# Patient Record
Sex: FEMALE | Race: WHITE | NOT HISPANIC OR LATINO | Employment: OTHER | URBAN - METROPOLITAN AREA
[De-identification: names, ages, dates, MRNs, and addresses within clinical notes are randomized per-mention and may not be internally consistent; named-entity substitution may affect disease eponyms.]

---

## 2017-01-13 ENCOUNTER — OFFICE VISIT (OUTPATIENT)
Dept: PLASTIC SURGERY | Facility: CLINIC | Age: 57
End: 2017-01-13
Payer: MEDICARE

## 2017-01-13 DIAGNOSIS — Z09 SURGERY FOLLOW-UP EXAMINATION: Primary | ICD-10-CM

## 2017-01-13 PROCEDURE — 99024 POSTOP FOLLOW-UP VISIT: CPT | Mod: S$GLB,,, | Performed by: PHYSICIAN ASSISTANT

## 2017-01-13 PROCEDURE — 99999 PR PBB SHADOW E&M-EST. PATIENT-LVL III: CPT | Mod: PBBFAC,,, | Performed by: PHYSICIAN ASSISTANT

## 2017-01-13 NOTE — PROGRESS NOTES
Negrita Castro presents [facility] on 1[facility]13/2017 for a follow up visit status post R breast expansion     Current Outpatient Prescriptions on File Prior to Visit   Medication Sig Dispense Refill    carisoprodol (SOMA) 350 MG tablet Take 350 mg by mouth 3 (three) times daily as needed.   0    cyclobenzaprine (FLEXERIL) 10 MG tablet       diazepam (VALIUM) 10 MG tablet Take 10 mg by mouth every 6 (six) hours as needed.       dronabinol (MARINOL) 2.5 MG capsule Take 2.5 mg by mouth 2 (two) times daily before meals.      enalapril (VASOTEC) 2.5 MG tablet Take 2.5 mg by mouth once daily. 1/2 TAB  1    glimepiride (AMARYL) 1 MG tablet Take 1 mg by mouth once daily.  1    hydrocodone-acetaminophen 10-325mg (NORCO)  mg Tab Take 1 tablet by mouth 4 (four) times daily as needed.  0    JANUVIA 100 mg Tab Take 100 mg by mouth once daily.   1    metformin (GLUCOPHAGE) 850 MG tablet Take 850 mg by mouth 2 (two) times daily.   1    ondansetron (ZOFRAN) 8 MG tablet   1    ondansetron (ZOFRAN-ODT) 8 MG TbDL Take 1 tablet (8 mg total) by mouth every 8 (eight) hours as needed. 10 tablet 1    oxycodone-acetaminophen (PERCOCET) 5-325 mg per tablet Take 1 tablet by mouth every 4 (four) hours as needed for Pain. 41 tablet 0    oxycodone-acetaminophen (PERCOCET) 7.5-325 mg per tablet Take 1 tablet by mouth every 4 (four) hours as needed for Pain. 40 tablet 0    oxycodone-acetaminophen (PERCOCET) 7.5-325 mg per tablet Take 1 tablet by mouth every 4 (four) hours as needed for Pain. 30 tablet 0    pravastatin (PRAVACHOL) 40 MG tablet Take 40 mg by mouth every evening.   1    promethazine (PHENERGAN) 25 MG tablet   1    sertraline (ZOLOFT) 100 MG tablet       silver sulfADIAZINE 1% (SILVADENE) 1 % cream APPLY TO AFFECTED AREA FOUR TIMES DAILY  1    triamcinolone acetonide 0.1% (KENALOG) 0.1 % ointment apply TO SKIN THREE TIMES DAILY  1    venlafaxine (EFFEXOR) 37.5 MG Tab TAKE 1 TABLET BY MOUTH TWICE  DAILY FOR hot flashes  3    zolpidem (AMBIEN) 10 mg Tab Take 10 mg by mouth nightly as needed.  0     No current facility-administered medications on file prior to visit.      Patient Active Problem List   Diagnosis    Breast cancer in situ    Breast cancer    Depression    Anxiety disorder due to known physiological condition    Dysthymic disorder    Malignant neoplasm of right female breast     Past Surgical History   Procedure Laterality Date    Shoulder surgery and wrist       BILAT  BONE SPUR    Wrist surgery       RIGHT    Breast biopsy       right    Breast surgery       11-3-15 LUMPECTOMY, 12-2-15 REEXCISION    Mastectomy 2016       Doing well today. Tolerated her last expansion well    50ccNS injected to R breast TE. Tolerated well. This will be her last expansion.     Scheduled for second stage recon the first week of March.     All questions were answered. The patient was advised to call the clinic with any questions or concerns prior to their next visit.

## 2017-01-25 ENCOUNTER — TELEPHONE (OUTPATIENT)
Dept: PLASTIC SURGERY | Facility: CLINIC | Age: 57
End: 2017-01-25

## 2017-01-25 DIAGNOSIS — Z85.3 PERSONAL HISTORY OF MALIGNANT NEOPLASM OF BREAST: Primary | ICD-10-CM

## 2017-01-26 NOTE — PRE ADMISSION SCREENING
Anesthesia Assessment: Preoperative EQUATION    Planned Procedure: Procedure(s) (LRB):  HEMANTH FREE FLAP (Bilateral)  Requested Anesthesia Type:General  Surgeon: Xiang Hernandez MD  Service: Plastics  Known or anticipated Date of Surgery:2/14/2017    Surgeon notes: reviewed and Personal history of malignant neoplasm of breast    Electronic QUestionnaire Assessment completed via nurse interview with patient.        Negrita Castro [[id number]] - 56 y.o. Female        Providers Outside of [facility]      No data to display       Surgical Risk Level      Surgical Risk Level:  3           caRDScore (Clinical Anesthesia Rapid Decision Score)        Moderate  Total Score: 16      16 Sum of Clinical Scores       caRDScores (Grouped)      caRDScore - Ane:  4                caRDScore - CVD:  1                caRDScore - Pul:  2                caRDScore - Met:  5                caRDScore - Phy:  4           caRDScore Items           Pre-admit from 2/14/2017 in [facility]     Anesthesia      Had IM or IV steroid injections -2 or more in last 6 mo as outpt or in-patient  Yes [during chemo-last dose 9/2016]     Uses non-conventional drugs  Yes [medical marijuana]     Has degenerative arthritis causing severely limited neck movement  No [bulging disk in neck-mild pain sometimes]     Has chest pain or heaviness at times  Yes [from breast expander]     Currently taking tranquilizers or sleep medications on a regular basis  Yes     Has panic attacks  Yes [none recently]     CVD      Activity similar to best ability for maximal activity or exercise  Has a physical disability that limits assessment of max activity, METS 3     Pulmonary      Total smoking adds up to 20 - 40 years  Yes     Metabolic      Is on Rx for depression or bipolar disease  Yes     Type 2 Diabetes  Yes     Is on oral Rx and/or non-insulin injectable for diabetes  Yes     Has hyperlipoproteinemia  Yes     Physiologic      Problems with memory   Yes [mild]     Had major surgery for Ca with resection of vital organ tissue (Brain, Lung, Liver, Kidney-please specify)  Yes [s/p breast cancer surgery]     Has Hx of seizures requiring chronic medication  Yes [last seizure 20yrs. ago from fall-head injury-no meds now]       Flags      Red Flag Score:  0                Yellow Flag Score:  7           Red Flags      No data to display       Yellow Flags           Pre-admit from 2/14/2017 in [facility]     Has had surgery within the last 3 months  Yes     Has had steroids in the last year  Yes     Takes herbal medications or vitamin supplements  Yes [will stop x7 days prior to sx]     Is or has been a Smoker  Yes     Has degenerative arthritis causing severely limited neck movement  No [bulging disk in neck-mild pain sometimes]     Has Hx of seizures requiring chronic medication  Yes [last seizure 20yrs. ago from fall-head injury-no meds now]     Has pain  Yes       PONV Risk Score (assumes periop narcotic use = +1, Max=4)      PONV Risk Score:  3           PONV Risk Factors  Total Score: 2      1 Female     1 Non-Smoker at present       Sleep Apnea  Total Score: 0        NERY STOP-Bang Risk Factors (Max=8)  Total Score: 3      1 Has loud snoring     1 Observed to have interrupted breathing during sleep     1 Age >50       NERY Risk Level - 1 (Low), 2 (Moderate), 3 (High)      NERY Risk Level:  2           RCRI (Revised Cardiac Risk Indices of ACC/AHA guidelines, Max=6)  Total Score: 0        CAD Risk Factors  Total Score: 3      1 Total smoking adds up to 20 - 40 years     1 Has Diabetes     1 Has hyperlipoproteinemia       CHADS Score if applicable (history of atrial fib/flutter, Max=6)  Total Score: 1      1 Has Diabetes       Maximal Exercise Capacity           Pre-admit from 2/14/2017 in [facility]     Maximal Exercise Capacity  Has a physical disability that limits assessment of max activity, METS 3       Summary of  Dependence  Total Score: 1      1 Is totally independent of others for activities of daily living       Phone Fraility Score (Max = 17)  Total Score: 3      1 Has had 1 hospitalization in last year     1 Uses 5 or more meds on reg basis     1 Recently, often feels sad or depressed       Pain Factors           Pre-admit from 2/14/2017 in [facility]     Has pain  Yes     Location and description of pain  -- [lower back-L4-L5-degenerative disk]     Typical Pain Scores  7 to 10     Depends on strong Rx (opioids, adjunctive meds)  Yes     Uses one of the following medications:  -- [[place]]     On opioid medication for greater than 90 days  Yes       Risk Triggers (Evidence-Based Risk Triggers)        Pulmonary Risk Triggers  Total Score: 1      1 Total smoking adds up to 20 - 40 years       Renal Risk Triggers  Total Score: 1      1 Type 2 Diabetes       Delirium Risk Triggers  Total Score: 1      1 Problems with memory       Urologic Risk Triggers  Total Score: 0        Logistics        Pre-op Clinic Logistics  Total Score: 8      1 Has had 1 hospitalization in last year     1 Has had surgery within the last 3 months     2 Takes herbal medications or vitamin supplements     2 Problems with memory     1 Has had anesthesia, either as adult or as a child     1 Has chronic pain / depends on strong Rx (opioids, adjunctive meds)       DOSC Logistics  Total Score: 6      2 Problems with memory     3 Has a functional chemotherapy port     1 Has peripheral neuropathy       Discharge Logistics  Total Score: 8      1 Functional capacity limit: METS 3     2 Problems with memory     2 Uses non-conventional drugs     1 Has peripheral neuropathy     2 Recently, often feels sad or depressed       Discharge Planning           Pre-admit from 2/14/2017 in [facility]     Discharge Planning      Will assist patient 24/7, if needed  -- [[phone]]       Anes & Int Med <For office use  only>      Total Score: 18        Surgical Risk Level Assessment                 Triage considerations:     The patient has no apparent active cardiac condition (No unstable coronary Syndrome such as severe unstable angina or recent [<1 month] myocardial infarction, decompensated CHF, severe valvular   disease or significant arrhythmia)    Previous anesthesia records:11/15/16-Removal Port-A-Cath left side-MAC- Airway/Jaw/Neck:  Airway Findings: Mouth Opening: Normal Tongue: Normal General Airway Assessment: Adult, Good Mallampati: I TM Distance: 4-6 cm    -No PONV-no apparent anesthetic complications and tolerated procedure well  Anesthesia Hx:  Denies Family Hx of Anesthesia complications. Denies Personal Hx of Anesthesia complications.     3/21/72-Tevnxmmsu-Pegf-A-Cath-left chest-GeneralAirway/Jaw/Neck:  Airway Findings: Mouth Opening: Normal Tongue: Normal General Airway Assessment: Adult, Good Mallampati: I TM Distance: 4-6 cm-No PONV   -no apparent anesthetic complications, tolerated procedure well and no evidence of recall    Anesthesia Hx:  Denies Family Hx of Anesthesia complications. Denies Personal Hx of Anesthesia complications.       Last PCP note: within 3 months , within [facility] , focused visit   Subspecialty notes: Hematology/Oncology    Other important co-morbidities:   Diagnosis    Breast cancer in situ    Breast cancer    Depression    Anxiety disorder due to known physiological condition    Dysthymic disorder    Malignant neoplasm of right female breast             Past Surgical History   Procedure Laterality Date    Shoulder surgery and wrist           BILAT BONE SPUR    Wrist surgery           RIGHT    Breast biopsy           right    Breast surgery           11-3-15 LUMPECTOMY, 12-2-15 REEXCISION    Mastectomy 2016               Tests already available:  Results have been reviewed.CXR-1/10/14/  EKG-11/2/15/            Instructions given. (See in Nurse's note)    Optimization:   Anesthesia Preop Clinic Assessment  Indicated    Medical Opinion Indicated           Plan:    Testing:  Hematology Profile   Pre-anesthesia  visit       Visit focus: concerns in complex and/or prolonged anesthesia, acute or chronic anxiety concerns     Consultation:IM Perioperative Hospitalist     Patient  has previously scheduled Medical Appointment:Appointment on 2/2/17, prior to surgery date.    Navigation: Tests Scheduled. Lab-Hem Profile on 2/2/17, @ 12:30p             Consults scheduled.POC & Perioperative IM Consult on 2/2/17 @ 1p & 2p             Results will be tracked by Preop Clinic.               Gayle Muniz RN 1/27/17

## 2017-01-27 ENCOUNTER — ANESTHESIA EVENT (OUTPATIENT)
Dept: SURGERY | Facility: HOSPITAL | Age: 57
DRG: 585 | End: 2017-01-27
Payer: MEDICARE

## 2017-01-27 NOTE — ANESTHESIA PREPROCEDURE EVALUATION
Anesthesia Assessment: Preoperative EQUATION    Planned Procedure: Procedure(s) (LRB):  HEMANTH FREE FLAP (Bilateral)  Requested Anesthesia Type:General  Surgeon: Xiang Hernandez MD  Service: Plastics  Known or anticipated Date of Surgery:2/14/2017    Surgeon notes: reviewed and Personal history of malignant neoplasm of breast    Electronic QUestionnaire Assessment completed via nurse interview with patient.        Negrita Castro [[id number]] - 56 y.o. Female        Providers Outside of [facility]      No data to display       Surgical Risk Level      Surgical Risk Level:  3           caRDScore (Clinical Anesthesia Rapid Decision Score)        Moderate  Total Score: 16      16 Sum of Clinical Scores       caRDScores (Grouped)      caRDScore - Ane:  4                caRDScore - CVD:  1                caRDScore - Pul:  2                caRDScore - Met:  5                caRDScore - Phy:  4           caRDScore Items           Pre-admit from 2/14/2017 in [facility]     Anesthesia      Had IM or IV steroid injections -2 or more in last 6 mo as outpt or in-patient  Yes [during chemo-last dose 9/2016]     Uses non-conventional drugs  Yes [medical marijuana]     Has degenerative arthritis causing severely limited neck movement  No [bulging disk in neck-mild pain sometimes]     Has chest pain or heaviness at times  Yes [from breast expander]     Currently taking tranquilizers or sleep medications on a regular basis  Yes     Has panic attacks  Yes [none recently]     CVD      Activity similar to best ability for maximal activity or exercise  Has a physical disability that limits assessment of max activity, METS 3     Pulmonary      Total smoking adds up to 20 - 40 years  Yes     Metabolic      Is on Rx for depression or bipolar disease  Yes     Type 2 Diabetes  Yes     Is on oral Rx and/or non-insulin injectable for diabetes  Yes     Has hyperlipoproteinemia  Yes     Physiologic      Problems with memory   Yes [mild]     Had major surgery for Ca with resection of vital organ tissue (Brain, Lung, Liver, Kidney-please specify)  Yes [s/p breast cancer surgery]     Has Hx of seizures requiring chronic medication  Yes [last seizure 20yrs. ago from fall-head injury-no meds now]       Flags      Red Flag Score:  0                Yellow Flag Score:  7           Red Flags      No data to display       Yellow Flags           Pre-admit from 2/14/2017 in [facility]     Has had surgery within the last 3 months  Yes     Has had steroids in the last year  Yes     Takes herbal medications or vitamin supplements  Yes [will stop x7 days prior to sx]     Is or has been a Smoker  Yes     Has degenerative arthritis causing severely limited neck movement  No [bulging disk in neck-mild pain sometimes]     Has Hx of seizures requiring chronic medication  Yes [last seizure 20yrs. ago from fall-head injury-no meds now]     Has pain  Yes       PONV Risk Score (assumes periop narcotic use = +1, Max=4)      PONV Risk Score:  3           PONV Risk Factors  Total Score: 2      1 Female     1 Non-Smoker at present       Sleep Apnea  Total Score: 0        NERY STOP-Bang Risk Factors (Max=8)  Total Score: 3      1 Has loud snoring     1 Observed to have interrupted breathing during sleep     1 Age >50       NERY Risk Level - 1 (Low), 2 (Moderate), 3 (High)      NERY Risk Level:  2           RCRI (Revised Cardiac Risk Indices of ACC/AHA guidelines, Max=6)  Total Score: 0        CAD Risk Factors  Total Score: 3      1 Total smoking adds up to 20 - 40 years     1 Has Diabetes     1 Has hyperlipoproteinemia       CHADS Score if applicable (history of atrial fib/flutter, Max=6)  Total Score: 1      1 Has Diabetes       Maximal Exercise Capacity           Pre-admit from 2/14/2017 in [facility]     Maximal Exercise Capacity  Has a physical disability that limits assessment of max activity, METS 3       Summary of  Dependence  Total Score: 1      1 Is totally independent of others for activities of daily living       Phone Fraility Score (Max = 17)  Total Score: 3      1 Has had 1 hospitalization in last year     1 Uses 5 or more meds on reg basis     1 Recently, often feels sad or depressed       Pain Factors           Pre-admit from 2/14/2017 in [facility]     Has pain  Yes     Location and description of pain  -- [lower back-L4-L5-degenerative disk]     Typical Pain Scores  7 to 10     Depends on strong Rx (opioids, adjunctive meds)  Yes     Uses one of the following medications:  -- [[place]]     On opioid medication for greater than 90 days  Yes       Risk Triggers (Evidence-Based Risk Triggers)        Pulmonary Risk Triggers  Total Score: 1      1 Total smoking adds up to 20 - 40 years       Renal Risk Triggers  Total Score: 1      1 Type 2 Diabetes       Delirium Risk Triggers  Total Score: 1      1 Problems with memory       Urologic Risk Triggers  Total Score: 0        Logistics        Pre-op Clinic Logistics  Total Score: 8      1 Has had 1 hospitalization in last year     1 Has had surgery within the last 3 months     2 Takes herbal medications or vitamin supplements     2 Problems with memory     1 Has had anesthesia, either as adult or as a child     1 Has chronic pain / depends on strong Rx (opioids, adjunctive meds)       DOSC Logistics  Total Score: 6      2 Problems with memory     3 Has a functional chemotherapy port     1 Has peripheral neuropathy       Discharge Logistics  Total Score: 8      1 Functional capacity limit: METS 3     2 Problems with memory     2 Uses non-conventional drugs     1 Has peripheral neuropathy     2 Recently, often feels sad or depressed       Discharge Planning           Pre-admit from 2/14/2017 in [facility]     Discharge Planning      Will assist patient 24/7, if needed  -- [[phone]]       Anes & Int Med <For office use  only>      Total Score: 18        Surgical Risk Level Assessment                 Triage considerations:     The patient has no apparent active cardiac condition (No unstable coronary Syndrome such as severe unstable angina or recent [<1 month] myocardial infarction, decompensated CHF, severe valvular   disease or significant arrhythmia)    Previous anesthesia records:11/15/16-Removal Port-A-Cath left side-MAC- Airway/Jaw/Neck:  Airway Findings: Mouth Opening: Normal Tongue: Normal General Airway Assessment: Adult, Good Mallampati: I TM Distance: 4-6 cm    -No PONV-no apparent anesthetic complications and tolerated procedure well  Anesthesia Hx:  Denies Family Hx of Anesthesia complications. Denies Personal Hx of Anesthesia complications.     3/21/19-Idcqiacnk-Yycm-A-Cath-left chest-GeneralAirway/Jaw/Neck:  Airway Findings: Mouth Opening: Normal Tongue: Normal General Airway Assessment: Adult, Good Mallampati: I TM Distance: 4-6 cm-No PONV   -no apparent anesthetic complications, tolerated procedure well and no evidence of recall    Anesthesia Hx:  Denies Family Hx of Anesthesia complications. Denies Personal Hx of Anesthesia complications.       Last PCP note: within 3 months , within [facility] , focused visit   Subspecialty notes: Hematology/Oncology    Other important co-morbidities:   Diagnosis    Breast cancer in situ    Breast cancer    Depression    Anxiety disorder due to known physiological condition    Dysthymic disorder    Malignant neoplasm of right female breast             Past Surgical History   Procedure Laterality Date    Shoulder surgery and wrist           BILAT BONE SPUR    Wrist surgery           RIGHT    Breast biopsy           right    Breast surgery           11-3-15 LUMPECTOMY, 12-2-15 REEXCISION    Mastectomy 2016               Tests already available:  Results have been reviewed.CXR-1/10/14/  EKG-11/2/15/            Instructions given. (See in Nurse's note)    Optimization:   Anesthesia Preop Clinic Assessment  Indicated    Medical Opinion Indicated           Plan:    Testing:  Hematology Profile   Pre-anesthesia  visit       Visit focus: concerns in complex and/or prolonged anesthesia, acute or chronic anxiety concerns     Consultation:IM Perioperative Hospitalist     Patient  has previously scheduled Medical Appointment:Appointment on 2/2/17, prior to surgery date.    Navigation: Tests Scheduled. Lab-Hem Profile on 2/2/17, @ 12:30p             Consults scheduled.POC & Perioperative IM Consult on 2/2/17 @ 1p & 2p             Results will be tracked by Preop Clinic.               Gayle Muniz RN 1/27/17 01/27/2017  Negrita Castro is a 56 y.o., female with depression, anxiety, DM II (poorly controlled since on steroids, A1C 9, on renal protection with ACEi), obesity, and breast cancer s/p mastectomy/chemo, neuropathy from chemo here for bilateral adan flap.    Preop  -- will give SSI to cover.    TTE 3/2016:    CONCLUSIONS     1 - Concentric remodeling.     2 - Hyperdynamic left ventricular systolic function (EF 65-70%).     3 - Normal left ventricular diastolic function.     4 - Normal right ventricular systolic function .     5 - The estimated PA systolic pressure is 16 mmHg.     6 - Difficult apical window, poor endocardial visualization.     [facility] Anesthesia Evaluation      I have reviewed the Medications.   Steroids Taken In Past Year:     Review of Systems  Anesthesia Hx:  History of prior surgery of interest to airway management or planning: Previous anesthesia: MAC  11/2016: Port-removal with MAC.  Denies Family Hx of Anesthesia complications.   Denies Personal Hx of Anesthesia complications.   Hematology/Oncology:         -- Cancer in past history: s/p chemotherapy and s/p surgery  Oncology Comments: Right breast cancer 10/2015: last chemo 9/2016; radiation-  8/2016    Uses medical marijuana for nausea and vomiting     Cardiovascular:  Functional Capacity good / => 4 METS, pt. was able to walk from garage to POC : denies CP/SOB    Pulmonary:   Denies Asthma.  Denies Sleep Apnea.    Renal/:  Renal/ Normal     Hepatic/GI:   Denies GERD.    Neurological:   Peripheral Neuropathy  Seizure Disorder Last seizure 20 years ago   Endocrine:  Diabetes, Type 2 Diabetes , controlled by oral hypoglycemics. , most recent HgA1c value was 9.1 on 2/2/17.  Metabolic Disorders, Hyperlipoproteinemia  Psych:  Anxiety Disorder.  Depression.          The patient was seen by Perioperative Internal Medicine physician Dr. Mc on 2/2/17, please see recommendations.    Luz Frazier RN      Physical Exam  General:  Obesity    Airway/Jaw/Neck:  Airway Findings: Mouth Opening: Normal Tongue: Normal  General Airway Assessment: Average, Adult  Mallampati: II  Jaw/Neck Findings:     Neck ROM: Normal ROM      Dental:  Dental Findings: In tact   Chest/Lungs:  Chest/Lungs Findings: Clear to auscultation, Normal Respiratory Rate     Heart/Vascular:  Heart Findings: Rate: Normal  Rhythm: Regular Rhythm        Mental Status:  Mental Status Findings:  Cooperative, Alert and Oriented         Anesthesia Plan  Type of Anesthesia, risks & benefits discussed:  Anesthesia Type:  general  Patient's Preference:   Intra-op Monitoring Plan: standard ASA monitors  Intra-op Monitoring Plan Comments:   Post Op Pain Control Plan:   Post Op Pain Control Plan Comments:   Induction:   IV  Beta Blocker:  Patient is not currently on a Beta-Blocker (No further documentation required).       Informed Consent: Patient understands risks and agrees with Anesthesia plan.  Questions answered. Anesthesia consent signed with patient.  ASA Score: 2     Day of Surgery Review of History & Physical:    H&P update referred to the surgeon.         Ready For Surgery From Anesthesia Perspective.

## 2017-02-02 ENCOUNTER — HOSPITAL ENCOUNTER (OUTPATIENT)
Dept: RADIOLOGY | Facility: OTHER | Age: 57
Discharge: HOME | End: 2017-02-02
Attending: PLASTIC SURGERY
Payer: MEDICARE

## 2017-02-02 ENCOUNTER — INITIAL CONSULT (OUTPATIENT)
Dept: INTERNAL MEDICINE | Facility: CLINIC | Age: 57
End: 2017-02-02
Payer: MEDICARE

## 2017-02-02 ENCOUNTER — HOSPITAL ENCOUNTER (OUTPATIENT)
Dept: PREADMISSION TESTING | Facility: HOSPITAL | Age: 57
Discharge: HOME | End: 2017-02-02
Attending: ANESTHESIOLOGY
Payer: MEDICARE

## 2017-02-02 VITALS
WEIGHT: 201.94 LBS | HEIGHT: 71 IN | BODY MASS INDEX: 28.27 KG/M2 | HEART RATE: 71 BPM | SYSTOLIC BLOOD PRESSURE: 142 MMHG | DIASTOLIC BLOOD PRESSURE: 87 MMHG | OXYGEN SATURATION: 98 %

## 2017-02-02 DIAGNOSIS — Z85.3 HISTORY OF BREAST CANCER: ICD-10-CM

## 2017-02-02 DIAGNOSIS — C50.919 BREAST CA: ICD-10-CM

## 2017-02-02 DIAGNOSIS — R74.8 ELEVATED ALKALINE PHOSPHATASE LEVEL: ICD-10-CM

## 2017-02-02 DIAGNOSIS — L81.8 TATTOO: ICD-10-CM

## 2017-02-02 DIAGNOSIS — Z01.818 PREOP EXAMINATION: Primary | ICD-10-CM

## 2017-02-02 DIAGNOSIS — E66.3 OVER WEIGHT: ICD-10-CM

## 2017-02-02 DIAGNOSIS — G62.9 NEUROPATHY: ICD-10-CM

## 2017-02-02 DIAGNOSIS — R07.89 ATYPICAL CHEST PAIN: ICD-10-CM

## 2017-02-02 DIAGNOSIS — E11.9 TYPE 2 DIABETES MELLITUS WITHOUT COMPLICATION, WITHOUT LONG-TERM CURRENT USE OF INSULIN: ICD-10-CM

## 2017-02-02 PROCEDURE — 99214 OFFICE O/P EST MOD 30 MIN: CPT | Mod: S$GLB,,, | Performed by: HOSPITALIST

## 2017-02-02 PROCEDURE — 73706 CT ANGIO LWR EXTR W/O&W/DYE: CPT | Mod: TC

## 2017-02-02 PROCEDURE — 2022F DILAT RTA XM EVC RTNOPTHY: CPT | Mod: S$GLB,,, | Performed by: HOSPITALIST

## 2017-02-02 PROCEDURE — 99999 PR PBB SHADOW E&M-EST. PATIENT-LVL II: CPT | Mod: PBBFAC,,, | Performed by: HOSPITALIST

## 2017-02-02 PROCEDURE — 74174 CTA ABD&PLVS W/CONTRAST: CPT | Mod: TC

## 2017-02-02 PROCEDURE — 3046F HEMOGLOBIN A1C LEVEL >9.0%: CPT | Mod: S$GLB,,, | Performed by: HOSPITALIST

## 2017-02-02 PROCEDURE — 4010F ACE/ARB THERAPY RXD/TAKEN: CPT | Mod: S$GLB,,, | Performed by: HOSPITALIST

## 2017-02-02 PROCEDURE — 25500020 PHARM REV CODE 255: Performed by: PLASTIC SURGERY

## 2017-02-02 PROCEDURE — 74174 CTA ABD&PLVS W/CONTRAST: CPT | Mod: 26,,, | Performed by: RADIOLOGY

## 2017-02-02 RX ADMIN — IOHEXOL 100 ML: 350 INJECTION, SOLUTION INTRAVENOUS at 04:02

## 2017-02-02 NOTE — IP AVS SNAPSHOT
[facility]  [address]  [facility] [address]  Phone: [phone]           Patient Discharge Instructions    Our goal is to set you up for success. This packet includes information on your condition, medications, and your home care. It will help you to care for yourself so you don't get sicker.     Please ask your nurse if you have any questions.        There are many details to remember when preparing for your surgery. Here is what you will need to do, please ask your nurse if there are more specific instructions and if you have any questions:    1. 24 hours before procedure Do not smoke or drink alcoholic beverages 24 hours prior to your procedure    2. Eating before procedure Do not eat or drink anything 8 hours before your procedure - this includes gum, mints, and candy.     3. Day of procedure Please remove all jewelry for the procedure. If you wear contact lenses, dentures, hearing aids or glasses, bring a container to put them in during your surgery and give to a family member for safekeeping.  If your doctor has scheduled you for an overnight stay, bring a small overnight bag with any personal items that you need.    4. After procedure Make arrangements in advance for transportation home by a responsible adult. It is not safe to drive a vehicle during the 24 hours following surgery.     PLEASE NOTE: You may be contacted the day before your surgery to confirm your surgery date and arrival time. The Surgery schedule has many variables which may affect the time of your surgery case. Family members should be available if your surgery time changes.                ** Verify the list of medication(s) below is accurate and up to date. Carry this with you in case of emergency. If your medications have changed, please notify your healthcare provider.             Medication List      Notice     Cannot display patient medications because the patient has not yet been checked in.                Please bring to all follow up appointments:    1. A copy of your discharge instructions.  2. All medicines you are currently taking in their original bottles.  3. Identification and insurance card.    Please arrive 15 minutes ahead of scheduled appointment time.    Please call 24 hours in advance if you must reschedule your appointment and/or time.        Your Scheduled Appointments     Feb 02, 2017  2:00 PM CST   Pre-Admit Testing Visit with Luz Frazier RN   [facility]    [address] [address] [phone]            Feb 02, 2017  3:00 PM CST   Ct Cta with [facility] CT OP LIMIT 450 LBS   [facility]    [address]  [place] [address] [phone]            Feb 02, 2017  3:30 PM CST   Ct Cta with [facility] CT OP LIMIT 450 LBS   [facility]    [address]  [place] [address] [phone]              Your Future Surgeries/Procedures     Feb 14, 2017   Surgery with Xiang Hernandez MD   [facility]    [facility] [address] [phone]                  Discharge Instructions       Your surgery has been scheduled for:__________________________________________    You should report to:  ____[facility], located on the [place] side of the first floor of the           [facility] ([phone])  ____[facility], located on the [facility] side of the            Second floor of the [facility] ([phone])  ____3rd Floor [facility] located on the [facility] side of the [facility] [phone]  Please Note   - Tell your doctor if you take Aspirin, products containing Aspirin, herbal medications  or blood thinners, such as Coumadin, Ticlid, or Plavix.  (Consult your provider regarding  holding or stopping before surgery).  - Arrange for someone to drive you home following surgery.  You will not be allowed to leave the surgical facility alone or drive yourself home following sedation and anesthesia.  Before Surgery  - Stop taking all herbal medications 14days prior to surgery  - No Motrin/Advil (Ibuprofen) 7 days before surgery  - No Aleve (Naproxen) 7 days before surgery  - Stop Taking Asprin, products containing Asprin _____days before surgery  - Stop taking blood thinners_______days before surgery  - Refrain from drinking alcoholic beverages for 24hours before and after surgery  - Stop or limit smoking _________days before surgery  Night before Surgery  - DO NOT EAT OR DRINK ANYTHING AFTER MIDNIGHT, INCLUDING GUM, HARD CANDY, MINTS, OR CHEWING TOBACCO.  - Take a shower or bath (shower is recommended).  Bathe with Hibiclens soap or an antibacterial soap from the neck down.  If not supplied by your surgeon, hibiclens soap will need to be purchased over the counter in pharmacy.  Rinse soap off thoroughly.  - Shampoo your hair with your regular shampoo  The Day of Surgery  - Take another bath or shower with hibiclens or any antibacterial soap, to reduce the chance of infection.  - Take heart and blood pressure medications with a small sip of water, as advised by the perioperative team.  - Do not take fluid pills  - You may brush your teeth and rinse your mouth, but do not swall any additional water.   - Do not apply perfumes, powder, body lotions or deodorant on the day of surgery.  - Nail polish should be removed.  - Do not wear makeup or moisturizer  - Wear comfortable clothes, such as a button front shirt and loose fitting pants.  - Leave all jewelry, including body piercings, and valuables at home.    - Bring any devices you will neeed after surgery such as crutches or canes.  - If you have sleep apnea, please bring your CPAP machine  In the event that your physical condition changes including  the onset of a cold or respiratory illness, or if you have to delay or cancel your surgery, please notify your surgeon.  Anesthesia: General Anesthesia  Youre due to have surgery. During surgery, youll be given medication called anesthesia. (It is also called anesthetic.) This will keep you comfortable and pain-free. Your surgeon will use general anesthesia. This sheet tells you more about it.    What Is General Anesthesia?  General anesthesia puts you into a state like deep sleep. It goes into the bloodstream (IV anesthetics), into the lungs (gas anesthetics), or both. You feel nothing during the procedure. You will not remember it. During the procedure, the anesthesia provider monitors you. He or she checks your heart rate and rhythm, blood pressure, and blood oxygen.  · IV Anesthetics  IV anesthetics are given through an IV line in your arm. Theyre often given first. This is so you are asleep before a gas anesthetic is started. Some kinds of IV anesthetics relieve pain. Others relax you. Your doctor will decide which kind is best in your case.  · Gas Anesthetics  Gas anesthetics are breathed into the lungs. They are often used to keep you asleep. They can be given through a facemask, an endotracheal tube, or a laryngeal mask airway.  ¨ If you have a facemask, your anesthesia provider will most likely place it over your nose and mouth while youre still awake. Youll breathe oxygen through the mask as your IV anesthetic is started. Gas anesthetic may be added through the mask.  ¨ If you have an endotracheal tube or a laryngeal mask airway, it will be inserted into your throat after youre asleep.  Anesthesia Tools and Medications  You will likely have:  · IV anesthetics sent through an IV line into your bloodstream.  · Gas anesthetics breathed into your lungs, where they pass into your bloodstream.  · A pulse oximeter on the end of your finger. This measures your blood oxygen level.  · Electrocardiography leads  (electrodes) on your chest. These record your heart rate and rhythm.  · A blood pressure cuff. This reads your blood pressure.  Risks and Possible Complications  General anesthesia has some risks. These include:  · Breathing problems  · Nausea and vomiting  · Sore throat or hoarseness  · Allergic reaction to the anesthetic  · Ongoing numbness (rare)  · Irregular heartbeat (rare)  · Cardiac arrest (rare)   Anesthesia Safety  · Follow all instructions you are given for how long not to eat or drink before your procedure.  · Be sure your doctor knows what medications you take. This includes over-the-counter medications, herbs, and supplements.  · Have an adult family member or friend drive you home after the procedure.  · For the first 24 hours after your surgery:  ¨ Do not drive or use heavy equipment.  ¨ Do not make important decisions or sign documents.  ¨ Avoid alcohol.  ¨ Have someone stay with you, if possible. They can watch for problems and help keep you safe.  © 7394-9967 [employer] [facility], [address] [place] [address]. All rights reserved. This information is not intended as a substitute for professional medical care. Always follow your healthcare professional's instructions.    Discharge References/Attachments     PAIN AT HOME, MANAGING POST-OP: NON-MEDICATION RELIEF (ENGLISH)    PAIN MANAGEMENT AFTER SURGERY (ENGLISH)        Admission Information     Date & Time Department CSN    2/2/2017  2:00 PM [facility] [id number]      Care Providers     Not on file      Your Vitals Were     Last Period                   01/16/2016           Recent Lab Values     No lab values to display.      Allergies as of 2/2/2017        Reactions    Adhesive Tape-silicones Hives    BANDAIDS    Polymycin Hives      Advance Directives     An advance directive is a document which, in the event you are no longer able to make decisions for yourself, tells your healthcare team what kind of treatment you do or  do not want to receive, or who you would like to make those decisions for you.  If you do not currently have an advance directive, [facility] encourages you to create one.  For more information call:  [phone]), [phone] ([phone]),  or log on to [email or web address].        Smoking Cessation     If you would like to quit smoking:   You may be eligible for free services if you are a [place] resident and started smoking cigarettes before September 1, 1988.  Call the Smoking Cessation Trust (SCT) toll free at (194) 768-8338 or (427) 594-6103.   Call 0-982-QUIT-NOW if you do not meet the above criteria.            Language Assistance Services     ATTENTION: Language assistance services are available, free of charge. Please call [phone].      ATENCIÓN: Si habla español, tiene a smart disposición servicios gratuitos de asistencia lingüística. Llame al [phone].     [facility] Ý: N?u b?n nói Ti?ng Vi?t, có các d?ch v? h? tr? ngôn ng? mi?n phí dành cho b?n. G?i s? [phone].        [facility] Sign-Up     Activating your [facility] account is as easy as 1-2-3!     1) Visit [email or web address], select Sign Up Now, enter this activation code and your date of birth, then select Next.  [email or web address]  Expires: 3/19/2017  1:21 PM      2) Create a username and password to use when you visit [facility] in the future and select a security question in case you lose your password and select Next.    3) Enter your e-mail address and click Sign Up!    Additional Information  If you have questions, please e-mail [email or web address] or call [phone] to talk to our [facility] staff. Remember, [facility] is NOT to be used for urgent needs. For medical emergencies, dial 911.          [facility] complies with applicable Federal civil rights laws and does not discriminate on the basis of race, color, national origin, age, disability, or sex.

## 2017-02-02 NOTE — LETTER
February 2, 2017      Mary Jo Hilton MD  [address] [address]           [facility] - Pre Op Consult  [address]  1st Floor [address]rium  [facility] [address]  Phone: [phone]          Patient: Negrita Castro   MR Number: [id number]   YOB: 1960   Date of Visit: 2/2/2017       Dear Dr. Mary Jo Hilton:    Thank you for referring Negrita Castro to me for evaluation. Attached you will find relevant portions of my assessment and plan of care.    If you have questions, please do not hesitate to call me. I look forward to following Negrita Castro along with you.    Sincerely,    Ludmila Mc MD    Enclosure  CC:  MD Xiang Dave MD    If you would like to receive this communication electronically, please contact [email or web address] or [phone] to request more information on [facility] access.    For providers and/or their staff who would like to refer a patient to [facility], please contact us through our one-stop-shop provider referral line, [facility], at [phone].    If you feel you have received this communication in error or would no longer like to receive these types of communications, please e-mail [email or web address]

## 2017-02-02 NOTE — PROGRESS NOTES
Chief complaint-Preoperative evaluation , Perioperative Medical management, complication reduction plan     Date of Evaluation- 02/02/2017    PCP-  Yoni Renteria MD    Future cases for Negrita Castro [[id number]]     Case ID Status Date Time Tomer Procedure Provider Location    [id number] [facility] 2/14/2017 12:00  HEMANTH FREE FLAP Xiang Hernandez MD [[id number]] [facility] OR 2ND FLR      Rt     History of present illness- I had the pleasure of meeting this pleasant 56 y.o. lady in the pre op clinic prior to her elective Breast surgery. The patient is new to me . Negrita was accompanied by friend Natanael.    I have obtained the history by speaking to the patient and by reviewing the electronic health records.    Events leading up to surgery / History of presenting illness -    Rt Breast cancer diagnosed october 2015  Mastectomy   Left breast reconstruction   No pain from this .No aggravating factors. No relieving factors     Relevant health conditions of significance for the perioperative period/ History of presenting illness -    Finished Chemo Sept 2016, Finished radiation Aug 2016   No recurrence    Type 2  Diabetes Mellitus  On treatment with oral agent Metformin  Was on Metformin , Januvia before chemo and had received steroid with chemo and due to hyperglycemia Metformin dose increased ( Jan 2017)  and sulfonylurea added ( during chemo )   Hemoglobin A1c- was 7 before was 8 Jan 2017  Capillary glucose check-none  Suggested CBG checks   Not drinking soft drinks  Due to Epidural steroid 2/8  Chemotherapy related neuropathy    Not known to have HTN,( taking Enalapril ) for renal protection , given Diabetes  No heart disease    Over weight   Suggested weight loss    Phantom pain Rt nipple area since removal  No radiation, on exposure to cold  Rt sided breast pain from expansion and since reduction of fluid pain resolved  Not on exertion, lasting for a few seconds  No associated sweating , nausea, vomiting     Metformin  related Diarrhea     Active cardiac conditions- none    Revised cardiac risk index predictors- None     Functional capacity -Examples of physical activity -   house work and  can take 1 flight of stairs----- She can undertake all the above activities without  chest pain,chest tightness, Shortness of breath ,dizziness,lightheadedness making her exercise tolerance more,than 4 Mets.     Review of symptoms    Constitutional -as above ,No fever  Eyes- No new vision changes  ENT- STOP BANG - age over 50   Cardiac-As above   Respiratory- No cough, expectoration and  no hemoptysis  GI- Bowel movements as above  No overt GI/ blood losses   LMP- before cancer   -No dysuria and  no urinary hesitancy   MS- No unusual muscle,Joint pain  Neurologic-No unilateral weakness   Psychiatric- no suicidal, homicidal ideation  and  Depression controlled  Endocrine-  Prednisone use for over 3 weeks -no  Hematological/Lymphatic-No spontaneous bruising, bleeding    Past Medical history- reviewed in EPIC-    Pertinent negatives-   DVT-  Pulmonary embolism-  Heart disease -  Vascular stenting -    Past Surgical history - reviewed in EPIC-    Most recent surgery jan 2016 breast  No Anaesthetic,Bleeding ,Cardiac problems with previous surgeries-  No history of delirium -  No history of post operative  nausea, vomiting -    Family history- reviewed in EPIC    Heart disease- none-    Thrombosis- None-  Anaesthetic complications- None-  Diabetes Mellitus - yes- maternal grand mother, siblings    Social history- reviewed in EPIC    Lives with friend  Help available post op     Medications and Allergies - reviewed in EPIC    Exam    Constitutional- General appearance-Conscious,Coherent  Eyes- No conjunctival icterus,pupils  round  and reactive to light   ENT-Oral cavity- moist  , Hearing grossly normal   Neck- No thyromegaly ,Trachea -central, No jugular venous distension, No Carotid Bruit   Cardiovascular -Heart Sounds- Normal  and  no murmur    , No gallop rhythm   Respiratory - Normal Respiratory Effort, Normal breath sounds and  no wheeze    Peripheral pitting pedal edema-- none , no calf pain   Gastrointestinal -Soft abdomen, No palpable masses, Non Tender,Liver,Spleen not palpable. No-- free fluid and shifting dullness  Musculoskeletal- No finger Clubbing. Strength grossly normal   Lymphatic-No Palpable cervical, axillary,Inguinal lymphadenopathy   Psychiatric - normal effect,Orientation  Rt Dorsalis pedis pulses-palpable    Lt Dorsalis pedis pulses- palpable   Rt Posterior tibial pulses -palpable   Left posterior tibial pulses -palpable   Miscellaneous -  no asterixis,  no dupuytren's contracture,  Surgical scarRt breast  ,  no suggestion of bacterial feet infection and  no renal bruit    Investigations    Review of Medicine tests    EKG- I had independently reviewed the EKG from--11/2/2015  It was reported to be showing     Sinus bradycardia  Otherwise normal ECG  No previous ECGs available    March 2016      1 - Concentric remodeling.     2 - Hyperdynamic left ventricular systolic function (EF 65-70%).     3 - Normal left ventricular diastolic function.     4 - Normal right ventricular systolic function .     5 - The estimated PA systolic pressure is 16 mmHg.     6 - Difficult apical window, poor endocardial visualization.     Stress test 2004   Negative for ischemia    Review of clinical lab tests-Date---3/21/2016  Creatinine-0.8  Date--today Hemoglobin--13.1 Platelet count--277    Review of old records- Was done and information gathered regards to events leading to surgery and health conditions of significance in the perioperative period      Assessment and plan    New problems to me      Preoperative cardiac risk assessment    The patient  does not have any active cardiac conditions . Revised cardiac risk index predictors-0 ---.Functional capacity  Is more than 4 Mets. She will be undergoing a breast procedure that carries a intermediate risk      The estimated risk of the rate of adverse cardiac outcomes   -0.4%     No further cardiac work up is indicated prior to proceeding with the surgery     Perioperative Medical management    Valium use   As needed up to 2 times a day   Habituation , fall risk discussed    Enalapril   I suggest holding Enalapril  on the morning of the surgery and can continue that  post operatively under blood pressure, electrolyte and renal function monitoring as long as they are acceptable.I suggest addressing pain control as uncontrolled pain can increased blood pressure     HLD-I  suggest continuation of statin during the entire perioperative period.    Type 2  Diabetes Mellitus  -I suggest monitoring the glucose in the perioperative period ( Before meals and bed time,if the patient is on oral feeds or every 6 hourly ,if the patient is NPO )  Blood glucose targets in hospitalized patients are ( Critical care patients 140-180 mg/dl, Med/Surg patients ( non critical care) 100-140 mg/dl, patients on continuous enteral or parenteral nutrition 140-180 mg/dl )  .Oral Hypoglycemic agents are generally avoided during the hospital stay . If glucose is consistently elevated ,I suggest using basal ,prandial Insulin regimen to control the glucose , as elevated glucose can be associated with adverse surgical out comes. Please consider involving Hospital Medicine or Endocrinology ,if any help is needed with Glucose control. Patient will be instructed based on the pre op clinic guidelines  about adjustment of diabetic treatment  considering the NPO status for Surgery   I had educated that uncontrolled DM can cause post op complications,risk of infection, wound healing problem,increased length of stay in hospital and its associated complications.I suggest exercise as much as possible and follow diabetic diet    Over weight   Suggested weight loss    Elevated alkaline phosphatase   No suggestion of symptomatic gall stone disease    Tattoo- no  history of hepatitis   No suggestion of hepatic decompensation    Atypical chest pain     Preventive perioperative care    Thromboembolic prophylaxis:  Her risk factors for thrombosis include surgical procedure and age.I suggest  thromboembolic prophylaxis ( mechanical/pharmacological, weighing the risk benefits of pharmacological agent use considering cherie procedural bleeding )  during the perioperative period.I suggested being active in the post operative period. I suggest  physical therapy, if felt appropriate  in the post operative period    Postoperative pulmonary complication prophylaxis-Risk factors for post operative pulmonary complications include proximity of the surgical site to the lungs- I suggest incentive Spirometry use,early ambulation and end tidal carbon dioxide  monitoring in the perioperative period.I suggested being active in the post operative period  . I suggest pain control so as to avoid diaphragmatic splinting due to pain leading to shallow breaths, atelectasis,pneumonia.I suggest physical therapy, if felt appropriate  in the post operative period    Renal complication prophylaxis-Risk factors for renal complications include Diabetes Mellitus . I suggest keeping her well hydrated .I suggested drinking 2 litre's of water a day     Surgical site Infection Prophylaxis-I  suggest appropriate antibiotic for Prophylaxis against Surgical site infections    Delirium prophylaxis-Risk factors - benzodiazepine use - I suggest avoidance / minimizing the use of  Benzodiazepines ( unless the patient has been taking it on a regular basis ),Anticholinergic medication,Antihistamines ( like  Benadryl).I suggest minimizing the use of opioid medication and use of IV tylenol,if it is appropriate. I suggest using the lowest possible dose of opioids for the shortest duration possible in the perioperative period. I suggest to Keep shades/blinds open during the day, lights off and shades closed at night to  "encourage normal sleep/wake cycle.I encourage the presence of the family member with the patient at all times, if at all possible as mental status changes can be picked up early by the family members and they help with reorientation. I encouraged the presence of family to help with orientation in the perioperative period. Benadryl avoidance suggested     This visit was focussed on Preoperative evaluation , Perioperative Medical management, complication reduction plans and I suggest that the patient follows up with the primary care ,relevant sub specialists for on going health care      I appreciate the opportunity to be involved in this  pleasant  lady's care.Please feel free to contact me if there were any questions about this consultation     Patient is -optimized- for the above     Dr SHAE Mc MD [facility] ( ),FACP   Perioperative Medicine  [facility]   Pager [phone]  ------------------    3/2- 1507     BS reading done in office was 249.   ---------------    2/2- 17 53     Visit Vitals    BP (!) 142/87  Comment: left only    Pulse 71    Ht 5' 11" (1.803 m)    Wt 91.6 kg (201 lb 15.1 oz)    LMP 01/16/2016    SpO2 98%    BMI 28.17 kg/m2     ? Limb alert - RUE    Called to follow up   Had not taken DM medication today   Feels fine  Hydration  CBG at bed time and at  1-2 am  ------------------    2/3 14 51    Pt called to report BS reading:       Yesterday when arrived home it was 174 and then at 1 it was 122.   This AM (w/o taking anything) it was 160     Called to follow up   Spoke to friend  Avoidance if salted food suggested   Suggested to follow diabetic diet      American Diabetes association targets - Capillary glucose Pre meal ( pre prandial) ,1-2 hr after beginning of the meal ( post prandial ) less than 180 were discussed    Call 2/6 with CBG readings  ---------------------    2/7   A1c is 9.1   Called to follow up on glucose   Left a message to call with CBG " readings  ------------------------    2/10- 9 47     Chem 2/2 Creat 0.9   Un controlled diabetes can cause problem with wound healing , given the plastic surgery   Spoke to friend, informed about the above   CBG- fasting 113-120- 160, 140   Bed time - 11 -12 PM- 160,150- - 5 Pm supper  Had steroid injection neck area- 2/8-    2/13- 12 42     Called to follow up  CBG pre breakfast , before supper 120-114-115 - 130-140

## 2017-02-02 NOTE — DISCHARGE INSTRUCTIONS
Your surgery has been scheduled for:__________________________________________    You should report to:  ____[facility], located on the [place] side of the first floor of the           [facility] ([phone])  ____[facility], located on the [facility] side of the            Second floor of the [facility] [phone])  ____3rd Floor [facility] located on the [facility] [address] side of the [facility] [phone]  Please Note   - Tell your doctor if you take Aspirin, products containing Aspirin, herbal medications  or blood thinners, such as Coumadin, Ticlid, or Plavix.  (Consult your provider regarding holding or stopping before surgery).  - Arrange for someone to drive you home following surgery.  You will not be allowed to leave the surgical facility alone or drive yourself home following sedation and anesthesia.  Before Surgery  - Stop taking all herbal medications 14days prior to surgery  - No Motrin/Advil (Ibuprofen) 7 days before surgery  - No Aleve (Naproxen) 7 days before surgery  - Stop Taking Asprin, products containing Asprin _____days before surgery  - Stop taking blood thinners_______days before surgery  - Refrain from drinking alcoholic beverages for 24hours before and after surgery  - Stop or limit smoking _________days before surgery  Night before Surgery  - DO NOT EAT OR DRINK ANYTHING AFTER MIDNIGHT, INCLUDING GUM, HARD CANDY, MINTS, OR CHEWING TOBACCO.  - Take a shower or bath (shower is recommended).  Bathe with Hibiclens soap or an antibacterial soap from the neck down.  If not supplied by your surgeon, hibiclens soap will need to be purchased over the counter in pharmacy.  Rinse soap off thoroughly.  - Shampoo your hair with your regular shampoo  The Day of Surgery  - Take another bath or shower with hibiclens or any antibacterial soap, to reduce the chance of infection.  - Take heart and blood  pressure medications with a small sip of water, as advised by the perioperative team.  - Do not take fluid pills  - You may brush your teeth and rinse your mouth, but do not swall any additional water.   - Do not apply perfumes, powder, body lotions or deodorant on the day of surgery.  - Nail polish should be removed.  - Do not wear makeup or moisturizer  - Wear comfortable clothes, such as a button front shirt and loose fitting pants.  - Leave all jewelry, including body piercings, and valuables at home.    - Bring any devices you will neeed after surgery such as crutches or canes.  - If you have sleep apnea, please bring your CPAP machine  In the event that your physical condition changes including the onset of a cold or respiratory illness, or if you have to delay or cancel your surgery, please notify your surgeon.  Anesthesia: General Anesthesia  Youre due to have surgery. During surgery, youll be given medication called anesthesia. (It is also called anesthetic.) This will keep you comfortable and pain-free. Your surgeon will use general anesthesia. This sheet tells you more about it.    What Is General Anesthesia?  General anesthesia puts you into a state like deep sleep. It goes into the bloodstream (IV anesthetics), into the lungs (gas anesthetics), or both. You feel nothing during the procedure. You will not remember it. During the procedure, the anesthesia provider monitors you. He or she checks your heart rate and rhythm, blood pressure, and blood oxygen.  · IV Anesthetics  IV anesthetics are given through an IV line in your arm. Theyre often given first. This is so you are asleep before a gas anesthetic is started. Some kinds of IV anesthetics relieve pain. Others relax you. Your doctor will decide which kind is best in your case.  · Gas Anesthetics  Gas anesthetics are breathed into the lungs. They are often used to keep you asleep. They can be given through a facemask, an endotracheal tube, or a  laryngeal mask airway.  ¨ If you have a facemask, your anesthesia provider will most likely place it over your nose and mouth while youre still awake. Youll breathe oxygen through the mask as your IV anesthetic is started. Gas anesthetic may be added through the mask.  ¨ If you have an endotracheal tube or a laryngeal mask airway, it will be inserted into your throat after youre asleep.  Anesthesia Tools and Medications  You will likely have:  · IV anesthetics sent through an IV line into your bloodstream.  · Gas anesthetics breathed into your lungs, where they pass into your bloodstream.  · A pulse oximeter on the end of your finger. This measures your blood oxygen level.  · Electrocardiography leads (electrodes) on your chest. These record your heart rate and rhythm.  · A blood pressure cuff. This reads your blood pressure.  Risks and Possible Complications  General anesthesia has some risks. These include:  · Breathing problems  · Nausea and vomiting  · Sore throat or hoarseness  · Allergic reaction to the anesthetic  · Ongoing numbness (rare)  · Irregular heartbeat (rare)  · Cardiac arrest (rare)   Anesthesia Safety  · Follow all instructions you are given for how long not to eat or drink before your procedure.  · Be sure your doctor knows what medications you take. This includes over-the-counter medications, herbs, and supplements.  · Have an adult family member or friend drive you home after the procedure.  · For the first 24 hours after your surgery:  ¨ Do not drive or use heavy equipment.  ¨ Do not make important decisions or sign documents.  ¨ Avoid alcohol.  ¨ Have someone stay with you, if possible. They can watch for problems and help keep you safe.  © 6183-3859 [employer], [address] [place] [address], [place] [address]. All rights reserved. This information is not intended as a substitute for professional medical care. Always follow your healthcare professional's instructions.

## 2017-02-13 ENCOUNTER — TELEPHONE (OUTPATIENT)
Dept: PLASTIC SURGERY | Facility: CLINIC | Age: 57
End: 2017-02-13

## 2017-02-14 ENCOUNTER — HOSPITAL ENCOUNTER (INPATIENT)
Facility: HOSPITAL | Age: 57
LOS: 4 days | Discharge: HOME | DRG: 585 | End: 2017-02-18
Attending: SURGERY | Admitting: SURGERY
Payer: MEDICARE

## 2017-02-14 ENCOUNTER — ANESTHESIA (OUTPATIENT)
Dept: SURGERY | Facility: HOSPITAL | Age: 57
DRG: 585 | End: 2017-02-14
Payer: MEDICARE

## 2017-02-14 DIAGNOSIS — C50.919 BREAST CANCER: ICD-10-CM

## 2017-02-14 DIAGNOSIS — E11.9 TYPE 2 DIABETES MELLITUS WITHOUT COMPLICATION, WITHOUT LONG-TERM CURRENT USE OF INSULIN: Primary | ICD-10-CM

## 2017-02-14 LAB
POCT GLUCOSE: 229 MG/DL (ref 70–110)
POCT GLUCOSE: 252 MG/DL (ref 70–110)

## 2017-02-14 PROCEDURE — 63600175 PHARM REV CODE 636 W HCPCS: Performed by: ANESTHESIOLOGY

## 2017-02-14 PROCEDURE — 63600175 PHARM REV CODE 636 W HCPCS

## 2017-02-14 PROCEDURE — 37000008 HC ANESTHESIA 1ST 15 MINUTES: Performed by: SURGERY

## 2017-02-14 PROCEDURE — C1769 GUIDE WIRE: HCPCS | Performed by: SURGERY

## 2017-02-14 PROCEDURE — 63600175 PHARM REV CODE 636 W HCPCS: Performed by: SURGERY

## 2017-02-14 PROCEDURE — D9220A PRA ANESTHESIA: Mod: CRNA,,, | Performed by: NURSE ANESTHETIST, CERTIFIED REGISTERED

## 2017-02-14 PROCEDURE — 0PB10ZZ EXCISION OF 1 TO 2 RIBS, OPEN APPROACH: ICD-10-PCS | Performed by: SURGERY

## 2017-02-14 PROCEDURE — 20600001 HC STEP DOWN PRIVATE ROOM

## 2017-02-14 PROCEDURE — 27201423 OPTIME MED/SURG SUP & DEVICES STERILE SUPPLY: Performed by: SURGERY

## 2017-02-14 PROCEDURE — 25000003 PHARM REV CODE 250

## 2017-02-14 PROCEDURE — C1729 CATH, DRAINAGE: HCPCS | Performed by: SURGERY

## 2017-02-14 PROCEDURE — 71000033 HC RECOVERY, INTIAL HOUR: Performed by: SURGERY

## 2017-02-14 PROCEDURE — 0HPT0JZ REMOVAL OF SYNTHETIC SUBSTITUTE FROM RIGHT BREAST, OPEN APPROACH: ICD-10-PCS | Performed by: SURGERY

## 2017-02-14 PROCEDURE — 63600175 PHARM REV CODE 636 W HCPCS: Performed by: NURSE ANESTHETIST, CERTIFIED REGISTERED

## 2017-02-14 PROCEDURE — 36000708 HC OR TIME LEV III 1ST 15 MIN: Performed by: SURGERY

## 2017-02-14 PROCEDURE — 36000709 HC OR TIME LEV III EA ADD 15 MIN: Performed by: SURGERY

## 2017-02-14 PROCEDURE — 25000003 PHARM REV CODE 250: Performed by: NURSE ANESTHETIST, CERTIFIED REGISTERED

## 2017-02-14 PROCEDURE — 0HRT077 REPLACEMENT OF RIGHT BREAST USING DEEP INFERIOR EPIGASTRIC ARTERY PERFORATOR FLAP, OPEN APPROACH: ICD-10-PCS | Performed by: SURGERY

## 2017-02-14 PROCEDURE — 25000003 PHARM REV CODE 250: Performed by: ORTHOPAEDIC SURGERY

## 2017-02-14 PROCEDURE — 88305 TISSUE EXAM BY PATHOLOGIST: CPT | Performed by: PATHOLOGY

## 2017-02-14 PROCEDURE — 25000003 PHARM REV CODE 250: Performed by: SURGERY

## 2017-02-14 PROCEDURE — 88305 TISSUE EXAM BY PATHOLOGIST: CPT | Mod: 26,,, | Performed by: PATHOLOGY

## 2017-02-14 PROCEDURE — 0HSU0ZZ REPOSITION LEFT BREAST, OPEN APPROACH: ICD-10-PCS | Performed by: SURGERY

## 2017-02-14 PROCEDURE — 19316 MASTOPEXY: CPT | Mod: 51,,, | Performed by: SURGERY

## 2017-02-14 PROCEDURE — 11971 RMVL TIS XPNDR WO INSJ IMPLT: CPT | Mod: 51,,, | Performed by: SURGERY

## 2017-02-14 PROCEDURE — 37000009 HC ANESTHESIA EA ADD 15 MINS: Performed by: SURGERY

## 2017-02-14 PROCEDURE — 27000221 HC OXYGEN, UP TO 24 HOURS

## 2017-02-14 PROCEDURE — 19364 BRST RCNSTJ FREE FLAP: CPT | Mod: 62,59,, | Performed by: SURGERY

## 2017-02-14 PROCEDURE — 82962 GLUCOSE BLOOD TEST: CPT | Performed by: SURGERY

## 2017-02-14 PROCEDURE — D9220A PRA ANESTHESIA: Mod: ANES,,, | Performed by: ANESTHESIOLOGY

## 2017-02-14 PROCEDURE — 27800903 OPTIME MED/SURG SUP & DEVICES OTHER IMPLANTS: Performed by: SURGERY

## 2017-02-14 PROCEDURE — 88300 SURGICAL PATH GROSS: CPT | Mod: 26,,, | Performed by: PATHOLOGY

## 2017-02-14 PROCEDURE — 71000039 HC RECOVERY, EACH ADD'L HOUR: Performed by: SURGERY

## 2017-02-14 PROCEDURE — 88304 TISSUE EXAM BY PATHOLOGIST: CPT | Mod: 26,,, | Performed by: PATHOLOGY

## 2017-02-14 DEVICE — COUPLER MICROVAS ANSTMS 2.5MM: Type: IMPLANTABLE DEVICE | Site: BREAST | Status: FUNCTIONAL

## 2017-02-14 DEVICE — COUPLER 2.0MM: Type: IMPLANTABLE DEVICE | Site: BREAST | Status: FUNCTIONAL

## 2017-02-14 RX ORDER — SODIUM CHLORIDE 9 MG/ML
INJECTION, SOLUTION INTRAVENOUS CONTINUOUS
Status: DISCONTINUED | OUTPATIENT
Start: 2017-02-14 | End: 2017-02-14

## 2017-02-14 RX ORDER — DIPHENHYDRAMINE HYDROCHLORIDE 50 MG/ML
12.5 INJECTION INTRAMUSCULAR; INTRAVENOUS ONCE
Status: COMPLETED | OUTPATIENT
Start: 2017-02-14 | End: 2017-02-14

## 2017-02-14 RX ORDER — HYDROMORPHONE HYDROCHLORIDE 1 MG/ML
1 INJECTION, SOLUTION INTRAMUSCULAR; INTRAVENOUS; SUBCUTANEOUS EVERY 4 HOURS PRN
Status: DISCONTINUED | OUTPATIENT
Start: 2017-02-14 | End: 2017-02-18 | Stop reason: HOSPADM

## 2017-02-14 RX ORDER — ONDANSETRON 2 MG/ML
4 INJECTION INTRAMUSCULAR; INTRAVENOUS EVERY 12 HOURS PRN
Status: DISCONTINUED | OUTPATIENT
Start: 2017-02-14 | End: 2017-02-14 | Stop reason: HOSPADM

## 2017-02-14 RX ORDER — HEPARIN SODIUM 5000 [USP'U]/ML
INJECTION, SOLUTION INTRAVENOUS; SUBCUTANEOUS
Status: DISCONTINUED | OUTPATIENT
Start: 2017-02-14 | End: 2017-02-14 | Stop reason: HOSPADM

## 2017-02-14 RX ORDER — LIDOCAINE HYDROCHLORIDE 10 MG/ML
1 INJECTION, SOLUTION EPIDURAL; INFILTRATION; INTRACAUDAL; PERINEURAL ONCE
Status: COMPLETED | OUTPATIENT
Start: 2017-02-14 | End: 2017-02-14

## 2017-02-14 RX ORDER — HYDROMORPHONE HYDROCHLORIDE 1 MG/ML
0.2 INJECTION, SOLUTION INTRAMUSCULAR; INTRAVENOUS; SUBCUTANEOUS EVERY 5 MIN PRN
Status: DISCONTINUED | OUTPATIENT
Start: 2017-02-14 | End: 2017-02-14 | Stop reason: HOSPADM

## 2017-02-14 RX ORDER — CEFAZOLIN SODIUM 2 G/50ML
2 SOLUTION INTRAVENOUS
Status: COMPLETED | OUTPATIENT
Start: 2017-02-15 | End: 2017-02-15

## 2017-02-14 RX ORDER — SODIUM CHLORIDE 0.9 % (FLUSH) 0.9 %
3 SYRINGE (ML) INJECTION EVERY 8 HOURS
Status: DISCONTINUED | OUTPATIENT
Start: 2017-02-14 | End: 2017-02-14 | Stop reason: HOSPADM

## 2017-02-14 RX ORDER — ONDANSETRON 2 MG/ML
INJECTION INTRAMUSCULAR; INTRAVENOUS
Status: DISCONTINUED | OUTPATIENT
Start: 2017-02-14 | End: 2017-02-14

## 2017-02-14 RX ORDER — HEPARIN SODIUM 1000 [USP'U]/ML
INJECTION, SOLUTION INTRAVENOUS; SUBCUTANEOUS
Status: DISCONTINUED | OUTPATIENT
Start: 2017-02-14 | End: 2017-02-14 | Stop reason: HOSPADM

## 2017-02-14 RX ORDER — ACETAMINOPHEN 10 MG/ML
INJECTION, SOLUTION INTRAVENOUS
Status: DISCONTINUED | OUTPATIENT
Start: 2017-02-14 | End: 2017-02-14

## 2017-02-14 RX ORDER — DOCUSATE SODIUM 100 MG/1
100 CAPSULE, LIQUID FILLED ORAL EVERY 12 HOURS
Status: DISCONTINUED | OUTPATIENT
Start: 2017-02-14 | End: 2017-02-18 | Stop reason: HOSPADM

## 2017-02-14 RX ORDER — HYDROCODONE BITARTRATE AND ACETAMINOPHEN 5; 325 MG/1; MG/1
1 TABLET ORAL EVERY 4 HOURS PRN
Status: DISCONTINUED | OUTPATIENT
Start: 2017-02-14 | End: 2017-02-16

## 2017-02-14 RX ORDER — SODIUM CHLORIDE 0.9 % (FLUSH) 0.9 %
3 SYRINGE (ML) INJECTION
Status: DISCONTINUED | OUTPATIENT
Start: 2017-02-14 | End: 2017-02-18 | Stop reason: HOSPADM

## 2017-02-14 RX ORDER — ONDANSETRON 2 MG/ML
4 INJECTION INTRAMUSCULAR; INTRAVENOUS EVERY 8 HOURS PRN
Status: DISCONTINUED | OUTPATIENT
Start: 2017-02-14 | End: 2017-02-18 | Stop reason: HOSPADM

## 2017-02-14 RX ORDER — NEOSTIGMINE METHYLSULFATE 1 MG/ML
INJECTION, SOLUTION INTRAVENOUS
Status: DISCONTINUED | OUTPATIENT
Start: 2017-02-14 | End: 2017-02-14

## 2017-02-14 RX ORDER — LIDOCAINE HCL/PF 100 MG/5ML
SYRINGE (ML) INTRAVENOUS
Status: DISCONTINUED | OUTPATIENT
Start: 2017-02-14 | End: 2017-02-14

## 2017-02-14 RX ORDER — MIDAZOLAM HYDROCHLORIDE 1 MG/ML
INJECTION, SOLUTION INTRAMUSCULAR; INTRAVENOUS
Status: DISCONTINUED | OUTPATIENT
Start: 2017-02-14 | End: 2017-02-14

## 2017-02-14 RX ORDER — ONDANSETRON 2 MG/ML
4 INJECTION INTRAMUSCULAR; INTRAVENOUS ONCE
Status: COMPLETED | OUTPATIENT
Start: 2017-02-14 | End: 2017-02-14

## 2017-02-14 RX ORDER — INSULIN ASPART 100 [IU]/ML
0-5 INJECTION, SOLUTION INTRAVENOUS; SUBCUTANEOUS
Status: DISCONTINUED | OUTPATIENT
Start: 2017-02-14 | End: 2017-02-18 | Stop reason: HOSPADM

## 2017-02-14 RX ORDER — DIPHENHYDRAMINE HYDROCHLORIDE 50 MG/ML
INJECTION INTRAMUSCULAR; INTRAVENOUS
Status: COMPLETED
Start: 2017-02-14 | End: 2017-02-14

## 2017-02-14 RX ORDER — PROPOFOL 10 MG/ML
VIAL (ML) INTRAVENOUS
Status: DISCONTINUED | OUTPATIENT
Start: 2017-02-14 | End: 2017-02-14

## 2017-02-14 RX ORDER — HEPARIN SODIUM 5000 [USP'U]/ML
5000 INJECTION, SOLUTION INTRAVENOUS; SUBCUTANEOUS EVERY 8 HOURS
Status: DISCONTINUED | OUTPATIENT
Start: 2017-02-14 | End: 2017-02-18 | Stop reason: HOSPADM

## 2017-02-14 RX ORDER — KETAMINE HYDROCHLORIDE 100 MG/ML
INJECTION, SOLUTION INTRAMUSCULAR; INTRAVENOUS
Status: DISCONTINUED | OUTPATIENT
Start: 2017-02-14 | End: 2017-02-14

## 2017-02-14 RX ORDER — DIPHENHYDRAMINE HCL 25 MG
25 CAPSULE ORAL EVERY 4 HOURS PRN
Status: DISCONTINUED | OUTPATIENT
Start: 2017-02-14 | End: 2017-02-18 | Stop reason: HOSPADM

## 2017-02-14 RX ORDER — SODIUM CHLORIDE AND POTASSIUM CHLORIDE 150; 900 MG/100ML; MG/100ML
INJECTION, SOLUTION INTRAVENOUS CONTINUOUS
Status: DISCONTINUED | OUTPATIENT
Start: 2017-02-14 | End: 2017-02-15

## 2017-02-14 RX ORDER — GLYCOPYRROLATE 0.2 MG/ML
INJECTION INTRAMUSCULAR; INTRAVENOUS
Status: DISCONTINUED | OUTPATIENT
Start: 2017-02-14 | End: 2017-02-14

## 2017-02-14 RX ORDER — HYDROMORPHONE HYDROCHLORIDE 1 MG/ML
INJECTION, SOLUTION INTRAMUSCULAR; INTRAVENOUS; SUBCUTANEOUS
Status: COMPLETED
Start: 2017-02-14 | End: 2017-02-14

## 2017-02-14 RX ORDER — LIDOCAINE HYDROCHLORIDE AND EPINEPHRINE 10; 10 MG/ML; UG/ML
INJECTION, SOLUTION INFILTRATION; PERINEURAL
Status: DISCONTINUED | OUTPATIENT
Start: 2017-02-14 | End: 2017-02-14 | Stop reason: HOSPADM

## 2017-02-14 RX ORDER — IBUPROFEN 200 MG
16 TABLET ORAL
Status: DISCONTINUED | OUTPATIENT
Start: 2017-02-14 | End: 2017-02-18 | Stop reason: HOSPADM

## 2017-02-14 RX ORDER — LIDOCAINE HYDROCHLORIDE 40 MG/ML
INJECTION, SOLUTION RETROBULBAR
Status: DISCONTINUED | OUTPATIENT
Start: 2017-02-14 | End: 2017-02-14 | Stop reason: HOSPADM

## 2017-02-14 RX ORDER — DEXAMETHASONE SODIUM PHOSPHATE 4 MG/ML
INJECTION, SOLUTION INTRA-ARTICULAR; INTRALESIONAL; INTRAMUSCULAR; INTRAVENOUS; SOFT TISSUE
Status: DISCONTINUED | OUTPATIENT
Start: 2017-02-14 | End: 2017-02-14

## 2017-02-14 RX ORDER — GLUCAGON 1 MG
1 KIT INJECTION
Status: DISCONTINUED | OUTPATIENT
Start: 2017-02-14 | End: 2017-02-18 | Stop reason: HOSPADM

## 2017-02-14 RX ORDER — PHENYLEPHRINE HYDROCHLORIDE 10 MG/ML
INJECTION INTRAVENOUS
Status: DISCONTINUED | OUTPATIENT
Start: 2017-02-14 | End: 2017-02-14

## 2017-02-14 RX ORDER — CYCLOBENZAPRINE HCL 10 MG
10 TABLET ORAL 3 TIMES DAILY PRN
Status: DISCONTINUED | OUTPATIENT
Start: 2017-02-14 | End: 2017-02-18 | Stop reason: HOSPADM

## 2017-02-14 RX ORDER — ROCURONIUM BROMIDE 10 MG/ML
INJECTION, SOLUTION INTRAVENOUS
Status: DISCONTINUED | OUTPATIENT
Start: 2017-02-14 | End: 2017-02-14

## 2017-02-14 RX ORDER — FENTANYL CITRATE 50 UG/ML
INJECTION, SOLUTION INTRAMUSCULAR; INTRAVENOUS
Status: DISCONTINUED | OUTPATIENT
Start: 2017-02-14 | End: 2017-02-14

## 2017-02-14 RX ORDER — IBUPROFEN 200 MG
24 TABLET ORAL
Status: DISCONTINUED | OUTPATIENT
Start: 2017-02-14 | End: 2017-02-18 | Stop reason: HOSPADM

## 2017-02-14 RX ORDER — SODIUM CHLORIDE AND POTASSIUM CHLORIDE 150; 900 MG/100ML; MG/100ML
INJECTION, SOLUTION INTRAVENOUS
Status: COMPLETED
Start: 2017-02-14 | End: 2017-02-14

## 2017-02-14 RX ADMIN — GLYCOPYRROLATE 0.6 MG: 0.2 INJECTION, SOLUTION INTRAMUSCULAR; INTRAVENOUS at 04:02

## 2017-02-14 RX ADMIN — PROPOFOL 200 MG: 10 INJECTION, EMULSION INTRAVENOUS at 12:02

## 2017-02-14 RX ADMIN — INSULIN HUMAN 2 UNITS: 100 INJECTION, SOLUTION PARENTERAL at 03:02

## 2017-02-14 RX ADMIN — PHENYLEPHRINE HYDROCHLORIDE 200 MCG: 10 INJECTION INTRAVENOUS at 03:02

## 2017-02-14 RX ADMIN — ONDANSETRON 4 MG: 2 INJECTION INTRAMUSCULAR; INTRAVENOUS at 04:02

## 2017-02-14 RX ADMIN — ROCURONIUM BROMIDE 20 MG: 10 INJECTION, SOLUTION INTRAVENOUS at 02:02

## 2017-02-14 RX ADMIN — ROCURONIUM BROMIDE 10 MG: 10 INJECTION, SOLUTION INTRAVENOUS at 03:02

## 2017-02-14 RX ADMIN — NEOSTIGMINE METHYLSULFATE 5 MG: 1 INJECTION INTRAVENOUS at 04:02

## 2017-02-14 RX ADMIN — PHENYLEPHRINE HYDROCHLORIDE 50 MCG: 10 INJECTION INTRAVENOUS at 03:02

## 2017-02-14 RX ADMIN — FENTANYL CITRATE 50 MCG: 50 INJECTION, SOLUTION INTRAMUSCULAR; INTRAVENOUS at 01:02

## 2017-02-14 RX ADMIN — SODIUM CHLORIDE: 0.9 INJECTION, SOLUTION INTRAVENOUS at 11:02

## 2017-02-14 RX ADMIN — PROMETHAZINE HYDROCHLORIDE 6.25 MG: 25 INJECTION INTRAMUSCULAR; INTRAVENOUS at 09:02

## 2017-02-14 RX ADMIN — DEXAMETHASONE SODIUM PHOSPHATE 4 MG: 4 INJECTION, SOLUTION INTRAMUSCULAR; INTRAVENOUS at 12:02

## 2017-02-14 RX ADMIN — DOCUSATE SODIUM 100 MG: 100 CAPSULE, LIQUID FILLED ORAL at 09:02

## 2017-02-14 RX ADMIN — HYDROMORPHONE HYDROCHLORIDE 0.2 MG: 1 INJECTION, SOLUTION INTRAMUSCULAR; INTRAVENOUS; SUBCUTANEOUS at 05:02

## 2017-02-14 RX ADMIN — DIPHENHYDRAMINE HYDROCHLORIDE 12.5 MG: 50 INJECTION INTRAMUSCULAR; INTRAVENOUS at 06:02

## 2017-02-14 RX ADMIN — SODIUM CHLORIDE AND POTASSIUM CHLORIDE: .9; .15 SOLUTION INTRAVENOUS at 06:02

## 2017-02-14 RX ADMIN — INSULIN ASPART 2 UNITS: 100 INJECTION, SOLUTION INTRAVENOUS; SUBCUTANEOUS at 11:02

## 2017-02-14 RX ADMIN — KETAMINE HYDROCHLORIDE 25 MG: 100 INJECTION, SOLUTION, CONCENTRATE INTRAMUSCULAR; INTRAVENOUS at 12:02

## 2017-02-14 RX ADMIN — LIDOCAINE HYDROCHLORIDE 0.2 MG: 10 INJECTION, SOLUTION EPIDURAL; INFILTRATION; INTRACAUDAL; PERINEURAL at 12:02

## 2017-02-14 RX ADMIN — LIDOCAINE HYDROCHLORIDE 100 MG: 20 INJECTION, SOLUTION INTRAVENOUS at 12:02

## 2017-02-14 RX ADMIN — SODIUM CHLORIDE, SODIUM GLUCONATE, SODIUM ACETATE, POTASSIUM CHLORIDE, MAGNESIUM CHLORIDE, SODIUM PHOSPHATE, DIBASIC, AND POTASSIUM PHOSPHATE: .53; .5; .37; .037; .03; .012; .00082 INJECTION, SOLUTION INTRAVENOUS at 02:02

## 2017-02-14 RX ADMIN — Medication 2 G: at 12:02

## 2017-02-14 RX ADMIN — ROCURONIUM BROMIDE 10 MG: 10 INJECTION, SOLUTION INTRAVENOUS at 02:02

## 2017-02-14 RX ADMIN — HYDROMORPHONE HYDROCHLORIDE 0.4 MG: 1 INJECTION, SOLUTION INTRAMUSCULAR; INTRAVENOUS; SUBCUTANEOUS at 07:02

## 2017-02-14 RX ADMIN — ACETAMINOPHEN 1000 MG: 10 INJECTION, SOLUTION INTRAVENOUS at 04:02

## 2017-02-14 RX ADMIN — DIPHENHYDRAMINE HYDROCHLORIDE 25 MG: 25 CAPSULE ORAL at 07:02

## 2017-02-14 RX ADMIN — HYDROMORPHONE HYDROCHLORIDE 0.2 MG: 1 INJECTION, SOLUTION INTRAMUSCULAR; INTRAVENOUS; SUBCUTANEOUS at 06:02

## 2017-02-14 RX ADMIN — ONDANSETRON 4 MG: 2 INJECTION INTRAMUSCULAR; INTRAVENOUS at 06:02

## 2017-02-14 RX ADMIN — ROCURONIUM BROMIDE 50 MG: 10 INJECTION, SOLUTION INTRAVENOUS at 12:02

## 2017-02-14 RX ADMIN — DIPHENHYDRAMINE HYDROCHLORIDE 12.5 MG: 50 INJECTION, SOLUTION INTRAMUSCULAR; INTRAVENOUS at 06:02

## 2017-02-14 RX ADMIN — FENTANYL CITRATE 100 MCG: 50 INJECTION, SOLUTION INTRAMUSCULAR; INTRAVENOUS at 01:02

## 2017-02-14 RX ADMIN — FENTANYL CITRATE 50 MCG: 50 INJECTION, SOLUTION INTRAMUSCULAR; INTRAVENOUS at 03:02

## 2017-02-14 RX ADMIN — HYDROCODONE BITARTRATE AND ACETAMINOPHEN 1 TABLET: 5; 325 TABLET ORAL at 07:02

## 2017-02-14 RX ADMIN — SODIUM CHLORIDE, SODIUM GLUCONATE, SODIUM ACETATE, POTASSIUM CHLORIDE, MAGNESIUM CHLORIDE, SODIUM PHOSPHATE, DIBASIC, AND POTASSIUM PHOSPHATE: .53; .5; .37; .037; .03; .012; .00082 INJECTION, SOLUTION INTRAVENOUS at 12:02

## 2017-02-14 RX ADMIN — INSULIN ASPART 3 UNITS: 100 INJECTION, SOLUTION INTRAVENOUS; SUBCUTANEOUS at 06:02

## 2017-02-14 RX ADMIN — FENTANYL CITRATE 100 MCG: 50 INJECTION, SOLUTION INTRAMUSCULAR; INTRAVENOUS at 12:02

## 2017-02-14 RX ADMIN — PHENYLEPHRINE HYDROCHLORIDE 200 MCG: 10 INJECTION INTRAVENOUS at 04:02

## 2017-02-14 RX ADMIN — Medication 2 G: at 04:02

## 2017-02-14 RX ADMIN — MIDAZOLAM HYDROCHLORIDE 2 MG: 1 INJECTION, SOLUTION INTRAMUSCULAR; INTRAVENOUS at 12:02

## 2017-02-14 RX ADMIN — ROCURONIUM BROMIDE 20 MG: 10 INJECTION, SOLUTION INTRAVENOUS at 01:02

## 2017-02-14 RX ADMIN — KETAMINE HYDROCHLORIDE 10 MG: 100 INJECTION, SOLUTION, CONCENTRATE INTRAMUSCULAR; INTRAVENOUS at 03:02

## 2017-02-14 RX ADMIN — HEPARIN SODIUM 5000 UNITS: 5000 INJECTION, SOLUTION INTRAVENOUS; SUBCUTANEOUS at 09:02

## 2017-02-14 NOTE — PROGRESS NOTES
1156-2 units of novolog co-checked with DANIEL Figueredo CRNA. Mar did not require a second witness.

## 2017-02-14 NOTE — TRANSFER OF CARE
"Anesthesia Transfer of Care Note    Patient: Negrita Castro    Procedure(s) Performed: Procedure(s) (LRB):  HEMANTH FREE FLAP (Bilateral)    Patient location: PACU    Anesthesia Type: general    Transport from OR: Transported from OR on 6-10 L/min O2 by face mask with adequate spontaneous ventilation    Post pain: adequate analgesia    Post assessment: no apparent anesthetic complications    Post vital signs: stable    Level of consciousness: awake    Nausea/Vomiting: no nausea/vomiting    Complications: none          Last vitals:   Visit Vitals    BP (!) 163/74    Pulse 77    Temp 36.8 °C (98.2 °F) (Oral)    Resp 18    Ht 5' 11" (1.803 m)    Wt 91.2 kg (201 lb)    LMP 01/16/2016    SpO2 99%    BMI 28.03 kg/m2     "

## 2017-02-14 NOTE — H&P
02/14/2017  CC: breast reconstruction    Interval History of Present Illness: Negrita Castro is a 56 y.o. year old female patient here for reconstruction of right breast with HEMANTH flap. Denies fevers, chills, nausea, vomiting. No significant change to medical history since last clinic visit.    Past Medical History:  Active Ambulatory Problems     Diagnosis Date Noted    Depression 01/22/2016    Anxiety disorder due to known physiological condition 01/22/2016    Dysthymic disorder 01/22/2016    History of breast cancer 02/02/2017    Neuropathy 02/02/2017    Over weight 02/02/2017    Type 2 diabetes mellitus without complication, without long-term current use of insulin 02/02/2017    Atypical chest pain 02/02/2017    Elevated alkaline phosphatase level 02/02/2017    Tattoo 02/02/2017     Resolved Ambulatory Problems     Diagnosis Date Noted    Breast cancer in situ 11/03/2015    Breast cancer 12/02/2015    Malignant neoplasm of right female breast 11/15/2016     Past Medical History   Diagnosis Date    Cancer     Diabetes mellitus     Panic attacks     S/P epidural steroid injection     Seizures     Wears glasses      Past Surgical History   Procedure Laterality Date    Shoulder surgery and wrist       BILAT  BONE SPUR    Wrist surgery       RIGHT    Breast biopsy       right    Breast surgery       11-3-15 LUMPECTOMY, 12-2-15 REEXCISION    Mastectomy 2016       Medications:  Entered into EMR  Review of patient's allergies indicates:   Allergen Reactions    Adhesive tape-silicones Hives     BANDAIDS    Polymycin Hives     ROS:   Negative except as stated in HPI.    Social History     Social History    Marital status: Single     Spouse name: N/A    Number of children: N/A    Years of education: N/A     Occupational History    Not on file.     Social History Main Topics    Smoking status: Former Smoker     Packs/day: 0.25     Years: 30.00     Quit date: 9/28/2015    Smokeless tobacco:  Not on file    Alcohol use 0.0 oz/week     0 Standard drinks or equivalent per week      Comment: RARELY    Drug use: Yes     Special: Marijuana      Comment: medical marijuana    Sexual activity: Not on file     Other Topics Concern    Not on file     Social History Narrative     Family History   Problem Relation Age of Onset    Anesthesia problems Neg Hx      Physical Examination:  Vital Signs: There were no vitals filed for this visit.  General: Awake, alert, oriented x3. NAD  Psych: Mood and affect normal  HEENT: NC/AT  Cardio: Regular rate  Respiratory: Clear, equal, and unlabored respirations  Skin/Neuro/MSK:  Incisions well-healed     Impression:  S/p right breast reconstruction with tissue expander and left breast mastopexy      Discussion/Plan:  - NPO  - To OR today    Marilu Mc M.D. PGY-1  Orthopedic Surgery

## 2017-02-14 NOTE — IP AVS SNAPSHOT
[facility]  [address]  [facility] [address]  Phone: [phone]           Patient Discharge Instructions     Our goal is to set you up for success. This packet includes information on your condition, medications, and your home care. It will help you to care for yourself so you don't get sicker and need to go back to the hospital.     Please ask your nurse if you have any questions.        There are many details to remember when preparing to leave the hospital. Here is what you will need to do:    1. Take your medicine. If you are prescribed medications, review your Medication List in the following pages. You may have new medications to  at the pharmacy and others that you'll need to stop taking. Review the instructions for how and when to take your medications. Talk with your doctor or nurses if you are unsure of what to do.     2. Go to your follow-up appointments. Specific follow-up information is listed in the following pages. Your may be contacted by a transition nurse or clinical provider about future appointments. Be sure we have all of the phone numbers to reach you, if needed. Please contact your provider's office if you are unable to make an appointment.     3. Watch for warning signs. Your doctor or nurse will give you detailed warning signs to watch for and when to call for assistance. These instructions may also include educational information about your condition. If you experience any of warning signs to your health, call your doctor.               [facility]  Unless otherwise directed by your provider, please contact [facility], our nurse care line that is available for 24/7 assistance.     [phone] (toll-free)    Registered nurses in the [facility] provide clinical advisement, health education, appointment booking, and other advisory services.                    ** Verify the list of medication(s) below is accurate and up  to date. Carry this with you in case of emergency. If your medications have changed, please notify your healthcare provider.             Medication List      START taking these medications        Additional Info                      clindamycin 150 MG capsule   Commonly known as:  CLEOCIN   Quantity:  30 capsule   Refills:  0   Dose:  300 mg    Instructions:  Take 2 capsules (300 mg total) by mouth 3 (three) times daily.     Begin Date    AM    Noon    PM    Bedtime       cyclobenzaprine 5 MG tablet   Commonly known as:  FLEXERIL   Quantity:  61 tablet   Refills:  0   Dose:  5 mg    Last time this was given:  10 mg on 2/15/2017  8:50 PM   Instructions:  Take 1 tablet (5 mg total) by mouth 3 (three) times daily as needed for Muscle spasms.     Begin Date    AM    Noon    PM    Bedtime       oxycodone-acetaminophen 5-325 mg per tablet   Commonly known as:  PERCOCET   Quantity:  91 tablet   Refills:  0   Dose:  1 tablet    Instructions:  Take 1 tablet by mouth every 4 (four) hours as needed for Pain.     Begin Date    AM    Noon    PM    Bedtime         ASK your doctor about these medications        Additional Info                      carisoprodol 350 MG tablet   Commonly known as:  SOMA   Refills:  0   Dose:  350 mg   Indications:  Muscle Spasm    Instructions:  Take 350 mg by mouth 3 (three) times daily as needed.     Begin Date    AM    Noon    PM    Bedtime       diazePAM 10 MG Tab   Commonly known as:  VALIUM   Refills:  0   Dose:  10 mg   Indications:  Panic Disorder    Instructions:  Take 10 mg by mouth every 6 (six) hours as needed.     Begin Date    AM    Noon    PM    Bedtime       enalapril 2.5 MG tablet   Commonly known as:  VASOTEC   Refills:  1   Dose:  1.25 mg   Indications:  Hypertension    Instructions:  Take 1.25 mg by mouth 2 (two) times daily.     Begin Date    AM    Noon    PM    Bedtime       glimepiride 1 MG tablet   Commonly known as:  AMARYL   Refills:  1   Dose:  1 mg    Instructions:  Take 1  mg by mouth once daily.     Begin Date    AM    Noon    PM    Bedtime       HAIR, SKIN, NAILS WITH BIOTIN ORAL   Refills:  0   Dose:  1 tablet    Instructions:  Take 1 tablet by mouth every morning.     Begin Date    AM    Noon    PM    Bedtime       hydrocodone-acetaminophen 10-325mg  mg Tab   Commonly known as:  NORCO   Refills:  0   Dose:  1 tablet    Instructions:  Take 1 tablet by mouth 4 (four) times daily as needed.     Begin Date    AM    Noon    PM    Bedtime       JANUVIA 100 MG Tab   Refills:  1   Dose:  100 mg   Indications:  type 2 diabetes mellitus   Generic drug:  SITagliptan    Instructions:  Take 100 mg by mouth once daily.     Begin Date    AM    Noon    PM    Bedtime       MARINOL 2.5 MG capsule   Refills:  0   Dose:  2.5 mg   Generic drug:  dronabinol    Instructions:  Take 2.5 mg by mouth 2 (two) times daily before meals.     Begin Date    AM    Noon    PM    Bedtime       metformin 850 MG tablet   Commonly known as:  GLUCOPHAGE   Refills:  1   Dose:  1000 mg   Indications:  type 2 diabetes mellitus    Instructions:  Take 1,000 mg by mouth 2 (two) times daily.     Begin Date    AM    Noon    PM    Bedtime       multivitamin capsule   Refills:  0   Dose:  1 capsule    Instructions:  Take 1 capsule by mouth every morning.     Begin Date    AM    Noon    PM    Bedtime       pravastatin 40 MG tablet   Commonly known as:  PRAVACHOL   Refills:  1   Dose:  40 mg   Indications:  hypercholesterolemia    Instructions:  Take 40 mg by mouth every morning.     Begin Date    AM    Noon    PM    Bedtime       zolpidem 10 mg Tab   Commonly known as:  AMBIEN   Refills:  0   Dose:  10 mg    Instructions:  Take 10 mg by mouth nightly as needed.     Begin Date    AM    Noon    PM    Bedtime            Where to Get Your Medications      You can get these medications from any pharmacy     Bring a paper prescription for each of these medications     clindamycin 150 MG capsule    cyclobenzaprine 5 MG tablet     "oxycodone-acetaminophen 5-325 mg per tablet                  Please bring to all follow up appointments:    1. A copy of your discharge instructions.  2. All medicines you are currently taking in their original bottles.  3. Identification and insurance card.    Please arrive 15 minutes ahead of scheduled appointment time.    Please call 24 hours in advance if you must reschedule your appointment and/or time.        Follow-up Information     Follow up with Xiang Hernandez MD. Schedule an appointment as soon as possible for a visit in 1 week.    Specialty:  Plastic Surgery    Why:  For wound re-check    Contact information:    [address]  [facility] [address]  [phone]            Primary Diagnosis     Your primary diagnosis was:  Breast Cancer      Admission Information     Date & Time Provider Department CSN    2/14/2017 10:47 AM Xiang Hernandez MD [facility] [id number]      Care Providers     Provider Role Specialty Primary office phone    Xiang Hernandez MD Attending Provider Plastic Surgery [phone]    Xiang Hernandez MD Surgeon  Plastic Surgery [phone]      Your Vitals Were     BP Pulse Temp Resp Height Weight    118/67 (BP Location: Left arm, Patient Position: Lying, BP Method: Automatic) 84 97.9 °F (36.6 °C) (Oral) 16 5' 11" (1.803 m) 91.2 kg (201 lb)    Last Period SpO2 BMI          01/16/2016 92% 28.03 kg/m2        Recent Lab Values        2/2/2017                          12:49 PM           A1C 9.1 (H)           Comment for A1C at 12:49 PM on 2/2/2017:  According to ADA guidelines, hemoglobin A1C <7.0% represents  optimal control in non-pregnant diabetic patients.  Different  metrics may apply to specific populations.   Standards of Medical Care in Diabetes - 2016.  For the purpose of screening for the presence of diabetes:  <5.7%     Consistent with the absence of diabetes  5.7-6.4%  Consistent with increasing risk for diabetes "   (prediabetes)  >or=6.5%  Consistent with diabetes  Currently no consensus exists for use of hemoglobin A1C  for diagnosis of diabetes for children.        Allergies as of 2/18/2017        Reactions    Adhesive Tape-silicones Hives    BANDAIDS    Polymycin Hives      Advance Directives     An advance directive is a document which, in the event you are no longer able to make decisions for yourself, tells your healthcare team what kind of treatment you do or do not want to receive, or who you would like to make those decisions for you.  If you do not currently have an advance directive, [facility] encourages you to create one.  For more information call:  [phone]), [phone] ([phone]),  or log on to [email or web address].        Smoking Cessation     If you would like to quit smoking:   You may be eligible for free services if you are a [place] resident and started smoking cigarettes before September 1, 1988.  Call the Smoking Cessation Trust ([facility]) toll free at ([phone] or [phone] [phone].   Call 2-310-QUIT-NOW if you do not meet the above criteria.            Language Assistance Services     ATTENTION: Language assistance services are available, free of charge. Please call [phone].      ATENCIÓN: Si habla español, tiene a smart disposición servicios gratuitos de asistencia lingüística. Llame al [phone].     CHÚ Ý: N?u b?n nói Ti?ng Vi?t, có các d?ch v? h? tr? ngôn ng? mi?n phí dành cho b?n. G?i s? [phone].        Diabetes Discharge Instructions                                   [facility] Sign-Up     Activating your [facility] account is as easy as 1-2-3!     1) Visit [email or web address], select Sign Up Now, enter this activation code and your date of birth, then select Next.  [email or web address]  Expires: 3/19/2017  1:21 PM      2) Create a username and password to use when you visit [facility] in the future and select a security question in case you lose your password  and select Next.    3) Enter your e-mail address and click Sign Up!    Additional Information  If you have questions, please e-mail [email or web address] or call [phone] to talk to our [facility] staff. Remember, [facility] is NOT to be used for urgent needs. For medical emergencies, dial 911.          [facility] complies with applicable Federal civil rights laws and does not discriminate on the basis of race, color, national origin, age, disability, or sex.

## 2017-02-15 LAB
POCT GLUCOSE: 170 MG/DL (ref 70–110)
POCT GLUCOSE: 203 MG/DL (ref 70–110)
POCT GLUCOSE: 207 MG/DL (ref 70–110)

## 2017-02-15 PROCEDURE — 20600001 HC STEP DOWN PRIVATE ROOM

## 2017-02-15 PROCEDURE — 63600175 PHARM REV CODE 636 W HCPCS: Performed by: SURGERY

## 2017-02-15 PROCEDURE — 25000003 PHARM REV CODE 250: Performed by: SURGERY

## 2017-02-15 RX ADMIN — HEPARIN SODIUM 5000 UNITS: 5000 INJECTION, SOLUTION INTRAVENOUS; SUBCUTANEOUS at 02:02

## 2017-02-15 RX ADMIN — HYDROCODONE BITARTRATE AND ACETAMINOPHEN 1 TABLET: 5; 325 TABLET ORAL at 06:02

## 2017-02-15 RX ADMIN — HYDROMORPHONE HYDROCHLORIDE 1 MG: 1 INJECTION, SOLUTION INTRAMUSCULAR; INTRAVENOUS; SUBCUTANEOUS at 02:02

## 2017-02-15 RX ADMIN — DOCUSATE SODIUM 100 MG: 100 CAPSULE, LIQUID FILLED ORAL at 08:02

## 2017-02-15 RX ADMIN — SODIUM CHLORIDE AND POTASSIUM CHLORIDE: .9; .15 SOLUTION INTRAVENOUS at 08:02

## 2017-02-15 RX ADMIN — INSULIN ASPART 1 UNITS: 100 INJECTION, SOLUTION INTRAVENOUS; SUBCUTANEOUS at 10:02

## 2017-02-15 RX ADMIN — CYCLOBENZAPRINE HYDROCHLORIDE 10 MG: 10 TABLET, FILM COATED ORAL at 08:02

## 2017-02-15 RX ADMIN — HEPARIN SODIUM 5000 UNITS: 5000 INJECTION, SOLUTION INTRAVENOUS; SUBCUTANEOUS at 09:02

## 2017-02-15 RX ADMIN — CEFAZOLIN SODIUM 2 G: 2 SOLUTION INTRAVENOUS at 03:02

## 2017-02-15 RX ADMIN — CEFAZOLIN SODIUM 2 G: 2 SOLUTION INTRAVENOUS at 08:02

## 2017-02-15 RX ADMIN — HEPARIN SODIUM 5000 UNITS: 5000 INJECTION, SOLUTION INTRAVENOUS; SUBCUTANEOUS at 06:02

## 2017-02-15 RX ADMIN — HYDROMORPHONE HYDROCHLORIDE 1 MG: 1 INJECTION, SOLUTION INTRAMUSCULAR; INTRAVENOUS; SUBCUTANEOUS at 08:02

## 2017-02-15 RX ADMIN — HYDROCODONE BITARTRATE AND ACETAMINOPHEN 1 TABLET: 5; 325 TABLET ORAL at 03:02

## 2017-02-15 RX ADMIN — INSULIN ASPART 3 UNITS: 100 INJECTION, SOLUTION INTRAVENOUS; SUBCUTANEOUS at 06:02

## 2017-02-15 RX ADMIN — INSULIN ASPART 2 UNITS: 100 INJECTION, SOLUTION INTRAVENOUS; SUBCUTANEOUS at 08:02

## 2017-02-15 RX ADMIN — HYDROMORPHONE HYDROCHLORIDE 1 MG: 1 INJECTION, SOLUTION INTRAMUSCULAR; INTRAVENOUS; SUBCUTANEOUS at 07:02

## 2017-02-15 NOTE — PLAN OF CARE
Problem: Patient Care Overview  Goal: Plan of Care Review  Outcome: Ongoing (interventions implemented as appropriate)  No significant events overnight. Patient has had pulse flap checks with a strong doppler pulse. Pain and nausea have been controled. Minimal drainage for three GENARO drains. Urine output adequate. Plan of care discussed with significant other, sisters and patient.

## 2017-02-15 NOTE — ANESTHESIA RELEASE NOTE
"Anesthesia Release from PACU Note    Patient: Negrita Castro    Procedure(s) Performed: Procedure(s) (LRB):  HEMANTH FREE FLAP (Bilateral)    Anesthesia type: general    Post pain: Adequate analgesia    Post assessment: no apparent anesthetic complications    Last Vitals:   Visit Vitals    BP (!) 148/65    Pulse 73    Temp 36.1 °C (96.9 °F) (Axillary)    Resp 10    Ht 5' 11" (1.803 m)    Wt 91.2 kg (201 lb)    LMP 01/16/2016    SpO2 100%    BMI 28.03 kg/m2       Post vital signs: stable    Level of consciousness: awake, alert  and oriented    Nausea/Vomiting: no nausea/no vomiting    Complications: none    Airway Patency: patent    Respiratory: unassisted    Cardiovascular: stable and blood pressure at baseline    Hydration: euvolemic     "

## 2017-02-15 NOTE — ANESTHESIA POSTPROCEDURE EVALUATION
"Anesthesia Post Evaluation    Patient: Negrita Castro    Procedure(s) Performed: Procedure(s) (LRB):  HEMANTH FREE FLAP (Bilateral)    Final Anesthesia Type: general  Patient location during evaluation: PACU  Patient participation: Yes- Able to Participate  Level of consciousness: awake and alert  Post-procedure vital signs: reviewed and stable  Pain management: adequate  Airway patency: patent  PONV status at discharge: No PONV  Anesthetic complications: no      Cardiovascular status: blood pressure returned to baseline  Respiratory status: unassisted  Hydration status: euvolemic  Follow-up not needed.        Visit Vitals    BP (!) 148/65    Pulse 73    Temp 36.1 °C (96.9 °F) (Axillary)    Resp 10    Ht 5' 11" (1.803 m)    Wt 91.2 kg (201 lb)    LMP 01/16/2016    SpO2 100%    BMI 28.03 kg/m2       Pain/Larissa Score: Pain Assessment Performed: Yes (2/14/2017  5:40 PM)  Presence of Pain: non-verbal indicators absent (2/14/2017  5:40 PM)  Pain Rating Prior to Med Admin: 7 (2/14/2017  6:07 PM)  Larissa Score: 8 (2/14/2017  5:40 PM)      "

## 2017-02-15 NOTE — PLAN OF CARE
Problem: Patient Care Overview  Goal: Plan of Care Review  Outcome: Ongoing (interventions implemented as appropriate)  No acute events throughout the day, VS and assessment per flow sheet, patient progressing towards goal as tolerated. Plan of care reviewed with Negrita Castro and family, all concerns addressed. Will continue to monitor.

## 2017-02-15 NOTE — OP NOTE
DATE OF PROCEDURE:  02/14/2017    PREOPERATIVE DIAGNOSES:  1.  History of breast cancer on the right side.  2.  Failure of implant based reconstruction.    POSTOPERATIVE DIAGNOSES:  1.  History of breast cancer on the right side.  2.  Failure of implant based reconstruction.    PROCEDURES:  1.  Removal of intact mammary implant, right.  2.  Capsulectomy, right.  3.  Mastopexy, left.  4.  Right breast reconstruction with right HEMANTH flap.  5.  Right breast reconstruction with left HEMANTH flap.  6.  Partial rib resection x1, right chest.    SURGEONS:  Juve Christopher and Xiang Hernandez M.D., ALEX    ANESTHESIA:  General through oral intubation.    ESTIMATED BLOOD LOSS:  100 mL    COMPLICATIONS:  None.    DISPOSITION:  The patient tolerated the procedure well, was extubated in the   Operating Room and transported to PACU in stable condition.    DESCRIPTION OF OPERATIVE PROCEDURE:  The patient was taken to the Operating Room   and placed supine on the table.  Following induction of general anesthesia and   successful oral endotracheal intubation, the patient was prepped and draped in   sterile fashion.  Attention was then directed to the abdomen, which had been   marked in the preoperative area.  The right hemiabdominal flap was initially   elevated, incised at its periphery, lateral and medial dissection was performed.    The dominant  was found and lateral row circumferentially dissected.    The fascia was opened and the muscle split along its fibers.  Retrograde   dissection was taken to the deep inferior epigastric to a point where adequate   pedicle length and vessel caliber was obtained.  Attention was then directed to   the contralateral ochoa-abdomen.  Again the flap was inset at its periphery,   lateral to medial dissection was performed.  Dominant  was found on   the medial row, there were actually two.  The fascia was opened and the muscle   was split along its fibers.  Retrograde  dissection was taken to the deep   inferior epigastric to a point where adequate pedicle length and vessel caliber   was obtained.  Attention was then directed to the chest where the previous   incision was entered.  The pocket was divided.  Hemostasis was achieved.  The   pectoralis muscle was elevated with the skin flap.  The implant was then removed   as well as capsulectomy performed.  Hemostasis was achieved and the pectoralis   muscle was tacked to the chest wall.  The third intercostal space was   identified.  The pectoralis muscle was split along its fibers.  The   perichondrium of the third rib was then scored.  Subperichondrial dissection was   performed with a M[place]ouin elevator.  Partial rib resection of the third rib was   then performed with a large rongeur.  The intercostal musculature was then   excised giving access to the internal mammary artery and vein, which were   prepared for microvascular transfer.  The flap was then harvested, taken to the   chest and secured temporarily.  Under the microscope, the cephalic continuation   of the DIEA and DIEV of the right flap was then anastomosed to the DIEA and DIEV   of the left flap.  This was performed using two 2.0 venous  and a 9-0   nylon suture placed in interrupted fashion.  The DIEA and DIEV of the left flap   was then anastomosed to the JUNO and IMV using 2.5 venous  and a 9-0 nylon   suture placed in interrupted fashion.  Upon release of vascular anastomotic   clamps, good inflow and outflow of both flaps.  Flaps were then deepithelialized   for a short-segment and inset it using a combination of 2-0 Vicryl and 2-0   Quill over drains.  Following this, the fascia was reconstructed at the abdomen   using 0 PDS sutures.  The abdominal skin flap was slightly undermined and closed   in layers using 0 PDS, 2-0 PDS, 2-0 Quill over drains.  Thoracoabdominal block   using Exparel was performed bilaterally as well.  The patient tolerated the    procedure well.  There was good perfusion of both flaps at the end of the   procedure and was transferred to PACU in stable condition.      HS/HN  dd: 02/15/2017 07:11:53 (CST)  td: 02/15/2017 08:21:56 (CST)  Doc ID   #[id number]  Job ID #[id number]    CC:

## 2017-02-15 NOTE — PLAN OF CARE
PCP: Yoni Renteria MD  ADDRESS: [address] / [place] [address]    Payor: [employer] MANAGED MEDICARE / Plan: [employer] / Product Type: Medicare Advantage /     PHARMACY:    [facility] [place], - [place] - [address]  [address] [place] [address]  Phone: [phone] Fax: [phone]         02/15/17 1532   Discharge Assessment   Assessment Type Discharge Planning Assessment   Confirmed/corrected address and phone number on facesheet? Yes   Assessment information obtained from? Medical Record   Expected Length of Stay (days) 4   Communicated expected length of stay with patient/caregiver yes   If Healthcare Directive is received, is it scanned into Epic? yes   Prior to hospitilization cognitive status: Alert/Oriented   Prior to hospitalization functional status: Independent   Current cognitive status: Alert/Oriented   Current Functional Status: Independent   Arrived From home or self-care   Lives With friend(s)   Able to Return to Prior Arrangements yes   Is patient able to care for self after discharge? Yes   How many people do you have in your home that can help with your care after discharge? 1   Who are your caregiver(s) and their phone number(s)? Isha Fowler, [phone]   Patient's perception of discharge disposition home or selfcare   Readmission Within The Last 30 Days no previous admission in last 30 days   Patient currently being followed by outpatient case management? No   Patient currently receives home health services? No   Does the patient currently use HME? No   Patient currently receives private duty nursing? N/A   Patient currently receives any other outside agency services? No   Equipment Currently Used at Home none   Do you have any problems affording any of your prescribed medications? TBD   Is the patient taking medications as prescribed? yes   Do you have any financial concerns preventing you from receiving the healthcare you  need? No   Does the patient have transportation to healthcare appointments? Yes   Transportation Available car;family or friend will provide   On Dialysis? No   Does the patient receive services at the Coumadin Clinic? No   Are there any open cases? No   Discharge Plan A Home with family   Discharge Plan B Home Health   Patient/Family In Agreement With Plan yes

## 2017-02-15 NOTE — OP NOTE
DATE OF PROCEDURE:  02/14/2017    PREOPERATIVE DIAGNOSIS:  History of breast cancer.    POSTOPERATIVE DIAGNOSIS:  History of breast cancer.    PROCEDURES PERFORMED:  1.  Left mastopexy.  2.  Right HEMANTH flap breast reconstruction using two HEMANTH flaps.  3.  Partial rib resection x1.  4.  Capsulectomy, right breast.    CO-SURGEONS:  Xiang Hernandez M.D., CUATE and Juve Christopher M.D.    BLOOD LOSS:  Approximately 150 mL.    DESCRIPTION OF PROCEDURE  The patient was evaluated in the preoperative holding   area.  In the supine position, the Doppler probe was used to identify the   perforators.  Both medial rows of perforators were marked.  Markings were also   made for the flap itself.  The patient would need to have stacked flaps   performed in order to get enough volume to recreate the right breast.  On the   left side, markings were made for the mastopexy.  The patient was then taken to   the Operative Room, placed in the supine position after adequate general   endotracheal anesthesia, was prepped and draped in a normal sterile fashion.    The new nipple areolar site complex was marked measuring 42 mm.  The area around   the nipple-areolar complex and inferior to it was overall deepithelialized.    Small flaps were then elevated.  The incisions were closed using a dermal   stapler for the dermis followed by a running 4-0 Monocryl subcuticular suture   for the inframammary incision, vertical limb and nipple-areolar complexes.  On   the HEMANTH flap side, it should be noted that I will be dictating the chest   portion while Dr. Juve Christopher will be dictating the flap harvest as well as   the microanastomosis portion.  The previous mastectomy incision was then   reexcised.  The patient had a tissue expander in place.  The dissection then   proceeded down to the new inframammary fold.  Superiorly, the dissection   proceeded on top of the pectoralis major muscle.  The entire breast mastectomy   pocket was then  recreated.  The implant was then removed.  The capsulectomy was   performed.  The pectoralis major muscle was then sutured down to the chest wall   using interrupted 2-0 Vicryl sutures.  Next, overlying the third rib, the   pectoralis muscle was divided at its sternal attachments.  It was then retracted   laterally.  The third rib was identified.  Subperiosteal dissection then   proceeded.  It should be noted that due to the radiation, there was thick rinds   of tissue that needed to be excised.  This made the dissection much more   difficult.  A partial rib resection was then performed.  The fascia was then   opened using a bipolar.  The internal mammary artery and internal mammary vein   were then dissected the full length of the interspace.  Dr. Juve Christopher   will then dictate the harvest and the microanastomosis.  After completion of the   microanastomosis, the flap was inset.  The superior flap, which was heavily   radiated and poor quality skin, was excised.  Inferiorly, the inferior flap was   preserved.  The deepithelialization of the HEMANTH flap then was performed in the   inferior aspect.  Very good bright red bleeding was noted.  The flap was then   inset.  A drain was placed.  The flap was then closed using dermal staplers   followed by running 4-0 Monocryl subcuticular suture.  The abdomen was then   closed.  The fascia was closed using #0 PDS mattress sutures.  This was a double   layer.  Next, the flap was then brought down and the Bib fascia was closed   using interrupted 2-0 Vicryl, the skin using 3-0 Monocryl dermal staples as well   as a running 4-0 Monocryl subcuticular suture.  The umbilicus was brought onto   the anterior abdominal wall and closed using 4-0 Monocryl followed by 5-0 nylon.    Excellent Doppler signals were achieved in 2 separate areas on the flap after   completion of the procedure.  There were no complications.  It should be noted   that there were 2 drains in the abdomen  as well.  There were no complications.      CRB/HN  dd: 02/14/2017 17:19:57 (CST)  td: 02/14/2017 21:37:31 (CST)  Doc ID   #[id number]  Job ID #[id number]    CC:

## 2017-02-15 NOTE — NURSING TRANSFER
Nursing Transfer Note      2/14/2017     Transfer To: 611 from pacu    Transfer via bed    Transfer with cardiac monitoring    Transported by pacu rn x2    Medicines sent: insulin pen     Chart send with patient: Yes    Notified: family    Patient reassessed at: 2030 2/14/2017

## 2017-02-15 NOTE — PROGRESS NOTES
Progress Note  Plastic Surgery    Admit Date: 2/14/2017  Post-operative Day: 1 Day Post-Op  Hospital Day: 2    SUBJECTIVE:     Patient doing well this AM. No acute events overnight. Pain well controlled. Doppler checks completed overnight.    Follow-up For: Right stacked HEMANTH flap for breast reconstuction, left breast scar revision    Scheduled Meds:   ceFAZolin 2 g/50mL Dextrose IVPB  2 g Intravenous Q8H    docusate sodium  100 mg Oral Q12H    heparin (porcine)  5,000 Units Subcutaneous Q8H     Continuous Infusions:   0/9% NACL & POTASSIUM CHLORIDE 20 MEQ/L 100 mL/hr at 02/14/17 1804     PRN Meds:cyclobenzaprine, dextrose 50%, dextrose 50%, diphenhydrAMINE, glucagon (human recombinant), glucose, glucose, hydrocodone-acetaminophen 5-325mg, HYDROmorphone, insulin aspart, ondansetron, promethazine (PHENERGAN) IVPB, sodium chloride 0.9%    Review of patient's allergies indicates:   Allergen Reactions    Adhesive tape-silicones Hives     BANDAIDS    Polymycin Hives       OBJECTIVE:     Vital Signs (Most Recent)  Temp: 98.5 °F (36.9 °C) (02/15/17 0400)  Pulse: 71 (02/15/17 0400)  Resp: 12 (02/15/17 0400)  BP: 138/66 (02/15/17 0400)  SpO2: 98 % (02/15/17 0400)    Vital Signs Range (Last 24H):  Temp:  [96.9 °F (36.1 °C)-98.5 °F (36.9 °C)]   Pulse:  [61-78]   Resp:  [10-21]   BP: (121-163)/(56-74)   SpO2:  [91 %-100 %]     I & O (Last 24H):  Intake/Output Summary (Last 24 hours) at 02/15/17 0748  Last data filed at 02/15/17 0600   Gross per 24 hour   Intake             2000 ml   Output             1180 ml   Net              820 ml     Physical Exam:  Right breast flap with good cap refill, triphasic doppler signal x2, warm, incision clean and dry, drain in place with ss output  Left breast incisions c/d/i  Abd incision c/d/i, drains x2 in place with ss output      ASSESSMENT/PLAN:     57 y/o female s/p Right breast HEMANTH stacked flap on 2/14/17.  - Patient tolerated procedure well  - Start clear liquid diet today  -  Q1h doppler checks until noon, then q2h doppler/flap checks   - Rosas removed last night, patient voiding  - Stop IVF at noon once tolerating clear liquids  - PRN pain mgmt  - Abx x2 days post op  - Heparin SQ q8h  - Drain teaching, patient will be discharged with drains

## 2017-02-16 LAB
POCT GLUCOSE: 226 MG/DL (ref 70–110)
POCT GLUCOSE: 239 MG/DL (ref 70–110)
POCT GLUCOSE: 246 MG/DL (ref 70–110)
POCT GLUCOSE: 263 MG/DL (ref 70–110)

## 2017-02-16 PROCEDURE — 63600175 PHARM REV CODE 636 W HCPCS: Performed by: SURGERY

## 2017-02-16 PROCEDURE — 20600001 HC STEP DOWN PRIVATE ROOM

## 2017-02-16 PROCEDURE — 25000003 PHARM REV CODE 250: Performed by: SURGERY

## 2017-02-16 PROCEDURE — 25000003 PHARM REV CODE 250: Performed by: STUDENT IN AN ORGANIZED HEALTH CARE EDUCATION/TRAINING PROGRAM

## 2017-02-16 PROCEDURE — 25000003 PHARM REV CODE 250: Performed by: ORTHOPAEDIC SURGERY

## 2017-02-16 RX ORDER — OXYCODONE AND ACETAMINOPHEN 5; 325 MG/1; MG/1
1 TABLET ORAL EVERY 4 HOURS PRN
Status: DISCONTINUED | OUTPATIENT
Start: 2017-02-16 | End: 2017-02-18 | Stop reason: HOSPADM

## 2017-02-16 RX ORDER — OXYCODONE AND ACETAMINOPHEN 10; 325 MG/1; MG/1
1 TABLET ORAL EVERY 4 HOURS PRN
Status: DISCONTINUED | OUTPATIENT
Start: 2017-02-16 | End: 2017-02-18 | Stop reason: HOSPADM

## 2017-02-16 RX ORDER — ACETAMINOPHEN 325 MG/1
650 TABLET ORAL EVERY 6 HOURS PRN
Status: DISCONTINUED | OUTPATIENT
Start: 2017-02-16 | End: 2017-02-18 | Stop reason: HOSPADM

## 2017-02-16 RX ADMIN — HEPARIN SODIUM 5000 UNITS: 5000 INJECTION, SOLUTION INTRAVENOUS; SUBCUTANEOUS at 09:02

## 2017-02-16 RX ADMIN — ACETAMINOPHEN 650 MG: 325 TABLET, FILM COATED ORAL at 04:02

## 2017-02-16 RX ADMIN — HYDROMORPHONE HYDROCHLORIDE 1 MG: 1 INJECTION, SOLUTION INTRAMUSCULAR; INTRAVENOUS; SUBCUTANEOUS at 10:02

## 2017-02-16 RX ADMIN — HEPARIN SODIUM 5000 UNITS: 5000 INJECTION, SOLUTION INTRAVENOUS; SUBCUTANEOUS at 05:02

## 2017-02-16 RX ADMIN — DOCUSATE SODIUM 100 MG: 100 CAPSULE, LIQUID FILLED ORAL at 08:02

## 2017-02-16 RX ADMIN — HEPARIN SODIUM 5000 UNITS: 5000 INJECTION, SOLUTION INTRAVENOUS; SUBCUTANEOUS at 02:02

## 2017-02-16 RX ADMIN — HYDROMORPHONE HYDROCHLORIDE 1 MG: 1 INJECTION, SOLUTION INTRAMUSCULAR; INTRAVENOUS; SUBCUTANEOUS at 07:02

## 2017-02-16 RX ADMIN — OXYCODONE AND ACETAMINOPHEN 1 TABLET: 10; 325 TABLET ORAL at 09:02

## 2017-02-16 RX ADMIN — HYDROMORPHONE HYDROCHLORIDE 1 MG: 1 INJECTION, SOLUTION INTRAMUSCULAR; INTRAVENOUS; SUBCUTANEOUS at 11:02

## 2017-02-16 RX ADMIN — INSULIN ASPART 2 UNITS: 100 INJECTION, SOLUTION INTRAVENOUS; SUBCUTANEOUS at 04:02

## 2017-02-16 RX ADMIN — HYDROMORPHONE HYDROCHLORIDE 1 MG: 1 INJECTION, SOLUTION INTRAMUSCULAR; INTRAVENOUS; SUBCUTANEOUS at 05:02

## 2017-02-16 RX ADMIN — DOCUSATE SODIUM 100 MG: 100 CAPSULE, LIQUID FILLED ORAL at 09:02

## 2017-02-16 RX ADMIN — INSULIN ASPART 2 UNITS: 100 INJECTION, SOLUTION INTRAVENOUS; SUBCUTANEOUS at 12:02

## 2017-02-16 RX ADMIN — HYDROMORPHONE HYDROCHLORIDE 1 MG: 1 INJECTION, SOLUTION INTRAMUSCULAR; INTRAVENOUS; SUBCUTANEOUS at 03:02

## 2017-02-16 RX ADMIN — INSULIN ASPART 1 UNITS: 100 INJECTION, SOLUTION INTRAVENOUS; SUBCUTANEOUS at 10:02

## 2017-02-16 NOTE — PLAN OF CARE
Problem: Patient Care Overview  Goal: Plan of Care Review  Outcome: Ongoing (interventions implemented as appropriate)  POC reviewed with pt. Who verbalized understanding. AAOx 4. Remains free of falls and injuries. VSS. Tolerating CL diet, denies nausea. Complains of pain treated with prn meds. Assist with activity. BG monitored at night time with coverage needed. L nipple sututes. R  breast Flap Q 2. Transverse-DB. GENARO x 3. Telemetry-SR. No acute events. No distress noted. BRADLEY.

## 2017-02-17 LAB
POCT GLUCOSE: 210 MG/DL (ref 70–110)
POCT GLUCOSE: 217 MG/DL (ref 70–110)
POCT GLUCOSE: 252 MG/DL (ref 70–110)
POCT GLUCOSE: 285 MG/DL (ref 70–110)
POCT GLUCOSE: 299 MG/DL (ref 70–110)

## 2017-02-17 PROCEDURE — 25000003 PHARM REV CODE 250: Performed by: STUDENT IN AN ORGANIZED HEALTH CARE EDUCATION/TRAINING PROGRAM

## 2017-02-17 PROCEDURE — 25000003 PHARM REV CODE 250: Performed by: ORTHOPAEDIC SURGERY

## 2017-02-17 PROCEDURE — 20600001 HC STEP DOWN PRIVATE ROOM

## 2017-02-17 PROCEDURE — 25000003 PHARM REV CODE 250: Performed by: SURGERY

## 2017-02-17 PROCEDURE — 63600175 PHARM REV CODE 636 W HCPCS: Performed by: SURGERY

## 2017-02-17 RX ORDER — CYCLOBENZAPRINE HCL 5 MG
5 TABLET ORAL 3 TIMES DAILY PRN
Qty: 61 TABLET | Refills: 0 | Status: SHIPPED | OUTPATIENT
Start: 2017-02-17 | End: 2017-03-03

## 2017-02-17 RX ORDER — OXYCODONE AND ACETAMINOPHEN 5; 325 MG/1; MG/1
1 TABLET ORAL EVERY 4 HOURS PRN
Qty: 91 TABLET | Refills: 0 | Status: SHIPPED | OUTPATIENT
Start: 2017-02-17 | End: 2017-05-18 | Stop reason: CLARIF

## 2017-02-17 RX ORDER — CLINDAMYCIN HYDROCHLORIDE 150 MG/1
300 CAPSULE ORAL 3 TIMES DAILY
Qty: 30 CAPSULE | Refills: 0 | Status: SHIPPED | OUTPATIENT
Start: 2017-02-17 | End: 2017-02-27

## 2017-02-17 RX ADMIN — HYDROMORPHONE HYDROCHLORIDE 1 MG: 1 INJECTION, SOLUTION INTRAMUSCULAR; INTRAVENOUS; SUBCUTANEOUS at 09:02

## 2017-02-17 RX ADMIN — ACETAMINOPHEN 650 MG: 325 TABLET, FILM COATED ORAL at 11:02

## 2017-02-17 RX ADMIN — HEPARIN SODIUM 5000 UNITS: 5000 INJECTION, SOLUTION INTRAVENOUS; SUBCUTANEOUS at 02:02

## 2017-02-17 RX ADMIN — DOCUSATE SODIUM 100 MG: 100 CAPSULE, LIQUID FILLED ORAL at 09:02

## 2017-02-17 RX ADMIN — DOCUSATE SODIUM 100 MG: 100 CAPSULE, LIQUID FILLED ORAL at 08:02

## 2017-02-17 RX ADMIN — INSULIN ASPART 1 UNITS: 100 INJECTION, SOLUTION INTRAVENOUS; SUBCUTANEOUS at 09:02

## 2017-02-17 RX ADMIN — HEPARIN SODIUM 5000 UNITS: 5000 INJECTION, SOLUTION INTRAVENOUS; SUBCUTANEOUS at 09:02

## 2017-02-17 RX ADMIN — INSULIN ASPART 3 UNITS: 100 INJECTION, SOLUTION INTRAVENOUS; SUBCUTANEOUS at 10:02

## 2017-02-17 RX ADMIN — INSULIN ASPART 2 UNITS: 100 INJECTION, SOLUTION INTRAVENOUS; SUBCUTANEOUS at 08:02

## 2017-02-17 RX ADMIN — HYDROMORPHONE HYDROCHLORIDE 1 MG: 1 INJECTION, SOLUTION INTRAMUSCULAR; INTRAVENOUS; SUBCUTANEOUS at 12:02

## 2017-02-17 RX ADMIN — OXYCODONE AND ACETAMINOPHEN 1 TABLET: 10; 325 TABLET ORAL at 05:02

## 2017-02-17 RX ADMIN — HYDROMORPHONE HYDROCHLORIDE 1 MG: 1 INJECTION, SOLUTION INTRAMUSCULAR; INTRAVENOUS; SUBCUTANEOUS at 07:02

## 2017-02-17 RX ADMIN — HEPARIN SODIUM 5000 UNITS: 5000 INJECTION, SOLUTION INTRAVENOUS; SUBCUTANEOUS at 05:02

## 2017-02-17 RX ADMIN — OXYCODONE AND ACETAMINOPHEN 1 TABLET: 10; 325 TABLET ORAL at 08:02

## 2017-02-17 RX ADMIN — INSULIN ASPART 3 UNITS: 100 INJECTION, SOLUTION INTRAVENOUS; SUBCUTANEOUS at 06:02

## 2017-02-17 RX ADMIN — OXYCODONE AND ACETAMINOPHEN 1 TABLET: 10; 325 TABLET ORAL at 10:02

## 2017-02-17 RX ADMIN — ONDANSETRON 4 MG: 2 INJECTION INTRAMUSCULAR; INTRAVENOUS at 05:02

## 2017-02-17 NOTE — PLAN OF CARE
02/17/17 1414   Right Care Assessment   Can the patient answer the patient profile reliably? Yes, cognitively intact   How often would a person be available to care for the patient? Whenever needed   Describe the patient's ability to walk at the present time. Minor restrictions or changes

## 2017-02-17 NOTE — PROGRESS NOTES
Progress Note  Plastic Surgery    Admit Date: 2/14/2017  Post-operative Day: 3 Days Post-Op  Hospital Day: 4    SUBJECTIVE:     Patient doing well this AM. No acute events overnight. Pain well controlled. OOB to chair yesterday, ambulating in room. Feels comfortable with discharge tomorrow. Encouraged IS use.    Follow-up For: Right stacked HEMANTH flap for breast reconstuction, left breast scar revision    Scheduled Meds:   docusate sodium  100 mg Oral Q12H    heparin (porcine)  5,000 Units Subcutaneous Q8H     Continuous Infusions:     PRN Meds:acetaminophen, cyclobenzaprine, dextrose 50%, dextrose 50%, diphenhydrAMINE, glucagon (human recombinant), glucose, glucose, HYDROmorphone, insulin aspart, ondansetron, oxycodone-acetaminophen, oxycodone-acetaminophen, promethazine (PHENERGAN) IVPB, sodium chloride 0.9%    Review of patient's allergies indicates:   Allergen Reactions    Adhesive tape-silicones Hives     BANDAIDS    Polymycin Hives       OBJECTIVE:     Vital Signs (Most Recent)  Temp: 98.4 °F (36.9 °C) (02/17/17 1200)  Pulse: 93 (02/17/17 1200)  Resp: 20 (02/17/17 1200)  BP: 124/67 (02/17/17 1200)  SpO2: 96 % (02/17/17 1200)    Vital Signs Range (Last 24H):  Temp:  [97.1 °F (36.2 °C)-100.2 °F (37.9 °C)]   Pulse:  [80-99]   Resp:  [13-28]   BP: (117-132)/(62-69)   SpO2:  [92 %-97 %]     I & O (Last 24H):    Intake/Output Summary (Last 24 hours) at 02/17/17 1303  Last data filed at 02/17/17 0500   Gross per 24 hour   Intake                0 ml   Output              640 ml   Net             -640 ml     Physical Exam:  Right breast flap with good cap refill, good biphasic doppler signal x2, warm, incision clean and intact with minimal ss drainage from medial incision, drain in place with ss output  Left breast incisions c/d/i  Abd incision c/d/i, drains x2 in place with ss output      ASSESSMENT/PLAN:     57 y/o female s/p Right breast HEMANTH stacked flap on 2/14/17.  - Patient tolerated procedure well  -  Encourage IS use  - ADA diet ordered  - doppler/flap checks as ordered  - PRN pain mgmt  - Heparin SQ q8h  - Drain teaching, patient will be discharged with drains    Dispo: home tomorrow

## 2017-02-17 NOTE — PLAN OF CARE
Problem: Patient Care Overview  Goal: Plan of Care Review  Outcome: Ongoing (interventions implemented as appropriate)  Careplan reviewed and understood. RR even and unlabored. Left AC IV intact and patent. Left GENARO drain to abdomen intact and patent. Right GENARO drain to abdomen intact and patent. Right GENARO drain to breast intact and patent. Flap checks strong. Ambulated to bedside commode throughout day. Up in chair throughout day. Pt tolerating diet. No complaints of N/V. Pt remain free of falls. No acute distress noted. Will continue to monitor.

## 2017-02-18 VITALS
DIASTOLIC BLOOD PRESSURE: 80 MMHG | BODY MASS INDEX: 28.14 KG/M2 | RESPIRATION RATE: 18 BRPM | WEIGHT: 201 LBS | HEART RATE: 75 BPM | SYSTOLIC BLOOD PRESSURE: 143 MMHG | TEMPERATURE: 98 F | HEIGHT: 71 IN | OXYGEN SATURATION: 94 %

## 2017-02-18 PROBLEM — C50.919 BREAST CANCER: Status: RESOLVED | Noted: 2017-02-14 | Resolved: 2017-02-18

## 2017-02-18 LAB — POCT GLUCOSE: 284 MG/DL (ref 70–110)

## 2017-02-18 PROCEDURE — 25000003 PHARM REV CODE 250: Performed by: STUDENT IN AN ORGANIZED HEALTH CARE EDUCATION/TRAINING PROGRAM

## 2017-02-18 PROCEDURE — 25000003 PHARM REV CODE 250: Performed by: SURGERY

## 2017-02-18 RX ORDER — BACITRACIN ZINC 500 UNIT/G
OINTMENT (GRAM) TOPICAL 2 TIMES DAILY
Status: DISCONTINUED | OUTPATIENT
Start: 2017-02-18 | End: 2017-02-18 | Stop reason: HOSPADM

## 2017-02-18 RX ADMIN — BACITRACIN ZINC: 500 OINTMENT TOPICAL at 09:02

## 2017-02-18 RX ADMIN — OXYCODONE AND ACETAMINOPHEN 1 TABLET: 10; 325 TABLET ORAL at 06:02

## 2017-02-18 RX ADMIN — HEPARIN SODIUM 5000 UNITS: 5000 INJECTION, SOLUTION INTRAVENOUS; SUBCUTANEOUS at 06:02

## 2017-02-18 RX ADMIN — INSULIN ASPART 3 UNITS: 100 INJECTION, SOLUTION INTRAVENOUS; SUBCUTANEOUS at 08:02

## 2017-02-18 RX ADMIN — OXYCODONE AND ACETAMINOPHEN 1 TABLET: 10; 325 TABLET ORAL at 01:02

## 2017-02-18 RX ADMIN — DOCUSATE SODIUM 100 MG: 100 CAPSULE, LIQUID FILLED ORAL at 08:02

## 2017-02-18 RX ADMIN — OXYCODONE AND ACETAMINOPHEN 1 TABLET: 10; 325 TABLET ORAL at 10:02

## 2017-02-18 NOTE — PROGRESS NOTES
Progress Note  Plastic Surgery    Admit Date: 2/14/2017  Post-operative Day: 4 Days Post-Op  Hospital Day: 5    SUBJECTIVE:     Patient doing well this AM. No acute events overnight.    Follow-up For: Right stacked HEMANTH flap for breast reconstuction, left breast scar revision    Scheduled Meds:   docusate sodium  100 mg Oral Q12H    heparin (porcine)  5,000 Units Subcutaneous Q8H     Continuous Infusions:     PRN Meds:acetaminophen, cyclobenzaprine, dextrose 50%, dextrose 50%, diphenhydrAMINE, glucagon (human recombinant), glucose, glucose, HYDROmorphone, insulin aspart, ondansetron, oxycodone-acetaminophen, oxycodone-acetaminophen, promethazine (PHENERGAN) IVPB, sodium chloride 0.9%    Review of patient's allergies indicates:   Allergen Reactions    Adhesive tape-silicones Hives     BANDAIDS    Polymycin Hives       OBJECTIVE:     Vital Signs (Most Recent)  Temp: 97.9 °F (36.6 °C) (02/18/17 0416)  Pulse: 84 (02/18/17 0416)  Resp: 16 (02/18/17 0416)  BP: 118/67 (02/18/17 0416)  SpO2: (!) 92 % (02/18/17 0416)    Vital Signs Range (Last 24H):  Temp:  [97.9 °F (36.6 °C)-98.9 °F (37.2 °C)]   Pulse:  [80-93]   Resp:  [16-20]   BP: (115-140)/(65-69)   SpO2:  [92 %-97 %]     I & O (Last 24H):    Intake/Output Summary (Last 24 hours) at 02/18/17 0820  Last data filed at 02/18/17 0400   Gross per 24 hour   Intake              251 ml   Output              797 ml   Net             -546 ml     Physical Exam:  Right breast flap with good cap refill, warm, incision clean and intact with minimal ss drainage from medial incision, drain in place with ss output  Left breast incisions c/d/i  Abd incision c/d/i, drains x2 in place with ss output, right lateral aspect of incision had small blister-placed mepilex dressing over it      ASSESSMENT/PLAN:     55 y/o female s/p Right breast HEMANTH stacked flap on 2/14/17.  - bacitracin to right lateral aspect of incision  - Drain teaching, patient will be discharged with drains    Dispo:  home today

## 2017-02-18 NOTE — PLAN OF CARE
Problem: Patient Care Overview  Goal: Plan of Care Review  Outcome: Ongoing (interventions implemented as appropriate)  Remained safe throughout shift. AAOx4. Abdominal binder on throughout shift. Flap checks Q4H to right breast. GENARO drains x3 noted with serosanguineous drainage. Tolerating diet; denies nausea. Accuchecks AC/HS. Pain managed with PRN PO meds. Ambulating independently in room. Remained free from falls or injuries with bed locked, bed in lowest position, and call bell within reach. Spouse at bedside.

## 2017-02-19 NOTE — PROGRESS NOTES
Pt. Given discharge instructions including prescription for medications. Questions answered to pt. Satisfaction. Pt. Verbalized understanding. Pt. VSS. PIV removed. Catheter tip intact. Pt. Provided wheelchair escort. Pt. Discharge home.

## 2017-02-20 ENCOUNTER — TELEPHONE (OUTPATIENT)
Dept: PLASTIC SURGERY | Facility: CLINIC | Age: 57
End: 2017-02-20

## 2017-02-20 NOTE — PLAN OF CARE
02/20/17 0917   Final Note   Assessment Type Final Discharge Note   Discharge Disposition Home   Discharge planning education complete? Yes   Discharge plans and expectations educations in teach back method with documentation complete? Yes   Offered [facility] -- Bedside Delivery? n/a   Discharge/Hospital Encounter Summary to (non-[facility]) PCP n/a   Referral to Outpatient Case Management complete? n/a   Referral to / orders for Home Health Complete? n/a   30 day supply of medicines given at discharge, if documented non-compliance / non-adherence? n/a   Any social issues identified prior to discharge? Yes   Did you assess the readiness or willingness of the family or caregiver to support self management of care? Yes

## 2017-02-20 NOTE — TELEPHONE ENCOUNTER
Returned a call to Andreia [employer] re: clarification of surgery and appt dates. No answer. Left a voice message.

## 2017-02-24 ENCOUNTER — TELEPHONE (OUTPATIENT)
Dept: PLASTIC SURGERY | Facility: CLINIC | Age: 57
End: 2017-02-24

## 2017-02-24 ENCOUNTER — OFFICE VISIT (OUTPATIENT)
Dept: PLASTIC SURGERY | Facility: CLINIC | Age: 57
End: 2017-02-24
Payer: MEDICARE

## 2017-02-24 DIAGNOSIS — Z09 SURGERY FOLLOW-UP EXAMINATION: Primary | ICD-10-CM

## 2017-02-24 PROCEDURE — 99024 POSTOP FOLLOW-UP VISIT: CPT | Mod: S$GLB,,, | Performed by: SURGERY

## 2017-02-24 RX ORDER — FLUCONAZOLE 150 MG/1
150 TABLET ORAL ONCE
Qty: 1 TABLET | Refills: 0 | Status: SHIPPED | OUTPATIENT
Start: 2017-02-24 | End: 2017-02-24

## 2017-02-24 RX ORDER — CLINDAMYCIN HYDROCHLORIDE 300 MG/1
300 CAPSULE ORAL 3 TIMES DAILY
Qty: 30 CAPSULE | Refills: 0 | Status: SHIPPED | OUTPATIENT
Start: 2017-02-24 | End: 2017-03-06

## 2017-02-24 NOTE — PROGRESS NOTES
Negrita Castro presents to Plastic Surgery Clinic after having a HEMANTH flap.    Her right breast drain was removed as was one abdominal drain.  In the radiated   skin on the superior pole of the breast, she is a little bit red, and also she   cannot feel any fluctuance or anything in there.  We will keep her on her   clindamycin.  We will see her again next week.      REI/ROLY  dd: 02/24/2017 09:38:15 (CST)  td: 02/24/2017 19:16:33 (CST)  Doc ID   #[id number]  Job ID #[id number]    CC:

## 2017-03-03 ENCOUNTER — OFFICE VISIT (OUTPATIENT)
Dept: PLASTIC SURGERY | Facility: CLINIC | Age: 57
End: 2017-03-03
Payer: MEDICARE

## 2017-03-03 DIAGNOSIS — Z09 SURGERY FOLLOW-UP EXAMINATION: Primary | ICD-10-CM

## 2017-03-03 PROCEDURE — 99024 POSTOP FOLLOW-UP VISIT: CPT | Mod: S$GLB,,, | Performed by: SURGERY

## 2017-03-14 NOTE — DISCHARGE SUMMARY
[facility]  Discharge Summary  Plastic Surgery      Admit Date: 2/14/2017    Discharge Date and Time: 2/18/2017 12:47 PM    Attending Physician: No att. providers found     Discharge Provider: Xiang Hernandez    Reason for Admission: Planned R breast reconstruction with HEMANTH flap and L breast mastopexy    Procedures Performed: Procedure(s) (LRB):  HEMANTH FREE FLAP (Bilateral)    Hospital Course (synopsis of major diagnoses, care, treatment, and services provided during the course of the hospital stay): Planned post procedure admission following R breast recon with HEMANTH flap and L mastopexy. Admitted to ICU following surgery for q1 hour flaps checks. Flap remained viable with good signal, then transferred to floor for further observation. Following PO pain control and return to normal diet, patient discharged home in stable condition. Procedure went as planned with no intra or post op complications.      Consults: None    Significant Diagnostic Studies: Labs: BMP: No results for input(s): GLU, NA, K, CL, CO2, BUN, CREATININE, CALCIUM, MG in the last 48 hours.    Final Diagnoses:    Principal Problem: Breast cancer   Secondary Diagnoses:   Active Hospital Problems    Diagnosis  POA   No active problems to display.      Resolved Hospital Problems    Diagnosis Date Resolved POA    *Breast cancer [C50.919] 02/18/2017 Yes       Discharged Condition: good    Disposition: Home or Self Care    Follow Up/Patient Instructions:     Medications:  Reconciled Home Medications:   Discharge Medication List as of 2/18/2017  9:19 AM      START taking these medications    Details   clindamycin (CLEOCIN) 150 MG capsule Take 2 capsules (300 mg total) by mouth 3 (three) times daily., Starting 2/17/2017, Until Mon 2/27/17, Print      cyclobenzaprine (FLEXERIL) 5 MG tablet Take 1 tablet (5 mg total) by mouth 3 (three) times daily as needed for Muscle spasms., Starting 2/17/2017, Until Fri 3/3/17, Print       oxycodone-acetaminophen (PERCOCET) 5-325 mg per tablet Take 1 tablet by mouth every 4 (four) hours as needed for Pain., Starting 2/17/2017, Until Discontinued, Print         CONTINUE these medications which have NOT CHANGED    Details   ASCORBIC ACID/VITAMIN E/BIOTIN (HAIR, SKIN, NAILS WITH BIOTIN ORAL) Take 1 tablet by mouth every morning. , Until Discontinued, Historical Med      carisoprodol (SOMA) 350 MG tablet Take 350 mg by mouth 3 (three) times daily as needed. , Starting 9/30/2015, Until Discontinued, Historical Med      diazepam (VALIUM) 10 MG tablet Take 10 mg by mouth every 6 (six) hours as needed. , Until Discontinued, Historical Med      dronabinol (MARINOL) 2.5 MG capsule Take 2.5 mg by mouth 2 (two) times daily before meals., Until Discontinued, Historical Med      enalapril (VASOTEC) 2.5 MG tablet Take 1.25 mg by mouth 2 (two) times daily. , Starting 8/28/2015, Until Discontinued, Historical Med      glimepiride (AMARYL) 1 MG tablet Take 1 mg by mouth once daily., Starting 3/30/2016, Until Discontinued, Historical Med      hydrocodone-acetaminophen 10-325mg (NORCO)  mg Tab Take 1 tablet by mouth 4 (four) times daily as needed., Starting 3/14/2016, Until Discontinued, Historical Med      JANUVIA 100 mg Tab Take 100 mg by mouth once daily. , Starting 8/27/2015, Until Discontinued, Historical Med      metformin (GLUCOPHAGE) 850 MG tablet Take 1,000 mg by mouth 2 (two) times daily. , Starting 7/31/2015, Until Discontinued, Historical Med      multivitamin capsule Take 1 capsule by mouth every morning., Until Discontinued, Historical Med      pravastatin (PRAVACHOL) 40 MG tablet Take 40 mg by mouth every morning. , Starting 8/28/2015, Until Discontinued, Historical Med      zolpidem (AMBIEN) 10 mg Tab Take 10 mg by mouth nightly as needed., Starting 8/15/2016, Until Discontinued, Historical Med           No discharge procedures on file.  Follow-up Information     Follow up with Xiang WOODALL  MD Mary. Schedule an appointment as soon as possible for a visit in 1 week.    Specialty:  Plastic Surgery    Why:  For wound re-check    Contact information:    Jaxon Figueroa  [facility] [id number] [phone]

## 2017-03-17 ENCOUNTER — OFFICE VISIT (OUTPATIENT)
Dept: PLASTIC SURGERY | Facility: CLINIC | Age: 57
End: 2017-03-17
Payer: MEDICARE

## 2017-03-17 DIAGNOSIS — Z09 SURGERY FOLLOW-UP EXAMINATION: Primary | ICD-10-CM

## 2017-03-17 PROCEDURE — 99024 POSTOP FOLLOW-UP VISIT: CPT | Mod: S$GLB,,, | Performed by: PHYSICIAN ASSISTANT

## 2017-03-17 PROCEDURE — 99999 PR PBB SHADOW E&M-EST. PATIENT-LVL II: CPT | Mod: PBBFAC,,, | Performed by: PHYSICIAN ASSISTANT

## 2017-03-17 NOTE — PROGRESS NOTES
Negrita Castro presents to Plastic Surgery Clinic on 3/17/2017 for a follow up visit status post R breast HEMANTH recon and L mastopexy on 02/14/2017    Current Outpatient Prescriptions on File Prior to Visit   Medication Sig Dispense Refill    ASCORBIC ACID/VITAMIN E/BIOTIN (HAIR, SKIN, NAILS WITH BIOTIN ORAL) Take 1 tablet by mouth every morning.       carisoprodol (SOMA) 350 MG tablet Take 350 mg by mouth 3 (three) times daily as needed.   0    diazepam (VALIUM) 10 MG tablet Take 10 mg by mouth every 6 (six) hours as needed.       dronabinol (MARINOL) 2.5 MG capsule Take 2.5 mg by mouth 2 (two) times daily before meals.      enalapril (VASOTEC) 2.5 MG tablet Take 1.25 mg by mouth 2 (two) times daily.   1    glimepiride (AMARYL) 1 MG tablet Take 1 mg by mouth once daily.  1    hydrocodone-acetaminophen 10-325mg (NORCO)  mg Tab Take 1 tablet by mouth 4 (four) times daily as needed.  0    JANUVIA 100 mg Tab Take 100 mg by mouth once daily.   1    metformin (GLUCOPHAGE) 850 MG tablet Take 1,000 mg by mouth 2 (two) times daily.   1    multivitamin capsule Take 1 capsule by mouth every morning.      oxycodone-acetaminophen (PERCOCET) 5-325 mg per tablet Take 1 tablet by mouth every 4 (four) hours as needed for Pain. 91 tablet 0    pravastatin (PRAVACHOL) 40 MG tablet Take 40 mg by mouth every morning.   1    zolpidem (AMBIEN) 10 mg Tab Take 10 mg by mouth nightly as needed.  0     No current facility-administered medications on file prior to visit.      Patient Active Problem List   Diagnosis    Depression    Anxiety disorder due to known physiological condition    Dysthymic disorder    History of breast cancer    Neuropathy    Over weight    Type 2 diabetes mellitus without complication, without long-term current use of insulin    Atypical chest pain    Elevated alkaline phosphatase level    Tattoo     Past Surgical History:   Procedure Laterality Date    BREAST BIOPSY      right     BREAST SURGERY      11-3-15 LUMPECTOMY, 12-2-15 REEXCISION    mastectomy 2016      shoulder surgery and wrist      BILAT  BONE SPUR    WRIST SURGERY      RIGHT     Doing well today. Flap viable. Incisions of B breast clean/dryt/intact. No erythema/warmth/signs of infection. All remaining sutures removed.     No complaints or concerns. All questions were answered. Will return to clinic in 3 weeks. The patient was advised to call the clinic with any questions or concerns prior to their next visit.

## 2017-04-07 ENCOUNTER — OFFICE VISIT (OUTPATIENT)
Dept: PLASTIC SURGERY | Facility: CLINIC | Age: 57
End: 2017-04-07
Payer: MEDICARE

## 2017-04-07 VITALS — BODY MASS INDEX: 27.97 KG/M2 | HEIGHT: 71 IN | WEIGHT: 199.75 LBS

## 2017-04-07 DIAGNOSIS — Z09 SURGERY FOLLOW-UP EXAMINATION: Primary | ICD-10-CM

## 2017-04-07 PROCEDURE — 99999 PR PBB SHADOW E&M-EST. PATIENT-LVL III: CPT | Mod: PBBFAC,,, | Performed by: PHYSICIAN ASSISTANT

## 2017-04-07 PROCEDURE — 99024 POSTOP FOLLOW-UP VISIT: CPT | Mod: S$GLB,,, | Performed by: PHYSICIAN ASSISTANT

## 2017-04-07 NOTE — PROGRESS NOTES
Negrita Castro presents to Plastic Surgery Clinic on 4/7/2017 for a follow up visit status post R breast HEMANTH flap reconstruction    Current Outpatient Prescriptions on File Prior to Visit   Medication Sig Dispense Refill    ASCORBIC ACID/VITAMIN E/BIOTIN (HAIR, SKIN, NAILS WITH BIOTIN ORAL) Take 1 tablet by mouth every morning.       carisoprodol (SOMA) 350 MG tablet Take 350 mg by mouth 3 (three) times daily as needed.   0    diazepam (VALIUM) 10 MG tablet Take 10 mg by mouth every 6 (six) hours as needed.       dronabinol (MARINOL) 2.5 MG capsule Take 2.5 mg by mouth 2 (two) times daily before meals.      enalapril (VASOTEC) 2.5 MG tablet Take 1.25 mg by mouth 2 (two) times daily.   1    glimepiride (AMARYL) 1 MG tablet Take 1 mg by mouth once daily.  1    hydrocodone-acetaminophen 10-325mg (NORCO)  mg Tab Take 1 tablet by mouth 4 (four) times daily as needed.  0    JANUVIA 100 mg Tab Take 100 mg by mouth once daily.   1    metformin (GLUCOPHAGE) 850 MG tablet Take 1,000 mg by mouth 2 (two) times daily.   1    multivitamin capsule Take 1 capsule by mouth every morning.      oxycodone-acetaminophen (PERCOCET) 5-325 mg per tablet Take 1 tablet by mouth every 4 (four) hours as needed for Pain. 91 tablet 0    pravastatin (PRAVACHOL) 40 MG tablet Take 40 mg by mouth every morning.   1    zolpidem (AMBIEN) 10 mg Tab Take 10 mg by mouth nightly as needed.  0     No current facility-administered medications on file prior to visit.      Patient Active Problem List   Diagnosis    Depression    Anxiety disorder due to known physiological condition    Dysthymic disorder    History of breast cancer    Neuropathy    Over weight    Type 2 diabetes mellitus without complication, without long-term current use of insulin    Atypical chest pain    Elevated alkaline phosphatase level    Tattoo     Past Surgical History:   Procedure Laterality Date    BREAST BIOPSY      right    BREAST SURGERY       11-3-15 LUMPECTOMY, 12-2-15 REEXCISION    mastectomy 2016      shoulder surgery and wrist      BILAT  BONE SPUR    WRIST SURGERY      RIGHT     Doing well today. Flap viable. Umbilicus viable. Incisions well healed.    She looks great. She will need release of scar contracture of R superior breast flap, Free fat transfer to breast and soft tissue revision of abdomen.     We will submit for insurance authorization and schedule accordingly.     All questions were answered. The patient was advised to call the clinic with any questions or concerns prior to their next visit.

## 2017-04-13 ENCOUNTER — TELEPHONE (OUTPATIENT)
Dept: PLASTIC SURGERY | Facility: CLINIC | Age: 57
End: 2017-04-13

## 2017-04-17 ENCOUNTER — TELEPHONE (OUTPATIENT)
Dept: PLASTIC SURGERY | Facility: CLINIC | Age: 57
End: 2017-04-17

## 2017-04-17 DIAGNOSIS — Z85.3 PERSONAL HISTORY OF BREAST CANCER: Primary | ICD-10-CM

## 2017-05-12 ENCOUNTER — TELEPHONE (OUTPATIENT)
Dept: HEMATOLOGY/ONCOLOGY | Facility: CLINIC | Age: 57
End: 2017-05-12

## 2017-05-13 NOTE — TELEPHONE ENCOUNTER
Amy or clarisa, Tell pt Soma not covered on [employer] formulary.  Does she want to pay pina for Soma or try another covered med in the [employer] plan?

## 2017-05-18 RX ORDER — METFORMIN HYDROCHLORIDE 500 MG/1
500 TABLET, FILM COATED, EXTENDED RELEASE ORAL
COMMUNITY
End: 2018-02-07 | Stop reason: CLARIF

## 2017-05-18 RX ORDER — FUROSEMIDE 20 MG/1
20 TABLET ORAL DAILY
COMMUNITY
End: 2018-05-22 | Stop reason: SDUPTHER

## 2017-05-18 NOTE — PRE-PROCEDURE INSTRUCTIONS
Anesthesia review complete, medication instructions given NPO past midnight, Bathe with hibiclens or Dial soap night before surgery & am of surgery. nothing on skin - lotion, deodorant, powder  No metal or jewelry & no valuables pt verbalized understanding and all questions answered

## 2017-05-18 NOTE — PRE ADMISSION SCREENING
Anesthesia Assessment: Preoperative EQUATION    Planned Procedure: Procedure(s) (LRB):  RECONSTRUCTION/REVISION-BREAST  1) second stage Breast Reconstruction 2) Soft Tissue Revision - Abdomen (N/A)  Requested Anesthesia Type:General  Surgeon: Xiang Hernandez MD  Service: Plastics  Known or anticipated Date of Surgery:6/1/2017    Surgeon notes: reviewed    Planned Procedure: Procedure(s) (LRB):  HEMANTH FREE FLAP (Bilateral)  Requested Anesthesia Type:General  Surgeon: Xiang Hernandez MD  Service: Plastics  Known or anticipated Date of Surgery:2/14/2017     Surgeon notes: reviewed and Personal history of malignant neoplasm of breast     Electronic QUestionnaire Assessment completed via nurse interview with patient.                    Negrita Castro [[id number]] - 56 y.o. Female         Providers Outside of [facility]       No data to display        Surgical Risk Level       Surgical Risk Level:   3              caRDScore (Clinical Anesthesia Rapid Decision Score)         Moderate  Total Score: 16       16 Sum of Clinical Scores        caRDScores (Grouped)       caRDScore - Ane:   4                       caRDScore - CVD:   1                       caRDScore - Pul:   2                       caRDScore - Met:   5                       caRDScore - Phy:   4              caRDScore Items             Pre-admit from 2/14/2017 in [facility]      Anesthesia        Had IM or IV steroid injections -2 or more in last 6 mo as outpt or in-patient   Yes [during chemo-last dose 9/2016]      Uses non-conventional drugs   Yes [medical marijuana]      Has degenerative arthritis causing severely limited neck movement   No [bulging disk in neck-mild pain sometimes]      Has chest pain or heaviness at times   Yes [from breast expander]      Currently taking tranquilizers or sleep medications on a regular basis   Yes      Has panic attacks   Yes [none recently]      CVD        Activity similar to best ability for  maximal activity or exercise   Has a physical disability that limits assessment of max activity, METS 3      Pulmonary        Total smoking adds up to 20 - 40 years   Yes      Metabolic        Is on Rx for depression or bipolar disease   Yes      Type 2 Diabetes   Yes      Is on oral Rx and/or non-insulin injectable for diabetes   Yes      Has hyperlipoproteinemia   Yes      Physiologic        Problems with memory   Yes [mild]      Had major surgery for Ca with resection of vital organ tissue (Brain, Lung, Liver, Kidney-please specify)   Yes [s/p breast cancer surgery]      Has Hx of seizures requiring chronic medication   Yes [last seizure 20yrs. ago from fall-head injury-no meds now]        Flags       Red Flag Score:   0                       Yellow Flag Score:   7              Red Flags       No data to display        Yellow Flags             Pre-admit from 2/14/2017 in [facility]      Has had surgery within the last 3 months   Yes      Has had steroids in the last year   Yes      Takes herbal medications or vitamin supplements   Yes [will stop x7 days prior to sx]      Is or has been a Smoker   Yes      Has degenerative arthritis causing severely limited neck movement   No [bulging disk in neck-mild pain sometimes]      Has Hx of seizures requiring chronic medication   Yes [last seizure 20yrs. ago from fall-head injury-no meds now]      Has pain   Yes        PONV Risk Score (assumes periop narcotic use = +1, Max=4)       PONV Risk Score:   3              PONV Risk Factors  Total Score: 2       1 Female      1 Non-Smoker at present        Sleep Apnea  Total Score: 0         NERY STOP-Bang Risk Factors (Max=8)  Total Score: 3       1 Has loud snoring      1 Observed to have interrupted breathing during sleep      1 Age >50        NERY Risk Level - 1 (Low), 2 (Moderate), 3 (High)       NERY Risk Level:   2              RCRI (Revised Cardiac Risk Indices of ACC/AHA guidelines, Max=6)  Total Score:  0         CAD Risk Factors  Total Score: 3       1 Total smoking adds up to 20 - 40 years      1 Has Diabetes      1 Has hyperlipoproteinemia        CHADS Score if applicable (history of atrial fib/flutter, Max=6)  Total Score: 1       1 Has Diabetes        Maximal Exercise Capacity             Pre-admit from 2/14/2017 in [facility]      Maximal Exercise Capacity   Has a physical disability that limits assessment of max activity, METS 3        Summary of Dependence  Total Score: 1       1 Is totally independent of others for activities of daily living        Phone Fraility Score (Max = 17)  Total Score: 3       1 Has had 1 hospitalization in last year      1 Uses 5 or more meds on reg basis      1 Recently, often feels sad or depressed        Pain Factors             Pre-admit from 2/14/2017 in [facility]      Has pain   Yes      Location and description of pain   -- [lower back-L4-L5-degenerative disk]      Typical Pain Scores   7 to 10      Depends on strong Rx (opioids, adjunctive meds)   Yes      Uses one of the following medications:   -- [Potter]      On opioid medication for greater than 90 days   Yes        Risk Triggers (Evidence-Based Risk Triggers)         Pulmonary Risk Triggers  Total Score: 1       1 Total smoking adds up to 20 - 40 years        Renal Risk Triggers  Total Score: 1       1 Type 2 Diabetes        Delirium Risk Triggers  Total Score: 1       1 Problems with memory        Urologic Risk Triggers  Total Score: 0         Logistics         Pre-op Clinic Logistics  Total Score: 8       1 Has had 1 hospitalization in last year      1 Has had surgery within the last 3 months      2 Takes herbal medications or vitamin supplements      2 Problems with memory      1 Has had anesthesia, either as adult or as a child      1 Has chronic pain / depends on strong Rx (opioids, adjunctive meds)        DOSC Logistics  Total Score: 6       2 Problems with memory      3  Has a functional chemotherapy port      1 Has peripheral neuropathy        Discharge Logistics  Total Score: 8       1 Functional capacity limit: METS 3      2 Problems with memory      2 Uses non-conventional drugs      1 Has peripheral neuropathy      2 Recently, often feels sad or depressed        Discharge Planning             Pre-admit from 2/14/2017 in [facility]      Discharge Planning        Will assist patient 24/7, if needed   -- [[phone]]        Anes & Int Med <For office use only>       Total Score: 18          Surgical Risk Level Assessment                       Triage considerations:      The patient has no apparent active cardiac condition (No unstable coronary Syndrome such as severe unstable angina or recent [<1 month] myocardial infarction, decompensated CHF, severe valvular disease or significant arrhythmia)     Previous anesthesia records:11/15/16-Removal Port-A-Cath left side-MAC- Airway/Jaw/Neck:  Airway Findings: Mouth Opening: Normal Tongue: Normal General Airway Assessment: Adult, Good Mallampati: I TM Distance: 4-6 cm -No PONV-no apparent anesthetic complications and tolerated procedure well  Anesthesia Hx:  Denies Family Hx of Anesthesia complications. Denies Personal Hx of Anesthesia complications.      3/21/88-Yhphiyzkb-Nkrj-A-Cath-left chest-GeneralAirway/Jaw/Neck:  Airway Findings: Mouth Opening: Normal Tongue: Normal General Airway Assessment: Adult, Good Mallampati: I TM Distance: 4-6 cm-No PONV -no apparent anesthetic complications, tolerated procedure well and no evidence of recall     Anesthesia Hx:  Denies Family Hx of Anesthesia complications. Denies Personal Hx of Anesthesia complications.         Last PCP note: within 3 months , within [facility] , focused visit   Subspecialty notes: Hematology/Oncology     Other important co-morbidities:       Diagnosis    Breast cancer in situ    Breast cancer    Depression    Anxiety disorder due to  known physiological condition    Dysthymic disorder    Malignant neoplasm of right female breast                   Past Surgical History   Procedure Laterality Date    Shoulder surgery and wrist               BILAT BONE SPUR    Wrist surgery               RIGHT    Breast biopsy               right    Breast surgery               11-3-15 LUMPECTOMY, 12-2-15 REEXCISION    Mastectomy 2016                    Tests already available:  Results have been reviewed.CXR-1/10/14/ EKG-11/2/15/      Instructions given. (See in Nurse's note)     Optimization:  Anesthesia Preop Clinic Assessment Indicated  Medical Opinion Indicated      Plan:   Testing: Hematology Profile  Pre-anesthesia visit   Visit focus: concerns in complex and/or prolonged anesthesia, acute or chronic anxiety concerns  Consultation:IM Perioperative Hospitalist  Patient has previously scheduled Medical Appointment:Appointment on 2/2/17, prior to surgery date.     Navigation: Tests Scheduled. Lab-Hem Profile on 2/2/17, @ 12:30p  Consults scheduled.POC & Perioperative IM Consult on 2/2/17 @ 1p & 2p  Results will be tracked by Preop Clinic.      Gayle Muniz RN 1/27/17 01/27/2017  Negrita Castro is a 56 y.o., female with depression, anxiety, DM II (poorly controlled since on steroids, A1C 9, on renal protection with ACEi), obesity, and breast cancer s/p mastectomy/chemo, neuropathy from chemo here for bilateral adan flap.     Preop  -- will give SSI to cover.     TTE 3/2016:    CONCLUSIONS     1 - Concentric remodeling.     2 - Hyperdynamic left ventricular systolic function (EF 65-70%).     3 - Normal left ventricular diastolic function.     4 - Normal right ventricular systolic function .     5 - The estimated PA systolic pressure is 16 mmHg.     6 - Difficult apical window, poor endocardial visualization.      [facility] Anesthesia Evaluation    I have reviewed the Medications.   Steroids Taken In Past Year:      Review of Systems  Anesthesia  Hx:  History of prior surgery of interest to airway management or planning: Previous anesthesia: MAC 2016: Port-removal with MAC. Denies Family Hx of Anesthesia complications. Denies Personal Hx of Anesthesia complications.   Hematology/Oncology:          -- Cancer in past history: s/p chemotherapy and s/p surgery Oncology Comments: Right breast cancer 10/2015: last chemo 2016; radiation- 2016     Uses medical marijuana for nausea and vomiting   Cardiovascular:  Functional Capacity good / => 4 METS, pt. was able to walk from garage to POC : denies CP/SOB   Pulmonary:  Denies Asthma. Denies Sleep Apnea.   Renal/:  Renal/ Normal   Hepatic/GI:  Denies GERD.   Neurological:  Peripheral Neuropathy Seizure Disorder Last seizure 20 years ago   Endocrine:  Diabetes, Type 2 Diabetes , controlled by oral hypoglycemics. , most recent HgA1c value was 9.1 on 17. Metabolic Disorders, Hyperlipoproteinemia  Psych:  Anxiety Disorder. Depression.         The patient was seen by Perioperative Internal Medicine physician Dr. Mc on 17, please see recommendations.             Triage considerations:     The patient has no apparent active cardiac condition (No unstable coronary Syndrome such as severe unstable angina or recent [<1 month] myocardial infarction, decompensated CHF, severe valvular   disease or significant arrhythmia)    Previous anesthesia records:GETA    [facility]   Subspecialty notes: Hematology/Oncology    Other important co-morbidities:  Depression, anxiety, dm 2     Tests already available:  {testin}            Instructions given. (See in Nurse's note)    Optimization:  Anesthesia Preop Clinic Assessment not  Indicated  Plan:    Testing:  Hematology Profile and A1C    Navigation: Tests Scheduled.                        Results will be tracked by Preop Clinic.                         Plans per surgeon and Follow-up per Surgeon

## 2017-05-24 ENCOUNTER — TELEPHONE (OUTPATIENT)
Dept: HEMATOLOGY/ONCOLOGY | Facility: CLINIC | Age: 57
End: 2017-05-24

## 2017-05-24 DIAGNOSIS — D05.90 CARCINOMA IN SITU OF BREAST, UNSPECIFIED LATERALITY, UNSPECIFIED TYPE: ICD-10-CM

## 2017-05-24 DIAGNOSIS — D05.90 CARCINOMA IN SITU OF BREAST, UNSPECIFIED LATERALITY, UNSPECIFIED TYPE: Primary | ICD-10-CM

## 2017-05-24 RX ORDER — DRONABINOL 2.5 MG/1
2.5 CAPSULE ORAL
Qty: 30 CAPSULE | Refills: 1 | Status: SHIPPED | OUTPATIENT
Start: 2017-05-24 | End: 2022-01-17

## 2017-05-24 RX ORDER — DRONABINOL 2.5 MG/1
2.5 CAPSULE ORAL
Qty: 30 CAPSULE | Refills: 1 | Status: SHIPPED | OUTPATIENT
Start: 2017-05-24 | End: 2017-05-24 | Stop reason: SDUPTHER

## 2017-05-24 NOTE — TELEPHONE ENCOUNTER
----- Message from Anyi Noel MA sent at 5/24/2017 12:07 PM CDT -----      ----- Message -----  From: Malena Lopez  Sent: 5/24/2017  11:36 AM  To: Nicanor Lopez Staff    rx refill 05/24/17

## 2017-05-25 ENCOUNTER — TELEPHONE (OUTPATIENT)
Dept: HEMATOLOGY/ONCOLOGY | Facility: CLINIC | Age: 57
End: 2017-05-25

## 2017-05-25 NOTE — TELEPHONE ENCOUNTER
Pt called in requesting nurse to call her about Rx marinol 2.5mg. She picked up rx today but wants to speak to nurse about the quanity that was given to her.

## 2017-05-25 NOTE — TELEPHONE ENCOUNTER
Pt called in and checked on status of RX let pt know RX is ready to be picked up. Pt said she is sending shayy to  rx .

## 2017-05-31 ENCOUNTER — ANESTHESIA EVENT (OUTPATIENT)
Dept: SURGERY | Facility: HOSPITAL | Age: 57
End: 2017-05-31
Payer: MEDICARE

## 2017-05-31 ENCOUNTER — TELEPHONE (OUTPATIENT)
Dept: PLASTIC SURGERY | Facility: CLINIC | Age: 57
End: 2017-05-31

## 2017-06-01 ENCOUNTER — ANESTHESIA (OUTPATIENT)
Dept: SURGERY | Facility: HOSPITAL | Age: 57
End: 2017-06-01
Payer: MEDICARE

## 2017-06-01 ENCOUNTER — HOSPITAL ENCOUNTER (OUTPATIENT)
Facility: HOSPITAL | Age: 57
Discharge: HOME | End: 2017-06-01
Attending: SURGERY | Admitting: SURGERY
Payer: MEDICARE

## 2017-06-01 DIAGNOSIS — C50.911 BREAST CANCER, RIGHT: Primary | ICD-10-CM

## 2017-06-01 DIAGNOSIS — Z85.3 HISTORY OF BREAST CANCER: ICD-10-CM

## 2017-06-01 LAB
POCT GLUCOSE: 209 MG/DL (ref 70–110)
POCT GLUCOSE: 212 MG/DL (ref 70–110)

## 2017-06-01 PROCEDURE — 14301 TIS TRNFR ANY 30.1-60 SQ CM: CPT | Mod: 59,LT,, | Performed by: SURGERY

## 2017-06-01 PROCEDURE — D9220A PRA ANESTHESIA: Mod: CRNA,,, | Performed by: NURSE ANESTHETIST, CERTIFIED REGISTERED

## 2017-06-01 PROCEDURE — 19364 BRST RCNSTJ FREE FLAP: CPT | Mod: RT,,, | Performed by: SURGERY

## 2017-06-01 PROCEDURE — 71000039 HC RECOVERY, EACH ADD'L HOUR: Performed by: SURGERY

## 2017-06-01 PROCEDURE — C1729 CATH, DRAINAGE: HCPCS | Performed by: SURGERY

## 2017-06-01 PROCEDURE — 63600175 PHARM REV CODE 636 W HCPCS: Performed by: ANESTHESIOLOGY

## 2017-06-01 PROCEDURE — 37000009 HC ANESTHESIA EA ADD 15 MINS: Performed by: SURGERY

## 2017-06-01 PROCEDURE — 37000008 HC ANESTHESIA 1ST 15 MINUTES: Performed by: SURGERY

## 2017-06-01 PROCEDURE — 25000003 PHARM REV CODE 250: Performed by: NURSE ANESTHETIST, CERTIFIED REGISTERED

## 2017-06-01 PROCEDURE — 88304 TISSUE EXAM BY PATHOLOGIST: CPT | Mod: 26,,, | Performed by: PATHOLOGY

## 2017-06-01 PROCEDURE — 82962 GLUCOSE BLOOD TEST: CPT | Performed by: SURGERY

## 2017-06-01 PROCEDURE — 71000015 HC POSTOP RECOV 1ST HR: Performed by: SURGERY

## 2017-06-01 PROCEDURE — 63600175 PHARM REV CODE 636 W HCPCS: Performed by: NURSE ANESTHETIST, CERTIFIED REGISTERED

## 2017-06-01 PROCEDURE — D9220A PRA ANESTHESIA: Mod: ANES,,, | Performed by: ANESTHESIOLOGY

## 2017-06-01 PROCEDURE — 88304 TISSUE EXAM BY PATHOLOGIST: CPT | Performed by: PATHOLOGY

## 2017-06-01 PROCEDURE — 25000003 PHARM REV CODE 250: Performed by: STUDENT IN AN ORGANIZED HEALTH CARE EDUCATION/TRAINING PROGRAM

## 2017-06-01 PROCEDURE — 71000033 HC RECOVERY, INTIAL HOUR: Performed by: SURGERY

## 2017-06-01 PROCEDURE — 63600175 PHARM REV CODE 636 W HCPCS: Performed by: STUDENT IN AN ORGANIZED HEALTH CARE EDUCATION/TRAINING PROGRAM

## 2017-06-01 PROCEDURE — 36000707: Performed by: SURGERY

## 2017-06-01 PROCEDURE — 25000003 PHARM REV CODE 250: Performed by: SURGERY

## 2017-06-01 PROCEDURE — 36000706: Performed by: SURGERY

## 2017-06-01 RX ORDER — ACETAMINOPHEN 325 MG/1
650 TABLET ORAL EVERY 8 HOURS PRN
Status: DISCONTINUED | OUTPATIENT
Start: 2017-06-01 | End: 2017-06-01 | Stop reason: HOSPADM

## 2017-06-01 RX ORDER — MIDAZOLAM HYDROCHLORIDE 1 MG/ML
1 INJECTION INTRAMUSCULAR; INTRAVENOUS EVERY 5 MIN PRN
Status: DISCONTINUED | OUTPATIENT
Start: 2017-06-01 | End: 2017-06-01 | Stop reason: HOSPADM

## 2017-06-01 RX ORDER — RAMELTEON 8 MG/1
8 TABLET ORAL NIGHTLY PRN
Status: DISCONTINUED | OUTPATIENT
Start: 2017-06-01 | End: 2017-06-01 | Stop reason: HOSPADM

## 2017-06-01 RX ORDER — FENTANYL CITRATE 50 UG/ML
INJECTION, SOLUTION INTRAMUSCULAR; INTRAVENOUS
Status: DISCONTINUED | OUTPATIENT
Start: 2017-06-01 | End: 2017-06-01

## 2017-06-01 RX ORDER — PROPOFOL 10 MG/ML
VIAL (ML) INTRAVENOUS
Status: DISCONTINUED | OUTPATIENT
Start: 2017-06-01 | End: 2017-06-01

## 2017-06-01 RX ORDER — MIDAZOLAM HYDROCHLORIDE 1 MG/ML
INJECTION, SOLUTION INTRAMUSCULAR; INTRAVENOUS
Status: DISCONTINUED | OUTPATIENT
Start: 2017-06-01 | End: 2017-06-01

## 2017-06-01 RX ORDER — FENTANYL CITRATE 50 UG/ML
25 INJECTION, SOLUTION INTRAMUSCULAR; INTRAVENOUS EVERY 5 MIN PRN
Status: DISCONTINUED | OUTPATIENT
Start: 2017-06-01 | End: 2017-06-01 | Stop reason: HOSPADM

## 2017-06-01 RX ORDER — HYDROMORPHONE HYDROCHLORIDE 2 MG/ML
INJECTION, SOLUTION INTRAMUSCULAR; INTRAVENOUS; SUBCUTANEOUS
Status: DISCONTINUED | OUTPATIENT
Start: 2017-06-01 | End: 2017-06-01

## 2017-06-01 RX ORDER — HYDROMORPHONE HYDROCHLORIDE 1 MG/ML
0.2 INJECTION, SOLUTION INTRAMUSCULAR; INTRAVENOUS; SUBCUTANEOUS EVERY 5 MIN PRN
Status: DISCONTINUED | OUTPATIENT
Start: 2017-06-01 | End: 2017-06-01 | Stop reason: HOSPADM

## 2017-06-01 RX ORDER — ONDANSETRON 2 MG/ML
4 INJECTION INTRAMUSCULAR; INTRAVENOUS EVERY 12 HOURS PRN
Status: DISCONTINUED | OUTPATIENT
Start: 2017-06-01 | End: 2017-06-01 | Stop reason: HOSPADM

## 2017-06-01 RX ORDER — DIPHENHYDRAMINE HYDROCHLORIDE 50 MG/ML
25 INJECTION INTRAMUSCULAR; INTRAVENOUS EVERY 4 HOURS PRN
Status: DISCONTINUED | OUTPATIENT
Start: 2017-06-01 | End: 2017-06-01 | Stop reason: HOSPADM

## 2017-06-01 RX ORDER — SODIUM CHLORIDE 0.9 % (FLUSH) 0.9 %
3 SYRINGE (ML) INJECTION EVERY 8 HOURS
Status: DISCONTINUED | OUTPATIENT
Start: 2017-06-01 | End: 2017-06-01 | Stop reason: HOSPADM

## 2017-06-01 RX ORDER — ROCURONIUM BROMIDE 10 MG/ML
INJECTION, SOLUTION INTRAVENOUS
Status: DISCONTINUED | OUTPATIENT
Start: 2017-06-01 | End: 2017-06-01

## 2017-06-01 RX ORDER — HYDROCODONE BITARTRATE AND ACETAMINOPHEN 5; 325 MG/1; MG/1
1 TABLET ORAL EVERY 6 HOURS PRN
Qty: 40 TABLET | Refills: 0 | Status: SHIPPED | OUTPATIENT
Start: 2017-06-01 | End: 2017-06-01

## 2017-06-01 RX ORDER — NEOSTIGMINE METHYLSULFATE 1 MG/ML
INJECTION, SOLUTION INTRAVENOUS
Status: DISCONTINUED | OUTPATIENT
Start: 2017-06-01 | End: 2017-06-01

## 2017-06-01 RX ORDER — ACETAMINOPHEN 325 MG/1
650 TABLET ORAL EVERY 4 HOURS PRN
Status: DISCONTINUED | OUTPATIENT
Start: 2017-06-01 | End: 2017-06-01 | Stop reason: HOSPADM

## 2017-06-01 RX ORDER — LIDOCAINE HCL/PF 100 MG/5ML
SYRINGE (ML) INTRAVENOUS
Status: DISCONTINUED | OUTPATIENT
Start: 2017-06-01 | End: 2017-06-01

## 2017-06-01 RX ORDER — GLYCOPYRROLATE 0.2 MG/ML
INJECTION INTRAMUSCULAR; INTRAVENOUS
Status: DISCONTINUED | OUTPATIENT
Start: 2017-06-01 | End: 2017-06-01

## 2017-06-01 RX ORDER — OXYCODONE AND ACETAMINOPHEN 5; 325 MG/1; MG/1
1 TABLET ORAL EVERY 4 HOURS PRN
Qty: 41 TABLET | Refills: 0 | Status: SHIPPED | OUTPATIENT
Start: 2017-06-01 | End: 2018-02-07 | Stop reason: CLARIF

## 2017-06-01 RX ORDER — OXYCODONE HYDROCHLORIDE 5 MG/1
5 TABLET ORAL EVERY 4 HOURS PRN
Status: DISCONTINUED | OUTPATIENT
Start: 2017-06-01 | End: 2017-06-01 | Stop reason: HOSPADM

## 2017-06-01 RX ORDER — ONDANSETRON 2 MG/ML
INJECTION INTRAMUSCULAR; INTRAVENOUS
Status: DISCONTINUED | OUTPATIENT
Start: 2017-06-01 | End: 2017-06-01

## 2017-06-01 RX ORDER — OXYCODONE HYDROCHLORIDE 5 MG/1
TABLET ORAL
Status: DISCONTINUED
Start: 2017-06-01 | End: 2017-06-01 | Stop reason: HOSPADM

## 2017-06-01 RX ORDER — SODIUM CHLORIDE 9 MG/ML
INJECTION, SOLUTION INTRAVENOUS CONTINUOUS
Status: DISCONTINUED | OUTPATIENT
Start: 2017-06-01 | End: 2017-06-01 | Stop reason: HOSPADM

## 2017-06-01 RX ORDER — ACETAMINOPHEN 10 MG/ML
INJECTION, SOLUTION INTRAVENOUS
Status: DISCONTINUED | OUTPATIENT
Start: 2017-06-01 | End: 2017-06-01

## 2017-06-01 RX ORDER — CEPHALEXIN 500 MG/1
500 CAPSULE ORAL EVERY 8 HOURS
Qty: 42 CAPSULE | Refills: 0 | Status: SHIPPED | OUTPATIENT
Start: 2017-06-01 | End: 2017-06-15

## 2017-06-01 RX ADMIN — MIDAZOLAM HYDROCHLORIDE 2 MG: 1 INJECTION, SOLUTION INTRAMUSCULAR; INTRAVENOUS at 10:06

## 2017-06-01 RX ADMIN — PROPOFOL 200 MG: 10 INJECTION, EMULSION INTRAVENOUS at 10:06

## 2017-06-01 RX ADMIN — FENTANYL CITRATE 50 MCG: 50 INJECTION, SOLUTION INTRAMUSCULAR; INTRAVENOUS at 11:06

## 2017-06-01 RX ADMIN — DIPHENHYDRAMINE HYDROCHLORIDE 25 MG: 50 INJECTION, SOLUTION INTRAMUSCULAR; INTRAVENOUS at 12:06

## 2017-06-01 RX ADMIN — HYDROMORPHONE HYDROCHLORIDE 0.2 MG: 1 INJECTION, SOLUTION INTRAMUSCULAR; INTRAVENOUS; SUBCUTANEOUS at 12:06

## 2017-06-01 RX ADMIN — ONDANSETRON 4 MG: 2 INJECTION INTRAMUSCULAR; INTRAVENOUS at 11:06

## 2017-06-01 RX ADMIN — NEOSTIGMINE METHYLSULFATE 4 MG: 1 INJECTION INTRAVENOUS at 11:06

## 2017-06-01 RX ADMIN — OXYCODONE HYDROCHLORIDE 5 MG: 5 TABLET ORAL at 12:06

## 2017-06-01 RX ADMIN — DEXTROSE 2 G: 50 INJECTION, SOLUTION INTRAVENOUS at 11:06

## 2017-06-01 RX ADMIN — FENTANYL CITRATE 50 MCG: 50 INJECTION, SOLUTION INTRAMUSCULAR; INTRAVENOUS at 10:06

## 2017-06-01 RX ADMIN — SODIUM CHLORIDE, SODIUM GLUCONATE, SODIUM ACETATE, POTASSIUM CHLORIDE, MAGNESIUM CHLORIDE, SODIUM PHOSPHATE, DIBASIC, AND POTASSIUM PHOSPHATE: .53; .5; .37; .037; .03; .012; .00082 INJECTION, SOLUTION INTRAVENOUS at 10:06

## 2017-06-01 RX ADMIN — GLYCOPYRROLATE 0.6 MG: 0.2 INJECTION, SOLUTION INTRAMUSCULAR; INTRAVENOUS at 11:06

## 2017-06-01 RX ADMIN — HYDROMORPHONE HYDROCHLORIDE 0.2 MG: 1 INJECTION, SOLUTION INTRAMUSCULAR; INTRAVENOUS; SUBCUTANEOUS at 01:06

## 2017-06-01 RX ADMIN — SODIUM CHLORIDE: 0.9 INJECTION, SOLUTION INTRAVENOUS at 07:06

## 2017-06-01 RX ADMIN — SODIUM CHLORIDE, PRESERVATIVE FREE 3 ML: 5 INJECTION INTRAVENOUS at 01:06

## 2017-06-01 RX ADMIN — LIDOCAINE HYDROCHLORIDE 100 MG: 20 INJECTION, SOLUTION INTRAVENOUS at 10:06

## 2017-06-01 RX ADMIN — HYDROMORPHONE HYDROCHLORIDE 0.8 MG: 2 INJECTION, SOLUTION INTRAMUSCULAR; INTRAVENOUS; SUBCUTANEOUS at 12:06

## 2017-06-01 RX ADMIN — FENTANYL CITRATE 150 MCG: 50 INJECTION, SOLUTION INTRAMUSCULAR; INTRAVENOUS at 10:06

## 2017-06-01 RX ADMIN — ONDANSETRON 4 MG: 2 INJECTION INTRAMUSCULAR; INTRAVENOUS at 12:06

## 2017-06-01 RX ADMIN — ACETAMINOPHEN 1000 MG: 10 INJECTION, SOLUTION INTRAVENOUS at 11:06

## 2017-06-01 RX ADMIN — ROCURONIUM BROMIDE 50 MG: 10 INJECTION, SOLUTION INTRAVENOUS at 10:06

## 2017-06-01 NOTE — BRIEF OP NOTE
[facility]  Brief Operative Note     SUMMARY     Surgery Date: 6/1/2017     Surgeon(s) and Role:     * Karen Grimes MD - Resident - Assisting     * Xiang Hernandez MD - Primary     * Zuleyka Caicedo MD - Fellow        Pre-op Diagnosis:  Personal history of breast cancer [Z85.3]    Post-op Diagnosis:  Post-Op Diagnosis Codes:     * Personal history of breast cancer [Z85.3]    Procedure(s) (LRB):  RECONSTRUCTION/REVISION-BREAST  1) second stage Breast Reconstruction 2) Soft Tissue Revision - Abdomen (N/A)  FLAP-FREE- RE-ELEVATION OF INSET. (Right)  EXCISION  HYPERTROPHY OF SCARS WITH ADV FLAP CLOSURE. (Left)    Anesthesia: General    Description of the findings of the procedure: right contracture release and revision of HEMANTH reconstruction and left breast soft tissue revision    Findings/Key Components: improved breast symmetry    Estimated Blood Loss:  30 ml       Specimens:   Specimen (12h ago through future)    Start     Ordered    06/01/17 1150  Specimen to Pathology - Surgery  Once     Comments:  1.right breast tissue-perm2. Left breast tissue-perm      06/01/17 1150          Discharge Note    SUMMARY     Admit Date: 6/1/2017    Discharge Date and Time:  06/01/2017 12:40 PM    Hospital Course (synopsis of major diagnoses, care, treatment, and services provided during the course of the hospital stay): Pt admitted for outpatient procedure, tolerated procedure well, no complications.  Pt discharged home in stable condition.    Final Diagnosis: Post-Op Diagnosis Codes:     * Personal history of breast cancer [Z85.3]    Disposition: Home or Self Care    Follow Up/Patient Instructions: instructions below    Medications:  Reconciled Home Medications:   Current Discharge Medication List      START taking these medications    Details   cephALEXin (KEFLEX) 500 MG capsule Take 1 capsule (500 mg total) by mouth every 8 (eight) hours.  Qty: 42 capsule, Refills: 0    Associated Diagnoses:  Breast cancer, right      hydrocodone-acetaminophen 5-325mg (NORCO) 5-325 mg per tablet Take 1 tablet by mouth every 6 (six) hours as needed for Pain.  Qty: 40 tablet, Refills: 0    Associated Diagnoses: Breast cancer, right         CONTINUE these medications which have NOT CHANGED    Details   enalapril (VASOTEC) 2.5 MG tablet Take 2.5 mg by mouth once daily. For diabetes  Refills: 1      furosemide (LASIX) 20 MG tablet Take 20 mg by mouth daily as needed.      glimepiride (AMARYL) 1 MG tablet Take 4 mg by mouth once daily.   Refills: 1      hydrocodone-acetaminophen 10-325mg (NORCO)  mg Tab Take 1 tablet by mouth 4 (four) times daily as needed.  Refills: 0      JANUVIA 100 mg Tab Take 100 mg by mouth once daily.   Refills: 1      metformin (GLUMETZA) 500 MG (MOD) 24 hr tablet Take 500 mg by mouth daily with breakfast. Taking 2000 mg in am      pravastatin (PRAVACHOL) 40 MG tablet Take 40 mg by mouth every morning.   Refills: 1      zolpidem (AMBIEN) 10 mg Tab Take 10 mg by mouth nightly as needed.  Refills: 0      ASCORBIC ACID/VITAMIN E/BIOTIN (HAIR, SKIN, NAILS WITH BIOTIN ORAL) Take 1 tablet by mouth every morning.       carisoprodol (SOMA) 350 MG tablet Take 350 mg by mouth 3 (three) times daily as needed.   Refills: 0      diazepam (VALIUM) 10 MG tablet Take 10 mg by mouth 4 (four) times daily.       dronabinol (MARINOL) 2.5 MG capsule Take 1 capsule (2.5 mg total) by mouth 2 (two) times daily before meals.  Qty: 30 capsule, Refills: 1    Associated Diagnoses: Carcinoma in situ of breast, unspecified laterality, unspecified type      multivitamin capsule Take 1 capsule by mouth every morning.             Discharge Procedure Orders  Diet general     Lifting restrictions   Order Comments: No lifting more than ten pounds over the next six weeks     Call MD for:  temperature >100.4     Call MD for:  persistent nausea and vomiting or diarrhea     Call MD for:  severe uncontrolled pain     Call MD for:   redness, tenderness, or signs of infection (pain, swelling, redness, odor or green/yellow discharge around incision site)     Call MD for:  difficulty breathing or increased cough     Call MD for:  severe persistent headache     Call MD for:  worsening rash     Call MD for:  persistent dizziness, light-headedness, or visual disturbances     Call MD for:  increased confusion or weakness     No dressing needed   Order Comments: Wear your bra 24 hours per day except when showering. You can shower in 48 hours. Leave the dermabond tape on - it will come off on its own in 3 weeks or we will take it off in clinic. The yellow gauze on your left nipple can come off in a day or so.       Follow-up Information     Xiang Hernandez MD. Schedule an appointment as soon as possible for a visit in 1 week.    Specialty:  Plastic Surgery  Why:  post op  Contact information:  [address]  [facility] [id number]  [phone]

## 2017-06-01 NOTE — ANESTHESIA POSTPROCEDURE EVALUATION
Anesthesia Post Evaluation    Patient: Negrita Castro    Procedure(s) Performed: Procedure(s) (LRB):  RECONSTRUCTION/REVISION-BREAST  1) second stage Breast Reconstruction 2) Soft Tissue Revision - Abdomen (N/A)  FLAP-FREE- RE-ELEVATION OF INSET. (Right)  EXCISION  HYPERTROPHY OF SCARS WITH ADV FLAP CLOSURE. (Left)    Final Anesthesia Type: general  Patient location during evaluation: PACU  Patient participation: Yes- Able to Participate  Level of consciousness: awake and alert  Post-procedure vital signs: reviewed and stable  Pain management: adequate  Airway patency: patent  PONV status at discharge: No PONV  Anesthetic complications: no      Cardiovascular status: blood pressure returned to baseline and hemodynamically stable  Respiratory status: unassisted, spontaneous ventilation and room air  Hydration status: euvolemic  Follow-up not needed.        Visit Vitals  BP (!) 156/78   Pulse 62   Temp 37 °C (98.6 °F) (Oral)   Resp 14   LMP 01/16/2016   SpO2 97%   Breastfeeding? No       Pain/Larissa Score: Pain Assessment Performed: Yes (6/1/2017  1:30 PM)  Presence of Pain: complains of pain/discomfort (6/1/2017  1:30 PM)  Pain Rating Prior to Med Admin: 6 (6/1/2017  1:25 PM)  Larissa Score: 10 (6/1/2017  1:30 PM)

## 2017-06-01 NOTE — PROGRESS NOTES
Pt AA&Ox3, sleeping between care, left breast dressing , right breast drain and surgical bra intact, pt states pain 7/10 , w/o grimacing, guarding or other pain indicators, pt stable at present.

## 2017-06-01 NOTE — ANESTHESIA PREPROCEDURE EVALUATION
The patient has no apparent active cardiac condition (No unstable coronary Syndrome such as severe unstable angina or recent [<1 month] myocardial infarction, decompensated CHF, severe valvular   disease or significant arrhythmia)    Previous anesthesia records:11/15/16-Removal Port-A-Cath left side-MAC- Airway/Jaw/Neck:  Airway Findings: Mouth Opening: Normal Tongue: Normal General Airway Assessment: Adult, Good Mallampati: I TM Distance: 4-6 cm    -No PONV-no apparent anesthetic complications and tolerated procedure well  Anesthesia Hx:  Denies Family Hx of Anesthesia complications. Denies Personal Hx of Anesthesia complications.     3/21/07-Ejuprkimd-Mgpd-A-Cath-left chest-GeneralAirway/Jaw/Neck:  Airway Findings: Mouth Opening: Normal Tongue: Normal General Airway Assessment: Adult, Good Mallampati: I TM Distance: 4-6 cm-No PONV   -no apparent anesthetic complications, tolerated procedure well and no evidence of recall    Anesthesia Hx:  Denies Family Hx of Anesthesia complications. Denies Personal Hx of Anesthesia complications.       Last PCP note: within 3 months , within [facility] , focused visit   Subspecialty notes: Hematology/Oncology    Other important co-morbidities:   Diagnosis    Breast cancer in situ    Breast cancer    Depression    Anxiety disorder due to known physiological condition    Dysthymic disorder    Malignant neoplasm of right female breast             Past Surgical History   Procedure Laterality Date    Shoulder surgery and wrist           BILAT BONE SPUR    Wrist surgery           RIGHT    Breast biopsy           right    Breast surgery           11-3-15 LUMPECTOMY, 12-2-15 REEXCISION    Mastectomy 2016 06/01/2017  Negrita Castro is a 56 y.o., female with depression, anxiety, DM II (poorly controlled since on steroids, A1C 9, on renal protection with  ACEi), obesity, and breast cancer s/p mastectomy/chemo, neuropathy from chemo here for bilateral adan flap.      TTE 3/2016:    CONCLUSIONS     1 - Concentric remodeling.     2 - Hyperdynamic left ventricular systolic function (EF 65-70%).     3 - Normal left ventricular diastolic function.     4 - Normal right ventricular systolic function .     5 - The estimated PA systolic pressure is 16 mmHg.     6 - Difficult apical window, poor endocardial visualization.     Pre-op Assessment      I have reviewed the Medications.   Steroids Taken In Past Year:     Review of Systems  Anesthesia Hx:  History of prior surgery of interest to airway management or planning: Previous anesthesia: MAC  11/2016: Port-removal with MAC.  Denies Family Hx of Anesthesia complications.   Denies Personal Hx of Anesthesia complications.   Hematology/Oncology:         -- Cancer in past history: s/p chemotherapy and s/p surgery  Oncology Comments: Right breast cancer 10/2015: last chemo 9/2016; radiation- 8/2016    Uses medical marijuana for nausea and vomiting     Cardiovascular:  Functional Capacity good / => 4 METS, pt. was able to walk from garage to POC : denies CP/SOB    Pulmonary:   Denies Asthma.  Denies Sleep Apnea.    Renal/:  Renal/ Normal     Hepatic/GI:   Denies GERD.    Neurological:   Peripheral Neuropathy  Seizure Disorder Last seizure 20 years ago   Endocrine:   Diabetes, poorly controlled  Diabetes, Type 2 Diabetes , controlled by oral hypoglycemics. , most recent HgA1c value was 9.1 on 2/2/17.  Metabolic Disorders, Hyperlipoproteinemia  Psych:  Anxiety Disorder.  Depression.              Physical Exam  General:  Obesity    Airway/Jaw/Neck:  Airway Findings: Mouth Opening: Normal Tongue: Normal  General Airway Assessment: Average, Adult  Mallampati: II  Jaw/Neck Findings:     Neck ROM: Normal ROM      Dental:  Dental Findings: In tact   Chest/Lungs:  Chest/Lungs Findings: Clear to auscultation, Normal Respiratory Rate    "  Heart/Vascular:  Heart Findings: Rate: Normal  Rhythm: Regular Rhythm        Mental Status:  Mental Status Findings:  Cooperative, Alert and Oriented         Anesthesia Plan  Type of Anesthesia, risks & benefits discussed:  Anesthesia Type:  general  Patient's Preference:   Intra-op Monitoring Plan: standard ASA monitors  Intra-op Monitoring Plan Comments:   Post Op Pain Control Plan:   Post Op Pain Control Plan Comments:   Induction:   IV  Beta Blocker:  Patient is not currently on a Beta-Blocker (No further documentation required).       Informed Consent: Patient understands risks and agrees with Anesthesia plan.  Questions answered. Anesthesia consent signed with patient.  ASA Score: 3     Day of Surgery Review of History & Physical:    H&P update referred to the surgeon.     Anesthesia Plan Notes: Sore throat "for a few days" after last GETA with 7.5 ETT. Will downsize for today procedure. No difficulty with previous intubation.        Ready For Surgery From Anesthesia Perspective.       "

## 2017-06-01 NOTE — ANESTHESIA RELEASE NOTE
Anesthesia Release from PACU Note    Patient: Negrita Castro    Procedure(s) Performed: Procedure(s) (LRB):  RECONSTRUCTION/REVISION-BREAST  1) second stage Breast Reconstruction 2) Soft Tissue Revision - Abdomen (N/A)  FLAP-FREE- RE-ELEVATION OF INSET. (Right)  EXCISION  HYPERTROPHY OF SCARS WITH ADV FLAP CLOSURE. (Left)    Anesthesia type: general    Post pain: Adequate analgesia    Post assessment: no apparent anesthetic complications, tolerated procedure well and no evidence of recall    Last Vitals:   Visit Vitals  BP (!) 156/78   Pulse 62   Temp 37 °C (98.6 °F) (Oral)   Resp 14   LMP 01/16/2016   SpO2 97%   Breastfeeding? No       Post vital signs: stable    Level of consciousness: awake, alert  and oriented    Nausea/Vomiting: no nausea/no vomiting    Complications: none    Airway Patency: patent    Respiratory: unassisted, spontaneous ventilation, room air    Cardiovascular: stable    Hydration: euvolemic

## 2017-06-01 NOTE — TRANSFER OF CARE
Anesthesia Transfer of Care Note    Patient: Negrita Castro    Procedure(s) Performed: Procedure(s) (LRB):  RECONSTRUCTION/REVISION-BREAST  1) second stage Breast Reconstruction 2) Soft Tissue Revision - Abdomen (N/A)  FLAP-FREE- RE-ELEVATION OF INSET. (Right)  EXCISION  HYPERTROPHY OF SCARS WITH ADV FLAP CLOSURE. (Left)    Patient location: PACU    Anesthesia Type: general    Transport from OR: Transported from OR on 6-10 L/min O2 by face mask with adequate spontaneous ventilation    Post pain: adequate analgesia    Post assessment: no apparent anesthetic complications    Post vital signs: stable    Level of consciousness: awake and lethargic    Complications: none    Transfer of care protocol was followed      Last vitals:   Visit Vitals  BP (!) 144/82 (BP Location: Left arm, Patient Position: Lying, BP Method: Automatic)   Pulse 74   Temp 36.9 °C (98.4 °F) (Oral)   Resp 18   LMP 01/16/2016   SpO2 98%   Breastfeeding? No

## 2017-06-01 NOTE — H&P
Plastic Surgery History and Physical    HPI:  Negrita Castro 56 y.o. female who is s/p HEMANTH reconstruction of the right breast and left breast mastopexy on 2/14/17. She did great post operatively but needs release of scar contracture of right superior breast flap, possible free fat transfer to breast and soft tissue revision of left breast.    PMH:  Past Medical History:   Diagnosis Date    Cancer     RIGHT BREAST 10-15    Depression     Diabetes mellitus     Neuropathy     Panic attacks     S/P epidural steroid injection     Seizures     none since early 30's    Wears glasses     TO DRIVE       PSH:  Past Surgical History:   Procedure Laterality Date    BREAST BIOPSY      right    BREAST SURGERY      11-3-15 LUMPECTOMY, 12-2-15 REEXCISION    mastectomy 2016      shoulder surgery and wrist      BILAT  BONE SPUR    WRIST SURGERY      RIGHT       SH:  Tobacco:   History   Smoking Status    Former Smoker    Packs/day: 0.25    Years: 30.00    Quit date: 9/28/2015   Smokeless Tobacco    Not on file     ETOH:   History   Alcohol Use    0.0 oz/week     Comment: RARELY     Illicit Drugs:   History   Drug Use    Types: Marijuana     Comment: medical marijuana       Medications  No current facility-administered medications for this encounter.     There were no vitals filed for this visit.    Physical Exam  General: NAD, Resting comfortably in bed  Chest: CTA Bl  Heart: RRR  Abd: Soft, NT, ND  Breasts with some asymmetry    Diagnostics:  Lab Results   Component Value Date    WBC 6.49 02/02/2017    HGB 13.1 02/02/2017    HCT 38.9 02/02/2017    MCV 91 02/02/2017     02/02/2017     Lab Results   Component Value Date    CREATININE 0.9 02/02/2017    BUN 13 02/02/2017     (L) 03/21/2016    K 4.0 03/21/2016     03/21/2016    CO2 21 (L) 03/21/2016     Lab Results   Component Value Date    ALT 37 03/21/2016    AST 20 03/21/2016    ALKPHOS 148 (H) 03/21/2016    BILITOT 0.3 03/21/2016     Lab  Results   Component Value Date    ALBUMIN 3.3 (L) 03/21/2016     Assessment/Plan  Negrita Castro 56 y.o. female with R HEMANTH recon and L mastopexy here for revision of soft tissue right breast with possible free fat transfer to breast and soft tissue revision of left breast    1. Proceed to OR today  2. Informed consent to be obtained on day of surgery    Karen Escobarvenger  General Surgery PGY1  Pager: [phone]

## 2017-06-01 NOTE — PLAN OF CARE
Discharge instructions reviewed w/ pt and spouse, verbalized understanding. Pt in NADN. States pain tolerable for d/c.Tolerated liquids w/ no issues. Paper RX given. Pt would rather percocet instead of norco, paged MD. Awaiting acll back To be d/c'd home w/ family. GENARO drain instructions given., verbalized understanding.

## 2017-06-02 VITALS
TEMPERATURE: 98 F | HEART RATE: 58 BPM | OXYGEN SATURATION: 97 % | RESPIRATION RATE: 16 BRPM | SYSTOLIC BLOOD PRESSURE: 148 MMHG | DIASTOLIC BLOOD PRESSURE: 76 MMHG

## 2017-06-02 NOTE — OP NOTE
DATE OF PROCEDURE:  06/01/2017    PREOPERATIVE DIAGNOSIS:  History of breast cancer.    POSTOPERATIVE DIAGNOSIS:  History of breast cancer.    PROCEDURES PERFORMED:  1.  Re-elevation and inset of right HEMANTH flap.  2.  Excision of hypertrophic scars of the left breast with advancement flap   closure.  Advancement flap measures 25 x 2 cm.    SURGEON:  Xiang Hernandez M.D., [facility]    ANESTHESIA:  General.    DESCRIPTION OF PROCEDURE:  The patient was placed in the supine position and   after adequate general endotracheal anesthesia, was prepped and draped in the   normal sterile fashion.  The patient had a large dent in the upper outer   quadrant of the right breast where the HEMANTH flap had been inset.  Thus, the   previous incisions were reexcised in one-half of the flap laterally.  This   enabled us to dissect down the entire HEMANTH flap down to the chest wall.  The   HEMANTH flap was then mobilized completely.  It was then advanced superiorly into   the defect.  The native skin was draped over the skin paddle for the HEMANTH flap,   which was then marked for deepithelialization.  It was deepithelialized.  It was   then sutured up high into the defect using Vicryl sutures.  The skin was then   closed using interrupted 3-0 Monocryl followed by running 4-0 Monocryl   subcuticular suture.  On the opposite side, the patient had severe hypertrophic   scarring.  Around her mastopexy incisions, these were completely reexcised   including the nipple vertically down and inferiorly.  Small flaps were then   raised to avoid tension.  This was dissected approximately 4 to 5 cm throughout   superiorly.  The flaps were then advanced inferiorly and closed in layers using   interrupted 3-0 Monocryl followed by running 4-0 Monocryl subcuticular suture.    Nipple areolar complex was closed using interrupted 4-0 Monocryl followed by   running 4-0 Monocryl subcuticular suture.  There were no complications.      REI/DARIA  dd: 06/01/2017 20:45:12  (CDT)  td: 06/02/2017 00:49:34 (ARVIN)  Doc ID   #[id number]  Job ID #[id number]    CC:

## 2017-06-05 DIAGNOSIS — C50.311 MALIGNANT NEOPLASM OF LOWER-INNER QUADRANT OF RIGHT FEMALE BREAST: Primary | ICD-10-CM

## 2017-06-05 RX ORDER — HYDROCODONE BITARTRATE AND ACETAMINOPHEN 10; 325 MG/1; MG/1
1 TABLET ORAL EVERY 6 HOURS PRN
COMMUNITY
End: 2017-06-05 | Stop reason: SDUPTHER

## 2017-06-05 NOTE — TELEPHONE ENCOUNTER
----- Message from Malena Lopez sent at 6/5/2017 11:43 AM CDT -----  Contact: pt   Pt called in requesting RX somas 350mg 90 tablets and norco 10mg 120 tablets      [facility]     Please call when ready

## 2017-06-06 RX ORDER — CARISOPRODOL 350 MG/1
350 TABLET ORAL 3 TIMES DAILY PRN
Qty: 90 TABLET | Refills: 0 | Status: SHIPPED | OUTPATIENT
Start: 2017-06-06 | End: 2021-01-26 | Stop reason: SDUPTHER

## 2017-06-06 RX ORDER — HYDROCODONE BITARTRATE AND ACETAMINOPHEN 10; 325 MG/1; MG/1
1 TABLET ORAL EVERY 6 HOURS PRN
Qty: 120 TABLET | Refills: 0 | Status: ON HOLD | OUTPATIENT
Start: 2017-06-06 | End: 2018-02-26 | Stop reason: HOSPADM

## 2017-06-09 ENCOUNTER — OFFICE VISIT (OUTPATIENT)
Dept: PLASTIC SURGERY | Facility: CLINIC | Age: 57
End: 2017-06-09
Payer: MEDICARE

## 2017-06-09 VITALS — BODY MASS INDEX: 28.51 KG/M2 | HEIGHT: 71 IN | WEIGHT: 203.63 LBS

## 2017-06-09 DIAGNOSIS — Z09 SURGERY FOLLOW-UP EXAMINATION: Primary | ICD-10-CM

## 2017-06-09 PROCEDURE — 99999 PR PBB SHADOW E&M-EST. PATIENT-LVL III: CPT | Mod: PBBFAC,,, | Performed by: PHYSICIAN ASSISTANT

## 2017-06-09 PROCEDURE — 99024 POSTOP FOLLOW-UP VISIT: CPT | Mod: S$GLB,,, | Performed by: PHYSICIAN ASSISTANT

## 2017-06-09 NOTE — PROGRESS NOTES
Negrita Castro presents to [facility] Surgery Clinic on 6/9/2017 s/p second stage breast reconstruction    Review of patient's allergies indicates:   Allergen Reactions    Adhesive tape-silicones Hives     BANDAIDS    Polymycin Hives    Latex, natural rubber Itching    Polyurethane-39      Current Outpatient Prescriptions on File Prior to Visit   Medication Sig Dispense Refill    ASCORBIC ACID/VITAMIN E/BIOTIN (HAIR, SKIN, NAILS WITH BIOTIN ORAL) Take 1 tablet by mouth every morning.       carisoprodol (SOMA) 350 MG tablet Take 1 tablet (350 mg total) by mouth 3 (three) times daily as needed. 90 tablet 0    cephALEXin (KEFLEX) 500 MG capsule Take 1 capsule (500 mg total) by mouth every 8 (eight) hours. 42 capsule 0    diazepam (VALIUM) 10 MG tablet Take 10 mg by mouth 4 (four) times daily.       dronabinol (MARINOL) 2.5 MG capsule Take 1 capsule (2.5 mg total) by mouth 2 (two) times daily before meals. 30 capsule 1    enalapril (VASOTEC) 2.5 MG tablet Take 2.5 mg by mouth once daily. For diabetes  1    furosemide (LASIX) 20 MG tablet Take 20 mg by mouth daily as needed.      glimepiride (AMARYL) 1 MG tablet Take 4 mg by mouth once daily.   1    hydrocodone-acetaminophen 10-325mg (NORCO)  mg Tab Take 1 tablet by mouth every 6 (six) hours as needed for Pain. 120 tablet 0    JANUVIA 100 mg Tab Take 100 mg by mouth once daily.   1    metformin (GLUMETZA) 500 MG (MOD) 24 hr tablet Take 500 mg by mouth daily with breakfast. Taking 2000 mg in am      multivitamin capsule Take 1 capsule by mouth every morning.      oxycodone-acetaminophen (PERCOCET) 5-325 mg per tablet Take 1 tablet by mouth every 4 (four) hours as needed for Pain. 41 tablet 0    pravastatin (PRAVACHOL) 40 MG tablet Take 40 mg by mouth every morning.   1    zolpidem (AMBIEN) 10 mg Tab Take 10 mg by mouth nightly as needed.  0     No current facility-administered medications on file prior to visit.      Patient Active Problem List    Diagnosis    Depression    Anxiety disorder due to known physiological condition    Dysthymic disorder    History of breast cancer    Neuropathy    Over weight    Type 2 diabetes mellitus without complication, without long-term current use of insulin    Atypical chest pain    Elevated alkaline phosphatase level    Tattoo    Breast cancer, right     Past Surgical History:   Procedure Laterality Date    BREAST BIOPSY      right    BREAST SURGERY      11-3-15 LUMPECTOMY, 12-2-15 REEXCISION    mastectomy 2016      shoulder surgery and wrist      BILAT  BONE SPUR    WRIST SURGERY      RIGHT       DATE OF PROCEDURE:  06/01/2017     PREOPERATIVE DIAGNOSIS:  History of breast cancer.     POSTOPERATIVE DIAGNOSIS:  History of breast cancer.     PROCEDURES PERFORMED:  1.  Re-elevation and inset of right HEMANTH flap.  2.  Excision of hypertrophic scars of the left breast with advancement flap   closure.  Advancement flap measures 25 x 2 cm.     SURGEON:  Xiang Hernandez M.D., [facility]     ANESTHESIA:  General.    PHYSICAL EXAMINATION    WD WN NAD  Lungs - Normal resp effort  R breast - flap viable, incisional tape intact  L breast - Nipple viable, incisional tape intact  Abdomen - hypertrophic scar (tranverse incision and periumbilical, dog ear of B flank  Skin - warm/dry, no rash      ASSESSMENT/PLAN  - doing well, no issues  - Drain removed from R lateral breast as she reports <15cc/24 hours  - reports painful scar of abdomen and periumbilical  - Will RTC next week, appt scheduled (to see Dr. Xiang Hernandez  Next week)    All questions were answered. The patient was advised to call the clinic with any questions or concerns prior to their next visit.

## 2017-06-14 DIAGNOSIS — C50.311 MALIGNANT NEOPLASM OF LOWER-INNER QUADRANT OF RIGHT FEMALE BREAST: Primary | ICD-10-CM

## 2017-06-14 DIAGNOSIS — D05.90 CARCINOMA IN SITU OF BREAST, UNSPECIFIED LATERALITY, UNSPECIFIED TYPE: ICD-10-CM

## 2017-06-15 RX ORDER — DRONABINOL 2.5 MG/1
2.5 CAPSULE ORAL
Qty: 60 CAPSULE | Refills: 1 | Status: CANCELLED | OUTPATIENT
Start: 2017-06-15

## 2017-06-16 ENCOUNTER — OFFICE VISIT (OUTPATIENT)
Dept: PLASTIC SURGERY | Facility: CLINIC | Age: 57
End: 2017-06-16
Payer: MEDICARE

## 2017-06-16 VITALS — BODY MASS INDEX: 28.7 KG/M2 | HEIGHT: 71 IN | WEIGHT: 205 LBS

## 2017-06-16 DIAGNOSIS — Z09 SURGERY FOLLOW-UP EXAMINATION: Primary | ICD-10-CM

## 2017-06-16 PROCEDURE — 99999 PR PBB SHADOW E&M-EST. PATIENT-LVL II: CPT | Mod: PBBFAC,,, | Performed by: SURGERY

## 2017-06-16 PROCEDURE — 99024 POSTOP FOLLOW-UP VISIT: CPT | Mod: S$GLB,,, | Performed by: SURGERY

## 2017-06-16 NOTE — PROGRESS NOTES
Ms. Castro presents to the Plastic Surgery Clinic.  She has had a unilateral   HEMANTH flap.  She has done great.  Everything looks very good.  She does have a   small dog ear in the left hip.  She most recently had free fat injections and   complete repositioning of the right HEMANTH.  Nonetheless, we can do the dog ear in   the Minor Surgery Room at a later date.      GREGORIO  dd: 06/16/2017 14:09:50 (CDT)  td: 06/17/2017 08:23:25 (CDT)  Doc ID   #[id number]  Job ID #[id number]    CC:

## 2017-06-23 ENCOUNTER — OFFICE VISIT (OUTPATIENT)
Dept: PLASTIC SURGERY | Facility: CLINIC | Age: 57
End: 2017-06-23
Payer: MEDICARE

## 2017-06-23 VITALS — BODY MASS INDEX: 28.41 KG/M2 | WEIGHT: 203.69 LBS

## 2017-06-23 DIAGNOSIS — D05.90 CARCINOMA IN SITU OF BREAST, UNSPECIFIED LATERALITY, UNSPECIFIED TYPE: ICD-10-CM

## 2017-06-23 DIAGNOSIS — Z09 SURGERY FOLLOW-UP EXAMINATION: Primary | ICD-10-CM

## 2017-06-23 PROCEDURE — 99024 POSTOP FOLLOW-UP VISIT: CPT | Mod: S$GLB,,, | Performed by: SURGERY

## 2017-06-23 PROCEDURE — 99999 PR PBB SHADOW E&M-EST. PATIENT-LVL I: CPT | Mod: PBBFAC,,, | Performed by: SURGERY

## 2017-06-23 NOTE — PROGRESS NOTES
Negrita Gloria regular after having a free flap since she had a stage II   revision.  She is doing well.  She complaints of some lumps, bumps and knots   within both the breast and also the abdomen.  Right now, I think she is fresh   postop.  I do not see any problems.  She has no signs of any infection.    Everything looks really good.  She will need a revision on the left hip.      CRB/HN  dd: 06/23/2017 08:27:22 (CDT)  td: 06/23/2017 13:03:27 (CDT)  Doc ID   #[id number]  Job ID #[id number]    CC:

## 2017-06-26 RX ORDER — DRONABINOL 2.5 MG/1
2.5 CAPSULE ORAL
Qty: 30 CAPSULE | Refills: 1 | Status: CANCELLED | OUTPATIENT
Start: 2017-06-26

## 2017-06-27 RX ORDER — METFORMIN HYDROCHLORIDE 500 MG/1
2000 TABLET, EXTENDED RELEASE ORAL NIGHTLY
COMMUNITY
Start: 2017-06-17 | End: 2021-01-14 | Stop reason: SDUPTHER

## 2017-06-27 RX ORDER — GLIMEPIRIDE 2 MG/1
2 TABLET ORAL DAILY
Refills: 1 | COMMUNITY
Start: 2017-04-14 | End: 2017-08-28 | Stop reason: CLARIF

## 2017-06-27 RX ORDER — GLIMEPIRIDE 4 MG/1
4 TABLET ORAL
COMMUNITY
Start: 2017-06-17 | End: 2019-07-30

## 2017-07-05 DIAGNOSIS — C50.311 MALIGNANT NEOPLASM OF LOWER-INNER QUADRANT OF RIGHT FEMALE BREAST: ICD-10-CM

## 2017-07-05 RX ORDER — HYDROCODONE BITARTRATE AND ACETAMINOPHEN 10; 325 MG/1; MG/1
1 TABLET ORAL EVERY 6 HOURS PRN
Qty: 120 TABLET | Refills: 0 | Status: CANCELLED | OUTPATIENT
Start: 2017-07-05

## 2017-07-05 RX ORDER — CARISOPRODOL 350 MG/1
350 TABLET ORAL 3 TIMES DAILY PRN
Qty: 90 TABLET | Refills: 0 | Status: CANCELLED | OUTPATIENT
Start: 2017-07-05

## 2017-07-05 NOTE — TELEPHONE ENCOUNTER
----- Message from Malena Lopez sent at 7/5/2017  1:42 PM CDT -----  Pt called in asking for refill on norco 10/325 120 tablets                                                   Soma 350mg  90 tablets      [facility] on      Pt call back number [phone]     Pt needs medication soon she will be out by end of today.

## 2017-07-24 DIAGNOSIS — Z85.3 PERSONAL HISTORY OF MALIGNANT NEOPLASM OF BREAST: Primary | ICD-10-CM

## 2017-08-28 ENCOUNTER — ANESTHESIA EVENT (OUTPATIENT)
Dept: SURGERY | Facility: HOSPITAL | Age: 57
End: 2017-08-28
Payer: MEDICARE

## 2017-08-28 NOTE — ANESTHESIA PREPROCEDURE EVALUATION
Pre Admission Screening  Mariia Pereira RN      []Hide copied text  Anesthesia Assessment: Preoperative EQUATION     Planned Procedure: Procedure(s) (LRB):  EXCISION-SKIN Dog Ear Left Hip (Left)  Requested Anesthesia Type:General  Surgeon: Xiang Hernandez MD  Service: Plastics  Known or anticipated Date of Surgery:8/31/2017     Surgeon notes: reviewed     Electronic QUestionnaire Assessment completed via nurse interview with patient.         No AQ        Triage considerations:      The patient has no apparent active cardiac condition (No unstable coronary Syndrome such as severe unstable angina or recent [<1 month] myocardial infarction, decompensated CHF, severe valvular   disease or significant arrhythmia)     Previous anesthesia records:GETA, MAC and No problems   Breast reconstruction:   06/01/17; Placement Time: 1041; Method of Intubation: Direct laryngoscopy; Inserted by: Anesthesia MD; Airway Device: Endotracheal Tube; Mask Ventilation: Easy - oral; Intubated: Postinduction; Blade: De Los Santos #2; Airway Device Size: 7.0; Style: Cuffed; Cuff Inflation: Minimal occlusive pressure; Inflation Amount: 5; Placement Verified By: Auscultation, Capnometry; Grade: Grade II; Complicating Factors: Large/Floppy epiglottis; Intubation Findings: Positive EtCO2, Bilateral breath sounds, Atraumatic/Condition of teeth unchanged;  Depth of Insertion: 21; Securment: Lips; Complications: None; Breath Sounds: Equal Bilateral; Insertion Attempts: 2 (first attempt CANDIS Dong); Removal Date: 06/01/17;  Removal Time: 1210  Airway/Jaw/Neck:  Airway Findings: Mouth Opening: Normal Tongue: Normal  General Airway Assessment: Average, Adult  Mallampati: II  Jaw/Neck Findings:     Neck ROM: Normal ROM      Dental:  Dental Findings: In tact      Last PCP note: 6-12 months ago , within [facility]  2/2/17 Dr Mc  Subspecialty notes: Endocrinology     Other important co-morbidities: HX seizures (30 yrs ago), HX breast  cancer, HLP, DM2     Tests already available:  Available tests,  6-12 months ago . 2/2/17 Heme prof and A1c. 3/2016 echo.                                                Instructions given. (See in Nurse's note)     Optimization:  Anesthesia Preop Clinic Assessment  Indicated-not required for this procedure                          Plan:              Testing:  none                                               Patient  has previously scheduled Medical Appointment:none     Navigation:                                      Straight Line to surgery.                                    No tests, anesthesia preop clinic visit, or consult required.                                                                                    Electronically signed by Mariia Pereira RN at 8/28/2017  2:41 PM        Pre-admit on 8/31/2017            Detailed Report                                                                                                                      08/28/2017  Negrita Castro is a 57 y.o., female.    Anesthesia Evaluation    I have reviewed the Patient Summary Reports.    I have reviewed the Nursing Notes.   I have reviewed the Medications.     Review of Systems  Anesthesia Hx:  No problems with previous Anesthesia  History of prior surgery of interest to airway management or planning: Previous anesthesia: General 6/1/17 breast reconstruction with general anesthesia.  Procedure performed at an [facility] Facility. Denies Family Hx of Anesthesia complications.   Denies Personal Hx of Anesthesia complications.   Hematology/Oncology:  Hematology Normal       -- Cancer in past history: s/p surgery, s/p chemotherapy and s/p radiation therapy   Oncology Comments: Breast cancer treated with chemo 9/2016, radiation 8/2016 and surgery     EENT/Dental:EENT/Dental Normal   Cardiovascular:   Denies Hypertension.   Denies Angina. hyperlipidemia States she is on low dose Pril to protect her kidneys-denies HTN    Pulmonary:  Pulmonary Normal  Denies Shortness of breath.  Denies Recent URI.    Renal/:  Renal/ Normal     Hepatic/GI:  Hepatic/GI Normal    Neurological:   Peripheral Neuropathy  Seizure Disorder , Most recent seizure occurred >1 year ago Last seizure 30 yrs ago   Endocrine:  Diabetes, Type 2 Diabetes , controlled by oral hypoglycemics. , most recent HgA1c value was 9.1 on 2/2/17.        Physical Exam  General:  Well nourished    Airway/Jaw/Neck:  Airway Findings: Mouth Opening: Normal Tongue: Normal  General Airway Assessment: Adult  Mallampati: II  TM Distance: Normal, at least 6 cm  Jaw/Neck Findings:     Neck ROM: Normal ROM     Eyes/Ears/Nose:  Eyes/Ears/Nose Findings:    Dental:  Dental Findings:    Chest/Lungs:  Chest/Lungs Findings:    Heart/Vascular:  Heart Findings:    Abdomen:  Abdomen Findings:       Mental Status:  Mental Status Findings:  Cooperative, Alert and Oriented         Anesthesia Plan  Type of Anesthesia, risks & benefits discussed:  Anesthesia Type:  general  Patient's Preference:   Intra-op Monitoring Plan: standard ASA monitors  Intra-op Monitoring Plan Comments:   Post Op Pain Control Plan: per primary service following discharge from PACU  Post Op Pain Control Plan Comments:   Induction:   IV  Beta Blocker:  Patient is not currently on a Beta-Blocker (No further documentation required).       Informed Consent: Patient understands risks and agrees with Anesthesia plan.  Questions answered. Anesthesia consent signed with patient.  ASA Score: 2     Day of Surgery Review of History & Physical:    H&P update referred to the surgeon.         Ready For Surgery From Anesthesia Perspective.

## 2017-08-30 ENCOUNTER — TELEPHONE (OUTPATIENT)
Dept: PLASTIC SURGERY | Facility: CLINIC | Age: 57
End: 2017-08-30

## 2017-08-31 ENCOUNTER — ANESTHESIA (OUTPATIENT)
Dept: SURGERY | Facility: HOSPITAL | Age: 57
End: 2017-08-31
Payer: MEDICARE

## 2017-08-31 ENCOUNTER — SURGERY (OUTPATIENT)
Age: 57
End: 2017-08-31

## 2017-08-31 PROBLEM — Z85.3 HISTORY OF MALIGNANT NEOPLASM OF BREAST: Status: ACTIVE | Noted: 2017-08-31

## 2017-08-31 PROCEDURE — 25000003 PHARM REV CODE 250: Performed by: NURSE ANESTHETIST, CERTIFIED REGISTERED

## 2017-08-31 PROCEDURE — D9220A PRA ANESTHESIA: Mod: CRNA,,, | Performed by: NURSE ANESTHETIST, CERTIFIED REGISTERED

## 2017-08-31 PROCEDURE — D9220A PRA ANESTHESIA: Mod: ANES,,, | Performed by: ANESTHESIOLOGY

## 2017-08-31 PROCEDURE — 63600175 PHARM REV CODE 636 W HCPCS: Performed by: STUDENT IN AN ORGANIZED HEALTH CARE EDUCATION/TRAINING PROGRAM

## 2017-08-31 PROCEDURE — 63600175 PHARM REV CODE 636 W HCPCS: Performed by: NURSE ANESTHETIST, CERTIFIED REGISTERED

## 2017-08-31 RX ORDER — LIDOCAINE HCL/PF 100 MG/5ML
SYRINGE (ML) INTRAVENOUS
Status: DISCONTINUED | OUTPATIENT
Start: 2017-08-31 | End: 2017-08-31

## 2017-08-31 RX ORDER — DEXAMETHASONE SODIUM PHOSPHATE 4 MG/ML
INJECTION, SOLUTION INTRA-ARTICULAR; INTRALESIONAL; INTRAMUSCULAR; INTRAVENOUS; SOFT TISSUE
Status: DISCONTINUED | OUTPATIENT
Start: 2017-08-31 | End: 2017-08-31

## 2017-08-31 RX ORDER — FENTANYL CITRATE 50 UG/ML
INJECTION, SOLUTION INTRAMUSCULAR; INTRAVENOUS
Status: DISCONTINUED | OUTPATIENT
Start: 2017-08-31 | End: 2017-08-31

## 2017-08-31 RX ORDER — ONDANSETRON 2 MG/ML
INJECTION INTRAMUSCULAR; INTRAVENOUS
Status: DISCONTINUED | OUTPATIENT
Start: 2017-08-31 | End: 2017-08-31

## 2017-08-31 RX ORDER — PROPOFOL 10 MG/ML
VIAL (ML) INTRAVENOUS
Status: DISCONTINUED | OUTPATIENT
Start: 2017-08-31 | End: 2017-08-31

## 2017-08-31 RX ORDER — MIDAZOLAM HYDROCHLORIDE 1 MG/ML
INJECTION, SOLUTION INTRAMUSCULAR; INTRAVENOUS
Status: DISCONTINUED | OUTPATIENT
Start: 2017-08-31 | End: 2017-08-31

## 2017-08-31 RX ORDER — SODIUM CHLORIDE 9 MG/ML
INJECTION, SOLUTION INTRAVENOUS CONTINUOUS PRN
Status: DISCONTINUED | OUTPATIENT
Start: 2017-08-31 | End: 2017-08-31

## 2017-08-31 RX ADMIN — MIDAZOLAM HYDROCHLORIDE 2 MG: 1 INJECTION, SOLUTION INTRAMUSCULAR; INTRAVENOUS at 03:08

## 2017-08-31 RX ADMIN — TRIAMCINOLONE ACETONIDE 160 MG: 40 INJECTION, SUSPENSION INTRA-ARTICULAR; INTRAMUSCULAR at 04:08

## 2017-08-31 RX ADMIN — SODIUM CHLORIDE: 0.9 INJECTION, SOLUTION INTRAVENOUS at 03:08

## 2017-08-31 RX ADMIN — FENTANYL CITRATE 50 MCG: 50 INJECTION, SOLUTION INTRAMUSCULAR; INTRAVENOUS at 04:08

## 2017-08-31 RX ADMIN — Medication 2 G: at 03:08

## 2017-08-31 RX ADMIN — LIDOCAINE HYDROCHLORIDE 25 MG: 20 INJECTION, SOLUTION INTRAVENOUS at 04:08

## 2017-08-31 RX ADMIN — FENTANYL CITRATE 50 MCG: 50 INJECTION, SOLUTION INTRAMUSCULAR; INTRAVENOUS at 03:08

## 2017-08-31 RX ADMIN — LIDOCAINE HYDROCHLORIDE AND EPINEPHRINE 10 ML: 10; 10 INJECTION, SOLUTION INFILTRATION; PERINEURAL at 04:08

## 2017-08-31 RX ADMIN — ONDANSETRON 4 MG: 2 INJECTION INTRAMUSCULAR; INTRAVENOUS at 04:08

## 2017-08-31 RX ADMIN — TRIAMCINOLONE ACETONIDE 200 MG: 40 INJECTION, SUSPENSION INTRA-ARTICULAR; INTRAMUSCULAR at 04:08

## 2017-08-31 RX ADMIN — DEXAMETHASONE SODIUM PHOSPHATE 8 MG: 4 INJECTION, SOLUTION INTRAMUSCULAR; INTRAVENOUS at 04:08

## 2017-08-31 RX ADMIN — PROPOFOL 170 MG: 10 INJECTION, EMULSION INTRAVENOUS at 03:08

## 2017-08-31 RX ADMIN — LIDOCAINE HYDROCHLORIDE 75 MG: 20 INJECTION, SOLUTION INTRAVENOUS at 03:08

## 2017-08-31 RX ADMIN — PROPOFOL 30 MG: 10 INJECTION, EMULSION INTRAVENOUS at 04:08

## 2017-08-31 NOTE — TRANSFER OF CARE
"Anesthesia Transfer of Care Note    Patient: Negrita Castro    Procedure(s) Performed: Procedure(s) (LRB):  EXCISION-SKIN Dog Ear Left Hip (Bilateral)    Patient location: PACU    Anesthesia Type: general    Transport from OR: Transported from OR on 100% O2 by closed face mask with adequate spontaneous ventilation    Post pain: adequate analgesia    Post assessment: no apparent anesthetic complications    Post vital signs: stable    Level of consciousness: awake, alert and oriented    Nausea/Vomiting: no nausea/vomiting    Complications: none    Transfer of care protocol was followed      Last vitals:   Visit Vitals  BP (!) 178/78 (BP Location: Left arm, Patient Position: Lying)   Pulse 60   Temp 36.7 °C (98 °F) (Oral)   Resp 15   Ht 5' 11" (1.803 m)   Wt 93 kg (205 lb)   LMP 01/16/2016   SpO2 95%   Breastfeeding? No   BMI 28.59 kg/m²     "

## 2017-09-05 ENCOUNTER — OFFICE VISIT (OUTPATIENT)
Dept: RADIATION ONCOLOGY | Facility: CLINIC | Age: 57
End: 2017-09-05
Payer: MEDICARE

## 2017-09-05 ENCOUNTER — OFFICE VISIT (OUTPATIENT)
Dept: HEMATOLOGY/ONCOLOGY | Facility: CLINIC | Age: 57
End: 2017-09-05
Payer: MEDICARE

## 2017-09-05 VITALS
HEART RATE: 94 BPM | WEIGHT: 203 LBS | TEMPERATURE: 99 F | RESPIRATION RATE: 20 BRPM | DIASTOLIC BLOOD PRESSURE: 82 MMHG | BODY MASS INDEX: 28.31 KG/M2 | SYSTOLIC BLOOD PRESSURE: 140 MMHG

## 2017-09-05 VITALS — WEIGHT: 203 LBS | BODY MASS INDEX: 28.31 KG/M2

## 2017-09-05 DIAGNOSIS — C50.311 MALIGNANT NEOPLASM OF LOWER-INNER QUADRANT OF RIGHT BREAST OF FEMALE, ESTROGEN RECEPTOR NEGATIVE: Primary | ICD-10-CM

## 2017-09-05 DIAGNOSIS — F06.4 ANXIETY DISORDER DUE TO KNOWN PHYSIOLOGICAL CONDITION: ICD-10-CM

## 2017-09-05 DIAGNOSIS — E11.9 TYPE 2 DIABETES MELLITUS WITHOUT COMPLICATION, WITHOUT LONG-TERM CURRENT USE OF INSULIN: ICD-10-CM

## 2017-09-05 DIAGNOSIS — G62.9 NEUROPATHY: ICD-10-CM

## 2017-09-05 DIAGNOSIS — Z17.1 MALIGNANT NEOPLASM OF LOWER-INNER QUADRANT OF RIGHT BREAST OF FEMALE, ESTROGEN RECEPTOR NEGATIVE: Primary | ICD-10-CM

## 2017-09-05 PROCEDURE — 99214 OFFICE O/P EST MOD 30 MIN: CPT | Mod: ,,, | Performed by: INTERNAL MEDICINE

## 2017-09-05 PROCEDURE — 3046F HEMOGLOBIN A1C LEVEL >9.0%: CPT | Mod: ,,, | Performed by: INTERNAL MEDICINE

## 2017-09-05 PROCEDURE — 99215 OFFICE O/P EST HI 40 MIN: CPT | Mod: ,,, | Performed by: RADIOLOGY

## 2017-09-05 PROCEDURE — 4010F ACE/ARB THERAPY RXD/TAKEN: CPT | Mod: ,,, | Performed by: INTERNAL MEDICINE

## 2017-09-05 PROCEDURE — 3008F BODY MASS INDEX DOCD: CPT | Mod: ,,, | Performed by: INTERNAL MEDICINE

## 2017-09-05 NOTE — LETTER
September 6, 2017      Xiang Hernandez MD  [address]  [facility] [address]           [facility]  [address]  [address]  [place] [address]  Phone: [phone]  Fax: [phone]          Patient: Negrita Castro   MR Number: [id number]   YOB: 1960   Date of Visit: 9/5/2017       Dear Dr. Xiang Hernandez:    Thank you for referring Negrita Castro to me for evaluation. Attached you will find relevant portions of my assessment and plan of care.    If you have questions, please do not hesitate to call me. I look forward to following Negrita Castro along with you.    Sincerely,    TALISHA Vick MD    Enclosure  CC:  No Recipients    If you would like to receive this communication electronically, please contact [email or web address] or [phone] to request more information on Home Delivery Service (HDS) Link access.    For providers and/or their staff who would like to refer a patient to [facility], please contact us through our one-stop-shop provider referral line, [facility], at [phone].    If you feel you have received this communication in error or would no longer like to receive these types of communications, please e-mail [email or web address]

## 2017-09-05 NOTE — PROGRESS NOTES
Negrita Castro  [id number]  1960 9/5/2017  No referring provider defined for this encounter.    DIAGNOSIS: IIA, [id number] IDC R breast, triple -   REASON FOR VISIT: Routine scheduled follow-up.    HISTORY OF PRESENT ILLNESS:   Is a 57-year-old female who had a screening mammogram detected abnormality in the right breast with diagnostic mammogram and ultrasound confirming a 9 mm asymmetric mass in the 4:00 position of the right breast, CT-guided biopsy returning invasive ductal carcinoma that was triple negative. She underwent a lumpectomy with sentinel lymph node assessment which returned a high-grade 7 mm invasive ductal carcinoma with high-grade ductal carcinoma in situ, with 3 separate foci of microinvasive disease, positive margin, 0 sentinel lymph nodes contained disease. She went to reexcision of the superior and anterior margin again with high-grade duct carcinoma in situ at the margins and 2 small foci of microinvasive disease. She subsequently underwent a right simple mastectomy that revealed residual invasive ductal carcinoma with associated high-grade ductal carcinoma in situ with close but negative margins. She began the reconstructive process with Dr. Hernandez by placement of tissue expanders. She then received Adriamycin and Cytoxan ×4 cycles.    Patient completed adjuvant radiotherapy to the right chest wall, 5040 cGy with concurrent Taxol ending the treatment on 08/04/2016.    INTERVAL HISTORY:   Patient continued her reconstructive process with bilateral HEMANTH flaps under the care of Dr. Hernandez. Notably the patient has had extensive keloid formation at the left reconstructed nipple x 2, umbilicus, transverse abdominal incision. She has not yet completed her reconstruction of the right breast. She met with her medical oncologist Dr. Vick and suggested to him that there may be a role for radiotherapy per her surgeon and he recommended she come to our clinic to discuss. At today's visit the  patient is complaining of bruising and occasional oozing in the lateral portions of her abdominal incision as well as pain, itching, discomfort at all sites of keloids. Notably the patient did not develop a keloid with prior shoulder surgery, hand surgery or port placement.    Review of Systems   Constitutional: Negative for appetite change, chills, fever and unexpected weight change.   HENT:   Negative for lump/mass, mouth sores, sore throat, trouble swallowing and voice change.    Eyes: Negative for eye problems and icterus.   Respiratory: Negative for cough, hemoptysis and shortness of breath.    Cardiovascular: Negative for chest pain and leg swelling.   Gastrointestinal: Negative for abdominal pain, constipation, diarrhea, nausea and vomiting.   Genitourinary: Negative for dysuria, frequency, hematuria, nocturia and vaginal bleeding.    Musculoskeletal: Negative for back pain, gait problem, neck pain and neck stiffness.   Skin: Positive for itching, rash and wound.   Neurological: Negative for extremity weakness, gait problem, headaches, numbness and seizures.   Hematological: Negative for adenopathy.     Past Medical History:   Diagnosis Date    Breast cancer     Cancer     RIGHT BREAST 10-15    Depression     Diabetes mellitus     Neuropathy     Panic attacks     S/P epidural steroid injection     Seizures     none since early 30's    Wears glasses     TO DRIVE     Past Surgical History:   Procedure Laterality Date    BREAST BIOPSY      right    breast reconstruction with tummy veeck      BREAST SURGERY      11-3-15 LUMPECTOMY, 12-2-15 REEXCISION    mastectomy 2016      shoulder surgery and wrist      BILAT  BONE SPUR    WRIST SURGERY      RIGHT     Social History     Social History    Marital status: Single     Spouse name: N/A    Number of children: N/A    Years of education: N/A     Social History Main Topics    Smoking status: Former Smoker     Packs/day: 0.25     Years: 30.00     Quit  date: 9/28/2015    Smokeless tobacco: Not on file    Alcohol use 0.0 oz/week      Comment: RARELY    Drug use:      Types: Marijuana      Comment: medical marijuana    Sexual activity: Not on file     Other Topics Concern    Not on file     Social History Narrative    No narrative on file     Family History   Problem Relation Age of Onset    Anesthesia problems Neg Hx      Medication List with Changes/Refills   Current Medications    ASCORBIC ACID/VITAMIN E/BIOTIN (HAIR, SKIN, NAILS WITH BIOTIN ORAL)    Take 1 tablet by mouth every morning.     CARISOPRODOL (SOMA) 350 MG TABLET    Take 1 tablet (350 mg total) by mouth 3 (three) times daily as needed.    CEPHALEXIN (KEFLEX) 500 MG CAPSULE    Take 1 capsule (500 mg total) by mouth every 12 (twelve) hours.    DIAZEPAM (VALIUM) 10 MG TABLET    Take 10 mg by mouth 4 (four) times daily.     DRONABINOL (MARINOL) 2.5 MG CAPSULE    Take 1 capsule (2.5 mg total) by mouth 2 (two) times daily before meals.    ENALAPRIL (VASOTEC) 2.5 MG TABLET    Take 2.5 mg by mouth once daily. For diabetes    FUROSEMIDE (LASIX) 20 MG TABLET    Take 20 mg by mouth daily as needed.    GLIMEPIRIDE (AMARYL) 4 MG TABLET        HYDROCODONE-ACETAMINOPHEN 10-325MG (NORCO)  MG TAB    Take 1 tablet by mouth every 6 (six) hours as needed for Pain.    JANUVIA 100 MG TAB    Take 100 mg by mouth once daily.     METFORMIN (GLUCOPHAGE-XR) 500 MG 24 HR TABLET        METFORMIN (GLUMETZA) 500 MG (MOD) 24 HR TABLET    Take 500 mg by mouth daily with breakfast. Taking 2000 mg in am    MULTIVITAMIN CAPSULE    Take 1 capsule by mouth every morning.    OXYCODONE-ACETAMINOPHEN (PERCOCET) 5-325 MG PER TABLET    Take 1 tablet by mouth every 4 (four) hours as needed for Pain.    OXYCODONE-ACETAMINOPHEN (PERCOCET) 5-325 MG PER TABLET    Take 1 tablet by mouth every 4 (four) hours as needed for Pain.    PRAVASTATIN (PRAVACHOL) 40 MG TABLET    Take 40 mg by mouth every morning.     ZOLPIDEM (AMBIEN) 10 MG TAB     Take 10 mg by mouth nightly as needed.     Review of patient's allergies indicates:   Allergen Reactions    Adhesive tape-silicones Hives     BANDAIDS    Polymycin Hives    Latex, natural rubber Itching    Polyurethane-39        QUALITY OF LIFE: 80%- Normal Activity with Effort: Some Symptoms of Disease    Vitals:    09/05/17 1521   Weight: 92.1 kg (203 lb)       PHYSICAL EXAM:   GENERAL: alert; in no apparent distress.   HEAD: normocephalic, atraumatic.  EYES: pupils are equal, round, reactive to light and accommodation. Sclera anicteric. Conjunctiva not injected.   NOSE/THROAT: no nasal erythema or rhinorrhea. Oropharynx pink, without erythema, ulcerations or thrush.   NECK: no cervical motion rigidity; supple with no masses.  CHEST:  Patient is speaking comfortably on room air with normal work of breathing without using accessory muscles of respiration. PORT site smooth  CARDIOVASCULAR: regular rate and rhythm; no murmurs, rubs or gallops.  ABDOMEN: transverse abdominal insicion with mild-moderate keloid formation and bloody bandage drainage at lateral margins B hips; circumferential keloid at umbilicus  MUSCULOSKELETAL: no tenderness to palpation along the spine or scapulae. Normal range of motion.  NEUROLOGIC: cranial nerves II-XII intact bilaterally. Strength 5/5 in bilateral upper and lower extremities. No sensory deficits appreciated. Normal gait.  EXTREMITIES: no clubbing, cyanosis, edema.  BREAST: R reconstructed breast with circumferential incision c/d/i, no nipple and very mild keloid formation, tender at inner quadrant; L nipple with mild-moderate keloid formation, TTP; no sx/sx infection    ANCILLARY DATA: none    ASSESSMENT: 57F with stage IIA, [id number] IDC R breast, triple - s/p R mastectomy, AC x 4, 50.4GY + taxane ending 08/16 now with keloid formation at B HEMANTH reconstruction.  PLAN:  I explained to Ms. Castro the role of radiotherapy in preventing keloid formation. It does appear as though  she's developed significant keloids at her abdominal incision which is large and traverses the entire width of her anterior abdomen, as well as at her umbilicus, and left-sided nipple reconstruction. There is a mild tender keloid of the right-sided HEMANTH as well. Her right-sided reconstruction is not yet completed and she is still healing from her recent surgeries just days ago. Regarding the role of keloid prevention, the treatment with radiotherapy is ineffective if not preceded immediately by surgery for removal of the scar tissue. Any treatment with radiotherapy would have to be closely coordinated with her surgeon to follow ideally within 24-48 hours, treatment likely in 3 fractions for a dose of 6-8Gy per fraction. The abdominal incision, umbilicus, left nipple are all in previously nonirradiated fields and could safely tolerate these doses. However the right-sided chest wall likely has received some dose of radiotherapy pending the process of reconstruction by her plastic surgeon and so this must be considered although I do suspect she could safely receive this treatment. Also recommend consideration of steroid injections at the time of her keloid removal prior to proceeding with radiotherapy as this is shown a benefit for preventing keloids as well. Given the large volume of irradiation that will be necessary the patient may require a staged procedure. And given the proximity of the radiotherapy to her surgery she may best be served by treatment in an inpatient setting which could best be provided at [facility] main campus. I will carbon copy this note to Dr. Hernandez for further discussion.    Regarding her diagnosis of breast cancer she will continue to follow with us on a every 6 month basis as well as with her medical oncologist Dr. Vick. By my assessment today she is without evidence of disease.  All questions answered and contact information provided. Patient understands free to call us anytime with any  questions or concerns regarding radiation therapy.    TIME SPENT WITH PATIENT: I have personally seen and evaluated this patient. Approximately 30 minutes were spent with the patient discussing follow-up careplan.     PHYSICIAN: Ramírez Alarcon Jr, MD

## 2017-09-05 NOTE — ANESTHESIA POSTPROCEDURE EVALUATION
"Anesthesia Post Evaluation    Patient: Negrita Castro    Procedure(s) Performed: Procedure(s) (LRB):  EXCISION-SKIN Dog Ear Left Hip (Bilateral)    Final Anesthesia Type: general  Patient location during evaluation: PACU  Patient participation: Yes- Able to Participate  Level of consciousness: awake and alert and oriented  Post-procedure vital signs: reviewed and stable  Pain management: adequate  Airway patency: patent  PONV status at discharge: No PONV  Anesthetic complications: no      Cardiovascular status: hemodynamically stable  Respiratory status: unassisted and spontaneous ventilation  Hydration status: euvolemic  Follow-up not needed.        Visit Vitals  BP (!) 140/75   Pulse 63   Temp 36.7 °C (98.1 °F) (Temporal)   Resp 16   Ht 5' 11" (1.803 m)   Wt 93 kg (205 lb)   LMP 01/16/2016   SpO2 99%   Breastfeeding? No   BMI 28.59 kg/m²       Pain/Larissa Score: No Data Recorded      "

## 2017-09-07 NOTE — PROGRESS NOTES
"     [facility] History & Physical    Subjective:      Patient ID:   Negrita Castro  57 y.o.   Martínez Renteria R. Leblanc      Chief Complaint:   Breast Cancer  follow up    HPI:  57 y.o. female with diagnosis of Stage 1 R breast cancer 10/26/15.  "Triple negative".  Adjuvant AC x's 4, tx's 12 and Rad Rx through 16.    Port removed.  Had bilateral reconstruction surgery 17.    DM, cholesterol, epilepsy hx, DJD, LBP.    LR shoulder arthroscopy, R wrist surgery, M0.    Smoke  ppd x's 35 years, quit 2015.  ETOH no.  Disability-depression, epilepsy  Allergy none    Mom ovarian cancer  Dad lung cancer  Sisters DM, lung dx.      ROS:   GEN: normal without any fever, night sweats or weight loss  HEENT: normal with no HA's, sore throat, stiff neck, changes in vision  CV: normal with no CP, SOB, PND, POOL or orthopnea  PULM: normal with no SOB, cough, hemoptysis, sputum or pleuritic pain  GI: normal with no abdominal pain, nausea, vomiting, constipation, diarrhea, melanotic stools, BRBPR, or hematemesis  : normal with no hematuria, dysuria  BREAST: See HPI.  SKIN: normal with no rash, erythema, bruising, or swelling     Past Medical History:   Diagnosis Date    Breast cancer     Cancer     RIGHT BREAST 10-15    Depression     Diabetes mellitus     Neuropathy     Panic attacks     S/P epidural steroid injection     Seizures     none since early     Wears glasses     TO DRIVE     Past Surgical History:   Procedure Laterality Date    BREAST BIOPSY      right    breast reconstruction with tummy tuck      BREAST SURGERY      11-3-15 LUMPECTOMY, 12-2-15 REEXCISION    mastectomy 2016      shoulder surgery and wrist      BILAT  BONE SPUR    WRIST SURGERY      RIGHT       Review of patient's allergies indicates:   Allergen Reactions    Adhesive tape-silicones Hives     BANDAIDS    Polymycin Hives    Latex, natural rubber Itching    Polyurethane-39      Social History     Social History    " Marital status: Single     Spouse name: N/A    Number of children: N/A    Years of education: N/A     Occupational History    Not on file.     Social History Main Topics    Smoking status: Former Smoker     Packs/day: 0.25     Years: 30.00     Quit date: 9/28/2015    Smokeless tobacco: Not on file    Alcohol use 0.0 oz/week      Comment: RARELY    Drug use:      Types: Marijuana      Comment: medical marijuana    Sexual activity: Not on file     Other Topics Concern    Not on file     Social History Narrative    No narrative on file         Current Outpatient Prescriptions:     ASCORBIC ACID/VITAMIN E/BIOTIN (HAIR, SKIN, NAILS WITH BIOTIN ORAL), Take 1 tablet by mouth every morning. , Disp: , Rfl:     carisoprodol (SOMA) 350 MG tablet, Take 1 tablet (350 mg total) by mouth 3 (three) times daily as needed., Disp: 90 tablet, Rfl: 0    cephALEXin (KEFLEX) 500 MG capsule, Take 1 capsule (500 mg total) by mouth every 12 (twelve) hours., Disp: 14 capsule, Rfl: 0    diazepam (VALIUM) 10 MG tablet, Take 10 mg by mouth 4 (four) times daily. , Disp: , Rfl:     dronabinol (MARINOL) 2.5 MG capsule, Take 1 capsule (2.5 mg total) by mouth 2 (two) times daily before meals., Disp: 30 capsule, Rfl: 1    enalapril (VASOTEC) 2.5 MG tablet, Take 2.5 mg by mouth once daily. For diabetes, Disp: , Rfl: 1    furosemide (LASIX) 20 MG tablet, Take 20 mg by mouth daily as needed., Disp: , Rfl:     glimepiride (AMARYL) 4 MG tablet, , Disp: , Rfl:     hydrocodone-acetaminophen 10-325mg (NORCO)  mg Tab, Take 1 tablet by mouth every 6 (six) hours as needed for Pain., Disp: 120 tablet, Rfl: 0    JANUVIA 100 mg Tab, Take 100 mg by mouth once daily. , Disp: , Rfl: 1    metformin (GLUMETZA) 500 MG (MOD) 24 hr tablet, Take 500 mg by mouth daily with breakfast. Taking 2000 mg in am, Disp: , Rfl:     multivitamin capsule, Take 1 capsule by mouth every morning., Disp: , Rfl:     oxycodone-acetaminophen (PERCOCET) 5-325 mg per  "tablet, Take 1 tablet by mouth every 4 (four) hours as needed for Pain., Disp: 41 tablet, Rfl: 0    pravastatin (PRAVACHOL) 40 MG tablet, Take 40 mg by mouth every morning. , Disp: , Rfl: 1    zolpidem (AMBIEN) 10 mg Tab, Take 10 mg by mouth nightly as needed., Disp: , Rfl: 0    metformin (GLUCOPHAGE-XR) 500 MG 24 hr tablet, , Disp: , Rfl:     oxycodone-acetaminophen (PERCOCET) 5-325 mg per tablet, Take 1 tablet by mouth every 4 (four) hours as needed for Pain., Disp: 41 tablet, Rfl: 0          Objective:   Vitals:  Blood pressure (!) 140/82, pulse 94, temperature 98.9 °F (37.2 °C), resp. rate 20, weight 92.1 kg (203 lb), last menstrual period 01/16/2016.    Physical Examination:   GEN: no apparent distress, comfortable  HEAD: atraumatic and normocephalic  EYES: no pallor, no icterus  ENT: no pharyngeal erythema, external ears WNL; no nasal discharge; no thrush  NECK: no masses, thyroid normal, trachea midline, no LAD/LN's, supple  CV: RRR with no murmur; normal pulse  CHEST: Normal respiratory effort; CTAB; normal breath sounds; no wheeze or crackles  ABDOM:  nondistended; soft; normal bowel sounds; no rebound/guarding, abdomenal wall healing, liver spleen not palpated  MUSC/Skeletal: ROM normal; no crepitus; joints normal  EXTREM: no clubbing, cyanosis, inflammation or swelling  SKIN: She is developing keloid changes at L breast incision and navel surgical sites.  : no devi  NEURO: grossly intact; motor/sensory WNL; no tremors  PSYCH: normal mood, affect and behavior  LYMPH: normal cervical, supraclavicular, axillary and groin LN's  BREASTS: L and "R breast" healing, extensive post op change, at chest .  No palpable mass.      Labs:   Lab Results   Component Value Date    WBC 6.49 02/02/2017    HGB 13.1 02/02/2017    HCT 38.9 02/02/2017    MCV 91 02/02/2017     02/02/2017    CMP      Assessment:   (1) 57 y.o. female with diagnosis of Stage 1 triple negative R breast cancer, 10/2016.       S/P R " mastectomy, SNBx, AC x's 4, T x's12 weeks, Rad Rx and recent reconstruction.(8/31/17)    (2)Keloids developing.  Refer to Dr. Peña/Agnes today for Rad Rx evaluation for keloid management.    (3)Refill marinol, norco.  RTC 3 months.    Plan:

## 2017-09-08 ENCOUNTER — OFFICE VISIT (OUTPATIENT)
Dept: PLASTIC SURGERY | Facility: CLINIC | Age: 57
End: 2017-09-08
Payer: MEDICARE

## 2017-09-08 VITALS
DIASTOLIC BLOOD PRESSURE: 82 MMHG | HEART RATE: 73 BPM | SYSTOLIC BLOOD PRESSURE: 144 MMHG | BODY MASS INDEX: 28.84 KG/M2 | WEIGHT: 206.81 LBS

## 2017-09-08 DIAGNOSIS — Z09 SURGERY FOLLOW-UP EXAMINATION: Primary | ICD-10-CM

## 2017-09-08 PROCEDURE — 99024 POSTOP FOLLOW-UP VISIT: CPT | Mod: S$GLB,,, | Performed by: PHYSICIAN ASSISTANT

## 2017-09-08 RX ORDER — FLUCONAZOLE 150 MG/1
150 TABLET ORAL DAILY
Qty: 1 TABLET | Refills: 0 | Status: SHIPPED | OUTPATIENT
Start: 2017-09-08 | End: 2017-09-09

## 2017-09-10 ENCOUNTER — HOSPITAL ENCOUNTER (EMERGENCY)
Facility: HOSPITAL | Age: 57
Discharge: HOME | End: 2017-09-10
Attending: EMERGENCY MEDICINE
Payer: MEDICARE

## 2017-09-10 ENCOUNTER — NURSE TRIAGE (OUTPATIENT)
Dept: ADMINISTRATIVE | Facility: CLINIC | Age: 57
End: 2017-09-10

## 2017-09-10 VITALS
WEIGHT: 205 LBS | OXYGEN SATURATION: 99 % | DIASTOLIC BLOOD PRESSURE: 68 MMHG | SYSTOLIC BLOOD PRESSURE: 145 MMHG | BODY MASS INDEX: 28.7 KG/M2 | HEART RATE: 75 BPM | HEIGHT: 71 IN | TEMPERATURE: 97 F | RESPIRATION RATE: 18 BRPM

## 2017-09-10 DIAGNOSIS — L03.311 CELLULITIS OF ABDOMINAL WALL: Primary | ICD-10-CM

## 2017-09-10 PROCEDURE — 99283 EMERGENCY DEPT VISIT LOW MDM: CPT

## 2017-09-10 PROCEDURE — 25000003 PHARM REV CODE 250: Performed by: EMERGENCY MEDICINE

## 2017-09-10 RX ORDER — CEPHALEXIN 500 MG/1
500 CAPSULE ORAL 4 TIMES DAILY
Qty: 28 CAPSULE | Refills: 0 | Status: SHIPPED | OUTPATIENT
Start: 2017-09-10 | End: 2017-09-17

## 2017-09-10 RX ORDER — CEPHALEXIN 250 MG/1
500 CAPSULE ORAL
Status: COMPLETED | OUTPATIENT
Start: 2017-09-10 | End: 2017-09-10

## 2017-09-10 RX ADMIN — CEPHALEXIN 500 MG: 250 CAPSULE ORAL at 07:09

## 2017-09-10 NOTE — TELEPHONE ENCOUNTER
Reason for Disposition   [1] SEVERE pain (e.g., excruciating, unable to do any normal activities) AND [2] fever    Protocols used: ST HIP PAIN-A-AH    Patient states her left hip and surgical site is red and draining and painful. Patient advised to go to the nearest ED for evaluation. She verbalized understanding.

## 2017-09-11 ENCOUNTER — TELEPHONE (OUTPATIENT)
Dept: PLASTIC SURGERY | Facility: CLINIC | Age: 57
End: 2017-09-11

## 2017-09-11 NOTE — ED PROVIDER NOTES
Encounter Date: 9/10/2017    SCRIBE #1 NOTE: I, Arnaldo Gauthier, am scribing for, and in the presence of, Dr. Leija.       History     Chief Complaint   Patient presents with    Post-op Problem     wound check        09/10/2017 7:04 PM     Chief Complaint: Wound check      Negrita Castro is a 57 y.o. female with a history of DM, Breast CA, Neuropathy, Seizures and Depression who presents to the ED with drainage, erythema, and pain to the incision site. Pt reports the wound was drained 2 days ago and drainage persist. She reports pain when sitting and movement. She took Keflex BID every 12 hours for 10 days but ran out yesterday. Pt is s/p HEMANTH free flap reconstruction. Allergens include Adhesive tape, Polymycin, Latex and polyurethane-39.      The history is provided by the patient.     Review of patient's allergies indicates:   Allergen Reactions    Adhesive tape-silicones Hives     BANDAIDS    Polymycin Hives    Latex, natural rubber Itching    Polyurethane-39      Past Medical History:   Diagnosis Date    Breast cancer     Cancer     RIGHT BREAST 10-15    Depression     Diabetes mellitus     Neuropathy     Panic attacks     S/P epidural steroid injection     Seizures     none since early 30's    Wears glasses     TO DRIVE     Past Surgical History:   Procedure Laterality Date    BREAST BIOPSY      right    breast reconstruction with tummy tuck      BREAST SURGERY      11-3-15 LUMPECTOMY, 12-2-15 REEXCISION    mastectomy 2016      shoulder surgery and wrist      BILAT  BONE SPUR    WRIST SURGERY      RIGHT     Family History   Problem Relation Age of Onset    Anesthesia problems Neg Hx      Social History   Substance Use Topics    Smoking status: Former Smoker     Packs/day: 0.25     Years: 30.00     Quit date: 9/28/2015    Smokeless tobacco: Not on file    Alcohol use 0.0 oz/week      Comment: RARELY     Review of Systems   Constitutional: Negative for activity change, appetite change,  "chills, fatigue and fever.   Eyes: Negative for visual disturbance.   Respiratory: Negative for apnea and shortness of breath.    Cardiovascular: Negative for chest pain and palpitations.   Gastrointestinal: Positive for abdominal pain (Secondary to incision). Negative for abdominal distention.   Genitourinary: Negative for difficulty urinating.   Musculoskeletal: Negative for neck pain.   Skin: Positive for color change (Erythema around incision site.) and wound. Negative for pallor and rash.        + for "wound drainage"   Neurological: Negative for headaches.   Hematological: Does not bruise/bleed easily.   Psychiatric/Behavioral: Negative for agitation.       Physical Exam     Initial Vitals [09/10/17 1829]   BP Pulse Resp Temp SpO2   (!) 145/68 75 18 97.4 °F (36.3 °C) 99 %      MAP       93.67         Physical Exam    Nursing note and vitals reviewed.  Constitutional: She appears well-developed and well-nourished.   HENT:   Head: Normocephalic and atraumatic.   Eyes: Conjunctivae and EOM are normal. Pupils are equal, round, and reactive to light.   Neck: Normal range of motion. Neck supple.   Cardiovascular: Normal rate and regular rhythm. Exam reveals no gallop and no friction rub.    No murmur heard.  Pulmonary/Chest: Effort normal and breath sounds normal. No respiratory distress. She has no wheezes. She has no rhonchi. She has no rales.   Abdominal: Soft. She exhibits no distension. There is no tenderness.   Musculoskeletal: Normal range of motion.   6 mm area of dehiscence. Drainage and enduration of superior margin of left hip.     Neurological: She is alert and oriented to person, place, and time.   Skin: Skin is warm and dry. No erythema.   Psychiatric: She has a normal mood and affect.         ED Course   Procedures  Labs Reviewed - No data to display          Medical Decision Making:   ED Management:  Negrita Castro is a 57 y.o. female who presents with  wound dehiscence with induration of the " margin concerning for postoperative cellulitis.  She is restarted on Keflex 500 mg 4 times daily.  She is encouraged to follow-up with Dr. Hernandez in 2 days.            Scribe Attestation:   Scribe #1: I performed the above scribed service and the documentation accurately describes the services I performed. I attest to the accuracy of the note.    Attending Attestation:           Physician Attestation for Scribe:  Physician Attestation Statement for Scribe #1: I, Dr. Leija, reviewed documentation, as scribed by Arnaldo Gauthier in my presence, and it is both accurate and complete.                 ED Course      Clinical Impression:     1. Cellulitis of abdominal wall                               Lewis Leija III, MD  09/10/17 2018

## 2017-09-11 NOTE — ED NOTES
Pt concerned because surgical areas on hips are red and draining small amounts of  Blood. Denies fever alert calm family at bedside. Aware to notify nurse of needs or concerns

## 2017-09-15 ENCOUNTER — OFFICE VISIT (OUTPATIENT)
Dept: PLASTIC SURGERY | Facility: CLINIC | Age: 57
End: 2017-09-15
Payer: MEDICARE

## 2017-09-15 DIAGNOSIS — Z09 SURGERY FOLLOW-UP EXAMINATION: Primary | ICD-10-CM

## 2017-09-15 PROCEDURE — 99024 POSTOP FOLLOW-UP VISIT: CPT | Mod: S$GLB,,, | Performed by: SURGERY

## 2017-09-15 NOTE — PROGRESS NOTES
SUBJECTIVE:  Ms. Castro had bilateral HEMANTH flaps.  She came back for a second   stage to have excess abdominal soft tissue removed.  This was several weeks ago.    She developed a small hematoma in the left flank.  This was evacuated by the   physician's assistant.  She comes in today, everything is healing well.  We will   see her again next week.      GREGORIO  dd: 09/15/2017 10:23:20 (CDT)  td: 09/16/2017 03:03:23 (CDT)  Doc ID   #[id number]  Job ID #[id number]    CC:

## 2017-09-22 ENCOUNTER — OFFICE VISIT (OUTPATIENT)
Dept: PLASTIC SURGERY | Facility: CLINIC | Age: 57
End: 2017-09-22
Payer: MEDICARE

## 2017-09-22 VITALS
BODY MASS INDEX: 29.16 KG/M2 | DIASTOLIC BLOOD PRESSURE: 86 MMHG | TEMPERATURE: 99 F | HEIGHT: 71 IN | WEIGHT: 208.31 LBS | HEART RATE: 74 BPM | SYSTOLIC BLOOD PRESSURE: 164 MMHG

## 2017-09-22 DIAGNOSIS — Z09 SURGERY FOLLOW-UP EXAMINATION: Primary | ICD-10-CM

## 2017-09-22 PROCEDURE — 99024 POSTOP FOLLOW-UP VISIT: CPT | Mod: S$GLB,,, | Performed by: SURGERY

## 2017-10-20 ENCOUNTER — OFFICE VISIT (OUTPATIENT)
Dept: PLASTIC SURGERY | Facility: CLINIC | Age: 57
End: 2017-10-20
Payer: MEDICARE

## 2017-10-20 VITALS
HEART RATE: 77 BPM | TEMPERATURE: 98 F | DIASTOLIC BLOOD PRESSURE: 91 MMHG | SYSTOLIC BLOOD PRESSURE: 142 MMHG | WEIGHT: 205.5 LBS | HEIGHT: 71 IN | BODY MASS INDEX: 28.77 KG/M2

## 2017-10-20 DIAGNOSIS — Z09 SURGERY FOLLOW-UP EXAMINATION: Primary | ICD-10-CM

## 2017-10-20 PROCEDURE — 99024 POSTOP FOLLOW-UP VISIT: CPT | Mod: S$GLB,,, | Performed by: SURGERY

## 2017-10-20 NOTE — PROGRESS NOTES
Ms. Castro is status post bilateral breast reconstruction.  She has done very,   very well.  She has developed a hollowing in the superomedial pole of the HEMANTH   flap on the right side.  She will need some extensive fat grafting to this area.    We will go ahead and harvest the fat from the medial thighs.  We will use   tumescent fluid.  The procedure will take about 90 minutes.      GREGORIO  dd: 10/20/2017 11:07:53 (CDT)  td: 10/20/2017 19:36:54 (CDT)  Doc ID   #[id number]  Job ID #[id number]    CC:

## 2017-12-04 ENCOUNTER — TELEPHONE (OUTPATIENT)
Dept: HEMATOLOGY/ONCOLOGY | Facility: CLINIC | Age: 57
End: 2017-12-04

## 2017-12-04 ENCOUNTER — OFFICE VISIT (OUTPATIENT)
Dept: HEMATOLOGY/ONCOLOGY | Facility: CLINIC | Age: 57
End: 2017-12-04
Payer: MEDICARE

## 2017-12-04 VITALS
DIASTOLIC BLOOD PRESSURE: 97 MMHG | WEIGHT: 208 LBS | RESPIRATION RATE: 18 BRPM | BODY MASS INDEX: 29.12 KG/M2 | SYSTOLIC BLOOD PRESSURE: 145 MMHG | TEMPERATURE: 98 F | HEIGHT: 71 IN | HEART RATE: 88 BPM

## 2017-12-04 DIAGNOSIS — C50.911 CARCINOMA OF RIGHT BREAST, ESTROGEN AND PROGESTERONE RECEPTOR NEGATIVE: Primary | ICD-10-CM

## 2017-12-04 DIAGNOSIS — Z17.1 CARCINOMA OF RIGHT BREAST, ESTROGEN AND PROGESTERONE RECEPTOR NEGATIVE: Primary | ICD-10-CM

## 2017-12-04 PROCEDURE — 99214 OFFICE O/P EST MOD 30 MIN: CPT | Mod: ,,, | Performed by: INTERNAL MEDICINE

## 2017-12-04 NOTE — LETTER
December 4, 2017      Yoni Renteria MD  [address]  [place] [address]           [facility]ology Oncology  [address]  [address]  [place] [address]  Phone: [phone]  Fax: [phone]          Patient: Negrita Castro   MR Number: [id number]   YOB: 1960   Date of Visit: 12/4/2017       Dear Dr. Yoni Renteria:    Thank you for referring Negrita Castro to me for evaluation. Attached you will find relevant portions of my assessment and plan of care.    If you have questions, please do not hesitate to call me. I look forward to following Negrita Castro along with you.    Sincerely,    TALISHA Vick MD    Enclosure  CC:  No Recipients    If you would like to receive this communication electronically, please contact [email or web address] or [phone] to request more information on [employer] Link access.    For providers and/or their staff who would like to refer a patient to [facility], please contact us through our one-stop-shop provider referral line, [facility], at [phone].    If you feel you have received this communication in error or would no longer like to receive these types of communications, please e-mail [email or web address]

## 2017-12-05 NOTE — PROGRESS NOTES
"     [facility] History & Physical    Subjective:      Patient ID:   Negrita Castro  57 y.o.   Martínez Renteria R. Leblanc      Chief Complaint:   Breast cancer follow up    HPI:  57 y.o. female with diagnosis of Stage 1 R breast cancer 10/26/15.  "Triple negative".  Adjuvant AC x's 4, tx's 12 and Rad Rx through 16.    Port removed.  Had bilateral reconstruction surgery 17.  Further reconstruction, tummy tuck 17.      DM, cholesterol, epilepsy hx, DJD, LBP.    LR shoulder arthroscopy, R wrist surgery, M0.    Smoke  ppd x's 35 years, quit 2015.  ETOH no.  Disability-depression, epilepsy  Allergy none    Mom ovarian cancer  Dad lung cancer  Sisters DM, lung dx.      ROS:   GEN: normal without any fever, night sweats or weight loss  HEENT: normal with no HA's, sore throat, stiff neck, changes in vision  CV: normal with no CP, SOB, PND, POOL or orthopnea  PULM: normal with no SOB, cough, hemoptysis, sputum or pleuritic pain  GI: normal with no abdominal pain, nausea, vomiting, constipation, diarrhea, melanotic stools, BRBPR, or hematemesis  : normal with no hematuria, dysuria  BREAST: See HPI.  SKIN: normal with no rash, erythema, bruising, or swelling     Past Medical History:   Diagnosis Date    Breast cancer     Cancer     RIGHT BREAST 10-15    Depression     Diabetes mellitus     Neuropathy     Panic attacks     S/P epidural steroid injection     Seizures     none since early     Wears glasses     TO DRIVE     Past Surgical History:   Procedure Laterality Date    BREAST BIOPSY      right    breast reconstruction with tummy tuck      BREAST SURGERY      11-3-15 LUMPECTOMY, 12-2-15 REEXCISION    mastectomy 2016      shoulder surgery and wrist      BILAT  BONE SPUR    WRIST SURGERY      RIGHT       Review of patient's allergies indicates:   Allergen Reactions    Adhesive tape-silicones Hives     BANDAIDS    Polymycin Hives    Latex, natural rubber Itching    Polyurethane-39  "     Social History     Social History    Marital status: Single     Spouse name: N/A    Number of children: N/A    Years of education: N/A     Occupational History    Not on file.     Social History Main Topics    Smoking status: Former Smoker     Packs/day: 0.25     Years: 30.00     Quit date: 9/28/2015    Smokeless tobacco: Never Used    Alcohol use 0.0 oz/week      Comment: RARELY    Drug use:      Types: Marijuana      Comment: medical marijuana    Sexual activity: Not on file     Other Topics Concern    Not on file     Social History Narrative    No narrative on file         Current Outpatient Prescriptions:     ASCORBIC ACID/VITAMIN E/BIOTIN (HAIR, SKIN, NAILS WITH BIOTIN ORAL), Take 1 tablet by mouth every morning. , Disp: , Rfl:     carisoprodol (SOMA) 350 MG tablet, Take 1 tablet (350 mg total) by mouth 3 (three) times daily as needed., Disp: 90 tablet, Rfl: 0    diazepam (VALIUM) 10 MG tablet, Take 10 mg by mouth 4 (four) times daily. , Disp: , Rfl:     dronabinol (MARINOL) 2.5 MG capsule, Take 1 capsule (2.5 mg total) by mouth 2 (two) times daily before meals., Disp: 30 capsule, Rfl: 1    enalapril (VASOTEC) 2.5 MG tablet, Take 2.5 mg by mouth once daily. For diabetes, Disp: , Rfl: 1    furosemide (LASIX) 20 MG tablet, Take 20 mg by mouth daily as needed., Disp: , Rfl:     glimepiride (AMARYL) 4 MG tablet, , Disp: , Rfl:     hydrocodone-acetaminophen 10-325mg (NORCO)  mg Tab, Take 1 tablet by mouth every 6 (six) hours as needed for Pain., Disp: 120 tablet, Rfl: 0    JANUVIA 100 mg Tab, Take 100 mg by mouth once daily. , Disp: , Rfl: 1    metformin (GLUCOPHAGE-XR) 500 MG 24 hr tablet, , Disp: , Rfl:     metformin (GLUMETZA) 500 MG (MOD) 24 hr tablet, Take 500 mg by mouth daily with breakfast. Taking 2000 mg in am, Disp: , Rfl:     multivitamin capsule, Take 1 capsule by mouth every morning., Disp: , Rfl:     oxycodone-acetaminophen (PERCOCET) 5-325 mg per tablet, Take 1 tablet  "by mouth every 4 (four) hours as needed for Pain., Disp: 41 tablet, Rfl: 0    oxycodone-acetaminophen (PERCOCET) 5-325 mg per tablet, Take 1 tablet by mouth every 4 (four) hours as needed for Pain., Disp: 41 tablet, Rfl: 0    pravastatin (PRAVACHOL) 40 MG tablet, Take 40 mg by mouth every morning. , Disp: , Rfl: 1    zolpidem (AMBIEN) 10 mg Tab, Take 10 mg by mouth nightly as needed., Disp: , Rfl: 0          Objective:   Vitals:  Blood pressure (!) 145/97, pulse 88, temperature 98.1 °F (36.7 °C), resp. rate 18, height 5' 11" (1.803 m), weight 94.3 kg (208 lb), last menstrual period 01/16/2016.    Physical Examination:   GEN: no apparent distress, comfortable  HEAD: atraumatic and normocephalic  EYES: no pallor, no icterus  ENT: no pharyngeal erythema, external ears WNL; no nasal discharge; no thrush  NECK: no masses, thyroid normal, trachea midline, no LAD/LN's, supple  CV: RRR with no murmur; normal pulse  CHEST: Normal respiratory effort; CTAB; normal breath sounds; no wheeze or crackles  ABDOM:  nondistended; soft; normal bowel sounds; no rebound/guarding, abdomenal wall healing, liver spleen not palpated  MUSC/Skeletal: ROM normal; no crepitus; joints normal  EXTREM: no clubbing, cyanosis, inflammation or swelling  SKIN: She is developing keloid changes at L breast incision and navel surgical sites.  : no devi  NEURO: grossly intact; motor/sensory WNL; no tremors  PSYCH: normal mood, affect and behavior  LYMPH: normal cervical, supraclavicular, axillary and groin LN's  BREASTS: L and "R breast" healing, extensive post op change, at chest .  No palpable mass.      Labs:   Lab Results   Component Value Date    WBC 6.49 02/02/2017    HGB 13.1 02/02/2017    HCT 38.9 02/02/2017    MCV 91 02/02/2017     02/02/2017    CMP      Assessment:   (1) 57 y.o. female with diagnosis of Stage 1 triple negative R breast cancer, 10/2016.       S/P R mastectomy, SNBx, AC x's 4, T x's12 weeks, Rad Rx and recent " reconstruction.(8/31/17)    (2)Keloids developing, Dr. Hernandez injecting scar areas.    (3)Refill marinol, norco.  Check CAT and Bone Scan in 2/2018  RTC 3 months.

## 2018-01-08 RX ORDER — FLUCONAZOLE 150 MG/1
150 TABLET ORAL DAILY
Qty: 1 TABLET | Refills: 0 | Status: SHIPPED | OUTPATIENT
Start: 2018-01-08 | End: 2018-01-09

## 2018-01-18 ENCOUNTER — TELEPHONE (OUTPATIENT)
Dept: PLASTIC SURGERY | Facility: CLINIC | Age: 58
End: 2018-01-18

## 2018-01-18 NOTE — TELEPHONE ENCOUNTER
Returned call to patient regarding scheduling surgery, pt states that she was told to follow up in Jan for a surgery date, will notify provider regarding surgical date, advised patient that we would give her a return call regarding scheduling, pt states the only date she is unavailable is 2/7/18 as she has a scan scheduled, all questions answered at this time

## 2018-01-18 NOTE — TELEPHONE ENCOUNTER
----- Message from Edgar Lombardo sent at 1/15/2018  3:55 PM CST -----  Pt want to schedule her surgery. Please call pt at [phone]

## 2018-01-29 ENCOUNTER — TELEPHONE (OUTPATIENT)
Dept: PLASTIC SURGERY | Facility: CLINIC | Age: 58
End: 2018-01-29

## 2018-01-31 ENCOUNTER — TELEPHONE (OUTPATIENT)
Dept: PLASTIC SURGERY | Facility: CLINIC | Age: 58
End: 2018-01-31

## 2018-01-31 NOTE — TELEPHONE ENCOUNTER
Returned call to patient, surgery date of 2/26/18 confirmed, pre-op and post op appt confirmed, pt to have CT chest/abd/pelvis and bone scan prior to sx with medical oncology, pt stated she would call if anything changes at this time

## 2018-02-02 DIAGNOSIS — Z85.3 PERSONAL HISTORY OF BREAST CANCER: Primary | ICD-10-CM

## 2018-02-06 ENCOUNTER — TELEPHONE (OUTPATIENT)
Dept: HEMATOLOGY/ONCOLOGY | Facility: CLINIC | Age: 58
End: 2018-02-06

## 2018-02-06 DIAGNOSIS — Z17.1 MALIGNANT NEOPLASM OF LOWER-INNER QUADRANT OF RIGHT BREAST OF FEMALE, ESTROGEN RECEPTOR NEGATIVE: Primary | ICD-10-CM

## 2018-02-06 DIAGNOSIS — C50.311 MALIGNANT NEOPLASM OF LOWER-INNER QUADRANT OF RIGHT BREAST OF FEMALE, ESTROGEN RECEPTOR NEGATIVE: Primary | ICD-10-CM

## 2018-02-06 NOTE — TELEPHONE ENCOUNTER
----- Message from Malena Lopez sent at 2/6/2018  3:03 PM CST -----  Pt called in said she is taking an whole body bone scan and ct of the abdomen with contrast done tomorrow morning and would like to know if she could take her medphormine and her insulin tonight even tho her test is tomorrow morning.     Call back # [phone]

## 2018-02-06 NOTE — TELEPHONE ENCOUNTER
Called pt back instructed her that with Metformin and Ct with contrast we instructed to hold because of kidneys, but she needed to consult with her PMD Dr. Renteria on holding this and her insulin. KATHY.

## 2018-02-06 NOTE — TELEPHONE ENCOUNTER
----- Message from Marilu Mendoza sent at 2/6/2018  9:07 AM CST -----  [facility] with [facility] called in stating that the patient is on the schedule for a CT w/ contrast tomorrow. She said that she needs a STAT creatinine order. She said it can either be put in epic or scanned in the media. Please contact More with any questions at [phone]. Thanks

## 2018-02-07 ENCOUNTER — HOSPITAL ENCOUNTER (OUTPATIENT)
Dept: RADIOLOGY | Facility: HOSPITAL | Age: 58
Discharge: HOME | End: 2018-02-07
Attending: INTERNAL MEDICINE
Payer: MEDICARE

## 2018-02-07 ENCOUNTER — ANESTHESIA EVENT (OUTPATIENT)
Dept: SURGERY | Facility: HOSPITAL | Age: 58
End: 2018-02-07
Payer: MEDICARE

## 2018-02-07 DIAGNOSIS — Z17.1 CARCINOMA OF RIGHT BREAST, ESTROGEN AND PROGESTERONE RECEPTOR NEGATIVE: ICD-10-CM

## 2018-02-07 DIAGNOSIS — C50.911 CARCINOMA OF RIGHT BREAST, ESTROGEN AND PROGESTERONE RECEPTOR NEGATIVE: ICD-10-CM

## 2018-02-07 PROCEDURE — A9503 TC99M MEDRONATE: HCPCS

## 2018-02-07 PROCEDURE — 74177 CT ABD & PELVIS W/CONTRAST: CPT | Mod: 26,,, | Performed by: RADIOLOGY

## 2018-02-07 PROCEDURE — 71260 CT THORAX DX C+: CPT | Mod: 26,,, | Performed by: RADIOLOGY

## 2018-02-07 PROCEDURE — 25500020 PHARM REV CODE 255

## 2018-02-07 PROCEDURE — 78306 BONE IMAGING WHOLE BODY: CPT | Mod: 26,,, | Performed by: RADIOLOGY

## 2018-02-07 PROCEDURE — 74177 CT ABD & PELVIS W/CONTRAST: CPT | Mod: TC

## 2018-02-07 RX ORDER — SODIUM CHLORIDE 9 MG/ML
INJECTION, SOLUTION INTRAVENOUS
Status: DISPENSED
Start: 2018-02-07 | End: 2018-02-07

## 2018-02-07 RX ADMIN — IOHEXOL 100 ML: 350 INJECTION, SOLUTION INTRAVENOUS at 08:02

## 2018-02-07 RX ADMIN — IOHEXOL 30 ML: 350 INJECTION, SOLUTION INTRAVENOUS at 08:02

## 2018-02-07 NOTE — ANESTHESIA PREPROCEDURE EVALUATION
Anesthesia Assessment: Preoperative EQUATION    Planned Procedure: Procedure(s) (LRB):  TRANSFER-FAT (Right)  Requested Anesthesia Type:General  Surgeon: Xiang Hernandez MD  Service: Plastics  Known or anticipated Date of Surgery:2/26/2018    Surgeon notes: reviewed and Personal history of breast cancer    Previous anesthesia records:8/31/17-Excision-Skin Dog Ear Left Hip-Bilateral-General    Last PCP note: > 1 year ago , within [facility] , focused visit , --Preop Exam  Subspecialty notes: Hematology/Oncology, Rad Onc     Tests already available:  Results have been reviewed.Labs-2/7/18-CEA/CMP/CBC/ CXR 1 view-3/18/16  EKG-11/2/15/ Lab-2/2/17-A1c      Plan:Phone      Testing:  EKG (SIGN  & HELD) per Dr. Hilton     Patient  has previously scheduled Medical Appointment:Appointment on 2/15/18, prior to surgery date.    Navigation: Tests Scheduled. EKG (SIGN  & HELD) per Dr. Hilton                            Reviewed plan with .                       Gayle Muniz, RN  2/7/18 02/07/2018  Negrita Castro is a 57 y.o., female.  Pre-operative evaluation for Procedure(s) (LRB):  TRANSFER-FAT (Right)    Negrita Castro is a 57 y.o. female     Patient Active Problem List   Diagnosis    Depression    Anxiety disorder due to known physiological condition    Dysthymic disorder    Neuropathy    Type 2 diabetes mellitus without complication, without long-term current use of insulin    Atypical chest pain    Breast cancer, right       Review of patient's allergies indicates:   Allergen Reactions    Adhesive tape-silicones Hives     BANDAIDS    Polymycin Hives    Latex, natural rubber Hives and Itching    Polyurethane-39 Hives       No current facility-administered medications on file prior to encounter.      Current Outpatient Prescriptions on File Prior to Encounter   Medication  Sig Dispense Refill    ASCORBIC ACID/VITAMIN E/BIOTIN (HAIR, SKIN, NAILS WITH BIOTIN ORAL) Take 1 tablet by mouth every morning.       carisoprodol (SOMA) 350 MG tablet Take 1 tablet (350 mg total) by mouth 3 (three) times daily as needed. 90 tablet 0    diazepam (VALIUM) 10 MG tablet Take 10 mg by mouth 4 (four) times daily.       dronabinol (MARINOL) 2.5 MG capsule Take 1 capsule (2.5 mg total) by mouth 2 (two) times daily before meals. 30 capsule 1    enalapril (VASOTEC) 2.5 MG tablet Take 2.5 mg by mouth once daily. For diabetes  1    furosemide (LASIX) 20 MG tablet Take 20 mg by mouth once daily.       glimepiride (AMARYL) 4 MG tablet       hydrocodone-acetaminophen 10-325mg (NORCO)  mg Tab Take 1 tablet by mouth every 6 (six) hours as needed for Pain. 120 tablet 0    metformin (GLUCOPHAGE-XR) 500 MG 24 hr tablet       multivitamin capsule Take 1 capsule by mouth every morning.      pravastatin (PRAVACHOL) 40 MG tablet Take 40 mg by mouth every morning.   1    zolpidem (AMBIEN) 10 mg Tab Take 10 mg by mouth nightly as needed.  0       Past Surgical History:   Procedure Laterality Date    BREAST BIOPSY      right    breast reconstruction with tummy tuck      BREAST SURGERY      11-3-15 LUMPECTOMY, 12-2-15 REEXCISION    mastectomy 2016      shoulder surgery and wrist      BILAT  BONE SPUR    WRIST SURGERY      RIGHT       Social History     Social History    Marital status: Single     Spouse name: N/A    Number of children: N/A    Years of education: N/A     Occupational History    Not on file.     Social History Main Topics    Smoking status: Former Smoker     Packs/day: 0.25     Years: 30.00     Quit date: 9/28/2015    Smokeless tobacco: Never Used    Alcohol use 0.0 oz/week      Comment: RARELY    Drug use: Unknown      Comment: medical marijuana    Sexual activity: Not on file     Other Topics Concern    Not on file     Social History Narrative    No narrative on file  "        Vital Signs Range (Last 24H):  Temp:  [36.6 °C (97.9 °F)]   Pulse:  [64]   Resp:  [18]   BP: (164)/(79)   SpO2:  [100 %]       CBC: No results for input(s): WBC, RBC, HGB, HCT, PLT, MCV, MCH, MCHC in the last 72 hours.    CMP: No results for input(s): NA, K, CL, CO2, BUN, CREATININE, GLU, MG, PHOS, CALCIUM, ALBUMIN, PROT, ALKPHOS, ALT, AST, BILITOT in the last 72 hours.    INR  No results for input(s): PT, INR, PROTIME, APTT in the last 72 hours.        Diagnostic Studies:  Last 3 sets of Vitals    Vitals - 1 value per visit 10/20/2017 12/4/2017 2/26/2018   SYSTOLIC 142 145 164   DIASTOLIC 91 97 79   PULSE 77 88 64   TEMPERATURE 97.6 98.1 97.9   RESPIRATIONS - 18 18   SPO2 - - 100   Weight (lb) 205.47 208 210   Weight (kg) 93.2 94.348 95.255   HEIGHT 5' 11" 5' 11" 5' 11"   BODY MASS INDEX 28.66 29.01 29.29   VISIT REPORT - - -   Pain Score  0 8 -   Some recent data might be hidden           EKG:      CONCLUSIONS     1 - Concentric remodeling.     2 - Hyperdynamic left ventricular systolic function (EF 65-70%).     3 - Normal left ventricular diastolic function.     4 - Normal right ventricular systolic function .     5 - The estimated PA systolic pressure is 16 mmHg.     6 - Difficult apical window, poor endocardial visualization.         This document has been electronically    SIGNED BY: Gideon Marr MD On: 03/04/2016 16:402D Echo:        Anesthesia Evaluation         Review of Systems  Anesthesia Hx:  No problems with previous Anesthesia Denies Family Hx of Anesthesia complications.   Denies Personal Hx of Anesthesia complications.   Social:  Social Alcohol Use, Former Smoker    Hematology/Oncology:         -- Cancer in past history: Breast right chemotherapy, radiation and surgery    EENT/Dental:   Implants/wears glasses   Cardiovascular:   Walking daily   Neurological:   Seizures Peripheral Neuropathy-Ruben. Legs/arms/   Hx of seizures -last seizure 1997-anxiety related per patient-Gran mal seizures during " sleep  Peripheral Neuropathy    Endocrine:   Diabetes, type 2, using insulin    Psych:   Psychiatric History anxiety depression             Anesthesia Plan  Type of Anesthesia, risks & benefits discussed:  Anesthesia Type:  general  Patient's Preference:   Intra-op Monitoring Plan: standard ASA monitors  Intra-op Monitoring Plan Comments:   Post Op Pain Control Plan: IV/PO Opioids PRN and per primary service following discharge from PACU  Post Op Pain Control Plan Comments:   Induction:   IV  Beta Blocker:  Patient is not currently on a Beta-Blocker (No further documentation required).       Informed Consent: Patient understands risks and agrees with Anesthesia plan.  Questions answered. Anesthesia consent signed with patient.  ASA Score: 3     Day of Surgery Review of History & Physical:    H&P update referred to the surgeon.         Ready For Surgery From Anesthesia Perspective.

## 2018-02-07 NOTE — PRE ADMISSION SCREENING
Anesthesia Assessment: Preoperative EQUATION    Planned Procedure: Procedure(s) (LRB):  TRANSFER-FAT (Right)  Requested Anesthesia Type:General  Surgeon: Xiang Hernandez MD  Service: Plastics  Known or anticipated Date of Surgery:2/26/2018    Surgeon notes: reviewed and Personal history of breast cancer    Previous anesthesia records:8/31/17-Excision-Skin Dog Ear Left Hip-Bilateral-General    Last PCP note: > 1 year ago , within [facility] , focused visit , --Preop Exam  Subspecialty notes: Hematology/Oncology, Rad Onc     Tests already available:  Results have been reviewed.Labs-2/7/18-CEA/CMP/CBC/ CXR 1 view-3/18/16  EKG-11/2/15/ Lab-2/2/17-A1c      Plan:Phone      Testing:  will check with Dr. Hilton     Patient  has previously scheduled Medical Appointment:Appointment on 2/15/18, prior to surgery date.    Navigation: Tests Scheduled. ?             Consults scheduled.?             Results will be tracked by Preop Clinic.                 Straight Line to surgery.  ?             No tests, anesthesia preop clinic visit, or consult required. ?            Gayle Muniz, RN  2/7/18

## 2018-02-08 ENCOUNTER — TELEPHONE (OUTPATIENT)
Dept: HEMATOLOGY/ONCOLOGY | Facility: CLINIC | Age: 58
End: 2018-02-08

## 2018-02-08 DIAGNOSIS — Z01.818 PREOP TESTING: Primary | ICD-10-CM

## 2018-02-08 NOTE — TELEPHONE ENCOUNTER
Tell her, bone scan did not show cancer in the bones.    Tell her, no cancer seen on the cat scan, she has a fatty liver.

## 2018-02-09 NOTE — TELEPHONE ENCOUNTER
Patient notified of Bone and CT results and voiced understanding. Reminded of MD appointment in March.

## 2018-02-12 ENCOUNTER — TELEPHONE (OUTPATIENT)
Dept: PLASTIC SURGERY | Facility: CLINIC | Age: 58
End: 2018-02-12

## 2018-02-12 NOTE — TELEPHONE ENCOUNTER
I HAVE CALLED HER MANY OF TIMES TODAY AND LEFT 3 MESSAGES ON CELL PHONE AND HOUSE NUMBER TO TRIED AND RESCHEDULE HER FOR THE [place] OFFICE. DR. BOUCHER IS NO LONGER SEEING PT IN [place].

## 2018-02-15 ENCOUNTER — CLINICAL SUPPORT (OUTPATIENT)
Dept: PLASTIC SURGERY | Facility: CLINIC | Age: 58
End: 2018-02-15
Payer: MEDICARE

## 2018-02-15 NOTE — PROGRESS NOTES
Patient arrived for pre op appointment. Pre op instructions, dates and times reviewed with patient. Patient verbalized understanding. Pre op paperwork given to patient.

## 2018-02-23 ENCOUNTER — TELEPHONE (OUTPATIENT)
Dept: PLASTIC SURGERY | Facility: CLINIC | Age: 58
End: 2018-02-23

## 2018-02-23 NOTE — TELEPHONE ENCOUNTER
Ms. Castro is aware of surgery date, arrival time, start time, and location. I discussed night before and morning of surgery instructions according to [facility] Surgery Guide.

## 2018-02-26 ENCOUNTER — ANESTHESIA (OUTPATIENT)
Dept: SURGERY | Facility: HOSPITAL | Age: 58
End: 2018-02-26
Payer: MEDICARE

## 2018-02-26 ENCOUNTER — SURGERY (OUTPATIENT)
Age: 58
End: 2018-02-26

## 2018-02-26 ENCOUNTER — HOSPITAL ENCOUNTER (OUTPATIENT)
Facility: HOSPITAL | Age: 58
Discharge: HOME | End: 2018-02-26
Attending: SURGERY | Admitting: SURGERY
Payer: MEDICARE

## 2018-02-26 VITALS
WEIGHT: 210 LBS | HEIGHT: 71 IN | HEART RATE: 59 BPM | BODY MASS INDEX: 29.4 KG/M2 | TEMPERATURE: 98 F | DIASTOLIC BLOOD PRESSURE: 90 MMHG | OXYGEN SATURATION: 97 % | RESPIRATION RATE: 18 BRPM | SYSTOLIC BLOOD PRESSURE: 146 MMHG

## 2018-02-26 DIAGNOSIS — C50.919 BREAST CANCER: ICD-10-CM

## 2018-02-26 DIAGNOSIS — C50.311 MALIGNANT NEOPLASM OF LOWER-INNER QUADRANT OF RIGHT BREAST OF FEMALE, ESTROGEN RECEPTOR NEGATIVE: Primary | ICD-10-CM

## 2018-02-26 DIAGNOSIS — Z17.1 MALIGNANT NEOPLASM OF LOWER-INNER QUADRANT OF RIGHT BREAST OF FEMALE, ESTROGEN RECEPTOR NEGATIVE: Primary | ICD-10-CM

## 2018-02-26 LAB
POCT GLUCOSE: 242 MG/DL (ref 70–110)
POCT GLUCOSE: 280 MG/DL (ref 70–110)
POCT GLUCOSE: 291 MG/DL (ref 70–110)

## 2018-02-26 PROCEDURE — 25000003 PHARM REV CODE 250: Performed by: SURGERY

## 2018-02-26 PROCEDURE — 27000221 HC OXYGEN, UP TO 24 HOURS

## 2018-02-26 PROCEDURE — 82962 GLUCOSE BLOOD TEST: CPT | Mod: 91 | Performed by: SURGERY

## 2018-02-26 PROCEDURE — 94761 N-INVAS EAR/PLS OXIMETRY MLT: CPT

## 2018-02-26 PROCEDURE — D9220A PRA ANESTHESIA: Mod: ANES,,, | Performed by: ANESTHESIOLOGY

## 2018-02-26 PROCEDURE — 63600175 PHARM REV CODE 636 W HCPCS: Performed by: SURGERY

## 2018-02-26 PROCEDURE — S0077 INJECTION, CLINDAMYCIN PHOSP: HCPCS | Performed by: SURGERY

## 2018-02-26 PROCEDURE — 20926 PR REMV TISSUE FOR GRAFT OTHR: CPT | Mod: RT,,, | Performed by: SURGERY

## 2018-02-26 PROCEDURE — 36000704 HC OR TIME LEV I 1ST 15 MIN: Performed by: SURGERY

## 2018-02-26 PROCEDURE — 63600175 PHARM REV CODE 636 W HCPCS: Performed by: ANESTHESIOLOGY

## 2018-02-26 PROCEDURE — 25000003 PHARM REV CODE 250: Performed by: NURSE ANESTHETIST, CERTIFIED REGISTERED

## 2018-02-26 PROCEDURE — D9220A PRA ANESTHESIA: Mod: CRNA,,, | Performed by: NURSE ANESTHETIST, CERTIFIED REGISTERED

## 2018-02-26 PROCEDURE — S0028 INJECTION, FAMOTIDINE, 20 MG: HCPCS | Performed by: NURSE ANESTHETIST, CERTIFIED REGISTERED

## 2018-02-26 PROCEDURE — 36000705 HC OR TIME LEV I EA ADD 15 MIN: Performed by: SURGERY

## 2018-02-26 PROCEDURE — 71000015 HC POSTOP RECOV 1ST HR: Performed by: SURGERY

## 2018-02-26 PROCEDURE — 63600175 PHARM REV CODE 636 W HCPCS

## 2018-02-26 PROCEDURE — 71000033 HC RECOVERY, INTIAL HOUR: Performed by: SURGERY

## 2018-02-26 PROCEDURE — 63600175 PHARM REV CODE 636 W HCPCS: Performed by: NURSE ANESTHETIST, CERTIFIED REGISTERED

## 2018-02-26 PROCEDURE — 27200651 HC AIRWAY, LMA: Performed by: NURSE ANESTHETIST, CERTIFIED REGISTERED

## 2018-02-26 PROCEDURE — 37000009 HC ANESTHESIA EA ADD 15 MINS: Performed by: SURGERY

## 2018-02-26 PROCEDURE — 71000039 HC RECOVERY, EACH ADD'L HOUR: Performed by: SURGERY

## 2018-02-26 PROCEDURE — 37000008 HC ANESTHESIA 1ST 15 MINUTES: Performed by: SURGERY

## 2018-02-26 PROCEDURE — 82962 GLUCOSE BLOOD TEST: CPT | Performed by: SURGERY

## 2018-02-26 RX ORDER — FAMOTIDINE 10 MG/ML
INJECTION INTRAVENOUS
Status: DISCONTINUED | OUTPATIENT
Start: 2018-02-26 | End: 2018-02-26

## 2018-02-26 RX ORDER — HEPARIN SODIUM 5000 [USP'U]/ML
5000 INJECTION, SOLUTION INTRAVENOUS; SUBCUTANEOUS ONCE
Status: DISCONTINUED | OUTPATIENT
Start: 2018-02-26 | End: 2018-02-26 | Stop reason: HOSPADM

## 2018-02-26 RX ORDER — MEPERIDINE HYDROCHLORIDE 50 MG/ML
25 INJECTION INTRAMUSCULAR; INTRAVENOUS; SUBCUTANEOUS EVERY 10 MIN PRN
Status: DISCONTINUED | OUTPATIENT
Start: 2018-02-26 | End: 2018-02-26 | Stop reason: HOSPADM

## 2018-02-26 RX ORDER — LIDOCAINE HYDROCHLORIDE 10 MG/ML
INJECTION, SOLUTION EPIDURAL; INFILTRATION; INTRACAUDAL; PERINEURAL
Status: DISCONTINUED | OUTPATIENT
Start: 2018-02-26 | End: 2018-02-26 | Stop reason: HOSPADM

## 2018-02-26 RX ORDER — OXYCODONE HYDROCHLORIDE 5 MG/1
5 TABLET ORAL ONCE AS NEEDED
Status: COMPLETED | OUTPATIENT
Start: 2018-02-26 | End: 2018-02-26

## 2018-02-26 RX ORDER — LIDOCAINE HYDROCHLORIDE 10 MG/ML
1 INJECTION, SOLUTION EPIDURAL; INFILTRATION; INTRACAUDAL; PERINEURAL ONCE
Status: COMPLETED | OUTPATIENT
Start: 2018-02-26 | End: 2018-02-26

## 2018-02-26 RX ORDER — FENTANYL CITRATE 50 UG/ML
INJECTION, SOLUTION INTRAMUSCULAR; INTRAVENOUS
Status: DISCONTINUED | OUTPATIENT
Start: 2018-02-26 | End: 2018-02-26

## 2018-02-26 RX ORDER — MIDAZOLAM HYDROCHLORIDE 1 MG/ML
INJECTION, SOLUTION INTRAMUSCULAR; INTRAVENOUS
Status: DISCONTINUED | OUTPATIENT
Start: 2018-02-26 | End: 2018-02-26

## 2018-02-26 RX ORDER — HYDROCODONE BITARTRATE AND ACETAMINOPHEN 5; 325 MG/1; MG/1
1 TABLET ORAL EVERY 4 HOURS PRN
Status: DISCONTINUED | OUTPATIENT
Start: 2018-02-26 | End: 2018-02-26 | Stop reason: HOSPADM

## 2018-02-26 RX ORDER — LIDOCAINE HCL/PF 100 MG/5ML
SYRINGE (ML) INTRAVENOUS
Status: DISCONTINUED | OUTPATIENT
Start: 2018-02-26 | End: 2018-02-26

## 2018-02-26 RX ORDER — CLINDAMYCIN HYDROCHLORIDE 300 MG/1
300 CAPSULE ORAL EVERY 6 HOURS
Qty: 30 CAPSULE | Refills: 0 | Status: SHIPPED | OUTPATIENT
Start: 2018-02-26 | End: 2018-07-09

## 2018-02-26 RX ORDER — PHENYLEPHRINE HYDROCHLORIDE 10 MG/ML
INJECTION INTRAVENOUS
Status: DISCONTINUED | OUTPATIENT
Start: 2018-02-26 | End: 2018-02-26

## 2018-02-26 RX ORDER — SODIUM CHLORIDE 9 MG/ML
INJECTION, SOLUTION INTRAVENOUS CONTINUOUS
Status: DISCONTINUED | OUTPATIENT
Start: 2018-02-26 | End: 2018-02-26 | Stop reason: HOSPADM

## 2018-02-26 RX ORDER — INSULIN ASPART 100 [IU]/ML
4 INJECTION, SOLUTION INTRAVENOUS; SUBCUTANEOUS ONCE AS NEEDED
Status: COMPLETED | OUTPATIENT
Start: 2018-02-26 | End: 2018-02-26

## 2018-02-26 RX ORDER — INDOMETHACIN 25 MG/1
CAPSULE ORAL
Status: DISCONTINUED | OUTPATIENT
Start: 2018-02-26 | End: 2018-02-26 | Stop reason: HOSPADM

## 2018-02-26 RX ORDER — LIDOCAINE HYDROCHLORIDE 10 MG/ML
1 INJECTION, SOLUTION EPIDURAL; INFILTRATION; INTRACAUDAL; PERINEURAL ONCE
Status: DISCONTINUED | OUTPATIENT
Start: 2018-02-26 | End: 2018-02-26 | Stop reason: HOSPADM

## 2018-02-26 RX ORDER — MEPERIDINE HYDROCHLORIDE 50 MG/ML
25 INJECTION INTRAMUSCULAR; INTRAVENOUS; SUBCUTANEOUS EVERY 10 MIN PRN
Status: DISCONTINUED | OUTPATIENT
Start: 2018-02-26 | End: 2018-02-26

## 2018-02-26 RX ORDER — SODIUM CHLORIDE 0.9 % (FLUSH) 0.9 %
3 SYRINGE (ML) INJECTION
Status: DISCONTINUED | OUTPATIENT
Start: 2018-02-26 | End: 2018-02-26 | Stop reason: HOSPADM

## 2018-02-26 RX ORDER — OXYCODONE AND ACETAMINOPHEN 10; 325 MG/1; MG/1
1 TABLET ORAL EVERY 4 HOURS PRN
Qty: 28 TABLET | Refills: 0 | Status: SHIPPED | OUTPATIENT
Start: 2018-02-26 | End: 2018-07-09

## 2018-02-26 RX ORDER — DIPHENHYDRAMINE HYDROCHLORIDE 50 MG/ML
INJECTION INTRAMUSCULAR; INTRAVENOUS
Status: COMPLETED
Start: 2018-02-26 | End: 2018-02-26

## 2018-02-26 RX ORDER — PROPOFOL 10 MG/ML
VIAL (ML) INTRAVENOUS
Status: DISCONTINUED | OUTPATIENT
Start: 2018-02-26 | End: 2018-02-26

## 2018-02-26 RX ORDER — TRIAMCINOLONE ACETONIDE 40 MG/ML
INJECTION, SUSPENSION INTRA-ARTICULAR; INTRAMUSCULAR
Status: DISCONTINUED | OUTPATIENT
Start: 2018-02-26 | End: 2018-02-26 | Stop reason: HOSPADM

## 2018-02-26 RX ORDER — INSULIN ASPART 100 [IU]/ML
INJECTION, SOLUTION INTRAVENOUS; SUBCUTANEOUS
Status: COMPLETED
Start: 2018-02-26 | End: 2018-02-26

## 2018-02-26 RX ORDER — ONDANSETRON 2 MG/ML
4 INJECTION INTRAMUSCULAR; INTRAVENOUS DAILY PRN
Status: DISCONTINUED | OUTPATIENT
Start: 2018-02-26 | End: 2018-02-26 | Stop reason: HOSPADM

## 2018-02-26 RX ORDER — DIPHENHYDRAMINE HYDROCHLORIDE 50 MG/ML
25 INJECTION INTRAMUSCULAR; INTRAVENOUS ONCE
Status: COMPLETED | OUTPATIENT
Start: 2018-02-26 | End: 2018-02-26

## 2018-02-26 RX ORDER — ACETAMINOPHEN 10 MG/ML
INJECTION, SOLUTION INTRAVENOUS
Status: COMPLETED
Start: 2018-02-26 | End: 2018-02-26

## 2018-02-26 RX ORDER — FENTANYL CITRATE 50 UG/ML
25 INJECTION, SOLUTION INTRAMUSCULAR; INTRAVENOUS EVERY 5 MIN PRN
Status: COMPLETED | OUTPATIENT
Start: 2018-02-26 | End: 2018-02-26

## 2018-02-26 RX ORDER — OXYCODONE HYDROCHLORIDE 5 MG/1
TABLET ORAL
Status: DISCONTINUED
Start: 2018-02-26 | End: 2018-02-26 | Stop reason: HOSPADM

## 2018-02-26 RX ORDER — EPINEPHRINE 1 MG/ML
INJECTION INTRAMUSCULAR; INTRAVENOUS; SUBCUTANEOUS
Status: DISCONTINUED | OUTPATIENT
Start: 2018-02-26 | End: 2018-02-26 | Stop reason: HOSPADM

## 2018-02-26 RX ORDER — CLINDAMYCIN PHOSPHATE 900 MG/50ML
900 INJECTION, SOLUTION INTRAVENOUS
Status: COMPLETED | OUTPATIENT
Start: 2018-02-26 | End: 2018-02-26

## 2018-02-26 RX ORDER — ONDANSETRON 2 MG/ML
INJECTION INTRAMUSCULAR; INTRAVENOUS
Status: DISCONTINUED | OUTPATIENT
Start: 2018-02-26 | End: 2018-02-26

## 2018-02-26 RX ORDER — ACETAMINOPHEN 10 MG/ML
1000 INJECTION, SOLUTION INTRAVENOUS EVERY 8 HOURS
Status: COMPLETED | OUTPATIENT
Start: 2018-02-26 | End: 2018-02-26

## 2018-02-26 RX ADMIN — DIPHENHYDRAMINE HYDROCHLORIDE 25 MG: 50 INJECTION, SOLUTION INTRAMUSCULAR; INTRAVENOUS at 01:02

## 2018-02-26 RX ADMIN — FENTANYL CITRATE 25 MCG: 50 INJECTION, SOLUTION INTRAMUSCULAR; INTRAVENOUS at 01:02

## 2018-02-26 RX ADMIN — FENTANYL CITRATE 25 MCG: 50 INJECTION, SOLUTION INTRAMUSCULAR; INTRAVENOUS at 12:02

## 2018-02-26 RX ADMIN — LIDOCAINE HYDROCHLORIDE 37.5 ML: 10 INJECTION, SOLUTION EPIDURAL; INFILTRATION; INTRACAUDAL; PERINEURAL at 11:02

## 2018-02-26 RX ADMIN — OXYCODONE HYDROCHLORIDE 5 MG: 5 TABLET ORAL at 03:02

## 2018-02-26 RX ADMIN — INSULIN ASPART 4 UNITS: 100 INJECTION, SOLUTION INTRAVENOUS; SUBCUTANEOUS at 02:02

## 2018-02-26 RX ADMIN — FENTANYL CITRATE 25 MCG: 50 INJECTION, SOLUTION INTRAMUSCULAR; INTRAVENOUS at 11:02

## 2018-02-26 RX ADMIN — MIDAZOLAM HYDROCHLORIDE 2 MG: 1 INJECTION, SOLUTION INTRAMUSCULAR; INTRAVENOUS at 11:02

## 2018-02-26 RX ADMIN — SODIUM CHLORIDE: 0.9 INJECTION, SOLUTION INTRAVENOUS at 12:02

## 2018-02-26 RX ADMIN — ACETAMINOPHEN 1000 MG: 10 INJECTION, SOLUTION INTRAVENOUS at 02:02

## 2018-02-26 RX ADMIN — DIPHENHYDRAMINE HYDROCHLORIDE 25 MG: 50 INJECTION INTRAMUSCULAR; INTRAVENOUS at 01:02

## 2018-02-26 RX ADMIN — LIDOCAINE HYDROCHLORIDE 1 MG: 10 INJECTION, SOLUTION EPIDURAL; INFILTRATION; INTRACAUDAL; PERINEURAL at 09:02

## 2018-02-26 RX ADMIN — PROPOFOL 150 MG: 10 INJECTION, EMULSION INTRAVENOUS at 11:02

## 2018-02-26 RX ADMIN — EPINEPHRINE 1 MG: 1 INJECTION INTRAMUSCULAR; INTRAVENOUS; SUBCUTANEOUS at 11:02

## 2018-02-26 RX ADMIN — ONDANSETRON 4 MG: 2 INJECTION INTRAMUSCULAR; INTRAVENOUS at 11:02

## 2018-02-26 RX ADMIN — SODIUM BICARBONATE 10 MEQ: 84 INJECTION, SOLUTION INTRAVENOUS at 11:02

## 2018-02-26 RX ADMIN — LIDOCAINE HYDROCHLORIDE 100 MG: 20 INJECTION, SOLUTION INTRAVENOUS at 11:02

## 2018-02-26 RX ADMIN — FENTANYL CITRATE 50 MCG: 50 INJECTION, SOLUTION INTRAMUSCULAR; INTRAVENOUS at 11:02

## 2018-02-26 RX ADMIN — CLINDAMYCIN PHOSPHATE 900 MG: 18 INJECTION, SOLUTION INTRAVENOUS at 11:02

## 2018-02-26 RX ADMIN — FAMOTIDINE 20 MG: 10 INJECTION, SOLUTION INTRAVENOUS at 11:02

## 2018-02-26 RX ADMIN — PHENYLEPHRINE HYDROCHLORIDE 100 MCG: 10 INJECTION INTRAVENOUS at 11:02

## 2018-02-26 RX ADMIN — ONDANSETRON 4 MG: 2 INJECTION INTRAMUSCULAR; INTRAVENOUS at 01:02

## 2018-02-26 RX ADMIN — SODIUM CHLORIDE: 0.9 INJECTION, SOLUTION INTRAVENOUS at 09:02

## 2018-02-26 RX ADMIN — HYDROCODONE BITARTRATE AND ACETAMINOPHEN 1 TABLET: 5; 325 TABLET ORAL at 12:02

## 2018-02-26 RX ADMIN — FENTANYL CITRATE 50 MCG: 50 INJECTION, SOLUTION INTRAMUSCULAR; INTRAVENOUS at 12:02

## 2018-02-26 RX ADMIN — TRIAMCINOLONE ACETONIDE 40 MG: 40 INJECTION, SUSPENSION INTRA-ARTICULAR; INTRAMUSCULAR at 11:02

## 2018-02-26 NOTE — PLAN OF CARE
"Vss. sats 96% on room air.  Pt states "pain is tolerable to chest area."   Pt has incision agnieszka, dermabond and one suture noted to right breast area. No drainage.  nilda upper legs, surgical girdle in place.  Pt assisted to bathroom with pacu rn.  When girdle moved down legs, abd pads off.  Pt did not want to replace.  Girdle remains in place.  ble scds on.  Pt's boyfriend updated by pacu rn.   "

## 2018-02-26 NOTE — H&P
Negirta Castro presents for outpatient surgery for second stage breast reconstruction. We will perform free fat transfer to R breast.     Review of patient's allergies indicates:   Allergen Reactions    Adhesive tape-silicones Hives     BANDAIDS    Polymycin Hives    Latex, natural rubber Hives and Itching    Polyurethane-39 Hives     No current facility-administered medications on file prior to encounter.      Current Outpatient Prescriptions on File Prior to Encounter   Medication Sig Dispense Refill    ASCORBIC ACID/VITAMIN E/BIOTIN (HAIR, SKIN, NAILS WITH BIOTIN ORAL) Take 1 tablet by mouth every morning.       carisoprodol (SOMA) 350 MG tablet Take 1 tablet (350 mg total) by mouth 3 (three) times daily as needed. 90 tablet 0    diazepam (VALIUM) 10 MG tablet Take 10 mg by mouth 4 (four) times daily.       dronabinol (MARINOL) 2.5 MG capsule Take 1 capsule (2.5 mg total) by mouth 2 (two) times daily before meals. 30 capsule 1    enalapril (VASOTEC) 2.5 MG tablet Take 2.5 mg by mouth once daily. For diabetes  1    furosemide (LASIX) 20 MG tablet Take 20 mg by mouth once daily.       glimepiride (AMARYL) 4 MG tablet       hydrocodone-acetaminophen 10-325mg (NORCO)  mg Tab Take 1 tablet by mouth every 6 (six) hours as needed for Pain. 120 tablet 0    metformin (GLUCOPHAGE-XR) 500 MG 24 hr tablet       multivitamin capsule Take 1 capsule by mouth every morning.      pravastatin (PRAVACHOL) 40 MG tablet Take 40 mg by mouth every morning.   1    zolpidem (AMBIEN) 10 mg Tab Take 10 mg by mouth nightly as needed.  0     Patient Active Problem List   Diagnosis    Depression    Anxiety disorder due to known physiological condition    Dysthymic disorder    Neuropathy    Type 2 diabetes mellitus without complication, without long-term current use of insulin    Atypical chest pain    Breast cancer, right     Past Surgical History:   Procedure Laterality Date    BREAST BIOPSY      right     breast reconstruction with tummy [facility]      BREAST SURGERY      11-3-15 LUMPECTOMY, 12-2-15 REEXCISION    mastectomy 2016      shoulder surgery and wrist      BILAT  BONE SPUR    WRIST SURGERY      RIGHT     PHYSICAL EXAMINATION    Vitals:    02/26/18 0857   BP: (!) 164/79   Pulse: 64   Resp: 18   Temp: 97.9 °F (36.6 °C)     WD WN NAD  VSS   Normal resp effort  R Breast - incisions well healed, implant intact  L Breast - incisions well healed, nipple viable    ASSESSMENT/PLAN  57 y.o. F here of second stage breast reconstruction  - Informed consent obtained for free fat transfer to R breast  - Patient marked for surgery  - Proceed to OR

## 2018-02-26 NOTE — PROGRESS NOTES
"Dr rojas notified that pt with complaints of itching.  New orders received per telephone order read back.  And md also aware that bg 242.  Per pt, "last time they gave me something for my blood sugar because I don't take my metformin until night time."  md to place orders in computer.  Pt's boyfriend updated by dalia ponce rn.   "

## 2018-02-26 NOTE — TRANSFER OF CARE
"Anesthesia Transfer of Care Note    Patient: Negrita Castro    Procedure(s) Performed: Procedure(s) (LRB):  TRANSFER-FAT (Right)    Patient location: PACU    Anesthesia Type: general    Transport from OR: Transported from OR on 6-10 L/min O2 by face mask with adequate spontaneous ventilation    Post pain: adequate analgesia    Post assessment: no apparent anesthetic complications and tolerated procedure well    Post vital signs: stable    Level of consciousness: awake    Nausea/Vomiting: no nausea/vomiting    Complications: none    Transfer of care protocol was followed      Last vitals:   Visit Vitals  BP (!) 164/79 (BP Location: Left arm, Patient Position: Lying)   Pulse 64   Temp 36.6 °C (97.9 °F) (Oral)   Resp 18   Ht 5' 11" (1.803 m)   Wt 95.3 kg (210 lb)   LMP 01/16/2016   SpO2 100%   Breastfeeding? No   BMI 29.29 kg/m²     "

## 2018-02-26 NOTE — PROGRESS NOTES
Pt's boyfriend updated by pacu rn.  Notified pt to stay in pacu a little longer.  Verbalizes understanding. Notified will continue to update.

## 2018-02-26 NOTE — DISCHARGE INSTRUCTIONS
Discharge Instructions for Laparoscopic Cholecystectomy  You have had a procedure known as a laparoscopic cholecystectomy. A laparoscopic cholecystectomy is a procedure to remove your gallbladder. People who have this procedure usually recover more quickly and have less pain than with open gallbladder surgery (called open cholecystectomy). Many surgeons recommend a low-fat diet, avoiding fried food in particular, for the first month after surgery.   You can live a full and healthy life without your gallbladder. This includes eating the foods and doing the things you enjoyed before your gallbladder problems started.  Home care  Recommendations for home care include the following:   · Ask someone to drive you to your appointments for the next 3 days. Dont drive until you are no longer taking pain medicine and are able to step on the brake pedal without hesitation.   · Wash the skin around your incision daily with mild soap and water. It's OK to shower the day after your surgery.  · Eat your regular diet. It is wise to stay away from rich, greasy, or spicy food for a few days.  · Remember, it takes at least 1 week for you to get most of your strength and energy back.  · Make an office visit to talk to your healthcare provider if the following symptoms dont go away within a week after your surgery:  ¨ Fatigue  ¨ Pain around the incision  ¨ Diarrhea or constipation  ¨ Loss of appetite

## 2018-02-27 NOTE — ANESTHESIA POSTPROCEDURE EVALUATION
"Anesthesia Post Evaluation    Patient: Negrita Castro    Procedure(s) Performed: Procedure(s) (LRB):  TRANSFER-FAT (Right)    Final Anesthesia Type: general  Patient location during evaluation: PACU  Patient participation: Yes- Able to Participate  Level of consciousness: awake and alert  Post-procedure vital signs: reviewed and stable  Pain management: adequate  Airway patency: patent  PONV status at discharge: No PONV  Anesthetic complications: no      Cardiovascular status: blood pressure returned to baseline  Respiratory status: unassisted, spontaneous ventilation and room air  Hydration status: euvolemic  Follow-up not needed.        Visit Vitals  BP (!) 146/90   Pulse (!) 59   Temp 36.7 °C (98.1 °F) (Oral)   Resp 18   Ht 5' 11" (1.803 m)   Wt 95.3 kg (210 lb)   LMP 01/16/2016   SpO2 97%   Breastfeeding? No   BMI 29.29 kg/m²       Pain/Larissa Score: Pain Assessment Performed: Yes (2/26/2018  3:34 PM)  Presence of Pain: complains of pain/discomfort (2/26/2018  2:10 PM)  Pain Rating Prior to Med Admin: 5 (2/26/2018  3:26 PM)  Pain Rating Post Med Admin: 5 (2/26/2018  2:27 PM)  Larissa Score: 10 (2/26/2018  3:34 PM)      "

## 2018-02-27 NOTE — OP NOTE
DATE OF PROCEDURE:  02/26/2018    PREOPERATIVE DIAGNOSIS:  History of breast cancer.    POSTOPERATIVE DIAGNOSIS:  History of breast cancer.    PROCEDURE PERFORMED:  Free fat transfer to the right superior and medial breast.    SURGEON:  Xiang Hernandez M.D., [facility]    ANESTHESIA:  General.    BLOOD LOSS:  Minimal.    DESCRIPTION OF PROCEDURE:  The patient was evaluated in the preoperative holding   area and the area to be fat grafted was then marked.  The patient had a large   depression in the medial and superior aspect of the left breast.  Next, she was   taken to the Operating Room, placed in the supine position after adequate   general endotracheal anesthesia, was prepped and draped in normal sterile   fashion.  The tumescent fluid was instilled into both thighs.  The liposuction   then proceeded.  The fat was washed in the Revolve system.  Loaded into   syringes.  Approximately 150 mL of fat were instilled into the defect.  No   further fat could be instilled.  Incisions were closed using 6-0 nylon sutures.    A compression garment was placed on the lower extremities.  There were no   complications with this procedure.      REI/DARIA  dd: 02/27/2018 10:44:23 (CST)  td: 02/27/2018 12:50:01 (CST)  Doc ID   #[id number]  Job ID #[id number]    CC:

## 2018-02-27 NOTE — DISCHARGE SUMMARY
[facility]  Brief Operative Note     SUMMARY     Surgery Date: 2/26/2018     Surgeon(s) and Role:     * Xiang Hernandez MD - Primary     * Ken Valdivia MD - Fellow    Assisting Surgeon: None    Pre-op Diagnosis:  Personal history of breast cancer [Z85.3]    Post-op Diagnosis:  Post-Op Diagnosis Codes:     * Personal history of breast cancer [Z85.3]    Procedure(s) (LRB):  TRANSFER-FAT (Right)    Anesthesia: General    Description of the findings of the procedure: fat grafting to R breast    Findings/Key Components: superior pole lack of volume    Estimated Blood Loss: * No values recorded between 2/26/2018 11:43 AM and 2/26/2018 12:35 PM *         Specimens:   Specimen (12h ago through future)    None          Discharge Note    SUMMARY     Admit Date: 2/26/2018    Discharge Date and Time:  02/27/2018 8:23 AM    Hospital Course (synopsis of major diagnoses, care, treatment, and services provided during the course of the hospital stay): pt admitted for surgery and tolerated well.  She was discharged to home.      Final Diagnosis: Post-Op Diagnosis Codes:     * Personal history of breast cancer [Z85.3]    Disposition: Home or Self Care    Follow Up/Patient Instructions:     Medications:  Reconciled Home Medications:   Discharge Medication List as of 2/26/2018  3:30 PM      START taking these medications    Details   clindamycin (CLEOCIN) 300 MG capsule Take 1 capsule (300 mg total) by mouth every 6 (six) hours., Starting Mon 2/26/2018, Print      oxyCODONE-acetaminophen (PERCOCET)  mg per tablet Take 1 tablet by mouth every 4 (four) hours as needed for Pain., Starting Mon 2/26/2018, Print         CONTINUE these medications which have NOT CHANGED    Details   INSULIN GLARGINE,HUM.REC.ANLOG (BASAGLAR KWIKPEN SUBQ) Inject 10 Units into the skin., Historical Med      ASCORBIC ACID/VITAMIN E/BIOTIN (HAIR, SKIN, NAILS WITH BIOTIN ORAL) Take 1 tablet by mouth every morning. , Until  Discontinued, Historical Med      carisoprodol (SOMA) 350 MG tablet Take 1 tablet (350 mg total) by mouth 3 (three) times daily as needed., Starting Tue 6/6/2017, Print      diazepam (VALIUM) 10 MG tablet Take 10 mg by mouth 4 (four) times daily. , Historical Med      dronabinol (MARINOL) 2.5 MG capsule Take 1 capsule (2.5 mg total) by mouth 2 (two) times daily before meals., Starting Wed 5/24/2017, Print      enalapril (VASOTEC) 2.5 MG tablet Take 2.5 mg by mouth once daily. For diabetes, Starting Fri 8/28/2015, Historical Med      furosemide (LASIX) 20 MG tablet Take 20 mg by mouth once daily. , Historical Med      glimepiride (AMARYL) 4 MG tablet Starting Sat 6/17/2017, Historical Med      metformin (GLUCOPHAGE-XR) 500 MG 24 hr tablet Starting Sat 6/17/2017, Historical Med      multivitamin capsule Take 1 capsule by mouth every morning., Until Discontinued, Historical Med      pravastatin (PRAVACHOL) 40 MG tablet Take 40 mg by mouth every morning. , Starting 8/28/2015, Until Discontinued, Historical Med      zolpidem (AMBIEN) 10 mg Tab Take 10 mg by mouth nightly as needed., Starting 8/15/2016, Until Discontinued, Historical Med         STOP taking these medications       hydrocodone-acetaminophen 10-325mg (NORCO)  mg Tab Comments:   Reason for Stopping:               Discharge Procedure Orders  Diet general     Diet general     Activity as tolerated     Call MD for:  temperature >100.4     Call MD for:  persistent nausea and vomiting     Call MD for:  severe uncontrolled pain     Call MD for:  difficulty breathing, headache or visual disturbances     Call MD for:  redness, tenderness, or signs of infection (pain, swelling, redness, odor or green/yellow discharge around incision site)     Call MD for:  hives     Call MD for:  persistent dizziness or light-headedness     Call MD for:  extreme fatigue       Follow-up Information     Xiang Hernandez MD In 1 week.    Specialty:  Plastic Surgery  Contact  information:  [address]  [facility] [address]  [phone]

## 2018-03-05 ENCOUNTER — OFFICE VISIT (OUTPATIENT)
Dept: HEMATOLOGY/ONCOLOGY | Facility: CLINIC | Age: 58
End: 2018-03-05
Payer: MEDICARE

## 2018-03-05 VITALS
WEIGHT: 207 LBS | HEIGHT: 71 IN | SYSTOLIC BLOOD PRESSURE: 139 MMHG | TEMPERATURE: 98 F | BODY MASS INDEX: 28.98 KG/M2 | RESPIRATION RATE: 18 BRPM | DIASTOLIC BLOOD PRESSURE: 83 MMHG | HEART RATE: 108 BPM

## 2018-03-05 DIAGNOSIS — C50.011 MALIGNANT NEOPLASM OF NIPPLE OF RIGHT BREAST IN FEMALE, ESTROGEN RECEPTOR POSITIVE: Primary | ICD-10-CM

## 2018-03-05 DIAGNOSIS — Z17.0 MALIGNANT NEOPLASM OF NIPPLE OF RIGHT BREAST IN FEMALE, ESTROGEN RECEPTOR POSITIVE: Primary | ICD-10-CM

## 2018-03-05 PROCEDURE — 99214 OFFICE O/P EST MOD 30 MIN: CPT | Mod: ,,, | Performed by: INTERNAL MEDICINE

## 2018-03-05 NOTE — LETTER
March 5, 2018      Yoni Renteria MD  [address]  [place] [address]           [facility]  [address]  [address]  [place] [address]  Phone: [phone]  Fax: [phone]          Patient: Negrita Castro   MR Number: [id number]   YOB: 1960   Date of Visit: 3/5/2018       Dear Dr. Yoni Renteria:    Thank you for referring Negrita Castro to me for evaluation. Attached you will find relevant portions of my assessment and plan of care.    If you have questions, please do not hesitate to call me. I look forward to following Negrita Castro along with you.    Sincerely,    TALISHA Vick MD    Enclosure  CC:  No Recipients    If you would like to receive this communication electronically, please contact [email or web address] or [phone] to request more information on [employer] access.    For providers and/or their staff who would like to refer a patient to [facility], please contact us through our one-stop-shop provider referral line, [facility], at [phone].    If you feel you have received this communication in error or would no longer like to receive these types of communications, please e-mail [email or web address]

## 2018-03-09 ENCOUNTER — OFFICE VISIT (OUTPATIENT)
Dept: PLASTIC SURGERY | Facility: CLINIC | Age: 58
End: 2018-03-09
Payer: MEDICARE

## 2018-03-09 VITALS
BODY MASS INDEX: 28.98 KG/M2 | DIASTOLIC BLOOD PRESSURE: 90 MMHG | HEART RATE: 105 BPM | TEMPERATURE: 98 F | WEIGHT: 207 LBS | HEIGHT: 71 IN | SYSTOLIC BLOOD PRESSURE: 175 MMHG

## 2018-03-09 DIAGNOSIS — Z09 SURGERY FOLLOW-UP EXAMINATION: Primary | ICD-10-CM

## 2018-03-09 PROCEDURE — 99999 PR PBB SHADOW E&M-EST. PATIENT-LVL III: CPT | Mod: PBBFAC,,, | Performed by: SURGERY

## 2018-03-09 PROCEDURE — 99024 POSTOP FOLLOW-UP VISIT: CPT | Mod: S$GLB,,, | Performed by: SURGERY

## 2018-03-09 RX ORDER — CIPROFLOXACIN 500 MG/1
500 TABLET ORAL EVERY 12 HOURS
Qty: 20 TABLET | Refills: 0 | Status: SHIPPED | OUTPATIENT
Start: 2018-03-09 | End: 2018-03-16 | Stop reason: SDUPTHER

## 2018-03-09 RX ORDER — SULFAMETHOXAZOLE AND TRIMETHOPRIM 800; 160 MG/1; MG/1
1 TABLET ORAL 2 TIMES DAILY
Qty: 20 TABLET | Refills: 0 | Status: SHIPPED | OUTPATIENT
Start: 2018-03-09 | End: 2018-03-16 | Stop reason: SDUPTHER

## 2018-03-09 NOTE — PROGRESS NOTES
Ms. Castro presents to [facility] after having bilateral HEMANTH flap.    She most recently had free fat injected into the right breast.  She presents   today.  She has got two areas of erythema.  This is where we put the majority of   the fat.  We will go ahead and treat her with Bactrim as well as the Cipro.    She may indeed develop an abscess in this area.  She is going to call the office   early Monday morning if she does not get better in 48 hours.      REI/DARIA  dd: 03/09/2018 11:43:38 (CST)  td: 03/10/2018 01:25:59 (CST)  Doc ID   #[id number]  Job ID #[id number]    CC:

## 2018-03-12 ENCOUNTER — TELEPHONE (OUTPATIENT)
Dept: PLASTIC SURGERY | Facility: CLINIC | Age: 58
End: 2018-03-12

## 2018-03-12 ENCOUNTER — OFFICE VISIT (OUTPATIENT)
Dept: PLASTIC SURGERY | Facility: CLINIC | Age: 58
End: 2018-03-12
Payer: MEDICARE

## 2018-03-12 VITALS
WEIGHT: 202 LBS | BODY MASS INDEX: 28.28 KG/M2 | HEIGHT: 71 IN | DIASTOLIC BLOOD PRESSURE: 78 MMHG | SYSTOLIC BLOOD PRESSURE: 150 MMHG | HEART RATE: 88 BPM | TEMPERATURE: 98 F

## 2018-03-12 DIAGNOSIS — L03.90 CELLULITIS, UNSPECIFIED CELLULITIS SITE: Primary | ICD-10-CM

## 2018-03-12 PROCEDURE — 99024 POSTOP FOLLOW-UP VISIT: CPT | Mod: S$GLB,,, | Performed by: SURGERY

## 2018-03-12 PROCEDURE — 99999 PR PBB SHADOW E&M-EST. PATIENT-LVL IV: CPT | Mod: PBBFAC,,, | Performed by: SURGERY

## 2018-03-12 PROCEDURE — 87070 CULTURE OTHR SPECIMN AEROBIC: CPT

## 2018-03-12 RX ORDER — FLUCONAZOLE 150 MG/1
150 TABLET ORAL DAILY
Refills: 0 | COMMUNITY
Start: 2018-03-09 | End: 2018-07-09

## 2018-03-12 NOTE — TELEPHONE ENCOUNTER
----- Message from Tonie Latif sent at 3/12/2018  8:10 AM CDT -----  Contact: Pt.Self   Pt. States she was told to call nurse MsGloriaCarmen back if her infection still not going away from last Friday 03/09/18 Pt. Is in a lot of pain and would like to know what she should do please call Pt. Back @ [phone] Thank You :)

## 2018-03-13 ENCOUNTER — OFFICE VISIT (OUTPATIENT)
Dept: PLASTIC SURGERY | Facility: CLINIC | Age: 58
End: 2018-03-13
Payer: MEDICARE

## 2018-03-13 VITALS
TEMPERATURE: 98 F | WEIGHT: 201.94 LBS | SYSTOLIC BLOOD PRESSURE: 163 MMHG | DIASTOLIC BLOOD PRESSURE: 84 MMHG | BODY MASS INDEX: 28.27 KG/M2 | HEART RATE: 92 BPM | HEIGHT: 71 IN

## 2018-03-13 DIAGNOSIS — Z09 SURGERY FOLLOW-UP EXAMINATION: Primary | ICD-10-CM

## 2018-03-13 PROCEDURE — 99999 PR PBB SHADOW E&M-EST. PATIENT-LVL IV: CPT | Mod: PBBFAC,,, | Performed by: PHYSICIAN ASSISTANT

## 2018-03-13 PROCEDURE — 99024 POSTOP FOLLOW-UP VISIT: CPT | Mod: S$GLB,,, | Performed by: PHYSICIAN ASSISTANT

## 2018-03-13 NOTE — PROGRESS NOTES
Negrita Castro presents to [facility] on 3/13/2018 for a follow up visit, 1 day status post I and D of R chest abscess status post free fat transfer for breast reconstruction    Review of patient's allergies indicates:   Allergen Reactions    Adhesive tape-silicones Hives     BANDAIDS    Polymycin Hives    Latex, natural rubber Hives and Itching    Polyurethane-39 Hives     Current Outpatient Prescriptions on File Prior to Visit   Medication Sig Dispense Refill    ASCORBIC ACID/VITAMIN E/BIOTIN (HAIR, SKIN, NAILS WITH BIOTIN ORAL) Take 1 tablet by mouth every morning.       carisoprodol (SOMA) 350 MG tablet Take 1 tablet (350 mg total) by mouth 3 (three) times daily as needed. 90 tablet 0    ciprofloxacin HCl (CIPRO) 500 MG tablet Take 1 tablet (500 mg total) by mouth every 12 (twelve) hours. 20 tablet 0    clindamycin (CLEOCIN) 300 MG capsule Take 1 capsule (300 mg total) by mouth every 6 (six) hours. 30 capsule 0    diazepam (VALIUM) 10 MG tablet Take 10 mg by mouth 4 (four) times daily.       dronabinol (MARINOL) 2.5 MG capsule Take 1 capsule (2.5 mg total) by mouth 2 (two) times daily before meals. 30 capsule 1    enalapril (VASOTEC) 2.5 MG tablet Take 2.5 mg by mouth once daily. For diabetes  1    fluconazole (DIFLUCAN) 150 MG Tab Take 150 mg by mouth once daily.  0    furosemide (LASIX) 20 MG tablet Take 20 mg by mouth once daily.       glimepiride (AMARYL) 4 MG tablet       INSULIN GLARGINE,HUM.REC.ANLOG (BASAGLAR KWIKPEN SUBQ) Inject 10 Units into the skin.      metformin (GLUCOPHAGE-XR) 500 MG 24 hr tablet       multivitamin capsule Take 1 capsule by mouth every morning.      oxyCODONE-acetaminophen (PERCOCET)  mg per tablet Take 1 tablet by mouth every 4 (four) hours as needed for Pain. 28 tablet 0    pravastatin (PRAVACHOL) 40 MG tablet Take 40 mg by mouth every morning.   1    sulfamethoxazole-trimethoprim 800-160mg (BACTRIM DS) 800-160 mg Tab Take 1 tablet by  mouth 2 (two) times daily. 20 tablet 0    zolpidem (AMBIEN) 10 mg Tab Take 10 mg by mouth nightly as needed.  0     No current facility-administered medications on file prior to visit.      Patient Active Problem List   Diagnosis    Depression    Anxiety disorder due to known physiological condition    Dysthymic disorder    Neuropathy    Type 2 diabetes mellitus without complication, without long-term current use of insulin    Atypical chest pain    Breast cancer, right    Breast cancer     Past Surgical History:   Procedure Laterality Date    BREAST BIOPSY      right    breast reconstruction with tummy tuck      BREAST SURGERY      11-3-15 LUMPECTOMY, 12-2-15 REEXCISION    mastectomy 2016      shoulder surgery and wrist      BILAT  BONE SPUR    WRIST SURGERY      RIGHT     PHYICAL EXAMINATION  Vitals:    03/13/18 1026   BP: (!) 163/84   Pulse: 92   Temp: 97.8 °F (36.6 °C)     WD WN NAD  VSS  Normal resp effort  R chest - erythema improved, packing in place    ASSESSMENT/PLAN  57 y.o. F s/p I and D of R chest  - Doing well, pain improved  - Packing removed and replaced  - Continue PO Bactrim and Cipro  - Advised to remove packing and cover with clean dressing daily  - RTC Friday, appt scheduled    All questions were answered. The patient was advised to call the clinic with any questions or concerns prior to their next visit.

## 2018-03-13 NOTE — PROGRESS NOTES
SUBJECTIVE:  Ms. Castro presents to the Plastic Surgery Clinic for evaluation   of her right breast.  She had recently undergone fat grafting to the right   breast.  She has a reddened area in the superior pole.  There was some   fluctuance there.  A syringe was used to aspirate it and there was purulence   there.  Thus, after signing the informed consent, the patient was prepped and   draped in the normal sterile fashion.  Using a #11 blade, the ____ was incised.    There was purulence that was drained.  It was irrigated.  It was lightly   packed.  She is on antibiotics.  We will see her tomorrow.      REI/DARIA  dd: 03/13/2018 11:37:39 (CDT)  td: 03/13/2018 18:58:45 (CDT)  Doc ID   #[id number]  Job ID #[id number]    CC:

## 2018-03-14 LAB — BACTERIA SPEC AEROBE CULT: NORMAL

## 2018-03-16 ENCOUNTER — OFFICE VISIT (OUTPATIENT)
Dept: PLASTIC SURGERY | Facility: CLINIC | Age: 58
End: 2018-03-16
Payer: MEDICARE

## 2018-03-16 VITALS
HEIGHT: 71 IN | BODY MASS INDEX: 28.14 KG/M2 | HEART RATE: 81 BPM | TEMPERATURE: 98 F | DIASTOLIC BLOOD PRESSURE: 79 MMHG | WEIGHT: 201 LBS | SYSTOLIC BLOOD PRESSURE: 126 MMHG

## 2018-03-16 DIAGNOSIS — Z09 SURGERY FOLLOW-UP EXAMINATION: Primary | ICD-10-CM

## 2018-03-16 PROCEDURE — 99999 PR PBB SHADOW E&M-EST. PATIENT-LVL III: CPT | Mod: PBBFAC,,, | Performed by: SURGERY

## 2018-03-16 PROCEDURE — 99024 POSTOP FOLLOW-UP VISIT: CPT | Mod: S$GLB,,, | Performed by: SURGERY

## 2018-03-16 RX ORDER — SULFAMETHOXAZOLE AND TRIMETHOPRIM 800; 160 MG/1; MG/1
1 TABLET ORAL 2 TIMES DAILY
Qty: 20 TABLET | Refills: 0 | Status: SHIPPED | OUTPATIENT
Start: 2018-03-16 | End: 2018-03-26

## 2018-03-16 RX ORDER — CIPROFLOXACIN 500 MG/1
500 TABLET ORAL EVERY 12 HOURS
Qty: 20 TABLET | Refills: 0 | Status: SHIPPED | OUTPATIENT
Start: 2018-03-16 | End: 2018-03-26

## 2018-03-16 RX ORDER — CIPROFLOXACIN 500 MG/1
500 TABLET ORAL EVERY 12 HOURS
Qty: 20 TABLET | Refills: 0 | Status: CANCELLED | OUTPATIENT
Start: 2018-03-16 | End: 2018-03-26

## 2018-03-16 RX ORDER — SULFAMETHOXAZOLE AND TRIMETHOPRIM 800; 160 MG/1; MG/1
1 TABLET ORAL 2 TIMES DAILY
Qty: 20 TABLET | Refills: 0 | Status: SHIPPED | OUTPATIENT
Start: 2018-03-16 | End: 2018-07-09

## 2018-03-20 DIAGNOSIS — G47.01 INSOMNIA DUE TO MEDICAL CONDITION: Primary | ICD-10-CM

## 2018-03-22 ENCOUNTER — TELEPHONE (OUTPATIENT)
Dept: HEMATOLOGY/ONCOLOGY | Facility: CLINIC | Age: 58
End: 2018-03-22

## 2018-03-22 RX ORDER — ZOLPIDEM TARTRATE 10 MG/1
TABLET ORAL
Qty: 30 TABLET | Refills: 2 | OUTPATIENT
Start: 2018-03-22

## 2018-03-22 NOTE — TELEPHONE ENCOUNTER
Called Rx for Ambien 1omg #30 #1 po Qhs prn sleep #2 refills. Called to [facility] pharmacy.

## 2018-04-06 ENCOUNTER — OFFICE VISIT (OUTPATIENT)
Dept: PLASTIC SURGERY | Facility: CLINIC | Age: 58
End: 2018-04-06
Payer: MEDICARE

## 2018-04-06 VITALS
WEIGHT: 207 LBS | SYSTOLIC BLOOD PRESSURE: 155 MMHG | BODY MASS INDEX: 28.98 KG/M2 | TEMPERATURE: 99 F | HEART RATE: 71 BPM | HEIGHT: 71 IN | DIASTOLIC BLOOD PRESSURE: 80 MMHG

## 2018-04-06 DIAGNOSIS — Z09 SURGERY FOLLOW-UP EXAMINATION: Primary | ICD-10-CM

## 2018-04-06 PROCEDURE — 99024 POSTOP FOLLOW-UP VISIT: CPT | Mod: S$GLB,,, | Performed by: PHYSICIAN ASSISTANT

## 2018-04-06 PROCEDURE — 99999 PR PBB SHADOW E&M-EST. PATIENT-LVL III: CPT | Mod: PBBFAC,,, | Performed by: PHYSICIAN ASSISTANT

## 2018-04-06 NOTE — PROGRESS NOTES
Negrita Castro presents to [facility] on 4/6/2018 for a follow up visit status post breast reconstruction. She is doing well today with no issues since her last visit     Review of patient's allergies indicates:   Allergen Reactions    Adhesive tape-silicones Hives     BANDAIDS    Polymycin Hives    Latex, natural rubber Hives and Itching    Polyurethane-39 Hives     Current Outpatient Prescriptions on File Prior to Visit   Medication Sig Dispense Refill    ASCORBIC ACID/VITAMIN E/BIOTIN (HAIR, SKIN, NAILS WITH BIOTIN ORAL) Take 1 tablet by mouth every morning.       carisoprodol (SOMA) 350 MG tablet Take 1 tablet (350 mg total) by mouth 3 (three) times daily as needed. 90 tablet 0    clindamycin (CLEOCIN) 300 MG capsule Take 1 capsule (300 mg total) by mouth every 6 (six) hours. 30 capsule 0    diazepam (VALIUM) 10 MG tablet Take 10 mg by mouth 4 (four) times daily.       dronabinol (MARINOL) 2.5 MG capsule Take 1 capsule (2.5 mg total) by mouth 2 (two) times daily before meals. 30 capsule 1    enalapril (VASOTEC) 2.5 MG tablet Take 2.5 mg by mouth once daily. For diabetes  1    fluconazole (DIFLUCAN) 150 MG Tab Take 150 mg by mouth once daily.  0    furosemide (LASIX) 20 MG tablet Take 20 mg by mouth once daily.       glimepiride (AMARYL) 4 MG tablet       INSULIN GLARGINE,HUM.REC.ANLOG (BASAGLAR KWIKPEN SUBQ) Inject 10 Units into the skin.      metformin (GLUCOPHAGE-XR) 500 MG 24 hr tablet       multivitamin capsule Take 1 capsule by mouth every morning.      oxyCODONE-acetaminophen (PERCOCET)  mg per tablet Take 1 tablet by mouth every 4 (four) hours as needed for Pain. 28 tablet 0    pravastatin (PRAVACHOL) 40 MG tablet Take 40 mg by mouth every morning.   1    sulfamethoxazole-trimethoprim 800-160mg (BACTRIM DS) 800-160 mg Tab Take 1 tablet by mouth 2 (two) times daily. 20 tablet 0    zolpidem (AMBIEN) 10 mg Tab Take 10 mg by mouth nightly as needed.  0     No current  facility-administered medications on file prior to visit.      Patient Active Problem List   Diagnosis    Depression    Anxiety disorder due to known physiological condition    Dysthymic disorder    Neuropathy    Type 2 diabetes mellitus without complication, without long-term current use of insulin    Atypical chest pain    Breast cancer, right    Breast cancer     Past Surgical History:   Procedure Laterality Date    BREAST BIOPSY      right    breast reconstruction with tummy tuck      BREAST SURGERY      11-3-15 LUMPECTOMY, 12-2-15 REEXCISION    mastectomy 2016      shoulder surgery and wrist      BILAT  BONE SPUR    WRIST SURGERY      RIGHT     PHYSICAL EXAMINATION  WD WN NAD  VSS  Normal resp effort  R Breast - flap viable, incision CDI, erythema resolved (superior chest)  L Breast - incision CDI, no erythema, nipple viable      ASSESSMENT/PLAN  57 y.o. F s/p Breast reconstruction  - Doing well, no issues  - Will RTC in 6 weeks to discuss R nipple recon    All questions were answered. The patient was advised to call the clinic with any questions or concerns prior to their next visit.

## 2018-04-11 ENCOUNTER — TELEPHONE (OUTPATIENT)
Dept: HEMATOLOGY/ONCOLOGY | Facility: CLINIC | Age: 58
End: 2018-04-11

## 2018-04-11 NOTE — TELEPHONE ENCOUNTER
----- Message from Malena Lopez sent at 4/11/2018 11:51 AM CDT -----  Pt called in asked to check the status of the prior auth for the marinol pt said she ran out today.  Call back [phone]

## 2018-04-16 ENCOUNTER — TELEPHONE (OUTPATIENT)
Dept: HEMATOLOGY/ONCOLOGY | Facility: CLINIC | Age: 58
End: 2018-04-16

## 2018-05-06 ENCOUNTER — HOSPITAL ENCOUNTER (EMERGENCY)
Facility: HOSPITAL | Age: 58
Discharge: HOME | End: 2018-05-06
Attending: EMERGENCY MEDICINE
Payer: MEDICARE

## 2018-05-06 DIAGNOSIS — H10.33 ACUTE CONJUNCTIVITIS OF BOTH EYES, UNSPECIFIED ACUTE CONJUNCTIVITIS TYPE: Primary | ICD-10-CM

## 2018-05-06 PROCEDURE — 99283 EMERGENCY DEPT VISIT LOW MDM: CPT

## 2018-05-06 PROCEDURE — 25000003 PHARM REV CODE 250: Performed by: EMERGENCY MEDICINE

## 2018-05-06 RX ORDER — NAPROXEN 500 MG/1
500 TABLET ORAL 2 TIMES DAILY WITH MEALS
Qty: 30 TABLET | Refills: 0 | Status: SHIPPED | OUTPATIENT
Start: 2018-05-06 | End: 2018-05-21

## 2018-05-06 RX ORDER — NAPROXEN 250 MG/1
500 TABLET ORAL
Status: COMPLETED | OUTPATIENT
Start: 2018-05-06 | End: 2018-05-06

## 2018-05-06 RX ORDER — SULFACETAMIDE SODIUM 100 MG/ML
2 SOLUTION/ DROPS OPHTHALMIC ONCE
Status: COMPLETED | OUTPATIENT
Start: 2018-05-06 | End: 2018-05-06

## 2018-05-06 RX ADMIN — NAPROXEN 500 MG: 250 TABLET ORAL at 06:05

## 2018-05-06 RX ADMIN — SULFACETAMIDE SODIUM 2 DROP: 100 SOLUTION/ DROPS OPHTHALMIC at 06:05

## 2018-05-07 NOTE — ED PROVIDER NOTES
Encounter Date: 5/6/2018       History     Chief Complaint   Patient presents with    Conjunctivitis     Began yesterday      This patient is a 57-year-old female presenting to the emergency department with complaints of right eye redness, drainage, and photosensitivity that has been present for the past 24 hr.  She reports she has been around her young grandchildren who have had similar symptoms associated with conjunctivitis within 1-2 days prior to onset of symptoms.  She denies associated past history of glaucoma, fever, swelling around the eye.  She denies taking anything for symptoms prior to arrival.  She denies she is a contact lens wearer.          Review of patient's allergies indicates:   Allergen Reactions    Adhesive tape-silicones Hives     BANDAIDS    Polymycin Hives    Latex, natural rubber Hives and Itching    Polyurethane-39 Hives     Past Medical History:   Diagnosis Date    Breast cancer     Cancer     RIGHT BREAST 10-15    Depression     Diabetes mellitus     Neuropathy     Panic attacks     S/P epidural steroid injection     Seizures     none since early 30's    Wears glasses     TO DRIVE     Past Surgical History:   Procedure Laterality Date    BREAST BIOPSY      right    breast reconstruction with tummy tuck      BREAST SURGERY      11-3-15 LUMPECTOMY, 12-2-15 REEXCISION    mastectomy 2016      shoulder surgery and wrist      BILAT  BONE SPUR    WRIST SURGERY      RIGHT     Family History   Problem Relation Age of Onset    Anesthesia problems Neg Hx      Social History   Substance Use Topics    Smoking status: Former Smoker     Packs/day: 0.25     Years: 30.00     Quit date: 9/28/2015    Smokeless tobacco: Never Used    Alcohol use 0.0 oz/week      Comment: RARELY     Review of Systems   Constitutional: Negative for fever.   HENT: Negative for congestion.    Eyes: Positive for discharge.   Respiratory: Negative for shortness of breath.    Skin: Negative for rash.    Hematological: Negative for adenopathy.       Physical Exam     Initial Vitals   BP Pulse Resp Temp SpO2   -- -- -- -- --      MAP       --         Physical Exam    Nursing note and vitals reviewed.  Constitutional: She appears well-nourished. No distress.   HENT:   Head: Normocephalic and atraumatic.   Eyes: EOM are normal. Right eye exhibits discharge.   Conjunctival injection with limbic sparing, scant clear drainage, cornea is not hazy, baseline visual acuity   Neck: Normal range of motion. Neck supple.   Pulmonary/Chest: No respiratory distress.   Musculoskeletal: Normal range of motion.   Skin: Skin is warm and dry. Capillary refill takes less than 2 seconds. No rash noted.   Psychiatric: She has a normal mood and affect. Thought content normal.         ED Course   Procedures  Labs Reviewed - No data to display          Medical Decision Making:   ED Management:  Patient's history and physical examination consistent with progressing conjunctivitis.  I doubt occult underlying ocular pathology, including uveitis, iritis, glaucoma.  She will be asked to take oral anti-inflammatory medications and will be provided antibiotic drops and she is asked to follow up with her primary care doctor or an ophthalmologist or optometrist as soon as possible regarding expected improvement.  She is asked to return to the ER for any new, concerning, or worsening symptoms.  The patient and  were agreeable with this plan for follow-up and she was discharged in stable condition.                      Clinical Impression:   The encounter diagnosis was Acute conjunctivitis of both eyes, unspecified acute conjunctivitis type.    Disposition:   Disposition: Discharged  Condition: Stable                        Cade Moctezuma MD  05/07/18 0658

## 2018-05-08 ENCOUNTER — TELEPHONE (OUTPATIENT)
Dept: HEMATOLOGY/ONCOLOGY | Facility: CLINIC | Age: 58
End: 2018-05-08

## 2018-05-08 NOTE — TELEPHONE ENCOUNTER
"----- Message from Nelia Sutherland sent at 5/8/2018  9:17 AM CDT -----  Contact: kat came in office  Patient is asking for another appeal for her Marinol. She stated "It helps with everything going on" she paid out of pocket for it this month. Please advise.   "

## 2018-05-11 ENCOUNTER — TELEPHONE (OUTPATIENT)
Dept: HEMATOLOGY/ONCOLOGY | Facility: CLINIC | Age: 58
End: 2018-05-11

## 2018-05-11 NOTE — TELEPHONE ENCOUNTER
Prescription for fluconozole 150mg x 1 dose [facility] per patient's [facility]. Patient voiced understanding.

## 2018-05-17 RX ORDER — FLUCONAZOLE 150 MG/1
150 TABLET ORAL DAILY
OUTPATIENT
Start: 2018-05-17 | End: 2018-05-18

## 2018-05-18 ENCOUNTER — OFFICE VISIT (OUTPATIENT)
Dept: PLASTIC SURGERY | Facility: CLINIC | Age: 58
End: 2018-05-18
Payer: MEDICARE

## 2018-05-18 VITALS — WEIGHT: 207 LBS | BODY MASS INDEX: 28.98 KG/M2 | HEIGHT: 71 IN

## 2018-05-18 DIAGNOSIS — Z09 SURGERY FOLLOW-UP EXAMINATION: Primary | ICD-10-CM

## 2018-05-18 PROCEDURE — 99999 PR PBB SHADOW E&M-EST. PATIENT-LVL II: CPT | Mod: PBBFAC,,, | Performed by: SURGERY

## 2018-05-18 PROCEDURE — 99024 POSTOP FOLLOW-UP VISIT: CPT | Mod: S$GLB,,, | Performed by: SURGERY

## 2018-05-18 NOTE — PROGRESS NOTES
HISTORY OF PRESENT ILLNESS:  Ms. Castro presents to Plastic Surgery Clinic in   followup of her breast reconstruction.  She had a HEMANTH flap on the right side   and had some loss of tissue.  She had some fat grafting and developed an abscess   there, so she does have a depression in the upper medial quadrant of the right   breast.  I do not think we should do anything with it at that time.  The breast   actually looks really good.  The inframammary folds are symmetrical.  The volume   is symmetrical.  She just needs a little volume in that upper pole medially.  I   do not think we should do anything with that at this time.  She has just   overcome a bout of shingles.  She states it was in her eye.  She does need a   nipple reconstruction.  I think we should probably wait until about November to   do this.      REI/DARIA  dd: 05/18/2018 10:02:23 (CDT)  td: 05/19/2018 06:21:55 (CDT)  Doc ID   #[id number]  Job ID #[id number]    CC:

## 2018-05-22 DIAGNOSIS — R60.0 MILD PERIPHERAL EDEMA: Primary | ICD-10-CM

## 2018-05-23 DIAGNOSIS — Z85.3 PERSONAL HISTORY OF BREAST CANCER: Primary | ICD-10-CM

## 2018-05-23 RX ORDER — FUROSEMIDE 20 MG/1
TABLET ORAL
Qty: 30 TABLET | Refills: 4 | Status: SHIPPED | OUTPATIENT
Start: 2018-05-23 | End: 2018-10-22 | Stop reason: SDUPTHER

## 2018-06-04 ENCOUNTER — TELEPHONE (OUTPATIENT)
Dept: HEMATOLOGY/ONCOLOGY | Facility: CLINIC | Age: 58
End: 2018-06-04

## 2018-06-04 NOTE — TELEPHONE ENCOUNTER
----- Message from Mary Jo Haas sent at 6/4/2018  1:38 PM CDT -----  Patient is calling to request RX refills on [address].

## 2018-07-05 DIAGNOSIS — C50.311 MALIGNANT NEOPLASM OF LOWER-INNER QUADRANT OF RIGHT BREAST OF FEMALE, ESTROGEN RECEPTOR NEGATIVE: ICD-10-CM

## 2018-07-05 DIAGNOSIS — Z17.0 MALIGNANT NEOPLASM OF NIPPLE OF RIGHT BREAST IN FEMALE, ESTROGEN RECEPTOR POSITIVE: Primary | ICD-10-CM

## 2018-07-05 DIAGNOSIS — Z17.1 MALIGNANT NEOPLASM OF LOWER-INNER QUADRANT OF RIGHT BREAST OF FEMALE, ESTROGEN RECEPTOR NEGATIVE: ICD-10-CM

## 2018-07-05 DIAGNOSIS — C50.011 MALIGNANT NEOPLASM OF NIPPLE OF RIGHT BREAST IN FEMALE, ESTROGEN RECEPTOR POSITIVE: Primary | ICD-10-CM

## 2018-07-05 DIAGNOSIS — D05.90 CARCINOMA IN SITU OF BREAST, UNSPECIFIED LATERALITY, UNSPECIFIED TYPE: ICD-10-CM

## 2018-07-05 DIAGNOSIS — F06.4 ANXIETY DISORDER DUE TO KNOWN PHYSIOLOGICAL CONDITION: ICD-10-CM

## 2018-07-05 RX ORDER — OXYCODONE AND ACETAMINOPHEN 10; 325 MG/1; MG/1
1 TABLET ORAL EVERY 4 HOURS PRN
Qty: 28 TABLET | Refills: 0 | Status: CANCELLED | OUTPATIENT
Start: 2018-07-05

## 2018-07-05 RX ORDER — CARISOPRODOL 350 MG/1
350 TABLET ORAL 3 TIMES DAILY PRN
Qty: 90 TABLET | Refills: 0 | Status: CANCELLED | OUTPATIENT
Start: 2018-07-05

## 2018-07-09 ENCOUNTER — OFFICE VISIT (OUTPATIENT)
Dept: HEMATOLOGY/ONCOLOGY | Facility: CLINIC | Age: 58
End: 2018-07-09
Payer: MEDICARE

## 2018-07-09 ENCOUNTER — TELEPHONE (OUTPATIENT)
Dept: HEMATOLOGY/ONCOLOGY | Facility: CLINIC | Age: 58
End: 2018-07-09

## 2018-07-09 VITALS
WEIGHT: 207 LBS | RESPIRATION RATE: 20 BRPM | DIASTOLIC BLOOD PRESSURE: 84 MMHG | TEMPERATURE: 99 F | HEART RATE: 76 BPM | BODY MASS INDEX: 28.87 KG/M2 | SYSTOLIC BLOOD PRESSURE: 127 MMHG

## 2018-07-09 DIAGNOSIS — Z17.0 MALIGNANT NEOPLASM OF NIPPLE OF RIGHT BREAST IN FEMALE, ESTROGEN RECEPTOR POSITIVE: Primary | ICD-10-CM

## 2018-07-09 DIAGNOSIS — C50.011 MALIGNANT NEOPLASM OF NIPPLE OF RIGHT BREAST IN FEMALE, ESTROGEN RECEPTOR POSITIVE: Primary | ICD-10-CM

## 2018-07-09 DIAGNOSIS — G62.9 NEUROPATHY: ICD-10-CM

## 2018-07-09 DIAGNOSIS — E11.9 TYPE 2 DIABETES MELLITUS WITHOUT COMPLICATION, WITHOUT LONG-TERM CURRENT USE OF INSULIN: ICD-10-CM

## 2018-07-09 PROCEDURE — 99213 OFFICE O/P EST LOW 20 MIN: CPT | Mod: ,,, | Performed by: INTERNAL MEDICINE

## 2018-07-09 PROCEDURE — 3008F BODY MASS INDEX DOCD: CPT | Mod: ,,, | Performed by: INTERNAL MEDICINE

## 2018-07-09 NOTE — TELEPHONE ENCOUNTER
----- Message from Anyi Noel MA sent at 7/9/2018  2:25 PM CDT -----  Pt called saying that the Marinol is requiring a PA. Dr Vick was trying to get any approval using the [employer]. Please call [phone].

## 2018-07-09 NOTE — TELEPHONE ENCOUNTER
Renata jenkins/ [employer] call inquiring as to why we are submitting another PA on the pt Marinol.     The pt Dx  aren't covering the rx..

## 2018-07-10 NOTE — TELEPHONE ENCOUNTER
----- Message from Armida Gross sent at 7/10/2018  9:45 AM CDT -----  Contact: Monika, [facility] [employer]  Monika with [facility] called.  Needs information related to the RX Marinol.  Current diagnosis is not sufficient to approve the Rx.    # [phone]    Thanks,  Armida

## 2018-07-10 NOTE — PROGRESS NOTES
"     [facility] History & Physical    Subjective:      Patient ID:   Negrita Castro  58 y.o.   Martínez Renteria R. Leblanc      Chief Complaint:   Breast cancer follow up    HPI:  58 y.o. female with diagnosis of Stage 1 R breast cancer 10/26/15.  "Triple negative".  Adjuvant AC x's 4, tx's 12 and Rad Rx through 16.    Port removed.  Had bilateral reconstruction surgery 17.  Further reconstruction, tummy tuck 17.  Additional fat injection at R chest done for symmetry.      DM, cholesterol, epilepsy hx, DJD, LBP.  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    LR shoulder arthroscopy, R wrist surgery, M0.    Smoke  ppd x's 35 years, quit 2015.  ETOH no.  Disability-depression, epilepsy  Allergy none    Mom ovarian cancer  Dad lung cancer  Sisters DM, lung dx.      ROS:   GEN: normal without any fever, night sweats or weight loss  HEENT: normal with no HA's, sore throat, stiff neck, changes in vision  CV: normal with no CP, SOB, PND, POOL or orthopnea  PULM: normal with no SOB, cough, hemoptysis, sputum or pleuritic pain  GI: normal with no abdominal pain, nausea, vomiting, constipation, diarrhea, melanotic stools, BRBPR, or hematemesis  : normal with no hematuria, dysuria  BREAST: See HPI.  SKIN: normal with no rash, erythema, bruising, or swelling     Past Medical History:   Diagnosis Date    Breast cancer     Cancer     RIGHT BREAST 10-15    Depression     Diabetes mellitus     Neuropathy     Panic attacks     S/P epidural steroid injection     Seizures     none since early     Wears glasses     TO DRIVE     Past Surgical History:   Procedure Laterality Date    BREAST BIOPSY      right    breast reconstruction with tummy maria l      BREAST SURGERY      11-3-15 LUMPECTOMY, 12-2-15 REEXCISION    mastectomy 2016      shoulder surgery and wrist      BILAT  BONE SPUR    WRIST SURGERY      RIGHT       Review of patient's allergies indicates:   Allergen Reactions    Adhesive tape-silicones " Hives     BANDAIDS    Polymycin Hives    Latex, natural rubber Itching    Polyurethane-39      Social History     Social History    Marital status: Single     Spouse name: N/A    Number of children: N/A    Years of education: N/A     Occupational History    Not on file.     Social History Main Topics    Smoking status: Former Smoker     Packs/day: 0.25     Years: 30.00     Quit date: 9/28/2015    Smokeless tobacco: Never Used    Alcohol use 0.0 oz/week      Comment: RARELY    Drug use: Unknown      Comment: medical marijuana    Sexual activity: Not on file     Other Topics Concern    Not on file     Social History Narrative    No narrative on file         Current Outpatient Prescriptions:     ASCORBIC ACID/VITAMIN E/BIOTIN (HAIR, SKIN, NAILS WITH BIOTIN ORAL), Take 1 tablet by mouth every morning. , Disp: , Rfl:     carisoprodol (SOMA) 350 MG tablet, Take 1 tablet (350 mg total) by mouth 3 (three) times daily as needed., Disp: 90 tablet, Rfl: 0    diazepam (VALIUM) 10 MG tablet, Take 10 mg by mouth 4 (four) times daily. , Disp: , Rfl:     dronabinol (MARINOL) 2.5 MG capsule, Take 1 capsule (2.5 mg total) by mouth 2 (two) times daily before meals., Disp: 30 capsule, Rfl: 1    enalapril (VASOTEC) 2.5 MG tablet, Take 2.5 mg by mouth once daily. For diabetes, Disp: , Rfl: 1    furosemide (LASIX) 20 MG tablet, TAKE 1 TABLET BY MOUTH EVERY DAY, Disp: 30 tablet, Rfl: 4    glimepiride (AMARYL) 4 MG tablet, , Disp: , Rfl:     INSULIN GLARGINE,HUM.REC.ANLOG (BASAGLAR KWIKPEN SUBQ), Inject 10 Units into the skin., Disp: , Rfl:     metformin (GLUCOPHAGE-XR) 500 MG 24 hr tablet, , Disp: , Rfl:     multivitamin capsule, Take 1 capsule by mouth every morning., Disp: , Rfl:     pravastatin (PRAVACHOL) 40 MG tablet, Take 40 mg by mouth every morning. , Disp: , Rfl: 1    zolpidem (AMBIEN) 10 mg Tab, Take 10 mg by mouth nightly as needed., Disp: , Rfl: 0          Objective:   Vitals:  Blood pressure 127/84,  "pulse 76, temperature 98.9 °F (37.2 °C), temperature source Oral, resp. rate 20, weight 93.9 kg (207 lb), last menstrual period 01/16/2016.    Physical Examination:   GEN: no apparent distress, comfortable  HEAD: atraumatic and normocephalic  EYES: no pallor, no icterus  ENT: no pharyngeal erythema, external ears WNL; no nasal discharge; no thrush  NECK: no masses, thyroid normal, trachea midline, no LAD/LN's, supple  CV: RRR with no murmur; normal pulse  CHEST: Normal respiratory effort; CTAB; normal breath sounds; no wheeze or crackles  ABDOM:  nondistended; soft; normal bowel sounds; no rebound/guarding, abdomenal wall healing, liver spleen not palpated  MUSC/Skeletal: ROM normal; no crepitus; joints normal  EXTREM: no clubbing, cyanosis, inflammation or swelling  SKIN: She is developing keloid changes at L breast incision and navel surgical sites.  : no devi  NEURO: grossly intact; motor/sensory WNL; no tremors  PSYCH: normal mood, affect and behavior  LYMPH: normal cervical, supraclavicular, axillary and groin LN's  BREASTS: L and "R breast" healing, extensive post op change, at chest .  No palpable mass.      Labs:   Lab Results   Component Value Date    WBC 4.90 02/07/2018    HGB 13.8 02/07/2018    HCT 40.9 02/07/2018    MCV 89 02/07/2018     02/07/2018    CMP    Bone Scan negative for metastatic dx. 2/7/18.  CAT of chest negative for metastatic dx. 2/7/18.  Assessment:   (1) 58 y.o. female with diagnosis of Stage 1 triple negative R breast cancer, 10/2016.       S/P R mastectomy, SNBx, AC x's 4, T x's12 weeks, Rad Rx and recent reconstruction.    (2)Keloids developing, Dr. Hernandez injecting scar areas.    (3)Refill marinol, norco.    RTC 4 months.               "

## 2018-07-10 NOTE — TELEPHONE ENCOUNTER
Marinol not covered with her diagnosis since chemotherapy is complete. Patient will have to pay cash for marinol per Insurance company since it doesn't meet criteria. LM for patient to call back to give her the information

## 2018-07-23 ENCOUNTER — DOCUMENTATION ONLY (OUTPATIENT)
Dept: HEMATOLOGY/ONCOLOGY | Facility: CLINIC | Age: 58
End: 2018-07-23

## 2018-07-31 ENCOUNTER — TELEPHONE (OUTPATIENT)
Dept: HEMATOLOGY/ONCOLOGY | Facility: CLINIC | Age: 58
End: 2018-07-31

## 2018-07-31 NOTE — TELEPHONE ENCOUNTER
LM for pt RE: scripts are ready for pickup  Scott RN     ----- Message from Armida Gross sent at 7/30/2018  3:25 PM CDT -----  Pt called and requested a refill on the following medication(s):    Soma 350mg  #120  [place] 10/325 #120    Pharmacy: n/a    CB# [phone]    Thanks,  Armida

## 2018-07-31 NOTE — TELEPHONE ENCOUNTER
Message left for pt that scripts are ready to be picked up in office.  Scott RN      ----- Message from Armida Gross sent at 7/30/2018  3:25 PM CDT -----  Pt called and requested a refill on the following medication(s):    Soma 350mg  #120  [place] 10/325 #120    Pharmacy: n/a    CB# [phone]    Thanks,  Armida

## 2018-08-08 LAB — CEA SERPL-MCNC: 1 NG/ML

## 2018-08-14 ENCOUNTER — TELEPHONE (OUTPATIENT)
Dept: HEMATOLOGY/ONCOLOGY | Facility: CLINIC | Age: 58
End: 2018-08-14

## 2018-09-04 ENCOUNTER — TELEPHONE (OUTPATIENT)
Dept: HEMATOLOGY/ONCOLOGY | Facility: CLINIC | Age: 58
End: 2018-09-04

## 2018-09-04 NOTE — TELEPHONE ENCOUNTER
----- Message from Marilu Mendoza sent at 8/31/2018  2:17 PM CDT -----  Patient called in requesting RX refill on Soma #90 and [place] 10/325 #120. Please contact patient when ready to be picked up at [phone]. Thanks

## 2018-09-25 ENCOUNTER — TELEPHONE (OUTPATIENT)
Dept: HEMATOLOGY/ONCOLOGY | Facility: CLINIC | Age: 58
End: 2018-09-25

## 2018-09-25 NOTE — TELEPHONE ENCOUNTER
----- Message from Armida Gross sent at 9/24/2018  1:35 PM CDT -----  Pt called and requested a refill on the following medication(s):    Hugo 10/325mg #120  Soma's   350mg  #90  Ambien ?  Faxed last  10mg  #30  Pharmacy:[facility]# [phone]    Thanks,  Armida

## 2018-10-19 ENCOUNTER — ANESTHESIA EVENT (OUTPATIENT)
Dept: SURGERY | Facility: HOSPITAL | Age: 58
End: 2018-10-19
Payer: MEDICARE

## 2018-10-19 ENCOUNTER — OFFICE VISIT (OUTPATIENT)
Dept: PLASTIC SURGERY | Facility: CLINIC | Age: 58
End: 2018-10-19
Payer: MEDICARE

## 2018-10-19 VITALS
DIASTOLIC BLOOD PRESSURE: 74 MMHG | HEIGHT: 71 IN | TEMPERATURE: 98 F | HEART RATE: 77 BPM | WEIGHT: 210 LBS | BODY MASS INDEX: 29.4 KG/M2 | RESPIRATION RATE: 12 BRPM | SYSTOLIC BLOOD PRESSURE: 143 MMHG

## 2018-10-19 DIAGNOSIS — Z09 SURGERY FOLLOW-UP EXAMINATION: Primary | ICD-10-CM

## 2018-10-19 DIAGNOSIS — R19.07 GENERALIZED ABDOMINAL MASS: ICD-10-CM

## 2018-10-19 PROCEDURE — 99213 OFFICE O/P EST LOW 20 MIN: CPT | Mod: PBBFAC,PO | Performed by: SURGERY

## 2018-10-19 PROCEDURE — 99212 OFFICE O/P EST SF 10 MIN: CPT | Mod: S$PBB,,, | Performed by: SURGERY

## 2018-10-19 PROCEDURE — 3008F BODY MASS INDEX DOCD: CPT | Mod: CPTII,,, | Performed by: SURGERY

## 2018-10-19 PROCEDURE — 99999 PR PBB SHADOW E&M-EST. PATIENT-LVL III: CPT | Mod: PBBFAC,,, | Performed by: SURGERY

## 2018-10-19 NOTE — PROGRESS NOTES
SP breast recon.  Doing well. Pt for rt nipple recon in November.  Has superior pole hollowing secondary to abscess post fat grafting.  Also has a bulge rt sup abdomen.  Do not feel a hernia but does have a weakness. Will order CT.

## 2018-10-22 DIAGNOSIS — R60.0 MILD PERIPHERAL EDEMA: ICD-10-CM

## 2018-10-23 ENCOUNTER — TELEPHONE (OUTPATIENT)
Dept: PLASTIC SURGERY | Facility: CLINIC | Age: 58
End: 2018-10-23

## 2018-10-23 RX ORDER — FUROSEMIDE 20 MG/1
TABLET ORAL
Qty: 30 TABLET | Refills: 4 | Status: SHIPPED | OUTPATIENT
Start: 2018-10-23 | End: 2020-11-29

## 2018-10-23 NOTE — TELEPHONE ENCOUNTER
Called to inform pt that her abd CT will be tomorrow 915 at [facility] next to ER reg. KEYSHAWN, LPN

## 2018-10-24 ENCOUNTER — HOSPITAL ENCOUNTER (OUTPATIENT)
Dept: RADIOLOGY | Facility: HOSPITAL | Age: 58
Discharge: HOME | End: 2018-10-24
Attending: SURGERY
Payer: MEDICARE

## 2018-10-24 DIAGNOSIS — R60.0 MILD PERIPHERAL EDEMA: ICD-10-CM

## 2018-10-24 DIAGNOSIS — R19.07 GENERALIZED ABDOMINAL MASS: ICD-10-CM

## 2018-10-24 PROCEDURE — 74150 CT ABDOMEN W/O CONTRAST: CPT | Mod: TC

## 2018-10-24 PROCEDURE — 74150 CT ABDOMEN W/O CONTRAST: CPT | Mod: 26,,, | Performed by: RADIOLOGY

## 2018-10-25 ENCOUNTER — TELEPHONE (OUTPATIENT)
Dept: PLASTIC SURGERY | Facility: CLINIC | Age: 58
End: 2018-10-25

## 2018-10-25 ENCOUNTER — TELEPHONE (OUTPATIENT)
Dept: HEMATOLOGY/ONCOLOGY | Facility: CLINIC | Age: 58
End: 2018-10-25

## 2018-10-25 RX ORDER — HYDROCODONE BITARTRATE AND ACETAMINOPHEN 10; 325 MG/1; MG/1
1 TABLET ORAL EVERY 4 HOURS PRN
Status: ON HOLD | COMMUNITY
End: 2018-11-05 | Stop reason: HOSPADM

## 2018-10-25 NOTE — PRE ADMISSION SCREENING
Anesthesia Assessment: Preoperative EQUATION    Planned Procedure: Procedure(s) (LRB):  RECONSTRUCTION-NIPPLE RIGHT (Right)  Requested Anesthesia Type:General  Surgeon: Xiang Hernandez MD  Service: Plastics  Known or anticipated Date of Surgery:11/5/2018    Optimization:  Anesthesia Preop Clinic Assessment  Indicated  Plan:    SEEING PCP ON 11/1 TO DISCUSS HER BLOOD SUGARS.    Navigation:               Straight Line to surgery.               No tests, anesthesia preop clinic visit, or consult required.

## 2018-10-25 NOTE — TELEPHONE ENCOUNTER
CALLED PT TO CONFIRM THAT DR HERNANDEZ WILL REVIEW PROCEDURE PLANS AND EXPECTATIONS IMMEDIATELY BEFORE SURGERY. TRACY MAHAJAN    ----- Message from Alejandrina Cadet RN sent at 10/25/2018  8:54 AM CDT -----  Just spoke to pt concerning surgical procedure. She stated that Dr Hernandez was going to address addition procedures with the reconstruction of nipple,. She stated that he was going to address scaring on abdomal scar, Chest caving in,scaring under left breast, & filling in the right breast. Wanted to give you an update of what pt is expecting. Please advise.

## 2018-10-25 NOTE — TELEPHONE ENCOUNTER
----- Message from Armida Gross sent at 10/25/2018  3:12 PM CDT -----  Pt called and requested a refill on the following medication(s):    norco 10/325mg  #120  Soma 350 #90  ambien 10mg  #with a couple refills#    Nov 5th surgery---does he want to see her before or after her surgery? Please clarify.    Ph[facility]acy: [facility]    CB# [phone]    Thanks,  Armida

## 2018-10-25 NOTE — ANESTHESIA PREPROCEDURE EVALUATION
10/25/2018  Pre-operative evaluation for Procedure(s) (LRB):  RECONSTRUCTION-NIPPLE RIGHT (Right)    Negrita Castro is a 58 y.o. female     Patient Active Problem List   Diagnosis    Depression    Anxiety disorder due to known physiological condition    Dysthymic disorder    Neuropathy    Type 2 diabetes mellitus without complication, without long-term current use of insulin    Atypical chest pain    Breast cancer, right    Breast cancer    Nipple anomaly       Review of patient's allergies indicates:   Allergen Reactions    Adhesive tape-silicones Hives     BANDAIDS    Polymycin Hives    Adhesive     Latex, natural rubber Hives and Itching    Neomycin-bacitracnzn-polymyxnb     Polyurethane-39 Hives       No current facility-administered medications on file prior to encounter.      Current Outpatient Medications on File Prior to Encounter   Medication Sig Dispense Refill    carisoprodol (SOMA) 350 MG tablet Take 1 tablet (350 mg total) by mouth 3 (three) times daily as needed. 90 tablet 0    diazepam (VALIUM) 10 MG tablet Take 10 mg by mouth 4 (four) times daily.       dronabinol (MARINOL) 2.5 MG capsule Take 1 capsule (2.5 mg total) by mouth 2 (two) times daily before meals. 30 capsule 1    enalapril (VASOTEC) 2.5 MG tablet Take 2.5 mg by mouth once daily. For diabetes  1    glimepiride (AMARYL) 4 MG tablet Take 4 mg by mouth daily with breakfast.       HYDROcodone-acetaminophen (NORCO)  mg per tablet Take 1 tablet by mouth every 4 (four) hours as needed for Pain.      metformin (GLUCOPHAGE-XR) 500 MG 24 hr tablet Take 2,000 mg by mouth every evening.       pravastatin (PRAVACHOL) 40 MG tablet Take 40 mg by mouth every morning.   1    zolpidem (AMBIEN) 10 mg Tab Take 10 mg by mouth nightly as needed.  0    ASCORBIC ACID/VITAMIN E/BIOTIN (HAIR, SKIN, NAILS WITH BIOTIN ORAL)  Take 1 tablet by mouth every morning.       INSULIN GLARGINE,HUM.REC.ANLOG (BASAGLAR KWIKPEN SUBQ) Inject 25 Units into the skin every evening.       multivitamin capsule Take 1 capsule by mouth every morning.         Past Surgical History:   Procedure Laterality Date    BIOPSY-SENTINEL NODE Right 11/3/2015    Performed [facility] at [facility] OR    BREAST BIOPSY      right    breast reconstruction with tummy tuck      BREAST SURGERY      11-3-15 LUMPECTOMY, 12-2-15 REEXCISION    HEMANTH FREE FLAP Bilateral 2/14/2017    [facility] 2ND FLR    EXCISION  HYPERTROPHY OF SCARS WITH ADV FLAP CLOSURE. Left 6/1/2017    Per[facility] at [facility] 2ND FLR    EXCISION-SKIN Dog Ear Left Hip Bilateral 8/31/2017    [facility] at [facility] 2ND FLR    FLAP-FREE- RE-ELEVATION OF INSET. Right 6/1/2017    Per[facility] at [facility] 2ND FLR    INSERTION-BREAST TISSUE EXPANDER Bilateral 1/29/2016    [facility] at [facility] OR    [id number]-IRNH-L-CATH Left 3/21/2016    [facility]    INSERTION-TISSUE EXPANDER Right 1/29/2016    [facility] at [facility] OR    LUMPECTOMY-BREAST Right 12/2/2015    [facility] by [facility]c [facility] at [facility] OR    LUMPECTOMY-BREAST Right 11/3/2015    [facility] at [facility] OR    mastectomy 2016      MASTECTOMY/RIGHT Right 1/29/2016    Performed [facility] at [facility] OR    MASTOPEXY Left 1/29/2016    Performed by [facility] at [facility] OR    PLACEMENT ALLODERM Right 1/29/2016    Performed by [facility] at [facility] OR    RECONSTRUCTION-BREAST Right 1/29/2016    Performed [facility] at [facility] OR    RECONSTRUCTION-BREAST Bilateral 1/29/2016    Performed by [facility] at [facility] OR    RECONSTRUCTION/REVISION-BREAST  1)  second stage Breast Reconstruction 2) Soft Tissue Revision - Abdomen N/A 6/1/2017    Performed [facility] at [facility] OR 2ND FLR    REMOVAL-PORT-A-CATH Left 11/15/2016    [facility]    shoulder surgery and wrist      BILAT  BONE SPUR    TRANSFER-FAT Right 2/26/2018    Performed [facility] at [facility] OR 2ND FLR    WRIST SURGERY      RIGHT       Social History     Socioeconomic History    Marital status: Single     Spouse name: Not on file    Number of children: Not on file    Years of education: Not on file    Highest education level: Not on file   Social Needs    Financial resource strain: Not on file    Food insecurity - worry: Not on file    Food insecurity - inability: Not on file    Transportation needs - medical: Not on file    Transportation needs - non-medical: Not on file   Occupational History    Not on file   Tobacco Use    Smoking status: Former Smoker     Packs/day: 0.25     Years: 30.00     Pack years: 7.50     Last attempt to quit: 9/28/2015     Years since quitting: 3.1    Smokeless tobacco: Never Used   Substance and Sexual Activity    Alcohol use: Yes     Alcohol/week: 0.0 oz     Comment: RARELY    Drug use: Not on file     Comment: medical marijuana    Sexual activity: Not on file   Other Topics Concern    Not on file   Social History Narrative    Not on file         2D Echo:  Results for orders placed or performed during the hospital encounter of 03/04/16   2D echo with color flow doppler   Result Value Ref Range    Calculated EF 68 55 - 65    Diastolic Dysfunction No     Est. PA Systolic Pressure 15.96     Tricuspid Valve Regurgitation TRIVIAL          Anesthesia Evaluation    I have reviewed the Patient Summary Reports.    I have reviewed the Nursing Notes.   I have reviewed the Medications.     Review of Systems  Anesthesia Hx:  No problems with previous Anesthesia    Social:  Former Smoker, Social Alcohol  Use    Hematology/Oncology:  Hematology Normal       -- Cancer in past history:  right chemotherapy, radiation and surgery    EENT/Dental:EENT/Dental Normal   Cardiovascular:  Cardiovascular Normal Exercise tolerance: poor   Functional Capacity 2 METS    Pulmonary:  Pulmonary Normal    Renal/:  Renal/ Normal     Hepatic/GI:  Hepatic/GI Normal    Musculoskeletal:  Musculoskeletal Normal    Neurological:   Seizures    Endocrine:   Diabetes, poorly controlled, type 2  Diabetes, Type 2 Diabetes for 10 years , Complications include Diabetic Nephropathy , controlled by diet, oral hypoglycemics, insulin. Typical AM glucose range: 165    Psych:   anxiety depression          Physical Exam  General:  Obesity    Airway/Jaw/Neck:  Airway Findings: Mouth Opening: Normal Tongue: Normal  General Airway Assessment: Adult  Mallampati: I  TM Distance: Normal, at least 6 cm       Chest/Lungs:  Chest/Lungs Findings: Clear to auscultation, Normal Respiratory Rate     Heart/Vascular:  Heart Findings: Rate: Normal  Rhythm: Regular Rhythm             Anesthesia Plan  Type of Anesthesia, risks & benefits discussed:  Anesthesia Type:  general  Patient's Preference:   Intra-op Monitoring Plan: standard ASA monitors  Intra-op Monitoring Plan Comments:   Post Op Pain Control Plan: multimodal analgesia and IV/PO Opioids PRN  Post Op Pain Control Plan Comments:   Induction:   IV  Beta Blocker:         Informed Consent: Patient understands risks and agrees with Anesthesia plan.  Questions answered. Anesthesia consent signed with patient.  ASA Score: 3     Day of Surgery Review of History & Physical:            Ready For Surgery From Anesthesia Perspective.

## 2018-10-25 NOTE — PRE-PROCEDURE INSTRUCTIONS
Anesthesia review complete, medication instructions given NPO past midnight, Bathe with hibiclens or dial soap the night before & am of surgery. Put nothing on skin -  lotion, deodorant, powder  No metal or jewelry & no valuables pt verbalized understanding and all questions answered. STRESSED IMPORTANCE OF PT TAKING HER DIABETIC MEDICINES AS INSTRUCTED.    Alejandrina Cadet RN sent to [facility] Staff            Just spoke to pt concerning surgical procedure. She stated that Dr Hernandez was going to address addition procedures with the reconstruction of nipple,. She stated that he was going to address scaring on abdomal scar, Chest caving in,scaring under left breast, & filling in the right breast. Wanted to give you an update of what pt is expecting. Please advise.

## 2018-10-26 RX ORDER — FUROSEMIDE 20 MG/1
TABLET ORAL
Qty: 30 TABLET | Refills: 4 | Status: SHIPPED | OUTPATIENT
Start: 2018-10-26 | End: 2018-12-13 | Stop reason: SDUPTHER

## 2018-11-01 ENCOUNTER — TELEPHONE (OUTPATIENT)
Dept: PLASTIC SURGERY | Facility: CLINIC | Age: 58
End: 2018-11-01

## 2018-11-01 NOTE — TELEPHONE ENCOUNTER
Attempted to speak with pt regarding preop instructions, LVM on id'd VM with call back info. KEYSHAWN, TRACY

## 2018-11-02 ENCOUNTER — TELEPHONE (OUTPATIENT)
Dept: PLASTIC SURGERY | Facility: CLINIC | Age: 58
End: 2018-11-02

## 2018-11-02 NOTE — TELEPHONE ENCOUNTER
Called to complete preop instructions, give arrival time, location, call back info and answer surgical questions with verbal understanding. KEYSHAWN, TRACY

## 2018-11-05 ENCOUNTER — HOSPITAL ENCOUNTER (OUTPATIENT)
Facility: HOSPITAL | Age: 58
Discharge: HOME | End: 2018-11-05
Attending: SURGERY | Admitting: SURGERY
Payer: MEDICARE

## 2018-11-05 ENCOUNTER — TELEPHONE (OUTPATIENT)
Dept: PLASTIC SURGERY | Facility: HOSPITAL | Age: 58
End: 2018-11-05

## 2018-11-05 ENCOUNTER — ANESTHESIA (OUTPATIENT)
Dept: SURGERY | Facility: HOSPITAL | Age: 58
End: 2018-11-05
Payer: MEDICARE

## 2018-11-05 ENCOUNTER — TELEPHONE (OUTPATIENT)
Dept: PLASTIC SURGERY | Facility: CLINIC | Age: 58
End: 2018-11-05

## 2018-11-05 VITALS
BODY MASS INDEX: 29.68 KG/M2 | WEIGHT: 212 LBS | TEMPERATURE: 98 F | RESPIRATION RATE: 18 BRPM | DIASTOLIC BLOOD PRESSURE: 86 MMHG | SYSTOLIC BLOOD PRESSURE: 159 MMHG | HEART RATE: 70 BPM | OXYGEN SATURATION: 93 % | HEIGHT: 71 IN

## 2018-11-05 DIAGNOSIS — Q83.9 NIPPLE ANOMALY: Primary | ICD-10-CM

## 2018-11-05 LAB
POCT GLUCOSE: 249 MG/DL (ref 70–110)
POCT GLUCOSE: 250 MG/DL (ref 70–110)

## 2018-11-05 PROCEDURE — D9220A PRA ANESTHESIA: Mod: CRNA,,, | Performed by: NURSE ANESTHETIST, CERTIFIED REGISTERED

## 2018-11-05 PROCEDURE — 25000003 PHARM REV CODE 250: Performed by: ANESTHESIOLOGY

## 2018-11-05 PROCEDURE — 25000003 PHARM REV CODE 250: Performed by: NURSE ANESTHETIST, CERTIFIED REGISTERED

## 2018-11-05 PROCEDURE — S0028 INJECTION, FAMOTIDINE, 20 MG: HCPCS | Performed by: NURSE ANESTHETIST, CERTIFIED REGISTERED

## 2018-11-05 PROCEDURE — 82962 GLUCOSE BLOOD TEST: CPT | Performed by: SURGERY

## 2018-11-05 PROCEDURE — 19350 NIPPLE/AREOLA RECONSTRUCTION: CPT | Mod: 51,RT,, | Performed by: SURGERY

## 2018-11-05 PROCEDURE — 94761 N-INVAS EAR/PLS OXIMETRY MLT: CPT

## 2018-11-05 PROCEDURE — 37000008 HC ANESTHESIA 1ST 15 MINUTES: Performed by: SURGERY

## 2018-11-05 PROCEDURE — 25000003 PHARM REV CODE 250: Performed by: STUDENT IN AN ORGANIZED HEALTH CARE EDUCATION/TRAINING PROGRAM

## 2018-11-05 PROCEDURE — 71000039 HC RECOVERY, EACH ADD'L HOUR: Performed by: SURGERY

## 2018-11-05 PROCEDURE — 71000015 HC POSTOP RECOV 1ST HR: Performed by: SURGERY

## 2018-11-05 PROCEDURE — 63600175 PHARM REV CODE 636 W HCPCS: Performed by: ANESTHESIOLOGY

## 2018-11-05 PROCEDURE — 63600175 PHARM REV CODE 636 W HCPCS: Performed by: STUDENT IN AN ORGANIZED HEALTH CARE EDUCATION/TRAINING PROGRAM

## 2018-11-05 PROCEDURE — 14301 TIS TRNFR ANY 30.1-60 SQ CM: CPT | Mod: ,,, | Performed by: SURGERY

## 2018-11-05 PROCEDURE — 37000009 HC ANESTHESIA EA ADD 15 MINS: Performed by: SURGERY

## 2018-11-05 PROCEDURE — D9220A PRA ANESTHESIA: Mod: ANES,,, | Performed by: ANESTHESIOLOGY

## 2018-11-05 PROCEDURE — 36000706: Performed by: SURGERY

## 2018-11-05 PROCEDURE — 27200651 HC AIRWAY, LMA: Performed by: NURSE ANESTHETIST, CERTIFIED REGISTERED

## 2018-11-05 PROCEDURE — 71000033 HC RECOVERY, INTIAL HOUR: Performed by: SURGERY

## 2018-11-05 PROCEDURE — 63600175 PHARM REV CODE 636 W HCPCS: Performed by: NURSE ANESTHETIST, CERTIFIED REGISTERED

## 2018-11-05 PROCEDURE — 14302 TIS TRNFR ADDL 30 SQ CM: CPT | Mod: ,,, | Performed by: SURGERY

## 2018-11-05 PROCEDURE — 36000707: Performed by: SURGERY

## 2018-11-05 RX ORDER — LIDOCAINE HYDROCHLORIDE 10 MG/ML
1 INJECTION, SOLUTION EPIDURAL; INFILTRATION; INTRACAUDAL; PERINEURAL ONCE
Status: COMPLETED | OUTPATIENT
Start: 2018-11-05 | End: 2018-11-05

## 2018-11-05 RX ORDER — DIPHENHYDRAMINE HYDROCHLORIDE 50 MG/ML
INJECTION INTRAMUSCULAR; INTRAVENOUS
Status: DISCONTINUED
Start: 2018-11-05 | End: 2018-11-05 | Stop reason: HOSPADM

## 2018-11-05 RX ORDER — CEPHALEXIN 500 MG/1
500 CAPSULE ORAL EVERY 6 HOURS
Status: DISCONTINUED | OUTPATIENT
Start: 2018-11-05 | End: 2018-11-05 | Stop reason: HOSPADM

## 2018-11-05 RX ORDER — IBUPROFEN 200 MG
24 TABLET ORAL
Status: DISCONTINUED | OUTPATIENT
Start: 2018-11-05 | End: 2018-11-05 | Stop reason: HOSPADM

## 2018-11-05 RX ORDER — OXYCODONE HYDROCHLORIDE 5 MG/1
TABLET ORAL
Status: DISCONTINUED
Start: 2018-11-05 | End: 2018-11-05 | Stop reason: HOSPADM

## 2018-11-05 RX ORDER — ACETAMINOPHEN 10 MG/ML
INJECTION, SOLUTION INTRAVENOUS
Status: DISCONTINUED | OUTPATIENT
Start: 2018-11-05 | End: 2018-11-05

## 2018-11-05 RX ORDER — MUPIROCIN 20 MG/G
OINTMENT TOPICAL
Status: DISCONTINUED | OUTPATIENT
Start: 2018-11-05 | End: 2018-11-05 | Stop reason: HOSPADM

## 2018-11-05 RX ORDER — INSULIN ASPART 100 [IU]/ML
1-10 INJECTION, SOLUTION INTRAVENOUS; SUBCUTANEOUS
Status: DISCONTINUED | OUTPATIENT
Start: 2018-11-05 | End: 2018-11-05 | Stop reason: HOSPADM

## 2018-11-05 RX ORDER — GLUCAGON 1 MG
1 KIT INJECTION
Status: DISCONTINUED | OUTPATIENT
Start: 2018-11-05 | End: 2018-11-05 | Stop reason: HOSPADM

## 2018-11-05 RX ORDER — PHENYLEPHRINE HYDROCHLORIDE 10 MG/ML
INJECTION INTRAVENOUS
Status: DISCONTINUED | OUTPATIENT
Start: 2018-11-05 | End: 2018-11-05

## 2018-11-05 RX ORDER — HYDROCODONE BITARTRATE AND ACETAMINOPHEN 5; 325 MG/1; MG/1
1 TABLET ORAL EVERY 4 HOURS PRN
Qty: 31 TABLET | Refills: 0 | Status: SHIPPED | OUTPATIENT
Start: 2018-11-05 | End: 2018-11-05 | Stop reason: SDUPTHER

## 2018-11-05 RX ORDER — LIDOCAINE HCL/PF 100 MG/5ML
SYRINGE (ML) INTRAVENOUS
Status: DISCONTINUED | OUTPATIENT
Start: 2018-11-05 | End: 2018-11-05

## 2018-11-05 RX ORDER — ONDANSETRON HYDROCHLORIDE 2 MG/ML
INJECTION, SOLUTION INTRAMUSCULAR; INTRAVENOUS
Status: DISCONTINUED | OUTPATIENT
Start: 2018-11-05 | End: 2018-11-05

## 2018-11-05 RX ORDER — HYDROMORPHONE HYDROCHLORIDE 1 MG/ML
1 INJECTION, SOLUTION INTRAMUSCULAR; INTRAVENOUS; SUBCUTANEOUS ONCE AS NEEDED
Status: DISCONTINUED | OUTPATIENT
Start: 2018-11-05 | End: 2018-11-05 | Stop reason: HOSPADM

## 2018-11-05 RX ORDER — MIDAZOLAM HYDROCHLORIDE 1 MG/ML
INJECTION INTRAMUSCULAR; INTRAVENOUS
Status: DISCONTINUED | OUTPATIENT
Start: 2018-11-05 | End: 2018-11-05

## 2018-11-05 RX ORDER — CEFAZOLIN SODIUM 1 G/3ML
2 INJECTION, POWDER, FOR SOLUTION INTRAMUSCULAR; INTRAVENOUS
Status: COMPLETED | OUTPATIENT
Start: 2018-11-05 | End: 2018-11-05

## 2018-11-05 RX ORDER — DIPHENHYDRAMINE HYDROCHLORIDE 50 MG/ML
INJECTION INTRAMUSCULAR; INTRAVENOUS
Status: DISCONTINUED | OUTPATIENT
Start: 2018-11-05 | End: 2018-11-05

## 2018-11-05 RX ORDER — HYDROMORPHONE HYDROCHLORIDE 1 MG/ML
0.2 INJECTION, SOLUTION INTRAMUSCULAR; INTRAVENOUS; SUBCUTANEOUS EVERY 5 MIN PRN
Status: COMPLETED | OUTPATIENT
Start: 2018-11-05 | End: 2018-11-05

## 2018-11-05 RX ORDER — GLYCOPYRROLATE 0.2 MG/ML
INJECTION INTRAMUSCULAR; INTRAVENOUS
Status: DISCONTINUED | OUTPATIENT
Start: 2018-11-05 | End: 2018-11-05

## 2018-11-05 RX ORDER — OXYCODONE HYDROCHLORIDE 5 MG/1
5 CAPSULE ORAL EVERY 4 HOURS PRN
Qty: 31 CAPSULE | Refills: 0 | Status: SHIPPED | OUTPATIENT
Start: 2018-11-05 | End: 2018-12-13

## 2018-11-05 RX ORDER — INSULIN ASPART 100 [IU]/ML
INJECTION, SOLUTION INTRAVENOUS; SUBCUTANEOUS
Status: DISCONTINUED
Start: 2018-11-05 | End: 2018-11-05 | Stop reason: HOSPADM

## 2018-11-05 RX ORDER — FAMOTIDINE 10 MG/ML
INJECTION INTRAVENOUS
Status: DISCONTINUED | OUTPATIENT
Start: 2018-11-05 | End: 2018-11-05

## 2018-11-05 RX ORDER — HEPARIN SODIUM 5000 [USP'U]/ML
5000 INJECTION, SOLUTION INTRAVENOUS; SUBCUTANEOUS EVERY 8 HOURS
Status: DISCONTINUED | OUTPATIENT
Start: 2018-11-05 | End: 2018-11-05 | Stop reason: HOSPADM

## 2018-11-05 RX ORDER — DIPHENHYDRAMINE HYDROCHLORIDE 50 MG/ML
12.5 INJECTION INTRAMUSCULAR; INTRAVENOUS ONCE
Status: DISCONTINUED | OUTPATIENT
Start: 2018-11-05 | End: 2018-11-05 | Stop reason: HOSPADM

## 2018-11-05 RX ORDER — IBUPROFEN 200 MG
16 TABLET ORAL
Status: DISCONTINUED | OUTPATIENT
Start: 2018-11-05 | End: 2018-11-05 | Stop reason: HOSPADM

## 2018-11-05 RX ORDER — OXYCODONE HYDROCHLORIDE 5 MG/1
5 TABLET ORAL
Status: DISCONTINUED | OUTPATIENT
Start: 2018-11-05 | End: 2018-11-05 | Stop reason: HOSPADM

## 2018-11-05 RX ORDER — SODIUM CHLORIDE 9 MG/ML
INJECTION, SOLUTION INTRAVENOUS CONTINUOUS
Status: DISCONTINUED | OUTPATIENT
Start: 2018-11-05 | End: 2018-11-05 | Stop reason: HOSPADM

## 2018-11-05 RX ORDER — SODIUM CHLORIDE 0.9 % (FLUSH) 0.9 %
5 SYRINGE (ML) INJECTION
Status: DISCONTINUED | OUTPATIENT
Start: 2018-11-05 | End: 2018-11-05 | Stop reason: HOSPADM

## 2018-11-05 RX ORDER — HYDROCODONE BITARTRATE AND ACETAMINOPHEN 5; 325 MG/1; MG/1
1 TABLET ORAL EVERY 4 HOURS PRN
Status: DISCONTINUED | OUTPATIENT
Start: 2018-11-05 | End: 2018-11-05 | Stop reason: HOSPADM

## 2018-11-05 RX ORDER — SODIUM CHLORIDE 0.9 % (FLUSH) 0.9 %
3 SYRINGE (ML) INJECTION
Status: DISCONTINUED | OUTPATIENT
Start: 2018-11-05 | End: 2018-11-05 | Stop reason: HOSPADM

## 2018-11-05 RX ORDER — FENTANYL CITRATE 50 UG/ML
INJECTION, SOLUTION INTRAMUSCULAR; INTRAVENOUS
Status: DISCONTINUED | OUTPATIENT
Start: 2018-11-05 | End: 2018-11-05

## 2018-11-05 RX ORDER — PROPOFOL 10 MG/ML
VIAL (ML) INTRAVENOUS
Status: DISCONTINUED | OUTPATIENT
Start: 2018-11-05 | End: 2018-11-05

## 2018-11-05 RX ORDER — DEXAMETHASONE SODIUM PHOSPHATE 4 MG/ML
INJECTION, SOLUTION INTRA-ARTICULAR; INTRALESIONAL; INTRAMUSCULAR; INTRAVENOUS; SOFT TISSUE
Status: DISCONTINUED | OUTPATIENT
Start: 2018-11-05 | End: 2018-11-05

## 2018-11-05 RX ORDER — FENTANYL CITRATE 50 UG/ML
25 INJECTION, SOLUTION INTRAMUSCULAR; INTRAVENOUS EVERY 5 MIN PRN
Status: DISCONTINUED | OUTPATIENT
Start: 2018-11-05 | End: 2018-11-05 | Stop reason: HOSPADM

## 2018-11-05 RX ORDER — HYDROCODONE BITARTRATE AND ACETAMINOPHEN 5; 325 MG/1; MG/1
1 TABLET ORAL EVERY 4 HOURS PRN
Qty: 31 TABLET | Refills: 0 | Status: SHIPPED | OUTPATIENT
Start: 2018-11-05 | End: 2018-11-05 | Stop reason: HOSPADM

## 2018-11-05 RX ADMIN — PHENYLEPHRINE HYDROCHLORIDE 50 MCG: 10 INJECTION INTRAVENOUS at 09:11

## 2018-11-05 RX ADMIN — SODIUM CHLORIDE, SODIUM GLUCONATE, SODIUM ACETATE, POTASSIUM CHLORIDE, MAGNESIUM CHLORIDE, SODIUM PHOSPHATE, DIBASIC, AND POTASSIUM PHOSPHATE: .53; .5; .37; .037; .03; .012; .00082 INJECTION, SOLUTION INTRAVENOUS at 08:11

## 2018-11-05 RX ADMIN — Medication 0.2 MG: at 10:11

## 2018-11-05 RX ADMIN — MIDAZOLAM HYDROCHLORIDE 2 MG: 1 INJECTION, SOLUTION INTRAMUSCULAR; INTRAVENOUS at 07:11

## 2018-11-05 RX ADMIN — Medication 0.2 MG: at 09:11

## 2018-11-05 RX ADMIN — LIDOCAINE HYDROCHLORIDE 2 MG: 10 INJECTION, SOLUTION EPIDURAL; INFILTRATION; INTRACAUDAL; PERINEURAL at 06:11

## 2018-11-05 RX ADMIN — PROPOFOL 150 MG: 10 INJECTION, EMULSION INTRAVENOUS at 07:11

## 2018-11-05 RX ADMIN — PROPOFOL 30 MG: 10 INJECTION, EMULSION INTRAVENOUS at 08:11

## 2018-11-05 RX ADMIN — ACETAMINOPHEN 1000 MG: 10 INJECTION, SOLUTION INTRAVENOUS at 08:11

## 2018-11-05 RX ADMIN — FENTANYL CITRATE 25 MCG: 50 INJECTION, SOLUTION INTRAMUSCULAR; INTRAVENOUS at 08:11

## 2018-11-05 RX ADMIN — CEFAZOLIN 2 G: 330 INJECTION, POWDER, FOR SOLUTION INTRAMUSCULAR; INTRAVENOUS at 08:11

## 2018-11-05 RX ADMIN — INSULIN ASPART 4 UNITS: 100 INJECTION, SOLUTION INTRAVENOUS; SUBCUTANEOUS at 09:11

## 2018-11-05 RX ADMIN — FAMOTIDINE 20 MG: 10 INJECTION, SOLUTION INTRAVENOUS at 07:11

## 2018-11-05 RX ADMIN — PHENYLEPHRINE HYDROCHLORIDE 100 MCG: 10 INJECTION INTRAVENOUS at 09:11

## 2018-11-05 RX ADMIN — ONDANSETRON 4 MG: 2 INJECTION, SOLUTION INTRAMUSCULAR; INTRAVENOUS at 07:11

## 2018-11-05 RX ADMIN — HEPARIN SODIUM 5000 UNITS: 5000 INJECTION, SOLUTION INTRAVENOUS; SUBCUTANEOUS at 06:11

## 2018-11-05 RX ADMIN — DIPHENHYDRAMINE HYDROCHLORIDE 12.5 MG: 50 INJECTION, SOLUTION INTRAMUSCULAR; INTRAVENOUS at 08:11

## 2018-11-05 RX ADMIN — GLYCOPYRROLATE 0.2 MG: 0.2 INJECTION, SOLUTION INTRAMUSCULAR; INTRAVENOUS at 07:11

## 2018-11-05 RX ADMIN — OXYCODONE HYDROCHLORIDE 5 MG: 5 TABLET ORAL at 09:11

## 2018-11-05 RX ADMIN — DEXAMETHASONE SODIUM PHOSPHATE 4 MG: 4 INJECTION, SOLUTION INTRAMUSCULAR; INTRAVENOUS at 07:11

## 2018-11-05 RX ADMIN — LIDOCAINE HYDROCHLORIDE 100 MG: 20 INJECTION, SOLUTION INTRAVENOUS at 07:11

## 2018-11-05 RX ADMIN — SODIUM CHLORIDE: 0.9 INJECTION, SOLUTION INTRAVENOUS at 06:11

## 2018-11-05 RX ADMIN — MUPIROCIN: 20 OINTMENT TOPICAL at 06:11

## 2018-11-05 NOTE — SUBJECTIVE & OBJECTIVE
No current facility-administered medications on file prior to encounter.      Current Outpatient Medications on File Prior to Encounter   Medication Sig    carisoprodol (SOMA) 350 MG tablet Take 1 tablet (350 mg total) by mouth 3 (three) times daily as needed.    diazepam (VALIUM) 10 MG tablet Take 10 mg by mouth 4 (four) times daily.     dronabinol (MARINOL) 2.5 MG capsule Take 1 capsule (2.5 mg total) by mouth 2 (two) times daily before meals.    enalapril (VASOTEC) 2.5 MG tablet Take 2.5 mg by mouth once daily. For diabetes    glimepiride (AMARYL) 4 MG tablet Take 4 mg by mouth daily with breakfast.     HYDROcodone-acetaminophen (NORCO)  mg per tablet Take 1 tablet by mouth every 4 (four) hours as needed for Pain.    metformin (GLUCOPHAGE-XR) 500 MG 24 hr tablet Take 2,000 mg by mouth every evening.     pravastatin (PRAVACHOL) 40 MG tablet Take 40 mg by mouth every morning.     zolpidem (AMBIEN) 10 mg Tab Take 10 mg by mouth nightly as needed.    ASCORBIC ACID/VITAMIN E/BIOTIN (HAIR, SKIN, NAILS WITH BIOTIN ORAL) Take 1 tablet by mouth every morning.     INSULIN GLARGINE,HUM.REC.ANLOG (BASAGLAR KWIKPEN SUBQ) Inject 25 Units into the skin every evening.     multivitamin capsule Take 1 capsule by mouth every morning.       Review of patient's allergies indicates:   Allergen Reactions    Adhesive tape-silicones Hives     BANDAIDS    Polymycin Hives    Adhesive     Latex, natural rubber Hives and Itching    Neomycin-bacitracnzn-polymyxnb     Polyurethane-39 Hives       Past Medical History:   Diagnosis Date    Breast cancer     Cancer     RIGHT BREAST 10-15    Depression     Diabetes mellitus     Neuropathy     Panic attacks     S/P epidural steroid injection     Seizures     none since early 30's    Wears glasses     TO DRIVE     Past Surgical History:   Procedure Laterality Date    BIOPSY-SENTINEL NODE Right 11/3/2015    [facility]    BREAST  BIOPSY      right    breast reconstruction with tummy [facility]      BREAST SURGERY      11-3-15 LUMPECTOMY, 12-2-15 REEXCISION    HEMANTH FREE FLAP Bilateral 2/14/2017    [facility] 2ND FLR    EXCISION  HYPERTROPHY OF SCARS WITH ADV FLAP CLOSURE. Left 6/1/2017    Performed [facility] at [facility] OR 2ND FLR    EXCISION-SKIN Dog Ear Left Hip Bilateral 8/31/2017    [facility] at [facility] OR 2ND FLR    FLAP-FREE- RE-ELEVATION OF INSET. Right 6/1/2017    Performed by [facility] at [facility] OR 2ND FLR    INSERTION-BREAST TISSUE EXPANDER Bilateral 1/29/2016    [facility]    [id number]-D-CATH Left 3/21/2016    [facility]    INSERTION-TISSUE EXPANDER Right 1/29/2016    [facility] at [facility]    LUMPECTOMY-BREAST Right 12/2/2015    [facility] at [facility]    LUMPECTOMY-BREAST Right 11/3/2015    [facility] at [facility]    mastectomy 2016      MASTECTOMY/RIGHT Right 1/29/2016    [facility] at [facility]    MASTOPEXY Left 1/29/2016    [facility] at [facility]    PLACEMENT ALLODERM Right 1/29/2016    [facility] at [facility]    RECONSTRUCTION-BREAST Right 1/29/2016    [facility] at [facility]    RECONSTRUCTION-BREAST Bilateral 1/29/2016    [facility] at [facility]    RECONSTRUCTION/REVISION-BREAST  1) second stage Breast Reconstruction 2) Soft Tissue Revision - Abdomen N/A 6/1/2017    Performed by [facility] at [facility] OR 2ND FLR    REMOVAL-PORT-A-CATH Left 11/15/2016    [facility] at [facility]    shoulder surgery and wrist      BILAT  BONE SPUR    TRANSFER-FAT Right 2/26/2018    Performed by [facility] at [facility] OR  2ND FLR    WRIST SURGERY      RIGHT     Family History     None        Tobacco Use    Smoking status: Former Smoker     Packs/day: 0.25     Years: 30.00     Pack years: 7.50     Last attempt to quit: 9/28/2015     Years since quitting: 3.1    Smokeless tobacco: Never Used   Substance and Sexual Activity    Alcohol use: Yes     Alcohol/week: 0.0 oz     Comment: RARELY    Drug use: Not on file     Comment: medical marijuana    Sexual activity: Not on file     Review of Systems   All other systems reviewed and are negative.    Objective:     Vital Signs (Most Recent):  Temp: 97.6 °F (36.4 °C) (11/05/18 0559)  Pulse: (!) 59 (11/05/18 0559)  Resp: 18 (11/05/18 0559)  BP: (!) 174/78 (11/05/18 0559)  SpO2: 98 % (11/05/18 0559) Vital Signs (24h Range):  Temp:  [97.6 °F (36.4 °C)] 97.6 °F (36.4 °C)  Pulse:  [59] 59  Resp:  [18] 18  SpO2:  [98 %] 98 %  BP: (174)/(78) 174/78     Weight: 96.2 kg (212 lb)  Body mass index is 29.57 kg/m².    Physical Exam   Constitutional: She appears well-developed and well-nourished. No distress.   HENT:   Head: Normocephalic and atraumatic.   Eyes: EOM are normal.   Neck: Normal range of motion.   Cardiovascular: Normal rate.   Pulmonary/Chest: Effort normal.   Abdominal: Soft.   Neurological: She is alert.   Skin: Skin is warm and dry.   Psychiatric: She has a normal mood and affect. Her behavior is normal.   Nursing note and vitals reviewed.

## 2018-11-05 NOTE — TELEPHONE ENCOUNTER
MD contacted regarding inability of pharmacy to fill capsule versus tablet of oxycodone. Spoke to pharmacist who stated he would call nearby pharmacies and get the prescription filled.     Brandon Padilla, PGY1  General Surgery  [phone]

## 2018-11-05 NOTE — BRIEF OP NOTE
[facility]  Brief Operative Note     SUMMARY     Surgery Date: 11/5/2018     Surgeon(s) and Role:     * Xiang Hernandez MD - Primary     * Donovan Jones MD - Resident - Assisting      Pre-op Diagnosis:  Personal history of breast cancer [Z85.3]    Post-op Diagnosis:  Post-Op Diagnosis Codes:     * Personal history of breast cancer [Z85.3]    Procedure(s) (LRB):  RECONSTRUCTION-NIPPLE RIGHT (Right)  REVISION, SCAR, ABDOMINAL DONOR SITE (N/A)    Anesthesia: General    Description of the findings of the procedure: right nipple reconstruction and abdominal scar revision    Findings/Key Components: as above    Estimated Blood Loss: 25 mL         Specimens:   Specimen (12h ago, onward)    None          Discharge Note    SUMMARY     Admit Date: 11/5/2018    Discharge Date and Time:  11/05/2018 9:53 AM    Hospital Course (synopsis of major diagnoses, care, treatment, and services provided during the course of the hospital stay): patient underwent operation stated above and tolerated it well. She was advanced on a surgically progressive diet with adequate PO pain control. She was then discharged with close follow up. No complications.      Final Diagnosis: Post-Op Diagnosis Codes:     * Personal history of breast cancer [Z85.3]    Disposition: Home or Self Care    Follow Up/Patient Instructions: As in summary    Medications:  Reconciled Home Medications:      Medication List      START taking these medications    HYDROcodone-acetaminophen 5-325 mg per tablet  Commonly known as:  NORCO  Take 1 tablet by mouth every 4 (four) hours as needed for Pain.  Replaces:  HYDROcodone-acetaminophen  mg per tablet        CONTINUE taking these medications    VASHTI RIVAS SUBQ  Inject 25 Units into the skin every evening.     carisoprodol 350 MG tablet  Commonly known as:  SOMA  Take 1 tablet (350 mg total) by mouth 3 (three) times daily as needed.     diazePAM 10 MG Tab  Commonly known as:   VALIUM  Take 10 mg by mouth 4 (four) times daily.     dronabinol 2.5 MG capsule  Commonly known as:  MARINOL  Take 1 capsule (2.5 mg total) by mouth 2 (two) times daily before meals.     enalapril 2.5 MG tablet  Commonly known as:  VASOTEC  Take 2.5 mg by mouth once daily. For diabetes     * furosemide 20 MG tablet  Commonly known as:  LASIX  TAKE ONE TABLET BY MOUTH EVERY DAY     * furosemide 20 MG tablet  Commonly known as:  LASIX  TAKE ONE TABLET BY MOUTH EVERY DAY     glimepiride 4 MG tablet  Commonly known as:  AMARYL  Take 4 mg by mouth daily with breakfast.     HAIR, SKIN, NAILS WITH BIOTIN ORAL  Take 1 tablet by mouth every morning.     metFORMIN 500 MG 24 hr tablet  Commonly known as:  GLUCOPHAGE-XR  Take 2,000 mg by mouth every evening.     multivitamin capsule  Take 1 capsule by mouth every morning.     pravastatin 40 MG tablet  Commonly known as:  PRAVACHOL  Take 40 mg by mouth every morning.     zolpidem 10 mg Tab  Commonly known as:  AMBIEN  Take 10 mg by mouth nightly as needed.         * This list has 2 medication(s) that are the same as other medications prescribed for you. Read the directions carefully, and ask your doctor or other care provider to review them with you.            STOP taking these medications    HYDROcodone-acetaminophen  mg per tablet  Commonly known as:  NORCO  Replaced by:  HYDROcodone-acetaminophen 5-325 mg per tablet          Discharge Procedure Orders   Diet general     Follow-up Information     Xiang Hernandez MD On 11/14/2018.    Specialty:  Plastic Surgery  Why:  For wound re-check  Contact information:  [facility] [facility] [id number] [phone]

## 2018-11-05 NOTE — DISCHARGE INSTRUCTIONS
You can shower after 48 hours. When showering, it is okay to let warm water with soap rinse over your wounds. Do not scrub your wounds or completely submerge your wounds in any body of water. Do not sit in a bath tub, spa, or pool. Do not go swimming. For your nipple wound, purchase over the counter antibiotic ointment and apply to the area three times a day. Any antibiotic ointment should be okay, as long as you aren't allergic to it. For your abdominal wound, it's okay to let it air dry after 48 hours. If oozing, apply gauze and skin tape over it.     Scar Revision Surgery  A scar is a lambert left after a wound has healed. Scar revision surgery can help improve the look of a scar or make it less visible. If the scar is tight and restricts movement of the skin, revision can improve this. No scar can be completely erased. But scar revision surgery can help a scar look and feel better.  Types of surgery  Scar revision surgery can be done in a number of ways. The method used depends on the type of scar you have and where it is on your body. You and your doctor will discuss these details before the surgery. Common methods of scar revision surgery include:  · Skin graft. Scar tissue is removed. It is then replaced with a piece of healthy skin (graft) taken from another part of the body.   · Z-plasty. A Z-shaped incision is made through the scar tissue and some healthy skin. (If the scar is very large, more than one incision may be made.) The Z-shape creates pointed skin flaps that can then be arranged for a more pleasing look and contour.  Preparing for surgery  Prepare for the surgery as you have been told. In addition:  · Tell your doctor about all medicines you take. This includes herbs and other supplements. It also includes any blood thinners, such as warfarin, clopidogrel, or daily aspirin. You may need to stop taking some or all of them before surgery.  · Follow any directions you are given for not eating or  drinking before surgery.  The day of surgery  The surgery takes 2 to 4 hours. You may go home the same day. Or you may stay 1 or more nights.  Before the surgery begins:  · An IV line is put into a vein in your arm or hand. This line delivers fluids and medicines.  · You will be given medicine (anesthesia) to keep you pain free during the surgery. You may receive sedation, which makes you relaxed and sleepy. Local anesthesia also used to numb the surgical area. In certain cases, general anesthesia is used instead. This puts you into a state like deep sleep during the surgery. With general anesthesia, a tube may be inserted into your throat to help you breathe. The anesthesiologist will discuss your options with you.  During the surgery:  · Your doctor will repair the scar using the method discussed with you prior to the surgery.  · You will be taken to a recovery room to wake up from the anesthesia. You may be sleepy and nauseated. If a breathing tube was used, your throat may be sore at first. You will be given medicine to manage pain. If the revision is to a large area, you may stay one or more nights in the hospital. Once you are ready to go home, have an adult family member or friend drive you.  Recovering at home  Once at home, follow the instructions you have been given. Your doctor will tell you when you can return to your normal routine. To help your healing:  · Take any prescribed medicines exactly as directed.  · If advised by your doctor, use a cold pack wrapped in a thin towel to relieve discomfort and control swelling. Its important not to leave the cold pack on for too long, or your skin could be damaged. Put the pack over your bandages for no more than 10 minutes at a time. Then, leave it off for at least 20 minutes. Repeat this as often as needed during waking hours until swelling starts to improve. Dont fall asleep with the cold pack on. If youre not sure how to safely use the cold pack, ask your  doctor.  · Follow all instructions from your doctor for taking care of the incision. Leave the bandage in place until you are told to remove or change it. Once you can change your bandage, do so every 24 hours or as directed. Also replace the bandage whenever it gets wet or dirty. Wash your hands before changing the bandage.  · Keep the surgical site clean and dry. You can get it wet after 48 hours. Rinse the surgical site gently and pat it dry.  · Keep the surgical site out of the sun as it heals. This will help ensure a better outcome. At first, cover the healing skin with a bandage or clothing. When your doctor says its okay, you can use sunscreen on the site.  When to call your doctor  Call your doctor if you have any of the following:  · Chest pain or trouble breathing (call 911 or other emergency service)  · A fever of 100.4°F (38.0°C) or higher (or as directed by your doctor)  · Symptoms of infection at an incision site, such as increased redness or swelling, warmth, worsening pain, or foul-smelling drainage  · Bleeding or drainage from the incision  · Pain that is not relieved by medication   Follow-up  You will have follow-up visits so your doctor can see how well youre healing. During these visits, your doctor can monitor the results of your surgery. Let your doctor know if you have any questions or concerns.  Risks and complications  Risks and possible complications include:  · Bleeding  · Infection  · Blood clots, which can travel to the lungs, heart, or brain  · Damage to nerves, muscles, or blood vessels  · Changes in skin sensitivity  · Skin discoloration  · Not liking look of revised scar  · Recurrence of a keloid scar  · Risks of anesthesia (the anesthesiologist will discuss these with you)   Date Last Reviewed: 7/15/2015  © 7089-5204 [employer]. [address] [place] [address], [place] [address]. All rights reserved. This information is not intended as a substitute for professional medical  care. Always follow your healthcare professional's instructions.

## 2018-11-05 NOTE — TELEPHONE ENCOUNTER
Spoke with pharmacist staff who state that the hydrocodone 5 mg cannot be accepted as capsules, has to be written as tabs. Can be escribed. Prescribing MD Dr Brandon Padilla informed. KEYSHAWN, LPN

## 2018-11-05 NOTE — HPI
Ms. Castro presents [facility] in   followup of her breast reconstruction.  She had a HEMANTH flap on the right side   and had some loss of tissue.  She had some fat grafting and developed an abscess   there, so she does have a depression in the upper medial quadrant of the right   breast.  I do not think we should do anything with it at that time.  The breast   actually looks really good.  The inframammary folds are symmetrical.  The volume   is symmetrical.  She just needs a little volume in that upper pole medially.  I   do not think we should do anything with that at this time.  She has just   overcome a bout of shingles.  She states it was in her eye.  She does need a   nipple reconstruction.  I think we should probably wait until about November to   do this.

## 2018-11-05 NOTE — ANESTHESIA POSTPROCEDURE EVALUATION
"Anesthesia Post Evaluation    Patient: Negrita Castro    Procedure(s) Performed: Procedure(s) (LRB):  RECONSTRUCTION-NIPPLE RIGHT (Right)  REVISION, SCAR, ABDOMINAL DONOR SITE (N/A)    Final Anesthesia Type: general  Patient location during evaluation: PACU  Patient participation: Yes- Able to Participate  Level of consciousness: awake and alert  Post-procedure vital signs: reviewed and stable  Pain management: adequate  Airway patency: patent  PONV status at discharge: No PONV  Anesthetic complications: no      Cardiovascular status: blood pressure returned to baseline  Respiratory status: unassisted  Hydration status: euvolemic  Follow-up not needed.        Visit Vitals  BP (!) 181/84   Pulse 75   Temp 36.6 °C (97.9 °F) (Temporal)   Resp (!) 22   Ht 5' 11" (1.803 m)   Wt 96.2 kg (212 lb)   LMP 01/16/2016   SpO2 100%   Breastfeeding? No   BMI 29.57 kg/m²       Pain/Larissa Score: Pain Assessment Performed: Yes (11/5/2018 10:00 AM)  Presence of Pain: complains of pain/discomfort (11/5/2018  9:55 AM)  Pain Rating Prior to Med Admin: 7 (11/5/2018 10:40 AM)  Larissa Score: 9 (11/5/2018  9:45 AM)        "

## 2018-11-05 NOTE — PLAN OF CARE
Pre op assessment complete. Pt needs: full H&P, surgical and anesthesia consents and site lambert.

## 2018-11-05 NOTE — H&P
[facility]  Plastic Surgery  History & Physical    Patient Name: Negrita Castro  MRN: [id number]  Admission Date: 11/5/2018  Attending Physician: Xiang Hernandez, *  Primary Care Provider: Yoni Renteria MD    Patient information was obtained from patient.     Subjective:     Chief Complaint/Reason for Admission: nipple reconstruction     History of Present Illness: Ms. Castro presents to [facility] in   followup of her breast reconstruction.  She had a HEMANTH flap on the right side   and had some loss of tissue.  She had some fat grafting and developed an abscess   there, so she does have a depression in the upper medial quadrant of the right   breast.  I do not think we should do anything with it at that time.  The breast   actually looks really good.  The inframammary folds are symmetrical.  The volume   is symmetrical.  She just needs a little volume in that upper pole medially.  I   do not think we should do anything with that at this time.  She has just   overcome a bout of shingles.  She states it was in her eye.  She does need a   nipple reconstruction.  I think we should probably wait until about November to   do this.    No current facility-administered medications on file prior to encounter.      Current Outpatient Medications on File Prior to Encounter   Medication Sig    carisoprodol (SOMA) 350 MG tablet Take 1 tablet (350 mg total) by mouth 3 (three) times daily as needed.    diazepam (VALIUM) 10 MG tablet Take 10 mg by mouth 4 (four) times daily.     dronabinol (MARINOL) 2.5 MG capsule Take 1 capsule (2.5 mg total) by mouth 2 (two) times daily before meals.    enalapril (VASOTEC) 2.5 MG tablet Take 2.5 mg by mouth once daily. For diabetes    glimepiride (AMARYL) 4 MG tablet Take 4 mg by mouth daily with breakfast.     HYDROcodone-acetaminophen (NORCO)  mg per tablet Take 1 tablet by mouth every 4 (four) hours as needed for Pain.    metformin  (GLUCOPHAGE-XR) 500 MG 24 hr tablet Take 2,000 mg by mouth every evening.     pravastatin (PRAVACHOL) 40 MG tablet Take 40 mg by mouth every morning.     zolpidem (AMBIEN) 10 mg Tab Take 10 mg by mouth nightly as needed.    ASCORBIC ACID/VITAMIN E/BIOTIN (HAIR, SKIN, NAILS WITH BIOTIN ORAL) Take 1 tablet by mouth every morning.     INSULIN GLARGINE,HUM.REC.ANLOG (BASAGLAR KWIKPEN SUBQ) Inject 25 Units into the skin every evening.     multivitamin capsule Take 1 capsule by mouth every morning.       Review of patient's allergies indicates:   Allergen Reactions    Adhesive tape-silicones Hives     BANDAIDS    Polymycin Hives    Adhesive     Latex, natural rubber Hives and Itching    Neomycin-bacitracnzn-polymyxnb     Polyurethane-39 Hives       Past Medical History:   Diagnosis Date    Breast cancer     Cancer     RIGHT BREAST 10-15    Depression     Diabetes mellitus     Neuropathy     Panic attacks     S/P epidural steroid injection     Seizures     none since early 30's    Wears glasses     TO DRIVE     Past Surgical History:   Procedure Laterality Date    BIOPSY-SENTINEL NODE Right 11/3/2015    [facility]formed [facility] at [facility] OR    BREAST BIOPSY      right    breast reconstruction with tummy [facility]      BREAST SURGERY      11-3-15 LUMPECTOMY, 12-2-15 REEXCISION    HEMANTH FREE FLAP Bilateral 2/14/2017    [facility] 2ND [facility]    EXCISION  HYPERTROPHY OF SCARS WITH ADV FLAP CLOSURE. Left 6/1/2017    [facility] OR 2ND [facility]LR    EXCISION-SKIN Dog Ear Left Hip Bilateral 8/31/2017    [facility] 2ND [facility]LR    FLAP-FREE- RE-ELEVATION OF INSET. Right 6/1/2017    [facility] 2ND [facility]LR    INSERTION-BREAST TISSUE EXPANDER Bilateral 1/29/2016    [facility] at [facility] OR    KQERDBSIJ-YHVN-X-CATH Left 3/21/2016    [facility]    INSERTION-TISSUE EXPANDER Right 1/29/2016    [facility] OR    LUMPECTOMY-BREAST Right 12/2/2015    [facility] at [facility] OR    LUMPECTOMY-BREAST Right 11/3/2015    [facility] at [facility] OR    mastectomy 2016      MASTECTOMY/RIGHT Right 1/29/2016    Performed [facility] at [facility] OR    MASTOPEXY Left 1/29/2016    Performed [facility] at [facility] OR    PLACEMENT ALLODERM Right 1/29/2016    Performed by [facility] at [facility] OR    RECONSTRUCTION-BREAST Right 1/29/2016    Performed [facility] at [facility] OR    RECONSTRUCTION-BREAST Bilateral 1/29/2016    Performed by [facility] at [facility] OR    RECONSTRUCTION/REVISION-BREAST  1) second stage Breast Reconstruction 2) Soft Tissue Revision - Abdomen N/A 6/1/2017    Performed [facility] at [facility] OR 2ND FLR    REMOVAL-PORT-A-CATH Left 11/15/2016    Performed [facility] at [facility] OR    shoulder surgery and wrist      BILAT  BONE SPUR    TRANSFER-FAT Right 2/26/2018    Performed [facility] at [facility] OR 2ND FLR    WRIST SURGERY      RIGHT     Family History     None        Tobacco Use    Smoking status: Former Smoker     Packs/day: 0.25     Years: 30.00     Pack years: 7.50     Last attempt to quit: 9/28/2015     Years since quitting: 3.1    Smokeless tobacco: Never Used   Substance and Sexual Activity    Alcohol use: Yes     Alcohol/week: 0.0 oz     Comment: RARELY    Drug use: Not on file     Comment: medical marijuana    Sexual activity: Not on file     Review of Systems   All other systems reviewed and are negative.    Objective:     Vital Signs (Most Recent):  Temp: 97.6 °F (36.4 °C) (11/05/18 0559)  Pulse: (!) 59 (11/05/18 0559)  Resp: 18 (11/05/18 0559)  BP: (!) 174/78 (11/05/18 0559)  SpO2: 98 % (11/05/18 0559) Vital Signs (24h  Range):  Temp:  [97.6 °F (36.4 °C)] 97.6 °F (36.4 °C)  Pulse:  [59] 59  Resp:  [18] 18  SpO2:  [98 %] 98 %  BP: (174)/(78) 174/78     Weight: 96.2 kg (212 lb)  Body mass index is 29.57 kg/m².    Physical Exam   Constitutional: She appears well-developed and well-nourished. No distress.   HENT:   Head: Normocephalic and atraumatic.   Eyes: EOM are normal.   Neck: Normal range of motion.   Cardiovascular: Normal rate.   Pulmonary/Chest: Effort normal.   Abdominal: Soft.   Neurological: She is alert.   Skin: Skin is warm and dry.   Psychiatric: She has a normal mood and affect. Her behavior is normal.   Nursing note and vitals reviewed.      Assessment/Plan:     Nipple anomaly    58-year-old female s/p HEMANTH flap now in need of nipple reconstruction.   -Plan on right nipple reconstruction today       VTE Risk Mitigation (From admission, onward)        Ordered     Place sequential compression device  Until discontinued      11/05/18 0538     IP VTE HIGH RISK PATIENT  Once      11/05/18 0538     heparin (porcine) injection 5,000 Units  Every 8 hours      11/05/18 0538          Brandon Padilla MD  Plastic Surgery  [facility]

## 2018-11-05 NOTE — ASSESSMENT & PLAN NOTE
58-year-old female s/p HEMANTH flap now in need of nipple reconstruction.   -Plan on right nipple reconstruction today

## 2018-11-05 NOTE — TRANSFER OF CARE
"Anesthesia Transfer of Care Note    Patient: Negrita Castro    Procedure(s) Performed: Procedure(s) (LRB):  RECONSTRUCTION-NIPPLE RIGHT (Right)  REVISION, SCAR, ABDOMINAL DONOR SITE (N/A)    Patient location: PACU    Anesthesia Type: general    Transport from OR: Transported from OR on room air with adequate spontaneous ventilation    Post pain: adequate analgesia    Post assessment: tolerated procedure well and no apparent anesthetic complications    Post vital signs: stable    Level of consciousness: awake    Nausea/Vomiting: no nausea/vomiting    Complications: none    Transfer of care protocol was followed      Last vitals: 11/05/18 0947  Visit Vitals  /93   Pulse 81   Temp 97.6   Resp 14   Ht 5' 11" (1.803 m)   Wt 96.2 kg (212 lb)   LMP 01/16/2016   SpO2 98%   Breastfeeding? No   BMI 29.57 kg/m²     "

## 2018-11-06 NOTE — OP NOTE
DATE OF PROCEDURE:  11/05/2018    PREOPERATIVE DIAGNOSIS:  History of breast cancer.    POSTOPERATIVE DIAGNOSIS:  History of breast cancer.    PROCEDURES PERFORMED:  1.  Right nipple reconstruction.  2.  Excision of released scar contracture of the lower abdomen with advancement   flap closure.  Advancement flap measures approximately 20 cm x 5 cm.    SURGEON:  Xiang Hernandez M.D., [facility]    ANESTHESIA:  General.    DESCRIPTION OF PROCEDURE:  The patient was evaluated in the preoperative holding   area.  The patient was instructed to place a circular Band-Aid where she   believes her nipple was best suited to match the opposite side.  This was done,   it was checked, and it looked in great position.  The patient also had a tight   painful contracture of the lower abdomen.  I had a very wide scar and long scar   in the midline.  The scar contracture would be released and advancement flap   would be used to close this.  The patient was taken to the Operative Room and   placed in the supine position.  After adequate general anesthesia, was prepped   and draped in the normal sterile fashion.  A modified skate flap was then marked   on the patient.  The incisions were made.  The flaps were wrapped around each   other and then closed using interrupted 5-0 nylon.  Donor site was closed using   interrupted 4-0 Monocryl followed by interrupted 5-0 nylon.  Attention was   turned to the abdomen.  Approximately 20 cm incision was then made around the   scar contracture and it was not freed up from the fascia beneath.  Next, flaps   were elevated superiorly.  They were advanced inferiorly.  Incision was closed   in three layers using running 2-0 Vicryl, interrupted 3-0 Monocryl and running   4-0 Monocryl subcuticular suture.  There were no complications.      CRB/HN  dd: 11/06/2018 16:04:47 (CST)  td: 11/06/2018 16:16:00 (CST)  Doc ID   #[id number]  Job ID #[id number]    CC:

## 2018-11-14 ENCOUNTER — OFFICE VISIT (OUTPATIENT)
Dept: HEMATOLOGY/ONCOLOGY | Facility: CLINIC | Age: 58
End: 2018-11-14
Payer: MEDICARE

## 2018-11-14 VITALS
TEMPERATURE: 98 F | WEIGHT: 209 LBS | SYSTOLIC BLOOD PRESSURE: 141 MMHG | RESPIRATION RATE: 20 BRPM | DIASTOLIC BLOOD PRESSURE: 94 MMHG | BODY MASS INDEX: 29.15 KG/M2 | HEART RATE: 75 BPM

## 2018-11-14 DIAGNOSIS — C50.011 MALIGNANT NEOPLASM OF NIPPLE OF RIGHT BREAST IN FEMALE, ESTROGEN RECEPTOR POSITIVE: Primary | ICD-10-CM

## 2018-11-14 DIAGNOSIS — Z17.0 MALIGNANT NEOPLASM OF NIPPLE OF RIGHT BREAST IN FEMALE, ESTROGEN RECEPTOR POSITIVE: Primary | ICD-10-CM

## 2018-11-14 PROCEDURE — 3008F BODY MASS INDEX DOCD: CPT | Mod: ,,, | Performed by: INTERNAL MEDICINE

## 2018-11-14 PROCEDURE — 99214 OFFICE O/P EST MOD 30 MIN: CPT | Mod: ,,, | Performed by: INTERNAL MEDICINE

## 2018-11-14 NOTE — PATIENT INSTRUCTIONS
Types of Surgery for Breast Cancer  Several types of surgery are used to treat breast cancer. The goal of each is to remove the cancer. You and your surgeon will decide which approach is best for you.  In these drawings, the tissue within the dotted line is the part removed for each type of surgery.            Lumpectomy  The goal of lumpectomy is to remove the cancer while conserving the breast. In fact, lumpectomy may also be called breast-conserving surgery. To do this surgery, the surgeon removes the breast tissue that contains the cancer cells. A margin of normal tissue around the cancer is also taken out. This surgery may not need a hospital stay. In most cases, it is followed by radiation therapy.  Simple mastectomy  During a simple mastectomy, the surgeon removes all of the breast tissue, plus the nipple. Underarm lymph nodes and the chest muscles are not removed. This surgery most often needs a hospital stay. Based on the results of surgery and follow-up tests, more treatment may also be needed.  Modified radical mastectomy  During modified radical mastectomy, the surgeon removes all the breast tissue and the nipple. Some of the lymph nodes in the armpit near the breast are removed as well. This surgery most often needs a hospital stay. Based on the results of follow-up tests, more treatment may be needed.  Checking the lymph nodes  If cancer cells break away from a tumor, they can travel in the lymph fluid to nearby lymph nodes. During surgery, your first lymph node or nodes may be removed and checked for cancer. This is called a sentinel node biopsy. In some cases, all of the nodes are removed. If the nodes contain cancer, more treatment is often needed because the cancer may have spread to other parts of the body.    Risks and possible complications  Risks of breast and lymph node surgery include:  · Pain (which improves as you heal and recover)  · Numbness (due to nerve damage)  · Bleeding or  infection  · Fluid collection (seroma)  · Long-term swelling of the arm (lymphedema)  · Stiffness of the shoulder  Your healthcare team can tell you what to expect based on the type of surgery you have.    Date Last Reviewed: 10/31/2015  © 5736-2819 [employer]. [address] [place] [address]. All rights reserved. This information is not intended as a substitute for professional medical care. Always follow your healthcare professional's instructions.

## 2018-11-14 NOTE — LETTER
November 14, 2018      Yoni Renteria MD  [address]  [place] [address]           [facility] Hematology Oncology  [address]  [address]  [place] [address]  Phone: [phone]  Fax: [phone]          Patient: Negrita Castro   MR Number: [id number]   YOB: 1960   Date of Visit: 11/14/2018       Dear Dr. Yoni Renteria:    Thank you for referring Negrita Castro to me for evaluation. Attached you will find relevant portions of my assessment and plan of care.    If you have questions, please do not hesitate to call me. I look forward to following Negrita Castro along with you.    Sincerely,    TALISHA Vick MD    Enclosure  CC:  No Recipients    If you would like to receive this communication electronically, please contact [email or web address] or [phone] to request more information on [employer] access.    For providers and/or their staff who would like to refer a patient to [facility], please contact us through our one-stop-shop provider referral line, [facility], at [phone].    If you feel you have received this communication in error or would no longer like to receive these types of communications, please e-mail [email or web address]

## 2018-11-15 NOTE — PROGRESS NOTES
"     [facility] History & Physical    Subjective:      Patient ID:   Negrita Castro  58 y.o.   Martínez Renteria R. Leblanc      Chief Complaint:   Breast cancer follow up    HPI:  58 y.o. female with diagnosis of Stage 1 R breast cancer 10/26/15.  "Triple negative".  Adjuvant AC x's 4, tx's 12 and Rad Rx through 16.    Port removed.  Had bilateral reconstruction surgery 17.  Further reconstruction, tummy tuck 17.  Additional fat injection at R chest done for symmetry.    She had placement of R nipple.    DM, cholesterol, epilepsy hx, DJD, LBP.  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    LR shoulder arthroscopy, R wrist surgery, M0.    Smoke  ppd x's 35 years, quit 2015.  ETOH no.  Disability-depression, epilepsy  Allergy none    Mom ovarian cancer  Dad lung cancer  Sisters DM, lung dx.      ROS:   GEN: normal without any fever, night sweats or weight loss  HEENT: normal with no HA's, sore throat, stiff neck, changes in vision  CV: normal with no CP, SOB, PND, POOL or orthopnea  PULM: normal with no SOB, cough, hemoptysis, sputum or pleuritic pain  GI: normal with no abdominal pain, nausea, vomiting, constipation, diarrhea, melanotic stools, BRBPR, or hematemesis  : normal with no hematuria, dysuria  BREAST: See HPI.  SKIN: normal with no rash, erythema, bruising, or swelling     Past Medical History:   Diagnosis Date    Breast cancer     Cancer     RIGHT BREAST 10-15    Depression     Diabetes mellitus     Neuropathy     Panic attacks     S/P epidural steroid injection     Seizures     none since early     Wears glasses     TO DRIVE     Past Surgical History:   Procedure Laterality Date    BIOPSY-SENTINEL NODE Right 11/3/2015    [facility]    BREAST BIOPSY      right    breast reconstruction with tummy tuck      BREAST SURGERY      11-3-15 LUMPECTOMY, 12-2-15 REEXCISION    HEMANTH FREE FLAP Bilateral 2017    Performed by Xiang Hernandez MD " at [facility]    EXCISION  HYPERTROPHY OF SCARS WITH ADV FLAP CLOSURE. Left 6/1/2017    Performed by [facility] at [facility]ND [facility]LR    EXCISION-SKIN Dog Ear Left Hip Bilateral 8/31/2017    [facility] at [facility]    FLAP-FREE- RE-ELEVATION OF INSET. Right 6/1/2017    Performed by [facility] at [facility] 2ND [facility]    INSERTION-BREAST TISSUE EXPANDER Bilateral 1/29/2016    [facility] at [facility] OR    VCFAJPNHZ-PWRE-Z-CATH Left 3/21/2016    [facility]    INSERTION-TISSUE EXPANDER Right 1/29/2016    [facility] at [facility] OR    LUMPECTOMY-BREAST Right 12/2/2015    [facility] at [facility] OR    LUMPECTOMY-BREAST Right 11/3/2015    [facility] at [facility] OR    mastectomy 2016      MASTECTOMY/RIGHT Right 1/29/2016    [facility]formed [facility] at [facility] OR    MASTOPEXY Left 1/29/2016    [facility] at [facility] OR    PLACEMENT ALLODERM Right 1/29/2016    [facility] at [facility] OR    RECONSTRUCTION OF NIPPLE Right 11/5/2018    Procedure: RECONSTRUCTION-NIPPLE RIGHT;  Surgeon: Xiang Hernandez MD;  [facility];  Service: Plastics;  Laterality: Right;    RECONSTRUCTION-BREAST Right 1/29/2016    [facility] at [facility] OR    RECONSTRUCTION-BREAST Bilateral 1/29/2016    [facility]formed by [facility] at [facility] OR    RECONSTRUCTION-NIPPLE RIGHT Right 11/5/2018    [facility]formed [facility] at [facility] 2ND [facility]LR    RECONSTRUCTION/REVISION-BREAST  1) second stage Breast Reconstruction 2) Soft Tissue Revision - Abdomen N/A 6/1/2017    Performed by [facility] at [facility] OR 2ND [facility]LR    REMOVAL-PORT-A-CATH Left 11/15/2016    [facility] at [facility] OR    REVISION, SCAR, ABDOMINAL DONOR SITE N/A  11/5/2018    [facility] 2ND [facility]    shoulder surgery and wrist      BILAT  BONE SPUR    TRANSFER-FAT Right 2/26/2018    [facility] 2ND [facility]LR    WRIST SURGERY      RIGHT       Review of patient's allergies indicates:   Allergen Reactions    Adhesive tape-silicones Hives     BANDAIDS    Polymycin Hives    Latex, natural rubber Itching    Polyurethane-39      Social History     Socioeconomic History    Marital status: Single     Spouse name: Not on file    Number of children: Not on file    Years of education: Not on file    Highest education level: Not on file   Social Needs    Financial resource strain: Not on file    Food insecurity - worry: Not on file    Food insecurity - inability: Not on file    Transportation needs - medical: Not on file    Transportation needs - non-medical: Not on file   Occupational History    Not on file   Tobacco Use    Smoking status: Former Smoker     Packs/day: 0.25     Years: 30.00     Pack years: 7.50     Last attempt to quit: 9/28/2015     Years since quitting: 3.1    Smokeless tobacco: Never Used   Substance and Sexual Activity    Alcohol use: Yes     Alcohol/week: 0.0 oz     Comment: RARELY    Drug use: Not on file     Comment: medical marijuana    Sexual activity: Not on file   Other Topics Concern    Not on file   Social History Narrative    Not on file         Current Outpatient Medications:     ASCORBIC ACID/VITAMIN E/BIOTIN (HAIR, SKIN, NAILS WITH BIOTIN ORAL), Take 1 tablet by mouth every morning. , Disp: , Rfl:     carisoprodol (SOMA) 350 MG tablet, Take 1 tablet (350 mg total) by mouth 3 (three) times daily as needed., Disp: 90 tablet, Rfl: 0    diazepam (VALIUM) 10 MG tablet, Take 10 mg by mouth 4 (four) times daily. , Disp: , Rfl:     dronabinol (MARINOL) 2.5 MG capsule, Take 1 capsule (2.5 mg total) by mouth 2 (two) times daily before meals., Disp: 30 capsule, Rfl:  1    enalapril (VASOTEC) 2.5 MG tablet, Take 2.5 mg by mouth once daily. For diabetes, Disp: , Rfl: 1    furosemide (LASIX) 20 MG tablet, TAKE ONE TABLET BY MOUTH EVERY DAY, Disp: 30 tablet, Rfl: 4    furosemide (LASIX) 20 MG tablet, TAKE ONE TABLET BY MOUTH EVERY DAY, Disp: 30 tablet, Rfl: 4    glimepiride (AMARYL) 4 MG tablet, Take 4 mg by mouth daily with breakfast. , Disp: , Rfl:     INSULIN GLARGINE,HUM.REC.ANLOG (BASAGLAR KWIKPEN SUBQ), Inject 25 Units into the skin every evening. , Disp: , Rfl:     metformin (GLUCOPHAGE-XR) 500 MG 24 hr tablet, Take 2,000 mg by mouth every evening. , Disp: , Rfl:     multivitamin capsule, Take 1 capsule by mouth every morning., Disp: , Rfl:     oxyCODONE (OXY-IR) 5 mg Cap, Take 1 capsule (5 mg total) by mouth every 4 (four) hours as needed for Pain., Disp: 31 capsule, Rfl: 0    pravastatin (PRAVACHOL) 40 MG tablet, Take 40 mg by mouth every morning. , Disp: , Rfl: 1    zolpidem (AMBIEN) 10 mg Tab, Take 10 mg by mouth nightly as needed., Disp: , Rfl: 0          Objective:   Vitals:  Blood pressure (!) 141/94, pulse 75, temperature 97.9 °F (36.6 °C), resp. rate 20, weight 94.8 kg (209 lb), last menstrual period 01/16/2016.    Physical Examination:   GEN: no apparent distress, comfortable  HEAD: atraumatic and normocephalic  EYES: no pallor, no icterus  ENT: no pharyngeal erythema, external ears WNL; no nasal discharge; no thrush  NECK: no masses, thyroid normal, trachea midline, no LAD/LN's, supple  CV: RRR with no murmur; normal pulse  CHEST: Normal respiratory effort; CTAB; normal breath sounds; no wheeze or crackles  ABDOM:  nondistended; soft;  no rebound/guarding, abdomenal wall healing, liver spleen not palpated  MUSC/Skeletal: ROM normal; no crepitus; joints normal  EXTREM: no clubbing, cyanosis, inflammation or swelling  SKIN: She is developing keloid changes at L breast incision and navel surgical sites.  : no CVAT  NEURO: grossly intact; motor/sensory WNL;  "no tremors  PSYCH: normal mood, affect and behavior  LYMPH: normal cervical, supraclavicular, axillary and groin LN's  BREASTS: L and "R breast" healing, extensive post op change, at chest .  No palpable mass.      Labs:   Lab Results   Component Value Date    WBC 5.40 10/24/2018    HGB 13.2 10/24/2018    HCT 39.3 10/24/2018    MCV 89 10/24/2018     10/24/2018    CMP    Bone Scan negative for metastatic dx. 2/7/18.  CAT of chest negative for metastatic dx. 2/7/18.  Assessment:   (1) 58 y.o. female with diagnosis of Stage 1 triple negative R breast cancer, 10/2016.       S/P R mastectomy, SNBx, AC x's 4, T x's12 weeks, Rad Rx and recent reconstruction.    (2)Keloids developing, Dr. Hernandez injecting scar areas.    (3)Refill marinol, norco.    RTC 4 months.               "

## 2018-11-16 ENCOUNTER — OFFICE VISIT (OUTPATIENT)
Dept: PLASTIC SURGERY | Facility: CLINIC | Age: 58
End: 2018-11-16
Payer: MEDICARE

## 2018-11-16 VITALS
HEIGHT: 71 IN | WEIGHT: 208 LBS | TEMPERATURE: 98 F | SYSTOLIC BLOOD PRESSURE: 149 MMHG | BODY MASS INDEX: 29.12 KG/M2 | DIASTOLIC BLOOD PRESSURE: 87 MMHG | HEART RATE: 65 BPM

## 2018-11-16 DIAGNOSIS — Z09 SURGERY FOLLOW-UP EXAMINATION: Primary | ICD-10-CM

## 2018-11-16 PROCEDURE — 99024 POSTOP FOLLOW-UP VISIT: CPT | Mod: S$GLB,,, | Performed by: SURGERY

## 2018-11-16 PROCEDURE — 99999 PR PBB SHADOW E&M-EST. PATIENT-LVL III: CPT | Mod: PBBFAC,,, | Performed by: SURGERY

## 2018-11-16 NOTE — PROGRESS NOTES
Sp Right nipple reconstruction. Looks nice. One tiny area on top flap non-viable.  Abdominal scar looks good.  Remove doner nipple sutures today. The remainder next week

## 2018-12-07 ENCOUNTER — OFFICE VISIT (OUTPATIENT)
Dept: PLASTIC SURGERY | Facility: CLINIC | Age: 58
End: 2018-12-07
Payer: MEDICARE

## 2018-12-07 VITALS
HEIGHT: 71 IN | HEART RATE: 64 BPM | DIASTOLIC BLOOD PRESSURE: 85 MMHG | WEIGHT: 209 LBS | SYSTOLIC BLOOD PRESSURE: 148 MMHG | BODY MASS INDEX: 29.26 KG/M2

## 2018-12-07 DIAGNOSIS — Z09 SURGERY FOLLOW-UP EXAMINATION: Primary | ICD-10-CM

## 2018-12-07 PROCEDURE — 99999 PR PBB SHADOW E&M-EST. PATIENT-LVL III: CPT | Mod: PBBFAC,,, | Performed by: PHYSICIAN ASSISTANT

## 2018-12-07 PROCEDURE — 99024 POSTOP FOLLOW-UP VISIT: CPT | Mod: S$GLB,,, | Performed by: PHYSICIAN ASSISTANT

## 2018-12-07 NOTE — PROGRESS NOTES
Negrita Castro presents to [facility] on 12/7/2018 for a follow up visit status post second stage breast reconstruction with R nipple recon and scar release of lower abdomen on 11/05/2018. She is doing well today with no issues since her last visit. She denies fever, chills, N/V, pain or other systemic signs of infection.     Review of patient's allergies indicates:   Allergen Reactions    Adhesive tape-silicones Hives     BANDAIDS    Polymycin Hives    Adhesive     Latex, natural rubber Hives and Itching    Neomycin-bacitracnzn-polymyxnb     Polyurethane-39 Hives     Current Outpatient Medications on File Prior to Visit   Medication Sig Dispense Refill    ASCORBIC ACID/VITAMIN E/BIOTIN (HAIR, SKIN, NAILS WITH BIOTIN ORAL) Take 1 tablet by mouth every morning.       carisoprodol (SOMA) 350 MG tablet Take 1 tablet (350 mg total) by mouth 3 (three) times daily as needed. 90 tablet 0    diazepam (VALIUM) 10 MG tablet Take 10 mg by mouth 4 (four) times daily.       dronabinol (MARINOL) 2.5 MG capsule Take 1 capsule (2.5 mg total) by mouth 2 (two) times daily before meals. 30 capsule 1    enalapril (VASOTEC) 2.5 MG tablet Take 2.5 mg by mouth once daily. For diabetes  1    furosemide (LASIX) 20 MG tablet TAKE ONE TABLET BY MOUTH EVERY DAY 30 tablet 4    furosemide (LASIX) 20 MG tablet TAKE ONE TABLET BY MOUTH EVERY DAY 30 tablet 4    glimepiride (AMARYL) 4 MG tablet Take 4 mg by mouth daily with breakfast.       INSULIN GLARGINE,HUM.REC.ANLOG (BASAGLAR KWIKPEN SUBQ) Inject 25 Units into the skin every evening.       metformin (GLUCOPHAGE-XR) 500 MG 24 hr tablet Take 2,000 mg by mouth every evening.       multivitamin capsule Take 1 capsule by mouth every morning.      oxyCODONE (OXY-IR) 5 mg Cap Take 1 capsule (5 mg total) by mouth every 4 (four) hours as needed for Pain. 31 capsule 0    pravastatin (PRAVACHOL) 40 MG tablet Take 40 mg by mouth every morning.   1    zolpidem  (AMBIEN) 10 mg Tab Take 10 mg by mouth nightly as needed.  0     No current facility-administered medications on file prior to visit.      Patient Active Problem List   Diagnosis    Depression    Anxiety disorder due to known physiological condition    Dysthymic disorder    Neuropathy    Type 2 diabetes mellitus without complication, without long-term current use of insulin    Atypical chest pain    Breast cancer, right    Breast cancer    Nipple anomaly     Past Surgical History:   Procedure Laterality Date    BIOPSY-SENTINEL NODE Right 11/3/2015    [facility]    BREAST BIOPSY      right    breast reconstruction [facility] tummy [facility]      BREAST SURGERY      11-3-15 LUMPECTOMY, 12-2-15 REEXCISION    HEMANTH FREE FLAP Bilateral 2/14/2017    [facility] 2ND FLR    EXCISION  HYPERTROPHY OF SCARS WITH ADV FLAP CLOSURE. Left 6/1/2017    Performed [facility] at [facility] 2ND [facility]LR    EXCISION-SKIN Dog Ear Left Hip Bilateral 8/31/2017    [facility] at [facility] 2[facility]LR    FLAP-FREE- RE-ELEVATION OF INSET. Right 6/1/2017    [facility]formed [facility] at [facility] 2[facility]LR    INSERTION-BREAST TISSUE EXPANDER Bilateral 1/29/2016    [facility]    [id number]-L-CATH Left 3/21/2016    [facility]    INSERTION-TISSUE EXPANDER Right 1/29/2016    [facility] at [facility]    LUMPECTOMY-BREAST Right 12/2/2015    [facility] at [facility]    LUMPECTOMY-BREAST Right 11/3/2015    [facility] at [facility] OR    mastectomy 2016      MASTECTOMY/RIGHT Right 1/29/2016    [facility] at [facility]    MASTOPEXY Left 1/29/2016    [facility] at [facility]    PLACEMENT ALLODERM Right 1/29/2016    [facility]    RECONSTRUCTION OF NIPPLE Right 11/5/2018    Procedure: RECONSTRUCTION-NIPPLE RIGHT;  Surgeon: Xiang Hernandez MD;  Location: [facility] OR [address] [place] [address];  Service: Plastics;  Laterality: Right;    RECONSTRUCTION-BREAST Right 1/29/2016    [facility] at [facility] OR    RECONSTRUCTION-BREAST Bilateral 1/29/2016    [facility] at [facility] OR    RECONSTRUCTION-NIPPLE RIGHT Right 11/5/2018    [facility] 2ND [facility]    RECONSTRUCTION/REVISION-BREAST  1) second stage Breast Reconstruction 2) Soft Tissue Revision - Abdomen N/A 6/1/2017    [facility] 2ND [facility]LR    REMOVAL-PORT-A-CATH Left 11/15/2016    [facility]    REVISION, SCAR, ABDOMINAL DONOR SITE N/A 11/5/2018    [facility] 2ND [facility]LR    shoulder surgery and wrist      BILAT  BONE SPUR    TRANSFER-FAT Right 2/26/2018    [facility] 2ND [facility]LR    WRIST SURGERY      RIGHT     Social History     Socioeconomic History    Marital status: Single     Spouse name: Not on file    Number of children: Not on file    Years of education: Not on file    Highest education level: Not on file   Social Needs    Financial resource strain: Not on file    Food insecurity - worry: Not on file    Food insecurity - inability: Not on file    Transportation needs - medical: Not on file    Transportation needs - non-medical: Not on file   Occupational History    Not on file   Tobacco Use    Smoking status: Former Smoker     Packs/day: 0.25     Years: 30.00     Pack years: 7.50     Last attempt to quit: 9/28/2015     Years since quitting: 3.1    Smokeless tobacco: Never Used   Substance and Sexual Activity    Alcohol use: Yes     Alcohol/week: 0.0 oz     Comment: RARELY    Drug use: Not on file     Comment: medical marijuana     Sexual activity: Not on file   Other Topics Concern    Not on file   Social History Narrative    Not on file       DATE OF PROCEDURE:  11/05/2018     PREOPERATIVE DIAGNOSIS:  History of breast cancer.     POSTOPERATIVE DIAGNOSIS:  History of breast cancer.     PROCEDURES PERFORMED:  1.  Right nipple reconstruction.  2.  Excision of released scar contracture of the lower abdomen with advancement   flap closure.  Advancement flap measures approximately 20 cm x 5 cm.     SURGEON:  Xiang Hernandez M.D., FACS     ANESTHESIA:  General.    ROS - negative    PHYSICAL EXAMINATION  Vitals:    12/07/18 0912   BP: (!) 148/85   Pulse: 64     WD WN NAD  VSS  Normal resp effort  R breast - flaps viable, incision CDI, sutures intact  L breast - incisions healed, circumareolar keloid  Abdomen - incision healed with no erythema/drainage    ASSESSMENT/PLAN  58 y.o. F s/p second stage breast reconstruction  - Doing well, no issues.   - Nipple flaps viable, all remaining sutures removed  - Abdomen well healed, pleased with outcome  - RTC x 6 weeks    All questions were answered. The patient was advised to call the clinic with any questions or concerns prior to their next visit.

## 2018-12-13 ENCOUNTER — OFFICE VISIT (OUTPATIENT)
Dept: ORTHOPEDICS | Facility: CLINIC | Age: 58
End: 2018-12-13
Payer: MEDICARE

## 2018-12-13 VITALS
WEIGHT: 208 LBS | HEART RATE: 82 BPM | HEIGHT: 71 IN | DIASTOLIC BLOOD PRESSURE: 82 MMHG | SYSTOLIC BLOOD PRESSURE: 152 MMHG | BODY MASS INDEX: 29.12 KG/M2

## 2018-12-13 DIAGNOSIS — M22.42 CHONDROMALACIA OF LEFT PATELLA: Primary | ICD-10-CM

## 2018-12-13 PROCEDURE — 99213 OFFICE O/P EST LOW 20 MIN: CPT | Mod: 25,,, | Performed by: ORTHOPAEDIC SURGERY

## 2018-12-13 PROCEDURE — 3008F BODY MASS INDEX DOCD: CPT | Mod: ,,, | Performed by: ORTHOPAEDIC SURGERY

## 2018-12-13 PROCEDURE — 20610 DRAIN/INJ JOINT/BURSA W/O US: CPT | Mod: LT,,, | Performed by: ORTHOPAEDIC SURGERY

## 2018-12-13 PROCEDURE — 73562 X-RAY EXAM OF KNEE 3: CPT | Mod: LT,,, | Performed by: ORTHOPAEDIC SURGERY

## 2018-12-13 RX ORDER — METHYLPREDNISOLONE ACETATE 40 MG/ML
40 INJECTION, SUSPENSION INTRA-ARTICULAR; INTRALESIONAL; INTRAMUSCULAR; SOFT TISSUE
Status: DISCONTINUED | OUTPATIENT
Start: 2018-12-13 | End: 2018-12-13 | Stop reason: HOSPADM

## 2018-12-13 RX ORDER — HYDROCODONE BITARTRATE AND ACETAMINOPHEN 10; 325 MG/1; MG/1
1 TABLET ORAL EVERY 6 HOURS PRN
Refills: 0 | COMMUNITY
Start: 2018-11-28 | End: 2021-01-26 | Stop reason: SDUPTHER

## 2018-12-13 RX ADMIN — METHYLPREDNISOLONE ACETATE 40 MG: 40 INJECTION, SUSPENSION INTRA-ARTICULAR; INTRALESIONAL; INTRAMUSCULAR; SOFT TISSUE at 12:12

## 2018-12-13 NOTE — PROCEDURES
Large Joint Aspiration/Injection: L knee  Date/Time: 12/13/2018 12:37 PM  Performed by: Chepe Stewart MD  Authorized by: Chepe Stewart MD     Consent Done?:  Yes (Verbal)  Indications:  Pain  Procedure site marked: Yes    Timeout: Prior to procedure the correct patient, procedure, and site was verified      Location:  Knee  Site:  L knee  Prep: Patient was prepped and draped in usual sterile fashion    Needle size:  25 G  Medications:  40 mg methylPREDNISolone acetate 40 mg/mL; 40 mg methylPREDNISolone acetate 40 mg/mL  Patient tolerance:  Patient tolerated the procedure well with no immediate complications

## 2018-12-13 NOTE — PROGRESS NOTES
[facility]    Subjective:     Chief Complaint:   Chief Complaint   Patient presents with    Left Knee - Pain     Left knee pain x 3-4 weeks. States that it bothers her the most when she bends on it. States that today it started to hurt when she walks. States that the pain is not constant and that she is not able to kneel,bend on it.        Past Medical History:   Diagnosis Date    Breast cancer     Cancer     RIGHT BREAST 10-15    Depression     Diabetes mellitus     Neuropathy     Panic attacks     S/P epidural steroid injection     Seizures     none since early 30's    Wears glasses     TO DRIVE       Past Surgical History:   Procedure Laterality Date    BIOPSY-SENTINEL NODE Right 11/3/2015    Performed by [facility] at [facility] OR    BREAST BIOPSY      right    breast reconstruction with tummy tuck      BREAST SURGERY      11-3-15 LUMPECTOMY, 12-2-15 REEXCISION    HEMANTH FREE FLAP Bilateral 2/14/2017    Performed [facility] 2ND [facility]LR    EXCISION  HYPERTROPHY OF SCARS WITH ADV FLAP CLOSURE. Left 6/1/2017    Performed by [facility] at [facility]ND [facility]    EXCISION-SKIN Dog Ear Left Hip Bilateral 8/31/2017    [facility]formed by [facility] at [facility]    FLAP-FREE- RE-ELEVATION OF INSET. Right 6/1/2017    Performed by [facility] at [facility] 2ND [facility]    INSERTION-BREAST TISSUE EXPANDER Bilateral 1/29/2016    [facility]formed [facility] at [facility] OR    YIWSHWQAG-MNPG-Y-CATH Left 3/21/2016    [facility]    INSERTION-TISSUE EXPANDER Right 1/29/2016    [facility]formed by [facility] at [facility] OR    LUMPECTOMY-BREAST Right 12/2/2015    [facility]formed [facility] at [facility] OR    LUMPECTOMY-BREAST Right 11/3/2015    [facility]formed [facility] at [facility] OR    mastectomy 2016      MASTECTOMY/RIGHT Right 1/29/2016    Performed by [facility]  at [facility] OR    MASTOPEXY Left 1/29/2016    [facility]formed by [facility] at [facility] OR    PLACEMENT ALLODERM Right 1/29/2016    Performed by [facility] at [facility] OR    RECONSTRUCTION OF NIPPLE Right 11/5/2018    Procedure: RECONSTRUCTION-NIPPLE RIGHT;  Surgeon: Xiang Hernandez MD;  Location: [facility] OR [address] [place] [address];  Service: Plastics;  Laterality: Right;    RECONSTRUCTION-BREAST Right 1/29/2016    Performed by [facility] at [facility] OR    RECONSTRUCTION-BREAST Bilateral 1/29/2016    Performed by [facility] at [facility] OR    RECONSTRUCTION-NIPPLE RIGHT Right 11/5/2018    Performed by [facility] at [facility]    RECONSTRUCTION/REVISION-BREAST  1) second stage Breast Reconstruction 2) Soft Tissue Revision - Abdomen N/A 6/1/2017    Performed by [facility]iang Hernandez MD at [facility] OR 2ND [facility]    REMOVAL-PORT-A-CATH Left 11/15/2016    Performed by [facility]ick [facility] at [facility] OR    REVISION, SCAR, ABDOMINAL DONOR SITE N/A 11/5/2018    Performed by [facility] MD at [facility] OR 2ND [facility]LR    shoulder surgery and wrist      BILAT  BONE SPUR    TRANSFER-FAT Right 2/26/2018    Performed by [facility] at [facility] OR 2ND [facility]    WRIST SURGERY      RIGHT       Current Outpatient Medications   Medication Sig    carisoprodol (SOMA) 350 MG tablet Take 1 tablet (350 mg total) by mouth 3 (three) times daily as needed.    diazepam (VALIUM) 10 MG tablet Take 10 mg by mouth 4 (four) times daily.     dronabinol (MARINOL) 2.5 MG capsule Take 1 capsule (2.5 mg total) by mouth 2 (two) times daily before meals.    enalapril (VASOTEC) 2.5 MG tablet Take 2.5 mg by mouth once daily. For diabetes    furosemide (LASIX) 20 MG tablet TAKE ONE TABLET BY MOUTH EVERY DAY    glimepiride (AMARYL) 4 MG tablet Take 4 mg by mouth daily with breakfast.     HYDROcodone-acetaminophen (NORCO)  mg per tablet Take 1 tablet by mouth every 6 (six) hours as needed.     INSULIN GLARGINE,HUM.REC.ANLOG (BASAGLAR KWIKPEN SUBQ) Inject 25 Units into the skin every evening.     metformin (GLUCOPHAGE-XR) 500 MG 24 hr tablet Take 2,000 mg by mouth every evening.     pravastatin (PRAVACHOL) 40 MG tablet Take 40 mg by mouth every morning.     zolpidem (AMBIEN) 10 mg Tab Take 10 mg by mouth nightly as needed.    ASCORBIC ACID/VITAMIN E/BIOTIN (HAIR, SKIN, NAILS WITH BIOTIN ORAL) Take 1 tablet by mouth every morning.     multivitamin capsule Take 1 capsule by mouth every morning.     No current facility-administered medications for this visit.        Review of patient's allergies indicates:   Allergen Reactions    Adhesive tape-silicones Hives     BANDAIDS    Polymycin Hives    Adhesive     Latex, natural rubber Hives and Itching    Neomycin-bacitracnzn-polymyxnb     Polyurethane-39 Hives       Family History   Problem Relation Age of Onset    Anesthesia problems Neg Hx        Social History     Socioeconomic History    Marital status: Single     Spouse name: Not on file    Number of children: Not on file    Years of education: Not on file    Highest education level: Not on file   Social Needs    Financial resource strain: Not on file    Food insecurity - worry: Not on file    Food insecurity - inability: Not on file    Transportation needs - medical: Not on file    Transportation needs - non-medical: Not on file   Occupational History    Not on file   Tobacco Use    Smoking status: Former Smoker     Packs/day: 0.25     Years: 30.00     Pack years: 7.50     Last attempt to quit: 9/28/2015     Years since quitting: 3.2    Smokeless tobacco: Never Used   Substance and Sexual Activity    Alcohol use: Yes     Alcohol/week: 0.0 oz     Comment: RARELY    Drug use: Not on file     Comment: medical marijuana    Sexual activity: Not on file   Other Topics Concern    Not on file   Social History Narrative    Not on file       History of present illness: Patient comes in  today for the left knee. She's having a lot of anterior knee pain. This been going on about a month.      Review of Systems:    Constitution: Negative for chills, fever, and sweats.  Negative for unexplained weight loss.    HENT:  Negative for headaches and blurry vision.    Cardiovascular:Negative for chest pain or irregular heart beat. Negative for hypertension.    Respiratory:  Negative for cough and shortness of breath.    Gastrointestinal: Negative for abdominal pain, heartburn, melena, nausea, and vomitting.    Genitourinary:  Negative bladder incontinence and dysuria.    Musculoskeletal:  See HPI for details.     Neurological: Negative for numbness.    Psychiatric/Behavioral: Negative for depression.  The patient is not nervous/anxious.      Endocrine: Negative for polyuria    Hematologic/Lymphatic: Negative for bleeding problem.  Does not bruise/bleed easily.    Skin: Negative for poor would healing and rash    Objective:      Physical Examination:    Vital Signs:    Vitals:    12/13/18 1131   BP: (!) 152/82   Pulse: 82       Body mass index is 29.01 kg/m².    This a well-developed, well nourished patient in no acute distress.  They are alert and oriented and cooperative to examination.        Patient has full range of motion the left knee. Positive anterior crepitus. Full range of motion of the right knee. Negative straight leg raises. EHLs are intact deep tendon reflexes are intact  Pertinent New Results:    XRAY Report / Interpretation:   AP lateral and sunrise views of the left knee demonstrate normal bony anatomy.    Assessment/Plan:      Chondromalacia patella left knee. I injected the left knee with Depo-Medrol and lidocaine. I provided her with quad strengthening exercises. She will follow-up in a month if she symptomatic      This note was created using Dragon voice recognition software that occasionally misinterpreted phrases or words.

## 2019-01-11 ENCOUNTER — TELEPHONE (OUTPATIENT)
Dept: HEMATOLOGY/ONCOLOGY | Facility: CLINIC | Age: 59
End: 2019-01-11

## 2019-01-11 NOTE — TELEPHONE ENCOUNTER
Pt returned the office call and we let her know that the letter was completed and faxed to the # given.

## 2019-01-11 NOTE — TELEPHONE ENCOUNTER
----- Message from Anyi Noel MA sent at 1/9/2019  3:08 PM CST -----  Contact: Negrita Cruz is requesting Jury duty excuse letter to be faxed to [phone]. Call if you have any questions.     Please call pt when it is done.

## 2019-01-18 ENCOUNTER — TELEPHONE (OUTPATIENT)
Dept: HEMATOLOGY/ONCOLOGY | Facility: CLINIC | Age: 59
End: 2019-01-18

## 2019-01-18 ENCOUNTER — OFFICE VISIT (OUTPATIENT)
Dept: PLASTIC SURGERY | Facility: CLINIC | Age: 59
End: 2019-01-18
Payer: MEDICARE

## 2019-01-18 VITALS
HEIGHT: 71 IN | HEART RATE: 89 BPM | WEIGHT: 210 LBS | DIASTOLIC BLOOD PRESSURE: 81 MMHG | SYSTOLIC BLOOD PRESSURE: 154 MMHG | BODY MASS INDEX: 29.4 KG/M2

## 2019-01-18 DIAGNOSIS — Z09 SURGERY FOLLOW-UP EXAMINATION: Primary | ICD-10-CM

## 2019-01-18 PROCEDURE — 99999 PR PBB SHADOW E&M-EST. PATIENT-LVL III: ICD-10-PCS | Mod: PBBFAC,,, | Performed by: SURGERY

## 2019-01-18 PROCEDURE — 99024 PR POST-OP FOLLOW-UP VISIT: ICD-10-PCS | Mod: S$GLB,,, | Performed by: SURGERY

## 2019-01-18 PROCEDURE — 99024 POSTOP FOLLOW-UP VISIT: CPT | Mod: S$GLB,,, | Performed by: SURGERY

## 2019-01-18 PROCEDURE — 99999 PR PBB SHADOW E&M-EST. PATIENT-LVL III: CPT | Mod: PBBFAC,,, | Performed by: SURGERY

## 2019-01-18 NOTE — TELEPHONE ENCOUNTER
----- Message from Armida Gross sent at 1/16/2019  4:29 PM CST -----  Pt called and requested a refill on the following medication(s): #due next week    Norco 10/325mg # 120  (2 scripts on each, one post dated)  Soma  350mg   #90       (2 scripts on each, one post dated)    She's going to call the pharmacy and have them fax over the refill request for Marinol and then Dr. Vick said he will fill out the Prior Auth request which he told her he kept a copy of from the last time.    Pharmacy:    CB# [phone]    Thanks,  Armida

## 2019-01-18 NOTE — PROGRESS NOTES
Dictation #1  MRN:[id number]  CSN:[id number]  Doing well.  Nipple recon looks greatStill has hollow sup aspect of the right adan flap.  Had an abscess there post fat grafting.  Will need further fat grafting but will wait at least another 6 months.

## 2019-03-18 ENCOUNTER — OFFICE VISIT (OUTPATIENT)
Dept: HEMATOLOGY/ONCOLOGY | Facility: CLINIC | Age: 59
End: 2019-03-18
Payer: MEDICARE

## 2019-03-18 VITALS
BODY MASS INDEX: 29.99 KG/M2 | TEMPERATURE: 98 F | WEIGHT: 215 LBS | HEART RATE: 67 BPM | SYSTOLIC BLOOD PRESSURE: 156 MMHG | RESPIRATION RATE: 20 BRPM | DIASTOLIC BLOOD PRESSURE: 73 MMHG

## 2019-03-18 DIAGNOSIS — R07.89 ATYPICAL CHEST PAIN: ICD-10-CM

## 2019-03-18 DIAGNOSIS — Z17.0 MALIGNANT NEOPLASM OF NIPPLE OF RIGHT BREAST IN FEMALE, ESTROGEN RECEPTOR POSITIVE: Primary | ICD-10-CM

## 2019-03-18 DIAGNOSIS — C50.011 MALIGNANT NEOPLASM OF NIPPLE OF RIGHT BREAST IN FEMALE, ESTROGEN RECEPTOR POSITIVE: Primary | ICD-10-CM

## 2019-03-18 PROCEDURE — 3008F PR BODY MASS INDEX (BMI) DOCUMENTED: ICD-10-PCS | Mod: ,,, | Performed by: INTERNAL MEDICINE

## 2019-03-18 PROCEDURE — 3008F BODY MASS INDEX DOCD: CPT | Mod: ,,, | Performed by: INTERNAL MEDICINE

## 2019-03-18 PROCEDURE — 99214 OFFICE O/P EST MOD 30 MIN: CPT | Mod: ,,, | Performed by: INTERNAL MEDICINE

## 2019-03-18 PROCEDURE — 99214 PR OFFICE/OUTPT VISIT, EST, LEVL IV, 30-39 MIN: ICD-10-PCS | Mod: ,,, | Performed by: INTERNAL MEDICINE

## 2019-03-18 NOTE — LETTER
March 18, 2019      Yoni Renteria MD  [address]  [place] [address]           [facility]  [address]  [address]  [place] [address]  Phone: [phone]  Fax: [phone]          Patient: Negrita Castro   MR Number: [id number]   YOB: 1960   Date of Visit: 3/18/2019       Dear Dr. Yoni Renteria:    Thank you for referring Negrita Castro to me for evaluation. Attached you will find relevant portions of my assessment and plan of care.    If you have questions, please do not hesitate to call me. I look forward to following Negrita Castro along with you.    Sincerely,    TALISHA Vick MD    Enclosure  CC:  No Recipients    If you would like to receive this communication electronically, please contact [email or web address] or [phone] to request more information on [employer] access.    For providers and/or their staff who would like to refer a patient to [facility], please contact us through our one-stop-shop provider referral line, [facility], at [phone].    If you feel you have received this communication in error or would no longer like to receive these types of communications, please e-mail [email or web address]

## 2019-03-19 NOTE — PROGRESS NOTES
"     [facility] History & Physical    Subjective:      Patient ID:   Negrita Castro  58 y.o.   Martínez Renteria R. Leblanc      Chief Complaint:   Breast cancer follow up    HPI:  58 y.o. female with diagnosis of Stage 1 R breast cancer 10/26/15.  "Triple negative".  Adjuvant AC x's 4, tx's 12 and Rad Rx through 16.    Port removed.  Had bilateral reconstruction surgery 17.  Further reconstruction, tummy tuck 17.  Additional fat injection at R chest done for symmetry.    She had placement of R nipple.    DM, cholesterol, epilepsy hx, DJD, LBP.  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    LR shoulder arthroscopy, R wrist surgery, M0.    Smoke  ppd x's 35 years, quit 2015.  ETOH no.  Disability-depression, epilepsy  Allergy none    Mom ovarian cancer  Dad lung cancer  Sisters DM, lung dx.      ROS:   GEN: normal without any fever, night sweats or weight loss  HEENT: normal with no HA's, sore throat, stiff neck, changes in vision  CV: normal with no CP, SOB, PND, POOL or orthopnea  PULM: normal with no SOB, cough, hemoptysis, sputum or pleuritic pain  GI: normal with no abdominal pain, nausea, vomiting, constipation, diarrhea, melanotic stools, BRBPR, or hematemesis  : normal with no hematuria, dysuria  BREAST: See HPI.  SKIN: normal with no rash, erythema, bruising, or swelling     Past Medical History:   Diagnosis Date    Breast cancer     Cancer     RIGHT BREAST 10-15    Depression     Diabetes mellitus     Neuropathy     Panic attacks     S/P epidural steroid injection     Seizures     none since early     Wears glasses     TO DRIVE     Past Surgical History:   Procedure Laterality Date    BIOPSY-SENTINEL NODE Right 11/3/2015    [facility]    BREAST BIOPSY      right    breast reconstruction with tummy tuck      BREAST SURGERY      11-3-15 LUMPECTOMY, 12-2-15 REEXCISION    HEMANTH FREE FLAP Bilateral 2017    Performed by Xiang Hernandez MD " at [facility] 2ND [facility]    EXCISION  HYPERTROPHY OF SCARS WITH ADV FLAP CLOSURE. Left 6/1/2017    Performed by [facility] at [facility] OR 2ND [facility]LR    EXCISION-SKIN Dog Ear Left Hip Bilateral 8/31/2017    Performed by [facility] at [facility] OR 2ND [facility]LR    FLAP-FREE- RE-ELEVATION OF INSET. Right 6/1/2017    Performed by [facility] at [facility] OR 2ND [facility]    INSERTION-BREAST TISSUE EXPANDER Bilateral 1/29/2016    [facility]    CELAYOHTD-NEEH-I-CATH Left 3/21/2016    [facility] OR    INSERTION-TISSUE EXPANDER Right 1/29/2016    [facility] at [facility] OR    LUMPECTOMY-BREAST Right 12/2/2015    [facility] at [facility] OR    LUMPECTOMY-BREAST Right 11/3/2015    [facility] at [facility] OR    mastectomy 2016      MASTECTOMY/RIGHT Right 1/29/2016    [facility] at [facility] OR    MASTOPEXY Left 1/29/2016    [facility] at [facility] OR    PLACEMENT ALLODERM Right 1/29/2016    [facility] at [facility] OR    RECONSTRUCTION-BREAST Right 1/29/2016    [facility] at [facility] OR    RECONSTRUCTION-BREAST Bilateral 1/29/2016    [facility] at [facility] OR    RECONSTRUCTION-NIPPLE RIGHT Right 11/5/2018    Performed by [facility] at [facility] OR 2ND FLR    RECONSTRUCTION/REVISION-BREAST  1) second stage Breast Reconstruction 2) Soft Tissue Revision - Abdomen N/A 6/1/2017    Performed by [facility] at [facility] OR 2ND [facility]LR    REMOVAL-PORT-A-CATH Left 11/15/2016    [facility] at [facility] OR    REVISION, SCAR, ABDOMINAL DONOR SITE N/A 11/5/2018    Performed by [facility] at [facility] OR 2ND [facility]LR    shoulder surgery and wrist      BILAT  BONE SPUR    TRANSFER-FAT Right 2/26/2018    [facility]    WRIST SURGERY      RIGHT       Review of patient's allergies indicates:   Allergen Reactions    Adhesive tape-silicones Hives     BANDAIDS    Polymycin Hives    Latex, natural rubber Itching    Polyurethane-39      Social History     Socioeconomic History    Marital status: Single     Spouse name: Not on file    Number of children: Not on file    Years of education: Not on file    Highest education level: Not on file   Social Needs    Financial resource strain: Not on file    Food insecurity - worry: Not on file    Food insecurity - inability: Not on file    Transportation needs - medical: Not on file    Transportation needs - non-medical: Not on file   Occupational History    Not on file   Tobacco Use    Smoking status: Former Smoker     Packs/day: 0.25     Years: 30.00     Pack years: 7.50     Last attempt to quit: 9/28/2015     Years since quitting: 3.4    Smokeless tobacco: Never Used   Substance and Sexual Activity    Alcohol use: Yes     Alcohol/week: 0.0 oz     Comment: RARELY    Drug use: Not on file     Comment: medical marijuana    Sexual activity: Not on file   Other Topics Concern    Not on file   Social History Narrative    Not on file         Current Outpatient Medications:     ASCORBIC ACID/VITAMIN E/BIOTIN (HAIR, SKIN, NAILS WITH BIOTIN ORAL), Take 1 tablet by mouth every morning. , Disp: , Rfl:     carisoprodol (SOMA) 350 MG tablet, Take 1 tablet (350 mg total) by mouth 3 (three) times daily as needed., Disp: 90 tablet, Rfl: 0    diazepam (VALIUM) 10 MG tablet, Take 10 mg by mouth 4 (four) times daily. , Disp: , Rfl:     dronabinol (MARINOL) 2.5 MG capsule, Take 1 capsule (2.5 mg total) by mouth 2 (two) times daily before meals., Disp: 30 capsule, Rfl: 1    enalapril (VASOTEC) 2.5 MG tablet, Take 2.5 mg by mouth once daily. For diabetes, Disp: , Rfl: 1    furosemide (LASIX) 20 MG tablet, TAKE ONE TABLET BY MOUTH EVERY DAY, Disp: 30  "tablet, Rfl: 4    glimepiride (AMARYL) 4 MG tablet, Take 4 mg by mouth daily with breakfast. , Disp: , Rfl:     HYDROcodone-acetaminophen (NORCO)  mg per tablet, Take 1 tablet by mouth every 6 (six) hours as needed., Disp: , Rfl: 0    INSULIN GLARGINE,HUM.REC.ANLOG (BASAGLAR KWIKPEN SUBQ), Inject 25 Units into the skin every evening. , Disp: , Rfl:     metformin (GLUCOPHAGE-XR) 500 MG 24 hr tablet, Take 2,000 mg by mouth every evening. , Disp: , Rfl:     multivitamin capsule, Take 1 capsule by mouth every morning., Disp: , Rfl:     pravastatin (PRAVACHOL) 40 MG tablet, Take 40 mg by mouth every morning. , Disp: , Rfl: 1    zolpidem (AMBIEN) 10 mg Tab, Take 10 mg by mouth nightly as needed., Disp: , Rfl: 0          Objective:   Vitals:  Blood pressure (!) 156/73, pulse 67, temperature 97.8 °F (36.6 °C), resp. rate 20, weight 97.5 kg (215 lb), last menstrual period 01/16/2016.    Physical Examination:   GEN: no apparent distress, comfortable  HEAD: atraumatic and normocephalic  EYES: no pallor, no icterus  ENT: no pharyngeal erythema, external ears WNL; no nasal discharge; no thrush  NECK: no masses, thyroid normal, trachea midline, no LAD/LN's, supple  CV: RRR with no murmur; normal pulse  CHEST: Normal respiratory effort; CTAB; normal breath sounds; no wheeze or crackles  ABDOM:  nondistended; soft;  no rebound/guarding, abdomenal wall healing, liver spleen not palpated  MUSC/Skeletal: ROM normal; no crepitus; joints normal  EXTREM: no clubbing, cyanosis, inflammation or swelling  SKIN: She is developing keloid changes at L breast incision and navel surgical sites.  : no CVAT  NEURO: grossly intact; motor/sensory WNL; no tremors  PSYCH: normal mood, affect and behavior  LYMPH: normal cervical, supraclavicular, axillary and groin LN's  BREASTS: L and "R breast" healing, extensive post op change, at chest .  No palpable mass.      Labs:   Lab Results   Component Value Date    WBC 5.40 10/24/2018    HGB " 13.2 10/24/2018    HCT 39.3 10/24/2018    MCV 89 10/24/2018     10/24/2018    CMP    Bone Scan negative for metastatic dx. 2/7/18.  CAT of chest negative for metastatic dx. 2/7/18.  Assessment:   (1) 58 y.o. female with diagnosis of Stage 1 triple negative R breast cancer, 10/2016.       S/P R mastectomy, SNBx, AC x's 4, T x's12 weeks, Rad Rx and recent reconstruction.    (2)Keloids developing, Dr. Hernandez injecting scar areas.  He plans further keloid surgery in 6/2019 in coordination with  Dr. Bebeto Alarcon of [facility].    (3)Refill marinol, norco., soma.    RTC 3 months.

## 2019-04-18 ENCOUNTER — HOSPITAL ENCOUNTER (OUTPATIENT)
Dept: RADIOLOGY | Facility: HOSPITAL | Age: 59
Discharge: HOME | End: 2019-04-18
Attending: INTERNAL MEDICINE
Payer: MEDICARE

## 2019-04-18 ENCOUNTER — ANCILLARY ORDERS (OUTPATIENT)
Dept: HEMATOLOGY/ONCOLOGY | Facility: CLINIC | Age: 59
End: 2019-04-18

## 2019-04-18 DIAGNOSIS — C50.011 MALIGNANT NEOPLASM OF NIPPLE OF RIGHT BREAST IN FEMALE, ESTROGEN RECEPTOR POSITIVE: ICD-10-CM

## 2019-04-18 DIAGNOSIS — Z17.0 MALIGNANT NEOPLASM OF NIPPLE OF RIGHT BREAST IN FEMALE, ESTROGEN RECEPTOR POSITIVE: ICD-10-CM

## 2019-04-18 DIAGNOSIS — R07.89 ATYPICAL CHEST PAIN: ICD-10-CM

## 2019-04-18 PROCEDURE — 71260 CT CHEST ABDOMEN PELVIS WITH CONTRAST (XPD): ICD-10-PCS | Mod: 26,,, | Performed by: RADIOLOGY

## 2019-04-18 PROCEDURE — 74177 CT ABD & PELVIS W/CONTRAST: CPT | Mod: TC

## 2019-04-18 PROCEDURE — 74177 CT ABD & PELVIS W/CONTRAST: CPT | Mod: 26,,, | Performed by: RADIOLOGY

## 2019-04-18 PROCEDURE — 71260 CT THORAX DX C+: CPT | Mod: 26,,, | Performed by: RADIOLOGY

## 2019-04-18 PROCEDURE — 78306 BONE IMAGING WHOLE BODY: CPT | Mod: 26,,, | Performed by: RADIOLOGY

## 2019-04-18 PROCEDURE — 78306 NM BONE SCAN WHOLE BODY: ICD-10-PCS | Mod: 26,,, | Performed by: RADIOLOGY

## 2019-04-18 PROCEDURE — 74177 CT CHEST ABDOMEN PELVIS WITH CONTRAST (XPD): ICD-10-PCS | Mod: 26,,, | Performed by: RADIOLOGY

## 2019-04-18 PROCEDURE — A9503 TC99M MEDRONATE: HCPCS

## 2019-04-18 PROCEDURE — 25500020 PHARM REV CODE 255: Performed by: INTERNAL MEDICINE

## 2019-04-18 RX ADMIN — IOHEXOL 100 ML: 350 INJECTION, SOLUTION INTRAVENOUS at 10:04

## 2019-04-25 ENCOUNTER — TELEPHONE (OUTPATIENT)
Dept: HEMATOLOGY/ONCOLOGY | Facility: CLINIC | Age: 59
End: 2019-04-25

## 2019-04-25 ENCOUNTER — DOCUMENTATION ONLY (OUTPATIENT)
Dept: HEMATOLOGY/ONCOLOGY | Facility: CLINIC | Age: 59
End: 2019-04-25

## 2019-04-25 NOTE — TELEPHONE ENCOUNTER
Let pt know no cancer was seen in the bone scan and CT.  Does show some congestion.  Levaquin 500 mg #10. 1 po daily x 10 days.  No refills. Generic is fine.

## 2019-04-25 NOTE — PROGRESS NOTES
CAT scans of chest, abdomen, pelvis no cancer seen.  Bone Scan no cancer seem.  CAT of chest shows ground glass opacity, etiology?    Give a trial of legaquin 500 mg #10, Sig 1 po daily x's 10 days, cover for possible pneumonia.    Pt informed.

## 2019-04-25 NOTE — TELEPHONE ENCOUNTER
----- Message from Nell Carrillo sent at 4/24/2019  2:08 PM CDT -----  The patient wants someone to call her back with the results of her CT scan. Please call her back at [phone].

## 2019-06-28 ENCOUNTER — OFFICE VISIT (OUTPATIENT)
Dept: PLASTIC SURGERY | Facility: CLINIC | Age: 59
End: 2019-06-28
Payer: MEDICARE

## 2019-06-28 VITALS
SYSTOLIC BLOOD PRESSURE: 155 MMHG | DIASTOLIC BLOOD PRESSURE: 83 MMHG | BODY MASS INDEX: 29.43 KG/M2 | HEART RATE: 72 BPM | WEIGHT: 211 LBS

## 2019-06-28 DIAGNOSIS — Z09 SURGERY FOLLOW-UP EXAMINATION: Primary | ICD-10-CM

## 2019-06-28 PROCEDURE — 99999 PR PBB SHADOW E&M-EST. PATIENT-LVL III: ICD-10-PCS | Mod: PBBFAC,,, | Performed by: SURGERY

## 2019-06-28 PROCEDURE — 99024 POSTOP FOLLOW-UP VISIT: CPT | Mod: S$GLB,,, | Performed by: SURGERY

## 2019-06-28 PROCEDURE — 99999 PR PBB SHADOW E&M-EST. PATIENT-LVL III: CPT | Mod: PBBFAC,,, | Performed by: SURGERY

## 2019-06-28 PROCEDURE — 99024 PR POST-OP FOLLOW-UP VISIT: ICD-10-PCS | Mod: S$GLB,,, | Performed by: SURGERY

## 2019-06-28 NOTE — PROGRESS NOTES
SP breast reconstruction with multiple stages.  Had free fat injected HEMANTH flap superior pole with postop purulent infection.  This was approx 1 year ago. Has superior hollowing. Discussed in great detail with the patient, I think we should leave that alone. I think the   Risks of surgery, especially in her case far out way the benefits.  I will however revise her lt periareolar scar.

## 2019-07-15 ENCOUNTER — OFFICE VISIT (OUTPATIENT)
Dept: HEMATOLOGY/ONCOLOGY | Facility: CLINIC | Age: 59
End: 2019-07-15
Payer: MEDICARE

## 2019-07-15 VITALS
BODY MASS INDEX: 29.01 KG/M2 | SYSTOLIC BLOOD PRESSURE: 139 MMHG | HEART RATE: 72 BPM | RESPIRATION RATE: 20 BRPM | WEIGHT: 208 LBS | TEMPERATURE: 98 F | DIASTOLIC BLOOD PRESSURE: 89 MMHG

## 2019-07-15 DIAGNOSIS — N30.00 ACUTE CYSTITIS WITHOUT HEMATURIA: ICD-10-CM

## 2019-07-15 DIAGNOSIS — R93.89 ABNORMAL CAT SCAN: Primary | ICD-10-CM

## 2019-07-15 PROCEDURE — 99214 PR OFFICE/OUTPT VISIT, EST, LEVL IV, 30-39 MIN: ICD-10-PCS | Mod: ,,, | Performed by: INTERNAL MEDICINE

## 2019-07-15 PROCEDURE — 3008F BODY MASS INDEX DOCD: CPT | Mod: ,,, | Performed by: INTERNAL MEDICINE

## 2019-07-15 PROCEDURE — 3008F PR BODY MASS INDEX (BMI) DOCUMENTED: ICD-10-PCS | Mod: ,,, | Performed by: INTERNAL MEDICINE

## 2019-07-15 PROCEDURE — 99214 OFFICE O/P EST MOD 30 MIN: CPT | Mod: ,,, | Performed by: INTERNAL MEDICINE

## 2019-07-16 ENCOUNTER — TELEPHONE (OUTPATIENT)
Dept: PLASTIC SURGERY | Facility: CLINIC | Age: 59
End: 2019-07-16

## 2019-07-18 LAB
APPEARANCE UR: ABNORMAL
BACTERIA #/AREA URNS HPF: ABNORMAL /HPF
BACTERIA UR CULT: ABNORMAL
BILIRUB UR QL STRIP: NEGATIVE
COLOR UR: YELLOW
GLUCOSE UR QL STRIP: ABNORMAL
HGB UR QL STRIP: NEGATIVE
HYALINE CASTS #/AREA URNS LPF: ABNORMAL /LPF
KETONES UR QL STRIP: ABNORMAL
LEUKOCYTE ESTERASE UR QL STRIP: ABNORMAL
NITRITE UR QL STRIP: NEGATIVE
PH UR STRIP: ABNORMAL [PH] (ref 5–8)
PROT UR QL STRIP: NEGATIVE
RBC #/AREA URNS HPF: ABNORMAL /HPF
SP GR UR STRIP: 1.03 (ref 1–1.03)
SQUAMOUS #/AREA URNS HPF: ABNORMAL /HPF
URATE CRY #/AREA URNS HPF: ABNORMAL /HPF
WBC #/AREA URNS HPF: ABNORMAL /HPF

## 2019-07-20 NOTE — PROGRESS NOTES
"  [facility] History & Physical    Subjective:      Patient ID:   Negrita Castro  59 y.o.   Martínez Renteria R. Leblanc, Babycos      Chief Complaint:   Breast cancer follow up    HPI:  59 y.o. female with diagnosis of Stage 1 R breast cancer 10/26/15.  "Triple negative".  Adjuvant AC x's 4, tx's 12 and Rad Rx through 16.    Port removed.  Had bilateral reconstruction surgery 17.  Further reconstruction, tummy tuck 17.  Additional fat injection at R chest done for symmetry.  She had placement of R nipple.  Dr. Hernandez.      C/O dysuria, frequency.  Check U/A, C/S.  Cipro 500 mg BID.    DM, cholesterol, epilepsy hx, DJD, LBP.  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    LR shoulder arthroscopy, R wrist surgery, M0.    Smoke  ppd x's 35 years, quit 2015.  ETOH no.  Disability-depression, epilepsy  Allergy none    Mom ovarian cancer  Dad lung cancer  Sisters DM, lung dx.      ROS:   GEN: normal without any fever, night sweats or weight loss  HEENT: normal with no HA's, sore throat, stiff neck, changes in vision  CV: normal with no CP, SOB, PND, POOL or orthopnea  PULM: normal with no SOB, cough, hemoptysis, sputum or pleuritic pain  GI: normal with no abdominal pain, nausea, vomiting, constipation, diarrhea, melanotic stools, BRBPR, or hematemesis  : normal with no hematuria, dysuria  BREAST: See HPI.  SKIN: normal with no rash, erythema, bruising, or swelling     Past Medical History:   Diagnosis Date    Breast cancer     Cancer     RIGHT BREAST 10-15    Depression     Diabetes mellitus     Neuropathy     Panic attacks     S/P epidural steroid injection     Seizures     none since early     Wears glasses     TO DRIVE     Past Surgical History:   Procedure Laterality Date    BIOPSY-SENTINEL NODE Right 11/3/2015    [facility]    BREAST BIOPSY      right    breast reconstruction with tummy veeck      BREAST SURGERY      11-3-15 LUMPECTOMY, 12-2-15 " REEXCISION    HEMANTH FREE FLAP Bilateral 2/14/2017    Performed by Xiang Hernandez [facility] at [facility] OR 2ND FLR    EXCISION  HYPERTROPHY OF SCARS WITH ADV FLAP CLOSURE. Left 6/1/2017    Performed by [facility]iang Hernandez MD at [facility] OR 2ND [facility]LR    EXCISION-SKIN Dog Ear Left Hip Bilateral 8/31/2017    Performed by [facility]iang Hernandez [facility] at [facility] OR 2ND [facility]LR    FLAP-FREE- RE-ELEVATION OF INSET. Right 6/1/2017    Performed by Xiang Hernandez MD at [facility] OR 2ND [facility]LR    INSERTION-BREAST TISSUE EXPANDER Bilateral 1/29/2016    [facility]formed [facility] at [facility] OR    [id number]-CATH Left 3/21/2016    [facility]    INSERTION-TISSUE EXPANDER Right 1/29/2016    [facility]formed by [facility] at [facility] OR    LUMPECTOMY-BREAST Right 12/2/2015    Performed [facility] at [facility] OR    LUMPECTOMY-BREAST Right 11/3/2015    Performed by [facility] at [facility] OR    mastectomy 2016      MASTECTOMY/RIGHT Right 1/29/2016    Performed by [facility] at [facility] OR    MASTOPEXY Left 1/29/2016    Performed by [facility] at [facility] OR    PLACEMENT ALLODERM Right 1/29/2016    Performed by [facility] at [facility] OR    RECONSTRUCTION-BREAST Right 1/29/2016    Performed by [facility] at [facility] OR    RECONSTRUCTION-BREAST Bilateral 1/29/2016    Performed by [facility] at [facility] OR    RECONSTRUCTION-NIPPLE RIGHT Right 11/5/2018    Performed by Xiang Hernandez [facility] at [facility] OR 2ND [facility]LR    RECONSTRUCTION/REVISION-BREAST  1) second stage Breast Reconstruction 2) Soft Tissue Revision - Abdomen N/A 6/1/2017    Performed by Xiang Hernandez MD at [facility] OR 2ND FLR    REMOVAL-PORT-A-CATH Left 11/15/2016    Performed by [facility] at [facility] OR    REVISION, SCAR, ABDOMINAL DONOR SITE N/A 11/5/2018    Performed by Xiang Hernandez MD at [facility] OR 2ND FLR    shoulder surgery and  wrist      BILAT  BONE SPUR    TRANSFER-FAT Right 2/26/2018    [facility] 2ND [facility]LR    WRIST SURGERY      RIGHT       Review of patient's allergies indicates:   Allergen Reactions    Adhesive tape-silicones Hives     BANDAIDS    Polymycin Hives    Latex, natural rubber Itching    Polyurethane-39            Current Outpatient Medications:     ASCORBIC ACID/VITAMIN E/BIOTIN (HAIR, SKIN, NAILS WITH BIOTIN ORAL), Take 1 tablet by mouth every morning. , Disp: , Rfl:     carisoprodol (SOMA) 350 MG tablet, Take 1 tablet (350 mg total) by mouth 3 (three) times daily as needed., Disp: 90 tablet, Rfl: 0    diazepam (VALIUM) 10 MG tablet, Take 10 mg by mouth 4 (four) times daily. , Disp: , Rfl:     dronabinol (MARINOL) 2.5 MG capsule, Take 1 capsule (2.5 mg total) by mouth 2 (two) times daily before meals., Disp: 30 capsule, Rfl: 1    enalapril (VASOTEC) 2.5 MG tablet, Take 2.5 mg by mouth once daily. For diabetes, Disp: , Rfl: 1    furosemide (LASIX) 20 MG tablet, TAKE ONE TABLET BY MOUTH EVERY DAY, Disp: 30 tablet, Rfl: 4    glimepiride (AMARYL) 4 MG tablet, Take 4 mg by mouth daily with breakfast. , Disp: , Rfl:     HYDROcodone-acetaminophen (NORCO)  mg per tablet, Take 1 tablet by mouth every 6 (six) hours as needed., Disp: , Rfl: 0    INSULIN GLARGINE,HUM.REC.ANLOG (BASAGLAR KWIKPEN SUBQ), Inject 25 Units into the skin every evening. , Disp: , Rfl:     metformin (GLUCOPHAGE-XR) 500 MG 24 hr tablet, Take 2,000 mg by mouth every evening. , Disp: , Rfl:     multivitamin capsule, Take 1 capsule by mouth every morning., Disp: , Rfl:     pravastatin (PRAVACHOL) 40 MG tablet, Take 40 mg by mouth every morning. , Disp: , Rfl: 1    zolpidem (AMBIEN) 10 mg Tab, Take 10 mg by mouth nightly as needed., Disp: , Rfl: 0          Objective:   Vitals:  Blood pressure 139/89, pulse 72, temperature 97.9 °F (36.6 °C), temperature source Oral, resp. rate 20, weight 94.3 kg (208  "lb), last menstrual period 01/16/2016.    Physical Examination:   GEN: no apparent distress, comfortable  HEAD: atraumatic and normocephalic  EYES: no pallor, no icterus  ENT: no pharyngeal erythema, external ears WNL; no nasal discharge; no thrush  NECK: no masses, thyroid normal, trachea midline, no LAD/LN's, supple  CV: RRR with no murmur; normal pulse  CHEST: Normal respiratory effort; CTAB; normal breath sounds; no wheeze or crackles  ABDOM:  nondistended; soft;  no rebound/guarding, abdomenal wall healing, liver spleen not palpated  MUSC/Skeletal: ROM normal; no crepitus; joints normal  EXTREM: no clubbing, cyanosis, inflammation or swelling  SKIN: She is developing keloid changes at L breast incision and navel surgical sites.  : no CVAT  NEURO: grossly intact; motor/sensory WNL; no tremors  PSYCH: normal mood, affect and behavior  LYMPH: normal cervical, supraclavicular, axillary and groin LN's  BREASTS: L and "R breast" healing, extensive post op change, at chest .  No palpable mass.      Labs:   Lab Results   Component Value Date    WBC 6.00 04/18/2019    HGB 13.3 04/18/2019    HCT 40.4 04/18/2019    MCV 88 04/18/2019     04/18/2019    CMP    Bone Scan negative for metastatic dx. 2/7/18.  CAT of chest negative for metastatic dx. 2/7/18.  Check CAT now.  U/A, C/S.  Assessment:   (1) 59 y.o. female with diagnosis of Stage 1 triple negative R breast cancer, 10/2016.       S/P R mastectomy, SNBx, AC x's 4, T x's12 weeks, Rad Rx and recent reconstruction.    (2)Keloids developing, Dr. Hernandez injecting scar areas.  He  further keloid surgery in 6/2019 in coordination with  Dr. Bebeto Alarcon of Rad Rx.    (3)Refill marinol, norco., soma.    RTC 2 weeks, can cancel.  RTC 6 months.               "

## 2019-07-22 ENCOUNTER — TELEPHONE (OUTPATIENT)
Dept: HEMATOLOGY/ONCOLOGY | Facility: CLINIC | Age: 59
End: 2019-07-22

## 2019-07-22 DIAGNOSIS — C50.011 MALIGNANT NEOPLASM OF NIPPLE OF RIGHT BREAST IN FEMALE, ESTROGEN RECEPTOR POSITIVE: Primary | ICD-10-CM

## 2019-07-22 DIAGNOSIS — Z17.0 MALIGNANT NEOPLASM OF NIPPLE OF RIGHT BREAST IN FEMALE, ESTROGEN RECEPTOR POSITIVE: Primary | ICD-10-CM

## 2019-07-23 ENCOUNTER — TELEPHONE (OUTPATIENT)
Dept: HEMATOLOGY/ONCOLOGY | Facility: CLINIC | Age: 59
End: 2019-07-23

## 2019-07-23 ENCOUNTER — HOSPITAL ENCOUNTER (OUTPATIENT)
Dept: RADIOLOGY | Facility: HOSPITAL | Age: 59
Discharge: HOME | End: 2019-07-23
Attending: INTERNAL MEDICINE
Payer: MEDICARE

## 2019-07-23 DIAGNOSIS — R93.89 ABNORMAL CAT SCAN: ICD-10-CM

## 2019-07-23 PROCEDURE — 25500020 PHARM REV CODE 255: Performed by: INTERNAL MEDICINE

## 2019-07-23 PROCEDURE — 71260 CT THORAX DX C+: CPT | Mod: 26,,, | Performed by: RADIOLOGY

## 2019-07-23 PROCEDURE — 71260 CT CHEST WITH CONTRAST: ICD-10-PCS | Mod: 26,,, | Performed by: RADIOLOGY

## 2019-07-23 PROCEDURE — 71260 CT THORAX DX C+: CPT | Mod: TC

## 2019-07-23 RX ADMIN — IOHEXOL 75 ML: 350 INJECTION, SOLUTION INTRAVENOUS at 10:07

## 2019-07-24 NOTE — TELEPHONE ENCOUNTER
Tell her the urine did show UTI.  Is she taking her antibiotic?  Is she better?    Tell her CAT of chest is clear, pneumonia is cleared, no cancer seen.

## 2019-07-30 ENCOUNTER — OFFICE VISIT (OUTPATIENT)
Dept: HEMATOLOGY/ONCOLOGY | Facility: CLINIC | Age: 59
End: 2019-07-30
Payer: MEDICARE

## 2019-07-30 VITALS
BODY MASS INDEX: 29.01 KG/M2 | HEART RATE: 63 BPM | DIASTOLIC BLOOD PRESSURE: 79 MMHG | WEIGHT: 208 LBS | SYSTOLIC BLOOD PRESSURE: 135 MMHG | RESPIRATION RATE: 20 BRPM | TEMPERATURE: 98 F

## 2019-07-30 DIAGNOSIS — N30.00 ACUTE CYSTITIS WITHOUT HEMATURIA: ICD-10-CM

## 2019-07-30 DIAGNOSIS — Z17.0 MALIGNANT NEOPLASM OF NIPPLE OF RIGHT BREAST IN FEMALE, ESTROGEN RECEPTOR POSITIVE: Primary | ICD-10-CM

## 2019-07-30 DIAGNOSIS — R93.89 ABNORMAL CAT SCAN: ICD-10-CM

## 2019-07-30 DIAGNOSIS — C50.011 MALIGNANT NEOPLASM OF NIPPLE OF RIGHT BREAST IN FEMALE, ESTROGEN RECEPTOR POSITIVE: Primary | ICD-10-CM

## 2019-07-30 DIAGNOSIS — E11.9 TYPE 2 DIABETES MELLITUS WITHOUT COMPLICATION, WITHOUT LONG-TERM CURRENT USE OF INSULIN: ICD-10-CM

## 2019-07-30 PROCEDURE — 3008F PR BODY MASS INDEX (BMI) DOCUMENTED: ICD-10-PCS | Mod: S$GLB,,, | Performed by: INTERNAL MEDICINE

## 2019-07-30 PROCEDURE — 3008F BODY MASS INDEX DOCD: CPT | Mod: S$GLB,,, | Performed by: INTERNAL MEDICINE

## 2019-07-30 PROCEDURE — 3046F PR MOST RECENT HEMOGLOBIN A1C LEVEL > 9.0%: ICD-10-PCS | Mod: S$GLB,,, | Performed by: INTERNAL MEDICINE

## 2019-07-30 PROCEDURE — 99213 OFFICE O/P EST LOW 20 MIN: CPT | Mod: S$GLB,,, | Performed by: INTERNAL MEDICINE

## 2019-07-30 PROCEDURE — 3046F HEMOGLOBIN A1C LEVEL >9.0%: CPT | Mod: S$GLB,,, | Performed by: INTERNAL MEDICINE

## 2019-07-30 PROCEDURE — 99213 PR OFFICE/OUTPT VISIT, EST, LEVL III, 20-29 MIN: ICD-10-PCS | Mod: S$GLB,,, | Performed by: INTERNAL MEDICINE

## 2019-07-31 NOTE — PROGRESS NOTES
"  [facility] History & Physical    Subjective:      Patient ID:   Negrita Castro  59 y.o.   Martínez Renteria R. Leblanc, Babycos      Chief Complaint:   Breast cancer follow up    HPI:  59 y.o. female with diagnosis of Stage 1 R breast cancer 10/26/15.  "Triple negative".  Adjuvant AC x's 4, tx's 12 and Rad Rx through 16.    Port removed.  Had bilateral reconstruction surgery 17.  Further reconstruction, tummy tuck 17.  Additional fat injection at R chest done for symmetry.  She had placement of R nipple.  Dr. Hernandez.      She had UTI sx.  Treated with   Cipro 500 mg BID.  Better.    DM, cholesterol, epilepsy hx, DJD, LBP.  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    LR shoulder arthroscopy, R wrist surgery, M0.    Smoke  ppd x's 35 years, quit 2015.  ETOH no.  Disability-depression, epilepsy  Allergy none    Mom ovarian cancer  Dad lung cancer  Sisters DM, lung dx.      ROS:   GEN: normal without any fever, night sweats or weight loss  HEENT: normal with no HA's, sore throat, stiff neck, changes in vision  CV: normal with no CP, SOB, PND, POOL or orthopnea  PULM: normal with no SOB, cough, hemoptysis, sputum or pleuritic pain  GI: normal with no abdominal pain, nausea, vomiting, constipation, diarrhea, melanotic stools, BRBPR, or hematemesis  : normal with no hematuria, dysuria  BREAST: See HPI.  SKIN: normal with no rash, erythema, bruising, or swelling     Past Medical History:   Diagnosis Date    Breast cancer     Cancer     RIGHT BREAST 10-15    Depression     Diabetes mellitus     Neuropathy     Panic attacks     S/P epidural steroid injection     Seizures     none since early     Wears glasses     TO DRIVE     Past Surgical History:   Procedure Laterality Date    BIOPSY-SENTINEL NODE Right 11/3/2015    [facility]    BREAST BIOPSY      right    breast reconstruction with tummy tuck      BREAST SURGERY      11-3-15 LUMPECTOMY, 12-2-15 REEXCISION "    HEMANTH FREE FLAP Bilateral 2/14/2017    Performed by [facility] 2ND [facility]LR    EXCISION  HYPERTROPHY OF SCARS WITH ADV FLAP CLOSURE. Left 6/1/2017    Performed by [facility] at [facility] 2ND [facility]LR    EXCISION-SKIN Dog Ear Left Hip Bilateral 8/31/2017    Performed by [facility] at [facility] 2ND [facility]LR    FLAP-FREE- RE-ELEVATION OF INSET. Right 6/1/2017    Performed by [facility] at [facility] 2ND [facility]LR    INSERTION-BREAST TISSUE EXPANDER Bilateral 1/29/2016    [facility] OR    [id number]-CATH Left 3/21/2016    [facility]    INSERTION-TISSUE EXPANDER Right 1/29/2016    Performed by [facility] at [facility] OR    LUMPECTOMY-BREAST Right 12/2/2015    Performed by [facility] at [facility] OR    LUMPECTOMY-BREAST Right 11/3/2015    Performed by [facility] at [facility] OR    mastectomy 2016      MASTECTOMY/RIGHT Right 1/29/2016    Performed by [facility] at [facility] OR    MASTOPEXY Left 1/29/2016    Performed by [facility] at [facility] OR    PLACEMENT ALLODERM Right 1/29/2016    Performed by [facility] at [facility] OR    RECONSTRUCTION-BREAST Right 1/29/2016    Performed by [facility] at [facility] OR    RECONSTRUCTION-BREAST Bilateral 1/29/2016    Performed by [facility] at [facility] OR    RECONSTRUCTION-NIPPLE RIGHT Right 11/5/2018    Performed by [facility] at [facility] 2ND FLR    RECONSTRUCTION/REVISION-BREAST  1) second stage Breast Reconstruction 2) Soft Tissue Revision - Abdomen N/A 6/1/2017    Performed by [facility] at [facility] OR 2ND FLR    REMOVAL-PORT-A-CATH Left 11/15/2016    Performed by [facility] at [facility] OR    REVISION, SCAR, ABDOMINAL DONOR SITE N/A 11/5/2018    [facility]    shoulder surgery and wrist       BILAT  BONE SPUR    TRANSFER-FAT Right 2/26/2018    [facility] 2ND FLR    WRIST SURGERY      RIGHT       Review of patient's allergies indicates:   Allergen Reactions    Adhesive tape-silicones Hives     BANDAIDS    Polymycin Hives    Latex, natural rubber Itching    Polyurethane-39            Current Outpatient Medications:     ASCORBIC ACID/VITAMIN E/BIOTIN (HAIR, SKIN, NAILS WITH BIOTIN ORAL), Take 1 tablet by mouth every morning. , Disp: , Rfl:     carisoprodol (SOMA) 350 MG tablet, Take 1 tablet (350 mg total) by mouth 3 (three) times daily as needed., Disp: 90 tablet, Rfl: 0    diazepam (VALIUM) 10 MG tablet, Take 10 mg by mouth 4 (four) times daily. , Disp: , Rfl:     dronabinol (MARINOL) 2.5 MG capsule, Take 1 capsule (2.5 mg total) by mouth 2 (two) times daily before meals., Disp: 30 capsule, Rfl: 1    dulaglutide (TRULICITY) 0.75 mg/0.5 mL PnIj, Inject 0.75 mg into the skin every 7 days., Disp: , Rfl:     enalapril (VASOTEC) 2.5 MG tablet, Take 2.5 mg by mouth once daily. For diabetes, Disp: , Rfl: 1    furosemide (LASIX) 20 MG tablet, TAKE ONE TABLET BY MOUTH EVERY DAY, Disp: 30 tablet, Rfl: 4    HYDROcodone-acetaminophen (NORCO)  mg per tablet, Take 1 tablet by mouth every 6 (six) hours as needed., Disp: , Rfl: 0    INSULIN GLARGINE,HUM.REC.ANLOG (BASAGLAR KWIKPEN SUBQ), Inject 25 Units into the skin every evening. , Disp: , Rfl:     metformin (GLUCOPHAGE-XR) 500 MG 24 hr tablet, Take 2,000 mg by mouth every evening. , Disp: , Rfl:     multivitamin capsule, Take 1 capsule by mouth every morning., Disp: , Rfl:     pravastatin (PRAVACHOL) 40 MG tablet, Take 40 mg by mouth every morning. , Disp: , Rfl: 1    zolpidem (AMBIEN) 10 mg Tab, Take 10 mg by mouth nightly as needed., Disp: , Rfl: 0          Objective:   Vitals:  Blood pressure 135/79, pulse 63, temperature 97.8 °F (36.6 °C), resp. rate 20, weight 94.3 kg (208 lb), last menstrual period  "01/16/2016.    Physical Examination:   GEN: no apparent distress, comfortable  HEAD: atraumatic and normocephalic  EYES: no pallor, no icterus  ENT: no pharyngeal erythema, external ears WNL; no nasal discharge; no thrush  NECK: no masses, thyroid normal, trachea midline, no LAD/LN's, supple  CV: RRR with no murmur; normal pulse  CHEST: Normal respiratory effort; CTAB; normal breath sounds; no wheeze or crackles  ABDOM:  nondistended; soft;  no rebound/guarding, abdomenal wall healing, liver spleen not palpated  MUSC/Skeletal: ROM normal; no crepitus; joints normal  EXTREM: no clubbing, cyanosis, inflammation or swelling  SKIN: She is developing keloid changes at L breast incision and navel surgical sites.  : no CVAT  NEURO: grossly intact; motor/sensory WNL; no tremors  PSYCH: normal mood, affect and behavior  LYMPH: normal cervical, supraclavicular, axillary and groin LN's  BREASTS: L and "R breast" healing, extensive post op change, at chest .  No palpable mass.      Labs:   Lab Results   Component Value Date    WBC 6.00 04/18/2019    HGB 13.3 04/18/2019    HCT 40.4 04/18/2019    MCV 88 04/18/2019     04/18/2019    CMP    Bone Scan negative for metastatic dx. 2/7/18.  CAT of chest negative for metastatic dx. 2/7/18.    CAT of chest now.    Clearing of patchy infiltrates, no pulmonary nodule seen.  Assessment:   (1) 59 y.o. female with diagnosis of Stage 1 triple negative R breast cancer, 10/2016.       S/P R mastectomy, SNBx, AC x's 4, T x's12 weeks, Rad Rx and recent reconstruction.    (2)Keloids developing, Dr. Hernandez injecting scar areas.  He  further keloid surgery in 6/2019 in coordination with  Dr. Bebeto Alarcon of [facility].    (3)Refill marinol, norco., soma. Now x's 3 months.    RTC 3 months.               "

## 2019-08-06 DIAGNOSIS — R11.2 CHEMOTHERAPY INDUCED NAUSEA AND VOMITING: Primary | ICD-10-CM

## 2019-08-06 DIAGNOSIS — T45.1X5A CHEMOTHERAPY INDUCED NAUSEA AND VOMITING: Primary | ICD-10-CM

## 2019-08-26 RX ORDER — ONDANSETRON 8 MG/1
TABLET, ORALLY DISINTEGRATING ORAL
OUTPATIENT
Start: 2019-08-26

## 2019-10-28 ENCOUNTER — OFFICE VISIT (OUTPATIENT)
Dept: HEMATOLOGY/ONCOLOGY | Facility: CLINIC | Age: 59
End: 2019-10-28
Payer: MEDICARE

## 2019-10-28 VITALS
TEMPERATURE: 98 F | HEART RATE: 75 BPM | SYSTOLIC BLOOD PRESSURE: 154 MMHG | DIASTOLIC BLOOD PRESSURE: 83 MMHG | WEIGHT: 204 LBS | BODY MASS INDEX: 28.45 KG/M2

## 2019-10-28 DIAGNOSIS — Z17.0 MALIGNANT NEOPLASM OF NIPPLE OF RIGHT BREAST IN FEMALE, ESTROGEN RECEPTOR POSITIVE: Primary | ICD-10-CM

## 2019-10-28 DIAGNOSIS — C50.011 MALIGNANT NEOPLASM OF NIPPLE OF RIGHT BREAST IN FEMALE, ESTROGEN RECEPTOR POSITIVE: Primary | ICD-10-CM

## 2019-10-28 PROCEDURE — 99214 PR OFFICE/OUTPT VISIT, EST, LEVL IV, 30-39 MIN: ICD-10-PCS | Mod: S$GLB,,, | Performed by: INTERNAL MEDICINE

## 2019-10-28 PROCEDURE — 3008F BODY MASS INDEX DOCD: CPT | Mod: S$GLB,,, | Performed by: INTERNAL MEDICINE

## 2019-10-28 PROCEDURE — 3008F PR BODY MASS INDEX (BMI) DOCUMENTED: ICD-10-PCS | Mod: S$GLB,,, | Performed by: INTERNAL MEDICINE

## 2019-10-28 PROCEDURE — 99214 OFFICE O/P EST MOD 30 MIN: CPT | Mod: S$GLB,,, | Performed by: INTERNAL MEDICINE

## 2019-10-29 NOTE — PROGRESS NOTES
"  [facility] History & Physical    Subjective:      Patient ID:   Negrita Castro  59 y.o.   Martínez Renteria R. Leblanc, Babycos      Chief Complaint:   Breast cancer follow up    HPI:  59 y.o. female with diagnosis of Stage 1 R breast cancer 10/26/15.  "Triple negative".  Adjuvant AC x's 4, tx's 12 and Rad Rx through 16.    Port removed.  Had bilateral reconstruction surgery 17.  Further reconstruction, tummy tuck 17.  Additional fat injection at R chest done for symmetry.  She had placement of R nipple.  Dr. Hernandez.      She is due for additional reconstructive surgery at breast and abdomenal areas, date not yet set?    Dr. Washington saw her for C spine eval, no surgery planned for now.  He referred her to Dr. Rivers for injection Rx.    DM, cholesterol, epilepsy hx, DJD, LBP.  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    LR shoulder arthroscopy, R wrist surgery, M0.    Smoke  ppd x's 35 years, quit 2015.  ETOH no.  Disability-depression, epilepsy  Allergy none    Mom ovarian cancer  Dad lung cancer  Sisters DM, lung dx.      ROS:   GEN: normal without any fever, night sweats or weight loss  HEENT: normal with no HA's, sore throat, stiff neck, changes in vision  CV: normal with no CP, SOB, PND, POOL or orthopnea  PULM: normal with no SOB, cough, hemoptysis, sputum or pleuritic pain  GI: normal with no abdominal pain, nausea, vomiting, constipation, diarrhea, melanotic stools, BRBPR, or hematemesis  : normal with no hematuria, dysuria  BREAST: See HPI.  SKIN: normal with no rash, erythema, bruising, or swelling     Past Medical History:   Diagnosis Date    Breast cancer     Cancer     RIGHT BREAST 10-15    Depression     Diabetes mellitus     Neuropathy     Panic attacks     S/P epidural steroid injection     Seizures     none since early     Wears glasses     TO DRIVE     Past Surgical History:   Procedure Laterality Date    BREAST BIOPSY      right    breast reconstruction with tummy tuck   "    BREAST SURGERY      11-3-15 LUMPECTOMY, 12-2-15 REEXCISION    mastectomy 2016      RECONSTRUCTION OF NIPPLE Right 11/5/2018    Procedure: RECONSTRUCTION-NIPPLE RIGHT;  Surgeon: Xiang Hernandez MD;  Location: [facility] OR [address] [place] [address];  Service: Plastics;  Laterality: Right;    shoulder surgery and wrist      BILAT  BONE SPUR    WRIST SURGERY      RIGHT       Review of patient's allergies indicates:   Allergen Reactions    Adhesive tape-silicones Hives     BANDAIDS    Polymycin Hives    Latex, natural rubber Itching    Polyurethane-39            Current Outpatient Medications:     ASCORBIC ACID/VITAMIN E/BIOTIN (HAIR, SKIN, NAILS WITH BIOTIN ORAL), Take 1 tablet by mouth every morning. , Disp: , Rfl:     carisoprodol (SOMA) 350 MG tablet, Take 1 tablet (350 mg total) by mouth 3 (three) times daily as needed., Disp: 90 tablet, Rfl: 0    diazepam (VALIUM) 10 MG tablet, Take 10 mg by mouth 4 (four) times daily. , Disp: , Rfl:     dronabinol (MARINOL) 2.5 MG capsule, Take 1 capsule (2.5 mg total) by mouth 2 (two) times daily before meals., Disp: 30 capsule, Rfl: 1    dulaglutide (TRULICITY) 0.75 mg/0.5 mL PnIj, Inject 0.75 mg into the skin every 7 days., Disp: , Rfl:     enalapril (VASOTEC) 2.5 MG tablet, Take 2.5 mg by mouth once daily. For diabetes, Disp: , Rfl: 1    furosemide (LASIX) 20 MG tablet, TAKE ONE TABLET BY MOUTH EVERY DAY, Disp: 30 tablet, Rfl: 4    HYDROcodone-acetaminophen (NORCO)  mg per tablet, Take 1 tablet by mouth every 6 (six) hours as needed., Disp: , Rfl: 0    INSULIN GLARGINE,HUM.REC.ANLOG (BASAGLAR KWIKPEN SUBQ), Inject 25 Units into the skin every evening. , Disp: , Rfl:     metformin (GLUCOPHAGE-XR) 500 MG 24 hr tablet, Take 2,000 mg by mouth every evening. , Disp: , Rfl:     multivitamin capsule, Take 1 capsule by mouth every morning., Disp: , Rfl:     pravastatin (PRAVACHOL) 40 MG tablet, Take 40 mg by mouth every morning. , Disp: , Rfl: 1    zolpidem (AMBIEN) 10  "mg Tab, Take 10 mg by mouth nightly as needed., Disp: , Rfl: 0          Objective:   Vitals:  Blood pressure (!) 154/83, pulse 75, temperature 98.4 °F (36.9 °C), weight 92.5 kg (204 lb), last menstrual period 01/16/2016.    Physical Examination:   GEN: no apparent distress, comfortable  HEAD: atraumatic and normocephalic  EYES: no pallor, no icterus  ENT: no pharyngeal erythema, external ears WNL; no nasal discharge; no thrush  NECK: no masses, thyroid normal, trachea midline, no LAD/LN's, supple  CV: RRR with no murmur; normal pulse  CHEST: Normal respiratory effort; CTAB; normal breath sounds; no wheeze or crackles  ABDOM:  nondistended; soft;  no rebound/guarding, abdomenal wall healing, liver spleen not palpated  MUSC/Skeletal: ROM normal; no crepitus; joints normal  EXTREM: no clubbing, cyanosis, inflammation or swelling  SKIN: She is developing keloid changes at L breast incision and navel surgical sites.  : no CVAT  NEURO: grossly intact; motor/sensory WNL; no tremors  PSYCH: normal mood, affect and behavior  LYMPH: normal cervical, supraclavicular, axillary and groin LN's  BREASTS: L and "R breast" healing, extensive post op change, at chest .  No palpable mass.      Labs:   Lab Results   Component Value Date    WBC 6.00 04/18/2019    HGB 13.3 04/18/2019    HCT 40.4 04/18/2019    MCV 88 04/18/2019     04/18/2019    CMP    Bone Scan negative for metastatic dx. 2/7/18.  CAT of chest negative for metastatic dx. 2/7/18.    CAT of chest now.    Clearing of patchy infiltrates, no pulmonary nodule seen.  Assessment:   (1) 59 y.o. female with diagnosis of Stage 1 triple negative R breast cancer, 10/2016.       S/P R mastectomy, SNBx, AC x's 4, T x's12 weeks, Rad Rx and recent reconstruction.    (2)Keloids developing, Dr. Hernandez injecting scar areas.  He  further keloid surgery in 6/2019 in coordination with  Dr. Bebeto Alarcon of [facility].    (3)Refill marinol, norco., soma. Now x's 3 months.    (4)C spine DDD, " as per Dr. Washington..    RTC 3 months.

## 2019-11-08 DIAGNOSIS — D05.90 CARCINOMA IN SITU OF BREAST, UNSPECIFIED LATERALITY, UNSPECIFIED TYPE: ICD-10-CM

## 2019-11-08 DIAGNOSIS — R60.0 MILD PERIPHERAL EDEMA: ICD-10-CM

## 2019-11-12 RX ORDER — DRONABINOL 2.5 MG/1
CAPSULE ORAL
Qty: 60 CAPSULE | OUTPATIENT
Start: 2019-11-12

## 2019-11-12 RX ORDER — FUROSEMIDE 20 MG/1
TABLET ORAL
Qty: 30 TABLET | Refills: 4 | OUTPATIENT
Start: 2019-11-12

## 2019-11-19 DIAGNOSIS — R43.0 ANOSMIA: Primary | ICD-10-CM

## 2019-11-25 ENCOUNTER — TELEPHONE (OUTPATIENT)
Dept: HEMATOLOGY/ONCOLOGY | Facility: CLINIC | Age: 59
End: 2019-11-25

## 2019-11-25 ENCOUNTER — HOSPITAL ENCOUNTER (OUTPATIENT)
Dept: RADIOLOGY | Facility: HOSPITAL | Age: 59
Discharge: HOME | End: 2019-11-25
Attending: OTOLARYNGOLOGY
Payer: MEDICARE

## 2019-11-25 DIAGNOSIS — R43.0 ANOSMIA: ICD-10-CM

## 2019-11-25 PROCEDURE — 70486 CT MAXILLOFACIAL W/O DYE: CPT | Mod: 26,,, | Performed by: RADIOLOGY

## 2019-11-25 PROCEDURE — 70486 CT MAXILLOFACIAL W/O DYE: CPT | Mod: TC

## 2019-11-25 PROCEDURE — 70486 CT SINUSES WITHOUT CONTRAST: ICD-10-PCS | Mod: 26,,, | Performed by: RADIOLOGY

## 2019-11-25 NOTE — TELEPHONE ENCOUNTER
----- Message from Nell Carrillo sent at 11/25/2019  1:57 PM CST -----  The patient called again about her Ambien. She said she would like it called in to [facility] on [address]. Please call her back at [phone] or [phone].

## 2020-01-07 NOTE — PROGRESS NOTES
CC: This 59 y.o. female presents for management of diabetes and chronic conditions pending review including HTN, HLP, h/o breast cancer     HPI: She was diagnosed with T2DM in ~ 2012  .Has never been hospitalized r/t DM.  Family hx of DM: MGM, brothers , sisters x3    Reports bg has been uncontrolled since 2015 when she was diagnosed w breast cancer  H/o breast cancer, Had breast reconstruction iintially 2017- having touch ups since  Right Mastectomy, and left breast lift      hypoglycemia at home-none    monitoring BG at home:  Fasting 169 this am, 138-300s    Diet: Eats 1 Meals a day, snacks  Skips breakfast and lunch, eats dinner and snacks overnight  Night owl, h/o seizures, scared to sleep, day and night schedule off  Exercise: none  CURRENT DM MEDS: Trulicity 1.5 mg weekly (Monday) , metformin xr 2000 mg qhs, basaglar 35 u daily  Vial/pen:  Uses pen  Glucometer type:  True Metric    Standards of Care:  Eye exam: Eye care 20/20 8/2019    disabled r/t seizures  Has never had DM edu class    ROS:   Gen: Appetite good, + weight loss ~ 20lbs since October , denies fatigue and weakness.  Skin: Skin is intact and heals well, no rashes, no hair changes  Eyes: Denies visual disturbances  Resp: no SOB or POOL, no cough  Cardiac: No palpitations, chest pain, no edema   GI: No nausea or vomiting, diarrhea, constipation, or abdominal pain.  /GYN: 2-7+ nocturia (depends on if be elevated at night or not), no burning or pain.   MS/Neuro: + numbness/ tingling in BLE; Gait steady, speech clear  Psych: Denies drug/ETOH abuse, no hx of depression.  Other systems: negative.    PE:  GENERAL: Well developed, well nourished.  PSYCH: AAOx3, appropriate mood and affect, pleasant expression, conversant, appears relaxed, well groomed.   EYES: Conjunctiva, corneas clear  NECK: Supple, trachea midline   CHEST: Resp even and unlabored, CTA bilateral.  CARDIAC: RRR, S1, S2 heard, no murmurs  ABDOMEN: Soft, non-tender, non-distended    VASCULAR: DP pulses +2/4 bilaterally, no edema.  NEURO: Gait steady  SKIN: Skin warm and dry no acanthosis nigracans. .    MCR 30 11/2019 [facility]- see in Media tab  A1c 12%    Hemoglobin A1C   Date Value Ref Range Status   10/24/2018 9.5 (H) 4.0 - 5.6 % Final     Comment:     ADA Screening Guidelines:  5.7-6.4%  Consistent with prediabetes  >or=6.5%  Consistent with diabetes  High levels of fetal hemoglobin interfere with the HbA1C  assay. Heterozygous hemoglobin variants (HbS, HgC, etc)do  not significantly interfere with this assay.   However, presence of multiple variants may affect accuracy.     02/02/2017 9.1 (H) 4.5 - 6.2 % Final     Comment:     According to ADA guidelines, hemoglobin A1C <7.0% represents  optimal control in non-pregnant diabetic patients.  Different  metrics may apply to specific populations.   Standards of Medical Care in Diabetes - 2016.  For the purpose of screening for the presence of diabetes:  <5.7%     Consistent with the absence of diabetes  5.7-6.4%  Consistent with increasing risk for diabetes   (prediabetes)  >or=6.5%  Consistent with diabetes  Currently no consensus exists for use of hemoglobin A1C  for diagnosis of diabetes for children.          ASSESSMENT and PLAN:      1. T2DM with hyperglycemia-  Continue basaglar 35 u qhs, Trulicity 1.5 mg weekly-   receives free samples from Dr Renteria   Start Jardiance 10 mg once a day  x1 month, then increase to 25 mg once a day indefinitely.   Discussed MOA, possible side effects including yeast infection, UTI, dehydration and ketoacidosis, importance of maintaining hydration and avoiding low carb diets.   No family or personal hx of medullary thyroid cancer, no h/o pancreatitis   Discussed DM, progression of disease, long term complications, tx options.   Discussed A1c and BG goals.  Check bg bid, log in 2 weeks or sooner for issues  Only other options viable would bg CHAMBERS- needs to be on a more regular meal schedule or meal dose insulin  or TZD  Marinol may contribute to hyperglycemia    2. HTN - controlled, continue meds as previously prescribed and monitor.     3. HLP -  on statin therapy, LFTs WNL    4. Overweight- continue weight loss, try and eat discrete meals, DM edu  Body mass index is 28.67 kg/m².    5. Neuropathy- suspect r/t cancer txs       Follow-up: in 3 months with lab prior

## 2020-01-08 ENCOUNTER — OFFICE VISIT (OUTPATIENT)
Dept: ENDOCRINOLOGY | Facility: CLINIC | Age: 60
End: 2020-01-08
Payer: MEDICARE

## 2020-01-08 VITALS
WEIGHT: 205.56 LBS | DIASTOLIC BLOOD PRESSURE: 82 MMHG | SYSTOLIC BLOOD PRESSURE: 120 MMHG | TEMPERATURE: 98 F | BODY MASS INDEX: 28.78 KG/M2 | HEIGHT: 71 IN | HEART RATE: 76 BPM

## 2020-01-08 DIAGNOSIS — E11.65 TYPE 2 DIABETES MELLITUS WITH HYPERGLYCEMIA, WITH LONG-TERM CURRENT USE OF INSULIN: Primary | ICD-10-CM

## 2020-01-08 DIAGNOSIS — G62.9 NEUROPATHY: ICD-10-CM

## 2020-01-08 DIAGNOSIS — Z79.4 TYPE 2 DIABETES MELLITUS WITH HYPERGLYCEMIA, WITH LONG-TERM CURRENT USE OF INSULIN: Primary | ICD-10-CM

## 2020-01-08 DIAGNOSIS — E78.2 MIXED HYPERLIPIDEMIA: ICD-10-CM

## 2020-01-08 DIAGNOSIS — E66.3 OVERWEIGHT: ICD-10-CM

## 2020-01-08 PROBLEM — E11.9 TYPE 2 DIABETES MELLITUS WITHOUT COMPLICATION, WITHOUT LONG-TERM CURRENT USE OF INSULIN: Status: RESOLVED | Noted: 2017-02-02 | Resolved: 2020-01-08

## 2020-01-08 PROCEDURE — 99999 PR PBB SHADOW E&M-EST. PATIENT-LVL V: ICD-10-PCS | Mod: PBBFAC,,, | Performed by: NURSE PRACTITIONER

## 2020-01-08 PROCEDURE — 99999 PR PBB SHADOW E&M-EST. PATIENT-LVL V: CPT | Mod: PBBFAC,,, | Performed by: NURSE PRACTITIONER

## 2020-01-08 PROCEDURE — 99204 PR OFFICE/OUTPT VISIT, NEW, LEVL IV, 45-59 MIN: ICD-10-PCS | Mod: S$GLB,,, | Performed by: NURSE PRACTITIONER

## 2020-01-08 PROCEDURE — 99204 OFFICE O/P NEW MOD 45 MIN: CPT | Mod: S$GLB,,, | Performed by: NURSE PRACTITIONER

## 2020-01-08 NOTE — LETTER
January 8, 2020      Yoni Renteria MD  [address]  [place] [address]           [place] [facility]  [address]  [place] [address]  Phone: [phone]          Patient: Negrita Castro   MR Number: [id number]   YOB: 1960   Date of Visit: 1/8/2020       Dear Dr. Yoni Renteria:    Thank you for referring Negrita Castro to me for evaluation. Attached you will find relevant portions of my assessment and plan of care.    If you have questions, please do not hesitate to call me. I look forward to following Negrita Castro along with you.    Sincerely,    Ludy Sawyer, TAYA, NP    Enclosure  CC:  No Recipients    If you would like to receive this communication electronically, please contact [email or web address] or [phone] to request more information on [employer] access.    For providers and/or their staff who would like to refer a patient to [facility], please contact us through our one-stop-shop provider referral line, [facility], at [phone].    If you feel you have received this communication in error or would no longer like to receive these types of communications, please e-mail [email or web address]

## 2020-01-27 ENCOUNTER — TELEPHONE (OUTPATIENT)
Dept: ENDOCRINOLOGY | Facility: CLINIC | Age: 60
End: 2020-01-27

## 2020-01-27 ENCOUNTER — OFFICE VISIT (OUTPATIENT)
Dept: HEMATOLOGY/ONCOLOGY | Facility: CLINIC | Age: 60
End: 2020-01-27
Payer: MEDICARE

## 2020-01-27 VITALS
HEART RATE: 67 BPM | SYSTOLIC BLOOD PRESSURE: 131 MMHG | TEMPERATURE: 98 F | DIASTOLIC BLOOD PRESSURE: 80 MMHG | BODY MASS INDEX: 28.4 KG/M2 | WEIGHT: 203.63 LBS | RESPIRATION RATE: 19 BRPM

## 2020-01-27 DIAGNOSIS — G62.9 NEUROPATHY: ICD-10-CM

## 2020-01-27 DIAGNOSIS — F06.4 ANXIETY DISORDER DUE TO KNOWN PHYSIOLOGICAL CONDITION: ICD-10-CM

## 2020-01-27 DIAGNOSIS — Z17.0 MALIGNANT NEOPLASM OF NIPPLE OF RIGHT BREAST IN FEMALE, ESTROGEN RECEPTOR POSITIVE: Primary | ICD-10-CM

## 2020-01-27 DIAGNOSIS — E11.9 TYPE 2 DIABETES MELLITUS WITHOUT COMPLICATION, WITHOUT LONG-TERM CURRENT USE OF INSULIN: ICD-10-CM

## 2020-01-27 DIAGNOSIS — F34.1 DYSTHYMIC DISORDER: ICD-10-CM

## 2020-01-27 DIAGNOSIS — C50.011 MALIGNANT NEOPLASM OF NIPPLE OF RIGHT BREAST IN FEMALE, ESTROGEN RECEPTOR POSITIVE: Primary | ICD-10-CM

## 2020-01-27 PROCEDURE — 99214 OFFICE O/P EST MOD 30 MIN: CPT | Mod: S$GLB,,, | Performed by: INTERNAL MEDICINE

## 2020-01-27 PROCEDURE — 3008F PR BODY MASS INDEX (BMI) DOCUMENTED: ICD-10-PCS | Mod: S$GLB,,, | Performed by: INTERNAL MEDICINE

## 2020-01-27 PROCEDURE — 3008F BODY MASS INDEX DOCD: CPT | Mod: S$GLB,,, | Performed by: INTERNAL MEDICINE

## 2020-01-27 PROCEDURE — 99214 PR OFFICE/OUTPT VISIT, EST, LEVL IV, 30-39 MIN: ICD-10-PCS | Mod: S$GLB,,, | Performed by: INTERNAL MEDICINE

## 2020-01-27 NOTE — TELEPHONE ENCOUNTER
Eats around 1 pm and around 7 pm. When do you want her to take the insulin. Does not go to bed until 6 am.

## 2020-01-28 NOTE — PROGRESS NOTES
"  [facility] History & Physical    Subjective:      Patient ID:   Negrita Castro  59 y.o.   Martínez Renteria R. Leblanc, Babycos      Chief Complaint:   Breast cancer follow up    HPI:  59 y.o. female with diagnosis of Stage 1 R breast cancer 10/26/15.  "Triple negative".  Adjuvant AC x's 4, tx's 12 and Rad Rx through 16.    Port removed.  Had bilateral reconstruction surgery 17.  Further reconstruction, tummy tuck 17.  Additional fat injection at R chest done for symmetry.  She had placement of R nipple.  Dr. Hernandez.      She is due for additional reconstructive surgery at breast and abdomenal areas, date not yet set?  Also, Dr. Alarcon to consider Rad Rx to operative site to decrease keloid potential.    Dr. Washington saw her for C spine eval, no surgery planned for now.  He referred her to Dr. Rivers for injection Rx.    DM, cholesterol, epilepsy hx, DJD, LBP.  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    LR shoulder arthroscopy, R wrist surgery, M0.    Smoke  ppd x's 35 years, quit 2015.  ETOH no.  Disability-depression, epilepsy  Allergy none    Mom ovarian cancer  Dad lung cancer  Sisters DM, lung dx.      ROS:   GEN: normal without any fever, night sweats or weight loss  HEENT: normal with no HA's, sore throat, stiff neck, changes in vision  CV: normal with no CP, SOB, PND, POOL or orthopnea  PULM: normal with no SOB, cough, hemoptysis, sputum or pleuritic pain  GI: normal with no abdominal pain, nausea, vomiting, constipation, diarrhea, melanotic stools, BRBPR, or hematemesis  : normal with no hematuria, dysuria  BREAST: See HPI.  SKIN: normal with no rash, erythema, bruising, or swelling     Past Medical History:   Diagnosis Date    Breast cancer     Cancer     RIGHT BREAST 10-15    Depression     Diabetes mellitus     Neuropathy     Panic attacks     S/P epidural steroid injection     Seizures     none since early     Wears glasses     TO DRIVE     Past Surgical History:   Procedure " Laterality Date    BREAST BIOPSY      right    breast reconstruction with tummy [facility]      BREAST SURGERY      11-3-15 LUMPECTOMY, 12-2-15 REEXCISION    mastectomy 2016      RECONSTRUCTION OF NIPPLE Right 11/5/2018    Procedure: RECONSTRUCTION-NIPPLE RIGHT;  Surgeon: Xiang Hernandez MD;  Location: [facility] OR [address] [place] [address];  Service: Plastics;  Laterality: Right;    shoulder surgery and wrist      BILAT  BONE SPUR    WRIST SURGERY      RIGHT       Review of patient's allergies indicates:   Allergen Reactions    Adhesive tape-silicones Hives     BANDAIDS    Polymycin Hives    Latex, natural rubber Itching    Polyurethane-39            Current Outpatient Medications:     ASCORBIC ACID/VITAMIN E/BIOTIN (HAIR, SKIN, NAILS WITH BIOTIN ORAL), Take 1 tablet by mouth every morning. , Disp: , Rfl:     carisoprodol (SOMA) 350 MG tablet, Take 1 tablet (350 mg total) by mouth 3 (three) times daily as needed., Disp: 90 tablet, Rfl: 0    diazepam (VALIUM) 10 MG tablet, Take 10 mg by mouth 4 (four) times daily. , Disp: , Rfl:     dronabinol (MARINOL) 2.5 MG capsule, Take 1 capsule (2.5 mg total) by mouth 2 (two) times daily before meals., Disp: 30 capsule, Rfl: 1    dulaglutide (TRULICITY) 0.75 mg/0.5 mL PnIj, Inject 0.75 mg into the skin every 7 days., Disp: , Rfl:     empagliflozin (JARDIANCE) 10 mg Tab, Take 10 mg by mouth once daily., Disp: 30 tablet, Rfl: 0    enalapril (VASOTEC) 2.5 MG tablet, Take 2.5 mg by mouth once daily. For diabetes, Disp: , Rfl: 1    furosemide (LASIX) 20 MG tablet, TAKE ONE TABLET BY MOUTH EVERY DAY, Disp: 30 tablet, Rfl: 4    HYDROcodone-acetaminophen (NORCO)  mg per tablet, Take 1 tablet by mouth every 6 (six) hours as needed., Disp: , Rfl: 0    INSULIN GLARGINE,HUM.REC.ANLOG (BASAGLAR KWIKPEN SUBQ), Inject 25 Units into the skin every evening. , Disp: , Rfl:     metformin (GLUCOPHAGE-XR) 500 MG 24 hr tablet, Take 2,000 mg by mouth every evening. , Disp: , Rfl:      "multivitamin capsule, Take 1 capsule by mouth every morning., Disp: , Rfl:     pravastatin (PRAVACHOL) 40 MG tablet, Take 40 mg by mouth every morning. , Disp: , Rfl: 1    zolpidem (AMBIEN) 10 mg Tab, Take 10 mg by mouth nightly as needed., Disp: , Rfl: 0          Objective:   Vitals:  Blood pressure 131/80, pulse 67, temperature 98.1 °F (36.7 °C), temperature source Oral, resp. rate 19, weight 92.4 kg (203 lb 9.6 oz), last menstrual period 01/16/2016.    Physical Examination:   GEN: no apparent distress, comfortable  HEAD: atraumatic and normocephalic  EYES: no pallor, no icterus  ENT: no pharyngeal erythema, external ears WNL; no nasal discharge; no thrush  NECK: no masses, thyroid normal, trachea midline, no LAD/LN's, supple  CV: RRR with no murmur; normal pulse  CHEST: Normal respiratory effort; CTAB; normal breath sounds; no wheeze or crackles  ABDOM:  nondistended; soft;  no rebound/guarding, abdomenal wall healing, liver spleen not palpated  MUSC/Skeletal: ROM normal; no crepitus; joints normal  EXTREM: no clubbing, cyanosis, inflammation or swelling  SKIN: She is developing keloid changes at L breast incision and navel surgical sites.  : no CVAT  NEURO: grossly intact; motor/sensory WNL; no tremors  PSYCH: normal mood, affect and behavior  LYMPH: normal cervical, supraclavicular, axillary and groin LN's  BREASTS: L and "R breast" healing, extensive post op change, at chest .  No palpable mass.      Labs:   Lab Results   Component Value Date    WBC 6.00 04/18/2019    HGB 13.3 04/18/2019    HCT 40.4 04/18/2019    MCV 88 04/18/2019     04/18/2019    CMP    Bone Scan negative for metastatic dx. 2/7/18.  CAT of chest negative for metastatic dx. 2/7/18.    CAT of chest clearing of patchy infiltrates, no pulmonary nodule seen.  Assessment:   (1) 59 y.o. female with diagnosis of Stage 1 triple negative R breast cancer, 10/2016.       S/P R mastectomy, SNBx, AC x's 4, T x's12 weeks, Rad Rx and recent " reconstruction.    (2)Keloids developing, Dr. Hernandez injecting scar areas.  For further keloid surgery  in coordination with  Dr. Bebeto Alarcon of Rad Rx.    (3)Refill marinol, norco., soma. Now x's 3 months.    (4)C spine DDD, as per Dr. Washington..    RTC 3 months.

## 2020-02-05 ENCOUNTER — TELEPHONE (OUTPATIENT)
Dept: ENDOCRINOLOGY | Facility: CLINIC | Age: 60
End: 2020-02-05

## 2020-02-05 RX ORDER — EMPAGLIFLOZIN 10 MG/1
TABLET, FILM COATED ORAL
Qty: 30 TABLET | Refills: 0 | OUTPATIENT
Start: 2020-02-05

## 2020-02-21 ENCOUNTER — OFFICE VISIT (OUTPATIENT)
Dept: PLASTIC SURGERY | Facility: CLINIC | Age: 60
End: 2020-02-21
Payer: MEDICARE

## 2020-02-21 VITALS
SYSTOLIC BLOOD PRESSURE: 158 MMHG | HEART RATE: 83 BPM | WEIGHT: 203 LBS | HEIGHT: 71 IN | DIASTOLIC BLOOD PRESSURE: 88 MMHG | BODY MASS INDEX: 28.42 KG/M2

## 2020-02-21 DIAGNOSIS — Z85.3 PERSONAL HISTORY OF BREAST CANCER: ICD-10-CM

## 2020-02-21 DIAGNOSIS — L91.0 KELOID: Primary | ICD-10-CM

## 2020-02-21 PROCEDURE — 99999 PR PBB SHADOW E&M-EST. PATIENT-LVL III: ICD-10-PCS | Mod: PBBFAC,,, | Performed by: SURGERY

## 2020-02-21 PROCEDURE — 3008F BODY MASS INDEX DOCD: CPT | Mod: CPTII,S$GLB,, | Performed by: SURGERY

## 2020-02-21 PROCEDURE — 3008F PR BODY MASS INDEX (BMI) DOCUMENTED: ICD-10-PCS | Mod: CPTII,S$GLB,, | Performed by: SURGERY

## 2020-02-21 PROCEDURE — 99213 PR OFFICE/OUTPT VISIT, EST, LEVL III, 20-29 MIN: ICD-10-PCS | Mod: S$GLB,,, | Performed by: SURGERY

## 2020-02-21 PROCEDURE — 99999 PR PBB SHADOW E&M-EST. PATIENT-LVL III: CPT | Mod: PBBFAC,,, | Performed by: SURGERY

## 2020-02-21 PROCEDURE — 99213 OFFICE O/P EST LOW 20 MIN: CPT | Mod: S$GLB,,, | Performed by: SURGERY

## 2020-02-21 NOTE — PROGRESS NOTES
Patient presents to Plastic surgery Clinic after having bilateral breast reconstruction using a DIP flap.  We attempted to do free fat grafting in the superior pole of the right flap this resulted in a purulent infection.  This was at least a year and half ago.  The patient still has the same defect above the flap.  I am reluctant to inject free fat.  We may indeed discuss doing a Tdap in this area if it will reach.  She also has very painful keloid amount the left areola as well as the left hip area.  We will excise these keloids as well and plan on doing some postoperative steroid injections.

## 2020-02-28 ENCOUNTER — TELEPHONE (OUTPATIENT)
Dept: PLASTIC SURGERY | Facility: CLINIC | Age: 60
End: 2020-02-28

## 2020-03-05 ENCOUNTER — TELEPHONE (OUTPATIENT)
Dept: HEMATOLOGY/ONCOLOGY | Facility: CLINIC | Age: 60
End: 2020-03-05

## 2020-03-05 DIAGNOSIS — L91.0 KELOID: Primary | ICD-10-CM

## 2020-03-05 DIAGNOSIS — Z85.3 PERSONAL HISTORY OF BREAST CANCER: ICD-10-CM

## 2020-03-05 NOTE — TELEPHONE ENCOUNTER
----- Message from Valerie Leblanc, Patient Care Assistant sent at 3/5/2020 10:43 AM CST -----  Lala, patient  called in stating she was checking to see if you received a fax from [employer] for the reduction of her medications. She can be reached at [phone] or [phone]

## 2020-03-11 PROBLEM — T45.1X5A CHEMOTHERAPY-INDUCED NAUSEA: Status: ACTIVE | Noted: 2020-03-11

## 2020-03-11 PROBLEM — R11.0 CHEMOTHERAPY-INDUCED NAUSEA: Status: ACTIVE | Noted: 2020-03-11

## 2020-03-12 ENCOUNTER — TELEPHONE (OUTPATIENT)
Dept: HEMATOLOGY/ONCOLOGY | Facility: CLINIC | Age: 60
End: 2020-03-12

## 2020-03-12 NOTE — TELEPHONE ENCOUNTER
----- Message from Valerie Leblanc, Patient Care Assistant sent at 3/10/2020  4:19 PM CDT -----  Patient called in stating her insurance company still have not received the paperwork for her reduction of her medication. She can be reached at [phone]

## 2020-03-13 ENCOUNTER — TELEPHONE (OUTPATIENT)
Dept: PLASTIC SURGERY | Facility: CLINIC | Age: 60
End: 2020-03-13

## 2020-03-16 ENCOUNTER — TELEPHONE (OUTPATIENT)
Dept: PLASTIC SURGERY | Facility: CLINIC | Age: 60
End: 2020-03-16

## 2020-03-16 NOTE — TELEPHONE ENCOUNTER
Pt notified elective surgery postponed  due to current COVID-19 Policy. Pt verbalized understanding.

## 2020-04-02 ENCOUNTER — TELEPHONE (OUTPATIENT)
Dept: PLASTIC SURGERY | Facility: CLINIC | Age: 60
End: 2020-04-02

## 2020-04-23 ENCOUNTER — TELEPHONE (OUTPATIENT)
Dept: HEMATOLOGY/ONCOLOGY | Facility: CLINIC | Age: 60
End: 2020-04-23

## 2020-04-23 NOTE — TELEPHONE ENCOUNTER
Called the pt to change her 04/23 appointment to a virtual visit. She has downloaded the chidi on her phone and I walked her through the epre-check. I changed her appointment to a video visit.

## 2020-04-27 ENCOUNTER — DOCUMENTATION ONLY (OUTPATIENT)
Dept: RADIATION ONCOLOGY | Facility: CLINIC | Age: 60
End: 2020-04-27

## 2020-04-27 ENCOUNTER — OFFICE VISIT (OUTPATIENT)
Dept: HEMATOLOGY/ONCOLOGY | Facility: CLINIC | Age: 60
End: 2020-04-27
Payer: MEDICARE

## 2020-04-27 ENCOUNTER — TELEPHONE (OUTPATIENT)
Dept: HEMATOLOGY/ONCOLOGY | Facility: CLINIC | Age: 60
End: 2020-04-27

## 2020-04-27 DIAGNOSIS — Z79.4 TYPE 2 DIABETES MELLITUS WITH HYPERGLYCEMIA, WITH LONG-TERM CURRENT USE OF INSULIN: ICD-10-CM

## 2020-04-27 DIAGNOSIS — Z17.0 MALIGNANT NEOPLASM OF NIPPLE OF RIGHT BREAST IN FEMALE, ESTROGEN RECEPTOR POSITIVE: Primary | ICD-10-CM

## 2020-04-27 DIAGNOSIS — C50.011 MALIGNANT NEOPLASM OF NIPPLE OF RIGHT BREAST IN FEMALE, ESTROGEN RECEPTOR POSITIVE: Primary | ICD-10-CM

## 2020-04-27 DIAGNOSIS — E11.65 TYPE 2 DIABETES MELLITUS WITH HYPERGLYCEMIA, WITH LONG-TERM CURRENT USE OF INSULIN: ICD-10-CM

## 2020-04-27 PROCEDURE — 99214 PR OFFICE/OUTPT VISIT, EST, LEVL IV, 30-39 MIN: ICD-10-PCS | Mod: 95,,, | Performed by: INTERNAL MEDICINE

## 2020-04-27 PROCEDURE — 99214 OFFICE O/P EST MOD 30 MIN: CPT | Mod: 95,,, | Performed by: INTERNAL MEDICINE

## 2020-04-27 NOTE — TELEPHONE ENCOUNTER
Rustam,  You have seen Mrs. Castro before.  She sees  for plastic surgery at the breast area.  She has keloids.    He has scheduled her for keloid surgery on May 19th.  The surgery is being done at [facility].  I believe.    He has set her up to see a radiation physician at [facility], she wants to have her radiation done per yourself here in [place].    Would you in your office carried the ball in getting the radiation clarified and transferred over here to [place] for her?

## 2020-04-27 NOTE — PROGRESS NOTES
"[facility] telemedicine audio video encounter progress note  2020  Patient is in isolation at home because of the corona virus epidemic.  This is a synchronous audio video encounter in lieu of in-person encounter because of corona virus emergency.  Patient acknowledges and consents to audio video encounter today as part of her outpatient care.  Chief complaint is breast cancer follow-up.    Subjective:      Patient ID:   Negrita Castro  59 y.o.   Martínez Renteria R. Leblanc, Babycos      Chief Complaint:   Breast cancer follow up    HPI:  59 y.o. female with diagnosis of Stage 1 R breast cancer 10/26/15.  "Triple negative".  Adjuvant AC x's 4, tx's 12 and Rad Rx through 16.    Port removed.  Had bilateral reconstruction surgery 17.  Further reconstruction, tummy tuck 17.  Additional fat injection at R chest done for symmetry.  She had placement of R nipple.  Dr. Hernandez.      She is due for additional reconstructive surgery at breast and abdomenal areas.  The surgery is being done as part of her reconstruction efforts and for keloid management.  The date of the surgery is May 19th.  Have sent a message to Dr. Alarcon to arrange radiation therapy follow-up with her around the time of the surgery to reduce risk of keloid recurrence.  Also, Dr. Alarcon to consider Rad Rx to operative site to decrease keloid potential.    Dr. Washington saw her for C spine eval, no surgery planned for now.  He referred her to Dr. Rivers for injection Rx.    DM, cholesterol, epilepsy hx, DJD, LBP.  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    LR shoulder arthroscopy, R wrist surgery, M0.    Smoke  ppd x's 35 years, quit 2015.  ETOH no.  Disability-depression, epilepsy  Allergy none    Mom ovarian cancer  Dad lung cancer  Sisters DM, lung dx.      ROS:   GEN: normal without any fever, night sweats or weight loss  HEENT: normal with no HA's, sore throat, stiff neck, changes in vision  CV: normal with no CP, SOB, " PND, POOL or orthopnea  PULM: normal with no SOB, cough, hemoptysis, sputum or pleuritic pain  GI: normal with no abdominal pain, nausea, vomiting, constipation, diarrhea, melanotic stools, BRBPR, or hematemesis  : normal with no hematuria, dysuria  BREAST: See HPI.  SKIN: normal with no rash, erythema, bruising, or swelling     Past Medical History:   Diagnosis Date    Breast cancer     Cancer     RIGHT BREAST 10-15    Depression     Diabetes mellitus     Neuropathy     Panic attacks     S/P epidural steroid injection     Seizures     none since early 30's    Wears glasses     TO DRIVE     Past Surgical History:   Procedure Laterality Date    BREAST BIOPSY      right    breast reconstruction with tummy [facility]      BREAST SURGERY      11-3-15 LUMPECTOMY, 12-2-15 REEXCISION    mastectomy 2016      RECONSTRUCTION OF NIPPLE Right 11/5/2018    Procedure: RECONSTRUCTION-NIPPLE RIGHT;  Surgeon: Xiang Hernandez MD;  Location: [facility] OR [address] [place] [address];  Service: Plastics;  Laterality: Right;    shoulder surgery and wrist      BILAT  BONE SPUR    WRIST SURGERY      RIGHT       Review of patient's allergies indicates:   Allergen Reactions    Adhesive tape-silicones Hives     BANDAIDS    Polymycin Hives    Latex, natural rubber Itching    Polyurethane-39            Current Outpatient Medications:     ASCORBIC ACID/VITAMIN E/BIOTIN (HAIR, SKIN, NAILS WITH BIOTIN ORAL), Take 1 tablet by mouth every morning. , Disp: , Rfl:     carisoprodol (SOMA) 350 MG tablet, Take 1 tablet (350 mg total) by mouth 3 (three) times daily as needed., Disp: 90 tablet, Rfl: 0    diazepam (VALIUM) 10 MG tablet, Take 10 mg by mouth 4 (four) times daily. , Disp: , Rfl:     dronabinol (MARINOL) 2.5 MG capsule, Take 1 capsule (2.5 mg total) by mouth 2 (two) times daily before meals., Disp: 30 capsule, Rfl: 1    dulaglutide (TRULICITY) 0.75 mg/0.5 mL PnIj, Inject 0.75 mg into the skin every 7 days., Disp: , Rfl:     enalapril  "(VASOTEC) 2.5 MG tablet, Take 2.5 mg by mouth once daily. For diabetes, Disp: , Rfl: 1    furosemide (LASIX) 20 MG tablet, TAKE ONE TABLET BY MOUTH EVERY DAY, Disp: 30 tablet, Rfl: 4    HYDROcodone-acetaminophen (NORCO)  mg per tablet, Take 1 tablet by mouth every 6 (six) hours as needed., Disp: , Rfl: 0    insulin glargine,hum.rec.anlog (LANTUS U-100 INSULIN SUBQ), Inject 35 Units into the skin every evening., Disp: , Rfl:     metformin (GLUCOPHAGE-XR) 500 MG 24 hr tablet, Take 2,000 mg by mouth every evening. , Disp: , Rfl:     multivitamin capsule, Take 1 capsule by mouth every morning., Disp: , Rfl:     pravastatin (PRAVACHOL) 40 MG tablet, Take 40 mg by mouth every morning. , Disp: , Rfl: 1    zolpidem (AMBIEN) 10 mg Tab, Take 10 mg by mouth nightly as needed., Disp: , Rfl: 0          Objective:   Vitals:  Last menstrual period 01/16/2016.    Physical Examination:   GEN: no apparent distress, comfortable  HEAD: atraumatic and normocephalic  EYES: no pallor, no icterus  ENT: no pharyngeal erythema  NECK: noLAD/LN's, supple  CV:  No edema  CHEST: Normal respiratory effort   ABDOM:  nondistended; soft  MUSC/Skeletal: ROM normal  EXTREM: no swelling  SKIN: She is developing keloid changes at L breast incision and navel surgical sites.  NEURO: grossly intact  PSYCH: normal mood, affect and behavior  LYMPH: normal  LN's  BREASTS: L and "R breast" healing, extensive post op change, at chest .  No palpable mass.      Labs:   Lab Results   Component Value Date    WBC 6.00 04/18/2019    HGB 13.3 04/18/2019    HCT 40.4 04/18/2019    MCV 88 04/18/2019     04/18/2019    CMP    Bone Scan negative for metastatic dx. 2/7/18.  CAT of chest negative for metastatic dx. 2/7/18.    CAT of chest clearing of patchy infiltrates, no pulmonary nodule seen.  Assessment:   (1) 59 y.o. female with diagnosis of Stage 1 triple negative R breast cancer, 10/2016.       S/P R mastectomy, SNBx, AC x's 4, T x's12 weeks, Rad Rx " and recent reconstruction.    (2)Keloids developing, Dr. Hernandez injecting scar areas.  For further keloid surgery  in coordination with  Dr. Bebeto Alarcon of Rad Rx.  On May 19, 2020.    (3)Refill marinol, norco., soma. Now x's 3 months.    (4)C spine DDD, as per Dr. Washington..    (5) CT scan of chest abdomen pelvis and bone scan and lab work ordered for around July 2020 for re-evaluation of breast cancer history.  Return to clinic at that time.

## 2020-04-27 NOTE — TELEPHONE ENCOUNTER
Lala,     She is having surgery at which [facility] May 19th.  To she is set up to see the radiation doctor on May 18th at [facility].  She wants to see Dr. Alarcon here instead.  I have placed a note to Dr. Alarcon the set this up.    Will you call radiation therapy at [facility] in cancel her appointment there for May 18th.    Would you refill her medications to include Marinol, Norco and Soma monthly x3 months, I think this will be a total of 9 prescriptions.  She will have someone  her prescriptions on Tuesday the 28th.  Date the prescriptions to be filled starting around May 1st    I have scheduled her for a CT scan of the chest abdomen and pelvis and a bone scan at [facility] imaging for July 15th approximately 2020.  Please tell her to hold her Glucophage on the day of the CAT scan and for 2 days thereafter    I have placed orders for her lab work at [facility] for July 2020.  Ask her to get her lab work done at [facility] in July.    She returns to clinic to see me towards the end of July,  Marilu, would you  make that appointment?

## 2020-04-28 ENCOUNTER — TELEPHONE (OUTPATIENT)
Dept: HEMATOLOGY/ONCOLOGY | Facility: CLINIC | Age: 60
End: 2020-04-28

## 2020-04-28 NOTE — TELEPHONE ENCOUNTER
Spoke to pt letting her know prescriptions are ready for .  Also, informed pt to hold glucophage the day of CT scan and 2 days after.  Also instructed pt to have lab work done in July before RTC at the end of July. Pt verbalized understanding.

## 2020-04-29 ENCOUNTER — DOCUMENTATION ONLY (OUTPATIENT)
Dept: RADIATION ONCOLOGY | Facility: CLINIC | Age: 60
End: 2020-04-29

## 2020-04-29 NOTE — PROGRESS NOTES
04/28/2020 Patient seen by nurse in radiation therapy.  Set up appointment with Dr. Alarcon to discuss upcoming radiation therapy for keloids.  Patient clarified that she was having surgery at [facility] with Dr. Hernandez and not at [facility].

## 2020-05-04 ENCOUNTER — LAB VISIT (OUTPATIENT)
Dept: LAB | Facility: HOSPITAL | Age: 60
End: 2020-05-04
Attending: INTERNAL MEDICINE
Payer: MEDICARE

## 2020-05-04 ENCOUNTER — CLINICAL SUPPORT (OUTPATIENT)
Dept: CARDIOLOGY | Facility: CLINIC | Age: 60
End: 2020-05-04
Payer: MEDICARE

## 2020-05-04 DIAGNOSIS — Z79.4 TYPE 2 DIABETES MELLITUS WITH HYPEROSMOLARITY WITHOUT COMA, WITH LONG-TERM CURRENT USE OF INSULIN: ICD-10-CM

## 2020-05-04 DIAGNOSIS — E11.00 TYPE 2 DIABETES MELLITUS WITH HYPEROSMOLARITY WITHOUT COMA, WITH LONG-TERM CURRENT USE OF INSULIN: ICD-10-CM

## 2020-05-04 DIAGNOSIS — Z01.818 PREOP TESTING: ICD-10-CM

## 2020-05-04 LAB
ANION GAP SERPL CALC-SCNC: 8 MMOL/L (ref 8–16)
BUN SERPL-MCNC: 19 MG/DL (ref 6–20)
CALCIUM SERPL-MCNC: 9 MG/DL (ref 8.7–10.5)
CHLORIDE SERPL-SCNC: 102 MMOL/L (ref 95–110)
CO2 SERPL-SCNC: 26 MMOL/L (ref 23–29)
CREAT SERPL-MCNC: 0.7 MG/DL (ref 0.5–1.4)
ERYTHROCYTE [DISTWIDTH] IN BLOOD BY AUTOMATED COUNT: 12.4 % (ref 11.5–14.5)
EST. GFR  (AFRICAN AMERICAN): >60 ML/MIN/1.73 M^2
EST. GFR  (NON AFRICAN AMERICAN): >60 ML/MIN/1.73 M^2
ESTIMATED AVG GLUCOSE: 220 MG/DL (ref 68–131)
GLUCOSE SERPL-MCNC: 235 MG/DL (ref 70–110)
HBA1C MFR BLD HPLC: 9.3 % (ref 4.5–6.2)
HCT VFR BLD AUTO: 40 % (ref 37–48.5)
HGB BLD-MCNC: 13.1 G/DL (ref 12–16)
MCH RBC QN AUTO: 30 PG (ref 27–31)
MCHC RBC AUTO-ENTMCNC: 32.8 G/DL (ref 32–36)
MCV RBC AUTO: 92 FL (ref 82–98)
PLATELET # BLD AUTO: 268 K/UL (ref 150–350)
PMV BLD AUTO: 10 FL (ref 9.2–12.9)
POTASSIUM SERPL-SCNC: 4.3 MMOL/L (ref 3.5–5.1)
RBC # BLD AUTO: 4.36 M/UL (ref 4–5.4)
SODIUM SERPL-SCNC: 136 MMOL/L (ref 136–145)
WBC # BLD AUTO: 5.93 K/UL (ref 3.9–12.7)

## 2020-05-04 PROCEDURE — 85027 COMPLETE CBC AUTOMATED: CPT

## 2020-05-04 PROCEDURE — 83036 HEMOGLOBIN GLYCOSYLATED A1C: CPT

## 2020-05-04 PROCEDURE — 80048 BASIC METABOLIC PNL TOTAL CA: CPT

## 2020-05-04 PROCEDURE — 93000 EKG 12-LEAD: ICD-10-PCS | Mod: S$GLB,,, | Performed by: INTERNAL MEDICINE

## 2020-05-04 PROCEDURE — 93000 ELECTROCARDIOGRAM COMPLETE: CPT | Mod: S$GLB,,, | Performed by: INTERNAL MEDICINE

## 2020-05-04 PROCEDURE — 36415 COLL VENOUS BLD VENIPUNCTURE: CPT

## 2020-05-05 ENCOUNTER — CLINICAL SUPPORT (OUTPATIENT)
Dept: RADIATION ONCOLOGY | Facility: CLINIC | Age: 60
End: 2020-05-05
Payer: MEDICARE

## 2020-05-05 DIAGNOSIS — C50.011 MALIGNANT NEOPLASM OF NIPPLE OF RIGHT BREAST IN FEMALE, ESTROGEN RECEPTOR POSITIVE: Primary | ICD-10-CM

## 2020-05-05 DIAGNOSIS — L91.0 KELOID OF SKIN: ICD-10-CM

## 2020-05-05 DIAGNOSIS — Z17.0 MALIGNANT NEOPLASM OF NIPPLE OF RIGHT BREAST IN FEMALE, ESTROGEN RECEPTOR POSITIVE: Primary | ICD-10-CM

## 2020-05-05 PROCEDURE — 99441 PR PHYSICIAN TELEPHONE EVALUATION 5-10 MIN: CPT | Mod: 95,,, | Performed by: RADIOLOGY

## 2020-05-05 PROCEDURE — 99441 PR PHYSICIAN TELEPHONE EVALUATION 5-10 MIN: ICD-10-PCS | Mod: 95,,, | Performed by: RADIOLOGY

## 2020-05-05 NOTE — PROGRESS NOTES
Negrita Castro  [id number]  1960 5/5/2020  No referring provider defined for this encounter.    DIAGNOSIS: Cancer Staging  Breast cancer, right  Staging form: Onc Breast AJCC V7  - Pathologic: Stage IIA (T2, N0, cM0) - Signed by Ramírez Alarcon Jr., MD on 9/5/2017    REASON FOR VISIT: Routine scheduled follow-up.    The patient location is:  [place], [place]; home  Visit type: Virtual visit with audio ONLY  The reason for the audio only service rather than synchronous audio and video virtual visit was related to technical difficulties or patient preference/necessity.    HISTORY OF PRESENT ILLNESS:   59-year-old female who had a screening mammogram detected abnormality in the right breast with diagnostic mammogram and ultrasound confirming a 9 mm asymmetric mass in the 4:00 position of the right breast, CT-guided biopsy returning invasive ductal carcinoma that was triple negative. She underwent a lumpectomy with sentinel lymph node assessment which returned a high-grade 7 mm invasive ductal carcinoma with high-grade ductal carcinoma in situ, with 3 separate foci of microinvasive disease, positive margin, 0 sentinel lymph nodes contained disease. She went to reexcision of the superior and anterior margin again with high-grade duct carcinoma in situ at the margins and 2 small foci of microinvasive disease. She subsequently underwent a right simple mastectomy that revealed residual invasive ductal carcinoma with associated high-grade ductal carcinoma in situ with close but negative margins. She began the reconstructive process with Dr. Hernandez by placement of tissue expanders. She then received Adriamycin and Cytoxan ×4 cycles.    Patient completed adjuvant radiotherapy to the right chest wall, 5040 cGy with concurrent Taxol ending the treatment on 08/04/2016.  Patient continued her reconstructive process with bilateral HEMANTH flaps under the care of Dr. Hernandez I met with her and September of 2017 to discuss adjuvant  radiotherapy after keloid resection.      INTERVAL HISTORY:   She has undergone multiple resections and reports persistent/recurrent keloid at the left hip causing pain and discomfort as well as at the left reconstructed nipple with eventual intention for tattooing.  She is now planned for left hip and left nipple keloid resection with Dr. Hernandez on 5/19 at [facility] and has met with Dr. Vick who recommended adjuvant radiotherapy to minimize risk of recurrence.     Review of Systems   Constitutional: Negative for appetite change, chills, fever and unexpected weight change.   HENT:   Negative for lump/mass, mouth sores, sore throat, trouble swallowing and voice change.    Eyes: Negative for eye problems and icterus.   Respiratory: Negative for cough, hemoptysis and shortness of breath.    Cardiovascular: Negative for chest pain and leg swelling.   Gastrointestinal: Negative for abdominal pain, constipation, diarrhea, nausea and vomiting.   Genitourinary: Negative for dysuria, frequency, hematuria, nocturia and vaginal bleeding.    Musculoskeletal: Negative for back pain, gait problem, neck pain and neck stiffness.   Skin: Positive for itching.   Neurological: Negative for extremity weakness, gait problem, headaches, numbness and seizures.   Hematological: Negative for adenopathy.     Past Medical History:   Diagnosis Date    Breast cancer     Cancer     RIGHT BREAST 10-15    Depression     Diabetes mellitus     Neuropathy     Panic attacks     S/P epidural steroid injection     Seizures     none since early 30's    Wears glasses     TO DRIVE     Past Surgical History:   Procedure Laterality Date    BREAST BIOPSY      right    breast reconstruction with tummy ck      BREAST SURGERY      11-3-15 LUMPECTOMY, 12-2-15 REEXCISION    mastectomy 2016      RECONSTRUCTION OF NIPPLE Right 11/5/2018    Procedure: RECONSTRUCTION-NIPPLE RIGHT;  Surgeon: Xiang Hernandez MD;  Location: [facility] OR [address] [place] [address];   Service: Plastics;  Laterality: Right;    shoulder surgery and wrist      BILAT  BONE SPUR    WRIST SURGERY      RIGHT     Social History     Socioeconomic History    Marital status: Single     Spouse name: Not on file    Number of children: Not on file    Years of education: Not on file    Highest education level: Not on file   Occupational History    Not on file   Social Needs    Financial resource strain: Not on file    Food insecurity:     Worry: Not on file     Inability: Not on file    Transportation needs:     Medical: Not on file     Non-medical: Not on file   Tobacco Use    Smoking status: Former Smoker     Packs/day: 0.25     Years: 30.00     Pack years: 7.50     Last attempt to quit: 2015     Years since quittin.6    Smokeless tobacco: Never Used   Substance and Sexual Activity    Alcohol use: Yes     Alcohol/week: 0.0 standard drinks     Comment: RARELY    Drug use: Not on file     Comment: medical marijuana    Sexual activity: Not on file   Lifestyle    Physical activity:     Days per week: Not on file     Minutes per session: Not on file    Stress: Not on file   Relationships    Social connections:     Talks on phone: Not on file     Gets together: Not on file     Attends [employer] service: Not on file     Active member of club or organization: Not on file     Attends meetings of clubs or organizations: Not on file     Relationship status: Not on file   Other Topics Concern    Not on file   Social History Narrative    Not on file     Family History   Problem Relation Age of Onset    Anesthesia problems Neg Hx      Medication List with Changes/Refills   Current Medications    ASCORBIC ACID/VITAMIN E/BIOTIN (HAIR, SKIN, NAILS WITH BIOTIN ORAL)    Take 1 tablet by mouth every morning.     CARISOPRODOL (SOMA) 350 MG TABLET    Take 1 tablet (350 mg total) by mouth 3 (three) times daily as needed.    DIAZEPAM (VALIUM) 10 MG TABLET    Take 10 mg by mouth 4 (four) times daily.      DRONABINOL (MARINOL) 2.5 MG CAPSULE    Take 1 capsule (2.5 mg total) by mouth 2 (two) times daily before meals.    DULAGLUTIDE (TRULICITY) 0.75 MG/0.5 ML PNIJ    Inject 0.75 mg into the skin every 7 days.    ENALAPRIL (VASOTEC) 2.5 MG TABLET    Take 2.5 mg by mouth once daily. For diabetes    FUROSEMIDE (LASIX) 20 MG TABLET    TAKE ONE TABLET BY MOUTH EVERY DAY    HYDROCODONE-ACETAMINOPHEN (NORCO)  MG PER TABLET    Take 1 tablet by mouth every 6 (six) hours as needed.    INSULIN GLARGINE,HUM.REC.ANLOG (LANTUS U-100 INSULIN SUBQ)    Inject 35 Units into the skin every evening.    METFORMIN (GLUCOPHAGE-XR) 500 MG 24 HR TABLET    Take 2,000 mg by mouth every evening.     MULTIVITAMIN CAPSULE    Take 1 capsule by mouth every morning.    PRAVASTATIN (PRAVACHOL) 40 MG TABLET    Take 40 mg by mouth every morning.     ZOLPIDEM (AMBIEN) 10 MG TAB    Take 10 mg by mouth nightly as needed.     Review of patient's allergies indicates:   Allergen Reactions    Adhesive tape-silicones Hives     BANDAIDS    Polymycin Hives    Adhesive     Latex, natural rubber Hives and Itching    Neomycin-bacitracnzn-polymyxnb     Polyurethane-39 Hives       QUALITY OF LIFE: Unknown    There were no vitals filed for this visit.  There is no height or weight on file to calculate BMI.    PHYSICAL EXAM:   (telemed visit)    ANCILLARY DATA:     ASSESSMENT: 59 y.o. female with recurrent keloid of the left hip causing pain and discomfort and of the left reconstructed nipple, planned for resection on May 19.  PLAN:   Negrita Castro will previously met with me to discuss the role of adjuvant radiotherapy for prevention of keloid.  I explained that radiotherapy has to be initiated within 24 hr of surgical resection for maximum efficacy to eradicate the cells responsible for keloid formation.  She is planned for resection with Dr. Hernandez of multiply recurrent left hip keloid which is causing significant pain and discomfort as well as of left  reconstructed nipple with eventual plan for tattooing.  She has undergone extensive prior reconstructions and reports previously visualized keloids at the abdominal incision have not recurred.  Given the pain and discomfort at the left hip with prior recurrences, she would benefit from adjuvant radiotherapy and I will carbon copy Dr. Hernandez for his opinion regarding the left nipple site.  My recommendation is 18 Gy in 3 fractions using superficial electrons with custom bolusing and I have asked the patient to contact us with her surgical appointment so that we may coordinate care and initiate treatment within 24 hr window.  She expressed understanding and would like to proceed.    I carefully explained the process of simulation and treatment delivery with weekly physician visits.     We discussed the risks and benefits of the above treatment and have gone over in detail the acute and late toxicities of radiation therapy to the left hip and left nipple. The patient expressed  understanding.    Consent deferred.    All questions answered and contact information provided. Patient understands free to call us anytime with any questions or concerns regarding radiation therapy.    I have personally seen and evaluated this patient. Greater than 50% of this time was spent discussing coordination of care and/or counseling.    Total time spent with patient: 10min    Each patient to whom he or she provides medical services by telemedicine is:  (1) informed of the relationship between the physician and patient and the respective role of any other health care provider with respect to management of the patient; and (2) notified that he or she may decline to receive medical services by telemedicine and may withdraw from such care at any time. Patient verbally consented to receive this service via voice-only telephone call.    This service was not originating from a related E/M service provided within the previous 7 days nor will   to an E/M service or procedure within the next 24 hours or my soonest available appointment.  Prevailing standard of care was able to be met in this audio-only visit.      COVID-19 precautions discussed.    PHYSICIAN: Ramírez Alarcon Jr, MD

## 2020-05-12 ENCOUNTER — TELEPHONE (OUTPATIENT)
Dept: PLASTIC SURGERY | Facility: CLINIC | Age: 60
End: 2020-05-12

## 2020-05-12 NOTE — TELEPHONE ENCOUNTER
Spoke to MsGloria Castro about postponing surgery scheduled 5/19/2020 until A1C is improved. Pt verbalized understanding.

## 2020-05-19 RX ORDER — SYRINGE,SAFETY WITH NEEDLE,1ML 25GX1"
1 SYRINGE (EA) MISCELLANEOUS DAILY
Qty: 50 EACH | Refills: 0 | COMMUNITY
Start: 2020-05-19 | End: 2020-12-11 | Stop reason: CLARIF

## 2020-05-19 RX ORDER — INSULIN GLARGINE 100 [IU]/ML
35 INJECTION, SOLUTION SUBCUTANEOUS NIGHTLY
Qty: 10.5 ML | Refills: 11 | Status: SHIPPED | OUTPATIENT
Start: 2020-05-19 | End: 2021-04-13

## 2020-05-19 NOTE — TELEPHONE ENCOUNTER
Patient advised she is in donut hole and would like to have vial for lantus sent in cheaper in cost and if there is a substitute for trulicity

## 2020-05-19 NOTE — TELEPHONE ENCOUNTER
----- Message from Jacquie Limon MA sent at 5/19/2020  9:58 AM CDT -----  Contact: self/ [phone]  PT stated she needed s a refill on her medication, Per [facility] pt stated she has a co-pay for over $500/dollars and was told to contact Dr. Sawyer office for help or a program that will help cover her cost. PT stated she is in a gap with [facility]...Please call back to discuss. Pt is out of Medication....    Medication: . dulaglutide (TRULICITY) 0.75 mg/0.5 mL PnIj  / insulin glargine,hum.rec.anlog (LANTUS U-100 INSULIN SUBQ)

## 2020-05-31 ENCOUNTER — HOSPITAL ENCOUNTER (EMERGENCY)
Facility: HOSPITAL | Age: 60
Discharge: HOME | End: 2020-05-31
Attending: EMERGENCY MEDICINE
Payer: MEDICARE

## 2020-05-31 VITALS
RESPIRATION RATE: 16 BRPM | DIASTOLIC BLOOD PRESSURE: 92 MMHG | HEART RATE: 82 BPM | OXYGEN SATURATION: 98 % | TEMPERATURE: 98 F | SYSTOLIC BLOOD PRESSURE: 164 MMHG

## 2020-05-31 DIAGNOSIS — S05.02XA ABRASION OF LEFT CORNEA, INITIAL ENCOUNTER: Primary | ICD-10-CM

## 2020-05-31 PROCEDURE — 63600175 PHARM REV CODE 636 W HCPCS: Performed by: EMERGENCY MEDICINE

## 2020-05-31 PROCEDURE — 99284 EMERGENCY DEPT VISIT MOD MDM: CPT | Mod: 25

## 2020-05-31 PROCEDURE — 90715 TDAP VACCINE 7 YRS/> IM: CPT | Performed by: EMERGENCY MEDICINE

## 2020-05-31 PROCEDURE — 90471 IMMUNIZATION ADMIN: CPT | Performed by: EMERGENCY MEDICINE

## 2020-05-31 RX ORDER — ERYTHROMYCIN 5 MG/G
OINTMENT OPHTHALMIC
Qty: 1 TUBE | Refills: 0 | Status: SHIPPED | OUTPATIENT
Start: 2020-05-31 | End: 2020-08-21

## 2020-05-31 RX ADMIN — CLOSTRIDIUM TETANI TOXOID ANTIGEN (FORMALDEHYDE INACTIVATED), CORYNEBACTERIUM DIPHTHERIAE TOXOID ANTIGEN (FORMALDEHYDE INACTIVATED), BORDETELLA PERTUSSIS TOXOID ANTIGEN (GLUTARALDEHYDE INACTIVATED), BORDETELLA PERTUSSIS FILAMENTOUS HEMAGGLUTININ ANTIGEN (FORMALDEHYDE INACTIVATED), BORDETELLA PERTUSSIS PERTACTIN ANTIGEN, AND BORDETELLA PERTUSSIS FIMBRIAE 2/3 ANTIGEN 0.5 ML: 5; 2; 2.5; 5; 3; 5 INJECTION, SUSPENSION INTRAMUSCULAR at 04:05

## 2020-05-31 NOTE — ED PROVIDER NOTES
"Encounter Date: 5/31/2020    SCRIBE #1 NOTE: I, Danny Nugent, am scribing for, and in the presence of, Dr. Jacobsen.       History     Chief Complaint   Patient presents with    Eye Problem     redness and swelling to left eye; blurred vision after using prednisolone gtts filled in April of 2019     Time seen by provider: 2:18 PM on 05/31/2020      Negrita Castro is a 59 y.o. female with a PMHx of DM, panic attacks, depression and breast cancer who presents to the ED for left eye redness that started 2 days ago. She states that this eye redness started 2 days ago and has associated eye drainage, visual disturbances, and eye pain. She reports that the pain has ceased since using her eye prednisolone gtts prescribed to her by a local eye clinic [facility]" 1 year ago. She states that the drainage is clear and thin in viscosity. Patient reports that she is having cloudy vision in the left eye that has slightly worsened since onset 2 days ago. She does admit to having previous occular issues. Patient states that she wears glasses to correct nearsightedness. She states that she took a Claritin for the eye redness, which offered a little relief. Patient denies fever, headache, numbness, weakness, right eye pain, or any other complaint at this time. The patient has a PSHx of shoulder surgery, mastectomy, and breast reconstructive surgery.    The history is provided by the patient.     Review of patient's allergies indicates:   Allergen Reactions    Adhesive tape-silicones Hives     BANDAIDS    Polymycin Hives    Adhesive     Latex, natural rubber Hives and Itching    Neomycin-bacitracnzn-polymyxnb     Polyurethane-39 Hives     Past Medical History:   Diagnosis Date    Breast cancer     Cancer     RIGHT BREAST 10-15    Depression     Diabetes mellitus     Neuropathy     Panic attacks     S/P epidural steroid injection     Seizures     none since early 30's    Wears glasses     TO DRIVE     Past " Surgical History:   Procedure Laterality Date    BREAST BIOPSY      right    breast reconstruction with tummy ck      BREAST SURGERY      11-3-15 LUMPECTOMY, 12-2-15 REEXCISION    mastectomy 2016      RECONSTRUCTION OF NIPPLE Right 2018    Procedure: RECONSTRUCTION-NIPPLE RIGHT;  Surgeon: Xiang Hernandez MD;  Location: [facility] OR [address] [place] [address];  Service: Plastics;  Laterality: Right;    shoulder surgery and wrist      BILAT  BONE SPUR    WRIST SURGERY      RIGHT     Family History   Problem Relation Age of Onset    Anesthesia problems Neg Hx      Social History     Tobacco Use    Smoking status: Former Smoker     Packs/day: 0.25     Years: 30.00     Pack years: 7.50     Last attempt to quit: 2015     Years since quittin.6    Smokeless tobacco: Never Used   Substance Use Topics    Alcohol use: Yes     Alcohol/week: 0.0 standard drinks     Comment: RARELY    Drug use: Not on file     Comment: medical marijuana     Review of Systems   Constitutional: Negative for activity change, diaphoresis and fever.   HENT: Negative for ear pain, rhinorrhea, sore throat and trouble swallowing.    Eyes: Positive for pain, discharge, redness and visual disturbance.   Respiratory: Negative for cough, shortness of breath and stridor.    Cardiovascular: Negative for chest pain.   Gastrointestinal: Negative for abdominal pain, blood in stool, diarrhea, nausea and vomiting.   Genitourinary: Negative for dysuria, hematuria, vaginal bleeding and vaginal discharge.   Musculoskeletal: Negative for gait problem.   Skin: Negative for rash and wound.   Neurological: Negative for seizures, weakness, numbness and headaches.   Psychiatric/Behavioral: Negative for hallucinations and suicidal ideas.       Physical Exam     Initial Vitals [20 1235]   BP Pulse Resp Temp SpO2   (!) 164/92 82 16 98.2 °F (36.8 °C) 98 %      MAP       --         Physical Exam    Nursing note and vitals reviewed.  Constitutional: She appears  well-developed. She is not diaphoretic. No distress.   HENT:   Head: Normocephalic and atraumatic.   Nose: Nose normal.   Eyes: EOM are normal.   20/40 in the right eye and unable to obtain visual acuity in right eye. Intraocular pressure 20 mm Hg in right and 16 mm Hg left eye. Fluoroscope exam spots corneal abrasion overlying the pupil in the right eye. Lids everted and swept with no foreign bodies    Neck: Normal range of motion. Neck supple.   Cardiovascular: Normal rate, regular rhythm, normal heart sounds and intact distal pulses. Exam reveals no gallop and no friction rub.    No murmur heard.  Pulmonary/Chest: Breath sounds normal. No stridor. No respiratory distress. She has no wheezes. She has no rhonchi. She has no rales.   Abdominal: Soft. Bowel sounds are normal. There is no tenderness.   Musculoskeletal: Normal range of motion.   Neurological: She is alert and oriented to person, place, and time. No cranial nerve deficit.   Skin: Skin is warm and dry. Capillary refill takes less than 2 seconds. No rash noted.   Psychiatric: She has a normal mood and affect.         ED Course   Procedures  Labs Reviewed - No data to display       Imaging Results    None          Medical Decision Making:   History:   Old Medical Records: I decided to obtain old medical records.  ED Management:  + Scleral injection  Corneal abrasion located centrally.  Negative Holli sign.  No significant photophobia.   Normal IOP bilaterally.   Decreased visual acuity due to location of abrasion.        Given history and exam I have low suspicion for corneal ulcer, globe rupture, uveitis, HSV keratitis, Endopthalmitis, Retinal Detachment, Angle Closure Glaucoma, Foreign Body. Unfortunately our slit lamp is broken and therefore unable to do complete evaluation in the ED therefore recommend ophthalmology visit first thing giana morning.     Rx:  Erythromycin applied to the conjunctiva q6hrs for 7 days    Disposition: Discharge home with  primary care and ophthalmology follow up in 24 hours for wound recheck. SRP.       4:05 PM Patient states that her TDAP status is unknown.            Scribe Attestation:   Scribe #1: I performed the above scribed service and the documentation accurately describes the services I performed. I attest to the accuracy of the note.      Attending Attestation:     Physician Attestation for Scribe:    I, Dr. Nathan Jacobsen, personally performed the services described in this documentation.   All medical record entries made by the scribe were at my direction and in my presence.   I have reviewed the chart and agree that the record is accurate and complete.   Nathan Jacobsen MD  9:01 AM 06/01/2020     DISCLAIMER: This note was prepared with [employer] Naturally Speaking voice recognition transcription software. Garbled syntax, mangled pronouns, and other bizarre constructions may be attributed to that software system.                        Clinical Impression:       ICD-10-CM ICD-9-CM   1. Abrasion of left cornea, initial encounter S05.02XA 918.1         Disposition:   Disposition: Discharged  Condition: Stable     ED Disposition Condition    Discharge Stable        ED Prescriptions     Medication Sig Dispense Start Date End Date Auth. Provider    erythromycin (ROMYCIN) ophthalmic ointment Place a 1/2 inch ribbon of ointment into the lower eyelid. 1 Tube 5/31/2020  Nathan Jacobsen MD        Follow-up Information     Follow up With Specialties Details Why Contact Info    [facility], Contact Lens Optometry, Ophthalmology Go in 1 day  [address] [facility] [address]  [phone]      Yoni Renteria MD Internal Medicine Go in 1 day  [address] [facility] [address]  [facility] [address]  [phone]      [facility] Emergency Medicine Go to  As needed, If symptoms worsen [address]  [facility] [address] [phone]                                     Nathan Jacobsen MD  06/01/20  0901

## 2020-05-31 NOTE — ED NOTES
AAOx4, skin warm/dry, respirations even/unlabored.  NAD, but complains of poor vision in left eye.  Left orbit erythematous.

## 2020-07-01 ENCOUNTER — HOSPITAL ENCOUNTER (OUTPATIENT)
Dept: RADIOLOGY | Facility: HOSPITAL | Age: 60
Discharge: HOME | End: 2020-07-01
Attending: INTERNAL MEDICINE
Payer: MEDICARE

## 2020-07-01 DIAGNOSIS — Z17.0 MALIGNANT NEOPLASM OF NIPPLE OF RIGHT BREAST IN FEMALE, ESTROGEN RECEPTOR POSITIVE: ICD-10-CM

## 2020-07-01 DIAGNOSIS — C50.011 MALIGNANT NEOPLASM OF NIPPLE OF RIGHT BREAST IN FEMALE, ESTROGEN RECEPTOR POSITIVE: ICD-10-CM

## 2020-07-01 LAB
CREAT SERPL-MCNC: 0.6 MG/DL (ref 0.5–1.4)
SAMPLE: NORMAL

## 2020-07-01 PROCEDURE — 25500020 PHARM REV CODE 255: Mod: PO | Performed by: INTERNAL MEDICINE

## 2020-07-01 PROCEDURE — 74177 CT ABD & PELVIS W/CONTRAST: CPT | Mod: TC,PO

## 2020-07-01 RX ADMIN — IOHEXOL 100 ML: 350 INJECTION, SOLUTION INTRAVENOUS at 02:07

## 2020-07-05 ENCOUNTER — TELEPHONE (OUTPATIENT)
Dept: HEMATOLOGY/ONCOLOGY | Facility: CLINIC | Age: 60
End: 2020-07-05

## 2020-07-05 DIAGNOSIS — R93.89 ABNORMAL CAT SCAN: Primary | ICD-10-CM

## 2020-07-05 DIAGNOSIS — N63.20 MASS OF LEFT BREAST: ICD-10-CM

## 2020-07-05 DIAGNOSIS — C50.011 MALIGNANT NEOPLASM OF NIPPLE OF RIGHT BREAST IN FEMALE, ESTROGEN RECEPTOR POSITIVE: ICD-10-CM

## 2020-07-05 DIAGNOSIS — Z17.0 MALIGNANT NEOPLASM OF NIPPLE OF RIGHT BREAST IN FEMALE, ESTROGEN RECEPTOR POSITIVE: ICD-10-CM

## 2020-07-09 ENCOUNTER — HOSPITAL ENCOUNTER (OUTPATIENT)
Dept: RADIOLOGY | Facility: HOSPITAL | Age: 60
Discharge: HOME | End: 2020-07-09
Attending: INTERNAL MEDICINE
Payer: MEDICARE

## 2020-07-09 DIAGNOSIS — Z17.0 MALIGNANT NEOPLASM OF NIPPLE OF RIGHT BREAST IN FEMALE, ESTROGEN RECEPTOR POSITIVE: ICD-10-CM

## 2020-07-09 DIAGNOSIS — C50.011 MALIGNANT NEOPLASM OF NIPPLE OF RIGHT BREAST IN FEMALE, ESTROGEN RECEPTOR POSITIVE: ICD-10-CM

## 2020-07-09 PROCEDURE — A9503 TC99M MEDRONATE: HCPCS

## 2020-07-22 ENCOUNTER — HOSPITAL ENCOUNTER (OUTPATIENT)
Dept: RADIOLOGY | Facility: HOSPITAL | Age: 60
Discharge: HOME | End: 2020-07-22
Attending: INTERNAL MEDICINE
Payer: MEDICARE

## 2020-07-22 DIAGNOSIS — N63.20 MASS OF LEFT BREAST: ICD-10-CM

## 2020-07-22 DIAGNOSIS — C50.011 MALIGNANT NEOPLASM OF NIPPLE OF RIGHT BREAST IN FEMALE, ESTROGEN RECEPTOR POSITIVE: ICD-10-CM

## 2020-07-22 DIAGNOSIS — R93.89 ABNORMAL CAT SCAN: ICD-10-CM

## 2020-07-22 DIAGNOSIS — Z17.0 MALIGNANT NEOPLASM OF NIPPLE OF RIGHT BREAST IN FEMALE, ESTROGEN RECEPTOR POSITIVE: ICD-10-CM

## 2020-07-22 PROCEDURE — 76642 ULTRASOUND BREAST LIMITED: CPT | Mod: TC,PO,LT

## 2020-07-22 PROCEDURE — 77065 DX MAMMO INCL CAD UNI: CPT | Mod: TC,PO,LT

## 2020-07-22 PROCEDURE — 77061 BREAST TOMOSYNTHESIS UNI: CPT | Mod: TC,PO,LT

## 2020-07-23 ENCOUNTER — TELEPHONE (OUTPATIENT)
Dept: PLASTIC SURGERY | Facility: CLINIC | Age: 60
End: 2020-07-23

## 2020-07-23 NOTE — TELEPHONE ENCOUNTER
Pt called to update us that she has cancer in the other breast now and unable to plan surgery until she gets the biopsy. States that her side where Mary is planning on doing surgery is still hurting and she still wants it when she gets things figured out. Pt encouraged to stay strong and to keep us updated if we can do anything for her.

## 2020-07-24 ENCOUNTER — TELEPHONE (OUTPATIENT)
Dept: HEMATOLOGY/ONCOLOGY | Facility: CLINIC | Age: 60
End: 2020-07-24

## 2020-07-24 NOTE — TELEPHONE ENCOUNTER
Called the patient and instructed her that Dr. Peraza is out of the office today and I will have Lala call her back on Monday.

## 2020-07-24 NOTE — TELEPHONE ENCOUNTER
----- Message from Nell Carrillo sent at 7/24/2020 11:12 AM CDT -----  The patient had her mammogram on Wednesday and she was told she needs a biopsy because there is suspicion of breast cancer on the left side. She called to find out when Dr Peraza will be ordering the biopsy for her. She is very anxious about this. Please call her back.

## 2020-07-27 DIAGNOSIS — R92.8 ABNORMAL MAMMOGRAM OF LEFT BREAST: Primary | ICD-10-CM

## 2020-07-28 ENCOUNTER — TELEPHONE (OUTPATIENT)
Dept: HEMATOLOGY/ONCOLOGY | Facility: CLINIC | Age: 60
End: 2020-07-28

## 2020-07-28 NOTE — TELEPHONE ENCOUNTER
----- Message from Nell Carrillo sent at 7/27/2020 11:27 AM CDT -----  The patient said she would like Dr Cory Peraza to do her biopsy if possible. She said to leave a message when you call her back because she is going to the eye doctor for 2pm.

## 2020-07-28 NOTE — TELEPHONE ENCOUNTER
Referral placed for Cory Vick. Pt to call his office to make an appt.  That is who she would like to see for biopsy and possible surgery.

## 2020-08-03 ENCOUNTER — OFFICE VISIT (OUTPATIENT)
Dept: SURGERY | Facility: CLINIC | Age: 60
End: 2020-08-03
Payer: MEDICARE

## 2020-08-03 ENCOUNTER — TELEPHONE (OUTPATIENT)
Dept: HEMATOLOGY/ONCOLOGY | Facility: CLINIC | Age: 60
End: 2020-08-03

## 2020-08-03 VITALS
DIASTOLIC BLOOD PRESSURE: 93 MMHG | BODY MASS INDEX: 29.18 KG/M2 | HEART RATE: 84 BPM | WEIGHT: 209.19 LBS | SYSTOLIC BLOOD PRESSURE: 210 MMHG

## 2020-08-03 DIAGNOSIS — R92.8 ABNORMAL MAMMOGRAM OF LEFT BREAST: ICD-10-CM

## 2020-08-03 PROCEDURE — 3008F PR BODY MASS INDEX (BMI) DOCUMENTED: ICD-10-PCS | Mod: CPTII,S$GLB,, | Performed by: SURGERY

## 2020-08-03 PROCEDURE — 3008F BODY MASS INDEX DOCD: CPT | Mod: CPTII,S$GLB,, | Performed by: SURGERY

## 2020-08-03 PROCEDURE — 99999 PR PBB SHADOW E&M-EST. PATIENT-LVL III: CPT | Mod: PBBFAC,,, | Performed by: SURGERY

## 2020-08-03 PROCEDURE — 99204 PR OFFICE/OUTPT VISIT, NEW, LEVL IV, 45-59 MIN: ICD-10-PCS | Mod: S$GLB,,, | Performed by: SURGERY

## 2020-08-03 PROCEDURE — 99999 PR PBB SHADOW E&M-EST. PATIENT-LVL III: ICD-10-PCS | Mod: PBBFAC,,, | Performed by: SURGERY

## 2020-08-03 PROCEDURE — 99204 OFFICE O/P NEW MOD 45 MIN: CPT | Mod: S$GLB,,, | Performed by: SURGERY

## 2020-08-03 NOTE — LETTER
August 3, 2020      TALISHA Vick MD  [address]  [address]  [facility] [address]           [facility]  [address] [place] [address]  Phone: [phone]          Patient: Negrita Castro   MR Number: [id number]   YOB: 1960   Date of Visit: 8/3/2020       Dear Dr. TALISHA Vick:    Thank you for referring Negrita Castro to me for evaluation. Attached you will find relevant portions of my assessment and plan of care.    If you have questions, please do not hesitate to call me. I look forward to following Negrita Castro along with you.    Sincerely,    Cory Vick MD    Enclosure  CC:  No Recipients    If you would like to receive this communication electronically, please contact [email or web address] or [phone] to request more information on [employer] access.    For providers and/or their staff who would like to refer a patient to [facility], please contact us through our one-stop-shop provider referral line, [facility] , at [phone].    If you feel you have received this communication in error or would no longer like to receive these types of communications, please e-mail [email or web address]

## 2020-08-03 NOTE — PROGRESS NOTES
Subjective:       Patient ID: Negrita Castro is a 60 y.o. female.    Chief Complaint: No chief complaint on file.      HPI 60-year-old female with a history of right breast triple negative cancer.  She underwent mastectomy with reconstruction.  She has a new suspicious lesion at 4:00 a.m. in the left breast for which biopsy was recommended.  This was found on a screening test.  The patient is asymptomatic.  At the time of her reconstruction she had a left done left breast as well.  She denies nipple discharge.  Family history is negative for breast cancer.    Past Medical History:   Diagnosis Date    Breast cancer     Cancer     RIGHT BREAST 10-15    Depression     Diabetes mellitus     Neuropathy     Panic attacks     S/P epidural steroid injection     Seizures     none since early 30's    Wears glasses     TO DRIVE     Past Surgical History:   Procedure Laterality Date    BREAST BIOPSY      right    breast reconstruction with tummy tuck      BREAST SURGERY      11-3-15 LUMPECTOMY, 12-2-15 REEXCISION    mastectomy 2016      RECONSTRUCTION OF NIPPLE Right 11/5/2018    Procedure: RECONSTRUCTION-NIPPLE RIGHT;  Surgeon: Xiang Hernandez MD;  Location: [facility] OR [address] [place] [address];  Service: Plastics;  Laterality: Right;    shoulder surgery and wrist      BILAT  BONE SPUR    WRIST SURGERY      RIGHT         Current Outpatient Medications:     ASCORBIC ACID/VITAMIN E/BIOTIN (HAIR, SKIN, NAILS WITH BIOTIN ORAL), Take 1 tablet by mouth every morning. , Disp: , Rfl:     carisoprodol (SOMA) 350 MG tablet, Take 1 tablet (350 mg total) by mouth 3 (three) times daily as needed., Disp: 90 tablet, Rfl: 0    diazepam (VALIUM) 10 MG tablet, Take 10 mg by mouth 4 (four) times daily. , Disp: , Rfl:     dronabinol (MARINOL) 2.5 MG capsule, Take 1 capsule (2.5 mg total) by mouth 2 (two) times daily before meals., Disp: 30 capsule, Rfl: 1    dulaglutide (TRULICITY) 0.75 mg/0.5 mL PnIj, Inject 0.75 mg into the skin every  7 days., Disp: , Rfl:     enalapril (VASOTEC) 2.5 MG tablet, Take 2.5 mg by mouth once daily. For diabetes, Disp: , Rfl: 1    erythromycin (ROMYCIN) ophthalmic ointment, Place a 1/2 inch ribbon of ointment into the lower eyelid., Disp: 1 Tube, Rfl: 0    furosemide (LASIX) 20 MG tablet, TAKE ONE TABLET BY MOUTH EVERY DAY, Disp: 30 tablet, Rfl: 4    HYDROcodone-acetaminophen (NORCO)  mg per tablet, Take 1 tablet by mouth every 6 (six) hours as needed., Disp: , Rfl: 0    insulin glargine (LANTUS U-100 INSULIN) 100 unit/mL injection, Inject 35 Units into the skin every evening., Disp: 10.5 mL, Rfl: 11    insulin glargine,hum.rec.anlog (LANTUS U-100 INSULIN SUBQ), Inject 35 Units into the skin every evening., Disp: , Rfl:     insulin syringe-needle U-100 1 mL 31 gauge x 5/16 Syrg, 1 Device by Misc.(Non-Drug; Combo Route) route once daily., Disp: 50 each, Rfl: 0    metformin (GLUCOPHAGE-XR) 500 MG 24 hr tablet, Take 2,000 mg by mouth every evening. , Disp: , Rfl:     multivitamin capsule, Take 1 capsule by mouth every morning., Disp: , Rfl:     pravastatin (PRAVACHOL) 40 MG tablet, Take 40 mg by mouth every morning. , Disp: , Rfl: 1    zolpidem (AMBIEN) 10 mg Tab, Take 10 mg by mouth nightly as needed., Disp: , Rfl: 0    Review of patient's allergies indicates:   Allergen Reactions    Adhesive tape-silicones Hives     BANDAIDS    Polymycin Hives    Adhesive     Latex, natural rubber Hives and Itching    Neomycin-bacitracnzn-polymyxnb     Polyurethane-39 Hives       Family History   Problem Relation Age of Onset    Anesthesia problems Neg Hx      Social History     Socioeconomic History    Marital status: Single     Spouse name: Not on file    Number of children: Not on file    Years of education: Not on file    Highest education level: Not on file   Occupational History    Not on file   Social Needs    Financial resource strain: Not on file    Food insecurity     Worry: Not on file      Inability: Not on file    Transportation needs     Medical: Not on file     Non-medical: Not on file   Tobacco Use    Smoking status: Former Smoker     Packs/day: 0.25     Years: 30.00     Pack years: 7.50     Quit date: 2015     Years since quittin.8    Smokeless tobacco: Never Used   Substance and Sexual Activity    Alcohol use: Yes     Alcohol/week: 0.0 standard drinks     Comment: RARELY    Drug use: Not on file     Comment: medical marijuana    Sexual activity: Not on file   Lifestyle    Physical activity     Days per week: Not on file     Minutes per session: Not on file    Stress: Not on file   Relationships    Social connections     Talks on phone: Not on file     Gets together: Not on file     Attends Anglican service: Not on file     Active member of club or organization: Not on file     Attends meetings of clubs or organizations: Not on file     Relationship status: Not on file   Other Topics Concern    Not on file   Social History Narrative    Not on file       Review of Systems   Constitutional: Negative for activity change, chills, fever and unexpected weight change.   HENT: Negative for congestion, sore throat, trouble swallowing and voice change.    Eyes: Negative for redness and visual disturbance.   Respiratory: Negative for cough, shortness of breath and wheezing.    Cardiovascular: Negative for chest pain and palpitations.   Gastrointestinal: Negative for abdominal pain, blood in stool, nausea and vomiting.   Endocrine: Negative.    Genitourinary: Negative for dysuria, frequency and hematuria.   Musculoskeletal: Negative for arthralgias, back pain and neck pain.   Skin: Negative for rash and wound.   Allergic/Immunologic: Negative.    Neurological: Negative for dizziness, weakness and headaches.   Hematological: Negative for adenopathy.   Psychiatric/Behavioral: Negative for agitation and dysphoric mood. The patient is not nervous/anxious.      Objective:     Physical  Exam  Constitutional:       General: She is not in acute distress.     Appearance: She is well-developed.   HENT:      Head: Normocephalic and atraumatic.      Mouth/Throat:      Pharynx: No oropharyngeal exudate.   Eyes:      General: No scleral icterus.     Conjunctiva/sclera: Conjunctivae normal.      Pupils: Pupils are equal, round, and reactive to light.   Neck:      Musculoskeletal: Normal range of motion.      Thyroid: No thyromegaly.   Cardiovascular:      Rate and Rhythm: Normal rate and regular rhythm.      Heart sounds: No murmur.      Comments: Left breast:  Postsurgical changes from previous left as expected.  There is no palpable axillary adenopathy.  There is some thickness at 4:00 a.m. in the breast consistent with a mammogram and ultrasound findings.  There is no nipple discharge.  There is no evidence of infection.  Pulmonary:      Effort: Pulmonary effort is normal.      Breath sounds: Normal breath sounds. No wheezing or rales.   Abdominal:      General: Bowel sounds are normal. There is no distension.      Palpations: Abdomen is soft. There is no mass.      Tenderness: There is no abdominal tenderness.      Hernia: No hernia is present.   Musculoskeletal: Normal range of motion.   Lymphadenopathy:      Cervical: No cervical adenopathy.   Skin:     General: Skin is warm and dry.      Findings: No erythema or rash.   Neurological:      Mental Status: She is alert and oriented to person, place, and time.      Cranial Nerves: No cranial nerve deficit.   Psychiatric:         Behavior: Behavior normal.       Assessment:     Encounter Diagnosis   Name Primary?    Abnormal mammogram of left breast        Plan:      1.  Plan ultrasound-guided biopsy of left breast lesion.  2. Risks and benefits of the planned procedure were discussed at length with the patient.  Risks and benefits of not proceeding with the procedure were discussed as well. All questions were answered. The patient expressed clear  understanding and would like to proceed with the procedure as discussed.

## 2020-08-04 ENCOUNTER — DOCUMENTATION ONLY (OUTPATIENT)
Dept: RADIOLOGY | Facility: HOSPITAL | Age: 60
End: 2020-08-04

## 2020-08-05 ENCOUNTER — HOSPITAL ENCOUNTER (OUTPATIENT)
Dept: RADIOLOGY | Facility: HOSPITAL | Age: 60
Discharge: HOME | End: 2020-08-05
Attending: SURGERY
Payer: MEDICARE

## 2020-08-05 DIAGNOSIS — R92.8 ABNORMAL MAMMOGRAM OF LEFT BREAST: ICD-10-CM

## 2020-08-05 PROCEDURE — 88360 TUMOR IMMUNOHISTOCHEM/MANUAL: CPT | Mod: 26,,, | Performed by: PATHOLOGY

## 2020-08-05 PROCEDURE — 25000003 PHARM REV CODE 250: Performed by: SURGERY

## 2020-08-05 PROCEDURE — 19083 BX BREAST 1ST LESION US IMAG: CPT | Mod: LT,,, | Performed by: SURGERY

## 2020-08-05 PROCEDURE — 88360 TUMOR IMMUNOHISTOCHEM/MANUAL: CPT | Mod: 59 | Performed by: PATHOLOGY

## 2020-08-05 PROCEDURE — 88360 PR  TUMOR IMMUNOHISTOCHEM/MANUAL: ICD-10-PCS | Mod: 26,,, | Performed by: PATHOLOGY

## 2020-08-05 PROCEDURE — 19083 US BREAST BIOPSY WITH IMAGING 1ST SITE LEFT: ICD-10-PCS | Mod: LT,,, | Performed by: SURGERY

## 2020-08-05 PROCEDURE — 88342 IMHCHEM/IMCYTCHM 1ST ANTB: CPT | Performed by: PATHOLOGY

## 2020-08-05 PROCEDURE — 27201044 US BREAST BIOPSY WITH IMAGING 1ST SITE LEFT

## 2020-08-05 PROCEDURE — 88305 TISSUE EXAM BY PATHOLOGIST: ICD-10-PCS | Mod: 26,,, | Performed by: PATHOLOGY

## 2020-08-05 PROCEDURE — 88305 TISSUE EXAM BY PATHOLOGIST: CPT | Mod: 26,,, | Performed by: PATHOLOGY

## 2020-08-05 PROCEDURE — 88342 IMHCHEM/IMCYTCHM 1ST ANTB: CPT | Mod: 26,59,, | Performed by: PATHOLOGY

## 2020-08-05 PROCEDURE — 88342 CHG IMMUNOCYTOCHEMISTRY: ICD-10-PCS | Mod: 26,59,, | Performed by: PATHOLOGY

## 2020-08-05 PROCEDURE — 19083 BX BREAST 1ST LESION US IMAG: CPT | Mod: TC,LT

## 2020-08-05 PROCEDURE — 88305 TISSUE EXAM BY PATHOLOGIST: CPT | Performed by: PATHOLOGY

## 2020-08-05 RX ADMIN — LIDOCAINE HYDROCHLORIDE 30 ML: 10; .005 INJECTION, SOLUTION EPIDURAL; INFILTRATION; INTRACAUDAL; PERINEURAL at 02:08

## 2020-08-10 LAB
FINAL PATHOLOGIC DIAGNOSIS: NORMAL
GROSS: NORMAL
Lab: NORMAL
MICROSCOPIC EXAM: NORMAL

## 2020-08-11 ENCOUNTER — TELEPHONE (OUTPATIENT)
Dept: RADIOLOGY | Facility: HOSPITAL | Age: 60
End: 2020-08-11

## 2020-08-11 NOTE — TELEPHONE ENCOUNTER
..Patient notified of breast biopsy results     Final Pathologic Diagnosis Left breast, biopsy:   Invasive ductal carcinoma, Grade 3 (T=3, N=2, M=3)   Tumor measures 12 mm (1.2 cm) in greatest linear dimension within the core   biopsy   Shyla Ayala DO has reviewed this case and agrees with the above   diagnosis.   BREAST BIOMARKER RESULTS   Estrogen receptor (ER):  Positive (100%, strong)   Progesterone receptor (ME):  Positive (70%, moderate to strong)   HER2 IHC:  Negative ( Score 0)      Appointment scheduled with Dr Vick on 8/17/2020.

## 2020-08-11 NOTE — TELEPHONE ENCOUNTER
----- Message from Emy Lynch sent at 8/11/2020 11:30 AM CDT -----  Type: Needs Medical Advice  Who Called:  Patient  Best Call Back Number:   Additional Information: Calling to get the results of her biopsy that was done last week.

## 2020-08-12 ENCOUNTER — TELEPHONE (OUTPATIENT)
Dept: HEMATOLOGY/ONCOLOGY | Facility: CLINIC | Age: 60
End: 2020-08-12

## 2020-08-12 NOTE — TELEPHONE ENCOUNTER
----- Message from Nell Carrillo sent at 8/11/2020 11:46 AM CDT -----  The patient called about the results of her biopsy. Please call her back at [phone].

## 2020-08-17 ENCOUNTER — OFFICE VISIT (OUTPATIENT)
Dept: SURGERY | Facility: CLINIC | Age: 60
End: 2020-08-17
Payer: MEDICARE

## 2020-08-17 VITALS
HEART RATE: 79 BPM | WEIGHT: 200.63 LBS | BODY MASS INDEX: 27.98 KG/M2 | DIASTOLIC BLOOD PRESSURE: 83 MMHG | SYSTOLIC BLOOD PRESSURE: 171 MMHG

## 2020-08-17 DIAGNOSIS — C50.912 MALIGNANT NEOPLASM OF LEFT FEMALE BREAST, UNSPECIFIED ESTROGEN RECEPTOR STATUS, UNSPECIFIED SITE OF BREAST: Primary | ICD-10-CM

## 2020-08-17 DIAGNOSIS — Z01.818 PREOP TESTING: ICD-10-CM

## 2020-08-17 DIAGNOSIS — C50.912 BREAST CANCER, LEFT BREAST: ICD-10-CM

## 2020-08-17 PROCEDURE — 3008F BODY MASS INDEX DOCD: CPT | Mod: CPTII,S$GLB,, | Performed by: SURGERY

## 2020-08-17 PROCEDURE — 99999 PR PBB SHADOW E&M-EST. PATIENT-LVL IV: ICD-10-PCS | Mod: PBBFAC,,, | Performed by: SURGERY

## 2020-08-17 PROCEDURE — 99214 OFFICE O/P EST MOD 30 MIN: CPT | Mod: S$GLB,,, | Performed by: SURGERY

## 2020-08-17 PROCEDURE — 99999 PR PBB SHADOW E&M-EST. PATIENT-LVL IV: CPT | Mod: PBBFAC,,, | Performed by: SURGERY

## 2020-08-17 PROCEDURE — 3008F PR BODY MASS INDEX (BMI) DOCUMENTED: ICD-10-PCS | Mod: CPTII,S$GLB,, | Performed by: SURGERY

## 2020-08-17 PROCEDURE — 99214 PR OFFICE/OUTPT VISIT, EST, LEVL IV, 30-39 MIN: ICD-10-PCS | Mod: S$GLB,,, | Performed by: SURGERY

## 2020-08-17 RX ORDER — SODIUM CHLORIDE 9 MG/ML
INJECTION, SOLUTION INTRAVENOUS CONTINUOUS
Status: CANCELLED | OUTPATIENT
Start: 2020-08-24

## 2020-08-18 ENCOUNTER — OFFICE VISIT (OUTPATIENT)
Dept: HEMATOLOGY/ONCOLOGY | Facility: CLINIC | Age: 60
End: 2020-08-18
Payer: MEDICARE

## 2020-08-18 ENCOUNTER — SOCIAL WORK (OUTPATIENT)
Dept: HEMATOLOGY/ONCOLOGY | Facility: CLINIC | Age: 60
End: 2020-08-18

## 2020-08-18 VITALS
SYSTOLIC BLOOD PRESSURE: 121 MMHG | BODY MASS INDEX: 28.02 KG/M2 | TEMPERATURE: 97 F | DIASTOLIC BLOOD PRESSURE: 80 MMHG | HEART RATE: 88 BPM | RESPIRATION RATE: 18 BRPM | WEIGHT: 200.88 LBS

## 2020-08-18 DIAGNOSIS — Z85.3 HISTORY OF BILATERAL BREAST CANCER: Primary | ICD-10-CM

## 2020-08-18 PROCEDURE — 99214 PR OFFICE/OUTPT VISIT, EST, LEVL IV, 30-39 MIN: ICD-10-PCS | Mod: S$GLB,,, | Performed by: INTERNAL MEDICINE

## 2020-08-18 PROCEDURE — 99214 OFFICE O/P EST MOD 30 MIN: CPT | Mod: S$GLB,,, | Performed by: INTERNAL MEDICINE

## 2020-08-18 PROCEDURE — 3008F BODY MASS INDEX DOCD: CPT | Mod: S$GLB,,, | Performed by: INTERNAL MEDICINE

## 2020-08-18 PROCEDURE — 3008F PR BODY MASS INDEX (BMI) DOCUMENTED: ICD-10-PCS | Mod: S$GLB,,, | Performed by: INTERNAL MEDICINE

## 2020-08-18 NOTE — PROGRESS NOTES
Amy Dolan, Financial Navigator, and this  did not assist patient with co-pay assistance for past Marinol prescription.  Patient provided a GoodRx coupon for assistance with Marinol.  Co-pay of $46.27 at [facility].  Amy will discuss with Lala Gary RN, to send a request to [facility] to inquired if they can locate a cheaper refill.

## 2020-08-19 NOTE — PROGRESS NOTES
"[facility] In Office Follow Up Encounter Progress Note    20    Subjective:      Patient ID:   Negrita Castro  60 y.o.   Martínez Renteria R. Leblanc, [place]      Chief Complaint:   New L breast cancer eval.    HPI:  60 y.o. female with diagnosis of Stage 1 R breast cancer 10/26/15.  "Triple negative".  Adjuvant AC x's 4, tx's 12 and Rad Rx through 16.    Port removed.  Had bilateral reconstruction surgery 17.  Further reconstruction, tummy tuck 17.  Additional fat injection at R chest done for symmetry.  She had placement of R nipple.  Dr. Hernandez.      She is due for additional reconstructive surgery at breast and abdomenal areas.  The surgery is being done as part of her reconstruction efforts and for keloid management.  The date of the surgery is May 19th.  Have sent a message to Dr. Alarcon to arrange radiation therapy follow-up with her around the time of the surgery to reduce risk of keloid recurrence.  Also, Dr. Alarcon to consider Rad Rx to operative site to decrease keloid potential.  On hold now because of Covid 19 pandemic.    Recent Mamm/U/S showed small mass at 4:00 at L breast.  Needle Bx invasive ductal Ca, ERP+, PRP+, H2N neg  To have L partial mastectomy, Sentinal node Bx 20.    Discussed Oncotype Dx testing  Discussed BRCA testing, given her bilateral  breast Dx.  Refill meds for her.    Dr. Washington saw her for C spine eval, no surgery planned for now.  He referred her to Dr. Rivers for injection Rx.    DM, cholesterol, epilepsy hx, DJD, LBP.  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    LR shoulder arthroscopy, R wrist surgery, M0.    Smoke  ppd x's 35 years, quit 2015.  ETOH no.  Disability-depression, epilepsy  Allergy none    Mom ovarian cancer  Dad lung cancer  Sisters DM, lung dx.      ROS:   GEN: normal without any fever, night sweats or weight loss  HEENT: normal with no HA's, sore throat, stiff neck, changes in vision  CV: normal with no CP, SOB, PND, POOL or " orthopnea  PULM: normal with no SOB, cough, hemoptysis, sputum or pleuritic pain  GI: normal with no abdominal pain, nausea, vomiting, constipation, diarrhea, melanotic stools, BRBPR, or hematemesis  : normal with no hematuria, dysuria  BREAST: See HPI.  SKIN: normal with no rash, erythema, bruising, or swelling     Past Medical History:   Diagnosis Date    Breast cancer     Cancer     RIGHT BREAST 10-15    Depression     Diabetes mellitus     Neuropathy     Panic attacks     S/P epidural steroid injection     Seizures     none since early 30's    Wears glasses     TO DRIVE     Past Surgical History:   Procedure Laterality Date    BREAST BIOPSY      right    breast reconstruction with tummy [facility]      BREAST SURGERY      11-3-15 LUMPECTOMY, 12-2-15 REEXCISION    mastectomy 2016      RECONSTRUCTION OF NIPPLE Right 11/5/2018    Procedure: RECONSTRUCTION-NIPPLE RIGHT;  Surgeon: Xiang Hernandez MD;  Location: [facility] OR [address] [place] [address];  Service: Plastics;  Laterality: Right;    shoulder surgery and wrist      BILAT  BONE SPUR    WRIST SURGERY      RIGHT       Review of patient's allergies indicates:   Allergen Reactions    Adhesive tape-silicones Hives     BANDAIDS    Polymycin Hives    Latex, natural rubber Itching    Polyurethane-39            Current Outpatient Medications:     ASCORBIC ACID/VITAMIN E/BIOTIN (HAIR, SKIN, NAILS WITH BIOTIN ORAL), Take 1 tablet by mouth every morning. , Disp: , Rfl:     carisoprodol (SOMA) 350 MG tablet, Take 1 tablet (350 mg total) by mouth 3 (three) times daily as needed., Disp: 90 tablet, Rfl: 0    diazepam (VALIUM) 10 MG tablet, Take 10 mg by mouth 4 (four) times daily. , Disp: , Rfl:     dronabinol (MARINOL) 2.5 MG capsule, Take 1 capsule (2.5 mg total) by mouth 2 (two) times daily before meals., Disp: 30 capsule, Rfl: 1    dulaglutide (TRULICITY) 0.75 mg/0.5 mL PnIj, Inject 0.75 mg into the skin every 7 days., Disp: , Rfl:     enalapril (VASOTEC) 2.5 MG  "tablet, Take 2.5 mg by mouth once daily. For diabetes, Disp: , Rfl: 1    erythromycin (ROMYCIN) ophthalmic ointment, Place a 1/2 inch ribbon of ointment into the lower eyelid., Disp: 1 Tube, Rfl: 0    furosemide (LASIX) 20 MG tablet, TAKE ONE TABLET BY MOUTH EVERY DAY, Disp: 30 tablet, Rfl: 4    HYDROcodone-acetaminophen (NORCO)  mg per tablet, Take 1 tablet by mouth every 6 (six) hours as needed., Disp: , Rfl: 0    insulin glargine (LANTUS U-100 INSULIN) 100 unit/mL injection, Inject 35 Units into the skin every evening., Disp: 10.5 mL, Rfl: 11    insulin glargine,hum.rec.anlog (LANTUS U-100 INSULIN SUBQ), Inject 35 Units into the skin every evening., Disp: , Rfl:     insulin syringe-needle U-100 1 mL 31 gauge x 5/16 Syrg, 1 Device by Misc.(Non-Drug; Combo Route) route once daily., Disp: 50 each, Rfl: 0    metformin (GLUCOPHAGE-XR) 500 MG 24 hr tablet, Take 2,000 mg by mouth every evening. , Disp: , Rfl:     multivitamin capsule, Take 1 capsule by mouth every morning., Disp: , Rfl:     pravastatin (PRAVACHOL) 40 MG tablet, Take 40 mg by mouth every morning. , Disp: , Rfl: 1    zolpidem (AMBIEN) 10 mg Tab, Take 10 mg by mouth nightly as needed., Disp: , Rfl: 0          Objective:   Vitals:  Blood pressure 121/80, pulse 88, temperature 96.7 °F (35.9 °C), resp. rate 18, weight 91.1 kg (200 lb 14.4 oz), last menstrual period 01/16/2016.    Physical Examination:   GEN: no apparent distress, comfortable  HEAD: atraumatic and normocephalic  EYES: no pallor, no icterus  ENT: no pharyngeal erythema  NECK: noLAD/LN's, supple  CV:  No edema  CHEST: Normal respiratory effort   ABDOM:  nondistended; soft  MUSC/Skeletal: ROM normal  EXTREM: no swelling  SKIN: She is developing keloid changes at L breast incision and navel surgical sites.  NEURO: grossly intact  PSYCH: normal mood, affect and behavior  LYMPH: normal  LN's  BREASTS: L and "R breast" healing, extensive post op change, at chest .  No palpable mass at " L or R breast.      Labs:   Lab Results   Component Value Date    WBC 5.93 05/04/2020    HGB 13.1 05/04/2020    HCT 40.0 05/04/2020    MCV 92 05/04/2020     05/04/2020    CMP    Bone Scan negative for metastatic dx. 2/7/18.  CAT of chest negative for metastatic dx. 2/7/18.    CAT of chest clearing of patchy infiltrates, no pulmonary nodule seen.    Mamm/U/S L breast mass 4:00.  Assessment:   (1) 60 y.o. female with diagnosis of Stage 1 triple negative R breast cancer, 10/2016.       S/P R mastectomy, SNBx, AC x's 4, T x's12 weeks, Rad Rx and recent reconstruction.    (2)Keloids developing, Dr. Hernandez injecting scar areas.  For further keloid surgery  in coordination with  Dr. Bebeto Alarcon of Rad Rx.    Deferred because of covid 19 pandemic for now.    (3)Refill marinol, norco., soma. Now x's 3 months.    (4)C spine DDD, as per Dr. Washington..    (5) New L invasive ductal Ca, ERP+, PRP+, H2N neg.  Clinical stage 1 dx.  For lumpectomy and SNBx 8/24/20  Oncotype Dx testing  RTC 4-5 weeks.

## 2020-08-21 ENCOUNTER — TELEPHONE (OUTPATIENT)
Dept: SURGERY | Facility: CLINIC | Age: 60
End: 2020-08-21

## 2020-08-21 ENCOUNTER — LAB VISIT (OUTPATIENT)
Dept: LAB | Facility: HOSPITAL | Age: 60
End: 2020-08-21
Attending: SURGERY
Payer: MEDICARE

## 2020-08-21 ENCOUNTER — LAB VISIT (OUTPATIENT)
Dept: PRIMARY CARE CLINIC | Facility: CLINIC | Age: 60
End: 2020-08-21
Payer: MEDICARE

## 2020-08-21 DIAGNOSIS — Z01.818 PREOP TESTING: ICD-10-CM

## 2020-08-21 LAB
ALBUMIN SERPL BCP-MCNC: 3.8 G/DL (ref 3.5–5.2)
ALP SERPL-CCNC: 176 U/L (ref 55–135)
ALT SERPL W/O P-5'-P-CCNC: 45 U/L (ref 10–44)
ANION GAP SERPL CALC-SCNC: 10 MMOL/L (ref 8–16)
AST SERPL-CCNC: 19 U/L (ref 10–40)
BASOPHILS # BLD AUTO: 0.03 K/UL (ref 0–0.2)
BASOPHILS NFR BLD: 0.5 % (ref 0–1.9)
BILIRUB SERPL-MCNC: 0.3 MG/DL (ref 0.1–1)
BUN SERPL-MCNC: 15 MG/DL (ref 6–20)
CALCIUM SERPL-MCNC: 9.6 MG/DL (ref 8.7–10.5)
CHLORIDE SERPL-SCNC: 103 MMOL/L (ref 95–110)
CO2 SERPL-SCNC: 26 MMOL/L (ref 23–29)
CREAT SERPL-MCNC: 0.9 MG/DL (ref 0.5–1.4)
DIFFERENTIAL METHOD: ABNORMAL
EOSINOPHIL # BLD AUTO: 0.1 K/UL (ref 0–0.5)
EOSINOPHIL NFR BLD: 1.2 % (ref 0–8)
ERYTHROCYTE [DISTWIDTH] IN BLOOD BY AUTOMATED COUNT: 12.5 % (ref 11.5–14.5)
EST. GFR  (AFRICAN AMERICAN): >60 ML/MIN/1.73 M^2
EST. GFR  (NON AFRICAN AMERICAN): >60 ML/MIN/1.73 M^2
GLUCOSE SERPL-MCNC: 364 MG/DL (ref 70–110)
HCT VFR BLD AUTO: 43.6 % (ref 37–48.5)
HGB BLD-MCNC: 13.6 G/DL (ref 12–16)
IMM GRANULOCYTES # BLD AUTO: 0.03 K/UL (ref 0–0.04)
IMM GRANULOCYTES NFR BLD AUTO: 0.5 % (ref 0–0.5)
LYMPHOCYTES # BLD AUTO: 1.8 K/UL (ref 1–4.8)
LYMPHOCYTES NFR BLD: 31.3 % (ref 18–48)
MCH RBC QN AUTO: 29 PG (ref 27–31)
MCHC RBC AUTO-ENTMCNC: 31.2 G/DL (ref 32–36)
MCV RBC AUTO: 93 FL (ref 82–98)
MONOCYTES # BLD AUTO: 0.4 K/UL (ref 0.3–1)
MONOCYTES NFR BLD: 6.3 % (ref 4–15)
NEUTROPHILS # BLD AUTO: 3.5 K/UL (ref 1.8–7.7)
NEUTROPHILS NFR BLD: 60.2 % (ref 38–73)
NRBC BLD-RTO: 0 /100 WBC
PLATELET # BLD AUTO: 289 K/UL (ref 150–350)
PMV BLD AUTO: 10.4 FL (ref 9.2–12.9)
POTASSIUM SERPL-SCNC: 4.3 MMOL/L (ref 3.5–5.1)
PROT SERPL-MCNC: 7.3 G/DL (ref 6–8.4)
RBC # BLD AUTO: 4.69 M/UL (ref 4–5.4)
SODIUM SERPL-SCNC: 139 MMOL/L (ref 136–145)
WBC # BLD AUTO: 5.76 K/UL (ref 3.9–12.7)

## 2020-08-21 PROCEDURE — 36415 COLL VENOUS BLD VENIPUNCTURE: CPT

## 2020-08-21 PROCEDURE — U0003 INFECTIOUS AGENT DETECTION BY NUCLEIC ACID (DNA OR RNA); SEVERE ACUTE RESPIRATORY SYNDROME CORONAVIRUS 2 (SARS-COV-2) (CORONAVIRUS DISEASE [COVID-19]), AMPLIFIED PROBE TECHNIQUE, MAKING USE OF HIGH THROUGHPUT TECHNOLOGIES AS DESCRIBED BY CMS-2020-01-R: HCPCS

## 2020-08-21 PROCEDURE — 85025 COMPLETE CBC W/AUTO DIFF WBC: CPT

## 2020-08-21 PROCEDURE — 80053 COMPREHEN METABOLIC PANEL: CPT

## 2020-08-21 NOTE — H&P (VIEW-ONLY)
Subjective:       Patient ID: Negrita Castro is a 60 y.o. female.    Chief Complaint: No chief complaint on file.      HPI 60-year-old female with newly diagnosed invasive ductal  Carcinoma of the left breast.  Patient had right mastectomy with reconstruction previously.  This is in the ERPR positive tumor which is different than the last pathology.  This was diagnosed on ultrasound-guided biopsy.  The patient was asymptomatic.  The lesion was found on a screening test.  She has done well since the biopsy.    Past Medical History:   Diagnosis Date    Breast cancer     Cancer     RIGHT BREAST 10-15    Depression     Diabetes mellitus     Neuropathy     Panic attacks     S/P epidural steroid injection     Seizures     none since early 30's    Wears glasses     TO DRIVE     Past Surgical History:   Procedure Laterality Date    BREAST BIOPSY      right    breast reconstruction with tummy [facility]      BREAST SURGERY      11-3-15 LUMPECTOMY, 12-2-15 REEXCISION    mastectomy 2016      RECONSTRUCTION OF NIPPLE Right 11/5/2018    Procedure: RECONSTRUCTION-NIPPLE RIGHT;  Surgeon: Xiang Hernandez MD;  Location: [facility] OR [address] [place] [address];  Service: Plastics;  Laterality: Right;    shoulder surgery and wrist      BILAT  BONE SPUR    WRIST SURGERY      RIGHT         Current Outpatient Medications:     ASCORBIC ACID/VITAMIN E/BIOTIN (HAIR, SKIN, NAILS WITH BIOTIN ORAL), Take 1 tablet by mouth every morning. , Disp: , Rfl:     carisoprodol (SOMA) 350 MG tablet, Take 1 tablet (350 mg total) by mouth 3 (three) times daily as needed., Disp: 90 tablet, Rfl: 0    diazepam (VALIUM) 10 MG tablet, Take 10 mg by mouth 4 (four) times daily. , Disp: , Rfl:     dronabinol (MARINOL) 2.5 MG capsule, Take 1 capsule (2.5 mg total) by mouth 2 (two) times daily before meals., Disp: 30 capsule, Rfl: 1    dulaglutide (TRULICITY) 0.75 mg/0.5 mL PnIj, Inject 0.75 mg into the skin every 7 days., Disp: , Rfl:     enalapril (VASOTEC) 2.5  MG tablet, Take 2.5 mg by mouth once daily. For diabetes, Disp: , Rfl: 1    erythromycin (ROMYCIN) ophthalmic ointment, Place a 1/2 inch ribbon of ointment into the lower eyelid., Disp: 1 Tube, Rfl: 0    furosemide (LASIX) 20 MG tablet, TAKE ONE TABLET BY MOUTH EVERY DAY, Disp: 30 tablet, Rfl: 4    HYDROcodone-acetaminophen (NORCO)  mg per tablet, Take 1 tablet by mouth every 6 (six) hours as needed., Disp: , Rfl: 0    insulin glargine (LANTUS U-100 INSULIN) 100 unit/mL injection, Inject 35 Units into the skin every evening., Disp: 10.5 mL, Rfl: 11    insulin glargine,hum.rec.anlog (LANTUS U-100 INSULIN SUBQ), Inject 35 Units into the skin every evening., Disp: , Rfl:     insulin syringe-needle U-100 1 mL 31 gauge x 5/16 Syrg, 1 Device by Misc.(Non-Drug; Combo Route) route once daily., Disp: 50 each, Rfl: 0    metformin (GLUCOPHAGE-XR) 500 MG 24 hr tablet, Take 2,000 mg by mouth every evening. , Disp: , Rfl:     multivitamin capsule, Take 1 capsule by mouth every morning., Disp: , Rfl:     pravastatin (PRAVACHOL) 40 MG tablet, Take 40 mg by mouth every morning. , Disp: , Rfl: 1    zolpidem (AMBIEN) 10 mg Tab, Take 10 mg by mouth nightly as needed., Disp: , Rfl: 0    Review of patient's allergies indicates:   Allergen Reactions    Adhesive tape-silicones Hives     BANDAIDS    Polymycin Hives    Adhesive     Neomycin-bacitracnzn-polymyxnb     Polyurethane-39 Hives       Family History   Problem Relation Age of Onset    Anesthesia problems Neg Hx      Social History     Socioeconomic History    Marital status: Single     Spouse name: Not on file    Number of children: Not on file    Years of education: Not on file    Highest education level: Not on file   Occupational History    Not on file   Social Needs    Financial resource strain: Not on file    Food insecurity     Worry: Not on file     Inability: Not on file    Transportation needs     Medical: Not on file     Non-medical: Not on  file   Tobacco Use    Smoking status: Former Smoker     Packs/day: 0.25     Years: 30.00     Pack years: 7.50     Quit date: 2015     Years since quittin.9    Smokeless tobacco: Never Used   Substance and Sexual Activity    Alcohol use: Yes     Alcohol/week: 0.0 standard drinks     Comment: RARELY    Drug use: Not on file     Comment: medical marijuana    Sexual activity: Not on file   Lifestyle    Physical activity     Days per week: Not on file     Minutes per session: Not on file    Stress: Not on file   Relationships    Social connections     Talks on phone: Not on file     Gets together: Not on file     Attends Rastafarian service: Not on file     Active member of club or organization: Not on file     Attends meetings of clubs or organizations: Not on file     Relationship status: Not on file   Other Topics Concern    Not on file   Social History Narrative    Not on file       Review of Systems   Constitutional: Negative for activity change, chills, fever and unexpected weight change.   HENT: Negative for congestion, sore throat, trouble swallowing and voice change.    Eyes: Negative for redness and visual disturbance.   Respiratory: Negative for cough, shortness of breath and wheezing.    Cardiovascular: Negative for chest pain and palpitations.   Gastrointestinal: Negative for abdominal pain, blood in stool, nausea and vomiting.   Endocrine: Negative.    Genitourinary: Negative for dysuria, frequency and hematuria.   Musculoskeletal: Negative for arthralgias, back pain and neck pain.   Skin: Negative for rash and wound.   Allergic/Immunologic: Negative.    Neurological: Negative for dizziness, weakness and headaches.   Hematological: Negative for adenopathy.   Psychiatric/Behavioral: Negative for agitation and dysphoric mood. The patient is not nervous/anxious.      Objective:     Physical Exam  Constitutional:       General: She is not in acute distress.     Appearance: She is  well-developed.   HENT:      Head: Normocephalic and atraumatic.      Mouth/Throat:      Pharynx: No oropharyngeal exudate.   Eyes:      General: No scleral icterus.     Conjunctiva/sclera: Conjunctivae normal.      Pupils: Pupils are equal, round, and reactive to light.   Neck:      Musculoskeletal: Normal range of motion.      Thyroid: No thyromegaly.   Cardiovascular:      Rate and Rhythm: Normal rate and regular rhythm.      Heart sounds: No murmur.      Comments: Left Breast Exam:  There are no palpable dominant masses.  There are no overlying skin changes.  There is no contour change of the breast.  There is no nipple discharge.  There is no significant breast tenderness.  The biopsy site is healing well.  There is no evidence of infection.  There is no palpable axillary or supraclavicular adenopathy.    There contralateral breast findings are consistent with previous mastectomy with reconstruction.  Pulmonary:      Effort: Pulmonary effort is normal.      Breath sounds: Normal breath sounds. No wheezing or rales.   Abdominal:      General: Bowel sounds are normal. There is no distension.      Palpations: Abdomen is soft. There is no mass.      Tenderness: There is no abdominal tenderness.      Hernia: No hernia is present.   Musculoskeletal: Normal range of motion.   Lymphadenopathy:      Cervical: No cervical adenopathy.   Skin:     General: Skin is warm and dry.      Findings: No erythema or rash.   Neurological:      Mental Status: She is alert and oriented to person, place, and time.      Cranial Nerves: No cranial nerve deficit.   Psychiatric:         Behavior: Behavior normal.       Assessment:     Encounter Diagnoses   Name Primary?    Preop testing     Malignant neoplasm of left female breast, unspecified estrogen receptor status, unspecified site of breast Yes    Breast cancer, left breast        Plan:      1.  Plan left partial mastectomy with wire localization and sentinel node biopsy.  This  will be followed by radiation.  The patient previously received radiation to the right breast.  2. Risks and benefits of the planned procedure were discussed at length with the patient.  Risks and benefits of not proceeding with the procedure were discussed as well. All questions were answered. The patient expressed clear understanding and would like to proceed with the procedure as discussed.

## 2020-08-21 NOTE — TELEPHONE ENCOUNTER
----- Message from Ilana Marroquin sent at 8/21/2020  8:47 AM CDT -----  Regarding: insurance approval for procedure  Contact: Negrita krishnamurthy  Type: Needs Medical Advice  Who Called:  Negrita Fowler Call Back Number: [phone]  Additional Information: Pls call pt regarding approval for her procedure. She doesn't want to go take the tests if approval has not been received.

## 2020-08-21 NOTE — PROGRESS NOTES
Subjective:       Patient ID: Negrita Castro is a 60 y.o. female.    Chief Complaint: No chief complaint on file.      HPI 60-year-old female with newly diagnosed invasive ductal  Carcinoma of the left breast.  Patient had right mastectomy with reconstruction previously.  This is in the ERPR positive tumor which is different than the last pathology.  This was diagnosed on ultrasound-guided biopsy.  The patient was asymptomatic.  The lesion was found on a screening test.  She has done well since the biopsy.    Past Medical History:   Diagnosis Date    Breast cancer     Cancer     RIGHT BREAST 10-15    Depression     Diabetes mellitus     Neuropathy     Panic attacks     S/P epidural steroid injection     Seizures     none since early 30's    Wears glasses     TO DRIVE     Past Surgical History:   Procedure Laterality Date    BREAST BIOPSY      right    breast [facility]my [facility]      BREAST SURGERY      11-3-15 LUMPECTOMY, 12-2-15 REEXCISION    mastectomy 2016      RECONSTRUCTION OF NIPPLE Right 11/5/2018    Procedure: RECONSTRUCTION-NIPPLE RIGHT;  Surgeon: Xiang Hernandez MD;  Location: [facility] OR [address] [place] [address];  Service: Plastics;  Laterality: Right;    shoulder surgery and wrist      BILAT  BONE SPUR    WRIST SURGERY      RIGHT         Current Outpatient Medications:     ASCORBIC ACID/VITAMIN E/BIOTIN (HAIR, SKIN, NAILS WITH BIOTIN ORAL), Take 1 tablet by mouth every morning. , Disp: , Rfl:     carisoprodol (SOMA) 350 MG tablet, Take 1 tablet (350 mg total) by mouth 3 (three) times daily as needed., Disp: 90 tablet, Rfl: 0    diazepam (VALIUM) 10 MG tablet, Take 10 mg by mouth 4 (four) times daily. , Disp: , Rfl:     dronabinol (MARINOL) 2.5 MG capsule, Take 1 capsule (2.5 mg total) by mouth 2 (two) times daily before meals., Disp: 30 capsule, Rfl: 1    dulaglutide (TRULICITY) 0.75 mg/0.5 mL PnIj, Inject 0.75 mg into the skin every 7 days., Disp: , Rfl:     enalapril (VASOTEC) 2.5  MG tablet, Take 2.5 mg by mouth once daily. For diabetes, Disp: , Rfl: 1    erythromycin (ROMYCIN) ophthalmic ointment, Place a 1/2 inch ribbon of ointment into the lower eyelid., Disp: 1 Tube, Rfl: 0    furosemide (LASIX) 20 MG tablet, TAKE ONE TABLET BY MOUTH EVERY DAY, Disp: 30 tablet, Rfl: 4    HYDROcodone-acetaminophen (NORCO)  mg per tablet, Take 1 tablet by mouth every 6 (six) hours as needed., Disp: , Rfl: 0    insulin glargine (LANTUS U-100 INSULIN) 100 unit/mL injection, Inject 35 Units into the skin every evening., Disp: 10.5 mL, Rfl: 11    insulin glargine,hum.rec.anlog (LANTUS U-100 INSULIN SUBQ), Inject 35 Units into the skin every evening., Disp: , Rfl:     insulin syringe-needle U-100 1 mL 31 gauge x 5/16 Syrg, 1 Device by Misc.(Non-Drug; Combo Route) route once daily., Disp: 50 each, Rfl: 0    metformin (GLUCOPHAGE-XR) 500 MG 24 hr tablet, Take 2,000 mg by mouth every evening. , Disp: , Rfl:     multivitamin capsule, Take 1 capsule by mouth every morning., Disp: , Rfl:     pravastatin (PRAVACHOL) 40 MG tablet, Take 40 mg by mouth every morning. , Disp: , Rfl: 1    zolpidem (AMBIEN) 10 mg Tab, Take 10 mg by mouth nightly as needed., Disp: , Rfl: 0    Review of patient's allergies indicates:   Allergen Reactions    Adhesive tape-silicones Hives     BANDAIDS    Polymycin Hives    Adhesive     Neomycin-bacitracnzn-polymyxnb     Polyurethane-39 Hives       Family History   Problem Relation Age of Onset    Anesthesia problems Neg Hx      Social History     Socioeconomic History    Marital status: Single     Spouse name: Not on file    Number of children: Not on file    Years of education: Not on file    Highest education level: Not on file   Occupational History    Not on file   Social Needs    Financial resource strain: Not on file    Food insecurity     Worry: Not on file     Inability: Not on file    Transportation needs     Medical: Not on file     Non-medical: Not on  file   Tobacco Use    Smoking status: Former Smoker     Packs/day: 0.25     Years: 30.00     Pack years: 7.50     Quit date: 2015     Years since quittin.9    Smokeless tobacco: Never Used   Substance and Sexual Activity    Alcohol use: Yes     Alcohol/week: 0.0 standard drinks     Comment: RARELY    Drug use: Not on file     Comment: medical marijuana    Sexual activity: Not on file   Lifestyle    Physical activity     Days per week: Not on file     Minutes per session: Not on file    Stress: Not on file   Relationships    Social connections     Talks on phone: Not on file     Gets together: Not on file     Attends Latter day service: Not on file     Active member of club or organization: Not on file     Attends meetings of clubs or organizations: Not on file     Relationship status: Not on file   Other Topics Concern    Not on file   Social History Narrative    Not on file       Review of Systems   Constitutional: Negative for activity change, chills, fever and unexpected weight change.   HENT: Negative for congestion, sore throat, trouble swallowing and voice change.    Eyes: Negative for redness and visual disturbance.   Respiratory: Negative for cough, shortness of breath and wheezing.    Cardiovascular: Negative for chest pain and palpitations.   Gastrointestinal: Negative for abdominal pain, blood in stool, nausea and vomiting.   Endocrine: Negative.    Genitourinary: Negative for dysuria, frequency and hematuria.   Musculoskeletal: Negative for arthralgias, back pain and neck pain.   Skin: Negative for rash and wound.   Allergic/Immunologic: Negative.    Neurological: Negative for dizziness, weakness and headaches.   Hematological: Negative for adenopathy.   Psychiatric/Behavioral: Negative for agitation and dysphoric mood. The patient is not nervous/anxious.      Objective:     Physical Exam  Constitutional:       General: She is not in acute distress.     Appearance: She is  well-developed.   HENT:      Head: Normocephalic and atraumatic.      Mouth/Throat:      Pharynx: No oropharyngeal exudate.   Eyes:      General: No scleral icterus.     Conjunctiva/sclera: Conjunctivae normal.      Pupils: Pupils are equal, round, and reactive to light.   Neck:      Musculoskeletal: Normal range of motion.      Thyroid: No thyromegaly.   Cardiovascular:      Rate and Rhythm: Normal rate and regular rhythm.      Heart sounds: No murmur.      Comments: Left Breast Exam:  There are no palpable dominant masses.  There are no overlying skin changes.  There is no contour change of the breast.  There is no nipple discharge.  There is no significant breast tenderness.  The biopsy site is healing well.  There is no evidence of infection.  There is no palpable axillary or supraclavicular adenopathy.    There contralateral breast findings are consistent with previous mastectomy with reconstruction.  Pulmonary:      Effort: Pulmonary effort is normal.      Breath sounds: Normal breath sounds. No wheezing or rales.   Abdominal:      General: Bowel sounds are normal. There is no distension.      Palpations: Abdomen is soft. There is no mass.      Tenderness: There is no abdominal tenderness.      Hernia: No hernia is present.   Musculoskeletal: Normal range of motion.   Lymphadenopathy:      Cervical: No cervical adenopathy.   Skin:     General: Skin is warm and dry.      Findings: No erythema or rash.   Neurological:      Mental Status: She is alert and oriented to person, place, and time.      Cranial Nerves: No cranial nerve deficit.   Psychiatric:         Behavior: Behavior normal.       Assessment:     Encounter Diagnoses   Name Primary?    Preop testing     Malignant neoplasm of left female breast, unspecified estrogen receptor status, unspecified site of breast Yes    Breast cancer, left breast        Plan:      1.  Plan left partial mastectomy with wire localization and sentinel node biopsy.  This  will be followed by radiation.  The patient previously received radiation to the right breast.  2. Risks and benefits of the planned procedure were discussed at length with the patient.  Risks and benefits of not proceeding with the procedure were discussed as well. All questions were answered. The patient expressed clear understanding and would like to proceed with the procedure as discussed.

## 2020-08-21 NOTE — H&P (VIEW-ONLY)
Subjective:       Patient ID: Negrita Castro is a 60 y.o. female.    Chief Complaint: No chief complaint on file.      HPI 60-year-old female with newly diagnosed invasive ductal  Carcinoma of the left breast.  Patient had right mastectomy with reconstruction previously.  This is in the ERPR positive tumor which is different than the last pathology.  This was diagnosed on ultrasound-guided biopsy.  The patient was asymptomatic.  The lesion was found on a screening test.  She has done well since the biopsy.    Past Medical History:   Diagnosis Date    Breast cancer     Cancer     RIGHT BREAST 10-15    Depression     Diabetes mellitus     Neuropathy     Panic attacks     S/P epidural steroid injection     Seizures     none since early 30's    Wears glasses     TO DRIVE     Past Surgical History:   Procedure Laterality Date    BREAST BIOPSY      right    breast reconstruction with tummy [facility]      BREAST SURGERY      11-3-15 LUMPECTOMY, 12-2-15 REEXCISION    mastectomy 2016      RECONSTRUCTION OF NIPPLE Right 11/5/2018    Procedure: RECONSTRUCTION-NIPPLE RIGHT;  Surgeon: Xiang Hernandez MD;  Location: [facility] OR [address] [place] [address];  Service: Plastics;  Laterality: Right;    shoulder surgery and wrist      BILAT  BONE SPUR    WRIST SURGERY      RIGHT         Current Outpatient Medications:     ASCORBIC ACID/VITAMIN E/BIOTIN (HAIR, SKIN, NAILS WITH BIOTIN ORAL), Take 1 tablet by mouth every morning. , Disp: , Rfl:     carisoprodol (SOMA) 350 MG tablet, Take 1 tablet (350 mg total) by mouth 3 (three) times daily as needed., Disp: 90 tablet, Rfl: 0    diazepam (VALIUM) 10 MG tablet, Take 10 mg by mouth 4 (four) times daily. , Disp: , Rfl:     dronabinol (MARINOL) 2.5 MG capsule, Take 1 capsule (2.5 mg total) by mouth 2 (two) times daily before meals., Disp: 30 capsule, Rfl: 1    dulaglutide (TRULICITY) 0.75 mg/0.5 mL PnIj, Inject 0.75 mg into the skin every 7 days., Disp: , Rfl:     enalapril (VASOTEC) 2.5  MG tablet, Take 2.5 mg by mouth once daily. For diabetes, Disp: , Rfl: 1    erythromycin (ROMYCIN) ophthalmic ointment, Place a 1/2 inch ribbon of ointment into the lower eyelid., Disp: 1 Tube, Rfl: 0    furosemide (LASIX) 20 MG tablet, TAKE ONE TABLET BY MOUTH EVERY DAY, Disp: 30 tablet, Rfl: 4    HYDROcodone-acetaminophen (NORCO)  mg per tablet, Take 1 tablet by mouth every 6 (six) hours as needed., Disp: , Rfl: 0    insulin glargine (LANTUS U-100 INSULIN) 100 unit/mL injection, Inject 35 Units into the skin every evening., Disp: 10.5 mL, Rfl: 11    insulin glargine,hum.rec.anlog (LANTUS U-100 INSULIN SUBQ), Inject 35 Units into the skin every evening., Disp: , Rfl:     insulin syringe-needle U-100 1 mL 31 gauge x 5/16 Syrg, 1 Device by Misc.(Non-Drug; Combo Route) route once daily., Disp: 50 each, Rfl: 0    metformin (GLUCOPHAGE-XR) 500 MG 24 hr tablet, Take 2,000 mg by mouth every evening. , Disp: , Rfl:     multivitamin capsule, Take 1 capsule by mouth every morning., Disp: , Rfl:     pravastatin (PRAVACHOL) 40 MG tablet, Take 40 mg by mouth every morning. , Disp: , Rfl: 1    zolpidem (AMBIEN) 10 mg Tab, Take 10 mg by mouth nightly as needed., Disp: , Rfl: 0    Review of patient's allergies indicates:   Allergen Reactions    Adhesive tape-silicones Hives     BANDAIDS    Polymycin Hives    Adhesive     Neomycin-bacitracnzn-polymyxnb     Polyurethane-39 Hives       Family History   Problem Relation Age of Onset    Anesthesia problems Neg Hx      Social History     Socioeconomic History    Marital status: Single     Spouse name: Not on file    Number of children: Not on file    Years of education: Not on file    Highest education level: Not on file   Occupational History    Not on file   Social Needs    Financial resource strain: Not on file    Food insecurity     Worry: Not on file     Inability: Not on file    Transportation needs     Medical: Not on file     Non-medical: Not on  file   Tobacco Use    Smoking status: Former Smoker     Packs/day: 0.25     Years: 30.00     Pack years: 7.50     Quit date: 2015     Years since quittin.9    Smokeless tobacco: Never Used   Substance and Sexual Activity    Alcohol use: Yes     Alcohol/week: 0.0 standard drinks     Comment: RARELY    Drug use: Not on file     Comment: medical marijuana    Sexual activity: Not on file   Lifestyle    Physical activity     Days per week: Not on file     Minutes per session: Not on file    Stress: Not on file   Relationships    Social connections     Talks on phone: Not on file     Gets together: Not on file     Attends [employer] service: Not on file     Active member of club or organization: Not on file     Attends meetings of clubs or organizations: Not on file     Relationship status: Not on file   Other Topics Concern    Not on file   Social History Narrative    Not on file       Review of Systems   Constitutional: Negative for activity change, chills, fever and unexpected weight change.   HENT: Negative for congestion, sore throat, trouble swallowing and voice change.    Eyes: Negative for redness and visual disturbance.   Respiratory: Negative for cough, shortness of breath and wheezing.    Cardiovascular: Negative for chest pain and palpitations.   Gastrointestinal: Negative for abdominal pain, blood in stool, nausea and vomiting.   Endocrine: Negative.    Genitourinary: Negative for dysuria, frequency and hematuria.   Musculoskeletal: Negative for arthralgias, back pain and neck pain.   Skin: Negative for rash and wound.   Allergic/Immunologic: Negative.    Neurological: Negative for dizziness, weakness and headaches.   Hematological: Negative for adenopathy.   Psychiatric/Behavioral: Negative for agitation and dysphoric mood. The patient is not nervous/anxious.      Objective:     Physical Exam  Constitutional:       General: She is not in acute distress.     Appearance: She is  well-developed.   HENT:      Head: Normocephalic and atraumatic.      Mouth/Throat:      Pharynx: No oropharyngeal exudate.   Eyes:      General: No scleral icterus.     Conjunctiva/sclera: Conjunctivae normal.      Pupils: Pupils are equal, round, and reactive to light.   Neck:      Musculoskeletal: Normal range of motion.      Thyroid: No thyromegaly.   Cardiovascular:      Rate and Rhythm: Normal rate and regular rhythm.      Heart sounds: No murmur.      Comments: Left Breast Exam:  There are no palpable dominant masses.  There are no overlying skin changes.  There is no contour change of the breast.  There is no nipple discharge.  There is no significant breast tenderness.  The biopsy site is healing well.  There is no evidence of infection.  There is no palpable axillary or supraclavicular adenopathy.    There contralateral breast findings are consistent with previous mastectomy with reconstruction.  Pulmonary:      Effort: Pulmonary effort is normal.      Breath sounds: Normal breath sounds. No wheezing or rales.   Abdominal:      General: Bowel sounds are normal. There is no distension.      Palpations: Abdomen is soft. There is no mass.      Tenderness: There is no abdominal tenderness.      Hernia: No hernia is present.   Musculoskeletal: Normal range of motion.   Lymphadenopathy:      Cervical: No cervical adenopathy.   Skin:     General: Skin is warm and dry.      Findings: No erythema or rash.   Neurological:      Mental Status: She is alert and oriented to person, place, and time.      Cranial Nerves: No cranial nerve deficit.   Psychiatric:         Behavior: Behavior normal.       Assessment:     Encounter Diagnoses   Name Primary?    Preop testing     Malignant neoplasm of left female breast, unspecified estrogen receptor status, unspecified site of breast Yes    Breast cancer, left breast        Plan:      1.  Plan left partial mastectomy with wire localization and sentinel node biopsy.  This  will be followed by radiation.  The patient previously received radiation to the right breast.  2. Risks and benefits of the planned procedure were discussed at length with the patient.  Risks and benefits of not proceeding with the procedure were discussed as well. All questions were answered. The patient expressed clear understanding and would like to proceed with the procedure as discussed.

## 2020-08-22 LAB — SARS-COV-2 RNA RESP QL NAA+PROBE: NOT DETECTED

## 2020-08-24 ENCOUNTER — HOSPITAL ENCOUNTER (OUTPATIENT)
Facility: HOSPITAL | Age: 60
Discharge: HOME | End: 2020-08-24
Attending: SURGERY | Admitting: SURGERY
Payer: MEDICARE

## 2020-08-24 ENCOUNTER — ANESTHESIA (OUTPATIENT)
Dept: SURGERY | Facility: HOSPITAL | Age: 60
End: 2020-08-24
Payer: MEDICARE

## 2020-08-24 ENCOUNTER — HOSPITAL ENCOUNTER (OUTPATIENT)
Dept: RADIOLOGY | Facility: HOSPITAL | Age: 60
Discharge: HOME | End: 2020-08-24
Attending: SURGERY | Admitting: SURGERY
Payer: MEDICARE

## 2020-08-24 ENCOUNTER — HOSPITAL ENCOUNTER (OUTPATIENT)
Dept: RADIOLOGY | Facility: HOSPITAL | Age: 60
Discharge: HOME | End: 2020-08-24
Attending: SURGERY
Payer: MEDICARE

## 2020-08-24 ENCOUNTER — ANESTHESIA EVENT (OUTPATIENT)
Dept: SURGERY | Facility: HOSPITAL | Age: 60
End: 2020-08-24
Payer: MEDICARE

## 2020-08-24 VITALS
HEIGHT: 71 IN | TEMPERATURE: 98 F | DIASTOLIC BLOOD PRESSURE: 83 MMHG | WEIGHT: 200 LBS | BODY MASS INDEX: 28 KG/M2 | SYSTOLIC BLOOD PRESSURE: 153 MMHG | RESPIRATION RATE: 14 BRPM | HEART RATE: 61 BPM | OXYGEN SATURATION: 97 %

## 2020-08-24 DIAGNOSIS — C50.912 MALIGNANT NEOPLASM OF LEFT FEMALE BREAST, UNSPECIFIED ESTROGEN RECEPTOR STATUS, UNSPECIFIED SITE OF BREAST: ICD-10-CM

## 2020-08-24 DIAGNOSIS — C50.912 INVASIVE DUCTAL CARCINOMA OF BREAST, FEMALE, LEFT: ICD-10-CM

## 2020-08-24 DIAGNOSIS — C50.912 BREAST CANCER, LEFT BREAST: Primary | ICD-10-CM

## 2020-08-24 LAB — GLUCOSE SERPL-MCNC: 225 MG/DL (ref 70–110)

## 2020-08-24 PROCEDURE — 88307 TISSUE EXAM BY PATHOLOGIST: CPT | Mod: 26,,, | Performed by: PATHOLOGY

## 2020-08-24 PROCEDURE — 19301 PR MASTECTOMY, PARTIAL: ICD-10-PCS | Mod: LT,,, | Performed by: SURGERY

## 2020-08-24 PROCEDURE — D9220A PRA ANESTHESIA: Mod: CRNA,,, | Performed by: NURSE ANESTHETIST, CERTIFIED REGISTERED

## 2020-08-24 PROCEDURE — 88342 IMHCHEM/IMCYTCHM 1ST ANTB: CPT | Mod: 26,,, | Performed by: PATHOLOGY

## 2020-08-24 PROCEDURE — 19285 PERQ DEV BREAST 1ST US IMAG: CPT | Mod: LT,,, | Performed by: RADIOLOGY

## 2020-08-24 PROCEDURE — 76098 X-RAY EXAM SURGICAL SPECIMEN: CPT | Mod: TC,PO

## 2020-08-24 PROCEDURE — 88342 IMHCHEM/IMCYTCHM 1ST ANTB: CPT | Performed by: PATHOLOGY

## 2020-08-24 PROCEDURE — 38792 RA TRACER ID OF SENTINL NODE: CPT | Mod: TC,PO

## 2020-08-24 PROCEDURE — 88341 PR IHC OR ICC EACH ADD'L SINGLE ANTIBODY  STAINPR: ICD-10-PCS | Mod: 26,,, | Performed by: PATHOLOGY

## 2020-08-24 PROCEDURE — 19301 PARTIAL MASTECTOMY: CPT | Mod: LT,,, | Performed by: SURGERY

## 2020-08-24 PROCEDURE — 88342 CHG IMMUNOCYTOCHEMISTRY: ICD-10-PCS | Mod: 26,,, | Performed by: PATHOLOGY

## 2020-08-24 PROCEDURE — 38525 PR BIOPSY/REM LYMPH NODES, AXILLARY: ICD-10-PCS | Mod: 51,LT,, | Performed by: SURGERY

## 2020-08-24 PROCEDURE — 63600175 PHARM REV CODE 636 W HCPCS: Mod: PO | Performed by: SURGERY

## 2020-08-24 PROCEDURE — D9220A PRA ANESTHESIA: ICD-10-PCS | Mod: CRNA,,, | Performed by: NURSE ANESTHETIST, CERTIFIED REGISTERED

## 2020-08-24 PROCEDURE — 88341 IMHCHEM/IMCYTCHM EA ADD ANTB: CPT | Performed by: PATHOLOGY

## 2020-08-24 PROCEDURE — 76098 X-RAY EXAM SURGICAL SPECIMEN: CPT | Mod: 26,,, | Performed by: RADIOLOGY

## 2020-08-24 PROCEDURE — 88341 IMHCHEM/IMCYTCHM EA ADD ANTB: CPT | Mod: 26,,, | Performed by: PATHOLOGY

## 2020-08-24 PROCEDURE — 25000003 PHARM REV CODE 250: Mod: PO | Performed by: SURGERY

## 2020-08-24 PROCEDURE — 63600175 PHARM REV CODE 636 W HCPCS: Mod: PO | Performed by: ANESTHESIOLOGY

## 2020-08-24 PROCEDURE — 25000003 PHARM REV CODE 250: Mod: PO | Performed by: ANESTHESIOLOGY

## 2020-08-24 PROCEDURE — 63600175 PHARM REV CODE 636 W HCPCS: Mod: PO | Performed by: NURSE ANESTHETIST, CERTIFIED REGISTERED

## 2020-08-24 PROCEDURE — 19285 PERQ DEV BREAST 1ST US IMAG: CPT | Mod: TC,PO

## 2020-08-24 PROCEDURE — D9220A PRA ANESTHESIA: Mod: ANES,,, | Performed by: ANESTHESIOLOGY

## 2020-08-24 PROCEDURE — 88307 PR  SURG PATH,LEVEL V: ICD-10-PCS | Mod: 26,,, | Performed by: PATHOLOGY

## 2020-08-24 PROCEDURE — D9220A PRA ANESTHESIA: ICD-10-PCS | Mod: ANES,,, | Performed by: ANESTHESIOLOGY

## 2020-08-24 PROCEDURE — 71000033 HC RECOVERY, INTIAL HOUR: Mod: PO | Performed by: SURGERY

## 2020-08-24 PROCEDURE — 37000009 HC ANESTHESIA EA ADD 15 MINS: Mod: PO | Performed by: SURGERY

## 2020-08-24 PROCEDURE — 88307 TISSUE EXAM BY PATHOLOGIST: CPT | Performed by: PATHOLOGY

## 2020-08-24 PROCEDURE — 82962 GLUCOSE BLOOD TEST: CPT | Mod: PO | Performed by: SURGERY

## 2020-08-24 PROCEDURE — 76098 MAMMO BREAST SPECIMEN: ICD-10-PCS | Mod: 26,,, | Performed by: RADIOLOGY

## 2020-08-24 PROCEDURE — 36000707: Mod: PO | Performed by: SURGERY

## 2020-08-24 PROCEDURE — 27200651 HC AIRWAY, LMA: Mod: PO | Performed by: ANESTHESIOLOGY

## 2020-08-24 PROCEDURE — 25000003 PHARM REV CODE 250: Mod: PO | Performed by: NURSE ANESTHETIST, CERTIFIED REGISTERED

## 2020-08-24 PROCEDURE — 36000706: Mod: PO | Performed by: SURGERY

## 2020-08-24 PROCEDURE — 38792 NM SENTINEL LYMPH NODE INJECTION ONLY_RAD PERFORMED: ICD-10-PCS | Mod: LT,,, | Performed by: RADIOLOGY

## 2020-08-24 PROCEDURE — 71000039 HC RECOVERY, EACH ADD'L HOUR: Mod: PO | Performed by: SURGERY

## 2020-08-24 PROCEDURE — 38525 BIOPSY/REMOVAL LYMPH NODES: CPT | Mod: 51,LT,, | Performed by: SURGERY

## 2020-08-24 PROCEDURE — 38900 PR INTRAOPERATIVE SENTINEL LYMPH NODE ID W DYE INJECTION: ICD-10-PCS | Mod: LT,,, | Performed by: SURGERY

## 2020-08-24 PROCEDURE — 38792 RA TRACER ID OF SENTINL NODE: CPT | Mod: LT,,, | Performed by: RADIOLOGY

## 2020-08-24 PROCEDURE — 38900 IO MAP OF SENT LYMPH NODE: CPT | Mod: LT,,, | Performed by: SURGERY

## 2020-08-24 PROCEDURE — 19285 US NEEDLE LOC WITH ULTRASOUND 1ST SITE: ICD-10-PCS | Mod: LT,,, | Performed by: RADIOLOGY

## 2020-08-24 PROCEDURE — 37000008 HC ANESTHESIA 1ST 15 MINUTES: Mod: PO | Performed by: SURGERY

## 2020-08-24 RX ORDER — FENTANYL CITRATE 50 UG/ML
INJECTION, SOLUTION INTRAMUSCULAR; INTRAVENOUS
Status: DISCONTINUED | OUTPATIENT
Start: 2020-08-24 | End: 2020-08-24

## 2020-08-24 RX ORDER — HYDRALAZINE HYDROCHLORIDE 20 MG/ML
10 INJECTION INTRAMUSCULAR; INTRAVENOUS ONCE AS NEEDED
Status: DISCONTINUED | OUTPATIENT
Start: 2020-08-24 | End: 2020-08-24 | Stop reason: HOSPADM

## 2020-08-24 RX ORDER — MIDAZOLAM HYDROCHLORIDE 1 MG/ML
INJECTION, SOLUTION INTRAMUSCULAR; INTRAVENOUS
Status: DISCONTINUED | OUTPATIENT
Start: 2020-08-24 | End: 2020-08-24

## 2020-08-24 RX ORDER — ONDANSETRON 2 MG/ML
INJECTION INTRAMUSCULAR; INTRAVENOUS
Status: DISCONTINUED | OUTPATIENT
Start: 2020-08-24 | End: 2020-08-24

## 2020-08-24 RX ORDER — BUPIVACAINE HYDROCHLORIDE AND EPINEPHRINE 2.5; 5 MG/ML; UG/ML
INJECTION, SOLUTION EPIDURAL; INFILTRATION; INTRACAUDAL; PERINEURAL
Status: DISCONTINUED | OUTPATIENT
Start: 2020-08-24 | End: 2020-08-24 | Stop reason: HOSPADM

## 2020-08-24 RX ORDER — LIDOCAINE HYDROCHLORIDE 10 MG/ML
1 INJECTION, SOLUTION EPIDURAL; INFILTRATION; INTRACAUDAL; PERINEURAL ONCE
Status: COMPLETED | OUTPATIENT
Start: 2020-08-24 | End: 2020-08-24

## 2020-08-24 RX ORDER — KETAMINE HYDROCHLORIDE 100 MG/ML
INJECTION, SOLUTION INTRAMUSCULAR; INTRAVENOUS
Status: DISCONTINUED | OUTPATIENT
Start: 2020-08-24 | End: 2020-08-24

## 2020-08-24 RX ORDER — LIDOCAINE HYDROCHLORIDE 20 MG/ML
INJECTION INTRAVENOUS
Status: DISCONTINUED | OUTPATIENT
Start: 2020-08-24 | End: 2020-08-24

## 2020-08-24 RX ORDER — SODIUM CHLORIDE 9 MG/ML
INJECTION, SOLUTION INTRAVENOUS CONTINUOUS
Status: DISCONTINUED | OUTPATIENT
Start: 2020-08-24 | End: 2020-08-24 | Stop reason: HOSPADM

## 2020-08-24 RX ORDER — PROPOFOL 10 MG/ML
VIAL (ML) INTRAVENOUS
Status: DISCONTINUED | OUTPATIENT
Start: 2020-08-24 | End: 2020-08-24

## 2020-08-24 RX ORDER — METHYLENE BLUE 10 MG/ML
INJECTION INTRAVENOUS
Status: DISCONTINUED | OUTPATIENT
Start: 2020-08-24 | End: 2020-08-24 | Stop reason: HOSPADM

## 2020-08-24 RX ORDER — CEFAZOLIN SODIUM 2 G/50ML
2 SOLUTION INTRAVENOUS
Status: COMPLETED | OUTPATIENT
Start: 2020-08-24 | End: 2020-08-24

## 2020-08-24 RX ORDER — ACETAMINOPHEN 10 MG/ML
INJECTION, SOLUTION INTRAVENOUS
Status: DISCONTINUED | OUTPATIENT
Start: 2020-08-24 | End: 2020-08-24

## 2020-08-24 RX ORDER — HYDROCODONE BITARTRATE AND ACETAMINOPHEN 5; 325 MG/1; MG/1
1 TABLET ORAL EVERY 6 HOURS PRN
Qty: 10 TABLET | Refills: 0 | Status: SHIPPED | OUTPATIENT
Start: 2020-08-24 | End: 2020-12-11 | Stop reason: CLARIF

## 2020-08-24 RX ORDER — LIDOCAINE HYDROCHLORIDE 10 MG/ML
5 INJECTION INFILTRATION; PERINEURAL ONCE
Status: COMPLETED | OUTPATIENT
Start: 2020-08-24 | End: 2020-08-24

## 2020-08-24 RX ORDER — DEXAMETHASONE SODIUM PHOSPHATE 4 MG/ML
8 INJECTION, SOLUTION INTRA-ARTICULAR; INTRALESIONAL; INTRAMUSCULAR; INTRAVENOUS; SOFT TISSUE
Status: DISCONTINUED | OUTPATIENT
Start: 2020-08-24 | End: 2020-08-24

## 2020-08-24 RX ORDER — ONDANSETRON 2 MG/ML
4 INJECTION INTRAMUSCULAR; INTRAVENOUS ONCE
Status: COMPLETED | OUTPATIENT
Start: 2020-08-24 | End: 2020-08-24

## 2020-08-24 RX ORDER — FENTANYL CITRATE 50 UG/ML
25 INJECTION, SOLUTION INTRAMUSCULAR; INTRAVENOUS EVERY 5 MIN PRN
Status: COMPLETED | OUTPATIENT
Start: 2020-08-24 | End: 2020-08-24

## 2020-08-24 RX ORDER — SODIUM CHLORIDE, SODIUM LACTATE, POTASSIUM CHLORIDE, CALCIUM CHLORIDE 600; 310; 30; 20 MG/100ML; MG/100ML; MG/100ML; MG/100ML
INJECTION, SOLUTION INTRAVENOUS CONTINUOUS
Status: DISCONTINUED | OUTPATIENT
Start: 2020-08-24 | End: 2020-08-24 | Stop reason: HOSPADM

## 2020-08-24 RX ORDER — OXYCODONE HYDROCHLORIDE 5 MG/1
5 TABLET ORAL ONCE AS NEEDED
Status: COMPLETED | OUTPATIENT
Start: 2020-08-24 | End: 2020-08-24

## 2020-08-24 RX ADMIN — FENTANYL CITRATE 25 MCG: 50 INJECTION INTRAMUSCULAR; INTRAVENOUS at 01:08

## 2020-08-24 RX ADMIN — ONDANSETRON HYDROCHLORIDE 4 MG: 2 SOLUTION INTRAMUSCULAR; INTRAVENOUS at 01:08

## 2020-08-24 RX ADMIN — LIDOCAINE HYDROCHLORIDE ANHYDROUS 5 ML: 10 INJECTION, SOLUTION INFILTRATION at 11:08

## 2020-08-24 RX ADMIN — PROPOFOL 150 MG: 10 INJECTION, EMULSION INTRAVENOUS at 12:08

## 2020-08-24 RX ADMIN — MIDAZOLAM 2 MG: 1 INJECTION INTRAMUSCULAR; INTRAVENOUS at 12:08

## 2020-08-24 RX ADMIN — FENTANYL CITRATE 25 MCG: 50 INJECTION, SOLUTION INTRAMUSCULAR; INTRAVENOUS at 12:08

## 2020-08-24 RX ADMIN — SODIUM CHLORIDE, SODIUM LACTATE, POTASSIUM CHLORIDE, AND CALCIUM CHLORIDE: .6; .31; .03; .02 INJECTION, SOLUTION INTRAVENOUS at 10:08

## 2020-08-24 RX ADMIN — CEFAZOLIN SODIUM 2 G: 2 SOLUTION INTRAVENOUS at 12:08

## 2020-08-24 RX ADMIN — LIDOCAINE HYDROCHLORIDE 10 MG: 10 INJECTION, SOLUTION EPIDURAL; INFILTRATION; INTRACAUDAL; PERINEURAL at 10:08

## 2020-08-24 RX ADMIN — FENTANYL CITRATE 50 MCG: 50 INJECTION, SOLUTION INTRAMUSCULAR; INTRAVENOUS at 12:08

## 2020-08-24 RX ADMIN — OXYCODONE HYDROCHLORIDE 5 MG: 5 TABLET ORAL at 01:08

## 2020-08-24 RX ADMIN — KETAMINE HYDROCHLORIDE 25 MG: 100 INJECTION, SOLUTION, CONCENTRATE INTRAMUSCULAR; INTRAVENOUS at 12:08

## 2020-08-24 RX ADMIN — ONDANSETRON 4 MG: 2 INJECTION, SOLUTION INTRAMUSCULAR; INTRAVENOUS at 12:08

## 2020-08-24 RX ADMIN — LIDOCAINE HYDROCHLORIDE 75 MG: 20 INJECTION, SOLUTION INTRAVENOUS at 12:08

## 2020-08-24 RX ADMIN — ACETAMINOPHEN 1000 MG: 10 INJECTION, SOLUTION INTRAVENOUS at 12:08

## 2020-08-24 NOTE — TRANSFER OF CARE
"Anesthesia Transfer of Care Note    Patient: Negrita Castro    Procedure(s) Performed: Procedure(s) (LRB):  LUMPECTOMY, BREAST (Left)  INSERTION, LOCALIZATION WIRE (Left)  BIOPSY, LYMPH NODE, SENTINEL (Left)  LYMPHADENECTOMY, AXILLARY (Left)    Patient location: PACU    Anesthesia Type: general    Transport from OR: Transported from OR on room air with adequate spontaneous ventilation    Post pain: adequate analgesia    Post assessment: no apparent anesthetic complications    Post vital signs: stable    Level of consciousness: awake and sedated    Nausea/Vomiting: no nausea/vomiting    Complications: none    Transfer of care protocol was followed      Last vitals:   Visit Vitals  BP (!) 169/80 (BP Location: Left arm, Patient Position: Sitting)   Pulse 69   Temp 36.6 °C (97.9 °F) (Skin)   Resp 16   Ht 5' 11" (1.803 m)   Wt 90.7 kg (200 lb)   LMP 01/16/2016   SpO2 95%   Breastfeeding No   BMI 27.89 kg/m²     "

## 2020-08-24 NOTE — INTERVAL H&P NOTE
The patient has been examined and the H&P has been reviewed:    I concur with the findings and no changes have occurred since H&P was written.    Surgery risks, benefits and alternative options discussed and understood by patient/family.          Active Hospital Problems    Diagnosis  POA    Breast cancer, left breast [C50.912]  Yes      Resolved Hospital Problems   No resolved problems to display.

## 2020-08-24 NOTE — DISCHARGE INSTRUCTIONS
WOUND CARE    After Surgery    DOS:   May shower in 24 hours   May remove outer dressing in 48 hours. Leave steri-strips in place. If steri-strips get wet, pat dry. If they fall off, do not worry.   Minimal activity for 48 hours.   Advance diet as tolerated.   Resume home medications as prescribed    DONT:   Do not soak in the tub   No driving for 24 hours or while taking narcotic pain medication   DO NOT TAKE ADDITIONAL TYLENOL/ACETAMINOPHEN WHILE TAKING NARCOTIC PAIN MEDICATION THAT CONTAINS TYLENOL/ACETAMINOPHEN.    CALL PHYSICIAN FOR:   Redness, swelling or bleeding.   Fever greater then 101.   Drainage from the incision site that appears infected.   Persistent pain not relieved by pain medication.    RETURN APPOINTMENT: Call to schedule appointment in 10-14 days    FOR EMERGENCIES: Contact your doctor at [phone]    BREAST BIOPSY    DO'S        Wear bra day and night for one week.        May shower in 48 hrs.        May remove dressing in 48 hours. Leave steri strips or skin glu in place. Pat dry if                  If gets wet.        Minimal activity for next 2 days.        Advance diet as tolerated.        Apply ice to area for 20 min at a time for comfort.        Resume all home medications.    DON'T        DO NOT SOAK WOUND IN TUB.        No driving for 24 hours or while taking narcotic pain meds.        DO NOT TAKE ADDITIONAL TYLENOL/ACETAMINOPHEN WHILE TAKING                  NARCOTICS.    CALL PHYSICIAN:         Bleeding or signs of infection (redness, swelling, warm to touch or pus)         Fever greater than 101.         Persistent pain not relieved with pain meds.  Return appointment as instructed.    EMERGENCIES: Contact doctor at [phone]

## 2020-08-24 NOTE — OP NOTE
Patient: Negrita Castro     Date of Procedure: 8/24/2020    Procedure: 1. Left partial mastectomy with wire localization.  2. [place] node injection.  3. Excision of deep axillary sentinel node.    Surgeon: Cory Peraza MD    Assistant: None    Pre-op Diagnosis: Malignant neoplasm of left female breast, unspecified estrogen receptor status, unspecified site of breast [C50.912]     Post-op Diagnosis: Malignant neoplasm of left female breast, unspecified estrogen receptor status, unspecified site of breast [C50.912]    Findings: Breast Cancer.  Axillary sentinel node.    Specimen: Partial mastectomy.  Axillary sentinel node.    EBL: 15 cc    Complications: None      Procedure in detail:    After appropriate consent was obtained, consent forms signed and questions answered, the breast was marked and the patient was taken to the operating room.  She was positioned and general anesthesia was induced.  3 cc of Methylene Blue was injected into the subareolar tissue of the left breast and the breast was massaged in the usual fashion. The x-ray images were reviewed.  The chest was then prepped and draped in the usual sterile fashion.  Prior to the procedure the patient had had wire localization performed by radiology.  Radiolabeled colloid was injected by radiology at that time for identification of the axillary sentinel node.    The neoprobe was used to identify the site of signal in the axilla.  A skin incision was made overlying this site and dissection was performed into the axillary tissue.  One to three sentinel nodes were identified and removed.  These were sent to pathology for permanent section.  Adequate hemostasis was achieved.  Quarter percent Marcaine with epinephrine was injected for local anesthesia.  The deep tissue was reapproximated with 3-0 Vicryl suture and the skin was closed with a 4-0 Monocryl subcuticular stitch.   The x-ray images were again reviewed identifying the wire and the lesion.  Skin  incision was made and dissection was performed into the breast tissue with electrocautery.  A partial mastectomy specimen was then removed which encased the wire.  The specimen was then oriented for the pathologist with a long stitch on the lateral margin, a short stitch on the superior margin, and a double stitch on the deep margin.  The cavity was examined and adequate hemostasis was achieved.  Quarter percent Marcaine with epinephrine was injected for local anesthesia.  The deep tissue was reapproximated with 3-0 Vicryl suture and the skin was then closed with a 4-0 Monocryl subcuticular stitch.   Specimen radiograph confirmed the lesion to be within the partial mastectomy specimen.  Steri-Strips and dressings were then applied.  The patient was then awakened, extubated and transferred to the recovery room in stable condition.  She tolerated the procedure without complication.

## 2020-08-24 NOTE — ANESTHESIA PREPROCEDURE EVALUATION
08/24/2020  Negrita Castro is a 60 y.o., female.    Anesthesia Evaluation          Review of Systems  Anesthesia Hx:  No problems with previous Anesthesia   Social:  Non-Smoker    Hematology/Oncology:        Current/Recent Cancer. Breast right and bilateral axillary node dissection no lymphedema chemotherapy and surgery   Cardiovascular:   Exercise tolerance: good    Pulmonary:  Pulmonary Normal    Renal/:  Renal/ Normal     Hepatic/GI:  Hepatic/GI Normal    Musculoskeletal:  Musculoskeletal Normal    Neurological:   Seizures, well controlled Last seizure 20 years ago   Endocrine:   Diabetes, type 2, using insulin    Psych:   Psychiatric History          Physical Exam  General:  Obesity    Airway/Jaw/Neck:  Airway Findings: Mouth Opening: Normal Tongue: Normal  General Airway Assessment: Adult  Mallampati: II  TM Distance: Normal, at least 6 cm       Chest/Lungs:  Chest/Lungs Clear    Heart/Vascular:  Heart Findings: Normal            Anesthesia Plan  Type of Anesthesia, risks & benefits discussed:  Anesthesia Type:  general  Patient's Preference:   Intra-op Monitoring Plan: standard ASA monitors  Intra-op Monitoring Plan Comments:   Post Op Pain Control Plan: multimodal analgesia and IV/PO Opioids PRN  Post Op Pain Control Plan Comments:   Induction:   IV  Beta Blocker:  Patient is not currently on a Beta-Blocker (No further documentation required).       Informed Consent: Patient understands risks and agrees with Anesthesia plan.  Questions answered. Anesthesia consent signed with patient.  ASA Score: 3     Day of Surgery Review of History & Physical:    H&P update referred to the surgeon.     Anesthesia Plan Notes: I have personally evaluated the patient and discussed risk/benefits/alternatives of general anesthesia.        Ready For Surgery From Anesthesia Perspective.

## 2020-08-24 NOTE — DISCHARGE SUMMARY
Discharge Summary  General Surgery      Admit Date: 8/24/2020    Discharge Date :8/24/2020    Attending Physician: Cory Vick     Discharge Physician: Cory Vick    Discharged Condition: good    Discharge Diagnosis: Malignant neoplasm of left female breast, unspecified estrogen receptor status, unspecified site of breast [C50.912]    Treatments/Procedures: Procedure(s) (LRB):  LUMPECTOMY, BREAST (Left)  INSERTION, LOCALIZATION WIRE (Left)  BIOPSY, LYMPH NODE, SENTINEL (Left)  LYMPHADENECTOMY, AXILLARY (Left)    Hospital Course: Uneventful; Discharged home from Recovery    Significant Diagnostic Studies: none    Disposition: Home or Self Care    Diet: Regular    Follow up: Office 10-14 days    Activity: No heavy lifting till seen in office.    Patient Instructions:   Current Discharge Medication List      START taking these medications    Details   HYDROcodone-acetaminophen (NORCO) 5-325 mg per tablet Take 1 tablet by mouth every 6 (six) hours as needed for Pain.  Qty: 10 tablet, Refills: 0    Comments: Quantity prescribed more than 7 day supply? No         CONTINUE these medications which have NOT CHANGED    Details   carisoprodol (SOMA) 350 MG tablet Take 1 tablet (350 mg total) by mouth 3 (three) times daily as needed.  Qty: 90 tablet, Refills: 0    Associated Diagnoses: Malignant neoplasm of lower-inner quadrant of right female breast      diazepam (VALIUM) 10 MG tablet Take 10 mg by mouth 4 (four) times daily.       dronabinol (MARINOL) 2.5 MG capsule Take 1 capsule (2.5 mg total) by mouth 2 (two) times daily before meals.  Qty: 30 capsule, Refills: 1    Associated Diagnoses: Carcinoma in situ of breast, unspecified laterality, unspecified type      dulaglutide (TRULICITY) 0.75 mg/0.5 mL PnIj Inject 0.75 mg into the skin every 7 days.      enalapril (VASOTEC) 2.5 MG tablet Take 5 mg by mouth once daily. For diabetes  Refills: 1      furosemide (LASIX) 20 MG tablet TAKE ONE TABLET BY MOUTH EVERY  DAY  Qty: 30 tablet, Refills: 4    Associated Diagnoses: Mild peripheral edema      HYDROcodone-acetaminophen (NORCO)  mg per tablet Take 1 tablet by mouth every 6 (six) hours as needed.  Refills: 0      !! insulin glargine (LANTUS U-100 INSULIN) 100 unit/mL injection Inject 35 Units into the skin every evening.  Qty: 10.5 mL, Refills: 11      metformin (GLUCOPHAGE-XR) 500 MG 24 hr tablet Take 2,000 mg by mouth every evening.       pravastatin (PRAVACHOL) 40 MG tablet Take 40 mg by mouth every morning.   Refills: 1      ASCORBIC ACID/VITAMIN E/BIOTIN (HAIR, SKIN, NAILS WITH BIOTIN ORAL) Take 1 tablet by mouth every morning.       !! insulin glargine,hum.rec.anlog (LANTUS U-100 INSULIN SUBQ) Inject 35 Units into the skin every evening.      insulin syringe-needle U-100 1 mL 31 gauge x 5/16 Syrg 1 Device by Misc.(Non-Drug; Combo Route) route once daily.  Qty: 50 each, Refills: 0      multivitamin capsule Take 1 capsule by mouth every morning.      zolpidem (AMBIEN) 10 mg Tab TAKE ONE TABLET BY MOUTH EVERY NIGHT AT BEDTIME AS NEEDED FOR SLEEP  Qty: 30 tablet, Refills: 2    Associated Diagnoses: Adjustment insomnia       !! - Potential duplicate medications found. Please discuss with provider.          Discharge Procedure Orders   Diet general

## 2020-08-25 NOTE — ANESTHESIA POSTPROCEDURE EVALUATION
Anesthesia Post Evaluation    Patient: Negrita Castro    Procedure(s) Performed: Procedure(s) (LRB):  LUMPECTOMY, BREAST (Left)  INSERTION, LOCALIZATION WIRE (Left)  BIOPSY, LYMPH NODE, SENTINEL (Left)  LYMPHADENECTOMY, AXILLARY (Left)    Final Anesthesia Type: general    Patient location during evaluation: PACU  Patient participation: Yes- Able to Participate  Level of consciousness: awake and alert and oriented  Post-procedure vital signs: reviewed and stable  Pain management: adequate  Airway patency: patent  NERY mitigation strategies: Multimodal analgesia and Extubation while patient is awake  PONV status at discharge: No PONV  Anesthetic complications: no      Cardiovascular status: blood pressure returned to baseline  Respiratory status: unassisted, spontaneous ventilation and room air  Hydration status: euvolemic  Follow-up not needed.          Vitals Value Taken Time   /83 08/24/20 1412   Temp 36.8 °C (98.2 °F) 08/24/20 1320   Pulse 61 08/24/20 1412   Resp 14 08/24/20 1400   SpO2 97 % 08/24/20 1412         Event Time   Out of Recovery 14:41:08         Pain/Larissa Score: Pain Rating Prior to Med Admin: 8 (8/24/2020  1:50 PM)  Pain Rating Post Med Admin: 2 (8/24/2020  2:00 PM)  Larissa Score: 10 (8/24/2020  2:00 PM)

## 2020-09-02 ENCOUNTER — TELEPHONE (OUTPATIENT)
Dept: SURGERY | Facility: CLINIC | Age: 60
End: 2020-09-02

## 2020-09-02 NOTE — TELEPHONE ENCOUNTER
----- Message from Ilana Marroquin sent at 9/2/2020 11:46 AM CDT -----  Regarding: pt needs a 2 week post op appt/  Contact: Negrita pt  Type:  Sooner Apoointment Request    Caller is requesting a sooner appointment.  Caller declined first available appointment listed below.  Caller will not accept being placed on the waitlist and is requesting a message be sent to doctor.    Name of Caller:  Negrita  When is the first available appointment?  Needs an appt for 2 weeks post op/surgery was 08/24/20    Best Call Back Number:  [phone]  Additional Information:  Pls call pt to schedule. Nobody called her to schedule

## 2020-09-04 ENCOUNTER — TELEPHONE (OUTPATIENT)
Dept: SURGERY | Facility: CLINIC | Age: 60
End: 2020-09-04

## 2020-09-04 NOTE — TELEPHONE ENCOUNTER
----- Message from Gladys Howell sent at 9/4/2020 12:53 PM CDT -----  Contact: pt at [phone]      Who Called:  Pt  Best Call Back Number:  [phone]  Additional Information:  Patient is also wanting to speak with a nurse in regards to some after effects from her surgery.Please call back and advise.

## 2020-09-06 ENCOUNTER — HOSPITAL ENCOUNTER (EMERGENCY)
Facility: HOSPITAL | Age: 60
Discharge: HOME | End: 2020-09-06
Attending: EMERGENCY MEDICINE
Payer: MEDICARE

## 2020-09-06 VITALS
BODY MASS INDEX: 28 KG/M2 | DIASTOLIC BLOOD PRESSURE: 76 MMHG | SYSTOLIC BLOOD PRESSURE: 159 MMHG | RESPIRATION RATE: 18 BRPM | OXYGEN SATURATION: 98 % | HEART RATE: 81 BPM | HEIGHT: 71 IN | WEIGHT: 200 LBS | TEMPERATURE: 98 F

## 2020-09-06 DIAGNOSIS — T14.8XXA DRAINAGE FROM WOUND: Primary | ICD-10-CM

## 2020-09-06 LAB
ALBUMIN SERPL BCP-MCNC: 3.9 G/DL (ref 3.5–5.2)
ALP SERPL-CCNC: 136 U/L (ref 55–135)
ALT SERPL W/O P-5'-P-CCNC: 30 U/L (ref 10–44)
ANION GAP SERPL CALC-SCNC: 12 MMOL/L (ref 8–16)
AST SERPL-CCNC: 28 U/L (ref 10–40)
BASOPHILS # BLD AUTO: 0.03 K/UL (ref 0–0.2)
BASOPHILS NFR BLD: 0.3 % (ref 0–1.9)
BILIRUB SERPL-MCNC: 1.1 MG/DL (ref 0.1–1)
BUN SERPL-MCNC: 14 MG/DL (ref 6–20)
CALCIUM SERPL-MCNC: 9.3 MG/DL (ref 8.7–10.5)
CHLORIDE SERPL-SCNC: 101 MMOL/L (ref 95–110)
CO2 SERPL-SCNC: 24 MMOL/L (ref 23–29)
CREAT SERPL-MCNC: 0.9 MG/DL (ref 0.5–1.4)
DIFFERENTIAL METHOD: ABNORMAL
EOSINOPHIL # BLD AUTO: 0.1 K/UL (ref 0–0.5)
EOSINOPHIL NFR BLD: 0.8 % (ref 0–8)
ERYTHROCYTE [DISTWIDTH] IN BLOOD BY AUTOMATED COUNT: 12.7 % (ref 11.5–14.5)
EST. GFR  (AFRICAN AMERICAN): >60 ML/MIN/1.73 M^2
EST. GFR  (NON AFRICAN AMERICAN): >60 ML/MIN/1.73 M^2
GLUCOSE SERPL-MCNC: 363 MG/DL (ref 70–110)
HCT VFR BLD AUTO: 42.9 % (ref 37–48.5)
HGB BLD-MCNC: 14 G/DL (ref 12–16)
IMM GRANULOCYTES # BLD AUTO: 0.03 K/UL (ref 0–0.04)
IMM GRANULOCYTES NFR BLD AUTO: 0.3 % (ref 0–0.5)
INR PPP: 1.4
LACTATE SERPL-SCNC: 1.6 MMOL/L (ref 0.5–1.9)
LYMPHOCYTES # BLD AUTO: 1.8 K/UL (ref 1–4.8)
LYMPHOCYTES NFR BLD: 15.5 % (ref 18–48)
MCH RBC QN AUTO: 29.4 PG (ref 27–31)
MCHC RBC AUTO-ENTMCNC: 32.6 G/DL (ref 32–36)
MCV RBC AUTO: 90 FL (ref 82–98)
MONOCYTES # BLD AUTO: 0.7 K/UL (ref 0.3–1)
MONOCYTES NFR BLD: 6 % (ref 4–15)
NEUTROPHILS # BLD AUTO: 9 K/UL (ref 1.8–7.7)
NEUTROPHILS NFR BLD: 77.1 % (ref 38–73)
NRBC BLD-RTO: 0 /100 WBC
PLATELET # BLD AUTO: 256 K/UL (ref 150–350)
PMV BLD AUTO: 9.6 FL (ref 9.2–12.9)
POTASSIUM SERPL-SCNC: 3.5 MMOL/L (ref 3.5–5.1)
PROT SERPL-MCNC: 7.9 G/DL (ref 6–8.4)
PROTHROMBIN TIME: 16.5 SEC (ref 10.6–14.8)
RBC # BLD AUTO: 4.76 M/UL (ref 4–5.4)
SARS-COV-2 RDRP RESP QL NAA+PROBE: NEGATIVE
SODIUM SERPL-SCNC: 137 MMOL/L (ref 136–145)
WBC # BLD AUTO: 11.62 K/UL (ref 3.9–12.7)

## 2020-09-06 PROCEDURE — 80053 COMPREHEN METABOLIC PANEL: CPT

## 2020-09-06 PROCEDURE — 36415 COLL VENOUS BLD VENIPUNCTURE: CPT

## 2020-09-06 PROCEDURE — 85610 PROTHROMBIN TIME: CPT

## 2020-09-06 PROCEDURE — U0002 COVID-19 LAB TEST NON-CDC: HCPCS

## 2020-09-06 PROCEDURE — 83605 ASSAY OF LACTIC ACID: CPT

## 2020-09-06 PROCEDURE — 25000003 PHARM REV CODE 250: Performed by: EMERGENCY MEDICINE

## 2020-09-06 PROCEDURE — 85025 COMPLETE CBC W/AUTO DIFF WBC: CPT

## 2020-09-06 PROCEDURE — 96365 THER/PROPH/DIAG IV INF INIT: CPT

## 2020-09-06 PROCEDURE — 87040 BLOOD CULTURE FOR BACTERIA: CPT

## 2020-09-06 PROCEDURE — 99284 EMERGENCY DEPT VISIT MOD MDM: CPT | Mod: 25

## 2020-09-06 RX ORDER — CLINDAMYCIN PHOSPHATE 900 MG/50ML
900 INJECTION, SOLUTION INTRAVENOUS
Status: COMPLETED | OUTPATIENT
Start: 2020-09-06 | End: 2020-09-06

## 2020-09-06 RX ORDER — CLINDAMYCIN HYDROCHLORIDE 150 MG/1
300 CAPSULE ORAL 4 TIMES DAILY
Qty: 56 CAPSULE | Refills: 0 | Status: SHIPPED | OUTPATIENT
Start: 2020-09-06 | End: 2020-09-13

## 2020-09-06 RX ADMIN — CLINDAMYCIN IN 5 PERCENT DEXTROSE 900 MG: 18 INJECTION, SOLUTION INTRAVENOUS at 07:09

## 2020-09-06 NOTE — ED PROVIDER NOTES
Encounter Date: 9/6/2020       History     Chief Complaint   Patient presents with    POST SURGICAL INFECTION     L AXILLA    Abscess     L, S/P LYMPH NODE REMOVAL ON AUG 24     60-year-old female presents status post lumpectomy on August 24th, patient had lumpectomy of the left breast and lymph node dissection patient reports redness to the left axilla with serous drainage which started yesterday, patient reports increased pain to this area and she reports mild fever yesterday.        Review of patient's allergies indicates:   Allergen Reactions    Adhesive tape-silicones Hives     BANDAIDS    Polymycin Hives    Adhesive     Latex, natural rubber Hives and Itching    Neomycin-bacitracnzn-polymyxnb     Polyurethane-39 Hives     Past Medical History:   Diagnosis Date    Breast cancer     left breast    Cancer     RIGHT BREAST 10-15    Depression     Diabetes mellitus     Neuropathy     Panic attacks     S/P epidural steroid injection     Seizures     none since early 30's    Wears glasses     TO DRIVE     Past Surgical History:   Procedure Laterality Date    AXILLARY NODE DISSECTION Left 8/24/2020    Procedure: LYMPHADENECTOMY, AXILLARY;  Surgeon: Cory Vick MD;  Location: [facility] OR;  Service: General;  Laterality: Left;  left breast mastectomy with possible axillary lymph node dissection    BREAST BIOPSY      right    breast reconstruction with tummy [facility]      BREAST SURGERY      11-3-15 LUMPECTOMY, 12-2-15 REEXCISION    INSERTION OF LOCALIZATION WIRE Left 8/24/2020    Procedure: INSERTION, LOCALIZATION WIRE;  Surgeon: Cory Vick MD;  Location: [facility] OR;  Service: General;  Laterality: Left;  left breast lumpectomy with wire needle loc    mastectomy 2016      RECONSTRUCTION OF NIPPLE Right 11/5/2018    Procedure: RECONSTRUCTION-NIPPLE RIGHT;  Surgeon: Xiang Hernandez MD;  Location: [address];  Service: Plastics;  Laterality: Right;    SENTINEL LYMPH NODE BIOPSY  Left 2020    Procedure: BIOPSY, LYMPH NODE, SENTINEL;  Surgeon: Cory Vick MD;  Location: [facility] OR;  Service: General;  Laterality: Left;  left breast lumpectomy with left sentinel lymoh node       shoulder surgery and wrist      BILAT  BONE SPUR    WRIST SURGERY      RIGHT     Family History   Problem Relation Age of Onset    Anesthesia problems Neg Hx      Social History     Tobacco Use    Smoking status: Former Smoker     Packs/day: 0.25     Years: 30.00     Pack years: 7.50     Quit date: 2015     Years since quittin.9    Smokeless tobacco: Never Used   Substance Use Topics    Alcohol use: Yes     Alcohol/week: 0.0 standard drinks     Comment: RARELY    Drug use: Yes     Types: Marijuana     Comment: medical marijuana     Review of Systems   Constitutional: Positive for fever.   HENT: Negative for congestion, rhinorrhea, sore throat and trouble swallowing.    Eyes: Negative for visual disturbance.   Respiratory: Negative for cough, chest tightness, shortness of breath and wheezing.    Cardiovascular: Negative for chest pain, palpitations and leg swelling.   Gastrointestinal: Negative for abdominal distention, abdominal pain, constipation, diarrhea, nausea and vomiting.   Genitourinary: Negative for difficulty urinating, dysuria, flank pain and frequency.   Musculoskeletal: Negative for arthralgias, back pain, joint swelling and neck pain.   Skin: Positive for wound. Negative for color change and rash.   Neurological: Negative for dizziness, syncope, speech difficulty, weakness, numbness and headaches.   All other systems reviewed and are negative.      Physical Exam     Initial Vitals [20 1750]   BP Pulse Resp Temp SpO2   (!) 148/80 89 16 98.6 °F (37 °C) 98 %      MAP       --         Physical Exam    Nursing note and vitals reviewed.  Constitutional: She appears well-developed and well-nourished. She is not diaphoretic. No distress.   HENT:   Head: Normocephalic and  atraumatic.   Right Ear: External ear normal.   Left Ear: External ear normal.   Nose: Nose normal.   Mouth/Throat: Oropharynx is clear and moist. No oropharyngeal exudate.   Eyes: Conjunctivae and EOM are normal. Pupils are equal, round, and reactive to light. Right eye exhibits no discharge. Left eye exhibits no discharge. No scleral icterus.   Neck: Normal range of motion. Neck supple. No thyromegaly present. No tracheal deviation present. No JVD present.   Cardiovascular: Normal rate, regular rhythm, normal heart sounds and intact distal pulses. Exam reveals no gallop and no friction rub.    No murmur heard.  Pulmonary/Chest: Breath sounds normal. No stridor. No respiratory distress. She has no wheezes. She has no rhonchi. She has no rales. She exhibits no tenderness.   Abdominal: Soft. Bowel sounds are normal. She exhibits no distension and no mass. There is no abdominal tenderness. There is no rebound and no guarding.   Musculoskeletal: Normal range of motion. No tenderness or edema.   Lymphadenopathy:     She has no cervical adenopathy.   Neurological: She is alert and oriented to person, place, and time. She has normal strength. She displays normal reflexes. No cranial nerve deficit or sensory deficit.   Skin: Skin is warm and dry. No rash and no abscess noted. There is erythema. No pallor.   Patient has a 2-1/2 cm area of erythema to the left axilla surgical wound has mild serous drainage when wound is palpated, there is no warmth to this region, patient has full range of motion of the shoulder no other acute abnormalities noted         ED Course   Procedures  Labs Reviewed   CBC W/ AUTO DIFFERENTIAL - Abnormal; Notable for the following components:       Result Value    Gran # (ANC) 9.0 (*)     Gran% 77.1 (*)     Lymph% 15.5 (*)     All other components within normal limits   COMPREHENSIVE METABOLIC PANEL - Abnormal; Notable for the following components:    Glucose 363 (*)     Total Bilirubin 1.1 (*)      Alkaline Phosphatase 136 (*)     All other components within normal limits   PROTIME-INR - Abnormal; Notable for the following components:    PT 16.5 (*)     All other components within normal limits   CULTURE, BLOOD   CULTURE, BLOOD   LACTIC ACID, PLASMA   SARS-COV-2 RNA AMPLIFICATION, QUAL          Imaging Results          X-Ray Chest AP Portable (Final result)  Result time 09/06/20 18:50:21    Final result by Alejandrina Zacarias MD (09/06/20 18:50:21)                 Narrative:    PROCEDURE:   XR CHEST AP PORTABLE  dated  9/6/2020 6:50 PM    CLINICAL HISTORY:   Female 60 years of age.   pain    TECHNIQUE: AP view of the chest obtained portably at 6:50 PM.    PREVIOUS STUDIES:  None Available    FINDINGS:    Surgical clips project over the right mid to lower hemithorax. Cardiac  and mediastinal contours are normal. Lungs are clear. There is no  pleural effusion or pneumothorax. Bones are unremarkable.      IMPRESSION:    No acute findings. Clear lungs. Normal size heart.  Surgical clips over the right mid to lower chest.    Electronically Signed by Alejandrina Zacarias on 9/6/2020 7:47 PM                               Medical Decision Making:   History:   Old Medical Records: I decided to obtain old medical records.  Initial Assessment:   Emergent evaluation of a 60-year-old female presenting with drainage from surgical wound, there is some serous component noted wound culture obtained patient will be started on IV antibiotics and consulted to General surgery and will be admitted to Internal Medicine for further evaluation and management              Attending Attestation:             Attending ED Notes:   Patient's labs show no significant acute abnormalities patient's vitals show no significant acute abnormalities, patient has negative lactic acid normal white blood cell count patient has no significant drainage noted on physical exam.  I discussed the case with Dr. Dixon who is covering for Dr Vick, she recommends  starting patient on a course of antibiotics and having patient follow-up with her surgeon on Tuesday.  Patient reports that she has an appointment on Tuesday and she is happy to comply she reports that she would have no difficulty returning should she experience any worsening and she is advised to do so patient given a dose of IV clindamycin here and will be discharged home with clindamycin, patient is advised to follow up on Tuesday as scheduled otherwise to return immediately to the emergency department.  Patient reports she understands these instructions and will have no difficulty complying                        Clinical Impression:       ICD-10-CM ICD-9-CM   1. Drainage from wound  T14.8XXA 879.8             ED Disposition Condition    Discharge Stable        ED Prescriptions     Medication Sig Dispense Start Date End Date Auth. Provider    clindamycin (CLEOCIN) 150 MG capsule Take 2 capsules (300 mg total) by mouth 4 (four) times daily. for 7 days 56 capsule 9/6/2020 9/13/2020 Main Pinzon MD        Follow-up Information     Follow up With Specialties Details Why Contact Info Additional Information    Cory Vick MD General Surgery, Surgery Schedule an appointment as soon as possible for a visit in 2 days  [address]  [address]  [place] [address]  [phone]       [facility] Emergency Medicine  If symptoms worsen [address] [address]  [phone] 1st floor                                     Main Pinzon MD  09/06/20 2066

## 2020-09-06 NOTE — FIRST PROVIDER EVALUATION
"Medical screening exam completed.  I have conducted a focused provider triage encounter, findings are as follows:    Brief history of present illness:  llump nodes taken out on 8/24  Has drainage and swelling to left axilla On Thurday    Vitals:    09/06/20 1750   BP: (!) 148/80   BP Location: Left arm   Patient Position: Sitting   Pulse: 89   Resp: 16   Temp: 98.6 °F (37 °C)   TempSrc: Oral   SpO2: 98%   Weight: 90.7 kg (200 lb)   Height: 5' 11" (1.803 m)       Pertinent physical exam: abscess left axilla  Red and tender    Brief workup plan:  Labs  I&D    Preliminary workup initiated; this workup will be continued and followed by the physician or advanced practice provider that is assigned to the patient when roomed.  "

## 2020-09-07 NOTE — ED NOTES
Calmoseptine ointment applied to left axillary abscess site before covered with ABD pad and reinforced with 4x4.

## 2020-09-08 ENCOUNTER — OFFICE VISIT (OUTPATIENT)
Dept: SURGERY | Facility: CLINIC | Age: 60
End: 2020-09-08
Payer: MEDICARE

## 2020-09-08 ENCOUNTER — ANESTHESIA EVENT (OUTPATIENT)
Dept: SURGERY | Facility: HOSPITAL | Age: 60
End: 2020-09-08
Payer: MEDICARE

## 2020-09-08 DIAGNOSIS — C50.911 BREAST CANCER, RIGHT BREAST: ICD-10-CM

## 2020-09-08 DIAGNOSIS — C50.911 MALIGNANT NEOPLASM OF RIGHT FEMALE BREAST, UNSPECIFIED ESTROGEN RECEPTOR STATUS, UNSPECIFIED SITE OF BREAST: Primary | ICD-10-CM

## 2020-09-08 PROCEDURE — 99999 PR PBB SHADOW E&M-EST. PATIENT-LVL III: CPT | Mod: PBBFAC,,, | Performed by: SURGERY

## 2020-09-08 PROCEDURE — 99999 PR PBB SHADOW E&M-EST. PATIENT-LVL III: ICD-10-PCS | Mod: PBBFAC,,, | Performed by: SURGERY

## 2020-09-08 PROCEDURE — 99024 PR POST-OP FOLLOW-UP VISIT: ICD-10-PCS | Mod: S$GLB,,, | Performed by: SURGERY

## 2020-09-08 PROCEDURE — 99024 POSTOP FOLLOW-UP VISIT: CPT | Mod: S$GLB,,, | Performed by: SURGERY

## 2020-09-08 RX ORDER — SODIUM CHLORIDE 9 MG/ML
INJECTION, SOLUTION INTRAVENOUS CONTINUOUS
Status: CANCELLED | OUTPATIENT
Start: 2020-09-09

## 2020-09-09 ENCOUNTER — HOSPITAL ENCOUNTER (OUTPATIENT)
Facility: HOSPITAL | Age: 60
Discharge: HOME | End: 2020-09-09
Attending: SURGERY | Admitting: SURGERY
Payer: MEDICARE

## 2020-09-09 ENCOUNTER — ANESTHESIA (OUTPATIENT)
Dept: SURGERY | Facility: HOSPITAL | Age: 60
End: 2020-09-09
Payer: MEDICARE

## 2020-09-09 DIAGNOSIS — C50.911 BREAST CANCER, RIGHT BREAST: Primary | ICD-10-CM

## 2020-09-09 DIAGNOSIS — C50.911 MALIGNANT NEOPLASM OF RIGHT FEMALE BREAST, UNSPECIFIED ESTROGEN RECEPTOR STATUS, UNSPECIFIED SITE OF BREAST: ICD-10-CM

## 2020-09-09 DIAGNOSIS — Z01.818 PREOP TESTING: ICD-10-CM

## 2020-09-09 LAB
POCT GLUCOSE: 257 MG/DL (ref 70–110)
POCT GLUCOSE: 260 MG/DL (ref 70–110)
POCT GLUCOSE: 361 MG/DL (ref 70–110)

## 2020-09-09 PROCEDURE — 99900103 DSU ONLY-NO CHARGE-INITIAL HR (STAT): Performed by: SURGERY

## 2020-09-09 PROCEDURE — 25000003 PHARM REV CODE 250: Performed by: NURSE ANESTHETIST, CERTIFIED REGISTERED

## 2020-09-09 PROCEDURE — 10140 I&D HMTMA SEROMA/FLUID COLLJ: CPT | Mod: 51,78,, | Performed by: SURGERY

## 2020-09-09 PROCEDURE — 63600175 PHARM REV CODE 636 W HCPCS: Performed by: NURSE ANESTHETIST, CERTIFIED REGISTERED

## 2020-09-09 PROCEDURE — 63600175 PHARM REV CODE 636 W HCPCS: Performed by: SURGERY

## 2020-09-09 PROCEDURE — 25000003 PHARM REV CODE 250: Performed by: ANESTHESIOLOGY

## 2020-09-09 PROCEDURE — 37000009 HC ANESTHESIA EA ADD 15 MINS: Performed by: SURGERY

## 2020-09-09 PROCEDURE — 88342 CHG IMMUNOCYTOCHEMISTRY: ICD-10-PCS | Mod: 26,,, | Performed by: PATHOLOGY

## 2020-09-09 PROCEDURE — 27200651 HC AIRWAY, LMA: Performed by: ANESTHESIOLOGY

## 2020-09-09 PROCEDURE — 87075 CULTR BACTERIA EXCEPT BLOOD: CPT

## 2020-09-09 PROCEDURE — 71000039 HC RECOVERY, EACH ADD'L HOUR: Performed by: SURGERY

## 2020-09-09 PROCEDURE — 63600175 PHARM REV CODE 636 W HCPCS: Performed by: ANESTHESIOLOGY

## 2020-09-09 PROCEDURE — 88341 PR IHC OR ICC EACH ADD'L SINGLE ANTIBODY  STAINPR: ICD-10-PCS | Mod: 26,,, | Performed by: PATHOLOGY

## 2020-09-09 PROCEDURE — 19301 PR MASTECTOMY, PARTIAL: ICD-10-PCS | Mod: 58,LT,, | Performed by: SURGERY

## 2020-09-09 PROCEDURE — 88341 IMHCHEM/IMCYTCHM EA ADD ANTB: CPT | Mod: 26,,, | Performed by: PATHOLOGY

## 2020-09-09 PROCEDURE — 88307 TISSUE EXAM BY PATHOLOGIST: CPT | Mod: 26,,, | Performed by: PATHOLOGY

## 2020-09-09 PROCEDURE — 88307 TISSUE EXAM BY PATHOLOGIST: CPT | Performed by: PATHOLOGY

## 2020-09-09 PROCEDURE — 25000003 PHARM REV CODE 250: Performed by: SURGERY

## 2020-09-09 PROCEDURE — D9220A PRA ANESTHESIA: Mod: ANES,,, | Performed by: ANESTHESIOLOGY

## 2020-09-09 PROCEDURE — 88342 IMHCHEM/IMCYTCHM 1ST ANTB: CPT | Performed by: PATHOLOGY

## 2020-09-09 PROCEDURE — 87186 SC STD MICRODIL/AGAR DIL: CPT

## 2020-09-09 PROCEDURE — 88341 IMHCHEM/IMCYTCHM EA ADD ANTB: CPT | Performed by: PATHOLOGY

## 2020-09-09 PROCEDURE — 71000015 HC POSTOP RECOV 1ST HR: Performed by: SURGERY

## 2020-09-09 PROCEDURE — 19301 PARTIAL MASTECTOMY: CPT | Mod: 58,LT,, | Performed by: SURGERY

## 2020-09-09 PROCEDURE — D9220A PRA ANESTHESIA: ICD-10-PCS | Mod: ANES,,, | Performed by: ANESTHESIOLOGY

## 2020-09-09 PROCEDURE — 87077 CULTURE AEROBIC IDENTIFY: CPT

## 2020-09-09 PROCEDURE — 88307 PR  SURG PATH,LEVEL V: ICD-10-PCS | Mod: 26,,, | Performed by: PATHOLOGY

## 2020-09-09 PROCEDURE — 37000008 HC ANESTHESIA 1ST 15 MINUTES: Performed by: SURGERY

## 2020-09-09 PROCEDURE — 36000706: Performed by: SURGERY

## 2020-09-09 PROCEDURE — C1729 CATH, DRAINAGE: HCPCS | Performed by: SURGERY

## 2020-09-09 PROCEDURE — 87070 CULTURE OTHR SPECIMN AEROBIC: CPT

## 2020-09-09 PROCEDURE — 99900104 DSU ONLY-NO CHARGE-EA ADD'L HR (STAT): Performed by: SURGERY

## 2020-09-09 PROCEDURE — 71000033 HC RECOVERY, INTIAL HOUR: Performed by: SURGERY

## 2020-09-09 PROCEDURE — 88342 IMHCHEM/IMCYTCHM 1ST ANTB: CPT | Mod: 26,,, | Performed by: PATHOLOGY

## 2020-09-09 PROCEDURE — 10140 PR DRAINAGE OF HEMATOMA/FLUID: ICD-10-PCS | Mod: 51,78,, | Performed by: SURGERY

## 2020-09-09 PROCEDURE — D9220A PRA ANESTHESIA: ICD-10-PCS | Mod: CRNA,,, | Performed by: NURSE ANESTHETIST, CERTIFIED REGISTERED

## 2020-09-09 PROCEDURE — D9220A PRA ANESTHESIA: Mod: CRNA,,, | Performed by: NURSE ANESTHETIST, CERTIFIED REGISTERED

## 2020-09-09 PROCEDURE — 36000707: Performed by: SURGERY

## 2020-09-09 RX ORDER — SODIUM CHLORIDE 9 MG/ML
INJECTION, SOLUTION INTRAVENOUS CONTINUOUS
Status: CANCELLED | OUTPATIENT
Start: 2020-09-09

## 2020-09-09 RX ORDER — LIDOCAINE HYDROCHLORIDE 10 MG/ML
1 INJECTION, SOLUTION EPIDURAL; INFILTRATION; INTRACAUDAL; PERINEURAL ONCE
Status: DISCONTINUED | OUTPATIENT
Start: 2020-09-09 | End: 2020-09-09 | Stop reason: HOSPADM

## 2020-09-09 RX ORDER — CEFAZOLIN SODIUM 2 G/50ML
2 SOLUTION INTRAVENOUS
Status: COMPLETED | OUTPATIENT
Start: 2020-09-09 | End: 2020-09-09

## 2020-09-09 RX ORDER — DIPHENHYDRAMINE HYDROCHLORIDE 50 MG/ML
25 INJECTION INTRAMUSCULAR; INTRAVENOUS EVERY 6 HOURS PRN
Status: DISCONTINUED | OUTPATIENT
Start: 2020-09-09 | End: 2020-09-09 | Stop reason: HOSPADM

## 2020-09-09 RX ORDER — FENTANYL CITRATE 50 UG/ML
INJECTION, SOLUTION INTRAMUSCULAR; INTRAVENOUS
Status: DISCONTINUED | OUTPATIENT
Start: 2020-09-09 | End: 2020-09-09

## 2020-09-09 RX ORDER — OXYCODONE HYDROCHLORIDE 5 MG/1
5 TABLET ORAL
Status: COMPLETED | OUTPATIENT
Start: 2020-09-09 | End: 2020-09-09

## 2020-09-09 RX ORDER — LIDOCAINE HCL/PF 100 MG/5ML
SYRINGE (ML) INTRAVENOUS
Status: DISCONTINUED | OUTPATIENT
Start: 2020-09-09 | End: 2020-09-09

## 2020-09-09 RX ORDER — HYDROCODONE BITARTRATE AND ACETAMINOPHEN 5; 325 MG/1; MG/1
1 TABLET ORAL EVERY 6 HOURS PRN
Qty: 15 TABLET | Refills: 0 | Status: SHIPPED | OUTPATIENT
Start: 2020-09-09 | End: 2020-12-11 | Stop reason: CLARIF

## 2020-09-09 RX ORDER — BUPIVACAINE HYDROCHLORIDE AND EPINEPHRINE 2.5; 5 MG/ML; UG/ML
INJECTION, SOLUTION EPIDURAL; INFILTRATION; INTRACAUDAL; PERINEURAL
Status: DISCONTINUED | OUTPATIENT
Start: 2020-09-09 | End: 2020-09-09 | Stop reason: HOSPADM

## 2020-09-09 RX ORDER — OXYCODONE AND ACETAMINOPHEN 10; 325 MG/1; MG/1
1 TABLET ORAL EVERY 4 HOURS PRN
Qty: 20 TABLET | Refills: 0 | Status: SHIPPED | OUTPATIENT
Start: 2020-09-09 | End: 2020-12-11 | Stop reason: CLARIF

## 2020-09-09 RX ORDER — ONDANSETRON 2 MG/ML
INJECTION INTRAMUSCULAR; INTRAVENOUS
Status: DISCONTINUED | OUTPATIENT
Start: 2020-09-09 | End: 2020-09-09

## 2020-09-09 RX ORDER — FENTANYL CITRATE 50 UG/ML
25 INJECTION, SOLUTION INTRAMUSCULAR; INTRAVENOUS EVERY 5 MIN PRN
Status: DISCONTINUED | OUTPATIENT
Start: 2020-09-09 | End: 2020-09-09 | Stop reason: HOSPADM

## 2020-09-09 RX ORDER — HYDRALAZINE HYDROCHLORIDE 20 MG/ML
10 INJECTION INTRAMUSCULAR; INTRAVENOUS ONCE
Status: COMPLETED | OUTPATIENT
Start: 2020-09-09 | End: 2020-09-09

## 2020-09-09 RX ORDER — HYDROMORPHONE HYDROCHLORIDE 2 MG/ML
0.2 INJECTION, SOLUTION INTRAMUSCULAR; INTRAVENOUS; SUBCUTANEOUS EVERY 5 MIN PRN
Status: DISCONTINUED | OUTPATIENT
Start: 2020-09-09 | End: 2020-09-09 | Stop reason: HOSPADM

## 2020-09-09 RX ORDER — MIDAZOLAM HYDROCHLORIDE 1 MG/ML
INJECTION, SOLUTION INTRAMUSCULAR; INTRAVENOUS
Status: DISCONTINUED | OUTPATIENT
Start: 2020-09-09 | End: 2020-09-09

## 2020-09-09 RX ORDER — SODIUM CHLORIDE 0.9 % (FLUSH) 0.9 %
3 SYRINGE (ML) INJECTION
Status: DISCONTINUED | OUTPATIENT
Start: 2020-09-09 | End: 2020-09-09 | Stop reason: HOSPADM

## 2020-09-09 RX ORDER — FENTANYL CITRATE 50 UG/ML
25 INJECTION, SOLUTION INTRAMUSCULAR; INTRAVENOUS EVERY 5 MIN PRN
Status: COMPLETED | OUTPATIENT
Start: 2020-09-09 | End: 2020-09-09

## 2020-09-09 RX ORDER — ONDANSETRON 2 MG/ML
4 INJECTION INTRAMUSCULAR; INTRAVENOUS ONCE AS NEEDED
Status: DISCONTINUED | OUTPATIENT
Start: 2020-09-09 | End: 2020-09-09 | Stop reason: HOSPADM

## 2020-09-09 RX ORDER — PROPOFOL 10 MG/ML
VIAL (ML) INTRAVENOUS
Status: DISCONTINUED | OUTPATIENT
Start: 2020-09-09 | End: 2020-09-09

## 2020-09-09 RX ORDER — PHENYLEPHRINE HYDROCHLORIDE 10 MG/ML
INJECTION INTRAVENOUS
Status: DISCONTINUED | OUTPATIENT
Start: 2020-09-09 | End: 2020-09-09

## 2020-09-09 RX ORDER — ACETAMINOPHEN 10 MG/ML
INJECTION, SOLUTION INTRAVENOUS
Status: DISCONTINUED | OUTPATIENT
Start: 2020-09-09 | End: 2020-09-09

## 2020-09-09 RX ORDER — SODIUM CHLORIDE, SODIUM LACTATE, POTASSIUM CHLORIDE, CALCIUM CHLORIDE 600; 310; 30; 20 MG/100ML; MG/100ML; MG/100ML; MG/100ML
75 INJECTION, SOLUTION INTRAVENOUS CONTINUOUS
Status: DISCONTINUED | OUTPATIENT
Start: 2020-09-09 | End: 2020-09-09 | Stop reason: HOSPADM

## 2020-09-09 RX ADMIN — HYDRALAZINE HYDROCHLORIDE 10 MG: 20 INJECTION INTRAMUSCULAR; INTRAVENOUS at 11:09

## 2020-09-09 RX ADMIN — MIDAZOLAM 2 MG: 1 INJECTION INTRAMUSCULAR; INTRAVENOUS at 09:09

## 2020-09-09 RX ADMIN — FENTANYL CITRATE 50 MCG: 50 INJECTION, SOLUTION INTRAMUSCULAR; INTRAVENOUS at 09:09

## 2020-09-09 RX ADMIN — ONDANSETRON 4 MG: 2 INJECTION, SOLUTION INTRAMUSCULAR; INTRAVENOUS at 09:09

## 2020-09-09 RX ADMIN — FENTANYL CITRATE 25 MCG: 50 INJECTION INTRAMUSCULAR; INTRAVENOUS at 11:09

## 2020-09-09 RX ADMIN — FENTANYL CITRATE 25 MCG: 50 INJECTION, SOLUTION INTRAMUSCULAR; INTRAVENOUS at 10:09

## 2020-09-09 RX ADMIN — LIDOCAINE HYDROCHLORIDE 100 MG: 20 INJECTION, SOLUTION INTRAVENOUS at 09:09

## 2020-09-09 RX ADMIN — SODIUM CHLORIDE, SODIUM GLUCONATE, SODIUM ACETATE, POTASSIUM CHLORIDE, MAGNESIUM CHLORIDE, SODIUM PHOSPHATE, DIBASIC, AND POTASSIUM PHOSPHATE: .53; .5; .37; .037; .03; .012; .00082 INJECTION, SOLUTION INTRAVENOUS at 10:09

## 2020-09-09 RX ADMIN — ACETAMINOPHEN 1000 MG: 10 INJECTION, SOLUTION INTRAVENOUS at 10:09

## 2020-09-09 RX ADMIN — PHENYLEPHRINE HYDROCHLORIDE 100 MCG: 10 INJECTION INTRAVENOUS at 10:09

## 2020-09-09 RX ADMIN — OXYCODONE HYDROCHLORIDE 5 MG: 5 TABLET ORAL at 11:09

## 2020-09-09 RX ADMIN — CEFAZOLIN SODIUM 2 G: 2 SOLUTION INTRAVENOUS at 09:09

## 2020-09-09 RX ADMIN — SODIUM CHLORIDE, SODIUM GLUCONATE, SODIUM ACETATE, POTASSIUM CHLORIDE, MAGNESIUM CHLORIDE, SODIUM PHOSPHATE, DIBASIC, AND POTASSIUM PHOSPHATE: .53; .5; .37; .037; .03; .012; .00082 INJECTION, SOLUTION INTRAVENOUS at 08:09

## 2020-09-09 RX ADMIN — DIPHENHYDRAMINE HYDROCHLORIDE 25 MG: 50 INJECTION INTRAMUSCULAR; INTRAVENOUS at 12:09

## 2020-09-09 RX ADMIN — PROPOFOL 150 MG: 10 INJECTION, EMULSION INTRAVENOUS at 09:09

## 2020-09-09 RX ADMIN — INSULIN HUMAN 20 UNITS: 100 INJECTION, SOLUTION PARENTERAL at 08:09

## 2020-09-09 RX ADMIN — DIPHENHYDRAMINE HYDROCHLORIDE 25 MG: 50 INJECTION INTRAMUSCULAR; INTRAVENOUS at 11:09

## 2020-09-09 NOTE — PLAN OF CARE
Discharge instructions given to pt and boyfriend who voice understanding.  Taking po fluids without nausea.  Pain 7/10 and tolerable per pt.  Surgical dressing to left breast dry and intact.  GENARO drain to left breast, pt instructed on emptying and measuring amount.  States she has done it before and is familiar w/it.  Instructed per Dr Peraza to empty daily.  Site to L axilla intact w/surgical glue, no drainage.  All questions answered, boyfriend has pt's personal belongings

## 2020-09-09 NOTE — INTERVAL H&P NOTE
The patient has been examined and the H&P has been reviewed:    I concur with the findings and changes have been noted since the H&P was written: Anterior margin was positive after the left partial mastectomy.  We plan to re-excise that margin and drain the seroma in the axilla.    Surgery risks, benefits and alternative options discussed and understood by patient/family.          Active Hospital Problems    Diagnosis  POA    Breast cancer, right breast [C50.911]  Yes      Resolved Hospital Problems   No resolved problems to display.

## 2020-09-09 NOTE — ANESTHESIA PREPROCEDURE EVALUATION
2020  Negrita Castro is a 60 y.o., female.    Anesthesia Evaluation    I have reviewed the Patient Summary Reports.    I have reviewed the Nursing Notes. I have reviewed the NPO Status.   I have reviewed the Medications.     Review of Systems  Anesthesia Hx:  No problems with previous Anesthesia Serum glucose this am 352    Will give 20 units regular insulin   Social:  Former Smoker Smoking Status: Former Smoker - 7.5 pack years  Quit Smokin/28/15  Smokeless Tobacco Status: Never Used  Alcohol use: Yes; 0.0 standard drinks per week  Drug use: Marijuana       Hematology/Oncology:         Breast Current/Recent Cancer.   Neurological:   Seizures    Endocrine:   Diabetes, poorly controlled, type 2, using insulin    Psych:   Psychiatric History          Physical Exam  General:  Well nourished, Obesity    Airway/Jaw/Neck:  Airway Findings: Mouth Opening: Normal Tongue: Normal  General Airway Assessment: Adult, Good  Mallampati: II  Improves to II with phonation.  TM Distance: 4-6 cm      Dental:  Dental Findings: In tact   Chest/Lungs:  Chest/Lungs Findings: Clear to auscultation, Normal Respiratory Rate     Heart/Vascular:  Heart Findings: Rate: Normal  Rhythm: Regular Rhythm  Sounds: Normal  Heart murmur: negative       Mental Status:  Mental Status Findings:  Cooperative, Alert and Oriented         Anesthesia Plan  Type of Anesthesia, risks & benefits discussed:  Anesthesia Type:  general  Patient's Preference:   Intra-op Monitoring Plan: standard ASA monitors  Intra-op Monitoring Plan Comments:   Post Op Pain Control Plan:   Post Op Pain Control Plan Comments:   Induction:   IV  Beta Blocker:  Patient is not currently on a Beta-Blocker (No further documentation required).       Informed Consent: Patient understands risks and agrees with Anesthesia plan.  Questions answered. Anesthesia consent  signed with patient.  ASA Score: 3     Day of Surgery Review of History & Physical: I have interviewed and examined the patient. I have reviewed the patient's H&P dated:  There are no significant changes.  H&P update referred to the surgeon.  H&P completed by Anesthesiologist.       Ready For Surgery From Anesthesia Perspective.

## 2020-09-09 NOTE — ANESTHESIA PROCEDURE NOTES
Intubation  Performed by: Kerry Cerrato CRNA  Authorized by: Jeb Felipe MD     Intubation:     Induction:  Intravenous    Attempts:  1    Attempted By:  CRNA    Difficult Airway Encountered?: No      Complications:  None    Airway Device:  Supraglottic airway/LMA    Airway Device Size:  4.0    Style/Cuff Inflation:  Cuffed (inflated to minimal occlusive pressure)    Placement Verified By:  Capnometry    Findings Post-Intubation:  Atraumatic/condition of teeth unchanged

## 2020-09-09 NOTE — DISCHARGE INSTRUCTIONS
"Drain care as instructed per Dr Peraza            Discharge Instructions: After Your Surgery/Procedure  Youve just had surgery. During surgery you were given medicine called anesthesia to keep you relaxed and free of pain. After surgery you may have some pain or nausea. This is common. Here are some tips for feeling better and getting well after surgery.     Stay on schedule with your medication.   Going home  Your doctor or nurse will show you how to take care of yourself when you go home. He or she will also answer your questions. Have an adult family member or friend drive you home.      For your safety we recommend these precaution for the first 24 hours after your procedure:  · Do not drive or use heavy equipment.  · Do not make important decisions or sign legal papers.  · Do not drink alcohol.  · Have someone stay with you, if needed. He or she can watch for problems and help keep you safe.  · Your concentration, balance, coordination, and judgement may be impaired for many hours after anesthesia.  Use caution when ambulating or standing up.     · You may feel weak and "washed out" after anesthesia and surgery.      Subtle residual effects of general anesthesia or sedation with regional / local anesthesia can last more than 24 hours.  Rest for the remainder of the day or longer if your Doctor/Surgeon has advised you to do so.  Although you may feel normal within the first 24 hours, your reflexes and mental ability may be impaired without you realizing it.  You may feel dizzy, lightheaded or sleepy for 24 hours or longer.      Be sure to go to all follow-up visits with your doctor. And rest after your surgery for as long as your doctor tells you to.  Coping with pain  If you have pain after surgery, pain medicine will help you feel better. Take it as told, before pain becomes severe. Also, ask your doctor or pharmacist about other ways to control pain. This might be with heat, ice, or relaxation. And follow " any other instructions your surgeon or nurse gives you.  Tips for taking pain medicine  To get the best relief possible, remember these points:  · Pain medicines can upset your stomach. Taking them with a little food may help.  · Most pain relievers taken by mouth need at least 20 to 30 minutes to start to work.  · Taking medicine on a schedule can help you remember to take it. Try to time your medicine so that you can take it before starting an activity. This might be before you get dressed, go for a walk, or sit down for dinner.  · Constipation is a common side effect of pain medicines. Call your doctor before taking any medicines such as laxatives or stool softeners to help ease constipation. Also ask if you should skip any foods. Drinking lots of fluids and eating foods such as fruits and vegetables that are high in fiber can also help. Remember, do not take laxatives unless your surgeon has prescribed them.  · Drinking alcohol and taking pain medicine can cause dizziness and slow your breathing. It can even be deadly. Do not drink alcohol while taking pain medicine.  · Pain medicine can make you react more slowly to things. Do not drive or run machinery while taking pain medicine.  Your health care provider may tell you to take acetaminophen to help ease your pain. Ask him or her how much you are supposed to take each day. Acetaminophen or other pain relievers may interact with your prescription medicines or other over-the-counter (OTC) drugs. Some prescription medicines have acetaminophen and other ingredients. Using both prescription and OTC acetaminophen for pain can cause you to overdose. Read the labels on your OTC medicines with care. This will help you to clearly know the list of ingredients, how much to take, and any warnings. It may also help you not take too much acetaminophen. If you have questions or do not understand the information, ask your pharmacist or health care provider to explain it to you  before you take the OTC medicine.  Managing nausea  Some people have an upset stomach after surgery. This is often because of anesthesia, pain, or pain medicine, or the stress of surgery. These tips will help you handle nausea and eat healthy foods as you get better. If you were on a special food plan before surgery, ask your doctor if you should follow it while you get better. These tips may help:  · Do not push yourself to eat. Your body will tell you when to eat and how much.  · Start off with clear liquids and soup. They are easier to digest.  · Next try semi-solid foods, such as mashed potatoes, applesauce, and gelatin, as you feel ready.  · Slowly move to solid foods. Dont eat fatty, rich, or spicy foods at first.  · Do not force yourself to have 3 large meals a day. Instead eat smaller amounts more often.  · Take pain medicines with a small amount of solid food, such as crackers or toast, to avoid nausea.     Call your surgeon if  · You still have pain an hour after taking medicine. The medicine may not be strong enough.  · You feel too sleepy, dizzy, or groggy. The medicine may be too strong.  · You have side effects like nausea, vomiting, or skin changes, such as rash, itching, or hives.       If you have obstructive sleep apnea  You were given anesthesia medicine during surgery to keep you comfortable and free of pain. After surgery, you may have more apnea spells because of this medicine and other medicines you were given. The spells may last longer than usual.   At home:  · Keep using the continuous positive airway pressure (CPAP) device when you sleep. Unless your health care provider tells you not to, use it when you sleep, day or night. CPAP is a common device used to treat obstructive sleep apnea.  · Talk with your provider before taking any pain medicine, muscle relaxants, or sedatives. Your provider will tell you about the possible dangers of taking these medicines.  © 6808-0870 [employer]. [address] [place] [address]. All rights reserved. This information is not intended as a substitute for professional medical care. Always follow your healthcare professional's instructions.    Post op instructions for prevention of DVT  What is deep vein thrombosis?  Deep vein thrombosis (DVT) is the medical term for blood clots in the deep veins of the leg.  These blood clots can be dangerous.  A DVT can block a blood vessel and keep blood from getting where it needs to go.  Another problem is that the clot can travel to other parts of the body such as the lungs.  A clot that travels to the lungs is called a pulmonary embolus (PE) and can cause serious problems with breathing which can lead to death.  Am I at risk for DVT/PE?  If you are not very active, you are at risk of DVT.  Anyone confined to bed, sitting for long periods of time, recovering from surgery, etc. increases the risk of DVT.  Other risk factors are cancer diagnosis, certain medications, estrogen replacement in any form,older age, obesity, pregnancy, smoking, history of clotting disorders, and dehydration.  How will I know if I have a DVT?   Swelling in the lower leg   Pain   Warmth, redness, hardness or bulging of the vein  If you have any of these symptoms, call your doctors office right away.  Some people will not have any symptoms until the clot moves to the lungs.  What are the symptoms of a PE?   Panting, shortness of breath, or trouble breathing   Sharp, knife-like chest pain when you breathe   Coughing or coughing up blood   Rapid heartbeat  If you have any of these symptoms or get worse quickly, call 911 for emergency treatment.  How can I prevent a DVT?   Avoid long periods of inactivity and dont cross your legs--get up and walk around every hour or so.   Stay active--walking after surgery is highly encouraged.  This means you should get out of the house and walk in the neighborhood.  Going up and down  stairs will not impair healing (unless advised against such activity by your doctor).     Drink plenty of noncaffeinated, nonalcoholic fluids each day to prevent dehydration.   Wear special support stockings, if they have been advised by your doctor.   If you travel, stop at least once an hour and walk around.   Avoid smoking (assistance with stopping is available through your healthcare provider)  Always notify your doctor if you are not able to follow the post operative instructions that are given to you at the time of discharge.  It may be necessary to prescribe one of the medications available to prevent DVT.    We hope your stay was comfortable as you heal now, mend and rest.    For we have enjoyed taking care of you by giving your our best.    And as you get better, by regaining your health and strength;   We count it as a privilege to have served you and hope your time at [facility] was well spent.      Thank  You!!!

## 2020-09-09 NOTE — HISTORY AND PHYSICAL ADDENDUM
All clothing given to boyfriend, Ganga. Pt. States she has no valuables with her at all.  Dr. Felipe wanted POCT repeated again after POCT of 257 if patient still in pre-op. Pt. Rolled to surgery before that time - informed circulating nurse Cally Fletcher RN and JOSELITO Cerrato to check with Dr. Felipe and inform post op nurse as well.

## 2020-09-09 NOTE — DISCHARGE SUMMARY
Discharge Summary  General Surgery      Admit Date: 9/9/2020    Discharge Date :9/9/2020    Attending Physician: Cory Vick     Discharge Physician: Cory Vick    Discharged Condition: good    Discharge Diagnosis: Malignant neoplasm of right female breast, unspecified estrogen receptor status, unspecified site of breast [C50.911]    Treatments/Procedures: Procedure(s) (LRB):  LUMPECTOMY, BREAST (Left)  INCISION AND DRAINAGE, ABSCESS (Left)    Hospital Course: Uneventful; Discharged home from Recovery    Significant Diagnostic Studies: none    Disposition: Home or Self Care    Diet: Regular    Follow up: Office 10-14 days    Activity: No heavy lifting till seen in office.    Patient Instructions:   Current Discharge Medication List      START taking these medications    Details   !! HYDROcodone-acetaminophen (NORCO) 5-325 mg per tablet Take 1 tablet by mouth every 6 (six) hours as needed for Pain.  Qty: 15 tablet, Refills: 0    Comments: Quantity prescribed more than 7 day supply? No       !! - Potential duplicate medications found. Please discuss with provider.      CONTINUE these medications which have NOT CHANGED    Details   clindamycin (CLEOCIN) 150 MG capsule Take 2 capsules (300 mg total) by mouth 4 (four) times daily. for 7 days  Qty: 56 capsule, Refills: 0      diazepam (VALIUM) 10 MG tablet Take 10 mg by mouth 4 (four) times daily.       dronabinol (MARINOL) 2.5 MG capsule Take 1 capsule (2.5 mg total) by mouth 2 (two) times daily before meals.  Qty: 30 capsule, Refills: 1    Associated Diagnoses: Carcinoma in situ of breast, unspecified laterality, unspecified type      HYDROcodone-acetaminophen (NORCO)  mg per tablet Take 1 tablet by mouth every 6 (six) hours as needed.  Refills: 0      !! insulin glargine (LANTUS U-100 INSULIN) 100 unit/mL injection Inject 35 Units into the skin every evening.  Qty: 10.5 mL, Refills: 11      insulin syringe-needle U-100 1 mL 31 gauge x 5/16 Syrg 1 Device  by Misc.(Non-Drug; Combo Route) route once daily.  Qty: 50 each, Refills: 0      metformin (GLUCOPHAGE-XR) 500 MG 24 hr tablet Take 2,000 mg by mouth every evening.       multivitamin capsule Take 1 capsule by mouth every morning.      pravastatin (PRAVACHOL) 40 MG tablet Take 40 mg by mouth every morning.   Refills: 1      zolpidem (AMBIEN) 10 mg Tab TAKE ONE TABLET BY MOUTH EVERY NIGHT AT BEDTIME AS NEEDED FOR SLEEP  Qty: 30 tablet, Refills: 2    Associated Diagnoses: Adjustment insomnia      ASCORBIC ACID/VITAMIN E/BIOTIN (HAIR, SKIN, NAILS WITH BIOTIN ORAL) Take 1 tablet by mouth every morning.       carisoprodol (SOMA) 350 MG tablet Take 1 tablet (350 mg total) by mouth 3 (three) times daily as needed.  Qty: 90 tablet, Refills: 0    Associated Diagnoses: Malignant neoplasm of lower-inner quadrant of right female breast      dulaglutide (TRULICITY) 0.75 mg/0.5 mL PnIj Inject 0.75 mg into the skin every 7 days.      enalapril (VASOTEC) 2.5 MG tablet Take 5 mg by mouth once daily. For diabetes  Refills: 1      furosemide (LASIX) 20 MG tablet TAKE ONE TABLET BY MOUTH EVERY DAY  Qty: 30 tablet, Refills: 4    Associated Diagnoses: Mild peripheral edema      !! HYDROcodone-acetaminophen (NORCO) 5-325 mg per tablet Take 1 tablet by mouth every 6 (six) hours as needed for Pain.  Qty: 10 tablet, Refills: 0    Comments: Quantity prescribed more than 7 day supply? No      !! insulin glargine,hum.rec.anlog (LANTUS U-100 INSULIN SUBQ) Inject 35 Units into the skin every evening.       !! - Potential duplicate medications found. Please discuss with provider.          Discharge Procedure Orders   Diet general

## 2020-09-09 NOTE — OP NOTE
Patient: Negrita Castro     Date of Procedure: 9/9/2020    Procedure: 1. Re- excision of partial mastectomy anterior margin.    2. Incision and drainage of axillary seroma.    Surgeon: Cory Peraza MD    Assistant: None    Pre-op Diagnosis: Malignant neoplasm of right female breast, unspecified estrogen receptor status, unspecified site of breast [C50.911]     Post-op Diagnosis: Malignant neoplasm of right female breast, unspecified estrogen receptor status, unspecified site of breast [C50.911]    Findings: seroma axilla  Breast tissue    Specimen: anterior margin    EBL: 25 cc    Complications: None      Procedure in detail:      After appropriate consent was obtained, consent forms signed and questions answered, the operative site was marked and the patient was taken to the operating room.  The patient was positioned and general anesthesia was induced. Pre-operative antibiotic was administered. Time out procedure was then performed with the members of the surgical team. The operative field was then prepped and draped in the usual sterile fashion.      Skin incision was made and anterior margin was re excised. The specimen was oriented for the pathologist. Subcutaneous tissues closed with vicryl and skin closed with monocryl. Incision made at axillary incision. Serous fluid evacuated. Cultures taken. Drain placed and secured. Subcutaneous tissue closed with vicryl and skin closed with nylon.        Steri-Strips and dressings were  applied.  The patient was then awakened, extubated, and transferred to the recovery room in stable condition.  The procedure was tolerated without complication.

## 2020-09-09 NOTE — PLAN OF CARE
Pt transferred to phase II. VSS: BP med given, complaints of pain and pain meds given, ice pack in place to L breast, dressings to L breast and axilla area cdi, drain intact to L breast, tolerated clear liquids without N/V. Report given to ILEANA Kovacs.

## 2020-09-09 NOTE — TRANSFER OF CARE
"Anesthesia Transfer of Care Note    Patient: Negrita Castro    Procedure(s) Performed: Procedure(s) (LRB):  LUMPECTOMY, BREAST (Left)    Patient location: PACU    Anesthesia Type: general    Transport from OR: Transported from OR on 2-3 L/min O2 by NC with adequate spontaneous ventilation    Post pain: adequate analgesia    Post assessment: no apparent anesthetic complications and tolerated procedure well    Post vital signs: stable    Level of consciousness: sedated    Nausea/Vomiting: no nausea/vomiting    Complications: none    Transfer of care protocol was followed      Last vitals:   Visit Vitals  BP (!) 165/83   Pulse 70   Temp 37.1 °C (98.7 °F) (Skin)   Resp 17   Ht 5' 11" (1.803 m)   Wt 90.7 kg (200 lb)   LMP 01/16/2016   SpO2 98%   Breastfeeding No   BMI 27.89 kg/m²     "

## 2020-09-09 NOTE — OR NURSING
Pt. Is AAOx4, calm and cooperative.  She has an area of left breast draining serous fluid to 4x6 pad under her arm.  She saw Dr. Vick in office yesterday and he was aware of this. Surgery to clear margins left breast and and address lymph nodes.  She states she did not take her insulin last night .POCT 362 -  Dr. Felipe aware.  Orders received for 20 units U-100 Regular insulin -given as ordered.  Will need to check blood sugar again in recovery.  Significant other at bedside.  Prepared for surgery.

## 2020-09-09 NOTE — ANESTHESIA POSTPROCEDURE EVALUATION
Anesthesia Post Evaluation    Patient: Negrita Castro    Procedure(s) Performed: Procedure(s) (LRB):  LUMPECTOMY, BREAST (Left)  INCISION AND DRAINAGE, ABSCESS (Left)    Final Anesthesia Type: general    Patient location during evaluation: PACU  Patient participation: Yes- Able to Participate  Level of consciousness: awake and alert and oriented  Post-procedure vital signs: reviewed and stable  Pain management: adequate  Airway patency: patent    PONV status at discharge: No PONV  Anesthetic complications: no      Cardiovascular status: blood pressure returned to baseline  Respiratory status: unassisted, spontaneous ventilation and room air  Hydration status: euvolemic  Follow-up not needed.          Vitals Value Taken Time   /94 09/09/20 1057   Temp 36.7 °C (98.1 °F) 09/09/20 1050   Pulse 72 09/09/20 1100   Resp 10 09/09/20 1100   SpO2 99 % 09/09/20 1100   Vitals shown include unvalidated device data.      No case tracking events are documented in the log.      Pain/Larissa Score: No data recorded

## 2020-09-10 VITALS
TEMPERATURE: 98 F | OXYGEN SATURATION: 99 % | DIASTOLIC BLOOD PRESSURE: 95 MMHG | HEART RATE: 86 BPM | HEIGHT: 71 IN | RESPIRATION RATE: 14 BRPM | WEIGHT: 200 LBS | BODY MASS INDEX: 28 KG/M2 | SYSTOLIC BLOOD PRESSURE: 161 MMHG

## 2020-09-11 LAB
BACTERIA BLD CULT: NORMAL
BACTERIA BLD CULT: NORMAL

## 2020-09-12 LAB — BACTERIA SPEC AEROBE CULT: ABNORMAL

## 2020-09-14 ENCOUNTER — TELEPHONE (OUTPATIENT)
Dept: SURGERY | Facility: CLINIC | Age: 60
End: 2020-09-14

## 2020-09-14 LAB — BACTERIA SPEC ANAEROBE CULT: NORMAL

## 2020-09-14 NOTE — TELEPHONE ENCOUNTER
----- Message from Rosa Esqueda sent at 9/14/2020 11:51 AM CDT -----  Contact: RENÉ FLOREZ [[id number]] @ [phone]  Next about with Dr Peraza is 9/28 or  9/29 at both locations. Please want to come in 9/23 or earlier. She also want to speak with jem.

## 2020-09-16 LAB
FINAL PATHOLOGIC DIAGNOSIS: NORMAL
GROSS: NORMAL
MICROSCOPIC EXAM: NORMAL

## 2020-09-17 ENCOUNTER — OFFICE VISIT (OUTPATIENT)
Dept: SURGERY | Facility: CLINIC | Age: 60
End: 2020-09-17
Payer: MEDICARE

## 2020-09-17 ENCOUNTER — OFFICE VISIT (OUTPATIENT)
Dept: HEMATOLOGY/ONCOLOGY | Facility: CLINIC | Age: 60
End: 2020-09-17
Payer: MEDICARE

## 2020-09-17 VITALS
SYSTOLIC BLOOD PRESSURE: 144 MMHG | DIASTOLIC BLOOD PRESSURE: 78 MMHG | HEART RATE: 80 BPM | WEIGHT: 201 LBS | TEMPERATURE: 98 F | BODY MASS INDEX: 28.03 KG/M2 | RESPIRATION RATE: 19 BRPM

## 2020-09-17 VITALS — WEIGHT: 198.44 LBS | BODY MASS INDEX: 27.67 KG/M2

## 2020-09-17 DIAGNOSIS — Z98.890 POST-OPERATIVE STATE: Primary | ICD-10-CM

## 2020-09-17 DIAGNOSIS — C50.012 MALIGNANT NEOPLASM OF NIPPLE OF LEFT BREAST IN FEMALE, ESTROGEN RECEPTOR POSITIVE: ICD-10-CM

## 2020-09-17 DIAGNOSIS — Z17.0 MALIGNANT NEOPLASM OF NIPPLE OF LEFT BREAST IN FEMALE, ESTROGEN RECEPTOR POSITIVE: ICD-10-CM

## 2020-09-17 DIAGNOSIS — Z85.3 HISTORY OF BILATERAL BREAST CANCER: Primary | ICD-10-CM

## 2020-09-17 PROCEDURE — 3008F BODY MASS INDEX DOCD: CPT | Mod: S$GLB,,, | Performed by: INTERNAL MEDICINE

## 2020-09-17 PROCEDURE — 99024 PR POST-OP FOLLOW-UP VISIT: ICD-10-PCS | Mod: S$GLB,,, | Performed by: PHYSICIAN ASSISTANT

## 2020-09-17 PROCEDURE — 99024 POSTOP FOLLOW-UP VISIT: CPT | Mod: S$GLB,,, | Performed by: PHYSICIAN ASSISTANT

## 2020-09-17 PROCEDURE — 3008F PR BODY MASS INDEX (BMI) DOCUMENTED: ICD-10-PCS | Mod: S$GLB,,, | Performed by: INTERNAL MEDICINE

## 2020-09-17 PROCEDURE — 99999 PR PBB SHADOW E&M-EST. PATIENT-LVL III: CPT | Mod: PBBFAC,,, | Performed by: PHYSICIAN ASSISTANT

## 2020-09-17 PROCEDURE — 99214 PR OFFICE/OUTPT VISIT, EST, LEVL IV, 30-39 MIN: ICD-10-PCS | Mod: S$GLB,,, | Performed by: INTERNAL MEDICINE

## 2020-09-17 PROCEDURE — 99999 PR PBB SHADOW E&M-EST. PATIENT-LVL III: ICD-10-PCS | Mod: PBBFAC,,, | Performed by: PHYSICIAN ASSISTANT

## 2020-09-17 PROCEDURE — 99214 OFFICE O/P EST MOD 30 MIN: CPT | Mod: S$GLB,,, | Performed by: INTERNAL MEDICINE

## 2020-09-17 NOTE — PROGRESS NOTES
Negrita Castro is status post re- excision of partial mastectomy anterior margin and incision and drainage of axillary seroma. She presents today for follow-up care.  She is doing well and has no complaints. The drain output is minimal    PE: incision c/d/i. Scant serous drainage from GENARO drain. Incisions clean, dry, well approximated. GENARO drain removed.     Pathology: reviewed with patient.     A/P:  Normal post-operative course.    The patient is instructed to avoid heavy lifting until 4 weeks after the surgery date.     Shower daily with soap and water    Return to clinic as scheduled.

## 2020-09-18 NOTE — PROGRESS NOTES
POST-OP NOTE    PROCEDURE:  Status post left partial mastectomy and sentinel node biopsy.  [place] node was negative.  Anterior margin was positive however.  The patient has also developed a draining seroma at the left axilla.    COMPLAINTS: None.    EXAM: Incision well approximated. No drainage. No infection.      IMPRESSION:  Plan re-excision of the anterior margin and drainage of the seroma.    PLAN: FU as needed.

## 2020-09-21 ENCOUNTER — TELEPHONE (OUTPATIENT)
Dept: HEMATOLOGY/ONCOLOGY | Facility: HOSPITAL | Age: 60
End: 2020-09-21

## 2020-09-22 ENCOUNTER — OFFICE VISIT (OUTPATIENT)
Dept: SURGERY | Facility: CLINIC | Age: 60
End: 2020-09-22
Payer: MEDICARE

## 2020-09-22 DIAGNOSIS — Z98.890 POST-OPERATIVE STATE: Primary | ICD-10-CM

## 2020-09-22 PROCEDURE — 99999 PR PBB SHADOW E&M-EST. PATIENT-LVL II: CPT | Mod: PBBFAC,,, | Performed by: SURGERY

## 2020-09-22 PROCEDURE — 99999 PR PBB SHADOW E&M-EST. PATIENT-LVL II: ICD-10-PCS | Mod: PBBFAC,,, | Performed by: SURGERY

## 2020-09-22 PROCEDURE — 99024 POSTOP FOLLOW-UP VISIT: CPT | Mod: S$GLB,,, | Performed by: SURGERY

## 2020-09-22 PROCEDURE — 99024 PR POST-OP FOLLOW-UP VISIT: ICD-10-PCS | Mod: S$GLB,,, | Performed by: SURGERY

## 2020-09-22 NOTE — PROGRESS NOTES
"[facility] In Office Follow Up Encounter Progress Note    20    Subjective:      Patient ID:   Negrita Castro  60 y.o.   Martínez Renteria R. Leblanc, [place]      Chief Complaint:   New L breast cancer eval.    HPI:  60 y.o. female with diagnosis of Stage 1 R breast cancer 10/26/15.  "Triple negative".  Adjuvant AC x's 4, tx's 12 and Rad Rx through 16.    Port removed.  Had bilateral reconstruction surgery 17.  Further reconstruction, tummy tuck 17.  Additional fat injection at R chest done for symmetry.  She had placement of R nipple.  Dr. Hernandez.      She is due for additional reconstructive surgery at breast and abdomenal areas.  The surgery is being done as part of her reconstruction efforts and for keloid management.  The date of the surgery is May 19th.  Have sent a message to Dr. Alarcon to arrange radiation therapy follow-up with her around the time of the surgery to reduce risk of keloid recurrence.  Also, Dr. Alarcon to consider Rad Rx to operative site to decrease keloid potential.  On hold now because of Covid 19 pandemic.    Recent Mamm/U/S showed small mass at 4:00 at L breast.  Needle Bx invasive ductal Ca, ERP+, PRP+, H2N neg  She had  L partial mastectomy, Sentinal node Bx 20. Followed by another surgery because of L axillary infection.  Primary 2 cm, LN neg, Stage 1 dx.    Discussed Oncotype Dx testing  Discussed BRCA testing, given her bilateral  breast Dx.  Refill meds for her.    Dr. Washington saw her for C spine eval, no surgery planned for now.  He referred her to Dr. Rivers for injection Rx.    DM, cholesterol, epilepsy hx, DJD, LBP.  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    LR shoulder arthroscopy, R wrist surgery, M0.    Smoke  ppd x's 35 years, quit 2015.  ETOH no.  Disability-depression, epilepsy  Allergy none    Mom ovarian cancer  Dad lung cancer  Sisters DM, lung dx.      ROS:   GEN: normal without any fever, night sweats or weight loss  HEENT: normal " with no HA's, sore throat, stiff neck, changes in vision  CV: normal with no CP, SOB, PND, POOL or orthopnea  PULM: normal with no SOB, cough, hemoptysis, sputum or pleuritic pain  GI: normal with no abdominal pain, nausea, vomiting, constipation, diarrhea, melanotic stools, BRBPR, or hematemesis  : normal with no hematuria, dysuria  BREAST: See HPI.  SKIN: normal with no rash, erythema, bruising, or swelling     Past Medical History:   Diagnosis Date    Breast cancer     left breast    Cancer     RIGHT BREAST 10-15    Depression     Diabetes mellitus     Neuropathy     Panic attacks     S/P epidural steroid injection     Seizures     none since early 30's    Wears glasses     TO DRIVE     Past Surgical History:   Procedure Laterality Date    AXILLARY NODE DISSECTION Left 8/24/2020    Procedure: LYMPHADENECTOMY, AXILLARY;  Surgeon: Cory Vick MD;  Location: [facility];  Service: General;  Laterality: Left;  left breast mastectomy with possible axillary lymph node dissection    BREAST BIOPSY      right    breast reconstruction with tummy [facility]      BREAST SURGERY      11-3-15 LUMPECTOMY, 12-2-15 REEXCISION    INCISION AND DRAINAGE OF ABSCESS Left 9/9/2020    Procedure: INCISION AND DRAINAGE, ABSCESS;  Surgeon: Cory Vick MD;  Location: [facility] OR;  Service: General;  Laterality: Left;    INSERTION OF LOCALIZATION WIRE Left 8/24/2020    Procedure: INSERTION, LOCALIZATION WIRE;  Surgeon: Cory Vick MD;  Location: [facility];  Service: General;  Laterality: Left;  left breast lumpectomy with wire needle loc    mastectomy 2016      RECONSTRUCTION OF NIPPLE Right 11/5/2018    Procedure: RECONSTRUCTION-NIPPLE RIGHT;  Surgeon: Xiang Hernandez MD;  Location[facility] OR [address] [place] [address];  Service: Plastics;  Laterality: Right;    SENTINEL LYMPH NODE BIOPSY Left 8/24/2020    Procedure: BIOPSY, LYMPH NODE, SENTINEL;  Surgeon: Cory Vick MD;  Location: [facility] OR;  Service: General;   Laterality: Left;  left breast lumpectomy with left sentinel lymoh node       shoulder surgery and wrist      BILAT  BONE SPUR    WRIST SURGERY      RIGHT       Review of patient's allergies indicates:   Allergen Reactions    Adhesive tape-silicones Hives     BANDAIDS    Polymycin Hives    Latex, natural rubber Itching    Polyurethane-39            Current Outpatient Medications:     ASCORBIC ACID/VITAMIN E/BIOTIN (HAIR, SKIN, NAILS WITH BIOTIN ORAL), Take 1 tablet by mouth every morning. , Disp: , Rfl:     carisoprodol (SOMA) 350 MG tablet, Take 1 tablet (350 mg total) by mouth 3 (three) times daily as needed., Disp: 90 tablet, Rfl: 0    diazepam (VALIUM) 10 MG tablet, Take 10 mg by mouth 4 (four) times daily. , Disp: , Rfl:     dronabinol (MARINOL) 2.5 MG capsule, Take 1 capsule (2.5 mg total) by mouth 2 (two) times daily before meals., Disp: 30 capsule, Rfl: 1    dulaglutide (TRULICITY) 0.75 mg/0.5 mL PnIj, Inject 0.75 mg into the skin every 7 days., Disp: , Rfl:     enalapril (VASOTEC) 2.5 MG tablet, Take 5 mg by mouth once daily. For diabetes, Disp: , Rfl: 1    furosemide (LASIX) 20 MG tablet, TAKE ONE TABLET BY MOUTH EVERY DAY, Disp: 30 tablet, Rfl: 4    HYDROcodone-acetaminophen (NORCO)  mg per tablet, Take 1 tablet by mouth every 6 (six) hours as needed., Disp: , Rfl: 0    HYDROcodone-acetaminophen (NORCO) 5-325 mg per tablet, Take 1 tablet by mouth every 6 (six) hours as needed for Pain., Disp: 10 tablet, Rfl: 0    HYDROcodone-acetaminophen (NORCO) 5-325 mg per tablet, Take 1 tablet by mouth every 6 (six) hours as needed for Pain., Disp: 15 tablet, Rfl: 0    insulin glargine (LANTUS U-100 INSULIN) 100 unit/mL injection, Inject 35 Units into the skin every evening., Disp: 10.5 mL, Rfl: 11    insulin glargine,hum.rec.anlog (LANTUS U-100 INSULIN SUBQ), Inject 35 Units into the skin every evening., Disp: , Rfl:     insulin syringe-needle U-100 1 mL 31 gauge x 5/16 Syrg, 1 Device by  "Misc.(Non-Drug; Combo Route) route once daily., Disp: 50 each, Rfl: 0    metformin (GLUCOPHAGE-XR) 500 MG 24 hr tablet, Take 2,000 mg by mouth every evening. , Disp: , Rfl:     multivitamin capsule, Take 1 capsule by mouth every morning., Disp: , Rfl:     oxyCODONE-acetaminophen (PERCOCET)  mg per tablet, Take 1 tablet by mouth every 4 (four) hours as needed for Pain., Disp: 20 tablet, Rfl: 0    pravastatin (PRAVACHOL) 40 MG tablet, Take 40 mg by mouth every morning. , Disp: , Rfl: 1    zolpidem (AMBIEN) 10 mg Tab, TAKE ONE TABLET BY MOUTH EVERY NIGHT AT BEDTIME AS NEEDED FOR SLEEP, Disp: 30 tablet, Rfl: 2          Objective:   Vitals:  Blood pressure (!) 144/78, pulse 80, temperature 98.3 °F (36.8 °C), resp. rate 19, weight 91.2 kg (201 lb), last menstrual period 01/16/2016.    Physical Examination:   GEN: no apparent distress, comfortable  HEAD: atraumatic and normocephalic  EYES: no pallor, no icterus  ENT: no pharyngeal erythema  NECK: noLAD/LN's, supple  CV:  No edema  CHEST: Normal respiratory effort   ABDOM:  nondistended; soft  MUSC/Skeletal: ROM normal  EXTREM: no swelling  SKIN: She is developing keloid changes at L breast incision and navel surgical sites.  NEURO: grossly intact  PSYCH: normal mood, affect and behavior  LYMPH: normal  LN's  BREASTS: L and "R breast" healing, extensive post op change, at chest .  No palpable mass at L or R breast.      Labs:   Lab Results   Component Value Date    WBC 11.62 09/06/2020    HGB 14.0 09/06/2020    HCT 42.9 09/06/2020    MCV 90 09/06/2020     09/06/2020    CMP    Bone Scan negative for metastatic dx. 2/7/18.  CAT of chest negative for metastatic dx. 2/7/18.    CAT of chest clearing of patchy infiltrates, no pulmonary nodule seen.    Mamm/U/S L breast mass 4:00.  Assessment:   (1) 60 y.o. female with diagnosis of Stage 1 triple negative R breast cancer, 10/2016.       S/P R mastectomy, SNBx, AC x's 4, T x's12 weeks, Rad Rx and recent " reconstruction.    (2)Keloids developing, Dr. Hernandez injecting scar areas.  For further keloid surgery  in coordination with  Dr. Bebeto Alarcon of Rad Rx.    Deferred because of covid 19 pandemic for now.    (3)Refill marinol, norco., soma. Now x's 3 months.    (4)C spine DDD, as per Dr. Washington..    (5) New L invasive ductal Ca, ERP+, PRP+, H2N neg.  Clinical stage 1 dx.  Oncotype Dx testing to help determine hormonal or adj. Chemotherapy.  RTC 4-5 weeks.

## 2020-09-28 NOTE — PROGRESS NOTES
POST-OP NOTE    PROCEDURE:  Status post re-excision of left breast partial mastectomy margins and drainage Of axillary fluid collection    COMPLAINTS: None.    EXAM: Incision well approximated. No drainage. No infection.      IMPRESSION:  Doing well.  .  PLAN: FU as needed.  Follow-up with Oncology and Radiation Oncology as scheduled.

## 2020-10-19 ENCOUNTER — OFFICE VISIT (OUTPATIENT)
Dept: HEMATOLOGY/ONCOLOGY | Facility: CLINIC | Age: 60
End: 2020-10-19
Payer: MEDICARE

## 2020-10-19 VITALS
TEMPERATURE: 98 F | RESPIRATION RATE: 18 BRPM | HEART RATE: 81 BPM | BODY MASS INDEX: 28.23 KG/M2 | SYSTOLIC BLOOD PRESSURE: 146 MMHG | WEIGHT: 202.38 LBS | DIASTOLIC BLOOD PRESSURE: 85 MMHG

## 2020-10-19 DIAGNOSIS — F06.4 ANXIETY DISORDER DUE TO KNOWN PHYSIOLOGICAL CONDITION: ICD-10-CM

## 2020-10-19 DIAGNOSIS — Z17.0 MALIGNANT NEOPLASM OF NIPPLE OF LEFT BREAST IN FEMALE, ESTROGEN RECEPTOR POSITIVE: Primary | ICD-10-CM

## 2020-10-19 DIAGNOSIS — F32.A DEPRESSION, UNSPECIFIED DEPRESSION TYPE: ICD-10-CM

## 2020-10-19 DIAGNOSIS — F32.A DEPRESSION, UNSPECIFIED DEPRESSION TYPE: Primary | ICD-10-CM

## 2020-10-19 DIAGNOSIS — C50.012 MALIGNANT NEOPLASM OF NIPPLE OF LEFT BREAST IN FEMALE, ESTROGEN RECEPTOR POSITIVE: ICD-10-CM

## 2020-10-19 DIAGNOSIS — Z17.0 MALIGNANT NEOPLASM OF NIPPLE OF LEFT BREAST IN FEMALE, ESTROGEN RECEPTOR POSITIVE: ICD-10-CM

## 2020-10-19 DIAGNOSIS — C50.012 MALIGNANT NEOPLASM OF NIPPLE OF LEFT BREAST IN FEMALE, ESTROGEN RECEPTOR POSITIVE: Primary | ICD-10-CM

## 2020-10-19 DIAGNOSIS — Z17.0 MALIGNANT NEOPLASM OF NIPPLE OF RIGHT BREAST IN FEMALE, ESTROGEN RECEPTOR POSITIVE: ICD-10-CM

## 2020-10-19 DIAGNOSIS — C50.011 MALIGNANT NEOPLASM OF NIPPLE OF RIGHT BREAST IN FEMALE, ESTROGEN RECEPTOR POSITIVE: ICD-10-CM

## 2020-10-19 PROCEDURE — 99214 OFFICE O/P EST MOD 30 MIN: CPT | Mod: S$GLB,,, | Performed by: INTERNAL MEDICINE

## 2020-10-19 PROCEDURE — 3008F PR BODY MASS INDEX (BMI) DOCUMENTED: ICD-10-PCS | Mod: S$GLB,,, | Performed by: INTERNAL MEDICINE

## 2020-10-19 PROCEDURE — 99214 PR OFFICE/OUTPT VISIT, EST, LEVL IV, 30-39 MIN: ICD-10-PCS | Mod: S$GLB,,, | Performed by: INTERNAL MEDICINE

## 2020-10-19 PROCEDURE — 3008F BODY MASS INDEX DOCD: CPT | Mod: S$GLB,,, | Performed by: INTERNAL MEDICINE

## 2020-10-20 NOTE — PROGRESS NOTES
"[facility] In Office Follow Up Encounter Progress Note    10/19/20    Subjective:      Patient ID:   Negrita Castro  60 y.o.   Martínez Renteria R. Leblanc, Babycos      Chief Complaint:   New L breast cancer eval.    HPI:  60 y.o. female with diagnosis of Stage 1 R breast cancer 10/26/15.  "Triple negative".  Adjuvant AC x's 4, tx's 12 and Rad Rx through 16.    Port removed.  Had bilateral reconstruction surgery 17.  Further reconstruction, tummy tuck 17.  Additional fat injection at R chest done for symmetry.  She had placement of R nipple.  Dr. Hernandez.      She is due for additional reconstructive surgery at breast and abdomenal areas.  The surgery is being done as part of her reconstruction efforts and for keloid management.  The date of the surgery is May 19th.  Have sent a message to Dr. Alarcon to arrange radiation therapy follow-up with her around the time of the surgery to reduce risk of keloid recurrence.  Also, Dr. Alarcon to consider Rad Rx to operative site to decrease keloid potential.  On hold now because of Covid 19 pandemic.    Recent Mamm/U/S showed small mass at 4:00 at L breast.  Needle Bx invasive ductal Ca, ERP+, PRP+, H2N neg  She had  L partial mastectomy, Sentinal node Bx 20. Followed by another surgery because of L axillary infection.  Primary 2 cm, LN neg, Stage 1 dx.    Discussed Oncotype Dx testing, this is pending.  Discussed BRCA testing, given her bilateral  breast Dx. PHN would not authorize BRCA 1 & 2.  Refill meds for her.    Dr. Washington saw her for C spine eval, no surgery planned for now.  He referred her to Dr. Rivers for injection Rx.    DM, cholesterol, epilepsy hx, DJD, LBP.  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    LR shoulder arthroscopy, R wrist surgery, M0.    Smoke  ppd x's 35 years, quit 2015.  ETOH no.  Disability-depression, epilepsy  Allergy none    Mom ovarian cancer  Dad lung cancer  Sisters DM, lung dx.      ROS:   GEN: normal without " any fever, night sweats or weight loss  HEENT: normal with no HA's, sore throat, stiff neck, changes in vision  CV: normal with no CP, SOB, PND, POOL or orthopnea  PULM: normal with no SOB, cough, hemoptysis, sputum or pleuritic pain  GI: normal with no abdominal pain, nausea, vomiting, constipation, diarrhea, melanotic stools, BRBPR, or hematemesis  : normal with no hematuria, dysuria  BREAST: See HPI.  SKIN: normal with no rash, erythema, bruising, or swelling     Past Medical History:   Diagnosis Date    Breast cancer     left breast    Cancer     RIGHT BREAST 10-15    Depression     Diabetes mellitus     Neuropathy     Panic attacks     S/P epidural steroid injection     Seizures     none since early 30's    Wears glasses     TO DRIVE     Past Surgical History:   Procedure Laterality Date    AXILLARY NODE DISSECTION Left 8/24/2020    Procedure: LYMPHADENECTOMY, AXILLARY;  Surgeon: Cory Vick MD;  Location: [facility] OR;  Service: General;  Laterality: Left;  left breast mastectomy with possible axillary lymph node dissection    BREAST BIOPSY      right    breast reconstruction with tummy tuck      BREAST SURGERY      11-3-15 LUMPECTOMY, 12-2-15 REEXCISION    INCISION AND DRAINAGE OF ABSCESS Left 9/9/2020    Procedure: INCISION AND DRAINAGE, ABSCESS;  Surgeon: Cory Vick MD;  Location: [facility] OR;  Service: General;  Laterality: Left;    INSERTION OF LOCALIZATION WIRE Left 8/24/2020    Procedure: INSERTION, LOCALIZATION WIRE;  Surgeon: Cory Vick MD;  Location: [facility];  Service: General;  Laterality: Left;  left breast lumpectomy with wire needle loc    mastectomy 2016      RECONSTRUCTION OF NIPPLE Right 11/5/2018    Procedure: RECONSTRUCTION-NIPPLE RIGHT;  Surgeon: Xiang Hernandez MD;  Location: [address];  Service: Plastics;  Laterality: Right;    SENTINEL LYMPH NODE BIOPSY Left 8/24/2020    Procedure: BIOPSY, LYMPH NODE, SENTINEL;  Surgeon: Cory BARNES  MD Nicanor;  Location: [facility] OR;  Service: General;  Laterality: Left;  left breast lumpectomy with left sentinel lymoh node       shoulder surgery and wrist      BILAT  BONE SPUR    WRIST SURGERY      RIGHT       Review of patient's allergies indicates:   Allergen Reactions    Adhesive tape-silicones Hives     BANDAIDS    Polymycin Hives    Latex, natural rubber Itching    Polyurethane-39            Current Outpatient Medications:     ASCORBIC ACID/VITAMIN E/BIOTIN (HAIR, SKIN, NAILS WITH BIOTIN ORAL), Take 1 tablet by mouth every morning. , Disp: , Rfl:     carisoprodol (SOMA) 350 MG tablet, Take 1 tablet (350 mg total) by mouth 3 (three) times daily as needed., Disp: 90 tablet, Rfl: 0    diazepam (VALIUM) 10 MG tablet, Take 10 mg by mouth 4 (four) times daily. , Disp: , Rfl:     dronabinol (MARINOL) 2.5 MG capsule, Take 1 capsule (2.5 mg total) by mouth 2 (two) times daily before meals., Disp: 30 capsule, Rfl: 1    dulaglutide (TRULICITY) 0.75 mg/0.5 mL PnIj, Inject 0.75 mg into the skin every 7 days., Disp: , Rfl:     enalapril (VASOTEC) 2.5 MG tablet, Take 5 mg by mouth once daily. For diabetes, Disp: , Rfl: 1    furosemide (LASIX) 20 MG tablet, TAKE ONE TABLET BY MOUTH EVERY DAY, Disp: 30 tablet, Rfl: 4    HYDROcodone-acetaminophen (NORCO)  mg per tablet, Take 1 tablet by mouth every 6 (six) hours as needed., Disp: , Rfl: 0    HYDROcodone-acetaminophen (NORCO) 5-325 mg per tablet, Take 1 tablet by mouth every 6 (six) hours as needed for Pain., Disp: 10 tablet, Rfl: 0    HYDROcodone-acetaminophen (NORCO) 5-325 mg per tablet, Take 1 tablet by mouth every 6 (six) hours as needed for Pain., Disp: 15 tablet, Rfl: 0    insulin glargine (LANTUS U-100 INSULIN) 100 unit/mL injection, Inject 35 Units into the skin every evening., Disp: 10.5 mL, Rfl: 11    insulin glargine,hum.rec.anlog (LANTUS U-100 INSULIN SUBQ), Inject 35 Units into the skin every evening., Disp: , Rfl:     insulin  "syringe-needle U-100 1 mL 31 gauge x 5/16 Syrg, 1 Device by Misc.(Non-Drug; Combo Route) route once daily., Disp: 50 each, Rfl: 0    metformin (GLUCOPHAGE-XR) 500 MG 24 hr tablet, Take 2,000 mg by mouth every evening. , Disp: , Rfl:     multivitamin capsule, Take 1 capsule by mouth every morning., Disp: , Rfl:     oxyCODONE-acetaminophen (PERCOCET)  mg per tablet, Take 1 tablet by mouth every 4 (four) hours as needed for Pain., Disp: 20 tablet, Rfl: 0    pravastatin (PRAVACHOL) 40 MG tablet, Take 40 mg by mouth every morning. , Disp: , Rfl: 1    zolpidem (AMBIEN) 10 mg Tab, TAKE ONE TABLET BY MOUTH EVERY NIGHT AT BEDTIME AS NEEDED FOR SLEEP, Disp: 30 tablet, Rfl: 2          Objective:   Vitals:  Blood pressure (!) 146/85, pulse 81, temperature 97.7 °F (36.5 °C), resp. rate 18, weight 91.8 kg (202 lb 6.4 oz), last menstrual period 01/16/2016.    Physical Examination:   GEN: no apparent distress, comfortable  HEAD: atraumatic and normocephalic  EYES: no pallor, no icterus  ENT: no pharyngeal erythema  NECK: noLAD/LN's, supple  CV:  No edema  CHEST: Normal respiratory effort   ABDOM:  nondistended; soft  MUSC/Skeletal: ROM normal  EXTREM: no swelling  SKIN: She is developing keloid changes at L breast incision and navel surgical sites.  NEURO: grossly intact  PSYCH: normal mood, affect and behavior  LYMPH: normal  LN's  BREASTS: L and "R breast" healing, extensive post op change, at chest .  No palpable mass at L or R breast.      Labs:   Lab Results   Component Value Date    WBC 11.62 09/06/2020    HGB 14.0 09/06/2020    HCT 42.9 09/06/2020    MCV 90 09/06/2020     09/06/2020    CMP    Bone Scan negative for metastatic dx. 2/7/18.  CAT of chest negative for metastatic dx. 2/7/18.    CAT of chest clearing of patchy infiltrates, no pulmonary nodule seen.    Mamm/U/S L breast mass 4:00.  Assessment:   (1) 60 y.o. female with diagnosis of Stage 1 triple negative R breast cancer, 10/2016.       S/P R " mastectomy, SNBx, AC x's 4, T x's12 weeks, Rad Rx and recent reconstruction.    (2)Keloids developing, Dr. Hernandez injecting scar areas.  For further keloid surgery  in coordination with  Dr. Bebeto Alarcon of Rad Rx.    Deferred because of covid 19 pandemic for now.    (3)Refill marinol, norco., soma. Now x's 3 months.    (4)C spine DDD, as per Dr. Washington..    (5) New L invasive ductal Ca, ERP+, PRP+, H2N neg.  Clinical stage 1 dx.  Oncotype Dx testing to help determine hormonal or adj. Chemotherapy.  RTC 4-5 weeks.

## 2020-10-21 ENCOUNTER — TELEPHONE (OUTPATIENT)
Dept: HEMATOLOGY/ONCOLOGY | Facility: CLINIC | Age: 60
End: 2020-10-21

## 2020-10-21 NOTE — PROGRESS NOTES
I received a referral from Dr. Peraza requesting me to provide patient with a psychiatrist.  I spoke with patient, she stated that she is looking for a psychiatrist that will continue prescribing valium.  She has previously seen a psychiatrist at [facility]; she stated that he will continue to prescribe the med but she does want to go that far.  She has since seen Dr. Raghu Decker, but he will not prescribe the medication she is seeking.  Patient provided the number to [facility] [phone] to inquire if that psychiatrist is able to meet fulfil her request.

## 2020-10-21 NOTE — PROGRESS NOTES
Chief Complaint   Patient presents with   [address]     Patient presents in office today for f/u and labs. Would like to get a flu shot. No concerns. Pt / caregiver given opportunity to review vaccine information sheet prior to vaccine administration. Opportunity given for questions and concerns. No questions or concerns at this time. 1. Have you been to the ER, urgent care clinic since your last visit? Hospitalized since your last visit? No    2. Have you seen or consulted any other health care providers outside of the [address] [place] [address] since your last visit? Include any pap smears or colon screening.  No    Learning Assessment 6/28/2017   PRIMARY LEARNER Patient   PRIMARY LANGUAGE ENGLISH   LEARNER PREFERENCE PRIMARY READING   ANSWERED BY patient   RELATIONSHIP SELF [facility]grita Castro [facility] a follow up visit status post excision of dog ears of abdomen s/p HEMANTH free flap reconstruction to R site. Doing well today with no issues    Review of patient's allergies indicates:   Allergen Reactions    Adhesive tape-silicones Hives     BANDAIDS    Polymycin Hives    Latex, natural rubber Itching    Polyurethane-39      Current Outpatient Prescriptions on File Prior to Visit   Medication Sig Dispense Refill    ASCORBIC ACID/VITAMIN E/BIOTIN (HAIR, SKIN, NAILS WITH BIOTIN ORAL) Take 1 tablet by mouth every morning.       carisoprodol (SOMA) 350 MG tablet Take 1 tablet (350 mg total) by mouth 3 (three) times daily as needed. 90 tablet 0    [] cephALEXin (KEFLEX) 500 MG capsule Take 1 capsule (500 mg total) by mouth every 12 (twelve) hours. 14 capsule 0    diazepam (VALIUM) 10 MG tablet Take 10 mg by mouth 4 (four) times daily.       dronabinol (MARINOL) 2.5 MG capsule Take 1 capsule (2.5 mg total) by mouth 2 (two) times daily before meals. 30 capsule 1    enalapril (VASOTEC) 2.5 MG tablet Take 2.5 mg by mouth once daily. For diabetes  1    furosemide (LASIX) 20 MG tablet Take 20 mg by mouth daily as needed.      glimepiride (AMARYL) 4 MG tablet       hydrocodone-acetaminophen 10-325mg (NORCO)  mg Tab Take 1 tablet by mouth every 6 (six) hours as needed for Pain. 120 tablet 0    JANUVIA 100 mg Tab Take 100 mg by mouth once daily.   1    metformin (GLUCOPHAGE-XR) 500 MG 24 hr tablet       metformin (GLUMETZA) 500 MG (MOD) 24 hr tablet Take 500 mg by mouth daily with breakfast. Taking 2000 mg in am      multivitamin capsule Take 1 capsule by mouth every morning.      oxycodone-acetaminophen (PERCOCET) 5-325 mg per tablet Take 1 tablet by mouth every 4 (four) hours as needed for Pain. 41 tablet 0    oxycodone-acetaminophen (PERCOCET) 5-325 mg per tablet Take 1 tablet by mouth every 4 (four) hours as needed for Pain. 41  tablet 0    pravastatin (PRAVACHOL) 40 MG tablet Take 40 mg by mouth every morning.   1    zolpidem (AMBIEN) 10 mg Tab Take 10 mg by mouth nightly as needed.  0     No current facility-administered medications on file prior to visit.      Patient Active Problem List   Diagnosis    Depression    Anxiety disorder due to known physiological condition    Dysthymic disorder    Neuropathy    Type 2 diabetes mellitus without complication, without long-term current use of insulin    Atypical chest pain    Breast cancer, right     Past Surgical History:   Procedure Laterality Date    BREAST BIOPSY      right    breast reconstruction with tummy tuck      BREAST SURGERY      11-3-15 LUMPECTOMY, 12-2-15 REEXCISION    mastectomy 2016      shoulder surgery and wrist      BILAT  BONE SPUR    WRIST SURGERY      RIGHT     PHYSICAL EXAMINATION    Vitals:    09/08/17 0915   BP: (!) 144/82   Pulse: 73         WD WN NAD  VSS  Lungs - normal effort  R Abdomen - incisional tape intact, no erythema/drainage/warmth  L abdomen - incisional tape intact, dark blood on dressing, small hematoma present (drained)  Skin - warm/dry, no rash    ASSESSMENT/PLAN    57 y.o. F s/p scar revision of abdomen  - doing well, no issues  - incisional tape left in place on R flank  - Incisional tape removed from L flank, incision opened using sterile scissors, hematoma drained  - she understands she can expect dark bloody draining for 3-4 days  - will RTC x 1 week    All questions were answered. The patient was advised to call the clinic with any questions or concerns prior to their next visit.

## 2020-10-29 LAB
FINAL PATHOLOGIC DIAGNOSIS: NORMAL
GROSS: NORMAL
MICROSCOPIC EXAM: NORMAL
SUPPLEMENTAL DIAGNOSIS: NORMAL

## 2020-11-16 ENCOUNTER — OFFICE VISIT (OUTPATIENT)
Dept: HEMATOLOGY/ONCOLOGY | Facility: CLINIC | Age: 60
End: 2020-11-16
Payer: MEDICARE

## 2020-11-16 VITALS
WEIGHT: 204 LBS | TEMPERATURE: 97 F | DIASTOLIC BLOOD PRESSURE: 94 MMHG | HEART RATE: 76 BPM | SYSTOLIC BLOOD PRESSURE: 161 MMHG | RESPIRATION RATE: 18 BRPM | BODY MASS INDEX: 28.45 KG/M2

## 2020-11-16 DIAGNOSIS — Z17.0 MALIGNANT NEOPLASM OF NIPPLE OF LEFT BREAST IN FEMALE, ESTROGEN RECEPTOR POSITIVE: Primary | ICD-10-CM

## 2020-11-16 DIAGNOSIS — C50.012 MALIGNANT NEOPLASM OF NIPPLE OF LEFT BREAST IN FEMALE, ESTROGEN RECEPTOR POSITIVE: Primary | ICD-10-CM

## 2020-11-16 PROCEDURE — 1125F AMNT PAIN NOTED PAIN PRSNT: CPT | Mod: S$GLB,,, | Performed by: INTERNAL MEDICINE

## 2020-11-16 PROCEDURE — 3008F BODY MASS INDEX DOCD: CPT | Mod: S$GLB,,, | Performed by: INTERNAL MEDICINE

## 2020-11-16 PROCEDURE — 3008F PR BODY MASS INDEX (BMI) DOCUMENTED: ICD-10-PCS | Mod: S$GLB,,, | Performed by: INTERNAL MEDICINE

## 2020-11-16 PROCEDURE — 1125F PR PAIN SEVERITY QUANTIFIED, PAIN PRESENT: ICD-10-PCS | Mod: S$GLB,,, | Performed by: INTERNAL MEDICINE

## 2020-11-16 PROCEDURE — 99214 PR OFFICE/OUTPT VISIT, EST, LEVL IV, 30-39 MIN: ICD-10-PCS | Mod: S$GLB,,, | Performed by: INTERNAL MEDICINE

## 2020-11-16 PROCEDURE — 99214 OFFICE O/P EST MOD 30 MIN: CPT | Mod: S$GLB,,, | Performed by: INTERNAL MEDICINE

## 2020-11-16 NOTE — PROGRESS NOTES
"[facility] In Office Follow Up Encounter Progress Note    2020    Subjective:      Patient ID:   Negrita Castro  60 y.o.   Martínez Renteria R. Leblanc, Babycos      Chief Complaint:   New L breast cancer eval.    HPI:  60 y.o. female with diagnosis of Stage 1 R breast cancer 10/26/15.  "Triple negative".  Adjuvant AC x's 4, tx's 12 and Rad Rx through 16.    Port removed.  Had bilateral reconstruction surgery 17.  Further reconstruction, tumsofy lisa 17.  Additional fat injection at R chest done for symmetry.  She had placement of R nipple.  Dr. Hernandez.      She is due for additional reconstructive surgery at breast and abdomenal areas.  The surgery is being done as part of her reconstruction efforts and for keloid management.  The date of the surgery is May 19th.  Have sent a message to Dr. Alarcon to arrange radiation therapy follow-up with her around the time of the surgery to reduce risk of keloid recurrence.  Also, Dr. Alarcon to consider Rad Rx to operative site to decrease keloid potential.  On hold now because of Covid 19 pandemic.    Recent Mamm/U/S showed small mass at 4:00 at L breast.  Needle Bx invasive ductal Ca, ERP+, PRP+, H2N neg  She had  L partial mastectomy, Sentinal node Bx 20. Followed by another surgery because of L axillary infection.  Primary 2 cm, LN neg, Stage 1 dx.    Discussed Oncotype Dx testing, this is pending.  Discussed BRCA testing, given her bilateral  breast Dx. PHN would not authorize BRCA 1 & 2.  Refill meds for her.    Dr. Washington saw her for C spine eval, no surgery planned for now.  He referred her to Dr. Rivers for injection Rx.    DM, cholesterol, epilepsy hx, DJD, LBP.  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    LR shoulder arthroscopy, R wrist surgery, M0.    Smoke / ppd x's 35 years, quit 2015.  ETOH no.  Disability-depression, epilepsy  Allergy none    Mom ovarian cancer  Dad lung cancer  Sisters DM, lung dx.      ROS:   GEN: normal " without any fever, night sweats or weight loss  HEENT: normal with no HA's, sore throat, stiff neck, changes in vision  CV: normal with no CP, SOB, PND, POOL or orthopnea  PULM: normal with no SOB, cough, hemoptysis, sputum or pleuritic pain  GI: normal with no abdominal pain, nausea, vomiting, constipation, diarrhea, melanotic stools, BRBPR, or hematemesis  : normal with no hematuria, dysuria  BREAST: See HPI.  SKIN: normal with no rash, erythema, bruising, or swelling     Past Medical History:   Diagnosis Date    Breast cancer     left breast    Cancer     RIGHT BREAST 10-15    Depression     Diabetes mellitus     Neuropathy     Panic attacks     S/P epidural steroid injection     Seizures     none since early 30's    Wears glasses     TO DRIVE     Past Surgical History:   Procedure Laterality Date    AXILLARY NODE DISSECTION Left 8/24/2020    Procedure: LYMPHADENECTOMY, AXILLARY;  Surgeon: Cory Vick MD;  Location: [facility] OR;  Service: General;  Laterality: Left;  left breast mastectomy with possible axillary lymph node dissection    BREAST BIOPSY      right    breast reconstruction with tummy [facility]      BREAST SURGERY      11-3-15 LUMPECTOMY, 12-2-15 REEXCISION    INCISION AND DRAINAGE OF ABSCESS Left 9/9/2020    Procedure: INCISION AND DRAINAGE, ABSCESS;  Surgeon: Cory Vick MD;  Location: [facility] OR;  Service: General;  Laterality: Left;    INSERTION OF LOCALIZATION WIRE Left 8/24/2020    Procedure: INSERTION, LOCALIZATION WIRE;  Surgeon: Cory Vick MD;  Location: [facility];  Service: General;  Laterality: Left;  left breast lumpectomy with wire needle loc    mastectomy 2016      RECONSTRUCTION OF NIPPLE Right 11/5/2018    Procedure: RECONSTRUCTION-NIPPLE RIGHT;  Surgeon: Xiang Hernandez MD;  Location: [address];  Service: Plastics;  Laterality: Right;    SENTINEL LYMPH NODE BIOPSY Left 8/24/2020    Procedure: BIOPSY, LYMPH NODE, SENTINEL;  Surgeon: Cory  MOLLY Vick MD;  Location: [facility] OR;  Service: General;  Laterality: Left;  left breast lumpectomy with left sentinel lymoh node       shoulder surgery and wrist      BILAT  BONE SPUR    WRIST SURGERY      RIGHT       Review of patient's allergies indicates:   Allergen Reactions    Adhesive tape-silicones Hives     BANDAIDS    Polymycin Hives    Latex, natural rubber Itching    Polyurethane-39            Current Outpatient Medications:     ASCORBIC ACID/VITAMIN E/BIOTIN (HAIR, SKIN, NAILS WITH BIOTIN ORAL), Take 1 tablet by mouth every morning. , Disp: , Rfl:     carisoprodol (SOMA) 350 MG tablet, Take 1 tablet (350 mg total) by mouth 3 (three) times daily as needed., Disp: 90 tablet, Rfl: 0    diazepam (VALIUM) 10 MG tablet, Take 10 mg by mouth 4 (four) times daily. , Disp: , Rfl:     dronabinol (MARINOL) 2.5 MG capsule, Take 1 capsule (2.5 mg total) by mouth 2 (two) times daily before meals., Disp: 30 capsule, Rfl: 1    dulaglutide (TRULICITY) 0.75 mg/0.5 mL PnIj, Inject 0.75 mg into the skin every 7 days., Disp: , Rfl:     enalapril (VASOTEC) 2.5 MG tablet, Take 5 mg by mouth once daily. For diabetes, Disp: , Rfl: 1    furosemide (LASIX) 20 MG tablet, TAKE ONE TABLET BY MOUTH EVERY DAY, Disp: 30 tablet, Rfl: 4    HYDROcodone-acetaminophen (NORCO)  mg per tablet, Take 1 tablet by mouth every 6 (six) hours as needed., Disp: , Rfl: 0    HYDROcodone-acetaminophen (NORCO) 5-325 mg per tablet, Take 1 tablet by mouth every 6 (six) hours as needed for Pain., Disp: 10 tablet, Rfl: 0    HYDROcodone-acetaminophen (NORCO) 5-325 mg per tablet, Take 1 tablet by mouth every 6 (six) hours as needed for Pain., Disp: 15 tablet, Rfl: 0    insulin glargine (LANTUS U-100 INSULIN) 100 unit/mL injection, Inject 35 Units into the skin every evening., Disp: 10.5 mL, Rfl: 11    insulin glargine,hum.rec.anlog (LANTUS U-100 INSULIN SUBQ), Inject 35 Units into the skin every evening., Disp: , Rfl:     insulin  "syringe-needle U-100 1 mL 31 gauge x 5/16 Syrg, 1 Device by Misc.(Non-Drug; Combo Route) route once daily., Disp: 50 each, Rfl: 0    metformin (GLUCOPHAGE-XR) 500 MG 24 hr tablet, Take 2,000 mg by mouth every evening. , Disp: , Rfl:     multivitamin capsule, Take 1 capsule by mouth every morning., Disp: , Rfl:     oxyCODONE-acetaminophen (PERCOCET)  mg per tablet, Take 1 tablet by mouth every 4 (four) hours as needed for Pain., Disp: 20 tablet, Rfl: 0    pravastatin (PRAVACHOL) 40 MG tablet, Take 40 mg by mouth every morning. , Disp: , Rfl: 1    zolpidem (AMBIEN) 10 mg Tab, TAKE ONE TABLET BY MOUTH EVERY NIGHT AT BEDTIME AS NEEDED FOR SLEEP, Disp: 30 tablet, Rfl: 2    dexAMETHasone (DECADRON) 4 MG Tab, Take 1 tablet b.i.d. with meals on Monday, Tuesday, Wednesday. Repeat q. 3 weeks before chemotherapy, Disp: 36 tablet, Rfl: 0          Objective:   Vitals:  Blood pressure (!) 161/94, pulse 76, temperature 97.3 °F (36.3 °C), resp. rate 18, weight 92.5 kg (204 lb), last menstrual period 01/16/2016.    Physical Examination:   GEN: no apparent distress, comfortable  HEAD: atraumatic and normocephalic  EYES: no pallor, no icterus  ENT: no pharyngeal erythema  NECK: noLAD/LN's, supple  CV:  No edema  CHEST: Normal respiratory effort   ABDOM:  nondistended; soft  MUSC/Skeletal: ROM normal  EXTREM: no swelling  SKIN: She is developing keloid changes at L breast incision and navel surgical sites.  NEURO: grossly intact  PSYCH: normal mood, affect and behavior  LYMPH: normal  LN's  BREASTS: L and "R breast" healing, extensive post op change, at chest .  No palpable mass at L or R breast.    Oncotype DX testing supports that her recurrence risk is increased with hormonal adjuvant therapy alone.  I have recommended that we give her adjuvant combination chemotherapy in addition to hormonal therapy to try and reduce her systemic recurrence risk long-term.  Cytoxan Taxotere Q 3 weeks x6 cycles would be reasonable here.  " This would be followed by adjuvant hormonal therapy at a later date..  Also she will require adjuvant radiation therapy for local control after combination chemotherapy has been completed.    Have discussed with her the rationale and general schema of Cytoxan Taxotere.  Myelosuppression is significant with this regimen and she will require Neulasta administration or Udenyca support.      Labs:   Lab Results   Component Value Date    WBC 11.62 09/06/2020    HGB 14.0 09/06/2020    HCT 42.9 09/06/2020    MCV 90 09/06/2020     09/06/2020    CMP    Bone Scan negative for metastatic dx. 2/7/18.  CAT of chest negative for metastatic dx. 2/7/18.    CAT of chest clearing of patchy infiltrates, no pulmonary nodule seen.    Mamm/U/S L breast mass 4:00.  Assessment:   (1) 60 y.o. female with diagnosis of Stage 1 triple negative R breast cancer, 10/2016.       S/P R mastectomy, SNBx, AC x's 4, T x's12 weeks, Rad Rx and recent reconstruction.    (2)Keloids developing, Dr. Hernandez injecting scar areas.  For further keloid surgery  in coordination with  Dr. Bebeto Alarcon of Rad Rx.    Deferred because of covid 19 pandemic for now.    (3)Refill marinol, norco., soma. Now x's 3 months.    (4)C spine DDD, as per Dr. Washington..    (5) New L invasive ductal Ca, ERP+, PRP+, H2N neg.  Clinical stage 1 dx.    Oncotype DX supports 25% recurrence risk with adjuvant hormonal therapy alone.  Will plan to give her Cytoxan Taxotere adjuvant chemotherapy q. 3 weeks x6 cycles to reduce her systemic recurrence risk.  Plan to start this on December 1st.

## 2020-11-24 ENCOUNTER — TELEPHONE (OUTPATIENT)
Dept: HEMATOLOGY/ONCOLOGY | Facility: CLINIC | Age: 60
End: 2020-11-24

## 2020-11-24 NOTE — TELEPHONE ENCOUNTER
----- Message from Nell Carrillo sent at 11/23/2020  4:15 PM CST -----  The patient called for a refill of her ambien to be called in to [facility]. She said they sent a request but did not get a response. Please call her back when done at [phone].

## 2020-11-25 DIAGNOSIS — T45.1X5A CHEMOTHERAPY-INDUCED NAUSEA: Primary | ICD-10-CM

## 2020-11-25 DIAGNOSIS — D70.1 CHEMOTHERAPY-INDUCED NEUTROPENIA: Primary | ICD-10-CM

## 2020-11-25 DIAGNOSIS — T45.1X5A CHEMOTHERAPY-INDUCED NEUTROPENIA: Primary | ICD-10-CM

## 2020-11-25 DIAGNOSIS — R11.0 CHEMOTHERAPY-INDUCED NAUSEA: Primary | ICD-10-CM

## 2020-11-25 RX ORDER — HEPARIN 100 UNIT/ML
500 SYRINGE INTRAVENOUS
Status: CANCELLED | OUTPATIENT
Start: 2020-12-03

## 2020-11-25 RX ORDER — DEXAMETHASONE 4 MG/1
TABLET ORAL
Qty: 36 TABLET | Refills: 0 | Status: SHIPPED | OUTPATIENT
Start: 2020-11-25 | End: 2021-03-02

## 2020-11-25 RX ORDER — SODIUM CHLORIDE 0.9 % (FLUSH) 0.9 %
10 SYRINGE (ML) INJECTION
Status: CANCELLED | OUTPATIENT
Start: 2020-12-03

## 2020-12-01 ENCOUNTER — LAB VISIT (OUTPATIENT)
Dept: PRIMARY CARE CLINIC | Facility: CLINIC | Age: 60
End: 2020-12-01
Payer: MEDICARE

## 2020-12-01 DIAGNOSIS — Z01.818 PREOP TESTING: ICD-10-CM

## 2020-12-01 PROCEDURE — U0003 INFECTIOUS AGENT DETECTION BY NUCLEIC ACID (DNA OR RNA); SEVERE ACUTE RESPIRATORY SYNDROME CORONAVIRUS 2 (SARS-COV-2) (CORONAVIRUS DISEASE [COVID-19]), AMPLIFIED PROBE TECHNIQUE, MAKING USE OF HIGH THROUGHPUT TECHNOLOGIES AS DESCRIBED BY CMS-2020-01-R: HCPCS

## 2020-12-02 ENCOUNTER — LAB VISIT (OUTPATIENT)
Dept: LAB | Facility: HOSPITAL | Age: 60
End: 2020-12-02
Attending: NURSE PRACTITIONER
Payer: MEDICARE

## 2020-12-02 ENCOUNTER — OFFICE VISIT (OUTPATIENT)
Dept: HEMATOLOGY/ONCOLOGY | Facility: CLINIC | Age: 60
End: 2020-12-02
Payer: MEDICARE

## 2020-12-02 ENCOUNTER — TELEPHONE (OUTPATIENT)
Dept: HEMATOLOGY/ONCOLOGY | Facility: CLINIC | Age: 60
End: 2020-12-02

## 2020-12-02 VITALS
DIASTOLIC BLOOD PRESSURE: 93 MMHG | WEIGHT: 203 LBS | SYSTOLIC BLOOD PRESSURE: 160 MMHG | BODY MASS INDEX: 28.31 KG/M2 | RESPIRATION RATE: 18 BRPM | TEMPERATURE: 98 F | HEART RATE: 86 BPM

## 2020-12-02 DIAGNOSIS — U07.1 COVID-19 VIRUS DETECTED: ICD-10-CM

## 2020-12-02 DIAGNOSIS — C50.011 MALIGNANT NEOPLASM OF NIPPLE OF RIGHT BREAST IN FEMALE, ESTROGEN RECEPTOR POSITIVE: ICD-10-CM

## 2020-12-02 DIAGNOSIS — C50.011 MALIGNANT NEOPLASM OF NIPPLE OF RIGHT BREAST IN FEMALE, ESTROGEN RECEPTOR POSITIVE: Primary | ICD-10-CM

## 2020-12-02 DIAGNOSIS — Z76.89 ESTABLISHING CARE WITH NEW DOCTOR, ENCOUNTER FOR: ICD-10-CM

## 2020-12-02 DIAGNOSIS — Z17.0 MALIGNANT NEOPLASM OF NIPPLE OF RIGHT BREAST IN FEMALE, ESTROGEN RECEPTOR POSITIVE: Primary | ICD-10-CM

## 2020-12-02 DIAGNOSIS — Z17.0 MALIGNANT NEOPLASM OF NIPPLE OF RIGHT BREAST IN FEMALE, ESTROGEN RECEPTOR POSITIVE: ICD-10-CM

## 2020-12-02 LAB
ALBUMIN SERPL BCP-MCNC: 4 G/DL (ref 3.5–5.2)
ALP SERPL-CCNC: 142 U/L (ref 55–135)
ALT SERPL W/O P-5'-P-CCNC: 56 U/L (ref 10–44)
ANION GAP SERPL CALC-SCNC: 11 MMOL/L (ref 8–16)
AST SERPL-CCNC: 51 U/L (ref 10–40)
BASOPHILS # BLD AUTO: 0.02 K/UL (ref 0–0.2)
BASOPHILS NFR BLD: 0.5 % (ref 0–1.9)
BILIRUB SERPL-MCNC: 0.6 MG/DL (ref 0.1–1)
BUN SERPL-MCNC: 9 MG/DL (ref 6–20)
CALCIUM SERPL-MCNC: 8.8 MG/DL (ref 8.7–10.5)
CHLORIDE SERPL-SCNC: 99 MMOL/L (ref 95–110)
CO2 SERPL-SCNC: 28 MMOL/L (ref 23–29)
CREAT SERPL-MCNC: 1 MG/DL (ref 0.5–1.4)
DIFFERENTIAL METHOD: ABNORMAL
EOSINOPHIL # BLD AUTO: 0 K/UL (ref 0–0.5)
EOSINOPHIL NFR BLD: 0.3 % (ref 0–8)
ERYTHROCYTE [DISTWIDTH] IN BLOOD BY AUTOMATED COUNT: 13 % (ref 11.5–14.5)
EST. GFR  (AFRICAN AMERICAN): >60 ML/MIN/1.73 M^2
EST. GFR  (NON AFRICAN AMERICAN): >60 ML/MIN/1.73 M^2
GLUCOSE SERPL-MCNC: 239 MG/DL (ref 70–110)
HCT VFR BLD AUTO: 42.1 % (ref 37–48.5)
HGB BLD-MCNC: 13.7 G/DL (ref 12–16)
IMM GRANULOCYTES # BLD AUTO: 0.02 K/UL (ref 0–0.04)
IMM GRANULOCYTES NFR BLD AUTO: 0.5 % (ref 0–0.5)
LYMPHOCYTES # BLD AUTO: 1.2 K/UL (ref 1–4.8)
LYMPHOCYTES NFR BLD: 32 % (ref 18–48)
MCH RBC QN AUTO: 29.7 PG (ref 27–31)
MCHC RBC AUTO-ENTMCNC: 32.5 G/DL (ref 32–36)
MCV RBC AUTO: 91 FL (ref 82–98)
MONOCYTES # BLD AUTO: 0.5 K/UL (ref 0.3–1)
MONOCYTES NFR BLD: 13 % (ref 4–15)
NEUTROPHILS # BLD AUTO: 2 K/UL (ref 1.8–7.7)
NEUTROPHILS NFR BLD: 53.7 % (ref 38–73)
NRBC BLD-RTO: 0 /100 WBC
PLATELET # BLD AUTO: 229 K/UL (ref 150–350)
PMV BLD AUTO: 10.3 FL (ref 9.2–12.9)
POTASSIUM SERPL-SCNC: 3.7 MMOL/L (ref 3.5–5.1)
PROT SERPL-MCNC: 7.5 G/DL (ref 6–8.4)
RBC # BLD AUTO: 4.62 M/UL (ref 4–5.4)
SARS-COV-2 RNA RESP QL NAA+PROBE: DETECTED
SODIUM SERPL-SCNC: 138 MMOL/L (ref 136–145)
WBC # BLD AUTO: 3.69 K/UL (ref 3.9–12.7)

## 2020-12-02 PROCEDURE — 85025 COMPLETE CBC W/AUTO DIFF WBC: CPT

## 2020-12-02 PROCEDURE — 36415 COLL VENOUS BLD VENIPUNCTURE: CPT

## 2020-12-02 PROCEDURE — 3008F BODY MASS INDEX DOCD: CPT | Mod: S$GLB,,, | Performed by: NURSE PRACTITIONER

## 2020-12-02 PROCEDURE — 1125F AMNT PAIN NOTED PAIN PRSNT: CPT | Mod: S$GLB,,, | Performed by: NURSE PRACTITIONER

## 2020-12-02 PROCEDURE — 3008F PR BODY MASS INDEX (BMI) DOCUMENTED: ICD-10-PCS | Mod: S$GLB,,, | Performed by: NURSE PRACTITIONER

## 2020-12-02 PROCEDURE — 1125F PR PAIN SEVERITY QUANTIFIED, PAIN PRESENT: ICD-10-PCS | Mod: S$GLB,,, | Performed by: NURSE PRACTITIONER

## 2020-12-02 PROCEDURE — 99215 PR OFFICE/OUTPT VISIT, EST, LEVL V, 40-54 MIN: ICD-10-PCS | Mod: S$GLB,,, | Performed by: NURSE PRACTITIONER

## 2020-12-02 PROCEDURE — 80053 COMPREHEN METABOLIC PANEL: CPT

## 2020-12-02 PROCEDURE — 99215 OFFICE O/P EST HI 40 MIN: CPT | Mod: S$GLB,,, | Performed by: NURSE PRACTITIONER

## 2020-12-02 RX ORDER — ONDANSETRON 8 MG/1
8 TABLET, ORALLY DISINTEGRATING ORAL EVERY 8 HOURS PRN
Qty: 30 TABLET | Refills: 5 | Status: SHIPPED | OUTPATIENT
Start: 2020-12-02 | End: 2021-01-14

## 2020-12-02 RX ORDER — FLUCONAZOLE 150 MG/1
150 TABLET ORAL ONCE
Qty: 1 TABLET | Refills: 0 | Status: SHIPPED | OUTPATIENT
Start: 2020-12-02 | End: 2020-12-02

## 2020-12-02 RX ORDER — PROMETHAZINE HYDROCHLORIDE 25 MG/1
25 TABLET ORAL
Qty: 30 TABLET | Refills: 5 | Status: SHIPPED | OUTPATIENT
Start: 2020-12-02 | End: 2022-06-01 | Stop reason: SDUPTHER

## 2020-12-02 NOTE — PROGRESS NOTES
FOLLOW-UP APPOINTMENT    PATIENT:   Negrita Castro  :    1960  MR#:    [id number]  DATE OF VISIT:  2020      Chief Complaint: Breast Cancer/Chemo School    HPI:   Ms. Negrita Castro 61 yo female with Breast Cancer presents today for chemotherapy education.  She will be starting treatment with Taxotere, Cytoxan and Udenyca for the above diagnosis. Patient concerned about the risk of hair loss discussed with her there is a chance of permanent hair loss with Taxotere. It is rare, but still a possibility. She verbalized understanding. She knows to have labs drawn weekly at [facility]. She will have baseline labs today at [facility]. She was previously being seen by Dr. Islas for her diabetes as her PCP and he is retiring. Will refer patient to Dr. Shaver for new PCP.    Depression Patient Health Questionnaire 2020 2020 10/19/2020 10/19/2020 2020 2020 2020   Over the last two weeks how often have you been bothered by little interest or pleasure in doing things 1 1 1 0 1 3 3   Over the last two weeks how often have you been bothered by feeling down, depressed or hopeless 1 1 1 0 1 3 3   PHQ-2 Total Score 2 2 2 0 2 6 6   Over the last two weeks how often have you been bothered by trouble falling or staying asleep, or sleeping too much - - - - - 3 1   Over the last two weeks how often have you been bothered by feeling tired or having little energy - - - - - 3 0   Over the last two weeks how often have you been bothered by a poor appetite or overeating - - - - - 0 0   Over the last two weeks how often have you been bothered by feeling bad about yourself - or that you are a failure or have let yourself or your family down - - - - - 0 0   Over the last two weeks how often have you been bothered by trouble concentrating on things, such as reading the newspaper or watching television - - - - - 3 0   Over the last two weeks how often have you been bothered by moving or speaking so slowly that other  people could have noticed. Or the opposite - being so fidgety or restless that you have been moving around a lot more than usual. - - - - - 0 0   Over the last two weeks how often have you been bothered by thoughts that you would be better off dead, or of hurting yourself - - - - - 0 0   If you checked off any problems, how difficult have these problems made it for you to do your work, take care of things at home or get along with other people? - - - - - Not difficult at all Not difficult at all   Total Score - - - - - 15 7         Review of Systems - Oncology    Oncology History   Breast cancer, left breast   8/24/2020 Initial Diagnosis    Breast cancer, left breast     12/3/2020 -  Chemotherapy    Treatment Summary   Plan Name: OP BREAST TC - DOCETAXEL CYCLOPHOSPHAMIDE Q3W  Treatment Goal: Curative  Status: Active  Start Date: 12/3/2020 (Planned)  End Date: 3/19/2021 (Planned)  Provider: TALISHA Vick MD  Chemotherapy: cyclophosphamide (CYTOXAN) 600 mg/m2 = 1,290 mg in sodium chloride 0.9% 250 mL chemo infusion, 600 mg/m2 = 1,290 mg, Intravenous, Clinic/HOD 1 time, 0 of 6 cycles  DOCEtaxeL (TAXOTERE) 75 mg/m2 = 160 mg in sodium chloride 0.9% 250 mL chemo infusion, 75 mg/m2 = 160 mg, Intravenous, Clinic/HOD 1 time, 0 of 6 cycles         Patient Active Problem List   Diagnosis    Depression    Anxiety disorder due to known physiological condition    Dysthymic disorder    Neuropathy    Atypical chest pain    Breast cancer, right    Breast cancer    Nipple anomaly    Type 2 diabetes mellitus with hyperglycemia, with long-term current use of insulin    Mixed hyperlipidemia    Chemotherapy-induced nausea    Keloid of skin    Breast cancer, left breast    Breast cancer, right breast    Chemotherapy-induced neutropenia       Past Medical History:   Diagnosis Date    Breast cancer     left breast    Cancer     RIGHT BREAST 10-15    Depression     Diabetes mellitus     Neuropathy     Panic attacks      S/P epidural steroid injection     Seizures     none since early 30's    Wears glasses     TO DRIVE       Past Surgical History:   Procedure Laterality Date    AXILLARY NODE DISSECTION Left 8/24/2020    Procedure: LYMPHADENECTOMY, AXILLARY;  Surgeon: Cory Vick MD;  Location: [facility];  Service: General;  Laterality: Left;  left breast mastectomy with possible axillary lymph node dissection    BREAST BIOPSY      right    breast reconstruction with tummy tuck      BREAST SURGERY      11-3-15 LUMPECTOMY, 12-2-15 REEXCISION    INCISION AND DRAINAGE OF ABSCESS Left 9/9/2020    Procedure: INCISION AND DRAINAGE, ABSCESS;  Surgeon: Cory Vick MD;  Location: [facility] OR;  Service: General;  Laterality: Left;    INSERTION OF LOCALIZATION WIRE Left 8/24/2020    Procedure: INSERTION, LOCALIZATION WIRE;  Surgeon: Cory Vick MD;  Location: [facility];  Service: General;  Laterality: Left;  left breast lumpectomy with wire needle loc    mastectomy 2016      RECONSTRUCTION OF NIPPLE Right 11/5/2018    Procedure: RECONSTRUCTION-NIPPLE RIGHT;  Surgeon: Xiang Hernandez MD;  Location: [address];  Service: Plastics;  Laterality: Right;    SENTINEL LYMPH NODE BIOPSY Left 8/24/2020    Procedure: BIOPSY, LYMPH NODE, SENTINEL;  Surgeon: Cory Vick MD;  Location: [facility];  Service: General;  Laterality: Left;  left breast lumpectomy with left sentinel lymoh node       shoulder surgery and wrist      BILAT  BONE SPUR    WRIST SURGERY      RIGHT       Social History     Socioeconomic History    Marital status:      Spouse name: Not on file    Number of children: Not on file    Years of education: Not on file    Highest education level: Not on file   Occupational History    Not on file   Social Needs    Financial resource strain: Not on file    Food insecurity     Worry: Not on file     Inability: Not on file    Transportation needs     Medical: Not on file      Non-medical: Not on file   Tobacco Use    Smoking status: Former Smoker     Packs/day: 0.25     Years: 30.00     Pack years: 7.50     Quit date: 2015     Years since quittin.1    Smokeless tobacco: Never Used   Substance and Sexual Activity    Alcohol use: Yes     Alcohol/week: 0.0 standard drinks     Comment: RARELY    Drug use: Yes     Types: Marijuana     Comment: medical marijuana    Sexual activity: Not on file   Lifestyle    Physical activity     Days per week: Not on file     Minutes per session: Not on file    Stress: Not on file   Relationships    Social connections     Talks on phone: Not on file     Gets together: Not on file     Attends Scientologist service: Not on file     Active member of club or organization: Not on file     Attends meetings of clubs or organizations: Not on file     Relationship status: Not on file   Other Topics Concern    Not on file   Social History Narrative    Not on file       Family History   Problem Relation Age of Onset    Anesthesia problems Neg Hx          Current Outpatient Medications:     ASCORBIC ACID/VITAMIN E/BIOTIN (HAIR, SKIN, NAILS WITH BIOTIN ORAL), Take 1 tablet by mouth every morning. , Disp: , Rfl:     carisoprodol (SOMA) 350 MG tablet, Take 1 tablet (350 mg total) by mouth 3 (three) times daily as needed., Disp: 90 tablet, Rfl: 0    dexAMETHasone (DECADRON) 4 MG Tab, Take 1 tablet b.i.d. with meals on Monday, Tuesday, Wednesday. Repeat q. 3 weeks before chemotherapy, Disp: 36 tablet, Rfl: 0    diazepam (VALIUM) 10 MG tablet, Take 10 mg by mouth 4 (four) times daily. , Disp: , Rfl:     dronabinol (MARINOL) 2.5 MG capsule, Take 1 capsule (2.5 mg total) by mouth 2 (two) times daily before meals., Disp: 30 capsule, Rfl: 1    dulaglutide (TRULICITY) 0.75 mg/0.5 mL PnIj, Inject 0.75 mg into the skin every 7 days., Disp: , Rfl:     enalapril (VASOTEC) 2.5 MG tablet, Take 5 mg by mouth once daily. For diabetes, Disp: , Rfl: 1    furosemide  (LASIX) 20 MG tablet, TAKE ONE TABLET BY MOUTH EVERY DAY, Disp: 30 tablet, Rfl: 6    HYDROcodone-acetaminophen (NORCO)  mg per tablet, Take 1 tablet by mouth every 6 (six) hours as needed., Disp: , Rfl: 0    HYDROcodone-acetaminophen (NORCO) 5-325 mg per tablet, Take 1 tablet by mouth every 6 (six) hours as needed for Pain., Disp: 10 tablet, Rfl: 0    HYDROcodone-acetaminophen (NORCO) 5-325 mg per tablet, Take 1 tablet by mouth every 6 (six) hours as needed for Pain., Disp: 15 tablet, Rfl: 0    insulin glargine (LANTUS U-100 INSULIN) 100 unit/mL injection, Inject 35 Units into the skin every evening., Disp: 10.5 mL, Rfl: 11    insulin glargine,hum.rec.anlog (LANTUS U-100 INSULIN SUBQ), Inject 35 Units into the skin every evening., Disp: , Rfl:     insulin syringe-needle U-100 1 mL 31 gauge x 5/16 Syrg, 1 Device by Misc.(Non-Drug; Combo Route) route once daily., Disp: 50 each, Rfl: 0    metformin (GLUCOPHAGE-XR) 500 MG 24 hr tablet, Take 2,000 mg by mouth every evening. , Disp: , Rfl:     multivitamin capsule, Take 1 capsule by mouth every morning., Disp: , Rfl:     oxyCODONE-acetaminophen (PERCOCET)  mg per tablet, Take 1 tablet by mouth every 4 (four) hours as needed for Pain., Disp: 20 tablet, Rfl: 0    pravastatin (PRAVACHOL) 40 MG tablet, Take 40 mg by mouth every morning. , Disp: , Rfl: 1    zolpidem (AMBIEN) 10 mg Tab, TAKE ONE TABLET BY MOUTH EVERY NIGHT AT BEDTIME AS NEEDED FOR SLEEP, Disp: 30 tablet, Rfl: 2    fluconazole (DIFLUCAN) 150 MG Tab, Take 1 tablet (150 mg total) by mouth once. for 1 dose, Disp: 1 tablet, Rfl: 0    ondansetron (ZOFRAN-ODT) 8 MG TbDL, Take 1 tablet (8 mg total) by mouth every 8 (eight) hours as needed., Disp: 30 tablet, Rfl: 5    promethazine (PHENERGAN) 25 MG tablet, Take 1 tablet (25 mg total) by mouth every 4 to 6 hours as needed., Disp: 30 tablet, Rfl: 5    Review of patient's allergies indicates:   Allergen Reactions    Adhesive tape-silicones Hives      BANDAIDS    Polymycin Hives    Adhesive     Latex, natural rubber Hives and Itching    Neomycin-bacitracnzn-polymyxnb     Polyurethane-39 Hives       Physcial Examination  VITAL SIGNS:    Body surface area is 2.15 meters squared.   Pain Assessment  Vitals:    12/02/20 1252   BP: (!) 160/93   Pulse: 86   Resp: 18   Temp: 98.4 °F (36.9 °C)   Weight: 92.1 kg (203 lb)   PainSc:   7   PainLoc: Back     Quality:aching and throbbing  Onset: Greater than 4 years ago  Duration: more than 1 year  What relieves the pain? pain medication  What cause or increases the pain? Laying, Bending, Extension, Flexing and Lifting  Plan: Patient is to continue with current pain medications.     Wt Readings from Last 5 Encounters:   12/02/20 92.1 kg (203 lb)   11/16/20 92.5 kg (204 lb)   10/19/20 91.8 kg (202 lb 6.4 oz)   09/17/20 91.2 kg (201 lb)   09/17/20 90 kg (198 lb 6.6 oz)       GENERAL:  Negrita Castro is healthy-appearing 60 y.o. female, in no distress.   EYES:   Pupils equal, round, reactive.  Conjunctivae, sclera and lids normal.  HEENT: Head normocephalic and atraumatic, without alopecia.  Oropharynx is unremarkable.  No icterus, jaundice, stomatitis, mucositis, or ulceration is noted.  Ears are clear and unremarkable.  Nose, nares, and septum are unremarkable.    NECK:   No masses.  Thyroid and trachea are normal.    BREASTS:  Deferred.  RESPIRATORY: Clear to auscultation bilaterally.  Symmetrically effortless expansion.  No wheezing and no stridor.    CV: Heart reveals regular rate and rhythm without murmur, rub, or gallops.  ABDOMEN: Soft, non-tender.  No masses, no hernias, and no rebound or rigidity are noted.  /RECTAL:  Deferred.  LYMPHATICS: No preauricular, submandibular, cervical, supraclavicular, axillary, lymphadenopathy.  MUSCULOSKELETAL:Fair musculature, no atrophy.  No arthritic changes.  No edema or cyanosis. Back is without gross abnormal curvature.   NEUROLOGICAL: Cranial nerves II-XII grossly  intact.  Motor and sensory exam intact.  SKIN:   No lesions, bruises, petechiae or rashes.  Good turgor.    PSYCHIATRIC: Patient is alert and oriented to time, place and person.  Mood and affect are appropriate.         Laboratory and Radiology   Lab Results   Component Value Date    WBC 11.62 09/06/2020    RBC 4.76 09/06/2020    HGB 14.0 09/06/2020    HCT 42.9 09/06/2020    MCV 90 09/06/2020    MCH 29.4 09/06/2020    MCHC 32.6 09/06/2020    RDW 12.7 09/06/2020     09/06/2020    MPV 9.6 09/06/2020    GRAN 9.0 (H) 09/06/2020    GRAN 77.1 (H) 09/06/2020    LYMPH 1.8 09/06/2020    LYMPH 15.5 (L) 09/06/2020    MONO 0.7 09/06/2020    MONO 6.0 09/06/2020    EOS 0.1 09/06/2020    BASO 0.03 09/06/2020    EOSINOPHIL 0.8 09/06/2020    BASOPHIL 0.3 09/06/2020     BMP  Lab Results   Component Value Date     09/06/2020    K 3.5 09/06/2020     09/06/2020    CO2 24 09/06/2020    BUN 14 09/06/2020    CREATININE 0.9 09/06/2020    CALCIUM 9.3 09/06/2020    ANIONGAP 12 09/06/2020    ESTGFRAFRICA >60.0 09/06/2020    EGFRNONAA >60.0 09/06/2020     Lab Results   Component Value Date    ALT 30 09/06/2020    AST 28 09/06/2020    ALKPHOS 136 (H) 09/06/2020    BILITOT 1.1 (H) 09/06/2020     No results found for this or any previous visit (from the past 2160 hour(s)).  No results found for this or any previous visit (from the past 2160 hour(s)).  No results found for this or any previous visit (from the past 2160 hour(s)).    Pathology  Pathology Results  (Last 10 years)               09/09/20 1032  Specimen to Pathology, Surgery General Surgery Final result    Narrative:  Pre-op Diagnosis: Malignant neoplasm of right female breast,   unspecified estrogen receptor status, unspecified site of   breast [C50.911]   Procedure(s):   LUMPECTOMY, BREAST   Number of specimens: 1   Name of specimens: LEFT BREAST ANTERIOR MARGIN  ( STITCH AT   NEW ANTERIOR MARGIN)   Specimen total (fresh, frozen, permanent):->1       08/24/20 1255   Specimen to Pathology, Surgery General Surgery Edited Result - FINAL    Narrative:  Pre-op Diagnosis: Malignant neoplasm of left female breast,   unspecified estrogen receptor status, unspecified site of   breast [C50.912]   Procedure(s):   LUMPECTOMY, BREAST   INSERTION, LOCALIZATION WIRE   BIOPSY, LYMPH NODE, SENTINEL   LYMPHADENECTOMY, AXILLARY   Number of specimens: 2   Name of specimens:   1- left axillary sentinel node   2- left breast partial mastectomy with wire (short stitch   superior, long stitch lateral, double stitch deep)   Specimen total (fresh, frozen, permanent):->2       08/05/20 1436  Specimen to Pathology, Radiology Breast, needle biopsy Final result    Narrative:  Left breast biopsy   4:00   6cm fn   Dr arthur   Fig 8 clip       10/22/15 0900  Tissue Specimen to Pathology Edited Result - FINAL          TITLE: PLAN OF CARE FOR THE CHEMOTHERAPY PATIENT / TEACHING PROTOCOL    PURPOSE: To involve the patient / significant other in the plan of care and to provide teaching to the significant other & patient receiving chemotherapy.    LEVEL: Independent.    CONTENT: The Plan of Care for the chemotherapy patient is individualized and appropriate to the patients needs, strengths, limitations, & goals.  Education includes information regarding chemotherapy side effects, the treatment itself, and self-care  Activities.    GOAL / OUTCOME STANDARDS    PHYSIOLOGIC: The client will remain free or experience minimal side effects or toxicities throughout the chemotherapy treatment period.     PSYCHOLOGIC: The client/significant others will demonstrate positive coping mechanisms in relation to chemotherapy and its side effects.      COGINITIVE: The client/significant others will verbalize understanding of self-care measure to avoid/minimize side effects of the chemotherapy regime.    EVALUATION / COMMENT KEY:    V = Audiovisual/Video  S = Successfully meets outcome  N = Needs further instruction  NA = Not  applicable to the patient  P = Previous knowledge  U = Unable to comprehend  * = See progress notes          PLAN OF CARE  INFORMATION TO BE DELIVERED / NURSING INTERVENTIONS DATE EVALUATION   1. Assessment of client/caregiver,          knowledge of cancer diagnosis,          and chemotherapy as a treatment.  1a. Evaluate patient/caregiver learning ability     b. Plan teaching sessions with patient/caregiver according to needs and present anxiety level/ability to learn.     c. Provide Chemotherapy Education Packet,         Mouth Care Protocol,          Specific Patient Education Sheets. 12/02/2020  S   2. Individual chemotherapy treatment          plan.  2a. Review of Chemotherapy Education handout from [employer]              12/02/2020     S   3. Knowledge Deficit & Self-Management of general side effects common to all chemotherapy:  a. Nausea/Vomiting   b.   Diarrhea  c. Mouth Care  d. Dental care  e. Constipation  f. Hair Loss  g. Potential for infection  h. Fatigue   3a. Reinforce that the majority of side effects from chemotherapy are reversible and are  controlled both in the hospital and at home        (blood counts recover, hair grows back).   b.  Refer to the following for reinforcement of         information post-treatment:  1. Mouth Care Protocol.  2. Bowel Protocol for constipation or diarrhea.  3.  Drug Specific Chemotherapy Information Sheets for each medication patient receiving.    12/02/2020     S     PLAN OF CARE  INFORMATION TO BE DELIVERED / NURSING INTERVENTIONS DATE EVALUATION   h. Potential for bleeding         i. Potential anemia/fatigue         j. Potential sunburn         k. Birth control measures  l. Safety measures post treatment 4.  Chemotherapy Home Care Instruction  and Safety Information Sheet.  A. patient/caregivers to thoroughly cook shellfish (shrimp, crab, etc) to decrease the chance of infection.    B.  Use sunscreen and protective clothing while in the sun.   12/02/2020       4. Knowledge deficit & Self Management of Drug Specific  Side Effects.    a. BLADDER EFFECTS         (Hemorrhagic Cystitis)                     Preventable with adequate hydration; occurs 2-3 days or more post treatment.     1.  Instruct patient to:   a.   Void at least every 2 hours; increase intake.   b.   DO NOT hold urine; go when urge is felt.   c.    Empty bladder at bedtime and on          awakening.   d.   Observe for color changes (red to tea            colored), amount and frequency changes.   e.   Notify oncologist of any abnormalities           in urine or voiding or if you cannot               drink adequate fluids.    12/02/2020     S    b.   CHANGES IN URINE        COLOR:         1.   Instruct patient:   a.   Most evident in first 2-3 voidings after            administration.  b. Lasts less than 24 hours.  c. If urine is discolored 2 or more days post- treatment, notify oncologist.      12/02/2020 S   c.    KIDNEY EFFECTS           (Nephrotoxicity)   1.  Instruct patient to:  a.   Drink 8-16 glasses of fluid/day the day   pre-treatment and 3-4 days post-treatment to maintain hydration; the best way to minimize kidney problems.  b.   Notify oncologist immediately if unable to drink fluids or if changes are noted in urinary elimination.      12/02/2020     S   a. PULMONARY TOXICITY    1. Instruct patient to report symptoms such as shortness of breath, chest pain, shallow breathing, or chest wall discomfort to physician.  2. Reinforce preventative measures used by the health care team.  a. Baseline and periodic PFT and chest x-ray.   12/02/2020   S     PLAN OF CARE INFORMATION TO BE DELIVERED / NURSING INTERVENTIONS DATE EVALUATION   b. NERVE & MUSCLE EFFECTS (neurotoxocity; neuropathy, possible visual/hearing changes)        3. Instruct patient to:    a. Report numbness or tingling of the hands/feet, loss of fine motor movement (buttoning shirt, tying shoelaces), or gait changes to your  oncologist.  b. If numbness/tingling are present:   protect feet with shoes at all times.   Use gloves for washing dishes/gardening & potholders in kitchen.       12/02/2020   S   c. CARDIOTOXICITY  Decreased effectiveness of             cardiac function. Effective are                  cumulative and irreversible.                                    CARDIAC ARRYTHMIAS              4   Instruct:  a. Heart function may be tested before treatment and perdiocally during treatment.  b. Notify oncologist of irregular pulse, palpitations, shortness of breath, or swelling in lower extremities/feet.          Can cause arrhythmias on infusion that resolve once infusion discontinued. Instruct nurse if any irregularity felt.    12/02/2020   S   d. EXTRAVASTION  Occurs when vesicants leak outside of vein and cause damage to the skin and underlying tissues.   1. Reinforce preventive measures used to avoid complications.  a. Fresh IV site or central line monitored continuously with vesicant IVP.  b. Continuous infusion via central line site and blood return monitored periodically around the clock.  2. Instruct to:  a. Notify nurse of any discomfort, burning, stinging, etc. at IV site during chemotherapy administration.  b. Notify oncologist of any redness, pain, or swelling at IV site after discharge from hospital.   12/02/2020   S   e. HYPERSENSITIVITY can happen with any medication.   1. Instruct patient:  a. Nurse is with them during the initial part of treatment and will be close by to monitor.  b. Pre-medication ordered by the oncologist must be taken on time. If doses are missed, treatment will need to be re-scheduled.  c. Skin redness, itching, or hives appearing after discharge should be reported to oncologist. 12/02/2020   S       PLAN OF CARE INFORMATION TO BE DELIVERED / NURSING INTERVENTIONS DATE EVALUATION   f. FLU-LIKE SYNDROME      1. Instruct patient symptoms are hard to prevent and may include fever, shaking  chills, muscle and body aches.  a. Taking prescribed medications from physician if needed.  b. Adequate fluids are important.    2. Reinforce the need to call if temperature is         elevated to 100.4 or more  12/02/2020   S   g. HAND-FOOT SYNDROME  causes painful, symmetric swelling and redness of palms and soles                  5. Instruct patient to report any numbness or tingling in the hands or feet.  6. Explain prevention techniques, such as     a. Use heavy moisturizers to lessen skin dryness and itching, but to avoid if skin is cracked or broken  b. Bathe in tepid water, use non-perfumed soap, and wash gently. Baths with oatmeal or diluted baking soda may be soothing.  c. Avoid tight fitting shoes and repetitive actions, such as rubbing hands or applying pressure to hands/feet.  7. Review measures to take should syndrome occur:  a. Cold compresses and elevation for          edema  b. Pain medications and other measures as ordered by oncologist.   4.   Syndrome resolves few weeks after therapy. 12/02/2020   S   5. DISCHARGE PLANNING /        EDUCATION 1.    Explain importance of compliance with follow- up  tests (CBC, CMP).  2.    Verify patient/caregiver know:  a.    Oncologists office phone number.  b.    Dates of follow-up appointments.  c.    Prescriptions given for nausea  3.   Review side effects to monitor and notify          oncologist about.  4.   Reinforce the need for patient and caregivers to:  a.    Review information given.  b.    Call oncologists office with questions          or symptoms  5.   Provide Cancer [facility] [employer] make referrals if needed for financial or .   12/02/2020   S     PROGRESS NOTES: I met with the patient today for chemotherapy education. she will be starting treatment with Taxotere and Cytoxan. We discussed the mechanism of action, potential side effects of this treatment as well as ways she can manage them at home. Some of these side effects  include but or not limited to fever, nausea, vomiting, decreased appetite, fatigue, weakness, cytopenias, myalgia/arthralgia, constipation, diarrhea, bleeding, headache, shortness of breath, nail changes, taste change, hair thinning/loss, mood disturbances, or edema. We also discussed dietary modifications she should make although this will be discussed in more detail with the dietician. she was provided with anti-emetic medication, a copy of all of the information we discussed today as well as our contact information. she will be provided a schedule on his first day of treatment. We will obtain labs on a weekly basis and the patient will follow-up with the physician for toxicity monitoring throughout treatment. All questions were answered and an informed consent was obtained. she was reminded to certainly contact us sooner if needed.  Attached to the patients folder and discussed with the patient the 24 hour/ 7 days a week after hours telephone number for the physician.  Patient notified to call anytime 24/7 because their is a physician on call for any problems that may arise.  Patient also notified to report to [facility] if they can not get in touch with a physician after hours.  Discussed the five wishes booklet with the patient and their family.           Assessment/Plan     ICD-10-CM ICD-9-CM   1. Malignant neoplasm of nipple of right breast in female, estrogen receptor positive  C50.011 174.0    Z17.0 V86.0   2. Establishing care with new doctor, encounter for  Z76.89 V65.8          Medications Ordered:  Zofran 8mg 1 tab PO Q8h prn nausea  Phenergan 25mg PO Q4-6h prn nausea  Diflucan 150 mg po once- if she develops a Yeast infection, which was common with her previous chemotherapy.  Claritin 10mg PO QD day of chemotherapy and for 5 days after Neulasta to help prevent bone pain        Standing Labs Ordered:  CBC weekly  CMP weekly    Follow up in about 12 days (around 12/14/2020) for with   Stu.      Total Face to Face Time: 60 minutes face to face with the patient and their family discussing the chemotherapy side-effects and when to call our office.     Electronically signed by: Tabatha Denise, MSN, APRN, AGNP-C, OCN

## 2020-12-02 NOTE — TELEPHONE ENCOUNTER
Called in prescription of decadron 4 mg #36 1 po BID with meals day before chemo, day of chemo, day after chemo. 0 refills.

## 2020-12-07 ENCOUNTER — TELEPHONE (OUTPATIENT)
Dept: HEMATOLOGY/ONCOLOGY | Facility: CLINIC | Age: 60
End: 2020-12-07

## 2020-12-07 NOTE — TELEPHONE ENCOUNTER
Pt to not take anything unless she is having loose stools 3-4 times a day.  If so, she may start imodium.  Pt encouraged to hydrate.

## 2020-12-07 NOTE — TELEPHONE ENCOUNTER
----- Message from Valerie Leblanc, Patient Care Assistant sent at 12/7/2020  2:29 PM CST -----  Patient  called in stating she has been having Diarrhea 1 x  a day since 12/02/2020, she would like to know if she could take something for it . She can be reached at [phone]

## 2020-12-11 ENCOUNTER — HOSPITAL ENCOUNTER (INPATIENT)
Facility: HOSPITAL | Age: 60
LOS: 12 days | Discharge: HOME HEALTH | DRG: 177 | End: 2020-12-23
Attending: EMERGENCY MEDICINE | Admitting: HOSPITALIST
Payer: MEDICARE

## 2020-12-11 DIAGNOSIS — U07.1 COVID-19 VIRUS INFECTION: Primary | ICD-10-CM

## 2020-12-11 DIAGNOSIS — Z20.822 SUSPECTED COVID-19 VIRUS INFECTION: ICD-10-CM

## 2020-12-11 DIAGNOSIS — J18.9 PNEUMONIA OF BOTH LOWER LOBES DUE TO INFECTIOUS ORGANISM: ICD-10-CM

## 2020-12-11 DIAGNOSIS — R09.02 HYPOXIA: ICD-10-CM

## 2020-12-11 DIAGNOSIS — J96.01 ACUTE HYPOXEMIC RESPIRATORY FAILURE: ICD-10-CM

## 2020-12-11 LAB
ALBUMIN SERPL BCP-MCNC: 3 G/DL (ref 3.5–5.2)
ALLENS TEST: ABNORMAL
ALP SERPL-CCNC: 205 U/L (ref 55–135)
ALT SERPL W/O P-5'-P-CCNC: 42 U/L (ref 10–44)
ANION GAP SERPL CALC-SCNC: 17 MMOL/L (ref 8–16)
ANION GAP SERPL CALC-SCNC: 22 MMOL/L (ref 8–16)
AST SERPL-CCNC: 38 U/L (ref 10–40)
BACTERIA #/AREA URNS HPF: ABNORMAL /HPF
BASOPHILS # BLD AUTO: 0.02 K/UL (ref 0–0.2)
BASOPHILS NFR BLD: 0.3 % (ref 0–1.9)
BILIRUB SERPL-MCNC: 0.4 MG/DL (ref 0.1–1)
BILIRUB UR QL STRIP: ABNORMAL
BNP SERPL-MCNC: 83 PG/ML (ref 0–99)
BUN SERPL-MCNC: 13 MG/DL (ref 6–20)
BUN SERPL-MCNC: 15 MG/DL (ref 6–20)
CALCIUM SERPL-MCNC: 8.8 MG/DL (ref 8.7–10.5)
CALCIUM SERPL-MCNC: 9.2 MG/DL (ref 8.7–10.5)
CHLORIDE SERPL-SCNC: 100 MMOL/L (ref 95–110)
CHLORIDE SERPL-SCNC: 102 MMOL/L (ref 95–110)
CK SERPL-CCNC: 65 U/L (ref 20–180)
CLARITY UR: CLEAR
CO2 SERPL-SCNC: 16 MMOL/L (ref 23–29)
CO2 SERPL-SCNC: 19 MMOL/L (ref 23–29)
COLOR UR: YELLOW
CREAT SERPL-MCNC: 0.9 MG/DL (ref 0.5–1.4)
CREAT SERPL-MCNC: 1.1 MG/DL (ref 0.5–1.4)
CRP SERPL-MCNC: 288.4 MG/L (ref 0–8.2)
D DIMER PPP IA.FEU-MCNC: 2.25 MG/L FEU
DIFFERENTIAL METHOD: ABNORMAL
EOSINOPHIL # BLD AUTO: 0 K/UL (ref 0–0.5)
EOSINOPHIL NFR BLD: 0 % (ref 0–8)
ERYTHROCYTE [DISTWIDTH] IN BLOOD BY AUTOMATED COUNT: 13.2 % (ref 11.5–14.5)
ERYTHROCYTE [SEDIMENTATION RATE] IN BLOOD BY WESTERGREN METHOD: 112 MM/HR (ref 0–20)
EST. GFR  (AFRICAN AMERICAN): >60 ML/MIN/1.73 M^2
EST. GFR  (AFRICAN AMERICAN): >60 ML/MIN/1.73 M^2
EST. GFR  (NON AFRICAN AMERICAN): 55 ML/MIN/1.73 M^2
EST. GFR  (NON AFRICAN AMERICAN): >60 ML/MIN/1.73 M^2
FERRITIN SERPL-MCNC: 540 NG/ML (ref 20–300)
GLUCOSE SERPL-MCNC: 422 MG/DL (ref 70–110)
GLUCOSE SERPL-MCNC: 422 MG/DL (ref 70–110)
GLUCOSE SERPL-MCNC: 466 MG/DL (ref 70–110)
GLUCOSE UR QL STRIP: ABNORMAL
HCO3 UR-SCNC: 15.2 MMOL/L (ref 24–28)
HCT VFR BLD AUTO: 43.3 % (ref 37–48.5)
HGB BLD-MCNC: 13.8 G/DL (ref 12–16)
HGB UR QL STRIP: ABNORMAL
HYALINE CASTS #/AREA URNS LPF: 0 /LPF
IMM GRANULOCYTES # BLD AUTO: 0.05 K/UL (ref 0–0.04)
IMM GRANULOCYTES NFR BLD AUTO: 0.7 % (ref 0–0.5)
KETONES UR QL STRIP: ABNORMAL
LACTATE SERPL-SCNC: 2.2 MMOL/L (ref 0.5–2.2)
LDH SERPL L TO P-CCNC: 556 U/L (ref 110–260)
LEUKOCYTE ESTERASE UR QL STRIP: NEGATIVE
LYMPHOCYTES # BLD AUTO: 0.6 K/UL (ref 1–4.8)
LYMPHOCYTES NFR BLD: 9.2 % (ref 18–48)
MCH RBC QN AUTO: 28.9 PG (ref 27–31)
MCHC RBC AUTO-ENTMCNC: 31.9 G/DL (ref 32–36)
MCV RBC AUTO: 91 FL (ref 82–98)
MICROSCOPIC COMMENT: ABNORMAL
MONOCYTES # BLD AUTO: 0.2 K/UL (ref 0.3–1)
MONOCYTES NFR BLD: 2.9 % (ref 4–15)
NEUTROPHILS # BLD AUTO: 6 K/UL (ref 1.8–7.7)
NEUTROPHILS NFR BLD: 86.9 % (ref 38–73)
NITRITE UR QL STRIP: NEGATIVE
NRBC BLD-RTO: 0 /100 WBC
PCO2 BLDA: 23.3 MMHG (ref 35–45)
PH SMN: 7.42 [PH] (ref 7.35–7.45)
PH UR STRIP: 6 [PH] (ref 5–8)
PLATELET # BLD AUTO: 286 K/UL (ref 150–350)
PMV BLD AUTO: 9.8 FL (ref 9.2–12.9)
PO2 BLDA: 59 MMHG (ref 50–70)
POC BE: -9 MMOL/L
POC SATURATED O2: 91 % (ref 95–100)
POC TCO2: 16 MMOL/L (ref 23–27)
POCT GLUCOSE: 415 MG/DL (ref 70–110)
POCT GLUCOSE: 447 MG/DL (ref 70–110)
POTASSIUM SERPL-SCNC: 4.4 MMOL/L (ref 3.5–5.1)
POTASSIUM SERPL-SCNC: 4.4 MMOL/L (ref 3.5–5.1)
PROCALCITONIN SERPL IA-MCNC: 0.72 NG/ML
PROT SERPL-MCNC: 8.1 G/DL (ref 6–8.4)
PROT UR QL STRIP: ABNORMAL
RBC # BLD AUTO: 4.77 M/UL (ref 4–5.4)
RBC #/AREA URNS HPF: 6 /HPF (ref 0–4)
SAMPLE: ABNORMAL
SITE: ABNORMAL
SODIUM SERPL-SCNC: 138 MMOL/L (ref 136–145)
SODIUM SERPL-SCNC: 138 MMOL/L (ref 136–145)
SP GR UR STRIP: >=1.03 (ref 1–1.03)
SQUAMOUS #/AREA URNS HPF: 2 /HPF
TROPONIN I SERPL DL<=0.01 NG/ML-MCNC: <0.006 NG/ML (ref 0–0.03)
URN SPEC COLLECT METH UR: ABNORMAL
UROBILINOGEN UR STRIP-ACNC: NEGATIVE EU/DL
WBC # BLD AUTO: 6.93 K/UL (ref 3.9–12.7)
WBC #/AREA URNS HPF: 3 /HPF (ref 0–5)
YEAST URNS QL MICRO: ABNORMAL

## 2020-12-11 PROCEDURE — 83615 LACTATE (LD) (LDH) ENZYME: CPT

## 2020-12-11 PROCEDURE — 80048 BASIC METABOLIC PNL TOTAL CA: CPT

## 2020-12-11 PROCEDURE — 83880 ASSAY OF NATRIURETIC PEPTIDE: CPT

## 2020-12-11 PROCEDURE — C9399 UNCLASSIFIED DRUGS OR BIOLOG: HCPCS | Performed by: HOSPITALIST

## 2020-12-11 PROCEDURE — 85651 RBC SED RATE NONAUTOMATED: CPT

## 2020-12-11 PROCEDURE — 93010 EKG 12-LEAD: ICD-10-PCS | Mod: ,,, | Performed by: INTERNAL MEDICINE

## 2020-12-11 PROCEDURE — 27000221 HC OXYGEN, UP TO 24 HOURS

## 2020-12-11 PROCEDURE — 85379 FIBRIN DEGRADATION QUANT: CPT

## 2020-12-11 PROCEDURE — 81000 URINALYSIS NONAUTO W/SCOPE: CPT

## 2020-12-11 PROCEDURE — 20000000 HC ICU ROOM

## 2020-12-11 PROCEDURE — 86140 C-REACTIVE PROTEIN: CPT

## 2020-12-11 PROCEDURE — 85025 COMPLETE CBC W/AUTO DIFF WBC: CPT

## 2020-12-11 PROCEDURE — 63600175 PHARM REV CODE 636 W HCPCS: Performed by: EMERGENCY MEDICINE

## 2020-12-11 PROCEDURE — 83605 ASSAY OF LACTIC ACID: CPT

## 2020-12-11 PROCEDURE — 82803 BLOOD GASES ANY COMBINATION: CPT

## 2020-12-11 PROCEDURE — 25000003 PHARM REV CODE 250: Performed by: HOSPITALIST

## 2020-12-11 PROCEDURE — 63600175 PHARM REV CODE 636 W HCPCS: Performed by: HOSPITALIST

## 2020-12-11 PROCEDURE — 80053 COMPREHEN METABOLIC PANEL: CPT

## 2020-12-11 PROCEDURE — 93010 ELECTROCARDIOGRAM REPORT: CPT | Mod: ,,, | Performed by: INTERNAL MEDICINE

## 2020-12-11 PROCEDURE — 82728 ASSAY OF FERRITIN: CPT

## 2020-12-11 PROCEDURE — 82947 ASSAY GLUCOSE BLOOD QUANT: CPT

## 2020-12-11 PROCEDURE — 87040 BLOOD CULTURE FOR BACTERIA: CPT

## 2020-12-11 PROCEDURE — 84484 ASSAY OF TROPONIN QUANT: CPT

## 2020-12-11 PROCEDURE — 36416 COLLJ CAPILLARY BLOOD SPEC: CPT

## 2020-12-11 PROCEDURE — 99291 CRITICAL CARE FIRST HOUR: CPT | Mod: 25

## 2020-12-11 PROCEDURE — 82550 ASSAY OF CK (CPK): CPT

## 2020-12-11 PROCEDURE — 84145 PROCALCITONIN (PCT): CPT

## 2020-12-11 PROCEDURE — 63600175 PHARM REV CODE 636 W HCPCS: Performed by: NURSE PRACTITIONER

## 2020-12-11 PROCEDURE — 36415 COLL VENOUS BLD VENIPUNCTURE: CPT

## 2020-12-11 PROCEDURE — 93005 ELECTROCARDIOGRAM TRACING: CPT

## 2020-12-11 RX ORDER — SODIUM CHLORIDE 0.9 % (FLUSH) 0.9 %
10 SYRINGE (ML) INJECTION
Status: DISCONTINUED | OUTPATIENT
Start: 2020-12-11 | End: 2020-12-23 | Stop reason: HOSPADM

## 2020-12-11 RX ORDER — CALCIUM GLUCONATE 20 MG/ML
2 INJECTION, SOLUTION INTRAVENOUS
Status: DISCONTINUED | OUTPATIENT
Start: 2020-12-11 | End: 2020-12-23 | Stop reason: HOSPADM

## 2020-12-11 RX ORDER — DRONABINOL 2.5 MG/1
2.5 CAPSULE ORAL
Status: DISCONTINUED | OUTPATIENT
Start: 2020-12-12 | End: 2020-12-17

## 2020-12-11 RX ORDER — ENALAPRIL MALEATE 5 MG/1
5 TABLET ORAL DAILY
Status: DISCONTINUED | OUTPATIENT
Start: 2020-12-12 | End: 2020-12-23 | Stop reason: HOSPADM

## 2020-12-11 RX ORDER — POTASSIUM CHLORIDE 7.45 MG/ML
60 INJECTION INTRAVENOUS
Status: DISCONTINUED | OUTPATIENT
Start: 2020-12-11 | End: 2020-12-23 | Stop reason: HOSPADM

## 2020-12-11 RX ORDER — ACETAMINOPHEN 325 MG/1
650 TABLET ORAL EVERY 4 HOURS PRN
Status: DISCONTINUED | OUTPATIENT
Start: 2020-12-11 | End: 2020-12-23 | Stop reason: HOSPADM

## 2020-12-11 RX ORDER — ZOLPIDEM TARTRATE 5 MG/1
5 TABLET ORAL NIGHTLY PRN
Status: DISCONTINUED | OUTPATIENT
Start: 2020-12-11 | End: 2020-12-23 | Stop reason: HOSPADM

## 2020-12-11 RX ORDER — MUPIROCIN 20 MG/G
OINTMENT TOPICAL 2 TIMES DAILY
Status: COMPLETED | OUTPATIENT
Start: 2020-12-12 | End: 2020-12-16

## 2020-12-11 RX ORDER — CALCIUM GLUCONATE 20 MG/ML
3 INJECTION, SOLUTION INTRAVENOUS
Status: DISCONTINUED | OUTPATIENT
Start: 2020-12-11 | End: 2020-12-23 | Stop reason: HOSPADM

## 2020-12-11 RX ORDER — MAGNESIUM SULFATE HEPTAHYDRATE 40 MG/ML
2 INJECTION, SOLUTION INTRAVENOUS
Status: DISCONTINUED | OUTPATIENT
Start: 2020-12-11 | End: 2020-12-23 | Stop reason: HOSPADM

## 2020-12-11 RX ORDER — POTASSIUM CHLORIDE 7.45 MG/ML
40 INJECTION INTRAVENOUS
Status: DISCONTINUED | OUTPATIENT
Start: 2020-12-11 | End: 2020-12-23 | Stop reason: HOSPADM

## 2020-12-11 RX ORDER — DEXAMETHASONE SODIUM PHOSPHATE 4 MG/ML
6 INJECTION, SOLUTION INTRA-ARTICULAR; INTRALESIONAL; INTRAMUSCULAR; INTRAVENOUS; SOFT TISSUE DAILY
Status: DISCONTINUED | OUTPATIENT
Start: 2020-12-12 | End: 2020-12-18

## 2020-12-11 RX ORDER — POTASSIUM CHLORIDE 7.45 MG/ML
80 INJECTION INTRAVENOUS
Status: DISCONTINUED | OUTPATIENT
Start: 2020-12-11 | End: 2020-12-23 | Stop reason: HOSPADM

## 2020-12-11 RX ORDER — ENOXAPARIN SODIUM 100 MG/ML
40 INJECTION SUBCUTANEOUS EVERY 24 HOURS
Status: DISCONTINUED | OUTPATIENT
Start: 2020-12-11 | End: 2020-12-14

## 2020-12-11 RX ORDER — MAGNESIUM SULFATE HEPTAHYDRATE 40 MG/ML
4 INJECTION, SOLUTION INTRAVENOUS
Status: DISCONTINUED | OUTPATIENT
Start: 2020-12-11 | End: 2020-12-23 | Stop reason: HOSPADM

## 2020-12-11 RX ORDER — FUROSEMIDE 20 MG/1
20 TABLET ORAL DAILY
Status: DISCONTINUED | OUTPATIENT
Start: 2020-12-12 | End: 2020-12-18

## 2020-12-11 RX ORDER — ONDANSETRON 2 MG/ML
8 INJECTION INTRAMUSCULAR; INTRAVENOUS EVERY 8 HOURS PRN
Status: DISCONTINUED | OUTPATIENT
Start: 2020-12-11 | End: 2020-12-23 | Stop reason: HOSPADM

## 2020-12-11 RX ORDER — CARISOPRODOL 350 MG/1
350 TABLET ORAL 3 TIMES DAILY PRN
Status: DISCONTINUED | OUTPATIENT
Start: 2020-12-11 | End: 2020-12-18

## 2020-12-11 RX ORDER — DIAZEPAM 5 MG/1
10 TABLET ORAL 4 TIMES DAILY PRN
Status: DISCONTINUED | OUTPATIENT
Start: 2020-12-11 | End: 2020-12-23 | Stop reason: HOSPADM

## 2020-12-11 RX ORDER — PRAVASTATIN SODIUM 40 MG/1
40 TABLET ORAL EVERY MORNING
Status: DISCONTINUED | OUTPATIENT
Start: 2020-12-12 | End: 2020-12-15

## 2020-12-11 RX ORDER — CALCIUM GLUCONATE 20 MG/ML
1 INJECTION, SOLUTION INTRAVENOUS
Status: DISCONTINUED | OUTPATIENT
Start: 2020-12-11 | End: 2020-12-23 | Stop reason: HOSPADM

## 2020-12-11 RX ORDER — ONDANSETRON 2 MG/ML
4 INJECTION INTRAMUSCULAR; INTRAVENOUS
Status: COMPLETED | OUTPATIENT
Start: 2020-12-11 | End: 2020-12-11

## 2020-12-11 RX ADMIN — AZITHROMYCIN MONOHYDRATE 500 MG: 500 INJECTION, POWDER, LYOPHILIZED, FOR SOLUTION INTRAVENOUS at 11:12

## 2020-12-11 RX ADMIN — INSULIN HUMAN 10 UNITS: 100 INJECTION, SOLUTION PARENTERAL at 11:12

## 2020-12-11 RX ADMIN — ONDANSETRON 4 MG: 2 INJECTION INTRAMUSCULAR; INTRAVENOUS at 05:12

## 2020-12-11 RX ADMIN — REMDESIVIR 200 MG: 100 INJECTION, POWDER, LYOPHILIZED, FOR SOLUTION INTRAVENOUS at 09:12

## 2020-12-11 RX ADMIN — ENOXAPARIN SODIUM 40 MG: 40 INJECTION SUBCUTANEOUS at 09:12

## 2020-12-11 RX ADMIN — INSULIN DETEMIR 35 UNITS: 100 INJECTION, SOLUTION SUBCUTANEOUS at 09:12

## 2020-12-11 RX ADMIN — CEFTRIAXONE 1 G: 1 INJECTION, SOLUTION INTRAVENOUS at 09:12

## 2020-12-11 NOTE — ED PROVIDER NOTES
Chief complaint:  Shortness of Breath (+ covid, worsening last few days, pt states she started having SOB last few days. )      HPI:  Negrita Castro is a 60 y.o. female IDDM, left breast CA on chemotherapy s/p mastectomy, known COVID-19-positive 12/1/20 presenting with SOB.  Patient initially endorsed anosmia followed by a nonproductive cough 3 days prior with gradual onset of dyspnea.  She has had multiple loose stools per day, nonbloody.  Decreased appetite without emesis.  No chest pain.     ROS: As per HPI and below:  No headache, neck pain, chest pain, syncope, hemoptysis, abdominal pain, emesis, rashes, swelling, dysuria, measured fever.  The patient/family denies blurry vision, dysphagia, joint swelling, easy bruising.    Review of patient's allergies indicates:   Allergen Reactions    Adhesive tape-silicones Hives     BANDAIDS    Polymycin Hives    Adhesive     Latex, natural rubber Hives and Itching    Neomycin-bacitracnzn-polymyxnb     Polyurethane-39 Hives       Current Discharge Medication List      CONTINUE these medications which have NOT CHANGED    Details   carisoprodol (SOMA) 350 MG tablet Take 1 tablet (350 mg total) by mouth 3 (three) times daily as needed.  Qty: 90 tablet, Refills: 0    Associated Diagnoses: Malignant neoplasm of lower-inner quadrant of right female breast      diazepam (VALIUM) 10 MG tablet Take 10 mg by mouth 4 (four) times daily as needed (anxiety).       dronabinol (MARINOL) 2.5 MG capsule Take 1 capsule (2.5 mg total) by mouth 2 (two) times daily before meals.  Qty: 30 capsule, Refills: 1    Associated Diagnoses: Carcinoma in situ of breast, unspecified laterality, unspecified type      dulaglutide (TRULICITY) 0.75 mg/0.5 mL PnIj Inject 0.75 mg into the skin once a week. Wed      enalapril (VASOTEC) 5 MG tablet Take 5 mg by mouth once daily.   Refills: 1      furosemide (LASIX) 20 MG tablet TAKE ONE TABLET BY MOUTH EVERY DAY  Qty: 30 tablet, Refills: 6    Comments:  This prescription was filled on 11/4/2020. Any refills authorized will be placed on file.  Associated Diagnoses: Mild peripheral edema      HYDROcodone-acetaminophen (NORCO)  mg per tablet Take 1 tablet by mouth every 6 (six) hours as needed for Pain.   Refills: 0      insulin glargine (LANTUS U-100 INSULIN) 100 unit/mL injection Inject 35 Units into the skin every evening.  Qty: 10.5 mL, Refills: 11      metformin (GLUCOPHAGE-XR) 500 MG 24 hr tablet Take 2,000 mg by mouth every evening.       ondansetron (ZOFRAN-ODT) 8 MG TbDL Take 1 tablet (8 mg total) by mouth every 8 (eight) hours as needed.  Qty: 30 tablet, Refills: 5    Associated Diagnoses: Malignant neoplasm of nipple of right breast in female, estrogen receptor positive      pravastatin (PRAVACHOL) 40 MG tablet Take 40 mg by mouth every morning.   Refills: 1      promethazine (PHENERGAN) 25 MG tablet Take 1 tablet (25 mg total) by mouth every 4 to 6 hours as needed.  Qty: 30 tablet, Refills: 5    Associated Diagnoses: Malignant neoplasm of nipple of right breast in female, estrogen receptor positive      zolpidem (AMBIEN) 10 mg Tab TAKE ONE TABLET BY MOUTH EVERY NIGHT AT BEDTIME AS NEEDED FOR SLEEP  Qty: 30 tablet, Refills: 2    Associated Diagnoses: Adjustment insomnia      dexAMETHasone (DECADRON) 4 MG Tab Take 1 tablet b.i.d. with meals on Monday, Tuesday, Wednesday.  Repeat q. 3 weeks before chemotherapy  Qty: 36 tablet, Refills: 0    Associated Diagnoses: Chemotherapy-induced nausea             PMH:  As per HPI and below:  Past Medical History:   Diagnosis Date    Breast cancer     left breast    Cancer     RIGHT BREAST 10-15    Depression     Diabetes mellitus     Neuropathy     Panic attacks     S/P epidural steroid injection     Seizures     none since early 30's    Wears glasses     TO DRIVE     Past Surgical History:   Procedure Laterality Date    AXILLARY NODE DISSECTION Left 8/24/2020    Procedure: LYMPHADENECTOMY, AXILLARY;   Surgeon: Cory Vick MD;  Location: [facility] OR;  Service: General;  Laterality: Left;  left breast mastectomy with possible axillary lymph node dissection    BREAST BIOPSY      right    breast reconstruction with tummy tuck      BREAST SURGERY      11-3-15 LUMPECTOMY, 12-2-15 REEXCISION    INCISION AND DRAINAGE OF ABSCESS Left 2020    Procedure: INCISION AND DRAINAGE, ABSCESS;  Surgeon: Cory Vick MD;  Location: [facility] OR;  Service: General;  Laterality: Left;    INSERTION OF LOCALIZATION WIRE Left 2020    Procedure: INSERTION, LOCALIZATION WIRE;  Surgeon: Cory Vick MD;  Location: [facility] OR;  Service: General;  Laterality: Left;  left breast lumpectomy with wire needle loc    mastectomy 2016      RECONSTRUCTION OF NIPPLE Right 2018    Procedure: RECONSTRUCTION-NIPPLE RIGHT;  Surgeon: Xiang Hernandez MD;  Location: [address];  Service: Plastics;  Laterality: Right;    SENTINEL LYMPH NODE BIOPSY Left 2020    Procedure: BIOPSY, LYMPH NODE, SENTINEL;  Surgeon: Cory Vick MD;  Location: [facility];  Service: General;  Laterality: Left;  left breast lumpectomy with left sentinel lymoh node       shoulder surgery and wrist      BILAT  BONE SPUR    WRIST SURGERY      RIGHT       Social History     Socioeconomic History    Marital status:      Spouse name: Not on file    Number of children: Not on file    Years of education: Not on file    Highest education level: Not on file   Occupational History    Not on file   Social Needs    Financial resource strain: Not on file    Food insecurity     Worry: Not on file     Inability: Not on file    Transportation needs     Medical: Not on file     Non-medical: Not on file   Tobacco Use    Smoking status: Former Smoker     Packs/day: 0.25     Years: 30.00     Pack years: 7.50     Quit date: 2015     Years since quittin.2    Smokeless tobacco: Never Used   Substance and Sexual Activity     Alcohol use: Yes     Alcohol/week: 0.0 standard drinks     Comment: RARELY    Drug use: Yes     Types: Marijuana     Comment: medical marijuana    Sexual activity: Not on file   Lifestyle    Physical activity     Days per week: Not on file     Minutes per session: Not on file    Stress: Not on file   Relationships    Social connections     Talks on phone: Not on file     Gets together: Not on file     Attends Faith service: Not on file     Active member of club or organization: Not on file     Attends meetings of clubs or organizations: Not on file     Relationship status: Not on file   Other Topics Concern    Not on file   Social History Narrative    Not on file       Family History   Problem Relation Age of Onset    Anesthesia problems Neg Hx        Physical Exam:    Vitals:    12/11/20 2021   BP:    Pulse:    Resp:    Temp: 99 °F (37.2 °C)     GENERAL:  No apparent distress.  Alert.    HEENT:  Moist mucous membranes.  Normocephalic and atraumatic.    NECK:  No swelling.  Midline trachea. No JVD.    CARDIOVASCULAR:  Regular rate and rhythm.  2+ radial pulses.  No murmurs auscultated.    PULMONARY:  Scant dry rales at both lung bases.  No wheezing or rhonchi.  Mild tachypnea without accessory muscle use or retractions.  Speaks in complete sentences.    ABDOMEN:  Non-tender and non-distended.    EXTREMITIES:  Warm and well perfused.  Brisk capillary refill.  No peripheral edema.  NEUROLOGICAL:  Normal mental status.  Appropriate and conversant.    SKIN:  No rashes or ecchymoses.    BACK:  Atraumatic.  No CVA tenderness to palpation.      Labs Reviewed   CBC W/ AUTO DIFFERENTIAL - Abnormal; Notable for the following components:       Result Value    MCHC 31.9 (*)     Immature Granulocytes 0.7 (*)     Immature Grans (Abs) 0.05 (*)     Lymph # 0.6 (*)     Mono # 0.2 (*)     Gran % 86.9 (*)     Lymph % 9.2 (*)     Mono % 2.9 (*)     All other components within normal limits   COMPREHENSIVE METABOLIC PANEL -  Abnormal; Notable for the following components:    CO2 16 (*)     Glucose 466 (*)     Albumin 3.0 (*)     Alkaline Phosphatase 205 (*)     Anion Gap 22 (*)     eGFR if non  55 (*)     All other components within normal limits    Narrative:        critical glucose result(s) called and verbal readback obtained   from Malena Dejesus at 1339 12/11/2020  by [facility] 12/11/2020 13:40   C-REACTIVE PROTEIN - Abnormal; Notable for the following components:    .4 (*)     All other components within normal limits   FERRITIN - Abnormal; Notable for the following components:    Ferritin 540 (*)     All other components within normal limits   LACTATE DEHYDROGENASE - Abnormal; Notable for the following components:     (*)     All other components within normal limits   URINALYSIS, REFLEX TO URINE CULTURE - Abnormal; Notable for the following components:    Specific Gravity, UA >=1.030 (*)     Protein, UA 2+ (*)     Glucose, UA 4+ (*)     Ketones, UA 3+ (*)     Bilirubin (UA) 1+ (*)     Occult Blood UA 2+ (*)     All other components within normal limits    Narrative:     Specimen Source->Urine   URINALYSIS MICROSCOPIC - Abnormal; Notable for the following components:    RBC, UA 6 (*)     Bacteria Few (*)     Yeast, UA Occasional (*)     All other components within normal limits    Narrative:     Specimen Source->Urine   ISTAT PROCEDURE - Abnormal; Notable for the following components:    POC PCO2 23.3 (*)     POC HCO3 15.2 (*)     POC SATURATED O2 91 (*)     POC TCO2 16 (*)     All other components within normal limits   CK   LACTIC ACID, PLASMA   TROPONIN I       Current Discharge Medication List      CONTINUE these medications which have NOT CHANGED    Details   carisoprodol (SOMA) 350 MG tablet Take 1 tablet (350 mg total) by mouth 3 (three) times daily as needed.  Qty: 90 tablet, Refills: 0    Associated Diagnoses: Malignant neoplasm of lower-inner quadrant of right female breast      diazepam (VALIUM)  10 MG tablet Take 10 mg by mouth 4 (four) times daily as needed (anxiety).       dronabinol (MARINOL) 2.5 MG capsule Take 1 capsule (2.5 mg total) by mouth 2 (two) times daily before meals.  Qty: 30 capsule, Refills: 1    Associated Diagnoses: Carcinoma in situ of breast, unspecified laterality, unspecified type      dulaglutide (TRULICITY) 0.75 mg/0.5 mL PnIj Inject 0.75 mg into the skin once a week. Wed      enalapril (VASOTEC) 5 MG tablet Take 5 mg by mouth once daily.   Refills: 1      furosemide (LASIX) 20 MG tablet TAKE ONE TABLET BY MOUTH EVERY DAY  Qty: 30 tablet, Refills: 6    Comments: This prescription was filled on 11/4/2020. Any refills authorized will be placed on file.  Associated Diagnoses: Mild peripheral edema      HYDROcodone-acetaminophen (NORCO)  mg per tablet Take 1 tablet by mouth every 6 (six) hours as needed for Pain.   Refills: 0      insulin glargine (LANTUS U-100 INSULIN) 100 unit/mL injection Inject 35 Units into the skin every evening.  Qty: 10.5 mL, Refills: 11      metformin (GLUCOPHAGE-XR) 500 MG 24 hr tablet Take 2,000 mg by mouth every evening.       ondansetron (ZOFRAN-ODT) 8 MG TbDL Take 1 tablet (8 mg total) by mouth every 8 (eight) hours as needed.  Qty: 30 tablet, Refills: 5    Associated Diagnoses: Malignant neoplasm of nipple of right breast in female, estrogen receptor positive      pravastatin (PRAVACHOL) 40 MG tablet Take 40 mg by mouth every morning.   Refills: 1      promethazine (PHENERGAN) 25 MG tablet Take 1 tablet (25 mg total) by mouth every 4 to 6 hours as needed.  Qty: 30 tablet, Refills: 5    Associated Diagnoses: Malignant neoplasm of nipple of right breast in female, estrogen receptor positive      zolpidem (AMBIEN) 10 mg Tab TAKE ONE TABLET BY MOUTH EVERY NIGHT AT BEDTIME AS NEEDED FOR SLEEP  Qty: 30 tablet, Refills: 2    Associated Diagnoses: Adjustment insomnia      dexAMETHasone (DECADRON) 4 MG Tab Take 1 tablet b.i.d. with meals on Monday, Tuesday,  Wednesday.  Repeat q. 3 weeks before chemotherapy  Qty: 36 tablet, Refills: 0    Associated Diagnoses: Chemotherapy-induced nausea             Orders Placed This Encounter   Procedures    Critical Care    Blood culture (site 1)    Blood culture (site 2)    X-Ray Chest AP Portable    CBC auto differential    Comprehensive metabolic panel    C-Reactive Protein    Ferritin    Lactate Dehydrogenase    CK    Lactic Acid, Plasma    Troponin I    Urinalysis, Reflex to Urine Culture Urine, Clean Catch    Brain natriuretic peptide    Comprehensive Metabolic Panel (CMP)    Magnesium    Phosphorus    C-Reactive Protein    Sedimentation rate    Procalcitonin    D-Dimer, Quantitative    Urinalysis Microscopic    Glucose, Random    Diet diabetic [facility]; 2000 Calorie    Vital signs    Charge nurse to notify infection control    Place appropriate labels/decals on patient door    Limit non essential personnel and visitor contact for patients with concern for suspected COVID-19    Please combine tasks to q4hrs or greater to minimize entry/exit from room    All visitors who enter room will adhere to PPE appropriate for patient condition and comply to facility rules for identification and conduct    Please verify that the patient has styrofoam tray and plastic utensils    Please ensure that the diet tray is brought to the nursing station and not directly to the patient's room    Intake and output Contact physician if urine output is less than 0.5 mL/kg/hr    Notify Physician - Potential Need of Opioid Reversal    Place sequential compression device    Full code    Inpatient consult to Pulmonology    Airborne and Contact and Droplet Isolation Status    Oxygen Continuous    POCT Capillary Blood Gas-Resp Once    Pulse Oximetry Continuous    Please use the respiratory therapy protocols to escalate and de-escalate therapy: Airway Clearance Protocol, Airway Clearance Protocol,  Atelectasis/Hyperinflation Protocol, Bronchodilator Protocol    Oxygen Continuous    EKG 12-lead    Saline lock IV    Admit to Inpatient       Imaging Results           X-Ray Chest AP Portable (Final result)  Result time 12/11/20 13:31:56    Final result by Floyd Nguyen Jr., MD (12/11/20 13:31:56)                 Impression:      Right lower lobe and left mid and lower lung field new infiltrates consistent with pneumonia.    This report was flagged in Epic as abnormal.      Electronically signed by: Floyd Nguyen MD  Date:    12/11/2020  Time:    13:31             Narrative:    EXAMINATION:  XR CHEST AP PORTABLE    CLINICAL HISTORY:  Suspected Covid-19 Virus Infection;    TECHNIQUE:  Single frontal view of the chest was performed.    COMPARISON:  Prior chest of September 6, 2020.    FINDINGS:  The cardiac size and contours within normal limits.  There is a new right lower lobe infiltrate and a left mid and lower lung field infiltrate since the prior study consistent with pneumonia.  The right upper lung field and left lung apex are spared.  No pneumothorax or pleural effusion is noted.                            (radiology reading, visualized by me)    ED Course as of Dec 11 2233   Fri Dec 11, 2020   1256 EKG:  Sinus tachycardia, rate of 102, normal intervals and axis.  There are no acute ST or T wave changes suggestive of acute ischemia or infarction.      [MR]   1305 CXR:  B/l infiltrates with lower lobe predominance. (my read)    [MR]      ED Course User Index  [MR] Dimitris Tucker MD       MDM:    60 y.o. female with hypoxia in the setting of known COVID-19 with worsening of respiratory function.  Patient initially started on nasal cannula with insufficient oxygenation.  Transition to high-flow nasal cannula at 15 L is sufficient with oxygen saturation in the low 90s.  Likely COVID admit workup undertaken with inflammatory markers.  Low suspicion for bacterial pneumonia or sepsis.  I doubt PE  or CHF.  Patient did well on high-flow nasal cannula with no recurrent hypoxia or worsening work of breathing.  I have discussed with hospital medicine who will assume care for further monitoring.  Hyperglycemia without acidosis noted on capillary venous blood gas.  I doubt DKA.    Diagnoses:    1. COVID-19 infection  2. Hypoxia  3. Bilateral pneumonia    Critical Care    Date/Time: 12/11/2020 10:32 PM  Performed by: Dimitris Tucker MD  Authorized by: Katt Richter MD   Direct patient critical care time: 15 minutes  Additional history critical care time: 4 minutes  Ordering / reviewing critical care time: 6 minutes  Documentation critical care time: 5 minutes  Consulting other physicians critical care time: 3 minutes  Total critical care time (exclusive of procedural time) : 33 minutes  Critical care time was exclusive of separately billable procedures and treating other patients.             Dimitris Tucker MD  12/11/20 [id number]

## 2020-12-11 NOTE — ED NOTES
AAOx4, skin warm/dry, respirations deep/even/unlabored.  Appears in mild distress with moderate shortness of breath.  Found on cardiac/BP/O2 sat monitors and O2 at 100% per non-rebreather, sats 93%.  Changed to O2 per high-flow nasal cannula per respiratory.  Sats continue at 93%.

## 2020-12-11 NOTE — ASSESSMENT & PLAN NOTE
- COVID-19 testing Collection Date: 12/1/2020 Collection Time:  11:28 AM   - Infection Control notified     - Isolation:   - Airborne, Contact and Droplet Precautions  - Cohort patients into COVID units  - N95 mask, wear eye protection  - 20 second hand hygiene              - Limit visitors per hospital policy              - Consolidating lab draws, nursing care, provider visits, and interventions    - Diagnostics: (leukopenia, hyponatremia, hyperferritinemia, elevated troponin, elevated d-dimer, age, and comorbidities are significant predictors of poor clinical outcome)  CBC, CMP, Ferritin, CRP and Portable CXR    - Management:  Supplemental O2 to maintain SpO2 >92%  Continuous/intermittent Pulse Ox  Albuterol treatment PRN   Dexamethasone  Remdesivir  Consult with pulmonology    Advance Care Planning     Code Status  Full code

## 2020-12-11 NOTE — SUBJECTIVE & OBJECTIVE
Past Medical History:   Diagnosis Date    Breast cancer     left breast    Cancer     RIGHT BREAST 10-15    Depression     Diabetes mellitus     Neuropathy     Panic attacks     S/P epidural steroid injection     Seizures     none since early 30's    Wears glasses     TO DRIVE       Past Surgical History:   Procedure Laterality Date    AXILLARY NODE DISSECTION Left 8/24/2020    Procedure: LYMPHADENECTOMY, AXILLARY;  Surgeon: Cory Vick MD;  Location: [facility];  Service: General;  Laterality: Left;  left breast mastectomy with possible axillary lymph node dissection    BREAST BIOPSY      right    breast reconstruction with tummy [facility]      BREAST SURGERY      11-3-15 LUMPECTOMY, 12-2-15 REEXCISION    INCISION AND DRAINAGE OF ABSCESS Left 9/9/2020    Procedure: INCISION AND DRAINAGE, ABSCESS;  Surgeon: Cory Vick MD;  Location: [facility] OR;  Service: General;  Laterality: Left;    INSERTION OF LOCALIZATION WIRE Left 8/24/2020    Procedure: INSERTION, LOCALIZATION WIRE;  Surgeon: Cory Vick MD;  Location: [facility];  Service: General;  Laterality: Left;  left breast lumpectomy with wire needle loc    mastectomy 2016      RECONSTRUCTION OF NIPPLE Right 11/5/2018    Procedure: RECONSTRUCTION-NIPPLE RIGHT;  Surgeon: Xiang eHrnandez MD;  Location: [address];  Service: Plastics;  Laterality: Right;    SENTINEL LYMPH NODE BIOPSY Left 8/24/2020    Procedure: BIOPSY, LYMPH NODE, SENTINEL;  Surgeon: Cory Vick MD;  Location: [facility];  Service: General;  Laterality: Left;  left breast lumpectomy with left sentinel lymoh node       shoulder surgery and wrist      BILAT  BONE SPUR    WRIST SURGERY      RIGHT       Review of patient's allergies indicates:   Allergen Reactions    Adhesive tape-silicones Hives     BANDAIDS    Polymycin Hives    Adhesive     Latex, natural rubber Hives and Itching    Neomycin-bacitracnzn-polymyxnb     Polyurethane-39 Hives       No  current facility-administered medications on file prior to encounter.      Current Outpatient Medications on File Prior to Encounter   Medication Sig    carisoprodol (SOMA) 350 MG tablet Take 1 tablet (350 mg total) by mouth 3 (three) times daily as needed. (Patient taking differently: Take 350 mg by mouth 3 (three) times daily as needed for Muscle spasms. )    diazepam (VALIUM) 10 MG tablet Take 10 mg by mouth 4 (four) times daily as needed (anxiety).     dronabinol (MARINOL) 2.5 MG capsule Take 1 capsule (2.5 mg total) by mouth 2 (two) times daily before meals.    dulaglutide (TRULICITY) 0.75 mg/0.5 mL PnIj Inject 0.75 mg into the skin once a week. Wed    enalapril (VASOTEC) 5 MG tablet Take 5 mg by mouth once daily.     furosemide (LASIX) 20 MG tablet TAKE ONE TABLET BY MOUTH EVERY DAY (Patient taking differently: Take 20 mg by mouth once daily. )    HYDROcodone-acetaminophen (NORCO)  mg per tablet Take 1 tablet by mouth every 6 (six) hours as needed for Pain.     insulin glargine (LANTUS U-100 INSULIN) 100 unit/mL injection Inject 35 Units into the skin every evening.    metformin (GLUCOPHAGE-XR) 500 MG 24 hr tablet Take 2,000 mg by mouth every evening.     ondansetron (ZOFRAN-ODT) 8 MG TbDL Take 1 tablet (8 mg total) by mouth every 8 (eight) hours as needed. (Patient taking differently: Take 8 mg by mouth every 8 (eight) hours as needed (nausea). )    pravastatin (PRAVACHOL) 40 MG tablet Take 40 mg by mouth every morning.     promethazine (PHENERGAN) 25 MG tablet Take 1 tablet (25 mg total) by mouth every 4 to 6 hours as needed. (Patient taking differently: Take 25 mg by mouth every 4 to 6 hours as needed for Nausea. )    zolpidem (AMBIEN) 10 mg Tab TAKE ONE TABLET BY MOUTH EVERY NIGHT AT BEDTIME AS NEEDED FOR SLEEP (Patient taking differently: Take 10 mg by mouth nightly as needed (insomnia). )    dexAMETHasone (DECADRON) 4 MG Tab Take 1 tablet b.i.d. with meals on Monday, Tuesday,  Wednesday.  Repeat q. 3 weeks before chemotherapy (Patient taking differently: Take 4 mg by mouth 3 (three) times a week. Take 1 tablet b.i.d. with meals on Monday, Tuesday, Wednesday.  Repeat q. 3 weeks before chemotherapy)    [DISCONTINUED] ASCORBIC ACID/VITAMIN E/BIOTIN (HAIR, SKIN, NAILS WITH BIOTIN ORAL) Take 1 tablet by mouth every morning.     [DISCONTINUED] HYDROcodone-acetaminophen (NORCO) 5-325 mg per tablet Take 1 tablet by mouth every 6 (six) hours as needed for Pain.    [DISCONTINUED] HYDROcodone-acetaminophen (NORCO) 5-325 mg per tablet Take 1 tablet by mouth every 6 (six) hours as needed for Pain.    [DISCONTINUED] insulin glargine,hum.rec.anlog (LANTUS U-100 INSULIN SUBQ) Inject 35 Units into the skin every evening.    [DISCONTINUED] insulin syringe-needle U-100 1 mL 31 gauge x 5/16 Syrg 1 Device by Misc.(Non-Drug; Combo Route) route once daily.    [DISCONTINUED] multivitamin capsule Take 1 capsule by mouth every morning.    [DISCONTINUED] oxyCODONE-acetaminophen (PERCOCET)  mg per tablet Take 1 tablet by mouth every 4 (four) hours as needed for Pain.     Family History     None        Tobacco Use    Smoking status: Former Smoker     Packs/day: 0.25     Years: 30.00     Pack years: 7.50     Quit date: 2015     Years since quittin.2    Smokeless tobacco: Never Used   Substance and Sexual Activity    Alcohol use: Yes     Alcohol/week: 0.0 standard drinks     Comment: RARELY    Drug use: Yes     Types: Marijuana     Comment: medical marijuana    Sexual activity: Not on file     Review of Systems   Constitutional: Positive for activity change, appetite change and fatigue. Negative for chills and fever.   HENT: Negative for congestion and rhinorrhea.    Respiratory: Positive for cough and shortness of breath. Negative for wheezing.    Cardiovascular: Negative for chest pain, palpitations and leg swelling.   Gastrointestinal: Positive for diarrhea. Negative for abdominal  distention, abdominal pain, nausea and vomiting.   Endocrine: Negative for cold intolerance and heat intolerance.   Genitourinary: Negative for dysuria and urgency.   Musculoskeletal: Negative for arthralgias and neck stiffness.   Skin: Negative for rash and wound.   Neurological: Negative for dizziness and weakness.     Objective:     Vital Signs (Most Recent):  Temp: 98 °F (36.7 °C) (12/11/20 1200)  Pulse: 100 (12/11/20 1701)  Resp: (!) 24 (12/11/20 1200)  BP: (!) 174/92 (12/11/20 1701)  SpO2: (!) 91 % (12/11/20 1701) Vital Signs (24h Range):  Temp:  [98 °F (36.7 °C)] 98 °F (36.7 °C)  Pulse:  [] 100  Resp:  [24] 24  SpO2:  [81 %-93 %] 91 %  BP: (159-218)/() 174/92     Weight: 90 kg (198 lb 6.6 oz)  Body mass index is 27.67 kg/m².    Physical Exam  Constitutional:       General: She is not in acute distress.     Appearance: She is well-developed.   HENT:      Head: Normocephalic and atraumatic.   Eyes:      Pupils: Pupils are equal, round, and reactive to light.   Cardiovascular:      Rate and Rhythm: Normal rate and regular rhythm.      Heart sounds: No murmur.   Pulmonary:      Effort: No respiratory distress.      Breath sounds: Rales present. No wheezing.      Comments: On high-flow nasal cannula.  Mild tachypnea  Abdominal:      General: Bowel sounds are normal. There is no distension.      Palpations: Abdomen is soft.      Tenderness: There is no abdominal tenderness.   Musculoskeletal: Normal range of motion.   Skin:     General: Skin is warm and dry.      Findings: No rash.   Neurological:      Mental Status: She is alert and oriented to person, place, and time.      Cranial Nerves: No cranial nerve deficit.   Psychiatric:         Behavior: Behavior normal.           CRANIAL NERVES     CN III, IV, VI   Pupils are equal, round, and reactive to light.       Significant Labs: All pertinent labs within the past 24 hours have been reviewed.    Significant Imaging: I have reviewed and interpreted all  pertinent imaging results/findings within the past 24 hours.

## 2020-12-11 NOTE — HPI
Miss Castro is a 60 y.o. female IDDM, prior right breast cancer and recent diagnosis of left breast CA about to start chemotherapy, known COVID-19-positive 12/1/20 presenting with SOB.  Patient reports loss of sense of smell at our initial system and denied any respiratory complaints up until about 3 days ago.  Since then she reports worsening shortness of breath.  Also reports some loose stools and fatigue.  She is requiring 15 L nasal cannula in the ED after being 81% on room air.  She is going to be admitted to the ICU on high-flow nasal cannula.  Full code status confirmed on admission

## 2020-12-11 NOTE — ED NOTES
Patient is talking on phone. Patient asked to relax and focus on her breathing as her oxygen is 89% while talking.

## 2020-12-11 NOTE — ASSESSMENT & PLAN NOTE
Patient's FSGs are not controlled on current hypoglycemics.  Patient reports noncompliance with insulin at home the past few days  Last A1c reviewed-   Lab Results   Component Value Date    HGBA1C 9.3 (H) 05/04/2020     Most recent fingerstick glucose reviewed- No results for input(s): POCTGLUCOSE in the last 24 hours.  Current correctional scale  Medium  Maintain anti-hyperglycemic dose as follows- will initiate home regimen with medium sliding scale insulin and monitor.  Antihyperglycemics (From admission, onward)    None        Hold Oral hypoglycemics while patient is in the hospital.

## 2020-12-12 ENCOUNTER — ANESTHESIA (OUTPATIENT)
Dept: INTENSIVE CARE | Facility: HOSPITAL | Age: 60
DRG: 177 | End: 2020-12-12
Payer: MEDICARE

## 2020-12-12 ENCOUNTER — ANESTHESIA EVENT (OUTPATIENT)
Dept: INTENSIVE CARE | Facility: HOSPITAL | Age: 60
DRG: 177 | End: 2020-12-12
Payer: MEDICARE

## 2020-12-12 PROBLEM — J96.01 ACUTE HYPOXEMIC RESPIRATORY FAILURE: Status: ACTIVE | Noted: 2020-12-12

## 2020-12-12 PROBLEM — J80 ARDS (ADULT RESPIRATORY DISTRESS SYNDROME): Status: ACTIVE | Noted: 2020-12-12

## 2020-12-12 LAB
ALBUMIN SERPL BCP-MCNC: 2.6 G/DL (ref 3.5–5.2)
ALP SERPL-CCNC: 181 U/L (ref 55–135)
ALT SERPL W/O P-5'-P-CCNC: 33 U/L (ref 10–44)
ANION GAP SERPL CALC-SCNC: 13 MMOL/L (ref 8–16)
AST SERPL-CCNC: 36 U/L (ref 10–40)
BILIRUB SERPL-MCNC: 0.3 MG/DL (ref 0.1–1)
BUN SERPL-MCNC: 12 MG/DL (ref 6–20)
CALCIUM SERPL-MCNC: 8.7 MG/DL (ref 8.7–10.5)
CHLORIDE SERPL-SCNC: 103 MMOL/L (ref 95–110)
CO2 SERPL-SCNC: 23 MMOL/L (ref 23–29)
CREAT SERPL-MCNC: 0.8 MG/DL (ref 0.5–1.4)
EST. GFR  (AFRICAN AMERICAN): >60 ML/MIN/1.73 M^2
EST. GFR  (NON AFRICAN AMERICAN): >60 ML/MIN/1.73 M^2
ESTIMATED AVG GLUCOSE: 275 MG/DL (ref 68–131)
GLUCOSE SERPL-MCNC: 234 MG/DL (ref 70–110)
HBA1C MFR BLD HPLC: 11.2 % (ref 4–5.6)
MAGNESIUM SERPL-MCNC: 2.2 MG/DL (ref 1.6–2.6)
PHOSPHATE SERPL-MCNC: 2.8 MG/DL (ref 2.7–4.5)
POCT GLUCOSE: 225 MG/DL (ref 70–110)
POCT GLUCOSE: 303 MG/DL (ref 70–110)
POCT GLUCOSE: 305 MG/DL (ref 70–110)
POCT GLUCOSE: 330 MG/DL (ref 70–110)
POCT GLUCOSE: 361 MG/DL (ref 70–110)
POTASSIUM SERPL-SCNC: 3.9 MMOL/L (ref 3.5–5.1)
PROT SERPL-MCNC: 7.4 G/DL (ref 6–8.4)
SODIUM SERPL-SCNC: 139 MMOL/L (ref 136–145)

## 2020-12-12 PROCEDURE — 83735 ASSAY OF MAGNESIUM: CPT

## 2020-12-12 PROCEDURE — 94761 N-INVAS EAR/PLS OXIMETRY MLT: CPT

## 2020-12-12 PROCEDURE — 83036 HEMOGLOBIN GLYCOSYLATED A1C: CPT

## 2020-12-12 PROCEDURE — 36556 INSERT NON-TUNNEL CV CATH: CPT | Mod: ,,, | Performed by: ANESTHESIOLOGY

## 2020-12-12 PROCEDURE — 25000003 PHARM REV CODE 250: Performed by: INTERNAL MEDICINE

## 2020-12-12 PROCEDURE — 94760 N-INVAS EAR/PLS OXIMETRY 1: CPT

## 2020-12-12 PROCEDURE — 99291 PR CRITICAL CARE, E/M 30-74 MINUTES: ICD-10-PCS | Mod: ,,, | Performed by: INTERNAL MEDICINE

## 2020-12-12 PROCEDURE — 20000000 HC ICU ROOM

## 2020-12-12 PROCEDURE — 25000003 PHARM REV CODE 250: Performed by: HOSPITALIST

## 2020-12-12 PROCEDURE — 84100 ASSAY OF PHOSPHORUS: CPT

## 2020-12-12 PROCEDURE — 99900035 HC TECH TIME PER 15 MIN (STAT)

## 2020-12-12 PROCEDURE — 27000221 HC OXYGEN, UP TO 24 HOURS

## 2020-12-12 PROCEDURE — 99291 CRITICAL CARE FIRST HOUR: CPT | Mod: ,,, | Performed by: INTERNAL MEDICINE

## 2020-12-12 PROCEDURE — 36415 COLL VENOUS BLD VENIPUNCTURE: CPT

## 2020-12-12 PROCEDURE — 27000190 HC CPAP FULL FACE MASK W/VALVE

## 2020-12-12 PROCEDURE — 76937 US GUIDE VASCULAR ACCESS: CPT | Performed by: ANESTHESIOLOGY

## 2020-12-12 PROCEDURE — 80053 COMPREHEN METABOLIC PANEL: CPT

## 2020-12-12 PROCEDURE — 76937 CENTRAL LINE: ICD-10-PCS | Mod: 26,,, | Performed by: ANESTHESIOLOGY

## 2020-12-12 PROCEDURE — 36573 INSJ PICC RS&I 5 YR+: CPT

## 2020-12-12 PROCEDURE — 63600175 PHARM REV CODE 636 W HCPCS: Performed by: HOSPITALIST

## 2020-12-12 PROCEDURE — C1751 CATH, INF, PER/CENT/MIDLINE: HCPCS

## 2020-12-12 PROCEDURE — 36556 CENTRAL LINE: ICD-10-PCS | Mod: ,,, | Performed by: ANESTHESIOLOGY

## 2020-12-12 PROCEDURE — 94660 CPAP INITIATION&MGMT: CPT

## 2020-12-12 RX ORDER — INSULIN ASPART 100 [IU]/ML
0-5 INJECTION, SOLUTION INTRAVENOUS; SUBCUTANEOUS EVERY 6 HOURS PRN
Status: DISCONTINUED | OUTPATIENT
Start: 2020-12-12 | End: 2020-12-14

## 2020-12-12 RX ORDER — GLUCAGON 1 MG
1 KIT INJECTION
Status: DISCONTINUED | OUTPATIENT
Start: 2020-12-12 | End: 2020-12-12

## 2020-12-12 RX ORDER — GLUCAGON 1 MG
1 KIT INJECTION
Status: DISCONTINUED | OUTPATIENT
Start: 2020-12-12 | End: 2020-12-23 | Stop reason: HOSPADM

## 2020-12-12 RX ADMIN — INSULIN DETEMIR 35 UNITS: 100 INJECTION, SOLUTION SUBCUTANEOUS at 09:12

## 2020-12-12 RX ADMIN — INSULIN ASPART 5 UNITS: 100 INJECTION, SOLUTION INTRAVENOUS; SUBCUTANEOUS at 02:12

## 2020-12-12 RX ADMIN — REMDESIVIR 100 MG: 100 INJECTION, POWDER, LYOPHILIZED, FOR SOLUTION INTRAVENOUS at 05:12

## 2020-12-12 RX ADMIN — AZITHROMYCIN MONOHYDRATE 500 MG: 500 INJECTION, POWDER, LYOPHILIZED, FOR SOLUTION INTRAVENOUS at 10:12

## 2020-12-12 RX ADMIN — INSULIN ASPART 2 UNITS: 100 INJECTION, SOLUTION INTRAVENOUS; SUBCUTANEOUS at 09:12

## 2020-12-12 RX ADMIN — ENALAPRIL MALEATE 5 MG: 5 TABLET ORAL at 10:12

## 2020-12-12 RX ADMIN — LEUCINE, PHENYLALANINE, LYSINE, METHIONINE, ISOLEUCINE, VALINE, HISTIDINE, THREONINE, TRYPTOPHAN, ALANINE, GLYCINE, ARGININE, PROLINE, SERINE, TYROSINE, SODIUM ACETATE, DIBASIC POTASSIUM PHOSPHATE, MAGNESIUM CHLORIDE, SODIUM CHLORIDE, CALCIUM CHLORIDE, DEXTROSE
311; 238; 247; 170; 255; 247; 204; 179; 77; 880; 438; 489; 289; 213; 17; 297; 261; 51; 77; 33; 5 INJECTION INTRAVENOUS at 08:12

## 2020-12-12 RX ADMIN — PRAVASTATIN SODIUM 40 MG: 40 TABLET ORAL at 06:12

## 2020-12-12 RX ADMIN — DRONABINOL 2.5 MG: 2.5 CAPSULE ORAL at 06:12

## 2020-12-12 RX ADMIN — FUROSEMIDE 20 MG: 20 TABLET ORAL at 10:12

## 2020-12-12 RX ADMIN — CEFTRIAXONE 1 G: 1 INJECTION, SOLUTION INTRAVENOUS at 08:12

## 2020-12-12 RX ADMIN — ENOXAPARIN SODIUM 40 MG: 40 INJECTION SUBCUTANEOUS at 05:12

## 2020-12-12 RX ADMIN — MUPIROCIN: 20 OINTMENT TOPICAL at 10:12

## 2020-12-12 RX ADMIN — DRONABINOL 2.5 MG: 2.5 CAPSULE ORAL at 05:12

## 2020-12-12 RX ADMIN — MUPIROCIN: 20 OINTMENT TOPICAL at 09:12

## 2020-12-12 RX ADMIN — DEXAMETHASONE SODIUM PHOSPHATE 6 MG: 4 INJECTION, SOLUTION INTRA-ARTICULAR; INTRALESIONAL; INTRAMUSCULAR; INTRAVENOUS; SOFT TISSUE at 10:12

## 2020-12-12 RX ADMIN — INSULIN ASPART 4 UNITS: 100 INJECTION, SOLUTION INTRAVENOUS; SUBCUTANEOUS at 05:12

## 2020-12-12 NOTE — ED NOTES
Upon transfer to ICU, patient is AAOx4, patient placed on non rebreather for transport. SANDIP SALCIDO

## 2020-12-12 NOTE — NURSING
Note patient with elevated blood pressure of 177/83. Also note pt. Blood glucose 447 per finger stick. Glucose lab drawn per protocol and note a blood glucose of 422. Notified MOLLY Caicedo NP at this time. Instructed to continue to monitor patient blood glucose. Insulin regular 10 U IV X1 ordered at this time. Will continue to monitor

## 2020-12-12 NOTE — EICU
eICU Note : New Admit :notified by the [facility]:    Brief HPI: 61 y/o F , with IDDM , Covid 19 positive,  12/01/20 ,  and prior breast cancer with recent diagnosis of left breast CA about to start chemotherapy. Pt is a known case CA breast., pt presenting with  Shortness of breath, diarrhea,  Anorexia,   She was on 15L nasal cannula  On 81% on room air .    Vital Signs :  Vitals:    12/11/20 1851 12/11/20 1918 12/11/20 1921 12/11/20 1924   BP:    (!) 154/74   Pulse: 99  104    SpO2: (!) 92% (!) 90%           Camera Assessment : Pt lying in bed in no distress      Data:  WBC 6.93, hemoglobin 13.8, hematocrit 43.3, platelets 286  Sodium 138, potassium 4.4, chloride 100, CO2 16, anion gap 22, BUN 15, creatinine 1.1, EGFR 55, glucose 466  ALT 42, AST 38, , CPK 65,   CXR: bilateral diffuse infiltrates   Venous Lactate 2.2  Impression and recommendations:  1.COVID 19 Pneumonia with bilateral infiltrates :  Rocephin and Azithromycin, Dexamethasone , transition to high flow nasal cannula , N95 mask,   2. Diabetes Mellitus  2: SSI  3.Hypertension : Enalapril  5 mg daily.  4. Hyperlipidemia : Pravastatin  40 mg , mixed   5. H/o CA breast:  Follow up with oncology  6. Anxiety : Diazepam PRN   7.PUD, DVT prophylaxis: SCD and PPI        Filomena Christian M.D  eICU Physician

## 2020-12-12 NOTE — PLAN OF CARE
"Alert and orient X4. Complains of pain but does not want pain medications at this time stating "I don't want to take it until I eat a real meal so I won't get sick." Blood glucose monitored this shift. Managed per NP order refer to MAR. Up to bedside commode with standby assist. SOB noted on transfer. Oxygen saturation maintained 90% or greater on nonrebreather. Pt. Complains of mask being on face. Educated on need to wear mask at this time to maintain adequate oxygenation. SR/ST. Reports Dr. Peraza to be patient oncologist. Safety intact.   "

## 2020-12-12 NOTE — PLAN OF CARE
Cm tried to complete the assessment, cm left message on answering machine. Pt is in ICU-COVID+.   Cm called the friend Carlos, they live in the same house, he ws able to give me minimal information and he has COVID as well.  The address and contact numbers are correct on face sheet.  Cm will follow-up with pt on discharge needs.  Disposition:  Pt will discharge to home. Cm will follow-up on transportation for discharge. Cm utilized the MR as well to complete the assessment.  Pt will need O2 evaluation on discharge.       12/12/20 0507   Discharge Assessment   Assessment Type Discharge Planning Assessment   Confirmed/corrected address and phone number on facesheet? Yes   Assessment information obtained from? Other;Medical Record  (friend that shares house)   Expected Length of Stay (days) 3   Communicated expected length of stay with patient/caregiver no   Prior to hospitilization cognitive status: Alert/Oriented   Prior to hospitalization functional status: Independent   Current cognitive status: Alert/Oriented   Current Functional Status: Independent   Lives With friend(s)   Able to Return to Prior Arrangements yes   Is patient able to care for self after discharge? Yes   Who are your caregiver(s) and their phone number(s)? Carlos- [phone]/balwinder delgadillo - [phone]   Patient's perception of discharge disposition home or selfcare   Readmission Within the Last 30 Days no previous admission in last 30 days   Patient currently being followed by outpatient case management? Yes   If yes, name of outpatient case management following: insurance company assigned oupatient case management   Patient currently receives any other outside agency services? No   Do you have any problems affording any of your prescribed medications? No   Is the patient taking medications as prescribed? yes   Discharge Plan A Home with family   Discharge Plan B Home with family   DME Needed Upon Discharge  none   Patient/Family in  Agreement with Plan yes

## 2020-12-12 NOTE — ED NOTES
Patient states that her dinner was not any good and she is still hungry. Patient given a turkey sandwich and apple sauce.

## 2020-12-12 NOTE — PLAN OF CARE
Care plan reviewed. Safety maintained. Patient is now on Bipap, continuous. NPO x meds and sips of water. Pure wick in place. Afebrile. PICC attempted with no success. Central line to be placed. Consents were obtained for each and are on patient chart. Patient became very tearful. Asked if she was going to make it. She stated she is scared. Emotional support provided. Prayed with patient. Patient spoke about her family, her friends who have recently , her dog. She reports that she is telling all of her friends goodbye. Patient stated she does not want to be alone. Dr. Knox placed central line. Waiting for CXR confirmation. Patient seen by Dr. Marin and Dr. Justin. POA papers are in patient chart. Patient also keeps stating she does not want any pain medication 'what so ever at all'.

## 2020-12-12 NOTE — NURSING
Messaged MOLLY Espinoza NP per secure chat.     patient is here for increased SOB. note lab at 2228 with glucose of 422, Gap 17, CO2 19. 10u of short acting insulin given and 35 units of patient long acting insulin given. follow up blood glucose noted to be 303. would you like to further treat at this time?     New orders received: Insulin Regular 10 U IV X1 ordered at this time

## 2020-12-12 NOTE — NURSING
PICC consulted.  Could only use L arm due to R mastectomy.    Attempted 2 different veins and could not get PICC to thread past axillary region.  PICC unsuccessful    TPN ordered, tunnelled PICC or IJ central line only options at this time   ILEANA Cortés, aware

## 2020-12-12 NOTE — CONSULTS
12/12/2020      Admit Date: 12/11/2020  Negrita Castro  New Patient Consult    Chief Complaint   Patient presents with    Shortness of Breath     + covid, worsening last few days, pt states she started having SOB last few days.        History of Present Illness:  Pt diabetic post Mary Grace, was completing breast rx - preparing for reconstruction of right breast post cancer when found to have left breast ca now post op.  Pt non smoker/drinker.  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  No functional limits nor sz last ys.  Not really ill prior to covid.  Has sibs as next of kin but relates boyfriend, Ganga Brown, acts Medical POA (vague on if this if formally done) - her recall is not optimal.      From Dr Richter hpi 12/11/2020-HPI: Miss Castro is a 60 y.o. female IDDM, prior right breast cancer and recent diagnosis of left breast CA about to start chemotherapy, known COVID-19-positive 12/1/20 presenting with SOB.  Patient reports loss of sense of smell at our initial system and denied any respiratory complaints up until about 3 days ago.  Since then she reports worsening shortness of breath.  Also reports some loose stools and fatigue.  She is requiring 15 L nasal cannula in the ED after being 81% on room air.  She is going to be admitted to the ICU on high-flow nasal cannula.  Full code status confirmed on admission  PFSH:  Past Medical History:   Diagnosis Date    Breast cancer     left breast    Cancer     RIGHT BREAST 10-15    Depression     Diabetes mellitus     Neuropathy     Panic attacks     S/P epidural steroid injection     Seizures     none since early 30's    Wears glasses     TO DRIVE     Past Surgical History:   Procedure Laterality Date    AXILLARY NODE DISSECTION Left 8/24/2020    Procedure: LYMPHADENECTOMY, AXILLARY;  Surgeon: Cory Vick MD;  Location: [facility] OR;  Service: General;  Laterality: Left;  left breast mastectomy with possible axillary lymph node  dissection    BREAST BIOPSY      right    breast reconstruction with tummy [facility]      BREAST SURGERY      11-3-15 LUMPECTOMY, 12-2-15 REEXCISION    INCISION AND DRAINAGE OF ABSCESS Left 2020    Procedure: INCISION AND DRAINAGE, ABSCESS;  Surgeon: Cory Vick MD;  Location: [facility] OR;  Service: General;  Laterality: Left;    INSERTION OF LOCALIZATION WIRE Left 2020    Procedure: INSERTION, LOCALIZATION WIRE;  Surgeon: Cory Vick MD;  Location: [facility] OR;  Service: General;  Laterality: Left;  left breast lumpectomy with wire needle loc    mastectomy 2016      RECONSTRUCTION OF NIPPLE Right 2018    Procedure: RECONSTRUCTION-NIPPLE RIGHT;  Surgeon: Xiang Hernandez MD;  Location: [facility] OR [address] [place] [address];  Service: Plastics;  Laterality: Right;    SENTINEL LYMPH NODE BIOPSY Left 2020    Procedure: BIOPSY, LYMPH NODE, SENTINEL;  Surgeon: Cory Vick MD;  Location: [facility] OR;  Service: General;  Laterality: Left;  left breast lumpectomy with left sentinel lymoh node       shoulder surgery and wrist      BILAT  BONE SPUR    WRIST SURGERY      RIGHT     Social History     Tobacco Use    Smoking status: Former Smoker     Packs/day: 0.25     Years: 30.00     Pack years: 7.50     Quit date: 2015     Years since quittin.2    Smokeless tobacco: Never Used   Substance Use Topics    Alcohol use: Yes     Alcohol/week: 0.0 standard drinks     Comment: RARELY    Drug use: Yes     Types: Marijuana     Comment: medical marijuana     Family History   Problem Relation Age of Onset    Anesthesia problems Neg Hx      Review of patient's allergies indicates:   Allergen Reactions    Adhesive tape-silicones Hives     BANDAIDS    Polymycin Hives    Adhesive     Latex, natural rubber Hives and Itching    Neomycin-bacitracnzn-polymyxnb     Polyurethane-39 Hives       Performance Status:Performance Status:The patient's activity level is functions out of house.    Review of Systems:  " a review of eleven systems covering constitutional, Psych, Eye, HEENT, Respiratory, Cardiac, GI, , Musculoskeletal, Endocrine, Dermatologicwas negative except the above mentioned abnormalities and for any pertinent findings as listed below: Do resp mechanics -supine and erect vital capacity, nip, mep, mvv         Exam:Comprehensive exam done. BP (!) 153/74   Pulse 103   Temp 98.5 °F (36.9 °C) (Axillary)   Resp (!) 33   Ht 5' 11" (1.803 m)   Wt 85.8 kg (189 lb 2.5 oz)   LMP 01/16/2016   SpO2 (!) 93%   Breastfeeding No   BMI 26.38 kg/m²   Exam included Vitals as listed, and patient's appearance and affect and alertness and mood, oral exam for yeast and hygiene and pharynx lesions and Mallapatti (M) score, neck with inspection for jvd and masses and thyroid abnormalities and lymph nodes (supraclavicular and infraclavicular nodes also examined and noted if abn), chest exam included symmetry and effort and fremitus and percussion and auscultation, cardiac exam included rhythm and gallops and murmur and rubs and jvd and edema, abdominal exam for mass and hepatosplenomegaly and tenderness and hernias and bowel sounds, Musculoskeletal exam with muscle tone and posture and mobility/gait and  strenght, and skin for rashes and cyanosis and pallor and turgor, extremity for clubbing.  Findings were normal except as listed below:  M3, alert, min rales, chest is symmetric, no distress, normal percussion, normal fremitus and distatn cor, no edema, rest good.    Radiographs reviewed: view by direct vision    Results for orders placed during the hospital encounter of 03/21/16   X-Ray Chest 1 View    Narrative A portable AP view of the chest was obtained and comparison is made to 01/10/2014.    A vascular access catheter has been placed via a left subclavian approach with the tip in the superior vena cava.  No pneumothorax is present.  Heart size and pulmonary vessels are normal.  The lungs are clear.  No pleural " effusion is present.    Impression  Vascular access catheter in satisfactory position with no evidence of complication.  ______________________________________     Electronically signed by: Natanael Hinojosa  Date:     03/21/16  Time:    09:10    ]    Labs     Recent Labs   Lab 12/11/20  1247   WBC 6.93   HGB 13.8   HCT 43.3        Recent Labs   Lab 12/11/20  1247 12/11/20 2035 12/12/20  0552     --    < > 139   K 4.4  --    < > 3.9     --    < > 103   CO2 16*  --    < > 23   BUN 15  --    < > 12   CREATININE 1.1  --    < > 0.8   *  --    < > 234*   CALCIUM 9.2  --    < > 8.7   MG  --   --   --  2.2   PHOS  --   --   --  2.8   AST 38  --   --  36   ALT 42  --   --  33   ALKPHOS 205*  --   --  181*   BILITOT 0.4  --   --  0.3   PROT 8.1  --   --  7.4   ALBUMIN 3.0*  --   --  2.6*   .4*  --   --   --    SEDRATE  --  112*  --   --    PROCAL  --  0.72*  --   --    LACTATE 2.2  --   --   --    TROPONINI <0.006  --   --   --    CPK 65  --   --   --    BNP  --  83  --   --     < > = values in this interval not displayed.     Recent Labs   Lab 12/11/20  1410   PH 7.423   PCO2 23.3*   PO2 59   HCO3 15.2*     Microbiology Results (last 7 days)     Procedure Component Value Units Date/Time    Blood culture (site 1) [[id number]] Collected: 12/11/20 2035    Order Status: Sent Specimen: Blood from Antecubital, Right Arm Updated: 12/11/20 2035    Blood culture (site 2) [[id number]] Collected: 12/11/20 2035    Order Status: Sent Specimen: Blood from Antecubital, Right Hand Updated: 12/11/20 2035          Impression:  Active Hospital Problems    Diagnosis  POA    *COVID-19 virus infection [U07.1]  Yes    Acute hypoxemic respiratory failure [J96.01]  Yes    ARDS (adult respiratory distress syndrome) [J80]  Yes    Type 2 diabetes mellitus with hyperglycemia, with long-term current use of insulin [E11.65, Z79.4]  Not Applicable    Mixed hyperlipidemia [E78.2]  Yes    Breast cancer [C50.919]  Yes     Depression [F32.9]  Yes      Resolved Hospital Problems   No resolved problems to display.               Plan: 12/12  No fever on  Rocephin/azithro, decadron  15 lpm nrbm sat upper 90's  Wbc 6.3 on 11th,  ddimer 2.25- low dose lovenox,  Ferritin 540, procal 0.72, crp 288 on 11th,   bnp good at 83,   cxr clear 9/6/2020-- 12/11 lef>> right infiltrates c/w covid. More basilair  covid + 12/1      Discussed niv/bipap, and possible needing intubation with pt and her designated poa (she identified Ganga Brown, friend as poa (not clear if medical poa executed but pt tells me he would make medical decisions if she cannot)) -both agree to intubation expecting prolonged course for covid to clear.    covid pos on 12/1 makes response to plasma minimal- would not recommedn.     The following were evaluated and adjusted as needed: mechanical ventilator settings and weaning status, intravenous fluids and nutritional status, sedation and neurologic status, antibiotics, hemodynamics, support tubes and access lines and invasive monitoring, acid base balance and oxygenation needs, input and output and renal status and CODE STATUS/OUTLOOK DISCUSSED WITH AVAILABLE NEXT OF  KIN       Critical Care  - THE PATIENT HAS A HIGH PROBABILITY OF IMMINENT OR LIFE THREATENING DETERIORATION.  Over 50%time of encounter was in direct care at bedside.  Time was 30 to 74 minutes required for patient care.  Time needed for all of the above totaled 35 minutes.

## 2020-12-13 LAB
ALBUMIN SERPL BCP-MCNC: 2.4 G/DL (ref 3.5–5.2)
ALP SERPL-CCNC: 243 U/L (ref 55–135)
ALT SERPL W/O P-5'-P-CCNC: 46 U/L (ref 10–44)
ANION GAP SERPL CALC-SCNC: 14 MMOL/L (ref 8–16)
AST SERPL-CCNC: 45 U/L (ref 10–40)
BILIRUB SERPL-MCNC: 0.3 MG/DL (ref 0.1–1)
BUN SERPL-MCNC: 19 MG/DL (ref 6–20)
CALCIUM SERPL-MCNC: 8.7 MG/DL (ref 8.7–10.5)
CHLORIDE SERPL-SCNC: 100 MMOL/L (ref 95–110)
CO2 SERPL-SCNC: 23 MMOL/L (ref 23–29)
CREAT SERPL-MCNC: 0.7 MG/DL (ref 0.5–1.4)
CRP SERPL-MCNC: 187.5 MG/L (ref 0–8.2)
EST. GFR  (AFRICAN AMERICAN): >60 ML/MIN/1.73 M^2
EST. GFR  (NON AFRICAN AMERICAN): >60 ML/MIN/1.73 M^2
GLUCOSE SERPL-MCNC: 378 MG/DL (ref 70–110)
GLUCOSE SERPL-MCNC: 527 MG/DL (ref 70–110)
MAGNESIUM SERPL-MCNC: 2.3 MG/DL (ref 1.6–2.6)
PHOSPHATE SERPL-MCNC: 4 MG/DL (ref 2.7–4.5)
POCT GLUCOSE: 361 MG/DL (ref 70–110)
POCT GLUCOSE: 374 MG/DL (ref 70–110)
POCT GLUCOSE: 451 MG/DL (ref 70–110)
POCT GLUCOSE: 462 MG/DL (ref 70–110)
POCT GLUCOSE: >500 MG/DL (ref 70–110)
POTASSIUM SERPL-SCNC: 4 MMOL/L (ref 3.5–5.1)
PROT SERPL-MCNC: 7 G/DL (ref 6–8.4)
SODIUM SERPL-SCNC: 137 MMOL/L (ref 136–145)

## 2020-12-13 PROCEDURE — 83735 ASSAY OF MAGNESIUM: CPT

## 2020-12-13 PROCEDURE — 99900035 HC TECH TIME PER 15 MIN (STAT)

## 2020-12-13 PROCEDURE — 36415 COLL VENOUS BLD VENIPUNCTURE: CPT

## 2020-12-13 PROCEDURE — 25000003 PHARM REV CODE 250: Performed by: HOSPITALIST

## 2020-12-13 PROCEDURE — 86140 C-REACTIVE PROTEIN: CPT

## 2020-12-13 PROCEDURE — 63600175 PHARM REV CODE 636 W HCPCS: Performed by: HOSPITALIST

## 2020-12-13 PROCEDURE — 80053 COMPREHEN METABOLIC PANEL: CPT

## 2020-12-13 PROCEDURE — 94761 N-INVAS EAR/PLS OXIMETRY MLT: CPT

## 2020-12-13 PROCEDURE — 99233 PR SUBSEQUENT HOSPITAL CARE,LEVL III: ICD-10-PCS | Mod: ,,, | Performed by: INTERNAL MEDICINE

## 2020-12-13 PROCEDURE — 99233 SBSQ HOSP IP/OBS HIGH 50: CPT | Mod: ,,, | Performed by: INTERNAL MEDICINE

## 2020-12-13 PROCEDURE — 27000221 HC OXYGEN, UP TO 24 HOURS

## 2020-12-13 PROCEDURE — 94660 CPAP INITIATION&MGMT: CPT

## 2020-12-13 PROCEDURE — 84100 ASSAY OF PHOSPHORUS: CPT

## 2020-12-13 PROCEDURE — 82947 ASSAY GLUCOSE BLOOD QUANT: CPT

## 2020-12-13 PROCEDURE — 20000000 HC ICU ROOM

## 2020-12-13 RX ORDER — HYDROCODONE BITARTRATE AND ACETAMINOPHEN 5; 325 MG/1; MG/1
1 TABLET ORAL EVERY 6 HOURS PRN
Status: DISCONTINUED | OUTPATIENT
Start: 2020-12-13 | End: 2020-12-23 | Stop reason: HOSPADM

## 2020-12-13 RX ORDER — DIPHENHYDRAMINE HCL 25 MG
50 CAPSULE ORAL EVERY 6 HOURS PRN
Status: DISCONTINUED | OUTPATIENT
Start: 2020-12-13 | End: 2020-12-23 | Stop reason: HOSPADM

## 2020-12-13 RX ORDER — HYDROMORPHONE HYDROCHLORIDE 1 MG/ML
0.5 INJECTION, SOLUTION INTRAMUSCULAR; INTRAVENOUS; SUBCUTANEOUS
Status: DISCONTINUED | OUTPATIENT
Start: 2020-12-13 | End: 2020-12-14

## 2020-12-13 RX ADMIN — LEUCINE, PHENYLALANINE, LYSINE, METHIONINE, ISOLEUCINE, VALINE, HISTIDINE, THREONINE, TRYPTOPHAN, ALANINE, GLYCINE, ARGININE, PROLINE, SERINE, TYROSINE, SODIUM ACETATE, DIBASIC POTASSIUM PHOSPHATE, MAGNESIUM CHLORIDE, SODIUM CHLORIDE, CALCIUM CHLORIDE, DEXTROSE
311; 238; 247; 170; 255; 247; 204; 179; 77; 880; 438; 489; 289; 213; 17; 297; 261; 51; 77; 33; 5 INJECTION INTRAVENOUS at 09:12

## 2020-12-13 RX ADMIN — DEXAMETHASONE SODIUM PHOSPHATE 6 MG: 4 INJECTION, SOLUTION INTRA-ARTICULAR; INTRALESIONAL; INTRAMUSCULAR; INTRAVENOUS; SOFT TISSUE at 08:12

## 2020-12-13 RX ADMIN — DIPHENHYDRAMINE HYDROCHLORIDE 50 MG: 25 CAPSULE ORAL at 09:12

## 2020-12-13 RX ADMIN — INSULIN ASPART 5 UNITS: 100 INJECTION, SOLUTION INTRAVENOUS; SUBCUTANEOUS at 04:12

## 2020-12-13 RX ADMIN — MUPIROCIN: 20 OINTMENT TOPICAL at 09:12

## 2020-12-13 RX ADMIN — DRONABINOL 2.5 MG: 2.5 CAPSULE ORAL at 04:12

## 2020-12-13 RX ADMIN — MUPIROCIN: 20 OINTMENT TOPICAL at 08:12

## 2020-12-13 RX ADMIN — AZITHROMYCIN MONOHYDRATE 500 MG: 500 INJECTION, POWDER, LYOPHILIZED, FOR SOLUTION INTRAVENOUS at 10:12

## 2020-12-13 RX ADMIN — REMDESIVIR 100 MG: 100 INJECTION, POWDER, LYOPHILIZED, FOR SOLUTION INTRAVENOUS at 04:12

## 2020-12-13 RX ADMIN — PRAVASTATIN SODIUM 40 MG: 40 TABLET ORAL at 06:12

## 2020-12-13 RX ADMIN — CEFTRIAXONE 1 G: 1 INJECTION, SOLUTION INTRAVENOUS at 08:12

## 2020-12-13 RX ADMIN — INSULIN ASPART 5 UNITS: 100 INJECTION, SOLUTION INTRAVENOUS; SUBCUTANEOUS at 11:12

## 2020-12-13 RX ADMIN — INSULIN ASPART 5 UNITS: 100 INJECTION, SOLUTION INTRAVENOUS; SUBCUTANEOUS at 06:12

## 2020-12-13 RX ADMIN — DRONABINOL 2.5 MG: 2.5 CAPSULE ORAL at 06:12

## 2020-12-13 RX ADMIN — INSULIN ASPART 5 UNITS: 100 INJECTION, SOLUTION INTRAVENOUS; SUBCUTANEOUS at 09:12

## 2020-12-13 RX ADMIN — ENALAPRIL MALEATE 5 MG: 5 TABLET ORAL at 08:12

## 2020-12-13 RX ADMIN — FUROSEMIDE 20 MG: 20 TABLET ORAL at 08:12

## 2020-12-13 RX ADMIN — ENOXAPARIN SODIUM 40 MG: 40 INJECTION SUBCUTANEOUS at 04:12

## 2020-12-13 RX ADMIN — DIPHENHYDRAMINE HYDROCHLORIDE 50 MG: 25 CAPSULE ORAL at 02:12

## 2020-12-13 NOTE — PLAN OF CARE
12/12/20 1958   Patient Assessment/Suction   Level of Consciousness (AVPU) alert   Respiratory Effort Unlabored   Expansion/Accessory Muscles/Retractions no use of accessory muscles   All Lung Fields Breath Sounds diminished   Rhythm/Pattern, Respiratory tachypneic   Cough Frequency no cough   PRE-TX-O2   O2 Device (Oxygen Therapy) BiPAP   $ Is the patient on Low Flow Oxygen? Yes   Oxygen Concentration (%) 50   SpO2 96 %   Pulse Oximetry Type Continuous   $ Pulse Oximetry - Multiple Charge Pulse Oximetry - Multiple   Pulse 79   Resp (!) 29   Wound Care   $ Wound Care Tech Time 15 min   Area of Concern Left;Right;Cheek;Bridge of nose   Skin Color/Characteristics without discoloration   Skin Temperature warm   Ready to Wean/Extubation Screen   FIO2<=50 (chart decimal) 0.5   Preset CPAP/BiPAP Settings   Mode Of Delivery BiPAP S/T   $ CPAP/BiPAP Daily Charge BiPAP/CPAP Daily   $ Initial CPAP/BiPAP Setup? No   $ Is patient using? Yes   Size of Mask Small/Medium   Sized Appropriately? Yes   Equipment Type V60   Airway Device Type medium full face mask   Humidifier not applicable   Ipap 14   EPAP (cm H2O) 7   Pressure Support (cm H2O) 7   Patient CPAP/BiPAP Settings   RR Total (Breaths/Min) 27   Tidal Volume (mL) 353   VE Minute Ventilation (L/min) 9.6 L/min   Peak Inspiratory Pressure (cm H2O) 14   TiTOT (%) 32   Total Leak (L/Min) 37   Patient Trigger - ST Mode Only (%) 100   CPAP/BiPAP Backup Settings   Backup Rate 10 breaths per minute (bpm)

## 2020-12-13 NOTE — ASSESSMENT & PLAN NOTE
Patient's FSGs are not controlled on current hypoglycemics. Last A1c reviewed-   Lab Results   Component Value Date    HGBA1C 11.2 (H) 12/12/2020     Most recent fingerstick glucose reviewed-   Recent Labs   Lab 12/12/20  0557 12/12/20  1109 12/12/20  1558 12/12/20  2105   POCTGLUCOSE 225* 361* 330* 305*     Current correctional scale  Low  Maintain anti-hyperglycemic dose as follows- will initiate home regimen with medium sliding scale insulin and monitor.  Antihyperglycemics (From admission, onward)    Start     Stop Route Frequency Ordered    12/12/20 1356  insulin aspart U-100 pen 0-5 Units      -- SubQ Every 6 hours PRN 12/12/20 1256    12/11/20 2100  insulin detemir U-100 pen 35 Units      -- SubQ Nightly 12/11/20 2002        Hold Oral hypoglycemics while patient is in the hospital.

## 2020-12-13 NOTE — NURSING
Accu check 461 rechecked 451 secure chat sent to Dr pierce to see if he want a stat blood glucose and to increase insulin. Awaiting response

## 2020-12-13 NOTE — PLAN OF CARE
Able to change to high flow nasal cannula at 15 oxygen saturations remaining above 92%. Patient is anxious and appears frightened. Sat up in chair for 3 hours this morning. Watches TV and talks to family on phone. Lung sounds remain diminished. SCD in place. Clinimix E in progress

## 2020-12-13 NOTE — SUBJECTIVE & OBJECTIVE
Interval History:  She's afraid of succumbing to the virus.  Nurse and I gave her emotional and spiritual support.  She had central line inserted by anesthesiologist.    Review of Systems   Constitutional: Positive for fatigue. Negative for fever.   Respiratory: Positive for cough and shortness of breath.    Cardiovascular: Negative for chest pain.   Gastrointestinal: Negative for abdominal pain.     Objective:     Vital Signs (Most Recent):  Temp: 97.9 °F (36.6 °C) (12/12/20 1912)  Pulse: 79 (12/12/20 1958)  Resp: (!) 29 (12/12/20 1958)  BP: (!) 158/70 (12/12/20 1800)  SpO2: 96 % (12/12/20 1958) Vital Signs (24h Range):  Temp:  [97.8 °F (36.6 °C)-98.9 °F (37.2 °C)] 97.9 °F (36.6 °C)  Pulse:  [] 79  Resp:  [17-40] 29  SpO2:  [83 %-98 %] 96 %  BP: (138-195)/() 158/70     Weight: 85.8 kg (189 lb 2.5 oz)  Body mass index is 26.38 kg/m².    Intake/Output Summary (Last 24 hours) at 12/12/2020 2217  Last data filed at 12/12/2020 1918  Gross per 24 hour   Intake 740 ml   Output 1400 ml   Net -660 ml      Physical Exam  Vitals signs reviewed.   Constitutional:       General: She is not in acute distress.     Appearance: She is ill-appearing.      Interventions: Face mask in place.   Eyes:      General:         Right eye: No discharge.         Left eye: No discharge.   Neck:      Vascular: No JVD.   Cardiovascular:      Rate and Rhythm: Normal rate and regular rhythm.   Pulmonary:      Effort: Tachypnea present.      Breath sounds: Rales present.      Comments: NRBM  Abdominal:      General: Bowel sounds are normal. There is no distension.      Palpations: Abdomen is soft.      Tenderness: There is no abdominal tenderness.   Skin:     General: Skin is warm.      Findings: No rash.   Neurological:      Mental Status: She is alert.         Significant Labs: All pertinent labs within the past 24 hours have been reviewed.    Significant Imaging: I have reviewed all pertinent imaging results/findings within the past  24 hours.

## 2020-12-13 NOTE — NURSING
Talked to Dr pierce about blood glucose level. He does not want stat blood glucose level. Wants to keeps coverage as is and he will increase levemir.

## 2020-12-13 NOTE — PROGRESS NOTES
Progress Note  PULMONARY    Admit Date: 12/11/2020 12/13/2020    From Dr Jutsin's consult 12/12:History of Present Illness:  Pt diabetic post Mary Grace, was completing breast rx - preparing for reconstruction of right breast post cancer when found to have left breast ca now post op.  Pt non smoker/drinker.  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  No functional limits nor sz last ys.  Not really ill prior to covid.  Has sibs as next of kin but relates boyfriend, Ganga Brown, acts Medical POA (vague on if this if formally done) - her recall is not optimal.       From Dr Richter hpi 12/11/2020-HPI: Miss Castro is a 60 y.o. female IDDM, prior right breast cancer and recent diagnosis of left breast CA about to start chemotherapy, known COVID-19-positive 12/1/20 presenting with SOB.  Patient reports loss of sense of smell at our initial system and denied any respiratory complaints up until about 3 days ago.  Since then she reports worsening shortness of breath.  Also reports some loose stools and fatigue.  She is requiring 15 L nasal cannula in the ED after being 81% on room air.  She is going to be admitted to the ICU on high-flow nasal cannula.  Full code status confirmed on admission  Plan: 12/12  No fever on  Rocephin/azithro, decadron  15 lpm nrbm sat upper 90's  Wbc 6.3 on 11th,  ddimer 2.25- low dose lovenox,  Ferritin 540, procal 0.72, crp 288 on 11th,   bnp good at 83,   cxr clear 9/6/2020-- 12/11 lef>> right infiltrates c/w covid. More basilair  covid + 12/1   Discussed niv/bipap, and possible needing intubation with pt and her designated poa (she identified Ganga Brown, friend as poa (not clear if medical poa executed but pt tells me he would make medical decisions if she cannot)) -both agree to intubation expecting prolonged course for covid to clear.   covid pos on 12/1 makes response to plasma minimal- would not recommedn.       SUBJECTIVE:     12/13 on niv, excessive coughing with  desat mobilizing or coughing, no new c/o.      PFSH and Allergies reviewed.    OBJECTIVE:     Vitals (Most recent):  Vitals:    12/13/20 1000   BP: (!) 170/77   Pulse: 88   Resp: 13   Temp:        Vitals (24 hour range):  Temp:  [97.3 °F (36.3 °C)-98.4 °F (36.9 °C)]   Pulse:  [65-96]   Resp:  [13-36]   BP: (122-197)/()   SpO2:  [88 %-97 %]       Intake/Output Summary (Last 24 hours) at 12/13/2020 1053  Last data filed at 12/13/2020 0700  Gross per 24 hour   Intake 1272.5 ml   Output 800 ml   Net 472.5 ml          Physical Exam:  The patient's neuro status (alertness,orientation,cognitive function,motor skills,), pharyngeal exam (oral lesions, hygiene, abn dentition,), Neck (jvd,mass,thyroid,nodes in neck and above/below clavicle),RESPIRATORY(symmetry,effort,fremitus,percussion,auscultation),  Cor(rhythm,heart tones including gallops,perfusion,edema)ABD(distention,hepatic&splenomegaly,tenderness,masses), Skin(rash,cyanosis),Psyc(affect,judgement,).  Exam negative except for these pertinent findings:    Alert, was up in chair 3 hrs, coughs at times, chest is symmetric, no distress, normal percussion, normal fremitus and post rales, no edema.    Radiographs reviewed: view by direct vision    Results for orders placed during the hospital encounter of 03/21/16   X-Ray Chest 1 View    Narrative A portable AP view of the chest was obtained and comparison is made to 01/10/2014.    A vascular access catheter has been placed via a left subclavian approach with the tip in the superior vena cava.  No pneumothorax is present.  Heart size and pulmonary vessels are normal.  The lungs are clear.  No pleural effusion is present.    Impression  Vascular access catheter in satisfactory position with no evidence of complication.  ______________________________________     Electronically signed by: Natanael Hinojosa  Date:     03/21/16  Time:    09:10    ]    Labs     No results for input(s): WBC, HGB, HCT, PLT, BAND, METAMYELOCYT,  MYELOPCT, HGBA1C in the last 24 hours.  Recent Labs   Lab 12/13/20  0344      K 4.0      CO2 23   BUN 19   CREATININE 0.7   *   CALCIUM 8.7   MG 2.3   PHOS 4.0   AST 45*   ALT 46*   ALKPHOS 243*   BILITOT 0.3   PROT 7.0   ALBUMIN 2.4*   .5*   No results for input(s): PH, PCO2, PO2, HCO3 in the last 24 hours.  Microbiology Results (last 7 days)     Procedure Component Value Units Date/Time    Blood culture (site 1) [[id number]] Collected: 12/11/20 2035    Order Status: Completed Specimen: Blood from Antecubital, Right Arm Updated: 12/12/20 1715     Blood Culture, Routine No Growth to date    Narrative:      Site # 1, aerobic and anaerobic    Blood culture (site 2) [[id number]] Collected: 12/11/20 2035    Order Status: Completed Specimen: Blood from Antecubital, Right Hand Updated: 12/12/20 1715     Blood Culture, Routine No Growth to date    Narrative:      Site # 2, aerobic only          Impression:  Active Hospital Problems    Diagnosis  POA    *COVID-19 virus infection [U07.1]  Yes    Acute hypoxemic respiratory failure [J96.01]  Yes    ARDS (adult respiratory distress syndrome) [J80]  Yes    Type 2 diabetes mellitus with hyperglycemia, with long-term current use of insulin [E11.65, Z79.4]  Not Applicable    Mixed hyperlipidemia [E78.2]  Yes    Breast cancer [C50.919]  Yes    Depression [F32.9]  Yes      Resolved Hospital Problems   No resolved problems to display.               Plan:   12/13- will add norco for coughing excessively , and dilaudid for cough/air hunger.      afeb  - crp 187  Rocephin/azithro, decadron 6/d  Glu 374, crp 188, ddimer 2.25, ferritin 540  cxr late 12/12 with impressive diffuse ggo  15 lpm or niv at 50% needed-- sat upper 80's.    Marginal, asks for intake on ppn. Need to keep light as very marginal    Increase ppn to 100.                          .

## 2020-12-13 NOTE — ASSESSMENT & PLAN NOTE
- COVID-19 testing Collection Date: 12/1/2020 Collection Time:  11:28 AM   - Infection Control notified     - Isolation:   - Airborne, Contact and Droplet Precautions  - Cohort patients into COVID units  - N95 masks must be fit tested, wear eye protection  - 20 second hand hygiene              - Limit visitors per hospital policy              - Consolidating lab draws, nursing care, provider visits, and interventions      - Management:  Supplemental O2 to maintain SpO2 >92%  Telemetry  Continuous/intermittent Pulse Ox   Dexamethasone.  Ceftriaxone.  Azithromycin.  remdesivir.    Ferritin   Date Value Ref Range Status   12/11/2020 540 (H) 20.0 - 300.0 ng/mL Final     CPK   Date Value Ref Range Status   12/11/2020 65 20 - 180 U/L Final     Procalcitonin   Date Value Ref Range Status   12/11/2020 0.72 (H) <0.25 ng/mL Final     Comment:     A concentration < 0.25 ng/mL represents a low risk bacterial   infection.  Procalcitonin may not be accurate among patients with localized   infection, recent trauma or major surgery, immunosuppressed state,   invasive fungal infection, renal dysfunction. Decisions regarding   initiation or continuation of antibiotic therapy should not be based   solely on procalcitonin levels.         Advance Care Planning     Advance Directives and Living Will: Received 7/30/2019    Power of : Received 1/29/2016

## 2020-12-13 NOTE — CARE UPDATE
12/13/20 0841   Patient Assessment/Suction   Level of Consciousness (AVPU) alert   Respiratory Effort Moderate;Mild   Expansion/Accessory Muscles/Retractions no use of accessory muscles   Rhythm/Pattern, Respiratory tachypneic   PRE-TX-O2   O2 Device (Oxygen Therapy) High Flow nasal Cannula   $ Is the patient on Low Flow Oxygen? Yes   Flow (L/min) 15   SpO2 (!) 88 %   Pulse Oximetry Type Continuous   $ Pulse Oximetry - Multiple Charge Pulse Oximetry - Multiple   Pulse 81   Resp (!) 36   Wound Care   $ Wound Care Tech Time 15 min   Area of Concern Bridge of nose   Skin Color/Characteristics without discoloration

## 2020-12-13 NOTE — PROGRESS NOTES
[facility] Hospital Medicine  Progress Note    Patient Name: Negrita Castro  MRN: [id number]  Patient Class: IP- Inpatient   Admission Date: 12/11/2020  Length of Stay: 1 days  Attending Physician: Jose Alfredo Marin MD  Primary Care Provider: Yoni Renteria MD        Subjective:     Principal Problem:COVID-19 virus infection        HPI:  Miss Castro is a 60 y.o. female IDDM, prior right breast cancer and recent diagnosis of left breast CA about to start chemotherapy, known COVID-19-positive 12/1/20 presenting with SOB.  Patient reports loss of sense of smell at our initial system and denied any respiratory complaints up until about 3 days ago.  Since then she reports worsening shortness of breath.  Also reports some loose stools and fatigue.  She is requiring 15 L nasal cannula in the ED after being 81% on room air.  She is going to be admitted to the ICU on high-flow nasal cannula.  Full code status confirmed on admission    Overview/Hospital Course:  No notes on file    Interval History:  She's afraid of succumbing to the virus.  Nurse and I gave her emotional and spiritual support.  She had central line inserted by anesthesiologist.    Review of Systems   Constitutional: Positive for fatigue. Negative for fever.   Respiratory: Positive for cough and shortness of breath.    Cardiovascular: Negative for chest pain.   Gastrointestinal: Negative for abdominal pain.     Objective:     Vital Signs (Most Recent):  Temp: 97.9 °F (36.6 °C) (12/12/20 1912)  Pulse: 79 (12/12/20 1958)  Resp: (!) 29 (12/12/20 1958)  BP: (!) 158/70 (12/12/20 1800)  SpO2: 96 % (12/12/20 1958) Vital Signs (24h Range):  Temp:  [97.8 °F (36.6 °C)-98.9 °F (37.2 °C)] 97.9 °F (36.6 °C)  Pulse:  [] 79  Resp:  [17-40] 29  SpO2:  [83 %-98 %] 96 %  BP: (138-195)/() 158/70     Weight: 85.8 kg (189 lb 2.5 oz)  Body mass index is 26.38 kg/m².    Intake/Output Summary (Last 24 hours) at 12/12/2020 8270  Last data filed at  12/12/2020 1918  Gross per 24 hour   Intake 740 ml   Output 1400 ml   Net -660 ml      Physical Exam  Vitals signs reviewed.   Constitutional:       General: She is not in acute distress.     Appearance: She is ill-appearing.      Interventions: Face mask in place.   Eyes:      General:         Right eye: No discharge.         Left eye: No discharge.   Neck:      Vascular: No JVD.   Cardiovascular:      Rate and Rhythm: Normal rate and regular rhythm.   Pulmonary:      Effort: Tachypnea present.      Breath sounds: Rales present.      Comments: NRBM  Abdominal:      General: Bowel sounds are normal. There is no distension.      Palpations: Abdomen is soft.      Tenderness: There is no abdominal tenderness.   Skin:     General: Skin is warm.      Findings: No rash.   Neurological:      Mental Status: She is alert.         Significant Labs: All pertinent labs within the past 24 hours have been reviewed.    Significant Imaging: I have reviewed all pertinent imaging results/findings within the past 24 hours.      Assessment/Plan:      * COVID-19 virus infection  - COVID-19 testing Collection Date: 12/1/2020 Collection Time:  11:28 AM   - Infection Control notified     - Isolation:   - Airborne, Contact and Droplet Precautions  - Cohort patients into COVID units  - N95 masks must be fit tested, wear eye protection  - 20 second hand hygiene              - Limit visitors per hospital policy              - Consolidating lab draws, nursing care, provider visits, and interventions      - Management:  Supplemental O2 to maintain SpO2 >92%  Telemetry  Continuous/intermittent Pulse Ox   Dexamethasone.  Ceftriaxone.  Azithromycin.  remdesivir.    Ferritin   Date Value Ref Range Status   12/11/2020 540 (H) 20.0 - 300.0 ng/mL Final     CPK   Date Value Ref Range Status   12/11/2020 65 20 - 180 U/L Final     Procalcitonin   Date Value Ref Range Status   12/11/2020 0.72 (H) <0.25 ng/mL Final     Comment:     A concentration < 0.25  ng/mL represents a low risk bacterial   infection.  Procalcitonin may not be accurate among patients with localized   infection, recent trauma or major surgery, immunosuppressed state,   invasive fungal infection, renal dysfunction. Decisions regarding   initiation or continuation of antibiotic therapy should not be based   solely on procalcitonin levels.         Advance Care Planning     Advance Directives and Living Will: Received 7/30/2019    Power of : Received 1/29/2016         ARDS (adult respiratory distress syndrome)  Monitor chest radiographs.  Conservative fluid strategy.      Acute hypoxemic respiratory failure  Severe.  Support on NRBM.  Pulmonologist assisting with management.  Will probably need NIPPV.    Mixed hyperlipidemia  Chronic/controlled.  Continue home medications      Type 2 diabetes mellitus with hyperglycemia, with long-term current use of insulin  Patient's FSGs are not controlled on current hypoglycemics. Last A1c reviewed-   Lab Results   Component Value Date    HGBA1C 11.2 (H) 12/12/2020     Most recent fingerstick glucose reviewed-   Recent Labs   Lab 12/12/20  0557 12/12/20  1109 12/12/20  1558 12/12/20  2105   POCTGLUCOSE 225* 361* 330* 305*     Current correctional scale  Low  Maintain anti-hyperglycemic dose as follows- will initiate home regimen with medium sliding scale insulin and monitor.  Antihyperglycemics (From admission, onward)    Start     Stop Route Frequency Ordered    12/12/20 1356  insulin aspart U-100 pen 0-5 Units      -- SubQ Every 6 hours PRN 12/12/20 1256    12/11/20 2100  insulin detemir U-100 pen 35 Units      -- SubQ Nightly 12/11/20 2002        Hold Oral hypoglycemics while patient is in the hospital.        Breast cancer  Seen by Dr. Peraza as outpatient.  Plans to start chemo soon      Depression  Chronic.  Stable at this time.      VTE Risk Mitigation (From admission, onward)         Ordered     enoxaparin injection 40 mg  Every 24 hours       12/11/20 2002     IP VTE HIGH RISK PATIENT  Once      12/11/20 2002     Place sequential compression device  Until discontinued      12/11/20 2002                Discharge Planning   GUNNER:      Code Status: Full Code   Is the patient medically ready for discharge?:     Reason for patient still in hospital (select all that apply): Patient unstable, Patient trending condition and Treatment  Discharge Plan A: Home with family            Critical care time spent on the evaluation and treatment of severe organ dysfunction, review of pertinent labs and imaging studies, discussions with consulting providers and discussions with patient/family: 37 minutes.      Jose Alfredo Marin MD  Department of Hospital Medicine   [facility]

## 2020-12-14 PROBLEM — J12.82 PNEUMONIA DUE TO COVID-19 VIRUS: Status: ACTIVE | Noted: 2020-12-11

## 2020-12-14 LAB
ALBUMIN SERPL BCP-MCNC: 2.3 G/DL (ref 3.5–5.2)
ALP SERPL-CCNC: 215 U/L (ref 55–135)
ALT SERPL W/O P-5'-P-CCNC: 33 U/L (ref 10–44)
ANION GAP SERPL CALC-SCNC: 10 MMOL/L (ref 8–16)
AST SERPL-CCNC: 25 U/L (ref 10–40)
BILIRUB SERPL-MCNC: 0.3 MG/DL (ref 0.1–1)
BUN SERPL-MCNC: 19 MG/DL (ref 6–20)
CALCIUM SERPL-MCNC: 8.4 MG/DL (ref 8.7–10.5)
CHLORIDE SERPL-SCNC: 98 MMOL/L (ref 95–110)
CO2 SERPL-SCNC: 27 MMOL/L (ref 23–29)
CREAT SERPL-MCNC: 0.7 MG/DL (ref 0.5–1.4)
CRP SERPL-MCNC: 91.3 MG/L (ref 0–8.2)
D DIMER PPP IA.FEU-MCNC: 23.69 MG/L FEU
ERYTHROCYTE [DISTWIDTH] IN BLOOD BY AUTOMATED COUNT: 12.3 % (ref 11.5–14.5)
EST. GFR  (AFRICAN AMERICAN): >60 ML/MIN/1.73 M^2
EST. GFR  (NON AFRICAN AMERICAN): >60 ML/MIN/1.73 M^2
FERRITIN SERPL-MCNC: 476 NG/ML (ref 20–300)
GLUCOSE SERPL-MCNC: 391 MG/DL (ref 70–110)
HCT VFR BLD AUTO: 37.5 % (ref 37–48.5)
HGB BLD-MCNC: 12.7 G/DL (ref 12–16)
MAGNESIUM SERPL-MCNC: 2 MG/DL (ref 1.6–2.6)
MCH RBC QN AUTO: 29.7 PG (ref 27–31)
MCHC RBC AUTO-ENTMCNC: 33.9 G/DL (ref 32–36)
MCV RBC AUTO: 88 FL (ref 82–98)
PHOSPHATE SERPL-MCNC: 4 MG/DL (ref 2.7–4.5)
PLATELET # BLD AUTO: 308 K/UL (ref 150–350)
PMV BLD AUTO: 9.7 FL (ref 9.2–12.9)
POCT GLUCOSE: 322 MG/DL (ref 70–110)
POCT GLUCOSE: 382 MG/DL (ref 70–110)
POCT GLUCOSE: 393 MG/DL (ref 70–110)
POCT GLUCOSE: 398 MG/DL (ref 70–110)
POCT GLUCOSE: 403 MG/DL (ref 70–110)
POCT GLUCOSE: 418 MG/DL (ref 70–110)
POCT GLUCOSE: 439 MG/DL (ref 70–110)
POCT GLUCOSE: 444 MG/DL (ref 70–110)
POCT GLUCOSE: 456 MG/DL (ref 70–110)
POTASSIUM SERPL-SCNC: 3.7 MMOL/L (ref 3.5–5.1)
PROCALCITONIN SERPL IA-MCNC: 0.13 NG/ML
PROT SERPL-MCNC: 6.5 G/DL (ref 6–8.4)
RBC # BLD AUTO: 4.27 M/UL (ref 4–5.4)
SODIUM SERPL-SCNC: 135 MMOL/L (ref 136–145)
WBC # BLD AUTO: 7.69 K/UL (ref 3.9–12.7)

## 2020-12-14 PROCEDURE — 85027 COMPLETE CBC AUTOMATED: CPT

## 2020-12-14 PROCEDURE — 20000000 HC ICU ROOM

## 2020-12-14 PROCEDURE — 97802 MEDICAL NUTRITION INDIV IN: CPT

## 2020-12-14 PROCEDURE — 25000003 PHARM REV CODE 250: Performed by: HOSPITALIST

## 2020-12-14 PROCEDURE — 83735 ASSAY OF MAGNESIUM: CPT

## 2020-12-14 PROCEDURE — 82728 ASSAY OF FERRITIN: CPT

## 2020-12-14 PROCEDURE — 36415 COLL VENOUS BLD VENIPUNCTURE: CPT

## 2020-12-14 PROCEDURE — 63600175 PHARM REV CODE 636 W HCPCS: Performed by: HOSPITALIST

## 2020-12-14 PROCEDURE — 99900035 HC TECH TIME PER 15 MIN (STAT)

## 2020-12-14 PROCEDURE — 94799 UNLISTED PULMONARY SVC/PX: CPT

## 2020-12-14 PROCEDURE — 99291 CRITICAL CARE FIRST HOUR: CPT | Mod: ,,, | Performed by: INTERNAL MEDICINE

## 2020-12-14 PROCEDURE — 25000003 PHARM REV CODE 250: Performed by: INTERNAL MEDICINE

## 2020-12-14 PROCEDURE — 84100 ASSAY OF PHOSPHORUS: CPT

## 2020-12-14 PROCEDURE — 80053 COMPREHEN METABOLIC PANEL: CPT

## 2020-12-14 PROCEDURE — 99291 PR CRITICAL CARE, E/M 30-74 MINUTES: ICD-10-PCS | Mod: ,,, | Performed by: INTERNAL MEDICINE

## 2020-12-14 PROCEDURE — 94761 N-INVAS EAR/PLS OXIMETRY MLT: CPT

## 2020-12-14 PROCEDURE — 63600175 PHARM REV CODE 636 W HCPCS: Performed by: INTERNAL MEDICINE

## 2020-12-14 PROCEDURE — 27000221 HC OXYGEN, UP TO 24 HOURS

## 2020-12-14 PROCEDURE — 84145 PROCALCITONIN (PCT): CPT

## 2020-12-14 PROCEDURE — 85379 FIBRIN DEGRADATION QUANT: CPT

## 2020-12-14 PROCEDURE — 63600175 PHARM REV CODE 636 W HCPCS: Performed by: NURSE PRACTITIONER

## 2020-12-14 PROCEDURE — 86140 C-REACTIVE PROTEIN: CPT

## 2020-12-14 RX ORDER — ENOXAPARIN SODIUM 100 MG/ML
40 INJECTION SUBCUTANEOUS EVERY 12 HOURS
Status: DISCONTINUED | OUTPATIENT
Start: 2020-12-14 | End: 2020-12-23 | Stop reason: HOSPADM

## 2020-12-14 RX ORDER — INSULIN ASPART 100 [IU]/ML
1-10 INJECTION, SOLUTION INTRAVENOUS; SUBCUTANEOUS
Status: DISCONTINUED | OUTPATIENT
Start: 2020-12-14 | End: 2020-12-23 | Stop reason: HOSPADM

## 2020-12-14 RX ORDER — INSULIN ASPART 100 [IU]/ML
15 INJECTION, SOLUTION INTRAVENOUS; SUBCUTANEOUS
Status: DISCONTINUED | OUTPATIENT
Start: 2020-12-14 | End: 2020-12-18

## 2020-12-14 RX ADMIN — REMDESIVIR 100 MG: 100 INJECTION, POWDER, LYOPHILIZED, FOR SOLUTION INTRAVENOUS at 04:12

## 2020-12-14 RX ADMIN — DRONABINOL 2.5 MG: 2.5 CAPSULE ORAL at 04:12

## 2020-12-14 RX ADMIN — HYDROCODONE BITARTRATE AND ACETAMINOPHEN 1 TABLET: 5; 325 TABLET ORAL at 11:12

## 2020-12-14 RX ADMIN — HYDROCODONE BITARTRATE AND ACETAMINOPHEN 1 TABLET: 5; 325 TABLET ORAL at 08:12

## 2020-12-14 RX ADMIN — INSULIN ASPART 10 UNITS: 100 INJECTION, SOLUTION INTRAVENOUS; SUBCUTANEOUS at 04:12

## 2020-12-14 RX ADMIN — INSULIN ASPART 10 UNITS: 100 INJECTION, SOLUTION INTRAVENOUS; SUBCUTANEOUS at 11:12

## 2020-12-14 RX ADMIN — INSULIN ASPART 15 UNITS: 100 INJECTION, SOLUTION INTRAVENOUS; SUBCUTANEOUS at 04:12

## 2020-12-14 RX ADMIN — INSULIN ASPART 3 UNITS: 100 INJECTION, SOLUTION INTRAVENOUS; SUBCUTANEOUS at 01:12

## 2020-12-14 RX ADMIN — ENALAPRIL MALEATE 5 MG: 5 TABLET ORAL at 08:12

## 2020-12-14 RX ADMIN — MUPIROCIN: 20 OINTMENT TOPICAL at 08:12

## 2020-12-14 RX ADMIN — INSULIN HUMAN 10 UNITS: 100 INJECTION, SOLUTION PARENTERAL at 12:12

## 2020-12-14 RX ADMIN — FUROSEMIDE 20 MG: 20 TABLET ORAL at 08:12

## 2020-12-14 RX ADMIN — CEFTRIAXONE 1 G: 1 INJECTION, SOLUTION INTRAVENOUS at 08:12

## 2020-12-14 RX ADMIN — DRONABINOL 2.5 MG: 2.5 CAPSULE ORAL at 06:12

## 2020-12-14 RX ADMIN — ENOXAPARIN SODIUM 40 MG: 40 INJECTION SUBCUTANEOUS at 11:12

## 2020-12-14 RX ADMIN — DEXAMETHASONE SODIUM PHOSPHATE 6 MG: 4 INJECTION, SOLUTION INTRA-ARTICULAR; INTRALESIONAL; INTRAMUSCULAR; INTRAVENOUS; SOFT TISSUE at 08:12

## 2020-12-14 RX ADMIN — INSULIN ASPART 4 UNITS: 100 INJECTION, SOLUTION INTRAVENOUS; SUBCUTANEOUS at 08:12

## 2020-12-14 RX ADMIN — INSULIN ASPART 10 UNITS: 100 INJECTION, SOLUTION INTRAVENOUS; SUBCUTANEOUS at 06:12

## 2020-12-14 RX ADMIN — ENOXAPARIN SODIUM 40 MG: 40 INJECTION SUBCUTANEOUS at 08:12

## 2020-12-14 RX ADMIN — PRAVASTATIN SODIUM 40 MG: 40 TABLET ORAL at 06:12

## 2020-12-14 NOTE — NURSING
MOLLY Espinoza NP notified of patient's accucheck and STAT glucose. Awaiting new orders.      Results for RENÉ FLOREZ (MRN [id number]) as of 12/13/2020 23:31   Ref. Range 12/13/2020 21:48 12/13/2020 22:21   Glucose Latest Ref Range: 70 - 110 mg/dL  527 (HH)   POCT Glucose Latest Ref Range: 70 - 110 mg/dL >500 (H)

## 2020-12-14 NOTE — ASSESSMENT & PLAN NOTE
Patient's FSGs are not controlled on current hypoglycemics.   Will increase detemir to 50 units every night.    Most recent fingerstick glucose reviewed-   Recent Labs   Lab 12/13/20  0652 12/13/20  1138 12/13/20  1623 12/13/20  1633   POCTGLUCOSE 374* 361* 462* 451*     Current correctional scale  Low  Maintain anti-hyperglycemic dose as follows- will initiate home regimen with medium sliding scale insulin and monitor.  Antihyperglycemics (From admission, onward)    Start     Stop Route Frequency Ordered    12/13/20 2100  insulin detemir U-100 pen 50 Units      -- SubQ Nightly 12/13/20 1748    12/12/20 1356  insulin aspart U-100 pen 0-5 Units      -- SubQ Every 6 hours PRN 12/12/20 1256        Hold Oral hypoglycemics while patient is in the hospital.

## 2020-12-14 NOTE — CARE UPDATE
12/13/20 2000   Patient Assessment/Suction   Level of Consciousness (AVPU) alert   Respiratory Effort Unlabored   Expansion/Accessory Muscles/Retractions no use of accessory muscles   Rhythm/Pattern, Respiratory no shortness of breath reported   Cough Frequency infrequent   Cough Type nonproductive   PRE-TX-O2   O2 Device (Oxygen Therapy) High Flow nasal Cannula   $ Is the patient on Low Flow Oxygen? Yes   Flow (L/min) 15   SpO2 (!) 91 %   Pulse Oximetry Type Continuous   $ Pulse Oximetry - Multiple Charge Pulse Oximetry - Multiple   Oximetry Probe Site No Change Needed   Pulse 82   Resp (!) 28   BP (!) 146/85   Respiratory Interventions   Cough And Deep Breathing done with encouragement   Airway/Ventilation Support humidification applied   Preset CPAP/BiPAP Settings   Mode Of Delivery Standby

## 2020-12-14 NOTE — CHAPLAIN
Placed a CV+ patient and staff prayer sign on the door of the pt; said silently prayer; Lord, in your mercy.

## 2020-12-14 NOTE — PLAN OF CARE
Patient AAOx4.  VSS.  Has remained afebrile this shift.  NSR on telemetry. Respiratory status maintained on 15L high flow. Purewick in place, patient tolerating well. Patient with increased blood glucose throughout night. Sliding scale changed from low-dose, to a moderate dose sliding scale (see new orders). POC reviewed with patient.  Open discussion was facilitated.  Patient verbalized understanding.  Bed in lowest position, side rails up x2, call light within reach, and safety measures maintained throughout shift.  Patient denies any needs at this time.

## 2020-12-14 NOTE — PROGRESS NOTES
"[facility]  Adult Nutrition  Progress Note    SUMMARY    Intervention: full liquid diet and parenteral nutrition therapy   Current intake:  D 5 AA 4.25 @ 100 ml/hr no lipids ( provides 816 kcal ( 42% EEN), 102  Protein ( 99% EPN))    Recommendation:  1) Advance PO diet to goal of DM 2000 kcal ( 4 carb servings/meal) as soon as medically able   2) Add Boost glucose control BID today and d/c PPN   3) weigh pt weekly  4) DM diet education when on floor    Goals: 1) PO intakes > 50% of meals and supplements at f/u  Nutrition Goal Status: new  Communication of RD Recs: reviewed with physician(POC, sticky note, second sign)    Reason for Assessment    Reason For Assessment: new TPN  Diagnosis: (COVID-19)  Relevant Medical History: DM2, breast CA no chemo yet, depression  Interdisciplinary Rounds: did not attend    General Information Comments: 61 y/o female admitted with COVID-19 ( diagnosed 12/1 and c/o loss of smell x 3 days PTA). Was on bipap with PPn ordered yesterday, now advanced to hiflow nasal cannula x 1 day, on full liquid diet. Tolerating. Per discussion with MD supplements added and PPN d/c'd as pt with elevated glucose. NFPE not done r/t restrictions to prevent the spread of COVID-19, to be done at a later date. no significant wt loss per chart review.    Nutrition Discharge Planning: to be determined- DM 1800 kcal diet + boost glucose control as needed    Nutrition Risk Screen    Nutrition Risk Screen: no indicators present    Nutrition/Diet History    Typical Food/Fluid Intake: unable to obtain  Spiritual, Cultural Beliefs, Muslim Practices, Values that Affect Care: no  Food Allergies: NKFA  Factors Affecting Nutritional Intake: decreased appetite, altered taste(full liquid diet)    Anthropometrics    Temp: 99.1 °F (37.3 °C)  Height Method: Measured(office 1/8/20)  Height: 5' 11"  Height (inches): 71 in  Weight Method: Bed Scale  Weight: 85.8 kg (189 lb 2.5 oz)  Weight (lb): 189.16 " lb  Ideal Body Weight (IBW), Female: 155 lb  % Ideal Body Weight, Female (lb): 122.04 %  BMI (Calculated): 26.4  BMI Grade: 25 - 29.9 - overweight  Usual Body Weight (UBW), k.1 kg(20)  % Usual Body Weight: 94.38  % Weight Change From Usual Weight: -5.82 %       Lab/Procedures/Meds    Pertinent Labs Reviewed: reviewed  BMP  Lab Results   Component Value Date     (L) 2020    K 3.7 2020    CL 98 2020    CO2 27 2020    BUN 19 2020    CREATININE 0.7 2020    CALCIUM 8.4 (L) 2020    ANIONGAP 10 2020    ESTGFRAFRICA >60 2020    EGFRNONAA >60 2020     POCT Glucose   Date Value Ref Range Status   2020 398 (H) 70 - 110 mg/dL Final   2020 456 (HH) 70 - 110 mg/dL Final   2020 382 (H) 70 - 110 mg/dL Final   2020 403 (H) 70 - 110 mg/dL Final   2020 393 (H) 70 - 110 mg/dL Final   2020 >500 (H) 70 - 110 mg/dL Final   2020 451 (HH) 70 - 110 mg/dL Final   2020 462 (HH) 70 - 110 mg/dL Final   2020 361 (H) 70 - 110 mg/dL Final   2020 374 (H) 70 - 110 mg/dL Final   2020 305 (H) 70 - 110 mg/dL Final   2020 330 (H) 70 - 110 mg/dL Final   2020 361 (H) 70 - 110 mg/dL Final   2020 225 (H) 70 - 110 mg/dL Final   2020 303 (H) 70 - 110 mg/dL Final   2020 415 (H) 70 - 110 mg/dL Final   2020 447 (H) 70 - 110 mg/dL Final     Lab Results   Component Value Date    HGBA1C 11.2 (H) 2020     Lab Results   Component Value Date    CRP 91.3 (H) 2020       Pertinent Medications Reviewed: reviewed  Pertinent Medications Comments: insulin, Mg sulfate, zofran, KCl, NaPhos, Dronabinol, lasix, statin    Estimated/Assessed Needs    Weight Used For Calorie Calculations: 85.5 kg (188 lb 7.9 oz)  Energy Calorie Requirements (kcal): MSJ ( x 1.25 critical care) = 1925 kcal  Energy Need Method: Chandler Ching  Protein Requirements: 1.2 g protein/kg ( 103 g protein, critical  care)  Weight Used For Protein Calculations: 85.8 kg (189 lb 2.5 oz)  Fluid Requirements (mL): 1900 ml or per MD  Estimated Fluid Requirement Method: RDA Method  CHO Requirement: 216-240 g      Nutrition Prescription Ordered    Current Diet Order: Full liquid diet + PPn above    Evaluation of Received Nutrient/Fluid Intake    Energy Calories Required: not meeting needs  Protein Required: meeting needs  Fluid Required: meeting needs  Total Fluid Intake (mL/kg): PPN @ 100 ml/hr + PO  Tolerance: other (see comments)(high glucose)  % Intake of Estimated Energy Needs: 42%  % Meal Intake: 05 + PPN    Nutrition Risk    Level of Risk/Frequency of Follow-up: moderate 2 x weekly    Assessment and Plan    Inadequate energy intake  R/t decreased appetite, bipap, and inadequate nutrition support infusion  As evidenced by PO intakes < 50% EEN x 4 days   Intervention: above  new    Monitor and Evaluation    Food and Nutrient Intake: energy intake, food and beverage intake  Food and Nutrient Adminstration: diet order, enteral and parenteral nutrition administration  Anthropometric Measurements: weight  Biochemical Data, Medical Tests and Procedures: electrolyte and renal panel, gastrointestinal profile, glucose/endocrine profile, inflammatory profile  Nutrition-Focused Physical Findings: overall appearance, extremities, muscles and bones     Malnutrition Assessment     Skin (Micronutrient): ([place] = 18, + PICC)  Teeth (Micronutrient): (WDL)   Micronutrient Evaluation: suspected deficiency  Micronutrient Evaluation Comments: Na   Energy Intake (Malnutrition): (PO intakes < 50% EEN x 4 days)           Edema (Fluid Accumulation): 0-->no edema present             Nutrition Follow-Up    RD Follow-up?: Yes

## 2020-12-14 NOTE — PROGRESS NOTES
[facility] Hospital Medicine  Progress Note    Patient Name: Negrita Castro  MRN: [id number]  Patient Class: IP- Inpatient   Admission Date: 12/11/2020  Length of Stay: 2 days  Attending Physician: Jose Alfredo Marin MD  Primary Care Provider: oYni Renteria MD        Subjective:     Principal Problem:COVID-19 virus infection        HPI:  Miss Castro is a 60 y.o. female IDDM, prior right breast cancer and recent diagnosis of left breast CA about to start chemotherapy, known COVID-19-positive 12/1/20 presenting with SOB.  Patient reports loss of sense of smell at our initial system and denied any respiratory complaints up until about 3 days ago.  Since then she reports worsening shortness of breath.  Also reports some loose stools and fatigue.  She is requiring 15 L nasal cannula in the ED after being 81% on room air.  She is going to be admitted to the ICU on high-flow nasal cannula.  Full code status confirmed on admission    Overview/Hospital Course:  Admitted to undergo treatment of severe COVID-19 pneumonia.  Was treated with NIPPV.  She improved.  NIPPV removed.  She continued to get supplemental oxygen via high flow NC.  She received dexamethasone, remdesivir, azithromycin, and ceftriaxone.    Interval History:  Spent today on high flow NC.  On parenteral nutrition.        Review of Systems   Constitutional: Positive for fatigue. Negative for fever.   Respiratory: Positive for cough and shortness of breath.    Cardiovascular: Negative for chest pain.   Gastrointestinal: Negative for abdominal pain.     Objective:     Vital Signs (Most Recent):  Temp: 98.8 °F (37.1 °C) (12/13/20 1930)  Pulse: 82 (12/13/20 2000)  Resp: (!) 28 (12/13/20 2000)  BP: (!) 146/85 (12/13/20 2000)  SpO2: (!) 91 % (12/13/20 2000) Vital Signs (24h Range):  Temp:  [97.3 °F (36.3 °C)-98.8 °F (37.1 °C)] 98.8 °F (37.1 °C)  Pulse:  [60-95] 82  Resp:  [13-36] 28  SpO2:  [88 %-98 %] 91 %  BP: (126-197)/(60-88) 146/85      Weight: 85.8 kg (189 lb 2.5 oz)  Body mass index is 26.38 kg/m².    Intake/Output Summary (Last 24 hours) at 12/13/2020 2118  Last data filed at 12/13/2020 1800  Gross per 24 hour   Intake 3047.92 ml   Output 3300 ml   Net -252.08 ml      Physical Exam  Vitals signs reviewed.   Constitutional:       General: She is not in acute distress.     Appearance: She is ill-appearing.      Interventions: Nasal cannula in place.   Eyes:      General:         Right eye: No discharge.         Left eye: No discharge.   Neck:      Vascular: No JVD.   Cardiovascular:      Rate and Rhythm: Normal rate and regular rhythm.   Pulmonary:      Effort: Tachypnea present.      Breath sounds: Rales present.   Abdominal:      General: Bowel sounds are normal. There is no distension.      Palpations: Abdomen is soft.      Tenderness: There is no abdominal tenderness.   Skin:     General: Skin is warm.      Findings: No rash.   Neurological:      Mental Status: She is alert.         Significant Labs: All pertinent labs within the past 24 hours have been reviewed.    Significant Imaging: I have reviewed all pertinent imaging results/findings within the past 24 hours.      Assessment/Plan:      * COVID-19 virus infection  - COVID-19 testing Collection Date: 12/1/2020 Collection Time:  11:28 AM   - Infection Control notified     - Isolation:   - Airborne, Contact and Droplet Precautions  - Cohort patients into COVID units  - N95 masks must be fit tested, wear eye protection  - 20 second hand hygiene              - Limit visitors per hospital policy              - Consolidating lab draws, nursing care, provider visits, and interventions      - Management:  Supplemental O2 to maintain SpO2 >92%  Telemetry  Continuous/intermittent Pulse Ox   Dexamethasone.  Ceftriaxone.  Azithromycin.  remdesivir.    Ferritin   Date Value Ref Range Status   12/11/2020 540 (H) 20.0 - 300.0 ng/mL Final     CPK   Date Value Ref Range Status   12/11/2020 65 20 - 180  U/L Final     Procalcitonin   Date Value Ref Range Status   12/11/2020 0.72 (H) <0.25 ng/mL Final     Comment:     A concentration < 0.25 ng/mL represents a low risk bacterial   infection.  Procalcitonin may not be accurate among patients with localized   infection, recent trauma or major surgery, immunosuppressed state,   invasive fungal infection, renal dysfunction. Decisions regarding   initiation or continuation of antibiotic therapy should not be based   solely on procalcitonin levels.         Advance Care Planning     Advance Directives and Living Will: Received 7/30/2019    Power of : Received 1/29/2016         ARDS (adult respiratory distress syndrome)  Monitor chest radiographs.  Conservative fluid strategy.  Not requiring mechanical ventilation.      Acute hypoxemic respiratory failure  Severe.  Support with high flow NC.  Pulmonologist assisting with management.  Will probably need NIPPV at times.    Mixed hyperlipidemia  Chronic/controlled.  Continue home medications      Type 2 diabetes mellitus with hyperglycemia, with long-term current use of insulin  Patient's FSGs are not controlled on current hypoglycemics.   Will increase detemir to 50 units every night.    Most recent fingerstick glucose reviewed-   Recent Labs   Lab 12/13/20  0652 12/13/20  1138 12/13/20  1623 12/13/20  1633   POCTGLUCOSE 374* 361* 462* 451*     Current correctional scale  Low  Maintain anti-hyperglycemic dose as follows- will initiate home regimen with medium sliding scale insulin and monitor.  Antihyperglycemics (From admission, onward)    Start     Stop Route Frequency Ordered    12/13/20 2100  insulin detemir U-100 pen 50 Units      -- SubQ Nightly 12/13/20 1748    12/12/20 1356  insulin aspart U-100 pen 0-5 Units      -- SubQ Every 6 hours PRN 12/12/20 1256        Hold Oral hypoglycemics while patient is in the hospital.        Breast cancer  Seen by Dr. Peraza as outpatient.  Plans to start chemo  soon      Depression  Chronic.  Stable at this time.      VTE Risk Mitigation (From admission, onward)         Ordered     enoxaparin injection 40 mg  Every 24 hours      12/11/20 2002     IP VTE HIGH RISK PATIENT  Once      12/11/20 2002     Place sequential compression device  Until discontinued      12/11/20 2002                Discharge Planning   GUNNER:      Code Status: Full Code   Is the patient medically ready for discharge?:     Reason for patient still in hospital (select all that apply): Patient trending condition and Treatment  Discharge Plan A: Home with family            Jose Alfredo Marin MD  Department of Hospital Medicine   [facility]

## 2020-12-14 NOTE — ASSESSMENT & PLAN NOTE
Severe.  Support with high flow NC.  Pulmonologist assisting with management.  Will probably need NIPPV at times.

## 2020-12-14 NOTE — PLAN OF CARE
"Afebrile.   Pt placed in prone position at 1235; can be place supine at 2035.  Weaned down to 12L high flow; Oxygenation improved.   Diet advanced to diabetic; TPN to be d/c.  Pt tolerating diet well.   Pt anxious, but refusing to take benzodiazepines because she "wants to be alert." POC discussed with pt.  Safety maintained entire shift.  "

## 2020-12-14 NOTE — SUBJECTIVE & OBJECTIVE
Interval History:  Spent today on high flow NC.  On parenteral nutrition.        Review of Systems   Constitutional: Positive for fatigue. Negative for fever.   Respiratory: Positive for cough and shortness of breath.    Cardiovascular: Negative for chest pain.   Gastrointestinal: Negative for abdominal pain.     Objective:     Vital Signs (Most Recent):  Temp: 98.8 °F (37.1 °C) (12/13/20 1930)  Pulse: 82 (12/13/20 2000)  Resp: (!) 28 (12/13/20 2000)  BP: (!) 146/85 (12/13/20 2000)  SpO2: (!) 91 % (12/13/20 2000) Vital Signs (24h Range):  Temp:  [97.3 °F (36.3 °C)-98.8 °F (37.1 °C)] 98.8 °F (37.1 °C)  Pulse:  [60-95] 82  Resp:  [13-36] 28  SpO2:  [88 %-98 %] 91 %  BP: (126-197)/(60-88) 146/85     Weight: 85.8 kg (189 lb 2.5 oz)  Body mass index is 26.38 kg/m².    Intake/Output Summary (Last 24 hours) at 12/13/2020 2118  Last data filed at 12/13/2020 1800  Gross per 24 hour   Intake 3047.92 ml   Output 3300 ml   Net -252.08 ml      Physical Exam  Vitals signs reviewed.   Constitutional:       General: She is not in acute distress.     Appearance: She is ill-appearing.      Interventions: Nasal cannula in place.   Eyes:      General:         Right eye: No discharge.         Left eye: No discharge.   Neck:      Vascular: No JVD.   Cardiovascular:      Rate and Rhythm: Normal rate and regular rhythm.   Pulmonary:      Effort: Tachypnea present.      Breath sounds: Rales present.   Abdominal:      General: Bowel sounds are normal. There is no distension.      Palpations: Abdomen is soft.      Tenderness: There is no abdominal tenderness.   Skin:     General: Skin is warm.      Findings: No rash.   Neurological:      Mental Status: She is alert.         Significant Labs: All pertinent labs within the past 24 hours have been reviewed.    Significant Imaging: I have reviewed all pertinent imaging results/findings within the past 24 hours.

## 2020-12-14 NOTE — HOSPITAL COURSE
Admitted to undergo treatment of severe COVID-19 pneumonia.  Was treated with NIPPV.  She improved.  NIPPV removed.  She continued to get supplemental oxygen via high flow NC.  She received dexamethasone, remdesivir, azithromycin, and ceftriaxone.  Patient made some improvements and was down to 6 L nasal cannula then had acute worsening on 12/19 and required to be put back on BiPAP.  Was started on Lasix with some improvement.  She was able to slowly be weaned off the BiPAP to high-flow nasal cannula.  Her insulin was adjusted during her stay.  Pulmonology consultant assisted with management.  Patient completed remdesivir course as well as steroid course.  She was evaluated for home oxygen and qualified.  Home oxygen was arranged and she was cleared for discharge by pulmonologist.  She will follow up with her PCP, pulmonology, as well as her oncologist for chemo planning.  She was agreeable to discharge plan    Discharge exam  Awake, alert, no apparent distress  Regular rate and rhythm no murmur  Lungs clear to auscultation no wheeze  Abdomen soft nontender nondistended

## 2020-12-14 NOTE — NURSING
Sliding scale used to cover patient's 393 blood sugar at 0146, see MAR. Patient's blood sugar checked again, resulting 403. MOLLY Espinoza NP notified. NP instructed RN to monitor morning lab results and reassess at 0600. Patient updated on plan of care.

## 2020-12-14 NOTE — PLAN OF CARE
Intervention: full liquid diet and parenteral nutrition therapy   Current intake:  D 5 AA 4.25 @ 100 ml/hr no lipids ( provides 816 kcal ( 42% EEN), 102  Protein ( 99% EPN))    Recommendation:  1) Advance PO diet to goal of DM 2000 kcal ( 4 carb servings/meal) as soon as medically able   2) Add Boost glucose control BID today and d/c PPN   3) weigh pt weekly  4) DM diet education when on floor    Goals: 1) PO intakes > 50% of meals and supplements at f/u  Nutrition Goal Status: new  Communication of RD Recs: reviewed with physician(POC, sticky note, second sign)

## 2020-12-14 NOTE — NURSING
After giving the 10 units of Insulin R len Dey, NP ordered. Patient's glucose is now 393. NP notified.       Results for RENÉ FLOREZ (MRN [id number]) as of 12/14/2020 01:19   Ref. Range 12/14/2020 01:15   POCT Glucose Latest Ref Range: 70 - 110 mg/dL 393 (H)

## 2020-12-14 NOTE — PROGRESS NOTES
Progress Note  PULMONARY    Admit Date: 12/11/2020 12/14/2020    From Dr Justin's consult 12/12:History of Present Illness:  Pt diabetic post [facility], was completing breast rx - preparing for reconstruction of right breast post cancer when found to have left breast ca now post op.  Pt non smoker/drinker.  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  No functional limits nor sz last ys.  Not really ill prior to covid.  Has sibs as next of kin but relates boyfriend, Ganga Brown, acts Medical POA (vague on if this if formally done) - her recall is not optimal.       From Dr Richter hpi 12/11/2020-HPI: Miss Castro is a 60 y.o. female IDDM, prior right breast cancer and recent diagnosis of left breast CA about to start chemotherapy, known COVID-19-positive 12/1/20 presenting with SOB.  Patient reports loss of sense of smell at our initial system and denied any respiratory complaints up until about 3 days ago.  Since then she reports worsening shortness of breath.  Also reports some loose stools and fatigue.  She is requiring 15 L nasal cannula in the ED after being 81% on room air.  She is going to be admitted to the ICU on high-flow nasal cannula.  Full code status confirmed on admission  Plan: 12/12  No fever on  Rocephin/azithro, decadron  15 lpm nrbm sat upper 90's  Wbc 6.3 on 11th,  ddimer 2.25- low dose lovenox,  Ferritin 540, procal 0.72, crp 288 on 11th,   bnp good at 83,   cxr clear 9/6/2020-- 12/11 lef>> right infiltrates c/w covid. More basilair  covid + 12/1   Discussed niv/bipap, and possible needing intubation with pt and her designated poa (she identified Ganga Brown, friend as poa (not clear if medical poa executed but pt tells me he would make medical decisions if she cannot)) -both agree to intubation expecting prolonged course for covid to clear.   covid pos on 12/1 makes response to plasma minimal- would not recommedn.       SUBJECTIVE:     12/13 on niv, excessive coughing with  desat mobilizing or coughing, no new c/o.  12/14- on 15L HFNC, with hypertension. Afebrile. Glucose 300s-500s. Per nurse seems anxious but pt denies. Starting to eat soft diet and had BM this am.     PFSH and Allergies reviewed.    OBJECTIVE:     Vitals (Most recent):  Vitals:    12/14/20 0845   BP:    Pulse: 93   Resp: (!) 28   Temp:        Vitals (24 hour range):  Temp:  [98.1 °F (36.7 °C)-98.8 °F (37.1 °C)]   Pulse:  [60-95]   Resp:  [13-33]   BP: (132-184)/(61-94)   SpO2:  [89 %-99 %]       Intake/Output Summary (Last 24 hours) at 12/14/2020 0959  Last data filed at 12/14/2020 0800  Gross per 24 hour   Intake 2855.42 ml   Output 4500 ml   Net -1644.58 ml          Physical Exam:  The patient's neuro status (alertness,orientation,cognitive function,motor skills,), pharyngeal exam (oral lesions, hygiene, abn dentition,), Neck (jvd,mass,thyroid,nodes in neck and above/below clavicle),RESPIRATORY(symmetry,effort,fremitus,percussion,auscultation),  Cor(rhythm,heart tones including gallops,perfusion,edema)ABD(distention,hepatic&splenomegaly,tenderness,masses), Skin(rash,cyanosis),Psyc(affect,judgement,).  Exam negative except for these pertinent findings:    Awake, alert, fatigued, breathless s/p adjusting in bed  Diminished breath sounds bilaterally  abd soft nontender  No edema    Radiographs reviewed: view by direct vision    CXR 12/12- bilateral infiltrates, increasing    Labs     No results for input(s): WBC, HGB, HCT, PLT, BAND, METAMYELOCYT, MYELOPCT, HGBA1C in the last 24 hours.  Recent Labs   Lab 12/14/20  0430   *   K 3.7   CL 98   CO2 27   BUN 19   CREATININE 0.7   *   CALCIUM 8.4*   MG 2.0   PHOS 4.0   AST 25   ALT 33   ALKPHOS 215*   BILITOT 0.3   PROT 6.5   ALBUMIN 2.3*   CRP 91.3*   No results for input(s): PH, PCO2, PO2, HCO3 in the last 24 hours.  Microbiology Results (last 7 days)     Procedure Component Value Units Date/Time    Blood culture (site 1) [[id number]] Collected: 12/11/20 2035     Order Status: Completed Specimen: Blood from Antecubital, Right Arm Updated: 12/13/20 1212     Blood Culture, Routine No Growth to date      No Growth to date    Narrative:      Site # 1, aerobic and anaerobic    Blood culture (site 2) [[id number]] Collected: 12/11/20 2035    Order Status: Completed Specimen: Blood from Antecubital, Right Hand Updated: 12/13/20 1212     Blood Culture, Routine No Growth to date      No Growth to date    Narrative:      Site # 2, aerobic only          Impression:  Active Hospital Problems    Diagnosis  POA    *COVID-19 virus infection [U07.1]  Yes    Acute hypoxemic respiratory failure [J96.01]  Yes    ARDS (adult respiratory distress syndrome) [J80]  Yes    Type 2 diabetes mellitus with hyperglycemia, with long-term current use of insulin [E11.65, Z79.4]  Not Applicable    Mixed hyperlipidemia [E78.2]  Yes    Breast cancer [C50.919]  Yes    Depression [F32.9]  Yes      Resolved Hospital Problems   No resolved problems to display.               Plan:   Acute hypoxemic respiratory failure   COVID 19 pna  ARDS  DM2, uncontrolled  Elevated D-dimer, increasing  Breast ca, s/p treatment on right (2015), recurrent on left and pending chemo    - continue HFNC to keep sats 92-95%  - BIPAP use if needed  - prone as tolerated  - CXR as needed for clinical change   - check procalcitonin, de-escalate rocephin if negative  - incentive spirometry   - continue RDV  - continue decadron 6mg daily x 10 day course  - blood sugar, BP control per hospitalist  - monitor CRP, d-dimer, ferritin   - increase to half dose lovenox for elevated d-dimer  - high index of suspicion for VTE- workup/escalate tx accordingly if decompensates  - continues on PPN, advance oral diet as tolerated    The following were evaluated and adjusted as needed: acid base balance and oxygenation needs       Critical Care  - THE PATIENT HAS A HIGH PROBABILITY OF IMMINENT OR LIFE THREATENING DETERIORATION.  Over 50%time of  encounter was in direct care at bedside.  Time was 30 to 74 minutes required for patient care.  Time needed for all of the above totaled 30 minutes.    Lala Leija MD  Pulmonary & Critical Care Medicine                      .

## 2020-12-15 LAB
ALBUMIN SERPL BCP-MCNC: 2.3 G/DL (ref 3.5–5.2)
ALP SERPL-CCNC: 201 U/L (ref 55–135)
ALT SERPL W/O P-5'-P-CCNC: 23 U/L (ref 10–44)
ANION GAP SERPL CALC-SCNC: 13 MMOL/L (ref 8–16)
AST SERPL-CCNC: 24 U/L (ref 10–40)
BILIRUB SERPL-MCNC: 0.3 MG/DL (ref 0.1–1)
BUN SERPL-MCNC: 13 MG/DL (ref 6–20)
CALCIUM SERPL-MCNC: 8.3 MG/DL (ref 8.7–10.5)
CHLORIDE SERPL-SCNC: 99 MMOL/L (ref 95–110)
CO2 SERPL-SCNC: 25 MMOL/L (ref 23–29)
CREAT SERPL-MCNC: 0.7 MG/DL (ref 0.5–1.4)
CRP SERPL-MCNC: 85.2 MG/L (ref 0–8.2)
D DIMER PPP IA.FEU-MCNC: 24.69 MG/L FEU
ERYTHROCYTE [DISTWIDTH] IN BLOOD BY AUTOMATED COUNT: 12.3 % (ref 11.5–14.5)
EST. GFR  (AFRICAN AMERICAN): >60 ML/MIN/1.73 M^2
EST. GFR  (NON AFRICAN AMERICAN): >60 ML/MIN/1.73 M^2
FERRITIN SERPL-MCNC: 414 NG/ML (ref 20–300)
GLUCOSE SERPL-MCNC: 234 MG/DL (ref 70–110)
HCT VFR BLD AUTO: 36.2 % (ref 37–48.5)
HGB BLD-MCNC: 12.1 G/DL (ref 12–16)
MAGNESIUM SERPL-MCNC: 1.8 MG/DL (ref 1.6–2.6)
MAGNESIUM SERPL-MCNC: 2.1 MG/DL (ref 1.6–2.6)
MCH RBC QN AUTO: 29.4 PG (ref 27–31)
MCHC RBC AUTO-ENTMCNC: 33.4 G/DL (ref 32–36)
MCV RBC AUTO: 88 FL (ref 82–98)
PHOSPHATE SERPL-MCNC: 4.1 MG/DL (ref 2.7–4.5)
PLATELET # BLD AUTO: 318 K/UL (ref 150–350)
PMV BLD AUTO: 9.7 FL (ref 9.2–12.9)
POCT GLUCOSE: 175 MG/DL (ref 70–110)
POCT GLUCOSE: 287 MG/DL (ref 70–110)
POCT GLUCOSE: 321 MG/DL (ref 70–110)
POTASSIUM SERPL-SCNC: 3.3 MMOL/L (ref 3.5–5.1)
POTASSIUM SERPL-SCNC: 3.8 MMOL/L (ref 3.5–5.1)
PROT SERPL-MCNC: 6.5 G/DL (ref 6–8.4)
RBC # BLD AUTO: 4.12 M/UL (ref 4–5.4)
SODIUM SERPL-SCNC: 137 MMOL/L (ref 136–145)
WBC # BLD AUTO: 6.26 K/UL (ref 3.9–12.7)

## 2020-12-15 PROCEDURE — 63600175 PHARM REV CODE 636 W HCPCS: Performed by: HOSPITALIST

## 2020-12-15 PROCEDURE — 63600175 PHARM REV CODE 636 W HCPCS: Performed by: INTERNAL MEDICINE

## 2020-12-15 PROCEDURE — 86140 C-REACTIVE PROTEIN: CPT

## 2020-12-15 PROCEDURE — 99291 CRITICAL CARE FIRST HOUR: CPT | Mod: ,,, | Performed by: INTERNAL MEDICINE

## 2020-12-15 PROCEDURE — 80053 COMPREHEN METABOLIC PANEL: CPT

## 2020-12-15 PROCEDURE — 83735 ASSAY OF MAGNESIUM: CPT

## 2020-12-15 PROCEDURE — 25000003 PHARM REV CODE 250: Performed by: INTERNAL MEDICINE

## 2020-12-15 PROCEDURE — 94761 N-INVAS EAR/PLS OXIMETRY MLT: CPT

## 2020-12-15 PROCEDURE — 82728 ASSAY OF FERRITIN: CPT

## 2020-12-15 PROCEDURE — 36415 COLL VENOUS BLD VENIPUNCTURE: CPT

## 2020-12-15 PROCEDURE — 94799 UNLISTED PULMONARY SVC/PX: CPT

## 2020-12-15 PROCEDURE — 84100 ASSAY OF PHOSPHORUS: CPT

## 2020-12-15 PROCEDURE — 25000003 PHARM REV CODE 250: Performed by: HOSPITALIST

## 2020-12-15 PROCEDURE — 27000221 HC OXYGEN, UP TO 24 HOURS

## 2020-12-15 PROCEDURE — 85027 COMPLETE CBC AUTOMATED: CPT

## 2020-12-15 PROCEDURE — 83735 ASSAY OF MAGNESIUM: CPT | Mod: 91

## 2020-12-15 PROCEDURE — 84132 ASSAY OF SERUM POTASSIUM: CPT

## 2020-12-15 PROCEDURE — 99291 PR CRITICAL CARE, E/M 30-74 MINUTES: ICD-10-PCS | Mod: ,,, | Performed by: INTERNAL MEDICINE

## 2020-12-15 PROCEDURE — 85379 FIBRIN DEGRADATION QUANT: CPT

## 2020-12-15 PROCEDURE — 20000000 HC ICU ROOM

## 2020-12-15 RX ORDER — PRAVASTATIN SODIUM 40 MG/1
40 TABLET ORAL EVERY MORNING
Status: DISCONTINUED | OUTPATIENT
Start: 2020-12-15 | End: 2020-12-23 | Stop reason: HOSPADM

## 2020-12-15 RX ADMIN — FUROSEMIDE 20 MG: 20 TABLET ORAL at 09:12

## 2020-12-15 RX ADMIN — INSULIN ASPART 8 UNITS: 100 INJECTION, SOLUTION INTRAVENOUS; SUBCUTANEOUS at 04:12

## 2020-12-15 RX ADMIN — INSULIN ASPART 3 UNITS: 100 INJECTION, SOLUTION INTRAVENOUS; SUBCUTANEOUS at 08:12

## 2020-12-15 RX ADMIN — ENOXAPARIN SODIUM 40 MG: 40 INJECTION SUBCUTANEOUS at 09:12

## 2020-12-15 RX ADMIN — MAGNESIUM SULFATE 2 G: 2 INJECTION INTRAVENOUS at 05:12

## 2020-12-15 RX ADMIN — ENOXAPARIN SODIUM 40 MG: 40 INJECTION SUBCUTANEOUS at 08:12

## 2020-12-15 RX ADMIN — INSULIN ASPART 15 UNITS: 100 INJECTION, SOLUTION INTRAVENOUS; SUBCUTANEOUS at 09:12

## 2020-12-15 RX ADMIN — MUPIROCIN: 20 OINTMENT TOPICAL at 08:12

## 2020-12-15 RX ADMIN — MUPIROCIN: 20 OINTMENT TOPICAL at 09:12

## 2020-12-15 RX ADMIN — INSULIN ASPART 2 UNITS: 100 INJECTION, SOLUTION INTRAVENOUS; SUBCUTANEOUS at 11:12

## 2020-12-15 RX ADMIN — INSULIN HUMAN 10 UNITS: 100 INJECTION, SOLUTION PARENTERAL at 12:12

## 2020-12-15 RX ADMIN — INSULIN ASPART 15 UNITS: 100 INJECTION, SOLUTION INTRAVENOUS; SUBCUTANEOUS at 04:12

## 2020-12-15 RX ADMIN — POTASSIUM CHLORIDE 40 MEQ: 7.46 INJECTION, SOLUTION INTRAVENOUS at 10:12

## 2020-12-15 RX ADMIN — PRAVASTATIN SODIUM 40 MG: 40 TABLET ORAL at 09:12

## 2020-12-15 RX ADMIN — REMDESIVIR 100 MG: 100 INJECTION, POWDER, LYOPHILIZED, FOR SOLUTION INTRAVENOUS at 05:12

## 2020-12-15 RX ADMIN — DRONABINOL 2.5 MG: 2.5 CAPSULE ORAL at 06:12

## 2020-12-15 RX ADMIN — DEXAMETHASONE SODIUM PHOSPHATE 6 MG: 4 INJECTION, SOLUTION INTRA-ARTICULAR; INTRALESIONAL; INTRAMUSCULAR; INTRAVENOUS; SOFT TISSUE at 09:12

## 2020-12-15 RX ADMIN — INSULIN ASPART 15 UNITS: 100 INJECTION, SOLUTION INTRAVENOUS; SUBCUTANEOUS at 11:12

## 2020-12-15 RX ADMIN — POTASSIUM CHLORIDE 60 MEQ: 7.46 INJECTION, SOLUTION INTRAVENOUS at 05:12

## 2020-12-15 RX ADMIN — HYDROCODONE BITARTRATE AND ACETAMINOPHEN 1 TABLET: 5; 325 TABLET ORAL at 08:12

## 2020-12-15 RX ADMIN — DRONABINOL 2.5 MG: 2.5 CAPSULE ORAL at 05:12

## 2020-12-15 RX ADMIN — ENALAPRIL MALEATE 5 MG: 5 TABLET ORAL at 09:12

## 2020-12-15 NOTE — SUBJECTIVE & OBJECTIVE
Interval History:  Patient seen and examined.  Had some hyperglycemia overnight.  Appears anxious on exam.    Review of Systems   Constitutional: Positive for activity change and fatigue.   Respiratory: Positive for cough and shortness of breath.    Cardiovascular: Negative for chest pain and leg swelling.   Gastrointestinal: Negative for abdominal pain and nausea.   Neurological: Negative for weakness.   Psychiatric/Behavioral: Negative for confusion. The patient is nervous/anxious.    All other systems reviewed and are negative.    Objective:     Vital Signs (Most Recent):  Temp: 98.7 °F (37.1 °C) (12/14/20 2000)  Pulse: 98 (12/14/20 2000)  Resp: 16 (12/14/20 2055)  BP: (!) 140/75 (12/14/20 2000)  SpO2: (!) 89 % (12/14/20 2000) Vital Signs (24h Range):  Temp:  [98.6 °F (37 °C)-99.1 °F (37.3 °C)] 98.7 °F (37.1 °C)  Pulse:  [] 98  Resp:  [16-34] 16  SpO2:  [87 %-99 %] 89 %  BP: (132-185)/(63-94) 140/75     Weight: 85.8 kg (189 lb 2.5 oz)  Body mass index is 26.38 kg/m².    Intake/Output Summary (Last 24 hours) at 12/14/2020 2100  Last data filed at 12/14/2020 1800  Gross per 24 hour   Intake 2210 ml   Output 4450 ml   Net -2240 ml      Physical Exam  Vitals signs and nursing note reviewed.   Constitutional:       General: She is not in acute distress.  Eyes:      Pupils: Pupils are equal, round, and reactive to light.   Cardiovascular:      Rate and Rhythm: Normal rate and regular rhythm.      Heart sounds: No murmur.   Pulmonary:      Comments: Increased respiratory effort noted.  Tachypnea noted.  Patient on high-flow nasal cannula.  Abdominal:      General: There is no distension.      Palpations: Abdomen is soft.      Tenderness: There is no abdominal tenderness.   Skin:     Coloration: Skin is not pale.      Findings: No rash.   Neurological:      Mental Status: She is alert and oriented to person, place, and time.         Significant Labs: All pertinent labs within the past 24 hours have been  reviewed.    Significant Imaging: I have reviewed all pertinent imaging results/findings within the past 24 hours.

## 2020-12-15 NOTE — ASSESSMENT & PLAN NOTE
Patient with Hypoxic Respiratory failure which is Acute.  she is not on home oxygen. Supplemental ventilation was provided and noted-  .  High-flow nasal cannula at 12 L  Differential diagnosis includes - Pneumonia Labs and images were reviewed. Patient Has not has a recent ABG. Will treat underlying causes and adjust management of respiratory failure as follows- continue treatment for COVID-19 and supplemental oxygen for hypoxemia.

## 2020-12-15 NOTE — PROGRESS NOTES
[facility] Hospital Medicine  Progress Note    Patient Name: Negrita Castro  MRN: [id number]  Patient Class: IP- Inpatient   Admission Date: 12/11/2020  Length of Stay: 3 days  Attending Physician: Patricio Barroso MD  Primary Care Provider: Yoni Renteria MD        Subjective:     Principal Problem:Acute hypoxemic respiratory failure        HPI:  Miss Castro is a 60 y.o. female IDDM, prior right breast cancer and recent diagnosis of left breast CA about to start chemotherapy, known COVID-19-positive 12/1/20 presenting with SOB.  Patient reports loss of sense of smell at our initial system and denied any respiratory complaints up until about 3 days ago.  Since then she reports worsening shortness of breath.  Also reports some loose stools and fatigue.  She is requiring 15 L nasal cannula in the ED after being 81% on room air.  She is going to be admitted to the ICU on high-flow nasal cannula.  Full code status confirmed on admission    Overview/Hospital Course:  Admitted to undergo treatment of severe COVID-19 pneumonia.  Was treated with NIPPV.  She improved.  NIPPV removed.  She continued to get supplemental oxygen via high flow NC.  She received dexamethasone, remdesivir, azithromycin, and ceftriaxone.    Interval History:  Patient seen and examined.  Had some hyperglycemia overnight.  Appears anxious on exam.    Review of Systems   Constitutional: Positive for activity change and fatigue.   Respiratory: Positive for cough and shortness of breath.    Cardiovascular: Negative for chest pain and leg swelling.   Gastrointestinal: Negative for abdominal pain and nausea.   Neurological: Negative for weakness.   Psychiatric/Behavioral: Negative for confusion. The patient is nervous/anxious.    All other systems reviewed and are negative.    Objective:     Vital Signs (Most Recent):  Temp: 98.7 °F (37.1 °C) (12/14/20 2000)  Pulse: 98 (12/14/20 2000)  Resp: 16 (12/14/20 2055)  BP: (!) 140/75  (12/14/20 2000)  SpO2: (!) 89 % (12/14/20 2000) Vital Signs (24h Range):  Temp:  [98.6 °F (37 °C)-99.1 °F (37.3 °C)] 98.7 °F (37.1 °C)  Pulse:  [] 98  Resp:  [16-34] 16  SpO2:  [87 %-99 %] 89 %  BP: (132-185)/(63-94) 140/75     Weight: 85.8 kg (189 lb 2.5 oz)  Body mass index is 26.38 kg/m².    Intake/Output Summary (Last 24 hours) at 12/14/2020 2100  Last data filed at 12/14/2020 1800  Gross per 24 hour   Intake 2210 ml   Output 4450 ml   Net -2240 ml      Physical Exam  Vitals signs and nursing note reviewed.   Constitutional:       General: She is not in acute distress.  Eyes:      Pupils: Pupils are equal, round, and reactive to light.   Cardiovascular:      Rate and Rhythm: Normal rate and regular rhythm.      Heart sounds: No murmur.   Pulmonary:      Comments: Increased respiratory effort noted.  Tachypnea noted.  Patient on high-flow nasal cannula.  Abdominal:      General: There is no distension.      Palpations: Abdomen is soft.      Tenderness: There is no abdominal tenderness.   Skin:     Coloration: Skin is not pale.      Findings: No rash.   Neurological:      Mental Status: She is alert and oriented to person, place, and time.         Significant Labs: All pertinent labs within the past 24 hours have been reviewed.    Significant Imaging: I have reviewed all pertinent imaging results/findings within the past 24 hours.      Assessment/Plan:      * Acute hypoxemic respiratory failure  Patient with Hypoxic Respiratory failure which is Acute.  she is not on home oxygen. Supplemental ventilation was provided and noted-  .  High-flow nasal cannula at 12 L  Differential diagnosis includes - Pneumonia Labs and images were reviewed. Patient Has not has a recent ABG. Will treat underlying causes and adjust management of respiratory failure as follows- continue treatment for COVID-19 and supplemental oxygen for hypoxemia.        Pneumonia due to COVID-19 virus  - COVID-19 testing Collection Date: 12/1/2020  Collection Time:  11:28 AM   - Infection Control notified     - Isolation:   - Airborne, Contact and Droplet Precautions  - Cohort patients into COVID units              - Limit visitors per hospital policy              - Consolidating lab draws, nursing care, provider visits, and interventions      - Management:  Supplemental O2 to maintain SpO2 >92%  Telemetry  Continuous/intermittent Pulse Ox  Albuterol treatment PRN  Careful use of steroids in the absence of other indications - unless septic shock due to increased viral replication Empiric antibiotics per likely source & patient allergies if patient hypoxic with airspace disease for CAP: azithromycin & ceftriaxone  Continue Remdesivir.    Advance Care Planning     Healthcare Power of : Received 12/14/2020    Advance Directives and Living Will: Received 7/30/2019    Power of : Received 1/29/2016         ARDS (adult respiratory distress syndrome)  Monitor chest radiographs.  Conservative fluid strategy.  Not requiring mechanical ventilation.      Mixed hyperlipidemia   Patient is chronically on statin.will continue for now. Monitor clinically. Last LDL was   Lab Results   Component Value Date    LDLCALC 114.2 10/24/2018            Type 2 diabetes mellitus with hyperglycemia, with long-term current use of insulin  Patient's FSGs are not controlled on current hypoglycemics.   Will increase detemir to 50 units every night.    Most recent fingerstick glucose reviewed-   Recent Labs   Lab 12/14/20  1122 12/14/20  1642 12/14/20  1644 12/14/20  1957   POCTGLUCOSE 398* 444* 439* 322*     Current correctional scale  Low  Maintain anti-hyperglycemic dose as follows- will initiate home regimen with medium sliding scale insulin and monitor.  Antihyperglycemics (From admission, onward)    Start     Stop Route Frequency Ordered    12/14/20 1645  insulin aspart U-100 pen 15 Units      -- SubQ 3 times daily with meals 12/14/20 1145    12/14/20 0716  insulin aspart  U-100 pen 1-10 Units      -- SubQ Before meals & nightly PRN 12/14/20 0617    12/13/20 2100  insulin detemir U-100 pen 50 Units      -- SubQ Nightly 12/13/20 1748        Hold Oral hypoglycemics while patient is in the hospital.        Breast cancer  Seen by Dr. Peraza as outpatient.  Plans to start chemo soon      Depression  Chronic.  Stable at this time.      VTE Risk Mitigation (From admission, onward)         Ordered     enoxaparin injection 40 mg  Every 12 hours      12/14/20 1005     IP VTE HIGH RISK PATIENT  Once      12/11/20 2002     Place sequential compression device  Until discontinued      12/11/20 2002                Discharge Planning   GUNNER:      Code Status: Full Code   Is the patient medically ready for discharge?:     Reason for patient still in hospital (select all that apply): Treatment  Discharge Plan A: Home with family            Critical care time spent on the evaluation and treatment of severe organ dysfunction, review of pertinent labs and imaging studies, discussions with consulting providers and discussions with patient/family: 38 minutes.      Patricio Barroso MD  Department of Hospital Medicine   [facility]

## 2020-12-15 NOTE — PLAN OF CARE
Problem: Adult Inpatient Plan of Care  Goal: Plan of Care Review  Outcome: Ongoing, Progressing  Goal: Patient-Specific Goal (Individualization)  Outcome: Ongoing, Progressing  Goal: Absence of Hospital-Acquired Illness or Injury  Outcome: Ongoing, Progressing  Goal: Optimal Comfort and Wellbeing  Outcome: Ongoing, Progressing  Goal: Readiness for Transition of Care  Outcome: Ongoing, Progressing  Goal: Rounds/Family Conference  Outcome: Ongoing, Progressing     Problem: Fall Injury Risk  Goal: Absence of Fall and Fall-Related Injury  Outcome: Ongoing, Progressing     Problem: Diabetes Comorbidity  Goal: Blood Glucose Level Within Desired Range  Outcome: Ongoing, Progressing     Problem: Skin Injury Risk Increased  Goal: Skin Health and Integrity  Outcome: Ongoing, Progressing     Problem: ARDS (Acute Respiratory Distress Syndrome)  Goal: Effective Oxygenation  Outcome: Ongoing, Progressing     Problem: Infection  Goal: Infection Symptom Resolution  Outcome: Ongoing, Progressing     Problem: Oral Intake Inadequate  Goal: Improved Oral Intake  Outcome: Ongoing, Progressing   Patient will demonstrate the desired outcomes by discharge/transition of care.

## 2020-12-15 NOTE — PROGRESS NOTES
Progress Note  PULMONARY    Admit Date: 12/11/2020   12/15/2020    From Dr Justin's consult 12/12:History of Present Illness:  Pt diabetic post [facility], was completing breast rx - preparing for reconstruction of right breast post cancer when found to have left breast ca now post op.  Pt non smoker/drinker.  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  No functional limits nor sz last ys.  Not really ill prior to covid.  Has sibs as next of kin but relates boyfriend, Ganga Brown, acts Medical POA (vague on if this if formally done) - her recall is not optimal.       From Dr Richter hpi 12/11/2020-HPI: Miss Castro is a 60 y.o. female IDDM, prior right breast cancer and recent diagnosis of left breast CA about to start chemotherapy, known COVID-19-positive 12/1/20 presenting with SOB.  Patient reports loss of sense of smell at our initial system and denied any respiratory complaints up until about 3 days ago.  Since then she reports worsening shortness of breath.  Also reports some loose stools and fatigue.  She is requiring 15 L nasal cannula in the ED after being 81% on room air.  She is going to be admitted to the ICU on high-flow nasal cannula.  Full code status confirmed on admission  Plan: 12/12  No fever on  Rocephin/azithro, decadron  15 lpm nrbm sat upper 90's  Wbc 6.3 on 11th,  ddimer 2.25- low dose lovenox,  Ferritin 540, procal 0.72, crp 288 on 11th,   bnp good at 83,   cxr clear 9/6/2020-- 12/11 lef>> right infiltrates c/w covid. More basilair  covid + 12/1   Discussed niv/bipap, and possible needing intubation with pt and her designated poa (she identified Ganga Brown, friend as poa (not clear if medical poa executed but pt tells me he would make medical decisions if she cannot)) -both agree to intubation expecting prolonged course for covid to clear.   covid pos on 12/1 makes response to plasma minimal- would not recommedn.       SUBJECTIVE:     12/13 on niv, excessive coughing with  desat mobilizing or coughing, no new c/o.  12/14- on 15L HFNC, with hypertension. Afebrile. Glucose 300s-500s. Per nurse seems anxious but pt denies. Starting to eat soft diet and had BM this am.   12/15- remains on HFNC with 15L NRB on top to maintain sats in 90s. Pt denies SOB, cough, pain. She has been able to eat some soft foods. Reports she was able to prone for several hours then turned back over due to neuropathy-leg pain.    PFSH and Allergies reviewed.    OBJECTIVE:     Vitals (Most recent):  Vitals:    12/15/20 1145   BP:    Pulse:    Resp:    Temp: 98.2 °F (36.8 °C)       Vitals (24 hour range):  Temp:  [97.7 °F (36.5 °C)-99.1 °F (37.3 °C)]   Pulse:  []   Resp:  [16-32]   BP: (124-169)/(58-81)   SpO2:  [89 %-99 %]       Intake/Output Summary (Last 24 hours) at 12/15/2020 1235  Last data filed at 12/15/2020 0600  Gross per 24 hour   Intake 1263.75 ml   Output 2650 ml   Net -1386.25 ml          Physical Exam:  The patient's neuro status (alertness,orientation,cognitive function,motor skills,), pharyngeal exam (oral lesions, hygiene, abn dentition,), Neck (jvd,mass,thyroid,nodes in neck and above/below clavicle),RESPIRATORY(symmetry,effort,fremitus,percussion,auscultation),  Cor(rhythm,heart tones including gallops,perfusion,edema)ABD(distention,hepatic&splenomegaly,tenderness,masses), Skin(rash,cyanosis),Psyc(affect,judgement,).  Exam negative except for these pertinent findings:    Sleeping, arouses, on NRB mask w/ HFNC  Diminished breath sounds bilaterally  abd soft nontender  No edema    Radiographs reviewed: view by direct vision    CXR 12/12- bilateral infiltrates, increasing    Labs     Recent Labs   Lab 12/15/20  0320   WBC 6.26   HGB 12.1   HCT 36.2*        Recent Labs   Lab 12/15/20  0320      K 3.3*   CL 99   CO2 25   BUN 13   CREATININE 0.7   *   CALCIUM 8.3*   MG 1.8   PHOS 4.1   AST 24   ALT 23   ALKPHOS 201*   BILITOT 0.3   PROT 6.5   ALBUMIN 2.3*   CRP 85.2*   No  results for input(s): PH, PCO2, PO2, HCO3 in the last 24 hours.  Microbiology Results (last 7 days)     Procedure Component Value Units Date/Time    Blood culture (site 1) [[id number]] Collected: 12/11/20 2035    Order Status: Completed Specimen: Blood from Antecubital, Right Arm Updated: 12/15/20 1212     Blood Culture, Routine No Growth to date      No Growth to date      No Growth to date      No Growth to date    Narrative:      Site # 1, aerobic and anaerobic    Blood culture (site 2) [[id number]] Collected: 12/11/20 2035    Order Status: Completed Specimen: Blood from Antecubital, Right Hand Updated: 12/15/20 1212     Blood Culture, Routine No Growth to date      No Growth to date      No Growth to date      No Growth to date    Narrative:      Site # 2, aerobic only          Impression:  Active Hospital Problems    Diagnosis  POA    *Acute hypoxemic respiratory failure [J96.01]  Yes    ARDS (adult respiratory distress syndrome) [J80]  Yes    Pneumonia due to COVID-19 virus [U07.1, J12.89]  Yes    Type 2 diabetes mellitus with hyperglycemia, with long-term current use of insulin [E11.65, Z79.4]  Not Applicable    Mixed hyperlipidemia [E78.2]  Yes    Breast cancer [C50.919]  Yes    Depression [F32.9]  Yes      Resolved Hospital Problems   No resolved problems to display.               Plan:   Acute hypoxemic respiratory failure   COVID 19 pna  ARDS  DM2, uncontrolled  Elevated D-dimer, increasing  Breast ca, s/p treatment on right (2015), recurrent on left and pending chemo    - continue HFNC/NRB mask to keep sats 92-95%  - BIPAP use if needed  - prone as tolerated  - CXR repeat in am  - dc rocephin  - incentive spirometry   - continue RDV  - continue decadron 6mg daily x 10 day course  - continue daily PO lasix  - blood sugar, BP control per hospitalist  - monitor CRP, d-dimer, ferritin   - continue half dose lovenox for elevated d-dimer  - high index of suspicion for VTE- workup/escalate tx accordingly  if decompensates  - advance oral diet as tolerated    The following were evaluated and adjusted as needed: antibiotics, acid base balance and oxygenation needs and input and output and renal status       Critical Care  - THE PATIENT HAS A HIGH PROBABILITY OF IMMINENT OR LIFE THREATENING DETERIORATION.  Over 50%time of encounter was in direct care at bedside.  Time was 30 to 74 minutes required for patient care.  Time needed for all of the above totaled 30 minutes.    Lala Leija MD  Pulmonary & Critical Care Medicine                      .

## 2020-12-15 NOTE — NURSING
Pt placed back in supine position d/t to pt insisting on getting out of prone position.  Pt should be placed back in prone at 2200

## 2020-12-15 NOTE — ASSESSMENT & PLAN NOTE
Patient is chronically on statin.will continue for now. Monitor clinically. Last LDL was   Lab Results   Component Value Date    LDLCALC 114.2 10/24/2018

## 2020-12-15 NOTE — CARE UPDATE
12/14/20 7140   Patient Assessment/Suction   Level of Consciousness (AVPU) alert   Expansion/Accessory Muscles/Retractions no use of accessory muscles;no retractions;expansion symmetric   All Lung Fields Breath Sounds Anterior:;Lateral:;diminished   Rhythm/Pattern, Respiratory no shortness of breath reported   Cough Frequency infrequent   Cough Type nonproductive   PRE-TX-O2   O2 Device (Oxygen Therapy) High Flow nasal Cannula   $ Is the patient on Low Flow Oxygen? Yes   Flow (L/min) 12   SpO2 96 %   Pulse Oximetry Type Continuous   $ Pulse Oximetry - Multiple Charge Pulse Oximetry - Multiple   Oximetry Probe Site No Change Needed   Incentive Spirometer   $ Incentive Spirometer Charges done with encouragement;other (see comments)  (while prone / had difficulties not withstanding the position)   Administration (IS) instruction provided, initial   Number of Repetitions (IS) 10   Level Incentive Spirometer (mL) 500   Patient Tolerance (IS) fair   Wound Care   Skin Color/Characteristics without discoloration   Skin Temperature warm

## 2020-12-15 NOTE — ASSESSMENT & PLAN NOTE
- COVID-19 testing Collection Date: 12/1/2020 Collection Time:  11:28 AM   - Infection Control notified     - Isolation:   - Airborne, Contact and Droplet Precautions  - Cohort patients into COVID units              - Limit visitors per hospital policy              - Consolidating lab draws, nursing care, provider visits, and interventions      - Management:  Supplemental O2 to maintain SpO2 >92%  Telemetry  Continuous/intermittent Pulse Ox  Albuterol treatment PRN  Careful use of steroids in the absence of other indications - unless septic shock due to increased viral replication Empiric antibiotics per likely source & patient allergies if patient hypoxic with airspace disease for CAP: azithromycin & ceftriaxone  Continue Remdesivir.    Advance Care Planning     Healthcare Power of : Received 12/14/2020    Advance Directives and Living Will: Received 7/30/2019    Power of : Received 1/29/2016

## 2020-12-15 NOTE — ASSESSMENT & PLAN NOTE
Patient's FSGs are not controlled on current hypoglycemics.   Will increase detemir to 50 units every night.    Most recent fingerstick glucose reviewed-   Recent Labs   Lab 12/14/20  1122 12/14/20  1642 12/14/20  1644 12/14/20  1957   POCTGLUCOSE 398* 444* 439* 322*     Current correctional scale  Low  Maintain anti-hyperglycemic dose as follows- will initiate home regimen with medium sliding scale insulin and monitor.  Antihyperglycemics (From admission, onward)    Start     Stop Route Frequency Ordered    12/14/20 1645  insulin aspart U-100 pen 15 Units      -- SubQ 3 times daily with meals 12/14/20 1145    12/14/20 0716  insulin aspart U-100 pen 1-10 Units      -- SubQ Before meals & nightly PRN 12/14/20 0617    12/13/20 2100  insulin detemir U-100 pen 50 Units      -- SubQ Nightly 12/13/20 1748        Hold Oral hypoglycemics while patient is in the hospital.

## 2020-12-16 PROBLEM — J80 ARDS (ADULT RESPIRATORY DISTRESS SYNDROME): Status: RESOLVED | Noted: 2020-12-12 | Resolved: 2020-12-16

## 2020-12-16 LAB
ALBUMIN SERPL BCP-MCNC: 2.3 G/DL (ref 3.5–5.2)
ALP SERPL-CCNC: 185 U/L (ref 55–135)
ALT SERPL W/O P-5'-P-CCNC: 19 U/L (ref 10–44)
ANION GAP SERPL CALC-SCNC: 10 MMOL/L (ref 8–16)
AST SERPL-CCNC: 25 U/L (ref 10–40)
BILIRUB SERPL-MCNC: 0.4 MG/DL (ref 0.1–1)
BUN SERPL-MCNC: 10 MG/DL (ref 6–20)
CALCIUM SERPL-MCNC: 8.5 MG/DL (ref 8.7–10.5)
CHLORIDE SERPL-SCNC: 101 MMOL/L (ref 95–110)
CO2 SERPL-SCNC: 26 MMOL/L (ref 23–29)
CREAT SERPL-MCNC: 0.6 MG/DL (ref 0.5–1.4)
ERYTHROCYTE [DISTWIDTH] IN BLOOD BY AUTOMATED COUNT: 12.4 % (ref 11.5–14.5)
EST. GFR  (AFRICAN AMERICAN): >60 ML/MIN/1.73 M^2
EST. GFR  (NON AFRICAN AMERICAN): >60 ML/MIN/1.73 M^2
FERRITIN SERPL-MCNC: 425 NG/ML (ref 20–300)
GLUCOSE SERPL-MCNC: 173 MG/DL (ref 70–110)
HCT VFR BLD AUTO: 37.6 % (ref 37–48.5)
HGB BLD-MCNC: 12.4 G/DL (ref 12–16)
MAGNESIUM SERPL-MCNC: 2.1 MG/DL (ref 1.6–2.6)
MCH RBC QN AUTO: 29.3 PG (ref 27–31)
MCHC RBC AUTO-ENTMCNC: 33 G/DL (ref 32–36)
MCV RBC AUTO: 89 FL (ref 82–98)
PHOSPHATE SERPL-MCNC: 3.6 MG/DL (ref 2.7–4.5)
PLATELET # BLD AUTO: 340 K/UL (ref 150–350)
PMV BLD AUTO: 9.8 FL (ref 9.2–12.9)
POCT GLUCOSE: 237 MG/DL (ref 70–110)
POCT GLUCOSE: 274 MG/DL (ref 70–110)
POCT GLUCOSE: 367 MG/DL (ref 70–110)
POTASSIUM SERPL-SCNC: 4.1 MMOL/L (ref 3.5–5.1)
PROT SERPL-MCNC: 6.7 G/DL (ref 6–8.4)
RBC # BLD AUTO: 4.23 M/UL (ref 4–5.4)
SODIUM SERPL-SCNC: 137 MMOL/L (ref 136–145)
WBC # BLD AUTO: 6.03 K/UL (ref 3.9–12.7)

## 2020-12-16 PROCEDURE — 85027 COMPLETE CBC AUTOMATED: CPT

## 2020-12-16 PROCEDURE — 63600175 PHARM REV CODE 636 W HCPCS: Performed by: HOSPITALIST

## 2020-12-16 PROCEDURE — 80053 COMPREHEN METABOLIC PANEL: CPT

## 2020-12-16 PROCEDURE — 36415 COLL VENOUS BLD VENIPUNCTURE: CPT

## 2020-12-16 PROCEDURE — 25000003 PHARM REV CODE 250: Performed by: INTERNAL MEDICINE

## 2020-12-16 PROCEDURE — 99291 PR CRITICAL CARE, E/M 30-74 MINUTES: ICD-10-PCS | Mod: ,,, | Performed by: INTERNAL MEDICINE

## 2020-12-16 PROCEDURE — 97161 PT EVAL LOW COMPLEX 20 MIN: CPT

## 2020-12-16 PROCEDURE — 83735 ASSAY OF MAGNESIUM: CPT

## 2020-12-16 PROCEDURE — 99291 CRITICAL CARE FIRST HOUR: CPT | Mod: ,,, | Performed by: INTERNAL MEDICINE

## 2020-12-16 PROCEDURE — 94761 N-INVAS EAR/PLS OXIMETRY MLT: CPT

## 2020-12-16 PROCEDURE — 25000003 PHARM REV CODE 250: Performed by: HOSPITALIST

## 2020-12-16 PROCEDURE — 82728 ASSAY OF FERRITIN: CPT

## 2020-12-16 PROCEDURE — 94799 UNLISTED PULMONARY SVC/PX: CPT

## 2020-12-16 PROCEDURE — 84100 ASSAY OF PHOSPHORUS: CPT

## 2020-12-16 PROCEDURE — 63600175 PHARM REV CODE 636 W HCPCS: Performed by: INTERNAL MEDICINE

## 2020-12-16 PROCEDURE — 20000000 HC ICU ROOM

## 2020-12-16 PROCEDURE — 27000221 HC OXYGEN, UP TO 24 HOURS

## 2020-12-16 RX ADMIN — DRONABINOL 2.5 MG: 2.5 CAPSULE ORAL at 07:12

## 2020-12-16 RX ADMIN — INSULIN ASPART 15 UNITS: 100 INJECTION, SOLUTION INTRAVENOUS; SUBCUTANEOUS at 04:12

## 2020-12-16 RX ADMIN — FUROSEMIDE 20 MG: 20 TABLET ORAL at 09:12

## 2020-12-16 RX ADMIN — HYDROCODONE BITARTRATE AND ACETAMINOPHEN 1 TABLET: 5; 325 TABLET ORAL at 09:12

## 2020-12-16 RX ADMIN — INSULIN ASPART 15 UNITS: 100 INJECTION, SOLUTION INTRAVENOUS; SUBCUTANEOUS at 07:12

## 2020-12-16 RX ADMIN — INSULIN ASPART 15 UNITS: 100 INJECTION, SOLUTION INTRAVENOUS; SUBCUTANEOUS at 12:12

## 2020-12-16 RX ADMIN — INSULIN ASPART 5 UNITS: 100 INJECTION, SOLUTION INTRAVENOUS; SUBCUTANEOUS at 08:12

## 2020-12-16 RX ADMIN — DIAZEPAM 10 MG: 5 TABLET ORAL at 09:12

## 2020-12-16 RX ADMIN — DEXAMETHASONE SODIUM PHOSPHATE 6 MG: 4 INJECTION, SOLUTION INTRA-ARTICULAR; INTRALESIONAL; INTRAMUSCULAR; INTRAVENOUS; SOFT TISSUE at 09:12

## 2020-12-16 RX ADMIN — ENOXAPARIN SODIUM 40 MG: 40 INJECTION SUBCUTANEOUS at 09:12

## 2020-12-16 RX ADMIN — ENALAPRIL MALEATE 5 MG: 5 TABLET ORAL at 09:12

## 2020-12-16 RX ADMIN — DRONABINOL 2.5 MG: 2.5 CAPSULE ORAL at 04:12

## 2020-12-16 RX ADMIN — HYDROCODONE BITARTRATE AND ACETAMINOPHEN 1 TABLET: 5; 325 TABLET ORAL at 03:12

## 2020-12-16 RX ADMIN — PRAVASTATIN SODIUM 40 MG: 40 TABLET ORAL at 07:12

## 2020-12-16 RX ADMIN — ENOXAPARIN SODIUM 40 MG: 40 INJECTION SUBCUTANEOUS at 08:12

## 2020-12-16 RX ADMIN — MUPIROCIN: 20 OINTMENT TOPICAL at 09:12

## 2020-12-16 RX ADMIN — DIAZEPAM 10 MG: 5 TABLET ORAL at 04:12

## 2020-12-16 RX ADMIN — MUPIROCIN: 20 OINTMENT TOPICAL at 08:12

## 2020-12-16 NOTE — PLAN OF CARE
Pt remains in ICU. Afebrile. AAOx4. Sitting to standing at side of bed with physical therapy, tolerated well. RT and myself re-educated pt on importance of IS and proning, pt verbalized understanding and stated she has been trying to prone but gets leg pain d/t neuropathy when prone. Remains on 12-15L HFNC, unable to titrate much without sats dropping. NSR on monitor. Rosas in place with adequate U/O. Up to bedside commode, 1 BM this shift. Pt remains free of any falls, injuries, or new skin breakdown during this shift with precautions in place for all.

## 2020-12-16 NOTE — PLAN OF CARE
Problem: Physical Therapy Goal  Goal: Physical Therapy Goal  Description: Goals to be met by: 2021    Patient will increase functional independence with mobility by performin. Supine to sit with [place]  2. Sit to supine with [place]  3. Sit to stand transfer with [place]  4. Bed to chair transfer with [place] using no AD  5. Gait  x 50 feet with Supervision using no AD.     Outcome: Ongoing, Progressing

## 2020-12-16 NOTE — PROGRESS NOTES
[facility] Hospital Medicine  Progress Note    Patient Name: Negrita Castro  MRN: [id number]  Patient Class: IP- Inpatient   Admission Date: 12/11/2020  Length of Stay: 4 days  Attending Physician: Patricio Barroso MD  Primary Care Provider: Yoni Renteria MD        Subjective:     Principal Problem:Acute hypoxemic respiratory failure        HPI:  Miss Castro is a 60 y.o. female IDDM, prior right breast cancer and recent diagnosis of left breast CA about to start chemotherapy, known COVID-19-positive 12/1/20 presenting with SOB.  Patient reports loss of sense of smell at our initial system and denied any respiratory complaints up until about 3 days ago.  Since then she reports worsening shortness of breath.  Also reports some loose stools and fatigue.  She is requiring 15 L nasal cannula in the ED after being 81% on room air.  She is going to be admitted to the ICU on high-flow nasal cannula.  Full code status confirmed on admission    Overview/Hospital Course:  Admitted to undergo treatment of severe COVID-19 pneumonia.  Was treated with NIPPV.  She improved.  NIPPV removed.  She continued to get supplemental oxygen via high flow NC.  She received dexamethasone, remdesivir, azithromycin, and ceftriaxone.    Interval History:  Patient seen and examined.  Hyperglycemia slowly improving no acute events overnight.  Plan of care discussed with the patient at the bedside in detail.    Review of Systems   Constitutional: Positive for activity change and fatigue.   Respiratory: Positive for cough and shortness of breath.    Cardiovascular: Negative for chest pain and leg swelling.   Gastrointestinal: Negative for abdominal pain and nausea.   Neurological: Negative for weakness.   Psychiatric/Behavioral: Negative for confusion. The patient is nervous/anxious.    All other systems reviewed and are negative.    Objective:     Vital Signs (Most Recent):  Temp: 98.1 °F (36.7 °C) (12/15/20 2000)  Pulse: 63  (12/15/20 2100)  Resp: 20 (12/15/20 2100)  BP: 127/75 (12/15/20 2100)  SpO2: 97 % (12/15/20 2100) Vital Signs (24h Range):  Temp:  [97.7 °F (36.5 °C)-98.7 °F (37.1 °C)] 98.1 °F (36.7 °C)  Pulse:  [] 63  Resp:  [18-39] 20  SpO2:  [88 %-99 %] 97 %  BP: (121-161)/(58-98) 127/75     Weight: 85.8 kg (189 lb 2.5 oz)  Body mass index is 26.38 kg/m².    Intake/Output Summary (Last 24 hours) at 12/15/2020 2212  Last data filed at 12/15/2020 1800  Gross per 24 hour   Intake 1630 ml   Output 2600 ml   Net -970 ml      Physical Exam  Vitals signs and nursing note reviewed.   Constitutional:       General: She is not in acute distress.  Eyes:      Pupils: Pupils are equal, round, and reactive to light.   Cardiovascular:      Rate and Rhythm: Normal rate and regular rhythm.      Heart sounds: No murmur.   Pulmonary:      Comments: Increased respiratory effort noted.  Tachypnea noted.  Patient on high-flow nasal cannula.  Abdominal:      General: There is no distension.      Palpations: Abdomen is soft.      Tenderness: There is no abdominal tenderness.   Skin:     Coloration: Skin is not pale.      Findings: No rash.   Neurological:      Mental Status: She is alert and oriented to person, place, and time.         Significant Labs: All pertinent labs within the past 24 hours have been reviewed.    Significant Imaging: I have reviewed all pertinent imaging results/findings within the past 24 hours.      Assessment/Plan:      * Acute hypoxemic respiratory failure  Patient with Hypoxic Respiratory failure which is Acute.  she is not on home oxygen. Supplemental ventilation was provided and noted-  .  High-flow nasal cannula at 12 L  Differential diagnosis includes - Pneumonia Labs and images were reviewed. Patient Has not has a recent ABG. Will treat underlying causes and adjust management of respiratory failure as follows- continue treatment for COVID-19 and supplemental oxygen for hypoxemia.        Pneumonia due to COVID-19  virus  - COVID-19 testing Collection Date: 12/1/2020 Collection Time:  11:28 AM   - Infection Control notified     - Isolation:   - Airborne, Contact and Droplet Precautions  - Cohort patients into COVID units              - Limit visitors per hospital policy              - Consolidating lab draws, nursing care, provider visits, and interventions      - Management:  Supplemental O2 to maintain SpO2 >92%  Telemetry  Continuous/intermittent Pulse Ox  Albuterol treatment PRN  Careful use of steroids in the absence of other indications - unless septic shock due to increased viral replication Empiric antibiotics per likely source & patient allergies if patient hypoxic with airspace disease for CAP: azithromycin & ceftriaxone  Continue Remdesivir.    Advance Care Planning     Healthcare Power of : Received 12/14/2020    Advance Directives and Living Will: Received 7/30/2019    Power of : Received 1/29/2016         ARDS (adult respiratory distress syndrome)  Monitor chest radiographs.  Conservative fluid strategy.  Not requiring mechanical ventilation.      Mixed hyperlipidemia   Patient is chronically on statin.will continue for now. Monitor clinically. Last LDL was   Lab Results   Component Value Date    LDLCALC 114.2 10/24/2018            Type 2 diabetes mellitus with hyperglycemia, with long-term current use of insulin  Patient's FSGs are not controlled on current hypoglycemics.   Will increase detemir to 50 units every night.    Most recent fingerstick glucose reviewed-   Recent Labs   Lab 12/14/20  2359 12/15/20  1102 12/15/20  1624 12/15/20  1951   POCTGLUCOSE 418* 175* 321* 287*     Current correctional scale  Low  Maintain anti-hyperglycemic dose as follows- will initiate home regimen with medium sliding scale insulin and monitor.  Antihyperglycemics (From admission, onward)    Start     Stop Route Frequency Ordered    12/15/20 2100  insulin detemir U-100 pen 60 Units      -- SubQ Nightly 12/14/20  2105    12/14/20 1645  insulin aspart U-100 pen 15 Units      -- SubQ 3 times daily with meals 12/14/20 1145    12/14/20 0716  insulin aspart U-100 pen 1-10 Units      -- SubQ Before meals & nightly PRN 12/14/20 0617        Hold Oral hypoglycemics while patient is in the hospital.        Breast cancer  Seen by Dr. Peraza as outpatient.  Plans to start chemo soon      Depression  Chronic.  Stable at this time.      VTE Risk Mitigation (From admission, onward)         Ordered     enoxaparin injection 40 mg  Every 12 hours      12/14/20 1005     IP VTE HIGH RISK PATIENT  Once      12/11/20 2002     Place sequential compression device  Until discontinued      12/11/20 2002                Discharge Planning   GUNNER:      Code Status: Full Code   Is the patient medically ready for discharge?:     Reason for patient still in hospital (select all that apply): Treatment  Discharge Plan A: Home with family            Critical care time spent on the evaluation and treatment of severe organ dysfunction, review of pertinent labs and imaging studies, discussions with consulting providers and discussions with patient/family:  35 minutes.      Patricio Barroso MD  Department of Hospital Medicine   [facility]

## 2020-12-16 NOTE — CARE UPDATE
12/15/20 2000   Patient Assessment/Suction   Level of Consciousness (AVPU) alert   Respiratory Effort Unlabored   Expansion/Accessory Muscles/Retractions no use of accessory muscles;no retractions   All Lung Fields Breath Sounds diminished   RLL Breath Sounds crackles, fine   Rhythm/Pattern, Respiratory no shortness of breath reported   Cough Frequency infrequent   Cough Type nonproductive   PRE-TX-O2   O2 Device (Oxygen Therapy) High Flow nasal Cannula   $ Is the patient on Low Flow Oxygen? Yes   Flow (L/min) 12   Pulse Oximetry Type Continuous   $ Pulse Oximetry - Multiple Charge Pulse Oximetry - Multiple   Oximetry Probe Site No Change Needed   Incentive Spirometer   $ Incentive Spirometer Charges done with encouragement   Administration (IS) mouthpiece   Number of Repetitions (IS) 10   Level Incentive Spirometer (mL) 500   Patient Tolerance (IS) fair

## 2020-12-16 NOTE — PLAN OF CARE
Pt has been very co operative today. She talked on phone at times. Pt took off bipap for breakfast. High flow NC on at 12 L. Oxygen levels dipped to mid 80's when eating. NR at 15L given to pt while she was eating to help keep her oxygen sats up. Pt sat up in chair for about 1.5 hours before supper. Ate supper while up in chair. States she wants to use BSC after a while. Instructed to call staff to keep from falling. Rosas to gravity. Right EJ site tender. Attempted to alleviate pressure by using PICC stabilizer.

## 2020-12-16 NOTE — PT/OT/SLP EVAL
Physical Therapy Evaluation    Patient Name:  Negrita Castro   MRN:  [id number]    Recommendations:     Discharge Recommendations:  home, home health PT   Discharge Equipment Recommendations: none   Barriers to discharge: None    Assessment:     Negrita Castro is a 60 y.o. female admitted with a medical diagnosis of Acute hypoxemic respiratory failure.  She presents with the following impairments/functional limitations:  weakness, impaired endurance, impaired functional mobilty, gait instability, impaired balance, decreased lower extremity function, impaired cardiopulmonary response to activity .  Patient agreeable to PT evaluation but did have significant reduced O2 sats with treatment.  Patient presented supine in bed and was able to sitting with SBA and then stood with SBA.  Patient only able to stand briefly due to O2 sats at 88% in sitting and then 74% in standing.  Patient returned to supine and O2 sats went to 90% after a few minutes.    Rehab Prognosis: Good; patient would benefit from acute skilled PT services to address these deficits and reach maximum level of function.    Recent Surgery: * No surgery found *      Plan:     During this hospitalization, patient to be seen 6 x/week to address the identified rehab impairments via gait training, therapeutic activities, therapeutic exercises and progress toward the following goals:    · Plan of Care Expires:  01/13/21    Subjective     Chief Complaint: SOB  Patient/Family Comments/goals: go home  Pain/Comfort:  ·      Patients cultural, spiritual, Nondenominational conflicts given the current situation:      Living Environment:  Currently lives with family in [address].  Prior to admission, patients level of function was independent.  Equipment used at home: none.  DME owned (not currently used): none.  Upon discharge, patient will have assistance from family.    Objective:     Communicated with nurse prior to session.  Patient found supine with bed alarm,  oxygen, peripheral IV, SCD, telemetry  upon PT entry to room.    General Precautions: Standard, fall, droplet, contact, airborne   Orthopedic Precautions:N/A   Braces:       Exams:  · RLE ROM: WFL  · RLE Strength: WFL  · LLE ROM: WFL  · LLE Strength: WFL    Functional Mobility:  · Bed Mobility:     · Supine to Sit: stand by assistance  · Sit to Supine: stand by assistance  · Transfers:     · Sit to Stand:  contact guard assistance with no AD and rolling walker    Therapeutic Activities and Exercises:   none    AM-PAC 6 CLICK MOBILITY  Total Score:18     Patient left supine with call button in reach, bed alarm on and nurse notified.    GOALS:   Multidisciplinary Problems     Physical Therapy Goals        Problem: Physical Therapy Goal    Goal Priority Disciplines Outcome Goal Variances Interventions   Physical Therapy Goal     PT, PT/OT Ongoing, Progressing     Description: Goals to be met by: 2021    Patient will increase functional independence with mobility by performin. Supine to sit with Levy  2. Sit to supine with Levy  3. Sit to stand transfer with Levy  4. Bed to chair transfer with Levy using no AD  5. Gait  x 50 feet with Supervision using no AD.                      History:     Past Medical History:   Diagnosis Date    Breast cancer     left breast    Cancer     RIGHT BREAST 10-15    Depression     Diabetes mellitus     Neuropathy     Panic attacks     S/P epidural steroid injection     Seizures     none since early     Wears glasses     TO DRIVE       Past Surgical History:   Procedure Laterality Date    AXILLARY NODE DISSECTION Left 2020    Procedure: LYMPHADENECTOMY, AXILLARY;  Surgeon: Cory Vick MD;  Location: [facility] OR;  Service: General;  Laterality: Left;  left breast mastectomy with possible axillary lymph node dissection    BREAST BIOPSY      right    breast reconstruction with tummy [facility]      BREAST SURGERY      11-3-15  LUMPECTOMY, 12-2-15 REEXCISION    INCISION AND DRAINAGE OF ABSCESS Left 9/9/2020    Procedure: INCISION AND DRAINAGE, ABSCESS;  Surgeon: Cory Vick MD;  Location: [facility] OR;  Service: General;  Laterality: Left;    INSERTION OF LOCALIZATION WIRE Left 8/24/2020    Procedure: INSERTION, LOCALIZATION WIRE;  Surgeon: Cory Vick MD;  Location: [facility] OR;  Service: General;  Laterality: Left;  left breast lumpectomy with wire needle loc    mastectomy 2016      RECONSTRUCTION OF NIPPLE Right 11/5/2018    Procedure: RECONSTRUCTION-NIPPLE RIGHT;  Surgeon: Xiang Hernandez MD;  Location: [address];  Service: Plastics;  Laterality: Right;    SENTINEL LYMPH NODE BIOPSY Left 8/24/2020    Procedure: BIOPSY, LYMPH NODE, SENTINEL;  Surgeon: Cory Vick MD;  Location: [facility];  Service: General;  Laterality: Left;  left breast lumpectomy with left sentinel lymoh node       shoulder surgery and wrist      BILAT  BONE SPUR    WRIST SURGERY      RIGHT       Time Tracking:     PT Received On: 12/16/20  PT Start Time: 0859     PT Stop Time: 0919  PT Total Time (min): 20 min     Billable Minutes: Evaluation 20      Chris Megilligan, PT  12/16/2020

## 2020-12-16 NOTE — SUBJECTIVE & OBJECTIVE
Interval History:  Patient seen and examined.  Hyperglycemia slowly improving no acute events overnight.  Plan of care discussed with the patient at the bedside in detail.    Review of Systems   Constitutional: Positive for activity change and fatigue.   Respiratory: Positive for cough and shortness of breath.    Cardiovascular: Negative for chest pain and leg swelling.   Gastrointestinal: Negative for abdominal pain and nausea.   Neurological: Negative for weakness.   Psychiatric/Behavioral: Negative for confusion. The patient is nervous/anxious.    All other systems reviewed and are negative.    Objective:     Vital Signs (Most Recent):  Temp: 98.1 °F (36.7 °C) (12/15/20 2000)  Pulse: 63 (12/15/20 2100)  Resp: 20 (12/15/20 2100)  BP: 127/75 (12/15/20 2100)  SpO2: 97 % (12/15/20 2100) Vital Signs (24h Range):  Temp:  [97.7 °F (36.5 °C)-98.7 °F (37.1 °C)] 98.1 °F (36.7 °C)  Pulse:  [] 63  Resp:  [18-39] 20  SpO2:  [88 %-99 %] 97 %  BP: (121-161)/(58-98) 127/75     Weight: 85.8 kg (189 lb 2.5 oz)  Body mass index is 26.38 kg/m².    Intake/Output Summary (Last 24 hours) at 12/15/2020 2212  Last data filed at 12/15/2020 1800  Gross per 24 hour   Intake 1630 ml   Output 2600 ml   Net -970 ml      Physical Exam  Vitals signs and nursing note reviewed.   Constitutional:       General: She is not in acute distress.  Eyes:      Pupils: Pupils are equal, round, and reactive to light.   Cardiovascular:      Rate and Rhythm: Normal rate and regular rhythm.      Heart sounds: No murmur.   Pulmonary:      Comments: Increased respiratory effort noted.  Tachypnea noted.  Patient on high-flow nasal cannula.  Abdominal:      General: There is no distension.      Palpations: Abdomen is soft.      Tenderness: There is no abdominal tenderness.   Skin:     Coloration: Skin is not pale.      Findings: No rash.   Neurological:      Mental Status: She is alert and oriented to person, place, and time.         Significant Labs: All  pertinent labs within the past 24 hours have been reviewed.    Significant Imaging: I have reviewed all pertinent imaging results/findings within the past 24 hours.

## 2020-12-16 NOTE — ASSESSMENT & PLAN NOTE
Patient's FSGs are not controlled on current hypoglycemics.   Will increase detemir to 50 units every night.    Most recent fingerstick glucose reviewed-   Recent Labs   Lab 12/14/20  2359 12/15/20  1102 12/15/20  1624 12/15/20  1951   POCTGLUCOSE 418* 175* 321* 287*     Current correctional scale  Low  Maintain anti-hyperglycemic dose as follows- will initiate home regimen with medium sliding scale insulin and monitor.  Antihyperglycemics (From admission, onward)    Start     Stop Route Frequency Ordered    12/15/20 2100  insulin detemir U-100 pen 60 Units      -- SubQ Nightly 12/14/20 2105    12/14/20 1645  insulin aspart U-100 pen 15 Units      -- SubQ 3 times daily with meals 12/14/20 1145    12/14/20 0716  insulin aspart U-100 pen 1-10 Units      -- SubQ Before meals & nightly PRN 12/14/20 0617        Hold Oral hypoglycemics while patient is in the hospital.

## 2020-12-16 NOTE — PLAN OF CARE
Problem: Adult Inpatient Plan of Care  Goal: Plan of Care Review  Outcome: Ongoing, Progressing  Goal: Patient-Specific Goal (Individualization)  Outcome: Ongoing, Progressing  Goal: Absence of Hospital-Acquired Illness or Injury  Outcome: Ongoing, Progressing  Goal: Optimal Comfort and Wellbeing  Outcome: Ongoing, Progressing  Goal: Readiness for Transition of Care  Outcome: Ongoing, Progressing  Goal: Rounds/Family Conference  Outcome: Ongoing, Progressing     Problem: Fall Injury Risk  Goal: Absence of Fall and Fall-Related Injury  Outcome: Ongoing, Progressing     Problem: Diabetes Comorbidity  Goal: Blood Glucose Level Within Desired Range  Outcome: Ongoing, Progressing     Problem: Skin Injury Risk Increased  Goal: Skin Health and Integrity  Outcome: Ongoing, Progressing     Problem: ARDS (Acute Respiratory Distress Syndrome)  Goal: Effective Oxygenation  Outcome: Ongoing, Progressing     Problem: Infection  Goal: Infection Symptom Resolution  Outcome: Ongoing, Progressing     Problem: Oral Intake Inadequate  Goal: Improved Oral Intake  Outcome: Ongoing, Progressing   No acute events overnight. Patient remains on 15L HFNC. Patient able to tolerate prone positioning for 6 hours during the night.

## 2020-12-16 NOTE — PROGRESS NOTES
Progress Note  PULMONARY    Admit Date: 12/11/2020 12/16/2020    From Dr Justin's consult 12/12:History of Present Illness:  Pt diabetic post [facility], was completing breast rx - preparing for reconstruction of right breast post cancer when found to have left breast ca now post op.  Pt non smoker/drinker.  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  No functional limits nor sz last ys.  Not really ill prior to covid.  Has sibs as next of kin but relates boyfriend, Ganga Brown, acts Medical POA (vague on if this if formally done) - her recall is not optimal.       From Dr Richter hpi 12/11/2020-HPI: Miss Castro is a 60 y.o. female IDDM, prior right breast cancer and recent diagnosis of left breast CA about to start chemotherapy, known COVID-19-positive 12/1/20 presenting with SOB.  Patient reports loss of sense of smell at our initial system and denied any respiratory complaints up until about 3 days ago.  Since then she reports worsening shortness of breath.  Also reports some loose stools and fatigue.  She is requiring 15 L nasal cannula in the ED after being 81% on room air.  She is going to be admitted to the ICU on high-flow nasal cannula.  Full code status confirmed on admission  Plan: 12/12  No fever on  Rocephin/azithro, decadron  15 lpm nrbm sat upper 90's  Wbc 6.3 on 11th,  ddimer 2.25- low dose lovenox,  Ferritin 540, procal 0.72, crp 288 on 11th,   bnp good at 83,   cxr clear 9/6/2020-- 12/11 lef>> right infiltrates c/w covid. More basilair  covid + 12/1   Discussed niv/bipap, and possible needing intubation with pt and her designated poa (she identified Ganga Brown, friend as poa (not clear if medical poa executed but pt tells me he would make medical decisions if she cannot)) -both agree to intubation expecting prolonged course for covid to clear.   covid pos on 12/1 makes response to plasma minimal- would not recommedn.       SUBJECTIVE:     12/13 on niv, excessive coughing with  desat mobilizing or coughing, no new c/o.  12/14- on 15L HFNC, with hypertension. Afebrile. Glucose 300s-500s. Per nurse seems anxious but pt denies. Starting to eat soft diet and had BM this am.   12/15- remains on HFNC with 15L NRB on top to maintain sats in 90s. Pt denies SOB, cough, pain. She has been able to eat some soft foods. Reports she was able to prone for several hours then turned back over due to neuropathy-leg pain.  12/16- on 12L HFNC today-->15L. Worked with PT, feels stronger. Good appetite. Able to prone 6h then stopped due to leg pain    PFSH and Allergies reviewed.    OBJECTIVE:     Vitals (Most recent):  Vitals:    12/16/20 0900   BP: (!) 127/59   Pulse: 80   Resp: (!) 22   Temp:        Vitals (24 hour range):  Temp:  [97.8 °F (36.6 °C)-98.3 °F (36.8 °C)]   Pulse:  []   Resp:  [17-34]   BP: (118-159)/(57-98)   SpO2:  [86 %-99 %]       Intake/Output Summary (Last 24 hours) at 12/16/2020 0931  Last data filed at 12/16/2020 0800  Gross per 24 hour   Intake 2530 ml   Output 3430 ml   Net -900 ml          Physical Exam:  The patient's neuro status (alertness,orientation,cognitive function,motor skills,), pharyngeal exam (oral lesions, hygiene, abn dentition,), Neck (jvd,mass,thyroid,nodes in neck and above/below clavicle),RESPIRATORY(symmetry,effort,fremitus,percussion,auscultation),  Cor(rhythm,heart tones including gallops,perfusion,edema)ABD(distention,hepatic&splenomegaly,tenderness,masses), Skin(rash,cyanosis),Psyc(affect,judgement,).  Exam negative except for these pertinent findings:    Awake, alert   HFNC in place  Diminished breath sounds bilaterally  abd soft nontender  No edema    Radiographs reviewed: view by direct vision    CXR 12/16- L>R airspace disease, clearing compared to previous  CXR 12/12- bilateral infiltrates, increasing    Labs     Recent Labs   Lab 12/16/20 0454   WBC 6.03   HGB 12.4   HCT 37.6        Recent Labs   Lab 12/16/20 0454      K 4.1       CO2 26   BUN 10   CREATININE 0.6   *   CALCIUM 8.5*   MG 2.1   PHOS 3.6   AST 25   ALT 19   ALKPHOS 185*   BILITOT 0.4   PROT 6.7   ALBUMIN 2.3*   No results for input(s): PH, PCO2, PO2, HCO3 in the last 24 hours.  Microbiology Results (last 7 days)     Procedure Component Value Units Date/Time    Blood culture (site 1) [[id number]] Collected: 12/11/20 2035    Order Status: Completed Specimen: Blood from Antecubital, Right Arm Updated: 12/15/20 1212     Blood Culture, Routine No Growth to date      No Growth to date      No Growth to date      No Growth to date    Narrative:      Site # 1, aerobic and anaerobic    Blood culture (site 2) [[id number]] Collected: 12/11/20 2035    Order Status: Completed Specimen: Blood from Antecubital, Right Hand Updated: 12/15/20 1212     Blood Culture, Routine No Growth to date      No Growth to date      No Growth to date      No Growth to date    Narrative:      Site # 2, aerobic only          Impression:  Active Hospital Problems    Diagnosis  POA    *Acute hypoxemic respiratory failure [J96.01]  Yes    ARDS (adult respiratory distress syndrome) [J80]  Yes    Pneumonia due to COVID-19 virus [U07.1, J12.89]  Yes    Type 2 diabetes mellitus with hyperglycemia, with long-term current use of insulin [E11.65, Z79.4]  Not Applicable    Mixed hyperlipidemia [E78.2]  Yes    Breast cancer [C50.919]  Yes    Depression [F32.9]  Yes      Resolved Hospital Problems   No resolved problems to display.               Plan:   Acute hypoxemic respiratory failure   COVID 19 pna  ARDS  DM2, uncontrolled  Elevated D-dimer, increasing  Breast ca, s/p treatment on right (2015), recurrent on left and pending chemo    - continue HFNC titrate to keep sats 92-95%  - BIPAP use if needed  - prone as tolerated  - emphasized incentive spirometry   - continue RDV  - continue decadron 6mg daily x 10 day course  - continue daily PO lasix  - blood sugar, BP control per hospitalist  - monitor CRP,  d-dimer, ferritin   - continue half dose lovenox for elevated d-dimer  - seems to be moving in the right direction today; could transfer out of ICU if a bed is needed    The following were evaluated and adjusted as needed: antibiotics, acid base balance and oxygenation needs and input and output and renal status       Critical Care  - THE PATIENT HAS A HIGH PROBABILITY OF IMMINENT OR LIFE THREATENING DETERIORATION.  Over 50%time of encounter was in direct care at bedside.  Time was 30 to 74 minutes required for patient care.  Time needed for all of the above totaled 30 minutes.    Lala Leija MD  Pulmonary & Critical Care Medicine                      .

## 2020-12-17 LAB
BACTERIA BLD CULT: NORMAL
BACTERIA BLD CULT: NORMAL
D DIMER PPP IA.FEU-MCNC: 12.89 MG/L FEU
ERYTHROCYTE [DISTWIDTH] IN BLOOD BY AUTOMATED COUNT: 12.6 % (ref 11.5–14.5)
FERRITIN SERPL-MCNC: 382 NG/ML (ref 20–300)
HCT VFR BLD AUTO: 35.8 % (ref 37–48.5)
HGB BLD-MCNC: 11.8 G/DL (ref 12–16)
MCH RBC QN AUTO: 29.5 PG (ref 27–31)
MCHC RBC AUTO-ENTMCNC: 33 G/DL (ref 32–36)
MCV RBC AUTO: 90 FL (ref 82–98)
PLATELET # BLD AUTO: 355 K/UL (ref 150–350)
PMV BLD AUTO: 9.8 FL (ref 9.2–12.9)
POCT GLUCOSE: 182 MG/DL (ref 70–110)
POCT GLUCOSE: 236 MG/DL (ref 70–110)
POCT GLUCOSE: 244 MG/DL (ref 70–110)
POCT GLUCOSE: 383 MG/DL (ref 70–110)
POCT GLUCOSE: 392 MG/DL (ref 70–110)
POCT GLUCOSE: 404 MG/DL (ref 70–110)
POCT GLUCOSE: 410 MG/DL (ref 70–110)
RBC # BLD AUTO: 4 M/UL (ref 4–5.4)
WBC # BLD AUTO: 6.37 K/UL (ref 3.9–12.7)

## 2020-12-17 PROCEDURE — 99900035 HC TECH TIME PER 15 MIN (STAT)

## 2020-12-17 PROCEDURE — 94660 CPAP INITIATION&MGMT: CPT

## 2020-12-17 PROCEDURE — 63600175 PHARM REV CODE 636 W HCPCS: Performed by: INTERNAL MEDICINE

## 2020-12-17 PROCEDURE — 25000003 PHARM REV CODE 250: Performed by: HOSPITALIST

## 2020-12-17 PROCEDURE — 97803 MED NUTRITION INDIV SUBSEQ: CPT

## 2020-12-17 PROCEDURE — 25000003 PHARM REV CODE 250: Performed by: INTERNAL MEDICINE

## 2020-12-17 PROCEDURE — 99291 PR CRITICAL CARE, E/M 30-74 MINUTES: ICD-10-PCS | Mod: ,,, | Performed by: INTERNAL MEDICINE

## 2020-12-17 PROCEDURE — 85379 FIBRIN DEGRADATION QUANT: CPT

## 2020-12-17 PROCEDURE — 63600175 PHARM REV CODE 636 W HCPCS: Performed by: HOSPITALIST

## 2020-12-17 PROCEDURE — 99291 CRITICAL CARE FIRST HOUR: CPT | Mod: ,,, | Performed by: INTERNAL MEDICINE

## 2020-12-17 PROCEDURE — 97530 THERAPEUTIC ACTIVITIES: CPT

## 2020-12-17 PROCEDURE — 94761 N-INVAS EAR/PLS OXIMETRY MLT: CPT

## 2020-12-17 PROCEDURE — 94799 UNLISTED PULMONARY SVC/PX: CPT

## 2020-12-17 PROCEDURE — 82728 ASSAY OF FERRITIN: CPT

## 2020-12-17 PROCEDURE — 27000221 HC OXYGEN, UP TO 24 HOURS

## 2020-12-17 PROCEDURE — 36415 COLL VENOUS BLD VENIPUNCTURE: CPT

## 2020-12-17 PROCEDURE — 97110 THERAPEUTIC EXERCISES: CPT

## 2020-12-17 PROCEDURE — 85027 COMPLETE CBC AUTOMATED: CPT

## 2020-12-17 PROCEDURE — 20000000 HC ICU ROOM

## 2020-12-17 RX ORDER — DRONABINOL 2.5 MG/1
2.5 CAPSULE ORAL 2 TIMES DAILY
Status: DISCONTINUED | OUTPATIENT
Start: 2020-12-17 | End: 2020-12-23 | Stop reason: HOSPADM

## 2020-12-17 RX ADMIN — DEXAMETHASONE SODIUM PHOSPHATE 6 MG: 4 INJECTION, SOLUTION INTRA-ARTICULAR; INTRALESIONAL; INTRAMUSCULAR; INTRAVENOUS; SOFT TISSUE at 08:12

## 2020-12-17 RX ADMIN — INSULIN ASPART 15 UNITS: 100 INJECTION, SOLUTION INTRAVENOUS; SUBCUTANEOUS at 08:12

## 2020-12-17 RX ADMIN — INSULIN ASPART 15 UNITS: 100 INJECTION, SOLUTION INTRAVENOUS; SUBCUTANEOUS at 12:12

## 2020-12-17 RX ADMIN — PRAVASTATIN SODIUM 40 MG: 40 TABLET ORAL at 06:12

## 2020-12-17 RX ADMIN — DRONABINOL 2.5 MG: 2.5 CAPSULE ORAL at 09:12

## 2020-12-17 RX ADMIN — ENOXAPARIN SODIUM 40 MG: 40 INJECTION SUBCUTANEOUS at 09:12

## 2020-12-17 RX ADMIN — INSULIN ASPART 4 UNITS: 100 INJECTION, SOLUTION INTRAVENOUS; SUBCUTANEOUS at 12:12

## 2020-12-17 RX ADMIN — INSULIN ASPART 4 UNITS: 100 INJECTION, SOLUTION INTRAVENOUS; SUBCUTANEOUS at 04:12

## 2020-12-17 RX ADMIN — HYDROCODONE BITARTRATE AND ACETAMINOPHEN 1 TABLET: 5; 325 TABLET ORAL at 07:12

## 2020-12-17 RX ADMIN — DRONABINOL 2.5 MG: 2.5 CAPSULE ORAL at 08:12

## 2020-12-17 RX ADMIN — DIAZEPAM 10 MG: 5 TABLET ORAL at 09:12

## 2020-12-17 RX ADMIN — INSULIN ASPART 10 UNITS: 100 INJECTION, SOLUTION INTRAVENOUS; SUBCUTANEOUS at 05:12

## 2020-12-17 RX ADMIN — ENOXAPARIN SODIUM 40 MG: 40 INJECTION SUBCUTANEOUS at 08:12

## 2020-12-17 RX ADMIN — INSULIN ASPART 15 UNITS: 100 INJECTION, SOLUTION INTRAVENOUS; SUBCUTANEOUS at 05:12

## 2020-12-17 RX ADMIN — FUROSEMIDE 20 MG: 20 TABLET ORAL at 08:12

## 2020-12-17 RX ADMIN — ENALAPRIL MALEATE 5 MG: 5 TABLET ORAL at 08:12

## 2020-12-17 NOTE — PROGRESS NOTES
[facility] Hospital Medicine  Progress Note    Patient Name: Negrita Castro  MRN: [id number]  Patient Class: IP- Inpatient   Admission Date: 12/11/2020  Length of Stay: 6 days  Attending Physician: Patricio Barroso MD  Primary Care Provider: Yoni Renteria MD        Subjective:     Principal Problem:Acute hypoxemic respiratory failure        HPI:  Miss Castro is a 60 y.o. female IDDM, prior right breast cancer and recent diagnosis of left breast CA about to start chemotherapy, known COVID-19-positive 12/1/20 presenting with SOB.  Patient reports loss of sense of smell at our initial system and denied any respiratory complaints up until about 3 days ago.  Since then she reports worsening shortness of breath.  Also reports some loose stools and fatigue.  She is requiring 15 L nasal cannula in the ED after being 81% on room air.  She is going to be admitted to the ICU on high-flow nasal cannula.  Full code status confirmed on admission    Overview/Hospital Course:  Admitted to undergo treatment of severe COVID-19 pneumonia.  Was treated with NIPPV.  She improved.  NIPPV removed.  She continued to get supplemental oxygen via high flow NC.  She received dexamethasone, remdesivir, azithromycin, and ceftriaxone.    Interval History:  Patient seen and examined.  Hyperglycemia slowly improving, no acute events overnight.  Plan of care discussed with the patient at the bedside in detail.    Review of Systems   Constitutional: Positive for activity change and fatigue.   Respiratory: Positive for cough and shortness of breath.    Cardiovascular: Negative for chest pain and leg swelling.   Gastrointestinal: Negative for abdominal pain and nausea.   Neurological: Negative for weakness.   Psychiatric/Behavioral: Negative for confusion. The patient is nervous/anxious.    All other systems reviewed and are negative.    Objective:     Vital Signs (Most Recent):  Temp: 98.6 °F (37 °C) (12/17/20 1150)  Pulse: 92  (12/17/20 1400)  Resp: (!) 28 (12/17/20 1400)  BP: 95/63 (12/17/20 1400)  SpO2: (!) 93 % (12/17/20 1400) Vital Signs (24h Range):  Temp:  [97.8 °F (36.6 °C)-98.9 °F (37.2 °C)] 98.6 °F (37 °C)  Pulse:  [62-97] 92  Resp:  [17-28] 28  SpO2:  [87 %-98 %] 93 %  BP: ()/(57-76) 95/63     Weight: 86.6 kg (190 lb 14.7 oz)  Body mass index is 26.63 kg/m².    Intake/Output Summary (Last 24 hours) at 12/17/2020 1458  Last data filed at 12/17/2020 0500  Gross per 24 hour   Intake 220 ml   Output 2350 ml   Net -2130 ml      Physical Exam  Vitals signs and nursing note reviewed.   Constitutional:       General: She is not in acute distress.  Eyes:      Pupils: Pupils are equal, round, and reactive to light.   Cardiovascular:      Rate and Rhythm: Normal rate and regular rhythm.      Heart sounds: No murmur.   Pulmonary:      Comments: Increased respiratory effort noted.  Tachypnea noted.  Patient on high-flow nasal cannula.  Abdominal:      General: There is no distension.      Palpations: Abdomen is soft.      Tenderness: There is no abdominal tenderness.   Skin:     Coloration: Skin is not pale.      Findings: No rash.   Neurological:      Mental Status: She is alert and oriented to person, place, and time.         Significant Labs: All pertinent labs within the past 24 hours have been reviewed.    Significant Imaging: I have reviewed all pertinent imaging results/findings within the past 24 hours.      Assessment/Plan:      * Acute hypoxemic respiratory failure  Patient with Hypoxic Respiratory failure which is Acute.  she is not on home oxygen. Supplemental ventilation was provided and noted- Oxygen Concentration (%):  [50] 50.  High-flow nasal cannula at 12 L  Differential diagnosis includes - Pneumonia Labs and images were reviewed. Patient Has not has a recent ABG. Will treat underlying causes and adjust management of respiratory failure as follows- continue treatment for COVID-19 and supplemental oxygen for  hypoxemia.        Pneumonia due to COVID-19 virus  - COVID-19 testing Collection Date: 12/1/2020 Collection Time:  11:28 AM   - Infection Control notified     - Isolation:   - Airborne, Contact and Droplet Precautions  - Cohort patients into COVID units              - Limit visitors per hospital policy              - Consolidating lab draws, nursing care, provider visits, and interventions      - Management:  Supplemental O2 to maintain SpO2 >92%  Telemetry  Continuous/intermittent Pulse Ox  Albuterol treatment PRN  Careful use of steroids in the absence of other indications - unless septic shock due to increased viral replication Empiric antibiotics per likely source & patient allergies if patient hypoxic with airspace disease for CAP: azithromycin & ceftriaxone  Continue Remdesivir.    Advance Care Planning     Healthcare Power of : Received 12/14/2020    Advance Directives and Living Will: Received 7/30/2019    Power of : Received 1/29/2016         Mixed hyperlipidemia   Patient is chronically on statin.will continue for now. Monitor clinically. Last LDL was   Lab Results   Component Value Date    LDLCALC 114.2 10/24/2018        S    Type 2 diabetes mellitus with hyperglycemia, with long-term current use of insulin  Fingerstick glucose appears to be controlled on current regimen.    Most recent fingerstick glucose reviewed-   Recent Labs   Lab 12/16/20 2008 12/17/20  0453 12/17/20  0726 12/17/20  1137   POCTGLUCOSE 383* 236* 182* 244*     Current correctional scale  Medium  Increase anti-hyperglycemic dose as follows- will initiate home regimen with medium sliding scale insulin and monitor.  Antihyperglycemics (From admission, onward)    Start     Stop Route Frequency Ordered    12/15/20 2100  insulin detemir U-100 pen 60 Units      -- SubQ Nightly 12/14/20 2105    12/14/20 1645  insulin aspart U-100 pen 15 Units      -- SubQ 3 times daily with meals 12/14/20 1145    12/14/20 0716  insulin aspart  U-100 pen 1-10 Units      -- SubQ Before meals & nightly PRN 12/14/20 0617        Hold Oral hypoglycemics while patient is in the hospital.        Breast cancer  Seen by Dr. Peraza as outpatient.  Plans to start chemo soon      Depression  Chronic.  Stable at this time.      VTE Risk Mitigation (From admission, onward)         Ordered     enoxaparin injection 40 mg  Every 12 hours      12/14/20 1005     IP VTE HIGH RISK PATIENT  Once      12/11/20 2002     Place sequential compression device  Until discontinued      12/11/20 2002                Discharge Planning   GUNNER:      Code Status: Full Code   Is the patient medically ready for discharge?:     Reason for patient still in hospital (select all that apply): Treatment  Discharge Plan A: Home with family            Critical care time spent on the evaluation and treatment of severe organ dysfunction, review of pertinent labs and imaging studies, discussions with consulting providers and discussions with patient/family: 33 minutes.      Patricio Barroso MD  Department of Hospital Medicine   [facility]

## 2020-12-17 NOTE — PLAN OF CARE
12/17/20 0806   Patient Assessment/Suction   Respiratory Effort Unlabored   Expansion/Accessory Muscles/Retractions no use of accessory muscles;no retractions   All Lung Fields Breath Sounds Anterior:;Lateral:;diminished   Rhythm/Pattern, Respiratory unlabored   Cough Frequency infrequent   PRE-TX-O2   O2 Device (Oxygen Therapy) High Flow nasal Cannula   $ Is the patient on Low Flow Oxygen? Yes   Flow (L/min) 15   SpO2 95 %   Pulse Oximetry Type Continuous   $ Pulse Oximetry - Multiple Charge Pulse Oximetry - Multiple   Pulse 84   Resp (!) 27   /71   Wound Care   $ Wound Care Tech Time 15 min   Area of Concern Bridge of nose   Skin Color/Characteristics without discoloration   Skin Temperature warm   Preset CPAP/BiPAP Settings   Mode Of Delivery Standby

## 2020-12-17 NOTE — PROGRESS NOTES
Progress Note  PULMONARY    Admit Date: 12/11/2020 12/17/2020    From Dr Justin's consult 12/12:History of Present Illness:  Pt diabetic post [facility], was completing breast rx - preparing for reconstruction of right breast post cancer when found to have left breast ca now post op.  Pt non smoker/drinker.  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  No functional limits nor sz last ys.  Not really ill prior to covid.  Has sibs as next of kin but relates boyfriend, Ganga Brown, acts Medical POA (vague on if this if formally done) - her recall is not optimal.       From Dr Richter hpi 12/11/2020-HPI: Miss Castro is a 60 y.o. female IDDM, prior right breast cancer and recent diagnosis of left breast CA about to start chemotherapy, known COVID-19-positive 12/1/20 presenting with SOB.  Patient reports loss of sense of smell at our initial system and denied any respiratory complaints up until about 3 days ago.  Since then she reports worsening shortness of breath.  Also reports some loose stools and fatigue.  She is requiring 15 L nasal cannula in the ED after being 81% on room air.  She is going to be admitted to the ICU on high-flow nasal cannula.  Full code status confirmed on admission  Plan: 12/12  No fever on  Rocephin/azithro, decadron  15 lpm nrbm sat upper 90's  Wbc 6.3 on 11th,  ddimer 2.25- low dose lovenox,  Ferritin 540, procal 0.72, crp 288 on 11th,   bnp good at 83,   cxr clear 9/6/2020-- 12/11 lef>> right infiltrates c/w covid. More basilair  covid + 12/1   Discussed niv/bipap, and possible needing intubation with pt and her designated poa (she identified Ganga Brown, friend as poa (not clear if medical poa executed but pt tells me he would make medical decisions if she cannot)) -both agree to intubation expecting prolonged course for covid to clear.   covid pos on 12/1 makes response to plasma minimal- would not recommedn.       SUBJECTIVE:     12/13 on niv, excessive coughing with  desat mobilizing or coughing, no new c/o.  12/14- on 15L HFNC, with hypertension. Afebrile. Glucose 300s-500s. Per nurse seems anxious but pt denies. Starting to eat soft diet and had BM this am.   12/15- remains on HFNC with 15L NRB on top to maintain sats in 90s. Pt denies SOB, cough, pain. She has been able to eat some soft foods. Reports she was able to prone for several hours then turned back over due to neuropathy-leg pain.  12/16- on 12L HFNC today-->15L. Worked with PT, feels stronger. Good appetite. Able to prone 6h then stopped due to leg pain  12/17- continues on HFNC 15L, no new complaints    PFSH and Allergies reviewed.    OBJECTIVE:     Vitals (Most recent):  Vitals:    12/17/20 0806   BP: 125/71   Pulse: 84   Resp: (!) 27   Temp:        Vitals (24 hour range):  Temp:  [97.8 °F (36.6 °C)-98.2 °F (36.8 °C)]   Pulse:  [62-95]   Resp:  [17-29]   BP: (113-150)/(58-85)   SpO2:  [84 %-98 %]       Intake/Output Summary (Last 24 hours) at 12/17/2020 0952  Last data filed at 12/17/2020 0500  Gross per 24 hour   Intake 220 ml   Output 3701 ml   Net -3481 ml          Physical Exam:  The patient's neuro status (alertness,orientation,cognitive function,motor skills,), pharyngeal exam (oral lesions, hygiene, abn dentition,), Neck (jvd,mass,thyroid,nodes in neck and above/below clavicle),RESPIRATORY(symmetry,effort,fremitus,percussion,auscultation),  Cor(rhythm,heart tones including gallops,perfusion,edema)ABD(distention,hepatic&splenomegaly,tenderness,masses), Skin(rash,cyanosis),Psyc(affect,judgement,).  Exam negative except for these pertinent findings:    Awake, alert   HFNC in place  Diminished breath sounds bilaterally  abd soft nontender  No edema    Radiographs reviewed: view by direct vision    CXR 12/16- L>R airspace disease, clearing compared to previous  CXR 12/12- bilateral infiltrates, increasing    Labs     Recent Labs   Lab 12/17/20  0356   WBC 6.37   HGB 11.8*   HCT 35.8*   *     No results  for input(s): NA, K, CL, CO2, BUN, CREATININE, GLU, CALCIUM, CAION, MG, PHOS, AST, ALT, ALKPHOS, BILITOT, BILIDIR, PROT, ALBUMIN, PREALBUMIN, AMYLASE, LIPASE, CRP, HSCRP, SEDRATE, PROCAL, INR, PTT, LABHEPA, LACTATE, TROPONINI, CPK, CPKMB, MB, BNP in the last 24 hours.No results for input(s): PH, PCO2, PO2, HCO3 in the last 24 hours.  Microbiology Results (last 7 days)     Procedure Component Value Units Date/Time    Blood culture (site 2) [[id number]] Collected: 12/11/20 2035    Order Status: Completed Specimen: Blood from Antecubital, Right Hand Updated: 12/16/20 1212     Blood Culture, Routine No Growth to date      No Growth to date      No Growth to date      No Growth to date      No Growth to date    Narrative:      Site # 2, aerobic only    Blood culture (site 1) [[id number]] Collected: 12/11/20 2035    Order Status: Completed Specimen: Blood from Antecubital, Right Arm Updated: 12/16/20 1212     Blood Culture, Routine No Growth to date      No Growth to date      No Growth to date      No Growth to date      No Growth to date    Narrative:      Site # 1, aerobic and anaerobic          Impression:  Active Hospital Problems    Diagnosis  POA    *Acute hypoxemic respiratory failure [J96.01]  Yes    Pneumonia due to COVID-19 virus [U07.1, J12.89]  Yes    Type 2 diabetes mellitus with hyperglycemia, with long-term current use of insulin [E11.65, Z79.4]  Not Applicable    Mixed hyperlipidemia [E78.2]  Yes    Breast cancer [C50.919]  Yes    Depression [F32.9]  Yes      Resolved Hospital Problems    Diagnosis Date Resolved POA    ARDS (adult respiratory distress syndrome) [J80] 12/16/2020 Yes               Plan:   Acute hypoxemic respiratory failure   COVID 19 pna  ARDS  DM2, uncontrolled  Elevated D-dimer, increasing  Breast ca, s/p treatment on right (2015), recurrent on left and pending chemo    - continue HFNC titrate to keep sats 92-95%  - BIPAP use if needed  - prone as tolerated  - emphasized incentive  spirometry   - continue RDV  - continue decadron 6mg daily x 10 day course  - continue daily PO lasix  - blood sugar, BP control per hospitalist  - monitor CRP, d-dimer, ferritin   - continue half dose lovenox for elevated d-dimer  - could transfer out of ICU if a bed is needed    The following were evaluated and adjusted as needed: acid base balance and oxygenation needs and input and output and renal status       Critical Care  - THE PATIENT HAS A HIGH PROBABILITY OF IMMINENT OR LIFE THREATENING DETERIORATION.  Over 50%time of encounter was in direct care at bedside.  Time was 30 to 74 minutes required for patient care.  Time needed for all of the above totaled 30 minutes.    Lala Leija MD  Pulmonary & Critical Care Medicine                      .

## 2020-12-17 NOTE — ASSESSMENT & PLAN NOTE
Fingerstick glucose appears to be controlled on current regimen.    Most recent fingerstick glucose reviewed-   Recent Labs   Lab 12/16/20 2008 12/17/20  0453 12/17/20  0726 12/17/20  1137   POCTGLUCOSE 383* 236* 182* 244*     Current correctional scale  Medium  Increase anti-hyperglycemic dose as follows- will initiate home regimen with medium sliding scale insulin and monitor.  Antihyperglycemics (From admission, onward)    Start     Stop Route Frequency Ordered    12/15/20 2100  insulin detemir U-100 pen 60 Units      -- SubQ Nightly 12/14/20 2105    12/14/20 1645  insulin aspart U-100 pen 15 Units      -- SubQ 3 times daily with meals 12/14/20 1145    12/14/20 0716  insulin aspart U-100 pen 1-10 Units      -- SubQ Before meals & nightly PRN 12/14/20 0617        Hold Oral hypoglycemics while patient is in the hospital.

## 2020-12-17 NOTE — CARE UPDATE
12/17/20 0345   PRE-TX-O2   O2 Device (Oxygen Therapy) BiPAP   $ Is the patient on Low Flow Oxygen? Yes   Oxygen Concentration (%) 50   SpO2 98 %   Pulse Oximetry Type Continuous   $ Pulse Oximetry - Multiple Charge Pulse Oximetry - Multiple   Oximetry Probe Site No Change Needed   Pulse 64   Resp 18   Wound Care   $ Wound Care Tech Time 15 min   Area of Concern Bridge of nose   Skin Color/Characteristics without discoloration   Skin Temperature warm   Preset CPAP/BiPAP Settings   Mode Of Delivery BiPAP S/T   $ CPAP/BiPAP Daily Charge BiPAP/CPAP Daily   $ Initial CPAP/BiPAP Setup? No   $ Is patient using? Yes   Size of Mask Small/Medium   Sized Appropriately? Yes   Equipment Type V60   Airway Device Type medium full face mask   Humidifier not applicable   Ipap 14   EPAP (cm H2O) 7   Pressure Support (cm H2O) 7   ITime (sec) 1   Rise Time (sec) 0.2   Patient CPAP/BiPAP Settings   RR Total (Breaths/Min) 24   Tidal Volume (mL) 610   VE Minute Ventilation (L/min) 12 L/min   Peak Inspiratory Pressure (cm H2O) 15   TiTOT (%) 32   Total Leak (L/Min) 7   Patient Trigger - ST Mode Only (%) 100   CPAP/BiPAP Backup Settings   Backup Rate 10 breaths per minute (bpm)   CPAP/BiPAP Alarms   High Pressure (cm H2O) 25   Low Pressure (cm H2O) 10   Low Pressure Delay (Sec) 20   Minute Ventilation (L/Min) 3   High RR (breaths/min) 40   Low RR (breaths/min) 10

## 2020-12-17 NOTE — PLAN OF CARE
Intervention: carbohydrate modified diet and nutrition supplement therapy    Recommendation:  1) Continue DM 2000 kcal ( 4 carb servings/meal)  Diet    2) Continue Boost glucose control BID  3) weigh pt weekly  4) DM diet education when on floor, off isolation     Goals: 1) PO intakes > 50% of meals and supplements at f/u  Nutrition Goal Status: meeting-continues  Communication of RD Recs: (POC, sticky note)

## 2020-12-17 NOTE — PROGRESS NOTES
"[facility]  Adult Nutrition  Progress Note    SUMMARY   Intervention: carbohydrate modified diet and nutrition supplement therapy    Recommendation:  1) Continue DM 2000 kcal ( 4 carb servings/meal)  Diet    2) Continue Boost glucose control BID  3) weigh pt weekly  4) DM diet education when on floor, off isolation     Goals: 1) PO intakes > 50% of meals and supplements at f/u  Nutrition Goal Status: meeting-continues  Communication of RD Recs: (POC, sticky note)     Reason for Assessment     Reason For Assessment: follow up  Diagnosis: (COVID-19)  Relevant Medical History: DM2, breast CA no chemo yet, depression  Interdisciplinary Rounds: attended     General Information Comments: 59 y/o female admitted with COVID-19 ( diagnosed 12/1 and c/o loss of smell x 3 days PTA). Was on bipap with PPn ordered yesterday, now advanced to hiflow nasal cannula x 1 day, on full liquid diet. Tolerating. Per discussion with MD supplements added and PPN d/c'd as pt with elevated glucose. NFPE not done r/t restrictions to prevent the spread of COVID-19, to be done at a later date. no significant wt loss per chart review.  12/17/20 Pt off PPN, now on hi flow nasal cannula. Glucose improving. Up in chair, ate 75% of meals yesterday, 25% of breakfast because it was cold. Asked for boost this morning. Did drink 2 yesterday.      Nutrition Discharge Planning: to be determined- DM 1800 kcal diet + boost glucose control as needed     Nutrition Risk Screen     Nutrition Risk Screen: no indicators present     Nutrition/Diet History     Typical Food/Fluid Intake: unable to obtain  Spiritual, Cultural Beliefs, Sikhism Practices, Values that Affect Care: no  Food Allergies: NKFA  Factors Affecting Nutritional Intake: decreased appetite     Anthropometrics  Height Method: Measured(office 1/8/20)  Height: 5' 11"  Height (inches): 71 in  Weight Method: Bed Scale  Weight: 86.6 kg 12/16, 85.8 kg (189 lb 2.5 oz)  Weight (lb): 190 " lb   Ideal Body Weight (IBW), Female: 155 lb  BMI (Calculated): 26 kg/m2  BMI Grade: 25 - 29.9 - overweight  Usual Body Weight (UBW), k.1 kg(20)     Lab/Procedures/Meds     Pertinent Labs Reviewed: reviewed  BMP  Lab Results   Component Value Date     2020    K 4.1 2020     2020    CO2 26 2020    BUN 10 2020    CREATININE 0.6 2020    CALCIUM 8.5 (L) 2020    ANIONGAP 10 2020    ESTGFRAFRICA >60 2020    EGFRNONAA >60 2020     Recent Labs   Lab 20  1137   POCTGLUCOSE 244*     Lab Results   Component Value Date    CRP 85.2 (H) 12/15/2020          Pertinent Medications Reviewed: reviewed  Lasix, Naphos, insulin, statin, Mg sulfate, zofran, KCl     Estimated/Assessed Needs   admission  Weight Used For Calorie Calculations: 85.5 kg (188 lb 7.9 oz)  Energy Calorie Requirements (kcal): MSJ ( x 1.25 critical care) = 1925 kcal  Energy Need Method: Flom-St or  Protein Requirements: 1.2 g protein/kg ( 103 g protein, critical care)  Weight Used For Protein Calculations: 85.8 kg (189 lb 2.5 oz)  Fluid Requirements (mL): 1900 ml or per MD  Estimated Fluid Requirement Method: RDA Method  CHO Requirement: 216-240 g        Nutrition Prescription Ordered     Current Diet Order: DM 2000 kcal diet + boost glucose control BID     Evaluation of Received Nutrient/Fluid Intake     Energy Calories Required: meeting needs  Protein Required: meeting needs  Fluid Required: meeting needs  Tolerance: tolerating  % Intake of Estimated Energy Needs: 75%  % Meal Intake: 75% most     Nutrition Risk     Level of Risk/Frequency of Follow-up: moderate 2 x weekly     Assessment and Plan     Inadequate energy intake  R/t decreased appetite, bipap, and inadequate nutrition support infusion  As evidenced by PO intakes < 50% EEN x 4 days   Intervention: above  improving     Monitor and Evaluation     Food and Nutrient Intake: energy intake, food and beverage  intake  Food and Nutrient Adminstration: diet order  Anthropometric Measurements: weight  Biochemical Data, Medical Tests and Procedures: electrolyte and renal panel, gastrointestinal profile, glucose/endocrine profile, inflammatory profile  Nutrition-Focused Physical Findings: overall appearance, extremities, muscles and bones      Malnutrition Assessment  Skin (Micronutrient): ([place] = 17)  Teeth (Micronutrient): (WDL)   Micronutrient Evaluation: suspected deficiency  Micronutrient Evaluation Comments: Na   Energy Intake (Malnutrition): (PO intakes < 50% EEN x 4 days)           Edema (Fluid Accumulation): 0-->no edema present           Nutrition Follow-Up     RD Follow-up?: Yes

## 2020-12-17 NOTE — ASSESSMENT & PLAN NOTE
Fingerstick glucose appears to be controlled on current regimen.    Most recent fingerstick glucose reviewed-   Recent Labs   Lab 12/16/20  0724 12/16/20  1202 12/16/20  1656   POCTGLUCOSE 237* 274* 367*     Current correctional scale  Low  Maintain anti-hyperglycemic dose as follows- will initiate home regimen with medium sliding scale insulin and monitor.  Antihyperglycemics (From admission, onward)    Start     Stop Route Frequency Ordered    12/15/20 2100  insulin detemir U-100 pen 60 Units      -- SubQ Nightly 12/14/20 2105    12/14/20 1645  insulin aspart U-100 pen 15 Units      -- SubQ 3 times daily with meals 12/14/20 1145    12/14/20 0716  insulin aspart U-100 pen 1-10 Units      -- SubQ Before meals & nightly PRN 12/14/20 0617        Hold Oral hypoglycemics while patient is in the hospital.

## 2020-12-17 NOTE — SUBJECTIVE & OBJECTIVE
Interval History:  Patient seen and examined.  Hyperglycemia slowly improving no acute events overnight.  Plan of care discussed with the patient at the bedside in detail.    Review of Systems   Constitutional: Positive for activity change and fatigue.   Respiratory: Positive for cough and shortness of breath.    Cardiovascular: Negative for chest pain and leg swelling.   Gastrointestinal: Negative for abdominal pain and nausea.   Neurological: Negative for weakness.   Psychiatric/Behavioral: Negative for confusion. The patient is nervous/anxious.    All other systems reviewed and are negative.    Objective:     Vital Signs (Most Recent):  Temp: 97.8 °F (36.6 °C) (12/16/20 2000)  Pulse: 74 (12/16/20 2000)  Resp: 18 (12/16/20 2129)  BP: 119/64 (12/16/20 2000)  SpO2: (!) 91 % (12/16/20 2000) Vital Signs (24h Range):  Temp:  [97.8 °F (36.6 °C)-98.3 °F (36.8 °C)] 97.8 °F (36.6 °C)  Pulse:  [57-95] 74  Resp:  [17-29] 18  SpO2:  [84 %-99 %] 91 %  BP: (113-156)/(57-86) 119/64     Weight: 86.6 kg (190 lb 14.7 oz)  Body mass index is 26.63 kg/m².    Intake/Output Summary (Last 24 hours) at 12/16/2020 2231  Last data filed at 12/16/2020 1743  Gross per 24 hour   Intake 1360 ml   Output 4531 ml   Net -3171 ml      Physical Exam  Vitals signs and nursing note reviewed.   Constitutional:       General: She is not in acute distress.  Eyes:      Pupils: Pupils are equal, round, and reactive to light.   Cardiovascular:      Rate and Rhythm: Normal rate and regular rhythm.      Heart sounds: No murmur.   Pulmonary:      Comments: Increased respiratory effort noted.  Tachypnea noted.  Patient on high-flow nasal cannula.  Abdominal:      General: There is no distension.      Palpations: Abdomen is soft.      Tenderness: There is no abdominal tenderness.   Skin:     Coloration: Skin is not pale.      Findings: No rash.   Neurological:      Mental Status: She is alert and oriented to person, place, and time.         Significant Labs:  All pertinent labs within the past 24 hours have been reviewed.    Significant Imaging: I have reviewed all pertinent imaging results/findings within the past 24 hours.

## 2020-12-17 NOTE — ASSESSMENT & PLAN NOTE
Patient with Hypoxic Respiratory failure which is Acute.  she is not on home oxygen. Supplemental ventilation was provided and noted- Oxygen Concentration (%):  [50] 50.  High-flow nasal cannula at 12 L  Differential diagnosis includes - Pneumonia Labs and images were reviewed. Patient Has not has a recent ABG. Will treat underlying causes and adjust management of respiratory failure as follows- continue treatment for COVID-19 and supplemental oxygen for hypoxemia.

## 2020-12-17 NOTE — SUBJECTIVE & OBJECTIVE
Interval History:  Patient seen and examined.  Hyperglycemia slowly improving, no acute events overnight.  Plan of care discussed with the patient at the bedside in detail.    Review of Systems   Constitutional: Positive for activity change and fatigue.   Respiratory: Positive for cough and shortness of breath.    Cardiovascular: Negative for chest pain and leg swelling.   Gastrointestinal: Negative for abdominal pain and nausea.   Neurological: Negative for weakness.   Psychiatric/Behavioral: Negative for confusion. The patient is nervous/anxious.    All other systems reviewed and are negative.    Objective:     Vital Signs (Most Recent):  Temp: 98.6 °F (37 °C) (12/17/20 1150)  Pulse: 92 (12/17/20 1400)  Resp: (!) 28 (12/17/20 1400)  BP: 95/63 (12/17/20 1400)  SpO2: (!) 93 % (12/17/20 1400) Vital Signs (24h Range):  Temp:  [97.8 °F (36.6 °C)-98.9 °F (37.2 °C)] 98.6 °F (37 °C)  Pulse:  [62-97] 92  Resp:  [17-28] 28  SpO2:  [87 %-98 %] 93 %  BP: ()/(57-76) 95/63     Weight: 86.6 kg (190 lb 14.7 oz)  Body mass index is 26.63 kg/m².    Intake/Output Summary (Last 24 hours) at 12/17/2020 1458  Last data filed at 12/17/2020 0500  Gross per 24 hour   Intake 220 ml   Output 2350 ml   Net -2130 ml      Physical Exam  Vitals signs and nursing note reviewed.   Constitutional:       General: She is not in acute distress.  Eyes:      Pupils: Pupils are equal, round, and reactive to light.   Cardiovascular:      Rate and Rhythm: Normal rate and regular rhythm.      Heart sounds: No murmur.   Pulmonary:      Comments: Increased respiratory effort noted.  Tachypnea noted.  Patient on high-flow nasal cannula.  Abdominal:      General: There is no distension.      Palpations: Abdomen is soft.      Tenderness: There is no abdominal tenderness.   Skin:     Coloration: Skin is not pale.      Findings: No rash.   Neurological:      Mental Status: She is alert and oriented to person, place, and time.         Significant Labs: All  pertinent labs within the past 24 hours have been reviewed.    Significant Imaging: I have reviewed all pertinent imaging results/findings within the past 24 hours.

## 2020-12-17 NOTE — PROGRESS NOTES
[facility] Hospital Medicine  Progress Note    Patient Name: Negrita Castro  MRN: [id number]  Patient Class: IP- Inpatient   Admission Date: 12/11/2020  Length of Stay: 5 days  Attending Physician: Patricio Barroso MD  Primary Care Provider: Yoni Renteria MD        Subjective:     Principal Problem:Acute hypoxemic respiratory failure        HPI:  Miss Castro is a 60 y.o. female IDDM, prior right breast cancer and recent diagnosis of left breast CA about to start chemotherapy, known COVID-19-positive 12/1/20 presenting with SOB.  Patient reports loss of sense of smell at our initial system and denied any respiratory complaints up until about 3 days ago.  Since then she reports worsening shortness of breath.  Also reports some loose stools and fatigue.  She is requiring 15 L nasal cannula in the ED after being 81% on room air.  She is going to be admitted to the ICU on high-flow nasal cannula.  Full code status confirmed on admission    Overview/Hospital Course:  Admitted to undergo treatment of severe COVID-19 pneumonia.  Was treated with NIPPV.  She improved.  NIPPV removed.  She continued to get supplemental oxygen via high flow NC.  She received dexamethasone, remdesivir, azithromycin, and ceftriaxone.    Interval History:  Patient seen and examined.  Hyperglycemia slowly improving no acute events overnight.  Plan of care discussed with the patient at the bedside in detail.    Review of Systems   Constitutional: Positive for activity change and fatigue.   Respiratory: Positive for cough and shortness of breath.    Cardiovascular: Negative for chest pain and leg swelling.   Gastrointestinal: Negative for abdominal pain and nausea.   Neurological: Negative for weakness.   Psychiatric/Behavioral: Negative for confusion. The patient is nervous/anxious.    All other systems reviewed and are negative.    Objective:     Vital Signs (Most Recent):  Temp: 97.8 °F (36.6 °C) (12/16/20 2000)  Pulse: 74  (12/16/20 2000)  Resp: 18 (12/16/20 2129)  BP: 119/64 (12/16/20 2000)  SpO2: (!) 91 % (12/16/20 2000) Vital Signs (24h Range):  Temp:  [97.8 °F (36.6 °C)-98.3 °F (36.8 °C)] 97.8 °F (36.6 °C)  Pulse:  [57-95] 74  Resp:  [17-29] 18  SpO2:  [84 %-99 %] 91 %  BP: (113-156)/(57-86) 119/64     Weight: 86.6 kg (190 lb 14.7 oz)  Body mass index is 26.63 kg/m².    Intake/Output Summary (Last 24 hours) at 12/16/2020 2231  Last data filed at 12/16/2020 1743  Gross per 24 hour   Intake 1360 ml   Output 4531 ml   Net -3171 ml      Physical Exam  Vitals signs and nursing note reviewed.   Constitutional:       General: She is not in acute distress.  Eyes:      Pupils: Pupils are equal, round, and reactive to light.   Cardiovascular:      Rate and Rhythm: Normal rate and regular rhythm.      Heart sounds: No murmur.   Pulmonary:      Comments: Increased respiratory effort noted.  Tachypnea noted.  Patient on high-flow nasal cannula.  Abdominal:      General: There is no distension.      Palpations: Abdomen is soft.      Tenderness: There is no abdominal tenderness.   Skin:     Coloration: Skin is not pale.      Findings: No rash.   Neurological:      Mental Status: She is alert and oriented to person, place, and time.         Significant Labs: All pertinent labs within the past 24 hours have been reviewed.    Significant Imaging: I have reviewed all pertinent imaging results/findings within the past 24 hours.      Assessment/Plan:      * Acute hypoxemic respiratory failure  Patient with Hypoxic Respiratory failure which is Acute.  she is not on home oxygen. Supplemental ventilation was provided and noted-  .  High-flow nasal cannula at 12 L  Differential diagnosis includes - Pneumonia Labs and images were reviewed. Patient Has not has a recent ABG. Will treat underlying causes and adjust management of respiratory failure as follows- continue treatment for COVID-19 and supplemental oxygen for hypoxemia.        Pneumonia due to  COVID-19 virus  - COVID-19 testing Collection Date: 12/1/2020 Collection Time:  11:28 AM   - Infection Control notified     - Isolation:   - Airborne, Contact and Droplet Precautions  - Cohort patients into COVID units              - Limit visitors per hospital policy              - Consolidating lab draws, nursing care, provider visits, and interventions      - Management:  Supplemental O2 to maintain SpO2 >92%  Telemetry  Continuous/intermittent Pulse Ox  Albuterol treatment PRN  Careful use of steroids in the absence of other indications - unless septic shock due to increased viral replication Empiric antibiotics per likely source & patient allergies if patient hypoxic with airspace disease for CAP: azithromycin & ceftriaxone  Continue Remdesivir.    Advance Care Planning     Healthcare Power of : Received 12/14/2020    Advance Directives and Living Will: Received 7/30/2019    Power of : Received 1/29/2016         Mixed hyperlipidemia   Patient is chronically on statin.will continue for now. Monitor clinically. Last LDL was   Lab Results   Component Value Date    LDLCALC 114.2 10/24/2018        S    Type 2 diabetes mellitus with hyperglycemia, with long-term current use of insulin  Fingerstick glucose appears to be controlled on current regimen.    Most recent fingerstick glucose reviewed-   Recent Labs   Lab 12/16/20  0724 12/16/20  1202 12/16/20  1656   POCTGLUCOSE 237* 274* 367*     Current correctional scale  Low  Maintain anti-hyperglycemic dose as follows- will initiate home regimen with medium sliding scale insulin and monitor.  Antihyperglycemics (From admission, onward)    Start     Stop Route Frequency Ordered    12/15/20 2100  insulin detemir U-100 pen 60 Units      -- SubQ Nightly 12/14/20 2105    12/14/20 1645  insulin aspart U-100 pen 15 Units      -- SubQ 3 times daily with meals 12/14/20 1145    12/14/20 0716  insulin aspart U-100 pen 1-10 Units      -- SubQ Before meals & nightly  PRN 12/14/20 0617        Hold Oral hypoglycemics while patient is in the hospital.        Breast cancer  Seen by Dr. Peraza as outpatient.  Plans to start chemo soon      Depression  Chronic.  Stable at this time.      VTE Risk Mitigation (From admission, onward)         Ordered     enoxaparin injection 40 mg  Every 12 hours      12/14/20 1005     IP VTE HIGH RISK PATIENT  Once      12/11/20 2002     Place sequential compression device  Until discontinued      12/11/20 2002                Discharge Planning   GUNNER:      Code Status: Full Code   Is the patient medically ready for discharge?:     Reason for patient still in hospital (select all that apply): Treatment  Discharge Plan A: Home with family            Critical care time spent on the evaluation and treatment of severe organ dysfunction, review of pertinent labs and imaging studies, discussions with consulting providers and discussions with patient/family:  32 minutes.      Patricio Barroso MD  Department of Hospital Medicine   [facility]

## 2020-12-17 NOTE — PT/OT/SLP PROGRESS
Physical Therapy Treatment    Patient Name:  Negrita Castro   MRN:  [id number]    Recommendations:     Discharge Recommendations:  home, home health PT   Discharge Equipment Recommendations: none   Barriers to discharge: None    Assessment:     Negrita Castro is a 60 y.o. female admitted with a medical diagnosis of Acute hypoxemic respiratory failure.  She presents with the following impairments/functional limitations:  weakness, impaired endurance, gait instability, impaired balance, impaired functional mobilty, decreased safety awareness, decreased ROM, edema .  Patient agreeable to PT treatment this morning.  Patient presented supine in bed and was able to transfer to sitting with SBA and then to chair also with SBA.  Patient currently SBA with mobility but has not ambulated due to being in small ICU isolation room.  Therapy to reduce to QD3 at least until moving out of the ICU.  Patient was instructed in home program and given written/verbal instruction so will do these on own daily.    Rehab Prognosis: Good; patient would benefit from acute skilled PT services to address these deficits and reach maximum level of function.    Recent Surgery: * No surgery found *      Plan:     During this hospitalization, patient to be seen 3 x/week to address the identified rehab impairments via gait training, therapeutic activities, therapeutic exercises and progress toward the following goals:    · Plan of Care Expires:  01/13/21    Subjective     Chief Complaint: SOB  Patient/Family Comments/goals: go home  Pain/Comfort:  ·        Objective:     Communicated with nurse prior to session.  Patient found supine with bed alarm, peripheral IV, oxygen, telemetry upon PT entry to room.     General Precautions: Standard, fall, droplet, contact, airborne   Orthopedic Precautions:N/A   Braces:       Functional Mobility:  · Bed Mobility:     · Supine to Sit: stand by assistance  · Transfers:     · Sit to Stand:  stand by assistance  with no AD      AM-PAC 6 CLICK MOBILITY          Therapeutic Activities and Exercises:   exercise to include ankle pumps, quad sets, SLR, hip abd and heel slides.  All done bilateral LE x 10 reps with a 3 second hold on isometrics.  Transfer training supine to sit with SBA, EOB to chair with SBA with no AD.    Patient left up in chair with call button in reach and nurse notified..    GOALS:   Multidisciplinary Problems     Physical Therapy Goals        Problem: Physical Therapy Goal    Goal Priority Disciplines Outcome Goal Variances Interventions   Physical Therapy Goal     PT, PT/OT Ongoing, Progressing     Description: Goals to be met by: 2021    Patient will increase functional independence with mobility by performin. Supine to sit with Harrah  2. Sit to supine with Harrah  3. Sit to stand transfer with Harrah  4. Bed to chair transfer with Harrah using no AD  5. Gait  x 50 feet with Supervision using no AD.                      Time Tracking:     PT Received On: 20  PT Start Time: 1016     PT Stop Time: 1039  PT Total Time (min): 23 min     Billable Minutes: Therapeutic Activity 8 and Therapeutic Exercise 15    Treatment Type: Treatment  PT/PTA: PT     PTA Visit Number: 0     Chris Megilligan, PT  2020

## 2020-12-17 NOTE — ASSESSMENT & PLAN NOTE
Patient is chronically on statin.will continue for now. Monitor clinically. Last LDL was   Lab Results   Component Value Date    LDLCALC 114.2 10/24/2018        S

## 2020-12-17 NOTE — PLAN OF CARE
POC discussed with patient, verbalized understanding. Patient with uneventful night, slept well throughout the night on Bipap. Received Valium for sleep and Hydrocodone for complaints of generalized discomfort. R IJ Quad Lumen noted. VS stable. Adequate urine noted in devi catheter. Call light at bedside.

## 2020-12-17 NOTE — PLAN OF CARE
SW spoke to patient's significant other Ganga RodriguezBrown [phone] regarding discharge plan.  SW gave name and number for any questions regarding discharge planning needs.  CM following.       12/17/20 1626   Discharge Reassessment   Assessment Type Discharge Planning Reassessment   Discharge Plan A Home   Discharge Plan B Home with family   DME Needed Upon Discharge  oxygen  (may home O2)

## 2020-12-18 LAB
ALLENS TEST: ABNORMAL
DELSYS: ABNORMAL
EP: 10
ERYTHROCYTE [SEDIMENTATION RATE] IN BLOOD BY WESTERGREN METHOD: 31 MM/H
FIO2: 60
HCO3 UR-SCNC: 26.5 MMOL/L (ref 24–28)
IP: 15
MIN VOL: 19.2
MODE: ABNORMAL
PCO2 BLDA: 40.9 MMHG (ref 35–45)
PH SMN: 7.42 [PH] (ref 7.35–7.45)
PO2 BLDA: 80 MMHG (ref 80–100)
POC BE: 2 MMOL/L
POC SATURATED O2: 96 % (ref 95–100)
POC TCO2: 28 MMOL/L (ref 23–27)
POCT GLUCOSE: 116 MG/DL (ref 70–110)
POCT GLUCOSE: 123 MG/DL (ref 70–110)
POCT GLUCOSE: 238 MG/DL (ref 70–110)
POCT GLUCOSE: 270 MG/DL (ref 70–110)
POCT GLUCOSE: 293 MG/DL (ref 70–110)
POCT GLUCOSE: 392 MG/DL (ref 70–110)
POCT GLUCOSE: 495 MG/DL (ref 70–110)
SAMPLE: ABNORMAL
SITE: ABNORMAL
SP02: 95
SPONT RATE: 31

## 2020-12-18 PROCEDURE — 25000003 PHARM REV CODE 250: Performed by: HOSPITALIST

## 2020-12-18 PROCEDURE — 99233 PR SUBSEQUENT HOSPITAL CARE,LEVL III: ICD-10-PCS | Mod: ,,, | Performed by: INTERNAL MEDICINE

## 2020-12-18 PROCEDURE — 99233 SBSQ HOSP IP/OBS HIGH 50: CPT | Mod: ,,, | Performed by: INTERNAL MEDICINE

## 2020-12-18 PROCEDURE — 94799 UNLISTED PULMONARY SVC/PX: CPT

## 2020-12-18 PROCEDURE — 27000221 HC OXYGEN, UP TO 24 HOURS

## 2020-12-18 PROCEDURE — 63600175 PHARM REV CODE 636 W HCPCS: Performed by: HOSPITALIST

## 2020-12-18 PROCEDURE — 99900035 HC TECH TIME PER 15 MIN (STAT)

## 2020-12-18 PROCEDURE — 94761 N-INVAS EAR/PLS OXIMETRY MLT: CPT

## 2020-12-18 PROCEDURE — 82803 BLOOD GASES ANY COMBINATION: CPT

## 2020-12-18 PROCEDURE — 36600 WITHDRAWAL OF ARTERIAL BLOOD: CPT

## 2020-12-18 PROCEDURE — 20000000 HC ICU ROOM

## 2020-12-18 PROCEDURE — 25000242 PHARM REV CODE 250 ALT 637 W/ HCPCS: Performed by: HOSPITALIST

## 2020-12-18 PROCEDURE — 94660 CPAP INITIATION&MGMT: CPT

## 2020-12-18 PROCEDURE — 63600175 PHARM REV CODE 636 W HCPCS: Performed by: INTERNAL MEDICINE

## 2020-12-18 PROCEDURE — 94640 AIRWAY INHALATION TREATMENT: CPT

## 2020-12-18 RX ORDER — INSULIN ASPART 100 [IU]/ML
25 INJECTION, SOLUTION INTRAVENOUS; SUBCUTANEOUS
Status: DISCONTINUED | OUTPATIENT
Start: 2020-12-18 | End: 2020-12-19

## 2020-12-18 RX ORDER — IPRATROPIUM BROMIDE AND ALBUTEROL SULFATE 2.5; .5 MG/3ML; MG/3ML
3 SOLUTION RESPIRATORY (INHALATION) EVERY 6 HOURS
Status: DISCONTINUED | OUTPATIENT
Start: 2020-12-18 | End: 2020-12-23 | Stop reason: HOSPADM

## 2020-12-18 RX ORDER — FUROSEMIDE 10 MG/ML
40 INJECTION INTRAMUSCULAR; INTRAVENOUS 2 TIMES DAILY WITH MEALS
Status: DISCONTINUED | OUTPATIENT
Start: 2020-12-18 | End: 2020-12-22

## 2020-12-18 RX ADMIN — DEXAMETHASONE SODIUM PHOSPHATE 6 MG: 4 INJECTION, SOLUTION INTRA-ARTICULAR; INTRALESIONAL; INTRAMUSCULAR; INTRAVENOUS; SOFT TISSUE at 08:12

## 2020-12-18 RX ADMIN — FUROSEMIDE 40 MG: 10 INJECTION, SOLUTION INTRAVENOUS at 03:12

## 2020-12-18 RX ADMIN — INSULIN ASPART 4 UNITS: 100 INJECTION, SOLUTION INTRAVENOUS; SUBCUTANEOUS at 11:12

## 2020-12-18 RX ADMIN — ENOXAPARIN SODIUM 40 MG: 40 INJECTION SUBCUTANEOUS at 08:12

## 2020-12-18 RX ADMIN — PRAVASTATIN SODIUM 40 MG: 40 TABLET ORAL at 06:12

## 2020-12-18 RX ADMIN — INSULIN ASPART 25 UNITS: 100 INJECTION, SOLUTION INTRAVENOUS; SUBCUTANEOUS at 05:12

## 2020-12-18 RX ADMIN — DIAZEPAM 10 MG: 5 TABLET ORAL at 08:12

## 2020-12-18 RX ADMIN — IPRATROPIUM BROMIDE AND ALBUTEROL SULFATE 3 ML: .5; 2.5 SOLUTION RESPIRATORY (INHALATION) at 07:12

## 2020-12-18 RX ADMIN — HYDROCODONE BITARTRATE AND ACETAMINOPHEN 1 TABLET: 5; 325 TABLET ORAL at 05:12

## 2020-12-18 RX ADMIN — INSULIN ASPART 10 UNITS: 100 INJECTION, SOLUTION INTRAVENOUS; SUBCUTANEOUS at 05:12

## 2020-12-18 RX ADMIN — INSULIN ASPART 15 UNITS: 100 INJECTION, SOLUTION INTRAVENOUS; SUBCUTANEOUS at 11:12

## 2020-12-18 RX ADMIN — FUROSEMIDE 20 MG: 20 TABLET ORAL at 08:12

## 2020-12-18 RX ADMIN — DRONABINOL 2.5 MG: 2.5 CAPSULE ORAL at 09:12

## 2020-12-18 RX ADMIN — INSULIN ASPART 3 UNITS: 100 INJECTION, SOLUTION INTRAVENOUS; SUBCUTANEOUS at 12:12

## 2020-12-18 RX ADMIN — INSULIN ASPART 15 UNITS: 100 INJECTION, SOLUTION INTRAVENOUS; SUBCUTANEOUS at 09:12

## 2020-12-18 RX ADMIN — DRONABINOL 2.5 MG: 2.5 CAPSULE ORAL at 08:12

## 2020-12-18 RX ADMIN — ENALAPRIL MALEATE 5 MG: 5 TABLET ORAL at 08:12

## 2020-12-18 NOTE — SIGNIFICANT EVENT
Patient with new event of SOB, placed on BIPAP. CXR shows worsening bilateral fluffy opacities that could represent pulm edema. Will increase lasix to IV and continue on BIPAP tonight with repeat CXR and ABG in AM. Ok to go off BIPAP for dinner if sats hold.

## 2020-12-18 NOTE — ASSESSMENT & PLAN NOTE
Patient with Hypoxic Respiratory failure which is Acute.  she is not on home oxygen. Supplemental ventilation was provided and noted- Oxygen Concentration (%):  [60] 60.  High-flow nasal cannula at 12 L  Differential diagnosis includes - Pneumonia Labs and images were reviewed. Patient Has not has a recent ABG. Will treat underlying causes and adjust management of respiratory failure as follows- continue treatment for COVID-19 and supplemental oxygen for hypoxemia.

## 2020-12-18 NOTE — PROGRESS NOTES
Progress Note  PULMONARY    Admit Date: 12/11/2020 12/18/2020    From Dr Justin's consult 12/12:History of Present Illness:  Pt diabetic post [facility], was completing breast rx - preparing for reconstruction of right breast post cancer when found to have left breast ca now post op.  Pt non smoker/drinker.  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  No functional limits nor sz last ys.  Not really ill prior to covid.  Has sibs as next of kin but relates boyfriend, Ganga Brown, acts Medical POA (vague on if this if formally done) - her recall is not optimal.       From Dr Richter hpi 12/11/2020-HPI: Miss Castro is a 60 y.o. female IDDM, prior right breast cancer and recent diagnosis of left breast CA about to start chemotherapy, known COVID-19-positive 12/1/20 presenting with SOB.  Patient reports loss of sense of smell at our initial system and denied any respiratory complaints up until about 3 days ago.  Since then she reports worsening shortness of breath.  Also reports some loose stools and fatigue.  She is requiring 15 L nasal cannula in the ED after being 81% on room air.  She is going to be admitted to the ICU on high-flow nasal cannula.  Full code status confirmed on admission  Plan: 12/12  No fever on  Rocephin/azithro, decadron  15 lpm nrbm sat upper 90's  Wbc 6.3 on 11th,  ddimer 2.25- low dose lovenox,  Ferritin 540, procal 0.72, crp 288 on 11th,   bnp good at 83,   cxr clear 9/6/2020-- 12/11 lef>> right infiltrates c/w covid. More basilair  covid + 12/1   Discussed niv/bipap, and possible needing intubation with pt and her designated poa (she identified Ganga Brown, friend as poa (not clear if medical poa executed but pt tells me he would make medical decisions if she cannot)) -both agree to intubation expecting prolonged course for covid to clear.   covid pos on 12/1 makes response to plasma minimal- would not recommedn.       SUBJECTIVE:     12/13 on niv, excessive coughing with  desat mobilizing or coughing, no new c/o.  12/14- on 15L HFNC, with hypertension. Afebrile. Glucose 300s-500s. Per nurse seems anxious but pt denies. Starting to eat soft diet and had BM this am.   12/15- remains on HFNC with 15L NRB on top to maintain sats in 90s. Pt denies SOB, cough, pain. She has been able to eat some soft foods. Reports she was able to prone for several hours then turned back over due to neuropathy-leg pain.  12/16- on 12L HFNC today-->15L. Worked with PT, feels stronger. Good appetite. Able to prone 6h then stopped due to leg pain  12/17- continues on HFNC 15L, no new complaints  12/18- now on HFNC 7L, improving    PFSH and Allergies reviewed.    OBJECTIVE:     Vitals (Most recent):  Vitals:    12/18/20 0827   BP:    Pulse: 77   Resp: (!) 24   Temp:        Vitals (24 hour range):  Temp:  [97.8 °F (36.6 °C)-98.6 °F (37 °C)]   Pulse:  [57-97]   Resp:  [17-31]   BP: ()/(50-81)   SpO2:  [87 %-100 %]       Intake/Output Summary (Last 24 hours) at 12/18/2020 0941  Last data filed at 12/17/2020 1930  Gross per 24 hour   Intake --   Output 1200 ml   Net -1200 ml          Physical Exam:  The patient's neuro status (alertness,orientation,cognitive function,motor skills,), pharyngeal exam (oral lesions, hygiene, abn dentition,), Neck (jvd,mass,thyroid,nodes in neck and above/below clavicle),RESPIRATORY(symmetry,effort,fremitus,percussion,auscultation),  Cor(rhythm,heart tones including gallops,perfusion,edema)ABD(distention,hepatic&splenomegaly,tenderness,masses), Skin(rash,cyanosis),Psyc(affect,judgement,).  Exam negative except for these pertinent findings:    Awake, alert   HFNC in place  Diminished breath sounds bilaterally  abd soft nontender  No edema    Radiographs reviewed: view by direct vision    CXR 12/16- L>R airspace disease, clearing compared to previous  CXR 12/12- bilateral infiltrates, increasing    Labs     No results for input(s): WBC, HGB, HCT, PLT, BAND, METAMYELOCYT, MYELOPCT,  HGBA1C in the last 24 hours.  No results for input(s): NA, K, CL, CO2, BUN, CREATININE, GLU, CALCIUM, CAION, MG, PHOS, AST, ALT, ALKPHOS, BILITOT, BILIDIR, PROT, ALBUMIN, PREALBUMIN, AMYLASE, LIPASE, CRP, HSCRP, SEDRATE, PROCAL, INR, PTT, LABHEPA, LACTATE, TROPONINI, CPK, CPKMB, MB, BNP in the last 24 hours.No results for input(s): PH, PCO2, PO2, HCO3 in the last 24 hours.  Microbiology Results (last 7 days)     Procedure Component Value Units Date/Time    Blood culture (site 1) [[id number]] Collected: 12/11/20 2035    Order Status: Completed Specimen: Blood from Antecubital, Right Arm Updated: 12/17/20 1212     Blood Culture, Routine No growth after 5 days.    Narrative:      Site # 1, aerobic and anaerobic    Blood culture (site 2) [[id number]] Collected: 12/11/20 2035    Order Status: Completed Specimen: Blood from Antecubital, Right Hand Updated: 12/17/20 1212     Blood Culture, Routine No growth after 5 days.    Narrative:      Site # 2, aerobic only          Impression:  Active Hospital Problems    Diagnosis  POA    *Acute hypoxemic respiratory failure [J96.01]  Yes    Pneumonia due to COVID-19 virus [U07.1, J12.89]  Yes    Type 2 diabetes mellitus with hyperglycemia, with long-term current use of insulin [E11.65, Z79.4]  Not Applicable    Mixed hyperlipidemia [E78.2]  Yes    Breast cancer [C50.919]  Yes    Depression [F32.9]  Yes      Resolved Hospital Problems    Diagnosis Date Resolved POA    ARDS (adult respiratory distress syndrome) [J80] 12/16/2020 Yes               Plan:   Acute hypoxemic respiratory failure   COVID 19 pna  ARDS  DM2, uncontrolled  Elevated D-dimer, increasing  Breast ca, s/p treatment on right (2015), recurrent on left and pending chemo    - continue HFNC titrate to keep sats 92-95%  - prone as tolerated  - emphasized incentive spirometry   - continue RDV  - continue decadron 6mg daily x 10 day course  - continue daily PO lasix  - blood sugar, BP control per hospitalist  - monitor  CRP, d-dimer, ferritin   - continue half dose lovenox for elevated d-dimer  - dc central line  - transfer out of ICU today      Lala Leija MD  Pulmonary & Critical Care Medicine                      .

## 2020-12-18 NOTE — CARE UPDATE
Dr Barroso told patient she needs to be on the bipap for the rest of the day with the exception for meals.      12/18/20 1605   PRE-TX-O2   SpO2 95 %   Pulse (!) 115   Resp (!) 27   BP (!) 153/95   Preset CPAP/BiPAP Settings   Mode Of Delivery BiPAP   $ CPAP/BiPAP Daily Charge BiPAP/CPAP Daily   $ Initial CPAP/BiPAP Setup? No   $ Is patient using? Yes   Size of Mask Small/Medium   Sized Appropriately? Yes   Equipment Type V60   Airway Device Type medium full face mask   Humidifier not applicable   Ipap 15   EPAP (cm H2O) 10   Pressure Support (cm H2O) 5   Set Rate (Breaths/Min) 14   ITime (sec) 1   Rise Time (sec) 0.2   Patient CPAP/BiPAP Settings   Timed Inspiration (Sec) 1   IPAP Rise Time (sec) 2   RR Total (Breaths/Min) 26   Tidal Volume (mL) 767   VE Minute Ventilation (L/min) 19.7 L/min   Peak Inspiratory Pressure (cm H2O) 16   TiTOT (%) 35   Total Leak (L/Min) 5   Patient Trigger - ST Mode Only (%) 99   CPAP/BiPAP Alarms   High Pressure (cm H2O) 22   Low Pressure (cm H2O) 5   Low Pressure Delay (Sec) 20   Minute Ventilation (L/Min) 3   High RR (breaths/min) 40   Low RR (breaths/min) 10   Apnea (Sec) 20

## 2020-12-18 NOTE — ASSESSMENT & PLAN NOTE
Fingerstick glucose appears to be controlled on current regimen.    Most recent fingerstick glucose reviewed-   Recent Labs   Lab 12/18/20  0631 12/18/20  0846 12/18/20  1148 12/18/20  1350   POCTGLUCOSE 116* 123* 238* 392*     Current correctional scale  Medium  Increase anti-hyperglycemic dose as follows- will initiate home regimen with medium sliding scale insulin and monitor.  Antihyperglycemics (From admission, onward)    Start     Stop Route Frequency Ordered    12/18/20 2100  insulin detemir U-100 pen 50 Units      -- SubQ Nightly 12/18/20 1156    12/18/20 1645  insulin aspart U-100 pen 25 Units      -- SubQ 3 times daily with meals 12/18/20 1156    12/14/20 0716  insulin aspart U-100 pen 1-10 Units      -- SubQ Before meals & nightly PRN 12/14/20 0617        Hold Oral hypoglycemics while patient is in the hospital.

## 2020-12-18 NOTE — PLAN OF CARE
Pt remains in ICU. Aaox4. Afebrile. Up to chair most of shift. Sats improved when in Chair (92-96%). Pt weaned to 7 L HFNC, tolerated well. No BM. Rosas with good U/O. CBG monitored with scheduled and SSI given per orders. % of all meals consumed. Pt back in bed at this time. No distress noted. Remains free of any falls, injuries, or new skin breakdown during this shift with precautions in place for all.

## 2020-12-18 NOTE — PROGRESS NOTES
[facility] Hospital Medicine  Progress Note    Patient Name: Negrita Castro  MRN: [id number]  Patient Class: IP- Inpatient   Admission Date: 12/11/2020  Length of Stay: 7 days  Attending Physician: Patricio Barroso MD  Primary Care Provider: Yoni Renteria MD        Subjective:     Principal Problem:Acute hypoxemic respiratory failure        HPI:  Miss Castro is a 60 y.o. female IDDM, prior right breast cancer and recent diagnosis of left breast CA about to start chemotherapy, known COVID-19-positive 12/1/20 presenting with SOB.  Patient reports loss of sense of smell at our initial system and denied any respiratory complaints up until about 3 days ago.  Since then she reports worsening shortness of breath.  Also reports some loose stools and fatigue.  She is requiring 15 L nasal cannula in the ED after being 81% on room air.  She is going to be admitted to the ICU on high-flow nasal cannula.  Full code status confirmed on admission    Overview/Hospital Course:  Admitted to undergo treatment of severe COVID-19 pneumonia.  Was treated with NIPPV.  She improved.  NIPPV removed.  She continued to get supplemental oxygen via high flow NC.  She received dexamethasone, remdesivir, azithromycin, and ceftriaxone.    Interval History:  Patient seen and examined.  Hyperglycemia improving.  Oxygen requirement has decreased to approximately 6 L. plan of care discussed with the patient bedside in detail.    Review of Systems   Constitutional: Positive for activity change and fatigue.   Respiratory: Positive for cough and shortness of breath.    Cardiovascular: Negative for chest pain and leg swelling.   Gastrointestinal: Negative for abdominal pain and nausea.   Neurological: Negative for weakness.   Psychiatric/Behavioral: Negative for confusion. The patient is nervous/anxious.    All other systems reviewed and are negative.    Objective:     Vital Signs (Most Recent):  Temp: 98.3 °F (36.8 °C) (12/18/20  1130)  Pulse: (!) 113 (12/18/20 1400)  Resp: (!) 26 (12/18/20 1400)  BP: (!) 156/91 (12/18/20 1400)  SpO2: 96 % (12/18/20 1400) Vital Signs (24h Range):  Temp:  [97.8 °F (36.6 °C)-98.7 °F (37.1 °C)] 98.3 °F (36.8 °C)  Pulse:  [] 113  Resp:  [17-31] 26  SpO2:  [88 %-100 %] 96 %  BP: (100-161)/(50-93) 156/91     Weight: 86.6 kg (190 lb 14.7 oz)  Body mass index is 26.63 kg/m².    Intake/Output Summary (Last 24 hours) at 12/18/2020 1459  Last data filed at 12/17/2020 1930  Gross per 24 hour   Intake --   Output 1200 ml   Net -1200 ml      Physical Exam  Vitals signs and nursing note reviewed.   Constitutional:       General: She is not in acute distress.  Eyes:      Pupils: Pupils are equal, round, and reactive to light.   Cardiovascular:      Rate and Rhythm: Normal rate and regular rhythm.      Heart sounds: No murmur.   Pulmonary:      Comments: Increased respiratory effort noted.  Tachypnea noted.  Patient on high-flow nasal cannula.  Abdominal:      General: There is no distension.      Palpations: Abdomen is soft.      Tenderness: There is no abdominal tenderness.   Skin:     Coloration: Skin is not pale.      Findings: No rash.   Neurological:      Mental Status: She is alert and oriented to person, place, and time.         Significant Labs: All pertinent labs within the past 24 hours have been reviewed.    Significant Imaging: I have reviewed all pertinent imaging results/findings within the past 24 hours.      Assessment/Plan:      * Acute hypoxemic respiratory failure  Patient with Hypoxic Respiratory failure which is Acute.  she is not on home oxygen. Supplemental ventilation was provided and noted- Oxygen Concentration (%):  [60] 60.  High-flow nasal cannula at 12 L  Differential diagnosis includes - Pneumonia Labs and images were reviewed. Patient Has not has a recent ABG. Will treat underlying causes and adjust management of respiratory failure as follows- continue treatment for COVID-19 and  supplemental oxygen for hypoxemia.        Pneumonia due to COVID-19 virus  - COVID-19 testing Collection Date: 12/1/2020 Collection Time:  11:28 AM   - Infection Control notified     - Isolation:   - Airborne, Contact and Droplet Precautions  - Cohort patients into COVID units              - Limit visitors per hospital policy              - Consolidating lab draws, nursing care, provider visits, and interventions      - Management:  Supplemental O2 to maintain SpO2 >92%  Telemetry  Continuous/intermittent Pulse Ox  Albuterol treatment PRN  Careful use of steroids in the absence of other indications - unless septic shock due to increased viral replication Empiric antibiotics per likely source & patient allergies if patient hypoxic with airspace disease for CAP: azithromycin & ceftriaxone  Continue Remdesivir.    Advance Care Planning     Healthcare Power of : Received 12/14/2020    Advance Directives and Living Will: Received 7/30/2019    Power of : Received 1/29/2016         Mixed hyperlipidemia   Patient is chronically on statin.will continue for now. Monitor clinically. Last LDL was   Lab Results   Component Value Date    LDLCALC 114.2 10/24/2018          Type 2 diabetes mellitus with hyperglycemia, with long-term current use of insulin  Fingerstick glucose appears to be controlled on current regimen.    Most recent fingerstick glucose reviewed-   Recent Labs   Lab 12/18/20  0631 12/18/20  0846 12/18/20  1148 12/18/20  1350   POCTGLUCOSE 116* 123* 238* 392*     Current correctional scale  Medium  Increase anti-hyperglycemic dose as follows- will initiate home regimen with medium sliding scale insulin and monitor.  Antihyperglycemics (From admission, onward)    Start     Stop Route Frequency Ordered    12/18/20 2100  insulin detemir U-100 pen 50 Units      -- SubQ Nightly 12/18/20 1156    12/18/20 1645  insulin aspart U-100 pen 25 Units      -- SubQ 3 times daily with meals 12/18/20 1156    12/14/20  0716  insulin aspart U-100 pen 1-10 Units      -- SubQ Before meals & nightly PRN 12/14/20 0617        Hold Oral hypoglycemics while patient is in the hospital.        Breast cancer  Seen by Dr. Peraza as outpatient.  Plans to start chemo soon      Depression  Chronic.  Stable at this time.      VTE Risk Mitigation (From admission, onward)         Ordered     enoxaparin injection 40 mg  Every 12 hours      12/14/20 1005     IP VTE HIGH RISK PATIENT  Once      12/11/20 2002     Place sequential compression device  Until discontinued      12/11/20 2002                Discharge Planning   GUNNER:      Code Status: Full Code   Is the patient medically ready for discharge?:     Reason for patient still in hospital (select all that apply): Treatment  Discharge Plan A: Home            Critical care time spent on the evaluation and treatment of severe organ dysfunction, review of pertinent labs and imaging studies, discussions with consulting providers and discussions with patient/family: 33 minutes.      Patricio Barroso MD  Department of Hospital Medicine   [facility]

## 2020-12-18 NOTE — CARE UPDATE
12/18/20 0827   PRE-TX-O2   O2 Device (Oxygen Therapy) High Flow nasal Cannula   $ Is the patient on Low Flow Oxygen? Yes   Flow (L/min) 7   SpO2 (!) 91 %   Pulse Oximetry Type Continuous   $ Pulse Oximetry - Multiple Charge Pulse Oximetry - Multiple   Pulse 77   Resp (!) 24   Incentive Spirometer   $ Incentive Spirometer Charges done with encouragement   Administration (IS) instruction provided, follow-up   Number of Repetitions (IS) 10   Level Incentive Spirometer (mL) 750   Patient Tolerance (IS) good

## 2020-12-18 NOTE — SUBJECTIVE & OBJECTIVE
Interval History:  Patient seen and examined.  Hyperglycemia improving.  Oxygen requirement has decreased to approximately 6 L. plan of care discussed with the patient bedside in detail.    Review of Systems   Constitutional: Positive for activity change and fatigue.   Respiratory: Positive for cough and shortness of breath.    Cardiovascular: Negative for chest pain and leg swelling.   Gastrointestinal: Negative for abdominal pain and nausea.   Neurological: Negative for weakness.   Psychiatric/Behavioral: Negative for confusion. The patient is nervous/anxious.    All other systems reviewed and are negative.    Objective:     Vital Signs (Most Recent):  Temp: 98.3 °F (36.8 °C) (12/18/20 1130)  Pulse: (!) 113 (12/18/20 1400)  Resp: (!) 26 (12/18/20 1400)  BP: (!) 156/91 (12/18/20 1400)  SpO2: 96 % (12/18/20 1400) Vital Signs (24h Range):  Temp:  [97.8 °F (36.6 °C)-98.7 °F (37.1 °C)] 98.3 °F (36.8 °C)  Pulse:  [] 113  Resp:  [17-31] 26  SpO2:  [88 %-100 %] 96 %  BP: (100-161)/(50-93) 156/91     Weight: 86.6 kg (190 lb 14.7 oz)  Body mass index is 26.63 kg/m².    Intake/Output Summary (Last 24 hours) at 12/18/2020 1459  Last data filed at 12/17/2020 1930  Gross per 24 hour   Intake --   Output 1200 ml   Net -1200 ml      Physical Exam  Vitals signs and nursing note reviewed.   Constitutional:       General: She is not in acute distress.  Eyes:      Pupils: Pupils are equal, round, and reactive to light.   Cardiovascular:      Rate and Rhythm: Normal rate and regular rhythm.      Heart sounds: No murmur.   Pulmonary:      Comments: Increased respiratory effort noted.  Tachypnea noted.  Patient on high-flow nasal cannula.  Abdominal:      General: There is no distension.      Palpations: Abdomen is soft.      Tenderness: There is no abdominal tenderness.   Skin:     Coloration: Skin is not pale.      Findings: No rash.   Neurological:      Mental Status: She is alert and oriented to person, place, and time.          Significant Labs: All pertinent labs within the past 24 hours have been reviewed.    Significant Imaging: I have reviewed all pertinent imaging results/findings within the past 24 hours.

## 2020-12-18 NOTE — PLAN OF CARE
Patient AAOx4.  VSS.  Has remained afebrile this shift.  Blood sugar monitored closely, PRN insulin utilized per sliding scale. Rosas cath intact, draining clear-yellow urine. Right Quad-lumen IJ intact. POC reviewed with patient.  Open discussion was facilitated.  Patient verbalized understanding.  Bed in lowest position, side rails up x2, call light within reach, and safety measures maintained throughout shift.  Patient denies any needs at this time.  Plan of care in progress.

## 2020-12-19 LAB
ALBUMIN SERPL BCP-MCNC: 2.6 G/DL (ref 3.5–5.2)
ALLENS TEST: ABNORMAL
ALP SERPL-CCNC: 304 U/L (ref 55–135)
ALT SERPL W/O P-5'-P-CCNC: 49 U/L (ref 10–44)
ANION GAP SERPL CALC-SCNC: 14 MMOL/L (ref 8–16)
AST SERPL-CCNC: 40 U/L (ref 10–40)
BASOPHILS # BLD AUTO: 0.03 K/UL (ref 0–0.2)
BASOPHILS NFR BLD: 0.3 % (ref 0–1.9)
BILIRUB SERPL-MCNC: 0.5 MG/DL (ref 0.1–1)
BUN SERPL-MCNC: 22 MG/DL (ref 6–20)
CALCIUM SERPL-MCNC: 9 MG/DL (ref 8.7–10.5)
CHLORIDE SERPL-SCNC: 98 MMOL/L (ref 95–110)
CO2 SERPL-SCNC: 27 MMOL/L (ref 23–29)
CREAT SERPL-MCNC: 0.8 MG/DL (ref 0.5–1.4)
CRP SERPL-MCNC: 76.4 MG/L (ref 0–8.2)
DELSYS: ABNORMAL
DIFFERENTIAL METHOD: ABNORMAL
EOSINOPHIL # BLD AUTO: 0.1 K/UL (ref 0–0.5)
EOSINOPHIL NFR BLD: 1.5 % (ref 0–8)
EP: 10
ERYTHROCYTE [DISTWIDTH] IN BLOOD BY AUTOMATED COUNT: 13.1 % (ref 11.5–14.5)
ERYTHROCYTE [SEDIMENTATION RATE] IN BLOOD BY WESTERGREN METHOD: 14 MM/H
EST. GFR  (AFRICAN AMERICAN): >60 ML/MIN/1.73 M^2
EST. GFR  (NON AFRICAN AMERICAN): >60 ML/MIN/1.73 M^2
FIO2: 60
GLUCOSE SERPL-MCNC: 217 MG/DL (ref 70–110)
HCO3 UR-SCNC: 29.6 MMOL/L (ref 24–28)
HCT VFR BLD AUTO: 43.7 % (ref 37–48.5)
HGB BLD-MCNC: 14 G/DL (ref 12–16)
IMM GRANULOCYTES # BLD AUTO: 0.22 K/UL (ref 0–0.04)
IMM GRANULOCYTES NFR BLD AUTO: 2.3 % (ref 0–0.5)
IP: 15
LYMPHOCYTES # BLD AUTO: 1.5 K/UL (ref 1–4.8)
LYMPHOCYTES NFR BLD: 16.1 % (ref 18–48)
MAGNESIUM SERPL-MCNC: 1.9 MG/DL (ref 1.6–2.6)
MCH RBC QN AUTO: 29.2 PG (ref 27–31)
MCHC RBC AUTO-ENTMCNC: 32 G/DL (ref 32–36)
MCV RBC AUTO: 91 FL (ref 82–98)
MIN VOL: 12
MODE: ABNORMAL
MONOCYTES # BLD AUTO: 0.4 K/UL (ref 0.3–1)
MONOCYTES NFR BLD: 4.4 % (ref 4–15)
NEUTROPHILS # BLD AUTO: 7.1 K/UL (ref 1.8–7.7)
NEUTROPHILS NFR BLD: 75.4 % (ref 38–73)
NRBC BLD-RTO: 0 /100 WBC
PCO2 BLDA: 42.2 MMHG (ref 35–45)
PH SMN: 7.46 [PH] (ref 7.35–7.45)
PLATELET # BLD AUTO: 413 K/UL (ref 150–350)
PMV BLD AUTO: 10.1 FL (ref 9.2–12.9)
PO2 BLDA: 69 MMHG (ref 80–100)
POC BE: 6 MMOL/L
POC SATURATED O2: 94 % (ref 95–100)
POC TCO2: 31 MMOL/L (ref 23–27)
POCT GLUCOSE: 177 MG/DL (ref 70–110)
POCT GLUCOSE: 223 MG/DL (ref 70–110)
POCT GLUCOSE: 231 MG/DL (ref 70–110)
POCT GLUCOSE: 272 MG/DL (ref 70–110)
POCT GLUCOSE: 287 MG/DL (ref 70–110)
POTASSIUM SERPL-SCNC: 3.9 MMOL/L (ref 3.5–5.1)
PROT SERPL-MCNC: 7.4 G/DL (ref 6–8.4)
RBC # BLD AUTO: 4.8 M/UL (ref 4–5.4)
SAMPLE: ABNORMAL
SITE: ABNORMAL
SODIUM SERPL-SCNC: 139 MMOL/L (ref 136–145)
SP02: 95
SPONT RATE: 9
WBC # BLD AUTO: 9.48 K/UL (ref 3.9–12.7)

## 2020-12-19 PROCEDURE — 94799 UNLISTED PULMONARY SVC/PX: CPT

## 2020-12-19 PROCEDURE — 25000003 PHARM REV CODE 250: Performed by: HOSPITALIST

## 2020-12-19 PROCEDURE — 63600175 PHARM REV CODE 636 W HCPCS: Performed by: HOSPITALIST

## 2020-12-19 PROCEDURE — 83735 ASSAY OF MAGNESIUM: CPT

## 2020-12-19 PROCEDURE — C9399 UNCLASSIFIED DRUGS OR BIOLOG: HCPCS | Performed by: HOSPITALIST

## 2020-12-19 PROCEDURE — 99900035 HC TECH TIME PER 15 MIN (STAT)

## 2020-12-19 PROCEDURE — 82803 BLOOD GASES ANY COMBINATION: CPT

## 2020-12-19 PROCEDURE — 99233 SBSQ HOSP IP/OBS HIGH 50: CPT | Mod: ,,, | Performed by: INTERNAL MEDICINE

## 2020-12-19 PROCEDURE — 99233 PR SUBSEQUENT HOSPITAL CARE,LEVL III: ICD-10-PCS | Mod: ,,, | Performed by: INTERNAL MEDICINE

## 2020-12-19 PROCEDURE — 86140 C-REACTIVE PROTEIN: CPT

## 2020-12-19 PROCEDURE — 94761 N-INVAS EAR/PLS OXIMETRY MLT: CPT

## 2020-12-19 PROCEDURE — 27000221 HC OXYGEN, UP TO 24 HOURS

## 2020-12-19 PROCEDURE — 36600 WITHDRAWAL OF ARTERIAL BLOOD: CPT

## 2020-12-19 PROCEDURE — 94640 AIRWAY INHALATION TREATMENT: CPT

## 2020-12-19 PROCEDURE — 80053 COMPREHEN METABOLIC PANEL: CPT

## 2020-12-19 PROCEDURE — 25000242 PHARM REV CODE 250 ALT 637 W/ HCPCS: Performed by: HOSPITALIST

## 2020-12-19 PROCEDURE — 20000000 HC ICU ROOM

## 2020-12-19 PROCEDURE — 94660 CPAP INITIATION&MGMT: CPT

## 2020-12-19 PROCEDURE — 36415 COLL VENOUS BLD VENIPUNCTURE: CPT

## 2020-12-19 PROCEDURE — 85025 COMPLETE CBC W/AUTO DIFF WBC: CPT

## 2020-12-19 RX ORDER — INSULIN ASPART 100 [IU]/ML
28 INJECTION, SOLUTION INTRAVENOUS; SUBCUTANEOUS
Status: DISCONTINUED | OUTPATIENT
Start: 2020-12-19 | End: 2020-12-21

## 2020-12-19 RX ADMIN — HYDROCODONE BITARTRATE AND ACETAMINOPHEN 1 TABLET: 5; 325 TABLET ORAL at 12:12

## 2020-12-19 RX ADMIN — IPRATROPIUM BROMIDE AND ALBUTEROL SULFATE 3 ML: .5; 2.5 SOLUTION RESPIRATORY (INHALATION) at 12:12

## 2020-12-19 RX ADMIN — IPRATROPIUM BROMIDE AND ALBUTEROL SULFATE 3 ML: .5; 2.5 SOLUTION RESPIRATORY (INHALATION) at 11:12

## 2020-12-19 RX ADMIN — INSULIN DETEMIR 54 UNITS: 100 INJECTION, SOLUTION SUBCUTANEOUS at 10:12

## 2020-12-19 RX ADMIN — INSULIN ASPART 25 UNITS: 100 INJECTION, SOLUTION INTRAVENOUS; SUBCUTANEOUS at 07:12

## 2020-12-19 RX ADMIN — DRONABINOL 2.5 MG: 2.5 CAPSULE ORAL at 08:12

## 2020-12-19 RX ADMIN — DRONABINOL 2.5 MG: 2.5 CAPSULE ORAL at 09:12

## 2020-12-19 RX ADMIN — DEXAMETHASONE 6 MG: 4 TABLET ORAL at 08:12

## 2020-12-19 RX ADMIN — INSULIN ASPART 25 UNITS: 100 INJECTION, SOLUTION INTRAVENOUS; SUBCUTANEOUS at 11:12

## 2020-12-19 RX ADMIN — INSULIN ASPART 28 UNITS: 100 INJECTION, SOLUTION INTRAVENOUS; SUBCUTANEOUS at 04:12

## 2020-12-19 RX ADMIN — FUROSEMIDE 40 MG: 10 INJECTION, SOLUTION INTRAVENOUS at 07:12

## 2020-12-19 RX ADMIN — IPRATROPIUM BROMIDE AND ALBUTEROL SULFATE 3 ML: .5; 2.5 SOLUTION RESPIRATORY (INHALATION) at 08:12

## 2020-12-19 RX ADMIN — HYDROCODONE BITARTRATE AND ACETAMINOPHEN 1 TABLET: 5; 325 TABLET ORAL at 10:12

## 2020-12-19 RX ADMIN — PRAVASTATIN SODIUM 40 MG: 40 TABLET ORAL at 06:12

## 2020-12-19 RX ADMIN — INSULIN ASPART 4 UNITS: 100 INJECTION, SOLUTION INTRAVENOUS; SUBCUTANEOUS at 06:12

## 2020-12-19 RX ADMIN — INSULIN ASPART 6 UNITS: 100 INJECTION, SOLUTION INTRAVENOUS; SUBCUTANEOUS at 04:12

## 2020-12-19 RX ADMIN — ENOXAPARIN SODIUM 40 MG: 40 INJECTION SUBCUTANEOUS at 08:12

## 2020-12-19 RX ADMIN — ENOXAPARIN SODIUM 40 MG: 40 INJECTION SUBCUTANEOUS at 09:12

## 2020-12-19 RX ADMIN — INSULIN ASPART 6 UNITS: 100 INJECTION, SOLUTION INTRAVENOUS; SUBCUTANEOUS at 11:12

## 2020-12-19 RX ADMIN — ENALAPRIL MALEATE 5 MG: 5 TABLET ORAL at 08:12

## 2020-12-19 RX ADMIN — FUROSEMIDE 40 MG: 10 INJECTION, SOLUTION INTRAVENOUS at 06:12

## 2020-12-19 RX ADMIN — IPRATROPIUM BROMIDE AND ALBUTEROL SULFATE 3 ML: .5; 2.5 SOLUTION RESPIRATORY (INHALATION) at 07:12

## 2020-12-19 RX ADMIN — DIAZEPAM 10 MG: 5 TABLET ORAL at 04:12

## 2020-12-19 NOTE — SUBJECTIVE & OBJECTIVE
Interval History:  Patient seen and examined.  Reports she is much less short of breath than yesterday.  She is currently on 15 L high-flow nasal cannula.  Continue to monitor in ICU per pulmonology.  I Updated her significant other Ganga    Review of Systems   Constitutional: Positive for activity change and fatigue.   Respiratory: Positive for cough and shortness of breath.    Cardiovascular: Negative for chest pain and leg swelling.   Gastrointestinal: Negative for abdominal pain and nausea.   Neurological: Negative for weakness.   Psychiatric/Behavioral: Negative for confusion. The patient is nervous/anxious.    All other systems reviewed and are negative.    Objective:     Vital Signs (Most Recent):  Temp: 98 °F (36.7 °C) (12/19/20 1200)  Pulse: 90 (12/19/20 1243)  Resp: (!) 23 (12/19/20 1243)  BP: 101/63 (12/19/20 1230)  SpO2: 97 % (12/19/20 1243) Vital Signs (24h Range):  Temp:  [97.7 °F (36.5 °C)-98.4 °F (36.9 °C)] 98 °F (36.7 °C)  Pulse:  [] 90  Resp:  [18-37] 23  SpO2:  [88 %-99 %] 97 %  BP: ()/() 101/63     Weight: 84.7 kg (186 lb 11.7 oz)  Body mass index is 26.04 kg/m².    Intake/Output Summary (Last 24 hours) at 12/19/2020 1331  Last data filed at 12/19/2020 0600  Gross per 24 hour   Intake 240 ml   Output 2640 ml   Net -2400 ml      Physical Exam  Vitals signs and nursing note reviewed.   Constitutional:       General: She is not in acute distress.  Eyes:      Pupils: Pupils are equal, round, and reactive to light.   Cardiovascular:      Rate and Rhythm: Normal rate and regular rhythm.      Heart sounds: No murmur.   Pulmonary:      Comments: .  Patient on high-flow nasal cannula.  Normal work of breathing noted  Abdominal:      General: There is no distension.      Palpations: Abdomen is soft.      Tenderness: There is no abdominal tenderness.   Skin:     Coloration: Skin is not pale.      Findings: No rash.   Neurological:      Mental Status: She is alert and oriented to person,  place, and time.         Significant Labs: All pertinent labs within the past 24 hours have been reviewed.    Significant Imaging: I have reviewed and interpreted all pertinent imaging results/findings within the past 24 hours.

## 2020-12-19 NOTE — PLAN OF CARE
"Pt remains in ICU. Afebrile. AAOx4. 6 L HFNC, tolerating well with sats above goal.  #22g PIV started in Lt AC with anticipation of transferring pt to floor. CBG monitored per orders with scheduled and SSI given, CBG continues to increase.     1230: Pt up to commode, large BM.   1310: Right IJ removed per MD to order to prepare pt for transfer to the floor.  1350: pt 02 sats begin to decrease rapidly. Pt stated " I can't breath". HFNC increased to 15 L and NRB placed over HFNC, minimal improvement in oxygenation noted. Respiratory called and place pt on Bipap 50%, sats improving. MD called to bedside, stat ABG and CXR obtained and MD reviewed. New order placed for IV lasix, q6h resp txs, increase aspart and lovenox doses.     1745: pt placed on 15L HFNC to see if she can tolerate being off of bipap to eat dinner, tolerating well. Will continue to monitor.    1815: pt tolerating 15L HFNC well. Dinner set up on bedside table. Pt only ate about 25%, stated she did not want any more. Pt upset she did not get dessert. Explained to pt that her blood sugars are running to high for her to get dessert at this time and that it is important that we get CBg under control. Pt verbalized understanding.    Pt upright in bed resting comfortably. PO pain med given per pt request. 15L HFNC continued with sats WNL. ST on monitor 115-120's, MD aware. Rosas in place with U/O >2L. Pt remains free of any falls, injuries, or new skin breakdown this shift with precautions in place for all.   "

## 2020-12-19 NOTE — PLAN OF CARE
Plan of care reviewed. Patient off bipap this am and placed on 15 liters high flow, no complaints voiced.VSS. Ate well. Valuim given x1. Urine output good. Safety maintained

## 2020-12-19 NOTE — CARE UPDATE
Receiving aerosol tx Q6.      12/19/20 0843   Patient Assessment/Suction   Level of Consciousness (AVPU) alert   Respiratory Effort Unlabored   Expansion/Accessory Muscles/Retractions expansion symmetric   All Lung Fields Breath Sounds diminished   PRE-TX-O2   O2 Device (Oxygen Therapy) High Flow nasal Cannula   $ Is the patient on Low Flow Oxygen? Yes   Flow (L/min) 15   SpO2 (!) 91 %   Pulse 93   Resp (!) 21   Aerosol Therapy   $ Aerosol Therapy Charges Aerosol Treatment   Respiratory Treatment Status (SVN) given   Treatment Route (SVN) mouthpiece;oxygen   Patient Position (SVN) Yang's   Signs of Intolerance (SVN) none   Breath Sounds Post-Respiratory Treatment   Throughout All Fields Post-Treatment All Fields   Throughout All Fields Post-Treatment no change   Post-treatment Heart Rate (beats/min) 100   Post-treatment Resp Rate (breaths/min) 21   Incentive Spirometer   $ Incentive Spirometer Charges done with encouragement   Administration (IS) instruction provided, follow-up   Number of Repetitions (IS) 10   Level Incentive Spirometer (mL) 500   Patient Tolerance (IS) fair   Wound Care   $ Wound Care Tech Time 15 min   Area of Concern Left;Right;Cheek;Lower lip;Corner lip   Skin Color/Characteristics without discoloration   Skin Temperature warm   Preset CPAP/BiPAP Settings   Mode Of Delivery Standby

## 2020-12-19 NOTE — ASSESSMENT & PLAN NOTE
Patient with Hypoxic Respiratory failure which is Acute.  she is not on home oxygen. Supplemental ventilation was provided and noted- Oxygen Concentration (%):  [60] 60.  High-flow nasal cannula at 15 L  Differential diagnosis includes - Pneumonia Labs and images were reviewed. Patient Has not has a recent ABG. Will treat underlying causes and adjust management of respiratory failure as follows- continue treatment for COVID-19 and supplemental oxygen for hypoxemia.

## 2020-12-19 NOTE — PROGRESS NOTES
[facility] Hospital Medicine  Progress Note    Patient Name: Negrita Castro  MRN: [id number]  Patient Class: IP- Inpatient   Admission Date: 12/11/2020  Length of Stay: 8 days  Attending Physician: Katt Richter MD  Primary Care Provider: Yoni Renteria MD        Subjective:     Principal Problem:Acute hypoxemic respiratory failure        HPI:  Miss Castro is a 60 y.o. female IDDM, prior right breast cancer and recent diagnosis of left breast CA about to start chemotherapy, known COVID-19-positive 12/1/20 presenting with SOB.  Patient reports loss of sense of smell at our initial system and denied any respiratory complaints up until about 3 days ago.  Since then she reports worsening shortness of breath.  Also reports some loose stools and fatigue.  She is requiring 15 L nasal cannula in the ED after being 81% on room air.  She is going to be admitted to the ICU on high-flow nasal cannula.  Full code status confirmed on admission    Overview/Hospital Course:  Admitted to undergo treatment of severe COVID-19 pneumonia.  Was treated with NIPPV.  She improved.  NIPPV removed.  She continued to get supplemental oxygen via high flow NC.  She received dexamethasone, remdesivir, azithromycin, and ceftriaxone.    Interval History:  Patient seen and examined.  Reports she is much less short of breath than yesterday.  She is currently on 15 L high-flow nasal cannula.  Continue to monitor in ICU per pulmonology.  I Updated her significant other Ganga    Review of Systems   Constitutional: Positive for activity change and fatigue.   Respiratory: Positive for cough and shortness of breath.    Cardiovascular: Negative for chest pain and leg swelling.   Gastrointestinal: Negative for abdominal pain and nausea.   Neurological: Negative for weakness.   Psychiatric/Behavioral: Negative for confusion. The patient is nervous/anxious.    All other systems reviewed and are negative.    Objective:     Vital Signs  (Most Recent):  Temp: 98 °F (36.7 °C) (12/19/20 1200)  Pulse: 90 (12/19/20 1243)  Resp: (!) 23 (12/19/20 1243)  BP: 101/63 (12/19/20 1230)  SpO2: 97 % (12/19/20 1243) Vital Signs (24h Range):  Temp:  [97.7 °F (36.5 °C)-98.4 °F (36.9 °C)] 98 °F (36.7 °C)  Pulse:  [] 90  Resp:  [18-37] 23  SpO2:  [88 %-99 %] 97 %  BP: ()/() 101/63     Weight: 84.7 kg (186 lb 11.7 oz)  Body mass index is 26.04 kg/m².    Intake/Output Summary (Last 24 hours) at 12/19/2020 1331  Last data filed at 12/19/2020 0600  Gross per 24 hour   Intake 240 ml   Output 2640 ml   Net -2400 ml      Physical Exam  Vitals signs and nursing note reviewed.   Constitutional:       General: She is not in acute distress.  Eyes:      Pupils: Pupils are equal, round, and reactive to light.   Cardiovascular:      Rate and Rhythm: Normal rate and regular rhythm.      Heart sounds: No murmur.   Pulmonary:      Comments: .  Patient on high-flow nasal cannula.  Normal work of breathing noted  Abdominal:      General: There is no distension.      Palpations: Abdomen is soft.      Tenderness: There is no abdominal tenderness.   Skin:     Coloration: Skin is not pale.      Findings: No rash.   Neurological:      Mental Status: She is alert and oriented to person, place, and time.         Significant Labs: All pertinent labs within the past 24 hours have been reviewed.    Significant Imaging: I have reviewed and interpreted all pertinent imaging results/findings within the past 24 hours.      Assessment/Plan:      * Acute hypoxemic respiratory failure  Patient with Hypoxic Respiratory failure which is Acute.  she is not on home oxygen. Supplemental ventilation was provided and noted- Oxygen Concentration (%):  [60] 60.  High-flow nasal cannula at 15 L  Differential diagnosis includes - Pneumonia Labs and images were reviewed. Patient Has not has a recent ABG. Will treat underlying causes and adjust management of respiratory failure as follows- continue  treatment for COVID-19 and supplemental oxygen for hypoxemia.        Pneumonia due to COVID-19 virus  - COVID-19 testing Collection Date: 12/1/2020 Collection Time:  11:28 AM   - Infection Control notified     - Isolation:   - Airborne, Contact and Droplet Precautions  - Cohort patients into COVID units              - Limit visitors per hospital policy              - Consolidating lab draws, nursing care, provider visits, and interventions      - Management:  Supplemental O2 to maintain SpO2 >92%  Telemetry  Continuous/intermittent Pulse Ox  Albuterol treatment PRN  Careful use of steroids in the absence of other indications - unless septic shock due to increased viral replication Empiric antibiotics per likely source & patient allergies if patient hypoxic with airspace disease for CAP: azithromycin & ceftriaxone  Continue Remdesivir.    Advance Care Planning     Healthcare Power of : Received 12/14/2020    Advance Directives and Living Will: Received 7/30/2019    Power of : Received 1/29/2016         Mixed hyperlipidemia   Patient is chronically on statin.will continue for now. Monitor clinically. Last LDL was   Lab Results   Component Value Date    LDLCALC 114.2 10/24/2018          Type 2 diabetes mellitus with hyperglycemia, with long-term current use of insulin  Fingerstick glucose uncontrolled on current regimen.    Most recent fingerstick glucose reviewed-   Recent Labs   Lab 12/18/20  2057 12/19/20  0609 12/19/20  0800 12/19/20  1106   POCTGLUCOSE 293* 231* 223* 287*     Current correctional scale  Medium  Increase anti-hyperglycemic dose as follows-  Antihyperglycemics (From admission, onward)    Start     Stop Route Frequency Ordered    12/19/20 2100  insulin detemir U-100 pen 54 Units      -- SubQ Nightly 12/19/20 1334    12/19/20 1645  insulin aspart U-100 pen 28 Units      -- SubQ 3 times daily with meals 12/19/20 1334    12/14/20 0716  insulin aspart U-100 pen 1-10 Units      -- SubQ  Before meals & nightly PRN 12/14/20 0617        Hold Oral hypoglycemics while patient is in the hospital.        Breast cancer  Seen by Dr. Peraza as outpatient.  Plans to start chemo soon      Depression  Chronic.  Stable at this time.      VTE Risk Mitigation (From admission, onward)         Ordered     enoxaparin injection 40 mg  Every 12 hours      12/14/20 1005     IP VTE HIGH RISK PATIENT  Once      12/11/20 2002     Place sequential compression device  Until discontinued      12/11/20 2002                Discharge Planning   GUNNER:      Code Status: Full Code   Is the patient medically ready for discharge?:     Reason for patient still in hospital (select all that apply): Patient trending condition  Discharge Plan A: Home            Critical care time spent on the evaluation and treatment of severe organ dysfunction, review of pertinent labs and imaging studies, discussions with consulting providers and discussions with patient/family: 45 minutes.      Katt Richter MD  Department of Hospital Medicine   [facility]

## 2020-12-19 NOTE — PROGRESS NOTES
Progress Note  PULMONARY    Admit Date: 12/11/2020 12/19/2020    From Dr Justin's consult 12/12:History of Present Illness:  Pt diabetic post [facility], was completing breast rx - preparing for reconstruction of right breast post cancer when found to have left breast ca now post op.  Pt non smoker/drinker.  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  No functional limits nor sz last ys.  Not really ill prior to covid.  Has sibs as next of kin but relates boyfriend, Ganga Brown, acts Medical POA (vague on if this if formally done) - her recall is not optimal.       From Dr Richter hpi 12/11/2020-HPI: Miss Castro is a 60 y.o. female IDDM, prior right breast cancer and recent diagnosis of left breast CA about to start chemotherapy, known COVID-19-positive 12/1/20 presenting with SOB.  Patient reports loss of sense of smell at our initial system and denied any respiratory complaints up until about 3 days ago.  Since then she reports worsening shortness of breath.  Also reports some loose stools and fatigue.  She is requiring 15 L nasal cannula in the ED after being 81% on room air.  She is going to be admitted to the ICU on high-flow nasal cannula.  Full code status confirmed on admission  Plan: 12/12  No fever on  Rocephin/azithro, decadron  15 lpm nrbm sat upper 90's  Wbc 6.3 on 11th,  ddimer 2.25- low dose lovenox,  Ferritin 540, procal 0.72, crp 288 on 11th,   bnp good at 83,   cxr clear 9/6/2020-- 12/11 lef>> right infiltrates c/w covid. More basilair  covid + 12/1   Discussed niv/bipap, and possible needing intubation with pt and her designated poa (she identified Ganga Brown, friend as poa (not clear if medical poa executed but pt tells me he would make medical decisions if she cannot)) -both agree to intubation expecting prolonged course for covid to clear.   covid pos on 12/1 makes response to plasma minimal- would not recommedn.       SUBJECTIVE:     12/13 on niv, excessive coughing with  desat mobilizing or coughing, no new c/o.  12/14- on 15L HFNC, with hypertension. Afebrile. Glucose 300s-500s. Per nurse seems anxious but pt denies. Starting to eat soft diet and had BM this am.   12/15- remains on HFNC with 15L NRB on top to maintain sats in 90s. Pt denies SOB, cough, pain. She has been able to eat some soft foods. Reports she was able to prone for several hours then turned back over due to neuropathy-leg pain.  12/16- on 12L HFNC today-->15L. Worked with PT, feels stronger. Good appetite. Able to prone 6h then stopped due to leg pain  12/17- continues on HFNC 15L, no new complaints  12/18- now on HFNC 7L, improving  12/19 needed niv last pm  No new c/o- relates breathing worsened abruptly yesterday.          PFSH and Allergies reviewed.    OBJECTIVE:     Vitals (Most recent):  Vitals:    12/19/20 0600   BP: 109/71   Pulse: 90   Resp: 19   Temp:        Vitals (24 hour range):  Temp:  [97.7 °F (36.5 °C)-98.7 °F (37.1 °C)]   Pulse:  []   Resp:  [17-37]   BP: ()/()   SpO2:  [88 %-97 %]       Intake/Output Summary (Last 24 hours) at 12/19/2020 0652  Last data filed at 12/19/2020 0600  Gross per 24 hour   Intake 240 ml   Output 3740 ml   Net -3500 ml          Physical Exam:  The patient's neuro status (alertness,orientation,cognitive function,motor skills,), pharyngeal exam (oral lesions, hygiene, abn dentition,), Neck (jvd,mass,thyroid,nodes in neck and above/below clavicle),RESPIRATORY(symmetry,effort,fremitus,percussion,auscultation),  Cor(rhythm,heart tones including gallops,perfusion,edema)ABD(distention,hepatic&splenomegaly,tenderness,masses), Skin(rash,cyanosis),Psyc(affect,judgement,).  Exam negative except for these pertinent findings:    Awake, alert   chest is symmetric, no distress, normal percussion, normal fremitus and min rales breath sounds   Diminished breath sounds bilaterally  abd soft nontender  No edema    Radiographs reviewed: view by direct vision    CXR 12/16-  L>R airspace disease, clearing compared to previous  CXR 12/12- bilateral infiltrates, increasing    Labs     Recent Labs   Lab 12/19/20  0441   WBC 9.48   HGB 14.0   HCT 43.7   *     Recent Labs   Lab 12/19/20  0441      K 3.9   CL 98   CO2 27   BUN 22*   CREATININE 0.8   *   CALCIUM 9.0   MG 1.9   AST 40   ALT 49*   ALKPHOS 304*   BILITOT 0.5   PROT 7.4   ALBUMIN 2.6*   CRP 76.4*     Recent Labs   Lab 12/19/20  0417   PH 7.455*   PCO2 42.2   PO2 69*   HCO3 29.6*     Microbiology Results (last 7 days)     Procedure Component Value Units Date/Time    Blood culture (site 1) [[id number]] Collected: 12/11/20 2035    Order Status: Completed Specimen: Blood from Antecubital, Right Arm Updated: 12/17/20 1212     Blood Culture, Routine No growth after 5 days.    Narrative:      Site # 1, aerobic and anaerobic    Blood culture (site 2) [[id number]] Collected: 12/11/20 2035    Order Status: Completed Specimen: Blood from Antecubital, Right Hand Updated: 12/17/20 1212     Blood Culture, Routine No growth after 5 days.    Narrative:      Site # 2, aerobic only          Impression:  Active Hospital Problems    Diagnosis  POA    *Acute hypoxemic respiratory failure [J96.01]  Yes    Pneumonia due to COVID-19 virus [U07.1, J12.89]  Yes    Type 2 diabetes mellitus with hyperglycemia, with long-term current use of insulin [E11.65, Z79.4]  Not Applicable    Mixed hyperlipidemia [E78.2]  Yes    Breast cancer [C50.919]  Yes    Depression [F32.9]  Yes      Resolved Hospital Problems    Diagnosis Date Resolved POA    ARDS (adult respiratory distress syndrome) [J80] 12/16/2020 Yes               Plan:   Acute hypoxemic respiratory failure   COVID 19 pna  ARDS  DM2, uncontrolled  Elevated D-dimer, increasing  Breast ca, s/p treatment on right (2015), recurrent on left and pending chemo    - continue HFNC titrate to keep sats 92-95%  - prone as tolerated  - emphasized incentive spirometry   - continue RDV  - continue  decadron 6mg daily x 10 day course  - continue daily PO lasix  - blood sugar, BP control per hospitalist  - monitor CRP, d-dimer, ferritin   - continue half dose lovenox for elevated d-dimer  - dc central line  - transfer out of ICU today   Above from Dr Leija and below from Dr Justin:  12/19   Afeb, decadron 6, 60% ox needs  ddimer 13 on 17th,   cxr 18th and 19th bilat impressive dz    F/u cxr am.  Discussed with Ganga Brown - needs longer in icu.  Seems stable?          .

## 2020-12-19 NOTE — PLAN OF CARE
Pt placed on bipap over night and tolerated. Heart rate down to 90's when sleeping. Bath and linen change done.safety measures in place. Bath and linen change

## 2020-12-20 LAB
ALBUMIN SERPL BCP-MCNC: 2.4 G/DL (ref 3.5–5.2)
ALP SERPL-CCNC: 238 U/L (ref 55–135)
ALT SERPL W/O P-5'-P-CCNC: 37 U/L (ref 10–44)
ANION GAP SERPL CALC-SCNC: 12 MMOL/L (ref 8–16)
AST SERPL-CCNC: 23 U/L (ref 10–40)
BASOPHILS # BLD AUTO: 0.02 K/UL (ref 0–0.2)
BASOPHILS NFR BLD: 0.2 % (ref 0–1.9)
BILIRUB SERPL-MCNC: 0.4 MG/DL (ref 0.1–1)
BUN SERPL-MCNC: 23 MG/DL (ref 6–20)
CALCIUM SERPL-MCNC: 8.9 MG/DL (ref 8.7–10.5)
CHLORIDE SERPL-SCNC: 96 MMOL/L (ref 95–110)
CO2 SERPL-SCNC: 29 MMOL/L (ref 23–29)
CREAT SERPL-MCNC: 0.8 MG/DL (ref 0.5–1.4)
CRP SERPL-MCNC: 74.1 MG/L (ref 0–8.2)
DIFFERENTIAL METHOD: ABNORMAL
EOSINOPHIL # BLD AUTO: 0.1 K/UL (ref 0–0.5)
EOSINOPHIL NFR BLD: 0.7 % (ref 0–8)
ERYTHROCYTE [DISTWIDTH] IN BLOOD BY AUTOMATED COUNT: 12.9 % (ref 11.5–14.5)
EST. GFR  (AFRICAN AMERICAN): >60 ML/MIN/1.73 M^2
EST. GFR  (NON AFRICAN AMERICAN): >60 ML/MIN/1.73 M^2
GLUCOSE SERPL-MCNC: 244 MG/DL (ref 70–110)
HCT VFR BLD AUTO: 38.1 % (ref 37–48.5)
HGB BLD-MCNC: 11.9 G/DL (ref 12–16)
IMM GRANULOCYTES # BLD AUTO: 0.13 K/UL (ref 0–0.04)
IMM GRANULOCYTES NFR BLD AUTO: 1.5 % (ref 0–0.5)
LYMPHOCYTES # BLD AUTO: 1.3 K/UL (ref 1–4.8)
LYMPHOCYTES NFR BLD: 14.3 % (ref 18–48)
MAGNESIUM SERPL-MCNC: 2.1 MG/DL (ref 1.6–2.6)
MCH RBC QN AUTO: 28.5 PG (ref 27–31)
MCHC RBC AUTO-ENTMCNC: 31.2 G/DL (ref 32–36)
MCV RBC AUTO: 91 FL (ref 82–98)
MONOCYTES # BLD AUTO: 0.5 K/UL (ref 0.3–1)
MONOCYTES NFR BLD: 5.5 % (ref 4–15)
NEUTROPHILS # BLD AUTO: 6.9 K/UL (ref 1.8–7.7)
NEUTROPHILS NFR BLD: 77.8 % (ref 38–73)
NRBC BLD-RTO: 0 /100 WBC
PLATELET # BLD AUTO: 348 K/UL (ref 150–350)
PMV BLD AUTO: 9.7 FL (ref 9.2–12.9)
POCT GLUCOSE: 206 MG/DL (ref 70–110)
POCT GLUCOSE: 268 MG/DL (ref 70–110)
POCT GLUCOSE: 271 MG/DL (ref 70–110)
POCT GLUCOSE: 358 MG/DL (ref 70–110)
POCT GLUCOSE: 371 MG/DL (ref 70–110)
POCT GLUCOSE: >500 MG/DL (ref 70–110)
POCT GLUCOSE: >500 MG/DL (ref 70–110)
POTASSIUM SERPL-SCNC: 3.9 MMOL/L (ref 3.5–5.1)
PROT SERPL-MCNC: 6.8 G/DL (ref 6–8.4)
RBC # BLD AUTO: 4.18 M/UL (ref 4–5.4)
SODIUM SERPL-SCNC: 137 MMOL/L (ref 136–145)
WBC # BLD AUTO: 8.86 K/UL (ref 3.9–12.7)

## 2020-12-20 PROCEDURE — 83735 ASSAY OF MAGNESIUM: CPT

## 2020-12-20 PROCEDURE — 94660 CPAP INITIATION&MGMT: CPT

## 2020-12-20 PROCEDURE — 99232 PR SUBSEQUENT HOSPITAL CARE,LEVL II: ICD-10-PCS | Mod: ,,, | Performed by: INTERNAL MEDICINE

## 2020-12-20 PROCEDURE — 25000003 PHARM REV CODE 250: Performed by: HOSPITALIST

## 2020-12-20 PROCEDURE — 20000000 HC ICU ROOM

## 2020-12-20 PROCEDURE — 63600175 PHARM REV CODE 636 W HCPCS: Performed by: HOSPITALIST

## 2020-12-20 PROCEDURE — 94640 AIRWAY INHALATION TREATMENT: CPT

## 2020-12-20 PROCEDURE — 99232 SBSQ HOSP IP/OBS MODERATE 35: CPT | Mod: ,,, | Performed by: INTERNAL MEDICINE

## 2020-12-20 PROCEDURE — 85025 COMPLETE CBC W/AUTO DIFF WBC: CPT

## 2020-12-20 PROCEDURE — 25000242 PHARM REV CODE 250 ALT 637 W/ HCPCS: Performed by: HOSPITALIST

## 2020-12-20 PROCEDURE — 94761 N-INVAS EAR/PLS OXIMETRY MLT: CPT

## 2020-12-20 PROCEDURE — 94799 UNLISTED PULMONARY SVC/PX: CPT

## 2020-12-20 PROCEDURE — 80053 COMPREHEN METABOLIC PANEL: CPT

## 2020-12-20 PROCEDURE — 27000221 HC OXYGEN, UP TO 24 HOURS

## 2020-12-20 PROCEDURE — 99900035 HC TECH TIME PER 15 MIN (STAT)

## 2020-12-20 PROCEDURE — 63600175 PHARM REV CODE 636 W HCPCS: Performed by: NURSE PRACTITIONER

## 2020-12-20 PROCEDURE — 36415 COLL VENOUS BLD VENIPUNCTURE: CPT

## 2020-12-20 PROCEDURE — 86140 C-REACTIVE PROTEIN: CPT

## 2020-12-20 RX ORDER — INSULIN ASPART 100 [IU]/ML
15 INJECTION, SOLUTION INTRAVENOUS; SUBCUTANEOUS ONCE
Status: COMPLETED | OUTPATIENT
Start: 2020-12-20 | End: 2020-12-20

## 2020-12-20 RX ADMIN — DEXAMETHASONE 6 MG: 4 TABLET ORAL at 09:12

## 2020-12-20 RX ADMIN — DIAZEPAM 10 MG: 5 TABLET ORAL at 08:12

## 2020-12-20 RX ADMIN — INSULIN ASPART 4 UNITS: 100 INJECTION, SOLUTION INTRAVENOUS; SUBCUTANEOUS at 06:12

## 2020-12-20 RX ADMIN — IPRATROPIUM BROMIDE AND ALBUTEROL SULFATE 3 ML: .5; 2.5 SOLUTION RESPIRATORY (INHALATION) at 07:12

## 2020-12-20 RX ADMIN — FUROSEMIDE 40 MG: 10 INJECTION, SOLUTION INTRAVENOUS at 07:12

## 2020-12-20 RX ADMIN — INSULIN ASPART 10 UNITS: 100 INJECTION, SOLUTION INTRAVENOUS; SUBCUTANEOUS at 05:12

## 2020-12-20 RX ADMIN — INSULIN ASPART 28 UNITS: 100 INJECTION, SOLUTION INTRAVENOUS; SUBCUTANEOUS at 05:12

## 2020-12-20 RX ADMIN — ENOXAPARIN SODIUM 40 MG: 40 INJECTION SUBCUTANEOUS at 09:12

## 2020-12-20 RX ADMIN — INSULIN ASPART 15 UNITS: 100 INJECTION, SOLUTION INTRAVENOUS; SUBCUTANEOUS at 08:12

## 2020-12-20 RX ADMIN — INSULIN ASPART 6 UNITS: 100 INJECTION, SOLUTION INTRAVENOUS; SUBCUTANEOUS at 11:12

## 2020-12-20 RX ADMIN — DRONABINOL 2.5 MG: 2.5 CAPSULE ORAL at 09:12

## 2020-12-20 RX ADMIN — ENOXAPARIN SODIUM 40 MG: 40 INJECTION SUBCUTANEOUS at 08:12

## 2020-12-20 RX ADMIN — IPRATROPIUM BROMIDE AND ALBUTEROL SULFATE 3 ML: .5; 2.5 SOLUTION RESPIRATORY (INHALATION) at 01:12

## 2020-12-20 RX ADMIN — FUROSEMIDE 40 MG: 10 INJECTION, SOLUTION INTRAVENOUS at 05:12

## 2020-12-20 RX ADMIN — INSULIN ASPART 28 UNITS: 100 INJECTION, SOLUTION INTRAVENOUS; SUBCUTANEOUS at 11:12

## 2020-12-20 RX ADMIN — INSULIN ASPART 28 UNITS: 100 INJECTION, SOLUTION INTRAVENOUS; SUBCUTANEOUS at 07:12

## 2020-12-20 RX ADMIN — ENALAPRIL MALEATE 5 MG: 5 TABLET ORAL at 09:12

## 2020-12-20 RX ADMIN — PRAVASTATIN SODIUM 40 MG: 40 TABLET ORAL at 06:12

## 2020-12-20 NOTE — PLAN OF CARE
Alert oriented . Vss. Afebrile. On bipap overnight and tolerated. Good urine output. Bath and linen change done medicated for complaints of back pain with prn analgesics. Safety measures in place.

## 2020-12-20 NOTE — CARE UPDATE
12/20/20 0758   Patient Assessment/Suction   Level of Consciousness (AVPU) alert   All Lung Fields Breath Sounds diminished   PRE-TX-O2   O2 Device (Oxygen Therapy) High Flow nasal Cannula   $ Is the patient on Low Flow Oxygen? Yes   Flow (L/min) 10   SpO2 96 %   Pulse Oximetry Type Continuous   $ Pulse Oximetry - Multiple Charge Pulse Oximetry - Multiple   Pulse 97   Resp 18   Aerosol Therapy   $ Aerosol Therapy Charges Aerosol Treatment   Respiratory Treatment Status (SVN) given   Treatment Route (SVN) mouthpiece   Patient Position (SVN) Yang's   Post Treatment Assessment (SVN) breath sounds unchanged   Signs of Intolerance (SVN) none   Breath Sounds Post-Respiratory Treatment   Throughout All Fields Post-Treatment All Fields   Throughout All Fields Post-Treatment no change   Post-treatment Heart Rate (beats/min) 100   Post-treatment Resp Rate (breaths/min) 18   Wound Care   $ Wound Care Tech Time 15 min   Skin Color/Characteristics without discoloration   Skin Temperature warm   Preset CPAP/BiPAP Settings   Mode Of Delivery Standby   Bipap, HFNC, Duoneb Q6, IS, tolerated MP tx well, Bipap on SB, HFNC at 10L, vitals as charted.

## 2020-12-20 NOTE — PROGRESS NOTES
Progress Note  PULMONARY    Admit Date: 12/11/2020 12/20/2020    From Dr Justin's consult 12/12:History of Present Illness:  Pt diabetic post [facility] Grace, was completing breast rx - preparing for reconstruction of right breast post cancer when found to have left breast ca now post op.  Pt non smoker/drinker.  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  No functional limits nor sz last ys.  Not really ill prior to covid.  Has sibs as next of kin but relates boyfriend, Ganga Brown, acts Medical POA (vague on if this if formally done) - her recall is not optimal.       From Dr Richter hpi 12/11/2020-HPI: Miss Castro is a 60 y.o. female IDDM, prior right breast cancer and recent diagnosis of left breast CA about to start chemotherapy, known COVID-19-positive 12/1/20 presenting with SOB.  Patient reports loss of sense of smell at our initial system and denied any respiratory complaints up until about 3 days ago.  Since then she reports worsening shortness of breath.  Also reports some loose stools and fatigue.  She is requiring 15 L nasal cannula in the ED after being 81% on room air.  She is going to be admitted to the ICU on high-flow nasal cannula.  Full code status confirmed on admission  Plan: 12/12  No fever on  Rocephin/azithro, decadron  15 lpm nrbm sat upper 90's  Wbc 6.3 on 11th,  ddimer 2.25- low dose lovenox,  Ferritin 540, procal 0.72, crp 288 on 11th,   bnp good at 83,   cxr clear 9/6/2020-- 12/11 lef>> right infiltrates c/w covid. More basilair  covid + 12/1   Discussed niv/bipap, and possible needing intubation with pt and her designated poa (she identified Ganga Brown, friend as poa (not clear if medical poa executed but pt tells me he would make medical decisions if she cannot)) -both agree to intubation expecting prolonged course for covid to clear.   covid pos on 12/1 makes response to plasma minimal- would not recommedn.       SUBJECTIVE:     12/13 on niv, excessive coughing with  desat mobilizing or coughing, no new c/o.  12/14- on 15L HFNC, with hypertension. Afebrile. Glucose 300s-500s. Per nurse seems anxious but pt denies. Starting to eat soft diet and had BM this am.   12/15- remains on HFNC with 15L NRB on top to maintain sats in 90s. Pt denies SOB, cough, pain. She has been able to eat some soft foods. Reports she was able to prone for several hours then turned back over due to neuropathy-leg pain.  12/16- on 12L HFNC today-->15L. Worked with PT, feels stronger. Good appetite. Able to prone 6h then stopped due to leg pain  12/17- continues on HFNC 15L, no new complaints  12/18- now on HFNC 7L, improving  12/19 needed niv last pm  No new c/o- relates breathing worsened abruptly yesterday.    12/20 no new c/o    PFSH and Allergies reviewed.    OBJECTIVE:     Vitals (Most recent):  Vitals:    12/20/20 0600   BP: 118/68   Pulse: 78   Resp: 19   Temp:        Vitals (24 hour range):  Temp:  [97.8 °F (36.6 °C)-99.1 °F (37.3 °C)]   Pulse:  []   Resp:  [17-37]   BP: (101-145)/(59-85)   SpO2:  [88 %-99 %]       Intake/Output Summary (Last 24 hours) at 12/20/2020 0657  Last data filed at 12/20/2020 0600  Gross per 24 hour   Intake 600 ml   Output 2000 ml   Net -1400 ml          Physical Exam:  The patient's neuro status (alertness,orientation,cognitive function,motor skills,), pharyngeal exam (oral lesions, hygiene, abn dentition,), Neck (jvd,mass,thyroid,nodes in neck and above/below clavicle),RESPIRATORY(symmetry,effort,fremitus,percussion,auscultation),  Cor(rhythm,heart tones including gallops,perfusion,edema)ABD(distention,hepatic&splenomegaly,tenderness,masses), Skin(rash,cyanosis),Psyc(affect,judgement,).  Exam negative except for these pertinent findings:    Awake, alert   chest is symmetric, no distress, normal percussion, normal fremitus and min rales breath sounds   Diminished breath sounds bilaterally  abd soft nontender  No edema    Radiographs reviewed: view by direct vision     CXR 12/16- L>R airspace disease, clearing compared to previous  CXR 12/12- bilateral infiltrates, increasing    Labs     Recent Labs   Lab 12/20/20  0418   WBC 8.86   HGB 11.9*   HCT 38.1        Recent Labs   Lab 12/20/20  0418      K 3.9   CL 96   CO2 29   BUN 23*   CREATININE 0.8   *   CALCIUM 8.9   MG 2.1   AST 23   ALT 37   ALKPHOS 238*   BILITOT 0.4   PROT 6.8   ALBUMIN 2.4*   CRP 74.1*     No results for input(s): PH, PCO2, PO2, HCO3 in the last 24 hours.  Microbiology Results (last 7 days)     Procedure Component Value Units Date/Time    Blood culture (site 1) [[id number]] Collected: 12/11/20 2035    Order Status: Completed Specimen: Blood from Antecubital, Right Arm Updated: 12/17/20 1212     Blood Culture, Routine No growth after 5 days.    Narrative:      Site # 1, aerobic and anaerobic    Blood culture (site 2) [[id number]] Collected: 12/11/20 2035    Order Status: Completed Specimen: Blood from Antecubital, Right Hand Updated: 12/17/20 1212     Blood Culture, Routine No growth after 5 days.    Narrative:      Site # 2, aerobic only          Impression:  Active Hospital Problems    Diagnosis  POA    *Acute hypoxemic respiratory failure [J96.01]  Yes    Pneumonia due to COVID-19 virus [U07.1, J12.89]  Yes    Type 2 diabetes mellitus with hyperglycemia, with long-term current use of insulin [E11.65, Z79.4]  Not Applicable    Mixed hyperlipidemia [E78.2]  Yes    Breast cancer [C50.919]  Yes    Depression [F32.9]  Yes      Resolved Hospital Problems    Diagnosis Date Resolved POA    ARDS (adult respiratory distress syndrome) [J80] 12/16/2020 Yes               Plan:   Acute hypoxemic respiratory failure   COVID 19 pna  ARDS  DM2, uncontrolled  Elevated D-dimer, increasing  Breast ca, s/p treatment on right (2015), recurrent on left and pending chemo    - continue HFNC titrate to keep sats 92-95%  - prone as tolerated  - emphasized incentive spirometry   - continue RDV  - continue  decadron 6mg daily x 10 day course  - continue daily PO lasix  - blood sugar, BP control per hospitalist  - monitor CRP, d-dimer, ferritin   - continue half dose lovenox for elevated d-dimer  - dc central line  - transfer out of ICU today   Above from Dr Leija and below from Dr Justin:  12/19   Afeb, decadron 6, 60% ox needs  ddimer 13 on 17th,   cxr 18th and 19th bilat impressive dz    F/u cxr am.  Discussed with Ganga Brown - needs longer in icu.  Seems stable?    12/20   Tm 99.1,   bipap 50%, cxr with clearing appreciated.  Could go to floor.    .

## 2020-12-20 NOTE — ASSESSMENT & PLAN NOTE
Fingerstick glucose uncontrolled on current regimen.    Most recent fingerstick glucose reviewed-   Recent Labs   Lab 12/19/20  1614 12/19/20  2139 12/20/20  0144 12/20/20  0617   POCTGLUCOSE 272* 177* 268* 206*     Current correctional scale  Medium  Maintain anti-hyperglycemic dose as follows-  Antihyperglycemics (From admission, onward)    Start     Stop Route Frequency Ordered    12/19/20 2100  insulin detemir U-100 pen 54 Units      -- SubQ Nightly 12/19/20 1334    12/19/20 1645  insulin aspart U-100 pen 28 Units      -- SubQ 3 times daily with meals 12/19/20 1334    12/14/20 0716  insulin aspart U-100 pen 1-10 Units      -- SubQ Before meals & nightly PRN 12/14/20 0617        Hold Oral hypoglycemics while patient is in the hospital.

## 2020-12-20 NOTE — PROGRESS NOTES
[facility] Hospital Medicine  Progress Note    Patient Name: Negrita Castro  MRN: [id number]  Patient Class: IP- Inpatient   Admission Date: 12/11/2020  Length of Stay: 9 days  Attending Physician: Katt Richter MD  Primary Care Provider: Yoni Renteria MD        Subjective:     Principal Problem:Acute hypoxemic respiratory failure        HPI:  Miss Castro is a 60 y.o. female IDDM, prior right breast cancer and recent diagnosis of left breast CA about to start chemotherapy, known COVID-19-positive 12/1/20 presenting with SOB.  Patient reports loss of sense of smell at our initial system and denied any respiratory complaints up until about 3 days ago.  Since then she reports worsening shortness of breath.  Also reports some loose stools and fatigue.  She is requiring 15 L nasal cannula in the ED after being 81% on room air.  She is going to be admitted to the ICU on high-flow nasal cannula.  Full code status confirmed on admission    Overview/Hospital Course:  Admitted to undergo treatment of severe COVID-19 pneumonia.  Was treated with NIPPV.  She improved.  NIPPV removed.  She continued to get supplemental oxygen via high flow NC.  She received dexamethasone, remdesivir, azithromycin, and ceftriaxone.    Interval History:  Patient seen and examined.  On 10 L nasal cannula.  States he is feeling a bit better today and reports breathing somewhat improved.  Chest x-ray with some improvement.  Updated her significant other Ganga via telephone    Review of Systems   Constitutional: Positive for activity change and fatigue.   Respiratory: Positive for cough and shortness of breath.    Cardiovascular: Negative for chest pain and leg swelling.   Gastrointestinal: Negative for abdominal pain and nausea.   Neurological: Negative for weakness.   Psychiatric/Behavioral: Negative for confusion. The patient is nervous/anxious.    All other systems reviewed and are negative.    Objective:     Vital Signs  (Most Recent):  Temp: 97.4 °F (36.3 °C) (12/20/20 0732)  Pulse: 101 (12/20/20 0930)  Resp: 20 (12/20/20 0930)  BP: (!) 145/73 (12/20/20 0939)  SpO2: 95 % (12/20/20 0930) Vital Signs (24h Range):  Temp:  [97.4 °F (36.3 °C)-99.1 °F (37.3 °C)] 97.4 °F (36.3 °C)  Pulse:  [] 101  Resp:  [17-37] 20  SpO2:  [88 %-99 %] 95 %  BP: (101-145)/(59-85) 145/73     Weight: 84.7 kg (186 lb 11.7 oz)  Body mass index is 26.04 kg/m².    Intake/Output Summary (Last 24 hours) at 12/20/2020 1056  Last data filed at 12/20/2020 0935  Gross per 24 hour   Intake 840 ml   Output 2410 ml   Net -1570 ml      Physical Exam  Vitals signs and nursing note reviewed.   Constitutional:       General: She is not in acute distress.  Eyes:      Pupils: Pupils are equal, round, and reactive to light.   Cardiovascular:      Rate and Rhythm: Normal rate and regular rhythm.      Heart sounds: No murmur.   Pulmonary:      Comments: .  Patient on high-flow nasal cannula.  Normal work of breathing noted  Abdominal:      General: There is no distension.      Palpations: Abdomen is soft.      Tenderness: There is no abdominal tenderness.   Skin:     Coloration: Skin is not pale.      Findings: No rash.   Neurological:      Mental Status: She is alert and oriented to person, place, and time.         Significant Labs: All pertinent labs within the past 24 hours have been reviewed.    Significant Imaging: I have reviewed and interpreted all pertinent imaging results/findings within the past 24 hours.      Assessment/Plan:      * Acute hypoxemic respiratory failure  Patient with Hypoxic Respiratory failure which is Acute.  she is not on home oxygen. Supplemental ventilation was provided and noted- Oxygen Concentration (%):  [50-60] 50.  High-flow nasal cannula at 15 L  Differential diagnosis includes - Pneumonia Labs and images were reviewed. Patient Has not has a recent ABG. Will treat underlying causes and adjust management of respiratory failure as follows-  continue treatment for COVID-19 and supplemental oxygen for hypoxemia.        Pneumonia due to COVID-19 virus  - COVID-19 testing Collection Date: 12/1/2020 Collection Time:  11:28 AM   - Infection Control notified     - Isolation:   - Airborne, Contact and Droplet Precautions  - Cohort patients into COVID units              - Limit visitors per hospital policy              - Consolidating lab draws, nursing care, provider visits, and interventions      - Management:  Supplemental O2 to maintain SpO2 >92%  Telemetry  Continuous/intermittent Pulse Ox  Albuterol treatment PRN  Careful use of steroids in the absence of other indications - unless septic shock due to increased viral replication Empiric antibiotics per likely source & patient allergies if patient hypoxic with airspace disease for CAP: azithromycin & ceftriaxone  Continue Remdesivir.    Advance Care Planning     Healthcare Power of : Received 12/14/2020    Advance Directives and Living Will: Received 7/30/2019    Power of : Received 1/29/2016         Mixed hyperlipidemia   Patient is chronically on statin.will continue for now. Monitor clinically. Last LDL was   Lab Results   Component Value Date    LDLCALC 114.2 10/24/2018          Type 2 diabetes mellitus with hyperglycemia, with long-term current use of insulin  Fingerstick glucose uncontrolled on current regimen.    Most recent fingerstick glucose reviewed-   Recent Labs   Lab 12/19/20  1614 12/19/20  2139 12/20/20  0144 12/20/20  0617   POCTGLUCOSE 272* 177* 268* 206*     Current correctional scale  Medium  Maintain anti-hyperglycemic dose as follows-  Antihyperglycemics (From admission, onward)    Start     Stop Route Frequency Ordered    12/19/20 2100  insulin detemir U-100 pen 54 Units      -- SubQ Nightly 12/19/20 1334    12/19/20 1645  insulin aspart U-100 pen 28 Units      -- SubQ 3 times daily with meals 12/19/20 1334    12/14/20 0716  insulin aspart U-100 pen 1-10 Units      --  SubQ Before meals & nightly PRN 12/14/20 0617        Hold Oral hypoglycemics while patient is in the hospital.        Breast cancer  Seen by Dr. Peraza as outpatient.  Plans to start chemo soon      Depression  Chronic.  Stable at this time.      VTE Risk Mitigation (From admission, onward)         Ordered     enoxaparin injection 40 mg  Every 12 hours      12/14/20 1005     IP VTE HIGH RISK PATIENT  Once      12/11/20 2002     Place sequential compression device  Until discontinued      12/11/20 2002                Discharge Planning   GUNNER:      Code Status: Full Code   Is the patient medically ready for discharge?:     Reason for patient still in hospital (select all that apply): Patient trending condition and Treatment  Discharge Plan A: Home            Critical care time spent on the evaluation and treatment of severe organ dysfunction, review of pertinent labs and imaging studies, discussions with consulting providers and discussions with patient/family: 40 minutes.      Katt Richter MD  Department of Hospital Medicine   [facility]

## 2020-12-20 NOTE — SUBJECTIVE & OBJECTIVE
Interval History:  Patient seen and examined.  On 10 L nasal cannula.  States he is feeling a bit better today and reports breathing somewhat improved.  Chest x-ray with some improvement.  Updated her significant other Ganga via telephone    Review of Systems   Constitutional: Positive for activity change and fatigue.   Respiratory: Positive for cough and shortness of breath.    Cardiovascular: Negative for chest pain and leg swelling.   Gastrointestinal: Negative for abdominal pain and nausea.   Neurological: Negative for weakness.   Psychiatric/Behavioral: Negative for confusion. The patient is nervous/anxious.    All other systems reviewed and are negative.    Objective:     Vital Signs (Most Recent):  Temp: 97.4 °F (36.3 °C) (12/20/20 0732)  Pulse: 101 (12/20/20 0930)  Resp: 20 (12/20/20 0930)  BP: (!) 145/73 (12/20/20 0939)  SpO2: 95 % (12/20/20 0930) Vital Signs (24h Range):  Temp:  [97.4 °F (36.3 °C)-99.1 °F (37.3 °C)] 97.4 °F (36.3 °C)  Pulse:  [] 101  Resp:  [17-37] 20  SpO2:  [88 %-99 %] 95 %  BP: (101-145)/(59-85) 145/73     Weight: 84.7 kg (186 lb 11.7 oz)  Body mass index is 26.04 kg/m².    Intake/Output Summary (Last 24 hours) at 12/20/2020 1056  Last data filed at 12/20/2020 0935  Gross per 24 hour   Intake 840 ml   Output 2410 ml   Net -1570 ml      Physical Exam  Vitals signs and nursing note reviewed.   Constitutional:       General: She is not in acute distress.  Eyes:      Pupils: Pupils are equal, round, and reactive to light.   Cardiovascular:      Rate and Rhythm: Normal rate and regular rhythm.      Heart sounds: No murmur.   Pulmonary:      Comments: .  Patient on high-flow nasal cannula.  Normal work of breathing noted  Abdominal:      General: There is no distension.      Palpations: Abdomen is soft.      Tenderness: There is no abdominal tenderness.   Skin:     Coloration: Skin is not pale.      Findings: No rash.   Neurological:      Mental Status: She is alert and oriented to  person, place, and time.         Significant Labs: All pertinent labs within the past 24 hours have been reviewed.    Significant Imaging: I have reviewed and interpreted all pertinent imaging results/findings within the past 24 hours.

## 2020-12-20 NOTE — ASSESSMENT & PLAN NOTE
Patient with Hypoxic Respiratory failure which is Acute.  she is not on home oxygen. Supplemental ventilation was provided and noted- Oxygen Concentration (%):  [50-60] 50.  High-flow nasal cannula at 15 L  Differential diagnosis includes - Pneumonia Labs and images were reviewed. Patient Has not has a recent ABG. Will treat underlying causes and adjust management of respiratory failure as follows- continue treatment for COVID-19 and supplemental oxygen for hypoxemia.

## 2020-12-20 NOTE — PROGRESS NOTES
pts blood sugar 177 discussed with BECKA Courtney per secure chat regarding pm dose of insulin 54 units. Ok to give and give with snack .  Will recheck during night.

## 2020-12-21 LAB
ALBUMIN SERPL BCP-MCNC: 2.4 G/DL (ref 3.5–5.2)
ALP SERPL-CCNC: 320 U/L (ref 55–135)
ALT SERPL W/O P-5'-P-CCNC: 78 U/L (ref 10–44)
ANION GAP SERPL CALC-SCNC: 14 MMOL/L (ref 8–16)
AST SERPL-CCNC: 46 U/L (ref 10–40)
BASOPHILS # BLD AUTO: 0.02 K/UL (ref 0–0.2)
BASOPHILS NFR BLD: 0.2 % (ref 0–1.9)
BILIRUB SERPL-MCNC: 0.4 MG/DL (ref 0.1–1)
BUN SERPL-MCNC: 20 MG/DL (ref 6–20)
CALCIUM SERPL-MCNC: 9.2 MG/DL (ref 8.7–10.5)
CHLORIDE SERPL-SCNC: 96 MMOL/L (ref 95–110)
CO2 SERPL-SCNC: 27 MMOL/L (ref 23–29)
CREAT SERPL-MCNC: 0.8 MG/DL (ref 0.5–1.4)
CRP SERPL-MCNC: 78 MG/L (ref 0–8.2)
DIFFERENTIAL METHOD: ABNORMAL
EOSINOPHIL # BLD AUTO: 0 K/UL (ref 0–0.5)
EOSINOPHIL NFR BLD: 0.2 % (ref 0–8)
ERYTHROCYTE [DISTWIDTH] IN BLOOD BY AUTOMATED COUNT: 12.9 % (ref 11.5–14.5)
EST. GFR  (AFRICAN AMERICAN): >60 ML/MIN/1.73 M^2
EST. GFR  (NON AFRICAN AMERICAN): >60 ML/MIN/1.73 M^2
GLUCOSE SERPL-MCNC: 254 MG/DL (ref 70–110)
HCT VFR BLD AUTO: 35.6 % (ref 37–48.5)
HGB BLD-MCNC: 11.3 G/DL (ref 12–16)
IMM GRANULOCYTES # BLD AUTO: 0.16 K/UL (ref 0–0.04)
IMM GRANULOCYTES NFR BLD AUTO: 1.7 % (ref 0–0.5)
LYMPHOCYTES # BLD AUTO: 1.2 K/UL (ref 1–4.8)
LYMPHOCYTES NFR BLD: 12.8 % (ref 18–48)
MAGNESIUM SERPL-MCNC: 2.1 MG/DL (ref 1.6–2.6)
MCH RBC QN AUTO: 29.4 PG (ref 27–31)
MCHC RBC AUTO-ENTMCNC: 31.7 G/DL (ref 32–36)
MCV RBC AUTO: 93 FL (ref 82–98)
MONOCYTES # BLD AUTO: 0.5 K/UL (ref 0.3–1)
MONOCYTES NFR BLD: 5.6 % (ref 4–15)
NEUTROPHILS # BLD AUTO: 7.3 K/UL (ref 1.8–7.7)
NEUTROPHILS NFR BLD: 79.5 % (ref 38–73)
NRBC BLD-RTO: 0 /100 WBC
PLATELET # BLD AUTO: 338 K/UL (ref 150–350)
PMV BLD AUTO: 10.2 FL (ref 9.2–12.9)
POCT GLUCOSE: 221 MG/DL (ref 70–110)
POCT GLUCOSE: 309 MG/DL (ref 70–110)
POCT GLUCOSE: 323 MG/DL (ref 70–110)
POCT GLUCOSE: 427 MG/DL (ref 70–110)
POCT GLUCOSE: 435 MG/DL (ref 70–110)
POTASSIUM SERPL-SCNC: 4.2 MMOL/L (ref 3.5–5.1)
PROT SERPL-MCNC: 6.9 G/DL (ref 6–8.4)
RBC # BLD AUTO: 3.85 M/UL (ref 4–5.4)
SODIUM SERPL-SCNC: 137 MMOL/L (ref 136–145)
WBC # BLD AUTO: 9.17 K/UL (ref 3.9–12.7)

## 2020-12-21 PROCEDURE — 99232 PR SUBSEQUENT HOSPITAL CARE,LEVL II: ICD-10-PCS | Mod: ,,, | Performed by: INTERNAL MEDICINE

## 2020-12-21 PROCEDURE — 36415 COLL VENOUS BLD VENIPUNCTURE: CPT

## 2020-12-21 PROCEDURE — 83735 ASSAY OF MAGNESIUM: CPT

## 2020-12-21 PROCEDURE — 85025 COMPLETE CBC W/AUTO DIFF WBC: CPT

## 2020-12-21 PROCEDURE — 97116 GAIT TRAINING THERAPY: CPT

## 2020-12-21 PROCEDURE — 11000001 HC ACUTE MED/SURG PRIVATE ROOM

## 2020-12-21 PROCEDURE — 94799 UNLISTED PULMONARY SVC/PX: CPT

## 2020-12-21 PROCEDURE — 25000003 PHARM REV CODE 250: Performed by: HOSPITALIST

## 2020-12-21 PROCEDURE — 99900035 HC TECH TIME PER 15 MIN (STAT)

## 2020-12-21 PROCEDURE — 80053 COMPREHEN METABOLIC PANEL: CPT

## 2020-12-21 PROCEDURE — 99232 SBSQ HOSP IP/OBS MODERATE 35: CPT | Mod: ,,, | Performed by: INTERNAL MEDICINE

## 2020-12-21 PROCEDURE — 63600175 PHARM REV CODE 636 W HCPCS: Performed by: HOSPITALIST

## 2020-12-21 PROCEDURE — 25000242 PHARM REV CODE 250 ALT 637 W/ HCPCS: Performed by: HOSPITALIST

## 2020-12-21 PROCEDURE — 97803 MED NUTRITION INDIV SUBSEQ: CPT

## 2020-12-21 PROCEDURE — 94660 CPAP INITIATION&MGMT: CPT

## 2020-12-21 PROCEDURE — 27000221 HC OXYGEN, UP TO 24 HOURS

## 2020-12-21 PROCEDURE — 94761 N-INVAS EAR/PLS OXIMETRY MLT: CPT

## 2020-12-21 PROCEDURE — C9399 UNCLASSIFIED DRUGS OR BIOLOG: HCPCS | Performed by: HOSPITALIST

## 2020-12-21 PROCEDURE — 86140 C-REACTIVE PROTEIN: CPT

## 2020-12-21 PROCEDURE — 94640 AIRWAY INHALATION TREATMENT: CPT

## 2020-12-21 RX ORDER — INSULIN ASPART 100 [IU]/ML
35 INJECTION, SOLUTION INTRAVENOUS; SUBCUTANEOUS
Status: DISCONTINUED | OUTPATIENT
Start: 2020-12-21 | End: 2020-12-22

## 2020-12-21 RX ADMIN — IPRATROPIUM BROMIDE AND ALBUTEROL SULFATE 3 ML: .5; 2.5 SOLUTION RESPIRATORY (INHALATION) at 07:12

## 2020-12-21 RX ADMIN — INSULIN ASPART 4 UNITS: 100 INJECTION, SOLUTION INTRAVENOUS; SUBCUTANEOUS at 06:12

## 2020-12-21 RX ADMIN — INSULIN ASPART 35 UNITS: 100 INJECTION, SOLUTION INTRAVENOUS; SUBCUTANEOUS at 04:12

## 2020-12-21 RX ADMIN — DIAZEPAM 10 MG: 5 TABLET ORAL at 10:12

## 2020-12-21 RX ADMIN — IPRATROPIUM BROMIDE AND ALBUTEROL SULFATE 3 ML: .5; 2.5 SOLUTION RESPIRATORY (INHALATION) at 12:12

## 2020-12-21 RX ADMIN — DEXAMETHASONE 6 MG: 4 TABLET ORAL at 07:12

## 2020-12-21 RX ADMIN — INSULIN ASPART 10 UNITS: 100 INJECTION, SOLUTION INTRAVENOUS; SUBCUTANEOUS at 04:12

## 2020-12-21 RX ADMIN — FUROSEMIDE 40 MG: 10 INJECTION, SOLUTION INTRAVENOUS at 07:12

## 2020-12-21 RX ADMIN — PRAVASTATIN SODIUM 40 MG: 40 TABLET ORAL at 06:12

## 2020-12-21 RX ADMIN — FUROSEMIDE 40 MG: 10 INJECTION, SOLUTION INTRAVENOUS at 04:12

## 2020-12-21 RX ADMIN — ENOXAPARIN SODIUM 40 MG: 40 INJECTION SUBCUTANEOUS at 09:12

## 2020-12-21 RX ADMIN — ENALAPRIL MALEATE 5 MG: 5 TABLET ORAL at 07:12

## 2020-12-21 RX ADMIN — INSULIN ASPART 28 UNITS: 100 INJECTION, SOLUTION INTRAVENOUS; SUBCUTANEOUS at 12:12

## 2020-12-21 RX ADMIN — INSULIN DETEMIR 65 UNITS: 100 INJECTION, SOLUTION SUBCUTANEOUS at 09:12

## 2020-12-21 RX ADMIN — DRONABINOL 2.5 MG: 2.5 CAPSULE ORAL at 10:12

## 2020-12-21 RX ADMIN — ENOXAPARIN SODIUM 40 MG: 40 INJECTION SUBCUTANEOUS at 07:12

## 2020-12-21 RX ADMIN — INSULIN ASPART 10 UNITS: 100 INJECTION, SOLUTION INTRAVENOUS; SUBCUTANEOUS at 12:12

## 2020-12-21 RX ADMIN — HYDROCODONE BITARTRATE AND ACETAMINOPHEN 1 TABLET: 5; 325 TABLET ORAL at 09:12

## 2020-12-21 RX ADMIN — DRONABINOL 2.5 MG: 2.5 CAPSULE ORAL at 09:12

## 2020-12-21 NOTE — CARE UPDATE
Aerosol tx Q6.       12/21/20 0747   Patient Assessment/Suction   Level of Consciousness (AVPU) alert   Respiratory Effort Unlabored   Expansion/Accessory Muscles/Retractions no use of accessory muscles   All Lung Fields Breath Sounds diminished   Cough Frequency infrequent   Cough Type productive   PRE-TX-O2   O2 Device (Oxygen Therapy) High Flow nasal Cannula   $ Is the patient on Low Flow Oxygen? Yes   Flow (L/min) 6  (weaned to 5lpm)   SpO2 97 %   Pulse Oximetry Type Continuous   $ Pulse Oximetry - Multiple Charge Pulse Oximetry - Multiple   Pulse 79   Resp 20   Aerosol Therapy   $ Aerosol Therapy Charges Aerosol Treatment   Respiratory Treatment Status (SVN) given   Treatment Route (SVN) mouthpiece;oxygen   Patient Position (SVN) semi-Yang's   Signs of Intolerance (SVN) none   Breath Sounds Post-Respiratory Treatment   Throughout All Fields Post-Treatment All Fields   Throughout All Fields Post-Treatment no change   Post-treatment Heart Rate (beats/min) 81   Post-treatment Resp Rate (breaths/min) 23   Incentive Spirometer   $ Incentive Spirometer Charges done with encouragement   Administration (IS) instruction provided, follow-up;proper technique demonstrated   Number of Repetitions (IS) 20   Level Incentive Spirometer (mL) 600   Patient Tolerance (IS) fair

## 2020-12-21 NOTE — PROGRESS NOTES
Pt's accu check >500 checked x 2. D Madison NP notified. Orders noted  No need for lab check per NP. Will recheck blood sugar after administration

## 2020-12-21 NOTE — ASSESSMENT & PLAN NOTE
- COVID-19 testing Collection Date: 12/1/2020 Collection Time:  11:28 AM   - Infection Control notified     - Isolation:     Okay to remove isolation per infection control nurse.  Confirmed with Dr. Justin.    - Management:  Supplemental O2 to maintain SpO2 >92%  Telemetry  Continuous/intermittent Pulse Ox  Albuterol treatment PRN  Careful use of steroids in the absence of other indications - unless septic shock due to increased viral replication Empiric antibiotics per likely source & patient allergies if patient hypoxic with airspace disease for CAP: azithromycin & ceftriaxone  Continue Remdesivir.    Advance Care Planning     Healthcare Power of : Received 12/14/2020    Advance Directives and Living Will: Received 7/30/2019    Power of : Received 1/29/2016

## 2020-12-21 NOTE — PROGRESS NOTES
12/21/20 1600   Exertional SpO2 Evaluation on O2   SpO2 During Exertion on O2 (!) 86 %   Exertion O2 LPM 4.5 LPM   SpO2 On Recovery   SpO2 on Recovery 95 %   Recovery Heart Rate 93 bpm   Recovery O2 LPM 4.5 LPM

## 2020-12-21 NOTE — PROGRESS NOTES
Progress Note  PULMONARY    Admit Date: 12/11/2020 12/21/2020    From Dr Justin's consult 12/12:History of Present Illness:  Pt diabetic post [facility], was completing breast rx - preparing for reconstruction of right breast post cancer when found to have left breast ca now post op.  Pt non smoker/drinker.  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  No functional limits nor sz last ys.  Not really ill prior to covid.  Has sibs as next of kin but relates boyfriend, Ganga Brown, acts Medical POA (vague on if this if formally done) - her recall is not optimal.       From Dr Richter hpi 12/11/2020-HPI: Miss Castro is a 60 y.o. female IDDM, prior right breast cancer and recent diagnosis of left breast CA about to start chemotherapy, known COVID-19-positive 12/1/20 presenting with SOB.  Patient reports loss of sense of smell at our initial system and denied any respiratory complaints up until about 3 days ago.  Since then she reports worsening shortness of breath.  Also reports some loose stools and fatigue.  She is requiring 15 L nasal cannula in the ED after being 81% on room air.  She is going to be admitted to the ICU on high-flow nasal cannula.  Full code status confirmed on admission  Plan: 12/12  No fever on  Rocephin/azithro, decadron  15 lpm nrbm sat upper 90's  Wbc 6.3 on 11th,  ddimer 2.25- low dose lovenox,  Ferritin 540, procal 0.72, crp 288 on 11th,   bnp good at 83,   cxr clear 9/6/2020-- 12/11 lef>> right infiltrates c/w covid. More basilair  covid + 12/1   Discussed niv/bipap, and possible needing intubation with pt and her designated poa (she identified Ganga Brown, friend as poa (not clear if medical poa executed but pt tells me he would make medical decisions if she cannot)) -both agree to intubation expecting prolonged course for covid to clear.   covid pos on 12/1 makes response to plasma minimal- would not recommedn.       SUBJECTIVE:     12/13 on niv, excessive coughing with  desat mobilizing or coughing, no new c/o.  12/14- on 15L HFNC, with hypertension. Afebrile. Glucose 300s-500s. Per nurse seems anxious but pt denies. Starting to eat soft diet and had BM this am.   12/15- remains on HFNC with 15L NRB on top to maintain sats in 90s. Pt denies SOB, cough, pain. She has been able to eat some soft foods. Reports she was able to prone for several hours then turned back over due to neuropathy-leg pain.  12/16- on 12L HFNC today-->15L. Worked with PT, feels stronger. Good appetite. Able to prone 6h then stopped due to leg pain  12/17- continues on HFNC 15L, no new complaints  12/18- now on HFNC 7L, improving  12/19 needed niv last pm  No new c/o- relates breathing worsened abruptly yesterday.    12/20 no new c/o  12/21 no new c/o        PFSH and Allergies reviewed.    OBJECTIVE:     Vitals (Most recent):  Vitals:    12/21/20 0600   BP: 111/73   Pulse: 75   Resp: (!) 21   Temp:        Vitals (24 hour range):  Temp:  [97.4 °F (36.3 °C)-99.2 °F (37.3 °C)]   Pulse:  []   Resp:  [18-31]   BP: (101-145)/(58-90)   SpO2:  [90 %-99 %]       Intake/Output Summary (Last 24 hours) at 12/21/2020 0655  Last data filed at 12/21/2020 0600  Gross per 24 hour   Intake 1380 ml   Output 2260 ml   Net -880 ml          Physical Exam:  The patient's neuro status (alertness,orientation,cognitive function,motor skills,), pharyngeal exam (oral lesions, hygiene, abn dentition,), Neck (jvd,mass,thyroid,nodes in neck and above/below clavicle),RESPIRATORY(symmetry,effort,fremitus,percussion,auscultation),  Cor(rhythm,heart tones including gallops,perfusion,edema)ABD(distention,hepatic&splenomegaly,tenderness,masses), Skin(rash,cyanosis),Psyc(affect,judgement,).  Exam negative except for these pertinent findings:    Awake, alert   chest is symmetric, no distress, normal percussion, normal fremitus and min rales breath sounds   Diminished breath sounds bilaterally  abd soft nontender  No edema    Radiographs  reviewed: view by direct vision    CXR 12/16- L>R airspace disease, clearing compared to previous  CXR 12/12- bilateral infiltrates, increasing    Labs     Recent Labs   Lab 12/21/20  0507   WBC 9.17   HGB 11.3*   HCT 35.6*        Recent Labs   Lab 12/21/20  0507      K 4.2   CL 96   CO2 27   BUN 20   CREATININE 0.8   *   CALCIUM 9.2   MG 2.1   AST 46*   ALT 78*   ALKPHOS 320*   BILITOT 0.4   PROT 6.9   ALBUMIN 2.4*   CRP 78.0*     No results for input(s): PH, PCO2, PO2, HCO3 in the last 24 hours.  Microbiology Results (last 7 days)     Procedure Component Value Units Date/Time    Blood culture (site 1) [[id number]] Collected: 12/11/20 2035    Order Status: Completed Specimen: Blood from Antecubital, Right Arm Updated: 12/17/20 1212     Blood Culture, Routine No growth after 5 days.    Narrative:      Site # 1, aerobic and anaerobic    Blood culture (site 2) [[id number]] Collected: 12/11/20 2035    Order Status: Completed Specimen: Blood from Antecubital, Right Hand Updated: 12/17/20 1212     Blood Culture, Routine No growth after 5 days.    Narrative:      Site # 2, aerobic only          Impression:  Active Hospital Problems    Diagnosis  POA    *Acute hypoxemic respiratory failure [J96.01]  Yes    Pneumonia due to COVID-19 virus [U07.1, J12.89]  Yes    Type 2 diabetes mellitus with hyperglycemia, with long-term current use of insulin [E11.65, Z79.4]  Not Applicable    Mixed hyperlipidemia [E78.2]  Yes    Breast cancer [C50.919]  Yes    Depression [F32.9]  Yes      Resolved Hospital Problems    Diagnosis Date Resolved POA    ARDS (adult respiratory distress syndrome) [J80] 12/16/2020 Yes               Plan:   Acute hypoxemic respiratory failure   COVID 19 pna  ARDS  DM2, uncontrolled  Elevated D-dimer, increasing  Breast ca, s/p treatment on right (2015), recurrent on left and pending chemo    - continue HFNC titrate to keep sats 92-95%  - prone as tolerated  - emphasized incentive  spirometry   - continue RDV  - continue decadron 6mg daily x 10 day course  - continue daily PO lasix  - blood sugar, BP control per hospitalist  - monitor CRP, d-dimer, ferritin   - continue half dose lovenox for elevated d-dimer  - dc central line  - transfer out of ICU today   Above from Dr Leija and below from Dr Justin:  12/19   Afeb, decadron 6, 60% ox needs  ddimer 13 on 17th,   cxr 18th and 19th bilat impressive dz    F/u cxr am.  Discussed with Ganga Brown - needs longer in icu.  Seems stable?    12/20   Tm 99.1,   bipap 50%, cxr with clearing appreciated.  Could go to floor.      12/21 crp 78,   30% ox on bipap to 6lpm  Patient continues to progress.  Could go to floor.      .

## 2020-12-21 NOTE — NURSING
"Results for RENÉ FLOREZ (MRN [id number])    Ref. Range 12/21/2020 12:09   POCT Glucose Latest Ref Range: 70 - 110 mg/dL 427 (H)     Attempted to recheck blood glucose- pt refusing stating "my blood sugar has been high because of the cancer and steroids." Educated pt on the importance of verifying blood glucose level when it is out of range- pt v/u and continued to refuse second blood glucose check. Scheduled and sliding scale insulin given per MD order. Will monitor.   "

## 2020-12-21 NOTE — ASSESSMENT & PLAN NOTE
Fingerstick glucose uncontrolled on current regimen.    Most recent fingerstick glucose reviewed-   Recent Labs   Lab 12/20/20  2246 12/21/20  0117 12/21/20  0611 12/21/20  1209   POCTGLUCOSE 371* 323* 221* 427*     Current correctional scale  Medium  Maintain anti-hyperglycemic dose as follows-  Antihyperglycemics (From admission, onward)    Start     Stop Route Frequency Ordered    12/21/20 2100  insulin detemir U-100 pen 65 Units      -- SubQ Nightly 12/21/20 1612    12/21/20 1645  insulin aspart U-100 pen 35 Units      -- SubQ 3 times daily with meals 12/21/20 1612    12/14/20 0716  insulin aspart U-100 pen 1-10 Units      -- SubQ Before meals & nightly PRN 12/14/20 0617        Hold Oral hypoglycemics while patient is in the hospital.

## 2020-12-21 NOTE — PLAN OF CARE
Tolerating diet, blood sugars remain high requiring sliding scale insulin along with scheduled insulin for meals. Able to titrate O 2 down 5 L and tolerating well. Reports felling better and ready to move out of ICU to floor. Skin intact. Safety maintained.

## 2020-12-21 NOTE — PLAN OF CARE
Problem: Physical Therapy Goal  Goal: Physical Therapy Goal  Description: Goals to be met by: 2021    Patient will increase functional independence with mobility by performin. Supine to sit with [place]  2. Sit to supine with [place]  3. Sit to stand transfer with [place]  4. Bed to chair transfer with [place] using no AD  5. Gait  x 50 feet with Supervision using no AD.     Outcome: Ongoing, Progressing

## 2020-12-21 NOTE — NURSING
Pt received to room 211 via wheelchair. Oriented to room. Bed alarm set. Call light within reach. Will monitor.

## 2020-12-21 NOTE — SUBJECTIVE & OBJECTIVE
Interval History:  Patient seen and examined.  Reports she feels little bit better than yesterday.  She is on 6 L high-flow nasal cannula.  Per infection control nurse and pulmonary okay to remove COVID isolation status given 20 days since her positive test and no fever.  Attempted to call and update her significant other, no answer.    Review of Systems   Constitutional: Positive for activity change and fatigue.   Respiratory: Positive for cough and shortness of breath.    Cardiovascular: Negative for chest pain and leg swelling.   Gastrointestinal: Negative for abdominal pain and nausea.   Neurological: Negative for weakness.   Psychiatric/Behavioral: Negative for confusion. The patient is nervous/anxious.    All other systems reviewed and are negative.    Objective:     Vital Signs (Most Recent):  Temp: 98.9 °F (37.2 °C) (12/21/20 1222)  Pulse: 73 (12/21/20 1230)  Resp: 18 (12/21/20 1230)  BP: 136/78 (12/21/20 1222)  SpO2: 95 % (12/21/20 1230) Vital Signs (24h Range):  Temp:  [98 °F (36.7 °C)-99.1 °F (37.3 °C)] 98.9 °F (37.2 °C)  Pulse:  [] 73  Resp:  [18-29] 18  SpO2:  [90 %-99 %] 95 %  BP: (101-149)/(58-90) 136/78     Weight: 85.5 kg (188 lb 7.9 oz)  Body mass index is 26.29 kg/m².    Intake/Output Summary (Last 24 hours) at 12/21/2020 1609  Last data filed at 12/21/2020 1115  Gross per 24 hour   Intake 540 ml   Output 2320 ml   Net -1780 ml      Physical Exam  Vitals signs and nursing note reviewed.   Constitutional:       General: She is not in acute distress.  Eyes:      Pupils: Pupils are equal, round, and reactive to light.   Cardiovascular:      Rate and Rhythm: Normal rate and regular rhythm.      Heart sounds: No murmur.   Pulmonary:      Comments: .  Patient on high-flow nasal cannula.  Normal work of breathing noted  Abdominal:      General: There is no distension.      Palpations: Abdomen is soft.      Tenderness: There is no abdominal tenderness.   Skin:     Coloration: Skin is not pale.       Findings: No rash.   Neurological:      Mental Status: She is alert and oriented to person, place, and time.         Significant Labs: All pertinent labs within the past 24 hours have been reviewed.    Significant Imaging: I have reviewed and interpreted all pertinent imaging results/findings within the past 24 hours.

## 2020-12-21 NOTE — ASSESSMENT & PLAN NOTE
Patient with Hypoxic Respiratory failure which is Acute.  she is not on home oxygen. Supplemental ventilation was provided and noted- Oxygen Concentration (%):  [30-40] 30  Resp Rate Total:  [97 br/min] 97 br/min.  High-flow nasal cannula at 15 L  Differential diagnosis includes - Pneumonia Labs and images were reviewed. Patient Has not has a recent ABG. Will treat underlying causes and adjust management of respiratory failure as follows- continue treatment for COVID-19 and supplemental oxygen for hypoxemia.

## 2020-12-21 NOTE — PLAN OF CARE
Pt alert pleasant. Blood sugars elevated at bedtime . Doses of insulin ordered per NP and rechecked during night. Valium po at bedtime per pt request. Pt wore bipap during night oxygen decreased to 30%. Good urine output. Safety measures in place.

## 2020-12-21 NOTE — PT/OT/SLP PROGRESS
Physical Therapy Treatment    Patient Name:  Negrita Castro   MRN:  [id number]    Recommendations:     Discharge Recommendations:  home, home health PT   Discharge Equipment Recommendations: walker, rolling   Barriers to discharge: None    Assessment:     Negrita Castro is a 60 y.o. female admitted with a medical diagnosis of Acute hypoxemic respiratory failure.  She presents with the following impairments/functional limitations:  weakness, impaired endurance, impaired functional mobilty, gait instability, impaired balance, decreased safety awareness, impaired cardiopulmonary response to activity. Pt alert and agreeable to working with PT. Pt on 4.5 Lpm O2 by nc with SPO2 dropping below 90% during gait training, taking several minutes of sitting rest to recover.    Rehab Prognosis: Good and Fair; patient would benefit from acute skilled PT services to address these deficits and reach maximum level of function.    Recent Surgery: * No surgery found *      Plan:     During this hospitalization, patient to be seen 5 x/week to address the identified rehab impairments via gait training, therapeutic activities, therapeutic exercises and progress toward the following goals:    · Plan of Care Expires:  01/13/21    Subjective     Chief Complaint: Weakness from being in the bed.  Patient/Family Comments/goals: Pt hopes she can go home before Precious.  Pain/Comfort:  · Pain Rating 1: 0/10      Objective:     Communicated with ILEANA Felder prior to session.  Patient found HOB elevated with   upon PT entry to room.     General Precautions: Standard, fall   Orthopedic Precautions:N/A   Braces: N/A     Functional Mobility:  · Bed Mobility:     · Scooting: contact guard assistance  · Supine to Sit: contact guard assistance, SPO2 95% on 4.5 L  · Sit to Supine: contact guard assistance  · Transfers:     · Sit to Stand:  contact guard assistance, minimum assistance and with pt leaning to L side with hand-held assist  · Gait: x 2  trials of 15' with HHA/min A for balance as pt leaned L several times. SPO2 dropped to 86% 1st trial with sitting rest EOB 3-4 minutes until pt recovered to 93%. SPO2 dropped to 89% 2nd trial on 4.5L.        AM-PAC 6 CLICK MOBILITY          Patient left HOB elevated and SPO2 93% on 4.5L  with all lines intact, call button in reach, bed alarm on and RN notified..    GOALS:   Multidisciplinary Problems     Physical Therapy Goals        Problem: Physical Therapy Goal    Goal Priority Disciplines Outcome Goal Variances Interventions   Physical Therapy Goal     PT, PT/OT Ongoing, Progressing     Description: Goals to be met by: 2021    Patient will increase functional independence with mobility by performin. Supine to sit with [place]  2. Sit to supine with [place]  3. Sit to stand transfer with [place]  4. Bed to chair transfer with [place] using no AD  5. Gait  x 50 feet with [place] using no AD.                      Time Tracking:     PT Received On: 20  PT Start Time: 1330     PT Stop Time: 1350  PT Total Time (min): 20 min     Billable Minutes: Gait Training 20    Treatment Type: Treatment  PT/PTA: PT     PTA Visit Number: 0     Lizy Faust, PT  2020

## 2020-12-21 NOTE — PROGRESS NOTES
[facility]  Adult Nutrition  Progress Note    SUMMARY       Intervention: carbohydrate modified diet and nutrition supplement therapy, nutrition education      Recommendation:  1) Continue DM 2000 kcal ( 4 carb servings/meal)  Diet    2) d/c Boost   3) weigh pt weekly     Goals: 1) PO intakes > 50% of meals and supplements at f/u  Nutrition Goal Status: meeting-continues  Communication of RD Recs: (POC, sticky note)     Reason for Assessment     Reason For Assessment: follow up  Diagnosis: (COVID-19)  Relevant Medical History: DM2, breast CA no chemo yet, depression  Interdisciplinary Rounds: did not attend     General Information Comments: 61 y/o female admitted with COVID-19 ( diagnosed 12/1 and c/o loss of smell x 3 days PTA). Was on bipap with PPn ordered yesterday, now advanced to hiflow nasal cannula x 1 day, on full liquid diet. Tolerating. Per discussion with MD supplements added and PPN d/c'd as pt with elevated glucose. NFPE not done r/t restrictions to prevent the spread of COVID-19, to be done at a later date. no significant wt loss per chart review.  12/17/20 Pt off PPN, now on hi flow nasal cannula. Glucose improving. Up in chair, ate 75% of meals yesterday, 25% of breakfast because it was cold. Asked for boost this morning. Did drink 2 yesterday.   12/21/20 Pt receiving 2000 kcal DM diet. Was transferred to floor today, off isolation. Pt reports a good appetite, consuming meals and supplements. Pt on 6L NC. Provided carbohydrate counting nutrition education.      Nutrition Discharge Planning: to be determined- DM 1800 kcal diet + boost glucose control as needed     Nutrition Risk Screen     Nutrition Risk Screen: no indicators present     Nutrition/Diet History     Typical Food/Fluid Intake: unable to obtain  Spiritual, Cultural Beliefs, Bahai Practices, Values that Affect Care: no  Food Allergies: NKFA  Factors Affecting Nutritional Intake: none     Anthropometrics  Height  "Method: Measured(office 20)  Height: 5' 11"  Height (inches): 71 in  Weight Method: Bed Scale  Weight: 86.6 kg , 85.8 kg (189 lb 2.5 oz)  Weight (lb): 190 lb   Ideal Body Weight (IBW), Female: 155 lb  BMI (Calculated): 26 kg/m2  BMI Grade: 25 - 29.9 - overweight  Usual Body Weight (UBW), k.1 kg(20)     Lab/Procedures/Meds     Pertinent Labs Reviewed: reviewed  BMP  Lab Results   Component Value Date     2020    K 4.2 2020    CL 96 2020    CO2 27 2020    BUN 20 2020    CREATININE 0.8 2020    CALCIUM 9.2 2020    ANIONGAP 14 2020    ESTGFRAFRICA >60 2020    EGFRNONAA >60 2020       Recent Labs   Lab 20  1209   POCTGLUCOSE 427*        Pertinent Medications Reviewed: reviewed  Albuterol, dexamethasone, furosemide, insulin      Estimated/Assessed Needs   admission  Weight Used For Calorie Calculations: 85.5 kg (188 lb 7.9 oz)  Energy Calorie Requirements (kcal): MSJ ( 1.2) = 1848 kcal  Energy Need Method: Chandler Ching  Protein Requirements: 0.8-1.0 g protein/kg ( obese) 68-85 g protein  Fluid Requirements (mL): 1600 ml   Estimated Fluid Requirement Method: RDA Method  CHO Requirement: 207-254 g        Nutrition Prescription Ordered     Current Diet Order: DM 2000 kcal diet + boost glucose control BID     Evaluation of Received Nutrient/Fluid Intake     Energy Calories Required: meeting needs  Protein Required: meeting needs  Fluid Required: meeting needs  Tolerance: tolerating  % Intake of Estimated Energy Needs: %  % Meal Intake: at least 75% of meals      Nutrition Risk     Level of Risk/Frequency of Follow-up: low 1 x weekly     Assessment and Plan     Inadequate energy intake  R/t decreased appetite, bipap, and inadequate nutrition support infusion  As evidenced by PO intakes < 50% EEN x 4 days   Intervention: above  improving     Monitor and Evaluation     Food and Nutrient Intake: energy intake, food and beverage " intake  Food and Nutrient Adminstration: diet order  Anthropometric Measurements: weight  Biochemical Data, Medical Tests and Procedures: electrolyte and renal panel, gastrointestinal profile, glucose/endocrine profile  Nutrition-Focused Physical Findings: overall appearance, extremities, muscles and bones      Malnutrition Assessment  Skin (Micronutrient): ([place] = 18)  Teeth (Micronutrient): (WDL)   Edema (Fluid Accumulation): 0-->no edema present           Nutrition Follow-Up     RD Follow-up?: Yes

## 2020-12-21 NOTE — PLAN OF CARE
Report given to Emerita on PCU; pt connected to tele monitor [id number]; transported to room 211 via wheel chair on 6 L nasal cannula by Dee;

## 2020-12-21 NOTE — PROGRESS NOTES
[facility] Hospital Medicine  Progress Note    Patient Name: Negrita Castro  MRN: [id number]  Patient Class: IP- Inpatient   Admission Date: 12/11/2020  Length of Stay: 10 days  Attending Physician: Katt Richter MD  Primary Care Provider: Yoni Renteria MD        Subjective:     Principal Problem:Acute hypoxemic respiratory failure        HPI:  Miss Castro is a 60 y.o. female IDDM, prior right breast cancer and recent diagnosis of left breast CA about to start chemotherapy, known COVID-19-positive 12/1/20 presenting with SOB.  Patient reports loss of sense of smell at our initial system and denied any respiratory complaints up until about 3 days ago.  Since then she reports worsening shortness of breath.  Also reports some loose stools and fatigue.  She is requiring 15 L nasal cannula in the ED after being 81% on room air.  She is going to be admitted to the ICU on high-flow nasal cannula.  Full code status confirmed on admission    Overview/Hospital Course:  Admitted to undergo treatment of severe COVID-19 pneumonia.  Was treated with NIPPV.  She improved.  NIPPV removed.  She continued to get supplemental oxygen via high flow NC.  She received dexamethasone, remdesivir, azithromycin, and ceftriaxone.    Interval History:  Patient seen and examined.  Reports she feels little bit better than yesterday.  She is on 6 L high-flow nasal cannula.  Per infection control nurse and pulmonary okay to remove COVID isolation status given 20 days since her positive test and no fever.  Attempted to call and update her significant other, no answer.    Review of Systems   Constitutional: Positive for activity change and fatigue.   Respiratory: Positive for cough and shortness of breath.    Cardiovascular: Negative for chest pain and leg swelling.   Gastrointestinal: Negative for abdominal pain and nausea.   Neurological: Negative for weakness.   Psychiatric/Behavioral: Negative for confusion. The patient  is nervous/anxious.    All other systems reviewed and are negative.    Objective:     Vital Signs (Most Recent):  Temp: 98.9 °F (37.2 °C) (12/21/20 1222)  Pulse: 73 (12/21/20 1230)  Resp: 18 (12/21/20 1230)  BP: 136/78 (12/21/20 1222)  SpO2: 95 % (12/21/20 1230) Vital Signs (24h Range):  Temp:  [98 °F (36.7 °C)-99.1 °F (37.3 °C)] 98.9 °F (37.2 °C)  Pulse:  [] 73  Resp:  [18-29] 18  SpO2:  [90 %-99 %] 95 %  BP: (101-149)/(58-90) 136/78     Weight: 85.5 kg (188 lb 7.9 oz)  Body mass index is 26.29 kg/m².    Intake/Output Summary (Last 24 hours) at 12/21/2020 1609  Last data filed at 12/21/2020 1115  Gross per 24 hour   Intake 540 ml   Output 2320 ml   Net -1780 ml      Physical Exam  Vitals signs and nursing note reviewed.   Constitutional:       General: She is not in acute distress.  Eyes:      Pupils: Pupils are equal, round, and reactive to light.   Cardiovascular:      Rate and Rhythm: Normal rate and regular rhythm.      Heart sounds: No murmur.   Pulmonary:      Comments: .  Patient on high-flow nasal cannula.  Normal work of breathing noted  Abdominal:      General: There is no distension.      Palpations: Abdomen is soft.      Tenderness: There is no abdominal tenderness.   Skin:     Coloration: Skin is not pale.      Findings: No rash.   Neurological:      Mental Status: She is alert and oriented to person, place, and time.         Significant Labs: All pertinent labs within the past 24 hours have been reviewed.    Significant Imaging: I have reviewed and interpreted all pertinent imaging results/findings within the past 24 hours.      Assessment/Plan:      * Acute hypoxemic respiratory failure  Patient with Hypoxic Respiratory failure which is Acute.  she is not on home oxygen. Supplemental ventilation was provided and noted- Oxygen Concentration (%):  [30-40] 30  Resp Rate Total:  [97 br/min] 97 br/min.  High-flow nasal cannula at 15 L  Differential diagnosis includes - Pneumonia Labs and images were  reviewed. Patient Has not has a recent ABG. Will treat underlying causes and adjust management of respiratory failure as follows- continue treatment for COVID-19 and supplemental oxygen for hypoxemia.        Pneumonia due to COVID-19 virus  - COVID-19 testing Collection Date: 12/1/2020 Collection Time:  11:28 AM   - Infection Control notified     - Isolation:     Okay to remove isolation per infection control nurse.  Confirmed with Dr. Justin.    - Management:  Supplemental O2 to maintain SpO2 >92%  Telemetry  Continuous/intermittent Pulse Ox  Albuterol treatment PRN  Careful use of steroids in the absence of other indications - unless septic shock due to increased viral replication Empiric antibiotics per likely source & patient allergies if patient hypoxic with airspace disease for CAP: azithromycin & ceftriaxone  Continue Remdesivir.    Advance Care Planning     Healthcare Power of : Received 12/14/2020    Advance Directives and Living Will: Received 7/30/2019    Power of : Received 1/29/2016         Mixed hyperlipidemia   Patient is chronically on statin.will continue for now. Monitor clinically. Last LDL was   Lab Results   Component Value Date    LDLCALC 114.2 10/24/2018          Type 2 diabetes mellitus with hyperglycemia, with long-term current use of insulin  Fingerstick glucose uncontrolled on current regimen.    Most recent fingerstick glucose reviewed-   Recent Labs   Lab 12/20/20  2246 12/21/20  0117 12/21/20  0611 12/21/20  1209   POCTGLUCOSE 371* 323* 221* 427*     Current correctional scale  Medium  Maintain anti-hyperglycemic dose as follows-  Antihyperglycemics (From admission, onward)    Start     Stop Route Frequency Ordered    12/21/20 2100  insulin detemir U-100 pen 65 Units      -- SubQ Nightly 12/21/20 1612    12/21/20 1645  insulin aspart U-100 pen 35 Units      -- SubQ 3 times daily with meals 12/21/20 1612    12/14/20 0716  insulin aspart U-100 pen 1-10 Units      -- SubQ  Before meals & nightly PRN 12/14/20 0617        Hold Oral hypoglycemics while patient is in the hospital.        Breast cancer  Seen by Dr. Peraza as outpatient.  Plans to start chemo soon      Depression  Chronic.  Stable at this time.      VTE Risk Mitigation (From admission, onward)         Ordered     enoxaparin injection 40 mg  Every 12 hours      12/14/20 1005     IP VTE HIGH RISK PATIENT  Once      12/11/20 2002     Place sequential compression device  Until discontinued      12/11/20 2002                Discharge Planning   GUNNER:      Code Status: Full Code   Is the patient medically ready for discharge?:     Reason for patient still in hospital (select all that apply): Patient trending condition  Discharge Plan A: Home                  Katt Richter MD  Department of Hospital Medicine   [facility]

## 2020-12-22 LAB
ALBUMIN SERPL BCP-MCNC: 2.3 G/DL (ref 3.5–5.2)
ALP SERPL-CCNC: 317 U/L (ref 55–135)
ALT SERPL W/O P-5'-P-CCNC: 75 U/L (ref 10–44)
ANION GAP SERPL CALC-SCNC: 13 MMOL/L (ref 8–16)
AST SERPL-CCNC: 30 U/L (ref 10–40)
BASOPHILS # BLD AUTO: 0.03 K/UL (ref 0–0.2)
BASOPHILS NFR BLD: 0.3 % (ref 0–1.9)
BILIRUB SERPL-MCNC: 0.3 MG/DL (ref 0.1–1)
BUN SERPL-MCNC: 25 MG/DL (ref 6–20)
CALCIUM SERPL-MCNC: 9 MG/DL (ref 8.7–10.5)
CHLORIDE SERPL-SCNC: 95 MMOL/L (ref 95–110)
CO2 SERPL-SCNC: 28 MMOL/L (ref 23–29)
CREAT SERPL-MCNC: 0.8 MG/DL (ref 0.5–1.4)
CRP SERPL-MCNC: 49.2 MG/L (ref 0–8.2)
DIFFERENTIAL METHOD: ABNORMAL
EOSINOPHIL # BLD AUTO: 0 K/UL (ref 0–0.5)
EOSINOPHIL NFR BLD: 0.3 % (ref 0–8)
ERYTHROCYTE [DISTWIDTH] IN BLOOD BY AUTOMATED COUNT: 12.9 % (ref 11.5–14.5)
EST. GFR  (AFRICAN AMERICAN): >60 ML/MIN/1.73 M^2
EST. GFR  (NON AFRICAN AMERICAN): >60 ML/MIN/1.73 M^2
GLUCOSE SERPL-MCNC: 277 MG/DL (ref 70–110)
HCT VFR BLD AUTO: 33.8 % (ref 37–48.5)
HGB BLD-MCNC: 10.8 G/DL (ref 12–16)
IMM GRANULOCYTES # BLD AUTO: 0.16 K/UL (ref 0–0.04)
IMM GRANULOCYTES NFR BLD AUTO: 1.8 % (ref 0–0.5)
LYMPHOCYTES # BLD AUTO: 1.4 K/UL (ref 1–4.8)
LYMPHOCYTES NFR BLD: 15.4 % (ref 18–48)
MAGNESIUM SERPL-MCNC: 2 MG/DL (ref 1.6–2.6)
MCH RBC QN AUTO: 29.3 PG (ref 27–31)
MCHC RBC AUTO-ENTMCNC: 32 G/DL (ref 32–36)
MCV RBC AUTO: 92 FL (ref 82–98)
MONOCYTES # BLD AUTO: 0.5 K/UL (ref 0.3–1)
MONOCYTES NFR BLD: 5.9 % (ref 4–15)
NEUTROPHILS # BLD AUTO: 6.9 K/UL (ref 1.8–7.7)
NEUTROPHILS NFR BLD: 76.3 % (ref 38–73)
NRBC BLD-RTO: 0 /100 WBC
PLATELET # BLD AUTO: 327 K/UL (ref 150–350)
PMV BLD AUTO: 9.7 FL (ref 9.2–12.9)
POCT GLUCOSE: 114 MG/DL (ref 70–110)
POCT GLUCOSE: 167 MG/DL (ref 70–110)
POCT GLUCOSE: 201 MG/DL (ref 70–110)
POCT GLUCOSE: 259 MG/DL (ref 70–110)
POCT GLUCOSE: 69 MG/DL (ref 70–110)
POTASSIUM SERPL-SCNC: 4.1 MMOL/L (ref 3.5–5.1)
PROT SERPL-MCNC: 6.7 G/DL (ref 6–8.4)
RBC # BLD AUTO: 3.69 M/UL (ref 4–5.4)
SODIUM SERPL-SCNC: 136 MMOL/L (ref 136–145)
WBC # BLD AUTO: 9.09 K/UL (ref 3.9–12.7)

## 2020-12-22 PROCEDURE — 25000003 PHARM REV CODE 250: Performed by: HOSPITALIST

## 2020-12-22 PROCEDURE — 86140 C-REACTIVE PROTEIN: CPT

## 2020-12-22 PROCEDURE — 83735 ASSAY OF MAGNESIUM: CPT

## 2020-12-22 PROCEDURE — 85025 COMPLETE CBC W/AUTO DIFF WBC: CPT

## 2020-12-22 PROCEDURE — 97110 THERAPEUTIC EXERCISES: CPT | Mod: CQ

## 2020-12-22 PROCEDURE — 99232 SBSQ HOSP IP/OBS MODERATE 35: CPT | Mod: ,,, | Performed by: INTERNAL MEDICINE

## 2020-12-22 PROCEDURE — 94660 CPAP INITIATION&MGMT: CPT

## 2020-12-22 PROCEDURE — 99232 PR SUBSEQUENT HOSPITAL CARE,LEVL II: ICD-10-PCS | Mod: ,,, | Performed by: INTERNAL MEDICINE

## 2020-12-22 PROCEDURE — 94640 AIRWAY INHALATION TREATMENT: CPT

## 2020-12-22 PROCEDURE — 94761 N-INVAS EAR/PLS OXIMETRY MLT: CPT

## 2020-12-22 PROCEDURE — 99900035 HC TECH TIME PER 15 MIN (STAT)

## 2020-12-22 PROCEDURE — 94799 UNLISTED PULMONARY SVC/PX: CPT

## 2020-12-22 PROCEDURE — 63600175 PHARM REV CODE 636 W HCPCS: Performed by: HOSPITALIST

## 2020-12-22 PROCEDURE — 80053 COMPREHEN METABOLIC PANEL: CPT

## 2020-12-22 PROCEDURE — 11000001 HC ACUTE MED/SURG PRIVATE ROOM

## 2020-12-22 PROCEDURE — 25000242 PHARM REV CODE 250 ALT 637 W/ HCPCS: Performed by: HOSPITALIST

## 2020-12-22 PROCEDURE — 36415 COLL VENOUS BLD VENIPUNCTURE: CPT

## 2020-12-22 PROCEDURE — 27000221 HC OXYGEN, UP TO 24 HOURS

## 2020-12-22 PROCEDURE — 97116 GAIT TRAINING THERAPY: CPT | Mod: CQ

## 2020-12-22 RX ORDER — INSULIN ASPART 100 [IU]/ML
20 INJECTION, SOLUTION INTRAVENOUS; SUBCUTANEOUS
Status: DISCONTINUED | OUTPATIENT
Start: 2020-12-23 | End: 2020-12-23 | Stop reason: HOSPADM

## 2020-12-22 RX ORDER — FUROSEMIDE 10 MG/ML
40 INJECTION INTRAMUSCULAR; INTRAVENOUS DAILY
Status: DISCONTINUED | OUTPATIENT
Start: 2020-12-23 | End: 2020-12-23 | Stop reason: HOSPADM

## 2020-12-22 RX ADMIN — ENOXAPARIN SODIUM 40 MG: 40 INJECTION SUBCUTANEOUS at 08:12

## 2020-12-22 RX ADMIN — INSULIN ASPART 35 UNITS: 100 INJECTION, SOLUTION INTRAVENOUS; SUBCUTANEOUS at 05:12

## 2020-12-22 RX ADMIN — HYDROCODONE BITARTRATE AND ACETAMINOPHEN 1 TABLET: 5; 325 TABLET ORAL at 10:12

## 2020-12-22 RX ADMIN — IPRATROPIUM BROMIDE AND ALBUTEROL SULFATE 3 ML: .5; 2.5 SOLUTION RESPIRATORY (INHALATION) at 12:12

## 2020-12-22 RX ADMIN — DRONABINOL 2.5 MG: 2.5 CAPSULE ORAL at 08:12

## 2020-12-22 RX ADMIN — ENALAPRIL MALEATE 5 MG: 5 TABLET ORAL at 08:12

## 2020-12-22 RX ADMIN — INSULIN ASPART 35 UNITS: 100 INJECTION, SOLUTION INTRAVENOUS; SUBCUTANEOUS at 11:12

## 2020-12-22 RX ADMIN — ENOXAPARIN SODIUM 40 MG: 40 INJECTION SUBCUTANEOUS at 10:12

## 2020-12-22 RX ADMIN — DIAZEPAM 10 MG: 5 TABLET ORAL at 10:12

## 2020-12-22 RX ADMIN — IPRATROPIUM BROMIDE AND ALBUTEROL SULFATE 3 ML: .5; 2.5 SOLUTION RESPIRATORY (INHALATION) at 06:12

## 2020-12-22 RX ADMIN — PRAVASTATIN SODIUM 40 MG: 40 TABLET ORAL at 07:12

## 2020-12-22 RX ADMIN — FUROSEMIDE 40 MG: 10 INJECTION, SOLUTION INTRAVENOUS at 08:12

## 2020-12-22 RX ADMIN — IPRATROPIUM BROMIDE AND ALBUTEROL SULFATE 3 ML: .5; 2.5 SOLUTION RESPIRATORY (INHALATION) at 07:12

## 2020-12-22 RX ADMIN — INSULIN ASPART 35 UNITS: 100 INJECTION, SOLUTION INTRAVENOUS; SUBCUTANEOUS at 07:12

## 2020-12-22 RX ADMIN — INSULIN ASPART 4 UNITS: 100 INJECTION, SOLUTION INTRAVENOUS; SUBCUTANEOUS at 11:12

## 2020-12-22 RX ADMIN — DRONABINOL 2.5 MG: 2.5 CAPSULE ORAL at 10:12

## 2020-12-22 NOTE — RESPIRATORY THERAPY
12/21/20 1924   Patient Assessment/Suction   Level of Consciousness (AVPU) alert   Respiratory Effort Normal;Unlabored   Expansion/Accessory Muscles/Retractions expansion symmetric;no retractions;no use of accessory muscles   All Lung Fields Breath Sounds diminished   Cough Frequency no cough   PRE-TX-O2   O2 Device (Oxygen Therapy) High Flow nasal Cannula   Flow (L/min) 5   Oxygen Concentration (%) 40   SpO2 (!) 94 %   Pulse Oximetry Type Intermittent   Pulse 80   Resp 18   Aerosol Therapy   $ Aerosol Therapy Charges Aerosol Treatment   Respiratory Treatment Status (SVN) given   Treatment Route (SVN) mask;oxygen   Patient Position (SVN) semi-Yang's   Signs of Intolerance (SVN) none   Breath Sounds Post-Respiratory Treatment   Throughout All Fields Post-Treatment All Fields   Throughout All Fields Post-Treatment no change   Post-treatment Heart Rate (beats/min) 82   Post-treatment Resp Rate (breaths/min) 18   Incentive Spirometer   $ Incentive Spirometer Charges done with encouragement   Administration (IS) proper technique demonstrated   Number of Repetitions (IS) 10   Level Incentive Spirometer (mL) 500   Patient Tolerance (IS) fair   Ready to Wean/Extubation Screen   FIO2<=50 (chart decimal) 0.4

## 2020-12-22 NOTE — RESPIRATORY THERAPY
12/22/20 0641   Patient Assessment/Suction   Level of Consciousness (AVPU) alert   All Lung Fields Breath Sounds diminished   Cough Frequency no cough   PRE-TX-O2   O2 Device (Oxygen Therapy) nasal cannula   $ Is the patient on Low Flow Oxygen? Yes   Flow (L/min) 5  (decreased to 4L)   SpO2 98 %   Pulse 73   Resp 20   Aerosol Therapy   $ Aerosol Therapy Charges Aerosol Treatment   Respiratory Treatment Status (SVN) given   Treatment Route (SVN) mask   Patient Position (SVN) HOB elevated   Post Treatment Assessment (SVN) breath sounds unchanged   Signs of Intolerance (SVN) none   Breath Sounds Post-Respiratory Treatment   Post-treatment Heart Rate (beats/min) 76   Post-treatment Resp Rate (breaths/min) 20   Incentive Spirometer   $ Incentive Spirometer Charges done with encouragement   Number of Repetitions (IS) 10   Level Incentive Spirometer (mL) 500   Patient Tolerance (IS) fair   Wound Care   $ Wound Care Tech Time 15 min   Area of Concern Bridge of nose   Skin Color/Characteristics without discoloration   Skin Temperature warm   Preset CPAP/BiPAP Settings   Mode Of Delivery Standby

## 2020-12-22 NOTE — PLAN OF CARE
Problem: Physical Therapy Goal  Goal: Physical Therapy Goal  Description: Goals to be met by: 2021    Patient will increase functional independence with mobility by performin. Supine to sit with [place]  2. Sit to supine with [place]  3. Sit to stand transfer with [place]  4. Bed to chair transfer with [place] using no AD  5. Gait  x 50 feet with [place] using no AD.     Outcome: Ongoing, Progressing   Ambulate with rw and assistance for safety.

## 2020-12-22 NOTE — PT/OT/SLP PROGRESS
Physical Therapy Treatment    Patient Name:  Negrita Castro   MRN:  [id number]    Recommendations:     Discharge Recommendations:  home, home health PT   Discharge Equipment Recommendations: walker, rolling   Barriers to discharge: None    Assessment:     Negrita Castro is a 60 y.o. female admitted with a medical diagnosis of Acute hypoxemic respiratory failure.  She presents with the following impairments/functional limitations:  weakness, impaired endurance, impaired self care skills, impaired functional mobilty, gait instability, impaired balance, decreased lower extremity function, impaired cardiopulmonary response to activity . Tolerated treatment. Reports feeling weak but agreeable to mobilize.   Ambulated short distance in hallway with chair follow for safety and O2 attached. Seated rest required. SpO2 > 90% throughout. Requires assistance for safety with mobility , leans L requiring verbal cues and assistance to correct.     Rehab Prognosis: Good; patient would benefit from acute skilled PT services to address these deficits and reach maximum level of function.    Recent Surgery: * No surgery found *      Plan:     During this hospitalization, patient to be seen 5 x/week to address the identified rehab impairments via gait training, therapeutic activities, therapeutic exercises and progress toward the following goals:    · Plan of Care Expires:  01/13/21    Subjective     Chief Complaint: none stated  Patient/Family Comments/goals: none stated  Pain/Comfort:  ·        Objective:     Communicated with nurse Crespo prior to session.  Patient found supine with bed alarm, oxygen, telemetry upon PT entry to room.     General Precautions: Standard, fall   Orthopedic Precautions:N/A   Braces: N/A     Functional Mobility:  · Bed Mobility:     · Rolling Right: minimum assistance  · Supine to Sit: minimum assistance  · Transfers:     · Sit to Stand:  moderate assistance with rolling walker  · Gait: 10' x 2 with rw  and Mod A with O2 attached. Seated rest between each .      AM-PAC 6 CLICK MOBILITY          Therapeutic Activities and Exercises:   Transferred to EOB with Min A.   Stood with rw and Mod A. Ambulated with rw and Mod A with assistance to weight shift R side .    BLE exercises seated in chair at 10 reps each ; TKE's, Hip abd/add/ flexion, AP's, SLR's, HS.     Patient left up in chair with all lines intact, call button in reach, chair alarm on and nurse Cherie notified..    GOALS:   Multidisciplinary Problems     Physical Therapy Goals        Problem: Physical Therapy Goal    Goal Priority Disciplines Outcome Goal Variances Interventions   Physical Therapy Goal     PT, PT/OT Ongoing, Progressing     Description: Goals to be met by: 2021    Patient will increase functional independence with mobility by performin. Supine to sit with [place]  2. Sit to supine with [place]  3. Sit to stand transfer with [place]  4. Bed to chair transfer with [place] using no AD  5. Gait  x 50 feet with Supervision using no AD.                      Time Tracking:     PT Received On: 20  PT Start Time: 0935     PT Stop Time: 1000  PT Total Time (min): 25 min     Billable Minutes: Gait Training 13min and Therapeutic Exercise 12min    Treatment Type: Treatment  PT/PTA: PTA     PTA Visit Number: 1     Freida Benitez PTA  2020

## 2020-12-22 NOTE — ASSESSMENT & PLAN NOTE
Fingerstick glucose uncontrolled on current regimen.  Stopping Decadron today should help this.    Most recent fingerstick glucose reviewed-   Recent Labs   Lab 12/21/20  1209 12/21/20  1650 12/21/20  2139 12/22/20  0642   POCTGLUCOSE 427* 435* 309* 259*     Current correctional scale  Medium  Maintain anti-hyperglycemic dose as follows-  Antihyperglycemics (From admission, onward)    Start     Stop Route Frequency Ordered    12/21/20 2100  insulin detemir U-100 pen 65 Units      -- SubQ Nightly 12/21/20 1612    12/21/20 1645  insulin aspart U-100 pen 35 Units      -- SubQ 3 times daily with meals 12/21/20 1612    12/14/20 0716  insulin aspart U-100 pen 1-10 Units      -- SubQ Before meals & nightly PRN 12/14/20 0617        Hold Oral hypoglycemics while patient is in the hospital.

## 2020-12-22 NOTE — PLAN OF CARE
POC reviewed with pt, understanding verbalized. Blood glucose monitoring, supplemental insulin given per MD order. Pt now on 5 L high flow NC. Safety maintained. Will monitor.

## 2020-12-22 NOTE — PROGRESS NOTES
Progress Note  PULMONARY    Admit Date: 12/11/2020 12/22/2020    From Dr Justin's consult 12/12:History of Present Illness:  Pt diabetic post [facility], was completing breast rx - preparing for reconstruction of right breast post cancer when found to have left breast ca now post op.  Pt non smoker/drinker.  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  No functional limits nor sz last ys.  Not really ill prior to covid.  Has sibs as next of kin but relates boyfriend, Ganga Brown, acts Medical POA (vague on if this if formally done) - her recall is not optimal.       From Dr Richter hpi 12/11/2020-HPI: Miss Castro is a 60 y.o. female IDDM, prior right breast cancer and recent diagnosis of left breast CA about to start chemotherapy, known COVID-19-positive 12/1/20 presenting with SOB.  Patient reports loss of sense of smell at our initial system and denied any respiratory complaints up until about 3 days ago.  Since then she reports worsening shortness of breath.  Also reports some loose stools and fatigue.  She is requiring 15 L nasal cannula in the ED after being 81% on room air.  She is going to be admitted to the ICU on high-flow nasal cannula.  Full code status confirmed on admission  Plan: 12/12  No fever on  Rocephin/azithro, decadron  15 lpm nrbm sat upper 90's  Wbc 6.3 on 11th,  ddimer 2.25- low dose lovenox,  Ferritin 540, procal 0.72, crp 288 on 11th,   bnp good at 83,   cxr clear 9/6/2020-- 12/11 lef>> right infiltrates c/w covid. More basilair  covid + 12/1   Discussed niv/bipap, and possible needing intubation with pt and her designated poa (she identified Ganga Brown, friend as poa (not clear if medical poa executed but pt tells me he would make medical decisions if she cannot)) -both agree to intubation expecting prolonged course for covid to clear.   covid pos on 12/1 makes response to plasma minimal- would not recommedn.       SUBJECTIVE:     12/13 on niv, excessive coughing with  desat mobilizing or coughing, no new c/o.  12/14- on 15L HFNC, with hypertension. Afebrile. Glucose 300s-500s. Per nurse seems anxious but pt denies. Starting to eat soft diet and had BM this am.   12/15- remains on HFNC with 15L NRB on top to maintain sats in 90s. Pt denies SOB, cough, pain. She has been able to eat some soft foods. Reports she was able to prone for several hours then turned back over due to neuropathy-leg pain.  12/16- on 12L HFNC today-->15L. Worked with PT, feels stronger. Good appetite. Able to prone 6h then stopped due to leg pain  12/17- continues on HFNC 15L, no new complaints  12/18- now on HFNC 7L, improving  12/19 needed niv last pm  No new c/o- relates breathing worsened abruptly yesterday.    12/20 no new c/o  12/21 no new c/o  12/22 no new c/o      PFSH and Allergies reviewed.    OBJECTIVE:     Vitals (Most recent):  Vitals:    12/22/20 0641   BP:    Pulse: 73   Resp: 20   Temp:        Vitals (24 hour range):  Temp:  [96.2 °F (35.7 °C)-99.1 °F (37.3 °C)]   Pulse:  [71-98]   Resp:  [16-29]   BP: (111-162)/(64-83)   SpO2:  [93 %-98 %]       Intake/Output Summary (Last 24 hours) at 12/22/2020 0659  Last data filed at 12/22/2020 0456  Gross per 24 hour   Intake 400 ml   Output 2825 ml   Net -2425 ml          Physical Exam:  The patient's neuro status (alertness,orientation,cognitive function,motor skills,), pharyngeal exam (oral lesions, hygiene, abn dentition,), Neck (jvd,mass,thyroid,nodes in neck and above/below clavicle),RESPIRATORY(symmetry,effort,fremitus,percussion,auscultation),  Cor(rhythm,heart tones including gallops,perfusion,edema)ABD(distention,hepatic&splenomegaly,tenderness,masses), Skin(rash,cyanosis),Psyc(affect,judgement,).  Exam negative except for these pertinent findings:    Awake, alert   chest is symmetric, no distress, normal percussion, normal fremitus and min rales breath sounds   Diminished breath sounds bilaterally  abd soft nontender  No edema    Radiographs  reviewed: view by direct vision    CXR 12/16- L>R airspace disease, clearing compared to previous  CXR 12/12- bilateral infiltrates, increasing    Labs     Recent Labs   Lab 12/22/20  0446   WBC 9.09   HGB 10.8*   HCT 33.8*        Recent Labs   Lab 12/22/20  0446      K 4.1   CL 95   CO2 28   BUN 25*   CREATININE 0.8   *   CALCIUM 9.0   MG 2.0   AST 30   ALT 75*   ALKPHOS 317*   BILITOT 0.3   PROT 6.7   ALBUMIN 2.3*   CRP 49.2*     No results for input(s): PH, PCO2, PO2, HCO3 in the last 24 hours.  Microbiology Results (last 7 days)     Procedure Component Value Units Date/Time    Blood culture (site 1) [[id number]] Collected: 12/11/20 2035    Order Status: Completed Specimen: Blood from Antecubital, Right Arm Updated: 12/17/20 1212     Blood Culture, Routine No growth after 5 days.    Narrative:      Site # 1, aerobic and anaerobic    Blood culture (site 2) [[id number]] Collected: 12/11/20 2035    Order Status: Completed Specimen: Blood from Antecubital, Right Hand Updated: 12/17/20 1212     Blood Culture, Routine No growth after 5 days.    Narrative:      Site # 2, aerobic only          Impression:  Active Hospital Problems    Diagnosis  POA    *Acute hypoxemic respiratory failure [J96.01]  Yes    Pneumonia due to COVID-19 virus [U07.1, J12.89]  Yes    Type 2 diabetes mellitus with hyperglycemia, with long-term current use of insulin [E11.65, Z79.4]  Not Applicable    Mixed hyperlipidemia [E78.2]  Yes    Breast cancer [C50.919]  Yes    Depression [F32.9]  Yes      Resolved Hospital Problems    Diagnosis Date Resolved POA    ARDS (adult respiratory distress syndrome) [J80] 12/16/2020 Yes               Plan:   Acute hypoxemic respiratory failure   COVID 19 pna  ARDS  DM2, uncontrolled  Elevated D-dimer, increasing  Breast ca, s/p treatment on right (2015), recurrent on left and pending chemo    - continue HFNC titrate to keep sats 92-95%  - prone as tolerated  - emphasized incentive  spirometry   - continue RDV  - continue decadron 6mg daily x 10 day course  - continue daily PO lasix  - blood sugar, BP control per hospitalist  - monitor CRP, d-dimer, ferritin   - continue half dose lovenox for elevated d-dimer  - dc central line  - transfer out of ICU today   Above from Dr Leija and below from Dr Justin:  12/19   Afeb, decadron 6, 60% ox needs  ddimer 13 on 17th,   cxr 18th and 19th bilat impressive dz    F/u cxr am.  Discussed with Ganga Brown - needs longer in icu.  Seems stable?    12/20   Tm 99.1,   bipap 50%, cxr with clearing appreciated.  Could go to floor.      12/21 crp 78,   30% ox on bipap to 6lpm  Patient continues to progress.  Could go to floor.    12/22 now 5lpm, no fever, decadron 6 day 10,   crp 49, gluc 260  -will stop decadron for now  Would consider outpatient therapy with oxygen therapy down to 2-3..  . outpt am good with ox.

## 2020-12-22 NOTE — SUBJECTIVE & OBJECTIVE
Interval History:  Patient seen and examined.  On 5 L nasal cannula.  Reports breathing continues to improve a little every day.  Decadron has been stopped by pulmonology.  Sugars remain elevated, stopping Decadron today should help this.    Review of Systems   Constitutional: Positive for activity change and fatigue.   Respiratory: Positive for cough and shortness of breath.    Cardiovascular: Negative for chest pain and leg swelling.   Gastrointestinal: Negative for abdominal pain and nausea.   Neurological: Negative for weakness.   Psychiatric/Behavioral: Negative for confusion. The patient is nervous/anxious.    All other systems reviewed and are negative.    Objective:     Vital Signs (Most Recent):  Temp: 96.2 °F (35.7 °C) (12/22/20 0453)  Pulse: 73 (12/22/20 0641)  Resp: 20 (12/22/20 0641)  BP: (!) 162/83 (12/22/20 0453)  SpO2: 98 % (12/22/20 0641) Vital Signs (24h Range):  Temp:  [96.2 °F (35.7 °C)-98.9 °F (37.2 °C)] 96.2 °F (35.7 °C)  Pulse:  [71-92] 73  Resp:  [16-23] 20  SpO2:  [93 %-98 %] 98 %  BP: (111-162)/(64-83) 162/83     Weight: 85.5 kg (188 lb 7.9 oz)  Body mass index is 26.29 kg/m².    Intake/Output Summary (Last 24 hours) at 12/22/2020 0940  Last data filed at 12/22/2020 0456  Gross per 24 hour   Intake 400 ml   Output 2675 ml   Net -2275 ml      Physical Exam  Vitals signs and nursing note reviewed.   Constitutional:       General: She is not in acute distress.  Eyes:      Pupils: Pupils are equal, round, and reactive to light.   Cardiovascular:      Rate and Rhythm: Normal rate and regular rhythm.      Heart sounds: No murmur.   Pulmonary:      Comments: .  Patient  nasal cannula.  Normal work of breathing noted  Abdominal:      General: There is no distension.      Palpations: Abdomen is soft.      Tenderness: There is no abdominal tenderness.   Skin:     Coloration: Skin is not pale.      Findings: No rash.   Neurological:      Mental Status: She is alert and oriented to person, place, and  time.         Significant Labs: All pertinent labs within the past 24 hours have been reviewed.    Significant Imaging: I have reviewed and interpreted all pertinent imaging results/findings within the past 24 hours.

## 2020-12-22 NOTE — PROGRESS NOTES
[facility] Hospital Medicine  Progress Note    Patient Name: Negrita Castro  MRN: [id number]  Patient Class: IP- Inpatient   Admission Date: 12/11/2020  Length of Stay: 11 days  Attending Physician: Katt Richter MD  Primary Care Provider: Yoni Renteria MD        Subjective:     Principal Problem:Acute hypoxemic respiratory failure        HPI:  Miss Castro is a 60 y.o. female IDDM, prior right breast cancer and recent diagnosis of left breast CA about to start chemotherapy, known COVID-19-positive 12/1/20 presenting with SOB.  Patient reports loss of sense of smell at our initial system and denied any respiratory complaints up until about 3 days ago.  Since then she reports worsening shortness of breath.  Also reports some loose stools and fatigue.  She is requiring 15 L nasal cannula in the ED after being 81% on room air.  She is going to be admitted to the ICU on high-flow nasal cannula.  Full code status confirmed on admission    Overview/Hospital Course:  Admitted to undergo treatment of severe COVID-19 pneumonia.  Was treated with NIPPV.  She improved.  NIPPV removed.  She continued to get supplemental oxygen via high flow NC.  She received dexamethasone, remdesivir, azithromycin, and ceftriaxone.    Interval History:  Patient seen and examined.  On 5 L nasal cannula.  Reports breathing continues to improve a little every day.  Decadron has been stopped by pulmonology.  Sugars remain elevated, stopping Decadron today should help this.    Review of Systems   Constitutional: Positive for activity change and fatigue.   Respiratory: Positive for cough and shortness of breath.    Cardiovascular: Negative for chest pain and leg swelling.   Gastrointestinal: Negative for abdominal pain and nausea.   Neurological: Negative for weakness.   Psychiatric/Behavioral: Negative for confusion. The patient is nervous/anxious.    All other systems reviewed and are negative.    Objective:     Vital  Signs (Most Recent):  Temp: 96.2 °F (35.7 °C) (12/22/20 0453)  Pulse: 73 (12/22/20 0641)  Resp: 20 (12/22/20 0641)  BP: (!) 162/83 (12/22/20 0453)  SpO2: 98 % (12/22/20 0641) Vital Signs (24h Range):  Temp:  [96.2 °F (35.7 °C)-98.9 °F (37.2 °C)] 96.2 °F (35.7 °C)  Pulse:  [71-92] 73  Resp:  [16-23] 20  SpO2:  [93 %-98 %] 98 %  BP: (111-162)/(64-83) 162/83     Weight: 85.5 kg (188 lb 7.9 oz)  Body mass index is 26.29 kg/m².    Intake/Output Summary (Last 24 hours) at 12/22/2020 0940  Last data filed at 12/22/2020 0456  Gross per 24 hour   Intake 400 ml   Output 2675 ml   Net -2275 ml      Physical Exam  Vitals signs and nursing note reviewed.   Constitutional:       General: She is not in acute distress.  Eyes:      Pupils: Pupils are equal, round, and reactive to light.   Cardiovascular:      Rate and Rhythm: Normal rate and regular rhythm.      Heart sounds: No murmur.   Pulmonary:      Comments: .  Patient  nasal cannula.  Normal work of breathing noted  Abdominal:      General: There is no distension.      Palpations: Abdomen is soft.      Tenderness: There is no abdominal tenderness.   Skin:     Coloration: Skin is not pale.      Findings: No rash.   Neurological:      Mental Status: She is alert and oriented to person, place, and time.         Significant Labs: All pertinent labs within the past 24 hours have been reviewed.    Significant Imaging: I have reviewed and interpreted all pertinent imaging results/findings within the past 24 hours.      Assessment/Plan:      * Acute hypoxemic respiratory failure  Patient with Hypoxic Respiratory failure which is Acute.  she is not on home oxygen. Supplemental ventilation was provided and noted- Oxygen Concentration (%):  [30-40] 30  Resp Rate Total:  [97 br/min] 97 br/min.  High-flow nasal cannula at 15 L  Differential diagnosis includes - Pneumonia Labs and images were reviewed. Patient Has not has a recent ABG. Will treat underlying causes and adjust management of  respiratory failure as follows- continue treatment for COVID-19 and supplemental oxygen for hypoxemia.        Pneumonia due to COVID-19 virus  - COVID-19 testing Collection Date: 12/1/2020 Collection Time:  11:28 AM   - Infection Control notified     - Isolation:     Okay to remove isolation per infection control nurse.  Confirmed with Dr. Justin.    - Management:  Supplemental O2 to maintain SpO2 >92%  Telemetry  Continuous/intermittent Pulse Ox  Albuterol treatment PRN  Careful use of steroids in the absence of other indications - unless septic shock due to increased viral replication Empiric antibiotics per likely source & patient allergies if patient hypoxic with airspace disease for CAP: azithromycin & ceftriaxone  Continue Remdesivir.    Advance Care Planning     Healthcare Power of : Received 12/14/2020    Advance Directives and Living Will: Received 7/30/2019    Power of : Received 1/29/2016         Mixed hyperlipidemia   Patient is chronically on statin.will continue for now. Monitor clinically. Last LDL was   Lab Results   Component Value Date    LDLCALC 114.2 10/24/2018          Type 2 diabetes mellitus with hyperglycemia, with long-term current use of insulin  Fingerstick glucose uncontrolled on current regimen.  Stopping Decadron today should help this.    Most recent fingerstick glucose reviewed-   Recent Labs   Lab 12/21/20  1209 12/21/20  1650 12/21/20  2139 12/22/20  0642   POCTGLUCOSE 427* 435* 309* 259*     Current correctional scale  Medium  Maintain anti-hyperglycemic dose as follows-  Antihyperglycemics (From admission, onward)    Start     Stop Route Frequency Ordered    12/21/20 2100  insulin detemir U-100 pen 65 Units      -- SubQ Nightly 12/21/20 1612    12/21/20 1645  insulin aspart U-100 pen 35 Units      -- SubQ 3 times daily with meals 12/21/20 1612    12/14/20 0716  insulin aspart U-100 pen 1-10 Units      -- SubQ Before meals & nightly PRN 12/14/20 0617        Hold Oral  hypoglycemics while patient is in the hospital.        Breast cancer  Seen by Dr. Peraza as outpatient.  Plans to start chemo soon      Depression  Chronic.  Stable at this time.      VTE Risk Mitigation (From admission, onward)         Ordered     enoxaparin injection 40 mg  Every 12 hours      12/14/20 1005     IP VTE HIGH RISK PATIENT  Once      12/11/20 2002     Place sequential compression device  Until discontinued      12/11/20 2002                Discharge Planning   GUNNER:      Code Status: Full Code   Is the patient medically ready for discharge?:     Reason for patient still in hospital (select all that apply): Patient trending condition and Treatment  Discharge Plan A: Home                  Katt Richter MD  Department of Hospital Medicine   [facility]

## 2020-12-23 VITALS
WEIGHT: 188.5 LBS | HEIGHT: 71 IN | TEMPERATURE: 97 F | OXYGEN SATURATION: 98 % | RESPIRATION RATE: 18 BRPM | BODY MASS INDEX: 26.39 KG/M2 | SYSTOLIC BLOOD PRESSURE: 121 MMHG | DIASTOLIC BLOOD PRESSURE: 74 MMHG | HEART RATE: 78 BPM

## 2020-12-23 LAB
ALBUMIN SERPL BCP-MCNC: 2.3 G/DL (ref 3.5–5.2)
ALP SERPL-CCNC: 302 U/L (ref 55–135)
ALT SERPL W/O P-5'-P-CCNC: 85 U/L (ref 10–44)
ANION GAP SERPL CALC-SCNC: 12 MMOL/L (ref 8–16)
AST SERPL-CCNC: 69 U/L (ref 10–40)
BASOPHILS # BLD AUTO: 0.05 K/UL (ref 0–0.2)
BASOPHILS NFR BLD: 0.6 % (ref 0–1.9)
BILIRUB SERPL-MCNC: 0.4 MG/DL (ref 0.1–1)
BUN SERPL-MCNC: 20 MG/DL (ref 6–20)
CALCIUM SERPL-MCNC: 8.7 MG/DL (ref 8.7–10.5)
CHLORIDE SERPL-SCNC: 96 MMOL/L (ref 95–110)
CO2 SERPL-SCNC: 29 MMOL/L (ref 23–29)
CREAT SERPL-MCNC: 0.7 MG/DL (ref 0.5–1.4)
CRP SERPL-MCNC: 39.3 MG/L (ref 0–8.2)
DIFFERENTIAL METHOD: ABNORMAL
EOSINOPHIL # BLD AUTO: 0.1 K/UL (ref 0–0.5)
EOSINOPHIL NFR BLD: 0.9 % (ref 0–8)
ERYTHROCYTE [DISTWIDTH] IN BLOOD BY AUTOMATED COUNT: 13.1 % (ref 11.5–14.5)
EST. GFR  (AFRICAN AMERICAN): >60 ML/MIN/1.73 M^2
EST. GFR  (NON AFRICAN AMERICAN): >60 ML/MIN/1.73 M^2
GLUCOSE SERPL-MCNC: 170 MG/DL (ref 70–110)
HCT VFR BLD AUTO: 36.3 % (ref 37–48.5)
HGB BLD-MCNC: 11.5 G/DL (ref 12–16)
IMM GRANULOCYTES # BLD AUTO: 0.18 K/UL (ref 0–0.04)
IMM GRANULOCYTES NFR BLD AUTO: 2.3 % (ref 0–0.5)
LYMPHOCYTES # BLD AUTO: 1.4 K/UL (ref 1–4.8)
LYMPHOCYTES NFR BLD: 18.1 % (ref 18–48)
MAGNESIUM SERPL-MCNC: 1.9 MG/DL (ref 1.6–2.6)
MCH RBC QN AUTO: 29 PG (ref 27–31)
MCHC RBC AUTO-ENTMCNC: 31.7 G/DL (ref 32–36)
MCV RBC AUTO: 91 FL (ref 82–98)
MONOCYTES # BLD AUTO: 0.6 K/UL (ref 0.3–1)
MONOCYTES NFR BLD: 6.9 % (ref 4–15)
NEUTROPHILS # BLD AUTO: 5.7 K/UL (ref 1.8–7.7)
NEUTROPHILS NFR BLD: 71.2 % (ref 38–73)
NRBC BLD-RTO: 0 /100 WBC
PLATELET # BLD AUTO: 335 K/UL (ref 150–350)
PMV BLD AUTO: 10 FL (ref 9.2–12.9)
POCT GLUCOSE: 184 MG/DL (ref 70–110)
POCT GLUCOSE: 228 MG/DL (ref 70–110)
POTASSIUM SERPL-SCNC: 3.8 MMOL/L (ref 3.5–5.1)
PROT SERPL-MCNC: 6.6 G/DL (ref 6–8.4)
RBC # BLD AUTO: 3.97 M/UL (ref 4–5.4)
SODIUM SERPL-SCNC: 137 MMOL/L (ref 136–145)
WBC # BLD AUTO: 7.96 K/UL (ref 3.9–12.7)

## 2020-12-23 PROCEDURE — 94761 N-INVAS EAR/PLS OXIMETRY MLT: CPT

## 2020-12-23 PROCEDURE — 25000242 PHARM REV CODE 250 ALT 637 W/ HCPCS: Performed by: HOSPITALIST

## 2020-12-23 PROCEDURE — 94760 N-INVAS EAR/PLS OXIMETRY 1: CPT

## 2020-12-23 PROCEDURE — 27000221 HC OXYGEN, UP TO 24 HOURS

## 2020-12-23 PROCEDURE — 86140 C-REACTIVE PROTEIN: CPT

## 2020-12-23 PROCEDURE — 85025 COMPLETE CBC W/AUTO DIFF WBC: CPT

## 2020-12-23 PROCEDURE — 97116 GAIT TRAINING THERAPY: CPT | Mod: CQ

## 2020-12-23 PROCEDURE — 63600175 PHARM REV CODE 636 W HCPCS: Performed by: HOSPITALIST

## 2020-12-23 PROCEDURE — 80053 COMPREHEN METABOLIC PANEL: CPT

## 2020-12-23 PROCEDURE — 94799 UNLISTED PULMONARY SVC/PX: CPT

## 2020-12-23 PROCEDURE — 94640 AIRWAY INHALATION TREATMENT: CPT

## 2020-12-23 PROCEDURE — 97110 THERAPEUTIC EXERCISES: CPT | Mod: CQ

## 2020-12-23 PROCEDURE — 94660 CPAP INITIATION&MGMT: CPT

## 2020-12-23 PROCEDURE — 83735 ASSAY OF MAGNESIUM: CPT

## 2020-12-23 PROCEDURE — 36415 COLL VENOUS BLD VENIPUNCTURE: CPT

## 2020-12-23 PROCEDURE — 99900035 HC TECH TIME PER 15 MIN (STAT)

## 2020-12-23 PROCEDURE — 25000003 PHARM REV CODE 250: Performed by: HOSPITALIST

## 2020-12-23 RX ADMIN — INSULIN ASPART 2 UNITS: 100 INJECTION, SOLUTION INTRAVENOUS; SUBCUTANEOUS at 05:12

## 2020-12-23 RX ADMIN — INSULIN ASPART 4 UNITS: 100 INJECTION, SOLUTION INTRAVENOUS; SUBCUTANEOUS at 11:12

## 2020-12-23 RX ADMIN — ENALAPRIL MALEATE 5 MG: 5 TABLET ORAL at 08:12

## 2020-12-23 RX ADMIN — INSULIN ASPART 20 UNITS: 100 INJECTION, SOLUTION INTRAVENOUS; SUBCUTANEOUS at 11:12

## 2020-12-23 RX ADMIN — INSULIN ASPART 20 UNITS: 100 INJECTION, SOLUTION INTRAVENOUS; SUBCUTANEOUS at 08:12

## 2020-12-23 RX ADMIN — PRAVASTATIN SODIUM 40 MG: 40 TABLET ORAL at 05:12

## 2020-12-23 RX ADMIN — DRONABINOL 2.5 MG: 2.5 CAPSULE ORAL at 08:12

## 2020-12-23 RX ADMIN — IPRATROPIUM BROMIDE AND ALBUTEROL SULFATE 3 ML: .5; 2.5 SOLUTION RESPIRATORY (INHALATION) at 06:12

## 2020-12-23 RX ADMIN — ENOXAPARIN SODIUM 40 MG: 40 INJECTION SUBCUTANEOUS at 08:12

## 2020-12-23 RX ADMIN — IPRATROPIUM BROMIDE AND ALBUTEROL SULFATE 3 ML: .5; 2.5 SOLUTION RESPIRATORY (INHALATION) at 12:12

## 2020-12-23 NOTE — PLAN OF CARE
CM requested follow up apt with PCP from Toshia Celis ([facility] Director). Toshia will contact pt to schedule.

## 2020-12-23 NOTE — NURSING
Written discharge instructions provided to patient with explanation.  Instructed on med regimen and f/u appoint.  She v/u.  Pt has oxygen tank at bedside and DME/ home health has been set up.  Waiting for ride home.

## 2020-12-23 NOTE — PLAN OF CARE
Pt AAO, VSS. Plan of care reviewed with pt at beginning of shift.  Pt/family verbalized understanding. Pain well managed with PRN meds. Left leg pain 7/10. Cardiac monitor showing NSR. Blood sugar monitoring in progress. Hourly/ Q2 hourly rounds completed on this pt throughout shift.  Pain monitored, restroom offered, repositions independently, safety maintained.  Patient has remained free from fall/injury, no skin breakdown noted.  Side rails up x2, bed in locked and lowest position, call light kept within reach.  Needs attended to, will continue to monitor/ update as indicated.

## 2020-12-23 NOTE — RESPIRATORY THERAPY
12/23/20 0745   Home Oxygen Qualification   Room Air SpO2 At Rest (!) 83 %   SpO2 on Recovery 97 %   Recovery Heart Rate 86 bpm   Recovery O2 LPM 3 LPM   Home O2 Eval Comments Pt does qualify for home O2

## 2020-12-23 NOTE — RESPIRATORY THERAPY
12/22/20 1904   Patient Assessment/Suction   Level of Consciousness (AVPU) alert   Respiratory Effort Unlabored   All Lung Fields Breath Sounds diminished   MARÍA Breath Sounds diminished   LLL Breath Sounds diminished   RUL Breath Sounds diminished   RML Breath Sounds diminished   RLL Breath Sounds diminished   PRE-TX-O2   O2 Device (Oxygen Therapy) nasal cannula w/ humidification   $ Is the patient on Low Flow Oxygen? Yes   Flow (L/min) 3   SpO2 (!) 92 %   Pulse Oximetry Type Intermittent   $ Pulse Oximetry - Multiple Charge Pulse Oximetry - Multiple   Pulse 94   Resp 16   Aerosol Therapy   $ Aerosol Therapy Charges Aerosol Treatment   Respiratory Treatment Status (SVN) given   Treatment Route (SVN) mask   Patient Position (SVN) HOB elevated   Post Treatment Assessment (SVN) breath sounds unchanged   Signs of Intolerance (SVN) none   Breath Sounds Post-Respiratory Treatment   Throughout All Fields Post-Treatment All Fields   Throughout All Fields Post-Treatment no change   Post-treatment Heart Rate (beats/min) 94   Post-treatment Resp Rate (breaths/min) 19   Incentive Spirometer   $ Incentive Spirometer Charges done with encouragement   Administration (IS) proper technique demonstrated   Number of Repetitions (IS) 10   Level Incentive Spirometer (mL) 500   Patient Tolerance (IS) good   Preset CPAP/BiPAP Settings   Mode Of Delivery Standby  (request to wear around 10pm)

## 2020-12-23 NOTE — PLAN OF CARE
Per PHN- the pt has been assigned to [facility]. I sent via [facility] and updated the pts AVS. Per Kaitlynn Stockton-with [facility]- the pts home O2 orders need to be signed so they can be sent to N for authorization. Dr. Richter updated via secure chat. Pt approved for two tanks per Kaitlynn. I pulled from the DME closet and delivered to pt at bedside. Pts signed delivery ticket returned to the DME closet. Pts nurse updated for communication to respiratory for home O2 education. Zuleyka Choi, TYREE     12/23/20 1006   Post-Acute Status   Post-Acute Authorization HME   HME Status Set-up Complete   Home Health Status Set-up Complete

## 2020-12-23 NOTE — RESPIRATORY THERAPY
12/23/20 0647   Patient Assessment/Suction   Level of Consciousness (AVPU) alert   All Lung Fields Breath Sounds diminished   Cough Frequency no cough   PRE-TX-O2   O2 Device (Oxygen Therapy) nasal cannula   Flow (L/min) 3  (placed on room air after tx, for home O2 eval)   SpO2 98 %   Pulse Oximetry Type Intermittent   $ Pulse Oximetry - Single Charge Pulse Oximetry - Single   Pulse 79   Resp 18   Aerosol Therapy   $ Aerosol Therapy Charges Aerosol Treatment   Respiratory Treatment Status (SVN) given   Treatment Route (SVN) mask   Patient Position (SVN) HOB elevated   Post Treatment Assessment (SVN) breath sounds unchanged   Signs of Intolerance (SVN) none   Breath Sounds Post-Respiratory Treatment   Post-treatment Heart Rate (beats/min) 75   Post-treatment Resp Rate (breaths/min) 18   Incentive Spirometer   $ Incentive Spirometer Charges done with encouragement   Administration (IS) mouthpiece   Number of Repetitions (IS) 10   Level Incentive Spirometer (mL) 500   Patient Tolerance (IS) good   Wound Care   $ Wound Care Tech Time 15 min   Area of Concern Bridge of nose   Skin Color/Characteristics without discoloration   Skin Temperature warm   Preset CPAP/BiPAP Settings   Mode Of Delivery Standby     Pt puts forth no effort with IS

## 2020-12-23 NOTE — PLAN OF CARE
Pt HH orders and packet sent to PHN to assign HH provider. Pt is pending a home O2 evaluation for home O2 needs. CM following. Zuleyka Choi LCSW     12/23/20 0849   Post-Acute Status   Post-Acute Authorization HME;Home Health   Home Health Status Referrals Sent

## 2020-12-23 NOTE — PLAN OF CARE
The pt is discharging [facility] and home O2 from [facility] . The pt is cleared for discharge from case management. Zuleyka Choi, TYREE     12/23/20 1018   Final Note   Assessment Type Final Discharge Note   Anticipated Discharge Disposition Home-Health

## 2020-12-23 NOTE — NURSING
Iv and tele removed per policy, discharged to home with portable oxygen- escorted to car via wc per staff.  S/o in attendance.

## 2020-12-23 NOTE — CHAPLAIN
Visited w/pt and spouse; said she is going home tomorrow;  holding her hand; very grateful for her healing; Offered a blessing and Merry Precious; Lord, in your mercy.

## 2020-12-23 NOTE — PT/OT/SLP PROGRESS
Physical Therapy Treatment    Patient Name:  Negrita Castro   MRN:  [id number]    Recommendations:     Discharge Recommendations:  home, home health PT   Discharge Equipment Recommendations: walker, rolling   Barriers to discharge: None    Assessment:     Negrita Castro is a 60 y.o. female admitted with a medical diagnosis of Acute hypoxemic respiratory failure.  She presents with the following impairments/functional limitations:  weakness, impaired endurance, impaired self care skills, impaired functional mobilty, gait instability, impaired balance, decreased lower extremity function, impaired cardiopulmonary response to activity . Agreed to mobilize. Eager to discharge home. Ambulated with rw and Mod A with O2 attached and chair follow for safety.     Rehab Prognosis: Good; patient would benefit from acute skilled PT services to address these deficits and reach maximum level of function.    Recent Surgery: * No surgery found *      Plan:     During this hospitalization, patient to be seen 5 x/week to address the identified rehab impairments via gait training, therapeutic activities, therapeutic exercises and progress toward the following goals:    · Plan of Care Expires:  01/13/21    Subjective     Chief Complaint: L calf soreness  Patient/Family Comments/goals: to return home  Pain/Comfort:  · Pain Rating 1: other (see comments)(did not rate)  · Location - Side 1: Left  · Location - Orientation 1: generalized  · Location 1: calf  · Pain Addressed 1: Reposition, Nurse notified      Objective:     Communicated with nurse Crespo prior to session.  Patient found supine with bed alarm, oxygen, telemetry upon PT entry to room.     General Precautions: Standard, fall, respiratory   Orthopedic Precautions:N/A   Braces: N/A     Functional Mobility:  · Bed Mobility:     · Rolling Right: contact guard assistance  · Supine to Sit: contact guard assistance  · Transfers:     · Sit to Stand:  minimum assistance with rolling  walker  · Gait: 80' with rw and Mod A with O2 attached.       AM-PAC 6 CLICK MOBILITY          Therapeutic Activities and Exercises:   Transferred EOB, sat briefly due to coughing episode.   Ambulated slowly in hallway with rw and Mod A with O2 attached.    BLE exercises at 10 reps each : TKE's, Hip abd/add/ flexion, AP's, SLRs', HS, Hip abd/add with knees extended.     Patient left up in chair with all lines intact, call button in reach, chair alarm on and nurse Cherie notified..    GOALS:   Multidisciplinary Problems     Physical Therapy Goals        Problem: Physical Therapy Goal    Goal Priority Disciplines Outcome Goal Variances Interventions   Physical Therapy Goal     PT, PT/OT Ongoing, Progressing     Description: Goals to be met by: 2021    Patient will increase functional independence with mobility by performin. Supine to sit with [place]  2. Sit to supine with [place]  3. Sit to stand transfer with [place]  4. Bed to chair transfer with [place] using no AD  5. Gait  x 50 feet with Supervision using no AD.                      Time Tracking:     PT Received On: 20  PT Start Time: 921     PT Stop Time: 944  PT Total Time (min): 23 min     Billable Minutes: Gait Training 10min and Therapeutic Exercise 13min    Treatment Type: Treatment  PT/PTA: PTA     PTA Visit Number: 2     Freida Benitez PTA  2020

## 2020-12-23 NOTE — DISCHARGE SUMMARY
[facility] Medicine  Discharge Summary      Patient Name: Negrita Castro  MRN: [id number]  Patient Class: IP- Inpatient  Admission Date: 12/11/2020  Hospital Length of Stay: 12 days  Discharge Date and Time: 12/23/2020 12:05 PM  Attending Physician: No att. providers found   Discharging Provider: Katt Richter MD  Primary Care Provider: Yoni Renteria MD      HPI:   Miss Castro is a 60 y.o. female IDDM, prior right breast cancer and recent diagnosis of left breast CA about to start chemotherapy, known COVID-19-positive 12/1/20 presenting with SOB.  Patient reports loss of sense of smell at our initial system and denied any respiratory complaints up until about 3 days ago.  Since then she reports worsening shortness of breath.  Also reports some loose stools and fatigue.  She is requiring 15 L nasal cannula in the ED after being 81% on room air.  She is going to be admitted to the ICU on high-flow nasal cannula.  Full code status confirmed on admission    * No surgery found *      Hospital Course:   Admitted to undergo treatment of severe COVID-19 pneumonia.  Was treated with NIPPV.  She improved.  NIPPV removed.  She continued to get supplemental oxygen via high flow NC.  She received dexamethasone, remdesivir, azithromycin, and ceftriaxone.  Patient made some improvements and was down to 6 L nasal cannula then had acute worsening on 12/19 and required to be put back on BiPAP.  Was started on Lasix with some improvement.  She was able to slowly be weaned off the BiPAP to high-flow nasal cannula.  Her insulin was adjusted during her stay.  Pulmonology consultant assisted with management.  Patient completed remdesivir course as well as steroid course.  She was evaluated for home oxygen and qualified.  Home oxygen was arranged and she was cleared for discharge by pulmonologist.  She will follow up with her PCP, pulmonology, as well as her oncologist for chemo planning.  She was agreeable  to discharge plan    Discharge exam  Awake, alert, no apparent distress  Regular rate and rhythm no murmur  Lungs clear to auscultation no wheeze  Abdomen soft nontender nondistended         Consults:   Consults (From admission, onward)        Status Ordering Provider     [facility]  Once     Provider:  (Not yet assigned)    Completed SEEMA BUCIO     Inpatient consult to PICC team ([facility])  Once     Provider:  (Not yet assigned)    Completed SEEMA BUCIO     Inpatient consult to Pulmonology  Once     Provider:  Rocky Justin MD    Completed JAIME OBREGON          No new Assessment & Plan notes have been filed under this hospital service since the last note was generated.  Service: Hospital Medicine    Final Active Diagnoses:    Diagnosis Date Noted POA    PRINCIPAL PROBLEM:  Acute hypoxemic respiratory failure [J96.01] 12/12/2020 Yes    Pneumonia due to COVID-19 virus [U07.1, J12.89] 12/11/2020 Yes    Type 2 diabetes mellitus with hyperglycemia, with long-term current use of insulin [E11.65, Z79.4] 01/08/2020 Not Applicable    Mixed hyperlipidemia [E78.2] 01/08/2020 Yes    Breast cancer [C50.919] 02/26/2018 Yes    Depression [F32.9] 01/22/2016 Yes      Problems Resolved During this Admission:    Diagnosis Date Noted Date Resolved POA    ARDS (adult respiratory distress syndrome) [J80] 12/12/2020 12/16/2020 Yes       Discharged Condition: good    Disposition: Home or Self Care    Follow Up:  Follow-up Information     Yoni Renteria MD In 3 days.    Specialty: Internal Medicine  Why: No longer in office- new doctor not in office until after Jan 1st.   Contact information:  [address]  [place] [address]  [phone]             TALISHA Vick MD In 1 week.    Specialties: Hematology, Oncology, Hematology and Oncology  Contact information:  [address]  [address]  [place] [address]  [phone]             [facility]    Specialty: DME Provider  Why:  "DME- Home O2   Contact information:  [address]  [address]  [facility] [address]  [phone]             Manuelito Shepard MD.    Specialty: Family Medicine  Why: office will contact you to schedule sooner apt than 1/21  Contact information:  [address]  [address]  [place] [address]  [phone]             [facility]    Specialty: Home Health Services  Why: Home Health  Contact information:  [address] [facility]  [address]  [place] [address]  [phone]             Rocky Justin MD In 1 week.    Specialty: Pulmonary Disease  Contact information:  [address] [facility]  [address]  [place] [address]  [phone]                 Patient Instructions:      OXYGEN FOR HOME USE     Order Specific Question Answer Comments   Liter Flow 3    Duration Continuous    Qualifying SpO2: 83    Testing done at: Rest    Route nasal cannula    Portable mode: continuous    Device home concentrator with portable unit portable tank   Length of need (in months): 3 mos    Patient condition with qualifying saturation  COVID   Height: 5' 11" (1.803 m)    Weight: 85.5 kg (188 lb 7.9 oz)    Does patient have medical equipment at home? none    Alternative treatment measures have been tried or considered and deemed clinically ineffective. Yes    Vendor: [facility]    Expected Date of Delivery: 12/23/2020      Ambulatory referral/consult to Home Health   Standing Status: Future   Referral Priority: Routine Referral Type: Home Health   Referral Reason: Specialty Services Required   Requested Specialty: Home Health Services   Number of Visits Requested: 1     Notify your health care provider if you experience any of the following:  temperature >100.4     Notify your health care provider if you experience any of the following:  difficulty breathing or increased cough     Notify your health care provider if you experience any of the following:  persistent dizziness, light-headedness, or visual disturbances     Notify your " health care provider if you experience any of the following:  increased confusion or weakness     Activity as tolerated       Significant Diagnostic Studies: Labs:   BMP:   Recent Labs   Lab 12/22/20  0446 12/23/20  0428   * 170*    137   K 4.1 3.8   CL 95 96   CO2 28 29   BUN 25* 20   CREATININE 0.8 0.7   CALCIUM 9.0 8.7   MG 2.0 1.9    and CBC   Recent Labs   Lab 12/22/20  0446 12/23/20  0428   WBC 9.09 7.96   HGB 10.8* 11.5*   HCT 33.8* 36.3*    335   X-Ray Chest AP Portable  Order: [id number]  Status:  Final result   Visible to patient:  Yes (Patient Portal and MyChart Bedside)   Next appt:  01/04/2021 at 11:30 AM in Hematology and Oncology (TALISHA Vick MD)  Details    Reading Physician Reading Date Result Priority   Floyd Nguyen Jr., MD  [phone] 12/11/2020 STAT      Narrative & Impression     EXAMINATION:  XR CHEST AP PORTABLE     CLINICAL HISTORY:  Suspected Covid-19 Virus Infection;     TECHNIQUE:  Single frontal view of the chest was performed.     COMPARISON:  Prior chest of September 6, 2020.     FINDINGS:  The cardiac size and contours within normal limits.  There is a new right lower lobe infiltrate and a left mid and lower lung field infiltrate since the prior study consistent with pneumonia.  The right upper lung field and left lung apex are spared.  No pneumothorax or pleural effusion is noted.     Impression:     Right lower lobe and left mid and lower lung field new infiltrates consistent with pneumonia.     This report was flagged in Epic as abnormal.        Electronically signed by: Floyd Nguyen MD  Date:                                            12/11/2020  Time:                                           13:31         X-Ray Chest AP Portable  Order: [id number]  Status:  Final result   Visible to patient:  Yes (Patient Portal and MyChart Bedside)   Next appt:  01/04/2021 at 11:30 AM in Hematology and Oncology (TALISHA Vick MD)  Details    Reading Physician  Reading Date Result Priority   Floyd Nguyen Jr., MD  [phone] 12/12/2020 STAT      Narrative & Impression     EXAMINATION:  XR CHEST AP PORTABLE     CLINICAL HISTORY:  central line placement;     TECHNIQUE:  Single frontal view of the chest was performed.     COMPARISON:  Chest x-ray performed December 11, 2020.     FINDINGS:  A central line has been placed entering at the right neck and ending in the superior vena cava in good position.  The mediastinal and cardiac size and contours normal.  There is decreased density of the diffuse lung infiltrate seen throughout the left mid and lower lung field and right lung base.  No pneumothorax is seen.     Impression:     Central line placed in good position without pneumothorax.  Decreased density of the diffuse left lung and right base infiltrates.        Electronically signed by: Floyd Nguyen MD  Date:                                            12/12/2020  Time:                                           16:33            X-Ray Chest 1 View  Order: [id number]  Status:  Final result   Visible to patient:  Yes (Patient Portal and MyChart Bedside)   Next appt:  01/04/2021 at 11:30 AM in Hematology and Oncology (TALISHA Vick MD)  Details    Reading Physician Reading Date Result Priority   Floyd Nguyen Jr., MD  [phone] 12/16/2020 Routine      Narrative & Impression     EXAMINATION:  XR CHEST 1 VIEW     CLINICAL HISTORY:  covid 19;     TECHNIQUE:  Single frontal view of the chest was performed.     COMPARISON:  Chest portable of December 12, 2020.     FINDINGS:  A central line enters at the right neck and ends in the superior vena cava.  The cardiac size is within normal limits although obscured by bilateral perihilar and lower lobe infiltrates which have increased from the prior study.  No pneumothorax or pleural effusion is noted.     Impression:     Increasing bilateral perihilar and lower lobe infiltrates.  Central line in position.   The        Electronically signed by: Floyd Nguyen MD  Date:                                            12/16/2020  Time:                                           08:20              X-Ray Chest AP Portable  Order: [id number]  Status:  Final result   Visible to patient:  Yes (Patient Portal and MyChart Bedside)   Next appt:  01/04/2021 at 11:30 AM in Hematology and Oncology (TALISHA Vick MD)  Details    Reading Physician Reading Date Result Priority   Cory Schultz MD  [phone]  [phone] 12/18/2020 STAT      Narrative & Impression     EXAMINATION:  XR CHEST AP PORTABLE     CLINICAL HISTORY:  resp failure;     TECHNIQUE:  Single frontal view of the chest was performed.     COMPARISON:  12/16/2020     FINDINGS:  A right IJ central line is been removed.  The cardiomediastinal silhouette is with normal limits.  There are moderate perihilar and basilar predominant pulmonary infiltrates, increased on the right and without significant change on the left.  There is no pleural effusion or pneumothorax.  Surgical clips are scattered over the chest.     Impression:     Moderate bilateral pulmonary infiltrates, mildly increased on the right.        Electronically signed by: Cory Schultz MD  Date:                                            12/18/2020  Time:                                           16:12            X-Ray Chest 1 View  Order: [id number]  Status:  Final result   Visible to patient:  Yes (Patient Portal and MyChart Bedside)   Next appt:  01/04/2021 at 11:30 AM in Hematology and Oncology (TALISHA Vick MD)  Details    Reading Physician Reading Date Result Priority   Laura Jorgensen MD  [phone]  [phone] 12/19/2020 Routine      Narrative & Impression     EXAMINATION:  XR CHEST 1 VIEW     CLINICAL HISTORY:  SOB;     TECHNIQUE:  Single frontal view of the chest was performed.     COMPARISON:  12/18/2020     FINDINGS:  There is patchy opacification of the entirety of the pulmonary  parenchyma similar to prior exam.     Impression:     There is continued patchy opacification of the entirety of the pulmonary parenchyma concerning for pneumonia versus edema.        Electronically signed by: Laura Jorgensen MD  Date:                                            12/19/2020  Time:                                           07:18         X-Ray Chest 1 View  Order: [id number]  Status:  Final result   Visible to patient:  Yes (Patient Portal and MyChart Bedside)   Next appt:  01/04/2021 at 11:30 AM in Hematology and Oncology (TALISHA Vick MD)  Details    Reading Physician Reading Date Result Priority   Laura Jorgensen MD  [phone]  [phone] 12/20/2020 Routine      Narrative & Impression     EXAMINATION:  XR CHEST 1 VIEW     CLINICAL HISTORY:  covid pneumonia;     TECHNIQUE:  Single frontal view of the chest was performed.     COMPARISON:  12/19/2020     FINDINGS:  There is patchy opacification of the entirety of the pulmonary parenchyma similar to prior exam.     Impression:     There is continued patchy opacification of the entirety of the pulmonary parenchyma concerning for pneumonia.        Electronically signed by: Laura Jorgensen MD  Date:                                            12/20/2020  Time:                                           06:57             Microbiology Results (Last 90 Days)    Procedure Component Value Units Date/Time   Blood culture (site 1) [[id number]] Collected: 12/11/20 2035   Order Status: Completed Specimen: Blood from Antecubital, Right Arm Updated: 12/17/20 1212    Blood Culture, Routine No growth after 5 days.   Narrative:     Site # 1, aerobic and anaerobic   Blood culture (site 2) [[id number]] Collected: 12/11/20 2035   Order Status: Completed Specimen: Blood from Antecubital, Right Hand Updated: 12/17/20 1212    Blood Culture, Routine No growth after 5 days.   Narrative:     Site # 2, aerobic only               Pending Diagnostic Studies:     None          Medications:  Reconciled Home Medications:      Medication List      CHANGE how you take these medications    carisoprodoL 350 MG tablet  Commonly known as: SOMA  Take 1 tablet (350 mg total) by mouth 3 (three) times daily as needed.  What changed: reasons to take this     dexAMETHasone 4 MG Tab  Commonly known as: DECADRON  Take 1 tablet b.i.d. with meals on Monday, Tuesday, Wednesday.  Repeat q. 3 weeks before chemotherapy  What changed:   · how much to take  · how to take this  · when to take this     ondansetron 8 MG Tbdl  Commonly known as: ZOFRAN-ODT  Take 1 tablet (8 mg total) by mouth every 8 (eight) hours as needed.  What changed: reasons to take this     promethazine 25 MG tablet  Commonly known as: PHENERGAN  Take 1 tablet (25 mg total) by mouth every 4 to 6 hours as needed.  What changed: reasons to take this        CONTINUE taking these medications    diazePAM 10 MG Tab  Commonly known as: VALIUM  Take 10 mg by mouth 4 (four) times daily as needed (anxiety).     dronabinoL 2.5 MG capsule  Commonly known as: MARINOL  Take 1 capsule (2.5 mg total) by mouth 2 (two) times daily before meals.     enalapril 5 MG tablet  Commonly known as: VASOTEC  Take 5 mg by mouth once daily.     furosemide 20 MG tablet  Commonly known as: LASIX  TAKE ONE TABLET BY MOUTH EVERY DAY     HYDROcodone-acetaminophen  mg per tablet  Commonly known as: NORCO  Take 1 tablet by mouth every 6 (six) hours as needed for Pain.     insulin glargine 100 unit/mL injection  Commonly known as: LANTUS U-100 INSULIN  Inject 35 Units into the skin every evening.     metFORMIN 500 MG ER 24hr tablet  Commonly known as: GLUCOPHAGE-XR  Take 2,000 mg by mouth every evening.     pravastatin 40 MG tablet  Commonly known as: PRAVACHOL  Take 40 mg by mouth every morning.     TRULICITY 0.75 mg/0.5 mL pen injector  Generic drug: dulaglutide  Inject 0.75 mg into the skin once a week. Wed        STOP taking these medications    zolpidem 10 mg  Tab  Commonly known as: AMBIEN            Indwelling Lines/Drains at time of discharge:   Lines/Drains/Airways     None                 Time spent on the discharge of patient: 35 minutes  Patient was seen and examined on the date of discharge and determined to be suitable for discharge.     Plan of care discussed with pulmonologist on day of discharge    Katt Richter MD  Department of Hospital Medicine  [facility]

## 2020-12-23 NOTE — PLAN OF CARE
Problem: Physical Therapy Goal  Goal: Physical Therapy Goal  Description: Goals to be met by: 2021    Patient will increase functional independence with mobility by performin. Supine to sit with [place]  2. Sit to supine with [place]  3. Sit to stand transfer with [place]  4. Bed to chair transfer with [place] using no AD  5. Gait  x 50 feet with Supervision using no AD.     Outcome: Ongoing, Progressing   Ambulate with rw and assistance for safety with O2 attached.

## 2020-12-24 ENCOUNTER — TELEPHONE (OUTPATIENT)
Dept: FAMILY MEDICINE | Facility: CLINIC | Age: 60
End: 2020-12-24

## 2020-12-24 ENCOUNTER — PATIENT OUTREACH (OUTPATIENT)
Dept: ADMINISTRATIVE | Facility: CLINIC | Age: 60
End: 2020-12-24

## 2020-12-24 NOTE — PATIENT INSTRUCTIONS
Pneumonia (Adult)  Pneumonia is an infection deep within the lungs. It is in the small air sacs (alveoli). Pneumonia may be caused by a virus or bacteria. Pneumonia caused by bacteria is usually treated with an antibiotic. Severe cases may need to be treated in the hospital. Milder cases can be treated at home. Symptoms usually start to get better during the first 2 days of treatment.    Home care  Follow these guidelines when caring for yourself at home:  · Rest at home for the first 2 to 3 days, or until you feel stronger. Dont let yourself get overly tired when you go back to your activities.  · Stay away from cigarette smoke - yours or other peoples.  · You may use acetaminophen or ibuprofen to control fever or pain, unless another medicine was prescribed. If you have chronic liver or kidney disease, talk with your healthcare provider before using these medicines. Also talk with your provider if youve had a stomach ulcer or gastrointestinal bleeding. Dont give aspirin to anyone younger than 18 years of age who is ill with a fever. It may cause severe liver damage.  · Your appetite may be poor, so a light diet is fine.  · Drink 6 to 8 glasses of fluids every day to make sure you are getting enough fluids. Beverages can include water, sport drinks, sodas without caffeine, juices, tea, or soup. Fluids will help loosen secretions in the lung. This will make it easier for you to cough up the phlegm (sputum). If you also have heart or kidney disease, check with your healthcare provider before you drink extra fluids.  · Take antibiotic medicine prescribed until it is all gone, even if you are feeling better after a few days.  Follow-up care  Follow up with your healthcare provider in the next 2 to 3 days, or as advised. This is to be sure the medicine is helping you get better.  If you are 65 or older, you should get a pneumococcal vaccine and a yearly flu (influenza) shot. You should also get these vaccines if  you have chronic lung disease like asthma, emphysema, or COPD. Recently, a second type of pneumonia vaccine has become available for everyone over 65 years old. This is in addition to the previous vaccine. Ask your provider about this.  When to seek medical advice  Call your healthcare provider right away if any of these occur:  · You dont get better within the first 48 hours of treatment  · Shortness of breath gets worse  · Rapid breathing (more than 25 breaths per minute)  · Coughing up blood  · Chest pain gets worse with breathing  · Fever of 100.4°F (38°C) or higher that doesnt get better with fever medicine  · Weakness, dizziness, or fainting that gets worse  · Thirst or dry mouth that gets worse  · Sinus pain, headache, or a stiff neck  · Chest pain not caused by coughing  Date Last Reviewed: 1/1/2017 © 2000-2017 [employer]. [address] [place] [address]. All rights reserved. This information is not intended as a substitute for professional medical care. Always follow your healthcare professional's instructions.      Instructions for Patients with Confirmed or Suspected COVID-19    If you are awaiting your test result, you will either be called or it will be released to the patient portal.  If you have any questions about your test, please visit [email or web address] or call our COVID-19 information line at [phone].      Instructions for non-hospitalized or discharged patients with confirmed or suspected COVID-19:       Stay home except to get medical care.    Separate yourself from other people and animals in your home.    Call ahead before visiting your doctor.    Wear a face mask.    Cover your coughs and sneezes.    Clean your hands often.    Avoid sharing personal household items.    Clean all high-touch surfaces every day.    Monitor your symptoms. Seek prompt medical attention if your illness is worsening (e.g., difficulty breathing). Before seeking  care, call your healthcare provider.    If you have a medical emergency and must call 911, notify the dispatcher that you have or are being evaluated for COVID-19. If possible, put on a face mask before emergency medical services arrive.    Use the following symptom-based strategy to return to normal activity following a suspected or confirmed case of COVID-19. Continue isolation until:   o At least 3 days (72 hours) have passed since recovery defined as resolution of fever without the use of fever-reducing medications and improvement in respiratory symptoms (e.g. cough, shortness of breath), and   o At least 10 days have passed since the first positive test.       As one of the next steps, you will receive a call or text from the [place] Department of Health ([facility]) COVID-19 Tracing Team. See the contact information below so you know not to ignore the health departments call. It is important that you contact them back immediately so they can help.     Contact Tracer Number:  [phone]  Caller ID for most carriers: [place] Dept Health    What is contact tracing?   Contact tracing is a process that helps identify everyone who has been in close contact with an infected person. Contact tracers let those people know they may have been exposed and guide them on next steps. Confidentiality is important for everyone; no one will be told who may have exposed them to the virus.   Your involvement is important. The more we know about where and how this virus is spreading, the better chance we have at stopping it from spreading further.  What does exposure mean?   Exposure means you have been within 6 feet for more than 15 minutes with a person who has or had COVID-19.  What kind of questions do the contact tracers ask?   A contact tracer will confirm your basic contact information including name, address, phone number, and next of kin, as well as asking about any symptoms you may have had. Theyll also ask you how you  think you may have gotten sick, such as places where you may have been exposed to the virus, and people you were with. Those names will never be shared with anyone outside of that call, and will only be used to help trace and stop the spread of the virus.   I have privacy concerns. How will the state use my information?   Your privacy about your health is important. All calls are completed using call centers that use the appropriate health privacy protection measures (HIPAA compliance), meaning that your patient information is safe. No one will ever ask you any questions related to immigration status. Your health comes first.   Do I have to participate?   You do not have to participate, but we strongly encourage you to. Contact tracing can help us catch and control new outbreaks as theyre developing to keep your friends and family safe.   What if I dont hear from anyone?   If you dont receive a call within 24 hours, you can call the number above right away to inquire about your status. That line is open from 8:00 am - 8:00 p.m., 7 days a week.  Contact tracing saves lives! Together, we have the power to beat this virus and keep our loved ones and neighbors safe.       Instructions for household members, intimate partners and caregivers in a non-healthcare setting of a patient with confirmed or suspected COVID-19:         Close contacts should monitor their health and call their healthcare provider right away if they develop symptoms suggestive of COVID-19 (e.g., fever, cough, shortness of breath).    Stay home except to get medical care. Separate yourself from other people and animals in the home.   Monitor the patients symptoms. If the patient is getting sicker, call his or her healthcare provider. If the patient has a medical emergency and you need to call 911, notify the dispatch personnel that the patient has or is being evaluated for COVID-19.    Wear a facemask when around other people such as  sharing a room or vehicle and before entering a healthcare provider's office.   Cover coughs and sneezes with a tissue. Throw used tissues in a lined trash can immediately and wash hands.   Clean hands often with soap and water for at least 20 seconds or with an alcohol-based hand , rubbing hands together until they feel dry. Avoid touching your eyes, nose, and mouth with unwashed hands.   Clean all high-touch; surfaces every day, including counters, tabletops, doorknobs, bathroom fixtures, toilets, phones, keyboards, tablets, bedside tables, etc. Use a household cleaning spray or wipe according to label instructions.   Avoid sharing personal household items such as dishes, drinking glasses, cups, towels, bedding, etc. After these items are used, they should be washed thoroughly with soap and water.   Continue isolation until:   At least 3 days (72 hours) have passed since recovery defined as resolution of fever without the use of fever-reducing medications and improvement in respiratory symptoms (e.g. cough, shortness of breath), and    At least 10 days have passed since the patients first positive test.    https://www.cdc.gov/coronavirus/2019-ncov/your-health/index.htm

## 2020-12-30 ENCOUNTER — TELEPHONE (OUTPATIENT)
Dept: FAMILY MEDICINE | Facility: CLINIC | Age: 60
End: 2020-12-30

## 2020-12-30 NOTE — TELEPHONE ENCOUNTER
Chuy called back in regarding message below. She stated she will get in touch with pts case manger to see waht they can do and give us a call back

## 2020-12-30 NOTE — TELEPHONE ENCOUNTER
----- Message from Selena Munguia sent at 12/30/2020 12:39 PM CST -----  Regarding: Needs call back from nurse  Contact: Negrita Castro  Pt was just released from the hospital on 12/23. She also has cancer. Dr. Schmitz has officially retired and they don't answer the phones anymore . She says she is on oxygen but was told her PCP has to order her a Pulse Ox . She isn't sure what she should do not because her NP appt isn't until 01/21. Please give the patient a call back# [phone]

## 2020-12-30 NOTE — TELEPHONE ENCOUNTER
----- Message from Selena Munguia sent at 12/30/2020  2:26 PM CST -----  Regarding: Notify nurse  Contact: Negrita STRAUSS is calling . Reached out to Dr. Harrison's office and spoke with reception about request for Pulse Ox . It will be reviewed by the Physician Assistant for the patient . She just wanted to let nurse Ty know .

## 2020-12-31 ENCOUNTER — TELEPHONE (OUTPATIENT)
Dept: FAMILY MEDICINE | Facility: CLINIC | Age: 60
End: 2020-12-31

## 2020-12-31 DIAGNOSIS — U07.1 COVID-19: Primary | ICD-10-CM

## 2021-01-06 ENCOUNTER — TELEPHONE (OUTPATIENT)
Dept: FAMILY MEDICINE | Facility: CLINIC | Age: 61
End: 2021-01-06

## 2021-01-07 ENCOUNTER — TELEPHONE (OUTPATIENT)
Dept: FAMILY MEDICINE | Facility: CLINIC | Age: 61
End: 2021-01-07

## 2021-01-08 ENCOUNTER — TELEPHONE (OUTPATIENT)
Dept: FAMILY MEDICINE | Facility: CLINIC | Age: 61
End: 2021-01-08

## 2021-01-08 ENCOUNTER — LAB VISIT (OUTPATIENT)
Dept: PRIMARY CARE CLINIC | Facility: CLINIC | Age: 61
End: 2021-01-08
Payer: MEDICARE

## 2021-01-08 ENCOUNTER — TELEPHONE (OUTPATIENT)
Dept: HEMATOLOGY/ONCOLOGY | Facility: CLINIC | Age: 61
End: 2021-01-08

## 2021-01-08 DIAGNOSIS — Z03.818 ENCNTR FOR OBS FOR SUSP EXPSR TO OTH BIOLG AGENTS RULED OUT: ICD-10-CM

## 2021-01-08 DIAGNOSIS — Z03.818 ENCNTR FOR OBS FOR SUSP EXPSR TO OTH BIOLG AGENTS RULED OUT: Primary | ICD-10-CM

## 2021-01-08 PROCEDURE — U0003 INFECTIOUS AGENT DETECTION BY NUCLEIC ACID (DNA OR RNA); SEVERE ACUTE RESPIRATORY SYNDROME CORONAVIRUS 2 (SARS-COV-2) (CORONAVIRUS DISEASE [COVID-19]), AMPLIFIED PROBE TECHNIQUE, MAKING USE OF HIGH THROUGHPUT TECHNOLOGIES AS DESCRIBED BY CMS-2020-01-R: HCPCS

## 2021-01-10 LAB — SARS-COV-2 RNA RESP QL NAA+PROBE: NOT DETECTED

## 2021-01-11 ENCOUNTER — TELEPHONE (OUTPATIENT)
Dept: HEMATOLOGY/ONCOLOGY | Facility: CLINIC | Age: 61
End: 2021-01-11

## 2021-01-12 ENCOUNTER — TELEPHONE (OUTPATIENT)
Dept: PULMONOLOGY | Facility: CLINIC | Age: 61
End: 2021-01-12

## 2021-01-12 ENCOUNTER — OFFICE VISIT (OUTPATIENT)
Dept: HEMATOLOGY/ONCOLOGY | Facility: CLINIC | Age: 61
End: 2021-01-12
Payer: MEDICARE

## 2021-01-12 VITALS
BODY MASS INDEX: 26.78 KG/M2 | TEMPERATURE: 98 F | DIASTOLIC BLOOD PRESSURE: 83 MMHG | RESPIRATION RATE: 17 BRPM | SYSTOLIC BLOOD PRESSURE: 146 MMHG | WEIGHT: 192 LBS | HEART RATE: 87 BPM

## 2021-01-12 DIAGNOSIS — Z17.0 MALIGNANT NEOPLASM OF NIPPLE OF RIGHT BREAST IN FEMALE, ESTROGEN RECEPTOR POSITIVE: Primary | ICD-10-CM

## 2021-01-12 DIAGNOSIS — C50.011 MALIGNANT NEOPLASM OF NIPPLE OF RIGHT BREAST IN FEMALE, ESTROGEN RECEPTOR POSITIVE: Primary | ICD-10-CM

## 2021-01-12 DIAGNOSIS — Z17.0 MALIGNANT NEOPLASM OF NIPPLE OF LEFT BREAST IN FEMALE, ESTROGEN RECEPTOR POSITIVE: ICD-10-CM

## 2021-01-12 DIAGNOSIS — C50.012 MALIGNANT NEOPLASM OF NIPPLE OF LEFT BREAST IN FEMALE, ESTROGEN RECEPTOR POSITIVE: ICD-10-CM

## 2021-01-12 DIAGNOSIS — Z85.3 HISTORY OF BILATERAL BREAST CANCER: ICD-10-CM

## 2021-01-12 PROCEDURE — 3008F PR BODY MASS INDEX (BMI) DOCUMENTED: ICD-10-PCS | Mod: S$GLB,,, | Performed by: INTERNAL MEDICINE

## 2021-01-12 PROCEDURE — 1111F DSCHRG MED/CURRENT MED MERGE: CPT | Mod: S$GLB,,, | Performed by: INTERNAL MEDICINE

## 2021-01-12 PROCEDURE — 99214 OFFICE O/P EST MOD 30 MIN: CPT | Mod: S$GLB,,, | Performed by: INTERNAL MEDICINE

## 2021-01-12 PROCEDURE — 1111F PR DISCHARGE MEDS RECONCILED W/ CURRENT OUTPATIENT MED LIST: ICD-10-PCS | Mod: S$GLB,,, | Performed by: INTERNAL MEDICINE

## 2021-01-12 PROCEDURE — 99214 PR OFFICE/OUTPT VISIT, EST, LEVL IV, 30-39 MIN: ICD-10-PCS | Mod: S$GLB,,, | Performed by: INTERNAL MEDICINE

## 2021-01-12 PROCEDURE — 1126F PR PAIN SEVERITY QUANTIFIED, NO PAIN PRESENT: ICD-10-PCS | Mod: S$GLB,,, | Performed by: INTERNAL MEDICINE

## 2021-01-12 PROCEDURE — 3008F BODY MASS INDEX DOCD: CPT | Mod: S$GLB,,, | Performed by: INTERNAL MEDICINE

## 2021-01-12 PROCEDURE — 1126F AMNT PAIN NOTED NONE PRSNT: CPT | Mod: S$GLB,,, | Performed by: INTERNAL MEDICINE

## 2021-01-14 ENCOUNTER — OFFICE VISIT (OUTPATIENT)
Dept: FAMILY MEDICINE | Facility: CLINIC | Age: 61
End: 2021-01-14
Payer: MEDICARE

## 2021-01-14 VITALS
HEART RATE: 95 BPM | WEIGHT: 195 LBS | SYSTOLIC BLOOD PRESSURE: 156 MMHG | BODY MASS INDEX: 27.3 KG/M2 | HEIGHT: 71 IN | OXYGEN SATURATION: 99 % | DIASTOLIC BLOOD PRESSURE: 80 MMHG

## 2021-01-14 DIAGNOSIS — J80 ACUTE RESPIRATORY DISTRESS SYNDROME: ICD-10-CM

## 2021-01-14 DIAGNOSIS — J12.82 PNEUMONIA DUE TO COVID-19 VIRUS: Primary | ICD-10-CM

## 2021-01-14 DIAGNOSIS — T45.1X5A CHEMOTHERAPY-INDUCED NEUTROPENIA: ICD-10-CM

## 2021-01-14 DIAGNOSIS — U07.1 PNEUMONIA DUE TO COVID-19 VIRUS: Primary | ICD-10-CM

## 2021-01-14 DIAGNOSIS — E78.2 MIXED HYPERLIPIDEMIA: ICD-10-CM

## 2021-01-14 DIAGNOSIS — Z17.0 MALIGNANT NEOPLASM OF UPPER-OUTER QUADRANT OF RIGHT BREAST IN FEMALE, ESTROGEN RECEPTOR POSITIVE: ICD-10-CM

## 2021-01-14 DIAGNOSIS — Z79.4 TYPE 2 DIABETES MELLITUS WITH HYPERGLYCEMIA, WITH LONG-TERM CURRENT USE OF INSULIN: ICD-10-CM

## 2021-01-14 DIAGNOSIS — C50.411 MALIGNANT NEOPLASM OF UPPER-OUTER QUADRANT OF RIGHT BREAST IN FEMALE, ESTROGEN RECEPTOR POSITIVE: ICD-10-CM

## 2021-01-14 DIAGNOSIS — E11.65 TYPE 2 DIABETES MELLITUS WITH HYPERGLYCEMIA, WITH LONG-TERM CURRENT USE OF INSULIN: ICD-10-CM

## 2021-01-14 DIAGNOSIS — D70.1 CHEMOTHERAPY-INDUCED NEUTROPENIA: ICD-10-CM

## 2021-01-14 PROCEDURE — 3008F BODY MASS INDEX DOCD: CPT | Mod: S$GLB,,, | Performed by: FAMILY MEDICINE

## 2021-01-14 PROCEDURE — 99204 PR OFFICE/OUTPT VISIT, NEW, LEVL IV, 45-59 MIN: ICD-10-PCS | Mod: S$GLB,,, | Performed by: FAMILY MEDICINE

## 2021-01-14 PROCEDURE — 1111F PR DISCHARGE MEDS RECONCILED W/ CURRENT OUTPATIENT MED LIST: ICD-10-PCS | Mod: S$GLB,,, | Performed by: FAMILY MEDICINE

## 2021-01-14 PROCEDURE — 3046F PR MOST RECENT HEMOGLOBIN A1C LEVEL > 9.0%: ICD-10-PCS | Mod: S$GLB,,, | Performed by: FAMILY MEDICINE

## 2021-01-14 PROCEDURE — 3008F PR BODY MASS INDEX (BMI) DOCUMENTED: ICD-10-PCS | Mod: S$GLB,,, | Performed by: FAMILY MEDICINE

## 2021-01-14 PROCEDURE — 3079F PR MOST RECENT DIASTOLIC BLOOD PRESSURE 80-89 MM HG: ICD-10-PCS | Mod: S$GLB,,, | Performed by: FAMILY MEDICINE

## 2021-01-14 PROCEDURE — 3077F SYST BP >= 140 MM HG: CPT | Mod: S$GLB,,, | Performed by: FAMILY MEDICINE

## 2021-01-14 PROCEDURE — 99204 OFFICE O/P NEW MOD 45 MIN: CPT | Mod: S$GLB,,, | Performed by: FAMILY MEDICINE

## 2021-01-14 PROCEDURE — 3077F PR MOST RECENT SYSTOLIC BLOOD PRESSURE >= 140 MM HG: ICD-10-PCS | Mod: S$GLB,,, | Performed by: FAMILY MEDICINE

## 2021-01-14 PROCEDURE — 3046F HEMOGLOBIN A1C LEVEL >9.0%: CPT | Mod: S$GLB,,, | Performed by: FAMILY MEDICINE

## 2021-01-14 PROCEDURE — 3079F DIAST BP 80-89 MM HG: CPT | Mod: S$GLB,,, | Performed by: FAMILY MEDICINE

## 2021-01-14 PROCEDURE — 1111F DSCHRG MED/CURRENT MED MERGE: CPT | Mod: S$GLB,,, | Performed by: FAMILY MEDICINE

## 2021-01-14 RX ORDER — PRAVASTATIN SODIUM 40 MG/1
40 TABLET ORAL EVERY MORNING
Qty: 90 TABLET | Refills: 3 | Status: SHIPPED | OUTPATIENT
Start: 2021-01-14 | End: 2021-04-13 | Stop reason: ALTCHOICE

## 2021-01-14 RX ORDER — ENALAPRIL MALEATE 5 MG/1
5 TABLET ORAL DAILY
Qty: 90 TABLET | Refills: 3 | Status: SHIPPED | OUTPATIENT
Start: 2021-01-14 | End: 2022-01-17 | Stop reason: SDUPTHER

## 2021-01-14 RX ORDER — METFORMIN HYDROCHLORIDE 500 MG/1
1000 TABLET, EXTENDED RELEASE ORAL 2 TIMES DAILY WITH MEALS
Qty: 360 TABLET | Refills: 3 | Status: SHIPPED | OUTPATIENT
Start: 2021-01-14 | End: 2021-11-02 | Stop reason: SDUPTHER

## 2021-01-18 DIAGNOSIS — R22.2 MASS OF CHEST WALL, LEFT: Primary | ICD-10-CM

## 2021-01-19 ENCOUNTER — TELEPHONE (OUTPATIENT)
Dept: PULMONOLOGY | Facility: CLINIC | Age: 61
End: 2021-01-19

## 2021-01-19 ENCOUNTER — HOSPITAL ENCOUNTER (OUTPATIENT)
Dept: RADIOLOGY | Facility: HOSPITAL | Age: 61
Discharge: HOME | End: 2021-01-19
Attending: INTERNAL MEDICINE
Payer: MEDICARE

## 2021-01-19 ENCOUNTER — OFFICE VISIT (OUTPATIENT)
Dept: PULMONOLOGY | Facility: CLINIC | Age: 61
End: 2021-01-19
Payer: MEDICARE

## 2021-01-19 ENCOUNTER — LAB VISIT (OUTPATIENT)
Dept: LAB | Facility: HOSPITAL | Age: 61
End: 2021-01-19
Attending: INTERNAL MEDICINE
Payer: MEDICARE

## 2021-01-19 VITALS
BODY MASS INDEX: 27.2 KG/M2 | HEIGHT: 71 IN | HEART RATE: 99 BPM | SYSTOLIC BLOOD PRESSURE: 174 MMHG | DIASTOLIC BLOOD PRESSURE: 79 MMHG | WEIGHT: 194.31 LBS | OXYGEN SATURATION: 98 %

## 2021-01-19 DIAGNOSIS — J44.9 CHRONIC OBSTRUCTIVE PULMONARY DISEASE, UNSPECIFIED COPD TYPE: ICD-10-CM

## 2021-01-19 DIAGNOSIS — R07.81 PLEURITIC CHEST PAIN: ICD-10-CM

## 2021-01-19 DIAGNOSIS — I10 ESSENTIAL HYPERTENSION: ICD-10-CM

## 2021-01-19 DIAGNOSIS — J12.82 PNEUMONIA DUE TO COVID-19 VIRUS: ICD-10-CM

## 2021-01-19 DIAGNOSIS — R00.0 TACHYCARDIA: ICD-10-CM

## 2021-01-19 DIAGNOSIS — J96.11 CHRONIC HYPOXEMIC RESPIRATORY FAILURE: Primary | ICD-10-CM

## 2021-01-19 DIAGNOSIS — J96.11 CHRONIC HYPOXEMIC RESPIRATORY FAILURE: ICD-10-CM

## 2021-01-19 DIAGNOSIS — F06.4 ANXIETY DISORDER DUE TO KNOWN PHYSIOLOGICAL CONDITION: ICD-10-CM

## 2021-01-19 DIAGNOSIS — U07.1 PNEUMONIA DUE TO COVID-19 VIRUS: ICD-10-CM

## 2021-01-19 DIAGNOSIS — R07.89 ATYPICAL CHEST PAIN: Primary | ICD-10-CM

## 2021-01-19 LAB
ALBUMIN SERPL BCP-MCNC: 3.8 G/DL (ref 3.5–5.2)
ALP SERPL-CCNC: 123 U/L (ref 55–135)
ALT SERPL W/O P-5'-P-CCNC: 23 U/L (ref 10–44)
ANION GAP SERPL CALC-SCNC: 15 MMOL/L (ref 8–16)
AST SERPL-CCNC: 18 U/L (ref 10–40)
BASOPHILS # BLD AUTO: 0.04 K/UL (ref 0–0.2)
BASOPHILS NFR BLD: 0.5 % (ref 0–1.9)
BILIRUB SERPL-MCNC: 0.4 MG/DL (ref 0.1–1)
BUN SERPL-MCNC: 9 MG/DL (ref 6–20)
CALCIUM SERPL-MCNC: 9.3 MG/DL (ref 8.7–10.5)
CHLORIDE SERPL-SCNC: 99 MMOL/L (ref 95–110)
CO2 SERPL-SCNC: 27 MMOL/L (ref 23–29)
CREAT SERPL-MCNC: 0.8 MG/DL (ref 0.5–1.4)
D DIMER PPP IA.FEU-MCNC: 0.7 MG/L FEU
DIFFERENTIAL METHOD: ABNORMAL
EOSINOPHIL # BLD AUTO: 0 K/UL (ref 0–0.5)
EOSINOPHIL NFR BLD: 0.5 % (ref 0–8)
ERYTHROCYTE [DISTWIDTH] IN BLOOD BY AUTOMATED COUNT: 13.7 % (ref 11.5–14.5)
EST. GFR  (AFRICAN AMERICAN): >60 ML/MIN/1.73 M^2
EST. GFR  (NON AFRICAN AMERICAN): >60 ML/MIN/1.73 M^2
GLUCOSE SERPL-MCNC: 353 MG/DL (ref 70–110)
HCT VFR BLD AUTO: 42 % (ref 37–48.5)
HGB BLD-MCNC: 13.1 G/DL (ref 12–16)
IMM GRANULOCYTES # BLD AUTO: 0.05 K/UL (ref 0–0.04)
IMM GRANULOCYTES NFR BLD AUTO: 0.6 % (ref 0–0.5)
LYMPHOCYTES # BLD AUTO: 1.7 K/UL (ref 1–4.8)
LYMPHOCYTES NFR BLD: 22.1 % (ref 18–48)
MCH RBC QN AUTO: 29.2 PG (ref 27–31)
MCHC RBC AUTO-ENTMCNC: 31.2 G/DL (ref 32–36)
MCV RBC AUTO: 94 FL (ref 82–98)
MONOCYTES # BLD AUTO: 0.5 K/UL (ref 0.3–1)
MONOCYTES NFR BLD: 6 % (ref 4–15)
NEUTROPHILS # BLD AUTO: 5.6 K/UL (ref 1.8–7.7)
NEUTROPHILS NFR BLD: 70.3 % (ref 38–73)
NRBC BLD-RTO: 0 /100 WBC
PLATELET # BLD AUTO: 330 K/UL (ref 150–350)
PMV BLD AUTO: 9.5 FL (ref 9.2–12.9)
POTASSIUM SERPL-SCNC: 3.9 MMOL/L (ref 3.5–5.1)
PROT SERPL-MCNC: 7.8 G/DL (ref 6–8.4)
RBC # BLD AUTO: 4.49 M/UL (ref 4–5.4)
SODIUM SERPL-SCNC: 141 MMOL/L (ref 136–145)
WBC # BLD AUTO: 7.89 K/UL (ref 3.9–12.7)

## 2021-01-19 PROCEDURE — 3077F PR MOST RECENT SYSTOLIC BLOOD PRESSURE >= 140 MM HG: ICD-10-PCS | Mod: CPTII,S$GLB,, | Performed by: INTERNAL MEDICINE

## 2021-01-19 PROCEDURE — 3078F DIAST BP <80 MM HG: CPT | Mod: CPTII,S$GLB,, | Performed by: INTERNAL MEDICINE

## 2021-01-19 PROCEDURE — 1125F AMNT PAIN NOTED PAIN PRSNT: CPT | Mod: S$GLB,,, | Performed by: INTERNAL MEDICINE

## 2021-01-19 PROCEDURE — 71046 X-RAY EXAM CHEST 2 VIEWS: CPT | Mod: TC,FY

## 2021-01-19 PROCEDURE — 71046 X-RAY EXAM CHEST 2 VIEWS: CPT | Mod: 26,,, | Performed by: RADIOLOGY

## 2021-01-19 PROCEDURE — 3077F SYST BP >= 140 MM HG: CPT | Mod: CPTII,S$GLB,, | Performed by: INTERNAL MEDICINE

## 2021-01-19 PROCEDURE — 71046 XR CHEST PA AND LATERAL: ICD-10-PCS | Mod: 26,,, | Performed by: RADIOLOGY

## 2021-01-19 PROCEDURE — 99999 PR PBB SHADOW E&M-EST. PATIENT-LVL IV: CPT | Mod: PBBFAC,,, | Performed by: INTERNAL MEDICINE

## 2021-01-19 PROCEDURE — 36415 COLL VENOUS BLD VENIPUNCTURE: CPT

## 2021-01-19 PROCEDURE — 3008F PR BODY MASS INDEX (BMI) DOCUMENTED: ICD-10-PCS | Mod: CPTII,S$GLB,, | Performed by: INTERNAL MEDICINE

## 2021-01-19 PROCEDURE — 85379 FIBRIN DEGRADATION QUANT: CPT

## 2021-01-19 PROCEDURE — 3078F PR MOST RECENT DIASTOLIC BLOOD PRESSURE < 80 MM HG: ICD-10-PCS | Mod: CPTII,S$GLB,, | Performed by: INTERNAL MEDICINE

## 2021-01-19 PROCEDURE — 1125F PR PAIN SEVERITY QUANTIFIED, PAIN PRESENT: ICD-10-PCS | Mod: S$GLB,,, | Performed by: INTERNAL MEDICINE

## 2021-01-19 PROCEDURE — 99214 OFFICE O/P EST MOD 30 MIN: CPT | Mod: S$GLB,,, | Performed by: INTERNAL MEDICINE

## 2021-01-19 PROCEDURE — 99214 PR OFFICE/OUTPT VISIT, EST, LEVL IV, 30-39 MIN: ICD-10-PCS | Mod: S$GLB,,, | Performed by: INTERNAL MEDICINE

## 2021-01-19 PROCEDURE — 3008F BODY MASS INDEX DOCD: CPT | Mod: CPTII,S$GLB,, | Performed by: INTERNAL MEDICINE

## 2021-01-19 PROCEDURE — 99999 PR PBB SHADOW E&M-EST. PATIENT-LVL IV: ICD-10-PCS | Mod: PBBFAC,,, | Performed by: INTERNAL MEDICINE

## 2021-01-19 PROCEDURE — 85025 COMPLETE CBC W/AUTO DIFF WBC: CPT

## 2021-01-19 PROCEDURE — 80053 COMPREHEN METABOLIC PANEL: CPT

## 2021-01-19 RX ORDER — METOPROLOL TARTRATE 25 MG/1
25 TABLET, FILM COATED ORAL 2 TIMES DAILY
Qty: 60 TABLET | Refills: 11 | Status: SHIPPED | OUTPATIENT
Start: 2021-01-19 | End: 2021-11-29 | Stop reason: SDUPTHER

## 2021-01-19 RX ORDER — FLUCONAZOLE 150 MG/1
TABLET ORAL
COMMUNITY
Start: 2020-12-02 | End: 2021-04-28

## 2021-01-20 ENCOUNTER — TELEPHONE (OUTPATIENT)
Dept: PULMONOLOGY | Facility: CLINIC | Age: 61
End: 2021-01-20

## 2021-01-22 ENCOUNTER — EXTERNAL HOME HEALTH (OUTPATIENT)
Dept: HOME HEALTH SERVICES | Facility: HOSPITAL | Age: 61
End: 2021-01-22

## 2021-01-22 ENCOUNTER — HOSPITAL ENCOUNTER (OUTPATIENT)
Dept: RADIOLOGY | Facility: HOSPITAL | Age: 61
Discharge: HOME | End: 2021-01-22
Attending: INTERNAL MEDICINE
Payer: MEDICARE

## 2021-01-22 VITALS — WEIGHT: 194.44 LBS | BODY MASS INDEX: 27.22 KG/M2 | HEIGHT: 71 IN

## 2021-01-22 DIAGNOSIS — N64.59 OTHER SIGNS AND SYMPTOMS IN BREAST: ICD-10-CM

## 2021-01-22 DIAGNOSIS — R22.2 MASS OF CHEST WALL, LEFT: ICD-10-CM

## 2021-01-22 PROCEDURE — 77061 BREAST TOMOSYNTHESIS UNI: CPT | Mod: TC,PO,LT

## 2021-01-22 PROCEDURE — 76642 ULTRASOUND BREAST LIMITED: CPT | Mod: TC,PO,LT

## 2021-01-25 ENCOUNTER — HOSPITAL ENCOUNTER (OUTPATIENT)
Dept: RADIOLOGY | Facility: HOSPITAL | Age: 61
Discharge: HOME | End: 2021-01-25
Attending: INTERNAL MEDICINE
Payer: MEDICARE

## 2021-01-25 ENCOUNTER — TELEPHONE (OUTPATIENT)
Dept: PULMONOLOGY | Facility: CLINIC | Age: 61
End: 2021-01-25

## 2021-01-25 DIAGNOSIS — J96.11 CHRONIC HYPOXEMIC RESPIRATORY FAILURE: ICD-10-CM

## 2021-01-25 DIAGNOSIS — R07.89 ATYPICAL CHEST PAIN: ICD-10-CM

## 2021-01-25 DIAGNOSIS — R07.81 PLEURITIC CHEST PAIN: ICD-10-CM

## 2021-01-25 PROCEDURE — 71275 CTA CHEST NON CORONARY: ICD-10-PCS | Mod: 26,,, | Performed by: RADIOLOGY

## 2021-01-25 PROCEDURE — 71275 CT ANGIOGRAPHY CHEST: CPT | Mod: 26,,, | Performed by: RADIOLOGY

## 2021-01-25 PROCEDURE — 71275 CT ANGIOGRAPHY CHEST: CPT | Mod: TC

## 2021-01-25 PROCEDURE — 25500020 PHARM REV CODE 255: Performed by: INTERNAL MEDICINE

## 2021-01-25 RX ADMIN — IOHEXOL 100 ML: 350 INJECTION, SOLUTION INTRAVENOUS at 02:01

## 2021-01-26 ENCOUNTER — HOSPITAL ENCOUNTER (OUTPATIENT)
Dept: RADIOLOGY | Facility: HOSPITAL | Age: 61
Discharge: HOME | End: 2021-01-26
Attending: INTERNAL MEDICINE
Payer: MEDICARE

## 2021-01-26 ENCOUNTER — OFFICE VISIT (OUTPATIENT)
Dept: HEMATOLOGY/ONCOLOGY | Facility: CLINIC | Age: 61
End: 2021-01-26
Payer: MEDICARE

## 2021-01-26 ENCOUNTER — TELEPHONE (OUTPATIENT)
Dept: HEMATOLOGY/ONCOLOGY | Facility: CLINIC | Age: 61
End: 2021-01-26

## 2021-01-26 VITALS
RESPIRATION RATE: 20 BRPM | DIASTOLIC BLOOD PRESSURE: 97 MMHG | WEIGHT: 186 LBS | BODY MASS INDEX: 25.94 KG/M2 | HEART RATE: 94 BPM | SYSTOLIC BLOOD PRESSURE: 165 MMHG

## 2021-01-26 DIAGNOSIS — C50.919 CARCINOMA OF BREAST METASTATIC TO BONE, UNSPECIFIED LATERALITY: Primary | ICD-10-CM

## 2021-01-26 DIAGNOSIS — Z17.0 MALIGNANT NEOPLASM OF LOWER-INNER QUADRANT OF RIGHT BREAST OF FEMALE, ESTROGEN RECEPTOR POSITIVE: ICD-10-CM

## 2021-01-26 DIAGNOSIS — R07.81 PLEURITIC CHEST PAIN: ICD-10-CM

## 2021-01-26 DIAGNOSIS — C50.311 MALIGNANT NEOPLASM OF LOWER-INNER QUADRANT OF RIGHT BREAST OF FEMALE, ESTROGEN RECEPTOR POSITIVE: ICD-10-CM

## 2021-01-26 DIAGNOSIS — C79.51 CARCINOMA OF BREAST METASTATIC TO BONE, UNSPECIFIED LATERALITY: Primary | ICD-10-CM

## 2021-01-26 DIAGNOSIS — C79.51 CARCINOMA OF BREAST METASTATIC TO BONE, UNSPECIFIED LATERALITY: ICD-10-CM

## 2021-01-26 DIAGNOSIS — C50.919 CARCINOMA OF BREAST METASTATIC TO BONE, UNSPECIFIED LATERALITY: ICD-10-CM

## 2021-01-26 DIAGNOSIS — R93.89 ABNORMAL CT OF THE CHEST: ICD-10-CM

## 2021-01-26 DIAGNOSIS — R22.2 MASS OF LEFT CHEST WALL: ICD-10-CM

## 2021-01-26 PROCEDURE — 3080F DIAST BP >= 90 MM HG: CPT | Mod: S$GLB,,, | Performed by: INTERNAL MEDICINE

## 2021-01-26 PROCEDURE — 1125F AMNT PAIN NOTED PAIN PRSNT: CPT | Mod: S$GLB,,, | Performed by: INTERNAL MEDICINE

## 2021-01-26 PROCEDURE — 71110 XR RIBS 3 VIEWS BILATERAL: ICD-10-PCS | Mod: 26,,, | Performed by: RADIOLOGY

## 2021-01-26 PROCEDURE — 3077F PR MOST RECENT SYSTOLIC BLOOD PRESSURE >= 140 MM HG: ICD-10-PCS | Mod: S$GLB,,, | Performed by: INTERNAL MEDICINE

## 2021-01-26 PROCEDURE — 1125F PR PAIN SEVERITY QUANTIFIED, PAIN PRESENT: ICD-10-PCS | Mod: S$GLB,,, | Performed by: INTERNAL MEDICINE

## 2021-01-26 PROCEDURE — 3008F PR BODY MASS INDEX (BMI) DOCUMENTED: ICD-10-PCS | Mod: S$GLB,,, | Performed by: INTERNAL MEDICINE

## 2021-01-26 PROCEDURE — 71110 X-RAY EXAM RIBS BIL 3 VIEWS: CPT | Mod: 26,,, | Performed by: RADIOLOGY

## 2021-01-26 PROCEDURE — 99214 OFFICE O/P EST MOD 30 MIN: CPT | Mod: S$GLB,,, | Performed by: INTERNAL MEDICINE

## 2021-01-26 PROCEDURE — 99214 PR OFFICE/OUTPT VISIT, EST, LEVL IV, 30-39 MIN: ICD-10-PCS | Mod: S$GLB,,, | Performed by: INTERNAL MEDICINE

## 2021-01-26 PROCEDURE — 71110 X-RAY EXAM RIBS BIL 3 VIEWS: CPT | Mod: TC,FY

## 2021-01-26 PROCEDURE — 3080F PR MOST RECENT DIASTOLIC BLOOD PRESSURE >= 90 MM HG: ICD-10-PCS | Mod: S$GLB,,, | Performed by: INTERNAL MEDICINE

## 2021-01-26 PROCEDURE — 3077F SYST BP >= 140 MM HG: CPT | Mod: S$GLB,,, | Performed by: INTERNAL MEDICINE

## 2021-01-26 PROCEDURE — 3008F BODY MASS INDEX DOCD: CPT | Mod: S$GLB,,, | Performed by: INTERNAL MEDICINE

## 2021-01-26 RX ORDER — ANASTROZOLE 1 MG/1
1 TABLET ORAL DAILY
Qty: 30 TABLET | Refills: 6 | Status: SHIPPED | OUTPATIENT
Start: 2021-01-26 | End: 2021-02-25

## 2021-01-26 RX ORDER — OXYCODONE HYDROCHLORIDE 5 MG/1
TABLET ORAL
Qty: 120 TABLET | Refills: 0 | Status: SHIPPED | OUTPATIENT
Start: 2021-01-26 | End: 2021-03-02

## 2021-01-26 RX ORDER — HYDROCODONE BITARTRATE AND ACETAMINOPHEN 10; 325 MG/1; MG/1
1 TABLET ORAL EVERY 6 HOURS PRN
Qty: 120 TABLET | Refills: 0 | Status: SHIPPED | OUTPATIENT
Start: 2021-01-26 | End: 2021-02-25

## 2021-01-26 RX ORDER — CARISOPRODOL 350 MG/1
350 TABLET ORAL 3 TIMES DAILY PRN
Qty: 90 TABLET | Refills: 0 | Status: SHIPPED | OUTPATIENT
Start: 2021-01-26 | End: 2021-03-29 | Stop reason: SDUPTHER

## 2021-01-27 ENCOUNTER — DOCUMENTATION ONLY (OUTPATIENT)
Dept: HEMATOLOGY/ONCOLOGY | Facility: CLINIC | Age: 61
End: 2021-01-27

## 2021-02-05 ENCOUNTER — HOSPITAL ENCOUNTER (OUTPATIENT)
Dept: RADIOLOGY | Facility: HOSPITAL | Age: 61
Discharge: HOME | End: 2021-02-05
Attending: INTERNAL MEDICINE
Payer: MEDICARE

## 2021-02-05 VITALS — WEIGHT: 186 LBS | HEIGHT: 71 IN | BODY MASS INDEX: 26.04 KG/M2

## 2021-02-05 DIAGNOSIS — C50.919 CARCINOMA OF BREAST METASTATIC TO BONE, UNSPECIFIED LATERALITY: ICD-10-CM

## 2021-02-05 DIAGNOSIS — C79.51 CARCINOMA OF BREAST METASTATIC TO BONE, UNSPECIFIED LATERALITY: ICD-10-CM

## 2021-02-05 DIAGNOSIS — R07.81 PLEURITIC CHEST PAIN: ICD-10-CM

## 2021-02-05 DIAGNOSIS — C50.311 MALIGNANT NEOPLASM OF LOWER-INNER QUADRANT OF RIGHT BREAST OF FEMALE, ESTROGEN RECEPTOR POSITIVE: ICD-10-CM

## 2021-02-05 DIAGNOSIS — R22.2 MASS OF LEFT CHEST WALL: ICD-10-CM

## 2021-02-05 DIAGNOSIS — Z17.0 MALIGNANT NEOPLASM OF LOWER-INNER QUADRANT OF RIGHT BREAST OF FEMALE, ESTROGEN RECEPTOR POSITIVE: ICD-10-CM

## 2021-02-05 DIAGNOSIS — R93.89 ABNORMAL CT OF THE CHEST: ICD-10-CM

## 2021-02-05 LAB — GLUCOSE SERPL-MCNC: 230 MG/DL (ref 70–110)

## 2021-02-05 PROCEDURE — 78815 PET IMAGE W/CT SKULL-THIGH: CPT | Mod: TC,PO

## 2021-02-05 PROCEDURE — 82962 GLUCOSE BLOOD TEST: CPT | Mod: PO

## 2021-02-10 ENCOUNTER — TELEPHONE (OUTPATIENT)
Dept: HEMATOLOGY/ONCOLOGY | Facility: CLINIC | Age: 61
End: 2021-02-10

## 2021-02-11 DIAGNOSIS — Z79.4 TYPE 2 DIABETES MELLITUS WITH HYPERGLYCEMIA, WITH LONG-TERM CURRENT USE OF INSULIN: Primary | ICD-10-CM

## 2021-02-11 DIAGNOSIS — E11.65 TYPE 2 DIABETES MELLITUS WITH HYPERGLYCEMIA, WITH LONG-TERM CURRENT USE OF INSULIN: Primary | ICD-10-CM

## 2021-02-11 RX ORDER — PEN NEEDLE, DIABETIC 30 GX3/16"
NEEDLE, DISPOSABLE MISCELLANEOUS
Qty: 100 EACH | Refills: 5 | Status: SHIPPED | OUTPATIENT
Start: 2021-02-11 | End: 2022-03-08 | Stop reason: SDUPTHER

## 2021-02-12 ENCOUNTER — TELEPHONE (OUTPATIENT)
Dept: HEMATOLOGY/ONCOLOGY | Facility: CLINIC | Age: 61
End: 2021-02-12

## 2021-02-23 ENCOUNTER — HOSPITAL ENCOUNTER (OUTPATIENT)
Dept: RADIOLOGY | Facility: HOSPITAL | Age: 61
Discharge: HOME | End: 2021-02-23
Attending: INTERNAL MEDICINE
Payer: MEDICARE

## 2021-02-23 ENCOUNTER — DOCUMENTATION ONLY (OUTPATIENT)
Dept: RADIOLOGY | Facility: HOSPITAL | Age: 61
End: 2021-02-23

## 2021-02-23 DIAGNOSIS — C79.51 CARCINOMA OF BREAST METASTATIC TO BONE, UNSPECIFIED LATERALITY: ICD-10-CM

## 2021-02-23 DIAGNOSIS — C50.919 CARCINOMA OF BREAST METASTATIC TO BONE, UNSPECIFIED LATERALITY: ICD-10-CM

## 2021-02-23 DIAGNOSIS — R22.2 MASS OF LEFT CHEST WALL: ICD-10-CM

## 2021-02-23 DIAGNOSIS — Z17.0 MALIGNANT NEOPLASM OF LOWER-INNER QUADRANT OF RIGHT BREAST OF FEMALE, ESTROGEN RECEPTOR POSITIVE: ICD-10-CM

## 2021-02-23 DIAGNOSIS — R92.8 ABNORMAL MAMMOGRAM OF LEFT BREAST: ICD-10-CM

## 2021-02-23 DIAGNOSIS — C50.311 MALIGNANT NEOPLASM OF LOWER-INNER QUADRANT OF RIGHT BREAST OF FEMALE, ESTROGEN RECEPTOR POSITIVE: ICD-10-CM

## 2021-02-23 PROCEDURE — 88341 IMHCHEM/IMCYTCHM EA ADD ANTB: CPT | Performed by: STUDENT IN AN ORGANIZED HEALTH CARE EDUCATION/TRAINING PROGRAM

## 2021-02-23 PROCEDURE — A4550 SURGICAL TRAYS: HCPCS

## 2021-02-23 PROCEDURE — A4648 IMPLANTABLE TISSUE MARKER: HCPCS

## 2021-02-23 PROCEDURE — 88342 CHG IMMUNOCYTOCHEMISTRY: ICD-10-PCS | Mod: 26,,, | Performed by: STUDENT IN AN ORGANIZED HEALTH CARE EDUCATION/TRAINING PROGRAM

## 2021-02-23 PROCEDURE — 88360 TUMOR IMMUNOHISTOCHEM/MANUAL: CPT | Mod: 59 | Performed by: STUDENT IN AN ORGANIZED HEALTH CARE EDUCATION/TRAINING PROGRAM

## 2021-02-23 PROCEDURE — 88342 IMHCHEM/IMCYTCHM 1ST ANTB: CPT | Mod: 59 | Performed by: STUDENT IN AN ORGANIZED HEALTH CARE EDUCATION/TRAINING PROGRAM

## 2021-02-23 PROCEDURE — 88341 PR IHC OR ICC EACH ADD'L SINGLE ANTIBODY  STAINPR: ICD-10-PCS | Mod: 26,,, | Performed by: STUDENT IN AN ORGANIZED HEALTH CARE EDUCATION/TRAINING PROGRAM

## 2021-02-23 PROCEDURE — 88305 TISSUE EXAM BY PATHOLOGIST: ICD-10-PCS | Mod: 26,,, | Performed by: STUDENT IN AN ORGANIZED HEALTH CARE EDUCATION/TRAINING PROGRAM

## 2021-02-23 PROCEDURE — 19083 US BREAST BIOPSY WITH IMAGING 1ST SITE LEFT: ICD-10-PCS | Mod: LT,,, | Performed by: RADIOLOGY

## 2021-02-23 PROCEDURE — 88305 TISSUE EXAM BY PATHOLOGIST: CPT | Mod: 26,,, | Performed by: STUDENT IN AN ORGANIZED HEALTH CARE EDUCATION/TRAINING PROGRAM

## 2021-02-23 PROCEDURE — 88377 M/PHMTRC ALYS ISHQUANT/SEMIQ: CPT | Performed by: STUDENT IN AN ORGANIZED HEALTH CARE EDUCATION/TRAINING PROGRAM

## 2021-02-23 PROCEDURE — 19083 BX BREAST 1ST LESION US IMAG: CPT | Mod: LT,,, | Performed by: RADIOLOGY

## 2021-02-23 PROCEDURE — 88341 IMHCHEM/IMCYTCHM EA ADD ANTB: CPT | Mod: 26,,, | Performed by: STUDENT IN AN ORGANIZED HEALTH CARE EDUCATION/TRAINING PROGRAM

## 2021-02-23 PROCEDURE — 88342 IMHCHEM/IMCYTCHM 1ST ANTB: CPT | Mod: 26,,, | Performed by: STUDENT IN AN ORGANIZED HEALTH CARE EDUCATION/TRAINING PROGRAM

## 2021-02-23 PROCEDURE — 25000003 PHARM REV CODE 250: Performed by: RADIOLOGY

## 2021-02-23 PROCEDURE — 88305 TISSUE EXAM BY PATHOLOGIST: CPT | Performed by: STUDENT IN AN ORGANIZED HEALTH CARE EDUCATION/TRAINING PROGRAM

## 2021-02-23 RX ADMIN — LIDOCAINE HYDROCHLORIDE 30 ML: 10; .005 INJECTION, SOLUTION EPIDURAL; INFILTRATION; INTRACAUDAL; PERINEURAL at 11:02

## 2021-03-02 ENCOUNTER — OFFICE VISIT (OUTPATIENT)
Dept: PULMONOLOGY | Facility: CLINIC | Age: 61
End: 2021-03-02
Payer: MEDICARE

## 2021-03-02 ENCOUNTER — TELEPHONE (OUTPATIENT)
Dept: FAMILY MEDICINE | Facility: CLINIC | Age: 61
End: 2021-03-02

## 2021-03-02 VITALS
HEART RATE: 80 BPM | HEIGHT: 71 IN | BODY MASS INDEX: 26.37 KG/M2 | SYSTOLIC BLOOD PRESSURE: 161 MMHG | DIASTOLIC BLOOD PRESSURE: 81 MMHG | OXYGEN SATURATION: 98 % | WEIGHT: 188.38 LBS

## 2021-03-02 DIAGNOSIS — J44.9 CHRONIC OBSTRUCTIVE PULMONARY DISEASE, UNSPECIFIED COPD TYPE: ICD-10-CM

## 2021-03-02 DIAGNOSIS — R07.89 ATYPICAL CHEST PAIN: ICD-10-CM

## 2021-03-02 DIAGNOSIS — R07.81 PLEURITIC CHEST PAIN: ICD-10-CM

## 2021-03-02 DIAGNOSIS — J96.11 CHRONIC HYPOXEMIC RESPIRATORY FAILURE: ICD-10-CM

## 2021-03-02 DIAGNOSIS — J12.82 PNEUMONIA DUE TO COVID-19 VIRUS: Primary | ICD-10-CM

## 2021-03-02 DIAGNOSIS — U07.1 PNEUMONIA DUE TO COVID-19 VIRUS: Primary | ICD-10-CM

## 2021-03-02 PROCEDURE — 3077F SYST BP >= 140 MM HG: CPT | Mod: CPTII,S$GLB,, | Performed by: INTERNAL MEDICINE

## 2021-03-02 PROCEDURE — 3079F DIAST BP 80-89 MM HG: CPT | Mod: CPTII,S$GLB,, | Performed by: INTERNAL MEDICINE

## 2021-03-02 PROCEDURE — 99999 PR PBB SHADOW E&M-EST. PATIENT-LVL IV: ICD-10-PCS | Mod: PBBFAC,,, | Performed by: INTERNAL MEDICINE

## 2021-03-02 PROCEDURE — 99214 OFFICE O/P EST MOD 30 MIN: CPT | Mod: S$GLB,,, | Performed by: INTERNAL MEDICINE

## 2021-03-02 PROCEDURE — 3008F BODY MASS INDEX DOCD: CPT | Mod: CPTII,S$GLB,, | Performed by: INTERNAL MEDICINE

## 2021-03-02 PROCEDURE — 1125F PR PAIN SEVERITY QUANTIFIED, PAIN PRESENT: ICD-10-PCS | Mod: S$GLB,,, | Performed by: INTERNAL MEDICINE

## 2021-03-02 PROCEDURE — 3079F PR MOST RECENT DIASTOLIC BLOOD PRESSURE 80-89 MM HG: ICD-10-PCS | Mod: CPTII,S$GLB,, | Performed by: INTERNAL MEDICINE

## 2021-03-02 PROCEDURE — 3077F PR MOST RECENT SYSTOLIC BLOOD PRESSURE >= 140 MM HG: ICD-10-PCS | Mod: CPTII,S$GLB,, | Performed by: INTERNAL MEDICINE

## 2021-03-02 PROCEDURE — 99999 PR PBB SHADOW E&M-EST. PATIENT-LVL IV: CPT | Mod: PBBFAC,,, | Performed by: INTERNAL MEDICINE

## 2021-03-02 PROCEDURE — 3008F PR BODY MASS INDEX (BMI) DOCUMENTED: ICD-10-PCS | Mod: CPTII,S$GLB,, | Performed by: INTERNAL MEDICINE

## 2021-03-02 PROCEDURE — 1125F AMNT PAIN NOTED PAIN PRSNT: CPT | Mod: S$GLB,,, | Performed by: INTERNAL MEDICINE

## 2021-03-02 PROCEDURE — 99214 PR OFFICE/OUTPT VISIT, EST, LEVL IV, 30-39 MIN: ICD-10-PCS | Mod: S$GLB,,, | Performed by: INTERNAL MEDICINE

## 2021-03-02 RX ORDER — HYDROCODONE BITARTRATE AND ACETAMINOPHEN 10; 325 MG/1; MG/1
TABLET ORAL
COMMUNITY
Start: 2021-02-19 | End: 2021-03-29 | Stop reason: SDUPTHER

## 2021-03-02 RX ORDER — ANASTROZOLE 1 MG/1
1 TABLET ORAL DAILY
COMMUNITY
End: 2022-01-01

## 2021-03-03 ENCOUNTER — TELEPHONE (OUTPATIENT)
Dept: HEMATOLOGY/ONCOLOGY | Facility: CLINIC | Age: 61
End: 2021-03-03

## 2021-03-05 ENCOUNTER — TELEPHONE (OUTPATIENT)
Dept: SURGERY | Facility: CLINIC | Age: 61
End: 2021-03-05

## 2021-03-05 ENCOUNTER — OFFICE VISIT (OUTPATIENT)
Dept: HEMATOLOGY/ONCOLOGY | Facility: CLINIC | Age: 61
End: 2021-03-05
Payer: MEDICARE

## 2021-03-05 VITALS
RESPIRATION RATE: 18 BRPM | HEART RATE: 73 BPM | TEMPERATURE: 98 F | SYSTOLIC BLOOD PRESSURE: 160 MMHG | DIASTOLIC BLOOD PRESSURE: 98 MMHG | BODY MASS INDEX: 26.04 KG/M2 | WEIGHT: 186.69 LBS

## 2021-03-05 DIAGNOSIS — Z17.0 MALIGNANT NEOPLASM OF LOWER-INNER QUADRANT OF RIGHT BREAST OF FEMALE, ESTROGEN RECEPTOR POSITIVE: Primary | ICD-10-CM

## 2021-03-05 DIAGNOSIS — C50.311 MALIGNANT NEOPLASM OF LOWER-INNER QUADRANT OF RIGHT BREAST OF FEMALE, ESTROGEN RECEPTOR POSITIVE: Primary | ICD-10-CM

## 2021-03-05 DIAGNOSIS — Z17.0 MALIGNANT NEOPLASM OF LOWER-OUTER QUADRANT OF LEFT BREAST OF FEMALE, ESTROGEN RECEPTOR POSITIVE: ICD-10-CM

## 2021-03-05 DIAGNOSIS — C50.512 MALIGNANT NEOPLASM OF LOWER-OUTER QUADRANT OF LEFT BREAST OF FEMALE, ESTROGEN RECEPTOR POSITIVE: ICD-10-CM

## 2021-03-05 PROCEDURE — 1125F AMNT PAIN NOTED PAIN PRSNT: CPT | Mod: S$GLB,,, | Performed by: INTERNAL MEDICINE

## 2021-03-05 PROCEDURE — 3080F PR MOST RECENT DIASTOLIC BLOOD PRESSURE >= 90 MM HG: ICD-10-PCS | Mod: S$GLB,,, | Performed by: INTERNAL MEDICINE

## 2021-03-05 PROCEDURE — 3008F BODY MASS INDEX DOCD: CPT | Mod: S$GLB,,, | Performed by: INTERNAL MEDICINE

## 2021-03-05 PROCEDURE — 3008F PR BODY MASS INDEX (BMI) DOCUMENTED: ICD-10-PCS | Mod: S$GLB,,, | Performed by: INTERNAL MEDICINE

## 2021-03-05 PROCEDURE — 99214 PR OFFICE/OUTPT VISIT, EST, LEVL IV, 30-39 MIN: ICD-10-PCS | Mod: S$GLB,,, | Performed by: INTERNAL MEDICINE

## 2021-03-05 PROCEDURE — 3080F DIAST BP >= 90 MM HG: CPT | Mod: S$GLB,,, | Performed by: INTERNAL MEDICINE

## 2021-03-05 PROCEDURE — 1125F PR PAIN SEVERITY QUANTIFIED, PAIN PRESENT: ICD-10-PCS | Mod: S$GLB,,, | Performed by: INTERNAL MEDICINE

## 2021-03-05 PROCEDURE — 3077F PR MOST RECENT SYSTOLIC BLOOD PRESSURE >= 140 MM HG: ICD-10-PCS | Mod: S$GLB,,, | Performed by: INTERNAL MEDICINE

## 2021-03-05 PROCEDURE — 99214 OFFICE O/P EST MOD 30 MIN: CPT | Mod: S$GLB,,, | Performed by: INTERNAL MEDICINE

## 2021-03-05 PROCEDURE — 3077F SYST BP >= 140 MM HG: CPT | Mod: S$GLB,,, | Performed by: INTERNAL MEDICINE

## 2021-03-09 ENCOUNTER — OFFICE VISIT (OUTPATIENT)
Dept: SURGERY | Facility: CLINIC | Age: 61
End: 2021-03-09
Payer: MEDICARE

## 2021-03-09 DIAGNOSIS — C50.912 MALIGNANT NEOPLASM OF LEFT FEMALE BREAST, UNSPECIFIED ESTROGEN RECEPTOR STATUS, UNSPECIFIED SITE OF BREAST: Primary | ICD-10-CM

## 2021-03-09 LAB
FINAL PATHOLOGIC DIAGNOSIS: NORMAL
GROSS: NORMAL
Lab: NORMAL
SUPPLEMENTAL DIAGNOSIS: NORMAL

## 2021-03-09 PROCEDURE — 99999 PR PBB SHADOW E&M-EST. PATIENT-LVL II: CPT | Mod: PBBFAC,,, | Performed by: SURGERY

## 2021-03-09 PROCEDURE — 99999 PR PBB SHADOW E&M-EST. PATIENT-LVL II: ICD-10-PCS | Mod: PBBFAC,,, | Performed by: SURGERY

## 2021-03-09 PROCEDURE — 99214 PR OFFICE/OUTPT VISIT, EST, LEVL IV, 30-39 MIN: ICD-10-PCS | Mod: S$GLB,,, | Performed by: SURGERY

## 2021-03-09 PROCEDURE — 99214 OFFICE O/P EST MOD 30 MIN: CPT | Mod: S$GLB,,, | Performed by: SURGERY

## 2021-03-09 RX ORDER — SODIUM CHLORIDE 9 MG/ML
INJECTION, SOLUTION INTRAVENOUS CONTINUOUS
Status: CANCELLED | OUTPATIENT
Start: 2021-03-19

## 2021-03-18 ENCOUNTER — HOSPITAL ENCOUNTER (EMERGENCY)
Facility: HOSPITAL | Age: 61
Discharge: HOME | End: 2021-03-18
Attending: EMERGENCY MEDICINE
Payer: MEDICARE

## 2021-03-18 ENCOUNTER — TELEPHONE (OUTPATIENT)
Dept: PULMONOLOGY | Facility: CLINIC | Age: 61
End: 2021-03-18

## 2021-03-18 ENCOUNTER — ANESTHESIA EVENT (OUTPATIENT)
Dept: SURGERY | Facility: HOSPITAL | Age: 61
End: 2021-03-18
Payer: MEDICARE

## 2021-03-18 ENCOUNTER — NURSE TRIAGE (OUTPATIENT)
Dept: ADMINISTRATIVE | Facility: CLINIC | Age: 61
End: 2021-03-18

## 2021-03-18 VITALS
HEIGHT: 71 IN | HEART RATE: 82 BPM | BODY MASS INDEX: 25.77 KG/M2 | SYSTOLIC BLOOD PRESSURE: 148 MMHG | RESPIRATION RATE: 18 BRPM | OXYGEN SATURATION: 98 % | DIASTOLIC BLOOD PRESSURE: 68 MMHG | WEIGHT: 184.06 LBS | TEMPERATURE: 99 F

## 2021-03-18 DIAGNOSIS — I10 HYPERTENSION: ICD-10-CM

## 2021-03-18 DIAGNOSIS — I10 HYPERTENSION, UNSPECIFIED TYPE: Primary | ICD-10-CM

## 2021-03-18 LAB
ALBUMIN SERPL BCP-MCNC: 3.8 G/DL (ref 3.5–5.2)
ALP SERPL-CCNC: 175 U/L (ref 55–135)
ALT SERPL W/O P-5'-P-CCNC: 39 U/L (ref 10–44)
ANION GAP SERPL CALC-SCNC: 12 MMOL/L (ref 8–16)
AST SERPL-CCNC: 23 U/L (ref 10–40)
BASOPHILS # BLD AUTO: 0.03 K/UL (ref 0–0.2)
BASOPHILS NFR BLD: 0.5 % (ref 0–1.9)
BILIRUB SERPL-MCNC: 0.4 MG/DL (ref 0.1–1)
BUN SERPL-MCNC: 9 MG/DL (ref 6–20)
CALCIUM SERPL-MCNC: 9 MG/DL (ref 8.7–10.5)
CHLORIDE SERPL-SCNC: 103 MMOL/L (ref 95–110)
CO2 SERPL-SCNC: 23 MMOL/L (ref 23–29)
CREAT SERPL-MCNC: 0.8 MG/DL (ref 0.5–1.4)
DIFFERENTIAL METHOD: NORMAL
EOSINOPHIL # BLD AUTO: 0 K/UL (ref 0–0.5)
EOSINOPHIL NFR BLD: 0.6 % (ref 0–8)
ERYTHROCYTE [DISTWIDTH] IN BLOOD BY AUTOMATED COUNT: 12.6 % (ref 11.5–14.5)
EST. GFR  (AFRICAN AMERICAN): >60 ML/MIN/1.73 M^2
EST. GFR  (NON AFRICAN AMERICAN): >60 ML/MIN/1.73 M^2
GLUCOSE SERPL-MCNC: 338 MG/DL (ref 70–110)
HCT VFR BLD AUTO: 42.9 % (ref 37–48.5)
HGB BLD-MCNC: 14.3 G/DL (ref 12–16)
IMM GRANULOCYTES # BLD AUTO: 0.02 K/UL (ref 0–0.04)
IMM GRANULOCYTES NFR BLD AUTO: 0.3 % (ref 0–0.5)
LYMPHOCYTES # BLD AUTO: 1.6 K/UL (ref 1–4.8)
LYMPHOCYTES NFR BLD: 24 % (ref 18–48)
MCH RBC QN AUTO: 29.9 PG (ref 27–31)
MCHC RBC AUTO-ENTMCNC: 33.3 G/DL (ref 32–36)
MCV RBC AUTO: 90 FL (ref 82–98)
MONOCYTES # BLD AUTO: 0.4 K/UL (ref 0.3–1)
MONOCYTES NFR BLD: 5.6 % (ref 4–15)
NEUTROPHILS # BLD AUTO: 4.5 K/UL (ref 1.8–7.7)
NEUTROPHILS NFR BLD: 69 % (ref 38–73)
NRBC BLD-RTO: 0 /100 WBC
PLATELET # BLD AUTO: 248 K/UL (ref 150–350)
PMV BLD AUTO: 9.7 FL (ref 9.2–12.9)
POTASSIUM SERPL-SCNC: 3.7 MMOL/L (ref 3.5–5.1)
PROT SERPL-MCNC: 7.3 G/DL (ref 6–8.4)
RBC # BLD AUTO: 4.78 M/UL (ref 4–5.4)
SODIUM SERPL-SCNC: 138 MMOL/L (ref 136–145)
TROPONIN I SERPL DL<=0.01 NG/ML-MCNC: <0.006 NG/ML (ref 0–0.03)
WBC # BLD AUTO: 6.57 K/UL (ref 3.9–12.7)

## 2021-03-18 PROCEDURE — 93010 ELECTROCARDIOGRAM REPORT: CPT | Mod: ,,, | Performed by: SPECIALIST

## 2021-03-18 PROCEDURE — 80053 COMPREHEN METABOLIC PANEL: CPT | Performed by: EMERGENCY MEDICINE

## 2021-03-18 PROCEDURE — 36415 COLL VENOUS BLD VENIPUNCTURE: CPT | Performed by: EMERGENCY MEDICINE

## 2021-03-18 PROCEDURE — 85025 COMPLETE CBC W/AUTO DIFF WBC: CPT | Performed by: EMERGENCY MEDICINE

## 2021-03-18 PROCEDURE — 99284 EMERGENCY DEPT VISIT MOD MDM: CPT | Mod: 25

## 2021-03-18 PROCEDURE — 93005 ELECTROCARDIOGRAM TRACING: CPT

## 2021-03-18 PROCEDURE — 84484 ASSAY OF TROPONIN QUANT: CPT | Performed by: EMERGENCY MEDICINE

## 2021-03-18 PROCEDURE — 93010 EKG 12-LEAD: ICD-10-PCS | Mod: ,,, | Performed by: SPECIALIST

## 2021-03-18 RX ORDER — LIDOCAINE HYDROCHLORIDE 10 MG/ML
0.5 INJECTION, SOLUTION EPIDURAL; INFILTRATION; INTRACAUDAL; PERINEURAL ONCE
Status: CANCELLED | OUTPATIENT
Start: 2021-03-18 | End: 2021-03-18

## 2021-03-18 RX ORDER — SODIUM CHLORIDE, SODIUM LACTATE, POTASSIUM CHLORIDE, CALCIUM CHLORIDE 600; 310; 30; 20 MG/100ML; MG/100ML; MG/100ML; MG/100ML
INJECTION, SOLUTION INTRAVENOUS CONTINUOUS
Status: CANCELLED | OUTPATIENT
Start: 2021-03-18

## 2021-03-19 ENCOUNTER — ANESTHESIA (OUTPATIENT)
Dept: SURGERY | Facility: HOSPITAL | Age: 61
End: 2021-03-19
Payer: MEDICARE

## 2021-03-19 ENCOUNTER — HOSPITAL ENCOUNTER (OUTPATIENT)
Facility: HOSPITAL | Age: 61
Discharge: HOME | End: 2021-03-19
Attending: SURGERY | Admitting: SURGERY
Payer: MEDICARE

## 2021-03-19 VITALS
SYSTOLIC BLOOD PRESSURE: 155 MMHG | OXYGEN SATURATION: 98 % | TEMPERATURE: 98 F | HEIGHT: 71 IN | WEIGHT: 186 LBS | RESPIRATION RATE: 18 BRPM | HEART RATE: 85 BPM | BODY MASS INDEX: 26.04 KG/M2 | DIASTOLIC BLOOD PRESSURE: 77 MMHG

## 2021-03-19 DIAGNOSIS — Z01.818 PREOP TESTING: ICD-10-CM

## 2021-03-19 DIAGNOSIS — C50.912 MALIGNANT NEOPLASM OF LEFT FEMALE BREAST, UNSPECIFIED ESTROGEN RECEPTOR STATUS, UNSPECIFIED SITE OF BREAST: Primary | ICD-10-CM

## 2021-03-19 LAB
POCT GLUCOSE: 268 MG/DL (ref 70–110)
POCT GLUCOSE: 317 MG/DL (ref 70–110)

## 2021-03-19 PROCEDURE — 71000015 HC POSTOP RECOV 1ST HR: Performed by: SURGERY

## 2021-03-19 PROCEDURE — 71000039 HC RECOVERY, EACH ADD'L HOUR: Performed by: SURGERY

## 2021-03-19 PROCEDURE — 25000003 PHARM REV CODE 250: Performed by: ANESTHESIOLOGY

## 2021-03-19 PROCEDURE — 88360 PR  TUMOR IMMUNOHISTOCHEM/MANUAL: ICD-10-PCS | Mod: 26,,, | Performed by: PATHOLOGY

## 2021-03-19 PROCEDURE — D9220A PRA ANESTHESIA: Mod: ANES,,, | Performed by: ANESTHESIOLOGY

## 2021-03-19 PROCEDURE — 99900103 DSU ONLY-NO CHARGE-INITIAL HR (STAT)

## 2021-03-19 PROCEDURE — 99900104 DSU ONLY-NO CHARGE-EA ADD'L HR (STAT): Performed by: SURGERY

## 2021-03-19 PROCEDURE — 37000009 HC ANESTHESIA EA ADD 15 MINS: Performed by: SURGERY

## 2021-03-19 PROCEDURE — D9220A PRA ANESTHESIA: ICD-10-PCS | Mod: ANES,,, | Performed by: ANESTHESIOLOGY

## 2021-03-19 PROCEDURE — 25000003 PHARM REV CODE 250: Performed by: SURGERY

## 2021-03-19 PROCEDURE — 25000003 PHARM REV CODE 250: Performed by: NURSE ANESTHETIST, CERTIFIED REGISTERED

## 2021-03-19 PROCEDURE — 63600175 PHARM REV CODE 636 W HCPCS: Performed by: SURGERY

## 2021-03-19 PROCEDURE — 88342 IMHCHEM/IMCYTCHM 1ST ANTB: CPT | Mod: 59 | Performed by: PATHOLOGY

## 2021-03-19 PROCEDURE — 36000706: Performed by: SURGERY

## 2021-03-19 PROCEDURE — 37000008 HC ANESTHESIA 1ST 15 MINUTES: Performed by: SURGERY

## 2021-03-19 PROCEDURE — 88342 CHG IMMUNOCYTOCHEMISTRY: ICD-10-PCS | Mod: 26,59,XU, | Performed by: PATHOLOGY

## 2021-03-19 PROCEDURE — 63600175 PHARM REV CODE 636 W HCPCS: Performed by: ANESTHESIOLOGY

## 2021-03-19 PROCEDURE — 19301 PR MASTECTOMY, PARTIAL: ICD-10-PCS | Mod: LT,,, | Performed by: SURGERY

## 2021-03-19 PROCEDURE — 88307 TISSUE EXAM BY PATHOLOGIST: CPT | Mod: 26,,, | Performed by: PATHOLOGY

## 2021-03-19 PROCEDURE — 88307 PR  SURG PATH,LEVEL V: ICD-10-PCS | Mod: 26,,, | Performed by: PATHOLOGY

## 2021-03-19 PROCEDURE — 88360 TUMOR IMMUNOHISTOCHEM/MANUAL: CPT | Performed by: PATHOLOGY

## 2021-03-19 PROCEDURE — D9220A PRA ANESTHESIA: ICD-10-PCS | Mod: CRNA,,, | Performed by: NURSE ANESTHETIST, CERTIFIED REGISTERED

## 2021-03-19 PROCEDURE — 71000033 HC RECOVERY, INTIAL HOUR: Performed by: SURGERY

## 2021-03-19 PROCEDURE — 88360 TUMOR IMMUNOHISTOCHEM/MANUAL: CPT | Mod: 26,,, | Performed by: PATHOLOGY

## 2021-03-19 PROCEDURE — 88342 IMHCHEM/IMCYTCHM 1ST ANTB: CPT | Mod: 26,59,XU, | Performed by: PATHOLOGY

## 2021-03-19 PROCEDURE — 99900103 DSU ONLY-NO CHARGE-INITIAL HR (STAT): Performed by: SURGERY

## 2021-03-19 PROCEDURE — 63600175 PHARM REV CODE 636 W HCPCS: Performed by: NURSE ANESTHETIST, CERTIFIED REGISTERED

## 2021-03-19 PROCEDURE — 88307 TISSUE EXAM BY PATHOLOGIST: CPT | Performed by: PATHOLOGY

## 2021-03-19 PROCEDURE — D9220A PRA ANESTHESIA: Mod: CRNA,,, | Performed by: NURSE ANESTHETIST, CERTIFIED REGISTERED

## 2021-03-19 PROCEDURE — 19301 PARTIAL MASTECTOMY: CPT | Mod: LT,,, | Performed by: SURGERY

## 2021-03-19 PROCEDURE — 36000707: Performed by: SURGERY

## 2021-03-19 PROCEDURE — 27200651 HC AIRWAY, LMA: Performed by: ANESTHESIOLOGY

## 2021-03-19 RX ORDER — ONDANSETRON 2 MG/ML
4 INJECTION INTRAMUSCULAR; INTRAVENOUS DAILY PRN
Status: DISCONTINUED | OUTPATIENT
Start: 2021-03-19 | End: 2021-03-19 | Stop reason: HOSPADM

## 2021-03-19 RX ORDER — OXYCODONE HYDROCHLORIDE 5 MG/1
5 TABLET ORAL
Status: DISCONTINUED | OUTPATIENT
Start: 2021-03-19 | End: 2021-03-19 | Stop reason: HOSPADM

## 2021-03-19 RX ORDER — FENTANYL CITRATE 50 UG/ML
25 INJECTION, SOLUTION INTRAMUSCULAR; INTRAVENOUS EVERY 5 MIN PRN
Status: DISCONTINUED | OUTPATIENT
Start: 2021-03-19 | End: 2021-03-19 | Stop reason: HOSPADM

## 2021-03-19 RX ORDER — LIDOCAINE HYDROCHLORIDE 10 MG/ML
0.5 INJECTION, SOLUTION EPIDURAL; INFILTRATION; INTRACAUDAL; PERINEURAL ONCE
Status: COMPLETED | OUTPATIENT
Start: 2021-03-19 | End: 2021-03-19

## 2021-03-19 RX ORDER — METOCLOPRAMIDE HYDROCHLORIDE 5 MG/ML
10 INJECTION INTRAMUSCULAR; INTRAVENOUS EVERY 10 MIN PRN
Status: DISCONTINUED | OUTPATIENT
Start: 2021-03-19 | End: 2021-03-19 | Stop reason: HOSPADM

## 2021-03-19 RX ORDER — ACETAMINOPHEN 10 MG/ML
INJECTION, SOLUTION INTRAVENOUS
Status: DISCONTINUED | OUTPATIENT
Start: 2021-03-19 | End: 2021-03-19

## 2021-03-19 RX ORDER — PROPOFOL 10 MG/ML
VIAL (ML) INTRAVENOUS
Status: DISCONTINUED | OUTPATIENT
Start: 2021-03-19 | End: 2021-03-19

## 2021-03-19 RX ORDER — PHENYLEPHRINE HYDROCHLORIDE 10 MG/ML
INJECTION INTRAVENOUS
Status: DISCONTINUED | OUTPATIENT
Start: 2021-03-19 | End: 2021-03-19

## 2021-03-19 RX ORDER — LIDOCAINE HCL/PF 100 MG/5ML
SYRINGE (ML) INTRAVENOUS
Status: DISCONTINUED | OUTPATIENT
Start: 2021-03-19 | End: 2021-03-19

## 2021-03-19 RX ORDER — HYDROMORPHONE HYDROCHLORIDE 2 MG/ML
0.2 INJECTION, SOLUTION INTRAMUSCULAR; INTRAVENOUS; SUBCUTANEOUS EVERY 5 MIN PRN
Status: DISCONTINUED | OUTPATIENT
Start: 2021-03-19 | End: 2021-03-19 | Stop reason: HOSPADM

## 2021-03-19 RX ORDER — HYDROCODONE BITARTRATE AND ACETAMINOPHEN 5; 325 MG/1; MG/1
1 TABLET ORAL EVERY 6 HOURS PRN
Qty: 10 TABLET | Refills: 0 | Status: SHIPPED | OUTPATIENT
Start: 2021-03-19 | End: 2021-05-04

## 2021-03-19 RX ORDER — SODIUM CHLORIDE, SODIUM LACTATE, POTASSIUM CHLORIDE, CALCIUM CHLORIDE 600; 310; 30; 20 MG/100ML; MG/100ML; MG/100ML; MG/100ML
INJECTION, SOLUTION INTRAVENOUS CONTINUOUS
Status: DISCONTINUED | OUTPATIENT
Start: 2021-03-19 | End: 2021-03-19 | Stop reason: HOSPADM

## 2021-03-19 RX ORDER — SODIUM CHLORIDE 9 MG/ML
INJECTION, SOLUTION INTRAVENOUS CONTINUOUS
Status: DISCONTINUED | OUTPATIENT
Start: 2021-03-19 | End: 2021-03-19 | Stop reason: HOSPADM

## 2021-03-19 RX ORDER — FENTANYL CITRATE 50 UG/ML
INJECTION, SOLUTION INTRAMUSCULAR; INTRAVENOUS
Status: DISCONTINUED | OUTPATIENT
Start: 2021-03-19 | End: 2021-03-19

## 2021-03-19 RX ORDER — MIDAZOLAM HYDROCHLORIDE 1 MG/ML
INJECTION INTRAMUSCULAR; INTRAVENOUS
Status: DISCONTINUED | OUTPATIENT
Start: 2021-03-19 | End: 2021-03-19

## 2021-03-19 RX ORDER — BUPIVACAINE HYDROCHLORIDE AND EPINEPHRINE 2.5; 5 MG/ML; UG/ML
INJECTION, SOLUTION EPIDURAL; INFILTRATION; INTRACAUDAL; PERINEURAL
Status: DISCONTINUED | OUTPATIENT
Start: 2021-03-19 | End: 2021-03-19 | Stop reason: HOSPADM

## 2021-03-19 RX ORDER — CEFAZOLIN SODIUM 2 G/50ML
2 SOLUTION INTRAVENOUS
Status: COMPLETED | OUTPATIENT
Start: 2021-03-19 | End: 2021-03-19

## 2021-03-19 RX ORDER — ONDANSETRON 2 MG/ML
INJECTION INTRAMUSCULAR; INTRAVENOUS
Status: DISCONTINUED | OUTPATIENT
Start: 2021-03-19 | End: 2021-03-19

## 2021-03-19 RX ORDER — OXYCODONE AND ACETAMINOPHEN 7.5; 325 MG/1; MG/1
1 TABLET ORAL EVERY 4 HOURS PRN
Qty: 15 TABLET | Refills: 0 | Status: SHIPPED | OUTPATIENT
Start: 2021-03-19 | End: 2021-04-15

## 2021-03-19 RX ORDER — SODIUM CHLORIDE 9 MG/ML
INJECTION, SOLUTION INTRAVENOUS CONTINUOUS
Status: CANCELLED | OUTPATIENT
Start: 2021-03-19

## 2021-03-19 RX ORDER — DIPHENHYDRAMINE HYDROCHLORIDE 50 MG/ML
12.5 INJECTION INTRAMUSCULAR; INTRAVENOUS ONCE AS NEEDED
Status: DISCONTINUED | OUTPATIENT
Start: 2021-03-19 | End: 2021-03-19 | Stop reason: HOSPADM

## 2021-03-19 RX ADMIN — PHENYLEPHRINE HYDROCHLORIDE 200 MCG: 10 INJECTION INTRAVENOUS at 09:03

## 2021-03-19 RX ADMIN — MIDAZOLAM HYDROCHLORIDE 2 MG: 1 INJECTION, SOLUTION INTRAMUSCULAR; INTRAVENOUS at 08:03

## 2021-03-19 RX ADMIN — FENTANYL CITRATE 50 MCG: 50 INJECTION, SOLUTION INTRAMUSCULAR; INTRAVENOUS at 09:03

## 2021-03-19 RX ADMIN — ACETAMINOPHEN 1000 MG: 10 INJECTION, SOLUTION INTRAVENOUS at 09:03

## 2021-03-19 RX ADMIN — GLYCOPYRROLATE 0.2 MG: 0.2 INJECTION, SOLUTION INTRAMUSCULAR; INTRAVITREAL at 08:03

## 2021-03-19 RX ADMIN — LIDOCAINE HYDROCHLORIDE 5 MG: 10 INJECTION, SOLUTION EPIDURAL; INFILTRATION; INTRACAUDAL; PERINEURAL at 08:03

## 2021-03-19 RX ADMIN — CEFAZOLIN SODIUM 2 G: 2 SOLUTION INTRAVENOUS at 09:03

## 2021-03-19 RX ADMIN — LIDOCAINE HYDROCHLORIDE 100 MG: 20 INJECTION, SOLUTION INTRAVENOUS at 09:03

## 2021-03-19 RX ADMIN — OXYCODONE HYDROCHLORIDE 5 MG: 5 TABLET ORAL at 10:03

## 2021-03-19 RX ADMIN — INSULIN HUMAN 10 UNITS: 100 INJECTION, SOLUTION PARENTERAL at 10:03

## 2021-03-19 RX ADMIN — ONDANSETRON 4 MG: 2 INJECTION, SOLUTION INTRAMUSCULAR; INTRAVENOUS at 09:03

## 2021-03-19 RX ADMIN — SODIUM CHLORIDE, SODIUM GLUCONATE, SODIUM ACETATE, POTASSIUM CHLORIDE, MAGNESIUM CHLORIDE, SODIUM PHOSPHATE, DIBASIC, AND POTASSIUM PHOSPHATE: .53; .5; .37; .037; .03; .012; .00082 INJECTION, SOLUTION INTRAVENOUS at 08:03

## 2021-03-19 RX ADMIN — PROPOFOL 150 MG: 10 INJECTION, EMULSION INTRAVENOUS at 09:03

## 2021-03-22 ENCOUNTER — TELEPHONE (OUTPATIENT)
Dept: SURGERY | Facility: CLINIC | Age: 61
End: 2021-03-22

## 2021-03-23 ENCOUNTER — TELEPHONE (OUTPATIENT)
Dept: HEMATOLOGY/ONCOLOGY | Facility: CLINIC | Age: 61
End: 2021-03-23

## 2021-03-25 LAB
FINAL PATHOLOGIC DIAGNOSIS: NORMAL
GROSS: NORMAL
Lab: NORMAL
SUPPLEMENTAL DIAGNOSIS: NORMAL

## 2021-03-29 ENCOUNTER — OFFICE VISIT (OUTPATIENT)
Dept: HEMATOLOGY/ONCOLOGY | Facility: CLINIC | Age: 61
End: 2021-03-29
Payer: MEDICARE

## 2021-03-29 VITALS
RESPIRATION RATE: 18 BRPM | HEART RATE: 81 BPM | WEIGHT: 187 LBS | BODY MASS INDEX: 26.08 KG/M2 | DIASTOLIC BLOOD PRESSURE: 81 MMHG | TEMPERATURE: 98 F | SYSTOLIC BLOOD PRESSURE: 140 MMHG

## 2021-03-29 DIAGNOSIS — C50.311 MALIGNANT NEOPLASM OF LOWER-INNER QUADRANT OF RIGHT BREAST OF FEMALE, ESTROGEN RECEPTOR POSITIVE: ICD-10-CM

## 2021-03-29 DIAGNOSIS — Z17.0 MALIGNANT NEOPLASM OF LOWER-INNER QUADRANT OF RIGHT BREAST OF FEMALE, ESTROGEN RECEPTOR POSITIVE: ICD-10-CM

## 2021-03-29 DIAGNOSIS — T45.1X5A CHEMOTHERAPY-INDUCED NAUSEA: Primary | ICD-10-CM

## 2021-03-29 DIAGNOSIS — R11.0 CHEMOTHERAPY-INDUCED NAUSEA: Primary | ICD-10-CM

## 2021-03-29 DIAGNOSIS — R07.81 PLEURITIC CHEST PAIN: ICD-10-CM

## 2021-03-29 PROCEDURE — 1125F AMNT PAIN NOTED PAIN PRSNT: CPT | Mod: S$GLB,,, | Performed by: INTERNAL MEDICINE

## 2021-03-29 PROCEDURE — 3079F PR MOST RECENT DIASTOLIC BLOOD PRESSURE 80-89 MM HG: ICD-10-PCS | Mod: S$GLB,,, | Performed by: INTERNAL MEDICINE

## 2021-03-29 PROCEDURE — 3079F DIAST BP 80-89 MM HG: CPT | Mod: S$GLB,,, | Performed by: INTERNAL MEDICINE

## 2021-03-29 PROCEDURE — 3008F PR BODY MASS INDEX (BMI) DOCUMENTED: ICD-10-PCS | Mod: S$GLB,,, | Performed by: INTERNAL MEDICINE

## 2021-03-29 PROCEDURE — 99214 OFFICE O/P EST MOD 30 MIN: CPT | Mod: S$GLB,,, | Performed by: INTERNAL MEDICINE

## 2021-03-29 PROCEDURE — 99214 PR OFFICE/OUTPT VISIT, EST, LEVL IV, 30-39 MIN: ICD-10-PCS | Mod: S$GLB,,, | Performed by: INTERNAL MEDICINE

## 2021-03-29 PROCEDURE — 3077F SYST BP >= 140 MM HG: CPT | Mod: S$GLB,,, | Performed by: INTERNAL MEDICINE

## 2021-03-29 PROCEDURE — 3077F PR MOST RECENT SYSTOLIC BLOOD PRESSURE >= 140 MM HG: ICD-10-PCS | Mod: S$GLB,,, | Performed by: INTERNAL MEDICINE

## 2021-03-29 PROCEDURE — 1125F PR PAIN SEVERITY QUANTIFIED, PAIN PRESENT: ICD-10-PCS | Mod: S$GLB,,, | Performed by: INTERNAL MEDICINE

## 2021-03-29 PROCEDURE — 3008F BODY MASS INDEX DOCD: CPT | Mod: S$GLB,,, | Performed by: INTERNAL MEDICINE

## 2021-03-29 RX ORDER — DRONABINOL 2.5 MG/1
2.5 CAPSULE ORAL
Qty: 60 CAPSULE | Refills: 0 | Status: SHIPPED | OUTPATIENT
Start: 2021-03-29 | End: 2021-04-28

## 2021-03-29 RX ORDER — CARISOPRODOL 350 MG/1
350 TABLET ORAL 3 TIMES DAILY PRN
Qty: 90 TABLET | Refills: 0 | Status: SHIPPED | OUTPATIENT
Start: 2021-03-29 | End: 2021-05-04 | Stop reason: SDUPTHER

## 2021-03-29 RX ORDER — HYDROCODONE BITARTRATE AND ACETAMINOPHEN 10; 325 MG/1; MG/1
1 TABLET ORAL EVERY 6 HOURS PRN
Qty: 120 TABLET | Refills: 0 | Status: SHIPPED | OUTPATIENT
Start: 2021-03-29 | End: 2021-04-28

## 2021-03-30 ENCOUNTER — OFFICE VISIT (OUTPATIENT)
Dept: SURGERY | Facility: CLINIC | Age: 61
End: 2021-03-30
Payer: MEDICARE

## 2021-03-30 VITALS — TEMPERATURE: 98 F | BODY MASS INDEX: 25.94 KG/M2 | WEIGHT: 186 LBS

## 2021-03-30 DIAGNOSIS — Z98.890 POST-OPERATIVE STATE: Primary | ICD-10-CM

## 2021-03-30 PROCEDURE — 99999 PR PBB SHADOW E&M-EST. PATIENT-LVL III: CPT | Mod: PBBFAC,,, | Performed by: SURGERY

## 2021-03-30 PROCEDURE — 3008F BODY MASS INDEX DOCD: CPT | Mod: CPTII,S$GLB,, | Performed by: SURGERY

## 2021-03-30 PROCEDURE — 3008F PR BODY MASS INDEX (BMI) DOCUMENTED: ICD-10-PCS | Mod: CPTII,S$GLB,, | Performed by: SURGERY

## 2021-03-30 PROCEDURE — 1126F PR PAIN SEVERITY QUANTIFIED, NO PAIN PRESENT: ICD-10-PCS | Mod: S$GLB,,, | Performed by: SURGERY

## 2021-03-30 PROCEDURE — 99024 POSTOP FOLLOW-UP VISIT: CPT | Mod: S$GLB,,, | Performed by: SURGERY

## 2021-03-30 PROCEDURE — 99024 PR POST-OP FOLLOW-UP VISIT: ICD-10-PCS | Mod: S$GLB,,, | Performed by: SURGERY

## 2021-03-30 PROCEDURE — 1126F AMNT PAIN NOTED NONE PRSNT: CPT | Mod: S$GLB,,, | Performed by: SURGERY

## 2021-03-30 PROCEDURE — 99999 PR PBB SHADOW E&M-EST. PATIENT-LVL III: ICD-10-PCS | Mod: PBBFAC,,, | Performed by: SURGERY

## 2021-03-31 ENCOUNTER — HOSPITAL ENCOUNTER (OUTPATIENT)
Dept: PULMONOLOGY | Facility: HOSPITAL | Age: 61
Discharge: HOME | End: 2021-03-31
Attending: INTERNAL MEDICINE
Payer: MEDICARE

## 2021-03-31 ENCOUNTER — TELEPHONE (OUTPATIENT)
Dept: SURGERY | Facility: CLINIC | Age: 61
End: 2021-03-31

## 2021-03-31 DIAGNOSIS — J44.9 CHRONIC OBSTRUCTIVE PULMONARY DISEASE, UNSPECIFIED COPD TYPE: ICD-10-CM

## 2021-03-31 DIAGNOSIS — J96.11 CHRONIC HYPOXEMIC RESPIRATORY FAILURE: ICD-10-CM

## 2021-03-31 LAB — BR6MWT: NORMAL

## 2021-03-31 PROCEDURE — 94618 PULMONARY STRESS TESTING: CPT | Mod: 26,,, | Performed by: INTERNAL MEDICINE

## 2021-03-31 PROCEDURE — 94618 PULMONARY STRESS TESTING: ICD-10-PCS | Mod: 26,,, | Performed by: INTERNAL MEDICINE

## 2021-03-31 PROCEDURE — 94618 PULMONARY STRESS TESTING: CPT

## 2021-04-01 ENCOUNTER — TELEPHONE (OUTPATIENT)
Dept: PULMONOLOGY | Facility: CLINIC | Age: 61
End: 2021-04-01

## 2021-04-05 ENCOUNTER — OFFICE VISIT (OUTPATIENT)
Dept: SURGERY | Facility: CLINIC | Age: 61
End: 2021-04-05
Payer: MEDICARE

## 2021-04-05 VITALS — TEMPERATURE: 98 F

## 2021-04-05 DIAGNOSIS — Z98.890 POST-OPERATIVE STATE: Primary | ICD-10-CM

## 2021-04-05 PROCEDURE — 99024 POSTOP FOLLOW-UP VISIT: CPT | Mod: S$GLB,,, | Performed by: SURGERY

## 2021-04-05 PROCEDURE — 99999 PR PBB SHADOW E&M-EST. PATIENT-LVL III: CPT | Mod: PBBFAC,,, | Performed by: SURGERY

## 2021-04-05 PROCEDURE — 99999 PR PBB SHADOW E&M-EST. PATIENT-LVL III: ICD-10-PCS | Mod: PBBFAC,,, | Performed by: SURGERY

## 2021-04-05 PROCEDURE — 1126F AMNT PAIN NOTED NONE PRSNT: CPT | Mod: S$GLB,,, | Performed by: SURGERY

## 2021-04-05 PROCEDURE — 99024 PR POST-OP FOLLOW-UP VISIT: ICD-10-PCS | Mod: S$GLB,,, | Performed by: SURGERY

## 2021-04-05 PROCEDURE — 1126F PR PAIN SEVERITY QUANTIFIED, NO PAIN PRESENT: ICD-10-PCS | Mod: S$GLB,,, | Performed by: SURGERY

## 2021-04-06 ENCOUNTER — TELEPHONE (OUTPATIENT)
Dept: FAMILY MEDICINE | Facility: CLINIC | Age: 61
End: 2021-04-06

## 2021-04-07 RX ORDER — DIAZEPAM 10 MG/1
10 TABLET ORAL 4 TIMES DAILY PRN
Qty: 40 TABLET | Refills: 0 | Status: SHIPPED | OUTPATIENT
Start: 2021-04-07 | End: 2021-04-13 | Stop reason: SDUPTHER

## 2021-04-08 ENCOUNTER — OFFICE VISIT (OUTPATIENT)
Dept: RADIATION ONCOLOGY | Facility: CLINIC | Age: 61
End: 2021-04-08
Payer: MEDICARE

## 2021-04-08 ENCOUNTER — TELEPHONE (OUTPATIENT)
Dept: FAMILY MEDICINE | Facility: CLINIC | Age: 61
End: 2021-04-08

## 2021-04-08 VITALS
BODY MASS INDEX: 26.08 KG/M2 | WEIGHT: 187 LBS | DIASTOLIC BLOOD PRESSURE: 103 MMHG | TEMPERATURE: 98 F | HEART RATE: 84 BPM | SYSTOLIC BLOOD PRESSURE: 167 MMHG

## 2021-04-08 DIAGNOSIS — C79.51 BONE METASTASES: ICD-10-CM

## 2021-04-08 DIAGNOSIS — Z20.822 ENCOUNTER FOR LABORATORY TESTING FOR COVID-19 VIRUS: ICD-10-CM

## 2021-04-08 DIAGNOSIS — Z17.0 MALIGNANT NEOPLASM OF LOWER-OUTER QUADRANT OF LEFT BREAST OF FEMALE, ESTROGEN RECEPTOR POSITIVE: Primary | ICD-10-CM

## 2021-04-08 DIAGNOSIS — C50.512 MALIGNANT NEOPLASM OF LOWER-OUTER QUADRANT OF LEFT BREAST OF FEMALE, ESTROGEN RECEPTOR POSITIVE: Primary | ICD-10-CM

## 2021-04-08 DIAGNOSIS — C50.411 MALIGNANT NEOPLASM OF UPPER-OUTER QUADRANT OF RIGHT BREAST IN FEMALE, ESTROGEN RECEPTOR NEGATIVE: ICD-10-CM

## 2021-04-08 DIAGNOSIS — Z17.1 MALIGNANT NEOPLASM OF UPPER-OUTER QUADRANT OF RIGHT BREAST IN FEMALE, ESTROGEN RECEPTOR NEGATIVE: ICD-10-CM

## 2021-04-08 DIAGNOSIS — C79.51 BONE METASTASES: Primary | ICD-10-CM

## 2021-04-08 PROCEDURE — 99417 PROLNG OP E/M EACH 15 MIN: CPT | Mod: S$GLB,,, | Performed by: RADIOLOGY

## 2021-04-08 PROCEDURE — 99205 PR OFFICE/OUTPT VISIT, NEW, LEVL V, 60-74 MIN: ICD-10-PCS | Mod: S$GLB,,, | Performed by: RADIOLOGY

## 2021-04-08 PROCEDURE — 3008F PR BODY MASS INDEX (BMI) DOCUMENTED: ICD-10-PCS | Mod: S$GLB,,, | Performed by: RADIOLOGY

## 2021-04-08 PROCEDURE — 3008F BODY MASS INDEX DOCD: CPT | Mod: S$GLB,,, | Performed by: RADIOLOGY

## 2021-04-08 PROCEDURE — 1125F PR PAIN SEVERITY QUANTIFIED, PAIN PRESENT: ICD-10-PCS | Mod: S$GLB,,, | Performed by: RADIOLOGY

## 2021-04-08 PROCEDURE — 1125F AMNT PAIN NOTED PAIN PRSNT: CPT | Mod: S$GLB,,, | Performed by: RADIOLOGY

## 2021-04-08 PROCEDURE — 99417 PR PROLONGED SVC, OUTPT, W/WO DIRECT PT CONTACT,  EA ADDTL 15 MIN: ICD-10-PCS | Mod: S$GLB,,, | Performed by: RADIOLOGY

## 2021-04-08 PROCEDURE — 99205 OFFICE O/P NEW HI 60 MIN: CPT | Mod: S$GLB,,, | Performed by: RADIOLOGY

## 2021-04-09 ENCOUNTER — TELEPHONE (OUTPATIENT)
Dept: FAMILY MEDICINE | Facility: CLINIC | Age: 61
End: 2021-04-09

## 2021-04-12 ENCOUNTER — TELEPHONE (OUTPATIENT)
Dept: FAMILY MEDICINE | Facility: CLINIC | Age: 61
End: 2021-04-12

## 2021-04-12 ENCOUNTER — TELEPHONE (OUTPATIENT)
Dept: RADIATION ONCOLOGY | Facility: CLINIC | Age: 61
End: 2021-04-12

## 2021-04-12 ENCOUNTER — TREATMENT (OUTPATIENT)
Dept: RADIATION ONCOLOGY | Facility: CLINIC | Age: 61
End: 2021-04-12
Payer: MEDICARE

## 2021-04-12 ENCOUNTER — LAB VISIT (OUTPATIENT)
Dept: PRIMARY CARE CLINIC | Facility: CLINIC | Age: 61
End: 2021-04-12
Payer: MEDICARE

## 2021-04-12 DIAGNOSIS — Z00.00 ROUTINE GENERAL MEDICAL EXAMINATION AT A HEALTH CARE FACILITY: Primary | ICD-10-CM

## 2021-04-12 DIAGNOSIS — Z20.822 ENCOUNTER FOR LABORATORY TESTING FOR COVID-19 VIRUS: ICD-10-CM

## 2021-04-12 DIAGNOSIS — E11.65 TYPE 2 DIABETES MELLITUS WITH HYPERGLYCEMIA, WITH LONG-TERM CURRENT USE OF INSULIN: ICD-10-CM

## 2021-04-12 DIAGNOSIS — Z79.4 TYPE 2 DIABETES MELLITUS WITH HYPERGLYCEMIA, WITH LONG-TERM CURRENT USE OF INSULIN: ICD-10-CM

## 2021-04-12 DIAGNOSIS — C79.51 BONE METASTASES: Primary | ICD-10-CM

## 2021-04-12 DIAGNOSIS — E78.2 MIXED HYPERLIPIDEMIA: ICD-10-CM

## 2021-04-12 PROCEDURE — 77290 THER RAD SIMULAJ FIELD CPLX: CPT | Mod: S$GLB,,, | Performed by: RADIOLOGY

## 2021-04-12 PROCEDURE — U0003 INFECTIOUS AGENT DETECTION BY NUCLEIC ACID (DNA OR RNA); SEVERE ACUTE RESPIRATORY SYNDROME CORONAVIRUS 2 (SARS-COV-2) (CORONAVIRUS DISEASE [COVID-19]), AMPLIFIED PROBE TECHNIQUE, MAKING USE OF HIGH THROUGHPUT TECHNOLOGIES AS DESCRIBED BY CMS-2020-01-R: HCPCS | Performed by: RADIOLOGY

## 2021-04-12 PROCEDURE — 77334 RADIATION TREATMENT AID(S): CPT | Mod: S$GLB,,, | Performed by: RADIOLOGY

## 2021-04-12 PROCEDURE — 77263 PR  RADIATION THERAPY PLAN COMPLEX: ICD-10-PCS | Mod: S$GLB,,, | Performed by: RADIOLOGY

## 2021-04-12 PROCEDURE — U0005 INFEC AGEN DETEC AMPLI PROBE: HCPCS | Performed by: RADIOLOGY

## 2021-04-12 PROCEDURE — 77399 PR IMAGE FUSION, RADIATION THERAPY: ICD-10-PCS | Mod: S$GLB,,, | Performed by: RADIOLOGY

## 2021-04-12 PROCEDURE — 77334 PR  RADN TREATMENT AID(S) COMPLX: ICD-10-PCS | Mod: S$GLB,,, | Performed by: RADIOLOGY

## 2021-04-12 PROCEDURE — 77399 UNLISTED PX MED RADJ PHYSICS: CPT | Mod: S$GLB,,, | Performed by: RADIOLOGY

## 2021-04-12 PROCEDURE — 77263 THER RADIOLOGY TX PLNG CPLX: CPT | Mod: S$GLB,,, | Performed by: RADIOLOGY

## 2021-04-12 PROCEDURE — 77290 PR  SET RADN THERAPY FIELD COMPLEX: ICD-10-PCS | Mod: S$GLB,,, | Performed by: RADIOLOGY

## 2021-04-13 ENCOUNTER — OFFICE VISIT (OUTPATIENT)
Dept: FAMILY MEDICINE | Facility: CLINIC | Age: 61
End: 2021-04-13
Payer: MEDICARE

## 2021-04-13 VITALS
HEART RATE: 85 BPM | SYSTOLIC BLOOD PRESSURE: 142 MMHG | DIASTOLIC BLOOD PRESSURE: 92 MMHG | WEIGHT: 189 LBS | HEIGHT: 71 IN | BODY MASS INDEX: 26.46 KG/M2

## 2021-04-13 DIAGNOSIS — I10 ESSENTIAL HYPERTENSION: ICD-10-CM

## 2021-04-13 DIAGNOSIS — F06.4 ANXIETY DISORDER DUE TO KNOWN PHYSIOLOGICAL CONDITION: ICD-10-CM

## 2021-04-13 DIAGNOSIS — Z79.4 TYPE 2 DIABETES MELLITUS WITH HYPERGLYCEMIA, WITH LONG-TERM CURRENT USE OF INSULIN: Primary | ICD-10-CM

## 2021-04-13 DIAGNOSIS — C79.51 BONE METASTASES: ICD-10-CM

## 2021-04-13 DIAGNOSIS — Z17.0 MALIGNANT NEOPLASM OF UPPER-OUTER QUADRANT OF RIGHT BREAST IN FEMALE, ESTROGEN RECEPTOR POSITIVE: ICD-10-CM

## 2021-04-13 DIAGNOSIS — Z17.0 MALIGNANT NEOPLASM OF LOWER-OUTER QUADRANT OF LEFT BREAST OF FEMALE, ESTROGEN RECEPTOR POSITIVE: ICD-10-CM

## 2021-04-13 DIAGNOSIS — C50.512 MALIGNANT NEOPLASM OF LOWER-OUTER QUADRANT OF LEFT BREAST OF FEMALE, ESTROGEN RECEPTOR POSITIVE: ICD-10-CM

## 2021-04-13 DIAGNOSIS — E11.65 TYPE 2 DIABETES MELLITUS WITH HYPERGLYCEMIA, WITH LONG-TERM CURRENT USE OF INSULIN: Primary | ICD-10-CM

## 2021-04-13 DIAGNOSIS — C50.411 MALIGNANT NEOPLASM OF UPPER-OUTER QUADRANT OF RIGHT BREAST IN FEMALE, ESTROGEN RECEPTOR POSITIVE: ICD-10-CM

## 2021-04-13 DIAGNOSIS — E78.2 MIXED HYPERLIPIDEMIA: ICD-10-CM

## 2021-04-13 PROBLEM — J96.01 ACUTE HYPOXEMIC RESPIRATORY FAILURE: Status: RESOLVED | Noted: 2020-12-12 | Resolved: 2021-04-13

## 2021-04-13 LAB
ALBUMIN/CREAT UR: 13 MCG/MG CREAT
CHOLEST SERPL-MCNC: 262 MG/DL
CHOLEST/HDLC SERPL: 4.4 (CALC)
CREAT UR-MCNC: 173 MG/DL (ref 20–275)
HBA1C MFR BLD: 12.9 % OF TOTAL HGB
HDLC SERPL-MCNC: 60 MG/DL
LDLC SERPL CALC-MCNC: 156 MG/DL (CALC)
MICROALBUMIN UR-MCNC: 2.2 MG/DL
NONHDLC SERPL-MCNC: 202 MG/DL (CALC)
SARS-COV-2 RNA RESP QL NAA+PROBE: NOT DETECTED
TRIGL SERPL-MCNC: 299 MG/DL
TSH SERPL-ACNC: 0.84 MIU/L (ref 0.4–4.5)

## 2021-04-13 PROCEDURE — 99214 PR OFFICE/OUTPT VISIT, EST, LEVL IV, 30-39 MIN: ICD-10-PCS | Mod: S$GLB,,, | Performed by: FAMILY MEDICINE

## 2021-04-13 PROCEDURE — 3080F PR MOST RECENT DIASTOLIC BLOOD PRESSURE >= 90 MM HG: ICD-10-PCS | Mod: S$GLB,,, | Performed by: FAMILY MEDICINE

## 2021-04-13 PROCEDURE — 3046F PR MOST RECENT HEMOGLOBIN A1C LEVEL > 9.0%: ICD-10-PCS | Mod: S$GLB,,, | Performed by: FAMILY MEDICINE

## 2021-04-13 PROCEDURE — 3008F BODY MASS INDEX DOCD: CPT | Mod: S$GLB,,, | Performed by: FAMILY MEDICINE

## 2021-04-13 PROCEDURE — 77370 RADIATION PHYSICS CONSULT: CPT | Mod: S$GLB,,, | Performed by: RADIOLOGY

## 2021-04-13 PROCEDURE — 77295 3-D RADIOTHERAPY PLAN: CPT | Mod: S$GLB,,, | Performed by: RADIOLOGY

## 2021-04-13 PROCEDURE — 3080F DIAST BP >= 90 MM HG: CPT | Mod: S$GLB,,, | Performed by: FAMILY MEDICINE

## 2021-04-13 PROCEDURE — 3046F HEMOGLOBIN A1C LEVEL >9.0%: CPT | Mod: S$GLB,,, | Performed by: FAMILY MEDICINE

## 2021-04-13 PROCEDURE — 77300 PR RADIATION THERAPY,DOSIMETRY PLAN: ICD-10-PCS | Mod: S$GLB,,, | Performed by: RADIOLOGY

## 2021-04-13 PROCEDURE — 77334 RADIATION TREATMENT AID(S): CPT | Mod: S$GLB,,, | Performed by: RADIOLOGY

## 2021-04-13 PROCEDURE — 3008F PR BODY MASS INDEX (BMI) DOCUMENTED: ICD-10-PCS | Mod: S$GLB,,, | Performed by: FAMILY MEDICINE

## 2021-04-13 PROCEDURE — 3077F SYST BP >= 140 MM HG: CPT | Mod: S$GLB,,, | Performed by: FAMILY MEDICINE

## 2021-04-13 PROCEDURE — 77300 RADIATION THERAPY DOSE PLAN: CPT | Mod: S$GLB,,, | Performed by: RADIOLOGY

## 2021-04-13 PROCEDURE — 77334 PR  RADN TREATMENT AID(S) COMPLX: ICD-10-PCS | Mod: S$GLB,,, | Performed by: RADIOLOGY

## 2021-04-13 PROCEDURE — 77370 PR  RADN PHYSICS CONSULT SPECIAL: ICD-10-PCS | Mod: S$GLB,,, | Performed by: RADIOLOGY

## 2021-04-13 PROCEDURE — 99214 OFFICE O/P EST MOD 30 MIN: CPT | Mod: S$GLB,,, | Performed by: FAMILY MEDICINE

## 2021-04-13 PROCEDURE — 77295 PR 3D RADIOTHERAPY PLAN: ICD-10-PCS | Mod: S$GLB,,, | Performed by: RADIOLOGY

## 2021-04-13 PROCEDURE — 3077F PR MOST RECENT SYSTOLIC BLOOD PRESSURE >= 140 MM HG: ICD-10-PCS | Mod: S$GLB,,, | Performed by: FAMILY MEDICINE

## 2021-04-13 RX ORDER — INSULIN GLARGINE 100 [IU]/ML
25 INJECTION, SOLUTION SUBCUTANEOUS 2 TIMES DAILY
Qty: 18 ML | Refills: 11 | Status: SHIPPED | OUTPATIENT
Start: 2021-04-13 | End: 2021-08-12

## 2021-04-13 RX ORDER — ROSUVASTATIN CALCIUM 40 MG/1
40 TABLET, COATED ORAL NIGHTLY
Qty: 90 TABLET | Refills: 3 | Status: SHIPPED | OUTPATIENT
Start: 2021-04-13 | End: 2021-11-02 | Stop reason: SDUPTHER

## 2021-04-13 RX ORDER — DIAZEPAM 10 MG/1
10 TABLET ORAL 4 TIMES DAILY PRN
Qty: 120 TABLET | Refills: 3 | Status: SHIPPED | OUTPATIENT
Start: 2021-04-13 | End: 2021-08-12 | Stop reason: SDUPTHER

## 2021-04-14 ENCOUNTER — TELEPHONE (OUTPATIENT)
Dept: RADIOLOGY | Facility: HOSPITAL | Age: 61
End: 2021-04-14

## 2021-04-14 ENCOUNTER — TELEPHONE (OUTPATIENT)
Dept: FAMILY MEDICINE | Facility: CLINIC | Age: 61
End: 2021-04-14

## 2021-04-15 ENCOUNTER — TELEPHONE (OUTPATIENT)
Dept: RADIOLOGY | Facility: HOSPITAL | Age: 61
End: 2021-04-15

## 2021-04-15 DIAGNOSIS — Z01.818 PRE-OP TESTING: ICD-10-CM

## 2021-04-25 ENCOUNTER — LAB VISIT (OUTPATIENT)
Dept: PRIMARY CARE CLINIC | Facility: CLINIC | Age: 61
End: 2021-04-25
Payer: MEDICARE

## 2021-04-25 DIAGNOSIS — Z01.818 PRE-OP TESTING: ICD-10-CM

## 2021-04-25 PROCEDURE — U0003 INFECTIOUS AGENT DETECTION BY NUCLEIC ACID (DNA OR RNA); SEVERE ACUTE RESPIRATORY SYNDROME CORONAVIRUS 2 (SARS-COV-2) (CORONAVIRUS DISEASE [COVID-19]), AMPLIFIED PROBE TECHNIQUE, MAKING USE OF HIGH THROUGHPUT TECHNOLOGIES AS DESCRIBED BY CMS-2020-01-R: HCPCS | Performed by: RADIOLOGY

## 2021-04-25 PROCEDURE — U0005 INFEC AGEN DETEC AMPLI PROBE: HCPCS | Performed by: RADIOLOGY

## 2021-04-27 ENCOUNTER — DOCUMENTATION ONLY (OUTPATIENT)
Dept: RADIATION ONCOLOGY | Facility: CLINIC | Age: 61
End: 2021-04-27

## 2021-04-27 ENCOUNTER — TELEPHONE (OUTPATIENT)
Dept: HEMATOLOGY/ONCOLOGY | Facility: CLINIC | Age: 61
End: 2021-04-27

## 2021-04-27 LAB — SARS-COV-2 RNA RESP QL NAA+PROBE: NOT DETECTED

## 2021-04-28 ENCOUNTER — HOSPITAL ENCOUNTER (OUTPATIENT)
Dept: RADIOLOGY | Facility: HOSPITAL | Age: 61
Discharge: HOME | End: 2021-04-28
Attending: RADIOLOGY
Payer: MEDICARE

## 2021-04-28 VITALS
OXYGEN SATURATION: 96 % | TEMPERATURE: 98 F | HEART RATE: 81 BPM | RESPIRATION RATE: 18 BRPM | SYSTOLIC BLOOD PRESSURE: 163 MMHG | DIASTOLIC BLOOD PRESSURE: 92 MMHG

## 2021-04-28 DIAGNOSIS — C79.51 BONE METASTASES: ICD-10-CM

## 2021-04-28 LAB
APTT PPP: 26.9 SEC (ref 25.6–35.8)
BASOPHILS # BLD AUTO: 0.03 K/UL (ref 0–0.2)
BASOPHILS NFR BLD: 0.6 % (ref 0–1.9)
DIFFERENTIAL METHOD: NORMAL
EOSINOPHIL # BLD AUTO: 0.1 K/UL (ref 0–0.5)
EOSINOPHIL NFR BLD: 1.7 % (ref 0–8)
ERYTHROCYTE [DISTWIDTH] IN BLOOD BY AUTOMATED COUNT: 12.3 % (ref 11.5–14.5)
GLUCOSE SERPL-MCNC: 269 MG/DL (ref 70–110)
HCT VFR BLD AUTO: 42.6 % (ref 37–48.5)
HGB BLD-MCNC: 14.2 G/DL (ref 12–16)
IMM GRANULOCYTES # BLD AUTO: 0.02 K/UL (ref 0–0.04)
IMM GRANULOCYTES NFR BLD AUTO: 0.4 % (ref 0–0.5)
INR PPP: 1.1
LYMPHOCYTES # BLD AUTO: 1.3 K/UL (ref 1–4.8)
LYMPHOCYTES NFR BLD: 27.3 % (ref 18–48)
MCH RBC QN AUTO: 29.4 PG (ref 27–31)
MCHC RBC AUTO-ENTMCNC: 33.3 G/DL (ref 32–36)
MCV RBC AUTO: 88 FL (ref 82–98)
MONOCYTES # BLD AUTO: 0.3 K/UL (ref 0.3–1)
MONOCYTES NFR BLD: 6.9 % (ref 4–15)
NEUTROPHILS # BLD AUTO: 3 K/UL (ref 1.8–7.7)
NEUTROPHILS NFR BLD: 63.1 % (ref 38–73)
NRBC BLD-RTO: 0 /100 WBC
PLATELET # BLD AUTO: 264 K/UL (ref 150–450)
PMV BLD AUTO: 9.7 FL (ref 9.2–12.9)
PROTHROMBIN TIME: 13.3 SEC (ref 11.8–14.3)
RBC # BLD AUTO: 4.83 M/UL (ref 4–5.4)
WBC # BLD AUTO: 4.77 K/UL (ref 3.9–12.7)

## 2021-04-28 PROCEDURE — 88307 TISSUE EXAM BY PATHOLOGIST: CPT | Mod: TC

## 2021-04-28 PROCEDURE — 88361 TUMOR IMMUNOHISTOCHEM/COMPUT: CPT | Mod: TC,59

## 2021-04-28 PROCEDURE — 99153 MOD SED SAME PHYS/QHP EA: CPT

## 2021-04-28 PROCEDURE — 63600175 PHARM REV CODE 636 W HCPCS: Performed by: RADIOLOGY

## 2021-04-28 PROCEDURE — 85025 COMPLETE CBC W/AUTO DIFF WBC: CPT | Performed by: RADIOLOGY

## 2021-04-28 PROCEDURE — 85610 PROTHROMBIN TIME: CPT | Performed by: RADIOLOGY

## 2021-04-28 PROCEDURE — 77012 CT SCAN FOR NEEDLE BIOPSY: CPT | Mod: TC

## 2021-04-28 PROCEDURE — 99152 MOD SED SAME PHYS/QHP 5/>YRS: CPT

## 2021-04-28 PROCEDURE — 25000003 PHARM REV CODE 250: Performed by: RADIOLOGY

## 2021-04-28 PROCEDURE — 85730 THROMBOPLASTIN TIME PARTIAL: CPT | Performed by: RADIOLOGY

## 2021-04-28 PROCEDURE — A4215 STERILE NEEDLE: HCPCS

## 2021-04-28 RX ORDER — FENTANYL CITRATE 50 UG/ML
INJECTION, SOLUTION INTRAMUSCULAR; INTRAVENOUS CODE/TRAUMA/SEDATION MEDICATION
Status: COMPLETED | OUTPATIENT
Start: 2021-04-28 | End: 2021-04-28

## 2021-04-28 RX ORDER — MIDAZOLAM HYDROCHLORIDE 1 MG/ML
INJECTION INTRAMUSCULAR; INTRAVENOUS CODE/TRAUMA/SEDATION MEDICATION
Status: COMPLETED | OUTPATIENT
Start: 2021-04-28 | End: 2021-04-28

## 2021-04-28 RX ORDER — SODIUM CHLORIDE 9 MG/ML
INJECTION, SOLUTION INTRAVENOUS
Status: COMPLETED | OUTPATIENT
Start: 2021-04-28 | End: 2021-04-28

## 2021-04-28 RX ADMIN — FENTANYL CITRATE 25 MCG: 50 INJECTION INTRAMUSCULAR; INTRAVENOUS at 10:04

## 2021-04-28 RX ADMIN — MIDAZOLAM HYDROCHLORIDE 1 MG: 1 INJECTION, SOLUTION INTRAMUSCULAR; INTRAVENOUS at 10:04

## 2021-04-28 RX ADMIN — SODIUM CHLORIDE 500 ML/HR: 9 INJECTION, SOLUTION INTRAVENOUS at 09:04

## 2021-04-28 RX ADMIN — MIDAZOLAM HYDROCHLORIDE 1 MG: 1 INJECTION, SOLUTION INTRAMUSCULAR; INTRAVENOUS at 09:04

## 2021-04-28 RX ADMIN — FENTANYL CITRATE 25 MCG: 50 INJECTION INTRAMUSCULAR; INTRAVENOUS at 09:04

## 2021-04-30 ENCOUNTER — OFFICE VISIT (OUTPATIENT)
Dept: PLASTIC SURGERY | Facility: CLINIC | Age: 61
End: 2021-04-30
Payer: MEDICARE

## 2021-04-30 ENCOUNTER — TELEPHONE (OUTPATIENT)
Dept: FAMILY MEDICINE | Facility: CLINIC | Age: 61
End: 2021-04-30

## 2021-04-30 VITALS — HEART RATE: 83 BPM | SYSTOLIC BLOOD PRESSURE: 160 MMHG | DIASTOLIC BLOOD PRESSURE: 84 MMHG

## 2021-04-30 DIAGNOSIS — Z09 SURGERY FOLLOW-UP EXAMINATION: Primary | ICD-10-CM

## 2021-04-30 PROCEDURE — 1125F PR PAIN SEVERITY QUANTIFIED, PAIN PRESENT: ICD-10-PCS | Mod: S$GLB,,, | Performed by: SURGERY

## 2021-04-30 PROCEDURE — 99999 PR PBB SHADOW E&M-EST. PATIENT-LVL III: ICD-10-PCS | Mod: PBBFAC,,, | Performed by: SURGERY

## 2021-04-30 PROCEDURE — 99999 PR PBB SHADOW E&M-EST. PATIENT-LVL III: CPT | Mod: PBBFAC,,, | Performed by: SURGERY

## 2021-04-30 PROCEDURE — 1125F AMNT PAIN NOTED PAIN PRSNT: CPT | Mod: S$GLB,,, | Performed by: SURGERY

## 2021-04-30 PROCEDURE — 99024 PR POST-OP FOLLOW-UP VISIT: ICD-10-PCS | Mod: S$GLB,,, | Performed by: SURGERY

## 2021-04-30 PROCEDURE — 99024 POSTOP FOLLOW-UP VISIT: CPT | Mod: S$GLB,,, | Performed by: SURGERY

## 2021-05-04 ENCOUNTER — OFFICE VISIT (OUTPATIENT)
Dept: HEMATOLOGY/ONCOLOGY | Facility: CLINIC | Age: 61
End: 2021-05-04
Payer: MEDICARE

## 2021-05-04 ENCOUNTER — TELEPHONE (OUTPATIENT)
Dept: FAMILY MEDICINE | Facility: CLINIC | Age: 61
End: 2021-05-04

## 2021-05-04 VITALS
DIASTOLIC BLOOD PRESSURE: 86 MMHG | HEART RATE: 76 BPM | TEMPERATURE: 98 F | WEIGHT: 187.19 LBS | BODY MASS INDEX: 26.11 KG/M2 | RESPIRATION RATE: 18 BRPM | SYSTOLIC BLOOD PRESSURE: 152 MMHG

## 2021-05-04 DIAGNOSIS — Z17.0 MALIGNANT NEOPLASM OF LOWER-INNER QUADRANT OF RIGHT BREAST OF FEMALE, ESTROGEN RECEPTOR POSITIVE: ICD-10-CM

## 2021-05-04 DIAGNOSIS — Z17.1 MALIGNANT NEOPLASM OF UPPER-OUTER QUADRANT OF RIGHT BREAST IN FEMALE, ESTROGEN RECEPTOR NEGATIVE: Primary | ICD-10-CM

## 2021-05-04 DIAGNOSIS — C50.411 MALIGNANT NEOPLASM OF UPPER-OUTER QUADRANT OF RIGHT BREAST IN FEMALE, ESTROGEN RECEPTOR NEGATIVE: Primary | ICD-10-CM

## 2021-05-04 DIAGNOSIS — C50.311 MALIGNANT NEOPLASM OF LOWER-INNER QUADRANT OF RIGHT BREAST OF FEMALE, ESTROGEN RECEPTOR POSITIVE: ICD-10-CM

## 2021-05-04 DIAGNOSIS — T45.1X5A CHEMOTHERAPY-INDUCED NAUSEA: ICD-10-CM

## 2021-05-04 DIAGNOSIS — R07.81 PLEURITIC CHEST PAIN: ICD-10-CM

## 2021-05-04 DIAGNOSIS — Z17.0 MALIGNANT NEOPLASM OF LOWER-OUTER QUADRANT OF LEFT BREAST OF FEMALE, ESTROGEN RECEPTOR POSITIVE: ICD-10-CM

## 2021-05-04 DIAGNOSIS — R11.0 CHEMOTHERAPY-INDUCED NAUSEA: ICD-10-CM

## 2021-05-04 DIAGNOSIS — D05.90 CARCINOMA IN SITU OF BREAST, UNSPECIFIED LATERALITY, UNSPECIFIED TYPE: ICD-10-CM

## 2021-05-04 DIAGNOSIS — C50.512 MALIGNANT NEOPLASM OF LOWER-OUTER QUADRANT OF LEFT BREAST OF FEMALE, ESTROGEN RECEPTOR POSITIVE: ICD-10-CM

## 2021-05-04 PROCEDURE — 3008F PR BODY MASS INDEX (BMI) DOCUMENTED: ICD-10-PCS | Mod: S$GLB,,, | Performed by: INTERNAL MEDICINE

## 2021-05-04 PROCEDURE — 3008F BODY MASS INDEX DOCD: CPT | Mod: S$GLB,,, | Performed by: INTERNAL MEDICINE

## 2021-05-04 PROCEDURE — 1125F AMNT PAIN NOTED PAIN PRSNT: CPT | Mod: S$GLB,,, | Performed by: INTERNAL MEDICINE

## 2021-05-04 PROCEDURE — 1125F PR PAIN SEVERITY QUANTIFIED, PAIN PRESENT: ICD-10-PCS | Mod: S$GLB,,, | Performed by: INTERNAL MEDICINE

## 2021-05-04 PROCEDURE — 99214 PR OFFICE/OUTPT VISIT, EST, LEVL IV, 30-39 MIN: ICD-10-PCS | Mod: S$GLB,,, | Performed by: INTERNAL MEDICINE

## 2021-05-04 PROCEDURE — 99214 OFFICE O/P EST MOD 30 MIN: CPT | Mod: S$GLB,,, | Performed by: INTERNAL MEDICINE

## 2021-05-04 RX ORDER — DRONABINOL 5 MG/1
5 CAPSULE ORAL
Qty: 60 CAPSULE | Refills: 0 | Status: SHIPPED | OUTPATIENT
Start: 2021-05-04 | End: 2021-06-03

## 2021-05-04 RX ORDER — CARISOPRODOL 350 MG/1
350 TABLET ORAL 3 TIMES DAILY PRN
Qty: 90 TABLET | Refills: 0 | Status: SHIPPED | OUTPATIENT
Start: 2021-05-04 | End: 2021-09-28 | Stop reason: SDUPTHER

## 2021-05-04 RX ORDER — HYDROCODONE BITARTRATE AND ACETAMINOPHEN 10; 325 MG/1; MG/1
1 TABLET ORAL EVERY 6 HOURS PRN
Qty: 120 TABLET | Refills: 0 | Status: SHIPPED | OUTPATIENT
Start: 2021-05-04 | End: 2021-09-28 | Stop reason: SDUPTHER

## 2021-05-05 ENCOUNTER — DOCUMENTATION ONLY (OUTPATIENT)
Dept: RADIATION ONCOLOGY | Facility: CLINIC | Age: 61
End: 2021-05-05

## 2021-05-17 ENCOUNTER — TREATMENT (OUTPATIENT)
Dept: RADIATION ONCOLOGY | Facility: CLINIC | Age: 61
End: 2021-05-17
Payer: MEDICARE

## 2021-05-17 PROCEDURE — G6012 PR RADN TX DELIVERY, 6-10 MEV, >= 3 TX AREAS: ICD-10-PCS | Mod: S$GLB,,, | Performed by: RADIOLOGY

## 2021-05-17 PROCEDURE — G6002 STEREOSCOPIC X-RAY GUIDANCE: HCPCS | Mod: S$GLB,,, | Performed by: RADIOLOGY

## 2021-05-17 PROCEDURE — G6012 RADIATION TREATMENT DELIVERY: HCPCS | Mod: S$GLB,,, | Performed by: RADIOLOGY

## 2021-05-17 PROCEDURE — G6002 PR STEREOSCOPIC XRAY GUIDE FOR RADIATION TX DELIV: ICD-10-PCS | Mod: S$GLB,,, | Performed by: RADIOLOGY

## 2021-05-25 ENCOUNTER — OFFICE VISIT (OUTPATIENT)
Dept: HEMATOLOGY/ONCOLOGY | Facility: CLINIC | Age: 61
End: 2021-05-25
Payer: MEDICARE

## 2021-05-25 VITALS
DIASTOLIC BLOOD PRESSURE: 106 MMHG | HEART RATE: 74 BPM | WEIGHT: 189 LBS | SYSTOLIC BLOOD PRESSURE: 156 MMHG | HEIGHT: 71 IN | RESPIRATION RATE: 20 BRPM | BODY MASS INDEX: 26.46 KG/M2 | TEMPERATURE: 98 F

## 2021-05-25 DIAGNOSIS — Z17.1 MALIGNANT NEOPLASM OF UPPER-OUTER QUADRANT OF RIGHT BREAST IN FEMALE, ESTROGEN RECEPTOR NEGATIVE: Primary | ICD-10-CM

## 2021-05-25 DIAGNOSIS — Z17.0 MALIGNANT NEOPLASM OF LOWER-OUTER QUADRANT OF LEFT BREAST OF FEMALE, ESTROGEN RECEPTOR POSITIVE: ICD-10-CM

## 2021-05-25 DIAGNOSIS — C50.512 MALIGNANT NEOPLASM OF LOWER-OUTER QUADRANT OF LEFT BREAST OF FEMALE, ESTROGEN RECEPTOR POSITIVE: ICD-10-CM

## 2021-05-25 DIAGNOSIS — C50.411 MALIGNANT NEOPLASM OF UPPER-OUTER QUADRANT OF RIGHT BREAST IN FEMALE, ESTROGEN RECEPTOR NEGATIVE: Primary | ICD-10-CM

## 2021-05-25 DIAGNOSIS — C79.51 BONE METASTASES: ICD-10-CM

## 2021-05-25 PROCEDURE — 1125F AMNT PAIN NOTED PAIN PRSNT: CPT | Mod: S$GLB,,, | Performed by: INTERNAL MEDICINE

## 2021-05-25 PROCEDURE — 1125F PR PAIN SEVERITY QUANTIFIED, PAIN PRESENT: ICD-10-PCS | Mod: S$GLB,,, | Performed by: INTERNAL MEDICINE

## 2021-05-25 PROCEDURE — 3008F PR BODY MASS INDEX (BMI) DOCUMENTED: ICD-10-PCS | Mod: S$GLB,,, | Performed by: INTERNAL MEDICINE

## 2021-05-25 PROCEDURE — 99214 OFFICE O/P EST MOD 30 MIN: CPT | Mod: S$GLB,,, | Performed by: INTERNAL MEDICINE

## 2021-05-25 PROCEDURE — 99214 PR OFFICE/OUTPT VISIT, EST, LEVL IV, 30-39 MIN: ICD-10-PCS | Mod: S$GLB,,, | Performed by: INTERNAL MEDICINE

## 2021-05-25 PROCEDURE — 3008F BODY MASS INDEX DOCD: CPT | Mod: S$GLB,,, | Performed by: INTERNAL MEDICINE

## 2021-05-28 ENCOUNTER — CLINICAL SUPPORT (OUTPATIENT)
Dept: HEMATOLOGY/ONCOLOGY | Facility: CLINIC | Age: 61
End: 2021-05-28

## 2021-06-01 ENCOUNTER — TELEPHONE (OUTPATIENT)
Dept: HEMATOLOGY/ONCOLOGY | Facility: CLINIC | Age: 61
End: 2021-06-01

## 2021-06-07 ENCOUNTER — OFFICE VISIT (OUTPATIENT)
Dept: HEMATOLOGY/ONCOLOGY | Facility: CLINIC | Age: 61
End: 2021-06-07
Payer: MEDICARE

## 2021-06-07 VITALS
RESPIRATION RATE: 18 BRPM | DIASTOLIC BLOOD PRESSURE: 90 MMHG | SYSTOLIC BLOOD PRESSURE: 157 MMHG | BODY MASS INDEX: 26.32 KG/M2 | WEIGHT: 188 LBS | HEIGHT: 71 IN | HEART RATE: 94 BPM

## 2021-06-07 DIAGNOSIS — C50.512 MALIGNANT NEOPLASM OF LOWER-OUTER QUADRANT OF LEFT BREAST OF FEMALE, ESTROGEN RECEPTOR POSITIVE: ICD-10-CM

## 2021-06-07 DIAGNOSIS — C79.51 BONE METASTASES: ICD-10-CM

## 2021-06-07 DIAGNOSIS — Z17.0 MALIGNANT NEOPLASM OF LOWER-OUTER QUADRANT OF LEFT BREAST OF FEMALE, ESTROGEN RECEPTOR POSITIVE: ICD-10-CM

## 2021-06-07 DIAGNOSIS — Z17.1 MALIGNANT NEOPLASM OF UPPER-OUTER QUADRANT OF RIGHT BREAST IN FEMALE, ESTROGEN RECEPTOR NEGATIVE: Primary | ICD-10-CM

## 2021-06-07 DIAGNOSIS — C50.411 MALIGNANT NEOPLASM OF UPPER-OUTER QUADRANT OF RIGHT BREAST IN FEMALE, ESTROGEN RECEPTOR NEGATIVE: Primary | ICD-10-CM

## 2021-06-07 PROCEDURE — 3008F PR BODY MASS INDEX (BMI) DOCUMENTED: ICD-10-PCS | Mod: S$GLB,,, | Performed by: INTERNAL MEDICINE

## 2021-06-07 PROCEDURE — 99213 PR OFFICE/OUTPT VISIT, EST, LEVL III, 20-29 MIN: ICD-10-PCS | Mod: S$GLB,,, | Performed by: INTERNAL MEDICINE

## 2021-06-07 PROCEDURE — 1125F AMNT PAIN NOTED PAIN PRSNT: CPT | Mod: S$GLB,,, | Performed by: INTERNAL MEDICINE

## 2021-06-07 PROCEDURE — 3008F BODY MASS INDEX DOCD: CPT | Mod: S$GLB,,, | Performed by: INTERNAL MEDICINE

## 2021-06-07 PROCEDURE — 1125F PR PAIN SEVERITY QUANTIFIED, PAIN PRESENT: ICD-10-PCS | Mod: S$GLB,,, | Performed by: INTERNAL MEDICINE

## 2021-06-07 PROCEDURE — 99213 OFFICE O/P EST LOW 20 MIN: CPT | Mod: S$GLB,,, | Performed by: INTERNAL MEDICINE

## 2021-06-08 ENCOUNTER — OFFICE VISIT (OUTPATIENT)
Dept: RADIATION ONCOLOGY | Facility: CLINIC | Age: 61
End: 2021-06-08
Payer: MEDICARE

## 2021-06-08 VITALS — BODY MASS INDEX: 25.98 KG/M2 | WEIGHT: 186.31 LBS

## 2021-06-08 DIAGNOSIS — Z17.0 MALIGNANT NEOPLASM OF LOWER-OUTER QUADRANT OF LEFT BREAST OF FEMALE, ESTROGEN RECEPTOR POSITIVE: Primary | ICD-10-CM

## 2021-06-08 DIAGNOSIS — C50.512 MALIGNANT NEOPLASM OF LOWER-OUTER QUADRANT OF LEFT BREAST OF FEMALE, ESTROGEN RECEPTOR POSITIVE: Primary | ICD-10-CM

## 2021-06-09 NOTE — PROGRESS NOTES
ASSESSMENT AND PLAN:  1. Fever she states she has a fever at home.  No fever noted here.  She has been using Tylenol.  Rapid strep done today.  Tests were negative culture results pending  Rapid Strep A Screen-Throat    Group A Strep, RNA Direct Detection, Throat   2. Sore throat Naprosyn prescribed for sore throat side effects discussed     3. Fatigue she had SVT ablation done last year.  Her  reports after this time she's been fatigued.  Cardiac exam was unremarkable.  She denies dizziness, fainting, chest pain, shortness of breath.  She should follow-up with her primary physician 3 weeks if her symptoms do not improve.            Orders Placed This Encounter   Procedures     Rapid Strep A Screen-Throat     There are no discontinued medications.    No Follow-up on file.    CHIEF COMPLAINT:  Chief Complaint   Patient presents with     Sore Throat     x1 week, hurts to eat and drinks per pt.     Headache     and neck pain     Cough     dry cough, x1 month, aggravated by cold weather per pt.       HISTORY OF PRESENT ILLNESS:  Shahbaz is a 36 y.o. female presenting with a sore throat. Shahbaz is present with a Sharon  and . For the past 2 week, she has been experiencing a sore throat. She notes that she has difficulty swallowing food and drinks. She also has been feeling fatigued as well. She denies ill contacts. She has not been taking any medication for throat pain Of note, her rapid strep test was negative.     REVIEW OF SYSTEMS:  She was seen at [facility] and underwent an ablation for svt. She notes that she feels constantly fatigued.   She has a poor appetite.  states that there are days she does not eat anything.    All other 10 point review of systems are negative.    PFSH:  Reviewed as below.     TOBACCO USE:  History   Smoking Status     Never Smoker   Smokeless Tobacco     Never Used       VITALS:  Vitals:    03/23/17 1411   BP: 94/62   Patient Site: Left Arm   Patient Position: Sitting  Ms. Casrto is status post free flap reconstruction of the right breast.  She   has done great.  Drain was removed today.  Her loop sutures were removed today.    We will see her again in two weeks.      CRB/PN  dd: 03/03/2017 12:51:21 (CST)  td: 03/03/2017 16:14:23 (CST)  Doc ID   #[id number]  Job ID #[id number]    CC:          Cuff Size: Adult Regular   Pulse: 82   Temp: 97.9  F (36.6  C)   TempSrc: Oral   SpO2: 98%   Weight: (!) 81 lb 6 oz (36.9 kg)     Wt Readings from Last 3 Encounters:   03/23/17 (!) 81 lb 6 oz (36.9 kg)   05/09/16 (!) 78 lb (35.4 kg)   04/07/16 (!) 77 lb (34.9 kg)     Body mass index is 17.76 kg/(m^2).    PHYSICAL EXAM:  General: Alert, cooperative, no distress, appears stated age  Head: Normocephalic, without obvious abnormality, atraumatic  Throat: Lips, mucosa, and tongue normal; teeth and gums normal  Lungs: Clear to auscultation bilaterally, respirations unlabored  Chest wall: No tenderness or deformity  Heart: Regular rate and rhythm, S1 and S2 normal, no murmur, rub, or gallop  Neurologic:  A & O x 3.  No tremor, no focal findings.       DATA REVIEWED:  Additional History from Old Records Summarized (2): None  Decision to Obtain Records (1): None  Radiology Tests Summarized or Ordered (1): None  Labs Reviewed or Ordered (1): Labs ordered.   Medicine Test Summarized or Ordered (1): None  Independent Review of EKG or X-RAY(2 each): None    The visit lasted a total of 12 minutes face to face with the patient. Over 50% of the time was spent counseling and educating the patient about sore throat.     IDahlia, am scribing for and in the presence of, Dr. Mayorga.    I, Dr. Mayorga, personally performed the services described in this documentation, as scribed by Dahlia Zhang in my presence, and it is both accurate and complete.      MEDICATIONS:  Current Outpatient Prescriptions   Medication Sig Dispense Refill     albuterol (PROVENTIL HFA;VENTOLIN HFA) 90 mcg/actuation inhaler Inhale 2 puffs every 6 (six) hours as needed for wheezing. 8.5 g 11     cholecalciferol, vitamin D3, (VITAMIN D3) 2,000 unit Tab Take 1 tablet (2,000 Units total) by mouth daily. 100 tablet 3     etonogestrel (NEXPLANON) 68 mg Impl implant Insert 1 device   Lot [id number] Exp 11/2016       ferrous sulfate 325 (65 FE) MG  tablet One daily with food 90 tablet 3     loratadine (CLARITIN) 10 mg tablet One by mouth daily as needed for allergy sx's 30 tablet 2     prenatal multivit-Ca-min-Fe-FA (PRENATAL VITAMIN) Tab Take 1 tablet by mouth once daily.       No current facility-administered medications for this visit.        Total Data Points: 1

## 2021-06-17 ENCOUNTER — PATIENT MESSAGE (OUTPATIENT)
Dept: PODIATRY | Facility: CLINIC | Age: 61
End: 2021-06-17

## 2021-06-21 ENCOUNTER — OFFICE VISIT (OUTPATIENT)
Dept: HEMATOLOGY/ONCOLOGY | Facility: CLINIC | Age: 61
End: 2021-06-21
Payer: MEDICARE

## 2021-06-21 VITALS
BODY MASS INDEX: 25.77 KG/M2 | RESPIRATION RATE: 18 BRPM | HEART RATE: 88 BPM | TEMPERATURE: 98 F | DIASTOLIC BLOOD PRESSURE: 73 MMHG | SYSTOLIC BLOOD PRESSURE: 108 MMHG | WEIGHT: 184.81 LBS

## 2021-06-21 DIAGNOSIS — C50.512 MALIGNANT NEOPLASM OF LOWER-OUTER QUADRANT OF LEFT BREAST OF FEMALE, ESTROGEN RECEPTOR POSITIVE: ICD-10-CM

## 2021-06-21 DIAGNOSIS — C79.51 BONE METASTASES: ICD-10-CM

## 2021-06-21 DIAGNOSIS — C50.411 MALIGNANT NEOPLASM OF UPPER-OUTER QUADRANT OF RIGHT BREAST IN FEMALE, ESTROGEN RECEPTOR NEGATIVE: Primary | ICD-10-CM

## 2021-06-21 DIAGNOSIS — Z17.0 MALIGNANT NEOPLASM OF LOWER-OUTER QUADRANT OF LEFT BREAST OF FEMALE, ESTROGEN RECEPTOR POSITIVE: ICD-10-CM

## 2021-06-21 DIAGNOSIS — Z17.1 MALIGNANT NEOPLASM OF UPPER-OUTER QUADRANT OF RIGHT BREAST IN FEMALE, ESTROGEN RECEPTOR NEGATIVE: Primary | ICD-10-CM

## 2021-06-21 PROCEDURE — 99213 PR OFFICE/OUTPT VISIT, EST, LEVL III, 20-29 MIN: ICD-10-PCS | Mod: S$GLB,,, | Performed by: INTERNAL MEDICINE

## 2021-06-21 PROCEDURE — 1125F AMNT PAIN NOTED PAIN PRSNT: CPT | Mod: S$GLB,,, | Performed by: INTERNAL MEDICINE

## 2021-06-21 PROCEDURE — 1125F PR PAIN SEVERITY QUANTIFIED, PAIN PRESENT: ICD-10-PCS | Mod: S$GLB,,, | Performed by: INTERNAL MEDICINE

## 2021-06-21 PROCEDURE — 3008F BODY MASS INDEX DOCD: CPT | Mod: S$GLB,,, | Performed by: INTERNAL MEDICINE

## 2021-06-21 PROCEDURE — 99213 OFFICE O/P EST LOW 20 MIN: CPT | Mod: S$GLB,,, | Performed by: INTERNAL MEDICINE

## 2021-06-21 PROCEDURE — 3008F PR BODY MASS INDEX (BMI) DOCUMENTED: ICD-10-PCS | Mod: S$GLB,,, | Performed by: INTERNAL MEDICINE

## 2021-06-27 ENCOUNTER — TELEPHONE (OUTPATIENT)
Dept: HEMATOLOGY/ONCOLOGY | Facility: CLINIC | Age: 61
End: 2021-06-27

## 2021-06-27 RX ORDER — LAMOTRIGINE 25 MG/1
500 TABLET ORAL
Status: CANCELLED | OUTPATIENT
Start: 2021-07-06

## 2021-06-27 RX ORDER — LAMOTRIGINE 25 MG/1
500 TABLET ORAL
Status: CANCELLED | OUTPATIENT
Start: 2021-07-20

## 2021-06-28 ENCOUNTER — TELEPHONE (OUTPATIENT)
Dept: HEMATOLOGY/ONCOLOGY | Facility: CLINIC | Age: 61
End: 2021-06-28

## 2021-06-28 DIAGNOSIS — Z03.818 ENCNTR FOR OBS FOR SUSP EXPSR TO OTH BIOLG AGENTS RULED OUT: Primary | ICD-10-CM

## 2021-06-29 ENCOUNTER — SPECIALTY PHARMACY (OUTPATIENT)
Dept: PHARMACY | Facility: CLINIC | Age: 61
End: 2021-06-29

## 2021-07-02 ENCOUNTER — OFFICE VISIT (OUTPATIENT)
Dept: HEMATOLOGY/ONCOLOGY | Facility: CLINIC | Age: 61
End: 2021-07-02
Payer: MEDICARE

## 2021-07-02 ENCOUNTER — LAB VISIT (OUTPATIENT)
Dept: PRIMARY CARE CLINIC | Facility: CLINIC | Age: 61
End: 2021-07-02
Payer: MEDICARE

## 2021-07-02 VITALS
DIASTOLIC BLOOD PRESSURE: 88 MMHG | WEIGHT: 182 LBS | BODY MASS INDEX: 25.48 KG/M2 | HEART RATE: 101 BPM | HEIGHT: 71 IN | RESPIRATION RATE: 18 BRPM | SYSTOLIC BLOOD PRESSURE: 146 MMHG

## 2021-07-02 DIAGNOSIS — Z01.818 PREOP TESTING: ICD-10-CM

## 2021-07-02 DIAGNOSIS — Z01.818 PRE-OP TESTING: ICD-10-CM

## 2021-07-02 DIAGNOSIS — Z17.1 MALIGNANT NEOPLASM OF UPPER-OUTER QUADRANT OF RIGHT BREAST IN FEMALE, ESTROGEN RECEPTOR NEGATIVE: Primary | ICD-10-CM

## 2021-07-02 DIAGNOSIS — C50.411 MALIGNANT NEOPLASM OF UPPER-OUTER QUADRANT OF RIGHT BREAST IN FEMALE, ESTROGEN RECEPTOR NEGATIVE: Primary | ICD-10-CM

## 2021-07-02 PROCEDURE — 1125F AMNT PAIN NOTED PAIN PRSNT: CPT | Mod: S$GLB,,, | Performed by: NURSE PRACTITIONER

## 2021-07-02 PROCEDURE — 3008F PR BODY MASS INDEX (BMI) DOCUMENTED: ICD-10-PCS | Mod: S$GLB,,, | Performed by: NURSE PRACTITIONER

## 2021-07-02 PROCEDURE — U0005 INFEC AGEN DETEC AMPLI PROBE: HCPCS | Performed by: INTERNAL MEDICINE

## 2021-07-02 PROCEDURE — 99215 PR OFFICE/OUTPT VISIT, EST, LEVL V, 40-54 MIN: ICD-10-PCS | Mod: S$GLB,,, | Performed by: NURSE PRACTITIONER

## 2021-07-02 PROCEDURE — 99215 OFFICE O/P EST HI 40 MIN: CPT | Mod: S$GLB,,, | Performed by: NURSE PRACTITIONER

## 2021-07-02 PROCEDURE — 1125F PR PAIN SEVERITY QUANTIFIED, PAIN PRESENT: ICD-10-PCS | Mod: S$GLB,,, | Performed by: NURSE PRACTITIONER

## 2021-07-02 PROCEDURE — 3008F BODY MASS INDEX DOCD: CPT | Mod: S$GLB,,, | Performed by: NURSE PRACTITIONER

## 2021-07-02 PROCEDURE — U0003 INFECTIOUS AGENT DETECTION BY NUCLEIC ACID (DNA OR RNA); SEVERE ACUTE RESPIRATORY SYNDROME CORONAVIRUS 2 (SARS-COV-2) (CORONAVIRUS DISEASE [COVID-19]), AMPLIFIED PROBE TECHNIQUE, MAKING USE OF HIGH THROUGHPUT TECHNOLOGIES AS DESCRIBED BY CMS-2020-01-R: HCPCS | Performed by: INTERNAL MEDICINE

## 2021-07-02 RX ORDER — ONDANSETRON 8 MG/1
8 TABLET, ORALLY DISINTEGRATING ORAL
Qty: 30 TABLET | Refills: 1 | Status: SHIPPED | OUTPATIENT
Start: 2021-07-02 | End: 2022-05-05 | Stop reason: SDUPTHER

## 2021-07-02 RX ORDER — MOMETASONE FUROATE 1 MG/G
CREAM TOPICAL
Qty: 45 G | Refills: 1 | Status: SHIPPED | OUTPATIENT
Start: 2021-07-02 | End: 2021-09-27 | Stop reason: SDUPTHER

## 2021-07-03 LAB — SARS-COV-2 RNA RESP QL NAA+PROBE: NOT DETECTED

## 2021-07-06 ENCOUNTER — INFUSION (OUTPATIENT)
Dept: INFUSION THERAPY | Facility: HOSPITAL | Age: 61
End: 2021-07-06
Attending: INTERNAL MEDICINE
Payer: MEDICARE

## 2021-07-06 ENCOUNTER — OFFICE VISIT (OUTPATIENT)
Dept: HEMATOLOGY/ONCOLOGY | Facility: CLINIC | Age: 61
End: 2021-07-06
Payer: MEDICARE

## 2021-07-06 VITALS
SYSTOLIC BLOOD PRESSURE: 123 MMHG | BODY MASS INDEX: 25.12 KG/M2 | RESPIRATION RATE: 16 BRPM | WEIGHT: 180.13 LBS | DIASTOLIC BLOOD PRESSURE: 83 MMHG | DIASTOLIC BLOOD PRESSURE: 83 MMHG | BODY MASS INDEX: 25.23 KG/M2 | SYSTOLIC BLOOD PRESSURE: 123 MMHG | RESPIRATION RATE: 16 BRPM | WEIGHT: 180.19 LBS | TEMPERATURE: 98 F | HEART RATE: 85 BPM | HEIGHT: 71 IN | HEART RATE: 85 BPM | TEMPERATURE: 98 F

## 2021-07-06 DIAGNOSIS — T45.1X5A CHEMOTHERAPY-INDUCED NEUTROPENIA: ICD-10-CM

## 2021-07-06 DIAGNOSIS — R07.81 PLEURITIC CHEST PAIN: Primary | ICD-10-CM

## 2021-07-06 DIAGNOSIS — C50.411 MALIGNANT NEOPLASM OF UPPER-OUTER QUADRANT OF RIGHT BREAST IN FEMALE, ESTROGEN RECEPTOR NEGATIVE: ICD-10-CM

## 2021-07-06 DIAGNOSIS — Z17.1 MALIGNANT NEOPLASM OF UPPER-OUTER QUADRANT OF RIGHT BREAST IN FEMALE, ESTROGEN RECEPTOR NEGATIVE: ICD-10-CM

## 2021-07-06 DIAGNOSIS — C79.51 BONE METASTASES: Primary | ICD-10-CM

## 2021-07-06 DIAGNOSIS — R07.89 RIGHT-SIDED CHEST WALL PAIN: ICD-10-CM

## 2021-07-06 DIAGNOSIS — D70.1 CHEMOTHERAPY-INDUCED NEUTROPENIA: ICD-10-CM

## 2021-07-06 DIAGNOSIS — Z17.0 MALIGNANT NEOPLASM OF LOWER-OUTER QUADRANT OF LEFT BREAST OF FEMALE, ESTROGEN RECEPTOR POSITIVE: ICD-10-CM

## 2021-07-06 DIAGNOSIS — C50.512 MALIGNANT NEOPLASM OF LOWER-OUTER QUADRANT OF LEFT BREAST OF FEMALE, ESTROGEN RECEPTOR POSITIVE: ICD-10-CM

## 2021-07-06 PROCEDURE — 1125F AMNT PAIN NOTED PAIN PRSNT: CPT | Mod: S$GLB,,, | Performed by: INTERNAL MEDICINE

## 2021-07-06 PROCEDURE — 99213 PR OFFICE/OUTPT VISIT, EST, LEVL III, 20-29 MIN: ICD-10-PCS | Mod: S$GLB,,, | Performed by: INTERNAL MEDICINE

## 2021-07-06 PROCEDURE — 1125F PR PAIN SEVERITY QUANTIFIED, PAIN PRESENT: ICD-10-PCS | Mod: S$GLB,,, | Performed by: INTERNAL MEDICINE

## 2021-07-06 PROCEDURE — 96402 CHEMO HORMON ANTINEOPL SQ/IM: CPT

## 2021-07-06 PROCEDURE — 99213 OFFICE O/P EST LOW 20 MIN: CPT | Mod: S$GLB,,, | Performed by: INTERNAL MEDICINE

## 2021-07-06 PROCEDURE — 63600175 PHARM REV CODE 636 W HCPCS: Mod: JG | Performed by: INTERNAL MEDICINE

## 2021-07-06 PROCEDURE — 3008F PR BODY MASS INDEX (BMI) DOCUMENTED: ICD-10-PCS | Mod: S$GLB,,, | Performed by: INTERNAL MEDICINE

## 2021-07-06 PROCEDURE — 3008F BODY MASS INDEX DOCD: CPT | Mod: S$GLB,,, | Performed by: INTERNAL MEDICINE

## 2021-07-06 RX ORDER — LAMOTRIGINE 25 MG/1
500 TABLET ORAL
Status: COMPLETED | OUTPATIENT
Start: 2021-07-06 | End: 2021-07-06

## 2021-07-06 RX ADMIN — FULVESTRANT 500 MG: 250 INJECTION INTRAMUSCULAR at 03:07

## 2021-07-07 ENCOUNTER — HOSPITAL ENCOUNTER (OUTPATIENT)
Dept: RADIOLOGY | Facility: HOSPITAL | Age: 61
Discharge: HOME | End: 2021-07-07
Attending: INTERNAL MEDICINE
Payer: MEDICARE

## 2021-07-07 DIAGNOSIS — R07.81 PLEURITIC CHEST PAIN: ICD-10-CM

## 2021-07-07 DIAGNOSIS — R07.89 RIGHT-SIDED CHEST WALL PAIN: ICD-10-CM

## 2021-07-07 PROCEDURE — 71100 X-RAY EXAM RIBS UNI 2 VIEWS: CPT | Mod: 26,RT,, | Performed by: RADIOLOGY

## 2021-07-07 PROCEDURE — 71046 XR CHEST PA AND LATERAL: ICD-10-PCS | Mod: 26,,, | Performed by: RADIOLOGY

## 2021-07-07 PROCEDURE — 71100 X-RAY EXAM RIBS UNI 2 VIEWS: CPT | Mod: TC,FY,RT

## 2021-07-07 PROCEDURE — 71046 X-RAY EXAM CHEST 2 VIEWS: CPT | Mod: TC,FY

## 2021-07-07 PROCEDURE — 71100 XR RIBS 2 VIEW RIGHT: ICD-10-PCS | Mod: 26,RT,, | Performed by: RADIOLOGY

## 2021-07-07 PROCEDURE — 71046 X-RAY EXAM CHEST 2 VIEWS: CPT | Mod: 26,,, | Performed by: RADIOLOGY

## 2021-07-08 ENCOUNTER — TELEPHONE (OUTPATIENT)
Dept: HEMATOLOGY/ONCOLOGY | Facility: CLINIC | Age: 61
End: 2021-07-08

## 2021-07-18 ENCOUNTER — DOCUMENTATION ONLY (OUTPATIENT)
Dept: HEMATOLOGY/ONCOLOGY | Facility: HOSPITAL | Age: 61
End: 2021-07-18

## 2021-07-19 ENCOUNTER — TELEPHONE (OUTPATIENT)
Dept: FAMILY MEDICINE | Facility: CLINIC | Age: 61
End: 2021-07-19

## 2021-07-19 ENCOUNTER — TELEPHONE (OUTPATIENT)
Dept: HEMATOLOGY/ONCOLOGY | Facility: CLINIC | Age: 61
End: 2021-07-19

## 2021-07-20 ENCOUNTER — INFUSION (OUTPATIENT)
Dept: INFUSION THERAPY | Facility: HOSPITAL | Age: 61
End: 2021-07-20
Attending: INTERNAL MEDICINE
Payer: MEDICARE

## 2021-07-20 VITALS
TEMPERATURE: 97 F | HEIGHT: 71 IN | HEART RATE: 82 BPM | RESPIRATION RATE: 16 BRPM | DIASTOLIC BLOOD PRESSURE: 79 MMHG | SYSTOLIC BLOOD PRESSURE: 136 MMHG | WEIGHT: 176.31 LBS | BODY MASS INDEX: 24.68 KG/M2

## 2021-07-20 DIAGNOSIS — C50.512 MALIGNANT NEOPLASM OF LOWER-OUTER QUADRANT OF LEFT BREAST OF FEMALE, ESTROGEN RECEPTOR POSITIVE: ICD-10-CM

## 2021-07-20 DIAGNOSIS — D70.1 CHEMOTHERAPY-INDUCED NEUTROPENIA: ICD-10-CM

## 2021-07-20 DIAGNOSIS — C79.51 BONE METASTASES: Primary | ICD-10-CM

## 2021-07-20 DIAGNOSIS — Z17.1 MALIGNANT NEOPLASM OF UPPER-OUTER QUADRANT OF RIGHT BREAST IN FEMALE, ESTROGEN RECEPTOR NEGATIVE: ICD-10-CM

## 2021-07-20 DIAGNOSIS — C50.411 MALIGNANT NEOPLASM OF UPPER-OUTER QUADRANT OF RIGHT BREAST IN FEMALE, ESTROGEN RECEPTOR NEGATIVE: ICD-10-CM

## 2021-07-20 DIAGNOSIS — T45.1X5A CHEMOTHERAPY-INDUCED NEUTROPENIA: ICD-10-CM

## 2021-07-20 DIAGNOSIS — Z17.0 MALIGNANT NEOPLASM OF LOWER-OUTER QUADRANT OF LEFT BREAST OF FEMALE, ESTROGEN RECEPTOR POSITIVE: ICD-10-CM

## 2021-07-20 PROCEDURE — 63600175 PHARM REV CODE 636 W HCPCS: Mod: JG | Performed by: INTERNAL MEDICINE

## 2021-07-20 PROCEDURE — 96402 CHEMO HORMON ANTINEOPL SQ/IM: CPT

## 2021-07-20 RX ORDER — LAMOTRIGINE 25 MG/1
500 TABLET ORAL
Status: COMPLETED | OUTPATIENT
Start: 2021-07-20 | End: 2021-07-20

## 2021-07-20 RX ADMIN — FULVESTRANT 500 MG: 250 INJECTION INTRAMUSCULAR at 03:07

## 2021-07-26 ENCOUNTER — OFFICE VISIT (OUTPATIENT)
Dept: HEMATOLOGY/ONCOLOGY | Facility: CLINIC | Age: 61
End: 2021-07-26
Payer: MEDICARE

## 2021-07-26 VITALS
RESPIRATION RATE: 20 BRPM | HEART RATE: 80 BPM | WEIGHT: 181 LBS | BODY MASS INDEX: 25.34 KG/M2 | DIASTOLIC BLOOD PRESSURE: 84 MMHG | SYSTOLIC BLOOD PRESSURE: 134 MMHG | HEIGHT: 71 IN

## 2021-07-26 DIAGNOSIS — Z17.1 MALIGNANT NEOPLASM OF UPPER-OUTER QUADRANT OF RIGHT BREAST IN FEMALE, ESTROGEN RECEPTOR NEGATIVE: Primary | ICD-10-CM

## 2021-07-26 DIAGNOSIS — C79.51 BONE METASTASES: ICD-10-CM

## 2021-07-26 DIAGNOSIS — C50.411 MALIGNANT NEOPLASM OF UPPER-OUTER QUADRANT OF RIGHT BREAST IN FEMALE, ESTROGEN RECEPTOR NEGATIVE: Primary | ICD-10-CM

## 2021-07-26 PROCEDURE — 1125F AMNT PAIN NOTED PAIN PRSNT: CPT | Mod: S$GLB,,, | Performed by: INTERNAL MEDICINE

## 2021-07-26 PROCEDURE — 3008F PR BODY MASS INDEX (BMI) DOCUMENTED: ICD-10-PCS | Mod: S$GLB,,, | Performed by: INTERNAL MEDICINE

## 2021-07-26 PROCEDURE — 99213 PR OFFICE/OUTPT VISIT, EST, LEVL III, 20-29 MIN: ICD-10-PCS | Mod: S$GLB,,, | Performed by: INTERNAL MEDICINE

## 2021-07-26 PROCEDURE — 1125F PR PAIN SEVERITY QUANTIFIED, PAIN PRESENT: ICD-10-PCS | Mod: S$GLB,,, | Performed by: INTERNAL MEDICINE

## 2021-07-26 PROCEDURE — 99213 OFFICE O/P EST LOW 20 MIN: CPT | Mod: S$GLB,,, | Performed by: INTERNAL MEDICINE

## 2021-07-26 PROCEDURE — 3008F BODY MASS INDEX DOCD: CPT | Mod: S$GLB,,, | Performed by: INTERNAL MEDICINE

## 2021-08-02 RX ORDER — LAMOTRIGINE 25 MG/1
500 TABLET ORAL
Status: CANCELLED | OUTPATIENT
Start: 2021-08-03

## 2021-08-03 ENCOUNTER — INFUSION (OUTPATIENT)
Dept: INFUSION THERAPY | Facility: HOSPITAL | Age: 61
End: 2021-08-03
Attending: INTERNAL MEDICINE
Payer: MEDICARE

## 2021-08-03 VITALS
HEIGHT: 71 IN | WEIGHT: 182.31 LBS | HEART RATE: 86 BPM | RESPIRATION RATE: 16 BRPM | SYSTOLIC BLOOD PRESSURE: 131 MMHG | BODY MASS INDEX: 25.52 KG/M2 | TEMPERATURE: 98 F | DIASTOLIC BLOOD PRESSURE: 74 MMHG

## 2021-08-03 DIAGNOSIS — T45.1X5A CHEMOTHERAPY-INDUCED NEUTROPENIA: ICD-10-CM

## 2021-08-03 DIAGNOSIS — C50.411 MALIGNANT NEOPLASM OF UPPER-OUTER QUADRANT OF RIGHT BREAST IN FEMALE, ESTROGEN RECEPTOR NEGATIVE: ICD-10-CM

## 2021-08-03 DIAGNOSIS — C79.51 BONE METASTASES: Primary | ICD-10-CM

## 2021-08-03 DIAGNOSIS — Z17.1 MALIGNANT NEOPLASM OF UPPER-OUTER QUADRANT OF RIGHT BREAST IN FEMALE, ESTROGEN RECEPTOR NEGATIVE: ICD-10-CM

## 2021-08-03 DIAGNOSIS — C50.512 MALIGNANT NEOPLASM OF LOWER-OUTER QUADRANT OF LEFT BREAST OF FEMALE, ESTROGEN RECEPTOR POSITIVE: ICD-10-CM

## 2021-08-03 DIAGNOSIS — Z17.0 MALIGNANT NEOPLASM OF LOWER-OUTER QUADRANT OF LEFT BREAST OF FEMALE, ESTROGEN RECEPTOR POSITIVE: ICD-10-CM

## 2021-08-03 DIAGNOSIS — D70.1 CHEMOTHERAPY-INDUCED NEUTROPENIA: ICD-10-CM

## 2021-08-03 PROCEDURE — 63600175 PHARM REV CODE 636 W HCPCS: Mod: JG | Performed by: INTERNAL MEDICINE

## 2021-08-03 PROCEDURE — 96402 CHEMO HORMON ANTINEOPL SQ/IM: CPT

## 2021-08-03 RX ORDER — LAMOTRIGINE 25 MG/1
500 TABLET ORAL
Status: COMPLETED | OUTPATIENT
Start: 2021-08-03 | End: 2021-08-03

## 2021-08-03 RX ADMIN — FULVESTRANT 500 MG: 250 INJECTION INTRAMUSCULAR at 03:08

## 2021-08-04 ENCOUNTER — TELEPHONE (OUTPATIENT)
Dept: FAMILY MEDICINE | Facility: CLINIC | Age: 61
End: 2021-08-04

## 2021-08-12 ENCOUNTER — OFFICE VISIT (OUTPATIENT)
Dept: FAMILY MEDICINE | Facility: CLINIC | Age: 61
End: 2021-08-12
Payer: MEDICARE

## 2021-08-12 VITALS
BODY MASS INDEX: 25.48 KG/M2 | DIASTOLIC BLOOD PRESSURE: 100 MMHG | HEIGHT: 71 IN | SYSTOLIC BLOOD PRESSURE: 132 MMHG | WEIGHT: 182 LBS | HEART RATE: 81 BPM

## 2021-08-12 DIAGNOSIS — E78.2 MIXED HYPERLIPIDEMIA: ICD-10-CM

## 2021-08-12 DIAGNOSIS — I10 ESSENTIAL HYPERTENSION: ICD-10-CM

## 2021-08-12 DIAGNOSIS — C79.51 BONE METASTASES: ICD-10-CM

## 2021-08-12 DIAGNOSIS — D70.1 CHEMOTHERAPY-INDUCED NEUTROPENIA: ICD-10-CM

## 2021-08-12 DIAGNOSIS — E11.65 TYPE 2 DIABETES MELLITUS WITH HYPERGLYCEMIA, WITH LONG-TERM CURRENT USE OF INSULIN: Primary | ICD-10-CM

## 2021-08-12 DIAGNOSIS — Z79.4 TYPE 2 DIABETES MELLITUS WITH HYPERGLYCEMIA, WITH LONG-TERM CURRENT USE OF INSULIN: Primary | ICD-10-CM

## 2021-08-12 DIAGNOSIS — T45.1X5A CHEMOTHERAPY-INDUCED NEUTROPENIA: ICD-10-CM

## 2021-08-12 DIAGNOSIS — F06.4 ANXIETY DISORDER DUE TO KNOWN PHYSIOLOGICAL CONDITION: ICD-10-CM

## 2021-08-12 LAB — HBA1C MFR BLD: 11.6 %

## 2021-08-12 PROCEDURE — 99214 OFFICE O/P EST MOD 30 MIN: CPT | Mod: S$GLB,,, | Performed by: FAMILY MEDICINE

## 2021-08-12 PROCEDURE — 83036 HEMOGLOBIN GLYCOSYLATED A1C: CPT | Mod: QW,,, | Performed by: FAMILY MEDICINE

## 2021-08-12 PROCEDURE — 3080F PR MOST RECENT DIASTOLIC BLOOD PRESSURE >= 90 MM HG: ICD-10-PCS | Mod: S$GLB,,, | Performed by: FAMILY MEDICINE

## 2021-08-12 PROCEDURE — 3008F BODY MASS INDEX DOCD: CPT | Mod: S$GLB,,, | Performed by: FAMILY MEDICINE

## 2021-08-12 PROCEDURE — 99214 PR OFFICE/OUTPT VISIT, EST, LEVL IV, 30-39 MIN: ICD-10-PCS | Mod: S$GLB,,, | Performed by: FAMILY MEDICINE

## 2021-08-12 PROCEDURE — 3080F DIAST BP >= 90 MM HG: CPT | Mod: S$GLB,,, | Performed by: FAMILY MEDICINE

## 2021-08-12 PROCEDURE — 3008F PR BODY MASS INDEX (BMI) DOCUMENTED: ICD-10-PCS | Mod: S$GLB,,, | Performed by: FAMILY MEDICINE

## 2021-08-12 PROCEDURE — 83036 POCT HEMOGLOBIN A1C: ICD-10-PCS | Mod: QW,,, | Performed by: FAMILY MEDICINE

## 2021-08-12 PROCEDURE — 3075F SYST BP GE 130 - 139MM HG: CPT | Mod: S$GLB,,, | Performed by: FAMILY MEDICINE

## 2021-08-12 PROCEDURE — 3046F HEMOGLOBIN A1C LEVEL >9.0%: CPT | Mod: S$GLB,,, | Performed by: FAMILY MEDICINE

## 2021-08-12 PROCEDURE — 3046F PR MOST RECENT HEMOGLOBIN A1C LEVEL > 9.0%: ICD-10-PCS | Mod: S$GLB,,, | Performed by: FAMILY MEDICINE

## 2021-08-12 PROCEDURE — 3075F PR MOST RECENT SYSTOLIC BLOOD PRESS GE 130-139MM HG: ICD-10-PCS | Mod: S$GLB,,, | Performed by: FAMILY MEDICINE

## 2021-08-12 RX ORDER — DULAGLUTIDE 3 MG/.5ML
3 INJECTION, SOLUTION SUBCUTANEOUS
Qty: 4 PEN | Refills: 11 | Status: SHIPPED | OUTPATIENT
Start: 2021-08-12 | End: 2021-12-09 | Stop reason: SDUPTHER

## 2021-08-12 RX ORDER — OXYCODONE HYDROCHLORIDE 5 MG/1
TABLET ORAL
COMMUNITY
Start: 2021-08-03 | End: 2022-03-15 | Stop reason: DRUGHIGH

## 2021-08-12 RX ORDER — DOCUSATE SODIUM 100 MG/1
100 CAPSULE, LIQUID FILLED ORAL 2 TIMES DAILY
COMMUNITY

## 2021-08-12 RX ORDER — INSULIN GLARGINE 100 [IU]/ML
32 INJECTION, SOLUTION SUBCUTANEOUS 2 TIMES DAILY
Qty: 18 ML | Refills: 11 | Status: SHIPPED | OUTPATIENT
Start: 2021-08-12 | End: 2023-01-01 | Stop reason: SDUPTHER

## 2021-08-12 RX ORDER — DIAZEPAM 10 MG/1
10 TABLET ORAL 4 TIMES DAILY PRN
Qty: 120 TABLET | Refills: 3 | Status: SHIPPED | OUTPATIENT
Start: 2021-08-12 | End: 2021-11-02 | Stop reason: SDUPTHER

## 2021-08-24 ENCOUNTER — TELEPHONE (OUTPATIENT)
Dept: FAMILY MEDICINE | Facility: CLINIC | Age: 61
End: 2021-08-24

## 2021-08-31 ENCOUNTER — INFUSION (OUTPATIENT)
Dept: INFUSION THERAPY | Facility: HOSPITAL | Age: 61
End: 2021-08-31
Attending: INTERNAL MEDICINE
Payer: MEDICARE

## 2021-08-31 ENCOUNTER — TELEPHONE (OUTPATIENT)
Dept: HEMATOLOGY/ONCOLOGY | Facility: CLINIC | Age: 61
End: 2021-08-31

## 2021-08-31 VITALS
SYSTOLIC BLOOD PRESSURE: 152 MMHG | HEART RATE: 89 BPM | BODY MASS INDEX: 25 KG/M2 | WEIGHT: 178.56 LBS | RESPIRATION RATE: 18 BRPM | DIASTOLIC BLOOD PRESSURE: 86 MMHG | HEIGHT: 71 IN | TEMPERATURE: 98 F

## 2021-08-31 DIAGNOSIS — Z17.0 MALIGNANT NEOPLASM OF LOWER-OUTER QUADRANT OF LEFT BREAST OF FEMALE, ESTROGEN RECEPTOR POSITIVE: ICD-10-CM

## 2021-08-31 DIAGNOSIS — Z17.1 MALIGNANT NEOPLASM OF UPPER-OUTER QUADRANT OF RIGHT BREAST IN FEMALE, ESTROGEN RECEPTOR NEGATIVE: ICD-10-CM

## 2021-08-31 DIAGNOSIS — T45.1X5A CHEMOTHERAPY-INDUCED NEUTROPENIA: ICD-10-CM

## 2021-08-31 DIAGNOSIS — C50.512 MALIGNANT NEOPLASM OF LOWER-OUTER QUADRANT OF LEFT BREAST OF FEMALE, ESTROGEN RECEPTOR POSITIVE: ICD-10-CM

## 2021-08-31 DIAGNOSIS — C79.51 BONE METASTASES: Primary | ICD-10-CM

## 2021-08-31 DIAGNOSIS — C50.411 MALIGNANT NEOPLASM OF UPPER-OUTER QUADRANT OF RIGHT BREAST IN FEMALE, ESTROGEN RECEPTOR NEGATIVE: ICD-10-CM

## 2021-08-31 DIAGNOSIS — D70.1 CHEMOTHERAPY-INDUCED NEUTROPENIA: ICD-10-CM

## 2021-08-31 PROCEDURE — 96402 CHEMO HORMON ANTINEOPL SQ/IM: CPT

## 2021-08-31 PROCEDURE — 63600175 PHARM REV CODE 636 W HCPCS: Mod: JG | Performed by: NURSE PRACTITIONER

## 2021-08-31 RX ORDER — LAMOTRIGINE 25 MG/1
500 TABLET ORAL
Status: CANCELLED | OUTPATIENT
Start: 2021-08-31

## 2021-08-31 RX ORDER — LAMOTRIGINE 25 MG/1
500 TABLET ORAL
Status: COMPLETED | OUTPATIENT
Start: 2021-08-31 | End: 2021-08-31

## 2021-08-31 RX ADMIN — FULVESTRANT 500 MG: 250 INJECTION INTRAMUSCULAR at 10:08

## 2021-09-02 ENCOUNTER — TELEPHONE (OUTPATIENT)
Dept: HEMATOLOGY/ONCOLOGY | Facility: CLINIC | Age: 61
End: 2021-09-02

## 2021-09-02 NOTE — PROGRESS NOTES
Ms. Castro has markedly improved today.  She still has some mild cellulitis.    There is no more fluid or purulence.  We are going to keep her on her   antibiotics of Bactrim and Cipro for 10 additional days.  We will see her back   in two weeks.      REI/DARIA  dd: 03/16/2018 11:00:13 (CDT)  td: 03/17/2018 01:31:29 (CDT)  Doc ID   #[id number]  Job ID #[id number]    CC:       
breast self-exam

## 2021-09-07 ENCOUNTER — TELEPHONE (OUTPATIENT)
Dept: HEMATOLOGY/ONCOLOGY | Facility: CLINIC | Age: 61
End: 2021-09-07

## 2021-09-13 ENCOUNTER — TELEPHONE (OUTPATIENT)
Dept: HEMATOLOGY/ONCOLOGY | Facility: CLINIC | Age: 61
End: 2021-09-13

## 2021-09-21 ENCOUNTER — TELEPHONE (OUTPATIENT)
Dept: HEMATOLOGY/ONCOLOGY | Facility: CLINIC | Age: 61
End: 2021-09-21

## 2021-09-22 ENCOUNTER — TELEPHONE (OUTPATIENT)
Dept: HEMATOLOGY/ONCOLOGY | Facility: CLINIC | Age: 61
End: 2021-09-22

## 2021-09-27 ENCOUNTER — TELEPHONE (OUTPATIENT)
Dept: HEMATOLOGY/ONCOLOGY | Facility: CLINIC | Age: 61
End: 2021-09-27

## 2021-09-27 DIAGNOSIS — C50.411 MALIGNANT NEOPLASM OF UPPER-OUTER QUADRANT OF RIGHT BREAST IN FEMALE, ESTROGEN RECEPTOR NEGATIVE: ICD-10-CM

## 2021-09-27 DIAGNOSIS — Z17.1 MALIGNANT NEOPLASM OF UPPER-OUTER QUADRANT OF RIGHT BREAST IN FEMALE, ESTROGEN RECEPTOR NEGATIVE: ICD-10-CM

## 2021-09-27 RX ORDER — MOMETASONE FUROATE 1 MG/G
CREAM TOPICAL
Qty: 45 G | Refills: 1 | Status: SHIPPED | OUTPATIENT
Start: 2021-09-27 | End: 2021-11-23 | Stop reason: SDUPTHER

## 2021-09-27 RX ORDER — LAMOTRIGINE 25 MG/1
500 TABLET ORAL
Status: CANCELLED | OUTPATIENT
Start: 2021-09-28

## 2021-09-28 ENCOUNTER — OFFICE VISIT (OUTPATIENT)
Dept: HEMATOLOGY/ONCOLOGY | Facility: CLINIC | Age: 61
End: 2021-09-28
Payer: MEDICARE

## 2021-09-28 ENCOUNTER — INFUSION (OUTPATIENT)
Dept: INFUSION THERAPY | Facility: HOSPITAL | Age: 61
End: 2021-09-28
Attending: INTERNAL MEDICINE
Payer: MEDICARE

## 2021-09-28 VITALS
WEIGHT: 178.56 LBS | RESPIRATION RATE: 18 BRPM | DIASTOLIC BLOOD PRESSURE: 75 MMHG | BODY MASS INDEX: 25.55 KG/M2 | HEART RATE: 95 BPM | HEIGHT: 71 IN | HEIGHT: 71 IN | SYSTOLIC BLOOD PRESSURE: 160 MMHG | SYSTOLIC BLOOD PRESSURE: 136 MMHG | BODY MASS INDEX: 25 KG/M2 | TEMPERATURE: 98 F | WEIGHT: 182.5 LBS | RESPIRATION RATE: 18 BRPM | DIASTOLIC BLOOD PRESSURE: 80 MMHG | TEMPERATURE: 98 F | HEART RATE: 88 BPM

## 2021-09-28 DIAGNOSIS — C50.411 MALIGNANT NEOPLASM OF UPPER-OUTER QUADRANT OF RIGHT BREAST IN FEMALE, ESTROGEN RECEPTOR NEGATIVE: ICD-10-CM

## 2021-09-28 DIAGNOSIS — J01.90 ACUTE NON-RECURRENT SINUSITIS, UNSPECIFIED LOCATION: Primary | ICD-10-CM

## 2021-09-28 DIAGNOSIS — C50.512 MALIGNANT NEOPLASM OF LOWER-OUTER QUADRANT OF LEFT BREAST OF FEMALE, ESTROGEN RECEPTOR POSITIVE: ICD-10-CM

## 2021-09-28 DIAGNOSIS — Z17.0 MALIGNANT NEOPLASM OF LOWER-OUTER QUADRANT OF LEFT BREAST OF FEMALE, ESTROGEN RECEPTOR POSITIVE: ICD-10-CM

## 2021-09-28 DIAGNOSIS — T45.1X5A CHEMOTHERAPY-INDUCED NEUTROPENIA: ICD-10-CM

## 2021-09-28 DIAGNOSIS — Z17.1 MALIGNANT NEOPLASM OF UPPER-OUTER QUADRANT OF RIGHT BREAST IN FEMALE, ESTROGEN RECEPTOR NEGATIVE: ICD-10-CM

## 2021-09-28 DIAGNOSIS — C79.51 BONE METASTASES: Primary | ICD-10-CM

## 2021-09-28 DIAGNOSIS — Z17.0 MALIGNANT NEOPLASM OF LOWER-INNER QUADRANT OF RIGHT BREAST OF FEMALE, ESTROGEN RECEPTOR POSITIVE: ICD-10-CM

## 2021-09-28 DIAGNOSIS — C50.311 MALIGNANT NEOPLASM OF LOWER-INNER QUADRANT OF RIGHT BREAST OF FEMALE, ESTROGEN RECEPTOR POSITIVE: ICD-10-CM

## 2021-09-28 DIAGNOSIS — F51.02 ADJUSTMENT INSOMNIA: ICD-10-CM

## 2021-09-28 DIAGNOSIS — R07.81 PLEURITIC CHEST PAIN: ICD-10-CM

## 2021-09-28 DIAGNOSIS — D70.1 CHEMOTHERAPY-INDUCED NEUTROPENIA: ICD-10-CM

## 2021-09-28 PROCEDURE — 96402 CHEMO HORMON ANTINEOPL SQ/IM: CPT

## 2021-09-28 PROCEDURE — 99214 OFFICE O/P EST MOD 30 MIN: CPT | Mod: S$GLB,,, | Performed by: INTERNAL MEDICINE

## 2021-09-28 PROCEDURE — 3066F NEPHROPATHY DOC TX: CPT | Mod: S$GLB,,, | Performed by: INTERNAL MEDICINE

## 2021-09-28 PROCEDURE — 3008F PR BODY MASS INDEX (BMI) DOCUMENTED: ICD-10-PCS | Mod: S$GLB,,, | Performed by: INTERNAL MEDICINE

## 2021-09-28 PROCEDURE — 4010F PR ACE/ARB THEARPY RXD/TAKEN: ICD-10-PCS | Mod: S$GLB,,, | Performed by: INTERNAL MEDICINE

## 2021-09-28 PROCEDURE — 3075F PR MOST RECENT SYSTOLIC BLOOD PRESS GE 130-139MM HG: ICD-10-PCS | Mod: S$GLB,,, | Performed by: INTERNAL MEDICINE

## 2021-09-28 PROCEDURE — 63600175 PHARM REV CODE 636 W HCPCS: Mod: JG | Performed by: INTERNAL MEDICINE

## 2021-09-28 PROCEDURE — 3008F BODY MASS INDEX DOCD: CPT | Mod: S$GLB,,, | Performed by: INTERNAL MEDICINE

## 2021-09-28 PROCEDURE — 3078F DIAST BP <80 MM HG: CPT | Mod: S$GLB,,, | Performed by: INTERNAL MEDICINE

## 2021-09-28 PROCEDURE — 3061F NEG MICROALBUMINURIA REV: CPT | Mod: S$GLB,,, | Performed by: INTERNAL MEDICINE

## 2021-09-28 PROCEDURE — 3046F HEMOGLOBIN A1C LEVEL >9.0%: CPT | Mod: S$GLB,,, | Performed by: INTERNAL MEDICINE

## 2021-09-28 PROCEDURE — 4010F ACE/ARB THERAPY RXD/TAKEN: CPT | Mod: S$GLB,,, | Performed by: INTERNAL MEDICINE

## 2021-09-28 PROCEDURE — 3078F PR MOST RECENT DIASTOLIC BLOOD PRESSURE < 80 MM HG: ICD-10-PCS | Mod: S$GLB,,, | Performed by: INTERNAL MEDICINE

## 2021-09-28 PROCEDURE — 3066F PR DOCUMENTATION OF TREATMENT FOR NEPHROPATHY: ICD-10-PCS | Mod: S$GLB,,, | Performed by: INTERNAL MEDICINE

## 2021-09-28 PROCEDURE — 3061F PR NEG MICROALBUMINURIA RESULT DOCUMENTED/REVIEW: ICD-10-PCS | Mod: S$GLB,,, | Performed by: INTERNAL MEDICINE

## 2021-09-28 PROCEDURE — 3075F SYST BP GE 130 - 139MM HG: CPT | Mod: S$GLB,,, | Performed by: INTERNAL MEDICINE

## 2021-09-28 PROCEDURE — 3046F PR MOST RECENT HEMOGLOBIN A1C LEVEL > 9.0%: ICD-10-PCS | Mod: S$GLB,,, | Performed by: INTERNAL MEDICINE

## 2021-09-28 PROCEDURE — 99214 PR OFFICE/OUTPT VISIT, EST, LEVL IV, 30-39 MIN: ICD-10-PCS | Mod: S$GLB,,, | Performed by: INTERNAL MEDICINE

## 2021-09-28 RX ORDER — ZOLPIDEM TARTRATE 10 MG/1
10 TABLET ORAL NIGHTLY
Qty: 30 TABLET | Refills: 3 | Status: SHIPPED | OUTPATIENT
Start: 2021-09-28 | End: 2021-12-31

## 2021-09-28 RX ORDER — LAMOTRIGINE 25 MG/1
500 TABLET ORAL
Status: COMPLETED | OUTPATIENT
Start: 2021-09-28 | End: 2021-09-28

## 2021-09-28 RX ORDER — AZITHROMYCIN 250 MG/1
TABLET, FILM COATED ORAL
Qty: 6 TABLET | Refills: 1 | Status: SHIPPED | OUTPATIENT
Start: 2021-09-28 | End: 2021-12-01

## 2021-09-28 RX ORDER — CARISOPRODOL 350 MG/1
350 TABLET ORAL 3 TIMES DAILY PRN
Qty: 90 TABLET | Refills: 0 | Status: SHIPPED | OUTPATIENT
Start: 2021-09-28 | End: 2021-10-03 | Stop reason: SDUPTHER

## 2021-09-28 RX ORDER — HYDROCODONE BITARTRATE AND ACETAMINOPHEN 10; 325 MG/1; MG/1
1 TABLET ORAL EVERY 6 HOURS PRN
Qty: 120 TABLET | Refills: 0 | Status: SHIPPED | OUTPATIENT
Start: 2021-09-28 | End: 2022-02-02 | Stop reason: SDUPTHER

## 2021-09-28 RX ADMIN — FULVESTRANT 500 MG: 50 INJECTION, SOLUTION INTRAMUSCULAR at 03:09

## 2021-10-03 DIAGNOSIS — Z17.0 MALIGNANT NEOPLASM OF LOWER-INNER QUADRANT OF RIGHT BREAST OF FEMALE, ESTROGEN RECEPTOR POSITIVE: ICD-10-CM

## 2021-10-03 DIAGNOSIS — C50.311 MALIGNANT NEOPLASM OF LOWER-INNER QUADRANT OF RIGHT BREAST OF FEMALE, ESTROGEN RECEPTOR POSITIVE: ICD-10-CM

## 2021-10-03 DIAGNOSIS — R07.81 PLEURITIC CHEST PAIN: ICD-10-CM

## 2021-10-03 RX ORDER — CARISOPRODOL 350 MG/1
350 TABLET ORAL 3 TIMES DAILY PRN
Qty: 90 TABLET | Refills: 0 | Status: SHIPPED | OUTPATIENT
Start: 2021-10-03 | End: 2022-02-02 | Stop reason: SDUPTHER

## 2021-10-12 DIAGNOSIS — J06.9 VIRAL UPPER RESPIRATORY TRACT INFECTION: Primary | ICD-10-CM

## 2021-10-13 DIAGNOSIS — R05.9 COUGH: Primary | ICD-10-CM

## 2021-10-13 RX ORDER — BENZONATATE 100 MG/1
100 CAPSULE ORAL 3 TIMES DAILY PRN
Qty: 30 CAPSULE | Refills: 1 | Status: SHIPPED | OUTPATIENT
Start: 2021-10-13 | End: 2021-12-01

## 2021-10-14 ENCOUNTER — TELEPHONE (OUTPATIENT)
Dept: HEMATOLOGY/ONCOLOGY | Facility: CLINIC | Age: 61
End: 2021-10-14

## 2021-10-14 ENCOUNTER — OFFICE VISIT (OUTPATIENT)
Dept: FAMILY MEDICINE | Facility: CLINIC | Age: 61
End: 2021-10-14
Payer: MEDICARE

## 2021-10-14 VITALS
WEIGHT: 182 LBS | OXYGEN SATURATION: 98 % | HEIGHT: 71 IN | BODY MASS INDEX: 25.48 KG/M2 | SYSTOLIC BLOOD PRESSURE: 126 MMHG | DIASTOLIC BLOOD PRESSURE: 78 MMHG | HEART RATE: 92 BPM

## 2021-10-14 DIAGNOSIS — E78.2 MIXED HYPERLIPIDEMIA: ICD-10-CM

## 2021-10-14 DIAGNOSIS — E11.65 TYPE 2 DIABETES MELLITUS WITH HYPERGLYCEMIA, WITH LONG-TERM CURRENT USE OF INSULIN: ICD-10-CM

## 2021-10-14 DIAGNOSIS — Z79.4 TYPE 2 DIABETES MELLITUS WITH HYPERGLYCEMIA, WITH LONG-TERM CURRENT USE OF INSULIN: ICD-10-CM

## 2021-10-14 DIAGNOSIS — J06.9 VIRAL URI WITH COUGH: Primary | ICD-10-CM

## 2021-10-14 DIAGNOSIS — I10 ESSENTIAL HYPERTENSION: ICD-10-CM

## 2021-10-14 DIAGNOSIS — Z17.0 MALIGNANT NEOPLASM OF UPPER-OUTER QUADRANT OF RIGHT BREAST IN FEMALE, ESTROGEN RECEPTOR POSITIVE: ICD-10-CM

## 2021-10-14 DIAGNOSIS — C50.411 MALIGNANT NEOPLASM OF UPPER-OUTER QUADRANT OF RIGHT BREAST IN FEMALE, ESTROGEN RECEPTOR POSITIVE: ICD-10-CM

## 2021-10-14 LAB
CTP QC/QA: YES
SARS-COV-2 RDRP RESP QL NAA+PROBE: NEGATIVE

## 2021-10-14 PROCEDURE — 3046F HEMOGLOBIN A1C LEVEL >9.0%: CPT | Mod: S$GLB,,, | Performed by: FAMILY MEDICINE

## 2021-10-14 PROCEDURE — 3061F NEG MICROALBUMINURIA REV: CPT | Mod: S$GLB,,, | Performed by: FAMILY MEDICINE

## 2021-10-14 PROCEDURE — 99214 OFFICE O/P EST MOD 30 MIN: CPT | Mod: S$GLB,,, | Performed by: FAMILY MEDICINE

## 2021-10-14 PROCEDURE — 3078F PR MOST RECENT DIASTOLIC BLOOD PRESSURE < 80 MM HG: ICD-10-PCS | Mod: S$GLB,,, | Performed by: FAMILY MEDICINE

## 2021-10-14 PROCEDURE — 3066F PR DOCUMENTATION OF TREATMENT FOR NEPHROPATHY: ICD-10-PCS | Mod: S$GLB,,, | Performed by: FAMILY MEDICINE

## 2021-10-14 PROCEDURE — 3046F PR MOST RECENT HEMOGLOBIN A1C LEVEL > 9.0%: ICD-10-PCS | Mod: S$GLB,,, | Performed by: FAMILY MEDICINE

## 2021-10-14 PROCEDURE — 3066F NEPHROPATHY DOC TX: CPT | Mod: S$GLB,,, | Performed by: FAMILY MEDICINE

## 2021-10-14 PROCEDURE — 3074F SYST BP LT 130 MM HG: CPT | Mod: S$GLB,,, | Performed by: FAMILY MEDICINE

## 2021-10-14 PROCEDURE — 3061F PR NEG MICROALBUMINURIA RESULT DOCUMENTED/REVIEW: ICD-10-PCS | Mod: S$GLB,,, | Performed by: FAMILY MEDICINE

## 2021-10-14 PROCEDURE — U0002: ICD-10-PCS | Mod: QW,S$GLB,, | Performed by: FAMILY MEDICINE

## 2021-10-14 PROCEDURE — 3008F PR BODY MASS INDEX (BMI) DOCUMENTED: ICD-10-PCS | Mod: S$GLB,,, | Performed by: FAMILY MEDICINE

## 2021-10-14 PROCEDURE — 3008F BODY MASS INDEX DOCD: CPT | Mod: S$GLB,,, | Performed by: FAMILY MEDICINE

## 2021-10-14 PROCEDURE — U0002 COVID-19 LAB TEST NON-CDC: HCPCS | Mod: QW,S$GLB,, | Performed by: FAMILY MEDICINE

## 2021-10-14 PROCEDURE — 4010F PR ACE/ARB THEARPY RXD/TAKEN: ICD-10-PCS | Mod: S$GLB,,, | Performed by: FAMILY MEDICINE

## 2021-10-14 PROCEDURE — 3078F DIAST BP <80 MM HG: CPT | Mod: S$GLB,,, | Performed by: FAMILY MEDICINE

## 2021-10-14 PROCEDURE — 99214 PR OFFICE/OUTPT VISIT, EST, LEVL IV, 30-39 MIN: ICD-10-PCS | Mod: S$GLB,,, | Performed by: FAMILY MEDICINE

## 2021-10-14 PROCEDURE — 3074F PR MOST RECENT SYSTOLIC BLOOD PRESSURE < 130 MM HG: ICD-10-PCS | Mod: S$GLB,,, | Performed by: FAMILY MEDICINE

## 2021-10-14 PROCEDURE — 4010F ACE/ARB THERAPY RXD/TAKEN: CPT | Mod: S$GLB,,, | Performed by: FAMILY MEDICINE

## 2021-10-14 RX ORDER — DESLORATADINE AND PSEUDOEPHEDRINE SULFATE 2.5; 12 MG/1; MG/1
1 TABLET, EXTENDED RELEASE ORAL 2 TIMES DAILY
Qty: 20 TABLET | Refills: 0
Start: 2021-10-14 | End: 2021-10-24

## 2021-10-19 ENCOUNTER — TELEPHONE (OUTPATIENT)
Dept: FAMILY MEDICINE | Facility: CLINIC | Age: 61
End: 2021-10-19

## 2021-10-21 LAB
ALBUMIN/CREAT UR: 315 MCG/MG CREAT
CHOLEST SERPL-MCNC: 136 MG/DL
CHOLEST/HDLC SERPL: 2.5 (CALC)
CREAT UR-MCNC: 114 MG/DL (ref 20–275)
HBA1C MFR BLD: 9.1 % OF TOTAL HGB
HDLC SERPL-MCNC: 54 MG/DL
LDLC SERPL CALC-MCNC: 54 MG/DL (CALC)
MICROALBUMIN UR-MCNC: 35.9 MG/DL
NONHDLC SERPL-MCNC: 82 MG/DL (CALC)
TRIGL SERPL-MCNC: 224 MG/DL

## 2021-10-25 ENCOUNTER — TELEPHONE (OUTPATIENT)
Dept: HEMATOLOGY/ONCOLOGY | Facility: CLINIC | Age: 61
End: 2021-10-25
Payer: MEDICARE

## 2021-10-25 RX ORDER — LAMOTRIGINE 25 MG/1
500 TABLET ORAL
Status: CANCELLED | OUTPATIENT
Start: 2021-10-26

## 2021-10-26 ENCOUNTER — OFFICE VISIT (OUTPATIENT)
Dept: HEMATOLOGY/ONCOLOGY | Facility: CLINIC | Age: 61
End: 2021-10-26
Payer: MEDICARE

## 2021-10-26 ENCOUNTER — INFUSION (OUTPATIENT)
Dept: INFUSION THERAPY | Facility: HOSPITAL | Age: 61
End: 2021-10-26
Attending: INTERNAL MEDICINE
Payer: MEDICARE

## 2021-10-26 VITALS
SYSTOLIC BLOOD PRESSURE: 163 MMHG | OXYGEN SATURATION: 98 % | BODY MASS INDEX: 24.92 KG/M2 | DIASTOLIC BLOOD PRESSURE: 90 MMHG | RESPIRATION RATE: 18 BRPM | HEART RATE: 77 BPM | TEMPERATURE: 97 F | HEIGHT: 71 IN | WEIGHT: 178 LBS

## 2021-10-26 VITALS
SYSTOLIC BLOOD PRESSURE: 163 MMHG | HEART RATE: 79 BPM | BODY MASS INDEX: 24.83 KG/M2 | DIASTOLIC BLOOD PRESSURE: 90 MMHG | WEIGHT: 178 LBS

## 2021-10-26 DIAGNOSIS — Z17.1 MALIGNANT NEOPLASM OF UPPER-OUTER QUADRANT OF RIGHT BREAST IN FEMALE, ESTROGEN RECEPTOR NEGATIVE: ICD-10-CM

## 2021-10-26 DIAGNOSIS — C50.411 MALIGNANT NEOPLASM OF UPPER-OUTER QUADRANT OF RIGHT BREAST IN FEMALE, ESTROGEN RECEPTOR NEGATIVE: ICD-10-CM

## 2021-10-26 DIAGNOSIS — C79.51 BONE METASTASES: ICD-10-CM

## 2021-10-26 DIAGNOSIS — C50.411 MALIGNANT NEOPLASM OF UPPER-OUTER QUADRANT OF RIGHT BREAST IN FEMALE, ESTROGEN RECEPTOR NEGATIVE: Primary | ICD-10-CM

## 2021-10-26 DIAGNOSIS — Z17.1 MALIGNANT NEOPLASM OF UPPER-OUTER QUADRANT OF RIGHT BREAST IN FEMALE, ESTROGEN RECEPTOR NEGATIVE: Primary | ICD-10-CM

## 2021-10-26 DIAGNOSIS — T45.1X5A CHEMOTHERAPY-INDUCED NEUTROPENIA: Primary | ICD-10-CM

## 2021-10-26 DIAGNOSIS — D70.1 CHEMOTHERAPY-INDUCED NEUTROPENIA: Primary | ICD-10-CM

## 2021-10-26 DIAGNOSIS — C50.512 MALIGNANT NEOPLASM OF LOWER-OUTER QUADRANT OF LEFT BREAST OF FEMALE, ESTROGEN RECEPTOR POSITIVE: ICD-10-CM

## 2021-10-26 DIAGNOSIS — Z17.0 MALIGNANT NEOPLASM OF LOWER-OUTER QUADRANT OF LEFT BREAST OF FEMALE, ESTROGEN RECEPTOR POSITIVE: ICD-10-CM

## 2021-10-26 PROCEDURE — 4010F PR ACE/ARB THEARPY RXD/TAKEN: ICD-10-PCS | Mod: S$GLB,,, | Performed by: INTERNAL MEDICINE

## 2021-10-26 PROCEDURE — 96402 CHEMO HORMON ANTINEOPL SQ/IM: CPT

## 2021-10-26 PROCEDURE — 3046F PR MOST RECENT HEMOGLOBIN A1C LEVEL > 9.0%: ICD-10-PCS | Mod: S$GLB,,, | Performed by: INTERNAL MEDICINE

## 2021-10-26 PROCEDURE — 3046F HEMOGLOBIN A1C LEVEL >9.0%: CPT | Mod: S$GLB,,, | Performed by: INTERNAL MEDICINE

## 2021-10-26 PROCEDURE — 3066F NEPHROPATHY DOC TX: CPT | Mod: S$GLB,,, | Performed by: INTERNAL MEDICINE

## 2021-10-26 PROCEDURE — 3080F DIAST BP >= 90 MM HG: CPT | Mod: S$GLB,,, | Performed by: INTERNAL MEDICINE

## 2021-10-26 PROCEDURE — 3066F PR DOCUMENTATION OF TREATMENT FOR NEPHROPATHY: ICD-10-PCS | Mod: S$GLB,,, | Performed by: INTERNAL MEDICINE

## 2021-10-26 PROCEDURE — 3062F PR POS MACROALBUMINURIA RESULT DOCUMENTED/REVIEW: ICD-10-PCS | Mod: S$GLB,,, | Performed by: INTERNAL MEDICINE

## 2021-10-26 PROCEDURE — 99213 OFFICE O/P EST LOW 20 MIN: CPT | Mod: S$GLB,,, | Performed by: INTERNAL MEDICINE

## 2021-10-26 PROCEDURE — 96372 THER/PROPH/DIAG INJ SC/IM: CPT

## 2021-10-26 PROCEDURE — 3080F PR MOST RECENT DIASTOLIC BLOOD PRESSURE >= 90 MM HG: ICD-10-PCS | Mod: S$GLB,,, | Performed by: INTERNAL MEDICINE

## 2021-10-26 PROCEDURE — 63600175 PHARM REV CODE 636 W HCPCS: Mod: JG | Performed by: INTERNAL MEDICINE

## 2021-10-26 PROCEDURE — 3077F SYST BP >= 140 MM HG: CPT | Mod: S$GLB,,, | Performed by: INTERNAL MEDICINE

## 2021-10-26 PROCEDURE — 3008F PR BODY MASS INDEX (BMI) DOCUMENTED: ICD-10-PCS | Mod: S$GLB,,, | Performed by: INTERNAL MEDICINE

## 2021-10-26 PROCEDURE — 3008F BODY MASS INDEX DOCD: CPT | Mod: S$GLB,,, | Performed by: INTERNAL MEDICINE

## 2021-10-26 PROCEDURE — 3062F POS MACROALBUMINURIA REV: CPT | Mod: S$GLB,,, | Performed by: INTERNAL MEDICINE

## 2021-10-26 PROCEDURE — 99213 PR OFFICE/OUTPT VISIT, EST, LEVL III, 20-29 MIN: ICD-10-PCS | Mod: S$GLB,,, | Performed by: INTERNAL MEDICINE

## 2021-10-26 PROCEDURE — 3077F PR MOST RECENT SYSTOLIC BLOOD PRESSURE >= 140 MM HG: ICD-10-PCS | Mod: S$GLB,,, | Performed by: INTERNAL MEDICINE

## 2021-10-26 PROCEDURE — 4010F ACE/ARB THERAPY RXD/TAKEN: CPT | Mod: S$GLB,,, | Performed by: INTERNAL MEDICINE

## 2021-10-26 RX ORDER — LAMOTRIGINE 25 MG/1
500 TABLET ORAL
Status: COMPLETED | OUTPATIENT
Start: 2021-10-26 | End: 2021-10-26

## 2021-10-26 RX ADMIN — FULVESTRANT 500 MG: 50 INJECTION, SOLUTION INTRAMUSCULAR at 03:10

## 2021-10-26 RX ADMIN — FILGRASTIM-SNDZ 480 MCG: 480 INJECTION, SOLUTION INTRAVENOUS; SUBCUTANEOUS at 03:10

## 2021-10-27 ENCOUNTER — INFUSION (OUTPATIENT)
Dept: INFUSION THERAPY | Facility: HOSPITAL | Age: 61
End: 2021-10-27
Attending: INTERNAL MEDICINE
Payer: MEDICARE

## 2021-10-27 VITALS
WEIGHT: 177 LBS | DIASTOLIC BLOOD PRESSURE: 95 MMHG | TEMPERATURE: 98 F | SYSTOLIC BLOOD PRESSURE: 165 MMHG | OXYGEN SATURATION: 99 % | BODY MASS INDEX: 24.69 KG/M2 | HEART RATE: 91 BPM | RESPIRATION RATE: 20 BRPM

## 2021-10-27 DIAGNOSIS — T45.1X5A CHEMOTHERAPY-INDUCED NEUTROPENIA: Primary | ICD-10-CM

## 2021-10-27 DIAGNOSIS — D70.1 CHEMOTHERAPY-INDUCED NEUTROPENIA: Primary | ICD-10-CM

## 2021-10-27 PROCEDURE — 63600175 PHARM REV CODE 636 W HCPCS: Mod: JG | Performed by: INTERNAL MEDICINE

## 2021-10-27 PROCEDURE — 96372 THER/PROPH/DIAG INJ SC/IM: CPT

## 2021-10-27 RX ADMIN — FILGRASTIM-SNDZ 480 MCG: 480 INJECTION, SOLUTION INTRAVENOUS; SUBCUTANEOUS at 11:10

## 2021-10-28 ENCOUNTER — INFUSION (OUTPATIENT)
Dept: INFUSION THERAPY | Facility: HOSPITAL | Age: 61
End: 2021-10-28
Attending: INTERNAL MEDICINE
Payer: MEDICARE

## 2021-10-28 VITALS
TEMPERATURE: 98 F | SYSTOLIC BLOOD PRESSURE: 145 MMHG | DIASTOLIC BLOOD PRESSURE: 85 MMHG | WEIGHT: 178.31 LBS | OXYGEN SATURATION: 97 % | HEART RATE: 98 BPM | RESPIRATION RATE: 18 BRPM | BODY MASS INDEX: 24.87 KG/M2

## 2021-10-28 DIAGNOSIS — T45.1X5A CHEMOTHERAPY-INDUCED NEUTROPENIA: Primary | ICD-10-CM

## 2021-10-28 DIAGNOSIS — D70.1 CHEMOTHERAPY-INDUCED NEUTROPENIA: Primary | ICD-10-CM

## 2021-10-28 PROCEDURE — 96372 THER/PROPH/DIAG INJ SC/IM: CPT

## 2021-10-28 PROCEDURE — 63600175 PHARM REV CODE 636 W HCPCS: Mod: JG | Performed by: INTERNAL MEDICINE

## 2021-10-28 RX ADMIN — FILGRASTIM-SNDZ 480 MCG: 480 INJECTION, SOLUTION INTRAVENOUS; SUBCUTANEOUS at 10:10

## 2021-10-29 ENCOUNTER — HOSPITAL ENCOUNTER (OUTPATIENT)
Dept: RADIOLOGY | Facility: HOSPITAL | Age: 61
Discharge: HOME | End: 2021-10-29
Attending: INTERNAL MEDICINE
Payer: MEDICARE

## 2021-10-29 ENCOUNTER — INFUSION (OUTPATIENT)
Dept: INFUSION THERAPY | Facility: HOSPITAL | Age: 61
End: 2021-10-29
Attending: INTERNAL MEDICINE
Payer: MEDICARE

## 2021-10-29 VITALS
WEIGHT: 180 LBS | RESPIRATION RATE: 17 BRPM | BODY MASS INDEX: 25.2 KG/M2 | DIASTOLIC BLOOD PRESSURE: 74 MMHG | HEART RATE: 94 BPM | HEIGHT: 71 IN | SYSTOLIC BLOOD PRESSURE: 147 MMHG | TEMPERATURE: 97 F

## 2021-10-29 DIAGNOSIS — D70.1 CHEMOTHERAPY-INDUCED NEUTROPENIA: Primary | ICD-10-CM

## 2021-10-29 DIAGNOSIS — Z17.1 MALIGNANT NEOPLASM OF UPPER-OUTER QUADRANT OF RIGHT BREAST IN FEMALE, ESTROGEN RECEPTOR NEGATIVE: ICD-10-CM

## 2021-10-29 DIAGNOSIS — C50.411 MALIGNANT NEOPLASM OF UPPER-OUTER QUADRANT OF RIGHT BREAST IN FEMALE, ESTROGEN RECEPTOR NEGATIVE: ICD-10-CM

## 2021-10-29 DIAGNOSIS — C79.51 BONE METASTASES: ICD-10-CM

## 2021-10-29 DIAGNOSIS — T45.1X5A CHEMOTHERAPY-INDUCED NEUTROPENIA: Primary | ICD-10-CM

## 2021-10-29 PROCEDURE — 71110 XR RIBS 3 VIEWS BILATERAL: ICD-10-PCS | Mod: 26,,, | Performed by: RADIOLOGY

## 2021-10-29 PROCEDURE — 71046 X-RAY EXAM CHEST 2 VIEWS: CPT | Mod: TC,FY

## 2021-10-29 PROCEDURE — 71110 X-RAY EXAM RIBS BIL 3 VIEWS: CPT | Mod: 26,,, | Performed by: RADIOLOGY

## 2021-10-29 PROCEDURE — 71046 XR CHEST PA AND LATERAL: ICD-10-PCS | Mod: 26,,, | Performed by: RADIOLOGY

## 2021-10-29 PROCEDURE — 63600175 PHARM REV CODE 636 W HCPCS: Mod: JG | Performed by: INTERNAL MEDICINE

## 2021-10-29 PROCEDURE — 96372 THER/PROPH/DIAG INJ SC/IM: CPT

## 2021-10-29 PROCEDURE — 71046 X-RAY EXAM CHEST 2 VIEWS: CPT | Mod: 26,,, | Performed by: RADIOLOGY

## 2021-10-29 PROCEDURE — 71110 X-RAY EXAM RIBS BIL 3 VIEWS: CPT | Mod: TC,FY

## 2021-10-29 RX ADMIN — FILGRASTIM-SNDZ 480 MCG: 480 INJECTION, SOLUTION INTRAVENOUS; SUBCUTANEOUS at 01:10

## 2021-11-02 ENCOUNTER — OFFICE VISIT (OUTPATIENT)
Dept: FAMILY MEDICINE | Facility: CLINIC | Age: 61
End: 2021-11-02
Payer: MEDICARE

## 2021-11-02 ENCOUNTER — TELEPHONE (OUTPATIENT)
Dept: HEMATOLOGY/ONCOLOGY | Facility: CLINIC | Age: 61
End: 2021-11-02
Payer: MEDICARE

## 2021-11-02 VITALS
SYSTOLIC BLOOD PRESSURE: 136 MMHG | HEART RATE: 88 BPM | HEIGHT: 71 IN | BODY MASS INDEX: 25.2 KG/M2 | WEIGHT: 180 LBS | DIASTOLIC BLOOD PRESSURE: 78 MMHG

## 2021-11-02 DIAGNOSIS — Z17.0 MALIGNANT NEOPLASM OF LOWER-INNER QUADRANT OF RIGHT BREAST OF FEMALE, ESTROGEN RECEPTOR POSITIVE: ICD-10-CM

## 2021-11-02 DIAGNOSIS — Z17.0 MALIGNANT NEOPLASM OF LOWER-OUTER QUADRANT OF LEFT BREAST OF FEMALE, ESTROGEN RECEPTOR POSITIVE: ICD-10-CM

## 2021-11-02 DIAGNOSIS — E78.2 MIXED HYPERLIPIDEMIA: ICD-10-CM

## 2021-11-02 DIAGNOSIS — C50.512 MALIGNANT NEOPLASM OF LOWER-OUTER QUADRANT OF LEFT BREAST OF FEMALE, ESTROGEN RECEPTOR POSITIVE: ICD-10-CM

## 2021-11-02 DIAGNOSIS — C79.51 BONE METASTASES: ICD-10-CM

## 2021-11-02 DIAGNOSIS — C50.311 MALIGNANT NEOPLASM OF LOWER-INNER QUADRANT OF RIGHT BREAST OF FEMALE, ESTROGEN RECEPTOR POSITIVE: ICD-10-CM

## 2021-11-02 DIAGNOSIS — F06.4 ANXIETY DISORDER DUE TO KNOWN PHYSIOLOGICAL CONDITION: ICD-10-CM

## 2021-11-02 DIAGNOSIS — E11.65 TYPE 2 DIABETES MELLITUS WITH HYPERGLYCEMIA, WITH LONG-TERM CURRENT USE OF INSULIN: Primary | ICD-10-CM

## 2021-11-02 DIAGNOSIS — Z79.4 TYPE 2 DIABETES MELLITUS WITH HYPERGLYCEMIA, WITH LONG-TERM CURRENT USE OF INSULIN: Primary | ICD-10-CM

## 2021-11-02 DIAGNOSIS — R07.81 PLEURITIC CHEST PAIN: ICD-10-CM

## 2021-11-02 PROCEDURE — 3066F NEPHROPATHY DOC TX: CPT | Mod: S$GLB,,, | Performed by: FAMILY MEDICINE

## 2021-11-02 PROCEDURE — 3075F SYST BP GE 130 - 139MM HG: CPT | Mod: S$GLB,,, | Performed by: FAMILY MEDICINE

## 2021-11-02 PROCEDURE — 3066F PR DOCUMENTATION OF TREATMENT FOR NEPHROPATHY: ICD-10-PCS | Mod: S$GLB,,, | Performed by: FAMILY MEDICINE

## 2021-11-02 PROCEDURE — 3046F PR MOST RECENT HEMOGLOBIN A1C LEVEL > 9.0%: ICD-10-PCS | Mod: S$GLB,,, | Performed by: FAMILY MEDICINE

## 2021-11-02 PROCEDURE — 3062F PR POS MACROALBUMINURIA RESULT DOCUMENTED/REVIEW: ICD-10-PCS | Mod: S$GLB,,, | Performed by: FAMILY MEDICINE

## 2021-11-02 PROCEDURE — 3008F BODY MASS INDEX DOCD: CPT | Mod: S$GLB,,, | Performed by: FAMILY MEDICINE

## 2021-11-02 PROCEDURE — 99214 OFFICE O/P EST MOD 30 MIN: CPT | Mod: S$GLB,,, | Performed by: FAMILY MEDICINE

## 2021-11-02 PROCEDURE — 99214 PR OFFICE/OUTPT VISIT, EST, LEVL IV, 30-39 MIN: ICD-10-PCS | Mod: S$GLB,,, | Performed by: FAMILY MEDICINE

## 2021-11-02 PROCEDURE — 3078F PR MOST RECENT DIASTOLIC BLOOD PRESSURE < 80 MM HG: ICD-10-PCS | Mod: S$GLB,,, | Performed by: FAMILY MEDICINE

## 2021-11-02 PROCEDURE — 3078F DIAST BP <80 MM HG: CPT | Mod: S$GLB,,, | Performed by: FAMILY MEDICINE

## 2021-11-02 PROCEDURE — 3008F PR BODY MASS INDEX (BMI) DOCUMENTED: ICD-10-PCS | Mod: S$GLB,,, | Performed by: FAMILY MEDICINE

## 2021-11-02 PROCEDURE — 3075F PR MOST RECENT SYSTOLIC BLOOD PRESS GE 130-139MM HG: ICD-10-PCS | Mod: S$GLB,,, | Performed by: FAMILY MEDICINE

## 2021-11-02 PROCEDURE — 4010F PR ACE/ARB THEARPY RXD/TAKEN: ICD-10-PCS | Mod: S$GLB,,, | Performed by: FAMILY MEDICINE

## 2021-11-02 PROCEDURE — 4010F ACE/ARB THERAPY RXD/TAKEN: CPT | Mod: S$GLB,,, | Performed by: FAMILY MEDICINE

## 2021-11-02 PROCEDURE — 3062F POS MACROALBUMINURIA REV: CPT | Mod: S$GLB,,, | Performed by: FAMILY MEDICINE

## 2021-11-02 PROCEDURE — 3046F HEMOGLOBIN A1C LEVEL >9.0%: CPT | Mod: S$GLB,,, | Performed by: FAMILY MEDICINE

## 2021-11-02 RX ORDER — METFORMIN HYDROCHLORIDE 500 MG/1
1000 TABLET, EXTENDED RELEASE ORAL 2 TIMES DAILY WITH MEALS
Qty: 360 TABLET | Refills: 3 | Status: SHIPPED | OUTPATIENT
Start: 2021-11-02 | End: 2022-01-17 | Stop reason: SDUPTHER

## 2021-11-02 RX ORDER — ROSUVASTATIN CALCIUM 40 MG/1
40 TABLET, COATED ORAL NIGHTLY
Qty: 90 TABLET | Refills: 3 | Status: SHIPPED | OUTPATIENT
Start: 2021-11-02 | End: 2022-01-17 | Stop reason: SDUPTHER

## 2021-11-02 RX ORDER — CARISOPRODOL 350 MG/1
350 TABLET ORAL 3 TIMES DAILY PRN
Qty: 90 TABLET | Refills: 0 | Status: CANCELLED | OUTPATIENT
Start: 2021-11-11 | End: 2021-12-11

## 2021-11-02 RX ORDER — HYDROCODONE BITARTRATE AND ACETAMINOPHEN 10; 325 MG/1; MG/1
1 TABLET ORAL EVERY 6 HOURS PRN
Qty: 120 TABLET | Refills: 0 | Status: CANCELLED | OUTPATIENT
Start: 2021-11-12 | End: 2021-12-12

## 2021-11-02 RX ORDER — DIAZEPAM 10 MG/1
10 TABLET ORAL 4 TIMES DAILY PRN
Qty: 120 TABLET | Refills: 3 | Status: SHIPPED | OUTPATIENT
Start: 2021-11-02 | End: 2022-04-06 | Stop reason: SDUPTHER

## 2021-11-08 ENCOUNTER — HOSPITAL ENCOUNTER (EMERGENCY)
Facility: HOSPITAL | Age: 61
Discharge: HOME | End: 2021-11-08
Attending: EMERGENCY MEDICINE
Payer: MEDICARE

## 2021-11-08 ENCOUNTER — TELEPHONE (OUTPATIENT)
Dept: HEMATOLOGY/ONCOLOGY | Facility: CLINIC | Age: 61
End: 2021-11-08
Payer: MEDICARE

## 2021-11-08 VITALS
RESPIRATION RATE: 20 BRPM | DIASTOLIC BLOOD PRESSURE: 64 MMHG | BODY MASS INDEX: 25.2 KG/M2 | HEART RATE: 80 BPM | WEIGHT: 180 LBS | OXYGEN SATURATION: 94 % | SYSTOLIC BLOOD PRESSURE: 111 MMHG | HEIGHT: 71 IN

## 2021-11-08 DIAGNOSIS — R52 PAIN: ICD-10-CM

## 2021-11-08 DIAGNOSIS — R07.9 CHEST PAIN: ICD-10-CM

## 2021-11-08 DIAGNOSIS — M54.9 BACK PAIN, UNSPECIFIED BACK LOCATION, UNSPECIFIED BACK PAIN LATERALITY, UNSPECIFIED CHRONICITY: Primary | ICD-10-CM

## 2021-11-08 LAB
ALBUMIN SERPL BCP-MCNC: 3.7 G/DL (ref 3.5–5.2)
ALP SERPL-CCNC: 242 U/L (ref 55–135)
ALT SERPL W/O P-5'-P-CCNC: 30 U/L (ref 10–44)
ANION GAP SERPL CALC-SCNC: 14 MMOL/L (ref 8–16)
AST SERPL-CCNC: 16 U/L (ref 10–40)
BASOPHILS # BLD AUTO: 0.03 K/UL (ref 0–0.2)
BASOPHILS NFR BLD: 0.9 % (ref 0–1.9)
BILIRUB SERPL-MCNC: 0.8 MG/DL (ref 0.1–1)
BUN SERPL-MCNC: 14 MG/DL (ref 6–20)
CALCIUM SERPL-MCNC: 9.6 MG/DL (ref 8.7–10.5)
CHLORIDE SERPL-SCNC: 100 MMOL/L (ref 95–110)
CO2 SERPL-SCNC: 22 MMOL/L (ref 23–29)
CREAT SERPL-MCNC: 1 MG/DL (ref 0.5–1.4)
D DIMER PPP IA.FEU-MCNC: 0.37 MG/L FEU
DIFFERENTIAL METHOD: ABNORMAL
EOSINOPHIL # BLD AUTO: 0 K/UL (ref 0–0.5)
EOSINOPHIL NFR BLD: 0.6 % (ref 0–8)
ERYTHROCYTE [DISTWIDTH] IN BLOOD BY AUTOMATED COUNT: 15.2 % (ref 11.5–14.5)
EST. GFR  (AFRICAN AMERICAN): >60 ML/MIN/1.73 M^2
EST. GFR  (NON AFRICAN AMERICAN): >60 ML/MIN/1.73 M^2
GLUCOSE SERPL-MCNC: 450 MG/DL (ref 70–110)
HCT VFR BLD AUTO: 36.1 % (ref 37–48.5)
HGB BLD-MCNC: 12.1 G/DL (ref 12–16)
IMM GRANULOCYTES # BLD AUTO: 0.03 K/UL (ref 0–0.04)
IMM GRANULOCYTES NFR BLD AUTO: 0.9 % (ref 0–0.5)
LYMPHOCYTES # BLD AUTO: 0.5 K/UL (ref 1–4.8)
LYMPHOCYTES NFR BLD: 14.9 % (ref 18–48)
MCH RBC QN AUTO: 33.2 PG (ref 27–31)
MCHC RBC AUTO-ENTMCNC: 33.5 G/DL (ref 32–36)
MCV RBC AUTO: 99 FL (ref 82–98)
MONOCYTES # BLD AUTO: 0.1 K/UL (ref 0.3–1)
MONOCYTES NFR BLD: 2.9 % (ref 4–15)
NEUTROPHILS # BLD AUTO: 2.7 K/UL (ref 1.8–7.7)
NEUTROPHILS NFR BLD: 79.8 % (ref 38–73)
NRBC BLD-RTO: 0 /100 WBC
PLATELET # BLD AUTO: 384 K/UL (ref 150–450)
PMV BLD AUTO: 9.8 FL (ref 9.2–12.9)
POTASSIUM SERPL-SCNC: 4 MMOL/L (ref 3.5–5.1)
PROT SERPL-MCNC: 7.6 G/DL (ref 6–8.4)
RBC # BLD AUTO: 3.64 M/UL (ref 4–5.4)
SODIUM SERPL-SCNC: 136 MMOL/L (ref 136–145)
TROPONIN I SERPL DL<=0.01 NG/ML-MCNC: 0.03 NG/ML (ref 0–0.03)
WBC # BLD AUTO: 3.43 K/UL (ref 3.9–12.7)

## 2021-11-08 PROCEDURE — 36415 COLL VENOUS BLD VENIPUNCTURE: CPT | Performed by: NURSE PRACTITIONER

## 2021-11-08 PROCEDURE — 93010 EKG 12-LEAD: ICD-10-PCS | Mod: ,,, | Performed by: GENERAL PRACTICE

## 2021-11-08 PROCEDURE — 85379 FIBRIN DEGRADATION QUANT: CPT | Performed by: NURSE PRACTITIONER

## 2021-11-08 PROCEDURE — 84484 ASSAY OF TROPONIN QUANT: CPT | Performed by: NURSE PRACTITIONER

## 2021-11-08 PROCEDURE — 96374 THER/PROPH/DIAG INJ IV PUSH: CPT

## 2021-11-08 PROCEDURE — 80053 COMPREHEN METABOLIC PANEL: CPT | Performed by: NURSE PRACTITIONER

## 2021-11-08 PROCEDURE — 85025 COMPLETE CBC W/AUTO DIFF WBC: CPT | Performed by: NURSE PRACTITIONER

## 2021-11-08 PROCEDURE — 93005 ELECTROCARDIOGRAM TRACING: CPT

## 2021-11-08 PROCEDURE — 99285 EMERGENCY DEPT VISIT HI MDM: CPT | Mod: 25

## 2021-11-08 PROCEDURE — 63600175 PHARM REV CODE 636 W HCPCS: Performed by: NURSE PRACTITIONER

## 2021-11-08 PROCEDURE — 93010 ELECTROCARDIOGRAM REPORT: CPT | Mod: ,,, | Performed by: GENERAL PRACTICE

## 2021-11-08 RX ORDER — MORPHINE SULFATE 4 MG/ML
8 INJECTION, SOLUTION INTRAMUSCULAR; INTRAVENOUS
Status: COMPLETED | OUTPATIENT
Start: 2021-11-08 | End: 2021-11-08

## 2021-11-08 RX ADMIN — MORPHINE SULFATE 8 MG: 4 INJECTION INTRAVENOUS at 12:11

## 2021-11-10 ENCOUNTER — TELEPHONE (OUTPATIENT)
Dept: FAMILY MEDICINE | Facility: CLINIC | Age: 61
End: 2021-11-10
Payer: MEDICARE

## 2021-11-12 DIAGNOSIS — C50.411 MALIGNANT NEOPLASM OF UPPER-OUTER QUADRANT OF RIGHT BREAST IN FEMALE, ESTROGEN RECEPTOR NEGATIVE: Primary | ICD-10-CM

## 2021-11-12 DIAGNOSIS — Z17.1 MALIGNANT NEOPLASM OF UPPER-OUTER QUADRANT OF RIGHT BREAST IN FEMALE, ESTROGEN RECEPTOR NEGATIVE: Primary | ICD-10-CM

## 2021-11-12 RX ORDER — ETODOLAC 400 MG/1
400 TABLET, FILM COATED ORAL DAILY
Qty: 30 TABLET | Refills: 1 | Status: SHIPPED | OUTPATIENT
Start: 2021-11-12 | End: 2022-02-28 | Stop reason: SDUPTHER

## 2021-11-15 ENCOUNTER — OFFICE VISIT (OUTPATIENT)
Dept: HEMATOLOGY/ONCOLOGY | Facility: CLINIC | Age: 61
End: 2021-11-15
Payer: MEDICARE

## 2021-11-15 ENCOUNTER — TELEPHONE (OUTPATIENT)
Dept: HEMATOLOGY/ONCOLOGY | Facility: CLINIC | Age: 61
End: 2021-11-15
Payer: MEDICARE

## 2021-11-15 VITALS
WEIGHT: 176 LBS | HEIGHT: 71 IN | HEART RATE: 87 BPM | DIASTOLIC BLOOD PRESSURE: 85 MMHG | RESPIRATION RATE: 18 BRPM | BODY MASS INDEX: 24.64 KG/M2 | SYSTOLIC BLOOD PRESSURE: 169 MMHG

## 2021-11-15 DIAGNOSIS — R07.81 PLEURITIC CHEST PAIN: Primary | ICD-10-CM

## 2021-11-15 DIAGNOSIS — R07.89 RIGHT-SIDED CHEST WALL PAIN: ICD-10-CM

## 2021-11-15 DIAGNOSIS — M54.6 ACUTE BILATERAL THORACIC BACK PAIN: ICD-10-CM

## 2021-11-15 PROCEDURE — 3008F PR BODY MASS INDEX (BMI) DOCUMENTED: ICD-10-PCS | Mod: S$GLB,,, | Performed by: INTERNAL MEDICINE

## 2021-11-15 PROCEDURE — 3046F HEMOGLOBIN A1C LEVEL >9.0%: CPT | Mod: S$GLB,,, | Performed by: INTERNAL MEDICINE

## 2021-11-15 PROCEDURE — 3079F DIAST BP 80-89 MM HG: CPT | Mod: S$GLB,,, | Performed by: INTERNAL MEDICINE

## 2021-11-15 PROCEDURE — 99214 PR OFFICE/OUTPT VISIT, EST, LEVL IV, 30-39 MIN: ICD-10-PCS | Mod: S$GLB,,, | Performed by: INTERNAL MEDICINE

## 2021-11-15 PROCEDURE — 99214 OFFICE O/P EST MOD 30 MIN: CPT | Mod: S$GLB,,, | Performed by: INTERNAL MEDICINE

## 2021-11-15 PROCEDURE — 3062F PR POS MACROALBUMINURIA RESULT DOCUMENTED/REVIEW: ICD-10-PCS | Mod: S$GLB,,, | Performed by: INTERNAL MEDICINE

## 2021-11-15 PROCEDURE — 3066F NEPHROPATHY DOC TX: CPT | Mod: S$GLB,,, | Performed by: INTERNAL MEDICINE

## 2021-11-15 PROCEDURE — 3062F POS MACROALBUMINURIA REV: CPT | Mod: S$GLB,,, | Performed by: INTERNAL MEDICINE

## 2021-11-15 PROCEDURE — 3079F PR MOST RECENT DIASTOLIC BLOOD PRESSURE 80-89 MM HG: ICD-10-PCS | Mod: S$GLB,,, | Performed by: INTERNAL MEDICINE

## 2021-11-15 PROCEDURE — 3008F BODY MASS INDEX DOCD: CPT | Mod: S$GLB,,, | Performed by: INTERNAL MEDICINE

## 2021-11-15 PROCEDURE — 3077F PR MOST RECENT SYSTOLIC BLOOD PRESSURE >= 140 MM HG: ICD-10-PCS | Mod: S$GLB,,, | Performed by: INTERNAL MEDICINE

## 2021-11-15 PROCEDURE — 3046F PR MOST RECENT HEMOGLOBIN A1C LEVEL > 9.0%: ICD-10-PCS | Mod: S$GLB,,, | Performed by: INTERNAL MEDICINE

## 2021-11-15 PROCEDURE — 4010F ACE/ARB THERAPY RXD/TAKEN: CPT | Mod: S$GLB,,, | Performed by: INTERNAL MEDICINE

## 2021-11-15 PROCEDURE — 3077F SYST BP >= 140 MM HG: CPT | Mod: S$GLB,,, | Performed by: INTERNAL MEDICINE

## 2021-11-15 PROCEDURE — 3066F PR DOCUMENTATION OF TREATMENT FOR NEPHROPATHY: ICD-10-PCS | Mod: S$GLB,,, | Performed by: INTERNAL MEDICINE

## 2021-11-15 PROCEDURE — 4010F PR ACE/ARB THEARPY RXD/TAKEN: ICD-10-PCS | Mod: S$GLB,,, | Performed by: INTERNAL MEDICINE

## 2021-11-15 RX ORDER — LEVOFLOXACIN 500 MG/1
500 TABLET, FILM COATED ORAL DAILY
Qty: 10 TABLET | Refills: 0 | Status: SHIPPED | OUTPATIENT
Start: 2021-11-15 | End: 2022-03-15

## 2021-11-15 RX ORDER — GUAIFENESIN/DEXTROMETHORPHAN 100-10MG/5
SYRUP ORAL
Qty: 300 ML | Refills: 0 | Status: SHIPPED | OUTPATIENT
Start: 2021-11-15 | End: 2023-01-01

## 2021-11-16 ENCOUNTER — HOSPITAL ENCOUNTER (OUTPATIENT)
Dept: RADIOLOGY | Facility: HOSPITAL | Age: 61
Discharge: HOME | End: 2021-11-16
Attending: INTERNAL MEDICINE
Payer: MEDICARE

## 2021-11-16 DIAGNOSIS — M54.6 ACUTE BILATERAL THORACIC BACK PAIN: ICD-10-CM

## 2021-11-16 DIAGNOSIS — R07.81 PLEURITIC CHEST PAIN: ICD-10-CM

## 2021-11-16 DIAGNOSIS — R05.9 COUGH: Primary | ICD-10-CM

## 2021-11-16 PROCEDURE — 25500020 PHARM REV CODE 255: Performed by: INTERNAL MEDICINE

## 2021-11-16 PROCEDURE — A9585 GADOBUTROL INJECTION: HCPCS | Performed by: INTERNAL MEDICINE

## 2021-11-16 PROCEDURE — 72157 MRI CHEST SPINE W/O & W/DYE: CPT | Mod: TC

## 2021-11-16 RX ORDER — PROMETHAZINE HYDROCHLORIDE AND CODEINE PHOSPHATE 6.25; 1 MG/5ML; MG/5ML
SOLUTION ORAL
Qty: 300 ML | Refills: 0 | Status: SHIPPED | OUTPATIENT
Start: 2021-11-16 | End: 2021-11-22 | Stop reason: SDUPTHER

## 2021-11-16 RX ORDER — GADOBUTROL 604.72 MG/ML
7 INJECTION INTRAVENOUS
Status: COMPLETED | OUTPATIENT
Start: 2021-11-16 | End: 2021-11-16

## 2021-11-16 RX ADMIN — GADOBUTROL 7 ML: 604.72 INJECTION INTRAVENOUS at 05:11

## 2021-11-17 ENCOUNTER — OFFICE VISIT (OUTPATIENT)
Dept: HEMATOLOGY/ONCOLOGY | Facility: CLINIC | Age: 61
End: 2021-11-17
Payer: MEDICARE

## 2021-11-17 VITALS
WEIGHT: 177.31 LBS | BODY MASS INDEX: 24.82 KG/M2 | SYSTOLIC BLOOD PRESSURE: 111 MMHG | RESPIRATION RATE: 18 BRPM | HEIGHT: 71 IN | DIASTOLIC BLOOD PRESSURE: 65 MMHG | HEART RATE: 87 BPM

## 2021-11-17 DIAGNOSIS — S22.039D CLOSED FRACTURE OF THIRD THORACIC VERTEBRA WITH ROUTINE HEALING, UNSPECIFIED FRACTURE MORPHOLOGY, SUBSEQUENT ENCOUNTER: ICD-10-CM

## 2021-11-17 DIAGNOSIS — Z17.1 MALIGNANT NEOPLASM OF UPPER-OUTER QUADRANT OF RIGHT BREAST IN FEMALE, ESTROGEN RECEPTOR NEGATIVE: Primary | ICD-10-CM

## 2021-11-17 DIAGNOSIS — C50.411 MALIGNANT NEOPLASM OF UPPER-OUTER QUADRANT OF RIGHT BREAST IN FEMALE, ESTROGEN RECEPTOR NEGATIVE: Primary | ICD-10-CM

## 2021-11-17 DIAGNOSIS — C79.51 BONE METASTASES: ICD-10-CM

## 2021-11-17 PROCEDURE — 4010F ACE/ARB THERAPY RXD/TAKEN: CPT | Mod: S$GLB,,, | Performed by: INTERNAL MEDICINE

## 2021-11-17 PROCEDURE — 3078F PR MOST RECENT DIASTOLIC BLOOD PRESSURE < 80 MM HG: ICD-10-PCS | Mod: S$GLB,,, | Performed by: INTERNAL MEDICINE

## 2021-11-17 PROCEDURE — 3066F NEPHROPATHY DOC TX: CPT | Mod: S$GLB,,, | Performed by: INTERNAL MEDICINE

## 2021-11-17 PROCEDURE — 3008F PR BODY MASS INDEX (BMI) DOCUMENTED: ICD-10-PCS | Mod: S$GLB,,, | Performed by: INTERNAL MEDICINE

## 2021-11-17 PROCEDURE — 3074F SYST BP LT 130 MM HG: CPT | Mod: S$GLB,,, | Performed by: INTERNAL MEDICINE

## 2021-11-17 PROCEDURE — 99213 PR OFFICE/OUTPT VISIT, EST, LEVL III, 20-29 MIN: ICD-10-PCS | Mod: S$GLB,,, | Performed by: INTERNAL MEDICINE

## 2021-11-17 PROCEDURE — 3008F BODY MASS INDEX DOCD: CPT | Mod: S$GLB,,, | Performed by: INTERNAL MEDICINE

## 2021-11-17 PROCEDURE — 4010F PR ACE/ARB THEARPY RXD/TAKEN: ICD-10-PCS | Mod: S$GLB,,, | Performed by: INTERNAL MEDICINE

## 2021-11-17 PROCEDURE — 3046F HEMOGLOBIN A1C LEVEL >9.0%: CPT | Mod: S$GLB,,, | Performed by: INTERNAL MEDICINE

## 2021-11-17 PROCEDURE — 3074F PR MOST RECENT SYSTOLIC BLOOD PRESSURE < 130 MM HG: ICD-10-PCS | Mod: S$GLB,,, | Performed by: INTERNAL MEDICINE

## 2021-11-17 PROCEDURE — 3046F PR MOST RECENT HEMOGLOBIN A1C LEVEL > 9.0%: ICD-10-PCS | Mod: S$GLB,,, | Performed by: INTERNAL MEDICINE

## 2021-11-17 PROCEDURE — 3078F DIAST BP <80 MM HG: CPT | Mod: S$GLB,,, | Performed by: INTERNAL MEDICINE

## 2021-11-17 PROCEDURE — 3066F PR DOCUMENTATION OF TREATMENT FOR NEPHROPATHY: ICD-10-PCS | Mod: S$GLB,,, | Performed by: INTERNAL MEDICINE

## 2021-11-17 PROCEDURE — 99213 OFFICE O/P EST LOW 20 MIN: CPT | Mod: S$GLB,,, | Performed by: INTERNAL MEDICINE

## 2021-11-17 PROCEDURE — 3062F POS MACROALBUMINURIA REV: CPT | Mod: S$GLB,,, | Performed by: INTERNAL MEDICINE

## 2021-11-17 PROCEDURE — 3062F PR POS MACROALBUMINURIA RESULT DOCUMENTED/REVIEW: ICD-10-PCS | Mod: S$GLB,,, | Performed by: INTERNAL MEDICINE

## 2021-11-18 ENCOUNTER — TELEPHONE (OUTPATIENT)
Dept: PAIN MEDICINE | Facility: CLINIC | Age: 61
End: 2021-11-18
Payer: MEDICARE

## 2021-11-18 ENCOUNTER — TELEPHONE (OUTPATIENT)
Dept: NEUROSURGERY | Facility: CLINIC | Age: 61
End: 2021-11-18
Payer: MEDICARE

## 2021-11-18 DIAGNOSIS — R05.9 COUGH: ICD-10-CM

## 2021-11-18 RX ORDER — PROMETHAZINE HYDROCHLORIDE AND CODEINE PHOSPHATE 6.25; 1 MG/5ML; MG/5ML
SOLUTION ORAL
Qty: 450 ML | Refills: 0 | OUTPATIENT
Start: 2021-11-19

## 2021-11-19 ENCOUNTER — ANCILLARY ORDERS (OUTPATIENT)
Dept: HEMATOLOGY/ONCOLOGY | Facility: CLINIC | Age: 61
End: 2021-11-19
Payer: MEDICARE

## 2021-11-19 DIAGNOSIS — C50.411 MALIGNANT NEOPLASM OF UPPER-OUTER QUADRANT OF RIGHT BREAST IN FEMALE, ESTROGEN RECEPTOR NEGATIVE: ICD-10-CM

## 2021-11-19 DIAGNOSIS — Z17.1 MALIGNANT NEOPLASM OF UPPER-OUTER QUADRANT OF RIGHT BREAST IN FEMALE, ESTROGEN RECEPTOR NEGATIVE: ICD-10-CM

## 2021-11-21 RX ORDER — LAMOTRIGINE 25 MG/1
500 TABLET ORAL
Status: CANCELLED | OUTPATIENT
Start: 2021-11-23

## 2021-11-22 DIAGNOSIS — C50.411 MALIGNANT NEOPLASM OF UPPER-OUTER QUADRANT OF RIGHT BREAST IN FEMALE, ESTROGEN RECEPTOR NEGATIVE: ICD-10-CM

## 2021-11-22 DIAGNOSIS — R05.9 COUGH: ICD-10-CM

## 2021-11-22 DIAGNOSIS — Z17.1 MALIGNANT NEOPLASM OF UPPER-OUTER QUADRANT OF RIGHT BREAST IN FEMALE, ESTROGEN RECEPTOR NEGATIVE: ICD-10-CM

## 2021-11-22 RX ORDER — PROMETHAZINE HYDROCHLORIDE AND CODEINE PHOSPHATE 6.25; 1 MG/5ML; MG/5ML
SOLUTION ORAL
Qty: 450 ML | Refills: 0 | Status: SHIPPED | OUTPATIENT
Start: 2021-11-26 | End: 2022-02-02 | Stop reason: SDUPTHER

## 2021-11-23 ENCOUNTER — INFUSION (OUTPATIENT)
Dept: INFUSION THERAPY | Facility: HOSPITAL | Age: 61
End: 2021-11-23
Attending: INTERNAL MEDICINE
Payer: MEDICARE

## 2021-11-23 VITALS
RESPIRATION RATE: 18 BRPM | WEIGHT: 177.63 LBS | DIASTOLIC BLOOD PRESSURE: 84 MMHG | HEART RATE: 72 BPM | HEIGHT: 71 IN | SYSTOLIC BLOOD PRESSURE: 132 MMHG | TEMPERATURE: 98 F | BODY MASS INDEX: 24.87 KG/M2 | OXYGEN SATURATION: 98 %

## 2021-11-23 DIAGNOSIS — C79.51 BONE METASTASES: Primary | ICD-10-CM

## 2021-11-23 DIAGNOSIS — C50.411 MALIGNANT NEOPLASM OF UPPER-OUTER QUADRANT OF RIGHT BREAST IN FEMALE, ESTROGEN RECEPTOR NEGATIVE: ICD-10-CM

## 2021-11-23 DIAGNOSIS — Z17.1 MALIGNANT NEOPLASM OF UPPER-OUTER QUADRANT OF RIGHT BREAST IN FEMALE, ESTROGEN RECEPTOR NEGATIVE: ICD-10-CM

## 2021-11-23 DIAGNOSIS — T45.1X5A CHEMOTHERAPY-INDUCED NEUTROPENIA: ICD-10-CM

## 2021-11-23 DIAGNOSIS — D70.1 CHEMOTHERAPY-INDUCED NEUTROPENIA: ICD-10-CM

## 2021-11-23 DIAGNOSIS — C50.512 MALIGNANT NEOPLASM OF LOWER-OUTER QUADRANT OF LEFT BREAST OF FEMALE, ESTROGEN RECEPTOR POSITIVE: ICD-10-CM

## 2021-11-23 DIAGNOSIS — Z17.0 MALIGNANT NEOPLASM OF LOWER-OUTER QUADRANT OF LEFT BREAST OF FEMALE, ESTROGEN RECEPTOR POSITIVE: ICD-10-CM

## 2021-11-23 PROCEDURE — 63600175 PHARM REV CODE 636 W HCPCS: Mod: JG | Performed by: INTERNAL MEDICINE

## 2021-11-23 PROCEDURE — 96402 CHEMO HORMON ANTINEOPL SQ/IM: CPT

## 2021-11-23 RX ORDER — LAMOTRIGINE 25 MG/1
500 TABLET ORAL
Status: COMPLETED | OUTPATIENT
Start: 2021-11-23 | End: 2021-11-23

## 2021-11-23 RX ORDER — MOMETASONE FUROATE 1 MG/G
CREAM TOPICAL
Qty: 45 G | Refills: 1 | Status: SHIPPED | OUTPATIENT
Start: 2021-11-23 | End: 2022-01-01 | Stop reason: CLARIF

## 2021-11-23 RX ADMIN — FULVESTRANT 500 MG: 50 INJECTION, SOLUTION INTRAMUSCULAR at 02:11

## 2021-11-25 LAB
ALBUMIN SERPL-MCNC: 3.9 G/DL (ref 3.6–5.1)
ALBUMIN/GLOB SERPL: 1.7 (CALC) (ref 1–2.5)
ALP SERPL-CCNC: 157 U/L (ref 37–153)
ALT SERPL-CCNC: 13 U/L (ref 6–29)
AST SERPL-CCNC: 15 U/L (ref 10–35)
BASOPHILS # BLD AUTO: 10 CELLS/UL (ref 0–200)
BASOPHILS NFR BLD AUTO: 0.7 %
BILIRUB SERPL-MCNC: 0.5 MG/DL (ref 0.2–1.2)
BUN SERPL-MCNC: 15 MG/DL (ref 7–25)
BUN/CREAT SERPL: ABNORMAL (CALC) (ref 6–22)
CALCIUM SERPL-MCNC: 8.9 MG/DL (ref 8.6–10.4)
CHLORIDE SERPL-SCNC: 100 MMOL/L (ref 98–110)
CO2 SERPL-SCNC: 28 MMOL/L (ref 20–32)
CREAT SERPL-MCNC: 0.93 MG/DL (ref 0.5–1.05)
EOSINOPHIL # BLD AUTO: 10 CELLS/UL (ref 15–500)
EOSINOPHIL NFR BLD AUTO: 0.7 %
ERYTHROCYTE [DISTWIDTH] IN BLOOD BY AUTOMATED COUNT: 15.7 % (ref 11–15)
GLOBULIN SER CALC-MCNC: 2.3 G/DL (CALC) (ref 1.9–3.7)
GLUCOSE SERPL-MCNC: 378 MG/DL (ref 65–99)
HCT VFR BLD AUTO: 35.9 % (ref 35–45)
HGB BLD-MCNC: 12.3 G/DL (ref 11.7–15.5)
LYMPHOCYTES # BLD AUTO: 574 CELLS/UL (ref 850–3900)
LYMPHOCYTES NFR BLD AUTO: 41 %
MCH RBC QN AUTO: 35 PG (ref 27–33)
MCHC RBC AUTO-ENTMCNC: 34.3 G/DL (ref 32–36)
MCV RBC AUTO: 102.3 FL (ref 80–100)
MONOCYTES # BLD AUTO: 97 CELLS/UL (ref 200–950)
MONOCYTES NFR BLD AUTO: 6.9 %
NEUTROPHILS # BLD AUTO: 710 CELLS/UL (ref 1500–7800)
NEUTROPHILS NFR BLD AUTO: 50.7 %
PLATELET # BLD AUTO: 137 THOUSAND/UL (ref 140–400)
PMV BLD REES-ECKER: 9.4 FL (ref 7.5–12.5)
POTASSIUM SERPL-SCNC: 4.1 MMOL/L (ref 3.5–5.3)
PROT SERPL-MCNC: 6.2 G/DL (ref 6.1–8.1)
RBC # BLD AUTO: 3.51 MILLION/UL (ref 3.8–5.1)
SERVICE CMNT-IMP: ABNORMAL
SODIUM SERPL-SCNC: 136 MMOL/L (ref 135–146)
WBC # BLD AUTO: 1.4 THOUSAND/UL (ref 3.8–10.8)

## 2021-11-26 ENCOUNTER — TELEPHONE (OUTPATIENT)
Dept: HEMATOLOGY/ONCOLOGY | Facility: CLINIC | Age: 61
End: 2021-11-26
Payer: MEDICARE

## 2021-11-29 ENCOUNTER — OFFICE VISIT (OUTPATIENT)
Dept: NEUROSURGERY | Facility: CLINIC | Age: 61
End: 2021-11-29
Payer: MEDICARE

## 2021-11-29 VITALS
SYSTOLIC BLOOD PRESSURE: 152 MMHG | HEART RATE: 71 BPM | BODY MASS INDEX: 23.64 KG/M2 | HEIGHT: 71 IN | WEIGHT: 168.88 LBS | DIASTOLIC BLOOD PRESSURE: 86 MMHG

## 2021-11-29 DIAGNOSIS — F06.4 ANXIETY DISORDER DUE TO KNOWN PHYSIOLOGICAL CONDITION: ICD-10-CM

## 2021-11-29 DIAGNOSIS — C79.51 BONE METASTASES: ICD-10-CM

## 2021-11-29 DIAGNOSIS — C50.411 MALIGNANT NEOPLASM OF UPPER-OUTER QUADRANT OF RIGHT BREAST IN FEMALE, ESTROGEN RECEPTOR NEGATIVE: ICD-10-CM

## 2021-11-29 DIAGNOSIS — R00.0 TACHYCARDIA: ICD-10-CM

## 2021-11-29 DIAGNOSIS — S22.039D CLOSED FRACTURE OF THIRD THORACIC VERTEBRA WITH ROUTINE HEALING, UNSPECIFIED FRACTURE MORPHOLOGY, SUBSEQUENT ENCOUNTER: ICD-10-CM

## 2021-11-29 DIAGNOSIS — Z17.1 MALIGNANT NEOPLASM OF UPPER-OUTER QUADRANT OF RIGHT BREAST IN FEMALE, ESTROGEN RECEPTOR NEGATIVE: ICD-10-CM

## 2021-11-29 DIAGNOSIS — I10 ESSENTIAL HYPERTENSION: ICD-10-CM

## 2021-11-29 PROCEDURE — 4010F ACE/ARB THERAPY RXD/TAKEN: CPT | Mod: CPTII,S$GLB,, | Performed by: NEUROLOGICAL SURGERY

## 2021-11-29 PROCEDURE — 3066F NEPHROPATHY DOC TX: CPT | Mod: CPTII,S$GLB,, | Performed by: NEUROLOGICAL SURGERY

## 2021-11-29 PROCEDURE — 99204 PR OFFICE/OUTPT VISIT, NEW, LEVL IV, 45-59 MIN: ICD-10-PCS | Mod: S$GLB,,, | Performed by: NEUROLOGICAL SURGERY

## 2021-11-29 PROCEDURE — 3066F PR DOCUMENTATION OF TREATMENT FOR NEPHROPATHY: ICD-10-PCS | Mod: CPTII,S$GLB,, | Performed by: NEUROLOGICAL SURGERY

## 2021-11-29 PROCEDURE — 99204 OFFICE O/P NEW MOD 45 MIN: CPT | Mod: S$GLB,,, | Performed by: NEUROLOGICAL SURGERY

## 2021-11-29 PROCEDURE — 3062F POS MACROALBUMINURIA REV: CPT | Mod: CPTII,S$GLB,, | Performed by: NEUROLOGICAL SURGERY

## 2021-11-29 PROCEDURE — 4010F PR ACE/ARB THEARPY RXD/TAKEN: ICD-10-PCS | Mod: CPTII,S$GLB,, | Performed by: NEUROLOGICAL SURGERY

## 2021-11-29 PROCEDURE — 3062F PR POS MACROALBUMINURIA RESULT DOCUMENTED/REVIEW: ICD-10-PCS | Mod: CPTII,S$GLB,, | Performed by: NEUROLOGICAL SURGERY

## 2021-11-29 RX ORDER — METOPROLOL TARTRATE 25 MG/1
25 TABLET, FILM COATED ORAL 2 TIMES DAILY
Qty: 60 TABLET | Refills: 11 | Status: SHIPPED | OUTPATIENT
Start: 2021-11-29 | End: 2022-01-01 | Stop reason: SDUPTHER

## 2021-11-30 ENCOUNTER — TELEPHONE (OUTPATIENT)
Dept: HEMATOLOGY/ONCOLOGY | Facility: CLINIC | Age: 61
End: 2021-11-30
Payer: MEDICARE

## 2021-12-01 ENCOUNTER — OFFICE VISIT (OUTPATIENT)
Dept: HEMATOLOGY/ONCOLOGY | Facility: CLINIC | Age: 61
End: 2021-12-01
Payer: MEDICARE

## 2021-12-01 VITALS
HEIGHT: 71 IN | BODY MASS INDEX: 24.78 KG/M2 | SYSTOLIC BLOOD PRESSURE: 126 MMHG | RESPIRATION RATE: 18 BRPM | DIASTOLIC BLOOD PRESSURE: 73 MMHG | WEIGHT: 177 LBS | HEART RATE: 76 BPM

## 2021-12-01 DIAGNOSIS — M54.6 ACUTE BILATERAL THORACIC BACK PAIN: ICD-10-CM

## 2021-12-01 DIAGNOSIS — Z17.1 MALIGNANT NEOPLASM OF UPPER-OUTER QUADRANT OF RIGHT BREAST IN FEMALE, ESTROGEN RECEPTOR NEGATIVE: Primary | ICD-10-CM

## 2021-12-01 DIAGNOSIS — C50.411 MALIGNANT NEOPLASM OF UPPER-OUTER QUADRANT OF RIGHT BREAST IN FEMALE, ESTROGEN RECEPTOR NEGATIVE: Primary | ICD-10-CM

## 2021-12-01 DIAGNOSIS — S22.039D CLOSED FRACTURE OF THIRD THORACIC VERTEBRA WITH ROUTINE HEALING, UNSPECIFIED FRACTURE MORPHOLOGY, SUBSEQUENT ENCOUNTER: ICD-10-CM

## 2021-12-01 DIAGNOSIS — C79.51 BONE METASTASES: ICD-10-CM

## 2021-12-01 PROCEDURE — 4010F ACE/ARB THERAPY RXD/TAKEN: CPT | Mod: S$GLB,,, | Performed by: INTERNAL MEDICINE

## 2021-12-01 PROCEDURE — 3066F PR DOCUMENTATION OF TREATMENT FOR NEPHROPATHY: ICD-10-PCS | Mod: S$GLB,,, | Performed by: INTERNAL MEDICINE

## 2021-12-01 PROCEDURE — 99213 OFFICE O/P EST LOW 20 MIN: CPT | Mod: S$GLB,,, | Performed by: INTERNAL MEDICINE

## 2021-12-01 PROCEDURE — 3062F PR POS MACROALBUMINURIA RESULT DOCUMENTED/REVIEW: ICD-10-PCS | Mod: S$GLB,,, | Performed by: INTERNAL MEDICINE

## 2021-12-01 PROCEDURE — 3062F POS MACROALBUMINURIA REV: CPT | Mod: S$GLB,,, | Performed by: INTERNAL MEDICINE

## 2021-12-01 PROCEDURE — 99213 PR OFFICE/OUTPT VISIT, EST, LEVL III, 20-29 MIN: ICD-10-PCS | Mod: S$GLB,,, | Performed by: INTERNAL MEDICINE

## 2021-12-01 PROCEDURE — 3066F NEPHROPATHY DOC TX: CPT | Mod: S$GLB,,, | Performed by: INTERNAL MEDICINE

## 2021-12-01 PROCEDURE — 4010F PR ACE/ARB THEARPY RXD/TAKEN: ICD-10-PCS | Mod: S$GLB,,, | Performed by: INTERNAL MEDICINE

## 2021-12-09 DIAGNOSIS — Z79.4 TYPE 2 DIABETES MELLITUS WITH HYPERGLYCEMIA, WITH LONG-TERM CURRENT USE OF INSULIN: ICD-10-CM

## 2021-12-09 DIAGNOSIS — C50.411 MALIGNANT NEOPLASM OF UPPER-OUTER QUADRANT OF RIGHT BREAST IN FEMALE, ESTROGEN RECEPTOR NEGATIVE: Primary | ICD-10-CM

## 2021-12-09 DIAGNOSIS — E11.65 TYPE 2 DIABETES MELLITUS WITH HYPERGLYCEMIA, WITH LONG-TERM CURRENT USE OF INSULIN: ICD-10-CM

## 2021-12-09 DIAGNOSIS — C79.51 BONE METASTASES: ICD-10-CM

## 2021-12-09 DIAGNOSIS — Z17.1 MALIGNANT NEOPLASM OF UPPER-OUTER QUADRANT OF RIGHT BREAST IN FEMALE, ESTROGEN RECEPTOR NEGATIVE: Primary | ICD-10-CM

## 2021-12-09 RX ORDER — DULAGLUTIDE 3 MG/.5ML
3 INJECTION, SOLUTION SUBCUTANEOUS
Qty: 4 PEN | Refills: 11 | Status: SHIPPED | OUTPATIENT
Start: 2021-12-09 | End: 2021-12-09

## 2021-12-09 RX ORDER — DULAGLUTIDE 3 MG/.5ML
3 INJECTION, SOLUTION SUBCUTANEOUS
Qty: 4 PEN | Refills: 11 | Status: SHIPPED | OUTPATIENT
Start: 2021-12-09 | End: 2022-09-06 | Stop reason: SDUPTHER

## 2021-12-16 ENCOUNTER — TELEPHONE (OUTPATIENT)
Dept: HEMATOLOGY/ONCOLOGY | Facility: CLINIC | Age: 61
End: 2021-12-16
Payer: MEDICARE

## 2021-12-17 ENCOUNTER — ANCILLARY ORDERS (OUTPATIENT)
Dept: HEMATOLOGY/ONCOLOGY | Facility: CLINIC | Age: 61
End: 2021-12-17
Payer: MEDICARE

## 2021-12-17 DIAGNOSIS — Z17.1 MALIGNANT NEOPLASM OF UPPER-OUTER QUADRANT OF RIGHT BREAST IN FEMALE, ESTROGEN RECEPTOR NEGATIVE: ICD-10-CM

## 2021-12-17 DIAGNOSIS — C50.411 MALIGNANT NEOPLASM OF UPPER-OUTER QUADRANT OF RIGHT BREAST IN FEMALE, ESTROGEN RECEPTOR NEGATIVE: ICD-10-CM

## 2021-12-19 RX ORDER — LAMOTRIGINE 25 MG/1
500 TABLET ORAL
Status: CANCELLED | OUTPATIENT
Start: 2021-12-21

## 2021-12-21 ENCOUNTER — INFUSION (OUTPATIENT)
Dept: INFUSION THERAPY | Facility: HOSPITAL | Age: 61
End: 2021-12-21
Attending: INTERNAL MEDICINE
Payer: MEDICARE

## 2021-12-21 VITALS
SYSTOLIC BLOOD PRESSURE: 141 MMHG | WEIGHT: 171.69 LBS | TEMPERATURE: 98 F | DIASTOLIC BLOOD PRESSURE: 90 MMHG | HEIGHT: 71 IN | HEART RATE: 97 BPM | BODY MASS INDEX: 24.04 KG/M2 | RESPIRATION RATE: 18 BRPM | OXYGEN SATURATION: 97 %

## 2021-12-21 DIAGNOSIS — Z17.0 MALIGNANT NEOPLASM OF LOWER-OUTER QUADRANT OF LEFT BREAST OF FEMALE, ESTROGEN RECEPTOR POSITIVE: ICD-10-CM

## 2021-12-21 DIAGNOSIS — D70.1 CHEMOTHERAPY-INDUCED NEUTROPENIA: ICD-10-CM

## 2021-12-21 DIAGNOSIS — Z17.1 MALIGNANT NEOPLASM OF UPPER-OUTER QUADRANT OF RIGHT BREAST IN FEMALE, ESTROGEN RECEPTOR NEGATIVE: ICD-10-CM

## 2021-12-21 DIAGNOSIS — C79.51 BONE METASTASES: Primary | ICD-10-CM

## 2021-12-21 DIAGNOSIS — C50.512 MALIGNANT NEOPLASM OF LOWER-OUTER QUADRANT OF LEFT BREAST OF FEMALE, ESTROGEN RECEPTOR POSITIVE: ICD-10-CM

## 2021-12-21 DIAGNOSIS — C50.411 MALIGNANT NEOPLASM OF UPPER-OUTER QUADRANT OF RIGHT BREAST IN FEMALE, ESTROGEN RECEPTOR NEGATIVE: ICD-10-CM

## 2021-12-21 DIAGNOSIS — T45.1X5A CHEMOTHERAPY-INDUCED NEUTROPENIA: ICD-10-CM

## 2021-12-21 PROCEDURE — 96402 CHEMO HORMON ANTINEOPL SQ/IM: CPT

## 2021-12-21 PROCEDURE — 63600175 PHARM REV CODE 636 W HCPCS: Mod: JG | Performed by: INTERNAL MEDICINE

## 2021-12-21 RX ORDER — LAMOTRIGINE 25 MG/1
500 TABLET ORAL
Status: COMPLETED | OUTPATIENT
Start: 2021-12-21 | End: 2021-12-21

## 2021-12-21 RX ADMIN — FULVESTRANT 500 MG: 50 INJECTION, SOLUTION INTRAMUSCULAR at 03:12

## 2021-12-22 ENCOUNTER — OFFICE VISIT (OUTPATIENT)
Dept: HEMATOLOGY/ONCOLOGY | Facility: CLINIC | Age: 61
End: 2021-12-22
Payer: MEDICARE

## 2021-12-22 VITALS
SYSTOLIC BLOOD PRESSURE: 144 MMHG | RESPIRATION RATE: 20 BRPM | HEIGHT: 71 IN | WEIGHT: 171 LBS | BODY MASS INDEX: 23.94 KG/M2 | HEART RATE: 99 BPM | DIASTOLIC BLOOD PRESSURE: 101 MMHG

## 2021-12-22 DIAGNOSIS — M54.6 ACUTE BILATERAL THORACIC BACK PAIN: ICD-10-CM

## 2021-12-22 DIAGNOSIS — Z17.1 MALIGNANT NEOPLASM OF UPPER-OUTER QUADRANT OF RIGHT BREAST IN FEMALE, ESTROGEN RECEPTOR NEGATIVE: Primary | ICD-10-CM

## 2021-12-22 DIAGNOSIS — C50.411 MALIGNANT NEOPLASM OF UPPER-OUTER QUADRANT OF RIGHT BREAST IN FEMALE, ESTROGEN RECEPTOR NEGATIVE: Primary | ICD-10-CM

## 2021-12-22 DIAGNOSIS — C79.51 BONE METASTASES: ICD-10-CM

## 2021-12-22 PROCEDURE — 3077F PR MOST RECENT SYSTOLIC BLOOD PRESSURE >= 140 MM HG: ICD-10-PCS | Mod: S$GLB,,, | Performed by: INTERNAL MEDICINE

## 2021-12-22 PROCEDURE — 3046F HEMOGLOBIN A1C LEVEL >9.0%: CPT | Mod: S$GLB,,, | Performed by: INTERNAL MEDICINE

## 2021-12-22 PROCEDURE — 3066F NEPHROPATHY DOC TX: CPT | Mod: S$GLB,,, | Performed by: INTERNAL MEDICINE

## 2021-12-22 PROCEDURE — 3008F BODY MASS INDEX DOCD: CPT | Mod: S$GLB,,, | Performed by: INTERNAL MEDICINE

## 2021-12-22 PROCEDURE — 3008F PR BODY MASS INDEX (BMI) DOCUMENTED: ICD-10-PCS | Mod: S$GLB,,, | Performed by: INTERNAL MEDICINE

## 2021-12-22 PROCEDURE — 4010F ACE/ARB THERAPY RXD/TAKEN: CPT | Mod: S$GLB,,, | Performed by: INTERNAL MEDICINE

## 2021-12-22 PROCEDURE — 3080F PR MOST RECENT DIASTOLIC BLOOD PRESSURE >= 90 MM HG: ICD-10-PCS | Mod: S$GLB,,, | Performed by: INTERNAL MEDICINE

## 2021-12-22 PROCEDURE — 99213 PR OFFICE/OUTPT VISIT, EST, LEVL III, 20-29 MIN: ICD-10-PCS | Mod: S$GLB,,, | Performed by: INTERNAL MEDICINE

## 2021-12-22 PROCEDURE — 3046F PR MOST RECENT HEMOGLOBIN A1C LEVEL > 9.0%: ICD-10-PCS | Mod: S$GLB,,, | Performed by: INTERNAL MEDICINE

## 2021-12-22 PROCEDURE — 3066F PR DOCUMENTATION OF TREATMENT FOR NEPHROPATHY: ICD-10-PCS | Mod: S$GLB,,, | Performed by: INTERNAL MEDICINE

## 2021-12-22 PROCEDURE — 3062F POS MACROALBUMINURIA REV: CPT | Mod: S$GLB,,, | Performed by: INTERNAL MEDICINE

## 2021-12-22 PROCEDURE — 3077F SYST BP >= 140 MM HG: CPT | Mod: S$GLB,,, | Performed by: INTERNAL MEDICINE

## 2021-12-22 PROCEDURE — 99213 OFFICE O/P EST LOW 20 MIN: CPT | Mod: S$GLB,,, | Performed by: INTERNAL MEDICINE

## 2021-12-22 PROCEDURE — 4010F PR ACE/ARB THEARPY RXD/TAKEN: ICD-10-PCS | Mod: S$GLB,,, | Performed by: INTERNAL MEDICINE

## 2021-12-22 PROCEDURE — 3080F DIAST BP >= 90 MM HG: CPT | Mod: S$GLB,,, | Performed by: INTERNAL MEDICINE

## 2021-12-22 PROCEDURE — 3062F PR POS MACROALBUMINURIA RESULT DOCUMENTED/REVIEW: ICD-10-PCS | Mod: S$GLB,,, | Performed by: INTERNAL MEDICINE

## 2021-12-27 ENCOUNTER — OFFICE VISIT (OUTPATIENT)
Dept: NEUROSURGERY | Facility: CLINIC | Age: 61
End: 2021-12-27
Payer: MEDICARE

## 2021-12-27 ENCOUNTER — LAB VISIT (OUTPATIENT)
Dept: LAB | Facility: HOSPITAL | Age: 61
End: 2021-12-27
Attending: NEUROLOGICAL SURGERY
Payer: MEDICARE

## 2021-12-27 VITALS
SYSTOLIC BLOOD PRESSURE: 156 MMHG | HEART RATE: 83 BPM | WEIGHT: 168.63 LBS | HEIGHT: 71 IN | BODY MASS INDEX: 23.61 KG/M2 | DIASTOLIC BLOOD PRESSURE: 97 MMHG

## 2021-12-27 DIAGNOSIS — E09.69: ICD-10-CM

## 2021-12-27 DIAGNOSIS — Z01.818 PRE-OP EXAM: ICD-10-CM

## 2021-12-27 DIAGNOSIS — S22.000A COMPRESSION FRACTURE OF BODY OF THORACIC VERTEBRA: ICD-10-CM

## 2021-12-27 DIAGNOSIS — C79.51 BONE METASTASES: Primary | ICD-10-CM

## 2021-12-27 DIAGNOSIS — M84.48XG PATHOLOGICAL FRACTURE OF THORACIC VERTEBRA WITH DELAYED HEALING, SUBSEQUENT ENCOUNTER: Primary | ICD-10-CM

## 2021-12-27 PROCEDURE — 3046F HEMOGLOBIN A1C LEVEL >9.0%: CPT | Mod: CPTII,S$GLB,, | Performed by: NEUROLOGICAL SURGERY

## 2021-12-27 PROCEDURE — 3062F PR POS MACROALBUMINURIA RESULT DOCUMENTED/REVIEW: ICD-10-PCS | Mod: CPTII,S$GLB,, | Performed by: NEUROLOGICAL SURGERY

## 2021-12-27 PROCEDURE — 1159F MED LIST DOCD IN RCRD: CPT | Mod: CPTII,S$GLB,, | Performed by: NEUROLOGICAL SURGERY

## 2021-12-27 PROCEDURE — 4010F PR ACE/ARB THEARPY RXD/TAKEN: ICD-10-PCS | Mod: CPTII,S$GLB,, | Performed by: NEUROLOGICAL SURGERY

## 2021-12-27 PROCEDURE — 99214 PR OFFICE/OUTPT VISIT, EST, LEVL IV, 30-39 MIN: ICD-10-PCS | Mod: S$GLB,,, | Performed by: NEUROLOGICAL SURGERY

## 2021-12-27 PROCEDURE — 99214 OFFICE O/P EST MOD 30 MIN: CPT | Mod: S$GLB,,, | Performed by: NEUROLOGICAL SURGERY

## 2021-12-27 PROCEDURE — 36415 COLL VENOUS BLD VENIPUNCTURE: CPT | Performed by: NEUROLOGICAL SURGERY

## 2021-12-27 PROCEDURE — 83036 HEMOGLOBIN GLYCOSYLATED A1C: CPT | Performed by: NEUROLOGICAL SURGERY

## 2021-12-27 PROCEDURE — 1160F RVW MEDS BY RX/DR IN RCRD: CPT | Mod: CPTII,S$GLB,, | Performed by: NEUROLOGICAL SURGERY

## 2021-12-27 PROCEDURE — 3008F BODY MASS INDEX DOCD: CPT | Mod: CPTII,S$GLB,, | Performed by: NEUROLOGICAL SURGERY

## 2021-12-27 PROCEDURE — 4010F ACE/ARB THERAPY RXD/TAKEN: CPT | Mod: CPTII,S$GLB,, | Performed by: NEUROLOGICAL SURGERY

## 2021-12-27 PROCEDURE — 3066F PR DOCUMENTATION OF TREATMENT FOR NEPHROPATHY: ICD-10-PCS | Mod: CPTII,S$GLB,, | Performed by: NEUROLOGICAL SURGERY

## 2021-12-27 PROCEDURE — 3008F PR BODY MASS INDEX (BMI) DOCUMENTED: ICD-10-PCS | Mod: CPTII,S$GLB,, | Performed by: NEUROLOGICAL SURGERY

## 2021-12-27 PROCEDURE — 3077F SYST BP >= 140 MM HG: CPT | Mod: CPTII,S$GLB,, | Performed by: NEUROLOGICAL SURGERY

## 2021-12-27 PROCEDURE — 3080F PR MOST RECENT DIASTOLIC BLOOD PRESSURE >= 90 MM HG: ICD-10-PCS | Mod: CPTII,S$GLB,, | Performed by: NEUROLOGICAL SURGERY

## 2021-12-27 PROCEDURE — 1159F PR MEDICATION LIST DOCUMENTED IN MEDICAL RECORD: ICD-10-PCS | Mod: CPTII,S$GLB,, | Performed by: NEUROLOGICAL SURGERY

## 2021-12-27 PROCEDURE — 3080F DIAST BP >= 90 MM HG: CPT | Mod: CPTII,S$GLB,, | Performed by: NEUROLOGICAL SURGERY

## 2021-12-27 PROCEDURE — 3077F PR MOST RECENT SYSTOLIC BLOOD PRESSURE >= 140 MM HG: ICD-10-PCS | Mod: CPTII,S$GLB,, | Performed by: NEUROLOGICAL SURGERY

## 2021-12-27 PROCEDURE — 3066F NEPHROPATHY DOC TX: CPT | Mod: CPTII,S$GLB,, | Performed by: NEUROLOGICAL SURGERY

## 2021-12-27 PROCEDURE — 3046F PR MOST RECENT HEMOGLOBIN A1C LEVEL > 9.0%: ICD-10-PCS | Mod: CPTII,S$GLB,, | Performed by: NEUROLOGICAL SURGERY

## 2021-12-27 PROCEDURE — 1160F PR REVIEW ALL MEDS BY PRESCRIBER/CLIN PHARMACIST DOCUMENTED: ICD-10-PCS | Mod: CPTII,S$GLB,, | Performed by: NEUROLOGICAL SURGERY

## 2021-12-27 PROCEDURE — 3062F POS MACROALBUMINURIA REV: CPT | Mod: CPTII,S$GLB,, | Performed by: NEUROLOGICAL SURGERY

## 2021-12-27 RX ORDER — MUPIROCIN 20 MG/G
OINTMENT TOPICAL
Status: CANCELLED | OUTPATIENT
Start: 2021-12-27

## 2021-12-27 RX ORDER — MUPIROCIN 20 MG/G
1 OINTMENT TOPICAL 2 TIMES DAILY
Status: CANCELLED | OUTPATIENT
Start: 2021-12-27 | End: 2021-12-28

## 2021-12-28 ENCOUNTER — TELEPHONE (OUTPATIENT)
Dept: HEMATOLOGY/ONCOLOGY | Facility: CLINIC | Age: 61
End: 2021-12-28
Payer: MEDICARE

## 2021-12-28 ENCOUNTER — TELEPHONE (OUTPATIENT)
Dept: FAMILY MEDICINE | Facility: CLINIC | Age: 61
End: 2021-12-28
Payer: MEDICARE

## 2021-12-28 LAB
ESTIMATED AVG GLUCOSE: 272 MG/DL (ref 68–131)
HBA1C MFR BLD: 11.1 % (ref 4–5.6)

## 2022-01-01 ENCOUNTER — HOSPITAL ENCOUNTER (OUTPATIENT)
Facility: HOSPITAL | Age: 62
Discharge: HOME | End: 2022-11-17
Attending: SURGERY | Admitting: SURGERY
Payer: MEDICARE

## 2022-01-01 ENCOUNTER — LAB VISIT (OUTPATIENT)
Dept: LAB | Facility: HOSPITAL | Age: 62
End: 2022-01-01
Attending: INTERNAL MEDICINE
Payer: MEDICARE

## 2022-01-01 ENCOUNTER — INFUSION (OUTPATIENT)
Dept: INFUSION THERAPY | Facility: HOSPITAL | Age: 62
End: 2022-01-01
Attending: INTERNAL MEDICINE
Payer: MEDICARE

## 2022-01-01 ENCOUNTER — TELEPHONE (OUTPATIENT)
Dept: HEMATOLOGY/ONCOLOGY | Facility: CLINIC | Age: 62
End: 2022-01-01

## 2022-01-01 ENCOUNTER — OFFICE VISIT (OUTPATIENT)
Dept: HEMATOLOGY/ONCOLOGY | Facility: CLINIC | Age: 62
End: 2022-01-01
Payer: MEDICARE

## 2022-01-01 ENCOUNTER — OFFICE VISIT (OUTPATIENT)
Dept: FAMILY MEDICINE | Facility: CLINIC | Age: 62
End: 2022-01-01
Payer: MEDICARE

## 2022-01-01 ENCOUNTER — TELEPHONE (OUTPATIENT)
Dept: SURGERY | Facility: CLINIC | Age: 62
End: 2022-01-01
Payer: MEDICARE

## 2022-01-01 ENCOUNTER — LAB VISIT (OUTPATIENT)
Dept: LAB | Facility: HOSPITAL | Age: 62
End: 2022-01-01
Attending: FAMILY MEDICINE
Payer: MEDICARE

## 2022-01-01 ENCOUNTER — OFFICE VISIT (OUTPATIENT)
Dept: SURGERY | Facility: CLINIC | Age: 62
End: 2022-01-01
Payer: MEDICARE

## 2022-01-01 ENCOUNTER — HOSPITAL ENCOUNTER (OUTPATIENT)
Dept: PREADMISSION TESTING | Facility: HOSPITAL | Age: 62
Discharge: HOME | End: 2022-11-16
Attending: SURGERY
Payer: MEDICARE

## 2022-01-01 ENCOUNTER — ANESTHESIA EVENT (OUTPATIENT)
Dept: SURGERY | Facility: HOSPITAL | Age: 62
End: 2022-01-01
Payer: MEDICARE

## 2022-01-01 ENCOUNTER — ANESTHESIA (OUTPATIENT)
Dept: SURGERY | Facility: HOSPITAL | Age: 62
End: 2022-01-01
Payer: MEDICARE

## 2022-01-01 ENCOUNTER — TELEPHONE (OUTPATIENT)
Dept: FAMILY MEDICINE | Facility: CLINIC | Age: 62
End: 2022-01-01

## 2022-01-01 ENCOUNTER — HOSPITAL ENCOUNTER (OUTPATIENT)
Dept: RADIOLOGY | Facility: HOSPITAL | Age: 62
Discharge: HOME | End: 2022-12-08
Attending: INTERNAL MEDICINE
Payer: MEDICARE

## 2022-01-01 ENCOUNTER — TELEPHONE (OUTPATIENT)
Dept: INFUSION THERAPY | Facility: HOSPITAL | Age: 62
End: 2022-01-01

## 2022-01-01 ENCOUNTER — HOSPITAL ENCOUNTER (OUTPATIENT)
Dept: RADIOLOGY | Facility: HOSPITAL | Age: 62
Discharge: HOME | End: 2022-11-16
Attending: SURGERY
Payer: MEDICARE

## 2022-01-01 VITALS
DIASTOLIC BLOOD PRESSURE: 80 MMHG | HEIGHT: 70 IN | TEMPERATURE: 98 F | HEART RATE: 84 BPM | BODY MASS INDEX: 22.05 KG/M2 | HEART RATE: 83 BPM | RESPIRATION RATE: 18 BRPM | RESPIRATION RATE: 18 BRPM | BODY MASS INDEX: 22.24 KG/M2 | WEIGHT: 155.38 LBS | HEIGHT: 70 IN | WEIGHT: 154 LBS | SYSTOLIC BLOOD PRESSURE: 128 MMHG | TEMPERATURE: 98 F | SYSTOLIC BLOOD PRESSURE: 137 MMHG | DIASTOLIC BLOOD PRESSURE: 82 MMHG

## 2022-01-01 VITALS
SYSTOLIC BLOOD PRESSURE: 120 MMHG | RESPIRATION RATE: 18 BRPM | DIASTOLIC BLOOD PRESSURE: 76 MMHG | BODY MASS INDEX: 22.28 KG/M2 | SYSTOLIC BLOOD PRESSURE: 132 MMHG | TEMPERATURE: 98 F | WEIGHT: 155.63 LBS | RESPIRATION RATE: 16 BRPM | TEMPERATURE: 98 F | OXYGEN SATURATION: 98 % | HEART RATE: 83 BPM | HEART RATE: 93 BPM | BODY MASS INDEX: 21.76 KG/M2 | HEART RATE: 81 BPM | HEIGHT: 70 IN | SYSTOLIC BLOOD PRESSURE: 127 MMHG | BODY MASS INDEX: 22 KG/M2 | DIASTOLIC BLOOD PRESSURE: 80 MMHG | WEIGHT: 152 LBS | DIASTOLIC BLOOD PRESSURE: 83 MMHG | RESPIRATION RATE: 18 BRPM | WEIGHT: 153.69 LBS | HEIGHT: 70 IN | TEMPERATURE: 98 F | HEIGHT: 70 IN

## 2022-01-01 VITALS
BODY MASS INDEX: 21.95 KG/M2 | WEIGHT: 153.31 LBS | SYSTOLIC BLOOD PRESSURE: 122 MMHG | OXYGEN SATURATION: 97 % | RESPIRATION RATE: 18 BRPM | HEIGHT: 70 IN | DIASTOLIC BLOOD PRESSURE: 77 MMHG | TEMPERATURE: 98 F | HEART RATE: 95 BPM

## 2022-01-01 VITALS
WEIGHT: 156.75 LBS | HEIGHT: 70 IN | WEIGHT: 154.19 LBS | TEMPERATURE: 98 F | DIASTOLIC BLOOD PRESSURE: 76 MMHG | OXYGEN SATURATION: 97 % | RESPIRATION RATE: 17 BRPM | HEART RATE: 78 BPM | TEMPERATURE: 98 F | DIASTOLIC BLOOD PRESSURE: 76 MMHG | HEIGHT: 70 IN | OXYGEN SATURATION: 98 % | SYSTOLIC BLOOD PRESSURE: 122 MMHG | SYSTOLIC BLOOD PRESSURE: 115 MMHG | BODY MASS INDEX: 22.44 KG/M2 | BODY MASS INDEX: 22.07 KG/M2 | HEART RATE: 91 BPM | RESPIRATION RATE: 18 BRPM

## 2022-01-01 VITALS
HEIGHT: 70 IN | OXYGEN SATURATION: 99 % | WEIGHT: 152.13 LBS | SYSTOLIC BLOOD PRESSURE: 116 MMHG | RESPIRATION RATE: 16 BRPM | DIASTOLIC BLOOD PRESSURE: 72 MMHG | HEART RATE: 100 BPM | TEMPERATURE: 98 F | BODY MASS INDEX: 21.78 KG/M2

## 2022-01-01 VITALS
WEIGHT: 154.5 LBS | SYSTOLIC BLOOD PRESSURE: 113 MMHG | HEIGHT: 70 IN | TEMPERATURE: 98 F | DIASTOLIC BLOOD PRESSURE: 72 MMHG | BODY MASS INDEX: 22.12 KG/M2 | HEART RATE: 85 BPM | RESPIRATION RATE: 16 BRPM | OXYGEN SATURATION: 96 %

## 2022-01-01 VITALS
HEART RATE: 88 BPM | OXYGEN SATURATION: 99 % | WEIGHT: 155.38 LBS | DIASTOLIC BLOOD PRESSURE: 79 MMHG | HEIGHT: 70 IN | SYSTOLIC BLOOD PRESSURE: 132 MMHG | RESPIRATION RATE: 18 BRPM | TEMPERATURE: 99 F | BODY MASS INDEX: 22.24 KG/M2

## 2022-01-01 VITALS
OXYGEN SATURATION: 97 % | BODY MASS INDEX: 22.09 KG/M2 | RESPIRATION RATE: 18 BRPM | DIASTOLIC BLOOD PRESSURE: 82 MMHG | SYSTOLIC BLOOD PRESSURE: 137 MMHG | WEIGHT: 154.31 LBS | RESPIRATION RATE: 18 BRPM | OXYGEN SATURATION: 100 % | BODY MASS INDEX: 22.28 KG/M2 | TEMPERATURE: 98 F | HEART RATE: 89 BPM | SYSTOLIC BLOOD PRESSURE: 115 MMHG | HEART RATE: 79 BPM | TEMPERATURE: 98 F | DIASTOLIC BLOOD PRESSURE: 77 MMHG | HEIGHT: 70 IN | WEIGHT: 155.63 LBS | HEIGHT: 70 IN

## 2022-01-01 VITALS
HEIGHT: 70 IN | SYSTOLIC BLOOD PRESSURE: 105 MMHG | BODY MASS INDEX: 21.64 KG/M2 | DIASTOLIC BLOOD PRESSURE: 66 MMHG | TEMPERATURE: 98 F | HEART RATE: 105 BPM | RESPIRATION RATE: 15 BRPM | OXYGEN SATURATION: 97 % | WEIGHT: 151.13 LBS

## 2022-01-01 VITALS
OXYGEN SATURATION: 99 % | BODY MASS INDEX: 21.76 KG/M2 | HEART RATE: 85 BPM | WEIGHT: 152 LBS | SYSTOLIC BLOOD PRESSURE: 146 MMHG | HEIGHT: 70 IN | DIASTOLIC BLOOD PRESSURE: 90 MMHG

## 2022-01-01 VITALS
HEART RATE: 87 BPM | DIASTOLIC BLOOD PRESSURE: 68 MMHG | HEIGHT: 70 IN | WEIGHT: 151 LBS | SYSTOLIC BLOOD PRESSURE: 121 MMHG | TEMPERATURE: 98 F | RESPIRATION RATE: 16 BRPM | BODY MASS INDEX: 21.62 KG/M2 | OXYGEN SATURATION: 98 %

## 2022-01-01 VITALS
WEIGHT: 154.31 LBS | SYSTOLIC BLOOD PRESSURE: 122 MMHG | HEIGHT: 70 IN | TEMPERATURE: 98 F | HEART RATE: 96 BPM | RESPIRATION RATE: 18 BRPM | OXYGEN SATURATION: 100 % | DIASTOLIC BLOOD PRESSURE: 78 MMHG | BODY MASS INDEX: 22.09 KG/M2

## 2022-01-01 VITALS
RESPIRATION RATE: 18 BRPM | OXYGEN SATURATION: 100 % | WEIGHT: 150.63 LBS | TEMPERATURE: 98 F | DIASTOLIC BLOOD PRESSURE: 71 MMHG | BODY MASS INDEX: 21.56 KG/M2 | HEART RATE: 89 BPM | HEIGHT: 70 IN | SYSTOLIC BLOOD PRESSURE: 128 MMHG

## 2022-01-01 VITALS
RESPIRATION RATE: 18 BRPM | SYSTOLIC BLOOD PRESSURE: 102 MMHG | DIASTOLIC BLOOD PRESSURE: 69 MMHG | HEART RATE: 97 BPM | OXYGEN SATURATION: 98 % | HEIGHT: 70 IN | BODY MASS INDEX: 21.47 KG/M2 | WEIGHT: 150 LBS | TEMPERATURE: 99 F

## 2022-01-01 VITALS
HEIGHT: 70 IN | WEIGHT: 151.88 LBS | DIASTOLIC BLOOD PRESSURE: 55 MMHG | BODY MASS INDEX: 21.74 KG/M2 | TEMPERATURE: 98 F | HEART RATE: 100 BPM | SYSTOLIC BLOOD PRESSURE: 110 MMHG

## 2022-01-01 VITALS
RESPIRATION RATE: 18 BRPM | TEMPERATURE: 98 F | BODY MASS INDEX: 22.12 KG/M2 | SYSTOLIC BLOOD PRESSURE: 101 MMHG | DIASTOLIC BLOOD PRESSURE: 67 MMHG | WEIGHT: 154.5 LBS | HEART RATE: 82 BPM | OXYGEN SATURATION: 96 % | HEIGHT: 70 IN

## 2022-01-01 VITALS — WEIGHT: 154 LBS | HEIGHT: 70 IN | BODY MASS INDEX: 22.05 KG/M2

## 2022-01-01 VITALS
WEIGHT: 152.19 LBS | OXYGEN SATURATION: 96 % | TEMPERATURE: 98 F | BODY MASS INDEX: 21.79 KG/M2 | DIASTOLIC BLOOD PRESSURE: 55 MMHG | HEIGHT: 70 IN | HEART RATE: 84 BPM | RESPIRATION RATE: 16 BRPM | SYSTOLIC BLOOD PRESSURE: 108 MMHG

## 2022-01-01 DIAGNOSIS — Z17.1 MALIGNANT NEOPLASM OF UPPER-OUTER QUADRANT OF RIGHT BREAST IN FEMALE, ESTROGEN RECEPTOR NEGATIVE: ICD-10-CM

## 2022-01-01 DIAGNOSIS — C50.411 MALIGNANT NEOPLASM OF UPPER-OUTER QUADRANT OF RIGHT BREAST IN FEMALE, ESTROGEN RECEPTOR NEGATIVE: ICD-10-CM

## 2022-01-01 DIAGNOSIS — T45.1X5A CHEMOTHERAPY-INDUCED NEUTROPENIA: ICD-10-CM

## 2022-01-01 DIAGNOSIS — C79.51 BONE METASTASES: Primary | ICD-10-CM

## 2022-01-01 DIAGNOSIS — R07.81 PLEURITIC CHEST PAIN: ICD-10-CM

## 2022-01-01 DIAGNOSIS — Z01.818 PREOP TESTING: ICD-10-CM

## 2022-01-01 DIAGNOSIS — Z17.1 MALIGNANT NEOPLASM OF UPPER-OUTER QUADRANT OF RIGHT BREAST IN FEMALE, ESTROGEN RECEPTOR NEGATIVE: Primary | ICD-10-CM

## 2022-01-01 DIAGNOSIS — Z17.0 MALIGNANT NEOPLASM OF LOWER-OUTER QUADRANT OF LEFT BREAST OF FEMALE, ESTROGEN RECEPTOR POSITIVE: ICD-10-CM

## 2022-01-01 DIAGNOSIS — C50.411 MALIGNANT NEOPLASM OF UPPER-OUTER QUADRANT OF RIGHT BREAST IN FEMALE, ESTROGEN RECEPTOR NEGATIVE: Primary | ICD-10-CM

## 2022-01-01 DIAGNOSIS — D70.1 CHEMOTHERAPY-INDUCED NEUTROPENIA: ICD-10-CM

## 2022-01-01 DIAGNOSIS — Z79.4 TYPE 2 DIABETES MELLITUS WITH HYPERGLYCEMIA, WITH LONG-TERM CURRENT USE OF INSULIN: ICD-10-CM

## 2022-01-01 DIAGNOSIS — C79.51 BONE METASTASES: ICD-10-CM

## 2022-01-01 DIAGNOSIS — C50.512 MALIGNANT NEOPLASM OF LOWER-OUTER QUADRANT OF LEFT BREAST OF FEMALE, ESTROGEN RECEPTOR POSITIVE: ICD-10-CM

## 2022-01-01 DIAGNOSIS — R11.0 CHEMOTHERAPY-INDUCED NAUSEA: ICD-10-CM

## 2022-01-01 DIAGNOSIS — Z79.4 TYPE 2 DIABETES MELLITUS WITH HYPERGLYCEMIA, WITH LONG-TERM CURRENT USE OF INSULIN: Primary | ICD-10-CM

## 2022-01-01 DIAGNOSIS — C50.311 MALIGNANT NEOPLASM OF LOWER-INNER QUADRANT OF RIGHT BREAST OF FEMALE, ESTROGEN RECEPTOR POSITIVE: ICD-10-CM

## 2022-01-01 DIAGNOSIS — J44.9 CHRONIC OBSTRUCTIVE PULMONARY DISEASE, UNSPECIFIED COPD TYPE: ICD-10-CM

## 2022-01-01 DIAGNOSIS — F06.4 ANXIETY DISORDER DUE TO KNOWN PHYSIOLOGICAL CONDITION: ICD-10-CM

## 2022-01-01 DIAGNOSIS — K04.7 DENTAL INFECTION: Primary | ICD-10-CM

## 2022-01-01 DIAGNOSIS — E78.2 MIXED HYPERLIPIDEMIA: ICD-10-CM

## 2022-01-01 DIAGNOSIS — Z01.818 PREOP TESTING: Primary | ICD-10-CM

## 2022-01-01 DIAGNOSIS — C50.911 MALIGNANT NEOPLASM OF RIGHT FEMALE BREAST, UNSPECIFIED ESTROGEN RECEPTOR STATUS, UNSPECIFIED SITE OF BREAST: ICD-10-CM

## 2022-01-01 DIAGNOSIS — E11.65 TYPE 2 DIABETES MELLITUS WITH HYPERGLYCEMIA, WITH LONG-TERM CURRENT USE OF INSULIN: Primary | ICD-10-CM

## 2022-01-01 DIAGNOSIS — I10 ESSENTIAL HYPERTENSION: ICD-10-CM

## 2022-01-01 DIAGNOSIS — R05.9 COUGH: ICD-10-CM

## 2022-01-01 DIAGNOSIS — C50.919 METASTATIC BREAST CANCER: Primary | ICD-10-CM

## 2022-01-01 DIAGNOSIS — Z17.0 MALIGNANT NEOPLASM OF LOWER-INNER QUADRANT OF RIGHT BREAST OF FEMALE, ESTROGEN RECEPTOR POSITIVE: ICD-10-CM

## 2022-01-01 DIAGNOSIS — R00.0 TACHYCARDIA: ICD-10-CM

## 2022-01-01 DIAGNOSIS — T45.1X5A CHEMOTHERAPY-INDUCED NAUSEA: ICD-10-CM

## 2022-01-01 DIAGNOSIS — C50.919 METASTATIC BREAST CANCER: ICD-10-CM

## 2022-01-01 DIAGNOSIS — E11.65 TYPE 2 DIABETES MELLITUS WITH HYPERGLYCEMIA, WITH LONG-TERM CURRENT USE OF INSULIN: ICD-10-CM

## 2022-01-01 DIAGNOSIS — C78.7 LIVER METASTASES: ICD-10-CM

## 2022-01-01 DIAGNOSIS — K08.89 TOOTH ACHE: Primary | ICD-10-CM

## 2022-01-01 LAB
ALBUMIN SERPL BCP-MCNC: 3.6 G/DL (ref 3.5–5.2)
ALBUMIN SERPL BCP-MCNC: 3.8 G/DL (ref 3.5–5.2)
ALBUMIN SERPL BCP-MCNC: 3.9 G/DL (ref 3.5–5.2)
ALBUMIN SERPL BCP-MCNC: 4 G/DL (ref 3.5–5.2)
ALBUMIN SERPL BCP-MCNC: 4.1 G/DL (ref 3.5–5.2)
ALP SERPL-CCNC: 230 U/L (ref 55–135)
ALP SERPL-CCNC: 260 U/L (ref 55–135)
ALP SERPL-CCNC: 277 U/L (ref 55–135)
ALP SERPL-CCNC: 292 U/L (ref 55–135)
ALP SERPL-CCNC: 295 U/L (ref 55–135)
ALP SERPL-CCNC: 311 U/L (ref 55–135)
ALP SERPL-CCNC: 356 U/L (ref 55–135)
ALP SERPL-CCNC: 360 U/L (ref 55–135)
ALP SERPL-CCNC: 360 U/L (ref 55–135)
ALP SERPL-CCNC: 384 U/L (ref 55–135)
ALP SERPL-CCNC: 393 U/L (ref 55–135)
ALP SERPL-CCNC: 407 U/L (ref 55–135)
ALP SERPL-CCNC: 408 U/L (ref 55–135)
ALP SERPL-CCNC: 441 U/L (ref 55–135)
ALP SERPL-CCNC: 566 U/L (ref 55–135)
ALT SERPL W/O P-5'-P-CCNC: 161 U/L (ref 10–44)
ALT SERPL W/O P-5'-P-CCNC: 17 U/L (ref 10–44)
ALT SERPL W/O P-5'-P-CCNC: 17 U/L (ref 10–44)
ALT SERPL W/O P-5'-P-CCNC: 19 U/L (ref 10–44)
ALT SERPL W/O P-5'-P-CCNC: 20 U/L (ref 10–44)
ALT SERPL W/O P-5'-P-CCNC: 21 U/L (ref 10–44)
ALT SERPL W/O P-5'-P-CCNC: 22 U/L (ref 10–44)
ALT SERPL W/O P-5'-P-CCNC: 31 U/L (ref 10–44)
ALT SERPL W/O P-5'-P-CCNC: 35 U/L (ref 10–44)
ALT SERPL W/O P-5'-P-CCNC: 37 U/L (ref 10–44)
ALT SERPL W/O P-5'-P-CCNC: 42 U/L (ref 10–44)
ALT SERPL W/O P-5'-P-CCNC: 53 U/L (ref 10–44)
ALT SERPL W/O P-5'-P-CCNC: 61 U/L (ref 10–44)
ALT SERPL W/O P-5'-P-CCNC: 85 U/L (ref 10–44)
ALT SERPL W/O P-5'-P-CCNC: 95 U/L (ref 10–44)
ANION GAP SERPL CALC-SCNC: 10 MMOL/L (ref 8–16)
ANION GAP SERPL CALC-SCNC: 11 MMOL/L (ref 8–16)
ANION GAP SERPL CALC-SCNC: 12 MMOL/L (ref 8–16)
ANION GAP SERPL CALC-SCNC: 13 MMOL/L (ref 8–16)
ANION GAP SERPL CALC-SCNC: 6 MMOL/L (ref 8–16)
ANION GAP SERPL CALC-SCNC: 8 MMOL/L (ref 8–16)
ANION GAP SERPL CALC-SCNC: 9 MMOL/L (ref 8–16)
ANISOCYTOSIS BLD QL SMEAR: SLIGHT
ANISOCYTOSIS BLD QL SMEAR: SLIGHT
AST SERPL-CCNC: 170 U/L (ref 10–40)
AST SERPL-CCNC: 18 U/L (ref 10–40)
AST SERPL-CCNC: 22 U/L (ref 10–40)
AST SERPL-CCNC: 23 U/L (ref 10–40)
AST SERPL-CCNC: 25 U/L (ref 10–40)
AST SERPL-CCNC: 26 U/L (ref 10–40)
AST SERPL-CCNC: 26 U/L (ref 10–40)
AST SERPL-CCNC: 27 U/L (ref 10–40)
AST SERPL-CCNC: 28 U/L (ref 10–40)
AST SERPL-CCNC: 30 U/L (ref 10–40)
AST SERPL-CCNC: 31 U/L (ref 10–40)
AST SERPL-CCNC: 34 U/L (ref 10–40)
AST SERPL-CCNC: 65 U/L (ref 10–40)
AST SERPL-CCNC: 73 U/L (ref 10–40)
AST SERPL-CCNC: 88 U/L (ref 10–40)
BASOPHILS # BLD AUTO: 0.01 K/UL (ref 0–0.2)
BASOPHILS # BLD AUTO: 0.02 K/UL (ref 0–0.2)
BASOPHILS # BLD AUTO: 0.03 K/UL (ref 0–0.2)
BASOPHILS # BLD AUTO: 0.03 K/UL (ref 0–0.2)
BASOPHILS # BLD AUTO: 0.05 K/UL (ref 0–0.2)
BASOPHILS # BLD AUTO: 0.15 K/UL (ref 0–0.2)
BASOPHILS NFR BLD: 0 % (ref 0–1.9)
BASOPHILS NFR BLD: 0 % (ref 0–1.9)
BASOPHILS NFR BLD: 0.1 % (ref 0–1.9)
BASOPHILS NFR BLD: 0.2 % (ref 0–1.9)
BASOPHILS NFR BLD: 0.2 % (ref 0–1.9)
BASOPHILS NFR BLD: 0.3 % (ref 0–1.9)
BASOPHILS NFR BLD: 0.4 % (ref 0–1.9)
BASOPHILS NFR BLD: 0.5 % (ref 0–1.9)
BASOPHILS NFR BLD: 0.6 % (ref 0–1.9)
BASOPHILS NFR BLD: 0.8 % (ref 0–1.9)
BASOPHILS NFR BLD: 0.9 % (ref 0–1.9)
BILIRUB SERPL-MCNC: 0.3 MG/DL (ref 0.1–1)
BILIRUB SERPL-MCNC: 0.3 MG/DL (ref 0.1–1)
BILIRUB SERPL-MCNC: 0.4 MG/DL (ref 0.1–1)
BILIRUB SERPL-MCNC: 0.5 MG/DL (ref 0.1–1)
BILIRUB SERPL-MCNC: 0.6 MG/DL (ref 0.1–1)
BILIRUB SERPL-MCNC: 0.6 MG/DL (ref 0.1–1)
BUN SERPL-MCNC: 10 MG/DL (ref 6–20)
BUN SERPL-MCNC: 13 MG/DL (ref 6–20)
BUN SERPL-MCNC: 14 MG/DL (ref 6–20)
BUN SERPL-MCNC: 16 MG/DL (ref 6–20)
BUN SERPL-MCNC: 4 MG/DL (ref 6–20)
BUN SERPL-MCNC: 5 MG/DL (ref 6–20)
BUN SERPL-MCNC: 6 MG/DL (ref 6–20)
BUN SERPL-MCNC: 7 MG/DL (ref 6–20)
BUN SERPL-MCNC: 9 MG/DL (ref 6–20)
BUN SERPL-MCNC: 9 MG/DL (ref 6–20)
CALCIUM SERPL-MCNC: 8.4 MG/DL (ref 8.7–10.5)
CALCIUM SERPL-MCNC: 8.5 MG/DL (ref 8.7–10.5)
CALCIUM SERPL-MCNC: 8.7 MG/DL (ref 8.7–10.5)
CALCIUM SERPL-MCNC: 8.8 MG/DL (ref 8.7–10.5)
CALCIUM SERPL-MCNC: 9 MG/DL (ref 8.7–10.5)
CALCIUM SERPL-MCNC: 9.1 MG/DL (ref 8.7–10.5)
CALCIUM SERPL-MCNC: 9.2 MG/DL (ref 8.7–10.5)
CANCER AG15-3 SERPL IA-ACNC: 37 U/ML
CANCER AG15-3 SERPL IA-ACNC: 37 U/ML
CANCER AG15-3 SERPL IA-ACNC: 38 U/ML
CANCER AG15-3 SERPL IA-ACNC: 41 U/ML
CANCER AG15-3 SERPL IA-ACNC: 43 U/ML
CANCER AG15-3 SERPL IA-ACNC: 44 U/ML
CANCER AG15-3 SERPL IA-ACNC: 44 U/ML
CANCER AG15-3 SERPL IA-ACNC: 46 U/ML
CANCER AG15-3 SERPL IA-ACNC: 47 U/ML
CANCER AG15-3 SERPL IA-ACNC: 47 U/ML
CANCER AG15-3 SERPL IA-ACNC: 48 U/ML
CANCER AG15-3 SERPL IA-ACNC: 51 U/ML
CANCER AG15-3 SERPL IA-ACNC: 55 U/ML
CANCER AG27-29 SERPL-ACNC: 44.9 U/ML
CANCER AG27-29 SERPL-ACNC: 45 U/ML
CANCER AG27-29 SERPL-ACNC: 45.2 U/ML
CANCER AG27-29 SERPL-ACNC: 45.5 U/ML
CANCER AG27-29 SERPL-ACNC: 45.8 U/ML
CANCER AG27-29 SERPL-ACNC: 46.4 U/ML
CANCER AG27-29 SERPL-ACNC: 47.3 U/ML
CANCER AG27-29 SERPL-ACNC: 49.2 U/ML
CANCER AG27-29 SERPL-ACNC: 49.7 U/ML
CANCER AG27-29 SERPL-ACNC: 52.3 U/ML
CANCER AG27-29 SERPL-ACNC: 52.4 U/ML
CANCER AG27-29 SERPL-ACNC: 53 U/ML
CANCER AG27-29 SERPL-ACNC: 56.3 U/ML
CANCER AG27-29 SERPL-ACNC: 58.8 U/ML
CANCER AG27-29 SERPL-ACNC: 66 U/ML
CHLORIDE SERPL-SCNC: 106 MMOL/L (ref 95–110)
CHLORIDE SERPL-SCNC: 106 MMOL/L (ref 95–110)
CHLORIDE SERPL-SCNC: 108 MMOL/L (ref 95–110)
CHLORIDE SERPL-SCNC: 109 MMOL/L (ref 95–110)
CHLORIDE SERPL-SCNC: 110 MMOL/L (ref 95–110)
CHLORIDE SERPL-SCNC: 111 MMOL/L (ref 95–110)
CO2 SERPL-SCNC: 19 MMOL/L (ref 23–29)
CO2 SERPL-SCNC: 20 MMOL/L (ref 23–29)
CO2 SERPL-SCNC: 20 MMOL/L (ref 23–29)
CO2 SERPL-SCNC: 21 MMOL/L (ref 23–29)
CO2 SERPL-SCNC: 21 MMOL/L (ref 23–29)
CO2 SERPL-SCNC: 22 MMOL/L (ref 23–29)
CO2 SERPL-SCNC: 22 MMOL/L (ref 23–29)
CO2 SERPL-SCNC: 23 MMOL/L (ref 23–29)
CO2 SERPL-SCNC: 23 MMOL/L (ref 23–29)
CO2 SERPL-SCNC: 24 MMOL/L (ref 23–29)
CO2 SERPL-SCNC: 24 MMOL/L (ref 23–29)
CO2 SERPL-SCNC: 25 MMOL/L (ref 23–29)
CREAT SERPL-MCNC: 0.7 MG/DL (ref 0.5–1.4)
CREAT SERPL-MCNC: 0.7 MG/DL (ref 0.5–1.4)
CREAT SERPL-MCNC: 0.8 MG/DL (ref 0.5–1.4)
CREAT SERPL-MCNC: 0.9 MG/DL (ref 0.5–1.4)
CREAT SERPL-MCNC: 1 MG/DL (ref 0.5–1.4)
CREAT SERPL-MCNC: 1 MG/DL (ref 0.5–1.4)
DACRYOCYTES BLD QL SMEAR: ABNORMAL
DACRYOCYTES BLD QL SMEAR: ABNORMAL
DIFFERENTIAL METHOD: ABNORMAL
EOSINOPHIL # BLD AUTO: 0 K/UL (ref 0–0.5)
EOSINOPHIL # BLD AUTO: 0.1 K/UL (ref 0–0.5)
EOSINOPHIL NFR BLD: 0 % (ref 0–8)
EOSINOPHIL NFR BLD: 0.1 % (ref 0–8)
EOSINOPHIL NFR BLD: 0.1 % (ref 0–8)
EOSINOPHIL NFR BLD: 0.2 % (ref 0–8)
EOSINOPHIL NFR BLD: 0.3 % (ref 0–8)
EOSINOPHIL NFR BLD: 0.4 % (ref 0–8)
EOSINOPHIL NFR BLD: 0.4 % (ref 0–8)
EOSINOPHIL NFR BLD: 0.6 % (ref 0–8)
EOSINOPHIL NFR BLD: 0.8 % (ref 0–8)
EOSINOPHIL NFR BLD: 0.9 % (ref 0–8)
EOSINOPHIL NFR BLD: 1 % (ref 0–8)
EOSINOPHIL NFR BLD: 1 % (ref 0–8)
EOSINOPHIL NFR BLD: 1.2 % (ref 0–8)
EOSINOPHIL NFR BLD: 1.9 % (ref 0–8)
EOSINOPHIL NFR BLD: 2.1 % (ref 0–8)
ERYTHROCYTE [DISTWIDTH] IN BLOOD BY AUTOMATED COUNT: 13.6 % (ref 11.5–14.5)
ERYTHROCYTE [DISTWIDTH] IN BLOOD BY AUTOMATED COUNT: 14 % (ref 11.5–14.5)
ERYTHROCYTE [DISTWIDTH] IN BLOOD BY AUTOMATED COUNT: 14 % (ref 11.5–14.5)
ERYTHROCYTE [DISTWIDTH] IN BLOOD BY AUTOMATED COUNT: 14.2 % (ref 11.5–14.5)
ERYTHROCYTE [DISTWIDTH] IN BLOOD BY AUTOMATED COUNT: 14.4 % (ref 11.5–14.5)
ERYTHROCYTE [DISTWIDTH] IN BLOOD BY AUTOMATED COUNT: 14.5 % (ref 11.5–14.5)
ERYTHROCYTE [DISTWIDTH] IN BLOOD BY AUTOMATED COUNT: 14.6 % (ref 11.5–14.5)
ERYTHROCYTE [DISTWIDTH] IN BLOOD BY AUTOMATED COUNT: 15 % (ref 11.5–14.5)
ERYTHROCYTE [DISTWIDTH] IN BLOOD BY AUTOMATED COUNT: 15.3 % (ref 11.5–14.5)
ERYTHROCYTE [DISTWIDTH] IN BLOOD BY AUTOMATED COUNT: 15.4 % (ref 11.5–14.5)
ERYTHROCYTE [DISTWIDTH] IN BLOOD BY AUTOMATED COUNT: 15.6 % (ref 11.5–14.5)
ERYTHROCYTE [DISTWIDTH] IN BLOOD BY AUTOMATED COUNT: 15.9 % (ref 11.5–14.5)
ERYTHROCYTE [DISTWIDTH] IN BLOOD BY AUTOMATED COUNT: 15.9 % (ref 11.5–14.5)
EST. GFR  (NO RACE VARIABLE): >60 ML/MIN/1.73 M^2
ESTIMATED AVG GLUCOSE: 148 MG/DL (ref 68–131)
GLUCOSE SERPL-MCNC: 117 MG/DL (ref 70–110)
GLUCOSE SERPL-MCNC: 123 MG/DL (ref 70–110)
GLUCOSE SERPL-MCNC: 128 MG/DL (ref 70–110)
GLUCOSE SERPL-MCNC: 146 MG/DL (ref 70–110)
GLUCOSE SERPL-MCNC: 149 MG/DL (ref 70–110)
GLUCOSE SERPL-MCNC: 150 MG/DL (ref 70–110)
GLUCOSE SERPL-MCNC: 151 MG/DL (ref 70–110)
GLUCOSE SERPL-MCNC: 156 MG/DL (ref 70–110)
GLUCOSE SERPL-MCNC: 160 MG/DL (ref 70–110)
GLUCOSE SERPL-MCNC: 162 MG/DL (ref 70–110)
GLUCOSE SERPL-MCNC: 168 MG/DL (ref 70–110)
GLUCOSE SERPL-MCNC: 192 MG/DL (ref 70–110)
GLUCOSE SERPL-MCNC: 194 MG/DL (ref 70–110)
GLUCOSE SERPL-MCNC: 201 MG/DL (ref 70–110)
GLUCOSE SERPL-MCNC: 226 MG/DL (ref 70–110)
GLUCOSE SERPL-MCNC: 255 MG/DL (ref 70–110)
GLUCOSE SERPL-MCNC: 290 MG/DL (ref 70–110)
HBA1C MFR BLD: 6.8 % (ref 4–5.6)
HCT VFR BLD AUTO: 29.3 % (ref 37–48.5)
HCT VFR BLD AUTO: 30.7 % (ref 37–48.5)
HCT VFR BLD AUTO: 30.8 % (ref 37–48.5)
HCT VFR BLD AUTO: 31.1 % (ref 37–48.5)
HCT VFR BLD AUTO: 31.9 % (ref 37–48.5)
HCT VFR BLD AUTO: 32.1 % (ref 37–48.5)
HCT VFR BLD AUTO: 33 % (ref 37–48.5)
HCT VFR BLD AUTO: 33.4 % (ref 37–48.5)
HCT VFR BLD AUTO: 33.4 % (ref 37–48.5)
HCT VFR BLD AUTO: 33.6 % (ref 37–48.5)
HCT VFR BLD AUTO: 33.6 % (ref 37–48.5)
HCT VFR BLD AUTO: 33.8 % (ref 37–48.5)
HCT VFR BLD AUTO: 35.3 % (ref 37–48.5)
HCT VFR BLD AUTO: 35.5 % (ref 37–48.5)
HCT VFR BLD AUTO: 35.5 % (ref 37–48.5)
HGB BLD-MCNC: 10 G/DL (ref 12–16)
HGB BLD-MCNC: 10 G/DL (ref 12–16)
HGB BLD-MCNC: 10.2 G/DL (ref 12–16)
HGB BLD-MCNC: 10.4 G/DL (ref 12–16)
HGB BLD-MCNC: 10.7 G/DL (ref 12–16)
HGB BLD-MCNC: 10.8 G/DL (ref 12–16)
HGB BLD-MCNC: 10.8 G/DL (ref 12–16)
HGB BLD-MCNC: 11.2 G/DL (ref 12–16)
HGB BLD-MCNC: 11.3 G/DL (ref 12–16)
HGB BLD-MCNC: 11.3 G/DL (ref 12–16)
HGB BLD-MCNC: 11.9 G/DL (ref 12–16)
HGB BLD-MCNC: 9.2 G/DL (ref 12–16)
HGB BLD-MCNC: 9.9 G/DL (ref 12–16)
HYPOCHROMIA BLD QL SMEAR: ABNORMAL
HYPOCHROMIA BLD QL SMEAR: ABNORMAL
IMM GRANULOCYTES # BLD AUTO: 0 K/UL (ref 0–0.04)
IMM GRANULOCYTES # BLD AUTO: 0.01 K/UL (ref 0–0.04)
IMM GRANULOCYTES # BLD AUTO: 0.02 K/UL (ref 0–0.04)
IMM GRANULOCYTES # BLD AUTO: 0.02 K/UL (ref 0–0.04)
IMM GRANULOCYTES # BLD AUTO: 0.03 K/UL (ref 0–0.04)
IMM GRANULOCYTES # BLD AUTO: 0.03 K/UL (ref 0–0.04)
IMM GRANULOCYTES # BLD AUTO: 0.04 K/UL (ref 0–0.04)
IMM GRANULOCYTES # BLD AUTO: 0.54 K/UL (ref 0–0.04)
IMM GRANULOCYTES # BLD AUTO: 0.56 K/UL (ref 0–0.04)
IMM GRANULOCYTES # BLD AUTO: 1.05 K/UL (ref 0–0.04)
IMM GRANULOCYTES # BLD AUTO: 1.6 K/UL (ref 0–0.04)
IMM GRANULOCYTES # BLD AUTO: ABNORMAL K/UL (ref 0–0.04)
IMM GRANULOCYTES # BLD AUTO: ABNORMAL K/UL (ref 0–0.04)
IMM GRANULOCYTES NFR BLD AUTO: 0 % (ref 0–0.5)
IMM GRANULOCYTES NFR BLD AUTO: 0.2 % (ref 0–0.5)
IMM GRANULOCYTES NFR BLD AUTO: 0.4 % (ref 0–0.5)
IMM GRANULOCYTES NFR BLD AUTO: 0.5 % (ref 0–0.5)
IMM GRANULOCYTES NFR BLD AUTO: 0.6 % (ref 0–0.5)
IMM GRANULOCYTES NFR BLD AUTO: 5.1 % (ref 0–0.5)
IMM GRANULOCYTES NFR BLD AUTO: 5.3 % (ref 0–0.5)
IMM GRANULOCYTES NFR BLD AUTO: 6.3 % (ref 0–0.5)
IMM GRANULOCYTES NFR BLD AUTO: 9.4 % (ref 0–0.5)
IMM GRANULOCYTES NFR BLD AUTO: ABNORMAL % (ref 0–0.5)
IMM GRANULOCYTES NFR BLD AUTO: ABNORMAL % (ref 0–0.5)
LYMPHOCYTES # BLD AUTO: 0.2 K/UL (ref 1–4.8)
LYMPHOCYTES # BLD AUTO: 0.2 K/UL (ref 1–4.8)
LYMPHOCYTES # BLD AUTO: 0.3 K/UL (ref 1–4.8)
LYMPHOCYTES # BLD AUTO: 0.4 K/UL (ref 1–4.8)
LYMPHOCYTES # BLD AUTO: 0.4 K/UL (ref 1–4.8)
LYMPHOCYTES # BLD AUTO: 0.5 K/UL (ref 1–4.8)
LYMPHOCYTES # BLD AUTO: 0.5 K/UL (ref 1–4.8)
LYMPHOCYTES # BLD AUTO: 0.6 K/UL (ref 1–4.8)
LYMPHOCYTES # BLD AUTO: 0.6 K/UL (ref 1–4.8)
LYMPHOCYTES # BLD AUTO: 0.7 K/UL (ref 1–4.8)
LYMPHOCYTES # BLD AUTO: 0.9 K/UL (ref 1–4.8)
LYMPHOCYTES NFR BLD: 10.2 % (ref 18–48)
LYMPHOCYTES NFR BLD: 16 % (ref 18–48)
LYMPHOCYTES NFR BLD: 19.9 % (ref 18–48)
LYMPHOCYTES NFR BLD: 4.3 % (ref 18–48)
LYMPHOCYTES NFR BLD: 5 % (ref 18–48)
LYMPHOCYTES NFR BLD: 5.3 % (ref 18–48)
LYMPHOCYTES NFR BLD: 6.4 % (ref 18–48)
LYMPHOCYTES NFR BLD: 6.7 % (ref 18–48)
LYMPHOCYTES NFR BLD: 6.8 % (ref 18–48)
LYMPHOCYTES NFR BLD: 7.6 % (ref 18–48)
LYMPHOCYTES NFR BLD: 7.9 % (ref 18–48)
LYMPHOCYTES NFR BLD: 8.1 % (ref 18–48)
LYMPHOCYTES NFR BLD: 8.2 % (ref 18–48)
LYMPHOCYTES NFR BLD: 8.6 % (ref 18–48)
LYMPHOCYTES NFR BLD: 9.5 % (ref 18–48)
MCH RBC QN AUTO: 32.5 PG (ref 27–31)
MCH RBC QN AUTO: 32.8 PG (ref 27–31)
MCH RBC QN AUTO: 33 PG (ref 27–31)
MCH RBC QN AUTO: 33.1 PG (ref 27–31)
MCH RBC QN AUTO: 33.2 PG (ref 27–31)
MCH RBC QN AUTO: 33.3 PG (ref 27–31)
MCH RBC QN AUTO: 33.4 PG (ref 27–31)
MCH RBC QN AUTO: 33.4 PG (ref 27–31)
MCH RBC QN AUTO: 33.6 PG (ref 27–31)
MCH RBC QN AUTO: 33.7 PG (ref 27–31)
MCH RBC QN AUTO: 33.8 PG (ref 27–31)
MCH RBC QN AUTO: 33.9 PG (ref 27–31)
MCH RBC QN AUTO: 34.1 PG (ref 27–31)
MCHC RBC AUTO-ENTMCNC: 31.4 G/DL (ref 32–36)
MCHC RBC AUTO-ENTMCNC: 31.5 G/DL (ref 32–36)
MCHC RBC AUTO-ENTMCNC: 31.7 G/DL (ref 32–36)
MCHC RBC AUTO-ENTMCNC: 31.8 G/DL (ref 32–36)
MCHC RBC AUTO-ENTMCNC: 32 G/DL (ref 32–36)
MCHC RBC AUTO-ENTMCNC: 32 G/DL (ref 32–36)
MCHC RBC AUTO-ENTMCNC: 32.1 G/DL (ref 32–36)
MCHC RBC AUTO-ENTMCNC: 32.2 G/DL (ref 32–36)
MCHC RBC AUTO-ENTMCNC: 32.4 G/DL (ref 32–36)
MCHC RBC AUTO-ENTMCNC: 32.6 G/DL (ref 32–36)
MCHC RBC AUTO-ENTMCNC: 32.7 G/DL (ref 32–36)
MCHC RBC AUTO-ENTMCNC: 33.5 G/DL (ref 32–36)
MCHC RBC AUTO-ENTMCNC: 33.5 G/DL (ref 32–36)
MCHC RBC AUTO-ENTMCNC: 33.6 G/DL (ref 32–36)
MCHC RBC AUTO-ENTMCNC: 33.8 G/DL (ref 32–36)
MCV RBC AUTO: 100 FL (ref 82–98)
MCV RBC AUTO: 100 FL (ref 82–98)
MCV RBC AUTO: 102 FL (ref 82–98)
MCV RBC AUTO: 102 FL (ref 82–98)
MCV RBC AUTO: 103 FL (ref 82–98)
MCV RBC AUTO: 103 FL (ref 82–98)
MCV RBC AUTO: 104 FL (ref 82–98)
MCV RBC AUTO: 104 FL (ref 82–98)
MCV RBC AUTO: 105 FL (ref 82–98)
MCV RBC AUTO: 106 FL (ref 82–98)
MCV RBC AUTO: 107 FL (ref 82–98)
MCV RBC AUTO: 98 FL (ref 82–98)
MCV RBC AUTO: 99 FL (ref 82–98)
METAMYELOCYTES NFR BLD MANUAL: 2 %
MONOCYTES # BLD AUTO: 0 K/UL (ref 0.3–1)
MONOCYTES # BLD AUTO: 0.4 K/UL (ref 0.3–1)
MONOCYTES # BLD AUTO: 0.4 K/UL (ref 0.3–1)
MONOCYTES # BLD AUTO: 0.6 K/UL (ref 0.3–1)
MONOCYTES # BLD AUTO: 0.7 K/UL (ref 0.3–1)
MONOCYTES # BLD AUTO: 0.9 K/UL (ref 0.3–1)
MONOCYTES # BLD AUTO: 1 K/UL (ref 0.3–1)
MONOCYTES # BLD AUTO: 1.3 K/UL (ref 0.3–1)
MONOCYTES # BLD AUTO: 1.4 K/UL (ref 0.3–1)
MONOCYTES NFR BLD: 0.9 % (ref 4–15)
MONOCYTES NFR BLD: 1.2 % (ref 4–15)
MONOCYTES NFR BLD: 1.6 % (ref 4–15)
MONOCYTES NFR BLD: 10.4 % (ref 4–15)
MONOCYTES NFR BLD: 10.7 % (ref 4–15)
MONOCYTES NFR BLD: 11.6 % (ref 4–15)
MONOCYTES NFR BLD: 12.9 % (ref 4–15)
MONOCYTES NFR BLD: 16.4 % (ref 4–15)
MONOCYTES NFR BLD: 5 % (ref 4–15)
MONOCYTES NFR BLD: 6.7 % (ref 4–15)
MONOCYTES NFR BLD: 7 % (ref 4–15)
MONOCYTES NFR BLD: 7.9 % (ref 4–15)
MONOCYTES NFR BLD: 8.4 % (ref 4–15)
MONOCYTES NFR BLD: 9.4 % (ref 4–15)
MONOCYTES NFR BLD: 9.6 % (ref 4–15)
NEUTROPHILS # BLD AUTO: 1.6 K/UL (ref 1.8–7.7)
NEUTROPHILS # BLD AUTO: 12.9 K/UL (ref 1.8–7.7)
NEUTROPHILS # BLD AUTO: 13.6 K/UL (ref 1.8–7.7)
NEUTROPHILS # BLD AUTO: 2.1 K/UL (ref 1.8–7.7)
NEUTROPHILS # BLD AUTO: 2.3 K/UL (ref 1.8–7.7)
NEUTROPHILS # BLD AUTO: 3 K/UL (ref 1.8–7.7)
NEUTROPHILS # BLD AUTO: 3.2 K/UL (ref 1.8–7.7)
NEUTROPHILS # BLD AUTO: 3.7 K/UL (ref 1.8–7.7)
NEUTROPHILS # BLD AUTO: 5.6 K/UL (ref 1.8–7.7)
NEUTROPHILS # BLD AUTO: 5.7 K/UL (ref 1.8–7.7)
NEUTROPHILS # BLD AUTO: 6.2 K/UL (ref 1.8–7.7)
NEUTROPHILS # BLD AUTO: 8 K/UL (ref 1.8–7.7)
NEUTROPHILS # BLD AUTO: 9 K/UL (ref 1.8–7.7)
NEUTROPHILS NFR BLD: 61.7 % (ref 38–73)
NEUTROPHILS NFR BLD: 68 % (ref 38–73)
NEUTROPHILS NFR BLD: 75 % (ref 38–73)
NEUTROPHILS NFR BLD: 75.9 % (ref 38–73)
NEUTROPHILS NFR BLD: 77.6 % (ref 38–73)
NEUTROPHILS NFR BLD: 77.7 % (ref 38–73)
NEUTROPHILS NFR BLD: 77.7 % (ref 38–73)
NEUTROPHILS NFR BLD: 79.6 % (ref 38–73)
NEUTROPHILS NFR BLD: 80.9 % (ref 38–73)
NEUTROPHILS NFR BLD: 81.3 % (ref 38–73)
NEUTROPHILS NFR BLD: 81.3 % (ref 38–73)
NEUTROPHILS NFR BLD: 81.6 % (ref 38–73)
NEUTROPHILS NFR BLD: 86.4 % (ref 38–73)
NEUTROPHILS NFR BLD: 87.9 % (ref 38–73)
NEUTROPHILS NFR BLD: 88.4 % (ref 38–73)
NEUTS BAND NFR BLD MANUAL: 18 %
NEUTS BAND NFR BLD MANUAL: 3 %
NRBC BLD-RTO: 0 /100 WBC
OVALOCYTES BLD QL SMEAR: ABNORMAL
PLATELET # BLD AUTO: 191 K/UL (ref 150–450)
PLATELET # BLD AUTO: 204 K/UL (ref 150–450)
PLATELET # BLD AUTO: 206 K/UL (ref 150–450)
PLATELET # BLD AUTO: 214 K/UL (ref 150–450)
PLATELET # BLD AUTO: 215 K/UL (ref 150–450)
PLATELET # BLD AUTO: 216 K/UL (ref 150–450)
PLATELET # BLD AUTO: 229 K/UL (ref 150–450)
PLATELET # BLD AUTO: 269 K/UL (ref 150–450)
PLATELET # BLD AUTO: 276 K/UL (ref 150–450)
PLATELET # BLD AUTO: 311 K/UL (ref 150–450)
PLATELET # BLD AUTO: 53 K/UL (ref 150–450)
PLATELET # BLD AUTO: 53 K/UL (ref 150–450)
PLATELET # BLD AUTO: 57 K/UL (ref 150–450)
PLATELET # BLD AUTO: 73 K/UL (ref 150–450)
PLATELET # BLD AUTO: 80 K/UL (ref 150–450)
PLATELET BLD QL SMEAR: ABNORMAL
PMV BLD AUTO: 10.6 FL (ref 9.2–12.9)
PMV BLD AUTO: 10.7 FL (ref 9.2–12.9)
PMV BLD AUTO: 10.9 FL (ref 9.2–12.9)
PMV BLD AUTO: 11.1 FL (ref 9.2–12.9)
PMV BLD AUTO: 11.2 FL (ref 9.2–12.9)
PMV BLD AUTO: 8.8 FL (ref 9.2–12.9)
PMV BLD AUTO: 8.9 FL (ref 9.2–12.9)
PMV BLD AUTO: 8.9 FL (ref 9.2–12.9)
PMV BLD AUTO: 9 FL (ref 9.2–12.9)
PMV BLD AUTO: 9.2 FL (ref 9.2–12.9)
PMV BLD AUTO: 9.3 FL (ref 9.2–12.9)
POIKILOCYTOSIS BLD QL SMEAR: SLIGHT
POLYCHROMASIA BLD QL SMEAR: ABNORMAL
POTASSIUM SERPL-SCNC: 3.1 MMOL/L (ref 3.5–5.1)
POTASSIUM SERPL-SCNC: 3.2 MMOL/L (ref 3.5–5.1)
POTASSIUM SERPL-SCNC: 3.4 MMOL/L (ref 3.5–5.1)
POTASSIUM SERPL-SCNC: 3.4 MMOL/L (ref 3.5–5.1)
POTASSIUM SERPL-SCNC: 3.5 MMOL/L (ref 3.5–5.1)
POTASSIUM SERPL-SCNC: 3.5 MMOL/L (ref 3.5–5.1)
POTASSIUM SERPL-SCNC: 3.8 MMOL/L (ref 3.5–5.1)
POTASSIUM SERPL-SCNC: 3.8 MMOL/L (ref 3.5–5.1)
POTASSIUM SERPL-SCNC: 3.9 MMOL/L (ref 3.5–5.1)
POTASSIUM SERPL-SCNC: 3.9 MMOL/L (ref 3.5–5.1)
POTASSIUM SERPL-SCNC: 4 MMOL/L (ref 3.5–5.1)
POTASSIUM SERPL-SCNC: 4.1 MMOL/L (ref 3.5–5.1)
POTASSIUM SERPL-SCNC: 4.2 MMOL/L (ref 3.5–5.1)
PROT SERPL-MCNC: 6.8 G/DL (ref 6–8.4)
PROT SERPL-MCNC: 7 G/DL (ref 6–8.4)
PROT SERPL-MCNC: 7.1 G/DL (ref 6–8.4)
PROT SERPL-MCNC: 7.1 G/DL (ref 6–8.4)
PROT SERPL-MCNC: 7.2 G/DL (ref 6–8.4)
PROT SERPL-MCNC: 7.4 G/DL (ref 6–8.4)
PROT SERPL-MCNC: 7.4 G/DL (ref 6–8.4)
PROT SERPL-MCNC: 7.5 G/DL (ref 6–8.4)
PROT SERPL-MCNC: 7.6 G/DL (ref 6–8.4)
PROT SERPL-MCNC: 7.8 G/DL (ref 6–8.4)
RBC # BLD AUTO: 2.78 M/UL (ref 4–5.4)
RBC # BLD AUTO: 2.97 M/UL (ref 4–5.4)
RBC # BLD AUTO: 2.98 M/UL (ref 4–5.4)
RBC # BLD AUTO: 3 M/UL (ref 4–5.4)
RBC # BLD AUTO: 3.05 M/UL (ref 4–5.4)
RBC # BLD AUTO: 3.11 M/UL (ref 4–5.4)
RBC # BLD AUTO: 3.19 M/UL (ref 4–5.4)
RBC # BLD AUTO: 3.2 M/UL (ref 4–5.4)
RBC # BLD AUTO: 3.21 M/UL (ref 4–5.4)
RBC # BLD AUTO: 3.32 M/UL (ref 4–5.4)
RBC # BLD AUTO: 3.35 M/UL (ref 4–5.4)
RBC # BLD AUTO: 3.4 M/UL (ref 4–5.4)
RBC # BLD AUTO: 3.42 M/UL (ref 4–5.4)
RBC # BLD AUTO: 3.45 M/UL (ref 4–5.4)
RBC # BLD AUTO: 3.56 M/UL (ref 4–5.4)
SODIUM SERPL-SCNC: 136 MMOL/L (ref 136–145)
SODIUM SERPL-SCNC: 138 MMOL/L (ref 136–145)
SODIUM SERPL-SCNC: 138 MMOL/L (ref 136–145)
SODIUM SERPL-SCNC: 139 MMOL/L (ref 136–145)
SODIUM SERPL-SCNC: 140 MMOL/L (ref 136–145)
SODIUM SERPL-SCNC: 141 MMOL/L (ref 136–145)
SODIUM SERPL-SCNC: 142 MMOL/L (ref 136–145)
SODIUM SERPL-SCNC: 142 MMOL/L (ref 136–145)
SODIUM SERPL-SCNC: 143 MMOL/L (ref 136–145)
WBC # BLD AUTO: 1.69 K/UL (ref 3.9–12.7)
WBC # BLD AUTO: 10.28 K/UL (ref 3.9–12.7)
WBC # BLD AUTO: 10.28 K/UL (ref 3.9–12.7)
WBC # BLD AUTO: 11.08 K/UL (ref 3.9–12.7)
WBC # BLD AUTO: 16.77 K/UL (ref 3.9–12.7)
WBC # BLD AUTO: 16.96 K/UL (ref 3.9–12.7)
WBC # BLD AUTO: 2.43 K/UL (ref 3.9–12.7)
WBC # BLD AUTO: 2.56 K/UL (ref 3.9–12.7)
WBC # BLD AUTO: 2.57 K/UL (ref 3.9–12.7)
WBC # BLD AUTO: 3.37 K/UL (ref 3.9–12.7)
WBC # BLD AUTO: 4.13 K/UL (ref 3.9–12.7)
WBC # BLD AUTO: 4.8 K/UL (ref 3.9–12.7)
WBC # BLD AUTO: 6.95 K/UL (ref 3.9–12.7)
WBC # BLD AUTO: 7.03 K/UL (ref 3.9–12.7)
WBC # BLD AUTO: 7.78 K/UL (ref 3.9–12.7)

## 2022-01-01 PROCEDURE — 63600175 PHARM REV CODE 636 W HCPCS: Performed by: SURGERY

## 2022-01-01 PROCEDURE — 96372 THER/PROPH/DIAG INJ SC/IM: CPT

## 2022-01-01 PROCEDURE — 86300 IMMUNOASSAY TUMOR CA 15-3: CPT | Performed by: INTERNAL MEDICINE

## 2022-01-01 PROCEDURE — 96367 TX/PROPH/DG ADDL SEQ IV INF: CPT

## 2022-01-01 PROCEDURE — 96417 CHEMO IV INFUS EACH ADDL SEQ: CPT

## 2022-01-01 PROCEDURE — 36415 COLL VENOUS BLD VENIPUNCTURE: CPT | Performed by: FAMILY MEDICINE

## 2022-01-01 PROCEDURE — 36415 COLL VENOUS BLD VENIPUNCTURE: CPT | Performed by: INTERNAL MEDICINE

## 2022-01-01 PROCEDURE — 99213 PR OFFICE/OUTPT VISIT, EST, LEVL III, 20-29 MIN: ICD-10-PCS | Mod: S$GLB,,, | Performed by: INTERNAL MEDICINE

## 2022-01-01 PROCEDURE — 3078F DIAST BP <80 MM HG: CPT | Mod: CPTII,S$GLB,, | Performed by: SURGERY

## 2022-01-01 PROCEDURE — 96375 TX/PRO/DX INJ NEW DRUG ADDON: CPT

## 2022-01-01 PROCEDURE — 63600175 PHARM REV CODE 636 W HCPCS: Performed by: NURSE ANESTHETIST, CERTIFIED REGISTERED

## 2022-01-01 PROCEDURE — 86300 IMMUNOASSAY TUMOR CA 15-3: CPT | Mod: 91 | Performed by: INTERNAL MEDICINE

## 2022-01-01 PROCEDURE — 4010F ACE/ARB THERAPY RXD/TAKEN: CPT | Mod: CPTII,S$GLB,, | Performed by: INTERNAL MEDICINE

## 2022-01-01 PROCEDURE — 96413 CHEMO IV INFUSION 1 HR: CPT

## 2022-01-01 PROCEDURE — 99214 OFFICE O/P EST MOD 30 MIN: CPT | Mod: S$GLB,,, | Performed by: INTERNAL MEDICINE

## 2022-01-01 PROCEDURE — 99214 PR OFFICE/OUTPT VISIT, EST, LEVL IV, 30-39 MIN: ICD-10-PCS | Mod: S$GLB,,, | Performed by: FAMILY MEDICINE

## 2022-01-01 PROCEDURE — 85025 COMPLETE CBC W/AUTO DIFF WBC: CPT | Performed by: INTERNAL MEDICINE

## 2022-01-01 PROCEDURE — 3078F DIAST BP <80 MM HG: CPT | Mod: CPTII,S$GLB,, | Performed by: INTERNAL MEDICINE

## 2022-01-01 PROCEDURE — A4216 STERILE WATER/SALINE, 10 ML: HCPCS | Performed by: INTERNAL MEDICINE

## 2022-01-01 PROCEDURE — 3044F HG A1C LEVEL LT 7.0%: CPT | Mod: CPTII,S$GLB,, | Performed by: FAMILY MEDICINE

## 2022-01-01 PROCEDURE — 3008F BODY MASS INDEX DOCD: CPT | Mod: CPTII,S$GLB,, | Performed by: INTERNAL MEDICINE

## 2022-01-01 PROCEDURE — 80053 COMPREHEN METABOLIC PANEL: CPT | Performed by: INTERNAL MEDICINE

## 2022-01-01 PROCEDURE — 99213 OFFICE O/P EST LOW 20 MIN: CPT | Mod: S$GLB,,, | Performed by: INTERNAL MEDICINE

## 2022-01-01 PROCEDURE — 3044F HG A1C LEVEL LT 7.0%: CPT | Mod: CPTII,S$GLB,, | Performed by: INTERNAL MEDICINE

## 2022-01-01 PROCEDURE — 25000003 PHARM REV CODE 250: Performed by: INTERNAL MEDICINE

## 2022-01-01 PROCEDURE — 25000003 PHARM REV CODE 250: Performed by: NURSE ANESTHETIST, CERTIFIED REGISTERED

## 2022-01-01 PROCEDURE — 83036 HEMOGLOBIN GLYCOSYLATED A1C: CPT | Performed by: FAMILY MEDICINE

## 2022-01-01 PROCEDURE — 3074F PR MOST RECENT SYSTOLIC BLOOD PRESSURE < 130 MM HG: ICD-10-PCS | Mod: CPTII,S$GLB,, | Performed by: INTERNAL MEDICINE

## 2022-01-01 PROCEDURE — 77001 CHG FLUOROGUIDE CNTRL VEN ACCESS,PLACE,REPLACE,REMOVE: ICD-10-PCS | Mod: 26,,, | Performed by: SURGERY

## 2022-01-01 PROCEDURE — 85027 COMPLETE CBC AUTOMATED: CPT | Performed by: INTERNAL MEDICINE

## 2022-01-01 PROCEDURE — 36561 PR INSERT TUNNELED CV CATH WITH PORT: ICD-10-PCS | Mod: RT,,, | Performed by: SURGERY

## 2022-01-01 PROCEDURE — 36000707: Performed by: SURGERY

## 2022-01-01 PROCEDURE — 1159F MED LIST DOCD IN RCRD: CPT | Mod: CPTII,S$GLB,, | Performed by: SURGERY

## 2022-01-01 PROCEDURE — 36561 INSERT TUNNELED CV CATH: CPT | Mod: RT,,, | Performed by: SURGERY

## 2022-01-01 PROCEDURE — 3077F PR MOST RECENT SYSTOLIC BLOOD PRESSURE >= 140 MM HG: ICD-10-PCS | Mod: CPTII,S$GLB,, | Performed by: FAMILY MEDICINE

## 2022-01-01 PROCEDURE — 96365 THER/PROPH/DIAG IV INF INIT: CPT

## 2022-01-01 PROCEDURE — 63600175 PHARM REV CODE 636 W HCPCS: Mod: JG | Performed by: INTERNAL MEDICINE

## 2022-01-01 PROCEDURE — 63600175 PHARM REV CODE 636 W HCPCS: Performed by: INTERNAL MEDICINE

## 2022-01-01 PROCEDURE — 3079F DIAST BP 80-89 MM HG: CPT | Mod: CPTII,S$GLB,, | Performed by: INTERNAL MEDICINE

## 2022-01-01 PROCEDURE — 3079F PR MOST RECENT DIASTOLIC BLOOD PRESSURE 80-89 MM HG: ICD-10-PCS | Mod: CPTII,S$GLB,, | Performed by: INTERNAL MEDICINE

## 2022-01-01 PROCEDURE — 3074F SYST BP LT 130 MM HG: CPT | Mod: CPTII,S$GLB,, | Performed by: INTERNAL MEDICINE

## 2022-01-01 PROCEDURE — 3008F PR BODY MASS INDEX (BMI) DOCUMENTED: ICD-10-PCS | Mod: CPTII,S$GLB,, | Performed by: SURGERY

## 2022-01-01 PROCEDURE — 71000033 HC RECOVERY, INTIAL HOUR: Performed by: SURGERY

## 2022-01-01 PROCEDURE — 3044F PR MOST RECENT HEMOGLOBIN A1C LEVEL <7.0%: ICD-10-PCS | Mod: CPTII,S$GLB,, | Performed by: INTERNAL MEDICINE

## 2022-01-01 PROCEDURE — 37000008 HC ANESTHESIA 1ST 15 MINUTES: Performed by: SURGERY

## 2022-01-01 PROCEDURE — 71046 X-RAY EXAM CHEST 2 VIEWS: CPT | Mod: TC

## 2022-01-01 PROCEDURE — 3044F PR MOST RECENT HEMOGLOBIN A1C LEVEL <7.0%: ICD-10-PCS | Mod: CPTII,S$GLB,, | Performed by: SURGERY

## 2022-01-01 PROCEDURE — 25000003 PHARM REV CODE 250: Performed by: NURSE PRACTITIONER

## 2022-01-01 PROCEDURE — 3080F DIAST BP >= 90 MM HG: CPT | Mod: CPTII,S$GLB,, | Performed by: FAMILY MEDICINE

## 2022-01-01 PROCEDURE — 3008F PR BODY MASS INDEX (BMI) DOCUMENTED: ICD-10-PCS | Mod: CPTII,S$GLB,, | Performed by: INTERNAL MEDICINE

## 2022-01-01 PROCEDURE — 99212 PR OFFICE/OUTPT VISIT, EST, LEVL II, 10-19 MIN: ICD-10-PCS | Mod: S$GLB,,, | Performed by: SURGERY

## 2022-01-01 PROCEDURE — 93010 EKG 12-LEAD: ICD-10-PCS | Mod: ,,, | Performed by: INTERNAL MEDICINE

## 2022-01-01 PROCEDURE — 85007 BL SMEAR W/DIFF WBC COUNT: CPT | Performed by: INTERNAL MEDICINE

## 2022-01-01 PROCEDURE — A9552 F18 FDG: HCPCS | Mod: PO

## 2022-01-01 PROCEDURE — 3074F SYST BP LT 130 MM HG: CPT | Mod: CPTII,S$GLB,, | Performed by: SURGERY

## 2022-01-01 PROCEDURE — C1788 PORT, INDWELLING, IMP: HCPCS | Performed by: SURGERY

## 2022-01-01 PROCEDURE — 99999 PR PBB SHADOW E&M-EST. PATIENT-LVL IV: ICD-10-PCS | Mod: PBBFAC,,, | Performed by: SURGERY

## 2022-01-01 PROCEDURE — 3075F PR MOST RECENT SYSTOLIC BLOOD PRESS GE 130-139MM HG: ICD-10-PCS | Mod: CPTII,S$GLB,, | Performed by: INTERNAL MEDICINE

## 2022-01-01 PROCEDURE — 3080F PR MOST RECENT DIASTOLIC BLOOD PRESSURE >= 90 MM HG: ICD-10-PCS | Mod: CPTII,S$GLB,, | Performed by: FAMILY MEDICINE

## 2022-01-01 PROCEDURE — 3046F HEMOGLOBIN A1C LEVEL >9.0%: CPT | Mod: CPTII,S$GLB,, | Performed by: INTERNAL MEDICINE

## 2022-01-01 PROCEDURE — 71000015 HC POSTOP RECOV 1ST HR: Performed by: SURGERY

## 2022-01-01 PROCEDURE — 3078F PR MOST RECENT DIASTOLIC BLOOD PRESSURE < 80 MM HG: ICD-10-PCS | Mod: CPTII,S$GLB,, | Performed by: INTERNAL MEDICINE

## 2022-01-01 PROCEDURE — 4010F ACE/ARB THERAPY RXD/TAKEN: CPT | Mod: CPTII,S$GLB,, | Performed by: FAMILY MEDICINE

## 2022-01-01 PROCEDURE — 77001 FLUOROGUIDE FOR VEIN DEVICE: CPT | Mod: 26,,, | Performed by: SURGERY

## 2022-01-01 PROCEDURE — 1159F PR MEDICATION LIST DOCUMENTED IN MEDICAL RECORD: ICD-10-PCS | Mod: CPTII,S$GLB,, | Performed by: SURGERY

## 2022-01-01 PROCEDURE — 27201423 OPTIME MED/SURG SUP & DEVICES STERILE SUPPLY: Performed by: SURGERY

## 2022-01-01 PROCEDURE — 78815 PET IMAGE W/CT SKULL-THIGH: CPT | Mod: TC,PS,PO

## 2022-01-01 PROCEDURE — 63600175 PHARM REV CODE 636 W HCPCS: Performed by: NURSE PRACTITIONER

## 2022-01-01 PROCEDURE — 3074F PR MOST RECENT SYSTOLIC BLOOD PRESSURE < 130 MM HG: ICD-10-PCS | Mod: CPTII,S$GLB,, | Performed by: SURGERY

## 2022-01-01 PROCEDURE — 4010F PR ACE/ARB THEARPY RXD/TAKEN: ICD-10-PCS | Mod: CPTII,S$GLB,, | Performed by: INTERNAL MEDICINE

## 2022-01-01 PROCEDURE — A4216 STERILE WATER/SALINE, 10 ML: HCPCS | Performed by: NURSE PRACTITIONER

## 2022-01-01 PROCEDURE — 3075F SYST BP GE 130 - 139MM HG: CPT | Mod: CPTII,S$GLB,, | Performed by: INTERNAL MEDICINE

## 2022-01-01 PROCEDURE — 93005 ELECTROCARDIOGRAM TRACING: CPT | Performed by: INTERNAL MEDICINE

## 2022-01-01 PROCEDURE — 3078F PR MOST RECENT DIASTOLIC BLOOD PRESSURE < 80 MM HG: ICD-10-PCS | Mod: CPTII,S$GLB,, | Performed by: SURGERY

## 2022-01-01 PROCEDURE — 99214 PR OFFICE/OUTPT VISIT, EST, LEVL IV, 30-39 MIN: ICD-10-PCS | Mod: S$GLB,,, | Performed by: INTERNAL MEDICINE

## 2022-01-01 PROCEDURE — 1159F MED LIST DOCD IN RCRD: CPT | Mod: CPTII,S$GLB,, | Performed by: FAMILY MEDICINE

## 2022-01-01 PROCEDURE — 37000009 HC ANESTHESIA EA ADD 15 MINS: Performed by: SURGERY

## 2022-01-01 PROCEDURE — 1159F MED LIST DOCD IN RCRD: CPT | Mod: CPTII,S$GLB,, | Performed by: INTERNAL MEDICINE

## 2022-01-01 PROCEDURE — 4010F PR ACE/ARB THEARPY RXD/TAKEN: ICD-10-PCS | Mod: CPTII,S$GLB,, | Performed by: FAMILY MEDICINE

## 2022-01-01 PROCEDURE — 99999 PR PBB SHADOW E&M-EST. PATIENT-LVL IV: CPT | Mod: PBBFAC,,, | Performed by: SURGERY

## 2022-01-01 PROCEDURE — 36000706: Performed by: SURGERY

## 2022-01-01 PROCEDURE — 3077F SYST BP >= 140 MM HG: CPT | Mod: CPTII,S$GLB,, | Performed by: FAMILY MEDICINE

## 2022-01-01 PROCEDURE — 3046F PR MOST RECENT HEMOGLOBIN A1C LEVEL > 9.0%: ICD-10-PCS | Mod: CPTII,S$GLB,, | Performed by: INTERNAL MEDICINE

## 2022-01-01 PROCEDURE — 99214 OFFICE O/P EST MOD 30 MIN: CPT | Mod: S$GLB,,, | Performed by: FAMILY MEDICINE

## 2022-01-01 PROCEDURE — 93010 ELECTROCARDIOGRAM REPORT: CPT | Mod: ,,, | Performed by: INTERNAL MEDICINE

## 2022-01-01 PROCEDURE — 4010F PR ACE/ARB THEARPY RXD/TAKEN: ICD-10-PCS | Mod: CPTII,S$GLB,, | Performed by: SURGERY

## 2022-01-01 PROCEDURE — 1159F PR MEDICATION LIST DOCUMENTED IN MEDICAL RECORD: ICD-10-PCS | Mod: CPTII,S$GLB,, | Performed by: INTERNAL MEDICINE

## 2022-01-01 PROCEDURE — 3044F PR MOST RECENT HEMOGLOBIN A1C LEVEL <7.0%: ICD-10-PCS | Mod: CPTII,S$GLB,, | Performed by: FAMILY MEDICINE

## 2022-01-01 PROCEDURE — 1159F PR MEDICATION LIST DOCUMENTED IN MEDICAL RECORD: ICD-10-PCS | Mod: CPTII,S$GLB,, | Performed by: FAMILY MEDICINE

## 2022-01-01 PROCEDURE — 99212 OFFICE O/P EST SF 10 MIN: CPT | Mod: S$GLB,,, | Performed by: SURGERY

## 2022-01-01 PROCEDURE — 3044F HG A1C LEVEL LT 7.0%: CPT | Mod: CPTII,S$GLB,, | Performed by: SURGERY

## 2022-01-01 PROCEDURE — 96415 CHEMO IV INFUSION ADDL HR: CPT

## 2022-01-01 PROCEDURE — 4010F ACE/ARB THERAPY RXD/TAKEN: CPT | Mod: CPTII,S$GLB,, | Performed by: SURGERY

## 2022-01-01 PROCEDURE — 25000003 PHARM REV CODE 250: Performed by: SURGERY

## 2022-01-01 PROCEDURE — 3008F BODY MASS INDEX DOCD: CPT | Mod: CPTII,S$GLB,, | Performed by: SURGERY

## 2022-01-01 DEVICE — PORT POWER MRI 8FR 1808000: Type: IMPLANTABLE DEVICE | Site: CHEST  WALL | Status: FUNCTIONAL

## 2022-01-01 RX ORDER — MIDAZOLAM HYDROCHLORIDE 1 MG/ML
INJECTION INTRAMUSCULAR; INTRAVENOUS
Status: DISCONTINUED | OUTPATIENT
Start: 2022-01-01 | End: 2022-01-01

## 2022-01-01 RX ORDER — PROMETHAZINE HYDROCHLORIDE 25 MG/1
25 TABLET ORAL
Qty: 30 TABLET | Refills: 5 | Status: SHIPPED | OUTPATIENT
Start: 2022-01-01 | End: 2022-01-01

## 2022-01-01 RX ORDER — SODIUM CHLORIDE 0.9 % (FLUSH) 0.9 %
10 SYRINGE (ML) INJECTION
Status: CANCELLED | OUTPATIENT
Start: 2022-01-01

## 2022-01-01 RX ORDER — HEPARIN 100 UNIT/ML
500 SYRINGE INTRAVENOUS
Status: DISCONTINUED | OUTPATIENT
Start: 2022-01-01 | End: 2022-01-01 | Stop reason: HOSPADM

## 2022-01-01 RX ORDER — ONDANSETRON 8 MG/1
TABLET, ORALLY DISINTEGRATING ORAL
Qty: 30 TABLET | Refills: 1 | Status: SHIPPED | OUTPATIENT
Start: 2022-01-01 | End: 2022-01-01 | Stop reason: SDUPTHER

## 2022-01-01 RX ORDER — ZOLEDRONIC ACID 0.04 MG/ML
4 INJECTION, SOLUTION INTRAVENOUS
Status: CANCELLED | OUTPATIENT
Start: 2022-01-01

## 2022-01-01 RX ORDER — HEPARIN 100 UNIT/ML
500 SYRINGE INTRAVENOUS
Status: CANCELLED | OUTPATIENT
Start: 2022-01-01

## 2022-01-01 RX ORDER — DIPHENHYDRAMINE HYDROCHLORIDE 50 MG/ML
12.5 INJECTION INTRAMUSCULAR; INTRAVENOUS
Status: DISCONTINUED | OUTPATIENT
Start: 2022-01-01 | End: 2022-01-01 | Stop reason: HOSPADM

## 2022-01-01 RX ORDER — MEPERIDINE HYDROCHLORIDE 50 MG/ML
12.5 INJECTION INTRAMUSCULAR; INTRAVENOUS; SUBCUTANEOUS EVERY 10 MIN PRN
Status: DISCONTINUED | OUTPATIENT
Start: 2022-01-01 | End: 2022-01-01 | Stop reason: HOSPADM

## 2022-01-01 RX ORDER — ONDANSETRON 2 MG/ML
16 INJECTION INTRAMUSCULAR; INTRAVENOUS ONCE
Status: CANCELLED | OUTPATIENT
Start: 2022-01-01

## 2022-01-01 RX ORDER — BUPIVACAINE HYDROCHLORIDE AND EPINEPHRINE 5; 5 MG/ML; UG/ML
INJECTION, SOLUTION EPIDURAL; INTRACAUDAL; PERINEURAL
Status: DISCONTINUED | OUTPATIENT
Start: 2022-01-01 | End: 2022-01-01 | Stop reason: HOSPADM

## 2022-01-01 RX ORDER — OXYCODONE HYDROCHLORIDE 15 MG/1
15 TABLET ORAL
Qty: 120 TABLET | Refills: 0 | Status: SHIPPED | OUTPATIENT
Start: 2022-01-01 | End: 2023-01-01 | Stop reason: SDUPTHER

## 2022-01-01 RX ORDER — OXYCODONE HYDROCHLORIDE 15 MG/1
15 TABLET ORAL
Qty: 120 TABLET | Refills: 0 | Status: SHIPPED | OUTPATIENT
Start: 2022-01-01 | End: 2022-01-01 | Stop reason: SDUPTHER

## 2022-01-01 RX ORDER — AMOXICILLIN 500 MG/1
TABLET, FILM COATED ORAL
Qty: 30 TABLET | Refills: 0 | Status: SHIPPED | OUTPATIENT
Start: 2022-01-01 | End: 2023-01-01 | Stop reason: SDUPTHER

## 2022-01-01 RX ORDER — PROMETHAZINE HYDROCHLORIDE AND CODEINE PHOSPHATE 6.25; 1 MG/5ML; MG/5ML
SOLUTION ORAL
Qty: 450 ML | Refills: 0 | Status: SHIPPED | OUTPATIENT
Start: 2022-01-01 | End: 2023-01-01

## 2022-01-01 RX ORDER — HEPARIN 100 UNIT/ML
500 SYRINGE INTRAVENOUS
Status: CANCELLED | OUTPATIENT
Start: 2023-01-01

## 2022-01-01 RX ORDER — OXYCODONE HYDROCHLORIDE 5 MG/1
5 TABLET ORAL
Status: DISCONTINUED | OUTPATIENT
Start: 2022-01-01 | End: 2022-01-01 | Stop reason: HOSPADM

## 2022-01-01 RX ORDER — HYDROCODONE BITARTRATE AND ACETAMINOPHEN 10; 325 MG/1; MG/1
1 TABLET ORAL EVERY 6 HOURS PRN
Qty: 120 TABLET | Refills: 0 | Status: SHIPPED | OUTPATIENT
Start: 2022-01-01 | End: 2022-01-01 | Stop reason: SDUPTHER

## 2022-01-01 RX ORDER — AMOXICILLIN 500 MG/1
500 CAPSULE ORAL EVERY 8 HOURS
Qty: 30 CAPSULE | Refills: 0 | Status: SHIPPED | OUTPATIENT
Start: 2022-01-01 | End: 2022-01-01

## 2022-01-01 RX ORDER — FENTANYL 25 UG/1
1 PATCH TRANSDERMAL
Qty: 10 PATCH | Refills: 0 | Status: SHIPPED | OUTPATIENT
Start: 2022-01-01 | End: 2023-01-01 | Stop reason: SDUPTHER

## 2022-01-01 RX ORDER — LIDOCAINE HCL/PF 100 MG/5ML
SYRINGE (ML) INTRAVENOUS
Status: DISCONTINUED | OUTPATIENT
Start: 2022-01-01 | End: 2022-01-01

## 2022-01-01 RX ORDER — METFORMIN HYDROCHLORIDE 500 MG/1
1000 TABLET, EXTENDED RELEASE ORAL 2 TIMES DAILY WITH MEALS
Qty: 360 TABLET | Refills: 3 | Status: SHIPPED | OUTPATIENT
Start: 2022-01-01 | End: 2023-01-01

## 2022-01-01 RX ORDER — ZOLEDRONIC ACID 0.04 MG/ML
4 INJECTION, SOLUTION INTRAVENOUS
Status: CANCELLED | OUTPATIENT
Start: 2023-01-01

## 2022-01-01 RX ORDER — FENTANYL CITRATE 50 UG/ML
INJECTION, SOLUTION INTRAMUSCULAR; INTRAVENOUS
Status: DISCONTINUED | OUTPATIENT
Start: 2022-01-01 | End: 2022-01-01

## 2022-01-01 RX ORDER — CARISOPRODOL 350 MG/1
350 TABLET ORAL 3 TIMES DAILY PRN
Qty: 90 TABLET | Refills: 0 | Status: SHIPPED | OUTPATIENT
Start: 2022-01-01 | End: 2023-01-01 | Stop reason: SDUPTHER

## 2022-01-01 RX ORDER — SODIUM CHLORIDE, SODIUM LACTATE, POTASSIUM CHLORIDE, CALCIUM CHLORIDE 600; 310; 30; 20 MG/100ML; MG/100ML; MG/100ML; MG/100ML
INJECTION, SOLUTION INTRAVENOUS CONTINUOUS PRN
Status: DISCONTINUED | OUTPATIENT
Start: 2022-01-01 | End: 2022-01-01

## 2022-01-01 RX ORDER — ONDANSETRON 8 MG/1
TABLET, ORALLY DISINTEGRATING ORAL
Qty: 60 TABLET | Refills: 2 | Status: SHIPPED | OUTPATIENT
Start: 2022-01-01 | End: 2023-01-01

## 2022-01-01 RX ORDER — FAMOTIDINE 10 MG/ML
INJECTION INTRAVENOUS
Status: DISCONTINUED | OUTPATIENT
Start: 2022-01-01 | End: 2022-01-01

## 2022-01-01 RX ORDER — DULAGLUTIDE 3 MG/.5ML
3 INJECTION, SOLUTION SUBCUTANEOUS
Qty: 4 PEN | Refills: 11 | Status: SHIPPED | OUTPATIENT
Start: 2022-01-01

## 2022-01-01 RX ORDER — HEPARIN SODIUM 5000 [USP'U]/ML
INJECTION, SOLUTION INTRAVENOUS; SUBCUTANEOUS
Status: DISCONTINUED | OUTPATIENT
Start: 2022-01-01 | End: 2022-01-01 | Stop reason: HOSPADM

## 2022-01-01 RX ORDER — ENALAPRIL MALEATE 5 MG/1
5 TABLET ORAL DAILY
Qty: 90 TABLET | Refills: 3 | Status: SHIPPED | OUTPATIENT
Start: 2022-01-01

## 2022-01-01 RX ORDER — METOPROLOL TARTRATE 25 MG/1
25 TABLET, FILM COATED ORAL 2 TIMES DAILY
Qty: 60 TABLET | Refills: 11 | Status: SHIPPED | OUTPATIENT
Start: 2022-01-01 | End: 2023-10-12

## 2022-01-01 RX ORDER — ZOLEDRONIC ACID 0.04 MG/ML
4 INJECTION, SOLUTION INTRAVENOUS
Status: COMPLETED | OUTPATIENT
Start: 2022-01-01 | End: 2022-01-01

## 2022-01-01 RX ORDER — SODIUM CHLORIDE 0.9 % (FLUSH) 0.9 %
10 SYRINGE (ML) INJECTION
Status: DISCONTINUED | OUTPATIENT
Start: 2022-01-01 | End: 2022-01-01 | Stop reason: HOSPADM

## 2022-01-01 RX ORDER — PROPOFOL 10 MG/ML
INJECTION, EMULSION INTRAVENOUS
Status: DISCONTINUED | OUTPATIENT
Start: 2022-01-01 | End: 2022-01-01

## 2022-01-01 RX ORDER — CEFAZOLIN SODIUM 2 G/50ML
2 SOLUTION INTRAVENOUS
Status: COMPLETED | OUTPATIENT
Start: 2022-01-01 | End: 2022-01-01

## 2022-01-01 RX ORDER — CARISOPRODOL 350 MG/1
350 TABLET ORAL 3 TIMES DAILY PRN
Qty: 90 TABLET | Refills: 0 | Status: SHIPPED | OUTPATIENT
Start: 2022-01-01 | End: 2022-01-01 | Stop reason: SDUPTHER

## 2022-01-01 RX ORDER — SODIUM CHLORIDE 0.9 % (FLUSH) 0.9 %
10 SYRINGE (ML) INJECTION
Status: CANCELLED | OUTPATIENT
Start: 2023-01-01

## 2022-01-01 RX ORDER — ONDANSETRON 2 MG/ML
4 INJECTION INTRAMUSCULAR; INTRAVENOUS DAILY PRN
Status: DISCONTINUED | OUTPATIENT
Start: 2022-01-01 | End: 2022-01-01 | Stop reason: HOSPADM

## 2022-01-01 RX ORDER — DIAZEPAM 10 MG/1
10 TABLET ORAL 4 TIMES DAILY PRN
Qty: 120 TABLET | Refills: 2 | Status: SHIPPED | OUTPATIENT
Start: 2022-01-01 | End: 2023-01-01 | Stop reason: SDUPTHER

## 2022-01-01 RX ORDER — HYDROCODONE BITARTRATE AND ACETAMINOPHEN 10; 325 MG/1; MG/1
1 TABLET ORAL EVERY 6 HOURS PRN
Qty: 120 TABLET | Refills: 0 | Status: SHIPPED | OUTPATIENT
Start: 2022-01-01 | End: 2023-01-01 | Stop reason: SDUPTHER

## 2022-01-01 RX ORDER — ROSUVASTATIN CALCIUM 40 MG/1
40 TABLET, COATED ORAL NIGHTLY
Qty: 90 TABLET | Refills: 3 | Status: SHIPPED | OUTPATIENT
Start: 2022-01-01 | End: 2023-10-25

## 2022-01-01 RX ORDER — ONDANSETRON 2 MG/ML
INJECTION INTRAMUSCULAR; INTRAVENOUS
Status: DISCONTINUED | OUTPATIENT
Start: 2022-01-01 | End: 2022-01-01

## 2022-01-01 RX ORDER — HYDROMORPHONE HYDROCHLORIDE 1 MG/ML
0.2 INJECTION, SOLUTION INTRAMUSCULAR; INTRAVENOUS; SUBCUTANEOUS EVERY 5 MIN PRN
Status: DISCONTINUED | OUTPATIENT
Start: 2022-01-01 | End: 2022-01-01 | Stop reason: HOSPADM

## 2022-01-01 RX ADMIN — GEMCITABINE 1490 MG: 38 INJECTION, SOLUTION INTRAVENOUS at 10:12

## 2022-01-01 RX ADMIN — FENTANYL CITRATE 25 MCG: 50 INJECTION INTRAMUSCULAR; INTRAVENOUS at 09:11

## 2022-01-01 RX ADMIN — DEXAMETHASONE SODIUM PHOSPHATE 12 MG: 4 INJECTION, SOLUTION INTRA-ARTICULAR; INTRALESIONAL; INTRAMUSCULAR; INTRAVENOUS; SOFT TISSUE at 09:10

## 2022-01-01 RX ADMIN — GEMCITABINE 1490 MG: 38 INJECTION, SOLUTION INTRAVENOUS at 11:11

## 2022-01-01 RX ADMIN — CARBOPLATIN 495 MG: 10 INJECTION, SOLUTION INTRAVENOUS at 12:11

## 2022-01-01 RX ADMIN — ONDANSETRON 16 MG: 2 INJECTION INTRAMUSCULAR; INTRAVENOUS at 09:10

## 2022-01-01 RX ADMIN — SODIUM CHLORIDE, SODIUM LACTATE, POTASSIUM CHLORIDE, AND CALCIUM CHLORIDE: .6; .31; .03; .02 INJECTION, SOLUTION INTRAVENOUS at 08:11

## 2022-01-01 RX ADMIN — PEGFILGRASTIM 6 MG: 6 INJECTION SUBCUTANEOUS at 08:12

## 2022-01-01 RX ADMIN — ONDANSETRON 16 MG: 2 INJECTION INTRAMUSCULAR; INTRAVENOUS at 10:09

## 2022-01-01 RX ADMIN — ONDANSETRON 16 MG: 2 INJECTION INTRAMUSCULAR; INTRAVENOUS at 10:11

## 2022-01-01 RX ADMIN — GEMCITABINE 1425 MG: 38 INJECTION, SOLUTION INTRAVENOUS at 10:10

## 2022-01-01 RX ADMIN — MIDAZOLAM HYDROCHLORIDE 2 MG: 1 INJECTION, SOLUTION INTRAMUSCULAR; INTRAVENOUS at 08:11

## 2022-01-01 RX ADMIN — SODIUM CHLORIDE: 9 INJECTION, SOLUTION INTRAVENOUS at 10:09

## 2022-01-01 RX ADMIN — DEXAMETHASONE SODIUM PHOSPHATE 12 MG: 4 INJECTION, SOLUTION INTRA-ARTICULAR; INTRALESIONAL; INTRAMUSCULAR; INTRAVENOUS; SOFT TISSUE at 10:11

## 2022-01-01 RX ADMIN — HEPARIN 500 UNITS: 100 SYRINGE at 12:12

## 2022-01-01 RX ADMIN — GEMCITABINE 1495 MG: 38 INJECTION, SOLUTION INTRAVENOUS at 10:11

## 2022-01-01 RX ADMIN — ONDANSETRON 16 MG: 2 INJECTION INTRAMUSCULAR; INTRAVENOUS at 10:12

## 2022-01-01 RX ADMIN — APREPITANT 130 MG: 130 INJECTION, EMULSION INTRAVENOUS at 10:11

## 2022-01-01 RX ADMIN — ZOLEDRONIC ACID 4 MG: 0.04 INJECTION, SOLUTION INTRAVENOUS at 09:09

## 2022-01-01 RX ADMIN — APREPITANT 130 MG: 130 INJECTION, EMULSION INTRAVENOUS at 10:12

## 2022-01-01 RX ADMIN — GEMCITABINE 1425 MG: 38 INJECTION, SOLUTION INTRAVENOUS at 11:09

## 2022-01-01 RX ADMIN — SODIUM CHLORIDE: 0.9 INJECTION, SOLUTION INTRAVENOUS at 09:10

## 2022-01-01 RX ADMIN — ONDANSETRON 4 MG: 2 INJECTION INTRAMUSCULAR; INTRAVENOUS at 09:11

## 2022-01-01 RX ADMIN — DEXAMETHASONE SODIUM PHOSPHATE 12 MG: 4 INJECTION, SOLUTION INTRA-ARTICULAR; INTRALESIONAL; INTRAMUSCULAR; INTRAVENOUS; SOFT TISSUE at 09:11

## 2022-01-01 RX ADMIN — SODIUM CHLORIDE: 9 INJECTION, SOLUTION INTRAVENOUS at 10:11

## 2022-01-01 RX ADMIN — PROPOFOL 200 MG: 10 INJECTION, EMULSION INTRAVENOUS at 09:11

## 2022-01-01 RX ADMIN — CARBOPLATIN 540 MG: 10 INJECTION, SOLUTION INTRAVENOUS at 11:11

## 2022-01-01 RX ADMIN — ZOLEDRONIC ACID 4 MG: 0.04 INJECTION, SOLUTION INTRAVENOUS at 10:12

## 2022-01-01 RX ADMIN — ONDANSETRON 16 MG: 2 INJECTION INTRAMUSCULAR; INTRAVENOUS at 09:11

## 2022-01-01 RX ADMIN — APREPITANT 130 MG: 130 INJECTION, EMULSION INTRAVENOUS at 10:09

## 2022-01-01 RX ADMIN — DEXAMETHASONE SODIUM PHOSPHATE 12 MG: 4 INJECTION, SOLUTION INTRA-ARTICULAR; INTRALESIONAL; INTRAMUSCULAR; INTRAVENOUS; SOFT TISSUE at 10:09

## 2022-01-01 RX ADMIN — PEGFILGRASTIM 6 MG: 6 INJECTION SUBCUTANEOUS at 10:11

## 2022-01-01 RX ADMIN — SODIUM CHLORIDE, PRESERVATIVE FREE 10 ML: 5 INJECTION INTRAVENOUS at 01:11

## 2022-01-01 RX ADMIN — HEPARIN 500 UNITS: 100 SYRINGE at 12:11

## 2022-01-01 RX ADMIN — CARBOPLATIN 450 MG: 10 INJECTION, SOLUTION INTRAVENOUS at 11:12

## 2022-01-01 RX ADMIN — SODIUM CHLORIDE, PRESERVATIVE FREE 10 ML: 5 INJECTION INTRAVENOUS at 10:11

## 2022-01-01 RX ADMIN — ZOLEDRONIC ACID 4 MG: 0.04 INJECTION, SOLUTION INTRAVENOUS at 09:11

## 2022-01-01 RX ADMIN — PEGFILGRASTIM 6 MG: 6 INJECTION SUBCUTANEOUS at 01:09

## 2022-01-01 RX ADMIN — CEFAZOLIN SODIUM 2 G: 2 SOLUTION INTRAVENOUS at 09:11

## 2022-01-01 RX ADMIN — DEXAMETHASONE SODIUM PHOSPHATE 12 MG: 4 INJECTION, SOLUTION INTRA-ARTICULAR; INTRALESIONAL; INTRAMUSCULAR; INTRAVENOUS; SOFT TISSUE at 10:12

## 2022-01-01 RX ADMIN — HEPARIN 500 UNITS: 100 SYRINGE at 10:11

## 2022-01-01 RX ADMIN — CARBOPLATIN 625 MG: 10 INJECTION, SOLUTION INTRAVENOUS at 12:09

## 2022-01-01 RX ADMIN — PEGFILGRASTIM 6 MG: 6 INJECTION SUBCUTANEOUS at 09:11

## 2022-01-01 RX ADMIN — CARBOPLATIN 480 MG: 10 INJECTION, SOLUTION INTRAVENOUS at 11:10

## 2022-01-01 RX ADMIN — APREPITANT 130 MG: 130 INJECTION, EMULSION INTRAVENOUS at 09:10

## 2022-01-01 RX ADMIN — LIDOCAINE HYDROCHLORIDE 50 MG: 20 INJECTION, SOLUTION INTRAVENOUS at 08:11

## 2022-01-01 RX ADMIN — FAMOTIDINE 20 MG: 10 INJECTION, SOLUTION INTRAVENOUS at 09:11

## 2022-01-01 RX ADMIN — SODIUM CHLORIDE, PRESERVATIVE FREE 10 ML: 5 INJECTION INTRAVENOUS at 12:12

## 2022-01-01 RX ADMIN — APREPITANT 130 MG: 130 INJECTION, EMULSION INTRAVENOUS at 09:11

## 2022-01-01 RX ADMIN — PEGFILGRASTIM 6 MG: 6 INJECTION SUBCUTANEOUS at 11:10

## 2022-01-03 ENCOUNTER — TELEPHONE (OUTPATIENT)
Dept: HEMATOLOGY/ONCOLOGY | Facility: CLINIC | Age: 62
End: 2022-01-03
Payer: MEDICARE

## 2022-01-03 DIAGNOSIS — Z17.1 MALIGNANT NEOPLASM OF UPPER-OUTER QUADRANT OF RIGHT BREAST IN FEMALE, ESTROGEN RECEPTOR NEGATIVE: Primary | ICD-10-CM

## 2022-01-03 DIAGNOSIS — C50.411 MALIGNANT NEOPLASM OF UPPER-OUTER QUADRANT OF RIGHT BREAST IN FEMALE, ESTROGEN RECEPTOR NEGATIVE: Primary | ICD-10-CM

## 2022-01-03 NOTE — TELEPHONE ENCOUNTER
Pt to finish out this cycle of ibrance.  Will finish 21 day cycle on 1/11 with 7 day break on 1/12.  Pt to hold ibrance at that time.  Pt to recheck cbc on 1/31 prior to procedure.  Pt verbalized understanding.

## 2022-01-03 NOTE — TELEPHONE ENCOUNTER
----- Message from Valerie Leblanc, Patient Care Assistant sent at 12/28/2021 11:02 AM CST -----  Patient called in stating she is scheduled for surgery on 2/2/2022 as long as her sugar stays regulated.  # [phone]

## 2022-01-06 ENCOUNTER — TELEPHONE (OUTPATIENT)
Dept: FAMILY MEDICINE | Facility: CLINIC | Age: 62
End: 2022-01-06
Payer: MEDICARE

## 2022-01-06 ENCOUNTER — TELEPHONE (OUTPATIENT)
Dept: NEUROSURGERY | Facility: CLINIC | Age: 62
End: 2022-01-06
Payer: MEDICARE

## 2022-01-06 DIAGNOSIS — S22.008G: Primary | ICD-10-CM

## 2022-01-06 NOTE — TELEPHONE ENCOUNTER
----- Message from Shantal Berman sent at 1/6/2022 11:22 AM CST -----  Patient called and stated that she had an appointment today and she was told she would be worked in this week because her original appointment was canceled please give her a call at [phone]

## 2022-01-06 NOTE — TELEPHONE ENCOUNTER
----- Message from [facility] sent at 1/6/2022 12:43 PM CST -----  Type:  Needs Medical Advice    Who Called: PT   Would the patient rather a call back or a response via [facility]? Callback   Best Call Back Number: [phone] or [phone]  Additional Information: Pt requesting callback to discuss pain

## 2022-01-06 NOTE — TELEPHONE ENCOUNTER
Returned call to pt. She reports that her pain is still very bad. Informed pt that I am unable to offer a sooner surgery date at this time. Her recent A1C was 11.1 and she does not complete the Ibrance cycle until 1/11 with 7 day break on 1/12. She is scheduled to recheck CBC on 1/31. Instructed pt to contact our office with any further questions or concerns.     Pre-op labs ordered so to be scheduled.

## 2022-01-13 ENCOUNTER — TELEPHONE (OUTPATIENT)
Dept: FAMILY MEDICINE | Facility: CLINIC | Age: 62
End: 2022-01-13
Payer: MEDICARE

## 2022-01-13 DIAGNOSIS — C50.411 MALIGNANT NEOPLASM OF UPPER-OUTER QUADRANT OF RIGHT BREAST IN FEMALE, ESTROGEN RECEPTOR NEGATIVE: Primary | ICD-10-CM

## 2022-01-13 DIAGNOSIS — Z17.1 MALIGNANT NEOPLASM OF UPPER-OUTER QUADRANT OF RIGHT BREAST IN FEMALE, ESTROGEN RECEPTOR NEGATIVE: Primary | ICD-10-CM

## 2022-01-13 DIAGNOSIS — Z79.4 TYPE 2 DIABETES MELLITUS WITH HYPERGLYCEMIA, WITH LONG-TERM CURRENT USE OF INSULIN: Primary | ICD-10-CM

## 2022-01-13 DIAGNOSIS — E11.65 TYPE 2 DIABETES MELLITUS WITH HYPERGLYCEMIA, WITH LONG-TERM CURRENT USE OF INSULIN: Primary | ICD-10-CM

## 2022-01-13 RX ORDER — OXYCODONE HYDROCHLORIDE 10 MG/1
10 TABLET ORAL
COMMUNITY
Start: 2021-12-22 | End: 2022-01-13 | Stop reason: SDUPTHER

## 2022-01-13 RX ORDER — OXYCODONE HYDROCHLORIDE 10 MG/1
10 TABLET ORAL
Qty: 120 TABLET | Refills: 0 | Status: SHIPPED | OUTPATIENT
Start: 2022-01-18 | End: 2022-02-15 | Stop reason: SDUPTHER

## 2022-01-13 NOTE — TELEPHONE ENCOUNTER
----- Message from Nell Carrillo sent at 1/13/2022  2:27 PM CST -----  The patient wants a prescription for oxycontin 10mg #120. She said she has to go to infusion on 1-18 and wants to  then. Her prescription is not due until the 22nd.    
- - -

## 2022-01-13 NOTE — TELEPHONE ENCOUNTER
----- Message from Daylin Washington sent at 1/13/2022  1:52 PM CST -----  Patient wants to know if Dr. Shepard wants her to get labwork done since she's having surgery on February 2, 2022. She's going to [facility] tomorrow for her Cancer Dr. She wants to know if  she should just go ahead and get labwork for Dr. Shepard too while she's there. She has an appointment on Monday.  Patient's callback number is [phone].

## 2022-01-15 LAB
BASOPHILS # BLD AUTO: 9 CELLS/UL (ref 0–200)
BASOPHILS NFR BLD AUTO: 0.4 %
EOSINOPHIL # BLD AUTO: 21 CELLS/UL (ref 15–500)
EOSINOPHIL NFR BLD AUTO: 0.9 %
ERYTHROCYTE [DISTWIDTH] IN BLOOD BY AUTOMATED COUNT: 14.3 % (ref 11–15)
HBA1C MFR BLD: 11.1 % OF TOTAL HGB
HCT VFR BLD AUTO: 38.3 % (ref 35–45)
HGB BLD-MCNC: 13.1 G/DL (ref 11.7–15.5)
LYMPHOCYTES # BLD AUTO: 759 CELLS/UL (ref 850–3900)
LYMPHOCYTES NFR BLD AUTO: 33 %
MCH RBC QN AUTO: 32.5 PG (ref 27–33)
MCHC RBC AUTO-ENTMCNC: 34.2 G/DL (ref 32–36)
MCV RBC AUTO: 95 FL (ref 80–100)
MONOCYTES # BLD AUTO: 101 CELLS/UL (ref 200–950)
MONOCYTES NFR BLD AUTO: 4.4 %
NEUTROPHILS # BLD AUTO: 1410 CELLS/UL (ref 1500–7800)
NEUTROPHILS NFR BLD AUTO: 61.3 %
PLATELET # BLD AUTO: 188 THOUSAND/UL (ref 140–400)
PMV BLD REES-ECKER: 9.1 FL (ref 7.5–12.5)
RBC # BLD AUTO: 4.03 MILLION/UL (ref 3.8–5.1)
SERVICE CMNT-IMP: ABNORMAL
WBC # BLD AUTO: 2.3 THOUSAND/UL (ref 3.8–10.8)

## 2022-01-17 ENCOUNTER — OFFICE VISIT (OUTPATIENT)
Dept: FAMILY MEDICINE | Facility: CLINIC | Age: 62
End: 2022-01-17
Payer: MEDICARE

## 2022-01-17 VITALS
HEIGHT: 71 IN | BODY MASS INDEX: 23.8 KG/M2 | SYSTOLIC BLOOD PRESSURE: 124 MMHG | WEIGHT: 170 LBS | DIASTOLIC BLOOD PRESSURE: 88 MMHG | HEART RATE: 64 BPM

## 2022-01-17 DIAGNOSIS — T45.1X5A CHEMOTHERAPY-INDUCED NEUTROPENIA: ICD-10-CM

## 2022-01-17 DIAGNOSIS — E11.65 TYPE 2 DIABETES MELLITUS WITH HYPERGLYCEMIA, WITH LONG-TERM CURRENT USE OF INSULIN: ICD-10-CM

## 2022-01-17 DIAGNOSIS — C79.51 BONE METASTASES: ICD-10-CM

## 2022-01-17 DIAGNOSIS — J44.9 CHRONIC OBSTRUCTIVE PULMONARY DISEASE, UNSPECIFIED COPD TYPE: ICD-10-CM

## 2022-01-17 DIAGNOSIS — Z79.4 TYPE 2 DIABETES MELLITUS WITH HYPERGLYCEMIA, WITH LONG-TERM CURRENT USE OF INSULIN: ICD-10-CM

## 2022-01-17 DIAGNOSIS — E78.2 MIXED HYPERLIPIDEMIA: ICD-10-CM

## 2022-01-17 DIAGNOSIS — D70.1 CHEMOTHERAPY-INDUCED NEUTROPENIA: ICD-10-CM

## 2022-01-17 PROCEDURE — 3008F BODY MASS INDEX DOCD: CPT | Mod: S$GLB,,, | Performed by: FAMILY MEDICINE

## 2022-01-17 PROCEDURE — 3046F HEMOGLOBIN A1C LEVEL >9.0%: CPT | Mod: S$GLB,,, | Performed by: FAMILY MEDICINE

## 2022-01-17 PROCEDURE — 4010F ACE/ARB THERAPY RXD/TAKEN: CPT | Mod: S$GLB,,, | Performed by: FAMILY MEDICINE

## 2022-01-17 PROCEDURE — 3074F PR MOST RECENT SYSTOLIC BLOOD PRESSURE < 130 MM HG: ICD-10-PCS | Mod: S$GLB,,, | Performed by: FAMILY MEDICINE

## 2022-01-17 PROCEDURE — 99214 PR OFFICE/OUTPT VISIT, EST, LEVL IV, 30-39 MIN: ICD-10-PCS | Mod: S$GLB,,, | Performed by: FAMILY MEDICINE

## 2022-01-17 PROCEDURE — 3079F DIAST BP 80-89 MM HG: CPT | Mod: S$GLB,,, | Performed by: FAMILY MEDICINE

## 2022-01-17 PROCEDURE — 4010F PR ACE/ARB THEARPY RXD/TAKEN: ICD-10-PCS | Mod: S$GLB,,, | Performed by: FAMILY MEDICINE

## 2022-01-17 PROCEDURE — 3046F PR MOST RECENT HEMOGLOBIN A1C LEVEL > 9.0%: ICD-10-PCS | Mod: S$GLB,,, | Performed by: FAMILY MEDICINE

## 2022-01-17 PROCEDURE — 3008F PR BODY MASS INDEX (BMI) DOCUMENTED: ICD-10-PCS | Mod: S$GLB,,, | Performed by: FAMILY MEDICINE

## 2022-01-17 PROCEDURE — 3079F PR MOST RECENT DIASTOLIC BLOOD PRESSURE 80-89 MM HG: ICD-10-PCS | Mod: S$GLB,,, | Performed by: FAMILY MEDICINE

## 2022-01-17 PROCEDURE — 99214 OFFICE O/P EST MOD 30 MIN: CPT | Mod: S$GLB,,, | Performed by: FAMILY MEDICINE

## 2022-01-17 PROCEDURE — 3074F SYST BP LT 130 MM HG: CPT | Mod: S$GLB,,, | Performed by: FAMILY MEDICINE

## 2022-01-17 RX ORDER — ROSUVASTATIN CALCIUM 40 MG/1
40 TABLET, COATED ORAL NIGHTLY
Qty: 90 TABLET | Refills: 3 | Status: SHIPPED | OUTPATIENT
Start: 2022-01-17 | End: 2022-01-01 | Stop reason: SDUPTHER

## 2022-01-17 RX ORDER — METFORMIN HYDROCHLORIDE 500 MG/1
1000 TABLET, EXTENDED RELEASE ORAL 2 TIMES DAILY WITH MEALS
Qty: 360 TABLET | Refills: 3 | Status: SHIPPED | OUTPATIENT
Start: 2022-01-17 | End: 2022-01-01 | Stop reason: SDUPTHER

## 2022-01-17 RX ORDER — ENALAPRIL MALEATE 5 MG/1
5 TABLET ORAL DAILY
Qty: 90 TABLET | Refills: 3 | Status: SHIPPED | OUTPATIENT
Start: 2022-01-17 | End: 2022-01-01 | Stop reason: SDUPTHER

## 2022-01-17 RX ORDER — INSULIN ASPART INJECTION 100 [IU]/ML
12 INJECTION, SOLUTION SUBCUTANEOUS 2 TIMES DAILY WITH MEALS
Qty: 3 PEN | Refills: 9
Start: 2022-01-17 | End: 2022-06-14

## 2022-01-17 RX ORDER — DRONABINOL 5 MG/1
5 CAPSULE ORAL
COMMUNITY
Start: 2022-01-07 | End: 2022-02-02 | Stop reason: SDUPTHER

## 2022-01-17 RX ORDER — LAMOTRIGINE 25 MG/1
500 TABLET ORAL
Status: CANCELLED | OUTPATIENT
Start: 2022-01-18

## 2022-01-17 NOTE — PROGRESS NOTES
SUBJECTIVE:    Patient ID: Negrita Castro is a 61 y.o. female.    Chief Complaint: Pre-op Exam (Brought bottles// SW)    Patient with breast cancer metastasized to her bones. She has pain due to nerve compression and ligament damage.  She has also had fractures.  She has plans to have decompression surgery with kyphoplasty this is being postponed secondary to current rib fractures.  Her blood sugars have also been extremely elevated and this must be controlled prior to surgery.  Her fasting blood sugars are generally in the 130s.  Postprandial values are in the 200s.  This is an despite of a recent increase in her Trulicity  She continues to receive chemotherapy.     Telephone on 01/13/2022   Component Date Value Ref Range Status    Hemoglobin A1C 01/14/2022 11.1* <5.7 % of total Hgb Final   Telephone on 01/03/2022   Component Date Value Ref Range Status    WBC 01/14/2022 2.3* 3.8 - 10.8 Thousand/uL Final    RBC 01/14/2022 4.03  3.80 - 5.10 Million/uL Final    Hemoglobin 01/14/2022 13.1  11.7 - 15.5 g/dL Final    Hematocrit 01/14/2022 38.3  35.0 - 45.0 % Final    MCV 01/14/2022 95.0  80.0 - 100.0 fL Final    MCH 01/14/2022 32.5  27.0 - 33.0 pg Final    MCHC 01/14/2022 34.2  32.0 - 36.0 g/dL Final    RDW 01/14/2022 14.3  11.0 - 15.0 % Final    Platelets 01/14/2022 188  140 - 400 Thousand/uL Final    MPV 01/14/2022 9.1  7.5 - 12.5 fL Final    Neutrophils, Abs 01/14/2022 1,410* 1,500 - 7,800 cells/uL Final    Lymph # 01/14/2022 759* 850 - 3,900 cells/uL Final    Mono # 01/14/2022 101* 200 - 950 cells/uL Final    Eos # 01/14/2022 21  15 - 500 cells/uL Final    Baso # 01/14/2022 9  0 - 200 cells/uL Final    Neutrophils Relative 01/14/2022 61.3  % Final    Lymph % 01/14/2022 33.0  % Final    Mono % 01/14/2022 4.4  % Final    Eosinophil % 01/14/2022 0.9  % Final    Basophil % 01/14/2022 0.4  % Final    Differential Comment 01/14/2022 Review of peripheral smear confirms automated results.   Final    Lab Visit on 12/27/2021   Component Date Value Ref Range Status    Hemoglobin A1C 12/27/2021 11.1* 4.0 - 5.6 % Final    Estimated Avg Glucose 12/27/2021 272* 68 - 131 mg/dL Final   Ancillary Orders on 12/10/2021   Component Date Value Ref Range Status    WBC 12/21/2021 7.2  3.8 - 10.8 Thousand/uL Final    RBC 12/21/2021 4.32  3.80 - 5.10 Million/uL Final    Hemoglobin 12/21/2021 14.0  11.7 - 15.5 g/dL Final    Hematocrit 12/21/2021 42.6  35.0 - 45.0 % Final    MCV 12/21/2021 98.6  80.0 - 100.0 fL Final    MCH 12/21/2021 32.4  27.0 - 33.0 pg Final    MCHC 12/21/2021 32.9  32.0 - 36.0 g/dL Final    RDW 12/21/2021 13.2  11.0 - 15.0 % Final    Platelets 12/21/2021 324  140 - 400 Thousand/uL Final    MPV 12/21/2021 10.3  7.5 - 12.5 fL Final    Neutrophils, Abs 12/21/2021 5,558  1,500 - 7,800 cells/uL Final    Lymph # 12/21/2021 929  850 - 3,900 cells/uL Final    Mono # 12/21/2021 576  200 - 950 cells/uL Final    Eos # 12/21/2021 86  15 - 500 cells/uL Final    Baso # 12/21/2021 50  0 - 200 cells/uL Final    Neutrophils Relative 12/21/2021 77.2  % Final    Lymph % 12/21/2021 12.9  % Final    Mono % 12/21/2021 8.0  % Final    Eosinophil % 12/21/2021 1.2  % Final    Basophil % 12/21/2021 0.7  % Final    Glucose 12/21/2021 305* 65 - 139 mg/dL Final    BUN 12/21/2021 10  7 - 25 mg/dL Final    Creatinine 12/21/2021 0.89  0.50 - 1.05 mg/dL Final    eGFR if non  12/21/2021 71  > OR = 60 mL/min/1.73m2 Final    eGFR if  12/21/2021 82  > OR = 60 mL/min/1.73m2 Final    BUN/Creatinine Ratio 12/21/2021 NOT APPLICABLE  6 - 22 (calc) Final    Sodium 12/21/2021 137  135 - 146 mmol/L Final    Potassium 12/21/2021 4.2  3.5 - 5.3 mmol/L Final    Chloride 12/21/2021 99  98 - 110 mmol/L Final    CO2 12/21/2021 27  20 - 32 mmol/L Final    Calcium 12/21/2021 9.8  8.6 - 10.4 mg/dL Final    Total Protein 12/21/2021 7.5  6.1 - 8.1 g/dL Final    Albumin 12/21/2021 4.5  3.6 - 5.1  g/dL Final    Globulin, Total 12/21/2021 3.0  1.9 - 3.7 g/dL (calc) Final    Albumin/Globulin Ratio 12/21/2021 1.5  1.0 - 2.5 (calc) Final    Total Bilirubin 12/21/2021 0.7  0.2 - 1.2 mg/dL Final    Alkaline Phosphatase 12/21/2021 251* 37 - 153 U/L Final    AST 12/21/2021 18  10 - 35 U/L Final    ALT 12/21/2021 21  6 - 29 U/L Final   Ancillary Orders on 12/03/2021   Component Date Value Ref Range Status    WBC 12/07/2021 3.4* 3.8 - 10.8 Thousand/uL Final    RBC 12/07/2021 3.72* 3.80 - 5.10 Million/uL Final    Hemoglobin 12/07/2021 12.6  11.7 - 15.5 g/dL Final    Hematocrit 12/07/2021 38.4  35.0 - 45.0 % Final    MCV 12/07/2021 103.2* 80.0 - 100.0 fL Final    MCH 12/07/2021 33.9* 27.0 - 33.0 pg Final    MCHC 12/07/2021 32.8  32.0 - 36.0 g/dL Final    RDW 12/07/2021 15.0  11.0 - 15.0 % Final    Platelets 12/07/2021 330  140 - 400 Thousand/uL Final    MPV 12/07/2021 9.3  7.5 - 12.5 fL Final    Neutrophils, Abs 12/07/2021 2,043  1,500 - 7,800 cells/uL Final    Lymph # 12/07/2021 1,006  850 - 3,900 cells/uL Final    Mono # 12/07/2021 279  200 - 950 cells/uL Final    Eos # 12/07/2021 20  15 - 500 cells/uL Final    Baso # 12/07/2021 51  0 - 200 cells/uL Final    Neutrophils Relative 12/07/2021 60.1  % Final    Lymph % 12/07/2021 29.6  % Final    Mono % 12/07/2021 8.2  % Final    Eosinophil % 12/07/2021 0.6  % Final    Basophil % 12/07/2021 1.5  % Final    Glucose 12/07/2021 377* 65 - 99 mg/dL Final    BUN 12/07/2021 10  7 - 25 mg/dL Final    Creatinine 12/07/2021 0.84  0.50 - 1.05 mg/dL Final    eGFR if non  12/07/2021 76  > OR = 60 mL/min/1.73m2 Final    eGFR if  12/07/2021 88  > OR = 60 mL/min/1.73m2 Final    BUN/Creatinine Ratio 12/07/2021 NOT APPLICABLE  6 - 22 (calc) Final    Sodium 12/07/2021 138  135 - 146 mmol/L Final    Potassium 12/07/2021 4.5  3.5 - 5.3 mmol/L Final    Chloride 12/07/2021 103  98 - 110 mmol/L Final    CO2 12/07/2021 26  20 - 32  mmol/L Final    Calcium 12/07/2021 9.3  8.6 - 10.4 mg/dL Final    Total Protein 12/07/2021 6.8  6.1 - 8.1 g/dL Final    Albumin 12/07/2021 4.3  3.6 - 5.1 g/dL Final    Globulin, Total 12/07/2021 2.5  1.9 - 3.7 g/dL (calc) Final    Albumin/Globulin Ratio 12/07/2021 1.7  1.0 - 2.5 (calc) Final    Total Bilirubin 12/07/2021 0.5  0.2 - 1.2 mg/dL Final    Alkaline Phosphatase 12/07/2021 151  37 - 153 U/L Final    AST 12/07/2021 15  10 - 35 U/L Final    ALT 12/07/2021 17  6 - 29 U/L Final   Ancillary Orders on 11/26/2021   Component Date Value Ref Range Status    WBC 11/30/2021 1.7* 3.8 - 10.8 Thousand/uL Final    RBC 11/30/2021 3.61* 3.80 - 5.10 Million/uL Final    Hemoglobin 11/30/2021 12.1  11.7 - 15.5 g/dL Final    Hematocrit 11/30/2021 36.7  35.0 - 45.0 % Final    MCV 11/30/2021 101.7* 80.0 - 100.0 fL Final    MCH 11/30/2021 33.5* 27.0 - 33.0 pg Final    MCHC 11/30/2021 33.0  32.0 - 36.0 g/dL Final    RDW 11/30/2021 15.7* 11.0 - 15.0 % Final    Platelets 11/30/2021 163  140 - 400 Thousand/uL Final    MPV 11/30/2021 9.4  7.5 - 12.5 fL Final    Neutrophils, Abs 11/30/2021 585* 1,500 - 7,800 cells/uL Final    Lymph # 11/30/2021 870  850 - 3,900 cells/uL Final    Mono # 11/30/2021 194* 200 - 950 cells/uL Final    Eos # 11/30/2021 20  15 - 500 cells/uL Final    Baso # 11/30/2021 31  0 - 200 cells/uL Final    Neutrophils Relative 11/30/2021 34.4  % Final    Lymph % 11/30/2021 51.2  % Final    Mono % 11/30/2021 11.4  % Final    Eosinophil % 11/30/2021 1.2  % Final    Basophil % 11/30/2021 1.8  % Final    Glucose 11/30/2021 322* 65 - 139 mg/dL Final    BUN 11/30/2021 10  7 - 25 mg/dL Final    Creatinine 11/30/2021 0.86  0.50 - 1.05 mg/dL Final    eGFR if non  11/30/2021 74  > OR = 60 mL/min/1.73m2 Final    eGFR if  11/30/2021 86  > OR = 60 mL/min/1.73m2 Final    BUN/Creatinine Ratio 11/30/2021 NOT APPLICABLE  6 - 22 (calc) Final    Sodium 11/30/2021 138  135  - 146 mmol/L Final    Potassium 11/30/2021 3.7  3.5 - 5.3 mmol/L Final    Chloride 11/30/2021 101  98 - 110 mmol/L Final    CO2 11/30/2021 25  20 - 32 mmol/L Final    Calcium 11/30/2021 8.9  8.6 - 10.4 mg/dL Final    Total Protein 11/30/2021 6.5  6.1 - 8.1 g/dL Final    Albumin 11/30/2021 4.2  3.6 - 5.1 g/dL Final    Globulin, Total 11/30/2021 2.3  1.9 - 3.7 g/dL (calc) Final    Albumin/Globulin Ratio 11/30/2021 1.8  1.0 - 2.5 (calc) Final    Total Bilirubin 11/30/2021 0.5  0.2 - 1.2 mg/dL Final    Alkaline Phosphatase 11/30/2021 165* 37 - 153 U/L Final    AST 11/30/2021 19  10 - 35 U/L Final    ALT 11/30/2021 16  6 - 29 U/L Final   Ancillary Orders on 11/19/2021   Component Date Value Ref Range Status    WBC 11/24/2021 1.4* 3.8 - 10.8 Thousand/uL Final    RBC 11/24/2021 3.51* 3.80 - 5.10 Million/uL Final    Hemoglobin 11/24/2021 12.3  11.7 - 15.5 g/dL Final    Hematocrit 11/24/2021 35.9  35.0 - 45.0 % Final    MCV 11/24/2021 102.3* 80.0 - 100.0 fL Final    MCH 11/24/2021 35.0* 27.0 - 33.0 pg Final    MCHC 11/24/2021 34.3  32.0 - 36.0 g/dL Final    RDW 11/24/2021 15.7* 11.0 - 15.0 % Final    Platelets 11/24/2021 137* 140 - 400 Thousand/uL Final    MPV 11/24/2021 9.4  7.5 - 12.5 fL Final    Neutrophils, Abs 11/24/2021 710* 1,500 - 7,800 cells/uL Final    Lymph # 11/24/2021 574* 850 - 3,900 cells/uL Final    Mono # 11/24/2021 97* 200 - 950 cells/uL Final    Eos # 11/24/2021 10* 15 - 500 cells/uL Final    Baso # 11/24/2021 10  0 - 200 cells/uL Final    Neutrophils Relative 11/24/2021 50.7  % Final    Lymph % 11/24/2021 41.0  % Final    Mono % 11/24/2021 6.9  % Final    Eosinophil % 11/24/2021 0.7  % Final    Basophil % 11/24/2021 0.7  % Final    Differential Comment 11/24/2021 Due to leukopenia the differential percentages may be imprecise.   Final    Glucose 11/24/2021 378* 65 - 99 mg/dL Final    BUN 11/24/2021 15  7 - 25 mg/dL Final    Creatinine 11/24/2021 0.93  0.50 - 1.05 mg/dL  Final    eGFR if non  11/24/2021 67  > OR = 60 mL/min/1.73m2 Final    eGFR if  11/24/2021 78  > OR = 60 mL/min/1.73m2 Final    BUN/Creatinine Ratio 11/24/2021 NOT APPLICABLE  6 - 22 (calc) Final    Sodium 11/24/2021 136  135 - 146 mmol/L Final    Potassium 11/24/2021 4.1  3.5 - 5.3 mmol/L Final    Chloride 11/24/2021 100  98 - 110 mmol/L Final    CO2 11/24/2021 28  20 - 32 mmol/L Final    Calcium 11/24/2021 8.9  8.6 - 10.4 mg/dL Final    Total Protein 11/24/2021 6.2  6.1 - 8.1 g/dL Final    Albumin 11/24/2021 3.9  3.6 - 5.1 g/dL Final    Globulin, Total 11/24/2021 2.3  1.9 - 3.7 g/dL (calc) Final    Albumin/Globulin Ratio 11/24/2021 1.7  1.0 - 2.5 (calc) Final    Total Bilirubin 11/24/2021 0.5  0.2 - 1.2 mg/dL Final    Alkaline Phosphatase 11/24/2021 157* 37 - 153 U/L Final    AST 11/24/2021 15  10 - 35 U/L Final    ALT 11/24/2021 13  6 - 29 U/L Final   Admission on 11/08/2021, Discharged on 11/08/2021   Component Date Value Ref Range Status    WBC 11/08/2021 3.43* 3.90 - 12.70 K/uL Final    RBC 11/08/2021 3.64* 4.00 - 5.40 M/uL Final    Hemoglobin 11/08/2021 12.1  12.0 - 16.0 g/dL Final    Hematocrit 11/08/2021 36.1* 37.0 - 48.5 % Final    MCV 11/08/2021 99* 82 - 98 fL Final    MCH 11/08/2021 33.2* 27.0 - 31.0 pg Final    MCHC 11/08/2021 33.5  32.0 - 36.0 g/dL Final    RDW 11/08/2021 15.2* 11.5 - 14.5 % Final    Platelets 11/08/2021 384  150 - 450 K/uL Final    MPV 11/08/2021 9.8  9.2 - 12.9 fL Final    Immature Granulocytes 11/08/2021 0.9* 0.0 - 0.5 % Final    Gran # (ANC) 11/08/2021 2.7  1.8 - 7.7 K/uL Final    Immature Grans (Abs) 11/08/2021 0.03  0.00 - 0.04 K/uL Final    Lymph # 11/08/2021 0.5* 1.0 - 4.8 K/uL Final    Mono # 11/08/2021 0.1* 0.3 - 1.0 K/uL Final    Eos # 11/08/2021 0.0  0.0 - 0.5 K/uL Final    Baso # 11/08/2021 0.03  0.00 - 0.20 K/uL Final    nRBC 11/08/2021 0  0 /100 WBC Final    Gran % 11/08/2021 79.8* 38.0 - 73.0 % Final     Lymph % 11/08/2021 14.9* 18.0 - 48.0 % Final    Mono % 11/08/2021 2.9* 4.0 - 15.0 % Final    Eosinophil % 11/08/2021 0.6  0.0 - 8.0 % Final    Basophil % 11/08/2021 0.9  0.0 - 1.9 % Final    Differential Method 11/08/2021 Automated   Final    Sodium 11/08/2021 136  136 - 145 mmol/L Final    Potassium 11/08/2021 4.0  3.5 - 5.1 mmol/L Final    Chloride 11/08/2021 100  95 - 110 mmol/L Final    CO2 11/08/2021 22* 23 - 29 mmol/L Final    Glucose 11/08/2021 450* 70 - 110 mg/dL Final    BUN 11/08/2021 14  6 - 20 mg/dL Final    Creatinine 11/08/2021 1.0  0.5 - 1.4 mg/dL Final    Calcium 11/08/2021 9.6  8.7 - 10.5 mg/dL Final    Total Protein 11/08/2021 7.6  6.0 - 8.4 g/dL Final    Albumin 11/08/2021 3.7  3.5 - 5.2 g/dL Final    Total Bilirubin 11/08/2021 0.8  0.1 - 1.0 mg/dL Final    Alkaline Phosphatase 11/08/2021 242* 55 - 135 U/L Final    AST 11/08/2021 16  10 - 40 U/L Final    ALT 11/08/2021 30  10 - 44 U/L Final    Anion Gap 11/08/2021 14  8 - 16 mmol/L Final    eGFR if African American 11/08/2021 >60  >60 mL/min/1.73 m^2 Final    eGFR if non African American 11/08/2021 >60  >60 mL/min/1.73 m^2 Final    Troponin I 11/08/2021 0.026  0.000 - 0.026 ng/mL Final    D-Dimer 11/08/2021 0.37  <0.50 mg/L FEU Final   Ancillary Orders on 10/29/2021   Component Date Value Ref Range Status    WBC 11/01/2021 6.9  3.8 - 10.8 Thousand/uL Final    RBC 11/01/2021 3.74* 3.80 - 5.10 Million/uL Final    Hemoglobin 11/01/2021 12.1  11.7 - 15.5 g/dL Final    Hematocrit 11/01/2021 37.8  35.0 - 45.0 % Final    MCV 11/01/2021 101.1* 80.0 - 100.0 fL Final    MCH 11/01/2021 32.4  27.0 - 33.0 pg Final    MCHC 11/01/2021 32.0  32.0 - 36.0 g/dL Final    RDW 11/01/2021 18.0* 11.0 - 15.0 % Final    Platelets 11/01/2021 192  140 - 400 Thousand/uL Final    MPV 11/01/2021 9.8  7.5 - 12.5 fL Final    Neutrophils, Abs 11/01/2021 4,699  1,500 - 7,800 cells/uL Final    Lymph # 11/01/2021 1,214  850 - 3,900 cells/uL Final     Mono # 11/01/2021 952* 200 - 950 cells/uL Final    Eos # 11/01/2021 7* 15 - 500 cells/uL Final    Baso # 11/01/2021 28  0 - 200 cells/uL Final    Neutrophils Relative 11/01/2021 68.1  % Final    Lymph % 11/01/2021 17.6  % Final    Mono % 11/01/2021 13.8  % Final    Eosinophil % 11/01/2021 0.1  % Final    Basophil % 11/01/2021 0.4  % Final    Differential Comment 11/01/2021 Review of peripheral smear confirms automated results.   Final    Glucose 11/01/2021 333* 65 - 99 mg/dL Final    BUN 11/01/2021 12  7 - 25 mg/dL Final    Creatinine 11/01/2021 0.80  0.50 - 1.05 mg/dL Final    eGFR if non  11/01/2021 81  > OR = 60 mL/min/1.73m2 Final    eGFR if  11/01/2021 94  > OR = 60 mL/min/1.73m2 Final    BUN/Creatinine Ratio 11/01/2021 NOT APPLICABLE  6 - 22 (calc) Final    Sodium 11/01/2021 137  135 - 146 mmol/L Final    Potassium 11/01/2021 4.0  3.5 - 5.3 mmol/L Final    Chloride 11/01/2021 100  98 - 110 mmol/L Final    CO2 11/01/2021 26  20 - 32 mmol/L Final    Calcium 11/01/2021 9.1  8.6 - 10.4 mg/dL Final    Total Protein 11/01/2021 6.9  6.1 - 8.1 g/dL Final    Albumin 11/01/2021 4.3  3.6 - 5.1 g/dL Final    Globulin, Total 11/01/2021 2.6  1.9 - 3.7 g/dL (calc) Final    Albumin/Globulin Ratio 11/01/2021 1.7  1.0 - 2.5 (calc) Final    Total Bilirubin 11/01/2021 0.5  0.2 - 1.2 mg/dL Final    Alkaline Phosphatase 11/01/2021 282* 37 - 153 U/L Final    AST 11/01/2021 37* 10 - 35 U/L Final    ALT 11/01/2021 41* 6 - 29 U/L Final   Ancillary Orders on 10/22/2021   Component Date Value Ref Range Status    WBC 10/25/2021 1.7* 3.8 - 10.8 Thousand/uL Final    RBC 10/25/2021 3.70* 3.80 - 5.10 Million/uL Final    Hemoglobin 10/25/2021 12.2  11.7 - 15.5 g/dL Final    Hematocrit 10/25/2021 36.6  35.0 - 45.0 % Final    MCV 10/25/2021 98.9  80.0 - 100.0 fL Final    MCH 10/25/2021 33.0  27.0 - 33.0 pg Final    MCHC 10/25/2021 33.3  32.0 - 36.0 g/dL Final    RDW  10/25/2021 18.5* 11.0 - 15.0 % Final    Platelets 10/25/2021 170  140 - 400 Thousand/uL Final    MPV 10/25/2021 9.2  7.5 - 12.5 fL Final    Neutrophils, Abs 10/25/2021 835* 1,500 - 7,800 cells/uL Final    Lymph # 10/25/2021 649* 850 - 3,900 cells/uL Final    Mono # 10/25/2021 196* 200 - 950 cells/uL Final    Eos # 10/25/2021 10* 15 - 500 cells/uL Final    Baso # 10/25/2021 10  0 - 200 cells/uL Final    Neutrophils Relative 10/25/2021 49.1  % Final    Lymph % 10/25/2021 38.2  % Final    Mono % 10/25/2021 11.5  % Final    Eosinophil % 10/25/2021 0.6  % Final    Basophil % 10/25/2021 0.6  % Final    Glucose 10/25/2021 277* 65 - 99 mg/dL Final    BUN 10/25/2021 9  7 - 25 mg/dL Final    Creatinine 10/25/2021 0.89  0.50 - 1.05 mg/dL Final    eGFR if non  10/25/2021 71  > OR = 60 mL/min/1.73m2 Final    eGFR if  10/25/2021 82  > OR = 60 mL/min/1.73m2 Final    BUN/Creatinine Ratio 10/25/2021 NOT APPLICABLE  6 - 22 (calc) Final    Sodium 10/25/2021 138  135 - 146 mmol/L Final    Potassium 10/25/2021 3.9  3.5 - 5.3 mmol/L Final    Chloride 10/25/2021 102  98 - 110 mmol/L Final    CO2 10/25/2021 25  20 - 32 mmol/L Final    Calcium 10/25/2021 9.0  8.6 - 10.4 mg/dL Final    Total Protein 10/25/2021 7.0  6.1 - 8.1 g/dL Final    Albumin 10/25/2021 4.4  3.6 - 5.1 g/dL Final    Globulin, Total 10/25/2021 2.6  1.9 - 3.7 g/dL (calc) Final    Albumin/Globulin Ratio 10/25/2021 1.7  1.0 - 2.5 (calc) Final    Total Bilirubin 10/25/2021 0.4  0.2 - 1.2 mg/dL Final    Alkaline Phosphatase 10/25/2021 145  37 - 153 U/L Final    AST 10/25/2021 16  10 - 35 U/L Final    ALT 10/25/2021 19  6 - 29 U/L Final   There may be more visits with results that are not included.     Past Medical History:   Diagnosis Date    Acute hypoxemic respiratory failure 12/12/2020    Breast cancer     left breast    Cancer     RIGHT BREAST 10-15    Depression     Diabetes mellitus     Neuropathy      Panic attacks     S/P epidural steroid injection     Seizures     none since early 30's    Wears glasses     TO DRIVE     Past Surgical History:   Procedure Laterality Date    AXILLARY NODE DISSECTION Left 8/24/2020    Procedure: LYMPHADENECTOMY, AXILLARY;  Surgeon: Cory Vick MD;  Location: [facility];  Service: General;  Laterality: Left;  left breast mastectomy with possible axillary lymph node dissection    BREAST BIOPSY      right    BREAST LUMPECTOMY      BREAST RECONSTRUCTION      breast reconstruction with tummy [facility]      BREAST SURGERY      11-3-15 LUMPECTOMY, 12-2-15 REEXCISION    INCISION AND DRAINAGE OF ABSCESS Left 9/9/2020    Procedure: INCISION AND DRAINAGE, ABSCESS;  Surgeon: Cory Vick MD;  Location: [facility];  Service: General;  Laterality: Left;    INSERTION OF LOCALIZATION WIRE Left 8/24/2020    Procedure: INSERTION, LOCALIZATION WIRE;  Surgeon: Cory Vick MD;  Location: [facility];  Service: General;  Laterality: Left;  left breast lumpectomy with wire needle loc    MASTECTOMY      mastectomy 2016      MASTECTOMY, PARTIAL Left 3/19/2021    Procedure: MASTECTOMY, PARTIAL;  Surgeon: Cory Vick MD;  Location: [facility];  Service: General;  Laterality: Left;  lumpectomy  left breast     RECONSTRUCTION OF NIPPLE Right 11/5/2018    Procedure: RECONSTRUCTION-NIPPLE RIGHT;  Surgeon: Xiang Hernandez MD;  Location: [address];  Service: Plastics;  Laterality: Right;    SENTINEL LYMPH NODE BIOPSY Left 8/24/2020    Procedure: BIOPSY, LYMPH NODE, SENTINEL;  Surgeon: Cory Vick MD;  Location: [facility];  Service: General;  Laterality: Left;  left breast lumpectomy with left sentinel lymoh node       shoulder surgery and wrist      BILAT  BONE SPUR    WRIST SURGERY      RIGHT     Family History   Problem Relation Age of Onset    Anesthesia problems Neg Hx        Marital Status:   Alcohol History:  reports current alcohol use.  Tobacco  History:  reports that she quit smoking about 6 years ago. She has a 7.50 pack-year smoking history. She has never used smokeless tobacco.  Drug History:  reports previous drug use. Drug: Marijuana.    Review of patient's allergies indicates:   Allergen Reactions    Adhesive tape-silicones Hives     BANDAIDS    Polymycin Hives    Adhesive     Latex, natural rubber Hives and Itching    Neomycin-bacitracnzn-polymyxnb     Polyurethane-39 Hives       Current Outpatient Medications:     anastrozole (ARIMIDEX) 1 mg Tab, Take 1 mg by mouth once daily., Disp: , Rfl:     dextromethorphan-guaiFENesin  mg/5 ml (ROBITUSSIN-DM)  mg/5 mL liquid, Take 1 teaspoon q 6 hours prn cough., Disp: 300 mL, Rfl: 0    diazePAM (VALIUM) 10 MG Tab, Take 1 tablet (10 mg total) by mouth 4 (four) times daily as needed (anxiety)., Disp: 120 tablet, Rfl: 3    docusate sodium (COLACE) 100 MG capsule, Take 100 mg by mouth 2 (two) times daily., Disp: , Rfl:     dulaglutide (TRULICITY) 3 mg/0.5 mL pen injector, Inject 3 mg into the skin every 7 days., Disp: 4 pen, Rfl: 11    etodolac (LODINE) 400 MG tablet, Take 1 tablet (400 mg total) by mouth once daily., Disp: 30 tablet, Rfl: 1    furosemide (LASIX) 20 MG tablet, TAKE ONE TABLET BY MOUTH EVERY DAY, Disp: 30 tablet, Rfl: 6    insulin glargine (LANTUS U-100 INSULIN) 100 unit/mL injection, Inject 32 Units into the skin 2 (two) times a day., Disp: 18 mL, Rfl: 11    metoprolol tartrate (LOPRESSOR) 25 MG tablet, Take 1 tablet (25 mg total) by mouth 2 (two) times daily., Disp: 60 tablet, Rfl: 11    mometasone 0.1% (ELOCON) 0.1 % cream, Apply to affected area daily, Disp: 45 g, Rfl: 1    ondansetron (ZOFRAN-ODT) 8 MG TbDL, Take 1 tablet (8 mg total) by mouth every 6 to 8 hours as needed (nausea)., Disp: 30 tablet, Rfl: 1    oxyCODONE (ROXICODONE) 10 mg Tab immediate release tablet, Take 1 tablet (10 mg total) by mouth every 4 to 6 hours as needed for Pain., Disp: 120 tablet,  "Rfl: 0    oxyCODONE (ROXICODONE) 5 MG immediate release tablet, TAKE 1 TO 2 TABLETS BY MOUTH EVERY 4 TO 6 HOURS AS NEEDED FOR BREAKTHROUGH PAIN, Disp: , Rfl:     palbociclib (IBRANCE) 100 mg Cap, Take 100 mg by mouth once daily For 21 days, then take 7 days off.  Total cycle length 28 days.., Disp: 21 capsule, Rfl: 6    palbociclib 100 mg Tab, Take 100 mg by mouth once daily. for 21 days, then take 7 days off. Total cycle length 28 days, Disp: 21 tablet, Rfl: 6    pen needle, diabetic (BD ULTRA-FINE MARCE PEN NEEDLE) 32 gauge x 5/32" Ndle, Test blood sugar twice daily, Disp: 100 each, Rfl: 5    promethazine (PHENERGAN) 25 MG tablet, Take 1 tablet (25 mg total) by mouth every 4 to 6 hours as needed., Disp: 30 tablet, Rfl: 5    promethazine-codeine 6.25-10 mg/5 ml (PHENERGAN WITH CODEINE) 6.25-10 mg/5 mL syrup, 1 teaspoon every 6 hours prn cough., Disp: 450 mL, Rfl: 0    zolpidem (AMBIEN) 10 mg Tab, TAKE ONE TABLET BY MOUTH EVERY NIGHT AT BEDTIME, Disp: 30 tablet, Rfl: 3    carisoprodoL (SOMA) 350 MG tablet, Take 1 tablet (350 mg total) by mouth 3 (three) times daily as needed., Disp: 90 tablet, Rfl: 0    dronabinoL (MARINOL) 5 MG capsule, Take 5 mg by mouth 2 (two) times daily before meals., Disp: , Rfl:     enalapril (VASOTEC) 5 MG tablet, Take 1 tablet (5 mg total) by mouth once daily., Disp: 90 tablet, Rfl: 3    HYDROcodone-acetaminophen (NORCO)  mg per tablet, Take 1 tablet by mouth every 6 (six) hours as needed for Pain., Disp: 120 tablet, Rfl: 0    insulin aspart, niacinamide, (FIASP FLEXTOUCH U-100 INSULIN) 100 unit/mL (3 mL) InPn, Inject 12 Units into the skin 2 (two) times daily with meals., Disp: 3 pen, Rfl: 9    levoFLOXacin (LEVAQUIN) 500 MG tablet, Take 1 tablet (500 mg total) by mouth once daily. for 10 days, Disp: 10 tablet, Rfl: 0    metFORMIN (GLUCOPHAGE-XR) 500 MG ER 24hr tablet, Take 2 tablets (1,000 mg total) by mouth 2 (two) times daily with meals., Disp: 360 tablet, Rfl: 3    " "rosuvastatin (CRESTOR) 40 MG Tab, Take 1 tablet (40 mg total) by mouth every evening. For cholesterol, Disp: 90 tablet, Rfl: 3    Review of Systems   Constitutional: Negative for activity change, fatigue and unexpected weight change.   HENT: Negative for hearing loss, postnasal drip, sinus pressure, sore throat and voice change.    Eyes: Negative for photophobia and visual disturbance.   Respiratory: Negative for cough, shortness of breath and wheezing.    Cardiovascular: Negative for chest pain and palpitations.   Gastrointestinal: Negative for constipation, diarrhea and nausea.   Genitourinary: Negative for difficulty urinating, frequency, hematuria and urgency.   Musculoskeletal: Positive for arthralgias and back pain.   Skin: Negative for rash.   Neurological: Negative for weakness, light-headedness and headaches.   Hematological: Negative for adenopathy. Does not bruise/bleed easily.   Psychiatric/Behavioral: The patient is not nervous/anxious.           Objective:      Vitals:    01/17/22 1603   BP: 124/88   Pulse: 64   Weight: 77.1 kg (170 lb)   Height: 5' 11" (1.803 m)     Physical Exam  Constitutional:       Appearance: Normal appearance.   HENT:      Head: Normocephalic and atraumatic.      Mouth/Throat:      Mouth: Mucous membranes are moist.   Eyes:      Conjunctiva/sclera: Conjunctivae normal.   Pulmonary:      Effort: Pulmonary effort is normal.   Neurological:      General: No focal deficit present.      Mental Status: She is alert and oriented to person, place, and time.   Psychiatric:         Mood and Affect: Mood normal.         Behavior: Behavior normal.           Assessment:       1. Type 2 diabetes mellitus with hyperglycemia, with long-term current use of insulin    2. Mixed hyperlipidemia    3. Bone metastases    4. Chemotherapy-induced neutropenia    5. Chronic obstructive pulmonary disease, unspecified COPD type         Plan:       Type 2 diabetes mellitus with hyperglycemia, with long-term " current use of insulin  -     metFORMIN (GLUCOPHAGE-XR) 500 MG ER 24hr tablet; Take 2 tablets (1,000 mg total) by mouth 2 (two) times daily with meals.  Dispense: 360 tablet; Refill: 3  -     enalapril (VASOTEC) 5 MG tablet; Take 1 tablet (5 mg total) by mouth once daily.  Dispense: 90 tablet; Refill: 3  -     insulin aspart, niacinamide, (FIASP FLEXTOUCH U-100 INSULIN) 100 unit/mL (3 mL) InPn; Inject 12 Units into the skin 2 (two) times daily with meals.  Dispense: 3 pen; Refill: 9  Medications adjusted to improve diabetes control    Mixed hyperlipidemia  -     rosuvastatin (CRESTOR) 40 MG Tab; Take 1 tablet (40 mg total) by mouth every evening. For cholesterol  Dispense: 90 tablet; Refill: 3    Bone metastases   Will await improvement in diabetes control before clearing for surgery    Chemotherapy-induced neutropenia    Chronic obstructive pulmonary disease, unspecified COPD type      Follow up in about 7 weeks (around 3/7/2022) for Diabetic Check-Up.

## 2022-01-18 ENCOUNTER — INFUSION (OUTPATIENT)
Dept: INFUSION THERAPY | Facility: HOSPITAL | Age: 62
End: 2022-01-18
Attending: INTERNAL MEDICINE
Payer: MEDICARE

## 2022-01-18 VITALS
DIASTOLIC BLOOD PRESSURE: 101 MMHG | HEART RATE: 65 BPM | WEIGHT: 169 LBS | SYSTOLIC BLOOD PRESSURE: 160 MMHG | HEIGHT: 71 IN | BODY MASS INDEX: 23.66 KG/M2 | TEMPERATURE: 98 F | RESPIRATION RATE: 18 BRPM

## 2022-01-18 DIAGNOSIS — T45.1X5A CHEMOTHERAPY-INDUCED NEUTROPENIA: ICD-10-CM

## 2022-01-18 DIAGNOSIS — C50.512 MALIGNANT NEOPLASM OF LOWER-OUTER QUADRANT OF LEFT BREAST OF FEMALE, ESTROGEN RECEPTOR POSITIVE: ICD-10-CM

## 2022-01-18 DIAGNOSIS — Z17.0 MALIGNANT NEOPLASM OF LOWER-OUTER QUADRANT OF LEFT BREAST OF FEMALE, ESTROGEN RECEPTOR POSITIVE: ICD-10-CM

## 2022-01-18 DIAGNOSIS — D70.1 CHEMOTHERAPY-INDUCED NEUTROPENIA: ICD-10-CM

## 2022-01-18 DIAGNOSIS — C79.51 BONE METASTASES: Primary | ICD-10-CM

## 2022-01-18 DIAGNOSIS — Z17.1 MALIGNANT NEOPLASM OF UPPER-OUTER QUADRANT OF RIGHT BREAST IN FEMALE, ESTROGEN RECEPTOR NEGATIVE: ICD-10-CM

## 2022-01-18 DIAGNOSIS — C50.411 MALIGNANT NEOPLASM OF UPPER-OUTER QUADRANT OF RIGHT BREAST IN FEMALE, ESTROGEN RECEPTOR NEGATIVE: ICD-10-CM

## 2022-01-18 PROCEDURE — 96402 CHEMO HORMON ANTINEOPL SQ/IM: CPT

## 2022-01-18 PROCEDURE — 63600175 PHARM REV CODE 636 W HCPCS: Mod: JG | Performed by: INTERNAL MEDICINE

## 2022-01-18 RX ORDER — LAMOTRIGINE 25 MG/1
500 TABLET ORAL
Status: COMPLETED | OUTPATIENT
Start: 2022-01-18 | End: 2022-01-18

## 2022-01-18 RX ADMIN — FULVESTRANT 500 MG: 50 INJECTION, SOLUTION INTRAMUSCULAR at 03:01

## 2022-01-18 NOTE — PLAN OF CARE
Problem: Fatigue  Goal: Improved Activity Tolerance  Outcome: Ongoing, Progressing  Intervention: Promote Energy Conservation  Flowsheets (Taken 1/18/2022 1522)  Fatigue Management:   fatigue-related activity identified   frequent rest breaks encouraged  Sleep/Rest Enhancement:   relaxation techniques promoted   reading promoted   regular sleep/rest pattern promoted

## 2022-01-21 ENCOUNTER — TELEPHONE (OUTPATIENT)
Dept: NEUROSURGERY | Facility: CLINIC | Age: 62
End: 2022-01-21
Payer: MEDICARE

## 2022-01-24 NOTE — TELEPHONE ENCOUNTER
Informed pt that Dr. Flowers reviewed recent A!C and PCP notes. Unfortunately, he is not able to proceed with the surgery with the level being as high as it is. Pt reports that PCP started her on new medication and that it may take up to 6 weeks for the A!C to come down. Postponed surgery to after next follow up with PCP. Instructed pt to contact me with any questions or concerns. Pt voiced understanding.

## 2022-01-27 ENCOUNTER — OFFICE VISIT (OUTPATIENT)
Dept: HEMATOLOGY/ONCOLOGY | Facility: CLINIC | Age: 62
End: 2022-01-27
Payer: MEDICARE

## 2022-01-27 ENCOUNTER — TELEPHONE (OUTPATIENT)
Dept: HEMATOLOGY/ONCOLOGY | Facility: CLINIC | Age: 62
End: 2022-01-27
Payer: MEDICARE

## 2022-01-27 VITALS
WEIGHT: 169 LBS | TEMPERATURE: 98 F | HEART RATE: 86 BPM | HEIGHT: 71 IN | DIASTOLIC BLOOD PRESSURE: 78 MMHG | RESPIRATION RATE: 18 BRPM | SYSTOLIC BLOOD PRESSURE: 131 MMHG | BODY MASS INDEX: 23.66 KG/M2

## 2022-01-27 DIAGNOSIS — C50.411 MALIGNANT NEOPLASM OF UPPER-OUTER QUADRANT OF RIGHT BREAST IN FEMALE, ESTROGEN RECEPTOR NEGATIVE: Primary | ICD-10-CM

## 2022-01-27 DIAGNOSIS — C79.51 BONE METASTASES: ICD-10-CM

## 2022-01-27 DIAGNOSIS — Z17.1 MALIGNANT NEOPLASM OF UPPER-OUTER QUADRANT OF RIGHT BREAST IN FEMALE, ESTROGEN RECEPTOR NEGATIVE: Primary | ICD-10-CM

## 2022-01-27 PROCEDURE — 3075F SYST BP GE 130 - 139MM HG: CPT | Mod: S$GLB,,, | Performed by: INTERNAL MEDICINE

## 2022-01-27 PROCEDURE — 4010F PR ACE/ARB THEARPY RXD/TAKEN: ICD-10-PCS | Mod: S$GLB,,, | Performed by: INTERNAL MEDICINE

## 2022-01-27 PROCEDURE — 3046F PR MOST RECENT HEMOGLOBIN A1C LEVEL > 9.0%: ICD-10-PCS | Mod: S$GLB,,, | Performed by: INTERNAL MEDICINE

## 2022-01-27 PROCEDURE — 3008F BODY MASS INDEX DOCD: CPT | Mod: S$GLB,,, | Performed by: INTERNAL MEDICINE

## 2022-01-27 PROCEDURE — 3078F DIAST BP <80 MM HG: CPT | Mod: S$GLB,,, | Performed by: INTERNAL MEDICINE

## 2022-01-27 PROCEDURE — 99213 OFFICE O/P EST LOW 20 MIN: CPT | Mod: S$GLB,,, | Performed by: INTERNAL MEDICINE

## 2022-01-27 PROCEDURE — 3075F PR MOST RECENT SYSTOLIC BLOOD PRESS GE 130-139MM HG: ICD-10-PCS | Mod: S$GLB,,, | Performed by: INTERNAL MEDICINE

## 2022-01-27 PROCEDURE — 99213 PR OFFICE/OUTPT VISIT, EST, LEVL III, 20-29 MIN: ICD-10-PCS | Mod: S$GLB,,, | Performed by: INTERNAL MEDICINE

## 2022-01-27 PROCEDURE — 3008F PR BODY MASS INDEX (BMI) DOCUMENTED: ICD-10-PCS | Mod: S$GLB,,, | Performed by: INTERNAL MEDICINE

## 2022-01-27 PROCEDURE — 4010F ACE/ARB THERAPY RXD/TAKEN: CPT | Mod: S$GLB,,, | Performed by: INTERNAL MEDICINE

## 2022-01-27 PROCEDURE — 3078F PR MOST RECENT DIASTOLIC BLOOD PRESSURE < 80 MM HG: ICD-10-PCS | Mod: S$GLB,,, | Performed by: INTERNAL MEDICINE

## 2022-01-27 PROCEDURE — 3046F HEMOGLOBIN A1C LEVEL >9.0%: CPT | Mod: S$GLB,,, | Performed by: INTERNAL MEDICINE

## 2022-01-27 NOTE — PROGRESS NOTES
"  [facility] In Office Hematology Oncology Subsequent  Encounter Note    1/27/22      Subjective:      Patient ID:   Negrita Castro  61 y.o.   Martínez Shepard R. Leblanc, [place], Bourgeois, [place]      Chief Complaint:   New L breast cancer eval.    HPI:  61 y.o. female with diagnosis of Stage 1 R breast cancer 10/26/15.  "Triple negative".  Adjuvant AC x's 4, tx's 12 and Rad Rx through 9/12/16.    Port removed.  Had bilateral reconstruction surgery 4/14/17.  Further reconstruction, tummy veeck 8/14/17.  Additional fat injection at R chest done for symmetry.  She had placement of R nipple.  Dr. Hernandez.      She is due for additional reconstructive surgery at breast and abdomenal areas.  The surgery is being done as part of her reconstruction efforts and for keloid management.  The date of the surgery is May 19th.    Also, Dr. Alarcon to consider Rad Rx to operative site to decrease keloid potential.  On hold now because of Covid 19 pandemic.    Recent Mamm/U/S showed small mass at 4:00 at L breast.  Needle Bx invasive ductal Ca, ERP+, PRP+, H2N neg  She had  L partial mastectomy, Sentinal node Bx 8/24/20. Followed by another surgery because of L axillary infection.  Primary 2 cm, LN neg, Stage 1 dx.    Discussed Oncotype Dx testing, this supported adding adjuvant chemotherapy to adjuvant hormonal therapy because of increased risk of cancer recurrence long-term.  Discussed BRCA testing, given her bilateral  breast Dx. PHN would not authorize BRCA 1 & 2.  Refill meds for her.    She had COVID-19 infection and was hospitalized.  She came very close to requiring intubation and mechanical ventilation.  She is off oxygen now,   and sees Dr. Justin of Pulmonary.  Recent CT scan of the chest showed residual changes of interstitial disease, consistent with recovering from COVID-19.  Also lytic lesions were seen in the thoracic spine area very suspicious for metastatic disease at T5 through T8 spine.    Recently on " Saturday night she had a hard coughing spell, and thereafter developed severe right posterior pleuritic chest pain.  She has point tenderness over the right posterior chest wall and may very well have a fractured rib at the site.  Rib x-rays have been ordered.  I have refilled her Soma for 1 month.  I have refilled her Norco  for 1 month.  I have added oxycodone 5 mg 1 or 2 p.o. Q 3-4 hours p.r.n. breakthrough pain for 1 month to her regimen.    She also has an enlarging nodule at the left chest wall.  Ultrasound of the site suggested that this was a cyst however the areas firm movable and may very well be cancer.  I asked  for a ultrasound-guided biopsy of the mass per radiology.  Path report showed invasive ductal Ca, ERP + PRP neg,   H2N equivocal.    She is postmenopausal, her last menses were 4 5 years ago.  Rib x-rays have been requested.  Will order a CT scan to evaluate for possible metastatic disease.  Will order a ultrasound-guided biopsy of the left chest wall mass for pathologic examination at [facility].  Bx + invasive breast cancer.  ERP+, PRP-, H2N-.    Suspected local recurrence at L breast.  She had L lumpectomy.  Margins clear.    Refer to Rad Rx MD for adjuvant Rad Rx., for local control.  She has T spine metastases.  Currently on hormonal Rx.  To see Dr. Manuelito Shepard for DM, BS control.    Dr. Washington saw her for C spine eval, no surgery planned for now.  He referred her to Dr. Rivers for injection Rx.    DM, cholesterol, epilepsy hx, DJD, LBP.  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    LR shoulder arthroscopy, R wrist surgery, M0.    Smoke  ppd x's 35 years, quit 2015.  ETOH no.  Disability-depression, epilepsy  Allergy none    Mom ovarian cancer  Dad lung cancer  Sisters DM, lung dx.    Summary of care:  Right breast cancer 2015, triple negative, treated with Adriamycin Cytoxan followed by taxane.    Left breast cancer ERP positive PRP positive HER2 Judy negative in  August 24, 2020    COVID infection December 2020.  She has not taken the covid 19 vaccination because of multiple allergy hx.    February 2021 bone metastases determined.    He he March 21, 2021 local left breast cancer recurrence, ERP positive PRP negative HER2 Judy negative.    He April 21st bone biopsy positive for breast cancer metastatic.  ERP, PRP, HER2 Judy pending.    She was on radiation therapy through May 17, 2021.  Refilled Marinol at increased 5 mg 1 p.o. b.i.d. number 60.   I refilled her Soma, Norco, Marinol, and Silvadene cream; dated the prescriptions for June 17, 2021.    C/O mid thoracic pain x's 6-8 weeks,radiates to L & R, increased.  Tender over Mid thoracic area.  Hx of T spine metastatic dx.  Pain controlled on Norco 10/325 mg and oxycodone 5 mg 1-2 po PRN.  Add lodine 400 mg 1 daily    Checked MRI of T spine.  Metastatic dx with pathologic fx at T3,5,6.  Refer to Dr. Flowers of neurosurgery for Vertebroplasty or Kyphoplasty evaluation.    She is on Faslodex. Ibrance. Monitor WBC count.    She saw Dr. Flowers, and has been fitted with a brace initially, vertebroplasty after the first of the year.  [phone].  RTC Dr. Flowers 12/27/21.    Vertibroplasty on hold, because HA1C > 8.     There is darkening of L posterior chest wall, rad rx effect?      ROS:   GEN: normal without any fever, night sweats or weight loss  HEENT: normal with no HA's, sore throat, stiff neck, changes in vision  CV: normal with no CP, SOB, PND, POOL or orthopnea  PULM: normal with no SOB, cough, hemoptysis, sputum or pleuritic pain  GI: normal with no abdominal pain, nausea, vomiting, constipation, diarrhea, melanotic stools, BRBPR, or hematemesis  : normal with no hematuria, dysuria  BREAST: See HPI.  SKIN: normal with no rash, erythema, bruising, or swelling     Past Medical History:   Diagnosis Date    Acute hypoxemic respiratory failure 12/12/2020    Breast cancer     left breast    Cancer     RIGHT BREAST 10-15     Depression     Diabetes mellitus     Neuropathy     Panic attacks     S/P epidural steroid injection     Seizures     none since early 30's    Wears glasses     TO DRIVE     Past Surgical History:   Procedure Laterality Date    AXILLARY NODE DISSECTION Left 8/24/2020    Procedure: LYMPHADENECTOMY, AXILLARY;  Surgeon: Cory Vick MD;  Location: [facility] OR;  Service: General;  Laterality: Left;  left breast mastectomy with possible axillary lymph node dissection    BREAST BIOPSY      right    BREAST LUMPECTOMY      BREAST RECONSTRUCTION      breast reconstruction with tummy [facility]      BREAST SURGERY      11-3-15 LUMPECTOMY, 12-2-15 REEXCISION    INCISION AND DRAINAGE OF ABSCESS Left 9/9/2020    Procedure: INCISION AND DRAINAGE, ABSCESS;  Surgeon: Cory Vick MD;  Location: [facility] OR;  Service: General;  Laterality: Left;    INSERTION OF LOCALIZATION WIRE Left 8/24/2020    Procedure: INSERTION, LOCALIZATION WIRE;  Surgeon: Cory Vick MD;  Location: [facility];  Service: General;  Laterality: Left;  left breast lumpectomy with wire needle loc    MASTECTOMY      mastectomy 2016      MASTECTOMY, PARTIAL Left 3/19/2021    Procedure: MASTECTOMY, PARTIAL;  Surgeon: Cory Vick MD;  Location: [facility] OR;  Service: General;  Laterality: Left;  lumpectomy  left breast     RECONSTRUCTION OF NIPPLE Right 11/5/2018    Procedure: RECONSTRUCTION-NIPPLE RIGHT;  Surgeon: Xiang Hernandez MD;  Location: [address];  Service: Plastics;  Laterality: Right;    SENTINEL LYMPH NODE BIOPSY Left 8/24/2020    Procedure: BIOPSY, LYMPH NODE, SENTINEL;  Surgeon: Cory Vick MD;  Location: [facility];  Service: General;  Laterality: Left;  left breast lumpectomy with left sentinel lymoh node       shoulder surgery and wrist      BILAT  BONE SPUR    WRIST SURGERY      RIGHT       Review of patient's allergies indicates:   Allergen Reactions    Adhesive tape-silicones Hives     BANDAIDS     Polymycin Hives    Latex, natural rubber Itching    Polyurethane-39            Current Outpatient Medications:     anastrozole (ARIMIDEX) 1 mg Tab, Take 1 mg by mouth once daily., Disp: , Rfl:     carisoprodoL (SOMA) 350 MG tablet, Take 1 tablet (350 mg total) by mouth 3 (three) times daily as needed., Disp: 90 tablet, Rfl: 0    dextromethorphan-guaiFENesin  mg/5 ml (ROBITUSSIN-DM)  mg/5 mL liquid, Take 1 teaspoon q 6 hours prn cough., Disp: 300 mL, Rfl: 0    diazePAM (VALIUM) 10 MG Tab, Take 1 tablet (10 mg total) by mouth 4 (four) times daily as needed (anxiety)., Disp: 120 tablet, Rfl: 3    docusate sodium (COLACE) 100 MG capsule, Take 100 mg by mouth 2 (two) times daily., Disp: , Rfl:     dronabinoL (MARINOL) 5 MG capsule, Take 5 mg by mouth 2 (two) times daily before meals., Disp: , Rfl:     dulaglutide (TRULICITY) 3 mg/0.5 mL pen injector, Inject 3 mg into the skin every 7 days., Disp: 4 pen, Rfl: 11    enalapril (VASOTEC) 5 MG tablet, Take 1 tablet (5 mg total) by mouth once daily., Disp: 90 tablet, Rfl: 3    etodolac (LODINE) 400 MG tablet, Take 1 tablet (400 mg total) by mouth once daily., Disp: 30 tablet, Rfl: 1    furosemide (LASIX) 20 MG tablet, TAKE ONE TABLET BY MOUTH EVERY DAY, Disp: 30 tablet, Rfl: 6    HYDROcodone-acetaminophen (NORCO)  mg per tablet, Take 1 tablet by mouth every 6 (six) hours as needed for Pain., Disp: 120 tablet, Rfl: 0    insulin aspart, niacinamide, (FIASP FLEXTOUCH U-100 INSULIN) 100 unit/mL (3 mL) InPn, Inject 12 Units into the skin 2 (two) times daily with meals., Disp: 3 pen, Rfl: 9    insulin glargine (LANTUS U-100 INSULIN) 100 unit/mL injection, Inject 32 Units into the skin 2 (two) times a day., Disp: 18 mL, Rfl: 11    levoFLOXacin (LEVAQUIN) 500 MG tablet, Take 1 tablet (500 mg total) by mouth once daily. for 10 days, Disp: 10 tablet, Rfl: 0    metFORMIN (GLUCOPHAGE-XR) 500 MG ER 24hr tablet, Take 2 tablets (1,000 mg total) by mouth 2  "(two) times daily with meals., Disp: 360 tablet, Rfl: 3    metoprolol tartrate (LOPRESSOR) 25 MG tablet, Take 1 tablet (25 mg total) by mouth 2 (two) times daily., Disp: 60 tablet, Rfl: 11    mometasone 0.1% (ELOCON) 0.1 % cream, Apply to affected area daily, Disp: 45 g, Rfl: 1    ondansetron (ZOFRAN-ODT) 8 MG TbDL, Take 1 tablet (8 mg total) by mouth every 6 to 8 hours as needed (nausea)., Disp: 30 tablet, Rfl: 1    oxyCODONE (ROXICODONE) 10 mg Tab immediate release tablet, Take 1 tablet (10 mg total) by mouth every 4 to 6 hours as needed for Pain., Disp: 120 tablet, Rfl: 0    oxyCODONE (ROXICODONE) 5 MG immediate release tablet, TAKE 1 TO 2 TABLETS BY MOUTH EVERY 4 TO 6 HOURS AS NEEDED FOR BREAKTHROUGH PAIN, Disp: , Rfl:     palbociclib (IBRANCE) 100 mg Cap, Take 100 mg by mouth once daily For 21 days, then take 7 days off.  Total cycle length 28 days.., Disp: 21 capsule, Rfl: 6    palbociclib 100 mg Tab, Take 100 mg by mouth once daily. for 21 days, then take 7 days off. Total cycle length 28 days, Disp: 21 tablet, Rfl: 6    pen needle, diabetic (BD ULTRA-FINE MARCE PEN NEEDLE) 32 gauge x 5/32" Ndle, Test blood sugar twice daily, Disp: 100 each, Rfl: 5    promethazine (PHENERGAN) 25 MG tablet, Take 1 tablet (25 mg total) by mouth every 4 to 6 hours as needed., Disp: 30 tablet, Rfl: 5    promethazine-codeine 6.25-10 mg/5 ml (PHENERGAN WITH CODEINE) 6.25-10 mg/5 mL syrup, 1 teaspoon every 6 hours prn cough., Disp: 450 mL, Rfl: 0    rosuvastatin (CRESTOR) 40 MG Tab, Take 1 tablet (40 mg total) by mouth every evening. For cholesterol, Disp: 90 tablet, Rfl: 3    zolpidem (AMBIEN) 10 mg Tab, TAKE ONE TABLET BY MOUTH EVERY NIGHT AT BEDTIME, Disp: 30 tablet, Rfl: 3          Objective:   Vitals:  Blood pressure 131/78, pulse 86, temperature 97.8 °F (36.6 °C), resp. rate 18, height 5' 11" (1.803 m), weight 76.7 kg (169 lb), last menstrual period 01/16/2016.    Physical Examination:   GEN: no apparent distress, " comfortable, Brace in place.  HEAD: atraumatic and normocephalic  EYES: no pallor, no icterus  ENT: no pharyngeal erythema  NECK: noLAD/LN's, supple  CV:  No edema  CHEST: Normal respiratory effort   she is not  tender over the right posterior chest wall on exam.  The chest wall is not swollen.  ABDOM:  nondistended; soft                                                                                          MUSC/Skeletal: ROM normal, Tender over mid T spine area.  EXTREM: no swelling  SKIN: She is developing keloid changes at L breast incision and navel surgical sites.  This area appears to be enlarging.  NEURO: grossly intact  PSYCH: normal mood, affect and behavior  LYMPH: normal  LN's  BREASTS: L breast is much improved.    Labs: WBC 7,200  BS better 305.  OK to resume Ibrance today x's 21 days.  Refill Oxycodone, SOMA, Norco, Cough syrup.      Assessment:   (1) 61 y.o. female with diagnosis of Stage 1 triple negative R breast cancer, 10/2016.       S/P R mastectomy, SNBx, AC x's 4, T x's12 weeks, Rad Rx and recent reconstruction.    (2) New L invasive ductal Ca, ERP+, PRP+, H2N neg.  Clinical stage 1 dx.    I have started her on Arimidex 1 mg p.o. daily  for metastatic disease at this time.    Suspected fracture of right ribs after recent cough event, check rib x-rays, medicate for pain.    ERP+ dx, bone metastases.  Started on Arimidex daily.  Add 2nd hormonal drug Rx after Rad Rx completed.    Bone biopsy positive for metastatic breast cancer.  ERP+, PRP+, H2N-.    Skin at chest wall and breast area is closed NT, healed.    Continue with Faslodex and Ibrance.    C/O mid thoracic pain.  Tender on exam.  Hx of T spine mets, Hx of Rad Rx to T spine.  MRI of T spine.  [facility].  Pathologic Fx of T3,5,6.    Dr. Flowers for Vertebroplasty or Kyphoplasty after the first of the year.  Fitted for a brace initially.      RTC 4 weeks.

## 2022-02-02 DIAGNOSIS — R05.9 COUGH: ICD-10-CM

## 2022-02-02 DIAGNOSIS — Z17.0 MALIGNANT NEOPLASM OF LOWER-OUTER QUADRANT OF LEFT BREAST OF FEMALE, ESTROGEN RECEPTOR POSITIVE: ICD-10-CM

## 2022-02-02 DIAGNOSIS — C50.311 MALIGNANT NEOPLASM OF LOWER-INNER QUADRANT OF RIGHT BREAST OF FEMALE, ESTROGEN RECEPTOR POSITIVE: ICD-10-CM

## 2022-02-02 DIAGNOSIS — Z17.0 MALIGNANT NEOPLASM OF LOWER-INNER QUADRANT OF RIGHT BREAST OF FEMALE, ESTROGEN RECEPTOR POSITIVE: ICD-10-CM

## 2022-02-02 DIAGNOSIS — R07.81 PLEURITIC CHEST PAIN: ICD-10-CM

## 2022-02-02 DIAGNOSIS — C50.512 MALIGNANT NEOPLASM OF LOWER-OUTER QUADRANT OF LEFT BREAST OF FEMALE, ESTROGEN RECEPTOR POSITIVE: ICD-10-CM

## 2022-02-02 RX ORDER — PROMETHAZINE HYDROCHLORIDE AND CODEINE PHOSPHATE 6.25; 1 MG/5ML; MG/5ML
SOLUTION ORAL
Qty: 450 ML | Refills: 0 | Status: SHIPPED | OUTPATIENT
Start: 2022-02-02 | End: 2022-05-11

## 2022-02-02 RX ORDER — DRONABINOL 5 MG/1
5 CAPSULE ORAL
Qty: 60 CAPSULE | Refills: 0 | Status: SHIPPED | OUTPATIENT
Start: 2022-02-02 | End: 2022-06-30 | Stop reason: SDUPTHER

## 2022-02-02 RX ORDER — CARISOPRODOL 350 MG/1
350 TABLET ORAL 3 TIMES DAILY PRN
Qty: 90 TABLET | Refills: 0 | Status: SHIPPED | OUTPATIENT
Start: 2022-02-02 | End: 2022-02-28 | Stop reason: SDUPTHER

## 2022-02-02 RX ORDER — HYDROCODONE BITARTRATE AND ACETAMINOPHEN 10; 325 MG/1; MG/1
1 TABLET ORAL EVERY 6 HOURS PRN
Qty: 120 TABLET | Refills: 0 | Status: SHIPPED | OUTPATIENT
Start: 2022-02-02 | End: 2022-02-28 | Stop reason: SDUPTHER

## 2022-02-02 NOTE — TELEPHONE ENCOUNTER
----- Message from Nell Carrillo sent at 2/2/2022  9:43 AM CST -----  The patient said she needs Norco, Soma, Marinol ,and cough syrup sent to pharmacy. Please call her when done.

## 2022-02-12 NOTE — TELEPHONE ENCOUNTER
Left message for patient that we haven't heard anything from her pharmacy regarding the Diflucan, so maybe they contacted her eye doctor since he was the one who put her on the antibiotics for Shingles. Instructed to call us for any questions.                ----- Message from Marilu Mendoza sent at 5/11/2018  1:56 PM CDT -----  Patient called in stating that her pharmacy was going to give us a call in regards to filling some Diflucan for her. She said that her eye Dr put her on some very strong antibiotics for shingles of the eye. She would like a call if and when this is completed. Please contact patient at [phone]. Thanks      You can access the FollowMyHealth Patient Portal offered by [facility] by registering at the following website: [email or web address]. By joining [employer]’s FollowMyHealth portal, you will also be able to view your health information using other applications (apps) compatible with our system.

## 2022-02-13 RX ORDER — LAMOTRIGINE 25 MG/1
500 TABLET ORAL
Status: CANCELLED | OUTPATIENT
Start: 2022-02-15

## 2022-02-15 ENCOUNTER — INFUSION (OUTPATIENT)
Dept: INFUSION THERAPY | Facility: HOSPITAL | Age: 62
End: 2022-02-15
Attending: INTERNAL MEDICINE
Payer: MEDICARE

## 2022-02-15 VITALS
TEMPERATURE: 98 F | SYSTOLIC BLOOD PRESSURE: 163 MMHG | HEART RATE: 73 BPM | DIASTOLIC BLOOD PRESSURE: 91 MMHG | OXYGEN SATURATION: 98 % | HEIGHT: 71 IN | WEIGHT: 169.88 LBS | BODY MASS INDEX: 23.78 KG/M2 | RESPIRATION RATE: 18 BRPM

## 2022-02-15 DIAGNOSIS — C50.512 MALIGNANT NEOPLASM OF LOWER-OUTER QUADRANT OF LEFT BREAST OF FEMALE, ESTROGEN RECEPTOR POSITIVE: ICD-10-CM

## 2022-02-15 DIAGNOSIS — T45.1X5A CHEMOTHERAPY-INDUCED NEUTROPENIA: ICD-10-CM

## 2022-02-15 DIAGNOSIS — Z17.1 MALIGNANT NEOPLASM OF UPPER-OUTER QUADRANT OF RIGHT BREAST IN FEMALE, ESTROGEN RECEPTOR NEGATIVE: ICD-10-CM

## 2022-02-15 DIAGNOSIS — C50.411 MALIGNANT NEOPLASM OF UPPER-OUTER QUADRANT OF RIGHT BREAST IN FEMALE, ESTROGEN RECEPTOR NEGATIVE: ICD-10-CM

## 2022-02-15 DIAGNOSIS — C79.51 BONE METASTASES: Primary | ICD-10-CM

## 2022-02-15 DIAGNOSIS — Z17.0 MALIGNANT NEOPLASM OF LOWER-OUTER QUADRANT OF LEFT BREAST OF FEMALE, ESTROGEN RECEPTOR POSITIVE: ICD-10-CM

## 2022-02-15 DIAGNOSIS — D70.1 CHEMOTHERAPY-INDUCED NEUTROPENIA: ICD-10-CM

## 2022-02-15 PROCEDURE — 63600175 PHARM REV CODE 636 W HCPCS: Mod: JG | Performed by: INTERNAL MEDICINE

## 2022-02-15 PROCEDURE — 96402 CHEMO HORMON ANTINEOPL SQ/IM: CPT

## 2022-02-15 RX ORDER — LAMOTRIGINE 25 MG/1
500 TABLET ORAL
Status: COMPLETED | OUTPATIENT
Start: 2022-02-15 | End: 2022-02-15

## 2022-02-15 RX ORDER — OXYCODONE HYDROCHLORIDE 10 MG/1
10 TABLET ORAL
Qty: 120 TABLET | Refills: 0 | Status: SHIPPED | OUTPATIENT
Start: 2022-02-15 | End: 2022-03-11 | Stop reason: SDUPTHER

## 2022-02-15 RX ADMIN — FULVESTRANT 500 MG: 50 INJECTION, SOLUTION INTRAMUSCULAR at 03:02

## 2022-02-15 NOTE — PLAN OF CARE
Problem: Fatigue  Goal: Improved Activity Tolerance  Outcome: Ongoing, Progressing  Intervention: Promote Improved Energy  Flowsheets (Taken 2/15/2022 6824)  Fatigue Management: frequent rest breaks encouraged  Sleep/Rest Enhancement: regular sleep/rest pattern promoted  Activity Management:   Ambulated -L4   Up in chair - L1

## 2022-02-15 NOTE — TELEPHONE ENCOUNTER
----- Message from Nell Carrillo sent at 2/15/2022  2:07 PM CST -----  The patient needs oxycontin 10mg #120. She has treatment next door at 3pm and will  then.

## 2022-02-17 ENCOUNTER — TELEPHONE (OUTPATIENT)
Dept: FAMILY MEDICINE | Facility: CLINIC | Age: 62
End: 2022-02-17
Payer: MEDICARE

## 2022-02-17 DIAGNOSIS — E11.65 TYPE 2 DIABETES MELLITUS WITH HYPERGLYCEMIA, WITH LONG-TERM CURRENT USE OF INSULIN: Primary | ICD-10-CM

## 2022-02-17 DIAGNOSIS — Z79.4 TYPE 2 DIABETES MELLITUS WITH HYPERGLYCEMIA, WITH LONG-TERM CURRENT USE OF INSULIN: Primary | ICD-10-CM

## 2022-02-17 NOTE — TELEPHONE ENCOUNTER
----- Message from Melissa Alin sent at 2/17/2022  2:52 PM CST -----  Pt calling she had an appt 3/2 provider will be out she declined to r/s for 2/21 said she needs a Ha1c before her surgery.  She wants a nurse to call her back to schedule something and wants to confirm if labs were ordered. [phone]

## 2022-02-28 ENCOUNTER — TELEPHONE (OUTPATIENT)
Dept: FAMILY MEDICINE | Facility: CLINIC | Age: 62
End: 2022-02-28
Payer: MEDICARE

## 2022-02-28 ENCOUNTER — OFFICE VISIT (OUTPATIENT)
Dept: HEMATOLOGY/ONCOLOGY | Facility: CLINIC | Age: 62
End: 2022-02-28
Payer: MEDICARE

## 2022-02-28 VITALS
BODY MASS INDEX: 23.52 KG/M2 | HEART RATE: 75 BPM | SYSTOLIC BLOOD PRESSURE: 176 MMHG | TEMPERATURE: 98 F | DIASTOLIC BLOOD PRESSURE: 96 MMHG | RESPIRATION RATE: 18 BRPM | HEIGHT: 71 IN | WEIGHT: 168 LBS

## 2022-02-28 DIAGNOSIS — Z17.1 MALIGNANT NEOPLASM OF UPPER-OUTER QUADRANT OF RIGHT BREAST IN FEMALE, ESTROGEN RECEPTOR NEGATIVE: ICD-10-CM

## 2022-02-28 DIAGNOSIS — Z17.0 MALIGNANT NEOPLASM OF LOWER-INNER QUADRANT OF RIGHT BREAST OF FEMALE, ESTROGEN RECEPTOR POSITIVE: ICD-10-CM

## 2022-02-28 DIAGNOSIS — R05.9 COUGH: ICD-10-CM

## 2022-02-28 DIAGNOSIS — C79.51 BONE METASTASES: Primary | ICD-10-CM

## 2022-02-28 DIAGNOSIS — C50.411 MALIGNANT NEOPLASM OF UPPER-OUTER QUADRANT OF RIGHT BREAST IN FEMALE, ESTROGEN RECEPTOR NEGATIVE: ICD-10-CM

## 2022-02-28 DIAGNOSIS — R07.81 PLEURITIC CHEST PAIN: ICD-10-CM

## 2022-02-28 DIAGNOSIS — C50.512 MALIGNANT NEOPLASM OF LOWER-OUTER QUADRANT OF LEFT BREAST OF FEMALE, ESTROGEN RECEPTOR POSITIVE: ICD-10-CM

## 2022-02-28 DIAGNOSIS — C50.311 MALIGNANT NEOPLASM OF LOWER-INNER QUADRANT OF RIGHT BREAST OF FEMALE, ESTROGEN RECEPTOR POSITIVE: ICD-10-CM

## 2022-02-28 DIAGNOSIS — Z17.0 MALIGNANT NEOPLASM OF LOWER-OUTER QUADRANT OF LEFT BREAST OF FEMALE, ESTROGEN RECEPTOR POSITIVE: ICD-10-CM

## 2022-02-28 PROCEDURE — 3046F HEMOGLOBIN A1C LEVEL >9.0%: CPT | Mod: S$GLB,,, | Performed by: INTERNAL MEDICINE

## 2022-02-28 PROCEDURE — 3080F DIAST BP >= 90 MM HG: CPT | Mod: S$GLB,,, | Performed by: INTERNAL MEDICINE

## 2022-02-28 PROCEDURE — 3077F SYST BP >= 140 MM HG: CPT | Mod: S$GLB,,, | Performed by: INTERNAL MEDICINE

## 2022-02-28 PROCEDURE — 3008F PR BODY MASS INDEX (BMI) DOCUMENTED: ICD-10-PCS | Mod: S$GLB,,, | Performed by: INTERNAL MEDICINE

## 2022-02-28 PROCEDURE — 3046F PR MOST RECENT HEMOGLOBIN A1C LEVEL > 9.0%: ICD-10-PCS | Mod: S$GLB,,, | Performed by: INTERNAL MEDICINE

## 2022-02-28 PROCEDURE — 99213 PR OFFICE/OUTPT VISIT, EST, LEVL III, 20-29 MIN: ICD-10-PCS | Mod: S$GLB,,, | Performed by: INTERNAL MEDICINE

## 2022-02-28 PROCEDURE — 3077F PR MOST RECENT SYSTOLIC BLOOD PRESSURE >= 140 MM HG: ICD-10-PCS | Mod: S$GLB,,, | Performed by: INTERNAL MEDICINE

## 2022-02-28 PROCEDURE — 3008F BODY MASS INDEX DOCD: CPT | Mod: S$GLB,,, | Performed by: INTERNAL MEDICINE

## 2022-02-28 PROCEDURE — 4010F PR ACE/ARB THEARPY RXD/TAKEN: ICD-10-PCS | Mod: S$GLB,,, | Performed by: INTERNAL MEDICINE

## 2022-02-28 PROCEDURE — 3080F PR MOST RECENT DIASTOLIC BLOOD PRESSURE >= 90 MM HG: ICD-10-PCS | Mod: S$GLB,,, | Performed by: INTERNAL MEDICINE

## 2022-02-28 PROCEDURE — 4010F ACE/ARB THERAPY RXD/TAKEN: CPT | Mod: S$GLB,,, | Performed by: INTERNAL MEDICINE

## 2022-02-28 PROCEDURE — 99213 OFFICE O/P EST LOW 20 MIN: CPT | Mod: S$GLB,,, | Performed by: INTERNAL MEDICINE

## 2022-02-28 RX ORDER — CARISOPRODOL 350 MG/1
350 TABLET ORAL 3 TIMES DAILY PRN
Qty: 90 TABLET | Refills: 0 | Status: SHIPPED | OUTPATIENT
Start: 2022-02-28 | End: 2022-03-28 | Stop reason: SDUPTHER

## 2022-02-28 RX ORDER — ETODOLAC 400 MG/1
400 TABLET, FILM COATED ORAL DAILY
Qty: 30 TABLET | Refills: 2 | Status: SHIPPED | OUTPATIENT
Start: 2022-02-28 | End: 2022-06-04

## 2022-02-28 RX ORDER — HYDROCODONE BITARTRATE AND ACETAMINOPHEN 10; 325 MG/1; MG/1
1 TABLET ORAL EVERY 6 HOURS PRN
Qty: 120 TABLET | Refills: 0 | Status: SHIPPED | OUTPATIENT
Start: 2022-02-28 | End: 2022-03-28 | Stop reason: SDUPTHER

## 2022-02-28 NOTE — PROGRESS NOTES
"  [facility] In Office Hematology Oncology Subsequent  Encounter Note    2/28/22      Subjective:      Patient ID:   Negrita Castro  61 y.o.   Martínez Shepard R. Leblanc, [place], Bourgeois, [place]      Chief Complaint:   New L breast cancer eval.    HPI:  61 y.o. female with diagnosis of Stage 1 R breast cancer 10/26/15.  "Triple negative".  Adjuvant AC x's 4, tx's 12 and Rad Rx through 9/12/16.    Port removed.  Had bilateral reconstruction surgery 4/14/17.  Further reconstruction, tummy veeck 8/14/17.  Additional fat injection at R chest done for symmetry.  She had placement of R nipple.  Dr. Hernandez.      She is due for additional reconstructive surgery at breast and abdomenal areas.  The surgery is being done as part of her reconstruction efforts and for keloid management.  The date of the surgery is May 19th.    Also, Dr. Alarcon to consider Rad Rx to operative site to decrease keloid potential.  On hold now because of Covid 19 pandemic.    Recent Mamm/U/S showed small mass at 4:00 at L breast.  Needle Bx invasive ductal Ca, ERP+, PRP+, H2N neg  She had  L partial mastectomy, Sentinal node Bx 8/24/20. Followed by another surgery because of L axillary infection.  Primary 2 cm, LN neg, Stage 1 dx.    Discussed Oncotype Dx testing, this supported adding adjuvant chemotherapy to adjuvant hormonal therapy because of increased risk of cancer recurrence long-term.  Discussed BRCA testing, given her bilateral  breast Dx. PHN would not authorize BRCA 1 & 2.  Refill meds for her.    She had COVID-19 infection and was hospitalized.  She came very close to requiring intubation and mechanical ventilation.  She is off oxygen now,   and sees Dr. Justin of Pulmonary.  Recent CT scan of the chest showed residual changes of interstitial disease, consistent with recovering from COVID-19.  Also lytic lesions were seen in the thoracic spine area very suspicious for metastatic disease at T5 through T8 spine.    Recently on " Saturday night she had a hard coughing spell, and thereafter developed severe right posterior pleuritic chest pain.  She has point tenderness over the right posterior chest wall and may very well have a fractured rib at the site.  Rib x-rays have been ordered.  I have refilled her Soma for 1 month.  I have refilled her Norco  for 1 month.  I have added oxycodone 5 mg 1 or 2 p.o. Q 3-4 hours p.r.n. breakthrough pain for 1 month to her regimen.    She also has an enlarging nodule at the left chest wall.  Ultrasound of the site suggested that this was a cyst however the areas firm movable and may very well be cancer.  I asked  for a ultrasound-guided biopsy of the mass per radiology.  Path report showed invasive ductal Ca, ERP + PRP neg,   H2N equivocal.    She is postmenopausal, her last menses were 4 5 years ago.  Rib x-rays have been requested.  Will order a CT scan to evaluate for possible metastatic disease.  Will order a ultrasound-guided biopsy of the left chest wall mass for pathologic examination at [facility].  Bx + invasive breast cancer.  ERP+, PRP-, H2N-.    Suspected local recurrence at L breast.  She had L lumpectomy.  Margins clear.    Refer to Rad Rx MD for adjuvant Rad Rx., for local control.  She has T spine metastases.  Currently on hormonal Rx.  To see Dr. Manuelito Shepard for DM, BS control.    Dr. Washington saw her for C spine eval, no surgery planned for now.  He referred her to Dr. Rivers for injection Rx.    DM, cholesterol, epilepsy hx, DJD, LBP.  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    LR shoulder arthroscopy, R wrist surgery, M0.    Smoke  ppd x's 35 years, quit 2015.  ETOH no.  Disability-depression, epilepsy  Allergy none    Mom ovarian cancer  Dad lung cancer  Sisters DM, lung dx.    Summary of care:  Right breast cancer 2015, triple negative, treated with Adriamycin Cytoxan followed by taxane.    Left breast cancer ERP positive PRP positive HER2 Judy negative in  August 24, 2020    COVID infection December 2020.  She has not taken the covid 19 vaccination because of multiple allergy hx.    February 2021 bone metastases determined.    He he March 21, 2021 local left breast cancer recurrence, ERP positive PRP negative HER2 Judy negative.    He April 21st bone biopsy positive for breast cancer metastatic.  ERP, PRP, HER2 Judy pending.    She was on radiation therapy through May 17, 2021.  Refilled Marinol at increased 5 mg 1 p.o. b.i.d. number 60.   I refilled her Soma, Norco, Marinol, and Silvadene cream; dated the prescriptions for June 17, 2021.    C/O mid thoracic pain x's 6-8 weeks,radiates to L & R, increased.  Tender over Mid thoracic area.  Hx of T spine metastatic dx.  Pain controlled on Norco 10/325 mg and oxycodone 5 mg 1-2 po PRN.  Add lodine 400 mg 1 daily    Checked MRI of T spine.  Metastatic dx with pathologic fx at T3,5,6.  Refer to Dr. Flowers of neurosurgery for Vertebroplasty or Kyphoplasty evaluation.    She is on Faslodex. Ibrance. Monitor WBC count.    She saw Dr. Flowers, and has been fitted with a brace initially, vertebroplasty after the first of the year.  [phone].  RTC Dr. Flowers 12/27/21.    Vertibroplasty on hold, because HA1C > 8.     There is darkening of L posterior chest wall, rad rx effect?    New date for vertebroplasty is 3/15/22, if HgbA1C is better.  Move her treatment date 3/15/22 to 3/22/22.  To see Dr. Shepard 3/8/22, lab to be checked at [facility].  Hold ibrance today, to allow WBC count to recover.    ROS:   GEN: normal without any fever, night sweats or weight loss  HEENT: normal with no HA's, sore throat, stiff neck, changes in vision  CV: normal with no CP, SOB, PND, POOL or orthopnea  PULM: normal with no SOB, cough, hemoptysis, sputum or pleuritic pain  GI: normal with no abdominal pain, nausea, vomiting, constipation, diarrhea, melanotic stools, BRBPR, or hematemesis  : normal with no hematuria, dysuria  BREAST: See HPI.  SKIN:  normal with no rash, erythema, bruising, or swelling     Past Medical History:   Diagnosis Date    Acute hypoxemic respiratory failure 12/12/2020    Breast cancer     left breast    Cancer     RIGHT BREAST 10-15    Depression     Diabetes mellitus     Neuropathy     Panic attacks     S/P epidural steroid injection     Seizures     none since early 30's    Wears glasses     TO DRIVE     Past Surgical History:   Procedure Laterality Date    AXILLARY NODE DISSECTION Left 8/24/2020    Procedure: LYMPHADENECTOMY, AXILLARY;  Surgeon: Cory Vick MD;  Location: [facility] OR;  Service: General;  Laterality: Left;  left breast mastectomy with possible axillary lymph node dissection    BREAST BIOPSY      right    BREAST LUMPECTOMY      BREAST RECONSTRUCTION      breast reconstruction with tummy [facility]      BREAST SURGERY      11-3-15 LUMPECTOMY, 12-2-15 REEXCISION    INCISION AND DRAINAGE OF ABSCESS Left 9/9/2020    Procedure: INCISION AND DRAINAGE, ABSCESS;  Surgeon: Cory Vick MD;  Location: [facility] OR;  Service: General;  Laterality: Left;    INSERTION OF LOCALIZATION WIRE Left 8/24/2020    Procedure: INSERTION, LOCALIZATION WIRE;  Surgeon: Cory Vick MD;  Location: [facility] OR;  Service: General;  Laterality: Left;  left breast lumpectomy with wire needle loc    MASTECTOMY      mastectomy 2016      MASTECTOMY, PARTIAL Left 3/19/2021    Procedure: MASTECTOMY, PARTIAL;  Surgeon: Cory Vick MD;  Location: [facility] OR;  Service: General;  Laterality: Left;  lumpectomy  left breast     RECONSTRUCTION OF NIPPLE Right 11/5/2018    Procedure: RECONSTRUCTION-NIPPLE RIGHT;  Surgeon: Xiang Hernandez MD;  Location: [address];  Service: Plastics;  Laterality: Right;    SENTINEL LYMPH NODE BIOPSY Left 8/24/2020    Procedure: BIOPSY, LYMPH NODE, SENTINEL;  Surgeon: Cory Vick MD;  Location: [facility] OR;  Service: General;  Laterality: Left;  left breast lumpectomy with left  sentinel lymoh node       shoulder surgery and wrist      BILAT  BONE SPUR    WRIST SURGERY      RIGHT       Review of patient's allergies indicates:   Allergen Reactions    Adhesive tape-silicones Hives     BANDAIDS    Polymycin Hives    Latex, natural rubber Itching    Polyurethane-39            Current Outpatient Medications:     anastrozole (ARIMIDEX) 1 mg Tab, Take 1 mg by mouth once daily., Disp: , Rfl:     carisoprodoL (SOMA) 350 MG tablet, Take 1 tablet (350 mg total) by mouth 3 (three) times daily as needed for Muscle spasms., Disp: 90 tablet, Rfl: 0    dextromethorphan-guaiFENesin  mg/5 ml (ROBITUSSIN-DM)  mg/5 mL liquid, Take 1 teaspoon q 6 hours prn cough., Disp: 300 mL, Rfl: 0    diazePAM (VALIUM) 10 MG Tab, Take 1 tablet (10 mg total) by mouth 4 (four) times daily as needed (anxiety)., Disp: 120 tablet, Rfl: 3    docusate sodium (COLACE) 100 MG capsule, Take 100 mg by mouth 2 (two) times daily., Disp: , Rfl:     dronabinoL (MARINOL) 5 MG capsule, Take 1 capsule (5 mg total) by mouth 2 (two) times daily before meals., Disp: 60 capsule, Rfl: 0    dulaglutide (TRULICITY) 3 mg/0.5 mL pen injector, Inject 3 mg into the skin every 7 days., Disp: 4 pen, Rfl: 11    enalapril (VASOTEC) 5 MG tablet, Take 1 tablet (5 mg total) by mouth once daily., Disp: 90 tablet, Rfl: 3    etodolac (LODINE) 400 MG tablet, Take 1 tablet (400 mg total) by mouth once daily., Disp: 30 tablet, Rfl: 2    furosemide (LASIX) 20 MG tablet, TAKE ONE TABLET BY MOUTH EVERY DAY, Disp: 30 tablet, Rfl: 6    HYDROcodone-acetaminophen (NORCO)  mg per tablet, Take 1 tablet by mouth every 6 (six) hours as needed for Pain., Disp: 120 tablet, Rfl: 0    insulin aspart, niacinamide, (FIASP FLEXTOUCH U-100 INSULIN) 100 unit/mL (3 mL) InPn, Inject 12 Units into the skin 2 (two) times daily with meals., Disp: 3 pen, Rfl: 9    insulin glargine (LANTUS U-100 INSULIN) 100 unit/mL injection, Inject 32 Units into the skin  "2 (two) times a day., Disp: 18 mL, Rfl: 11    metFORMIN (GLUCOPHAGE-XR) 500 MG ER 24hr tablet, Take 2 tablets (1,000 mg total) by mouth 2 (two) times daily with meals., Disp: 360 tablet, Rfl: 3    metoprolol tartrate (LOPRESSOR) 25 MG tablet, Take 1 tablet (25 mg total) by mouth 2 (two) times daily., Disp: 60 tablet, Rfl: 11    mometasone 0.1% (ELOCON) 0.1 % cream, Apply to affected area daily, Disp: 45 g, Rfl: 1    ondansetron (ZOFRAN-ODT) 8 MG TbDL, Take 1 tablet (8 mg total) by mouth every 6 to 8 hours as needed (nausea)., Disp: 30 tablet, Rfl: 1    oxyCODONE (ROXICODONE) 10 mg Tab immediate release tablet, Take 1 tablet (10 mg total) by mouth every 4 to 6 hours as needed for Pain., Disp: 120 tablet, Rfl: 0    oxyCODONE (ROXICODONE) 5 MG immediate release tablet, TAKE 1 TO 2 TABLETS BY MOUTH EVERY 4 TO 6 HOURS AS NEEDED FOR BREAKTHROUGH PAIN, Disp: , Rfl:     palbociclib (IBRANCE) 100 mg Cap, Take 100 mg by mouth once daily For 21 days, then take 7 days off.  Total cycle length 28 days.., Disp: 21 capsule, Rfl: 6    palbociclib 100 mg Tab, Take 100 mg by mouth once daily. for 21 days, then take 7 days off. Total cycle length 28 days, Disp: 21 tablet, Rfl: 6    pen needle, diabetic (BD ULTRA-FINE MARCE PEN NEEDLE) 32 gauge x 5/32" Ndle, Test blood sugar twice daily, Disp: 100 each, Rfl: 5    promethazine (PHENERGAN) 25 MG tablet, Take 1 tablet (25 mg total) by mouth every 4 to 6 hours as needed., Disp: 30 tablet, Rfl: 5    promethazine-codeine 6.25-10 mg/5 ml (PHENERGAN WITH CODEINE) 6.25-10 mg/5 mL syrup, 1 teaspoon every 6 hours prn cough., Disp: 450 mL, Rfl: 0    rosuvastatin (CRESTOR) 40 MG Tab, Take 1 tablet (40 mg total) by mouth every evening. For cholesterol, Disp: 90 tablet, Rfl: 3    zolpidem (AMBIEN) 10 mg Tab, TAKE ONE TABLET BY MOUTH EVERY NIGHT AT BEDTIME, Disp: 30 tablet, Rfl: 3    levoFLOXacin (LEVAQUIN) 500 MG tablet, Take 1 tablet (500 mg total) by mouth once daily. for 10 days, " "Disp: 10 tablet, Rfl: 0          Objective:   Vitals:  Blood pressure (!) 176/96, pulse 75, temperature 97.6 °F (36.4 °C), resp. rate 18, height 5' 11" (1.803 m), weight 76.2 kg (168 lb), last menstrual period 01/16/2016.    Physical Examination:   GEN: no apparent distress, comfortable, Brace in place.  HEAD: atraumatic and normocephalic  EYES: no pallor, no icterus  ENT: no pharyngeal erythema  NECK: noLAD/LN's, supple  CV:  No edema  CHEST: Normal respiratory effort   she is not  tender over the right posterior chest wall on exam.  The chest wall is not swollen.  ABDOM:  nondistended; soft                                                                                          MUSC/Skeletal: ROM normal, Tender over mid T spine area.  EXTREM: no swelling  SKIN: She is developing keloid changes at L breast incision and navel surgical sites.  This area appears to be enlarging.  NEURO: grossly intact  PSYCH: normal mood, affect and behavior  LYMPH: normal  LN's  BREASTS: L breast is much improved.    Refill Oxycodone, SOMA, Norco.    Assessment:   (1) 61 y.o. female with diagnosis of Stage 1 triple negative R breast cancer, 10/2016.       S/P R mastectomy, SNBx, AC x's 4, T x's12 weeks, Rad Rx and recent reconstruction.    (2) New L invasive ductal Ca, ERP+, PRP+, H2N neg.  Clinical stage 1 dx.    I have started her on Arimidex 1 mg p.o. daily  for metastatic disease at this time.    Suspected fracture of right ribs after recent cough event, check rib x-rays, medicate for pain.    ERP+ dx, bone metastases.  Started on Arimidex daily.  Add 2nd hormonal drug Rx after Rad Rx completed.    Bone biopsy positive for metastatic breast cancer.  ERP+, PRP+, H2N-.    Skin at chest wall and breast area is closed NT, healed.    Continue with Faslodex and Ibrance.    C/O mid thoracic pain.  Tender on exam.  Hx of T spine mets, Hx of Rad Rx to T spine.  MRI of T spine.  NS.  Pathologic Fx of T3,5,6.    Dr. Flowers for Vertebroplasty " or Kyphoplasty after the first of the year. 3/15/22.  Fitted for a brace initially.    RTC 3/22/22, for Rx.  RTC see me 1 month.

## 2022-03-08 DIAGNOSIS — Z79.4 TYPE 2 DIABETES MELLITUS WITH HYPERGLYCEMIA, WITH LONG-TERM CURRENT USE OF INSULIN: ICD-10-CM

## 2022-03-08 DIAGNOSIS — E11.65 TYPE 2 DIABETES MELLITUS WITH HYPERGLYCEMIA, WITH LONG-TERM CURRENT USE OF INSULIN: ICD-10-CM

## 2022-03-08 RX ORDER — PEN NEEDLE, DIABETIC 30 GX3/16"
NEEDLE, DISPOSABLE MISCELLANEOUS
Qty: 100 EACH | Refills: 5 | Status: SHIPPED | OUTPATIENT
Start: 2022-03-08

## 2022-03-08 RX ORDER — LAMOTRIGINE 25 MG/1
500 TABLET ORAL
Status: CANCELLED | OUTPATIENT
Start: 2022-03-15

## 2022-03-08 NOTE — TELEPHONE ENCOUNTER
Pen needles pended to send to pharmacy.  Paperwork faxed.  Called to notify patient, no answer.  Left message for patient to return call -DN

## 2022-03-08 NOTE — TELEPHONE ENCOUNTER
----- Message from Melissafish Ogden sent at 3/8/2022 10:21 AM CST -----  Pt calling for refill on diabetic  Francesco plus pen needles  to be sent to her pharm [facility].  Said she dropped off the [email or web address] patient assistance paperwork but has not heard anything if we did our part and fax back she is out and only has the previous dose of medicine wants to know is it ok to take the old one.    [phone]

## 2022-03-09 ENCOUNTER — TELEPHONE (OUTPATIENT)
Dept: NEUROSURGERY | Facility: CLINIC | Age: 62
End: 2022-03-09
Payer: MEDICARE

## 2022-03-09 LAB — HBA1C MFR BLD: 8.3 % OF TOTAL HGB

## 2022-03-09 NOTE — TELEPHONE ENCOUNTER
----- Message from Anju Masterson sent at 3/9/2022  1:50 PM CST -----  Contact: Patient  Patient phone in regarding if blood work came back normal before surgery     Patient stated if okay to have surgery or suppose to this anything before surgery    Patient stated is white cells and diabetes are right for Doctor to operated?    Please advice     Patient stated in severe pain     Patient can be reach at [phone]

## 2022-03-09 NOTE — TELEPHONE ENCOUNTER
Returned call to pt. She reports that she completed a new A1C yesterday in hopes of proceeding with surgery next week. Informed pt that I do not see a new A!C lab collected. She reports that she is scheduled to see her diabetes physician tomorrow and will have the results faxed to her office. Informed pt that I will contact her when I receive the new lab levels and discuss the surgery plan. Pt voiced understanding.

## 2022-03-10 ENCOUNTER — OFFICE VISIT (OUTPATIENT)
Dept: FAMILY MEDICINE | Facility: CLINIC | Age: 62
End: 2022-03-10
Payer: MEDICARE

## 2022-03-10 ENCOUNTER — TELEPHONE (OUTPATIENT)
Dept: HEMATOLOGY/ONCOLOGY | Facility: CLINIC | Age: 62
End: 2022-03-10
Payer: MEDICARE

## 2022-03-10 VITALS
HEART RATE: 91 BPM | WEIGHT: 165 LBS | HEIGHT: 71 IN | SYSTOLIC BLOOD PRESSURE: 170 MMHG | BODY MASS INDEX: 23.1 KG/M2 | DIASTOLIC BLOOD PRESSURE: 92 MMHG | OXYGEN SATURATION: 97 %

## 2022-03-10 DIAGNOSIS — E11.65 TYPE 2 DIABETES MELLITUS WITH HYPERGLYCEMIA, WITH LONG-TERM CURRENT USE OF INSULIN: Primary | ICD-10-CM

## 2022-03-10 DIAGNOSIS — T40.2X5A THERAPEUTIC OPIOID INDUCED CONSTIPATION: ICD-10-CM

## 2022-03-10 DIAGNOSIS — Z17.0 MALIGNANT NEOPLASM OF LOWER-OUTER QUADRANT OF LEFT BREAST OF FEMALE, ESTROGEN RECEPTOR POSITIVE: Primary | ICD-10-CM

## 2022-03-10 DIAGNOSIS — M84.48XG PATHOLOGICAL FRACTURE OF THORACIC VERTEBRA WITH DELAYED HEALING, SUBSEQUENT ENCOUNTER: ICD-10-CM

## 2022-03-10 DIAGNOSIS — C50.311 MALIGNANT NEOPLASM OF LOWER-INNER QUADRANT OF RIGHT BREAST OF FEMALE, ESTROGEN RECEPTOR POSITIVE: ICD-10-CM

## 2022-03-10 DIAGNOSIS — J44.9 CHRONIC OBSTRUCTIVE PULMONARY DISEASE, UNSPECIFIED COPD TYPE: ICD-10-CM

## 2022-03-10 DIAGNOSIS — T45.1X5A CHEMOTHERAPY-INDUCED NEUTROPENIA: ICD-10-CM

## 2022-03-10 DIAGNOSIS — C50.512 MALIGNANT NEOPLASM OF LOWER-OUTER QUADRANT OF LEFT BREAST OF FEMALE, ESTROGEN RECEPTOR POSITIVE: Primary | ICD-10-CM

## 2022-03-10 DIAGNOSIS — Z79.4 TYPE 2 DIABETES MELLITUS WITH HYPERGLYCEMIA, WITH LONG-TERM CURRENT USE OF INSULIN: Primary | ICD-10-CM

## 2022-03-10 DIAGNOSIS — E78.2 MIXED HYPERLIPIDEMIA: ICD-10-CM

## 2022-03-10 DIAGNOSIS — D70.1 CHEMOTHERAPY-INDUCED NEUTROPENIA: ICD-10-CM

## 2022-03-10 DIAGNOSIS — I10 ESSENTIAL HYPERTENSION: ICD-10-CM

## 2022-03-10 DIAGNOSIS — Z17.0 MALIGNANT NEOPLASM OF LOWER-INNER QUADRANT OF RIGHT BREAST OF FEMALE, ESTROGEN RECEPTOR POSITIVE: ICD-10-CM

## 2022-03-10 DIAGNOSIS — K59.03 THERAPEUTIC OPIOID INDUCED CONSTIPATION: ICD-10-CM

## 2022-03-10 DIAGNOSIS — C79.51 BONE METASTASES: ICD-10-CM

## 2022-03-10 PROCEDURE — 3008F BODY MASS INDEX DOCD: CPT | Mod: S$GLB,,, | Performed by: FAMILY MEDICINE

## 2022-03-10 PROCEDURE — 3052F PR MOST RECENT HEMOGLOBIN A1C LEVEL 8.0 - < 9.0%: ICD-10-PCS | Mod: S$GLB,,, | Performed by: FAMILY MEDICINE

## 2022-03-10 PROCEDURE — 3080F DIAST BP >= 90 MM HG: CPT | Mod: S$GLB,,, | Performed by: FAMILY MEDICINE

## 2022-03-10 PROCEDURE — 3077F PR MOST RECENT SYSTOLIC BLOOD PRESSURE >= 140 MM HG: ICD-10-PCS | Mod: S$GLB,,, | Performed by: FAMILY MEDICINE

## 2022-03-10 PROCEDURE — 99214 OFFICE O/P EST MOD 30 MIN: CPT | Mod: S$GLB,,, | Performed by: FAMILY MEDICINE

## 2022-03-10 PROCEDURE — 3008F PR BODY MASS INDEX (BMI) DOCUMENTED: ICD-10-PCS | Mod: S$GLB,,, | Performed by: FAMILY MEDICINE

## 2022-03-10 PROCEDURE — 4010F ACE/ARB THERAPY RXD/TAKEN: CPT | Mod: S$GLB,,, | Performed by: FAMILY MEDICINE

## 2022-03-10 PROCEDURE — 3077F SYST BP >= 140 MM HG: CPT | Mod: S$GLB,,, | Performed by: FAMILY MEDICINE

## 2022-03-10 PROCEDURE — 99214 PR OFFICE/OUTPT VISIT, EST, LEVL IV, 30-39 MIN: ICD-10-PCS | Mod: S$GLB,,, | Performed by: FAMILY MEDICINE

## 2022-03-10 PROCEDURE — 4010F PR ACE/ARB THEARPY RXD/TAKEN: ICD-10-PCS | Mod: S$GLB,,, | Performed by: FAMILY MEDICINE

## 2022-03-10 PROCEDURE — 3080F PR MOST RECENT DIASTOLIC BLOOD PRESSURE >= 90 MM HG: ICD-10-PCS | Mod: S$GLB,,, | Performed by: FAMILY MEDICINE

## 2022-03-10 PROCEDURE — 3052F HG A1C>EQUAL 8.0%<EQUAL 9.0%: CPT | Mod: S$GLB,,, | Performed by: FAMILY MEDICINE

## 2022-03-10 RX ORDER — NALOXEGOL OXALATE 12.5 MG/1
12.5 TABLET, FILM COATED ORAL DAILY
Qty: 30 TABLET | Refills: 3 | Status: SHIPPED | OUTPATIENT
Start: 2022-03-10 | End: 2023-01-01

## 2022-03-10 NOTE — PROGRESS NOTES
SUBJECTIVE:    Patient ID: Negrita Castro is a 61 y.o. female.    Chief Complaint: DM Check Up / Surgery Clearance and requests rx for wheelchair    Patient with type 2 diabetes, breast cancer with bone metastases and chemotherapy-induced neutropenia presents for follow-up visit.  The patient's diabetes had been significantly out of control with A1c greater than 11 on last visit.  Meds have been adjusted and her A1c has decreased 3 points.  It is not an ideal range but has improved.  Patient is has been having constipation due to use of pain medications. She is having significant pain due to throacic soine fracture . sjhe his using multipl e otc meds but has to strain which is causing more back pain She is to have kyphoplasty next week     Blood pressure noted to be elvated today most likely due to pain     Back pain is limiting her ability to walk       Ancillary Orders on 03/04/2022   Component Date Value Ref Range Status    WBC 03/08/2022 3.7 (A) 3.8 - 10.8 Thousand/uL Final    RBC 03/08/2022 3.93  3.80 - 5.10 Million/uL Final    Hemoglobin 03/08/2022 12.2  11.7 - 15.5 g/dL Final    Hematocrit 03/08/2022 38.4  35.0 - 45.0 % Final    MCV 03/08/2022 97.7  80.0 - 100.0 fL Final    MCH 03/08/2022 31.0  27.0 - 33.0 pg Final    MCHC 03/08/2022 31.8 (A) 32.0 - 36.0 g/dL Final    RDW 03/08/2022 15.7 (A) 11.0 - 15.0 % Final    Platelets 03/08/2022 292  140 - 400 Thousand/uL Final    MPV 03/08/2022 9.5  7.5 - 12.5 fL Final    Neutrophils, Abs 03/08/2022 2,435  1,500 - 7,800 cells/uL Final    Lymph # 03/08/2022 810 (A) 850 - 3,900 cells/uL Final    Mono # 03/08/2022 385  200 - 950 cells/uL Final    Eos # 03/08/2022 41  15 - 500 cells/uL Final    Baso # 03/08/2022 30  0 - 200 cells/uL Final    Neutrophils Relative 03/08/2022 65.8  % Final    Lymph % 03/08/2022 21.9  % Final    Mono % 03/08/2022 10.4  % Final    Eosinophil % 03/08/2022 1.1  % Final    Basophil % 03/08/2022 0.8  % Final    Glucose  03/08/2022 182 (A) 65 - 99 mg/dL Final    BUN 03/08/2022 10  7 - 25 mg/dL Final    Creatinine 03/08/2022 0.76  0.50 - 1.05 mg/dL Final    eGFR if non  03/08/2022 86  > OR = 60 mL/min/1.73m2 Final    eGFR if African American 03/08/2022 100  > OR = 60 mL/min/1.73m2 Final    BUN/Creatinine Ratio 03/08/2022 NOT APPLICABLE  6 - 22 (calc) Final    Sodium 03/08/2022 142  135 - 146 mmol/L Final    Potassium 03/08/2022 3.9  3.5 - 5.3 mmol/L Final    Chloride 03/08/2022 104  98 - 110 mmol/L Final    CO2 03/08/2022 27  20 - 32 mmol/L Final    Calcium 03/08/2022 9.6  8.6 - 10.4 mg/dL Final    Total Protein 03/08/2022 6.6  6.1 - 8.1 g/dL Final    Albumin 03/08/2022 4.2  3.6 - 5.1 g/dL Final    Globulin, Total 03/08/2022 2.4  1.9 - 3.7 g/dL (calc) Final    Albumin/Globulin Ratio 03/08/2022 1.8  1.0 - 2.5 (calc) Final    Total Bilirubin 03/08/2022 0.6  0.2 - 1.2 mg/dL Final    Alkaline Phosphatase 03/08/2022 225 (A) 37 - 153 U/L Final    AST 03/08/2022 31  10 - 35 U/L Final    ALT 03/08/2022 23  6 - 29 U/L Final   Ancillary Orders on 02/25/2022   Component Date Value Ref Range Status    WBC 02/25/2022 2.5 (A) 3.8 - 10.8 Thousand/uL Final    RBC 02/25/2022 4.05  3.80 - 5.10 Million/uL Final    Hemoglobin 02/25/2022 12.4  11.7 - 15.5 g/dL Final    Hematocrit 02/25/2022 38.7  35.0 - 45.0 % Final    MCV 02/25/2022 95.6  80.0 - 100.0 fL Final    MCH 02/25/2022 30.6  27.0 - 33.0 pg Final    MCHC 02/25/2022 32.0  32.0 - 36.0 g/dL Final    RDW 02/25/2022 15.7 (A) 11.0 - 15.0 % Final    Platelets 02/25/2022 233  140 - 400 Thousand/uL Final    MPV 02/25/2022 9.2  7.5 - 12.5 fL Final    Neutrophils, Abs 02/25/2022 935 (A) 1,500 - 7,800 cells/uL Final    Lymph # 02/25/2022 1,215  850 - 3,900 cells/uL Final    Mono # 02/25/2022 300  200 - 950 cells/uL Final    Eos # 02/25/2022 30  15 - 500 cells/uL Final    Baso # 02/25/2022 20  0 - 200 cells/uL Final    Neutrophils Relative 02/25/2022 37.4  %  Final    Lymph % 02/25/2022 48.6  % Final    Mono % 02/25/2022 12.0  % Final    Eosinophil % 02/25/2022 1.2  % Final    Basophil % 02/25/2022 0.8  % Final    Glucose 02/25/2022 144 (A) 65 - 99 mg/dL Final    BUN 02/25/2022 10  7 - 25 mg/dL Final    Creatinine 02/25/2022 0.74  0.50 - 1.05 mg/dL Final    eGFR if non  02/25/2022 89  > OR = 60 mL/min/1.73m2 Final    eGFR if  02/25/2022 103  > OR = 60 mL/min/1.73m2 Final    BUN/Creatinine Ratio 02/25/2022 NOT APPLICABLE  6 - 22 (calc) Final    Sodium 02/25/2022 142  135 - 146 mmol/L Final    Potassium 02/25/2022 4.2  3.5 - 5.3 mmol/L Final    Chloride 02/25/2022 104  98 - 110 mmol/L Final    CO2 02/25/2022 27  20 - 32 mmol/L Final    Calcium 02/25/2022 9.5  8.6 - 10.4 mg/dL Final    Total Protein 02/25/2022 7.0  6.1 - 8.1 g/dL Final    Albumin 02/25/2022 4.3  3.6 - 5.1 g/dL Final    Globulin, Total 02/25/2022 2.7  1.9 - 3.7 g/dL (calc) Final    Albumin/Globulin Ratio 02/25/2022 1.6  1.0 - 2.5 (calc) Final    Total Bilirubin 02/25/2022 0.5  0.2 - 1.2 mg/dL Final    Alkaline Phosphatase 02/25/2022 214 (A) 37 - 153 U/L Final    AST 02/25/2022 19  10 - 35 U/L Final    ALT 02/25/2022 15  6 - 29 U/L Final   Ancillary Orders on 02/18/2022   Component Date Value Ref Range Status    WBC 02/18/2022 2.9 (A) 3.8 - 10.8 Thousand/uL Final    RBC 02/18/2022 4.09  3.80 - 5.10 Million/uL Final    Hemoglobin 02/18/2022 12.9  11.7 - 15.5 g/dL Final    Hematocrit 02/18/2022 39.0  35.0 - 45.0 % Final    MCV 02/18/2022 95.4  80.0 - 100.0 fL Final    MCH 02/18/2022 31.5  27.0 - 33.0 pg Final    MCHC 02/18/2022 33.1  32.0 - 36.0 g/dL Final    RDW 02/18/2022 14.9  11.0 - 15.0 % Final    Platelets 02/18/2022 200  140 - 400 Thousand/uL Final    MPV 02/18/2022 9.2  7.5 - 12.5 fL Final    Neutrophils, Abs 02/18/2022 1,923  1,500 - 7,800 cells/uL Final    Lymph # 02/18/2022 806 (A) 850 - 3,900 cells/uL Final    Mono # 02/18/2022 110  (A) 200 - 950 cells/uL Final    Eos # 02/18/2022 20  15 - 500 cells/uL Final    Baso # 02/18/2022 41  0 - 200 cells/uL Final    Neutrophils Relative 02/18/2022 66.3  % Final    Lymph % 02/18/2022 27.8  % Final    Mono % 02/18/2022 3.8  % Final    Eosinophil % 02/18/2022 0.7  % Final    Basophil % 02/18/2022 1.4  % Final    Differential Comment 02/18/2022 Review of peripheral smear confirms automated results.   Final    Glucose 02/18/2022 200 (A) 65 - 99 mg/dL Final    BUN 02/18/2022 11  7 - 25 mg/dL Final    Creatinine 02/18/2022 0.84  0.50 - 1.05 mg/dL Final    eGFR if non African American 02/18/2022 76  > OR = 60 mL/min/1.73m2 Final    eGFR if  02/18/2022 88  > OR = 60 mL/min/1.73m2 Final    BUN/Creatinine Ratio 02/18/2022 NOT APPLICABLE  6 - 22 (calc) Final    Sodium 02/18/2022 141  135 - 146 mmol/L Final    Potassium 02/18/2022 3.9  3.5 - 5.3 mmol/L Final    Chloride 02/18/2022 103  98 - 110 mmol/L Final    CO2 02/18/2022 28  20 - 32 mmol/L Final    Calcium 02/18/2022 9.1  8.6 - 10.4 mg/dL Final    Total Protein 02/18/2022 7.1  6.1 - 8.1 g/dL Final    Albumin 02/18/2022 4.4  3.6 - 5.1 g/dL Final    Globulin, Total 02/18/2022 2.7  1.9 - 3.7 g/dL (calc) Final    Albumin/Globulin Ratio 02/18/2022 1.6  1.0 - 2.5 (calc) Final    Total Bilirubin 02/18/2022 0.5  0.2 - 1.2 mg/dL Final    Alkaline Phosphatase 02/18/2022 224 (A) 37 - 153 U/L Final    AST 02/18/2022 15  10 - 35 U/L Final    ALT 02/18/2022 16  6 - 29 U/L Final   Telephone on 02/17/2022   Component Date Value Ref Range Status    Hemoglobin A1C 03/08/2022 8.3 (A) <5.7 % of total Hgb Final   Telephone on 01/13/2022   Component Date Value Ref Range Status    Hemoglobin A1C 01/14/2022 11.1 (A) <5.7 % of total Hgb Final   Telephone on 01/03/2022   Component Date Value Ref Range Status    WBC 01/14/2022 2.3 (A) 3.8 - 10.8 Thousand/uL Final    RBC 01/14/2022 4.03  3.80 - 5.10 Million/uL Final    Hemoglobin 01/14/2022  13.1  11.7 - 15.5 g/dL Final    Hematocrit 01/14/2022 38.3  35.0 - 45.0 % Final    MCV 01/14/2022 95.0  80.0 - 100.0 fL Final    MCH 01/14/2022 32.5  27.0 - 33.0 pg Final    MCHC 01/14/2022 34.2  32.0 - 36.0 g/dL Final    RDW 01/14/2022 14.3  11.0 - 15.0 % Final    Platelets 01/14/2022 188  140 - 400 Thousand/uL Final    MPV 01/14/2022 9.1  7.5 - 12.5 fL Final    Neutrophils, Abs 01/14/2022 1,410 (A) 1,500 - 7,800 cells/uL Final    Lymph # 01/14/2022 759 (A) 850 - 3,900 cells/uL Final    Mono # 01/14/2022 101 (A) 200 - 950 cells/uL Final    Eos # 01/14/2022 21  15 - 500 cells/uL Final    Baso # 01/14/2022 9  0 - 200 cells/uL Final    Neutrophils Relative 01/14/2022 61.3  % Final    Lymph % 01/14/2022 33.0  % Final    Mono % 01/14/2022 4.4  % Final    Eosinophil % 01/14/2022 0.9  % Final    Basophil % 01/14/2022 0.4  % Final    Differential Comment 01/14/2022 Review of peripheral smear confirms automated results.   Final   Lab Visit on 12/27/2021   Component Date Value Ref Range Status    Hemoglobin A1C 12/27/2021 11.1 (A) 4.0 - 5.6 % Final    Estimated Avg Glucose 12/27/2021 272 (A) 68 - 131 mg/dL Final   Ancillary Orders on 12/10/2021   Component Date Value Ref Range Status    WBC 12/21/2021 7.2  3.8 - 10.8 Thousand/uL Final    RBC 12/21/2021 4.32  3.80 - 5.10 Million/uL Final    Hemoglobin 12/21/2021 14.0  11.7 - 15.5 g/dL Final    Hematocrit 12/21/2021 42.6  35.0 - 45.0 % Final    MCV 12/21/2021 98.6  80.0 - 100.0 fL Final    MCH 12/21/2021 32.4  27.0 - 33.0 pg Final    MCHC 12/21/2021 32.9  32.0 - 36.0 g/dL Final    RDW 12/21/2021 13.2  11.0 - 15.0 % Final    Platelets 12/21/2021 324  140 - 400 Thousand/uL Final    MPV 12/21/2021 10.3  7.5 - 12.5 fL Final    Neutrophils, Abs 12/21/2021 5,558  1,500 - 7,800 cells/uL Final    Lymph # 12/21/2021 929  850 - 3,900 cells/uL Final    Mono # 12/21/2021 576  200 - 950 cells/uL Final    Eos # 12/21/2021 86  15 - 500 cells/uL Final     Baso # 12/21/2021 50  0 - 200 cells/uL Final    Neutrophils Relative 12/21/2021 77.2  % Final    Lymph % 12/21/2021 12.9  % Final    Mono % 12/21/2021 8.0  % Final    Eosinophil % 12/21/2021 1.2  % Final    Basophil % 12/21/2021 0.7  % Final    Glucose 12/21/2021 305 (A) 65 - 139 mg/dL Final    BUN 12/21/2021 10  7 - 25 mg/dL Final    Creatinine 12/21/2021 0.89  0.50 - 1.05 mg/dL Final    eGFR if non  12/21/2021 71  > OR = 60 mL/min/1.73m2 Final    eGFR if  12/21/2021 82  > OR = 60 mL/min/1.73m2 Final    BUN/Creatinine Ratio 12/21/2021 NOT APPLICABLE  6 - 22 (calc) Final    Sodium 12/21/2021 137  135 - 146 mmol/L Final    Potassium 12/21/2021 4.2  3.5 - 5.3 mmol/L Final    Chloride 12/21/2021 99  98 - 110 mmol/L Final    CO2 12/21/2021 27  20 - 32 mmol/L Final    Calcium 12/21/2021 9.8  8.6 - 10.4 mg/dL Final    Total Protein 12/21/2021 7.5  6.1 - 8.1 g/dL Final    Albumin 12/21/2021 4.5  3.6 - 5.1 g/dL Final    Globulin, Total 12/21/2021 3.0  1.9 - 3.7 g/dL (calc) Final    Albumin/Globulin Ratio 12/21/2021 1.5  1.0 - 2.5 (calc) Final    Total Bilirubin 12/21/2021 0.7  0.2 - 1.2 mg/dL Final    Alkaline Phosphatase 12/21/2021 251 (A) 37 - 153 U/L Final    AST 12/21/2021 18  10 - 35 U/L Final    ALT 12/21/2021 21  6 - 29 U/L Final   Ancillary Orders on 12/03/2021   Component Date Value Ref Range Status    WBC 12/07/2021 3.4 (A) 3.8 - 10.8 Thousand/uL Final    RBC 12/07/2021 3.72 (A) 3.80 - 5.10 Million/uL Final    Hemoglobin 12/07/2021 12.6  11.7 - 15.5 g/dL Final    Hematocrit 12/07/2021 38.4  35.0 - 45.0 % Final    MCV 12/07/2021 103.2 (A) 80.0 - 100.0 fL Final    MCH 12/07/2021 33.9 (A) 27.0 - 33.0 pg Final    MCHC 12/07/2021 32.8  32.0 - 36.0 g/dL Final    RDW 12/07/2021 15.0  11.0 - 15.0 % Final    Platelets 12/07/2021 330  140 - 400 Thousand/uL Final    MPV 12/07/2021 9.3  7.5 - 12.5 fL Final    Neutrophils, Abs 12/07/2021 2,043  1,500 - 7,800  cells/uL Final    Lymph # 12/07/2021 1,006  850 - 3,900 cells/uL Final    Mono # 12/07/2021 279  200 - 950 cells/uL Final    Eos # 12/07/2021 20  15 - 500 cells/uL Final    Baso # 12/07/2021 51  0 - 200 cells/uL Final    Neutrophils Relative 12/07/2021 60.1  % Final    Lymph % 12/07/2021 29.6  % Final    Mono % 12/07/2021 8.2  % Final    Eosinophil % 12/07/2021 0.6  % Final    Basophil % 12/07/2021 1.5  % Final    Glucose 12/07/2021 377 (A) 65 - 99 mg/dL Final    BUN 12/07/2021 10  7 - 25 mg/dL Final    Creatinine 12/07/2021 0.84  0.50 - 1.05 mg/dL Final    eGFR if non  12/07/2021 76  > OR = 60 mL/min/1.73m2 Final    eGFR if  12/07/2021 88  > OR = 60 mL/min/1.73m2 Final    BUN/Creatinine Ratio 12/07/2021 NOT APPLICABLE  6 - 22 (calc) Final    Sodium 12/07/2021 138  135 - 146 mmol/L Final    Potassium 12/07/2021 4.5  3.5 - 5.3 mmol/L Final    Chloride 12/07/2021 103  98 - 110 mmol/L Final    CO2 12/07/2021 26  20 - 32 mmol/L Final    Calcium 12/07/2021 9.3  8.6 - 10.4 mg/dL Final    Total Protein 12/07/2021 6.8  6.1 - 8.1 g/dL Final    Albumin 12/07/2021 4.3  3.6 - 5.1 g/dL Final    Globulin, Total 12/07/2021 2.5  1.9 - 3.7 g/dL (calc) Final    Albumin/Globulin Ratio 12/07/2021 1.7  1.0 - 2.5 (calc) Final    Total Bilirubin 12/07/2021 0.5  0.2 - 1.2 mg/dL Final    Alkaline Phosphatase 12/07/2021 151  37 - 153 U/L Final    AST 12/07/2021 15  10 - 35 U/L Final    ALT 12/07/2021 17  6 - 29 U/L Final   Ancillary Orders on 11/26/2021   Component Date Value Ref Range Status    WBC 11/30/2021 1.7 (A) 3.8 - 10.8 Thousand/uL Final    RBC 11/30/2021 3.61 (A) 3.80 - 5.10 Million/uL Final    Hemoglobin 11/30/2021 12.1  11.7 - 15.5 g/dL Final    Hematocrit 11/30/2021 36.7  35.0 - 45.0 % Final    MCV 11/30/2021 101.7 (A) 80.0 - 100.0 fL Final    MCH 11/30/2021 33.5 (A) 27.0 - 33.0 pg Final    MCHC 11/30/2021 33.0  32.0 - 36.0 g/dL Final    RDW 11/30/2021 15.7 (A)  11.0 - 15.0 % Final    Platelets 11/30/2021 163  140 - 400 Thousand/uL Final    MPV 11/30/2021 9.4  7.5 - 12.5 fL Final    Neutrophils, Abs 11/30/2021 585 (A) 1,500 - 7,800 cells/uL Final    Lymph # 11/30/2021 870  850 - 3,900 cells/uL Final    Mono # 11/30/2021 194 (A) 200 - 950 cells/uL Final    Eos # 11/30/2021 20  15 - 500 cells/uL Final    Baso # 11/30/2021 31  0 - 200 cells/uL Final    Neutrophils Relative 11/30/2021 34.4  % Final    Lymph % 11/30/2021 51.2  % Final    Mono % 11/30/2021 11.4  % Final    Eosinophil % 11/30/2021 1.2  % Final    Basophil % 11/30/2021 1.8  % Final    Glucose 11/30/2021 322 (A) 65 - 139 mg/dL Final    BUN 11/30/2021 10  7 - 25 mg/dL Final    Creatinine 11/30/2021 0.86  0.50 - 1.05 mg/dL Final    eGFR if non  11/30/2021 74  > OR = 60 mL/min/1.73m2 Final    eGFR if  11/30/2021 86  > OR = 60 mL/min/1.73m2 Final    BUN/Creatinine Ratio 11/30/2021 NOT APPLICABLE  6 - 22 (calc) Final    Sodium 11/30/2021 138  135 - 146 mmol/L Final    Potassium 11/30/2021 3.7  3.5 - 5.3 mmol/L Final    Chloride 11/30/2021 101  98 - 110 mmol/L Final    CO2 11/30/2021 25  20 - 32 mmol/L Final    Calcium 11/30/2021 8.9  8.6 - 10.4 mg/dL Final    Total Protein 11/30/2021 6.5  6.1 - 8.1 g/dL Final    Albumin 11/30/2021 4.2  3.6 - 5.1 g/dL Final    Globulin, Total 11/30/2021 2.3  1.9 - 3.7 g/dL (calc) Final    Albumin/Globulin Ratio 11/30/2021 1.8  1.0 - 2.5 (calc) Final    Total Bilirubin 11/30/2021 0.5  0.2 - 1.2 mg/dL Final    Alkaline Phosphatase 11/30/2021 165 (A) 37 - 153 U/L Final    AST 11/30/2021 19  10 - 35 U/L Final    ALT 11/30/2021 16  6 - 29 U/L Final   There may be more visits with results that are not included.       Past Medical History:   Diagnosis Date    Acute hypoxemic respiratory failure 12/12/2020    Breast cancer     left breast    Cancer     RIGHT BREAST 10-15    Depression     Diabetes mellitus     Neuropathy      Panic attacks     S/P epidural steroid injection     Seizures     none since early 30's    Wears glasses     TO DRIVE     Past Surgical History:   Procedure Laterality Date    AXILLARY NODE DISSECTION Left 8/24/2020    Procedure: LYMPHADENECTOMY, AXILLARY;  Surgeon: Cory Vick MD;  Location: [facility];  Service: General;  Laterality: Left;  left breast mastectomy with possible axillary lymph node dissection    BREAST BIOPSY      right    BREAST LUMPECTOMY      BREAST RECONSTRUCTION      breast reconstruction with tummy [facility]      BREAST SURGERY      11-3-15 LUMPECTOMY, 12-2-15 REEXCISION    INCISION AND DRAINAGE OF ABSCESS Left 9/9/2020    Procedure: INCISION AND DRAINAGE, ABSCESS;  Surgeon: Cory Vick MD;  Location: [facility] OR;  Service: General;  Laterality: Left;    INSERTION OF LOCALIZATION WIRE Left 8/24/2020    Procedure: INSERTION, LOCALIZATION WIRE;  Surgeon: Cory Vick MD;  Location: [facility];  Service: General;  Laterality: Left;  left breast lumpectomy with wire needle loc    MASTECTOMY      mastectomy 2016      MASTECTOMY, PARTIAL Left 3/19/2021    Procedure: MASTECTOMY, PARTIAL;  Surgeon: Cory Vick MD;  Location: [facility];  Service: General;  Laterality: Left;  lumpectomy  left breast     RECONSTRUCTION OF NIPPLE Right 11/5/2018    Procedure: RECONSTRUCTION-NIPPLE RIGHT;  Surgeon: iXang Hernandez MD;  Location: [address];  Service: Plastics;  Laterality: Right;    SENTINEL LYMPH NODE BIOPSY Left 8/24/2020    Procedure: BIOPSY, LYMPH NODE, SENTINEL;  Surgeon: Cory Vick MD;  Location: [facility];  Service: General;  Laterality: Left;  left breast lumpectomy with left sentinel lymoh node       shoulder surgery and wrist      BILAT  BONE SPUR    WRIST SURGERY      RIGHT     Family History   Problem Relation Age of Onset    Anesthesia problems Neg Hx        Marital Status:   Alcohol History:  reports current alcohol use.  Tobacco  History:  reports that she quit smoking about 6 years ago. She has a 7.50 pack-year smoking history. She has never used smokeless tobacco.  Drug History:  reports previous drug use. Drug: Marijuana.    Review of patient's allergies indicates:   Allergen Reactions    Adhesive tape-silicones Hives     BANDAIDS    Polymycin Hives    Adhesive     Latex, natural rubber Hives and Itching    Neomycin-bacitracnzn-polymyxnb     Polyurethane-39 Hives       Current Outpatient Medications:     carisoprodoL (SOMA) 350 MG tablet, Take 1 tablet (350 mg total) by mouth 3 (three) times daily as needed for Muscle spasms., Disp: 90 tablet, Rfl: 0    diazePAM (VALIUM) 10 MG Tab, Take 1 tablet (10 mg total) by mouth 4 (four) times daily as needed (anxiety)., Disp: 120 tablet, Rfl: 3    dronabinoL (MARINOL) 5 MG capsule, Take 1 capsule (5 mg total) by mouth 2 (two) times daily before meals., Disp: 60 capsule, Rfl: 0    dulaglutide (TRULICITY) 3 mg/0.5 mL pen injector, Inject 3 mg into the skin every 7 days., Disp: 4 pen, Rfl: 11    enalapril (VASOTEC) 5 MG tablet, Take 1 tablet (5 mg total) by mouth once daily., Disp: 90 tablet, Rfl: 3    etodolac (LODINE) 400 MG tablet, Take 1 tablet (400 mg total) by mouth once daily., Disp: 30 tablet, Rfl: 2    furosemide (LASIX) 20 MG tablet, TAKE ONE TABLET BY MOUTH EVERY DAY, Disp: 30 tablet, Rfl: 6    HYDROcodone-acetaminophen (NORCO)  mg per tablet, Take 1 tablet by mouth every 6 (six) hours as needed for Pain., Disp: 120 tablet, Rfl: 0    insulin aspart, niacinamide, (FIASP FLEXTOUCH U-100 INSULIN) 100 unit/mL (3 mL) InPn, Inject 12 Units into the skin 2 (two) times daily with meals., Disp: 3 pen, Rfl: 9    insulin glargine (LANTUS U-100 INSULIN) 100 unit/mL injection, Inject 32 Units into the skin 2 (two) times a day., Disp: 18 mL, Rfl: 11    metFORMIN (GLUCOPHAGE-XR) 500 MG ER 24hr tablet, Take 2 tablets (1,000 mg total) by mouth 2 (two) times daily with meals., Disp:  "360 tablet, Rfl: 3    metoprolol tartrate (LOPRESSOR) 25 MG tablet, Take 1 tablet (25 mg total) by mouth 2 (two) times daily., Disp: 60 tablet, Rfl: 11    oxyCODONE (ROXICODONE) 10 mg Tab immediate release tablet, Take 1 tablet (10 mg total) by mouth every 4 to 6 hours as needed for Pain., Disp: 120 tablet, Rfl: 0    oxyCODONE (ROXICODONE) 5 MG immediate release tablet, TAKE 1 TO 2 TABLETS BY MOUTH EVERY 4 TO 6 HOURS AS NEEDED FOR BREAKTHROUGH PAIN, Disp: , Rfl:     rosuvastatin (CRESTOR) 40 MG Tab, Take 1 tablet (40 mg total) by mouth every evening. For cholesterol, Disp: 90 tablet, Rfl: 3    zolpidem (AMBIEN) 10 mg Tab, TAKE ONE TABLET BY MOUTH EVERY NIGHT AT BEDTIME, Disp: 30 tablet, Rfl: 3    anastrozole (ARIMIDEX) 1 mg Tab, Take 1 mg by mouth once daily., Disp: , Rfl:     dextromethorphan-guaiFENesin  mg/5 ml (ROBITUSSIN-DM)  mg/5 mL liquid, Take 1 teaspoon q 6 hours prn cough., Disp: 300 mL, Rfl: 0    docusate sodium (COLACE) 100 MG capsule, Take 100 mg by mouth 2 (two) times daily., Disp: , Rfl:     levoFLOXacin (LEVAQUIN) 500 MG tablet, Take 1 tablet (500 mg total) by mouth once daily. for 10 days, Disp: 10 tablet, Rfl: 0    mometasone 0.1% (ELOCON) 0.1 % cream, Apply to affected area daily, Disp: 45 g, Rfl: 1    naloxegoL (MOVANTIK) 12.5 mg Tab, Take 12.5 mg by mouth once daily., Disp: 30 tablet, Rfl: 3    ondansetron (ZOFRAN-ODT) 8 MG TbDL, Take 1 tablet (8 mg total) by mouth every 6 to 8 hours as needed (nausea)., Disp: 30 tablet, Rfl: 1    palbociclib 100 mg Tab, Take 100 mg by mouth once daily. for 21 days, then take 7 days off. Total cycle length 28 days, Disp: 21 tablet, Rfl: 6    pen needle, diabetic (BD ULTRA-FINE MARCE PEN NEEDLE) 32 gauge x 5/32" Ndle, Test blood sugar twice daily, Disp: 100 each, Rfl: 5    promethazine (PHENERGAN) 25 MG tablet, Take 1 tablet (25 mg total) by mouth every 4 to 6 hours as needed., Disp: 30 tablet, Rfl: 5    promethazine-codeine 6.25-10 " "mg/5 ml (PHENERGAN WITH CODEINE) 6.25-10 mg/5 mL syrup, 1 teaspoon every 6 hours prn cough., Disp: 450 mL, Rfl: 0    Review of Systems   All other systems reviewed and are negative.         Objective:      Vitals:    03/10/22 1021   BP: (!) 170/92   Pulse: 91   SpO2: 97%   Weight: 74.8 kg (165 lb)   Height: 5' 11" (1.803 m)     Physical Exam  Constitutional:       General: She is not in acute distress.     Appearance: Normal appearance.      Comments: Seated in wheelchair   HENT:      Head: Normocephalic and atraumatic.      Mouth/Throat:      Mouth: Mucous membranes are moist.   Eyes:      Conjunctiva/sclera: Conjunctivae normal.   Pulmonary:      Effort: Pulmonary effort is normal.   Neurological:      General: No focal deficit present.      Mental Status: She is alert and oriented to person, place, and time.   Psychiatric:         Mood and Affect: Mood is anxious.         Behavior: Behavior normal.           Assessment:       1. Type 2 diabetes mellitus with hyperglycemia, with long-term current use of insulin    2. Mixed hyperlipidemia    3. Essential hypertension    4. Therapeutic opioid induced constipation    5. Pathological fracture of thoracic vertebra with delayed healing, subsequent encounter    6. Malignant neoplasm of lower-inner quadrant of right breast of female, estrogen receptor positive    7. Chronic obstructive pulmonary disease, unspecified COPD type    8. Bone metastases    9. Chemotherapy-induced neutropenia         Plan:       Type 2 diabetes mellitus with hyperglycemia, with long-term current use of insulin  Blood sugar improved    Mixed hyperlipidemia  Stable on meds    Essential hypertension  Will need to monitor.  Patient very uncomfortable and somewhat anxious is visit    Therapeutic opioid induced constipation  -     naloxegoL (MOVANTIK) 12.5 mg Tab; Take 12.5 mg by mouth once daily.  Dispense: 30 tablet; Refill: 3    Pathological fracture of thoracic vertebra with delayed healing, " subsequent encounter  -     WHEEL[employer]IR FOR HOME USE  Will notify neurosurgeon about her lab results    Malignant neoplasm of lower-inner quadrant of right breast of female, estrogen receptor positive    Chronic obstructive pulmonary disease, unspecified COPD type    Bone metastases  -     WHEELCHAIR FOR HOME USE    Chemotherapy-induced neutropenia      Follow up in about 3 months (around 6/10/2022) for Diabetic Check-Up.

## 2022-03-10 NOTE — TELEPHONE ENCOUNTER
Dr. Flowers reviewed pt's recent labs. Informed her that unfortunately, her A1C is still to high to proceed with surgery as planned next week. Pt reports extreme disappointment. Informed pt that I will move the surgery to April and reschedule another A1C draw prior to. Pt voiced understanding.

## 2022-03-10 NOTE — TELEPHONE ENCOUNTER
----- Message from Nell Carrillo sent at 3/10/2022  9:13 AM CST -----  The patient would like a call back with th results of her WBC. # [phone]

## 2022-03-11 DIAGNOSIS — Z17.1 MALIGNANT NEOPLASM OF UPPER-OUTER QUADRANT OF RIGHT BREAST IN FEMALE, ESTROGEN RECEPTOR NEGATIVE: ICD-10-CM

## 2022-03-11 DIAGNOSIS — C50.411 MALIGNANT NEOPLASM OF UPPER-OUTER QUADRANT OF RIGHT BREAST IN FEMALE, ESTROGEN RECEPTOR NEGATIVE: ICD-10-CM

## 2022-03-11 NOTE — TELEPHONE ENCOUNTER
----- Message from Nell Carrillo sent at 3/10/2022  3:11 PM CST -----  The patient called and said her doctor cancelled her surgery until next month. He told her he wants her sugar level below 7. She wants to know if she should still do the test  ordered for her and about her Ibrance. She was upset saying she can barely walk. # [phone]

## 2022-03-11 NOTE — TELEPHONE ENCOUNTER
Pt surgery got pushed back until 4/20.  Pt will resume ibrance at this time.  Requesting motorized wheelchair.

## 2022-03-14 ENCOUNTER — TELEPHONE (OUTPATIENT)
Dept: FAMILY MEDICINE | Facility: CLINIC | Age: 62
End: 2022-03-14
Payer: MEDICARE

## 2022-03-14 ENCOUNTER — TELEPHONE (OUTPATIENT)
Dept: HEMATOLOGY/ONCOLOGY | Facility: CLINIC | Age: 62
End: 2022-03-14
Payer: MEDICARE

## 2022-03-14 RX ORDER — OXYCODONE HYDROCHLORIDE 10 MG/1
10 TABLET ORAL
Qty: 120 TABLET | Refills: 0 | Status: SHIPPED | OUTPATIENT
Start: 2022-03-15 | End: 2022-03-18

## 2022-03-14 NOTE — TELEPHONE ENCOUNTER
----- Message from Nell Carrillo sent at 3/14/2022 11:30 AM CDT -----  The patient called about an order for a wheel chair. She said she needs an electric one because she cannot walk. She also said she has an appointment in infusion tomorrow and would like to  her oxydodone prescription then. # [phone]

## 2022-03-14 NOTE — TELEPHONE ENCOUNTER
Spoke with patient notified will review paperwork for completeness and refax.  Patient verbalized understanding -DN

## 2022-03-14 NOTE — TELEPHONE ENCOUNTER
----- Message from Shantal Berman sent at 3/14/2022 11:17 AM CDT -----  Patient called and stated that she contacted the pharmacy and they advised that he medicine is on hold because the doctor forgot to sign the prescription and they forgot to put the insurance information in please give her a call at [phone]

## 2022-03-14 NOTE — TELEPHONE ENCOUNTER
----- Message from Shantal Berman sent at 3/14/2022 12:05 PM CDT -----  Patient called and stated that the pharmacy is missing the signature on page six please sign and send back if any questions please give her a call at [phone]

## 2022-03-14 NOTE — TELEPHONE ENCOUNTER
Per DR. Vick, a motorized wheelchair is not something he usually orders.  Pt will need to contact pcp or surgeon in regards to this. Prescription ready for . Pt verbalized understanding.

## 2022-03-15 ENCOUNTER — NURSE TRIAGE (OUTPATIENT)
Dept: ADMINISTRATIVE | Facility: CLINIC | Age: 62
End: 2022-03-15
Payer: MEDICARE

## 2022-03-15 ENCOUNTER — INFUSION (OUTPATIENT)
Dept: INFUSION THERAPY | Facility: HOSPITAL | Age: 62
End: 2022-03-15
Attending: INTERNAL MEDICINE
Payer: MEDICARE

## 2022-03-15 ENCOUNTER — HOSPITAL ENCOUNTER (EMERGENCY)
Facility: HOSPITAL | Age: 62
Discharge: HOME | End: 2022-03-15
Attending: EMERGENCY MEDICINE
Payer: MEDICARE

## 2022-03-15 ENCOUNTER — TELEPHONE (OUTPATIENT)
Dept: HEMATOLOGY/ONCOLOGY | Facility: CLINIC | Age: 62
End: 2022-03-15
Payer: MEDICARE

## 2022-03-15 VITALS
HEART RATE: 90 BPM | BODY MASS INDEX: 22.56 KG/M2 | RESPIRATION RATE: 18 BRPM | HEIGHT: 71 IN | TEMPERATURE: 98 F | WEIGHT: 161.19 LBS | SYSTOLIC BLOOD PRESSURE: 177 MMHG | OXYGEN SATURATION: 98 % | DIASTOLIC BLOOD PRESSURE: 104 MMHG

## 2022-03-15 VITALS
SYSTOLIC BLOOD PRESSURE: 152 MMHG | TEMPERATURE: 98 F | DIASTOLIC BLOOD PRESSURE: 78 MMHG | OXYGEN SATURATION: 95 % | HEART RATE: 82 BPM | HEIGHT: 71 IN | RESPIRATION RATE: 18 BRPM | WEIGHT: 161 LBS | BODY MASS INDEX: 22.54 KG/M2

## 2022-03-15 DIAGNOSIS — C79.51 BONE METASTASES: Primary | ICD-10-CM

## 2022-03-15 DIAGNOSIS — Z17.0 MALIGNANT NEOPLASM OF LOWER-OUTER QUADRANT OF LEFT BREAST OF FEMALE, ESTROGEN RECEPTOR POSITIVE: ICD-10-CM

## 2022-03-15 DIAGNOSIS — C50.411 MALIGNANT NEOPLASM OF UPPER-OUTER QUADRANT OF RIGHT BREAST IN FEMALE, ESTROGEN RECEPTOR NEGATIVE: ICD-10-CM

## 2022-03-15 DIAGNOSIS — C50.512 MALIGNANT NEOPLASM OF LOWER-OUTER QUADRANT OF LEFT BREAST OF FEMALE, ESTROGEN RECEPTOR POSITIVE: ICD-10-CM

## 2022-03-15 DIAGNOSIS — R07.9 CHEST PAIN: ICD-10-CM

## 2022-03-15 DIAGNOSIS — M54.14 THORACIC RADICULOPATHY: Primary | ICD-10-CM

## 2022-03-15 DIAGNOSIS — D70.1 CHEMOTHERAPY-INDUCED NEUTROPENIA: ICD-10-CM

## 2022-03-15 DIAGNOSIS — T45.1X5A CHEMOTHERAPY-INDUCED NEUTROPENIA: ICD-10-CM

## 2022-03-15 DIAGNOSIS — Z17.1 MALIGNANT NEOPLASM OF UPPER-OUTER QUADRANT OF RIGHT BREAST IN FEMALE, ESTROGEN RECEPTOR NEGATIVE: ICD-10-CM

## 2022-03-15 LAB
ALBUMIN SERPL BCP-MCNC: 3.9 G/DL (ref 3.5–5.2)
ALP SERPL-CCNC: 241 U/L (ref 55–135)
ALT SERPL W/O P-5'-P-CCNC: 22 U/L (ref 10–44)
ANION GAP SERPL CALC-SCNC: 13 MMOL/L (ref 8–16)
AST SERPL-CCNC: 25 U/L (ref 10–40)
BASOPHILS # BLD AUTO: 0.02 K/UL (ref 0–0.2)
BASOPHILS NFR BLD: 0.3 % (ref 0–1.9)
BILIRUB SERPL-MCNC: 0.5 MG/DL (ref 0.1–1)
BUN SERPL-MCNC: 16 MG/DL (ref 6–20)
CALCIUM SERPL-MCNC: 10.2 MG/DL (ref 8.7–10.5)
CHLORIDE SERPL-SCNC: 102 MMOL/L (ref 95–110)
CO2 SERPL-SCNC: 28 MMOL/L (ref 23–29)
CREAT SERPL-MCNC: 0.8 MG/DL (ref 0.5–1.4)
DIFFERENTIAL METHOD: ABNORMAL
EOSINOPHIL # BLD AUTO: 0 K/UL (ref 0–0.5)
EOSINOPHIL NFR BLD: 0.1 % (ref 0–8)
ERYTHROCYTE [DISTWIDTH] IN BLOOD BY AUTOMATED COUNT: 13.7 % (ref 11.5–14.5)
EST. GFR  (AFRICAN AMERICAN): >60 ML/MIN/1.73 M^2
EST. GFR  (NON AFRICAN AMERICAN): >60 ML/MIN/1.73 M^2
GLUCOSE SERPL-MCNC: 111 MG/DL (ref 70–110)
HCT VFR BLD AUTO: 39.8 % (ref 37–48.5)
HGB BLD-MCNC: 13.3 G/DL (ref 12–16)
IMM GRANULOCYTES # BLD AUTO: 0.02 K/UL (ref 0–0.04)
IMM GRANULOCYTES NFR BLD AUTO: 0.3 % (ref 0–0.5)
LYMPHOCYTES # BLD AUTO: 1 K/UL (ref 1–4.8)
LYMPHOCYTES NFR BLD: 13.5 % (ref 18–48)
MCH RBC QN AUTO: 31.5 PG (ref 27–31)
MCHC RBC AUTO-ENTMCNC: 33.4 G/DL (ref 32–36)
MCV RBC AUTO: 94 FL (ref 82–98)
MONOCYTES # BLD AUTO: 0.3 K/UL (ref 0.3–1)
MONOCYTES NFR BLD: 4.5 % (ref 4–15)
NEUTROPHILS # BLD AUTO: 6.2 K/UL (ref 1.8–7.7)
NEUTROPHILS NFR BLD: 81.3 % (ref 38–73)
NRBC BLD-RTO: 0 /100 WBC
PLATELET # BLD AUTO: 316 K/UL (ref 150–450)
PMV BLD AUTO: 9.8 FL (ref 9.2–12.9)
POTASSIUM SERPL-SCNC: 4.2 MMOL/L (ref 3.5–5.1)
PROT SERPL-MCNC: 7.8 G/DL (ref 6–8.4)
RBC # BLD AUTO: 4.22 M/UL (ref 4–5.4)
SODIUM SERPL-SCNC: 143 MMOL/L (ref 136–145)
TROPONIN I SERPL DL<=0.01 NG/ML-MCNC: 0.01 NG/ML (ref 0–0.03)
WBC # BLD AUTO: 7.64 K/UL (ref 3.9–12.7)

## 2022-03-15 PROCEDURE — 96402 CHEMO HORMON ANTINEOPL SQ/IM: CPT

## 2022-03-15 PROCEDURE — 96374 THER/PROPH/DIAG INJ IV PUSH: CPT

## 2022-03-15 PROCEDURE — 80053 COMPREHEN METABOLIC PANEL: CPT | Performed by: PHYSICIAN ASSISTANT

## 2022-03-15 PROCEDURE — 93005 ELECTROCARDIOGRAM TRACING: CPT

## 2022-03-15 PROCEDURE — 99285 EMERGENCY DEPT VISIT HI MDM: CPT | Mod: 25

## 2022-03-15 PROCEDURE — 85025 COMPLETE CBC W/AUTO DIFF WBC: CPT | Performed by: PHYSICIAN ASSISTANT

## 2022-03-15 PROCEDURE — 84484 ASSAY OF TROPONIN QUANT: CPT | Performed by: PHYSICIAN ASSISTANT

## 2022-03-15 PROCEDURE — 36415 COLL VENOUS BLD VENIPUNCTURE: CPT | Performed by: PHYSICIAN ASSISTANT

## 2022-03-15 PROCEDURE — 93010 EKG 12-LEAD: ICD-10-PCS | Mod: ,,, | Performed by: GENERAL PRACTICE

## 2022-03-15 PROCEDURE — 63600175 PHARM REV CODE 636 W HCPCS: Performed by: PHYSICIAN ASSISTANT

## 2022-03-15 PROCEDURE — 63600175 PHARM REV CODE 636 W HCPCS: Mod: JG | Performed by: INTERNAL MEDICINE

## 2022-03-15 PROCEDURE — 93010 ELECTROCARDIOGRAM REPORT: CPT | Mod: ,,, | Performed by: GENERAL PRACTICE

## 2022-03-15 RX ORDER — HYDROMORPHONE HYDROCHLORIDE 2 MG/ML
1 INJECTION, SOLUTION INTRAMUSCULAR; INTRAVENOUS; SUBCUTANEOUS
Status: COMPLETED | OUTPATIENT
Start: 2022-03-15 | End: 2022-03-15

## 2022-03-15 RX ORDER — LAMOTRIGINE 25 MG/1
500 TABLET ORAL
Status: COMPLETED | OUTPATIENT
Start: 2022-03-15 | End: 2022-03-15

## 2022-03-15 RX ADMIN — HYDROMORPHONE HYDROCHLORIDE 1 MG: 2 INJECTION INTRAMUSCULAR; INTRAVENOUS; SUBCUTANEOUS at 08:03

## 2022-03-15 RX ADMIN — FULVESTRANT 500 MG: 50 INJECTION, SOLUTION INTRAMUSCULAR at 03:03

## 2022-03-15 NOTE — TELEPHONE ENCOUNTER
----- Message from Nell Carrillo sent at 3/15/2022 11:12 AM CDT -----  The patient called and said she is in severe pain and can't walk or move her arm. She wants to know if she can get an anti-inflammatory medication when  she comes today to  her pain medication. # [phone]

## 2022-03-15 NOTE — TELEPHONE ENCOUNTER
Dr. Vick recommends the lodeine that she is currently on. Pt states she can not move her arms or legs. I recommended that pt go to ER.

## 2022-03-15 NOTE — ED PROVIDER NOTES
"Encounter Date: 3/15/2022       History     Chief Complaint   Patient presents with    Chest Pain     X 7 days worse with deep breathing; hx of chronic pain requiring surgery to repair fractured discs (thoracic)--"they keep putting off my surgery d/t AIC and WBC"     Negrita Castro is a 61 y.o. female with back and chest pain X 7 days. Patient has a PMHx of breast cancer with metastasis to the T1-T7 vertebral bodies and known compression fractures of T3, T5, and T6. Patient also had DM and HTN.  Patient denies recent falls or known injury. She states that the pain "feels like a rubber band around her chest and back" and that it is worse when she takes a deep breath or when she moves. Patient reports that the pain is worst in the middle of her back and the middle of her chest. Patient states the back pain is so bad that she is struggling to walk or move her arms.  She reports she has a cough that developed 4 days ago and that the chest and back pain are exacerbated by coughing. Patient states that she has a decreased appetite because of the pain. She states that she experiences shortness of breath "all the time." Patient states that she was supposed to have a kyphoplasty today, but that her A1C was 11 and her surgeon decided not to proceed with the surgery. Patient reports that she had a hormone therapy infusion today for her breast cancer. Patient states that she takes Norco, Oxycodone, and Somas for pain, but these do not give her relief. Patient states she has been having constipation due to use of pain medications. The patient denies numbness, vomiting, loss of bladder control, loss of bowel control, headache, or vision changes. She has no known history of blood clots. She has no swelling or pain in her lower extremities.                 Review of patient's allergies indicates:   Allergen Reactions    Adhesive tape-silicones Hives     BANDAIDS    Polymycin Hives    Adhesive     Latex, natural rubber Hives " and Itching    Neomycin-bacitracnzn-polymyxnb     Polyurethane-39 Hives     Past Medical History:   Diagnosis Date    Acute hypoxemic respiratory failure 12/12/2020    Breast cancer     left breast    Cancer     RIGHT BREAST 10-15    Depression     Diabetes mellitus     Neuropathy     Panic attacks     S/P epidural steroid injection     Seizures     none since early 30's    Wears glasses     TO DRIVE     Past Surgical History:   Procedure Laterality Date    AXILLARY NODE DISSECTION Left 8/24/2020    Procedure: LYMPHADENECTOMY, AXILLARY;  Surgeon: Cory Vick MD;  Location: [facility] OR;  Service: General;  Laterality: Left;  left breast mastectomy with possible axillary lymph node dissection    BREAST BIOPSY      right    BREAST LUMPECTOMY      BREAST RECONSTRUCTION      breast reconstruction with tummy tuck      BREAST SURGERY      11-3-15 LUMPECTOMY, 12-2-15 REEXCISION    INCISION AND DRAINAGE OF ABSCESS Left 9/9/2020    Procedure: INCISION AND DRAINAGE, ABSCESS;  Surgeon: Cory Vick MD;  Location: [facility] OR;  Service: General;  Laterality: Left;    INSERTION OF LOCALIZATION WIRE Left 8/24/2020    Procedure: INSERTION, LOCALIZATION WIRE;  Surgeon: Cory Vick MD;  Location: [facility] OR;  Service: General;  Laterality: Left;  left breast lumpectomy with wire needle loc    MASTECTOMY      mastectomy 2016      MASTECTOMY, PARTIAL Left 3/19/2021    Procedure: MASTECTOMY, PARTIAL;  Surgeon: Cory Vick MD;  Location: [facility] OR;  Service: General;  Laterality: Left;  lumpectomy  left breast     RECONSTRUCTION OF NIPPLE Right 11/5/2018    Procedure: RECONSTRUCTION-NIPPLE RIGHT;  Surgeon: Xiang Hernandez MD;  Location: [address];  Service: Plastics;  Laterality: Right;    SENTINEL LYMPH NODE BIOPSY Left 8/24/2020    Procedure: BIOPSY, LYMPH NODE, SENTINEL;  Surgeon: Cory Vick MD;  Location: [facility] OR;  Service: General;  Laterality: Left;  left breast  lumpectomy with left sentinel lymoh node       shoulder surgery and wrist      BILAT  BONE SPUR    WRIST SURGERY      RIGHT     Family History   Problem Relation Age of Onset    Anesthesia problems Neg Hx      Social History     Tobacco Use    Smoking status: Former Smoker     Packs/day: 0.25     Years: 30.00     Pack years: 7.50     Quit date: 2015     Years since quittin.4    Smokeless tobacco: Never Used   Substance Use Topics    Alcohol use: Yes     Alcohol/week: 0.0 standard drinks     Comment: RARELY    Drug use: Not Currently     Types: Marijuana     Comment: medical marijuana     Review of Systems   Constitutional: Negative for activity change, appetite change, chills and fever.   HENT: Negative for congestion, rhinorrhea and sore throat.    Eyes: Negative for redness and visual disturbance.   Respiratory: Positive for cough and shortness of breath. Negative for chest tightness.    Cardiovascular: Positive for chest pain. Negative for palpitations and leg swelling.   Gastrointestinal: Positive for constipation. Negative for abdominal pain, diarrhea, nausea and vomiting.   Genitourinary: Negative for dysuria, flank pain and frequency.   Musculoskeletal: Positive for back pain. Negative for gait problem, neck pain and neck stiffness.   Skin: Negative for rash.   Neurological: Positive for weakness. Negative for dizziness, syncope, numbness and headaches.   Psychiatric/Behavioral: Negative for confusion.       Physical Exam     Initial Vitals [03/15/22 1755]   BP Pulse Resp Temp SpO2   (!) 207/101 84 18 97.7 °F (36.5 °C) 96 %      MAP       --         Physical Exam    Nursing note and vitals reviewed.  Constitutional: She appears well-developed and well-nourished. She is not diaphoretic. She is cooperative.  Non-toxic appearance. She does not have a sickly appearance. No distress.   HENT:   Head: Normocephalic and atraumatic.   Right Ear: External ear normal.   Left Ear: External ear normal.    Nose: Nose normal.   Eyes: Conjunctivae and lids are normal.   Neck:    Full passive range of motion without pain.     Cardiovascular: Normal rate, regular rhythm, normal heart sounds and intact distal pulses. Exam reveals no gallop and no friction rub.    No murmur heard.  Pulmonary/Chest: Effort normal and breath sounds normal. She has no wheezes. She has no rhonchi. She has no rales. She exhibits tenderness. She exhibits no crepitus.     Abdominal: Abdomen is soft. There is no abdominal tenderness. There is no rebound and no guarding.   Musculoskeletal:         General: Tenderness present. Normal range of motion.      Cervical back: Normal and full passive range of motion without pain.      Thoracic back: Tenderness and bony tenderness present.      Lumbar back: Normal.        Back:      Neurological: She is alert. She has normal strength. No sensory deficit.   Skin: Skin is warm, dry and intact. No rash and no abscess noted. No erythema.   Psychiatric: She has a normal mood and affect.         ED Course   Procedures  Labs Reviewed   CBC W/ AUTO DIFFERENTIAL - Abnormal; Notable for the following components:       Result Value    MCH 31.5 (*)     Gran % 81.3 (*)     Lymph % 13.5 (*)     All other components within normal limits   COMPREHENSIVE METABOLIC PANEL - Abnormal; Notable for the following components:    Glucose 111 (*)     Alkaline Phosphatase 241 (*)     All other components within normal limits   TROPONIN I     EKG Readings: (Independently Interpreted)   Initial Reading: No STEMI. Previous EKG: Compared with most recent EKG Previous EKG Date: 11/8/21. Rhythm: Normal Sinus Rhythm. Heart Rate: 92.   No ST or T wave abnormalities       Imaging Results          X-Ray Chest AP Portable (Final result)  Result time 03/15/22 18:35:20    Final result by Brandon Dias MD (03/15/22 18:35:20)                 Impression:      No definite acute radiographic abnormality in the chest.  Scattered bibasilar subsegmental  atelectasis versus scarring.      Electronically signed by: Brandon Dias  Date:    03/15/2022  Time:    18:35             Narrative:    EXAMINATION:  XR CHEST AP PORTABLE    CLINICAL HISTORY:  Chest Pain;.    TECHNIQUE:  Single frontal portable view of the chest was performed.    COMPARISON:  11/08/2021    FINDINGS:  Cardiomediastinal silhouette is within normal limits.Scattered thin bandlike densities in both lower lungs suggestive of scarring versus subsegmental atelectasis.  No definite new consolidation, pleural effusion, or pneumothorax.  Bones are intact.                                 Medications   HYDROmorphone (PF) injection 1 mg (1 mg Intravenous Given 3/15/22 2002)     Medical Decision Making:   History:   I obtained history from: someone other than patient.  Old Medical Records: I decided to obtain old medical records.  Clinical Tests:   Lab Tests: Ordered and Reviewed  Radiological Study: Ordered and Reviewed  Medical Tests: Ordered and Reviewed       APC / Resident Notes:   Urgent evaluation of a 61-year-old female who presents with back pain radiating to her chest.  She reports intermittent pain last few months.  She has known metastatic cancer and compression fractures.  She reports chronic shortness of breath which is unchanged.  Pain is worse with movement and palpation.  EKG shows no acute changes.  Chest x-ray is normal.  Pain is worse with palpation to the chest and back.  She has no lower extremity swelling.  No calf tenderness.  She is not tachycardic or hypoxic.  I have low suspicion for pulmonary embolism due to her chronic pain, reproduction of the pain and improvement with pain medication.  Troponin is negative.  I doubt ACS.  Discussed speaking with her oncologist and orthopedist regarding her pain control.  She voiced understanding. Discussed results with patient. Return precautions given. Based on my clinical evaluation, I do not appreciate any immediate, emergent, or life threatening  condition or etiology that warrants additional workup today and feel that the patient can be discharged with close follow up care.  Patient is to follow up with their primary care provider. Case was discussed with Dr. Clemente who is in agreement with the plan of care. All questions answered.                    Clinical Impression:   Final diagnoses:  [R07.9] Chest pain  [M54.14] Thoracic radiculopathy (Primary)          ED Disposition Condition    Discharge Stable        ED Prescriptions     None        Follow-up Information     Follow up With Specialties Details Why Contact Info    Manuelito Shepard MD Family Medicine   [address] [facility]  [address] [place] [id number]  [phone]      [facility] Emergency Dept Emergency Medicine  As needed [address] [place] [id number] [phone]           Comfort Barnes PA-C  03/15/22 2036

## 2022-03-15 NOTE — TELEPHONE ENCOUNTER
Dx with fractured discs, was suppose to have surgery but needs a1c <7 in order to have surgery. Now unable to walk and can barely move arms. Pt also receiving infusion for breast cancer that metastasized, /107. In severe pain to back and chest, taking pain pills and patches but no relief. Mult complaints including weight loss, etc. Advised per protocol. Go to ED now. VU and agreed.     Reason for Disposition   Systolic BP >= 160 OR Diastolic >= 100, and any cardiac or neurologic symptoms (e.g., chest pain, difficulty breathing, unsteady gait, blurred vision)    Additional Information   Negative: Sounds like a life-threatening emergency to the triager    Protocols used: BLOOD PRESSURE - HIGH-A-OH

## 2022-03-15 NOTE — PLAN OF CARE
Problem: Fatigue  Goal: Improved Activity Tolerance  Outcome: Ongoing, Progressing  Intervention: Promote Improved Energy  Flowsheets (Taken 3/15/2022 1512)  Fatigue Management: frequent rest breaks encouraged  Sleep/Rest Enhancement:   regular sleep/rest pattern promoted   relaxation techniques promoted  Activity Management: Ambulated -L4

## 2022-03-16 ENCOUNTER — TELEPHONE (OUTPATIENT)
Dept: NEUROSURGERY | Facility: CLINIC | Age: 62
End: 2022-03-16
Payer: MEDICARE

## 2022-03-16 NOTE — TELEPHONE ENCOUNTER
Contacted pt per Dr. Flowers request to schedule appt tomorrow to discuss potential surgery. Pt voiced understanding to appt details.

## 2022-03-16 NOTE — PHARMACY MED REC
"Admission Medication History     The home medication history was taken by Steffanie Ronquillo CPhT.    Medication history obtained from Patient     You may go to "Admission" then "Reconcile Home Medications" tabs to review and/or act upon these items.      The home medication list has been updated by the Pharmacy department.    Please read ALL comments highlighted in yellow.    Please address this information as you see fit.     Feel free to contact us if you have any questions or require assistance.      Steffanie Ronquillo CPhT.  [phone]      .          "

## 2022-03-16 NOTE — DISCHARGE INSTRUCTIONS
Follow up with your oncologist and back specialist.  Continue your routine pain medication   For worsening symptoms, chest pain, shortness of breath, increased abdominal pain, high grade fever, stroke or stroke like symptoms, immediately go to the nearest Emergency Room or call 911 as soon as possible.

## 2022-03-17 ENCOUNTER — OFFICE VISIT (OUTPATIENT)
Dept: NEUROSURGERY | Facility: CLINIC | Age: 62
End: 2022-03-17
Payer: MEDICARE

## 2022-03-17 VITALS — SYSTOLIC BLOOD PRESSURE: 146 MMHG | HEART RATE: 79 BPM | DIASTOLIC BLOOD PRESSURE: 86 MMHG

## 2022-03-17 DIAGNOSIS — Z17.1 MALIGNANT NEOPLASM OF UPPER-OUTER QUADRANT OF RIGHT BREAST IN FEMALE, ESTROGEN RECEPTOR NEGATIVE: ICD-10-CM

## 2022-03-17 DIAGNOSIS — C50.411 MALIGNANT NEOPLASM OF UPPER-OUTER QUADRANT OF RIGHT BREAST IN FEMALE, ESTROGEN RECEPTOR NEGATIVE: ICD-10-CM

## 2022-03-17 DIAGNOSIS — C79.51 BONE METASTASES: ICD-10-CM

## 2022-03-17 DIAGNOSIS — Z17.0 MALIGNANT NEOPLASM OF LOWER-OUTER QUADRANT OF LEFT BREAST OF FEMALE, ESTROGEN RECEPTOR POSITIVE: Primary | ICD-10-CM

## 2022-03-17 DIAGNOSIS — M84.48XG PATHOLOGICAL FRACTURE OF THORACIC VERTEBRA WITH DELAYED HEALING, SUBSEQUENT ENCOUNTER: Primary | ICD-10-CM

## 2022-03-17 DIAGNOSIS — C50.512 MALIGNANT NEOPLASM OF LOWER-OUTER QUADRANT OF LEFT BREAST OF FEMALE, ESTROGEN RECEPTOR POSITIVE: Primary | ICD-10-CM

## 2022-03-17 PROCEDURE — 3079F DIAST BP 80-89 MM HG: CPT | Mod: CPTII,S$GLB,, | Performed by: NEUROLOGICAL SURGERY

## 2022-03-17 PROCEDURE — 99213 PR OFFICE/OUTPT VISIT, EST, LEVL III, 20-29 MIN: ICD-10-PCS | Mod: S$GLB,,, | Performed by: NEUROLOGICAL SURGERY

## 2022-03-17 PROCEDURE — 1159F PR MEDICATION LIST DOCUMENTED IN MEDICAL RECORD: ICD-10-PCS | Mod: CPTII,S$GLB,, | Performed by: NEUROLOGICAL SURGERY

## 2022-03-17 PROCEDURE — 4010F ACE/ARB THERAPY RXD/TAKEN: CPT | Mod: CPTII,S$GLB,, | Performed by: NEUROLOGICAL SURGERY

## 2022-03-17 PROCEDURE — 3079F PR MOST RECENT DIASTOLIC BLOOD PRESSURE 80-89 MM HG: ICD-10-PCS | Mod: CPTII,S$GLB,, | Performed by: NEUROLOGICAL SURGERY

## 2022-03-17 PROCEDURE — 99213 OFFICE O/P EST LOW 20 MIN: CPT | Mod: S$GLB,,, | Performed by: NEUROLOGICAL SURGERY

## 2022-03-17 PROCEDURE — 3077F SYST BP >= 140 MM HG: CPT | Mod: CPTII,S$GLB,, | Performed by: NEUROLOGICAL SURGERY

## 2022-03-17 PROCEDURE — 3052F PR MOST RECENT HEMOGLOBIN A1C LEVEL 8.0 - < 9.0%: ICD-10-PCS | Mod: CPTII,S$GLB,, | Performed by: NEUROLOGICAL SURGERY

## 2022-03-17 PROCEDURE — 3052F HG A1C>EQUAL 8.0%<EQUAL 9.0%: CPT | Mod: CPTII,S$GLB,, | Performed by: NEUROLOGICAL SURGERY

## 2022-03-17 PROCEDURE — 3077F PR MOST RECENT SYSTOLIC BLOOD PRESSURE >= 140 MM HG: ICD-10-PCS | Mod: CPTII,S$GLB,, | Performed by: NEUROLOGICAL SURGERY

## 2022-03-17 PROCEDURE — 4010F PR ACE/ARB THEARPY RXD/TAKEN: ICD-10-PCS | Mod: CPTII,S$GLB,, | Performed by: NEUROLOGICAL SURGERY

## 2022-03-17 PROCEDURE — 1159F MED LIST DOCD IN RCRD: CPT | Mod: CPTII,S$GLB,, | Performed by: NEUROLOGICAL SURGERY

## 2022-03-17 NOTE — PROGRESS NOTES
The patient follows up regarding pathologic T5, T6 compression fractures.  At her last evaluation, percutaneous kyphoplasty T5, T6 was tentatively scheduled.  However her hemoglobin A1c was markedly elevated preoperative.  Her diabetes mellitus regimen was subsequently modified.  Her last hemoglobin A1c remains elevated at 8.3.  She was evaluated the in the emergency department on 03/15/2022 due to worsening mid back pain with radiation to the chest.  An acute MI was excluded.  She notes worsened mid thoracic pain with arm and leg movement.  She discontinued wearing the TLSO brace as this exacerbated the pain.  She denies focal extremity weakness, radicular pain, paresthesias, ataxia, bowel or bladder disturbance.  She is currently taking Norco 10/325 every 6 hours with oxycodone immediate release 10 mg every 4-6 hours as needed.    Exam:    Awake, alert, oriented to person place and time.    Cranial nerves 2-12 grossly intact.    Strength is graded 5/5 in all muscle groups.    Sensation is grossly intact throughout.    Deep tendon reflexes 1/4 all extremities  Fredi signs absent bilaterally  Clonus absent bilaterally  Gait not examined  Tender to palpation midline midthoracic region    Analysis:  I outlined multiple risk factors for significant postoperative infection including elevated hemoglobin A1c, neutropenia, previous postoperative infection, and adjuvant chemotherapy.  I also reiterated that there is no guarantee that percutaneous kyphoplasty T5, T6 will relieve her pain.  Nevertheless, based on her worsened pain, I offered to move up the date of the procedure.  She voiced understanding of all the above and states that she would prefer to limit the risk for infection and continue symptom management for now.  She notes that after receiving IV Dilaudid in the ED she had much improved pain relief.  Transitioning from hydrocodone/oxycodone to oral Dilaudid on a short-term basis may be a reasonable option  until her hemoglobin A1c can be optimized.  We tentatively scheduled her for the above procedure on April 20, 2022. We will obtain updated MRI thoracic with and without contrast and repeat hemoglobin A1c shortly before the planned surgery date.      Negrita was seen today for back pain.    Diagnoses and all orders for this visit:    Pathological fracture of thoracic vertebra with delayed healing, subsequent encounter

## 2022-03-18 RX ORDER — OXYCODONE HYDROCHLORIDE 15 MG/1
15 TABLET ORAL
Qty: 120 TABLET | Refills: 0 | Status: SHIPPED | OUTPATIENT
Start: 2022-03-18 | End: 2022-04-14 | Stop reason: SDUPTHER

## 2022-03-25 ENCOUNTER — HOSPITAL ENCOUNTER (EMERGENCY)
Facility: HOSPITAL | Age: 62
Discharge: HOME | End: 2022-03-26
Attending: EMERGENCY MEDICINE
Payer: MEDICARE

## 2022-03-25 VITALS
HEART RATE: 109 BPM | BODY MASS INDEX: 22.54 KG/M2 | RESPIRATION RATE: 18 BRPM | SYSTOLIC BLOOD PRESSURE: 178 MMHG | HEIGHT: 71 IN | OXYGEN SATURATION: 98 % | TEMPERATURE: 99 F | WEIGHT: 161 LBS | DIASTOLIC BLOOD PRESSURE: 89 MMHG

## 2022-03-25 DIAGNOSIS — K59.03 DRUG-INDUCED CONSTIPATION: Primary | ICD-10-CM

## 2022-03-25 DIAGNOSIS — K59.00 CONSTIPATION: ICD-10-CM

## 2022-03-25 PROCEDURE — 25000003 PHARM REV CODE 250: Performed by: EMERGENCY MEDICINE

## 2022-03-25 PROCEDURE — 99283 EMERGENCY DEPT VISIT LOW MDM: CPT | Mod: 25

## 2022-03-25 RX ORDER — PSEUDOEPHEDRINE/ACETAMINOPHEN 30MG-500MG
100 TABLET ORAL
Status: COMPLETED | OUTPATIENT
Start: 2022-03-25 | End: 2022-03-25

## 2022-03-25 RX ORDER — SYRING-NEEDL,DISP,INSUL,0.3 ML 29 G X1/2"
296 SYRINGE, EMPTY DISPOSABLE MISCELLANEOUS
Status: COMPLETED | OUTPATIENT
Start: 2022-03-25 | End: 2022-03-25

## 2022-03-25 RX ADMIN — SODIUM CHLORIDE 500 ML: 0.9 INJECTION, SOLUTION INTRAVENOUS at 10:03

## 2022-03-25 RX ADMIN — Medication 100 ML: at 10:03

## 2022-03-25 RX ADMIN — MAGNESIUM CITRATE 296 ML: 1.75 LIQUID ORAL at 10:03

## 2022-03-26 RX ORDER — PSYLLIUM HUSK 3.4 G/12G
1 POWDER ORAL 2 TIMES DAILY
Qty: 30 PACKET | Refills: 2 | Status: SHIPPED | OUTPATIENT
Start: 2022-03-26 | End: 2023-01-01

## 2022-03-26 NOTE — ED PROVIDER NOTES
Encounter Date: 3/25/2022    SCRIBE #1 NOTE: I, Sheyla Waite, am scribing for, and in the presence of, Myke Rizzo MD.       History     Chief Complaint   Patient presents with    Constipation     Feels pressure in their rectum. Pt reports last normal BM a week ago     Time seen by provider: 9:40 PM on 03/25/2022    Negrita Castro is a 61 y.o. female who presents to the ED with constipation that began a week ago. Pt reports taking pain medicine, which she thinks caused the constipation. Pt took oxycodone PTA. Pt reports taking Dilaudid this past week. Pt is able to pass gas. Pt reports feeling pressure around her rectum. Pt has tried duralax and stool softeners with no improvements to constipation. The patient denies fever, congestion, cough, chest pain, abdominal pain, or any other symptoms at this time. PMHx of DM, breast cancer, and acute hypoxemic respiratory failure. PSHx of mastectomy, breast lumpectomy, and breast reconstruction.     The history is provided by the patient and a significant other.     Review of patient's allergies indicates:   Allergen Reactions    Adhesive tape-silicones Hives     BANDAIDS    Polymycin Hives    Adhesive     Latex, natural rubber Hives and Itching    Neomycin-bacitracnzn-polymyxnb     Polyurethane-39 Hives     Past Medical History:   Diagnosis Date    Acute hypoxemic respiratory failure 12/12/2020    Breast cancer     left breast    Cancer     RIGHT BREAST 10-15    Depression     Diabetes mellitus     Neuropathy     Panic attacks     S/P epidural steroid injection     Seizures     none since early 30's    Wears glasses     TO DRIVE     Past Surgical History:   Procedure Laterality Date    AXILLARY NODE DISSECTION Left 8/24/2020    Procedure: LYMPHADENECTOMY, AXILLARY;  Surgeon: Cory Vick MD;  Location: [facility];  Service: General;  Laterality: Left;  left breast mastectomy with possible axillary lymph node dissection    BREAST BIOPSY       right    BREAST LUMPECTOMY      BREAST RECONSTRUCTION      breast reconstruction with tummy [facility]      BREAST SURGERY      11-3-15 LUMPECTOMY, 12-2-15 REEXCISION    INCISION AND DRAINAGE OF ABSCESS Left 2020    Procedure: INCISION AND DRAINAGE, ABSCESS;  Surgeon: Cory Vick MD;  Location: [facility] OR;  Service: General;  Laterality: Left;    INSERTION OF LOCALIZATION WIRE Left 2020    Procedure: INSERTION, LOCALIZATION WIRE;  Surgeon: Cory Vick MD;  Location: [facility] OR;  Service: General;  Laterality: Left;  left breast lumpectomy with wire needle loc    MASTECTOMY      mastectomy 2016      MASTECTOMY, PARTIAL Left 3/19/2021    Procedure: MASTECTOMY, PARTIAL;  Surgeon: Cory Vick MD;  Location: [facility] OR;  Service: General;  Laterality: Left;  lumpectomy  left breast     RECONSTRUCTION OF NIPPLE Right 2018    Procedure: RECONSTRUCTION-NIPPLE RIGHT;  Surgeon: Xiang Hernandez MD;  Location: [address];  Service: Plastics;  Laterality: Right;    SENTINEL LYMPH NODE BIOPSY Left 2020    Procedure: BIOPSY, LYMPH NODE, SENTINEL;  Surgeon: Cory Vick MD;  Location: [facility] OR;  Service: General;  Laterality: Left;  left breast lumpectomy with left sentinel lymoh node       shoulder surgery and wrist      BILAT  BONE SPUR    WRIST SURGERY      RIGHT     Family History   Problem Relation Age of Onset    Anesthesia problems Neg Hx      Social History     Tobacco Use    Smoking status: Former Smoker     Packs/day: 0.25     Years: 30.00     Pack years: 7.50     Quit date: 2015     Years since quittin.4    Smokeless tobacco: Never Used   Substance Use Topics    Alcohol use: Yes     Alcohol/week: 0.0 standard drinks     Comment: RARELY    Drug use: Not Currently     Types: Marijuana     Comment: medical marijuana     Review of Systems   Constitutional: Negative for fever.   HENT: Negative for congestion.    Eyes: Negative for visual disturbance.    Respiratory: Negative for cough.    Cardiovascular: Negative for chest pain.   Gastrointestinal: Positive for constipation and rectal pain.   Musculoskeletal: Negative for arthralgias.   Skin: Negative for pallor.   Hematological: Does not bruise/bleed easily.   Psychiatric/Behavioral: Negative for agitation.       Physical Exam     Initial Vitals [03/25/22 2008]   BP Pulse Resp Temp SpO2   (!) 178/89 109 18 98.9 °F (37.2 °C) 98 %      MAP       --         Physical Exam    Nursing note and vitals reviewed.  Constitutional: She appears well-developed and well-nourished.  Non-toxic appearance. No distress.   HENT:   Head: Normocephalic and atraumatic.   Eyes: EOM are normal. Pupils are equal, round, and reactive to light.   Neck: Neck supple. No JVD present.   Normal range of motion.  Cardiovascular: Normal rate, regular rhythm, normal heart sounds and intact distal pulses. Exam reveals no gallop and no friction rub.    No murmur heard.  Pulmonary/Chest: Breath sounds normal. She has no wheezes. She has no rhonchi. She has no rales.   Abdominal: Bowel sounds are normal. She exhibits distension. There is no abdominal tenderness.   Abdomen firm.  There is no rebound and no guarding.   Musculoskeletal:         General: Normal range of motion.      Cervical back: Normal range of motion and neck supple. No rigidity.     Neurological: She is alert and oriented to person, place, and time. She has normal strength and normal reflexes. No cranial nerve deficit or sensory deficit. She exhibits normal muscle tone. Coordination normal.   Skin: Skin is warm and dry.   Psychiatric: She has a normal mood and affect. Her speech is normal and behavior is normal. She is not actively hallucinating.         ED Course   Procedures  Labs Reviewed - No data to display       Imaging Results          X-Ray Abdomen AP 1 View (KUB) (Final result)  Result time 03/25/22 21:53:36    Final result by Kadeem Lee DO (03/25/22 21:53:36)                  Impression:      Nonobstructive bowel gas pattern.    Moderate to large stool burden.      Electronically signed by: Kadeem Lee  Date:    03/25/2022  Time:    21:53             Narrative:    EXAMINATION:  XR ABDOMEN AP 1 VIEW    CLINICAL HISTORY:  Constipation, unspecified    TECHNIQUE:  AP View(s) of the abdomen was performed.    COMPARISON:  None    FINDINGS:  There is a nonobstructive bowel gas pattern.  There is a large amount of stool in the rectum and moderate of stool in the colon.  Free air cannot be excluded on this supine radiograph, although none is definitively seen.  There are surgical clips in the abdomen.  The visualized lung bases are clear.  The visualized osseous structures are intact.                                 Medications   glycerin 99.5% topical solution 100 mL (100 mLs Rectal Given 3/25/22 2245)     And   magnesium citrate solution 296 mL (296 mLs Rectal Given 3/25/22 2245)     And   sodium chloride 0.9% bolus 500 mL (0 mLs Rectal Stopped 3/26/22 0014)     Medical Decision Making:   History:   Old Medical Records: I decided to obtain old medical records.  Initial Assessment:   Patient is a 61-year-old woman who presents emergency department for evaluation of constipation.  She feels pressure around the rectum and has not had a bowel movement for several days.  She has a history of breast cancer with Mets to the spine and is on narcotic pain medication which is likely causing her constipation.  No obstructive process on KUB.  Fecal disimpaction performed and brown bottom enema administered with several large bowel movements and significant improvement her symptoms.  Patient discharged improved in no acute distress with instructions to add Metamucil to her bowel regimen.  Patient to follow-up with PCP.  Return precautions discussed.  Discharged in no acute distress.  Clinical Tests:   Radiological Study: Reviewed and Ordered          Scribe Attestation:   Scribe #1: I performed  the above scribed service and the documentation accurately describes the services I performed. I attest to the accuracy of the note.              I, Matthias Small, personally performed the services described in this documentation. All medical record entries made by the scribe were at my direction and in my presence.  I have reviewed the chart and agree that the record reflects my personal performance and is accurate and complete. Myke Rizzo MD.    Clinical Impression:   Final diagnoses:  [K59.00] Constipation  [K59.03] Drug-induced constipation (Primary)          ED Disposition Condition    Discharge Stable        ED Prescriptions     Medication Sig Dispense Start Date End Date Auth. Provider    psyllium husk, with sugar, (METAMUCIL, WITH SUGAR,) 3.4 gram packet Take 1 packet by mouth 2 (two) times daily. 30 packet 3/26/2022  Myke Rizzo MD        Follow-up Information     Follow up With Specialties Details Why Contact Info    Manuelito Shepard MD Family Medicine Schedule an appointment as soon as possible for a visit   [address]  [address]  [facility] [address]  [phone]      [facility] Emergency Dept Emergency Medicine  As needed, If symptoms worsen [address] [place] [id number] [phone]           Myke Rizzo MD  03/26/22 2410

## 2022-03-28 ENCOUNTER — OFFICE VISIT (OUTPATIENT)
Dept: HEMATOLOGY/ONCOLOGY | Facility: CLINIC | Age: 62
End: 2022-03-28
Payer: MEDICARE

## 2022-03-28 ENCOUNTER — TELEPHONE (OUTPATIENT)
Dept: HEMATOLOGY/ONCOLOGY | Facility: CLINIC | Age: 62
End: 2022-03-28

## 2022-03-28 ENCOUNTER — TELEPHONE (OUTPATIENT)
Dept: NEUROSURGERY | Facility: CLINIC | Age: 62
End: 2022-03-28
Payer: MEDICARE

## 2022-03-28 VITALS
BODY MASS INDEX: 22.45 KG/M2 | DIASTOLIC BLOOD PRESSURE: 89 MMHG | HEART RATE: 98 BPM | TEMPERATURE: 97 F | RESPIRATION RATE: 18 BRPM | HEIGHT: 71 IN | SYSTOLIC BLOOD PRESSURE: 141 MMHG

## 2022-03-28 DIAGNOSIS — R05.9 COUGH: ICD-10-CM

## 2022-03-28 DIAGNOSIS — C50.512 MALIGNANT NEOPLASM OF LOWER-OUTER QUADRANT OF LEFT BREAST OF FEMALE, ESTROGEN RECEPTOR POSITIVE: Primary | ICD-10-CM

## 2022-03-28 DIAGNOSIS — M84.48XG PATHOLOGICAL FRACTURE OF THORACIC VERTEBRA WITH DELAYED HEALING, SUBSEQUENT ENCOUNTER: Primary | ICD-10-CM

## 2022-03-28 DIAGNOSIS — R07.81 PLEURITIC CHEST PAIN: ICD-10-CM

## 2022-03-28 DIAGNOSIS — R79.1 ABNORMAL COAGULATION PROFILE: ICD-10-CM

## 2022-03-28 DIAGNOSIS — Z17.0 MALIGNANT NEOPLASM OF LOWER-OUTER QUADRANT OF LEFT BREAST OF FEMALE, ESTROGEN RECEPTOR POSITIVE: Primary | ICD-10-CM

## 2022-03-28 DIAGNOSIS — Z17.0 MALIGNANT NEOPLASM OF LOWER-INNER QUADRANT OF RIGHT BREAST OF FEMALE, ESTROGEN RECEPTOR POSITIVE: ICD-10-CM

## 2022-03-28 DIAGNOSIS — D70.2 OTHER DRUG-INDUCED NEUTROPENIA: ICD-10-CM

## 2022-03-28 DIAGNOSIS — Z01.818 PRE-OP EXAM: ICD-10-CM

## 2022-03-28 DIAGNOSIS — C50.311 MALIGNANT NEOPLASM OF LOWER-INNER QUADRANT OF RIGHT BREAST OF FEMALE, ESTROGEN RECEPTOR POSITIVE: ICD-10-CM

## 2022-03-28 DIAGNOSIS — C79.51 BONE METASTASES: ICD-10-CM

## 2022-03-28 PROCEDURE — 99214 OFFICE O/P EST MOD 30 MIN: CPT | Mod: S$GLB,,, | Performed by: INTERNAL MEDICINE

## 2022-03-28 PROCEDURE — 99214 PR OFFICE/OUTPT VISIT, EST, LEVL IV, 30-39 MIN: ICD-10-PCS | Mod: S$GLB,,, | Performed by: INTERNAL MEDICINE

## 2022-03-28 PROCEDURE — 3079F PR MOST RECENT DIASTOLIC BLOOD PRESSURE 80-89 MM HG: ICD-10-PCS | Mod: S$GLB,,, | Performed by: INTERNAL MEDICINE

## 2022-03-28 PROCEDURE — 3008F PR BODY MASS INDEX (BMI) DOCUMENTED: ICD-10-PCS | Mod: S$GLB,,, | Performed by: INTERNAL MEDICINE

## 2022-03-28 PROCEDURE — 3077F SYST BP >= 140 MM HG: CPT | Mod: S$GLB,,, | Performed by: INTERNAL MEDICINE

## 2022-03-28 PROCEDURE — 3008F BODY MASS INDEX DOCD: CPT | Mod: S$GLB,,, | Performed by: INTERNAL MEDICINE

## 2022-03-28 PROCEDURE — 3052F PR MOST RECENT HEMOGLOBIN A1C LEVEL 8.0 - < 9.0%: ICD-10-PCS | Mod: S$GLB,,, | Performed by: INTERNAL MEDICINE

## 2022-03-28 PROCEDURE — 4010F PR ACE/ARB THEARPY RXD/TAKEN: ICD-10-PCS | Mod: S$GLB,,, | Performed by: INTERNAL MEDICINE

## 2022-03-28 PROCEDURE — 3052F HG A1C>EQUAL 8.0%<EQUAL 9.0%: CPT | Mod: S$GLB,,, | Performed by: INTERNAL MEDICINE

## 2022-03-28 PROCEDURE — 3079F DIAST BP 80-89 MM HG: CPT | Mod: S$GLB,,, | Performed by: INTERNAL MEDICINE

## 2022-03-28 PROCEDURE — 3077F PR MOST RECENT SYSTOLIC BLOOD PRESSURE >= 140 MM HG: ICD-10-PCS | Mod: S$GLB,,, | Performed by: INTERNAL MEDICINE

## 2022-03-28 PROCEDURE — 4010F ACE/ARB THERAPY RXD/TAKEN: CPT | Mod: S$GLB,,, | Performed by: INTERNAL MEDICINE

## 2022-03-28 RX ORDER — LAMOTRIGINE 25 MG/1
500 TABLET ORAL
Status: CANCELLED | OUTPATIENT
Start: 2022-04-12

## 2022-03-28 RX ORDER — CARISOPRODOL 350 MG/1
350 TABLET ORAL 3 TIMES DAILY PRN
Qty: 90 TABLET | Refills: 0 | Status: SHIPPED | OUTPATIENT
Start: 2022-03-28 | End: 2022-05-05 | Stop reason: SDUPTHER

## 2022-03-28 RX ORDER — HYDROCODONE BITARTRATE AND ACETAMINOPHEN 10; 325 MG/1; MG/1
1 TABLET ORAL EVERY 6 HOURS PRN
Qty: 120 TABLET | Refills: 0 | Status: SHIPPED | OUTPATIENT
Start: 2022-03-28 | End: 2022-05-05 | Stop reason: SDUPTHER

## 2022-03-28 NOTE — TELEPHONE ENCOUNTER
Returned call to pt and schedule MRI and lab. She is scheduled to see Dr. Vick today to discuss WBC count prior to surgery. Informed pt she can contact out office with any questions or concerns. Pt voiced understanding.

## 2022-03-28 NOTE — PROGRESS NOTES
"  [facility] In Office Hematology Oncology Subsequent  Encounter Note    3/28/22      Subjective:      Patient ID:   Negrita Castro  61 y.o.   Martínez Shepard R. Leblanc, Baby[place], Bourgeois, [place]      Chief Complaint:   New L breast cancer eval.    HPI:  61 y.o. female with diagnosis of Stage 1 R breast cancer 10/26/15.  "Triple negative".  Adjuvant AC x's 4, tx's 12 and Rad Rx through 9/12/16.    Port removed.  Had bilateral reconstruction surgery 4/14/17.  Further reconstruction, tummy veeck 8/14/17.  Additional fat injection at R chest done for symmetry.  She had placement of R nipple.  Dr. Hernandez.      She is due for additional reconstructive surgery at breast and abdomenal areas.  The surgery is being done as part of her reconstruction efforts and for keloid management.  The date of the surgery is May 19th.    Also, Dr. Alarcon to consider Rad Rx to operative site to decrease keloid potential.  On hold now because of Covid 19 pandemic.    Recent Mamm/U/S showed small mass at 4:00 at L breast.  Needle Bx invasive ductal Ca, ERP+, PRP+, H2N neg  She had  L partial mastectomy, Sentinal node Bx 8/24/20. Followed by another surgery because of L axillary infection.  Primary 2 cm, LN neg, Stage 1 dx.    Discussed Oncotype Dx testing, this supported adding adjuvant chemotherapy to adjuvant hormonal therapy because of increased risk of cancer recurrence long-term.  Discussed BRCA testing, given her bilateral  breast Dx. PHN would not authorize BRCA 1 & 2.  Refill meds for her.    She had COVID-19 infection and was hospitalized.  She came very close to requiring intubation and mechanical ventilation.  She is off oxygen now,   and sees Dr. Justin of Pulmonary.  Recent CT scan of the chest showed residual changes of interstitial disease, consistent with recovering from COVID-19.  Also lytic lesions were seen in the thoracic spine area very suspicious for metastatic disease at T5 through T8 spine.    Recently on " Saturday night she had a hard coughing spell, and thereafter developed severe right posterior pleuritic chest pain.  She has point tenderness over the right posterior chest wall and may very well have a fractured rib at the site.  Rib x-rays have been ordered.  I have refilled her Soma for 1 month.  I have refilled her Norco  for 1 month.  I have added oxycodone 5 mg 1 or 2 p.o. Q 3-4 hours p.r.n. breakthrough pain for 1 month to her regimen.    She also has an enlarging nodule at the left chest wall.  Ultrasound of the site suggested that this was a cyst however the areas firm movable and may very well be cancer.  I asked  for a ultrasound-guided biopsy of the mass per radiology.  Path report showed invasive ductal Ca, ERP + PRP neg,   H2N equivocal.    She is postmenopausal, her last menses were 4 5 years ago.  Rib x-rays have been requested.  Will order a CT scan to evaluate for possible metastatic disease.  Will order a ultrasound-guided biopsy of the left chest wall mass for pathologic examination at [facility].  Bx + invasive breast cancer.  ERP+, PRP-, H2N-.    Suspected local recurrence at L breast.  She had L lumpectomy.  Margins clear.    Refer to Rad Rx MD for adjuvant Rad Rx., for local control.  She has T spine metastases.  Currently on hormonal Rx.  To see Dr. Manuelito Shepard for DM, BS control.    Dr. Washington saw her for C spine eval, no surgery planned for now.  He referred her to Dr. Rivers for injection Rx.    DM, cholesterol, epilepsy hx, DJD, LBP.  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    LR shoulder arthroscopy, R wrist surgery, M0.    Smoke  ppd x's 35 years, quit 2015.  ETOH no.  Disability-depression, epilepsy  Allergy none    Mom ovarian cancer  Dad lung cancer  Sisters DM, lung dx.    Summary of care:  Right breast cancer 2015, triple negative, treated with Adriamycin Cytoxan followed by taxane.    Left breast cancer ERP positive PRP positive HER2 Judy negative in  August 24, 2020    COVID infection December 2020.  She has not taken the covid 19 vaccination because of multiple allergy hx.    February 2021 bone metastases determined.    He he March 21, 2021 local left breast cancer recurrence, ERP positive PRP negative HER2 Judy negative.    He April 21st bone biopsy positive for breast cancer metastatic.  ERP, PRP, HER2 Judy pending.    She was on radiation therapy through May 17, 2021.  Refilled Marinol at increased 5 mg 1 p.o. b.i.d. number 60.   I refilled her Soma, Norco, Marinol, and Silvadene cream; dated the prescriptions for June 17, 2021.    C/O mid thoracic pain x's 6-8 weeks,radiates to L & R, increased.  Tender over Mid thoracic area.  Hx of T spine metastatic dx.  Pain controlled on Norco 10/325 mg and oxycodone 5 mg 1-2 po PRN.  Add lodine 400 mg 1 daily    Checked MRI of T spine.  Metastatic dx with pathologic fx at T3,5,6.  Refer to Dr. Flowers of neurosurgery for Vertebroplasty or Kyphoplasty evaluation.    She is on Faslodex. Ibrance. Monitor WBC count.    She saw Dr. Flowers, and has been fitted with a brace initially, vertebroplasty after the first of the year.  [phone].  RTC Dr. Flowers 12/27/21.    Vertibroplasty on hold, because HA1C > 8.     There is darkening of L posterior chest wall, rad rx effect?    She was seen in ER over the weekend, had to be disempacted.  For MRI of T spine 4/6/22.  Last day Ibrance 3/31/22, until surgery completed.  Check CBC diff, PT PTT  4/13/22.  For surgery 4/20/22.  Next Faslodex 4/12/22.    ROS:   GEN: normal without any fever, night sweats or weight loss  HEENT: normal with no HA's, sore throat, stiff neck, changes in vision  CV: normal with no CP, SOB, PND, POOL or orthopnea  PULM: normal with no SOB, cough, hemoptysis, sputum or pleuritic pain  GI: normal with no abdominal pain, nausea, vomiting, constipation, diarrhea, melanotic stools, BRBPR, or hematemesis  : normal with no hematuria, dysuria  BREAST: See HPI.  SKIN:  normal with no rash, erythema, bruising, or swelling     Past Medical History:   Diagnosis Date    Acute hypoxemic respiratory failure 12/12/2020    Breast cancer     left breast    Cancer     RIGHT BREAST 10-15    Depression     Diabetes mellitus     Neuropathy     Panic attacks     S/P epidural steroid injection     Seizures     none since early 30's    Wears glasses     TO DRIVE     Past Surgical History:   Procedure Laterality Date    AXILLARY NODE DISSECTION Left 8/24/2020    Procedure: LYMPHADENECTOMY, AXILLARY;  Surgeon: Cory Vick MD;  Location: [facility] OR;  Service: General;  Laterality: Left;  left breast mastectomy with possible axillary lymph node dissection    BREAST BIOPSY      right    BREAST LUMPECTOMY      BREAST RECONSTRUCTION      breast reconstruction with tummy [facility]      BREAST SURGERY      11-3-15 LUMPECTOMY, 12-2-15 REEXCISION    INCISION AND DRAINAGE OF ABSCESS Left 9/9/2020    Procedure: INCISION AND DRAINAGE, ABSCESS;  Surgeon: Cory Vick MD;  Location: [facility] OR;  Service: General;  Laterality: Left;    INSERTION OF LOCALIZATION WIRE Left 8/24/2020    Procedure: INSERTION, LOCALIZATION WIRE;  Surgeon: Cory Vick MD;  Location: [facility] OR;  Service: General;  Laterality: Left;  left breast lumpectomy with wire needle loc    MASTECTOMY      mastectomy 2016      MASTECTOMY, PARTIAL Left 3/19/2021    Procedure: MASTECTOMY, PARTIAL;  Surgeon: Cory Vick MD;  Location: [facility] OR;  Service: General;  Laterality: Left;  lumpectomy  left breast     RECONSTRUCTION OF NIPPLE Right 11/5/2018    Procedure: RECONSTRUCTION-NIPPLE RIGHT;  Surgeon: Xiang Hernandez MD;  Location: [address];  Service: Plastics;  Laterality: Right;    SENTINEL LYMPH NODE BIOPSY Left 8/24/2020    Procedure: BIOPSY, LYMPH NODE, SENTINEL;  Surgeon: Cory Vick MD;  Location: [facility] OR;  Service: General;  Laterality: Left;  left breast lumpectomy with left  sentinel lymoh node       shoulder surgery and wrist      BILAT  BONE SPUR    WRIST SURGERY      RIGHT       Review of patient's allergies indicates:   Allergen Reactions    Adhesive tape-silicones Hives     BANDAIDS    Polymycin Hives    Latex, natural rubber Itching    Polyurethane-39            Current Outpatient Medications:     anastrozole (ARIMIDEX) 1 mg Tab, Take 1 mg by mouth once daily., Disp: , Rfl:     carisoprodoL (SOMA) 350 MG tablet, Take 1 tablet (350 mg total) by mouth 3 (three) times daily as needed for Muscle spasms., Disp: 90 tablet, Rfl: 0    dextromethorphan-guaiFENesin  mg/5 ml (ROBITUSSIN-DM)  mg/5 mL liquid, Take 1 teaspoon q 6 hours prn cough., Disp: 300 mL, Rfl: 0    diazePAM (VALIUM) 10 MG Tab, Take 1 tablet (10 mg total) by mouth 4 (four) times daily as needed (anxiety)., Disp: 120 tablet, Rfl: 3    docusate sodium (COLACE) 100 MG capsule, Take 100 mg by mouth 2 (two) times daily., Disp: , Rfl:     dronabinoL (MARINOL) 5 MG capsule, Take 1 capsule (5 mg total) by mouth 2 (two) times daily before meals., Disp: 60 capsule, Rfl: 0    dulaglutide (TRULICITY) 3 mg/0.5 mL pen injector, Inject 3 mg into the skin every 7 days., Disp: 4 pen, Rfl: 11    enalapril (VASOTEC) 5 MG tablet, Take 1 tablet (5 mg total) by mouth once daily., Disp: 90 tablet, Rfl: 3    etodolac (LODINE) 400 MG tablet, Take 1 tablet (400 mg total) by mouth once daily., Disp: 30 tablet, Rfl: 2    furosemide (LASIX) 20 MG tablet, TAKE ONE TABLET BY MOUTH EVERY DAY, Disp: 30 tablet, Rfl: 6    HYDROcodone-acetaminophen (NORCO)  mg per tablet, Take 1 tablet by mouth every 6 (six) hours as needed for Pain., Disp: 120 tablet, Rfl: 0    insulin aspart, niacinamide, (FIASP FLEXTOUCH U-100 INSULIN) 100 unit/mL (3 mL) InPn, Inject 12 Units into the skin 2 (two) times daily with meals., Disp: 3 pen, Rfl: 9    insulin glargine (LANTUS U-100 INSULIN) 100 unit/mL injection, Inject 32 Units into the skin  "2 (two) times a day., Disp: 18 mL, Rfl: 11    metFORMIN (GLUCOPHAGE-XR) 500 MG ER 24hr tablet, Take 2 tablets (1,000 mg total) by mouth 2 (two) times daily with meals., Disp: 360 tablet, Rfl: 3    metoprolol tartrate (LOPRESSOR) 25 MG tablet, Take 1 tablet (25 mg total) by mouth 2 (two) times daily., Disp: 60 tablet, Rfl: 11    mometasone 0.1% (ELOCON) 0.1 % cream, Apply to affected area daily, Disp: 45 g, Rfl: 1    naloxegoL (MOVANTIK) 12.5 mg Tab, Take 12.5 mg by mouth once daily., Disp: 30 tablet, Rfl: 3    ondansetron (ZOFRAN-ODT) 8 MG TbDL, Take 1 tablet (8 mg total) by mouth every 6 to 8 hours as needed (nausea)., Disp: 30 tablet, Rfl: 1    oxyCODONE (ROXICODONE) 15 MG Tab, Take 1 tablet (15 mg total) by mouth every 4 to 6 hours as needed for Pain., Disp: 120 tablet, Rfl: 0    palbociclib 100 mg Tab, Take 100 mg by mouth once daily. for 21 days, then take 7 days off. Total cycle length 28 days, Disp: 21 tablet, Rfl: 6    pen needle, diabetic (BD ULTRA-FINE MARCE PEN NEEDLE) 32 gauge x 5/32" Ndle, Test blood sugar twice daily, Disp: 100 each, Rfl: 5    promethazine (PHENERGAN) 25 MG tablet, Take 1 tablet (25 mg total) by mouth every 4 to 6 hours as needed., Disp: 30 tablet, Rfl: 5    promethazine-codeine 6.25-10 mg/5 ml (PHENERGAN WITH CODEINE) 6.25-10 mg/5 mL syrup, 1 teaspoon every 6 hours prn cough., Disp: 450 mL, Rfl: 0    psyllium husk, with sugar, (METAMUCIL, WITH SUGAR,) 3.4 gram packet, Take 1 packet by mouth 2 (two) times daily., Disp: 30 packet, Rfl: 2    rosuvastatin (CRESTOR) 40 MG Tab, Take 1 tablet (40 mg total) by mouth every evening. For cholesterol, Disp: 90 tablet, Rfl: 3    zolpidem (AMBIEN) 10 mg Tab, TAKE ONE TABLET BY MOUTH EVERY NIGHT AT BEDTIME, Disp: 30 tablet, Rfl: 3          Objective:   Vitals:  Blood pressure (!) 141/89, pulse 98, temperature 97.4 °F (36.3 °C), resp. rate 18, height 5' 11" (1.803 m), last menstrual period 01/16/2016.    Physical Examination:   GEN: no " apparent distress, comfortable, Brace in place.  HEAD: atraumatic and normocephalic  EYES: no pallor, no icterus  ENT: no pharyngeal erythema  NECK: noLAD/LN's, supple  CV:  No edema  CHEST: Normal respiratory effort   she is not  tender over the right posterior chest wall on exam.  The chest wall is not swollen.  ABDOM:  nondistended; soft                                                                                          MUSC/Skeletal: ROM normal, Tender over mid T spine area.  EXTREM: no swelling  SKIN: She is developing keloid changes at L breast incision and navel surgical sites.  This area appears to be enlarging.  NEURO: grossly intact  PSYCH: normal mood, affect and behavior  LYMPH: normal  LN's  BREASTS: L breast is much improved.    Refill Oxycodone, SOMA, Norco.    Assessment:   (1) 61 y.o. female with diagnosis of Stage 1 triple negative R breast cancer, 10/2016.       S/P R mastectomy, SNBx, AC x's 4, T x's12 weeks, Rad Rx and recent reconstruction.    (2) New L invasive ductal Ca, ERP+, PRP+, H2N neg.  Clinical stage 1 dx.    I have started her on Arimidex 1 mg p.o. daily  for metastatic disease at this time.    Suspected fracture of right ribs after recent cough event, check rib x-rays, medicate for pain.    ERP+ dx, bone metastases.  Started on Arimidex daily.  Add 2nd hormonal drug Rx after Rad Rx completed.    Bone biopsy positive for metastatic breast cancer.  ERP+, PRP+, H2N-.    Skin at chest wall and breast area is closed NT, healed.    Continue with Faslodex and Ibrance.  Faslodex 4/12/22.  Last dose ibrance 3/31/22, till surgery done.    C/O mid thoracic pain.  Tender on exam.  Hx of T spine mets, Hx of Rad Rx to T spine.  MRI of T spine.  [facility].  Pathologic Fx of T3,5,6.    Dr. Flowers for Vertebroplasty or Kyphoplasty after the first of the year. 4/20/22  Fitted for a brace initially.  MRI ordered for 4/6/22, Dr. Flowers.    Apply for Ibrance program  Refill Soma and Hydrocodone,  She is on  Oxycodone 15 mg   RTC see me 1 month.

## 2022-03-28 NOTE — TELEPHONE ENCOUNTER
----- Message from Taylor Arevalo sent at 3/28/2022 10:41 AM CDT -----  Name Of Caller: Negrita     Provider Name: Hector Flowers,     Does patient feel the need to be seen today? No     Relationship to the Pt?: pt    Contact Preference?: [phone]    What is the nature of the call?:Pt called and said you told her she needs to do MRI and blood work but no orders in her chart to please call her back to schedule

## 2022-03-29 NOTE — TELEPHONE ENCOUNTER
Rx for norco and soma sent to [facility]  Per [facility].    We need to reapply for Ibrance to the specialty pharmacy?  Pt tells me?

## 2022-03-30 NOTE — TELEPHONE ENCOUNTER
Spoke with Lo at [employer] assistance and she states application has been approved through December 2022.  Pt will need to call every month 7-10 days prior to needing a refill so that shipment can be set up.  Attempted to contact pt.

## 2022-04-06 ENCOUNTER — HOSPITAL ENCOUNTER (OUTPATIENT)
Dept: RADIOLOGY | Facility: HOSPITAL | Age: 62
Discharge: HOME | End: 2022-04-06
Attending: NEUROLOGICAL SURGERY
Payer: MEDICARE

## 2022-04-06 DIAGNOSIS — F06.4 ANXIETY DISORDER DUE TO KNOWN PHYSIOLOGICAL CONDITION: ICD-10-CM

## 2022-04-06 DIAGNOSIS — M84.48XG PATHOLOGICAL FRACTURE OF THORACIC VERTEBRA WITH DELAYED HEALING, SUBSEQUENT ENCOUNTER: ICD-10-CM

## 2022-04-06 PROCEDURE — 72146 MRI THORACIC SPINE WITHOUT CONTRAST: ICD-10-PCS | Mod: 26,,, | Performed by: RADIOLOGY

## 2022-04-06 PROCEDURE — 72146 MRI CHEST SPINE W/O DYE: CPT | Mod: TC

## 2022-04-06 PROCEDURE — 72146 MRI CHEST SPINE W/O DYE: CPT | Mod: 26,,, | Performed by: RADIOLOGY

## 2022-04-06 RX ORDER — DIAZEPAM 10 MG/1
10 TABLET ORAL 4 TIMES DAILY PRN
Qty: 120 TABLET | Refills: 3 | Status: SHIPPED | OUTPATIENT
Start: 2022-04-06 | End: 2022-08-02 | Stop reason: SDUPTHER

## 2022-04-06 NOTE — TELEPHONE ENCOUNTER
----- Message from Conchita Perez sent at 4/6/2022  2:21 PM CDT -----  Refill for Vinay Brown. Pt #[phone]

## 2022-04-08 ENCOUNTER — TELEPHONE (OUTPATIENT)
Dept: HEMATOLOGY/ONCOLOGY | Facility: CLINIC | Age: 62
End: 2022-04-08

## 2022-04-08 NOTE — TELEPHONE ENCOUNTER
----- Message from Nell Carrillo sent at 4/7/2022  9:15 AM CDT -----  Please call her back and let her know if she will still has her Faslodex on 4-12. She has surgery on 4-20.

## 2022-04-12 ENCOUNTER — INFUSION (OUTPATIENT)
Dept: INFUSION THERAPY | Facility: HOSPITAL | Age: 62
End: 2022-04-12
Attending: INTERNAL MEDICINE
Payer: MEDICARE

## 2022-04-12 VITALS
DIASTOLIC BLOOD PRESSURE: 97 MMHG | HEART RATE: 90 BPM | OXYGEN SATURATION: 98 % | WEIGHT: 157 LBS | TEMPERATURE: 98 F | RESPIRATION RATE: 18 BRPM | SYSTOLIC BLOOD PRESSURE: 159 MMHG | BODY MASS INDEX: 21.98 KG/M2 | HEIGHT: 71 IN

## 2022-04-12 DIAGNOSIS — C79.51 BONE METASTASES: Primary | ICD-10-CM

## 2022-04-12 DIAGNOSIS — Z17.0 MALIGNANT NEOPLASM OF LOWER-OUTER QUADRANT OF LEFT BREAST OF FEMALE, ESTROGEN RECEPTOR POSITIVE: ICD-10-CM

## 2022-04-12 DIAGNOSIS — Z17.1 MALIGNANT NEOPLASM OF UPPER-OUTER QUADRANT OF RIGHT BREAST IN FEMALE, ESTROGEN RECEPTOR NEGATIVE: ICD-10-CM

## 2022-04-12 DIAGNOSIS — D70.1 CHEMOTHERAPY-INDUCED NEUTROPENIA: ICD-10-CM

## 2022-04-12 DIAGNOSIS — T45.1X5A CHEMOTHERAPY-INDUCED NEUTROPENIA: ICD-10-CM

## 2022-04-12 DIAGNOSIS — C50.512 MALIGNANT NEOPLASM OF LOWER-OUTER QUADRANT OF LEFT BREAST OF FEMALE, ESTROGEN RECEPTOR POSITIVE: ICD-10-CM

## 2022-04-12 DIAGNOSIS — C50.411 MALIGNANT NEOPLASM OF UPPER-OUTER QUADRANT OF RIGHT BREAST IN FEMALE, ESTROGEN RECEPTOR NEGATIVE: ICD-10-CM

## 2022-04-12 PROCEDURE — 63600175 PHARM REV CODE 636 W HCPCS: Mod: JG | Performed by: INTERNAL MEDICINE

## 2022-04-12 PROCEDURE — 96402 CHEMO HORMON ANTINEOPL SQ/IM: CPT

## 2022-04-12 RX ORDER — LAMOTRIGINE 25 MG/1
500 TABLET ORAL
Status: COMPLETED | OUTPATIENT
Start: 2022-04-12 | End: 2022-04-12

## 2022-04-12 RX ADMIN — FULVESTRANT 500 MG: 50 INJECTION, SOLUTION INTRAMUSCULAR at 03:04

## 2022-04-12 NOTE — PLAN OF CARE
Problem: Fall Injury Risk  Goal: Absence of Fall and Fall-Related Injury  Outcome: Ongoing, Progressing  Intervention: Promote Injury-Free Environment  Flowsheets (Taken 4/12/2022 1515)  Safety Promotion/Fall Prevention: assistive device/personal item within reach

## 2022-04-13 ENCOUNTER — LAB VISIT (OUTPATIENT)
Dept: LAB | Facility: HOSPITAL | Age: 62
End: 2022-04-13
Attending: INTERNAL MEDICINE
Payer: MEDICARE

## 2022-04-13 DIAGNOSIS — C79.51 BONE METASTASES: ICD-10-CM

## 2022-04-13 DIAGNOSIS — C50.512 MALIGNANT NEOPLASM OF LOWER-OUTER QUADRANT OF LEFT BREAST OF FEMALE, ESTROGEN RECEPTOR POSITIVE: ICD-10-CM

## 2022-04-13 DIAGNOSIS — D70.2 OTHER DRUG-INDUCED NEUTROPENIA: ICD-10-CM

## 2022-04-13 DIAGNOSIS — Z17.0 MALIGNANT NEOPLASM OF LOWER-OUTER QUADRANT OF LEFT BREAST OF FEMALE, ESTROGEN RECEPTOR POSITIVE: ICD-10-CM

## 2022-04-13 DIAGNOSIS — R79.1 ABNORMAL COAGULATION PROFILE: ICD-10-CM

## 2022-04-13 LAB
APTT BLDCRRT: 26.4 SEC (ref 21–32)
BASOPHILS # BLD AUTO: 0.04 K/UL (ref 0–0.2)
BASOPHILS NFR BLD: 1.2 % (ref 0–1.9)
DIFFERENTIAL METHOD: ABNORMAL
EOSINOPHIL # BLD AUTO: 0 K/UL (ref 0–0.5)
EOSINOPHIL NFR BLD: 0.3 % (ref 0–8)
ERYTHROCYTE [DISTWIDTH] IN BLOOD BY AUTOMATED COUNT: 15.1 % (ref 11.5–14.5)
HCT VFR BLD AUTO: 39.4 % (ref 37–48.5)
HGB BLD-MCNC: 13.2 G/DL (ref 12–16)
IMM GRANULOCYTES # BLD AUTO: 0.01 K/UL (ref 0–0.04)
IMM GRANULOCYTES NFR BLD AUTO: 0.3 % (ref 0–0.5)
INR PPP: 1.1 (ref 0.8–1.2)
LYMPHOCYTES # BLD AUTO: 0.9 K/UL (ref 1–4.8)
LYMPHOCYTES NFR BLD: 27.5 % (ref 18–48)
MCH RBC QN AUTO: 32 PG (ref 27–31)
MCHC RBC AUTO-ENTMCNC: 33.5 G/DL (ref 32–36)
MCV RBC AUTO: 95 FL (ref 82–98)
MONOCYTES # BLD AUTO: 0.4 K/UL (ref 0.3–1)
MONOCYTES NFR BLD: 11.9 % (ref 4–15)
NEUTROPHILS # BLD AUTO: 1.9 K/UL (ref 1.8–7.7)
NEUTROPHILS NFR BLD: 58.8 % (ref 38–73)
NRBC BLD-RTO: 0 /100 WBC
PLATELET # BLD AUTO: 291 K/UL (ref 150–450)
PMV BLD AUTO: 8.7 FL (ref 9.2–12.9)
PROTHROMBIN TIME: 11.8 SEC (ref 9–12.5)
RBC # BLD AUTO: 4.13 M/UL (ref 4–5.4)
WBC # BLD AUTO: 3.27 K/UL (ref 3.9–12.7)

## 2022-04-13 PROCEDURE — 85025 COMPLETE CBC W/AUTO DIFF WBC: CPT | Performed by: INTERNAL MEDICINE

## 2022-04-13 PROCEDURE — 85730 THROMBOPLASTIN TIME PARTIAL: CPT | Performed by: INTERNAL MEDICINE

## 2022-04-13 PROCEDURE — 85610 PROTHROMBIN TIME: CPT | Performed by: INTERNAL MEDICINE

## 2022-04-13 PROCEDURE — 36415 COLL VENOUS BLD VENIPUNCTURE: CPT | Performed by: INTERNAL MEDICINE

## 2022-04-14 ENCOUNTER — TELEPHONE (OUTPATIENT)
Dept: NEUROSURGERY | Facility: CLINIC | Age: 62
End: 2022-04-14
Payer: MEDICARE

## 2022-04-14 DIAGNOSIS — Z17.1 MALIGNANT NEOPLASM OF UPPER-OUTER QUADRANT OF RIGHT BREAST IN FEMALE, ESTROGEN RECEPTOR NEGATIVE: ICD-10-CM

## 2022-04-14 DIAGNOSIS — C79.51 BONE METASTASES: ICD-10-CM

## 2022-04-14 DIAGNOSIS — Z17.0 MALIGNANT NEOPLASM OF LOWER-OUTER QUADRANT OF LEFT BREAST OF FEMALE, ESTROGEN RECEPTOR POSITIVE: ICD-10-CM

## 2022-04-14 DIAGNOSIS — C50.411 MALIGNANT NEOPLASM OF UPPER-OUTER QUADRANT OF RIGHT BREAST IN FEMALE, ESTROGEN RECEPTOR NEGATIVE: ICD-10-CM

## 2022-04-14 DIAGNOSIS — C50.512 MALIGNANT NEOPLASM OF LOWER-OUTER QUADRANT OF LEFT BREAST OF FEMALE, ESTROGEN RECEPTOR POSITIVE: ICD-10-CM

## 2022-04-14 DIAGNOSIS — R20.2 HAND PARESTHESIA, UNSPECIFIED LATERALITY: Primary | ICD-10-CM

## 2022-04-14 DIAGNOSIS — S22.000G COMPRESSION FRACTURE OF THORACIC VERTEBRA WITH DELAYED HEALING, UNSPECIFIED THORACIC VERTEBRAL LEVEL, SUBSEQUENT ENCOUNTER: Primary | ICD-10-CM

## 2022-04-14 RX ORDER — OXYCODONE HYDROCHLORIDE 15 MG/1
15 TABLET ORAL
Qty: 120 TABLET | Refills: 0 | Status: SHIPPED | OUTPATIENT
Start: 2022-04-15 | End: 2022-04-14 | Stop reason: SDUPTHER

## 2022-04-14 RX ORDER — DEXAMETHASONE 4 MG/1
4 TABLET ORAL 2 TIMES DAILY WITH MEALS
Qty: 60 TABLET | Refills: 1 | Status: SHIPPED | OUTPATIENT
Start: 2022-04-14 | End: 2022-06-06

## 2022-04-14 RX ORDER — OXYCODONE HYDROCHLORIDE 15 MG/1
15 TABLET ORAL
Qty: 120 TABLET | Refills: 0 | Status: SHIPPED | OUTPATIENT
Start: 2022-04-15 | End: 2022-05-05 | Stop reason: SDUPTHER

## 2022-04-14 NOTE — TELEPHONE ENCOUNTER
Contacted pt to inform her that Dr. Flowers reviewed her recent MRI Thoracic without contrast and recommends she complete the scan with contrast for further eval. He also recommends she come to clinic to discuss results on Monday prior to scheduled surgery on Tuesday. Pt voiced understanding to scan and clinic appt details.

## 2022-04-14 NOTE — TELEPHONE ENCOUNTER
I updated the patient with the preoperative MRI thoracic spine without contrast findings made known to me today. She denies extremity weakness, imbalance, bowel or bladder disturbance.  She does report intermittent left hand numbness.  She reports that her midthoracic pain is stable if not somewhat improved of late.    She is scheduled for T5-T6 percutaneous kyphoplasty for pathologic compression fractures on April 19, 2022. .  Based on interval worsening with possible spinal canal compromise on recent imaging, will postpone the procedure.  MRI thoracic with contrast as well as MRI cervical with and without contrast has been scheduled for tomorrow.  I will review with her radiation oncologist, Dr. Brown regarding palliative XRT verses multilevel thoracic decompression with instrumented stabilization.    I explained all the above to the patient.  She voiced understanding.  She is scheduled for outpatient evaluation with me on April 18th.  In the interim, I counseled her to be immediately evaluated she develops extremity weakness, imbalance, bowel or bladder disturbance.

## 2022-04-14 NOTE — TELEPHONE ENCOUNTER
----- Message from Nell Carrillo sent at 4/14/2022  2:50 PM CDT -----  The patient said they moved her surgery to Tuesday. She wants her prescription for oxycodone 15mg #120. # [phone]

## 2022-04-18 ENCOUNTER — OFFICE VISIT (OUTPATIENT)
Dept: RADIATION ONCOLOGY | Facility: CLINIC | Age: 62
End: 2022-04-18
Payer: MEDICARE

## 2022-04-18 ENCOUNTER — OFFICE VISIT (OUTPATIENT)
Dept: NEUROSURGERY | Facility: CLINIC | Age: 62
End: 2022-04-18
Payer: MEDICARE

## 2022-04-18 VITALS — HEART RATE: 82 BPM | SYSTOLIC BLOOD PRESSURE: 163 MMHG | DIASTOLIC BLOOD PRESSURE: 93 MMHG

## 2022-04-18 DIAGNOSIS — Z17.0 MALIGNANT NEOPLASM OF LOWER-OUTER QUADRANT OF LEFT BREAST OF FEMALE, ESTROGEN RECEPTOR POSITIVE: Primary | ICD-10-CM

## 2022-04-18 DIAGNOSIS — Z17.1 MALIGNANT NEOPLASM OF UPPER-OUTER QUADRANT OF RIGHT BREAST IN FEMALE, ESTROGEN RECEPTOR NEGATIVE: ICD-10-CM

## 2022-04-18 DIAGNOSIS — C50.411 MALIGNANT NEOPLASM OF UPPER-OUTER QUADRANT OF RIGHT BREAST IN FEMALE, ESTROGEN RECEPTOR NEGATIVE: ICD-10-CM

## 2022-04-18 DIAGNOSIS — C79.51 BONE METASTASES: ICD-10-CM

## 2022-04-18 DIAGNOSIS — M84.48XG PATHOLOGICAL FRACTURE OF THORACIC VERTEBRA WITH DELAYED HEALING, SUBSEQUENT ENCOUNTER: Primary | ICD-10-CM

## 2022-04-18 DIAGNOSIS — C50.512 MALIGNANT NEOPLASM OF LOWER-OUTER QUADRANT OF LEFT BREAST OF FEMALE, ESTROGEN RECEPTOR POSITIVE: Primary | ICD-10-CM

## 2022-04-18 PROCEDURE — 3080F DIAST BP >= 90 MM HG: CPT | Mod: CPTII,S$GLB,, | Performed by: NEUROLOGICAL SURGERY

## 2022-04-18 PROCEDURE — 3051F HG A1C>EQUAL 7.0%<8.0%: CPT | Mod: S$GLB,,, | Performed by: RADIOLOGY

## 2022-04-18 PROCEDURE — 99214 OFFICE O/P EST MOD 30 MIN: CPT | Mod: S$GLB,,, | Performed by: NEUROLOGICAL SURGERY

## 2022-04-18 PROCEDURE — 3051F HG A1C>EQUAL 7.0%<8.0%: CPT | Mod: CPTII,S$GLB,, | Performed by: NEUROLOGICAL SURGERY

## 2022-04-18 PROCEDURE — 99215 OFFICE O/P EST HI 40 MIN: CPT | Mod: 25,S$GLB,, | Performed by: RADIOLOGY

## 2022-04-18 PROCEDURE — 1159F MED LIST DOCD IN RCRD: CPT | Mod: CPTII,S$GLB,, | Performed by: NEUROLOGICAL SURGERY

## 2022-04-18 PROCEDURE — 3051F PR MOST RECENT HEMOGLOBIN A1C LEVEL 7.0 - < 8.0%: ICD-10-PCS | Mod: S$GLB,,, | Performed by: RADIOLOGY

## 2022-04-18 PROCEDURE — 3077F SYST BP >= 140 MM HG: CPT | Mod: CPTII,S$GLB,, | Performed by: NEUROLOGICAL SURGERY

## 2022-04-18 PROCEDURE — 4010F ACE/ARB THERAPY RXD/TAKEN: CPT | Mod: CPTII,S$GLB,, | Performed by: NEUROLOGICAL SURGERY

## 2022-04-18 PROCEDURE — 3080F PR MOST RECENT DIASTOLIC BLOOD PRESSURE >= 90 MM HG: ICD-10-PCS | Mod: CPTII,S$GLB,, | Performed by: NEUROLOGICAL SURGERY

## 2022-04-18 PROCEDURE — 3051F PR MOST RECENT HEMOGLOBIN A1C LEVEL 7.0 - < 8.0%: ICD-10-PCS | Mod: CPTII,S$GLB,, | Performed by: NEUROLOGICAL SURGERY

## 2022-04-18 PROCEDURE — 4010F PR ACE/ARB THEARPY RXD/TAKEN: ICD-10-PCS | Mod: CPTII,S$GLB,, | Performed by: NEUROLOGICAL SURGERY

## 2022-04-18 PROCEDURE — 3077F PR MOST RECENT SYSTOLIC BLOOD PRESSURE >= 140 MM HG: ICD-10-PCS | Mod: CPTII,S$GLB,, | Performed by: NEUROLOGICAL SURGERY

## 2022-04-18 PROCEDURE — 4010F PR ACE/ARB THEARPY RXD/TAKEN: ICD-10-PCS | Mod: S$GLB,,, | Performed by: RADIOLOGY

## 2022-04-18 PROCEDURE — 99215 PR OFFICE/OUTPT VISIT, EST, LEVL V, 40-54 MIN: ICD-10-PCS | Mod: 25,S$GLB,, | Performed by: RADIOLOGY

## 2022-04-18 PROCEDURE — 4010F ACE/ARB THERAPY RXD/TAKEN: CPT | Mod: S$GLB,,, | Performed by: RADIOLOGY

## 2022-04-18 PROCEDURE — 99214 PR OFFICE/OUTPT VISIT, EST, LEVL IV, 30-39 MIN: ICD-10-PCS | Mod: S$GLB,,, | Performed by: NEUROLOGICAL SURGERY

## 2022-04-18 PROCEDURE — 1159F PR MEDICATION LIST DOCUMENTED IN MEDICAL RECORD: ICD-10-PCS | Mod: CPTII,S$GLB,, | Performed by: NEUROLOGICAL SURGERY

## 2022-04-18 NOTE — PROGRESS NOTES
The patient returns with MRI imaging of the thoracic spine with and without contrast.  The imaging was obtained as part of preoperative planning for T5, T6 percutaneous kyphoplasty scheduled for tomorrow.  She reports ongoing axial thoracic pain.  She denies lower extremity weakness, paresthesias, ataxia, bowel or bladder disturbance.  She reports intermittent left hand numbness particularly related to sleeping.  She denies neck pain or upper extremity weakness.  She reports having to use a wheelchair due to thoracic pain with prolonged standing or walking.    MRI thoracic spine with and without contrast obtained 04/16/2022 was reviewed in detail with the patient and family members.  MRI cervical spine with without contrast has been ordered but not completed due to scheduling difficulties.    Exam:    Awake, alert, oriented to person place and time.    Cranial nerves 2-12 grossly intact.    Strength is graded 5/5 in all muscle groups.    Sensation is grossly intact throughout.    Gait not examined.  In wheelchair.    Absent Fredi sign.  No clonus.  Tender to palpation diffusely upper and midthoracic region    Analysis:  Repeat imaging demonstrates significant interval worsening of the metastatic disease throughout much of the thoracic spine with now spinal canal involvement resulting in moderate spinal cord compression as well as multiple areas of osseous erosion involving the posterior elements, pedicles, and vertebral bodies.  There also appears to be involvement of the vertebral bodies of C4 and C7 on  images.  Unfortunately, this extensive disease process is not amenable to surgical intervention in my opinion.  I recommended palliative radiation therapy.  I have discussed all the above with Dr. Brown, Radiation Oncology.  He is willing to evaluate the patient today and initiate XRT immediately.  My staff will again attempt to schedule the ordered MRI cervical spine with and without contrast.  XRT  in the cervical region may also be indicated.    I reviewed all the above findings and recommendations with the patient.  She voiced understanding and will be seen by Dr. Brown this morning.    Negrita was seen today for back pain.    Diagnoses and all orders for this visit:    Pathological fracture of thoracic vertebra with delayed healing, subsequent encounter    Malignant neoplasm of upper-outer quadrant of right breast in female, estrogen receptor negative    Bone metastases

## 2022-04-18 NOTE — PROGRESS NOTES
Negrita Castro  [id number]  1960 4/18/2022   Hector Flowers DO    DIAGNOSIS: Metastatic breast cancer p/w cord compression    REASON FOR VISIT: Routine scheduled follow-up.     HISTORY OF PRESENT ILLNESS:   Ms. Castro is a 61F w/ h/o bilateral breast cancer with known bony metastases since last year, currently on ibrance/faslodex therapy w/ Dr. Vick.  She has had prior RT to bilateral breast/chestwall each w/o RNI here at [facility].      Unfortunately, she then developed progressive back pain and was seeing orthopedic and neurosurgery re: possible surgical interventions when MRI noted profound progression of T-spine osseous disease extending from lower C-spine down to T7-10 contiguously w/ noted cord compression and signal changes.      Interestingly she denies any weakness, parasthesias, spasms, incontinence, BM changes beyond chronic narcotic-related constipation, falls, syncope, breathing or swallowing issues, or other issues/changes beyond midback pain that radiates around her chestwall/ribcage.      Dr. Flowers has deemed her disease inoperable due to diffuse nature in T-spine and concerns re: stability of any needed fixation, thus she has been sent over today directly from his clinic for eval.      T-spine MRI (4/15/22):        REVIEW OF SYSTEMS:  A complete review of systems was performed and the patient denies any acute concerns/changes other than per HPI    Past Medical History:   Diagnosis Date    Acute hypoxemic respiratory failure 12/12/2020    Breast cancer     left breast    Cancer     RIGHT BREAST 10-15    Depression     Diabetes mellitus     Neuropathy     Panic attacks     S/P epidural steroid injection     Seizures     none since early 30's    Wears glasses     TO DRIVE     Past Surgical History:   Procedure Laterality Date    AXILLARY NODE DISSECTION Left 8/24/2020    Procedure: LYMPHADENECTOMY, AXILLARY;  Surgeon: Cory Vick MD;  Location: [facility] OR;  Service: General;   Laterality: Left;  left breast mastectomy with possible axillary lymph node dissection    BREAST BIOPSY      right    BREAST LUMPECTOMY      BREAST RECONSTRUCTION      breast reconstruction with tummy [facility]      BREAST SURGERY      11-3-15 LUMPECTOMY, 12-2-15 REEXCISION    INCISION AND DRAINAGE OF ABSCESS Left 2020    Procedure: INCISION AND DRAINAGE, ABSCESS;  Surgeon: Cory Vick MD;  Location: [facility] OR;  Service: General;  Laterality: Left;    INSERTION OF LOCALIZATION WIRE Left 2020    Procedure: INSERTION, LOCALIZATION WIRE;  Surgeon: Cory Vick MD;  Location: [facility] OR;  Service: General;  Laterality: Left;  left breast lumpectomy with wire needle loc    MASTECTOMY      mastectomy 2016      MASTECTOMY, PARTIAL Left 3/19/2021    Procedure: MASTECTOMY, PARTIAL;  Surgeon: Cory Vick MD;  Location: [facility] OR;  Service: General;  Laterality: Left;  lumpectomy  left breast     RECONSTRUCTION OF NIPPLE Right 2018    Procedure: RECONSTRUCTION-NIPPLE RIGHT;  Surgeon: Xiang Hernandez MD;  Location: [address];  Service: Plastics;  Laterality: Right;    SENTINEL LYMPH NODE BIOPSY Left 2020    Procedure: BIOPSY, LYMPH NODE, SENTINEL;  Surgeon: Cory Vick MD;  Location: [facility] OR;  Service: General;  Laterality: Left;  left breast lumpectomy with left sentinel lymoh node       shoulder surgery and wrist      BILAT  BONE SPUR    WRIST SURGERY      RIGHT     Social History     Socioeconomic History    Marital status:    Tobacco Use    Smoking status: Former Smoker     Packs/day: 0.25     Years: 30.00     Pack years: 7.50     Quit date: 2015     Years since quittin.5    Smokeless tobacco: Never Used   Substance and Sexual Activity    Alcohol use: Yes     Alcohol/week: 0.0 standard drinks     Comment: RARELY    Drug use: Not Currently     Types: Marijuana     Comment: medical marijuana     Family History   Problem Relation Age of  Onset    Anesthesia problems Neg Hx      Medication List with Changes/Refills   Current Medications    ANASTROZOLE (ARIMIDEX) 1 MG TAB    Take 1 mg by mouth once daily.    CARISOPRODOL (SOMA) 350 MG TABLET    Take 1 tablet (350 mg total) by mouth 3 (three) times daily as needed for Muscle spasms.    DEXAMETHASONE (DECADRON) 4 MG TAB    Take 1 tablet (4 mg total) by mouth 2 (two) times daily with meals.    DEXTROMETHORPHAN-GUAIFENESIN  MG/5 ML (ROBITUSSIN-DM)  MG/5 ML LIQUID    Take 1 teaspoon q 6 hours prn cough.    DIAZEPAM (VALIUM) 10 MG TAB    Take 1 tablet (10 mg total) by mouth 4 (four) times daily as needed (anxiety).    DOCUSATE SODIUM (COLACE) 100 MG CAPSULE    Take 100 mg by mouth 2 (two) times daily.    DRONABINOL (MARINOL) 5 MG CAPSULE    Take 1 capsule (5 mg total) by mouth 2 (two) times daily before meals.    DULAGLUTIDE (TRULICITY) 3 MG/0.5 ML PEN INJECTOR    Inject 3 mg into the skin every 7 days.    ENALAPRIL (VASOTEC) 5 MG TABLET    Take 1 tablet (5 mg total) by mouth once daily.    ETODOLAC (LODINE) 400 MG TABLET    Take 1 tablet (400 mg total) by mouth once daily.    FUROSEMIDE (LASIX) 20 MG TABLET    TAKE ONE TABLET BY MOUTH EVERY DAY    HYDROCODONE-ACETAMINOPHEN (NORCO)  MG PER TABLET    Take 1 tablet by mouth every 6 (six) hours as needed for Pain.    INSULIN ASPART, NIACINAMIDE, (FIASP FLEXTOUCH U-100 INSULIN) 100 UNIT/ML (3 ML) INPN    Inject 12 Units into the skin 2 (two) times daily with meals.    INSULIN GLARGINE (LANTUS U-100 INSULIN) 100 UNIT/ML INJECTION    Inject 32 Units into the skin 2 (two) times a day.    METFORMIN (GLUCOPHAGE-XR) 500 MG ER 24HR TABLET    Take 2 tablets (1,000 mg total) by mouth 2 (two) times daily with meals.    METOPROLOL TARTRATE (LOPRESSOR) 25 MG TABLET    Take 1 tablet (25 mg total) by mouth 2 (two) times daily.    MOMETASONE 0.1% (ELOCON) 0.1 % CREAM    Apply to affected area daily    NALOXEGOL (MOVANTIK) 12.5 MG TAB    Take 12.5 mg by  "mouth once daily.    ONDANSETRON (ZOFRAN-ODT) 8 MG TBDL    Take 1 tablet (8 mg total) by mouth every 6 to 8 hours as needed (nausea).    OXYCODONE (ROXICODONE) 15 MG TAB    Take 1 tablet (15 mg total) by mouth every 4 to 6 hours as needed for Pain.    PALBOCICLIB 100 MG TAB    Take 100 mg by mouth once daily. for 21 days, then take 7 days off. Total cycle length 28 days    PEN NEEDLE, DIABETIC (BD ULTRA-FINE MARCE PEN NEEDLE) 32 GAUGE X 5/32" NDLE    Test blood sugar twice daily    PROMETHAZINE (PHENERGAN) 25 MG TABLET    Take 1 tablet (25 mg total) by mouth every 4 to 6 hours as needed.    PROMETHAZINE-CODEINE 6.25-10 MG/5 ML (PHENERGAN WITH CODEINE) 6.25-10 MG/5 ML SYRUP    1 teaspoon every 6 hours prn cough.    PSYLLIUM HUSK, WITH SUGAR, (METAMUCIL, WITH SUGAR,) 3.4 GRAM PACKET    Take 1 packet by mouth 2 (two) times daily.    ROSUVASTATIN (CRESTOR) 40 MG TAB    Take 1 tablet (40 mg total) by mouth every evening. For cholesterol    ZOLPIDEM (AMBIEN) 10 MG TAB    TAKE ONE TABLET BY MOUTH EVERY NIGHT AT BEDTIME     Review of patient's allergies indicates:   Allergen Reactions    Adhesive tape-silicones Hives     BANDAIDS    Polymycin Hives    Adhesive     Latex, natural rubber Hives and Itching    Neomycin-bacitracnzn-polymyxnb     Polyurethane-39 Hives       QUALITY OF LIFE: 60%- Requires Occasional Assistance but is Able to Care for Needs    There were no vitals filed for this visit.  There is no height or weight on file to calculate BMI.    PHYSICAL EXAM:  GENERAL: alert; in no apparent distress.   HEAD: normocephalic, atraumatic.  EYES: pupils are equal, round, reactive to light and accommodation. Sclera anicteric. Conjunctiva not injected.   NOSE/THROAT: no nasal erythema or rhinorrhea. Oropharynx pink, without erythema, ulcerations or thrush.   NECK: no cervical motion rigidity; supple with no masses.  CHEST: clear to auscultation bilaterally; no wheezes, crackles or rubs. Patient is speaking comfortably " on room air with normal work of breathing without using accessory muscles of respiration.  CARDIOVASCULAR: regular rate and rhythm; no murmurs, rubs or gallops.  ABDOMEN: soft, nontender, nondistended. Bowel sounds present.   MUSCULOSKELETAL: no tenderness to palpation along the spine or scapulae. Normal range of motion.  NEUROLOGIC: cranial nerves II-XII intact bilaterally. Strength 5/5 in bilateral upper and lower extremities. No sensory deficits appreciated. Reflexes globally intact. No cerebellar signs. Normal gait.  LYMPHATIC: no cervical, supraclavicular or axillary adenopathy appreciated bilaterally.   EXTREMITIES: no clubbing, cyanosis, edema.  SKIN: no erythema, rashes, ulcerations noted.       ASSESSMENT: Negrita Castro is a 61 y.o. female with metastatic breast cancer p/w spinal cord compression on preop MRI imaging    PLAN:   - RTP today for prompt palliative RT start tomorrow    - IMRT techniques required due to overlap w/ two prior chest RT courses  - Decadron 8mg now, then 4mg BID to start now; pt aware to monitor BG  - Hold ibrance during RT    All questions answered and contact information provided. Patient understands free to call us anytime with any questions or concerns regarding radiation therapy.    I have personally seen and evaluated this patient. Greater than 50% of this time was spent discussing coordination of care and/or counseling.    PHYSICIAN: Rakesh Brown III, MD

## 2022-04-20 ENCOUNTER — TELEPHONE (OUTPATIENT)
Dept: NEUROSURGERY | Facility: CLINIC | Age: 62
End: 2022-04-20
Payer: MEDICARE

## 2022-04-20 DIAGNOSIS — Z79.4 TYPE 2 DIABETES MELLITUS WITH HYPERGLYCEMIA, WITH LONG-TERM CURRENT USE OF INSULIN: Primary | ICD-10-CM

## 2022-04-20 DIAGNOSIS — R20.2 HAND PARESTHESIA, UNSPECIFIED LATERALITY: Primary | ICD-10-CM

## 2022-04-20 DIAGNOSIS — E11.65 TYPE 2 DIABETES MELLITUS WITH HYPERGLYCEMIA, WITH LONG-TERM CURRENT USE OF INSULIN: Primary | ICD-10-CM

## 2022-04-20 RX ORDER — INSULIN PUMP SYRINGE, 3 ML
EACH MISCELLANEOUS
Qty: 1 EACH | Refills: 0 | Status: CANCELLED | OUTPATIENT
Start: 2022-04-20 | End: 2023-01-01

## 2022-04-20 NOTE — TELEPHONE ENCOUNTER
----- Message from Melissa Ogden sent at 4/20/2022 10:09 AM CDT -----  Pt calling said she was supposed to have surgery for a broken back but when she had the MRI and cancer of the spine was found.  She is doing radiation and was put back on steroids and this is making her sugar go up again. She is out of test strips and needs a new sun-metrix machine.   Cb # [phone] call after 1:00 she has chemo today.

## 2022-04-20 NOTE — TELEPHONE ENCOUNTER
Contacted pt and informed her that the radiology dept has been contacted and informed that the MRI Cspine w/wo is stat and that she will be worked into the schedule today. Pt voiced understanding and reports that she will go to [facility] to complete scan today.

## 2022-04-21 ENCOUNTER — DOCUMENTATION ONLY (OUTPATIENT)
Dept: HEMATOLOGY/ONCOLOGY | Facility: HOSPITAL | Age: 62
End: 2022-04-21

## 2022-04-21 ENCOUNTER — TELEPHONE (OUTPATIENT)
Dept: NEUROSURGERY | Facility: CLINIC | Age: 62
End: 2022-04-21
Payer: MEDICARE

## 2022-04-21 RX ORDER — BLOOD-GLUCOSE,RECEIVER,CONT
EACH MISCELLANEOUS
Qty: 1 EACH | Refills: 1 | Status: SHIPPED | OUTPATIENT
Start: 2022-04-21

## 2022-04-21 RX ORDER — BLOOD-GLUCOSE TRANSMITTER
EACH MISCELLANEOUS
Qty: 1 EACH | Refills: 1 | Status: SHIPPED | OUTPATIENT
Start: 2022-04-21

## 2022-04-21 RX ORDER — BLOOD-GLUCOSE SENSOR
EACH MISCELLANEOUS
Qty: 13 EACH | Refills: 3 | Status: SHIPPED | OUTPATIENT
Start: 2022-04-21

## 2022-04-21 NOTE — TELEPHONE ENCOUNTER
----- Message from Michelle Armas RN sent at 4/20/2022  3:43 PM CDT -----    ----- Message -----  From: Stacey M Lefort  Sent: 4/20/2022   3:27 PM CDT  To: Michelle Armas RN    Pt asked for a callback at [phone].  Thank you.

## 2022-04-21 NOTE — TELEPHONE ENCOUNTER
Returned call to pt. She said was contacted and scheduled for MRI on 4-23-22. Pt confirms that she will keep this appt.

## 2022-04-21 NOTE — PROGRESS NOTES
MRI confirms progressive bone metastases.  Compression on spinal canal and cord.  Not an operable situation per Dr. Flowers.  She saw Dr. Brown, she is starting   Decadron 4 mg BID and radiation to the site for pain control.  She has a RTC 5/4/22 see me.  I expect we will D/C hormonal therapy for now,  And go with chemotherapy trial again, depending on her tolerance?  Waiting on C spine MRI results?

## 2022-04-23 ENCOUNTER — HOSPITAL ENCOUNTER (OUTPATIENT)
Dept: RADIOLOGY | Facility: HOSPITAL | Age: 62
Discharge: HOME | End: 2022-04-23
Attending: NEUROLOGICAL SURGERY
Payer: MEDICARE

## 2022-04-23 DIAGNOSIS — R20.2 HAND PARESTHESIA, UNSPECIFIED LATERALITY: ICD-10-CM

## 2022-04-23 PROCEDURE — 72156 MRI NECK SPINE W/O & W/DYE: CPT | Mod: TC

## 2022-04-23 PROCEDURE — A9585 GADOBUTROL INJECTION: HCPCS | Performed by: NEUROLOGICAL SURGERY

## 2022-04-23 PROCEDURE — 72156 MRI CERVICAL SPINE W WO CONTRAST: ICD-10-PCS | Mod: 26,,, | Performed by: RADIOLOGY

## 2022-04-23 PROCEDURE — 25500020 PHARM REV CODE 255: Performed by: NEUROLOGICAL SURGERY

## 2022-04-23 PROCEDURE — 72156 MRI NECK SPINE W/O & W/DYE: CPT | Mod: 26,,, | Performed by: RADIOLOGY

## 2022-04-23 RX ORDER — GADOBUTROL 604.72 MG/ML
7 INJECTION INTRAVENOUS
Status: COMPLETED | OUTPATIENT
Start: 2022-04-23 | End: 2022-04-23

## 2022-04-23 RX ADMIN — GADOBUTROL 7 ML: 604.72 INJECTION INTRAVENOUS at 12:04

## 2022-04-25 DIAGNOSIS — Z79.4 TYPE 2 DIABETES MELLITUS WITH HYPERGLYCEMIA, WITH LONG-TERM CURRENT USE OF INSULIN: Primary | ICD-10-CM

## 2022-04-25 DIAGNOSIS — E11.65 TYPE 2 DIABETES MELLITUS WITH HYPERGLYCEMIA, WITH LONG-TERM CURRENT USE OF INSULIN: Primary | ICD-10-CM

## 2022-04-25 NOTE — TELEPHONE ENCOUNTER
----- Message from Lala Dvais sent at 4/25/2022  9:51 AM CDT -----  Per howard with phn patient does not want dexcom. She takes her insulin twice a day and her sugars are running high because of the steroids. She is testing her sugar 3 times a day. Please put in rx for meter, and supplies so I can send to n

## 2022-04-26 RX ORDER — INSULIN PUMP SYRINGE, 3 ML
EACH MISCELLANEOUS
Qty: 1 EACH | Refills: 0 | Status: SHIPPED | OUTPATIENT
Start: 2022-04-26 | End: 2023-01-01

## 2022-05-02 ENCOUNTER — TREATMENT (OUTPATIENT)
Dept: RADIATION ONCOLOGY | Facility: CLINIC | Age: 62
End: 2022-05-02
Payer: MEDICARE

## 2022-05-02 PROCEDURE — 77014 PR  CT GUIDANCE PLACEMENT RAD THERAPY FIELDS: ICD-10-PCS | Mod: S$GLB,,, | Performed by: RADIOLOGY

## 2022-05-02 PROCEDURE — 77336 PR  RADN PHYSICS CONSULT CONTINUING: ICD-10-PCS | Mod: S$GLB,,, | Performed by: RADIOLOGY

## 2022-05-02 PROCEDURE — 77336 RADIATION PHYSICS CONSULT: CPT | Mod: S$GLB,,, | Performed by: RADIOLOGY

## 2022-05-02 PROCEDURE — G6015 RADIATION TX DELIVERY IMRT: HCPCS | Mod: S$GLB,,, | Performed by: RADIOLOGY

## 2022-05-02 PROCEDURE — 77014 PR  CT GUIDANCE PLACEMENT RAD THERAPY FIELDS: CPT | Mod: S$GLB,,, | Performed by: RADIOLOGY

## 2022-05-02 PROCEDURE — G6015 PR RADN TX DELIVERY,  INTENS MOD, 1+ FIELDS PER TX: ICD-10-PCS | Mod: S$GLB,,, | Performed by: RADIOLOGY

## 2022-05-04 ENCOUNTER — OFFICE VISIT (OUTPATIENT)
Dept: HEMATOLOGY/ONCOLOGY | Facility: CLINIC | Age: 62
End: 2022-05-04
Payer: MEDICARE

## 2022-05-04 VITALS
SYSTOLIC BLOOD PRESSURE: 129 MMHG | WEIGHT: 140 LBS | BODY MASS INDEX: 19.6 KG/M2 | DIASTOLIC BLOOD PRESSURE: 77 MMHG | HEIGHT: 71 IN | RESPIRATION RATE: 20 BRPM | HEART RATE: 107 BPM

## 2022-05-04 DIAGNOSIS — M62.81 MUSCLE RIGHT ARM WEAKNESS: ICD-10-CM

## 2022-05-04 DIAGNOSIS — Z17.1 MALIGNANT NEOPLASM OF UPPER-OUTER QUADRANT OF RIGHT BREAST IN FEMALE, ESTROGEN RECEPTOR NEGATIVE: Primary | ICD-10-CM

## 2022-05-04 DIAGNOSIS — C50.411 MALIGNANT NEOPLASM OF UPPER-OUTER QUADRANT OF RIGHT BREAST IN FEMALE, ESTROGEN RECEPTOR NEGATIVE: Primary | ICD-10-CM

## 2022-05-04 DIAGNOSIS — C79.51 BONE METASTASES: ICD-10-CM

## 2022-05-04 PROCEDURE — 4010F ACE/ARB THERAPY RXD/TAKEN: CPT | Mod: CPTII,S$GLB,, | Performed by: INTERNAL MEDICINE

## 2022-05-04 PROCEDURE — 3074F SYST BP LT 130 MM HG: CPT | Mod: CPTII,S$GLB,, | Performed by: INTERNAL MEDICINE

## 2022-05-04 PROCEDURE — 3051F PR MOST RECENT HEMOGLOBIN A1C LEVEL 7.0 - < 8.0%: ICD-10-PCS | Mod: CPTII,S$GLB,, | Performed by: INTERNAL MEDICINE

## 2022-05-04 PROCEDURE — 3078F DIAST BP <80 MM HG: CPT | Mod: CPTII,S$GLB,, | Performed by: INTERNAL MEDICINE

## 2022-05-04 PROCEDURE — 99214 OFFICE O/P EST MOD 30 MIN: CPT | Mod: S$GLB,,, | Performed by: INTERNAL MEDICINE

## 2022-05-04 PROCEDURE — 99214 PR OFFICE/OUTPT VISIT, EST, LEVL IV, 30-39 MIN: ICD-10-PCS | Mod: S$GLB,,, | Performed by: INTERNAL MEDICINE

## 2022-05-04 PROCEDURE — 3008F BODY MASS INDEX DOCD: CPT | Mod: CPTII,S$GLB,, | Performed by: INTERNAL MEDICINE

## 2022-05-04 PROCEDURE — 3008F PR BODY MASS INDEX (BMI) DOCUMENTED: ICD-10-PCS | Mod: CPTII,S$GLB,, | Performed by: INTERNAL MEDICINE

## 2022-05-04 PROCEDURE — 3074F PR MOST RECENT SYSTOLIC BLOOD PRESSURE < 130 MM HG: ICD-10-PCS | Mod: CPTII,S$GLB,, | Performed by: INTERNAL MEDICINE

## 2022-05-04 PROCEDURE — 3051F HG A1C>EQUAL 7.0%<8.0%: CPT | Mod: CPTII,S$GLB,, | Performed by: INTERNAL MEDICINE

## 2022-05-04 PROCEDURE — 3078F PR MOST RECENT DIASTOLIC BLOOD PRESSURE < 80 MM HG: ICD-10-PCS | Mod: CPTII,S$GLB,, | Performed by: INTERNAL MEDICINE

## 2022-05-04 PROCEDURE — 4010F PR ACE/ARB THEARPY RXD/TAKEN: ICD-10-PCS | Mod: CPTII,S$GLB,, | Performed by: INTERNAL MEDICINE

## 2022-05-05 DIAGNOSIS — C50.311 MALIGNANT NEOPLASM OF LOWER-INNER QUADRANT OF RIGHT BREAST OF FEMALE, ESTROGEN RECEPTOR POSITIVE: ICD-10-CM

## 2022-05-05 DIAGNOSIS — R05.9 COUGH: ICD-10-CM

## 2022-05-05 DIAGNOSIS — C50.512 MALIGNANT NEOPLASM OF LOWER-OUTER QUADRANT OF LEFT BREAST OF FEMALE, ESTROGEN RECEPTOR POSITIVE: ICD-10-CM

## 2022-05-05 DIAGNOSIS — Z17.1 MALIGNANT NEOPLASM OF UPPER-OUTER QUADRANT OF RIGHT BREAST IN FEMALE, ESTROGEN RECEPTOR NEGATIVE: ICD-10-CM

## 2022-05-05 DIAGNOSIS — R07.81 PLEURITIC CHEST PAIN: ICD-10-CM

## 2022-05-05 DIAGNOSIS — Z17.0 MALIGNANT NEOPLASM OF LOWER-INNER QUADRANT OF RIGHT BREAST OF FEMALE, ESTROGEN RECEPTOR POSITIVE: ICD-10-CM

## 2022-05-05 DIAGNOSIS — Z17.0 MALIGNANT NEOPLASM OF LOWER-OUTER QUADRANT OF LEFT BREAST OF FEMALE, ESTROGEN RECEPTOR POSITIVE: ICD-10-CM

## 2022-05-05 DIAGNOSIS — C50.411 MALIGNANT NEOPLASM OF UPPER-OUTER QUADRANT OF RIGHT BREAST IN FEMALE, ESTROGEN RECEPTOR NEGATIVE: ICD-10-CM

## 2022-05-05 DIAGNOSIS — C79.51 BONE METASTASES: ICD-10-CM

## 2022-05-05 RX ORDER — HYDROCODONE BITARTRATE AND ACETAMINOPHEN 10; 325 MG/1; MG/1
1 TABLET ORAL EVERY 6 HOURS PRN
Qty: 120 TABLET | Refills: 0 | Status: SHIPPED | OUTPATIENT
Start: 2022-05-05 | End: 2022-06-03 | Stop reason: SDUPTHER

## 2022-05-05 RX ORDER — CARISOPRODOL 350 MG/1
350 TABLET ORAL 3 TIMES DAILY PRN
Qty: 90 TABLET | Refills: 0 | Status: SHIPPED | OUTPATIENT
Start: 2022-05-05 | End: 2022-06-03 | Stop reason: SDUPTHER

## 2022-05-05 RX ORDER — OXYCODONE HYDROCHLORIDE 15 MG/1
15 TABLET ORAL
Qty: 120 TABLET | Refills: 0 | Status: SHIPPED | OUTPATIENT
Start: 2022-05-05 | End: 2022-06-04

## 2022-05-05 RX ORDER — ONDANSETRON 8 MG/1
8 TABLET, ORALLY DISINTEGRATING ORAL
Qty: 30 TABLET | Refills: 1 | Status: SHIPPED | OUTPATIENT
Start: 2022-05-05 | End: 2022-05-16

## 2022-05-05 NOTE — TELEPHONE ENCOUNTER
----- Message from Felisa Mejía sent at 5/5/2022 11:13 AM CDT -----  Regarding: Refill Request  Patient called in to f/u on refill medications that were suppose to be sent to [facility] on  yesterday after her appt with Dr Peraza yesterday. The medications were Oxycodone 15, Norco 10, Soma, and Zofran.    Thanks so much,  Felisa

## 2022-05-08 ENCOUNTER — TELEPHONE (OUTPATIENT)
Dept: HEMATOLOGY/ONCOLOGY | Facility: CLINIC | Age: 62
End: 2022-05-08

## 2022-05-08 RX ORDER — ONDANSETRON 2 MG/ML
16 INJECTION INTRAMUSCULAR; INTRAVENOUS ONCE
Status: CANCELLED | OUTPATIENT
Start: 2022-06-02

## 2022-05-08 RX ORDER — SODIUM CHLORIDE 0.9 % (FLUSH) 0.9 %
10 SYRINGE (ML) INJECTION
Status: CANCELLED | OUTPATIENT
Start: 2022-06-02

## 2022-05-08 RX ORDER — HEPARIN 100 UNIT/ML
500 SYRINGE INTRAVENOUS
Status: CANCELLED | OUTPATIENT
Start: 2022-06-09

## 2022-05-08 RX ORDER — SODIUM CHLORIDE 0.9 % (FLUSH) 0.9 %
10 SYRINGE (ML) INJECTION
Status: CANCELLED | OUTPATIENT
Start: 2022-06-09

## 2022-05-08 RX ORDER — ONDANSETRON 2 MG/ML
16 INJECTION INTRAMUSCULAR; INTRAVENOUS ONCE
Status: CANCELLED | OUTPATIENT
Start: 2022-06-09

## 2022-05-08 RX ORDER — HEPARIN 100 UNIT/ML
500 SYRINGE INTRAVENOUS
Status: CANCELLED | OUTPATIENT
Start: 2022-06-02

## 2022-05-08 NOTE — PROGRESS NOTES
"  [facility] In Office Hematology Oncology Subsequent  Encounter Note    5/4/22      Subjective:      Patient ID:   Negrita Castro  61 y.o.   Martínez Shepard R. Leblanc, Babycos, Bourgeois, Hall      Chief Complaint:   New L breast cancer eval.    HPI:  61 y.o. female with diagnosis of Stage 1 R breast cancer 10/26/15.  "Triple negative".  Adjuvant AC x's 4, tx's 12 and Rad Rx through 9/12/16.    Port removed.  Had bilateral reconstruction surgery 4/14/17.  Further reconstruction, tummy veeck 8/14/17.  Additional fat injection at R chest done for symmetry.  She had placement of R nipple.  Dr. Hernandez.      She is due for additional reconstructive surgery at breast and abdomenal areas.  The surgery is being done as part of her reconstruction efforts and for keloid management.  The date of the surgery is May 19th.    Also, Dr. Alarcon to consider Rad Rx to operative site to decrease keloid potential.  On hold now because of Covid 19 pandemic.    Recent Mamm/U/S showed small mass at 4:00 at L breast.  Needle Bx invasive ductal Ca, ERP+, PRP+, H2N neg  She had  L partial mastectomy, Sentinal node Bx 8/24/20. Followed by another surgery because of L axillary infection.  Primary 2 cm, LN neg, Stage 1 dx.    Discussed Oncotype Dx testing, this supported adding adjuvant chemotherapy to adjuvant hormonal therapy because of increased risk of cancer recurrence long-term.  Discussed BRCA testing, given her bilateral  breast Dx. PHN would not authorize BRCA 1 & 2.  Refill meds for her.    She had COVID-19 infection and was hospitalized.  She came very close to requiring intubation and mechanical ventilation.  She is off oxygen now,   and sees Dr. Justin of Pulmonary.  Recent CT scan of the chest showed residual changes of interstitial disease, consistent with recovering from COVID-19.  Also lytic lesions were seen in the thoracic spine area very suspicious for metastatic disease at T5 through T8 spine.    Recently on " Saturday night she had a hard coughing spell, and thereafter developed severe right posterior pleuritic chest pain.  She has point tenderness over the right posterior chest wall and may very well have a fractured rib at the site.  Rib x-rays have been ordered.  I have refilled her Soma for 1 month.  I have refilled her Norco  for 1 month.  I have added oxycodone 5 mg 1 or 2 p.o. Q 3-4 hours p.r.n. breakthrough pain for 1 month to her regimen.    She also has an enlarging nodule at the left chest wall.  Ultrasound of the site suggested that this was a cyst however the areas firm movable and may very well be cancer.  I asked  for a ultrasound-guided biopsy of the mass per radiology.  Path report showed invasive ductal Ca, ERP + PRP neg,   H2N equivocal.    She is postmenopausal, her last menses were 4 5 years ago.  Rib x-rays have been requested.  Will order a CT scan to evaluate for possible metastatic disease.  Will order a ultrasound-guided biopsy of the left chest wall mass for pathologic examination at [facility].  Bx + invasive breast cancer.  ERP+, PRP-, H2N-.    Suspected local recurrence at L breast.  She had L lumpectomy.  Margins clear.    Refer to Rad Rx MD for adjuvant Rad Rx., for local control.  She has T spine metastases.  Currently on hormonal Rx.  To see Dr. Manuelito Shepard for DM, BS control.    Dr. Washington saw her for C spine eval, no surgery planned for now.  He referred her to Dr. Rivers for injection Rx.    DM, cholesterol, epilepsy hx, DJD, LBP.  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    LR shoulder arthroscopy, R wrist surgery, M0.    Smoke  ppd x's 35 years, quit 2015.  ETOH no.  Disability-depression, epilepsy  Allergy none    Mom ovarian cancer  Dad lung cancer  Sisters DM, lung dx.    Summary of care:  Right breast cancer 2015, triple negative, treated with Adriamycin Cytoxan followed by taxane.    Left breast cancer ERP positive PRP positive HER2 Judy negative in  August 24, 2020    COVID infection December 2020.  She has not taken the covid 19 vaccination because of multiple allergy hx.    February 2021 bone metastases determined.    He he March 21, 2021 local left breast cancer recurrence, ERP positive PRP negative HER2 Judy negative.    He April 21st bone biopsy positive for breast cancer metastatic.  ERP, PRP, HER2 Judy pending.    She was on radiation therapy through May 17, 2021.  Refilled Marinol at increased 5 mg 1 p.o. b.i.d. number 60.   I refilled her Soma, Norco, Marinol, and Silvadene cream; dated the prescriptions for June 17, 2021.    C/O mid thoracic pain x's 6-8 weeks,radiates to L & R, increased.  Tender over Mid thoracic area.  Hx of T spine metastatic dx.  Pain controlled on Norco 10/325 mg and oxycodone 5 mg 1-2 po PRN.  Add lodine 400 mg 1 daily    Checked MRI of T spine.  Metastatic dx with pathologic fx at T3,5,6.  Refer to Dr. Flowers of neurosurgery for Vertebroplasty or Kyphoplasty evaluation.    She is on Faslodex. Ibrance. Monitor WBC count.    She saw Dr. Flowers, and has been fitted with a brace initially, vertebroplasty after the first of the year.  [phone].  RTC Dr. Flowers 12/27/21.    No neuro surgery is planned for T-spine disease because of progressive growth and compression of the spine.  She admits to having decreased strength in the right hand.  She completed 10 of 10 radiation treatments on May 2, 2022. She is tapering down her Decadron 4 mg p.o. b.i.d. and she is taking Lodine 400 mg daily.  We are stopping fast low dex and Ibrance as of April 12, 2022.    Will ask the  to see her if we can get Meals on wheels for her.    She is due for an MRI on May 17, and a PET scan on May 19, I will see her on May 20th.  Plan to go with chemotherapy now, probably will go with carboplatin Gemzar, for May 24th and May 31st.  She is 140 lb and 5 ft 11.     ROS:   GEN: normal without any fever, night sweats or weight loss  HEENT: normal with  no HA's, sore throat, stiff neck, changes in vision  CV: normal with no CP, SOB, PND, POOL or orthopnea  PULM: normal with no SOB, cough, hemoptysis, sputum or pleuritic pain  GI: normal with no abdominal pain, nausea, vomiting, constipation, diarrhea, melanotic stools, BRBPR, or hematemesis  : normal with no hematuria, dysuria  BREAST: See HPI.  SKIN: normal with no rash, erythema, bruising, or swelling     Past Medical History:   Diagnosis Date    Acute hypoxemic respiratory failure 12/12/2020    Breast cancer     left breast    Cancer     RIGHT BREAST 10-15    Depression     Diabetes mellitus     Neuropathy     Panic attacks     S/P epidural steroid injection     Seizures     none since early 30's    Wears glasses     TO DRIVE     Past Surgical History:   Procedure Laterality Date    AXILLARY NODE DISSECTION Left 8/24/2020    Procedure: LYMPHADENECTOMY, AXILLARY;  Surgeon: Cory Vick MD;  Location: [facility] OR;  Service: General;  Laterality: Left;  left breast mastectomy with possible axillary lymph node dissection    BREAST BIOPSY      right    BREAST LUMPECTOMY      BREAST RECONSTRUCTION      breast reconstruction with tummy [facility]      BREAST SURGERY      11-3-15 LUMPECTOMY, 12-2-15 REEXCISION    INCISION AND DRAINAGE OF ABSCESS Left 9/9/2020    Procedure: INCISION AND DRAINAGE, ABSCESS;  Surgeon: Cory Vick MD;  Location: [facility] OR;  Service: General;  Laterality: Left;    INSERTION OF LOCALIZATION WIRE Left 8/24/2020    Procedure: INSERTION, LOCALIZATION WIRE;  Surgeon: Cory Vick MD;  Location: [facility] OR;  Service: General;  Laterality: Left;  left breast lumpectomy with wire needle loc    MASTECTOMY      mastectomy 2016      MASTECTOMY, PARTIAL Left 3/19/2021    Procedure: MASTECTOMY, PARTIAL;  Surgeon: Cory Vick MD;  Location: [facility] OR;  Service: General;  Laterality: Left;  lumpectomy  left breast     RECONSTRUCTION OF NIPPLE Right 11/5/2018    Procedure:  RECONSTRUCTION-NIPPLE RIGHT;  Surgeon: Xiang Hernandez MD;  [facility];  Service: Plastics;  Laterality: Right;    SENTINEL LYMPH NODE BIOPSY Left 8/24/2020    Procedure: BIOPSY, LYMPH NODE, SENTINEL;  Surgeon: Cory Vick MD;  Location: [facility] OR;  Service: General;  Laterality: Left;  left breast lumpectomy with left sentinel lymoh node       shoulder surgery and wrist      BILAT  BONE SPUR    WRIST SURGERY      RIGHT       Review of patient's allergies indicates:   Allergen Reactions    Adhesive tape-silicones Hives     BANDAIDS    Polymycin Hives    Latex, natural rubber Itching    Polyurethane-39            Current Outpatient Medications:     anastrozole (ARIMIDEX) 1 mg Tab, Take 1 mg by mouth once daily., Disp: , Rfl:     blood sugar diagnostic Strp, TEST GLUCOSE BID, Disp: 300 each, Rfl: 3    blood-glucose meter (TRUE METRIX GLUCOSE METER) kit, TEST GLUCOSE BID, Disp: 1 each, Rfl: 0    blood-glucose meter,continuous (DEXCOM G6 ) Misc, TEST GLUCOSE QID, Disp: 1 each, Rfl: 1    blood-glucose sensor (DEXCOM G6 SENSOR) Edwige, TEST GLUCOSE QID, Disp: 13 each, Rfl: 3    blood-glucose transmitter (DEXCOM G6 TRANSMITTER) Edwige, TEST GLUCOSE QID, Disp: 1 each, Rfl: 1    carisoprodoL (SOMA) 350 MG tablet, Take 1 tablet (350 mg total) by mouth 3 (three) times daily as needed for Muscle spasms., Disp: 90 tablet, Rfl: 0    dexAMETHasone (DECADRON) 4 MG Tab, Take 1 tablet (4 mg total) by mouth 2 (two) times daily with meals., Disp: 60 tablet, Rfl: 1    dextromethorphan-guaiFENesin  mg/5 ml (ROBITUSSIN-DM)  mg/5 mL liquid, Take 1 teaspoon q 6 hours prn cough., Disp: 300 mL, Rfl: 0    diazePAM (VALIUM) 10 MG Tab, Take 1 tablet (10 mg total) by mouth 4 (four) times daily as needed (anxiety)., Disp: 120 tablet, Rfl: 3    docusate sodium (COLACE) 100 MG capsule, Take 100 mg by mouth 2 (two) times daily., Disp: , Rfl:     dronabinoL (MARINOL) 5 MG capsule, Take 1  capsule (5 mg total) by mouth 2 (two) times daily before meals., Disp: 60 capsule, Rfl: 0    dulaglutide (TRULICITY) 3 mg/0.5 mL pen injector, Inject 3 mg into the skin every 7 days., Disp: 4 pen, Rfl: 11    enalapril (VASOTEC) 5 MG tablet, Take 1 tablet (5 mg total) by mouth once daily., Disp: 90 tablet, Rfl: 3    etodolac (LODINE) 400 MG tablet, Take 1 tablet (400 mg total) by mouth once daily., Disp: 30 tablet, Rfl: 2    furosemide (LASIX) 20 MG tablet, TAKE ONE TABLET BY MOUTH EVERY DAY, Disp: 30 tablet, Rfl: 6    HYDROcodone-acetaminophen (NORCO)  mg per tablet, Take 1 tablet by mouth every 6 (six) hours as needed for Pain., Disp: 120 tablet, Rfl: 0    insulin aspart, niacinamide, (FIASP FLEXTOUCH U-100 INSULIN) 100 unit/mL (3 mL) InPn, Inject 12 Units into the skin 2 (two) times daily with meals., Disp: 3 pen, Rfl: 9    insulin glargine (LANTUS U-100 INSULIN) 100 unit/mL injection, Inject 32 Units into the skin 2 (two) times a day., Disp: 18 mL, Rfl: 11    lancets 32 gauge Misc, TEST GLUCOSE BID, Disp: 300 each, Rfl: 3    metFORMIN (GLUCOPHAGE-XR) 500 MG ER 24hr tablet, Take 2 tablets (1,000 mg total) by mouth 2 (two) times daily with meals., Disp: 360 tablet, Rfl: 3    metoprolol tartrate (LOPRESSOR) 25 MG tablet, Take 1 tablet (25 mg total) by mouth 2 (two) times daily., Disp: 60 tablet, Rfl: 11    mometasone 0.1% (ELOCON) 0.1 % cream, Apply to affected area daily, Disp: 45 g, Rfl: 1    naloxegoL (MOVANTIK) 12.5 mg Tab, Take 12.5 mg by mouth once daily., Disp: 30 tablet, Rfl: 3    ondansetron (ZOFRAN-ODT) 8 MG TbDL, Take 1 tablet (8 mg total) by mouth every 6 to 8 hours as needed (nausea)., Disp: 30 tablet, Rfl: 1    oxyCODONE (ROXICODONE) 15 MG Tab, Take 1 tablet (15 mg total) by mouth every 4 to 6 hours as needed for Pain., Disp: 120 tablet, Rfl: 0    palbociclib 100 mg Tab, Take 100 mg by mouth once daily. for 21 days, then take 7 days off. Total cycle length 28 days, Disp: 21 tablet,  "Rfl: 6    pen needle, diabetic (BD ULTRA-FINE MARCE PEN NEEDLE) 32 gauge x 5/32" Ndle, Test blood sugar twice daily, Disp: 100 each, Rfl: 5    promethazine (PHENERGAN) 25 MG tablet, Take 1 tablet (25 mg total) by mouth every 4 to 6 hours as needed., Disp: 30 tablet, Rfl: 5    promethazine-codeine 6.25-10 mg/5 ml (PHENERGAN WITH CODEINE) 6.25-10 mg/5 mL syrup, 1 teaspoon every 6 hours prn cough., Disp: 450 mL, Rfl: 0    psyllium husk, with sugar, (METAMUCIL, WITH SUGAR,) 3.4 gram packet, Take 1 packet by mouth 2 (two) times daily., Disp: 30 packet, Rfl: 2    rosuvastatin (CRESTOR) 40 MG Tab, Take 1 tablet (40 mg total) by mouth every evening. For cholesterol, Disp: 90 tablet, Rfl: 3    zolpidem (AMBIEN) 10 mg Tab, TAKE ONE TABLET BY MOUTH EVERY NIGHT AT BEDTIME, Disp: 30 tablet, Rfl: 3          Objective:   Vitals:  Blood pressure 129/77, pulse 107, resp. rate 20, height 5' 11" (1.803 m), weight 63.5 kg (140 lb), last menstrual period 01/16/2016.    Physical Examination:   GEN: no apparent distress, comfortable, Brace in place.  HEAD: atraumatic and normocephalic  EYES: no pallor, no icterus  ENT: no pharyngeal erythema  NECK: noLAD/LN's, supple  CV:  No edema  CHEST: Normal respiratory effort   she is not  tender over the right posterior chest wall on exam.  The chest wall is not swollen.  ABDOM:  nondistended; soft                                                                                          MUSC/Skeletal: ROM normal, Tender over mid T spine area.  EXTREM: no swelling  SKIN: She is developing keloid changes at L breast incision and navel surgical sites.  This area appears to be enlarging.  NEURO: grossly intact  PSYCH: normal mood, affect and behavior  LYMPH: normal  LN's  BREASTS: L breast is much improved.    Refill Oxycodone, SOMA, Norco.    Assessment:   (1) 61 y.o. female with diagnosis of Stage 1 triple negative R breast cancer, 10/2016.       S/P R mastectomy, SNBx, AC x's 4, T x's12 weeks, " Rad Rx and recent reconstruction.    (2) New L invasive ductal Ca, ERP+, PRP+, H2N neg.  Clinical stage 1 dx.    I have started her on Arimidex 1 mg p.o. daily  for metastatic disease at this time.    Suspected fracture of right ribs after recent cough event, check rib x-rays, medicate for pain.    ERP+ dx, bone metastases.  Started on Arimidex daily.  Add 2nd hormonal drug Rx after Rad Rx completed.    Bone biopsy positive for metastatic breast cancer.  ERP+, PRP+, H2N-.    Check PET and MRI of brain.  RTC 5/20/22.  Start chemo 5/24/22..

## 2022-05-08 NOTE — TELEPHONE ENCOUNTER
Orders placed in epic for PET scan and MRI scan for May 17th and May 19th.  Get her date and time.  Get authorization.    She returns to clinic to see me on May 20th.    I have placed orders for carboplatin Gemzar Udenyca chemotherapy to begin on May 24th.    Get date and time.      Get authorization.    Chemotherapy school    Co-pay assistance    See me May 24th.

## 2022-05-10 ENCOUNTER — DOCUMENTATION ONLY (OUTPATIENT)
Dept: RADIATION ONCOLOGY | Facility: CLINIC | Age: 62
End: 2022-05-10

## 2022-05-10 ENCOUNTER — TELEPHONE (OUTPATIENT)
Dept: HEMATOLOGY/ONCOLOGY | Facility: CLINIC | Age: 62
End: 2022-05-10

## 2022-05-10 NOTE — PROGRESS NOTES
Patient reports failing dentition on right side.  [facility]  I explained that prior radiotherapy to B breasts and C7-T8 placed 0 dose at mandible, mouth or gingiva and is not responsible for dentition. Advised discuss with medical oncology, Dr. Vick re: systemic therapy, bisphosphonate, etc...    Pt directed to forward my contact info to DDS if needed.     Advised to coordinate care between DDS and Dr. Vick.

## 2022-05-11 ENCOUNTER — TELEPHONE (OUTPATIENT)
Dept: HEMATOLOGY/ONCOLOGY | Facility: CLINIC | Age: 62
End: 2022-05-11

## 2022-05-11 NOTE — TELEPHONE ENCOUNTER
----- Message from Nell Carrillo sent at 5/11/2022  9:11 AM CDT -----  The patient said that her teeth have just recently started to break and causing her problems. She said Dr. Lorenzo told her it is not from the radiation. She wants Dr. Peraza to know this is happening to her.

## 2022-05-12 ENCOUNTER — TELEPHONE (OUTPATIENT)
Dept: HEMATOLOGY/ONCOLOGY | Facility: CLINIC | Age: 62
End: 2022-05-12

## 2022-05-13 NOTE — TELEPHONE ENCOUNTER
Pt called in requesting magic mouth wash due to dry mouth, and tongue sore.  Pt does not have any patchy white spots.  I have called in prescription of magic mouth wash to [facility] pharmacy.

## 2022-05-17 ENCOUNTER — TELEPHONE (OUTPATIENT)
Dept: HEMATOLOGY/ONCOLOGY | Facility: CLINIC | Age: 62
End: 2022-05-17

## 2022-05-17 DIAGNOSIS — Z03.818 ENCNTR FOR OBS FOR SUSP EXPSR TO OTH BIOLG AGENTS RULED OUT: Primary | ICD-10-CM

## 2022-05-19 ENCOUNTER — TELEPHONE (OUTPATIENT)
Dept: HEMATOLOGY/ONCOLOGY | Facility: CLINIC | Age: 62
End: 2022-05-19

## 2022-05-19 ENCOUNTER — TELEPHONE (OUTPATIENT)
Dept: FAMILY MEDICINE | Facility: CLINIC | Age: 62
End: 2022-05-19

## 2022-05-19 NOTE — PROGRESS NOTES
Returned call to patient regarding meal delivery to her home.  No answer, voicemail left on patient's number requesting her to return my call.

## 2022-05-19 NOTE — TELEPHONE ENCOUNTER
----- Message from Melissa Ogden sent at 5/19/2022 11:26 AM CDT -----  Pt calling last seen prior to prospective back surgery for broken back given a temporary regular chair temporary but she has since been dx with spine cancer that has affected her arms as well she wants to get orders for a motorized wheel chair. Has been taking steroids so her sugar has been elevated between 400-500 finished them on Friday, and she needs to know if we have received her shipment of Lantus.   # [phone]

## 2022-05-19 NOTE — TELEPHONE ENCOUNTER
Spoke with patient informed will need clinical documentation for motorized scooter. Patient will discuss at 6/14/22 visit.  No indigent patient medication available at this time.  Informed will call once available -MARLYN

## 2022-05-19 NOTE — PROGRESS NOTES
I spoke to [facility] Meals on Wheels program; patient cannot request meals until her 62th birthday.  Patient was informed of aforementioned.

## 2022-05-23 ENCOUNTER — TELEPHONE (OUTPATIENT)
Dept: NEUROSURGERY | Facility: CLINIC | Age: 62
End: 2022-05-23
Payer: MEDICARE

## 2022-05-23 NOTE — TELEPHONE ENCOUNTER
Returned call to pt. She would like to know if she is a surgical candidate after she completes this current round of chemo. Informed pt that I will discuss with Dr. Flowers and call her back. Pt voiced understanding.

## 2022-05-23 NOTE — TELEPHONE ENCOUNTER
Spoke with Dr. Flowers. Returned call to pt and informed her that unfortunately, she is not a surgical candidate at this time. She can possibly be re evaluated in the future after treatments are complete. Pt voiced understanding.

## 2022-05-23 NOTE — TELEPHONE ENCOUNTER
----- Message from Michelle Armas RN sent at 5/20/2022  4:07 PM CDT -----    ----- Message -----  From: Tobias Rodriguez  Sent: 5/20/2022   2:40 PM CDT  To: Lionel Young Staff    Name Of Caller:kat          Provider Name:Hector Flowers      Does patient feel the need to be seen today?no      Relationship to the Pt?:pt      Contact Preference?:[phone]      What is the nature of the call?: pt stated she would like to talk to the nurse in this office please callback regarding this.

## 2022-05-25 ENCOUNTER — CLINICAL SUPPORT (OUTPATIENT)
Dept: RADIATION ONCOLOGY | Facility: CLINIC | Age: 62
End: 2022-05-25
Payer: MEDICARE

## 2022-05-25 DIAGNOSIS — C79.51 BONE METASTASES: Primary | ICD-10-CM

## 2022-05-25 NOTE — PROGRESS NOTES
DIAGNOSIS:  Metastatic breast cancer    TREATMENT  Completed emergent treatment for spinal cord compression to C7-T8, 30 Gy in 10 fractions ending May 2, 2022.  Treatment well tolerated with hyperglycemia due to IDDM and steroid use.    Contacted patient today for 3 week checkup.  Patient reports mild residual fatigue and dry pruritic skin on the back.  She has some weakness in the right hand that is improving.  She is not a neurosurgical candidate and is planned for further systemic therapy.  Steroids have been tapered.    A/P  1.  Good tolerance of radiotherapy.  She has been seen by Neurosurgery and is not a candidate for intervention at this time.  2.  Follow-up with Dr. Vick--MRI and PET pending.  Plan to begin gemcitabine plus carboplatin.  3.  Return to clinic prn.  4.  COVID-19 precautions discussed.

## 2022-05-30 ENCOUNTER — HOSPITAL ENCOUNTER (OUTPATIENT)
Dept: RADIOLOGY | Facility: HOSPITAL | Age: 62
Discharge: HOME | End: 2022-05-30
Attending: INTERNAL MEDICINE
Payer: MEDICARE

## 2022-05-30 VITALS — HEIGHT: 71 IN | BODY MASS INDEX: 21.28 KG/M2 | WEIGHT: 152 LBS

## 2022-05-30 DIAGNOSIS — M62.81 MUSCLE RIGHT ARM WEAKNESS: ICD-10-CM

## 2022-05-30 DIAGNOSIS — C50.411 MALIGNANT NEOPLASM OF UPPER-OUTER QUADRANT OF RIGHT BREAST IN FEMALE, ESTROGEN RECEPTOR NEGATIVE: ICD-10-CM

## 2022-05-30 DIAGNOSIS — C79.51 BONE METASTASES: ICD-10-CM

## 2022-05-30 DIAGNOSIS — Z17.1 MALIGNANT NEOPLASM OF UPPER-OUTER QUADRANT OF RIGHT BREAST IN FEMALE, ESTROGEN RECEPTOR NEGATIVE: ICD-10-CM

## 2022-05-30 LAB
CREAT SERPL-MCNC: 0.4 MG/DL (ref 0.5–1.4)
GLUCOSE SERPL-MCNC: 106 MG/DL (ref 70–110)
SAMPLE: ABNORMAL

## 2022-05-30 PROCEDURE — A9585 GADOBUTROL INJECTION: HCPCS | Mod: PO | Performed by: INTERNAL MEDICINE

## 2022-05-30 PROCEDURE — 70553 MRI BRAIN STEM W/O & W/DYE: CPT | Mod: TC,PO

## 2022-05-30 PROCEDURE — 78815 PET IMAGE W/CT SKULL-THIGH: CPT | Mod: TC,PO

## 2022-05-30 PROCEDURE — 25500020 PHARM REV CODE 255: Mod: PO | Performed by: INTERNAL MEDICINE

## 2022-05-30 RX ORDER — GADOBUTROL 604.72 MG/ML
7 INJECTION INTRAVENOUS
Status: COMPLETED | OUTPATIENT
Start: 2022-05-30 | End: 2022-05-30

## 2022-05-30 RX ADMIN — GADOBUTROL 7 ML: 604.72 INJECTION INTRAVENOUS at 11:05

## 2022-05-31 ENCOUNTER — OFFICE VISIT (OUTPATIENT)
Dept: HEMATOLOGY/ONCOLOGY | Facility: CLINIC | Age: 62
End: 2022-05-31
Payer: MEDICARE

## 2022-05-31 ENCOUNTER — LAB VISIT (OUTPATIENT)
Dept: PRIMARY CARE CLINIC | Facility: CLINIC | Age: 62
End: 2022-05-31
Payer: MEDICARE

## 2022-05-31 VITALS
DIASTOLIC BLOOD PRESSURE: 74 MMHG | TEMPERATURE: 99 F | RESPIRATION RATE: 16 BRPM | HEART RATE: 95 BPM | HEIGHT: 71 IN | WEIGHT: 153 LBS | SYSTOLIC BLOOD PRESSURE: 133 MMHG | BODY MASS INDEX: 21.42 KG/M2

## 2022-05-31 DIAGNOSIS — Z17.1 MALIGNANT NEOPLASM OF UPPER-OUTER QUADRANT OF RIGHT BREAST IN FEMALE, ESTROGEN RECEPTOR NEGATIVE: Primary | ICD-10-CM

## 2022-05-31 DIAGNOSIS — C78.7 LIVER METASTASES: ICD-10-CM

## 2022-05-31 DIAGNOSIS — C50.411 MALIGNANT NEOPLASM OF UPPER-OUTER QUADRANT OF RIGHT BREAST IN FEMALE, ESTROGEN RECEPTOR NEGATIVE: Primary | ICD-10-CM

## 2022-05-31 DIAGNOSIS — Z01.818 PREOP TESTING: ICD-10-CM

## 2022-05-31 DIAGNOSIS — C79.51 BONE METASTASES: ICD-10-CM

## 2022-05-31 PROCEDURE — 3075F SYST BP GE 130 - 139MM HG: CPT | Mod: CPTII,S$GLB,, | Performed by: INTERNAL MEDICINE

## 2022-05-31 PROCEDURE — 3078F PR MOST RECENT DIASTOLIC BLOOD PRESSURE < 80 MM HG: ICD-10-PCS | Mod: CPTII,S$GLB,, | Performed by: INTERNAL MEDICINE

## 2022-05-31 PROCEDURE — 4010F PR ACE/ARB THEARPY RXD/TAKEN: ICD-10-PCS | Mod: CPTII,S$GLB,, | Performed by: INTERNAL MEDICINE

## 2022-05-31 PROCEDURE — 3008F BODY MASS INDEX DOCD: CPT | Mod: CPTII,S$GLB,, | Performed by: INTERNAL MEDICINE

## 2022-05-31 PROCEDURE — 3051F PR MOST RECENT HEMOGLOBIN A1C LEVEL 7.0 - < 8.0%: ICD-10-PCS | Mod: CPTII,S$GLB,, | Performed by: INTERNAL MEDICINE

## 2022-05-31 PROCEDURE — 4010F ACE/ARB THERAPY RXD/TAKEN: CPT | Mod: CPTII,S$GLB,, | Performed by: INTERNAL MEDICINE

## 2022-05-31 PROCEDURE — U0003 INFECTIOUS AGENT DETECTION BY NUCLEIC ACID (DNA OR RNA); SEVERE ACUTE RESPIRATORY SYNDROME CORONAVIRUS 2 (SARS-COV-2) (CORONAVIRUS DISEASE [COVID-19]), AMPLIFIED PROBE TECHNIQUE, MAKING USE OF HIGH THROUGHPUT TECHNOLOGIES AS DESCRIBED BY CMS-2020-01-R: HCPCS | Performed by: INTERNAL MEDICINE

## 2022-05-31 PROCEDURE — 99213 OFFICE O/P EST LOW 20 MIN: CPT | Mod: S$GLB,,, | Performed by: INTERNAL MEDICINE

## 2022-05-31 PROCEDURE — 99213 PR OFFICE/OUTPT VISIT, EST, LEVL III, 20-29 MIN: ICD-10-PCS | Mod: S$GLB,,, | Performed by: INTERNAL MEDICINE

## 2022-05-31 PROCEDURE — 3075F PR MOST RECENT SYSTOLIC BLOOD PRESS GE 130-139MM HG: ICD-10-PCS | Mod: CPTII,S$GLB,, | Performed by: INTERNAL MEDICINE

## 2022-05-31 PROCEDURE — U0005 INFEC AGEN DETEC AMPLI PROBE: HCPCS | Performed by: INTERNAL MEDICINE

## 2022-05-31 PROCEDURE — 3008F PR BODY MASS INDEX (BMI) DOCUMENTED: ICD-10-PCS | Mod: CPTII,S$GLB,, | Performed by: INTERNAL MEDICINE

## 2022-05-31 PROCEDURE — 3051F HG A1C>EQUAL 7.0%<8.0%: CPT | Mod: CPTII,S$GLB,, | Performed by: INTERNAL MEDICINE

## 2022-05-31 PROCEDURE — 3078F DIAST BP <80 MM HG: CPT | Mod: CPTII,S$GLB,, | Performed by: INTERNAL MEDICINE

## 2022-05-31 NOTE — PROGRESS NOTES
FOLLOW-UP APPOINTMENT    PATIENT:   Negrita Castro  :    1960  MR#:    [id number]  DATE OF VISIT:  2022      Chief Complaint: Chemo School/ Met Breast Cancer    HPI:   Ms. Negrita Castro presents today for chemotherapy education.  She will be starting treatment with Carboplatin and Gemzar for the above diagnosis.     Depression Patient Health Questionnaire 2022 2022 3/28/2022 3/10/2022 2022 2022 11/15/2021   Over the last two weeks how often have you been bothered by little interest or pleasure in doing things 1 1 2 1 1 1 0   Over the last two weeks how often have you been bothered by feeling down, depressed or hopeless 1 1 2 1 1 1 0   PHQ-2 Total Score 2 2 4 2 2 2 0   Over the last two weeks how often have you been bothered by trouble falling or staying asleep, or sleeping too much - - - - - - -   Over the last two weeks how often have you been bothered by feeling tired or having little energy - - - - - - -   Over the last two weeks how often have you been bothered by a poor appetite or overeating - - - - - - -   Over the last two weeks how often have you been bothered by feeling bad about yourself - or that you are a failure or have let yourself or your family down - - - - - - -   Over the last two weeks how often have you been bothered by trouble concentrating on things, such as reading the newspaper or watching television - - - - - - -   Over the last two weeks how often have you been bothered by moving or speaking so slowly that other people could have noticed. Or the opposite - being so fidgety or restless that you have been moving around a lot more than usual. - - - - - - -   Over the last two weeks how often have you been bothered by thoughts that you would be better off dead, or of hurting yourself - - - - - - -   If you checked off any problems, how difficult have these problems made it for you to do your work, take care of things at home or get along with other people? -  - - - - - -   Total Score - - - - - - -           Review of Systems   Constitutional: Positive for fatigue. Negative for appetite change and fever.   Respiratory: Negative for cough and shortness of breath.    Cardiovascular: Negative for chest pain, leg swelling and palpitations.   Gastrointestinal: Positive for constipation. Negative for abdominal pain and diarrhea.   Endocrine: Positive for hot flashes.   Genitourinary: Negative for dysuria.    Musculoskeletal: Positive for back pain.   Neurological: Positive for numbness.   Psychiatric/Behavioral: Negative for confusion. The patient is not nervous/anxious.        Oncology History   Breast cancer, right   6/1/2017 Initial Diagnosis    Breast cancer, right     7/6/2021 - 4/12/2022 Chemotherapy    Treatment Summary   Plan Name: OP PALBOCICLIB FULVESTRANT Q4W  Treatment Goal: Palliative  Status: Inactive  Start Date: 7/6/2021  End Date: 4/12/2022  Provider: TALISHA Vick MD  Chemotherapy: fulvestrant (FASLODEX) injection 500 mg, 500 mg, Intramuscular, Clinic/HOD 1 time, 11 of 12 cycles  Administration: 500 mg (7/6/2021), 500 mg (8/3/2021), 500 mg (7/20/2021), 500 mg (8/31/2021), 500 mg (9/28/2021), 500 mg (10/26/2021), 500 mg (11/23/2021), 500 mg (12/21/2021), 500 mg (1/18/2022), 500 mg (2/15/2022), 500 mg (3/15/2022), 500 mg (4/12/2022)  palbociclib 125 mg Tab, 125 mg, Oral, Daily, 1 of 1 cycle, Start date: --, End date: --     6/2/2022 -  Chemotherapy    Treatment Summary   Plan Name: OP BREAST GEMCITABINE CARBOPLATIN Q3W  Treatment Goal: Control  Status: Active  Start Date: 6/2/2022 (Planned)  End Date: 5/12/2023 (Planned)  Provider: TALISHA Vick MD  Chemotherapy: CARBOplatin (PARAPLATIN) 505 mg in sodium chloride 0.9% 250 mL chemo infusion, 505 mg (100 % of original dose 504.5 mg), Intravenous, Clinic/HOD 1 time, 0 of 17 cycles  Dose modification:   (original dose 504.5 mg, Cycle 1)  gemcitabine (GEMZAR) 1,425 mg in sodium chloride 0.9% 250 mL chemo  infusion, 800 mg/m2 = 1,425 mg (100 % of original dose 800 mg/m2), Intravenous, Once, 0 of 17 cycles  Dose modification: 800 mg/m2 (original dose 800 mg/m2, Cycle 1)     Breast cancer, left breast   8/24/2020 Initial Diagnosis    Breast cancer, left breast     12/3/2020 - 12/3/2020 Chemotherapy    Treatment Summary   Plan Name: OP BREAST TC - DOCETAXEL CYCLOPHOSPHAMIDE Q3W  Treatment Goal: Curative  Status: Inactive  Start Date:   End Date:   Provider: TALISHA Vick MD  Chemotherapy: cyclophosphamide (CYTOXAN) 600 mg/m2 = 1,290 mg in sodium chloride 0.9% 250 mL chemo infusion, 600 mg/m2 = 1,290 mg, Intravenous, Clinic/HOD 1 time, 0 of 6 cycles  DOCEtaxeL (TAXOTERE) 75 mg/m2 = 160 mg in sodium chloride 0.9% 250 mL chemo infusion, 75 mg/m2 = 160 mg, Intravenous, Clinic/HOD 1 time, 0 of 6 cycles     4/8/2021 Cancer Staged    Staging form: Breast, AJCC 8th Edition  - Pathologic: Stage IIA (rpT2, pN0(f), cM0, G3, ER+, CO-, HER2-)     7/6/2021 - 4/12/2022 Chemotherapy    Treatment Summary   Plan Name: OP PALBOCICLIB FULVESTRANT Q4W  Treatment Goal: Palliative  Status: Inactive  Start Date: 7/6/2021  End Date: 4/12/2022  Provider: TALISHA Vick MD  Chemotherapy: fulvestrant (FASLODEX) injection 500 mg, 500 mg, Intramuscular, Clinic/HOD 1 time, 11 of 12 cycles  Administration: 500 mg (7/6/2021), 500 mg (8/3/2021), 500 mg (7/20/2021), 500 mg (8/31/2021), 500 mg (9/28/2021), 500 mg (10/26/2021), 500 mg (11/23/2021), 500 mg (12/21/2021), 500 mg (1/18/2022), 500 mg (2/15/2022), 500 mg (3/15/2022), 500 mg (4/12/2022)  palbociclib 125 mg Tab, 125 mg, Oral, Daily, 1 of 1 cycle, Start date: --, End date: --     6/2/2022 -  Chemotherapy    Treatment Summary   Plan Name: OP BREAST GEMCITABINE CARBOPLATIN Q3W  Treatment Goal: Control  Status: Active  Start Date: 6/2/2022 (Planned)  End Date: 5/12/2023 (Planned)  Provider: TALISHA Vick MD  Chemotherapy: CARBOplatin (PARAPLATIN) 505 mg in sodium chloride 0.9% 250 mL chemo  infusion, 505 mg (100 % of original dose 504.5 mg), Intravenous, Clinic/HOD 1 time, 0 of 17 cycles  Dose modification:   (original dose 504.5 mg, Cycle 1)  gemcitabine (GEMZAR) 1,425 mg in sodium chloride 0.9% 250 mL chemo infusion, 800 mg/m2 = 1,425 mg (100 % of original dose 800 mg/m2), Intravenous, Once, 0 of 17 cycles  Dose modification: 800 mg/m2 (original dose 800 mg/m2, Cycle 1)     Bone metastases   4/8/2021 Initial Diagnosis    Bone metastases     7/6/2021 - 4/12/2022 Chemotherapy    Treatment Summary   Plan Name: OP PALBOCICLIB FULVESTRANT Q4W  Treatment Goal: Palliative  Status: Inactive  Start Date: 7/6/2021  End Date: 4/12/2022  Provider: TALISHA Vick MD  Chemotherapy: fulvestrant (FASLODEX) injection 500 mg, 500 mg, Intramuscular, Clinic/HOD 1 time, 11 of 12 cycles  Administration: 500 mg (7/6/2021), 500 mg (8/3/2021), 500 mg (7/20/2021), 500 mg (8/31/2021), 500 mg (9/28/2021), 500 mg (10/26/2021), 500 mg (11/23/2021), 500 mg (12/21/2021), 500 mg (1/18/2022), 500 mg (2/15/2022), 500 mg (3/15/2022), 500 mg (4/12/2022)  palbociclib 125 mg Tab, 125 mg, Oral, Daily, 1 of 1 cycle, Start date: --, End date: --     6/2/2022 -  Chemotherapy    Treatment Summary   Plan Name: OP BREAST GEMCITABINE CARBOPLATIN Q3W  Treatment Goal: Control  Status: Active  Start Date: 6/2/2022 (Planned)  End Date: 5/12/2023 (Planned)  Provider: TALISHA Vick MD  Chemotherapy: CARBOplatin (PARAPLATIN) 505 mg in sodium chloride 0.9% 250 mL chemo infusion, 505 mg (100 % of original dose 504.5 mg), Intravenous, Clinic/HOD 1 time, 0 of 17 cycles  Dose modification:   (original dose 504.5 mg, Cycle 1)  gemcitabine (GEMZAR) 1,425 mg in sodium chloride 0.9% 250 mL chemo infusion, 800 mg/m2 = 1,425 mg (100 % of original dose 800 mg/m2), Intravenous, Once, 0 of 17 cycles  Dose modification: 800 mg/m2 (original dose 800 mg/m2, Cycle 1)         Patient Active Problem List   Diagnosis    Depression    Anxiety disorder due to known  physiological condition    Dysthymic disorder    Neuropathy    Atypical chest pain    Breast cancer, right    Breast cancer    Nipple anomaly    Type 2 diabetes mellitus with hyperglycemia, with long-term current use of insulin    Mixed hyperlipidemia    Chemotherapy-induced nausea    Keloid of skin    Breast cancer, left breast    Breast cancer, right breast    Chemotherapy-induced neutropenia    Pneumonia due to COVID-19 virus    Chronic hypoxemic respiratory failure    Pleuritic chest pain    Tachycardia    Chronic obstructive pulmonary disease    Essential hypertension    Bone metastases       Past Medical History:   Diagnosis Date    Acute hypoxemic respiratory failure 12/12/2020    Breast cancer     left breast    Cancer     RIGHT BREAST 10-15    Depression     Diabetes mellitus     Neuropathy     Panic attacks     S/P epidural steroid injection     Seizures     none since early 30's    Wears glasses     TO DRIVE       Past Surgical History:   Procedure Laterality Date    AXILLARY NODE DISSECTION Left 8/24/2020    Procedure: LYMPHADENECTOMY, AXILLARY;  Surgeon: Cory Vick MD;  Location: [facility] OR;  Service: General;  Laterality: Left;  left breast mastectomy with possible axillary lymph node dissection    BREAST BIOPSY      right    BREAST LUMPECTOMY      BREAST RECONSTRUCTION      breast reconstruction with tummy tu      BREAST SURGERY      11-3-15 LUMPECTOMY, 12-2-15 REEXCISION    INCISION AND DRAINAGE OF ABSCESS Left 9/9/2020    Procedure: INCISION AND DRAINAGE, ABSCESS;  Surgeon: Cory Vick MD;  Location: [facility] OR;  Service: General;  Laterality: Left;    INSERTION OF LOCALIZATION WIRE Left 8/24/2020    Procedure: INSERTION, LOCALIZATION WIRE;  Surgeon: Cory Vick MD;  Location: [facility] OR;  Service: General;  Laterality: Left;  left breast lumpectomy with wire needle loc    MASTECTOMY      mastectomy 2016      MASTECTOMY, PARTIAL Left  3/19/2021    Procedure: MASTECTOMY, PARTIAL;  Surgeon: Cory Vick MD;  Location[facility] OR;  Service: General;  Laterality: Left;  lumpectomy  left breast     RECONSTRUCTION OF NIPPLE Right 2018    Procedure: RECONSTRUCTION-NIPPLE RIGHT;  Surgeon: Xiang Hernandez MD;  [facility];  Service: Plastics;  Laterality: Right;    SENTINEL LYMPH NODE BIOPSY Left 2020    Procedure: BIOPSY, LYMPH NODE, SENTINEL;  Surgeon: Cory Vick MD;  Location: [facility] OR;  Service: General;  Laterality: Left;  left breast lumpectomy with left sentinel lymoh node       shoulder surgery and wrist      BILAT  BONE SPUR    WRIST SURGERY      RIGHT       Social History     Socioeconomic History    Marital status:    Tobacco Use    Smoking status: Former Smoker     Packs/day: 0.25     Years: 30.00     Pack years: 7.50     Quit date: 2015     Years since quittin.6    Smokeless tobacco: Never Used   Substance and Sexual Activity    Alcohol use: Yes     Alcohol/week: 0.0 standard drinks     Comment: RARELY    Drug use: Not Currently     Types: Marijuana     Comment: medical marijuana       Family History   Problem Relation Age of Onset    Anesthesia problems Neg Hx          Current Outpatient Medications:     anastrozole (ARIMIDEX) 1 mg Tab, Take 1 mg by mouth once daily., Disp: , Rfl:     blood sugar diagnostic Strp, TEST GLUCOSE BID, Disp: 300 each, Rfl: 3    blood-glucose meter (TRUE METRIX GLUCOSE METER) kit, TEST GLUCOSE BID, Disp: 1 each, Rfl: 0    blood-glucose meter,continuous (DEXCOM G6 ) Misc, TEST GLUCOSE QID, Disp: 1 each, Rfl: 1    blood-glucose sensor (DEXCOM G6 SENSOR) Edwige, TEST GLUCOSE QID, Disp: 13 each, Rfl: 3    blood-glucose transmitter (DEXCOM G6 TRANSMITTER) Edwige, TEST GLUCOSE QID, Disp: 1 each, Rfl: 1    carisoprodoL (SOMA) 350 MG tablet, Take 1 tablet (350 mg total) by mouth 3 (three) times daily as needed for Muscle spasms., Disp: 90  tablet, Rfl: 0    dextromethorphan-guaiFENesin  mg/5 ml (ROBITUSSIN-DM)  mg/5 mL liquid, Take 1 teaspoon q 6 hours prn cough., Disp: 300 mL, Rfl: 0    diazePAM (VALIUM) 10 MG Tab, Take 1 tablet (10 mg total) by mouth 4 (four) times daily as needed (anxiety)., Disp: 120 tablet, Rfl: 3    docusate sodium (COLACE) 100 MG capsule, Take 100 mg by mouth 2 (two) times daily., Disp: , Rfl:     dronabinoL (MARINOL) 5 MG capsule, Take 1 capsule (5 mg total) by mouth 2 (two) times daily before meals., Disp: 60 capsule, Rfl: 0    dulaglutide (TRULICITY) 3 mg/0.5 mL pen injector, Inject 3 mg into the skin every 7 days., Disp: 4 pen, Rfl: 11    enalapril (VASOTEC) 5 MG tablet, Take 1 tablet (5 mg total) by mouth once daily., Disp: 90 tablet, Rfl: 3    fluconazole (DIFLUCAN) 150 MG Tab, TAKE ONE TABLET BY MOUTH ONCE FOR 1 DOSE, Disp: 1 tablet, Rfl: 2    furosemide (LASIX) 20 MG tablet, TAKE ONE TABLET BY MOUTH EVERY DAY, Disp: 30 tablet, Rfl: 6    HYDROcodone-acetaminophen (NORCO)  mg per tablet, Take 1 tablet by mouth every 6 (six) hours as needed for Pain., Disp: 120 tablet, Rfl: 0    insulin aspart, niacinamide, (FIASP FLEXTOUCH U-100 INSULIN) 100 unit/mL (3 mL) InPn, Inject 12 Units into the skin 2 (two) times daily with meals., Disp: 3 pen, Rfl: 9    insulin glargine (LANTUS U-100 INSULIN) 100 unit/mL injection, Inject 32 Units into the skin 2 (two) times a day., Disp: 18 mL, Rfl: 11    lancets 32 gauge Misc, TEST GLUCOSE BID, Disp: 300 each, Rfl: 3    metFORMIN (GLUCOPHAGE-XR) 500 MG ER 24hr tablet, Take 2 tablets (1,000 mg total) by mouth 2 (two) times daily with meals., Disp: 360 tablet, Rfl: 3    metoprolol tartrate (LOPRESSOR) 25 MG tablet, Take 1 tablet (25 mg total) by mouth 2 (two) times daily., Disp: 60 tablet, Rfl: 11    mometasone 0.1% (ELOCON) 0.1 % cream, Apply to affected area daily, Disp: 45 g, Rfl: 1    naloxegoL (MOVANTIK) 12.5 mg Tab, Take 12.5 mg by mouth once daily., Disp:  "30 tablet, Rfl: 3    ondansetron (ZOFRAN-ODT) 8 MG TbDL, DISSOLVE 1 TABLET (8MG TOTAL) on the tongue EVERY 6 TO 8 HOURS AS NEEDED (NAUSEA), Disp: 30 tablet, Rfl: 1    oxyCODONE (ROXICODONE) 15 MG Tab, Take 1 tablet (15 mg total) by mouth every 4 to 6 hours as needed for Pain., Disp: 120 tablet, Rfl: 0    palbociclib 100 mg Tab, Take 100 mg by mouth once daily. for 21 days, then take 7 days off. Total cycle length 28 days, Disp: 21 tablet, Rfl: 6    pen needle, diabetic (BD ULTRA-FINE MARCE PEN NEEDLE) 32 gauge x 5/32" Ndle, Test blood sugar twice daily, Disp: 100 each, Rfl: 5    promethazine (PHENERGAN) 25 MG tablet, Take 1 tablet (25 mg total) by mouth every 4 to 6 hours as needed., Disp: 30 tablet, Rfl: 5    promethazine-codeine 6.25-10 mg/5 ml (PHENERGAN WITH CODEINE) 6.25-10 mg/5 mL syrup, TAKE 5 ML BY MOUTH EVERY 6 HOURS AS NEEDED FOR COUGH, Disp: 450 mL, Rfl: 0    psyllium husk, with sugar, (METAMUCIL, WITH SUGAR,) 3.4 gram packet, Take 1 packet by mouth 2 (two) times daily., Disp: 30 packet, Rfl: 2    rosuvastatin (CRESTOR) 40 MG Tab, Take 1 tablet (40 mg total) by mouth every evening. For cholesterol, Disp: 90 tablet, Rfl: 3    zolpidem (AMBIEN) 10 mg Tab, TAKE ONE TABLET BY MOUTH EVERY NIGHT AT BEDTIME, Disp: 30 tablet, Rfl: 3    etodolac (LODINE) 400 MG tablet, Take 1 tablet (400 mg total) by mouth once daily., Disp: 30 tablet, Rfl: 2    Review of patient's allergies indicates:   Allergen Reactions    Adhesive tape-silicones Hives     BANDAIDS    Polymycin Hives    Adhesive     Latex, natural rubber Hives and Itching    Neomycin-bacitracnzn-polymyxnb     Polyurethane-39 Hives       Physcial Examination  VITAL SIGNS:    Body surface area is 1.86 meters squared.   Pain Assessment  Vitals:    06/01/22 0923   BP: (!) 151/91   Pulse: 105   Resp: 18   Temp: 98.2 °F (36.8 °C)   Weight: 68.9 kg (152 lb)   Height: 5' 11" (1.803 m)   PainSc: 10-Worst pain ever   PainLoc: Back     Quality:aching, " radiating and constant  Onset: gradual  Duration: more than 1 year  What relieves the pain? pain medication  What cause or increases the pain? Sitting, Standing, Laying, Bending and Touching  Plan: Patient is to continue with current pain medications.     Wt Readings from Last 5 Encounters:   06/01/22 68.9 kg (152 lb)   05/31/22 69.4 kg (153 lb)   05/30/22 68.9 kg (152 lb)   05/04/22 63.5 kg (140 lb)   04/12/22 71.2 kg (157 lb)       GENERAL:  Negrita Castro is healthy-appearing 61 y.o. female, in no distress.   EYES:   Pupils equal, round, reactive.  Conjunctivae, sclera and lids normal.  HEENT: Head normocephalic and atraumatic, without alopecia.  Oropharynx is unremarkable.  No icterus, jaundice, stomatitis, mucositis, or ulceration is noted.  Ears are clear and unremarkable.  Nose, nares, and septum are unremarkable.    NECK:   No masses.  Thyroid and trachea are normal.    BREASTS:  Deferred.  RESPIRATORY: Clear to auscultation bilaterally.  Symmetrically effortless expansion.  No wheezing and no stridor.    CV: Heart reveals regular rate and rhythm without murmur, rub, or gallops.  ABDOMEN: Soft, non-tender.  No masses, no hernias, and no rebound or rigidity are noted.  /RECTAL:  Deferred.  LYMPHATICS: No preauricular, submandibular, cervical, supraclavicular, axillary, lymphadenopathy.  MUSCULOSKELETAL:Fair musculature, no atrophy.  No arthritic changes.  No edema or cyanosis. Back is without gross abnormal curvature.   NEUROLOGICAL: Cranial nerves II-XII grossly intact.  Motor and sensory exam intact.  SKIN:   No lesions, bruises, petechiae or rashes.  Good turgor.    PSYCHIATRIC: Patient is alert and oriented to time, place and person.  Mood and affect are appropriate.         Laboratory and Radiology   Lab Results   Component Value Date    WBC 3.66 (L) 06/01/2022    RBC 3.66 (L) 06/01/2022    HGB 11.7 (L) 06/01/2022    HCT 35.9 (L) 06/01/2022    MCV 98 06/01/2022    MCH 32.0 (H) 06/01/2022    MCHC 32.6  06/01/2022    RDW 14.7 (H) 06/01/2022     06/01/2022    MPV 8.9 (L) 06/01/2022    GRAN 2.5 06/01/2022    GRAN 67.8 06/01/2022    LYMPH 0.7 (L) 06/01/2022    LYMPH 20.2 06/01/2022    MONO 0.4 06/01/2022    MONO 10.4 06/01/2022    EOS 0.0 06/01/2022    BASO 0.01 06/01/2022    EOSINOPHIL 0.5 06/01/2022    BASOPHIL 0.3 06/01/2022     BMP  Lab Results   Component Value Date     06/01/2022    K 3.6 06/01/2022     06/01/2022    CO2 24 06/01/2022    BUN 6 06/01/2022    CREATININE 0.6 06/01/2022    CALCIUM 9.4 06/01/2022    ANIONGAP 12 06/01/2022    ESTGFRAFRICA >60 06/01/2022    EGFRNONAA >60 06/01/2022     Lab Results   Component Value Date    ALT 20 06/01/2022    AST 23 06/01/2022    ALKPHOS 375 (H) 06/01/2022    BILITOT 0.5 06/01/2022     No results found. However, due to the size of the patient record, not all encounters were searched. Please check Results Review for a complete set of results.  No results found. However, due to the size of the patient record, not all encounters were searched. Please check Results Review for a complete set of results.  Results for orders placed or performed during the hospital encounter of 04/23/22 (from the past 2160 hour(s))   MRI Cervical Spine W WO Cont    Impression    In this patient with known extensive bony metastatic disease with lesions involving multiple cervical and superior contiguous thoracic vertebral bodies.  The appearance of this and the severe central canal narrowing is unchanged from what was seen on MR from 04/15/2022.  No abnormal signal is identified within the cord.      Electronically signed by: Laura Jorgensen MD  Date:    04/23/2022  Time:    12:22   Results for orders placed or performed during the hospital encounter of 04/15/22 (from the past 2160 hour(s))   MRI Thoracic Spine W WO Contrast    Impression    Extensive metastatic disease with confluent infiltration at the T1 through T7 levels with multilevel spinal canal narrowing and spinal  cord compression.  Appearance is essentially identical to the prior exam.      Electronically signed by: Pat Rutherford MD  Date:    04/15/2022  Time:    20:50   Results for orders placed or performed during the hospital encounter of 04/06/22 (from the past 2160 hour(s))   MRI Thoracic Spine Without Contrast    Impression    1. Significant interval detrimental change as compared to the prior study on 11/16/2021, with progressive marrow replacement throughout the visualized spine consistent with osseous metastatic disease, most extensively at T1-T7.  Extraosseous extension at T2-T5 with associated severe spinal canal stenosis and deformity of the spinal cord.  Questionable increased cord signal at these levels, possibly reflecting cord edema or myelomalacia.  2. Worsening pathologic compression fractures at T3, T5, and T6 with new pathologic compression fractures at T1, T2, T4, and T7.  3. New 1.3 cm superficial soft tissue lesion at the level of T3-4, possibly representing a superficial inflammatory/infectious process, epidermal inclusion cyst, or possible metastasis.  This report was flagged in Epic as abnormal.      Electronically signed by: Brandon Dias  Date:    04/07/2022  Time:    08:00     *Note: Due to a large number of results and/or encounters for the requested time period, some results have not been displayed. A complete set of results can be found in Results Review.       Pathology  Pathology Results  (Last 10 years)               03/19/21 0993  Specimen to Pathology, Surgery General Surgery Edited Result - FINAL    Narrative:  Pre-op Diagnosis: Malignant neoplasm of left female breast,   unspecified estrogen receptor status, unspecified site of   breast [C50.912]   Procedure(s):   LUMPECTOMY, BREAST   Number of specimens: 1   Name of specimens: 1. LEFT BREAST RE-EXCISION, LONG   STITCH=LATERAL MARGIN, SHORT STITCH=SUPERIOR MARGIN   Specimen total (fresh, frozen, permanent):->1       02/23/21 3467   Specimen to Pathology, Radiology Breast, needle biopsy Edited Result - FINAL    Narrative:  Left breast 4:00 8cfn       09/09/20 1032  Specimen to Pathology, Surgery General Surgery Final result    Narrative:  Pre-op Diagnosis: Malignant neoplasm of right female breast,   unspecified estrogen receptor status, unspecified site of   breast [C50.911]   Procedure(s):   LUMPECTOMY, BREAST   Number of specimens: 1   Name of specimens: LEFT BREAST ANTERIOR MARGIN  ( STITCH AT   NEW ANTERIOR MARGIN)   Specimen total (fresh, frozen, permanent):->1       08/24/20 1259  Specimen to Pathology, Surgery General Surgery Edited Result - FINAL    Narrative:  Pre-op Diagnosis: Malignant neoplasm of left female breast,   unspecified estrogen receptor status, unspecified site of   breast [C50.912]   Procedure(s):   LUMPECTOMY, BREAST   INSERTION, LOCALIZATION WIRE   BIOPSY, LYMPH NODE, SENTINEL   LYMPHADENECTOMY, AXILLARY   Number of specimens: 2   Name of specimens:   1- left axillary sentinel node   2- left breast partial mastectomy with wire (short stitch   superior, long stitch lateral, double stitch deep)   Specimen total (fresh, frozen, permanent):->2       08/05/20 1436  Specimen to Pathology, Radiology Breast, needle biopsy Final result    Narrative:  Left breast biopsy   4:00   6cm fn   Dr arthur   Fig 8 clip       10/22/15 0900  Tissue Specimen to Pathology Edited Result - FINAL          TITLE: PLAN OF CARE FOR THE CHEMOTHERAPY PATIENT / TEACHING PROTOCOL    PURPOSE: To involve the patient / significant other in the plan of care and to provide teaching to the significant other & patient receiving chemotherapy.    LEVEL: Independent.    CONTENT: The Plan of Care for the chemotherapy patient is individualized and appropriate to the patients needs, strengths, limitations, & goals.  Education includes information regarding chemotherapy side effects, the treatment itself, and self-care  Activities.    GOAL / OUTCOME  STANDARDS    PHYSIOLOGIC: The client will remain free or experience minimal side effects or toxicities throughout the chemotherapy treatment period.     PSYCHOLOGIC: The client/significant others will demonstrate positive coping mechanisms in relation to chemotherapy and its side effects.      COGINITIVE: The client/significant others will verbalize understanding of self-care measure to avoid/minimize side effects of the chemotherapy regimen.    EVALUATION / COMMENT KEY:    V = Audiovisual/Video  S = Successfully meets outcome  N = Needs further instruction  NA = Not applicable to the patient  P = Previous knowledge  U = Unable to comprehend  * = See progress notes          PLAN OF CARE  INFORMATION TO BE DELIVERED / NURSING INTERVENTIONS DATE EVALUATION   1. Assessment of client/caregiver,          knowledge of cancer diagnosis,          and chemotherapy as a treatment.  1a. Evaluate patient/caregiver learning ability     b. Plan teaching sessions with patient/caregiver according to needs and present anxiety level/ability to learn.     c. Provide Chemotherapy Education Packet,         Mouth Care Protocol,          Specific Patient Education Sheets. 06/01/2022  S   2. Individual chemotherapy treatment          plan.  2a. Review of Chemotherapy Education handout from [employer]              06/01/2022     S   3. Knowledge Deficit & Self-Management of general side effects common to all chemotherapy:  a. Nausea/Vomiting   b.   Diarrhea  c. Mouth Care  d. Dental care  e. Constipation  f. Hair Loss  g. Potential for infection  h. Fatigue   3a. Reinforce that the majority of side effects from chemotherapy are reversible and are  controlled both in the hospital and at home        (blood counts recover, hair grows back).   b.  Refer to the following for reinforcement of         information post-treatment:  1. Mouth Care Protocol.  2. Bowel Protocol for constipation or diarrhea.  3.  Drug Specific Chemotherapy Information  Sheets for each medication patient receiving.    06/01/2022     S     PLAN OF CARE  INFORMATION TO BE DELIVERED / NURSING INTERVENTIONS DATE EVALUATION   h. Potential for bleeding         i. Potential anemia/fatigue         j. Potential sunburn         k. Birth control measures  l. Safety measures post treatment 4.  Chemotherapy Home Care Instruction  and Safety Information Sheet.  A. patient/caregivers to thoroughly cook shellfish (shrimp, crab, etc) to decrease the chance of infection.    B.  Use sunscreen and protective clothing while in the sun.   06/01/2022      4. Knowledge deficit & Self Management of Drug Specific  Side Effects.    a. BLADDER EFFECTS         (Hemorrhagic Cystitis)                     Preventable with adequate hydration; occurs 2-3 days or more post treatment.     1.  Instruct patient to:   a.   Void at least every 2 hours; increase intake.   b.   DO NOT hold urine; go when urge is felt.   c.    Empty bladder at bedtime and on          awakening.   d.   Observe for color changes (red to tea            colored), amount and frequency changes.   e.   Notify oncologist of any abnormalities           in urine or voiding or if you cannot               drink adequate fluids.    06/01/2022     S    b.   CHANGES IN URINE        COLOR:         1.   Instruct patient:   a.   Most evident in first 2-3 voidings after            administration.  b. Lasts less than 24 hours.  c. If urine is discolored 2 or more days post- treatment, notify oncologist.      06/01/2022 S   c.    KIDNEY EFFECTS           (Nephrotoxicity)   1.  Instruct patient to:  a.   Drink 8-16 glasses of fluid/day the day   pre-treatment and 3-4 days post-treatment to maintain hydration; the best way to minimize kidney problems.  b.   Notify oncologist immediately if unable to drink fluids or if changes are noted in urinary elimination.      06/01/2022     S   a. PULMONARY TOXICITY    1. Instruct patient to report symptoms such as shortness  of breath, chest pain, shallow breathing, or chest wall discomfort to physician.  2. Reinforce preventative measures used by the health care team.  a. Baseline and periodic PFT and chest x-ray.   06/01/2022   S     PLAN OF CARE INFORMATION TO BE DELIVERED / NURSING INTERVENTIONS DATE EVALUATION   b. NERVE & MUSCLE EFFECTS (neurotoxocity; neuropathy, possible visual/hearing changes)        3. Instruct patient to:    a. Report numbness or tingling of the hands/feet, loss of fine motor movement (buttoning shirt, tying shoelaces), or gait changes to your oncologist.  b. If numbness/tingling are present:   protect feet with shoes at all times.   Use gloves for washing dishes/gardening & potholders in kitchen.       06/01/2022   S   c. CARDIOTOXICITY  Decreased effectiveness of             cardiac function. Effective are                  cumulative and irreversible.                                    CARDIAC ARRYTHMIAS              4   Instruct:  a. Heart function may be tested before treatment and perdiocally during treatment.  b. Notify oncologist of irregular pulse, palpitations, shortness of breath, or swelling in lower extremities/feet.          Can cause arrhythmias on infusion that resolve once infusion discontinued. Instruct nurse if any irregularity felt.    06/01/2022   S   d. EXTRAVASTION  Occurs when vesicants leak outside of vein and cause damage to the skin and underlying tissues.   1. Reinforce preventive measures used to avoid complications.  a. Fresh IV site or central line monitored continuously with vesicant IVP.  b. Continuous infusion via central line site and blood return monitored periodically around the clock.  2. Instruct to:  a. Notify nurse of any discomfort, burning, stinging, etc. at IV site during chemotherapy administration.  b. Notify oncologist of any redness, pain, or swelling at IV site after discharge from hospital.   06/01/2022   S   e. HYPERSENSITIVITY can happen with any  medication.   1. Instruct patient:  a. Nurse is with them during the initial part of treatment and will be close by to monitor.  b. Pre-medication ordered by the oncologist must be taken on time. If doses are missed, treatment will need to be re-scheduled.  c. Skin redness, itching, or hives appearing after discharge should be reported to oncologist. 06/01/2022   S       PLAN OF CARE INFORMATION TO BE DELIVERED / NURSING INTERVENTIONS DATE EVALUATION   f. FLU-LIKE SYNDROME      1. Instruct patient symptoms are hard to prevent and may include fever, shaking chills, muscle and body aches.  a. Taking prescribed medications from physician if needed.  b. Adequate fluids are important.    2. Reinforce the need to call if temperature is         elevated to 100.4 or more  06/01/2022   S   g. HAND-FOOT SYNDROME  causes painful, symmetric swelling and redness of palms and soles                  5. Instruct patient to report any numbness or tingling in the hands or feet.  6. Explain prevention techniques, such as     a. Use heavy moisturizers to lessen skin dryness and itching, but to avoid if skin is cracked or broken  b. Bathe in tepid water, use non-perfumed soap, and wash gently. Baths with oatmeal or diluted baking soda may be soothing.  c. Avoid tight fitting shoes and repetitive actions, such as rubbing hands or applying pressure to hands/feet.  7. Review measures to take should syndrome occur:  a. Cold compresses and elevation for          edema  b. Pain medications and other measures as ordered by oncologist.   4.   Syndrome resolves few weeks after therapy. 06/01/2022   S   5. DISCHARGE PLANNING /        EDUCATION 1.    Explain importance of compliance with follow- up  tests (CBC, CMP).  2.    Verify patient/caregiver know:  a.    Oncologists office phone number.  b.    Dates of follow-up appointments.  c.    Prescriptions given for nausea  3.   Review side effects to monitor and notify          oncologist  about.  4.   Reinforce the need for patient and caregivers to:  a.    Review information given.  b.    Call oncologists office with questions          or symptoms  5.   Provide [facility] [place] Brochure make referrals if needed for financial or .   06/01/2022   S     PROGRESS NOTES: I met with the patient and her family today for chemotherapy education. she will be starting treatment with Carboplatin and Gemzar. We discussed the mechanism of action, potential side effects of this treatment as well as ways she can manage them at home. Some of these side effects include but or not limited to fever, nausea, vomiting, decreased appetite, fatigue, weakness, cytopenias, myalgia/arthralgia, constipation, diarrhea, bleeding, headache, shortness of breath, nail changes, taste change, hair thinning/loss, mood disturbances, or edema. We also discussed dietary modifications she should make although this will be discussed in more detail with the dietician. she was provided with anti-emetic medication, a copy of all of the information we discussed today as well as our contact information. she will be provided a schedule on his first day of treatment. We will obtain labs on a weekly basis and the patient will follow-up with the physician for toxicity monitoring throughout treatment. All questions were answered and an informed consent was obtained. she was reminded to certainly contact us sooner if needed.  Attached to the patients folder and discussed with the patient the 24 hour/ 7 days a week after hours telephone number for the physician.  Patient notified to call anytime 24/7 because their is a physician on call for any problems that may arise.  Patient also notified to report to [facility] if they can not get in touch with a physician after hours.  Discussed the five wishes booklet with the patient and their family.           Assessment/Plan     ICD-10-CM ICD-9-CM   1. Malignant neoplasm of  upper-outer quadrant of right breast in female, estrogen receptor negative  C50.411 174.4    Z17.1 V86.1   2. Malignant neoplasm of nipple of right breast in female, estrogen receptor positive  C50.011 174.0    Z17.0 V86.0   3. Alopecia  L65.9 704.00          Medications Ordered:  Zofran 8mg 1 tab PO Q8h prn nausea  Phenergan 25mg PO Q4-6h prn nausea  Claritin 10mg PO QD day of chemotherapy and for 5 days after Neulasta to help prevent bone pain        Standing Labs Ordered:  CBC weekly  CMP weekly  Mag weekly    Follow up in about 1 day (around 6/2/2022) for with Dr. Peraza.    Total Face to Face Time: 60 minutes face to face with the patient and their family discussing the chemotherapy side-effects and when to call our office.   Electronically signed by: Tabatha Denise, MSN, APRN, AGNP-C, OCN

## 2022-06-01 ENCOUNTER — OFFICE VISIT (OUTPATIENT)
Dept: HEMATOLOGY/ONCOLOGY | Facility: CLINIC | Age: 62
End: 2022-06-01
Payer: MEDICARE

## 2022-06-01 ENCOUNTER — LAB VISIT (OUTPATIENT)
Dept: LAB | Facility: HOSPITAL | Age: 62
End: 2022-06-01
Attending: NURSE PRACTITIONER
Payer: MEDICARE

## 2022-06-01 VITALS
DIASTOLIC BLOOD PRESSURE: 91 MMHG | BODY MASS INDEX: 21.28 KG/M2 | RESPIRATION RATE: 18 BRPM | HEIGHT: 71 IN | TEMPERATURE: 98 F | SYSTOLIC BLOOD PRESSURE: 151 MMHG | WEIGHT: 152 LBS | HEART RATE: 105 BPM

## 2022-06-01 DIAGNOSIS — C50.011 MALIGNANT NEOPLASM OF NIPPLE OF RIGHT BREAST IN FEMALE, ESTROGEN RECEPTOR POSITIVE: ICD-10-CM

## 2022-06-01 DIAGNOSIS — C50.411 MALIGNANT NEOPLASM OF UPPER-OUTER QUADRANT OF RIGHT BREAST IN FEMALE, ESTROGEN RECEPTOR NEGATIVE: ICD-10-CM

## 2022-06-01 DIAGNOSIS — Z17.1 MALIGNANT NEOPLASM OF UPPER-OUTER QUADRANT OF RIGHT BREAST IN FEMALE, ESTROGEN RECEPTOR NEGATIVE: Primary | ICD-10-CM

## 2022-06-01 DIAGNOSIS — C50.411 MALIGNANT NEOPLASM OF UPPER-OUTER QUADRANT OF RIGHT BREAST IN FEMALE, ESTROGEN RECEPTOR NEGATIVE: Primary | ICD-10-CM

## 2022-06-01 DIAGNOSIS — Z17.0 MALIGNANT NEOPLASM OF NIPPLE OF RIGHT BREAST IN FEMALE, ESTROGEN RECEPTOR POSITIVE: ICD-10-CM

## 2022-06-01 DIAGNOSIS — Z17.1 MALIGNANT NEOPLASM OF UPPER-OUTER QUADRANT OF RIGHT BREAST IN FEMALE, ESTROGEN RECEPTOR NEGATIVE: ICD-10-CM

## 2022-06-01 DIAGNOSIS — L65.9 ALOPECIA: ICD-10-CM

## 2022-06-01 LAB
ALBUMIN SERPL BCP-MCNC: 3 G/DL (ref 3.5–5.2)
ALP SERPL-CCNC: 375 U/L (ref 55–135)
ALT SERPL W/O P-5'-P-CCNC: 20 U/L (ref 10–44)
ANION GAP SERPL CALC-SCNC: 12 MMOL/L (ref 8–16)
AST SERPL-CCNC: 23 U/L (ref 10–40)
BASOPHILS # BLD AUTO: 0.01 K/UL (ref 0–0.2)
BASOPHILS NFR BLD: 0.3 % (ref 0–1.9)
BILIRUB SERPL-MCNC: 0.5 MG/DL (ref 0.1–1)
BUN SERPL-MCNC: 6 MG/DL (ref 6–20)
CALCIUM SERPL-MCNC: 9.4 MG/DL (ref 8.7–10.5)
CHLORIDE SERPL-SCNC: 105 MMOL/L (ref 95–110)
CO2 SERPL-SCNC: 24 MMOL/L (ref 23–29)
CREAT SERPL-MCNC: 0.6 MG/DL (ref 0.5–1.4)
DIFFERENTIAL METHOD: ABNORMAL
EOSINOPHIL # BLD AUTO: 0 K/UL (ref 0–0.5)
EOSINOPHIL NFR BLD: 0.5 % (ref 0–8)
ERYTHROCYTE [DISTWIDTH] IN BLOOD BY AUTOMATED COUNT: 14.7 % (ref 11.5–14.5)
EST. GFR  (AFRICAN AMERICAN): >60 ML/MIN/1.73 M^2
EST. GFR  (NON AFRICAN AMERICAN): >60 ML/MIN/1.73 M^2
GLUCOSE SERPL-MCNC: 184 MG/DL (ref 70–110)
HCT VFR BLD AUTO: 35.9 % (ref 37–48.5)
HGB BLD-MCNC: 11.7 G/DL (ref 12–16)
IMM GRANULOCYTES # BLD AUTO: 0.03 K/UL (ref 0–0.04)
IMM GRANULOCYTES NFR BLD AUTO: 0.8 % (ref 0–0.5)
LYMPHOCYTES # BLD AUTO: 0.7 K/UL (ref 1–4.8)
LYMPHOCYTES NFR BLD: 20.2 % (ref 18–48)
MAGNESIUM SERPL-MCNC: 1.4 MG/DL (ref 1.6–2.6)
MCH RBC QN AUTO: 32 PG (ref 27–31)
MCHC RBC AUTO-ENTMCNC: 32.6 G/DL (ref 32–36)
MCV RBC AUTO: 98 FL (ref 82–98)
MONOCYTES # BLD AUTO: 0.4 K/UL (ref 0.3–1)
MONOCYTES NFR BLD: 10.4 % (ref 4–15)
NEUTROPHILS # BLD AUTO: 2.5 K/UL (ref 1.8–7.7)
NEUTROPHILS NFR BLD: 67.8 % (ref 38–73)
NRBC BLD-RTO: 0 /100 WBC
PLATELET # BLD AUTO: 290 K/UL (ref 150–450)
PMV BLD AUTO: 8.9 FL (ref 9.2–12.9)
POTASSIUM SERPL-SCNC: 3.6 MMOL/L (ref 3.5–5.1)
PROT SERPL-MCNC: 6.5 G/DL (ref 6–8.4)
RBC # BLD AUTO: 3.66 M/UL (ref 4–5.4)
SARS-COV-2 RNA RESP QL NAA+PROBE: NOT DETECTED
SARS-COV-2- CYCLE NUMBER: NORMAL
SODIUM SERPL-SCNC: 141 MMOL/L (ref 136–145)
WBC # BLD AUTO: 3.66 K/UL (ref 3.9–12.7)

## 2022-06-01 PROCEDURE — 85025 COMPLETE CBC W/AUTO DIFF WBC: CPT | Performed by: NURSE PRACTITIONER

## 2022-06-01 PROCEDURE — 1160F RVW MEDS BY RX/DR IN RCRD: CPT | Mod: CPTII,S$GLB,, | Performed by: NURSE PRACTITIONER

## 2022-06-01 PROCEDURE — 3051F PR MOST RECENT HEMOGLOBIN A1C LEVEL 7.0 - < 8.0%: ICD-10-PCS | Mod: CPTII,S$GLB,, | Performed by: NURSE PRACTITIONER

## 2022-06-01 PROCEDURE — 3008F BODY MASS INDEX DOCD: CPT | Mod: CPTII,S$GLB,, | Performed by: NURSE PRACTITIONER

## 2022-06-01 PROCEDURE — 3080F DIAST BP >= 90 MM HG: CPT | Mod: CPTII,S$GLB,, | Performed by: NURSE PRACTITIONER

## 2022-06-01 PROCEDURE — 83735 ASSAY OF MAGNESIUM: CPT | Performed by: NURSE PRACTITIONER

## 2022-06-01 PROCEDURE — 3051F HG A1C>EQUAL 7.0%<8.0%: CPT | Mod: CPTII,S$GLB,, | Performed by: NURSE PRACTITIONER

## 2022-06-01 PROCEDURE — 3008F PR BODY MASS INDEX (BMI) DOCUMENTED: ICD-10-PCS | Mod: CPTII,S$GLB,, | Performed by: NURSE PRACTITIONER

## 2022-06-01 PROCEDURE — 4010F ACE/ARB THERAPY RXD/TAKEN: CPT | Mod: CPTII,S$GLB,, | Performed by: NURSE PRACTITIONER

## 2022-06-01 PROCEDURE — 99215 OFFICE O/P EST HI 40 MIN: CPT | Mod: S$GLB,,, | Performed by: NURSE PRACTITIONER

## 2022-06-01 PROCEDURE — 1160F PR REVIEW ALL MEDS BY PRESCRIBER/CLIN PHARMACIST DOCUMENTED: ICD-10-PCS | Mod: CPTII,S$GLB,, | Performed by: NURSE PRACTITIONER

## 2022-06-01 PROCEDURE — 3080F PR MOST RECENT DIASTOLIC BLOOD PRESSURE >= 90 MM HG: ICD-10-PCS | Mod: CPTII,S$GLB,, | Performed by: NURSE PRACTITIONER

## 2022-06-01 PROCEDURE — 4010F PR ACE/ARB THEARPY RXD/TAKEN: ICD-10-PCS | Mod: CPTII,S$GLB,, | Performed by: NURSE PRACTITIONER

## 2022-06-01 PROCEDURE — 1159F MED LIST DOCD IN RCRD: CPT | Mod: CPTII,S$GLB,, | Performed by: NURSE PRACTITIONER

## 2022-06-01 PROCEDURE — 99215 PR OFFICE/OUTPT VISIT, EST, LEVL V, 40-54 MIN: ICD-10-PCS | Mod: S$GLB,,, | Performed by: NURSE PRACTITIONER

## 2022-06-01 PROCEDURE — 36415 COLL VENOUS BLD VENIPUNCTURE: CPT | Performed by: NURSE PRACTITIONER

## 2022-06-01 PROCEDURE — 3077F PR MOST RECENT SYSTOLIC BLOOD PRESSURE >= 140 MM HG: ICD-10-PCS | Mod: CPTII,S$GLB,, | Performed by: NURSE PRACTITIONER

## 2022-06-01 PROCEDURE — 80053 COMPREHEN METABOLIC PANEL: CPT | Performed by: NURSE PRACTITIONER

## 2022-06-01 PROCEDURE — 3077F SYST BP >= 140 MM HG: CPT | Mod: CPTII,S$GLB,, | Performed by: NURSE PRACTITIONER

## 2022-06-01 PROCEDURE — 1159F PR MEDICATION LIST DOCUMENTED IN MEDICAL RECORD: ICD-10-PCS | Mod: CPTII,S$GLB,, | Performed by: NURSE PRACTITIONER

## 2022-06-01 RX ORDER — PROMETHAZINE HYDROCHLORIDE 25 MG/1
25 TABLET ORAL
Qty: 30 TABLET | Refills: 5 | Status: SHIPPED | OUTPATIENT
Start: 2022-06-01 | End: 2022-07-26 | Stop reason: SDUPTHER

## 2022-06-02 ENCOUNTER — TELEPHONE (OUTPATIENT)
Dept: HEMATOLOGY/ONCOLOGY | Facility: CLINIC | Age: 62
End: 2022-06-02

## 2022-06-02 ENCOUNTER — INFUSION (OUTPATIENT)
Dept: INFUSION THERAPY | Facility: HOSPITAL | Age: 62
End: 2022-06-02
Attending: INTERNAL MEDICINE
Payer: MEDICARE

## 2022-06-02 ENCOUNTER — OFFICE VISIT (OUTPATIENT)
Dept: HEMATOLOGY/ONCOLOGY | Facility: CLINIC | Age: 62
End: 2022-06-02
Payer: MEDICARE

## 2022-06-02 VITALS
WEIGHT: 152 LBS | HEIGHT: 71 IN | TEMPERATURE: 98 F | OXYGEN SATURATION: 97 % | BODY MASS INDEX: 21.28 KG/M2 | HEART RATE: 98 BPM | RESPIRATION RATE: 18 BRPM | DIASTOLIC BLOOD PRESSURE: 91 MMHG | SYSTOLIC BLOOD PRESSURE: 155 MMHG

## 2022-06-02 VITALS
BODY MASS INDEX: 21.35 KG/M2 | TEMPERATURE: 98 F | WEIGHT: 152.5 LBS | HEIGHT: 71 IN | HEART RATE: 106 BPM | DIASTOLIC BLOOD PRESSURE: 84 MMHG | SYSTOLIC BLOOD PRESSURE: 142 MMHG | RESPIRATION RATE: 18 BRPM | OXYGEN SATURATION: 97 %

## 2022-06-02 DIAGNOSIS — Z17.0 MALIGNANT NEOPLASM OF LOWER-OUTER QUADRANT OF LEFT BREAST OF FEMALE, ESTROGEN RECEPTOR POSITIVE: ICD-10-CM

## 2022-06-02 DIAGNOSIS — C50.512 MALIGNANT NEOPLASM OF LOWER-OUTER QUADRANT OF LEFT BREAST OF FEMALE, ESTROGEN RECEPTOR POSITIVE: ICD-10-CM

## 2022-06-02 DIAGNOSIS — T45.1X5A CHEMOTHERAPY-INDUCED NEUTROPENIA: ICD-10-CM

## 2022-06-02 DIAGNOSIS — C79.51 BONE METASTASES: ICD-10-CM

## 2022-06-02 DIAGNOSIS — C50.411 MALIGNANT NEOPLASM OF UPPER-OUTER QUADRANT OF RIGHT BREAST IN FEMALE, ESTROGEN RECEPTOR NEGATIVE: ICD-10-CM

## 2022-06-02 DIAGNOSIS — Z17.1 MALIGNANT NEOPLASM OF UPPER-OUTER QUADRANT OF RIGHT BREAST IN FEMALE, ESTROGEN RECEPTOR NEGATIVE: Primary | ICD-10-CM

## 2022-06-02 DIAGNOSIS — Z17.1 MALIGNANT NEOPLASM OF UPPER-OUTER QUADRANT OF RIGHT BREAST IN FEMALE, ESTROGEN RECEPTOR NEGATIVE: ICD-10-CM

## 2022-06-02 DIAGNOSIS — C78.7 LIVER METASTASES: ICD-10-CM

## 2022-06-02 DIAGNOSIS — D70.1 CHEMOTHERAPY-INDUCED NEUTROPENIA: ICD-10-CM

## 2022-06-02 DIAGNOSIS — C50.411 MALIGNANT NEOPLASM OF UPPER-OUTER QUADRANT OF RIGHT BREAST IN FEMALE, ESTROGEN RECEPTOR NEGATIVE: Primary | ICD-10-CM

## 2022-06-02 DIAGNOSIS — C79.51 BONE METASTASES: Primary | ICD-10-CM

## 2022-06-02 PROCEDURE — 96417 CHEMO IV INFUS EACH ADDL SEQ: CPT

## 2022-06-02 PROCEDURE — 96375 TX/PRO/DX INJ NEW DRUG ADDON: CPT

## 2022-06-02 PROCEDURE — 3051F PR MOST RECENT HEMOGLOBIN A1C LEVEL 7.0 - < 8.0%: ICD-10-PCS | Mod: CPTII,S$GLB,, | Performed by: INTERNAL MEDICINE

## 2022-06-02 PROCEDURE — 3077F PR MOST RECENT SYSTOLIC BLOOD PRESSURE >= 140 MM HG: ICD-10-PCS | Mod: CPTII,S$GLB,, | Performed by: INTERNAL MEDICINE

## 2022-06-02 PROCEDURE — 25000003 PHARM REV CODE 250: Performed by: INTERNAL MEDICINE

## 2022-06-02 PROCEDURE — 3080F PR MOST RECENT DIASTOLIC BLOOD PRESSURE >= 90 MM HG: ICD-10-PCS | Mod: CPTII,S$GLB,, | Performed by: INTERNAL MEDICINE

## 2022-06-02 PROCEDURE — 4010F PR ACE/ARB THEARPY RXD/TAKEN: ICD-10-PCS | Mod: CPTII,S$GLB,, | Performed by: INTERNAL MEDICINE

## 2022-06-02 PROCEDURE — 3008F BODY MASS INDEX DOCD: CPT | Mod: CPTII,S$GLB,, | Performed by: INTERNAL MEDICINE

## 2022-06-02 PROCEDURE — 96413 CHEMO IV INFUSION 1 HR: CPT

## 2022-06-02 PROCEDURE — 4010F ACE/ARB THERAPY RXD/TAKEN: CPT | Mod: CPTII,S$GLB,, | Performed by: INTERNAL MEDICINE

## 2022-06-02 PROCEDURE — 99213 PR OFFICE/OUTPT VISIT, EST, LEVL III, 20-29 MIN: ICD-10-PCS | Mod: S$GLB,,, | Performed by: INTERNAL MEDICINE

## 2022-06-02 PROCEDURE — 96367 TX/PROPH/DG ADDL SEQ IV INF: CPT

## 2022-06-02 PROCEDURE — 63600175 PHARM REV CODE 636 W HCPCS: Performed by: INTERNAL MEDICINE

## 2022-06-02 PROCEDURE — 3008F PR BODY MASS INDEX (BMI) DOCUMENTED: ICD-10-PCS | Mod: CPTII,S$GLB,, | Performed by: INTERNAL MEDICINE

## 2022-06-02 PROCEDURE — 99213 OFFICE O/P EST LOW 20 MIN: CPT | Mod: S$GLB,,, | Performed by: INTERNAL MEDICINE

## 2022-06-02 PROCEDURE — 3080F DIAST BP >= 90 MM HG: CPT | Mod: CPTII,S$GLB,, | Performed by: INTERNAL MEDICINE

## 2022-06-02 PROCEDURE — 3077F SYST BP >= 140 MM HG: CPT | Mod: CPTII,S$GLB,, | Performed by: INTERNAL MEDICINE

## 2022-06-02 PROCEDURE — 3051F HG A1C>EQUAL 7.0%<8.0%: CPT | Mod: CPTII,S$GLB,, | Performed by: INTERNAL MEDICINE

## 2022-06-02 RX ADMIN — GEMCITABINE HYDROCHLORIDE 1425 MG: 2 INJECTION, SOLUTION INTRAVENOUS at 09:06

## 2022-06-02 RX ADMIN — ONDANSETRON 16 MG: 2 INJECTION INTRAMUSCULAR; INTRAVENOUS at 08:06

## 2022-06-02 RX ADMIN — DEXAMETHASONE SODIUM PHOSPHATE 12 MG: 4 INJECTION, SOLUTION INTRA-ARTICULAR; INTRALESIONAL; INTRAMUSCULAR; INTRAVENOUS; SOFT TISSUE at 08:06

## 2022-06-02 RX ADMIN — CARBOPLATIN 505 MG: 10 INJECTION, SOLUTION INTRAVENOUS at 10:06

## 2022-06-02 RX ADMIN — APREPITANT 130 MG: 130 INJECTION, EMULSION INTRAVENOUS at 08:06

## 2022-06-02 NOTE — PLAN OF CARE
Problem: Fatigue  Goal: Improved Activity Tolerance  Outcome: Ongoing, Progressing  Intervention: Promote Improved Energy  Flowsheets (Taken 6/2/2022 0751)  Fatigue Management:   frequent rest breaks encouraged   fatigue-related activity identified   paced activity encouraged  Sleep/Rest Enhancement:   regular sleep/rest pattern promoted   relaxation techniques promoted

## 2022-06-03 ENCOUNTER — TELEPHONE (OUTPATIENT)
Dept: HEMATOLOGY/ONCOLOGY | Facility: CLINIC | Age: 62
End: 2022-06-03

## 2022-06-03 DIAGNOSIS — C79.51 BONE METASTASES: ICD-10-CM

## 2022-06-03 DIAGNOSIS — Z17.0 MALIGNANT NEOPLASM OF LOWER-INNER QUADRANT OF RIGHT BREAST OF FEMALE, ESTROGEN RECEPTOR POSITIVE: ICD-10-CM

## 2022-06-03 DIAGNOSIS — C50.411 MALIGNANT NEOPLASM OF UPPER-OUTER QUADRANT OF RIGHT BREAST IN FEMALE, ESTROGEN RECEPTOR NEGATIVE: Primary | ICD-10-CM

## 2022-06-03 DIAGNOSIS — Z17.0 MALIGNANT NEOPLASM OF LOWER-OUTER QUADRANT OF LEFT BREAST OF FEMALE, ESTROGEN RECEPTOR POSITIVE: ICD-10-CM

## 2022-06-03 DIAGNOSIS — R05.9 COUGH: ICD-10-CM

## 2022-06-03 DIAGNOSIS — R07.81 PLEURITIC CHEST PAIN: ICD-10-CM

## 2022-06-03 DIAGNOSIS — C50.512 MALIGNANT NEOPLASM OF LOWER-OUTER QUADRANT OF LEFT BREAST OF FEMALE, ESTROGEN RECEPTOR POSITIVE: ICD-10-CM

## 2022-06-03 DIAGNOSIS — Z17.1 MALIGNANT NEOPLASM OF UPPER-OUTER QUADRANT OF RIGHT BREAST IN FEMALE, ESTROGEN RECEPTOR NEGATIVE: Primary | ICD-10-CM

## 2022-06-03 DIAGNOSIS — C50.311 MALIGNANT NEOPLASM OF LOWER-INNER QUADRANT OF RIGHT BREAST OF FEMALE, ESTROGEN RECEPTOR POSITIVE: ICD-10-CM

## 2022-06-03 RX ORDER — CARISOPRODOL 350 MG/1
350 TABLET ORAL 3 TIMES DAILY PRN
Qty: 90 TABLET | Refills: 0 | Status: SHIPPED | OUTPATIENT
Start: 2022-06-03 | End: 2022-06-30 | Stop reason: SDUPTHER

## 2022-06-03 RX ORDER — HYDROCODONE BITARTRATE AND ACETAMINOPHEN 10; 325 MG/1; MG/1
1 TABLET ORAL EVERY 6 HOURS PRN
Qty: 120 TABLET | Refills: 0 | Status: SHIPPED | OUTPATIENT
Start: 2022-06-03 | End: 2022-06-30 | Stop reason: SDUPTHER

## 2022-06-03 NOTE — TELEPHONE ENCOUNTER
Breast cancer  Bone metastases    D8C1 Gemzar.    D9C1 Udenyca and I am resuming Xgeva 120 mg.    Get auth for udenyca and xgeva.

## 2022-06-03 NOTE — TELEPHONE ENCOUNTER
I have added xgeva to D9C1, with her Udenyca.    Breast cancer  Bone metastases    Get auth for xgeva.    Date and time.

## 2022-06-03 NOTE — PROGRESS NOTES
"  [facility] In Office Hematology Oncology Subsequent  Encounter Note    6/2/22      Subjective:      Patient ID:   Negrita Castro  61 y.o.   Martínez Shepard R. Leblanc, Babycos, Bourgeois, Hall      Chief Complaint:   New L breast cancer eval.    HPI:  61 y.o. female with diagnosis of Stage 1 R breast cancer 10/26/15.  "Triple negative".  Adjuvant AC x's 4, tx's 12 and Rad Rx through 9/12/16.    Port removed.  Had bilateral reconstruction surgery 4/14/17.  Further reconstruction, tummy veeck 8/14/17.  Additional fat injection at R chest done for symmetry.  She had placement of R nipple.  Dr. Hernandez.      She is due for additional reconstructive surgery at breast and abdomenal areas.  The surgery is being done as part of her reconstruction efforts and for keloid management.  The date of the surgery is May 19th.    Also, Dr. Alarcon to consider Rad Rx to operative site to decrease keloid potential.  On hold now because of Covid 19 pandemic.    Recent Mamm/U/S showed small mass at 4:00 at L breast.  Needle Bx invasive ductal Ca, ERP+, PRP+, H2N neg  She had  L partial mastectomy, Sentinal node Bx 8/24/20. Followed by another surgery because of L axillary infection.  Primary 2 cm, LN neg, Stage 1 dx.    Discussed Oncotype Dx testing, this supported adding adjuvant chemotherapy to adjuvant hormonal therapy because of increased risk of cancer recurrence long-term.  Discussed BRCA testing, given her bilateral  breast Dx. PHN would not authorize BRCA 1 & 2.  Refill meds for her.    She had COVID-19 infection and was hospitalized.  She came very close to requiring intubation and mechanical ventilation.  She is off oxygen now,   and sees Dr. Justin of Pulmonary.  Recent CT scan of the chest showed residual changes of interstitial disease, consistent with recovering from COVID-19.  Also lytic lesions were seen in the thoracic spine area very suspicious for metastatic disease at T5 through T8 spine.    Recently on " Saturday night she had a hard coughing spell, and thereafter developed severe right posterior pleuritic chest pain.  She has point tenderness over the right posterior chest wall and may very well have a fractured rib at the site.  Rib x-rays have been ordered.  I have refilled her Soma for 1 month.  I have refilled her Norco  for 1 month.  I have added oxycodone 5 mg 1 or 2 p.o. Q 3-4 hours p.r.n. breakthrough pain for 1 month to her regimen.    She also has an enlarging nodule at the left chest wall.  Ultrasound of the site suggested that this was a cyst however the areas firm movable and may very well be cancer.  I asked  for a ultrasound-guided biopsy of the mass per radiology.  Path report showed invasive ductal Ca, ERP + PRP neg,   H2N equivocal.    She is postmenopausal, her last menses were 4 5 years ago.  Rib x-rays have been requested.  Will order a CT scan to evaluate for possible metastatic disease.  Will order a ultrasound-guided biopsy of the left chest wall mass for pathologic examination at [facility].  Bx + invasive breast cancer.  ERP+, PRP-, H2N-.    Suspected local recurrence at L breast.  She had L lumpectomy.  Margins clear.    Refer to Rad Rx MD for adjuvant Rad Rx., for local control.  She has T spine metastases.  Currently on hormonal Rx.  To see Dr. Manuelito Shepard for DM, BS control.    Dr. Washington saw her for C spine eval, no surgery planned for now.  He referred her to Dr. Rivers for injection Rx.    DM, cholesterol, epilepsy hx, DJD, LBP.  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    LR shoulder arthroscopy, R wrist surgery, M0.    Smoke  ppd x's 35 years, quit 2015.  ETOH no.  Disability-depression, epilepsy  Allergy none    Mom ovarian cancer  Dad lung cancer  Sisters DM, lung dx.    Summary of care:  Right breast cancer 2015, triple negative, treated with Adriamycin Cytoxan followed by taxane.    Left breast cancer ERP positive PRP positive HER2 Judy negative in  August 24, 2020    COVID infection December 2020.  She has not taken the covid 19 vaccination because of multiple allergy hx.    February 2021 bone metastases determined.    He he March 21, 2021 local left breast cancer recurrence, ERP positive PRP negative HER2 Judy negative.    He April 21st bone biopsy positive for breast cancer metastatic.  ERP, PRP, HER2 Judy pending.    She was on radiation therapy through May 17, 2021.  Refilled Marinol at increased 5 mg 1 p.o. b.i.d. number 60.   I refilled her Soma, Norco, Marinol, and Silvadene cream; dated the prescriptions for June 17, 2021.    C/O mid thoracic pain x's 6-8 weeks,radiates to L & R, increased.  Tender over Mid thoracic area.  Hx of T spine metastatic dx.  Pain controlled on Norco 10/325 mg and oxycodone 5 mg 1-2 po PRN.  Add lodine 400 mg 1 daily    Checked MRI of T spine.  Metastatic dx with pathologic fx at T3,5,6.  Refer to Dr. Flowers of neurosurgery for Vertebroplasty or Kyphoplasty evaluation.    She is on Faslodex. Ibrance. Monitor WBC count.    She saw Dr. Flowers, and has been fitted with a brace initially, vertebroplasty after the first of the year.  [phone].  RTC Dr. Flowers 12/27/21.    No neuro surgery is planned for T-spine disease because of progressive growth and compression of the spine.  She admits to having decreased strength in the right hand.  She completed 10 of 10 radiation treatments on May 2, 2022. She is tapering down her Decadron 4 mg p.o. b.i.d. and she is taking Lodine 400 mg daily.  We are stopping fast low dex and Ibrance as of April 12, 2022.    Will ask the  to see her if we can get Meals on wheels for her.    She is due for an MRI on May 17, and a PET scan on May 19, I will see her on May 20th.  Plan to go with chemotherapy now, probably will go with carboplatin Gemzar.   She is 140 lb and 5 ft 11.     Today is D1C1 carbo, gemzar.  Duragesic patch 25 mch q 3 days x's 3 patches, start Tuesday.  Claritin 10 mg 1 po  daily x's 4 days, start Wednesday.  For neulasta or Udenyca Thursday.    Resume Xgeva with D9 C1.  Check lab 7/14 weekly [facility] lab.    ROS:   GEN: normal without any fever, night sweats or weight loss  HEENT: normal with no HA's, sore throat, stiff neck, changes in vision  CV: normal with no CP, SOB, PND, POOL or orthopnea  PULM: normal with no SOB, cough, hemoptysis, sputum or pleuritic pain  GI: normal with no abdominal pain, nausea, vomiting, constipation, diarrhea, melanotic stools, BRBPR, or hematemesis  : normal with no hematuria, dysuria  BREAST: See HPI.  SKIN: normal with no rash, erythema, bruising, or swelling     Past Medical History:   Diagnosis Date    Acute hypoxemic respiratory failure 12/12/2020    Breast cancer     left breast    Cancer     RIGHT BREAST 10-15    Depression     Diabetes mellitus     Neuropathy     Panic attacks     S/P epidural steroid injection     Seizures     none since early 30's    Wears glasses     TO DRIVE     Past Surgical History:   Procedure Laterality Date    AXILLARY NODE DISSECTION Left 8/24/2020    Procedure: LYMPHADENECTOMY, AXILLARY;  Surgeon: Cory Vick MD;  Location: [facility] OR;  Service: General;  Laterality: Left;  left breast mastectomy with possible axillary lymph node dissection    BREAST BIOPSY      right    BREAST LUMPECTOMY      BREAST RECONSTRUCTION      breast reconstruction with tummy [facility]      BREAST SURGERY      11-3-15 LUMPECTOMY, 12-2-15 REEXCISION    INCISION AND DRAINAGE OF ABSCESS Left 9/9/2020    Procedure: INCISION AND DRAINAGE, ABSCESS;  Surgeon: Cory Vick MD;  Location: [facility] OR;  Service: General;  Laterality: Left;    INSERTION OF LOCALIZATION WIRE Left 8/24/2020    Procedure: INSERTION, LOCALIZATION WIRE;  Surgeon: Cory Vick MD;  Location: [facility] OR;  Service: General;  Laterality: Left;  left breast lumpectomy with wire needle loc    MASTECTOMY      mastectomy 2016      MASTECTOMY, PARTIAL Left  3/19/2021    Procedure: MASTECTOMY, PARTIAL;  Surgeon: Cory Vick MD;  Location: [facility] OR;  Service: General;  Laterality: Left;  lumpectomy  left breast     RECONSTRUCTION OF NIPPLE Right 11/5/2018    Procedure: RECONSTRUCTION-NIPPLE RIGHT;  Surgeon: Xiang Hernandez MD;  [facility];  Service: Plastics;  Laterality: Right;    SENTINEL LYMPH NODE BIOPSY Left 8/24/2020    Procedure: BIOPSY, LYMPH NODE, SENTINEL;  Surgeon: Cory Vick MD;  Location: [facility] OR;  Service: General;  Laterality: Left;  left breast lumpectomy with left sentinel lymoh node       shoulder surgery and wrist      BILAT  BONE SPUR    WRIST SURGERY      RIGHT       Review of patient's allergies indicates:   Allergen Reactions    Adhesive tape-silicones Hives     BANDAIDS    Polymycin Hives    Latex, natural rubber Itching    Polyurethane-39            Current Outpatient Medications:     anastrozole (ARIMIDEX) 1 mg Tab, Take 1 mg by mouth once daily., Disp: , Rfl:     blood sugar diagnostic Strp, TEST GLUCOSE BID, Disp: 300 each, Rfl: 3    blood-glucose meter (TRUE METRIX GLUCOSE METER) kit, TEST GLUCOSE BID, Disp: 1 each, Rfl: 0    blood-glucose meter,continuous (DEXCOM G6 ) Misc, TEST GLUCOSE QID, Disp: 1 each, Rfl: 1    blood-glucose sensor (DEXCOM G6 SENSOR) Edwige, TEST GLUCOSE QID, Disp: 13 each, Rfl: 3    blood-glucose transmitter (DEXCOM G6 TRANSMITTER) Edwige, TEST GLUCOSE QID, Disp: 1 each, Rfl: 1    carisoprodoL (SOMA) 350 MG tablet, Take 1 tablet (350 mg total) by mouth 3 (three) times daily as needed for Muscle spasms., Disp: 90 tablet, Rfl: 0    dextromethorphan-guaiFENesin  mg/5 ml (ROBITUSSIN-DM)  mg/5 mL liquid, Take 1 teaspoon q 6 hours prn cough., Disp: 300 mL, Rfl: 0    diazePAM (VALIUM) 10 MG Tab, Take 1 tablet (10 mg total) by mouth 4 (four) times daily as needed (anxiety)., Disp: 120 tablet, Rfl: 3    docusate sodium (COLACE) 100 MG capsule, Take 100 mg  by mouth 2 (two) times daily., Disp: , Rfl:     dronabinoL (MARINOL) 5 MG capsule, Take 1 capsule (5 mg total) by mouth 2 (two) times daily before meals., Disp: 60 capsule, Rfl: 0    dulaglutide (TRULICITY) 3 mg/0.5 mL pen injector, Inject 3 mg into the skin every 7 days., Disp: 4 pen, Rfl: 11    enalapril (VASOTEC) 5 MG tablet, Take 1 tablet (5 mg total) by mouth once daily., Disp: 90 tablet, Rfl: 3    etodolac (LODINE) 400 MG tablet, Take 1 tablet (400 mg total) by mouth once daily., Disp: 30 tablet, Rfl: 2    fluconazole (DIFLUCAN) 150 MG Tab, TAKE ONE TABLET BY MOUTH ONCE FOR 1 DOSE, Disp: 1 tablet, Rfl: 2    furosemide (LASIX) 20 MG tablet, TAKE ONE TABLET BY MOUTH EVERY DAY, Disp: 30 tablet, Rfl: 6    HYDROcodone-acetaminophen (NORCO)  mg per tablet, Take 1 tablet by mouth every 6 (six) hours as needed for Pain., Disp: 120 tablet, Rfl: 0    insulin aspart, niacinamide, (FIASP FLEXTOUCH U-100 INSULIN) 100 unit/mL (3 mL) InPn, Inject 12 Units into the skin 2 (two) times daily with meals., Disp: 3 pen, Rfl: 9    insulin glargine (LANTUS U-100 INSULIN) 100 unit/mL injection, Inject 32 Units into the skin 2 (two) times a day., Disp: 18 mL, Rfl: 11    lancets 32 gauge Misc, TEST GLUCOSE BID, Disp: 300 each, Rfl: 3    metFORMIN (GLUCOPHAGE-XR) 500 MG ER 24hr tablet, Take 2 tablets (1,000 mg total) by mouth 2 (two) times daily with meals., Disp: 360 tablet, Rfl: 3    metoprolol tartrate (LOPRESSOR) 25 MG tablet, Take 1 tablet (25 mg total) by mouth 2 (two) times daily., Disp: 60 tablet, Rfl: 11    mometasone 0.1% (ELOCON) 0.1 % cream, Apply to affected area daily, Disp: 45 g, Rfl: 1    naloxegoL (MOVANTIK) 12.5 mg Tab, Take 12.5 mg by mouth once daily., Disp: 30 tablet, Rfl: 3    ondansetron (ZOFRAN-ODT) 8 MG TbDL, DISSOLVE 1 TABLET (8MG TOTAL) on the tongue EVERY 6 TO 8 HOURS AS NEEDED (NAUSEA), Disp: 30 tablet, Rfl: 1    oxyCODONE (ROXICODONE) 15 MG Tab, Take 1 tablet (15 mg total) by mouth  "every 4 to 6 hours as needed for Pain., Disp: 120 tablet, Rfl: 0    palbociclib 100 mg Tab, Take 100 mg by mouth once daily. for 21 days, then take 7 days off. Total cycle length 28 days, Disp: 21 tablet, Rfl: 6    pen needle, diabetic (BD ULTRA-FINE MARCE PEN NEEDLE) 32 gauge x 5/32" Ndle, Test blood sugar twice daily, Disp: 100 each, Rfl: 5    promethazine (PHENERGAN) 25 MG tablet, Take 1 tablet (25 mg total) by mouth every 4 to 6 hours as needed., Disp: 30 tablet, Rfl: 5    promethazine-codeine 6.25-10 mg/5 ml (PHENERGAN WITH CODEINE) 6.25-10 mg/5 mL syrup, TAKE 5 ML BY MOUTH EVERY 6 HOURS AS NEEDED FOR COUGH, Disp: 450 mL, Rfl: 0    psyllium husk, with sugar, (METAMUCIL, WITH SUGAR,) 3.4 gram packet, Take 1 packet by mouth 2 (two) times daily., Disp: 30 packet, Rfl: 2    rosuvastatin (CRESTOR) 40 MG Tab, Take 1 tablet (40 mg total) by mouth every evening. For cholesterol, Disp: 90 tablet, Rfl: 3    zolpidem (AMBIEN) 10 mg Tab, TAKE ONE TABLET BY MOUTH EVERY NIGHT AT BEDTIME, Disp: 30 tablet, Rfl: 3  No current facility-administered medications for this visit.          Objective:   Vitals:  Blood pressure (!) 155/91, pulse 98, temperature 97.6 °F (36.4 °C), resp. rate 18, height 5' 11" (1.803 m), weight 68.9 kg (152 lb), last menstrual period 01/16/2016, SpO2 97 %.    Physical Examination:   GEN: no apparent distress, comfortable, Brace in place.  HEAD: atraumatic and normocephalic  EYES: no pallor, no icterus  ENT: no pharyngeal erythema  NECK: noLAD/LN's, supple  CV:  No edema  CHEST: Normal respiratory effort   she is not  tender over the right posterior chest wall on exam.  The chest wall is not swollen.  ABDOM:  nondistended; soft                                                                                          MUSC/Skeletal: ROM normal, Tender over mid T spine area.  EXTREM: no swelling  SKIN: She is developing keloid changes at L breast incision and navel surgical sites.  This area appears to " be enlarging.  NEURO: grossly intact  PSYCH: normal mood, affect and behavior  LYMPH: normal  LN's  BREASTS: L breast is much improved.    Refill Oxycodone, SOMA, Norco.    Assessment:   (1) 61 y.o. female with diagnosis of Stage 1 triple negative R breast cancer, 10/2016.       S/P R mastectomy, SNBx, AC x's 4, T x's12 weeks, Rad Rx and recent reconstruction.    (2) New L invasive ductal Ca, ERP+, PRP+, H2N neg.  Clinical stage 1 dx.    I have started her on Arimidex 1 mg p.o. daily  for metastatic disease at this time.    Suspected fracture of right ribs after recent cough event, check rib x-rays, medicate for pain.    ERP+ dx, bone metastases.  Started on Arimidex daily.  Add 2nd hormonal drug Rx after Rad Rx completed.    Bone biopsy positive for metastatic breast cancer.  ERP+, PRP+, H2N-.    Check PET and MRI of brain.  RTC 5/20/22.  Brain metastases not seen.  Progressive bone and liver metastases seen on PET.    D1C1 Carbo Gemzar today.  RTC 1 week for D8C1 Gemzar.

## 2022-06-03 NOTE — TELEPHONE ENCOUNTER
----- Message from Nell Carrillo sent at 6/3/2022  1:34 PM CDT -----  The patient said she needs her prescriptions for Norco and Soma sent to the pharmacy. She said Dr. Peraza was supposed to do it for her yesterday.     (3) walks occasionally

## 2022-06-04 NOTE — PROGRESS NOTES
"  [facility] In Office Hematology Oncology Subsequent  Encounter Note    5/31/22      Subjective:      Patient ID:   Negrita Castro  61 y.o.   Martínez Shepard R. Leblanc, [place], Bourgeois, [place]      Chief Complaint:   New L breast cancer eval.    HPI:  61 y.o. female with diagnosis of Stage 1 R breast cancer 10/26/15.  "Triple negative".  Adjuvant AC x's 4, tx's 12 and Rad Rx through 9/12/16.    Port removed.  Had bilateral reconstruction surgery 4/14/17.  Further reconstruction, tummy veeck 8/14/17.  Additional fat injection at R chest done for symmetry.  She had placement of R nipple.  Dr. Hernandez.      She is due for additional reconstructive surgery at breast and abdomenal areas.  The surgery is being done as part of her reconstruction efforts and for keloid management.  The date of the surgery is May 19th.    Also, Dr. Alarcon to consider Rad Rx to operative site to decrease keloid potential.  On hold now because of Covid 19 pandemic.    Recent Mamm/U/S showed small mass at 4:00 at L breast.  Needle Bx invasive ductal Ca, ERP+, PRP+, H2N neg  She had  L partial mastectomy, Sentinal node Bx 8/24/20. Followed by another surgery because of L axillary infection.  Primary 2 cm, LN neg, Stage 1 dx.    Discussed Oncotype Dx testing, this supported adding adjuvant chemotherapy to adjuvant hormonal therapy because of increased risk of cancer recurrence long-term.  Discussed BRCA testing, given her bilateral  breast Dx. PHN would not authorize BRCA 1 & 2.  Refill meds for her.    She had COVID-19 infection and was hospitalized.  She came very close to requiring intubation and mechanical ventilation.  She is off oxygen now,   and sees Dr. Justin of Pulmonary.  Recent CT scan of the chest showed residual changes of interstitial disease, consistent with recovering from COVID-19.  Also lytic lesions were seen in the thoracic spine area very suspicious for metastatic disease at T5 through T8 spine.    Recently on " Saturday night she had a hard coughing spell, and thereafter developed severe right posterior pleuritic chest pain.  She has point tenderness over the right posterior chest wall and may very well have a fractured rib at the site.  Rib x-rays have been ordered.  I have refilled her Soma for 1 month.  I have refilled her Norco  for 1 month.  I have added oxycodone 5 mg 1 or 2 p.o. Q 3-4 hours p.r.n. breakthrough pain for 1 month to her regimen.    She also has an enlarging nodule at the left chest wall.  Ultrasound of the site suggested that this was a cyst however the areas firm movable and may very well be cancer.  I asked  for a ultrasound-guided biopsy of the mass per radiology.  Path report showed invasive ductal Ca, ERP + PRP neg,   H2N equivocal.    She is postmenopausal, her last menses were 4 5 years ago.  Rib x-rays have been requested.  Will order a CT scan to evaluate for possible metastatic disease.  Will order a ultrasound-guided biopsy of the left chest wall mass for pathologic examination at [facility].  Bx + invasive breast cancer.  ERP+, PRP-, H2N-.    Suspected local recurrence at L breast.  She had L lumpectomy.  Margins clear.    Refer to Rad Rx MD for adjuvant Rad Rx., for local control.  She has T spine metastases.  Currently on hormonal Rx.  To see Dr. Manuelito Shepard for DM, BS control.    Dr. Washington saw her for C spine eval, no surgery planned for now.  He referred her to Dr. Rivers for injection Rx.    DM, cholesterol, epilepsy hx, DJD, LBP.  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    LR shoulder arthroscopy, R wrist surgery, M0.    Smoke  ppd x's 35 years, quit 2015.  ETOH no.  Disability-depression, epilepsy  Allergy none    Mom ovarian cancer  Dad lung cancer  Sisters DM, lung dx.    Summary of care:  Right breast cancer 2015, triple negative, treated with Adriamycin Cytoxan followed by taxane.    Left breast cancer ERP positive PRP positive HER2 Judy negative in  August 24, 2020    COVID infection December 2020.  She has not taken the covid 19 vaccination because of multiple allergy hx.    February 2021 bone metastases determined.    He he March 21, 2021 local left breast cancer recurrence, ERP positive PRP negative HER2 Judy negative.    He April 21st bone biopsy positive for breast cancer metastatic.  ERP, PRP, HER2 Judy pending.    She was on radiation therapy through May 17, 2021.  Refilled Marinol at increased 5 mg 1 p.o. b.i.d. number 60.   I refilled her Soma, Norco, Marinol, and Silvadene cream; dated the prescriptions for June 17, 2021.    C/O mid thoracic pain x's 6-8 weeks,radiates to L & R, increased.  Tender over Mid thoracic area.  Hx of T spine metastatic dx.  Pain controlled on Norco 10/325 mg and oxycodone 5 mg 1-2 po PRN.  Add lodine 400 mg 1 daily    Checked MRI of T spine.  Metastatic dx with pathologic fx at T3,5,6.  Refer to Dr. Flowers of neurosurgery for Vertebroplasty or Kyphoplasty evaluation.    She is on Faslodex. Ibrance. Monitor WBC count.    She saw Dr. Flowers, and has been fitted with a brace initially, vertebroplasty after the first of the year.  [phone].  RTC Dr. Flowers 12/27/21.    No neuro surgery is planned for T-spine disease because of progressive growth and compression of the spine.  She admits to having decreased strength in the right hand.  She completed 10 of 10 radiation treatments on May 2, 2022. She is tapering down her Decadron 4 mg p.o. b.i.d. and she is taking Lodine 400 mg daily.  We are stopping fast low dex and Ibrance as of April 12, 2022.    Will ask the  to see her if we can get Meals on wheels for her.    She is due for an MRI on May 17, and a PET scan on May 19, I will see her on May 20th.  Plan to go with chemotherapy now, probably will go with carboplatin Gemzar, for 6/2/22.  She is 153 lb and 5 ft 11.     ROS:   GEN: normal without any fever, night sweats or weight loss  HEENT: normal with no HA's, sore  throat, stiff neck, changes in vision  CV: normal with no CP, SOB, PND, POOL or orthopnea  PULM: normal with no SOB, cough, hemoptysis, sputum or pleuritic pain  GI: normal with no abdominal pain, nausea, vomiting, constipation, diarrhea, melanotic stools, BRBPR, or hematemesis  : normal with no hematuria, dysuria  BREAST: See HPI.  SKIN: normal with no rash, erythema, bruising, or swelling     Past Medical History:   Diagnosis Date    Acute hypoxemic respiratory failure 12/12/2020    Breast cancer     left breast    Cancer     RIGHT BREAST 10-15    Depression     Diabetes mellitus     Neuropathy     Panic attacks     S/P epidural steroid injection     Seizures     none since early 30's    Wears glasses     TO DRIVE     Past Surgical History:   Procedure Laterality Date    AXILLARY NODE DISSECTION Left 8/24/2020    Procedure: LYMPHADENECTOMY, AXILLARY;  Surgeon: Cory Vick MD;  Location: [facility] OR;  Service: General;  Laterality: Left;  left breast mastectomy with possible axillary lymph node dissection    BREAST BIOPSY      right    BREAST LUMPECTOMY      BREAST RECONSTRUCTION      breast reconstruction with tummy [facility]      BREAST SURGERY      11-3-15 LUMPECTOMY, 12-2-15 REEXCISION    INCISION AND DRAINAGE OF ABSCESS Left 9/9/2020    Procedure: INCISION AND DRAINAGE, ABSCESS;  Surgeon: Cory Vick MD;  Location: [facility] OR;  Service: General;  Laterality: Left;    INSERTION OF LOCALIZATION WIRE Left 8/24/2020    Procedure: INSERTION, LOCALIZATION WIRE;  Surgeon: Cory Vick MD;  Location: [facility] OR;  Service: General;  Laterality: Left;  left breast lumpectomy with wire needle loc    MASTECTOMY      mastectomy 2016      MASTECTOMY, PARTIAL Left 3/19/2021    Procedure: MASTECTOMY, PARTIAL;  Surgeon: Cory Vick MD;  Location: [facility] OR;  Service: General;  Laterality: Left;  lumpectomy  left breast     RECONSTRUCTION OF NIPPLE Right 11/5/2018    Procedure:  RECONSTRUCTION-NIPPLE RIGHT;  Surgeon: Xiang Hernandez MD;  [facility];  Service: Plastics;  Laterality: Right;    SENTINEL LYMPH NODE BIOPSY Left 8/24/2020    Procedure: BIOPSY, LYMPH NODE, SENTINEL;  Surgeon: Cory Vick MD;  Location: [facility] OR;  Service: General;  Laterality: Left;  left breast lumpectomy with left sentinel lymoh node       shoulder surgery and wrist      BILAT  BONE SPUR    WRIST SURGERY      RIGHT       Review of patient's allergies indicates:   Allergen Reactions    Adhesive tape-silicones Hives     BANDAIDS    Polymycin Hives    Latex, natural rubber Itching    Polyurethane-39            Current Outpatient Medications:     anastrozole (ARIMIDEX) 1 mg Tab, Take 1 mg by mouth once daily., Disp: , Rfl:     blood sugar diagnostic Strp, TEST GLUCOSE BID, Disp: 300 each, Rfl: 3    blood-glucose meter (TRUE METRIX GLUCOSE METER) kit, TEST GLUCOSE BID, Disp: 1 each, Rfl: 0    blood-glucose meter,continuous (DEXCOM G6 ) Misc, TEST GLUCOSE QID, Disp: 1 each, Rfl: 1    blood-glucose sensor (DEXCOM G6 SENSOR) Edwige, TEST GLUCOSE QID, Disp: 13 each, Rfl: 3    blood-glucose transmitter (DEXCOM G6 TRANSMITTER) Edwige, TEST GLUCOSE QID, Disp: 1 each, Rfl: 1    carisoprodoL (SOMA) 350 MG tablet, Take 1 tablet (350 mg total) by mouth 3 (three) times daily as needed for Muscle spasms., Disp: 90 tablet, Rfl: 0    dextromethorphan-guaiFENesin  mg/5 ml (ROBITUSSIN-DM)  mg/5 mL liquid, Take 1 teaspoon q 6 hours prn cough., Disp: 300 mL, Rfl: 0    diazePAM (VALIUM) 10 MG Tab, Take 1 tablet (10 mg total) by mouth 4 (four) times daily as needed (anxiety)., Disp: 120 tablet, Rfl: 3    docusate sodium (COLACE) 100 MG capsule, Take 100 mg by mouth 2 (two) times daily., Disp: , Rfl:     dronabinoL (MARINOL) 5 MG capsule, Take 1 capsule (5 mg total) by mouth 2 (two) times daily before meals., Disp: 60 capsule, Rfl: 0    dulaglutide (TRULICITY) 3 mg/0.5 mL  "pen injector, Inject 3 mg into the skin every 7 days., Disp: 4 pen, Rfl: 11    enalapril (VASOTEC) 5 MG tablet, Take 1 tablet (5 mg total) by mouth once daily., Disp: 90 tablet, Rfl: 3    fluconazole (DIFLUCAN) 150 MG Tab, TAKE ONE TABLET BY MOUTH ONCE FOR 1 DOSE, Disp: 1 tablet, Rfl: 2    furosemide (LASIX) 20 MG tablet, TAKE ONE TABLET BY MOUTH EVERY DAY, Disp: 30 tablet, Rfl: 6    HYDROcodone-acetaminophen (NORCO)  mg per tablet, Take 1 tablet by mouth every 6 (six) hours as needed for Pain., Disp: 120 tablet, Rfl: 0    insulin aspart, niacinamide, (FIASP FLEXTOUCH U-100 INSULIN) 100 unit/mL (3 mL) InPn, Inject 12 Units into the skin 2 (two) times daily with meals., Disp: 3 pen, Rfl: 9    insulin glargine (LANTUS U-100 INSULIN) 100 unit/mL injection, Inject 32 Units into the skin 2 (two) times a day., Disp: 18 mL, Rfl: 11    lancets 32 gauge Misc, TEST GLUCOSE BID, Disp: 300 each, Rfl: 3    metFORMIN (GLUCOPHAGE-XR) 500 MG ER 24hr tablet, Take 2 tablets (1,000 mg total) by mouth 2 (two) times daily with meals., Disp: 360 tablet, Rfl: 3    metoprolol tartrate (LOPRESSOR) 25 MG tablet, Take 1 tablet (25 mg total) by mouth 2 (two) times daily., Disp: 60 tablet, Rfl: 11    mometasone 0.1% (ELOCON) 0.1 % cream, Apply to affected area daily, Disp: 45 g, Rfl: 1    naloxegoL (MOVANTIK) 12.5 mg Tab, Take 12.5 mg by mouth once daily., Disp: 30 tablet, Rfl: 3    ondansetron (ZOFRAN-ODT) 8 MG TbDL, DISSOLVE 1 TABLET (8MG TOTAL) on the tongue EVERY 6 TO 8 HOURS AS NEEDED (NAUSEA), Disp: 30 tablet, Rfl: 1    oxyCODONE (ROXICODONE) 15 MG Tab, Take 1 tablet (15 mg total) by mouth every 4 to 6 hours as needed for Pain., Disp: 120 tablet, Rfl: 0    palbociclib 100 mg Tab, Take 100 mg by mouth once daily. for 21 days, then take 7 days off. Total cycle length 28 days, Disp: 21 tablet, Rfl: 6    pen needle, diabetic (BD ULTRA-FINE MARCE PEN NEEDLE) 32 gauge x 5/32" Ndle, Test blood sugar twice daily, Disp: 100 " "each, Rfl: 5    promethazine-codeine 6.25-10 mg/5 ml (PHENERGAN WITH CODEINE) 6.25-10 mg/5 mL syrup, TAKE 5 ML BY MOUTH EVERY 6 HOURS AS NEEDED FOR COUGH, Disp: 450 mL, Rfl: 0    psyllium husk, with sugar, (METAMUCIL, WITH SUGAR,) 3.4 gram packet, Take 1 packet by mouth 2 (two) times daily., Disp: 30 packet, Rfl: 2    rosuvastatin (CRESTOR) 40 MG Tab, Take 1 tablet (40 mg total) by mouth every evening. For cholesterol, Disp: 90 tablet, Rfl: 3    zolpidem (AMBIEN) 10 mg Tab, TAKE ONE TABLET BY MOUTH EVERY NIGHT AT BEDTIME, Disp: 30 tablet, Rfl: 3    etodolac (LODINE) 400 MG tablet, Take 1 tablet (400 mg total) by mouth once daily., Disp: 30 tablet, Rfl: 2    promethazine (PHENERGAN) 25 MG tablet, Take 1 tablet (25 mg total) by mouth every 4 to 6 hours as needed., Disp: 30 tablet, Rfl: 5          Objective:   Vitals:  Blood pressure 133/74, pulse 95, temperature 99.1 °F (37.3 °C), resp. rate 16, height 5' 11" (1.803 m), weight 69.4 kg (153 lb), last menstrual period 01/16/2016.    Physical Examination:   GEN: no apparent distress, comfortable, Brace in place.  HEAD: atraumatic and normocephalic  EYES: no pallor, no icterus  ENT: no pharyngeal erythema  NECK: noLAD/LN's, supple  CV:  No edema  CHEST: Normal respiratory effort   she is not  tender over the right posterior chest wall on exam.  The chest wall is not swollen.  ABDOM:  nondistended; soft                                                                                          MUSC/Skeletal: ROM normal, Tender over mid T spine area.  EXTREM: no swelling  SKIN: She is developing keloid changes at L breast incision and navel surgical sites.  This area appears to be enlarging.  NEURO: grossly intact  PSYCH: normal mood, affect and behavior  LYMPH: normal  LN's  BREASTS: L breast is much improved.    Refill Oxycodone, SOMA, Norco.    Assessment:   (1) 61 y.o. female with diagnosis of Stage 1 triple negative R breast cancer, 10/2016.       S/P R mastectomy, " SNBx, AC x's 4, T x's12 weeks, Rad Rx and recent reconstruction.    (2) New L invasive ductal Ca, ERP+, PRP+, H2N neg.  Clinical stage 1 dx.    I have started her on Arimidex 1 mg p.o. daily  for metastatic disease at this time.    Suspected fracture of right ribs after recent cough event, check rib x-rays, medicate for pain.    ERP+ dx, bone metastases.  Started on Arimidex daily.  Add 2nd hormonal drug Rx after Rad Rx completed.    Bone biopsy positive for metastatic breast cancer.  ERP+, PRP+, H2N-.    Check PET and MRI of brain.  MRI of brain no brain metastases.  PET progressive bone and liver metastases.    Carboplatin, Gemzar [id number] 6/2/22.

## 2022-06-07 ENCOUNTER — TELEPHONE (OUTPATIENT)
Dept: HEMATOLOGY/ONCOLOGY | Facility: CLINIC | Age: 62
End: 2022-06-07

## 2022-06-07 ENCOUNTER — LAB VISIT (OUTPATIENT)
Dept: LAB | Facility: HOSPITAL | Age: 62
End: 2022-06-07
Attending: INTERNAL MEDICINE
Payer: MEDICARE

## 2022-06-07 DIAGNOSIS — Z17.1 MALIGNANT NEOPLASM OF UPPER-OUTER QUADRANT OF RIGHT BREAST IN FEMALE, ESTROGEN RECEPTOR NEGATIVE: ICD-10-CM

## 2022-06-07 DIAGNOSIS — C79.51 BONE METASTASES: ICD-10-CM

## 2022-06-07 DIAGNOSIS — C50.411 MALIGNANT NEOPLASM OF UPPER-OUTER QUADRANT OF RIGHT BREAST IN FEMALE, ESTROGEN RECEPTOR NEGATIVE: ICD-10-CM

## 2022-06-07 LAB
ALBUMIN SERPL BCP-MCNC: 3.2 G/DL (ref 3.5–5.2)
ALP SERPL-CCNC: 375 U/L (ref 55–135)
ALT SERPL W/O P-5'-P-CCNC: 48 U/L (ref 10–44)
ANION GAP SERPL CALC-SCNC: 12 MMOL/L (ref 8–16)
AST SERPL-CCNC: 58 U/L (ref 10–40)
BASOPHILS # BLD AUTO: ABNORMAL K/UL (ref 0–0.2)
BASOPHILS NFR BLD: 0 % (ref 0–1.9)
BILIRUB SERPL-MCNC: 0.5 MG/DL (ref 0.1–1)
BUN SERPL-MCNC: 6 MG/DL (ref 6–20)
CALCIUM SERPL-MCNC: 9.4 MG/DL (ref 8.7–10.5)
CHLORIDE SERPL-SCNC: 105 MMOL/L (ref 95–110)
CO2 SERPL-SCNC: 24 MMOL/L (ref 23–29)
CREAT SERPL-MCNC: 0.6 MG/DL (ref 0.5–1.4)
DIFFERENTIAL METHOD: ABNORMAL
EOSINOPHIL # BLD AUTO: ABNORMAL K/UL (ref 0–0.5)
EOSINOPHIL NFR BLD: 0 % (ref 0–8)
ERYTHROCYTE [DISTWIDTH] IN BLOOD BY AUTOMATED COUNT: 13.7 % (ref 11.5–14.5)
EST. GFR  (AFRICAN AMERICAN): >60 ML/MIN/1.73 M^2
EST. GFR  (NON AFRICAN AMERICAN): >60 ML/MIN/1.73 M^2
GLUCOSE SERPL-MCNC: 175 MG/DL (ref 70–110)
HCT VFR BLD AUTO: 33.4 % (ref 37–48.5)
HGB BLD-MCNC: 11.6 G/DL (ref 12–16)
IMM GRANULOCYTES # BLD AUTO: ABNORMAL K/UL (ref 0–0.04)
IMM GRANULOCYTES NFR BLD AUTO: ABNORMAL % (ref 0–0.5)
LYMPHOCYTES # BLD AUTO: ABNORMAL K/UL (ref 1–4.8)
LYMPHOCYTES NFR BLD: 22 % (ref 18–48)
MCH RBC QN AUTO: 32.8 PG (ref 27–31)
MCHC RBC AUTO-ENTMCNC: 34.7 G/DL (ref 32–36)
MCV RBC AUTO: 94 FL (ref 82–98)
MONOCYTES # BLD AUTO: ABNORMAL K/UL (ref 0.3–1)
MONOCYTES NFR BLD: 0 % (ref 4–15)
NEUTROPHILS # BLD AUTO: ABNORMAL K/UL (ref 1.8–7.7)
NEUTROPHILS NFR BLD: 78 % (ref 38–73)
NRBC BLD-RTO: 0 /100 WBC
PLATELET # BLD AUTO: 308 K/UL (ref 150–450)
PLATELET BLD QL SMEAR: ABNORMAL
PMV BLD AUTO: 8.5 FL (ref 9.2–12.9)
POTASSIUM SERPL-SCNC: 3.5 MMOL/L (ref 3.5–5.1)
PROT SERPL-MCNC: 7.1 G/DL (ref 6–8.4)
RBC # BLD AUTO: 3.54 M/UL (ref 4–5.4)
SODIUM SERPL-SCNC: 141 MMOL/L (ref 136–145)
WBC # BLD AUTO: 1.26 K/UL (ref 3.9–12.7)

## 2022-06-07 PROCEDURE — 85007 BL SMEAR W/DIFF WBC COUNT: CPT | Performed by: INTERNAL MEDICINE

## 2022-06-07 PROCEDURE — 86300 IMMUNOASSAY TUMOR CA 15-3: CPT | Performed by: INTERNAL MEDICINE

## 2022-06-07 PROCEDURE — 86300 IMMUNOASSAY TUMOR CA 15-3: CPT | Mod: 91 | Performed by: INTERNAL MEDICINE

## 2022-06-07 PROCEDURE — 85027 COMPLETE CBC AUTOMATED: CPT | Performed by: INTERNAL MEDICINE

## 2022-06-07 PROCEDURE — 80053 COMPREHEN METABOLIC PANEL: CPT | Performed by: INTERNAL MEDICINE

## 2022-06-07 PROCEDURE — 36415 COLL VENOUS BLD VENIPUNCTURE: CPT | Performed by: INTERNAL MEDICINE

## 2022-06-08 LAB — CANCER AG15-3 SERPL IA-ACNC: 31 U/ML

## 2022-06-09 RX ORDER — HEPARIN 100 UNIT/ML
500 SYRINGE INTRAVENOUS
Status: CANCELLED | OUTPATIENT
Start: 2022-06-09

## 2022-06-09 RX ORDER — ZOLEDRONIC ACID 0.04 MG/ML
4 INJECTION, SOLUTION INTRAVENOUS
Status: CANCELLED | OUTPATIENT
Start: 2022-06-09

## 2022-06-09 RX ORDER — SODIUM CHLORIDE 0.9 % (FLUSH) 0.9 %
10 SYRINGE (ML) INJECTION
Status: CANCELLED | OUTPATIENT
Start: 2022-06-09

## 2022-06-10 ENCOUNTER — INFUSION (OUTPATIENT)
Dept: INFUSION THERAPY | Facility: HOSPITAL | Age: 62
End: 2022-06-10
Attending: INTERNAL MEDICINE
Payer: MEDICARE

## 2022-06-10 VITALS
TEMPERATURE: 98 F | SYSTOLIC BLOOD PRESSURE: 131 MMHG | DIASTOLIC BLOOD PRESSURE: 80 MMHG | BODY MASS INDEX: 21.14 KG/M2 | WEIGHT: 151 LBS | HEIGHT: 71 IN | RESPIRATION RATE: 18 BRPM | OXYGEN SATURATION: 96 % | HEART RATE: 98 BPM

## 2022-06-10 DIAGNOSIS — C50.411 MALIGNANT NEOPLASM OF UPPER-OUTER QUADRANT OF RIGHT BREAST IN FEMALE, ESTROGEN RECEPTOR NEGATIVE: ICD-10-CM

## 2022-06-10 DIAGNOSIS — C79.51 BONE METASTASES: Primary | ICD-10-CM

## 2022-06-10 DIAGNOSIS — T45.1X5A CHEMOTHERAPY-INDUCED NEUTROPENIA: ICD-10-CM

## 2022-06-10 DIAGNOSIS — Z17.1 MALIGNANT NEOPLASM OF UPPER-OUTER QUADRANT OF RIGHT BREAST IN FEMALE, ESTROGEN RECEPTOR NEGATIVE: ICD-10-CM

## 2022-06-10 DIAGNOSIS — Z17.0 MALIGNANT NEOPLASM OF LOWER-OUTER QUADRANT OF LEFT BREAST OF FEMALE, ESTROGEN RECEPTOR POSITIVE: ICD-10-CM

## 2022-06-10 DIAGNOSIS — D70.1 CHEMOTHERAPY-INDUCED NEUTROPENIA: ICD-10-CM

## 2022-06-10 DIAGNOSIS — C50.512 MALIGNANT NEOPLASM OF LOWER-OUTER QUADRANT OF LEFT BREAST OF FEMALE, ESTROGEN RECEPTOR POSITIVE: ICD-10-CM

## 2022-06-10 LAB — CANCER AG27-29 SERPL-ACNC: 35.4 U/ML

## 2022-06-10 PROCEDURE — 63600175 PHARM REV CODE 636 W HCPCS: Mod: JG | Performed by: INTERNAL MEDICINE

## 2022-06-10 PROCEDURE — 96365 THER/PROPH/DIAG IV INF INIT: CPT

## 2022-06-10 PROCEDURE — 25000003 PHARM REV CODE 250: Performed by: NURSE PRACTITIONER

## 2022-06-10 PROCEDURE — 96372 THER/PROPH/DIAG INJ SC/IM: CPT | Mod: 59

## 2022-06-10 PROCEDURE — 63600175 PHARM REV CODE 636 W HCPCS: Performed by: NURSE PRACTITIONER

## 2022-06-10 RX ORDER — ZOLEDRONIC ACID 0.04 MG/ML
4 INJECTION, SOLUTION INTRAVENOUS
Status: CANCELLED | OUTPATIENT
Start: 2022-07-08

## 2022-06-10 RX ORDER — SODIUM CHLORIDE 0.9 % (FLUSH) 0.9 %
10 SYRINGE (ML) INJECTION
Status: CANCELLED | OUTPATIENT
Start: 2022-07-08

## 2022-06-10 RX ORDER — HEPARIN 100 UNIT/ML
500 SYRINGE INTRAVENOUS
Status: CANCELLED | OUTPATIENT
Start: 2022-07-08

## 2022-06-10 RX ORDER — SODIUM CHLORIDE 0.9 % (FLUSH) 0.9 %
10 SYRINGE (ML) INJECTION
Status: DISCONTINUED | OUTPATIENT
Start: 2022-06-10 | End: 2022-06-10 | Stop reason: HOSPADM

## 2022-06-10 RX ORDER — ZOLEDRONIC ACID 0.04 MG/ML
4 INJECTION, SOLUTION INTRAVENOUS
Status: COMPLETED | OUTPATIENT
Start: 2022-06-10 | End: 2022-06-10

## 2022-06-10 RX ADMIN — ZOLEDRONIC ACID 4 MG: 0.04 INJECTION, SOLUTION INTRAVENOUS at 09:06

## 2022-06-10 RX ADMIN — SODIUM CHLORIDE: 9 INJECTION, SOLUTION INTRAVENOUS at 09:06

## 2022-06-10 RX ADMIN — PEGFILGRASTIM 6 MG: 6 INJECTION SUBCUTANEOUS at 09:06

## 2022-06-10 NOTE — PLAN OF CARE
Problem: Fatigue  Goal: Improved Activity Tolerance  Outcome: Ongoing, Progressing  Intervention: Promote Improved Energy  Flowsheets (Taken 6/10/2022 0490)  Fatigue Management:   paced activity encouraged   activity assistance provided   fatigue-related activity identified   frequent rest breaks encouraged  Sleep/Rest Enhancement:   regular sleep/rest pattern promoted   reading promoted   relaxation techniques promoted

## 2022-06-14 ENCOUNTER — TELEPHONE (OUTPATIENT)
Dept: HEMATOLOGY/ONCOLOGY | Facility: CLINIC | Age: 62
End: 2022-06-14

## 2022-06-14 ENCOUNTER — OFFICE VISIT (OUTPATIENT)
Dept: FAMILY MEDICINE | Facility: CLINIC | Age: 62
End: 2022-06-14
Payer: MEDICARE

## 2022-06-14 ENCOUNTER — LAB VISIT (OUTPATIENT)
Dept: LAB | Facility: HOSPITAL | Age: 62
End: 2022-06-14
Attending: INTERNAL MEDICINE
Payer: MEDICARE

## 2022-06-14 VITALS
SYSTOLIC BLOOD PRESSURE: 132 MMHG | OXYGEN SATURATION: 95 % | WEIGHT: 153 LBS | HEIGHT: 71 IN | BODY MASS INDEX: 21.42 KG/M2 | HEART RATE: 106 BPM | DIASTOLIC BLOOD PRESSURE: 78 MMHG

## 2022-06-14 DIAGNOSIS — M84.48XG PATHOLOGICAL FRACTURE OF THORACIC VERTEBRA WITH DELAYED HEALING, SUBSEQUENT ENCOUNTER: ICD-10-CM

## 2022-06-14 DIAGNOSIS — Z79.4 TYPE 2 DIABETES MELLITUS WITH HYPERGLYCEMIA, WITH LONG-TERM CURRENT USE OF INSULIN: Primary | ICD-10-CM

## 2022-06-14 DIAGNOSIS — K59.03 THERAPEUTIC OPIOID INDUCED CONSTIPATION: ICD-10-CM

## 2022-06-14 DIAGNOSIS — C50.512 MALIGNANT NEOPLASM OF LOWER-OUTER QUADRANT OF LEFT BREAST OF FEMALE, ESTROGEN RECEPTOR POSITIVE: ICD-10-CM

## 2022-06-14 DIAGNOSIS — C50.411 MALIGNANT NEOPLASM OF UPPER-OUTER QUADRANT OF RIGHT BREAST IN FEMALE, ESTROGEN RECEPTOR NEGATIVE: Primary | ICD-10-CM

## 2022-06-14 DIAGNOSIS — Z17.1 MALIGNANT NEOPLASM OF UPPER-OUTER QUADRANT OF RIGHT BREAST IN FEMALE, ESTROGEN RECEPTOR NEGATIVE: ICD-10-CM

## 2022-06-14 DIAGNOSIS — C50.411 MALIGNANT NEOPLASM OF UPPER-OUTER QUADRANT OF RIGHT BREAST IN FEMALE, ESTROGEN RECEPTOR NEGATIVE: ICD-10-CM

## 2022-06-14 DIAGNOSIS — E78.2 MIXED HYPERLIPIDEMIA: ICD-10-CM

## 2022-06-14 DIAGNOSIS — I10 ESSENTIAL HYPERTENSION: ICD-10-CM

## 2022-06-14 DIAGNOSIS — C79.51 BONE METASTASES: ICD-10-CM

## 2022-06-14 DIAGNOSIS — Z17.1 MALIGNANT NEOPLASM OF UPPER-OUTER QUADRANT OF RIGHT BREAST IN FEMALE, ESTROGEN RECEPTOR NEGATIVE: Primary | ICD-10-CM

## 2022-06-14 DIAGNOSIS — D70.1 CHEMOTHERAPY-INDUCED NEUTROPENIA: ICD-10-CM

## 2022-06-14 DIAGNOSIS — Z17.0 MALIGNANT NEOPLASM OF LOWER-OUTER QUADRANT OF LEFT BREAST OF FEMALE, ESTROGEN RECEPTOR POSITIVE: ICD-10-CM

## 2022-06-14 DIAGNOSIS — E11.65 TYPE 2 DIABETES MELLITUS WITH HYPERGLYCEMIA, WITH LONG-TERM CURRENT USE OF INSULIN: Primary | ICD-10-CM

## 2022-06-14 DIAGNOSIS — T40.2X5A THERAPEUTIC OPIOID INDUCED CONSTIPATION: ICD-10-CM

## 2022-06-14 DIAGNOSIS — T45.1X5A CHEMOTHERAPY-INDUCED NEUTROPENIA: ICD-10-CM

## 2022-06-14 LAB
ALBUMIN SERPL BCP-MCNC: 3.2 G/DL (ref 3.5–5.2)
ALP SERPL-CCNC: 524 U/L (ref 55–135)
ALT SERPL W/O P-5'-P-CCNC: 41 U/L (ref 10–44)
ANION GAP SERPL CALC-SCNC: 15 MMOL/L (ref 8–16)
ANISOCYTOSIS BLD QL SMEAR: SLIGHT
AST SERPL-CCNC: 34 U/L (ref 10–40)
BASOPHILS NFR BLD: 0 % (ref 0–1.9)
BILIRUB SERPL-MCNC: 0.4 MG/DL (ref 0.1–1)
BUN SERPL-MCNC: 4 MG/DL (ref 6–20)
CALCIUM SERPL-MCNC: 8.3 MG/DL (ref 8.7–10.5)
CHLORIDE SERPL-SCNC: 105 MMOL/L (ref 95–110)
CO2 SERPL-SCNC: 23 MMOL/L (ref 23–29)
CREAT SERPL-MCNC: 0.7 MG/DL (ref 0.5–1.4)
DIFFERENTIAL METHOD: ABNORMAL
EOSINOPHIL NFR BLD: 0 % (ref 0–8)
ERYTHROCYTE [DISTWIDTH] IN BLOOD BY AUTOMATED COUNT: 14.7 % (ref 11.5–14.5)
EST. GFR  (AFRICAN AMERICAN): >60 ML/MIN/1.73 M^2
EST. GFR  (NON AFRICAN AMERICAN): >60 ML/MIN/1.73 M^2
GLUCOSE SERPL-MCNC: 250 MG/DL (ref 70–110)
HCT VFR BLD AUTO: 32.1 % (ref 37–48.5)
HGB BLD-MCNC: 10.9 G/DL (ref 12–16)
HYPOCHROMIA BLD QL SMEAR: ABNORMAL
IMM GRANULOCYTES # BLD AUTO: ABNORMAL K/UL (ref 0–0.04)
IMM GRANULOCYTES NFR BLD AUTO: ABNORMAL % (ref 0–0.5)
LYMPHOCYTES NFR BLD: 12 % (ref 18–48)
MCH RBC QN AUTO: 32.4 PG (ref 27–31)
MCHC RBC AUTO-ENTMCNC: 34 G/DL (ref 32–36)
MCV RBC AUTO: 96 FL (ref 82–98)
METAMYELOCYTES NFR BLD MANUAL: 4 %
MONOCYTES NFR BLD: 5 % (ref 4–15)
MYELOCYTES NFR BLD MANUAL: 2 %
NEUTROPHILS NFR BLD: 55 % (ref 38–73)
NEUTS BAND NFR BLD MANUAL: 22 %
NRBC BLD-RTO: 2 /100 WBC
PLATELET # BLD AUTO: 172 K/UL (ref 150–450)
PLATELET BLD QL SMEAR: ABNORMAL
PMV BLD AUTO: 9.8 FL (ref 9.2–12.9)
POLYCHROMASIA BLD QL SMEAR: ABNORMAL
POTASSIUM SERPL-SCNC: 3.5 MMOL/L (ref 3.5–5.1)
PROT SERPL-MCNC: 6.7 G/DL (ref 6–8.4)
RBC # BLD AUTO: 3.36 M/UL (ref 4–5.4)
SODIUM SERPL-SCNC: 143 MMOL/L (ref 136–145)
WBC # BLD AUTO: 9.22 K/UL (ref 3.9–12.7)

## 2022-06-14 PROCEDURE — 85007 BL SMEAR W/DIFF WBC COUNT: CPT | Performed by: INTERNAL MEDICINE

## 2022-06-14 PROCEDURE — 3075F PR MOST RECENT SYSTOLIC BLOOD PRESS GE 130-139MM HG: ICD-10-PCS | Mod: CPTII,S$GLB,, | Performed by: FAMILY MEDICINE

## 2022-06-14 PROCEDURE — 36415 COLL VENOUS BLD VENIPUNCTURE: CPT | Performed by: INTERNAL MEDICINE

## 2022-06-14 PROCEDURE — 3078F DIAST BP <80 MM HG: CPT | Mod: CPTII,S$GLB,, | Performed by: FAMILY MEDICINE

## 2022-06-14 PROCEDURE — 1159F MED LIST DOCD IN RCRD: CPT | Mod: CPTII,S$GLB,, | Performed by: FAMILY MEDICINE

## 2022-06-14 PROCEDURE — 3075F SYST BP GE 130 - 139MM HG: CPT | Mod: CPTII,S$GLB,, | Performed by: FAMILY MEDICINE

## 2022-06-14 PROCEDURE — 4010F ACE/ARB THERAPY RXD/TAKEN: CPT | Mod: CPTII,S$GLB,, | Performed by: FAMILY MEDICINE

## 2022-06-14 PROCEDURE — 1159F PR MEDICATION LIST DOCUMENTED IN MEDICAL RECORD: ICD-10-PCS | Mod: CPTII,S$GLB,, | Performed by: FAMILY MEDICINE

## 2022-06-14 PROCEDURE — 1160F RVW MEDS BY RX/DR IN RCRD: CPT | Mod: CPTII,S$GLB,, | Performed by: FAMILY MEDICINE

## 2022-06-14 PROCEDURE — 86300 IMMUNOASSAY TUMOR CA 15-3: CPT | Performed by: INTERNAL MEDICINE

## 2022-06-14 PROCEDURE — 3078F PR MOST RECENT DIASTOLIC BLOOD PRESSURE < 80 MM HG: ICD-10-PCS | Mod: CPTII,S$GLB,, | Performed by: FAMILY MEDICINE

## 2022-06-14 PROCEDURE — 86300 IMMUNOASSAY TUMOR CA 15-3: CPT | Mod: 91 | Performed by: INTERNAL MEDICINE

## 2022-06-14 PROCEDURE — 3051F HG A1C>EQUAL 7.0%<8.0%: CPT | Mod: CPTII,S$GLB,, | Performed by: FAMILY MEDICINE

## 2022-06-14 PROCEDURE — 80053 COMPREHEN METABOLIC PANEL: CPT | Performed by: INTERNAL MEDICINE

## 2022-06-14 PROCEDURE — 99214 PR OFFICE/OUTPT VISIT, EST, LEVL IV, 30-39 MIN: ICD-10-PCS | Mod: S$GLB,,, | Performed by: FAMILY MEDICINE

## 2022-06-14 PROCEDURE — 3008F PR BODY MASS INDEX (BMI) DOCUMENTED: ICD-10-PCS | Mod: CPTII,S$GLB,, | Performed by: FAMILY MEDICINE

## 2022-06-14 PROCEDURE — 99214 OFFICE O/P EST MOD 30 MIN: CPT | Mod: S$GLB,,, | Performed by: FAMILY MEDICINE

## 2022-06-14 PROCEDURE — 3008F BODY MASS INDEX DOCD: CPT | Mod: CPTII,S$GLB,, | Performed by: FAMILY MEDICINE

## 2022-06-14 PROCEDURE — 3051F PR MOST RECENT HEMOGLOBIN A1C LEVEL 7.0 - < 8.0%: ICD-10-PCS | Mod: CPTII,S$GLB,, | Performed by: FAMILY MEDICINE

## 2022-06-14 PROCEDURE — 1160F PR REVIEW ALL MEDS BY PRESCRIBER/CLIN PHARMACIST DOCUMENTED: ICD-10-PCS | Mod: CPTII,S$GLB,, | Performed by: FAMILY MEDICINE

## 2022-06-14 PROCEDURE — 4010F PR ACE/ARB THEARPY RXD/TAKEN: ICD-10-PCS | Mod: CPTII,S$GLB,, | Performed by: FAMILY MEDICINE

## 2022-06-14 PROCEDURE — 85027 COMPLETE CBC AUTOMATED: CPT | Performed by: INTERNAL MEDICINE

## 2022-06-14 RX ORDER — OXYCODONE HYDROCHLORIDE 15 MG/1
10 TABLET ORAL
COMMUNITY
End: 2022-06-30 | Stop reason: SDUPTHER

## 2022-06-14 NOTE — TELEPHONE ENCOUNTER
----- Message from Rosa Grayson sent at 6/13/2022 10:32 AM CDT -----  Regarding: has question a Morphine  Mrs. Castro has some questions about her morphine please call her.  Thanks Ale

## 2022-06-14 NOTE — TELEPHONE ENCOUNTER
Pt only needs morphine patch before and after neulasta injection per dr arthur. Pt verbalized understanding.

## 2022-06-14 NOTE — PROGRESS NOTES
SUBJECTIVE:    Patient ID: Negrita Castro is a 61 y.o. female.    Chief Complaint: 3M DM Check up, current chemo treatments, and requests motorized wheelchair    Patient with metastatic breast cancer presents for visit.  She had issue with compression fractures and was attempting to get spinal surgery.  We had worked at improving diabetes control.  Short-acting insulin was added to her regimen and A1c had improved to 7.2.  However surgery was postponed secondary to the presence of bone metastases.  She has recently started back on chemotherapy and has had some problems with neutropenia.  It has been held.  She reports that her blood sugars had been holding even after stopping short-acting insulin.  However steroids are part of her chemo that will be in weekly cycles.  And she is to get 6 cycles.  Her blood pressures are also noted to be elevated but patient attributes this to pain.  She is on multiple medications for recurrent pain.  She is noting inability to walk well and weakness in her right arm stemming from her spinal lesions.  She was given a wheelchair at her last visit with me but is unable to manipulate the chair self.  She feels as if she cannot get out anywhere without someone help her.  However with patient's current state of weakness she does need helpo       Past Medical History:   Diagnosis Date    Acute hypoxemic respiratory failure 12/12/2020    Breast cancer     left breast    Cancer     RIGHT BREAST 10-15    Depression     Diabetes mellitus     Neuropathy     Panic attacks     S/P epidural steroid injection     Seizures     none since early 30's    Wears glasses     TO DRIVE     Past Surgical History:   Procedure Laterality Date    AXILLARY NODE DISSECTION Left 8/24/2020    Procedure: LYMPHADENECTOMY, AXILLARY;  Surgeon: Cory Vick MD;  Location: [facility] OR;  Service: General;  Laterality: Left;  left breast mastectomy with possible axillary lymph node dissection    BREAST  BIOPSY      right    BREAST LUMPECTOMY      BREAST RECONSTRUCTION      breast reconstruction with tummy [facility]      BREAST SURGERY      11-3-15 LUMPECTOMY, 12-2-15 REEXCISION    INCISION AND DRAINAGE OF ABSCESS Left 9/9/2020    Procedure: INCISION AND DRAINAGE, ABSCESS;  Surgeon: Cory Vick MD;  Location: [facility] OR;  Service: General;  Laterality: Left;    INSERTION OF LOCALIZATION WIRE Left 8/24/2020    Procedure: INSERTION, LOCALIZATION WIRE;  Surgeon: Cory Vick MD;  Location: [facility] OR;  Service: General;  Laterality: Left;  left breast lumpectomy with wire needle loc    MASTECTOMY      mastectomy 2016      MASTECTOMY, PARTIAL Left 3/19/2021    Procedure: MASTECTOMY, PARTIAL;  Surgeon: Cory Vick MD;  Location: [facility] OR;  Service: General;  Laterality: Left;  lumpectomy  left breast     RECONSTRUCTION OF NIPPLE Right 11/5/2018    Procedure: RECONSTRUCTION-NIPPLE RIGHT;  Surgeon: Xiang Hernandez MD;  Location: [address];  Service: Plastics;  Laterality: Right;    SENTINEL LYMPH NODE BIOPSY Left 8/24/2020    Procedure: BIOPSY, LYMPH NODE, SENTINEL;  Surgeon: Cory Vick MD;  Location: [facility] OR;  Service: General;  Laterality: Left;  left breast lumpectomy with left sentinel lymoh node       shoulder surgery and wrist      BILAT  BONE SPUR    WRIST SURGERY      RIGHT     Family History   Problem Relation Age of Onset    Anesthesia problems Neg Hx        Marital Status:   Alcohol History:  reports current alcohol use.  Tobacco History:  reports that she quit smoking about 6 years ago. She has a 7.50 pack-year smoking history. She has never used smokeless tobacco.  Drug History:  reports previous drug use. Drug: Marijuana.    Review of patient's allergies indicates:   Allergen Reactions    Adhesive tape-silicones Hives     BANDAIDS    Polymycin Hives    Adhesive     Latex, natural rubber Hives and Itching    Neomycin-bacitracnzn-polymyxnb      Polyurethane-39 Hives       Current Outpatient Medications:     carisoprodoL (SOMA) 350 MG tablet, Take 1 tablet (350 mg total) by mouth 3 (three) times daily as needed for Muscle spasms., Disp: 90 tablet, Rfl: 0    furosemide (LASIX) 20 MG tablet, TAKE ONE TABLET BY MOUTH EVERY DAY, Disp: 30 tablet, Rfl: 6    HYDROcodone-acetaminophen (NORCO)  mg per tablet, Take 1 tablet by mouth every 6 (six) hours as needed for Pain., Disp: 120 tablet, Rfl: 0    metoprolol tartrate (LOPRESSOR) 25 MG tablet, Take 1 tablet (25 mg total) by mouth 2 (two) times daily., Disp: 60 tablet, Rfl: 11    ondansetron (ZOFRAN-ODT) 8 MG TbDL, DISSOLVE 1 TABLET (8MG TOTAL) on the tongue EVERY 6 TO 8 HOURS AS NEEDED (NAUSEA), Disp: 30 tablet, Rfl: 1    oxyCODONE (ROXICODONE) 15 MG Tab, Take 10 mg by mouth every 4 to 6 hours as needed for Pain., Disp: , Rfl:     promethazine (PHENERGAN) 25 MG tablet, Take 1 tablet (25 mg total) by mouth every 4 to 6 hours as needed., Disp: 30 tablet, Rfl: 5    anastrozole (ARIMIDEX) 1 mg Tab, Take 1 mg by mouth once daily., Disp: , Rfl:     blood sugar diagnostic Strp, TEST GLUCOSE BID, Disp: 300 each, Rfl: 3    blood-glucose meter (TRUE METRIX GLUCOSE METER) kit, TEST GLUCOSE BID, Disp: 1 each, Rfl: 0    blood-glucose meter,continuous (DEXCOM G6 ) Misc, TEST GLUCOSE QID, Disp: 1 each, Rfl: 1    blood-glucose sensor (DEXCOM G6 SENSOR) Edwige, TEST GLUCOSE QID, Disp: 13 each, Rfl: 3    blood-glucose transmitter (DEXCOM G6 TRANSMITTER) Edwige, TEST GLUCOSE QID, Disp: 1 each, Rfl: 1    dexAMETHasone (DECADRON) 4 MG Tab, TAKE ONE TABLET (4MG TOTAL) BY MOUTH TWICE DAILY WITH MEALS, Disp: 60 tablet, Rfl: 1    dextromethorphan-guaiFENesin  mg/5 ml (ROBITUSSIN-DM)  mg/5 mL liquid, Take 1 teaspoon q 6 hours prn cough., Disp: 300 mL, Rfl: 0    diazePAM (VALIUM) 10 MG Tab, Take 1 tablet (10 mg total) by mouth 4 (four) times daily as needed (anxiety)., Disp: 120 tablet, Rfl: 3     "docusate sodium (COLACE) 100 MG capsule, Take 100 mg by mouth 2 (two) times daily., Disp: , Rfl:     dronabinoL (MARINOL) 5 MG capsule, Take 1 capsule (5 mg total) by mouth 2 (two) times daily before meals., Disp: 60 capsule, Rfl: 0    dulaglutide (TRULICITY) 3 mg/0.5 mL pen injector, Inject 3 mg into the skin every 7 days., Disp: 4 pen, Rfl: 11    enalapril (VASOTEC) 5 MG tablet, Take 1 tablet (5 mg total) by mouth once daily., Disp: 90 tablet, Rfl: 3    etodolac (LODINE) 400 MG tablet, TAKE ONE TABLET (400 MG TOTAL) BY MOUTH ONCE DAILY, Disp: 30 tablet, Rfl: 2    fluconazole (DIFLUCAN) 150 MG Tab, TAKE ONE TABLET BY MOUTH ONCE FOR 1 DOSE, Disp: 1 tablet, Rfl: 2    insulin glargine (LANTUS U-100 INSULIN) 100 unit/mL injection, Inject 32 Units into the skin 2 (two) times a day., Disp: 18 mL, Rfl: 11    lancets 32 gauge Misc, TEST GLUCOSE BID, Disp: 300 each, Rfl: 3    metFORMIN (GLUCOPHAGE-XR) 500 MG ER 24hr tablet, Take 2 tablets (1,000 mg total) by mouth 2 (two) times daily with meals., Disp: 360 tablet, Rfl: 3    mometasone 0.1% (ELOCON) 0.1 % cream, Apply to affected area daily, Disp: 45 g, Rfl: 1    naloxegoL (MOVANTIK) 12.5 mg Tab, Take 12.5 mg by mouth once daily., Disp: 30 tablet, Rfl: 3    palbociclib 100 mg Tab, Take 100 mg by mouth once daily. for 21 days, then take 7 days off. Total cycle length 28 days, Disp: 21 tablet, Rfl: 6    pen needle, diabetic (BD ULTRA-FINE MARCE PEN NEEDLE) 32 gauge x 5/32" Ndle, Test blood sugar twice daily, Disp: 100 each, Rfl: 5    promethazine-codeine 6.25-10 mg/5 ml (PHENERGAN WITH CODEINE) 6.25-10 mg/5 mL syrup, TAKE 5 ML BY MOUTH EVERY 6 HOURS AS NEEDED FOR COUGH, Disp: 450 mL, Rfl: 0    psyllium husk, with sugar, (METAMUCIL, WITH SUGAR,) 3.4 gram packet, Take 1 packet by mouth 2 (two) times daily., Disp: 30 packet, Rfl: 2    rosuvastatin (CRESTOR) 40 MG Tab, Take 1 tablet (40 mg total) by mouth every evening. For cholesterol, Disp: 90 tablet, Rfl: 3    " "zolpidem (AMBIEN) 10 mg Tab, TAKE ONE TABLET BY MOUTH EVERY NIGHT AT BEDTIME, Disp: 30 tablet, Rfl: 3    Review of Systems   Constitutional: Positive for activity change and fatigue.   HENT: Negative.    Respiratory: Negative.    Gastrointestinal: Negative.    Genitourinary: Negative.    Musculoskeletal: Positive for arthralgias, back pain, gait problem and neck pain.   Skin: Negative for rash.   Psychiatric/Behavioral: Positive for sleep disturbance. The patient is nervous/anxious.           Objective:      Vitals:    06/14/22 1359 06/14/22 1438   BP: (!) 146/90 132/78   Pulse: 106    SpO2: 95%    Weight: 69.4 kg (153 lb)    Height: 5' 11" (1.803 m)      Physical Exam  Constitutional:       Comments: For old female patient seated in wheelchair   HENT:      Head: Normocephalic and atraumatic.      Mouth/Throat:      Mouth: Mucous membranes are moist.   Eyes:      Conjunctiva/sclera: Conjunctivae normal.   Pulmonary:      Effort: Pulmonary effort is normal.   Neurological:      Mental Status: She is alert and oriented to person, place, and time.      Cranial Nerves: No cranial nerve deficit.   Psychiatric:         Mood and Affect: Mood normal.         Behavior: Behavior is hyperactive.           Assessment:       1. Type 2 diabetes mellitus with hyperglycemia, with long-term current use of insulin    2. Mixed hyperlipidemia    3. Essential hypertension    4. Therapeutic opioid induced constipation    5. Malignant neoplasm of lower-outer quadrant of left breast of female, estrogen receptor positive    6. Bone metastases    7. Pathological fracture of thoracic vertebra with delayed healing, subsequent encounter    8. Chemotherapy-induced neutropenia         Plan:       Type 2 diabetes mellitus with hyperglycemia, with long-term current use of insulin  -     Hemoglobin A1C; Future; Expected date: 06/21/2022  The patient instructed to increase Lantus from 25 units twice a day to 28 units twice a day to accommodate " steroid usage    Mixed hyperlipidemia  Stable on meds    Essential hypertension  Control is noted    Therapeutic opioid induced constipation  Continue stool softeners    Malignant neoplasm of lower-outer quadrant of left breast of female, estrogen receptor positive  Patient to continue regimen for chemotherapy per Hematology-Oncology    Bone metastases    Pathological fracture of thoracic vertebra with delayed healing, subsequent encounter    Chemotherapy-induced neutropenia      Follow up in about 4 months (around 10/14/2022) for Diabetic Check-Up.

## 2022-06-15 ENCOUNTER — TELEPHONE (OUTPATIENT)
Dept: FAMILY MEDICINE | Facility: CLINIC | Age: 62
End: 2022-06-15

## 2022-06-15 NOTE — TELEPHONE ENCOUNTER
----- Message from Melissa Ogden sent at 6/15/2022 10:49 AM CDT -----  Pt calling said she is on a low income program for her Lantus said it is due and we need to do whatever is needed on our end. We are suppose to call Hannah Ibarra @ [phone]. Pt said she called about this yesterday as well and it has not been done.    pt cb # [phone]

## 2022-06-15 NOTE — TELEPHONE ENCOUNTER
Spoke with [employer], states patient eligibility  and needs to reapply to program.  Patient verbalized understanding -DN   Miscarriage  X5 with D&C

## 2022-06-16 LAB — CANCER AG15-3 SERPL IA-ACNC: 39 U/ML

## 2022-06-17 LAB — CANCER AG27-29 SERPL-ACNC: 42.2 U/ML

## 2022-06-19 RX ORDER — SODIUM CHLORIDE 0.9 % (FLUSH) 0.9 %
10 SYRINGE (ML) INJECTION
Status: CANCELLED | OUTPATIENT
Start: 2022-06-23

## 2022-06-19 RX ORDER — HEPARIN 100 UNIT/ML
500 SYRINGE INTRAVENOUS
Status: CANCELLED | OUTPATIENT
Start: 2022-06-23

## 2022-06-19 RX ORDER — ONDANSETRON 2 MG/ML
16 INJECTION INTRAMUSCULAR; INTRAVENOUS ONCE
Status: CANCELLED | OUTPATIENT
Start: 2022-06-23

## 2022-06-21 ENCOUNTER — LAB VISIT (OUTPATIENT)
Dept: LAB | Facility: HOSPITAL | Age: 62
End: 2022-06-21
Attending: INTERNAL MEDICINE
Payer: MEDICARE

## 2022-06-21 DIAGNOSIS — C50.411 MALIGNANT NEOPLASM OF UPPER-OUTER QUADRANT OF RIGHT BREAST IN FEMALE, ESTROGEN RECEPTOR NEGATIVE: ICD-10-CM

## 2022-06-21 DIAGNOSIS — Z17.1 MALIGNANT NEOPLASM OF UPPER-OUTER QUADRANT OF RIGHT BREAST IN FEMALE, ESTROGEN RECEPTOR NEGATIVE: ICD-10-CM

## 2022-06-21 DIAGNOSIS — C79.51 BONE METASTASES: ICD-10-CM

## 2022-06-21 LAB
ALBUMIN SERPL BCP-MCNC: 3.6 G/DL (ref 3.5–5.2)
ALP SERPL-CCNC: 548 U/L (ref 55–135)
ALT SERPL W/O P-5'-P-CCNC: 18 U/L (ref 10–44)
ANION GAP SERPL CALC-SCNC: 13 MMOL/L (ref 8–16)
AST SERPL-CCNC: 24 U/L (ref 10–40)
BASOPHILS # BLD AUTO: 0.01 K/UL (ref 0–0.2)
BASOPHILS NFR BLD: 0.1 % (ref 0–1.9)
BILIRUB SERPL-MCNC: 0.5 MG/DL (ref 0.1–1)
BUN SERPL-MCNC: 5 MG/DL (ref 6–20)
CALCIUM SERPL-MCNC: 8.6 MG/DL (ref 8.7–10.5)
CHLORIDE SERPL-SCNC: 107 MMOL/L (ref 95–110)
CO2 SERPL-SCNC: 23 MMOL/L (ref 23–29)
CREAT SERPL-MCNC: 0.6 MG/DL (ref 0.5–1.4)
DIFFERENTIAL METHOD: ABNORMAL
EOSINOPHIL # BLD AUTO: 0 K/UL (ref 0–0.5)
EOSINOPHIL NFR BLD: 0.1 % (ref 0–8)
ERYTHROCYTE [DISTWIDTH] IN BLOOD BY AUTOMATED COUNT: 16.1 % (ref 11.5–14.5)
EST. GFR  (AFRICAN AMERICAN): >60 ML/MIN/1.73 M^2
EST. GFR  (NON AFRICAN AMERICAN): >60 ML/MIN/1.73 M^2
GLUCOSE SERPL-MCNC: 133 MG/DL (ref 70–110)
HCT VFR BLD AUTO: 35.3 % (ref 37–48.5)
HGB BLD-MCNC: 11.7 G/DL (ref 12–16)
IMM GRANULOCYTES # BLD AUTO: 0.14 K/UL (ref 0–0.04)
IMM GRANULOCYTES NFR BLD AUTO: 1.5 % (ref 0–0.5)
LYMPHOCYTES # BLD AUTO: 0.8 K/UL (ref 1–4.8)
LYMPHOCYTES NFR BLD: 8 % (ref 18–48)
MCH RBC QN AUTO: 32.1 PG (ref 27–31)
MCHC RBC AUTO-ENTMCNC: 33.1 G/DL (ref 32–36)
MCV RBC AUTO: 97 FL (ref 82–98)
MONOCYTES # BLD AUTO: 0.7 K/UL (ref 0.3–1)
MONOCYTES NFR BLD: 7.6 % (ref 4–15)
NEUTROPHILS # BLD AUTO: 7.8 K/UL (ref 1.8–7.7)
NEUTROPHILS NFR BLD: 82.7 % (ref 38–73)
NRBC BLD-RTO: 2 /100 WBC
PLATELET # BLD AUTO: 337 K/UL (ref 150–450)
PMV BLD AUTO: 9.2 FL (ref 9.2–12.9)
POTASSIUM SERPL-SCNC: 3.6 MMOL/L (ref 3.5–5.1)
PROT SERPL-MCNC: 7.3 G/DL (ref 6–8.4)
RBC # BLD AUTO: 3.64 M/UL (ref 4–5.4)
SODIUM SERPL-SCNC: 143 MMOL/L (ref 136–145)
WBC # BLD AUTO: 9.48 K/UL (ref 3.9–12.7)

## 2022-06-21 PROCEDURE — 86300 IMMUNOASSAY TUMOR CA 15-3: CPT | Performed by: INTERNAL MEDICINE

## 2022-06-21 PROCEDURE — 85025 COMPLETE CBC W/AUTO DIFF WBC: CPT | Performed by: INTERNAL MEDICINE

## 2022-06-21 PROCEDURE — 86300 IMMUNOASSAY TUMOR CA 15-3: CPT | Mod: 91 | Performed by: INTERNAL MEDICINE

## 2022-06-21 PROCEDURE — 80053 COMPREHEN METABOLIC PANEL: CPT | Performed by: INTERNAL MEDICINE

## 2022-06-23 ENCOUNTER — SOCIAL WORK (OUTPATIENT)
Dept: HEMATOLOGY/ONCOLOGY | Facility: CLINIC | Age: 62
End: 2022-06-23

## 2022-06-23 ENCOUNTER — INFUSION (OUTPATIENT)
Dept: INFUSION THERAPY | Facility: HOSPITAL | Age: 62
End: 2022-06-23
Attending: INTERNAL MEDICINE
Payer: MEDICARE

## 2022-06-23 ENCOUNTER — TELEPHONE (OUTPATIENT)
Dept: FAMILY MEDICINE | Facility: CLINIC | Age: 62
End: 2022-06-23

## 2022-06-23 VITALS
SYSTOLIC BLOOD PRESSURE: 130 MMHG | TEMPERATURE: 99 F | BODY MASS INDEX: 21.31 KG/M2 | HEART RATE: 98 BPM | DIASTOLIC BLOOD PRESSURE: 84 MMHG | RESPIRATION RATE: 18 BRPM | WEIGHT: 152.19 LBS | OXYGEN SATURATION: 96 % | HEIGHT: 71 IN

## 2022-06-23 DIAGNOSIS — Z17.0 MALIGNANT NEOPLASM OF LOWER-OUTER QUADRANT OF LEFT BREAST OF FEMALE, ESTROGEN RECEPTOR POSITIVE: ICD-10-CM

## 2022-06-23 DIAGNOSIS — C79.51 BONE METASTASES: Primary | ICD-10-CM

## 2022-06-23 DIAGNOSIS — C50.512 MALIGNANT NEOPLASM OF LOWER-OUTER QUADRANT OF LEFT BREAST OF FEMALE, ESTROGEN RECEPTOR POSITIVE: ICD-10-CM

## 2022-06-23 DIAGNOSIS — C50.411 MALIGNANT NEOPLASM OF UPPER-OUTER QUADRANT OF RIGHT BREAST IN FEMALE, ESTROGEN RECEPTOR NEGATIVE: ICD-10-CM

## 2022-06-23 DIAGNOSIS — Z17.1 MALIGNANT NEOPLASM OF UPPER-OUTER QUADRANT OF RIGHT BREAST IN FEMALE, ESTROGEN RECEPTOR NEGATIVE: ICD-10-CM

## 2022-06-23 DIAGNOSIS — D70.1 CHEMOTHERAPY-INDUCED NEUTROPENIA: ICD-10-CM

## 2022-06-23 DIAGNOSIS — T45.1X5A CHEMOTHERAPY-INDUCED NEUTROPENIA: ICD-10-CM

## 2022-06-23 LAB — CANCER AG15-3 SERPL IA-ACNC: 49 U/ML

## 2022-06-23 PROCEDURE — A4216 STERILE WATER/SALINE, 10 ML: HCPCS | Performed by: INTERNAL MEDICINE

## 2022-06-23 PROCEDURE — 96413 CHEMO IV INFUSION 1 HR: CPT

## 2022-06-23 PROCEDURE — 25000003 PHARM REV CODE 250: Performed by: INTERNAL MEDICINE

## 2022-06-23 PROCEDURE — 96375 TX/PRO/DX INJ NEW DRUG ADDON: CPT

## 2022-06-23 PROCEDURE — 96367 TX/PROPH/DG ADDL SEQ IV INF: CPT

## 2022-06-23 PROCEDURE — 63600175 PHARM REV CODE 636 W HCPCS: Mod: JG | Performed by: INTERNAL MEDICINE

## 2022-06-23 PROCEDURE — 96417 CHEMO IV INFUS EACH ADDL SEQ: CPT

## 2022-06-23 RX ORDER — SODIUM CHLORIDE 0.9 % (FLUSH) 0.9 %
10 SYRINGE (ML) INJECTION
Status: DISCONTINUED | OUTPATIENT
Start: 2022-06-23 | End: 2022-06-23 | Stop reason: HOSPADM

## 2022-06-23 RX ADMIN — GEMCITABINE HYDROCHLORIDE 1425 MG: 2 INJECTION, SOLUTION INTRAVENOUS at 09:06

## 2022-06-23 RX ADMIN — APREPITANT 130 MG: 130 INJECTION, EMULSION INTRAVENOUS at 08:06

## 2022-06-23 RX ADMIN — CARBOPLATIN 625 MG: 10 INJECTION, SOLUTION INTRAVENOUS at 10:06

## 2022-06-23 RX ADMIN — SODIUM CHLORIDE, PRESERVATIVE FREE 10 ML: 5 INJECTION INTRAVENOUS at 12:06

## 2022-06-23 RX ADMIN — SODIUM CHLORIDE: 9 INJECTION, SOLUTION INTRAVENOUS at 08:06

## 2022-06-23 RX ADMIN — ONDANSETRON 16 MG: 2 INJECTION INTRAMUSCULAR; INTRAVENOUS at 09:06

## 2022-06-23 RX ADMIN — DEXAMETHASONE SODIUM PHOSPHATE 12 MG: 4 INJECTION, SOLUTION INTRA-ARTICULAR; INTRALESIONAL; INTRAMUSCULAR; INTRAVENOUS; SOFT TISSUE at 08:06

## 2022-06-23 NOTE — TELEPHONE ENCOUNTER
Spoke with patient will drop off application for patient assistance on tomorrow for MD signature -DN

## 2022-06-23 NOTE — TELEPHONE ENCOUNTER
----- Message from Shantal Berman sent at 6/23/2022  9:30 AM CDT -----  Patient called and stated that she would like to see if the nurse can give her a call about her medicine please give her a call at [phone]

## 2022-06-23 NOTE — PROGRESS NOTES
[facility] Medical Necessity Form given to Dr. Peraza's RN to complete and have an order written for a motorized scooter.  I will then fax the documents to [facility].

## 2022-06-23 NOTE — PLAN OF CARE
Problem: Activity Intolerance  Goal: Enhanced Capacity and Energy  Outcome: Ongoing, Progressing  Intervention: Optimize Activity Tolerance  Flowsheets (Taken 6/23/2022 0805)  Self-Care Promotion: independence encouraged  Activity Management:   Ambulated -L4   Up in chair - L3  Environmental Support: rest periods encouraged

## 2022-06-24 LAB — CANCER AG27-29 SERPL-ACNC: 56.3 U/ML

## 2022-06-27 ENCOUNTER — TELEPHONE (OUTPATIENT)
Dept: FAMILY MEDICINE | Facility: CLINIC | Age: 62
End: 2022-06-27

## 2022-06-27 NOTE — TELEPHONE ENCOUNTER
----- Message from Melissa Ogden sent at 6/24/2022  1:38 PM CDT -----  Pt's friend Carlos came in office @ 1:39 on Friday 6/24 dropped office an envelope only said to give to Anabella. I put your desk   [phone]

## 2022-06-28 ENCOUNTER — LAB VISIT (OUTPATIENT)
Dept: LAB | Facility: HOSPITAL | Age: 62
End: 2022-06-28
Attending: INTERNAL MEDICINE
Payer: MEDICARE

## 2022-06-28 ENCOUNTER — TELEPHONE (OUTPATIENT)
Dept: HEMATOLOGY/ONCOLOGY | Facility: CLINIC | Age: 62
End: 2022-06-28

## 2022-06-28 DIAGNOSIS — C50.411 MALIGNANT NEOPLASM OF UPPER-OUTER QUADRANT OF RIGHT BREAST IN FEMALE, ESTROGEN RECEPTOR NEGATIVE: ICD-10-CM

## 2022-06-28 DIAGNOSIS — Z17.1 MALIGNANT NEOPLASM OF UPPER-OUTER QUADRANT OF RIGHT BREAST IN FEMALE, ESTROGEN RECEPTOR NEGATIVE: ICD-10-CM

## 2022-06-28 DIAGNOSIS — C79.51 BONE METASTASES: ICD-10-CM

## 2022-06-28 LAB
ALBUMIN SERPL BCP-MCNC: 3.5 G/DL (ref 3.5–5.2)
ALP SERPL-CCNC: 494 U/L (ref 55–135)
ALT SERPL W/O P-5'-P-CCNC: 61 U/L (ref 10–44)
ANION GAP SERPL CALC-SCNC: 10 MMOL/L (ref 8–16)
ANISOCYTOSIS BLD QL SMEAR: SLIGHT
AST SERPL-CCNC: 47 U/L (ref 10–40)
BASOPHILS NFR BLD: 0 % (ref 0–1.9)
BILIRUB SERPL-MCNC: 0.6 MG/DL (ref 0.1–1)
BUN SERPL-MCNC: 10 MG/DL (ref 6–20)
CALCIUM SERPL-MCNC: 8.8 MG/DL (ref 8.7–10.5)
CHLORIDE SERPL-SCNC: 107 MMOL/L (ref 95–110)
CO2 SERPL-SCNC: 22 MMOL/L (ref 23–29)
CREAT SERPL-MCNC: 0.7 MG/DL (ref 0.5–1.4)
DIFFERENTIAL METHOD: ABNORMAL
EOSINOPHIL NFR BLD: 0 % (ref 0–8)
ERYTHROCYTE [DISTWIDTH] IN BLOOD BY AUTOMATED COUNT: 14.8 % (ref 11.5–14.5)
EST. GFR  (AFRICAN AMERICAN): >60 ML/MIN/1.73 M^2
EST. GFR  (NON AFRICAN AMERICAN): >60 ML/MIN/1.73 M^2
GLUCOSE SERPL-MCNC: 305 MG/DL (ref 70–110)
HCT VFR BLD AUTO: 34.1 % (ref 37–48.5)
HGB BLD-MCNC: 11 G/DL (ref 12–16)
HYPOCHROMIA BLD QL SMEAR: ABNORMAL
IMM GRANULOCYTES # BLD AUTO: ABNORMAL K/UL (ref 0–0.04)
IMM GRANULOCYTES NFR BLD AUTO: ABNORMAL % (ref 0–0.5)
LYMPHOCYTES NFR BLD: 13 % (ref 18–48)
MCH RBC QN AUTO: 31.1 PG (ref 27–31)
MCHC RBC AUTO-ENTMCNC: 32.3 G/DL (ref 32–36)
MCV RBC AUTO: 96 FL (ref 82–98)
MONOCYTES NFR BLD: 0 % (ref 4–15)
NEUTROPHILS NFR BLD: 75 % (ref 38–73)
NEUTS BAND NFR BLD MANUAL: 12 %
NRBC BLD-RTO: 0 /100 WBC
PLATELET # BLD AUTO: 246 K/UL (ref 150–450)
PLATELET BLD QL SMEAR: ABNORMAL
PMV BLD AUTO: 9 FL (ref 9.2–12.9)
POTASSIUM SERPL-SCNC: 4.3 MMOL/L (ref 3.5–5.1)
PROT SERPL-MCNC: 6.8 G/DL (ref 6–8.4)
RBC # BLD AUTO: 3.54 M/UL (ref 4–5.4)
SODIUM SERPL-SCNC: 139 MMOL/L (ref 136–145)
WBC # BLD AUTO: 1.99 K/UL (ref 3.9–12.7)

## 2022-06-28 PROCEDURE — 80053 COMPREHEN METABOLIC PANEL: CPT | Performed by: INTERNAL MEDICINE

## 2022-06-28 PROCEDURE — 85007 BL SMEAR W/DIFF WBC COUNT: CPT | Performed by: INTERNAL MEDICINE

## 2022-06-28 PROCEDURE — 85027 COMPLETE CBC AUTOMATED: CPT | Performed by: INTERNAL MEDICINE

## 2022-06-28 PROCEDURE — 86300 IMMUNOASSAY TUMOR CA 15-3: CPT | Mod: 91 | Performed by: INTERNAL MEDICINE

## 2022-06-28 PROCEDURE — 86300 IMMUNOASSAY TUMOR CA 15-3: CPT | Performed by: INTERNAL MEDICINE

## 2022-06-29 ENCOUNTER — TELEPHONE (OUTPATIENT)
Dept: FAMILY MEDICINE | Facility: CLINIC | Age: 62
End: 2022-06-29

## 2022-06-29 ENCOUNTER — DOCUMENTATION ONLY (OUTPATIENT)
Dept: INFUSION THERAPY | Facility: HOSPITAL | Age: 62
End: 2022-06-29

## 2022-06-29 NOTE — NURSING
Patient scheduled for C2D8 tomorrow; however, the chemo orders seem to have been updated without a day 8 and neulasta changed to day 3. Spoke with Dr. Vick and he did clarify that patient will now receive C1D1 Carboplatin/Gemzar D3 Neulasta every 21 days and that patient can get the Neulasta tomorrow since her WBC was low. Scheduling notified.

## 2022-06-29 NOTE — TELEPHONE ENCOUNTER
----- Message from Melissa Ogden sent at 6/29/2022  9:58 AM CDT -----  Pt calling to dequan on the Lantus paperwork she had a friend drop off on Friday 6/24/2022.    Cb # [phone]

## 2022-06-30 ENCOUNTER — INFUSION (OUTPATIENT)
Dept: INFUSION THERAPY | Facility: HOSPITAL | Age: 62
End: 2022-06-30
Attending: INTERNAL MEDICINE
Payer: MEDICARE

## 2022-06-30 ENCOUNTER — OFFICE VISIT (OUTPATIENT)
Dept: HEMATOLOGY/ONCOLOGY | Facility: CLINIC | Age: 62
End: 2022-06-30
Payer: MEDICARE

## 2022-06-30 VITALS — HEART RATE: 98 BPM | TEMPERATURE: 99 F | DIASTOLIC BLOOD PRESSURE: 72 MMHG | SYSTOLIC BLOOD PRESSURE: 108 MMHG

## 2022-06-30 DIAGNOSIS — Z17.1 MALIGNANT NEOPLASM OF UPPER-OUTER QUADRANT OF RIGHT BREAST IN FEMALE, ESTROGEN RECEPTOR NEGATIVE: Primary | ICD-10-CM

## 2022-06-30 DIAGNOSIS — Z17.0 MALIGNANT NEOPLASM OF LOWER-OUTER QUADRANT OF LEFT BREAST OF FEMALE, ESTROGEN RECEPTOR POSITIVE: ICD-10-CM

## 2022-06-30 DIAGNOSIS — Z17.0 MALIGNANT NEOPLASM OF LOWER-INNER QUADRANT OF RIGHT BREAST OF FEMALE, ESTROGEN RECEPTOR POSITIVE: ICD-10-CM

## 2022-06-30 DIAGNOSIS — C50.512 MALIGNANT NEOPLASM OF LOWER-OUTER QUADRANT OF LEFT BREAST OF FEMALE, ESTROGEN RECEPTOR POSITIVE: ICD-10-CM

## 2022-06-30 DIAGNOSIS — R07.81 PLEURITIC CHEST PAIN: ICD-10-CM

## 2022-06-30 DIAGNOSIS — T45.1X5A CHEMOTHERAPY-INDUCED NEUTROPENIA: ICD-10-CM

## 2022-06-30 DIAGNOSIS — R05.9 COUGH: ICD-10-CM

## 2022-06-30 DIAGNOSIS — C50.411 MALIGNANT NEOPLASM OF UPPER-OUTER QUADRANT OF RIGHT BREAST IN FEMALE, ESTROGEN RECEPTOR NEGATIVE: Primary | ICD-10-CM

## 2022-06-30 DIAGNOSIS — C79.51 BONE METASTASES: ICD-10-CM

## 2022-06-30 DIAGNOSIS — C79.51 BONE METASTASES: Primary | ICD-10-CM

## 2022-06-30 DIAGNOSIS — C78.7 LIVER METASTASES: ICD-10-CM

## 2022-06-30 DIAGNOSIS — D70.1 CHEMOTHERAPY-INDUCED NEUTROPENIA: ICD-10-CM

## 2022-06-30 DIAGNOSIS — C50.411 MALIGNANT NEOPLASM OF UPPER-OUTER QUADRANT OF RIGHT BREAST IN FEMALE, ESTROGEN RECEPTOR NEGATIVE: ICD-10-CM

## 2022-06-30 DIAGNOSIS — C50.311 MALIGNANT NEOPLASM OF LOWER-INNER QUADRANT OF RIGHT BREAST OF FEMALE, ESTROGEN RECEPTOR POSITIVE: ICD-10-CM

## 2022-06-30 DIAGNOSIS — Z17.1 MALIGNANT NEOPLASM OF UPPER-OUTER QUADRANT OF RIGHT BREAST IN FEMALE, ESTROGEN RECEPTOR NEGATIVE: ICD-10-CM

## 2022-06-30 LAB — CANCER AG15-3 SERPL IA-ACNC: 37 U/ML

## 2022-06-30 PROCEDURE — 3074F SYST BP LT 130 MM HG: CPT | Mod: CPTII,S$GLB,, | Performed by: INTERNAL MEDICINE

## 2022-06-30 PROCEDURE — 4010F ACE/ARB THERAPY RXD/TAKEN: CPT | Mod: CPTII,S$GLB,, | Performed by: INTERNAL MEDICINE

## 2022-06-30 PROCEDURE — 99213 OFFICE O/P EST LOW 20 MIN: CPT | Mod: S$GLB,,, | Performed by: INTERNAL MEDICINE

## 2022-06-30 PROCEDURE — 3046F PR MOST RECENT HEMOGLOBIN A1C LEVEL > 9.0%: ICD-10-PCS | Mod: CPTII,S$GLB,, | Performed by: INTERNAL MEDICINE

## 2022-06-30 PROCEDURE — 3074F PR MOST RECENT SYSTOLIC BLOOD PRESSURE < 130 MM HG: ICD-10-PCS | Mod: CPTII,S$GLB,, | Performed by: INTERNAL MEDICINE

## 2022-06-30 PROCEDURE — 3078F PR MOST RECENT DIASTOLIC BLOOD PRESSURE < 80 MM HG: ICD-10-PCS | Mod: CPTII,S$GLB,, | Performed by: INTERNAL MEDICINE

## 2022-06-30 PROCEDURE — 3078F DIAST BP <80 MM HG: CPT | Mod: CPTII,S$GLB,, | Performed by: INTERNAL MEDICINE

## 2022-06-30 PROCEDURE — 3046F HEMOGLOBIN A1C LEVEL >9.0%: CPT | Mod: CPTII,S$GLB,, | Performed by: INTERNAL MEDICINE

## 2022-06-30 PROCEDURE — 4010F PR ACE/ARB THEARPY RXD/TAKEN: ICD-10-PCS | Mod: CPTII,S$GLB,, | Performed by: INTERNAL MEDICINE

## 2022-06-30 PROCEDURE — 99213 PR OFFICE/OUTPT VISIT, EST, LEVL III, 20-29 MIN: ICD-10-PCS | Mod: S$GLB,,, | Performed by: INTERNAL MEDICINE

## 2022-06-30 RX ORDER — FENTANYL 25 UG/1
1 PATCH TRANSDERMAL
Status: DISCONTINUED | OUTPATIENT
Start: 2022-06-30 | End: 2022-06-30 | Stop reason: CLARIF

## 2022-06-30 RX ORDER — CARISOPRODOL 350 MG/1
350 TABLET ORAL 3 TIMES DAILY PRN
Qty: 90 TABLET | Refills: 0 | Status: SHIPPED | OUTPATIENT
Start: 2022-06-30 | End: 2022-07-25 | Stop reason: SDUPTHER

## 2022-06-30 RX ORDER — DRONABINOL 5 MG/1
5 CAPSULE ORAL
Qty: 60 CAPSULE | Refills: 0 | Status: SHIPPED | OUTPATIENT
Start: 2022-06-30 | End: 2023-01-01 | Stop reason: SDUPTHER

## 2022-06-30 RX ORDER — OXYCODONE HYDROCHLORIDE 15 MG/1
15 TABLET ORAL
Qty: 120 TABLET | Refills: 0 | Status: SHIPPED | OUTPATIENT
Start: 2022-06-30 | End: 2022-07-25 | Stop reason: SDUPTHER

## 2022-06-30 RX ORDER — HYDROCODONE BITARTRATE AND ACETAMINOPHEN 10; 325 MG/1; MG/1
1 TABLET ORAL EVERY 6 HOURS PRN
Qty: 120 TABLET | Refills: 0 | Status: SHIPPED | OUTPATIENT
Start: 2022-06-30 | End: 2022-07-25 | Stop reason: SDUPTHER

## 2022-06-30 NOTE — NURSING
Patient arrived today with misconception that she was to receive chemotherapy today and have neulasta injection tomorrow. Explained to patient that her treatment schedule was changed and that she was just to receive neulasta today. Patient did not want to receive injection today. She stated dr. arthur gave instructions to get injection on third day after applying fentanyl patch. She applied her patch on 6/29/22. Patient to receive injection on 7/1/22. Patient to have follow up appointment with Dr. Arthur today at 1245. Patient verbalized understanding.

## 2022-07-01 ENCOUNTER — INFUSION (OUTPATIENT)
Dept: INFUSION THERAPY | Facility: HOSPITAL | Age: 62
End: 2022-07-01
Attending: INTERNAL MEDICINE
Payer: MEDICARE

## 2022-07-01 VITALS
DIASTOLIC BLOOD PRESSURE: 76 MMHG | OXYGEN SATURATION: 98 % | BODY MASS INDEX: 21.17 KG/M2 | SYSTOLIC BLOOD PRESSURE: 124 MMHG | HEART RATE: 93 BPM | RESPIRATION RATE: 18 BRPM | TEMPERATURE: 98 F | WEIGHT: 151.81 LBS

## 2022-07-01 DIAGNOSIS — C50.512 MALIGNANT NEOPLASM OF LOWER-OUTER QUADRANT OF LEFT BREAST OF FEMALE, ESTROGEN RECEPTOR POSITIVE: ICD-10-CM

## 2022-07-01 DIAGNOSIS — Z17.1 MALIGNANT NEOPLASM OF UPPER-OUTER QUADRANT OF RIGHT BREAST IN FEMALE, ESTROGEN RECEPTOR NEGATIVE: ICD-10-CM

## 2022-07-01 DIAGNOSIS — C50.411 MALIGNANT NEOPLASM OF UPPER-OUTER QUADRANT OF RIGHT BREAST IN FEMALE, ESTROGEN RECEPTOR NEGATIVE: ICD-10-CM

## 2022-07-01 DIAGNOSIS — T45.1X5A CHEMOTHERAPY-INDUCED NEUTROPENIA: ICD-10-CM

## 2022-07-01 DIAGNOSIS — D70.1 CHEMOTHERAPY-INDUCED NEUTROPENIA: ICD-10-CM

## 2022-07-01 DIAGNOSIS — C79.51 BONE METASTASES: Primary | ICD-10-CM

## 2022-07-01 DIAGNOSIS — Z17.0 MALIGNANT NEOPLASM OF LOWER-OUTER QUADRANT OF LEFT BREAST OF FEMALE, ESTROGEN RECEPTOR POSITIVE: ICD-10-CM

## 2022-07-01 LAB — CANCER AG27-29 SERPL-ACNC: 36.3 U/ML

## 2022-07-01 PROCEDURE — 96372 THER/PROPH/DIAG INJ SC/IM: CPT

## 2022-07-01 PROCEDURE — 63600175 PHARM REV CODE 636 W HCPCS: Mod: JG | Performed by: INTERNAL MEDICINE

## 2022-07-01 RX ADMIN — PEGFILGRASTIM 6 MG: 6 INJECTION SUBCUTANEOUS at 11:07

## 2022-07-03 RX ORDER — FENTANYL 25 UG/1
1 PATCH TRANSDERMAL
Qty: 3 PATCH | Refills: 0 | Status: SHIPPED | OUTPATIENT
Start: 2022-07-03 | End: 2022-07-05 | Stop reason: SDUPTHER

## 2022-07-05 ENCOUNTER — LAB VISIT (OUTPATIENT)
Dept: LAB | Facility: HOSPITAL | Age: 62
End: 2022-07-05
Attending: INTERNAL MEDICINE
Payer: MEDICARE

## 2022-07-05 ENCOUNTER — SOCIAL WORK (OUTPATIENT)
Dept: HEMATOLOGY/ONCOLOGY | Facility: CLINIC | Age: 62
End: 2022-07-05

## 2022-07-05 DIAGNOSIS — C79.51 BONE METASTASES: ICD-10-CM

## 2022-07-05 DIAGNOSIS — Z17.1 MALIGNANT NEOPLASM OF UPPER-OUTER QUADRANT OF RIGHT BREAST IN FEMALE, ESTROGEN RECEPTOR NEGATIVE: ICD-10-CM

## 2022-07-05 DIAGNOSIS — C50.411 MALIGNANT NEOPLASM OF UPPER-OUTER QUADRANT OF RIGHT BREAST IN FEMALE, ESTROGEN RECEPTOR NEGATIVE: ICD-10-CM

## 2022-07-05 LAB
ALBUMIN SERPL BCP-MCNC: 3.6 G/DL (ref 3.5–5.2)
ALP SERPL-CCNC: 573 U/L (ref 55–135)
ALT SERPL W/O P-5'-P-CCNC: 27 U/L (ref 10–44)
ANION GAP SERPL CALC-SCNC: 12 MMOL/L (ref 8–16)
ANISOCYTOSIS BLD QL SMEAR: SLIGHT
AST SERPL-CCNC: 31 U/L (ref 10–40)
BASOPHILS # BLD AUTO: ABNORMAL K/UL (ref 0–0.2)
BASOPHILS NFR BLD: 0 % (ref 0–1.9)
BILIRUB SERPL-MCNC: 0.5 MG/DL (ref 0.1–1)
BUN SERPL-MCNC: 4 MG/DL (ref 6–20)
CALCIUM SERPL-MCNC: 8.7 MG/DL (ref 8.7–10.5)
CHLORIDE SERPL-SCNC: 106 MMOL/L (ref 95–110)
CO2 SERPL-SCNC: 23 MMOL/L (ref 23–29)
CREAT SERPL-MCNC: 0.6 MG/DL (ref 0.5–1.4)
DIFFERENTIAL METHOD: ABNORMAL
EOSINOPHIL # BLD AUTO: ABNORMAL K/UL (ref 0–0.5)
EOSINOPHIL NFR BLD: 0 % (ref 0–8)
ERYTHROCYTE [DISTWIDTH] IN BLOOD BY AUTOMATED COUNT: 15.8 % (ref 11.5–14.5)
EST. GFR  (AFRICAN AMERICAN): >60 ML/MIN/1.73 M^2
EST. GFR  (NON AFRICAN AMERICAN): >60 ML/MIN/1.73 M^2
GLUCOSE SERPL-MCNC: 146 MG/DL (ref 70–110)
HCT VFR BLD AUTO: 31.1 % (ref 37–48.5)
HGB BLD-MCNC: 10.3 G/DL (ref 12–16)
IMM GRANULOCYTES # BLD AUTO: ABNORMAL K/UL (ref 0–0.04)
IMM GRANULOCYTES NFR BLD AUTO: ABNORMAL % (ref 0–0.5)
LYMPHOCYTES # BLD AUTO: ABNORMAL K/UL (ref 1–4.8)
LYMPHOCYTES NFR BLD: 3 % (ref 18–48)
MCH RBC QN AUTO: 31.9 PG (ref 27–31)
MCHC RBC AUTO-ENTMCNC: 33.1 G/DL (ref 32–36)
MCV RBC AUTO: 96 FL (ref 82–98)
METAMYELOCYTES NFR BLD MANUAL: 5 %
MONOCYTES # BLD AUTO: ABNORMAL K/UL (ref 0.3–1)
MONOCYTES NFR BLD: 4 % (ref 4–15)
MYELOCYTES NFR BLD MANUAL: 4 %
NEUTROPHILS NFR BLD: 65 % (ref 38–73)
NEUTS BAND NFR BLD MANUAL: 17 %
NRBC BLD-RTO: 1 /100 WBC
OVALOCYTES BLD QL SMEAR: ABNORMAL
PLATELET # BLD AUTO: 110 K/UL (ref 150–450)
PLATELET BLD QL SMEAR: ABNORMAL
PMV BLD AUTO: 10.2 FL (ref 9.2–12.9)
POIKILOCYTOSIS BLD QL SMEAR: SLIGHT
POLYCHROMASIA BLD QL SMEAR: ABNORMAL
POTASSIUM SERPL-SCNC: 3.6 MMOL/L (ref 3.5–5.1)
PROMYELOCYTES NFR BLD MANUAL: 2 %
PROT SERPL-MCNC: 6.8 G/DL (ref 6–8.4)
RBC # BLD AUTO: 3.23 M/UL (ref 4–5.4)
SODIUM SERPL-SCNC: 141 MMOL/L (ref 136–145)
WBC # BLD AUTO: 17.22 K/UL (ref 3.9–12.7)

## 2022-07-05 PROCEDURE — 80053 COMPREHEN METABOLIC PANEL: CPT | Performed by: INTERNAL MEDICINE

## 2022-07-05 PROCEDURE — 85007 BL SMEAR W/DIFF WBC COUNT: CPT | Performed by: INTERNAL MEDICINE

## 2022-07-05 PROCEDURE — 86300 IMMUNOASSAY TUMOR CA 15-3: CPT | Performed by: INTERNAL MEDICINE

## 2022-07-05 PROCEDURE — 85027 COMPLETE CBC AUTOMATED: CPT | Performed by: INTERNAL MEDICINE

## 2022-07-05 PROCEDURE — 36415 COLL VENOUS BLD VENIPUNCTURE: CPT | Performed by: INTERNAL MEDICINE

## 2022-07-05 PROCEDURE — 86300 IMMUNOASSAY TUMOR CA 15-3: CPT | Mod: 91 | Performed by: INTERNAL MEDICINE

## 2022-07-05 RX ORDER — FENTANYL 25 UG/1
1 PATCH TRANSDERMAL
Qty: 5 PATCH | Refills: 0 | Status: SHIPPED | OUTPATIENT
Start: 2022-07-05 | End: 2022-07-06 | Stop reason: SDUPTHER

## 2022-07-05 NOTE — TELEPHONE ENCOUNTER
----- Message from Nell Carrillo sent at 7/5/2022  9:45 AM CDT -----  Lala from [facility] called to ask about the patient's prescription for Fentanyl patches. She said the box comes with 5 patches and the prescription is written for 3 patches. She wants to know if a new prescription can be sent over because they cannot break the box. # [phone]

## 2022-07-05 NOTE — PROGRESS NOTES
[facility] medical necessity form faxed along with documentation to request a motorized scooter.

## 2022-07-06 DIAGNOSIS — C79.51 BONE METASTASES: ICD-10-CM

## 2022-07-06 DIAGNOSIS — Z17.1 MALIGNANT NEOPLASM OF UPPER-OUTER QUADRANT OF RIGHT BREAST IN FEMALE, ESTROGEN RECEPTOR NEGATIVE: ICD-10-CM

## 2022-07-06 DIAGNOSIS — C50.411 MALIGNANT NEOPLASM OF UPPER-OUTER QUADRANT OF RIGHT BREAST IN FEMALE, ESTROGEN RECEPTOR NEGATIVE: ICD-10-CM

## 2022-07-06 LAB
CANCER AG15-3 SERPL IA-ACNC: 45 U/ML
CANCER AG27-29 SERPL-ACNC: 51.4 U/ML

## 2022-07-06 NOTE — TELEPHONE ENCOUNTER
----- Message from Nell Carrillo sent at 7/6/2022 11:48 AM CDT -----  The patient said she got a prescription for her fentanyl patches and that Dr. Peraza usually orders 10 patches to last her the whole month. She said the prescription is for 5 patches. She said if she gets the 5 it is a $45 copay and then she will have to pay another $45 copay to get another 5 to last the month. She wants him to send a prescription for 10 patches so she only has to make one copay of $45. # [phone]

## 2022-07-07 ENCOUNTER — TELEPHONE (OUTPATIENT)
Dept: FAMILY MEDICINE | Facility: CLINIC | Age: 62
End: 2022-07-07

## 2022-07-08 ENCOUNTER — INFUSION (OUTPATIENT)
Dept: INFUSION THERAPY | Facility: HOSPITAL | Age: 62
End: 2022-07-08
Attending: INTERNAL MEDICINE
Payer: MEDICARE

## 2022-07-08 VITALS
WEIGHT: 154 LBS | OXYGEN SATURATION: 99 % | DIASTOLIC BLOOD PRESSURE: 81 MMHG | BODY MASS INDEX: 21.56 KG/M2 | HEART RATE: 90 BPM | RESPIRATION RATE: 18 BRPM | TEMPERATURE: 98 F | HEIGHT: 71 IN | SYSTOLIC BLOOD PRESSURE: 133 MMHG

## 2022-07-08 DIAGNOSIS — C79.51 BONE METASTASES: Primary | ICD-10-CM

## 2022-07-08 DIAGNOSIS — Z17.0 MALIGNANT NEOPLASM OF LOWER-OUTER QUADRANT OF LEFT BREAST OF FEMALE, ESTROGEN RECEPTOR POSITIVE: ICD-10-CM

## 2022-07-08 DIAGNOSIS — C50.512 MALIGNANT NEOPLASM OF LOWER-OUTER QUADRANT OF LEFT BREAST OF FEMALE, ESTROGEN RECEPTOR POSITIVE: ICD-10-CM

## 2022-07-08 PROCEDURE — 96365 THER/PROPH/DIAG IV INF INIT: CPT

## 2022-07-08 PROCEDURE — 25000003 PHARM REV CODE 250: Performed by: NURSE PRACTITIONER

## 2022-07-08 PROCEDURE — 63600175 PHARM REV CODE 636 W HCPCS: Performed by: NURSE PRACTITIONER

## 2022-07-08 PROCEDURE — A4216 STERILE WATER/SALINE, 10 ML: HCPCS | Performed by: NURSE PRACTITIONER

## 2022-07-08 RX ORDER — SODIUM CHLORIDE 0.9 % (FLUSH) 0.9 %
10 SYRINGE (ML) INJECTION
Status: DISCONTINUED | OUTPATIENT
Start: 2022-07-08 | End: 2022-07-08 | Stop reason: HOSPADM

## 2022-07-08 RX ORDER — HEPARIN 100 UNIT/ML
500 SYRINGE INTRAVENOUS
Status: CANCELLED | OUTPATIENT
Start: 2022-08-05

## 2022-07-08 RX ORDER — ZOLEDRONIC ACID 0.04 MG/ML
4 INJECTION, SOLUTION INTRAVENOUS
Status: COMPLETED | OUTPATIENT
Start: 2022-07-08 | End: 2022-07-08

## 2022-07-08 RX ORDER — SODIUM CHLORIDE 0.9 % (FLUSH) 0.9 %
10 SYRINGE (ML) INJECTION
Status: CANCELLED | OUTPATIENT
Start: 2022-08-05

## 2022-07-08 RX ORDER — FENTANYL 25 UG/1
1 PATCH TRANSDERMAL
Qty: 10 PATCH | Refills: 0 | Status: SHIPPED | OUTPATIENT
Start: 2022-07-08 | End: 2022-08-05

## 2022-07-08 RX ORDER — ZOLEDRONIC ACID 0.04 MG/ML
4 INJECTION, SOLUTION INTRAVENOUS
Status: CANCELLED | OUTPATIENT
Start: 2022-08-05

## 2022-07-08 RX ADMIN — ZOLEDRONIC ACID 4 MG: 0.04 INJECTION, SOLUTION INTRAVENOUS at 09:07

## 2022-07-08 RX ADMIN — SODIUM CHLORIDE, PRESERVATIVE FREE 10 ML: 5 INJECTION INTRAVENOUS at 09:07

## 2022-07-10 RX ORDER — SODIUM CHLORIDE 0.9 % (FLUSH) 0.9 %
10 SYRINGE (ML) INJECTION
Status: CANCELLED | OUTPATIENT
Start: 2022-07-14

## 2022-07-10 RX ORDER — HEPARIN 100 UNIT/ML
500 SYRINGE INTRAVENOUS
Status: CANCELLED | OUTPATIENT
Start: 2022-07-14

## 2022-07-10 RX ORDER — ONDANSETRON 2 MG/ML
16 INJECTION INTRAMUSCULAR; INTRAVENOUS ONCE
Status: CANCELLED | OUTPATIENT
Start: 2022-07-14

## 2022-07-11 NOTE — PROGRESS NOTES
"  [facility] In Office Hematology Oncology Subsequent  Encounter Note    6/30/22      Subjective:      Patient ID:   Negrita Castro  62 y.o.   Martínez Shepard R. Leblanc, Babycos, Bourgeois, Hall      Chief Complaint:   New L breast cancer eval.    HPI:  62 y.o. female with diagnosis of Stage 1 R breast cancer 10/26/15.  "Triple negative".  Adjuvant AC x's 4, tx's 12 and Rad Rx through 9/12/16.    Port removed.  Had bilateral reconstruction surgery 4/14/17.  Further reconstruction, tumsofy baxterck 8/14/17.  Additional fat injection at R chest done for symmetry.  She had placement of R nipple.  Dr. Hernandez.      She is due for additional reconstructive surgery at breast and abdomenal areas.  The surgery is being done as part of her reconstruction efforts and for keloid management.  The date of the surgery is May 19th.    Also, Dr. Alarcon to consider Rad Rx to operative site to decrease keloid potential.  On hold now because of Covid 19 pandemic.    Recent Mamm/U/S showed small mass at 4:00 at L breast.  Needle Bx invasive ductal Ca, ERP+, PRP+, H2N neg  She had  L partial mastectomy, Sentinal node Bx 8/24/20. Followed by another surgery because of L axillary infection.  Primary 2 cm, LN neg, Stage 1 dx.    Discussed Oncotype Dx testing, this supported adding adjuvant chemotherapy to adjuvant hormonal therapy because of increased risk of cancer recurrence long-term.  Discussed BRCA testing, given her bilateral  breast Dx. PHN would not authorize BRCA 1 & 2.  Refill meds for her.    She had COVID-19 infection and was hospitalized.  She came very close to requiring intubation and mechanical ventilation.  She is off oxygen now,   and sees Dr. Justin of Pulmonary.  Recent CT scan of the chest showed residual changes of interstitial disease, consistent with recovering from COVID-19.  Also lytic lesions were seen in the thoracic spine area very suspicious for metastatic disease at T5 through T8 spine.    Recently on " Saturday night she had a hard coughing spell, and thereafter developed severe right posterior pleuritic chest pain.  She has point tenderness over the right posterior chest wall and may very well have a fractured rib at the site.  Rib x-rays have been ordered.  I have refilled her Soma for 1 month.  I have refilled her Norco  for 1 month.  I have added oxycodone 5 mg 1 or 2 p.o. Q 3-4 hours p.r.n. breakthrough pain for 1 month to her regimen.    She also has an enlarging nodule at the left chest wall.  Ultrasound of the site suggested that this was a cyst however the areas firm movable and may very well be cancer.  I asked  for a ultrasound-guided biopsy of the mass per radiology.  Path report showed invasive ductal Ca, ERP + PRP neg,   H2N equivocal.    She is postmenopausal, her last menses were 4 5 years ago.  Rib x-rays have been requested.  Will order a CT scan to evaluate for possible metastatic disease.  Will order a ultrasound-guided biopsy of the left chest wall mass for pathologic examination at [facility].  Bx + invasive breast cancer.  ERP+, PRP-, H2N-.    Suspected local recurrence at L breast.  She had L lumpectomy.  Margins clear.    Refer to Rad Rx MD for adjuvant Rad Rx., for local control.  She has T spine metastases.  Currently on hormonal Rx.  To see Dr. Manuelito Shepard for DM, BS control.    Dr. Washington saw her for C spine eval, no surgery planned for now.  He referred her to Dr. Rivers for injection Rx.    DM, cholesterol, epilepsy hx, DJD, LBP.  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    LR shoulder arthroscopy, R wrist surgery, M0.    Smoke  ppd x's 35 years, quit 2015.  ETOH no.  Disability-depression, epilepsy  Allergy none    Mom ovarian cancer  Dad lung cancer  Sisters DM, lung dx.    Summary of care:  Right breast cancer 2015, triple negative, treated with Adriamycin Cytoxan followed by taxane.    Left breast cancer ERP positive PRP positive HER2 Judy negative in  August 24, 2020    COVID infection December 2020.  She has not taken the covid 19 vaccination because of multiple allergy hx.    February 2021 bone metastases determined.    He he March 21, 2021 local left breast cancer recurrence, ERP positive PRP negative HER2 Judy negative.    He April 21st bone biopsy positive for breast cancer metastatic.  ERP, PRP, HER2 Judy pending.    She was on radiation therapy through May 17, 2021.  Refilled Marinol at increased 5 mg 1 p.o. b.i.d. number 60.   I refilled her Soma, Norco, Marinol, and Silvadene cream; dated the prescriptions for June 17, 2021.    C/O mid thoracic pain x's 6-8 weeks,radiates to L & R, increased.  Tender over Mid thoracic area.  Hx of T spine metastatic dx.  Pain controlled on Norco 10/325 mg and oxycodone 5 mg 1-2 po PRN.  Add lodine 400 mg 1 daily    Checked MRI of T spine.  Metastatic dx with pathologic fx at T3,5,6.  Refer to Dr. Flowers of neurosurgery for Vertebroplasty or Kyphoplasty evaluation.    She is on Faslodex. Ibrance. Monitor WBC count.    She saw Dr. Flowers, and has been fitted with a brace initially, vertebroplasty after the first of the year.  [phone].  RTC Dr. Flowers 12/27/21.    No neuro surgery is planned for T-spine disease because of progressive growth and compression of the spine.  She admits to having decreased strength in the right hand.  She completed 10 of 10 radiation treatments on May 2, 2022. She is tapering down her Decadron 4 mg p.o. b.i.d. and she is taking Lodine 400 mg daily.  We are stopping fast low dex and Ibrance as of April 12, 2022.    Will ask the  to see her if we can get Meals on wheels for her.    She is due for an MRI on May 17, and a PET scan on May 19, I will see her on May 20th.  Plan to go with chemotherapy now, probably will go with carboplatin Gemzar.   She is 140 lb and 5 ft 11.     Completed D1C1 carbo, gemzar.  Duragesic patch 25 mch q 3 days x's 3 patches, start Tuesday.  Claritin 10 mg 1  po daily x's 4 days, start Wednesday.  For neulasta or Udenyca Thursday    ROS:   GEN: normal without any fever, night sweats or weight loss  HEENT: normal with no HA's, sore throat, stiff neck, changes in vision  CV: normal with no CP, SOB, PND, POOL or orthopnea  PULM: normal with no SOB, cough, hemoptysis, sputum or pleuritic pain  GI: normal with no abdominal pain, nausea, vomiting, constipation, diarrhea, melanotic stools, BRBPR, or hematemesis  : normal with no hematuria, dysuria  BREAST: See HPI.  SKIN: normal with no rash, erythema, bruising, or swelling     Past Medical History:   Diagnosis Date    Acute hypoxemic respiratory failure 12/12/2020    Breast cancer     left breast    Cancer     RIGHT BREAST 10-15    Depression     Diabetes mellitus     Neuropathy     Panic attacks     S/P epidural steroid injection     Seizures     none since early 30's    Wears glasses     TO DRIVE     Past Surgical History:   Procedure Laterality Date    AXILLARY NODE DISSECTION Left 8/24/2020    Procedure: LYMPHADENECTOMY, AXILLARY;  Surgeon: Cory Vick MD;  Location: [facility] OR;  Service: General;  Laterality: Left;  left breast mastectomy with possible axillary lymph node dissection    BREAST BIOPSY      right    BREAST LUMPECTOMY      BREAST RECONSTRUCTION      breast reconstruction with tummy [facility]      BREAST SURGERY      11-3-15 LUMPECTOMY, 12-2-15 REEXCISION    INCISION AND DRAINAGE OF ABSCESS Left 9/9/2020    Procedure: INCISION AND DRAINAGE, ABSCESS;  Surgeon: Cory Vick MD;  Location: [facility] OR;  Service: General;  Laterality: Left;    INSERTION OF LOCALIZATION WIRE Left 8/24/2020    Procedure: INSERTION, LOCALIZATION WIRE;  Surgeon: Cory Vick MD;  Location: [facility] OR;  Service: General;  Laterality: Left;  left breast lumpectomy with wire needle loc    MASTECTOMY      mastectomy 2016      MASTECTOMY, PARTIAL Left 3/19/2021    Procedure: MASTECTOMY, PARTIAL;  Surgeon:  Cory Vick MD;  Location: [facility] OR;  Service: General;  Laterality: Left;  lumpectomy  left breast     RECONSTRUCTION OF NIPPLE Right 11/5/2018    Procedure: RECONSTRUCTION-NIPPLE RIGHT;  Surgeon: Xiang Hernandez MD;  [facility];  Service: Plastics;  Laterality: Right;    SENTINEL LYMPH NODE BIOPSY Left 8/24/2020    Procedure: BIOPSY, LYMPH NODE, SENTINEL;  Surgeon: Cory Vick MD;  Location: [facility] OR;  Service: General;  Laterality: Left;  left breast lumpectomy with left sentinel lymoh node       shoulder surgery and wrist      BILAT  BONE SPUR    WRIST SURGERY      RIGHT       Review of patient's allergies indicates:   Allergen Reactions    Adhesive tape-silicones Hives     BANDAIDS    Polymycin Hives    Latex, natural rubber Itching    Polyurethane-39            Current Outpatient Medications:     anastrozole (ARIMIDEX) 1 mg Tab, Take 1 mg by mouth once daily., Disp: , Rfl:     blood sugar diagnostic Strp, TEST GLUCOSE BID, Disp: 300 each, Rfl: 3    blood-glucose meter (TRUE METRIX GLUCOSE METER) kit, TEST GLUCOSE BID, Disp: 1 each, Rfl: 0    blood-glucose meter,continuous (DEXCOM G6 ) Misc, TEST GLUCOSE QID, Disp: 1 each, Rfl: 1    blood-glucose sensor (DEXCOM G6 SENSOR) Edwige, TEST GLUCOSE QID, Disp: 13 each, Rfl: 3    blood-glucose transmitter (DEXCOM G6 TRANSMITTER) Edwige, TEST GLUCOSE QID, Disp: 1 each, Rfl: 1    carisoprodoL (SOMA) 350 MG tablet, Take 1 tablet (350 mg total) by mouth 3 (three) times daily as needed for Muscle spasms., Disp: 90 tablet, Rfl: 0    dexAMETHasone (DECADRON) 4 MG Tab, TAKE ONE TABLET (4MG TOTAL) BY MOUTH TWICE DAILY WITH MEALS, Disp: 60 tablet, Rfl: 1    dextromethorphan-guaiFENesin  mg/5 ml (ROBITUSSIN-DM)  mg/5 mL liquid, Take 1 teaspoon q 6 hours prn cough., Disp: 300 mL, Rfl: 0    diazePAM (VALIUM) 10 MG Tab, Take 1 tablet (10 mg total) by mouth 4 (four) times daily as needed (anxiety)., Disp: 120  tablet, Rfl: 3    docusate sodium (COLACE) 100 MG capsule, Take 100 mg by mouth 2 (two) times daily., Disp: , Rfl:     dronabinoL (MARINOL) 5 MG capsule, Take 1 capsule (5 mg total) by mouth 2 (two) times daily before meals., Disp: 60 capsule, Rfl: 0    dulaglutide (TRULICITY) 3 mg/0.5 mL pen injector, Inject 3 mg into the skin every 7 days., Disp: 4 pen, Rfl: 11    enalapril (VASOTEC) 5 MG tablet, Take 1 tablet (5 mg total) by mouth once daily., Disp: 90 tablet, Rfl: 3    etodolac (LODINE) 400 MG tablet, TAKE ONE TABLET (400 MG TOTAL) BY MOUTH ONCE DAILY, Disp: 30 tablet, Rfl: 2    fentaNYL (DURAGESIC) 25 mcg/hr, Place 1 patch onto the skin every 72 hours. for 10 doses, Disp: 10 patch, Rfl: 0    fluconazole (DIFLUCAN) 150 MG Tab, TAKE ONE TABLET BY MOUTH ONCE FOR 1 DOSE, Disp: 1 tablet, Rfl: 2    furosemide (LASIX) 20 MG tablet, TAKE ONE TABLET BY MOUTH EVERY DAY, Disp: 30 tablet, Rfl: 6    HYDROcodone-acetaminophen (NORCO)  mg per tablet, Take 1 tablet by mouth every 6 (six) hours as needed for Pain., Disp: 120 tablet, Rfl: 0    insulin glargine (LANTUS U-100 INSULIN) 100 unit/mL injection, Inject 32 Units into the skin 2 (two) times a day., Disp: 18 mL, Rfl: 11    lancets 32 gauge Misc, TEST GLUCOSE BID, Disp: 300 each, Rfl: 3    metFORMIN (GLUCOPHAGE-XR) 500 MG ER 24hr tablet, Take 2 tablets (1,000 mg total) by mouth 2 (two) times daily with meals., Disp: 360 tablet, Rfl: 3    metoprolol tartrate (LOPRESSOR) 25 MG tablet, Take 1 tablet (25 mg total) by mouth 2 (two) times daily., Disp: 60 tablet, Rfl: 11    mometasone 0.1% (ELOCON) 0.1 % cream, Apply to affected area daily, Disp: 45 g, Rfl: 1    naloxegoL (MOVANTIK) 12.5 mg Tab, Take 12.5 mg by mouth once daily., Disp: 30 tablet, Rfl: 3    ondansetron (ZOFRAN-ODT) 8 MG TbDL, DISSOLVE 1 TABLET (8MG TOTAL) on the tongue EVERY 6 TO 8 HOURS AS NEEDED (NAUSEA), Disp: 30 tablet, Rfl: 1    oxyCODONE (ROXICODONE) 15 MG Tab, Take 1 tablet (15 mg  "total) by mouth every 4 to 6 hours as needed for Pain., Disp: 120 tablet, Rfl: 0    palbociclib 100 mg Tab, Take 100 mg by mouth once daily. for 21 days, then take 7 days off. Total cycle length 28 days, Disp: 21 tablet, Rfl: 6    pen needle, diabetic (BD ULTRA-FINE MARCE PEN NEEDLE) 32 gauge x 5/32" Ndle, Test blood sugar twice daily, Disp: 100 each, Rfl: 5    promethazine (PHENERGAN) 25 MG tablet, Take 1 tablet (25 mg total) by mouth every 4 to 6 hours as needed., Disp: 30 tablet, Rfl: 5    promethazine-codeine 6.25-10 mg/5 ml (PHENERGAN WITH CODEINE) 6.25-10 mg/5 mL syrup, TAKE 5 ML BY MOUTH EVERY 6 HOURS AS NEEDED FOR COUGH, Disp: 450 mL, Rfl: 0    psyllium husk, with sugar, (METAMUCIL, WITH SUGAR,) 3.4 gram packet, Take 1 packet by mouth 2 (two) times daily., Disp: 30 packet, Rfl: 2    rosuvastatin (CRESTOR) 40 MG Tab, Take 1 tablet (40 mg total) by mouth every evening. For cholesterol, Disp: 90 tablet, Rfl: 3    zolpidem (AMBIEN) 10 mg Tab, TAKE ONE TABLET BY MOUTH EVERY NIGHT AT BEDTIME, Disp: 30 tablet, Rfl: 3          Objective:   Vitals:  Last menstrual period 01/16/2016.    Physical Examination:   GEN: no apparent distress, comfortable, Brace in place.  HEAD: atraumatic and normocephalic  EYES: no pallor, no icterus  ENT: no pharyngeal erythema  NECK: noLAD/LN's, supple  CV:  No edema  CHEST: Normal respiratory effort   she is not  tender over the right posterior chest wall on exam.  The chest wall is not swollen.  ABDOM:  nondistended; soft                                                                                          MUSC/Skeletal: ROM normal, Tender over mid T spine area.  EXTREM: no swelling  SKIN: She is developing keloid changes at L breast incision and navel surgical sites.  This area appears to be enlarging.  NEURO: grossly intact  PSYCH: normal mood, affect and behavior  LYMPH: normal  LN's  BREASTS: L breast is much improved.    Refill Oxycodone, SOMA, Norco.    Assessment:   (1) " 62 y.o. female with diagnosis of Stage 1 triple negative R breast cancer, 10/2016.       S/P R mastectomy, SNBx, AC x's 4, T x's12 weeks, Rad Rx and recent reconstruction.    (2) New L invasive ductal Ca, ERP+, PRP+, H2N neg.  Clinical stage 1 dx.    I have started her on Arimidex 1 mg p.o. daily  for metastatic disease at this time.    Suspected fracture of right ribs after recent cough event, check rib x-rays, medicate for pain.    ERP+ dx, bone metastases.  Started on Arimidex daily.  Add 2nd hormonal drug Rx after Rad Rx completed.    Bone biopsy positive for metastatic breast cancer.  ERP+, PRP+, H2N-.    Check PET and MRI of brain.  RTC 5/20/22.  Brain metastases not seen.  Progressive bone and liver metastases seen on PET.    D1C1 Carbo Gemzar completed.  D1C2 7/11/22.  Udenyca support.

## 2022-07-11 NOTE — PROGRESS NOTES
"  [facility] In Office Hematology Oncology Subsequent  Encounter Note    6/30/22      Subjective:      Patient ID:   Negrita Castro  62 y.o.   Martínez Shepard R. Leblanc, Babycos, Bourgeois, Hall      Chief Complaint:   New L breast cancer eval.    HPI:  62 y.o. female with diagnosis of Stage 1 R breast cancer 10/26/15.  "Triple negative".  Adjuvant AC x's 4, tx's 12 and Rad Rx through 9/12/16.    Port removed.  Had bilateral reconstruction surgery 4/14/17.  Further reconstruction, tumsofy baxterck 8/14/17.  Additional fat injection at R chest done for symmetry.  She had placement of R nipple.  Dr. Hernandez.      She is due for additional reconstructive surgery at breast and abdomenal areas.  The surgery is being done as part of her reconstruction efforts and for keloid management.  The date of the surgery is May 19th.    Also, Dr. Alarcon to consider Rad Rx to operative site to decrease keloid potential.  On hold now because of Covid 19 pandemic.    Recent Mamm/U/S showed small mass at 4:00 at L breast.  Needle Bx invasive ductal Ca, ERP+, PRP+, H2N neg  She had  L partial mastectomy, Sentinal node Bx 8/24/20. Followed by another surgery because of L axillary infection.  Primary 2 cm, LN neg, Stage 1 dx.    Discussed Oncotype Dx testing, this supported adding adjuvant chemotherapy to adjuvant hormonal therapy because of increased risk of cancer recurrence long-term.  Discussed BRCA testing, given her bilateral  breast Dx. PHN would not authorize BRCA 1 & 2.  Refill meds for her.    She had COVID-19 infection and was hospitalized.  She came very close to requiring intubation and mechanical ventilation.  She is off oxygen now,   and sees Dr. Justin of Pulmonary.  Recent CT scan of the chest showed residual changes of interstitial disease, consistent with recovering from COVID-19.  Also lytic lesions were seen in the thoracic spine area very suspicious for metastatic disease at T5 through T8 spine.    Recently on " Saturday night she had a hard coughing spell, and thereafter developed severe right posterior pleuritic chest pain.  She has point tenderness over the right posterior chest wall and may very well have a fractured rib at the site.  Rib x-rays have been ordered.  I have refilled her Soma for 1 month.  I have refilled her Norco  for 1 month.  I have added oxycodone 5 mg 1 or 2 p.o. Q 3-4 hours p.r.n. breakthrough pain for 1 month to her regimen.    She also has an enlarging nodule at the left chest wall.  Ultrasound of the site suggested that this was a cyst however the areas firm movable and may very well be cancer.  I asked  for a ultrasound-guided biopsy of the mass per radiology.  Path report showed invasive ductal Ca, ERP + PRP neg,   H2N equivocal.    She is postmenopausal, her last menses were 4 5 years ago.  Rib x-rays have been requested.  Will order a CT scan to evaluate for possible metastatic disease.  Will order a ultrasound-guided biopsy of the left chest wall mass for pathologic examination at [facility].  Bx + invasive breast cancer.  ERP+, PRP-, H2N-.    Suspected local recurrence at L breast.  She had L lumpectomy.  Margins clear.    Refer to Rad Rx MD for adjuvant Rad Rx., for local control.  She has T spine metastases.  Currently on hormonal Rx.  To see Dr. Manuelito Shepard for DM, BS control.    Dr. Washington saw her for C spine eval, no surgery planned for now.  He referred her to Dr. Rivers for injection Rx.    DM, cholesterol, epilepsy hx, DJD, LBP.  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    LR shoulder arthroscopy, R wrist surgery, M0.    Smoke  ppd x's 35 years, quit 2015.  ETOH no.  Disability-depression, epilepsy  Allergy none    Mom ovarian cancer  Dad lung cancer  Sisters DM, lung dx.    Summary of care:  Right breast cancer 2015, triple negative, treated with Adriamycin Cytoxan followed by taxane.    Left breast cancer ERP positive PRP positive HER2 Judy negative in  August 24, 2020    COVID infection December 2020.  She has not taken the covid 19 vaccination because of multiple allergy hx.    February 2021 bone metastases determined.    He he March 21, 2021 local left breast cancer recurrence, ERP positive PRP negative HER2 Judy negative.    He April 21st bone biopsy positive for breast cancer metastatic.  ERP, PRP, HER2 Judy pending.    She was on radiation therapy through May 17, 2021.  Refilled Marinol at increased 5 mg 1 p.o. b.i.d. number 60.   I refilled her Soma, Norco, Marinol, and Silvadene cream; dated the prescriptions for June 17, 2021.    C/O mid thoracic pain x's 6-8 weeks,radiates to L & R, increased.  Tender over Mid thoracic area.  Hx of T spine metastatic dx.  Pain controlled on Norco 10/325 mg and oxycodone 5 mg 1-2 po PRN.  Add lodine 400 mg 1 daily    Checked MRI of T spine.  Metastatic dx with pathologic fx at T3,5,6.  Refer to Dr. Flowers of neurosurgery for Vertebroplasty or Kyphoplasty evaluation.    She is on Faslodex. Ibrance. Monitor WBC count.    She saw Dr. Flowers, and has been fitted with a brace initially, vertebroplasty after the first of the year.  [phone].  RTC Dr. Flowers 12/27/21.    No neuro surgery is planned for T-spine disease because of progressive growth and compression of the spine.  She admits to having decreased strength in the right hand.  She completed 10 of 10 radiation treatments on May 2, 2022. She is tapering down her Decadron 4 mg p.o. b.i.d. and she is taking Lodine 400 mg daily.  We are stopping fast low dex and Ibrance as of April 12, 2022.    Will ask the  to see her if we can get Meals on wheels for her.    She is due for an MRI on May 17, and a PET scan on May 19, I will see her on May 20th.  Plan to go with chemotherapy now, probably will go with carboplatin Gemzar.   She is 140 lb and 5 ft 11.     Today is D1C1 carbo, gemzar.  Duragesic patch 25 mch q 3 days x's 3 patches, start Tuesday.  Claritin 10 mg 1 po  daily x's 4 days, start Wednesday.  For neulasta or Udenyca Thursday.    Resume Xgeva with D9 C1.  Check lab 7/14 weekly [facility] lab.    ROS:   GEN: normal without any fever, night sweats or weight loss  HEENT: normal with no HA's, sore throat, stiff neck, changes in vision  CV: normal with no CP, SOB, PND, POOL or orthopnea  PULM: normal with no SOB, cough, hemoptysis, sputum or pleuritic pain  GI: normal with no abdominal pain, nausea, vomiting, constipation, diarrhea, melanotic stools, BRBPR, or hematemesis  : normal with no hematuria, dysuria  BREAST: See HPI.  SKIN: normal with no rash, erythema, bruising, or swelling     Past Medical History:   Diagnosis Date    Acute hypoxemic respiratory failure 12/12/2020    Breast cancer     left breast    Cancer     RIGHT BREAST 10-15    Depression     Diabetes mellitus     Neuropathy     Panic attacks     S/P epidural steroid injection     Seizures     none since early 30's    Wears glasses     TO DRIVE     Past Surgical History:   Procedure Laterality Date    AXILLARY NODE DISSECTION Left 8/24/2020    Procedure: LYMPHADENECTOMY, AXILLARY;  Surgeon: Cory Vick MD;  Location: [facility] OR;  Service: General;  Laterality: Left;  left breast mastectomy with possible axillary lymph node dissection    BREAST BIOPSY      right    BREAST LUMPECTOMY      BREAST RECONSTRUCTION      breast reconstruction with tummy [facility]      BREAST SURGERY      11-3-15 LUMPECTOMY, 12-2-15 REEXCISION    INCISION AND DRAINAGE OF ABSCESS Left 9/9/2020    Procedure: INCISION AND DRAINAGE, ABSCESS;  Surgeon: Cory Vick MD;  Location: [facility] OR;  Service: General;  Laterality: Left;    INSERTION OF LOCALIZATION WIRE Left 8/24/2020    Procedure: INSERTION, LOCALIZATION WIRE;  Surgeon: Cory Vick MD;  Location: [facility] OR;  Service: General;  Laterality: Left;  left breast lumpectomy with wire needle loc    MASTECTOMY      mastectomy 2016      MASTECTOMY, PARTIAL Left  3/19/2021    Procedure: MASTECTOMY, PARTIAL;  Surgeon: Cory Vick MD;  Location: [facility] OR;  Service: General;  Laterality: Left;  lumpectomy  left breast     RECONSTRUCTION OF NIPPLE Right 11/5/2018    Procedure: RECONSTRUCTION-NIPPLE RIGHT;  Surgeon: Xiang Hernandez MD;  [facility];  Service: Plastics;  Laterality: Right;    SENTINEL LYMPH NODE BIOPSY Left 8/24/2020    Procedure: BIOPSY, LYMPH NODE, SENTINEL;  Surgeon: Cory Vick MD;  Location: [facility] OR;  Service: General;  Laterality: Left;  left breast lumpectomy with left sentinel lymoh node       shoulder surgery and wrist      BILAT  BONE SPUR    WRIST SURGERY      RIGHT       Review of patient's allergies indicates:   Allergen Reactions    Adhesive tape-silicones Hives     BANDAIDS    Polymycin Hives    Latex, natural rubber Itching    Polyurethane-39            Current Outpatient Medications:     anastrozole (ARIMIDEX) 1 mg Tab, Take 1 mg by mouth once daily., Disp: , Rfl:     blood sugar diagnostic Strp, TEST GLUCOSE BID, Disp: 300 each, Rfl: 3    blood-glucose meter (TRUE METRIX GLUCOSE METER) kit, TEST GLUCOSE BID, Disp: 1 each, Rfl: 0    blood-glucose meter,continuous (DEXCOM G6 ) Misc, TEST GLUCOSE QID, Disp: 1 each, Rfl: 1    blood-glucose sensor (DEXCOM G6 SENSOR) Edwige, TEST GLUCOSE QID, Disp: 13 each, Rfl: 3    blood-glucose transmitter (DEXCOM G6 TRANSMITTER) Edwige, TEST GLUCOSE QID, Disp: 1 each, Rfl: 1    carisoprodoL (SOMA) 350 MG tablet, Take 1 tablet (350 mg total) by mouth 3 (three) times daily as needed for Muscle spasms., Disp: 90 tablet, Rfl: 0    dexAMETHasone (DECADRON) 4 MG Tab, TAKE ONE TABLET (4MG TOTAL) BY MOUTH TWICE DAILY WITH MEALS, Disp: 60 tablet, Rfl: 1    dextromethorphan-guaiFENesin  mg/5 ml (ROBITUSSIN-DM)  mg/5 mL liquid, Take 1 teaspoon q 6 hours prn cough., Disp: 300 mL, Rfl: 0    diazePAM (VALIUM) 10 MG Tab, Take 1 tablet (10 mg total) by mouth 4  (four) times daily as needed (anxiety)., Disp: 120 tablet, Rfl: 3    docusate sodium (COLACE) 100 MG capsule, Take 100 mg by mouth 2 (two) times daily., Disp: , Rfl:     dronabinoL (MARINOL) 5 MG capsule, Take 1 capsule (5 mg total) by mouth 2 (two) times daily before meals., Disp: 60 capsule, Rfl: 0    dulaglutide (TRULICITY) 3 mg/0.5 mL pen injector, Inject 3 mg into the skin every 7 days., Disp: 4 pen, Rfl: 11    enalapril (VASOTEC) 5 MG tablet, Take 1 tablet (5 mg total) by mouth once daily., Disp: 90 tablet, Rfl: 3    etodolac (LODINE) 400 MG tablet, TAKE ONE TABLET (400 MG TOTAL) BY MOUTH ONCE DAILY, Disp: 30 tablet, Rfl: 2    fentaNYL (DURAGESIC) 25 mcg/hr, Place 1 patch onto the skin every 72 hours. for 10 doses, Disp: 10 patch, Rfl: 0    fluconazole (DIFLUCAN) 150 MG Tab, TAKE ONE TABLET BY MOUTH ONCE FOR 1 DOSE, Disp: 1 tablet, Rfl: 2    furosemide (LASIX) 20 MG tablet, TAKE ONE TABLET BY MOUTH EVERY DAY, Disp: 30 tablet, Rfl: 6    HYDROcodone-acetaminophen (NORCO)  mg per tablet, Take 1 tablet by mouth every 6 (six) hours as needed for Pain., Disp: 120 tablet, Rfl: 0    insulin glargine (LANTUS U-100 INSULIN) 100 unit/mL injection, Inject 32 Units into the skin 2 (two) times a day., Disp: 18 mL, Rfl: 11    lancets 32 gauge Misc, TEST GLUCOSE BID, Disp: 300 each, Rfl: 3    metFORMIN (GLUCOPHAGE-XR) 500 MG ER 24hr tablet, Take 2 tablets (1,000 mg total) by mouth 2 (two) times daily with meals., Disp: 360 tablet, Rfl: 3    metoprolol tartrate (LOPRESSOR) 25 MG tablet, Take 1 tablet (25 mg total) by mouth 2 (two) times daily., Disp: 60 tablet, Rfl: 11    mometasone 0.1% (ELOCON) 0.1 % cream, Apply to affected area daily, Disp: 45 g, Rfl: 1    naloxegoL (MOVANTIK) 12.5 mg Tab, Take 12.5 mg by mouth once daily., Disp: 30 tablet, Rfl: 3    ondansetron (ZOFRAN-ODT) 8 MG TbDL, DISSOLVE 1 TABLET (8MG TOTAL) on the tongue EVERY 6 TO 8 HOURS AS NEEDED (NAUSEA), Disp: 30 tablet, Rfl: 1     "oxyCODONE (ROXICODONE) 15 MG Tab, Take 1 tablet (15 mg total) by mouth every 4 to 6 hours as needed for Pain., Disp: 120 tablet, Rfl: 0    palbociclib 100 mg Tab, Take 100 mg by mouth once daily. for 21 days, then take 7 days off. Total cycle length 28 days, Disp: 21 tablet, Rfl: 6    pen needle, diabetic (BD ULTRA-FINE MARCE PEN NEEDLE) 32 gauge x 5/32" Ndle, Test blood sugar twice daily, Disp: 100 each, Rfl: 5    promethazine (PHENERGAN) 25 MG tablet, Take 1 tablet (25 mg total) by mouth every 4 to 6 hours as needed., Disp: 30 tablet, Rfl: 5    promethazine-codeine 6.25-10 mg/5 ml (PHENERGAN WITH CODEINE) 6.25-10 mg/5 mL syrup, TAKE 5 ML BY MOUTH EVERY 6 HOURS AS NEEDED FOR COUGH, Disp: 450 mL, Rfl: 0    psyllium husk, with sugar, (METAMUCIL, WITH SUGAR,) 3.4 gram packet, Take 1 packet by mouth 2 (two) times daily., Disp: 30 packet, Rfl: 2    rosuvastatin (CRESTOR) 40 MG Tab, Take 1 tablet (40 mg total) by mouth every evening. For cholesterol, Disp: 90 tablet, Rfl: 3    zolpidem (AMBIEN) 10 mg Tab, TAKE ONE TABLET BY MOUTH EVERY NIGHT AT BEDTIME, Disp: 30 tablet, Rfl: 3          Objective:   Vitals:  Blood pressure 108/72, pulse 98, temperature 98.5 °F (36.9 °C), last menstrual period 01/16/2016.    Physical Examination:   GEN: no apparent distress, comfortable, Brace in place.  HEAD: atraumatic and normocephalic  EYES: no pallor, no icterus  ENT: no pharyngeal erythema  NECK: noLAD/LN's, supple  CV:  No edema  CHEST: Normal respiratory effort   she is not  tender over the right posterior chest wall on exam.  The chest wall is not swollen.  ABDOM:  nondistended; soft                                                                                          MUSC/Skeletal: ROM normal, Tender over mid T spine area.  EXTREM: no swelling  SKIN: She is developing keloid changes at L breast incision and navel surgical sites.  This area appears to be enlarging.  NEURO: grossly intact  PSYCH: normal mood, affect and " behavior  LYMPH: normal  LN's  BREASTS: L breast is much improved.    Refill Oxycodone, SOMA, Norco.    Assessment:   (1) 62 y.o. female with diagnosis of Stage 1 triple negative R breast cancer, 10/2016.       S/P R mastectomy, SNBx, AC x's 4, T x's12 weeks, Rad Rx and recent reconstruction.    (2) New L invasive ductal Ca, ERP+, PRP+, H2N neg.  Clinical stage 1 dx.    I have started her on Arimidex 1 mg p.o. daily  for metastatic disease at this time.    Suspected fracture of right ribs after recent cough event, check rib x-rays, medicate for pain.    ERP+ dx, bone metastases.  Started on Arimidex daily.  Add 2nd hormonal drug Rx after Rad Rx completed.    Bone biopsy positive for metastatic breast cancer.  ERP+, PRP+, H2N-.    Check PET and MRI of brain.  RTC 5/20/22.  Brain metastases not seen.  Progressive bone and liver metastases seen on PET.    D1C1 Carbo Gemzar today.  RTC 1 week for D8C1 Gemzar.

## 2022-07-12 ENCOUNTER — LAB VISIT (OUTPATIENT)
Dept: LAB | Facility: HOSPITAL | Age: 62
End: 2022-07-12
Attending: INTERNAL MEDICINE
Payer: MEDICARE

## 2022-07-12 DIAGNOSIS — C79.51 BONE METASTASES: ICD-10-CM

## 2022-07-12 DIAGNOSIS — C50.411 MALIGNANT NEOPLASM OF UPPER-OUTER QUADRANT OF RIGHT BREAST IN FEMALE, ESTROGEN RECEPTOR NEGATIVE: ICD-10-CM

## 2022-07-12 DIAGNOSIS — Z17.1 MALIGNANT NEOPLASM OF UPPER-OUTER QUADRANT OF RIGHT BREAST IN FEMALE, ESTROGEN RECEPTOR NEGATIVE: ICD-10-CM

## 2022-07-12 LAB
ALBUMIN SERPL BCP-MCNC: 3.8 G/DL (ref 3.5–5.2)
ALP SERPL-CCNC: 504 U/L (ref 55–135)
ALT SERPL W/O P-5'-P-CCNC: 14 U/L (ref 10–44)
ANION GAP SERPL CALC-SCNC: 12 MMOL/L (ref 8–16)
AST SERPL-CCNC: 24 U/L (ref 10–40)
BASOPHILS # BLD AUTO: 0.03 K/UL (ref 0–0.2)
BASOPHILS NFR BLD: 0.2 % (ref 0–1.9)
BILIRUB SERPL-MCNC: 0.5 MG/DL (ref 0.1–1)
BUN SERPL-MCNC: 5 MG/DL (ref 6–20)
CALCIUM SERPL-MCNC: 8.5 MG/DL (ref 8.7–10.5)
CHLORIDE SERPL-SCNC: 107 MMOL/L (ref 95–110)
CO2 SERPL-SCNC: 21 MMOL/L (ref 23–29)
CREAT SERPL-MCNC: 0.7 MG/DL (ref 0.5–1.4)
DIFFERENTIAL METHOD: ABNORMAL
EOSINOPHIL # BLD AUTO: 0 K/UL (ref 0–0.5)
EOSINOPHIL NFR BLD: 0.2 % (ref 0–8)
ERYTHROCYTE [DISTWIDTH] IN BLOOD BY AUTOMATED COUNT: 16.7 % (ref 11.5–14.5)
EST. GFR  (AFRICAN AMERICAN): >60 ML/MIN/1.73 M^2
EST. GFR  (NON AFRICAN AMERICAN): >60 ML/MIN/1.73 M^2
GLUCOSE SERPL-MCNC: 158 MG/DL (ref 70–110)
HCT VFR BLD AUTO: 34.2 % (ref 37–48.5)
HGB BLD-MCNC: 11.5 G/DL (ref 12–16)
IMM GRANULOCYTES # BLD AUTO: 0.13 K/UL (ref 0–0.04)
IMM GRANULOCYTES NFR BLD AUTO: 0.9 % (ref 0–0.5)
LYMPHOCYTES # BLD AUTO: 0.9 K/UL (ref 1–4.8)
LYMPHOCYTES NFR BLD: 6.5 % (ref 18–48)
MCH RBC QN AUTO: 32.7 PG (ref 27–31)
MCHC RBC AUTO-ENTMCNC: 33.6 G/DL (ref 32–36)
MCV RBC AUTO: 97 FL (ref 82–98)
MONOCYTES # BLD AUTO: 0.9 K/UL (ref 0.3–1)
MONOCYTES NFR BLD: 6.2 % (ref 4–15)
NEUTROPHILS # BLD AUTO: 12.3 K/UL (ref 1.8–7.7)
NEUTROPHILS NFR BLD: 86 % (ref 38–73)
NRBC BLD-RTO: 1 /100 WBC
PLATELET # BLD AUTO: 321 K/UL (ref 150–450)
PLATELET BLD QL SMEAR: ABNORMAL
PMV BLD AUTO: 9 FL (ref 9.2–12.9)
POTASSIUM SERPL-SCNC: 4 MMOL/L (ref 3.5–5.1)
PROT SERPL-MCNC: 7.4 G/DL (ref 6–8.4)
RBC # BLD AUTO: 3.52 M/UL (ref 4–5.4)
SODIUM SERPL-SCNC: 140 MMOL/L (ref 136–145)
WBC # BLD AUTO: 14.25 K/UL (ref 3.9–12.7)

## 2022-07-12 PROCEDURE — 80053 COMPREHEN METABOLIC PANEL: CPT | Performed by: INTERNAL MEDICINE

## 2022-07-12 PROCEDURE — 36415 COLL VENOUS BLD VENIPUNCTURE: CPT | Performed by: INTERNAL MEDICINE

## 2022-07-12 PROCEDURE — 85025 COMPLETE CBC W/AUTO DIFF WBC: CPT | Performed by: INTERNAL MEDICINE

## 2022-07-12 PROCEDURE — 86300 IMMUNOASSAY TUMOR CA 15-3: CPT | Mod: 91 | Performed by: INTERNAL MEDICINE

## 2022-07-12 PROCEDURE — 86300 IMMUNOASSAY TUMOR CA 15-3: CPT | Performed by: INTERNAL MEDICINE

## 2022-07-13 LAB
CANCER AG15-3 SERPL IA-ACNC: 51 U/ML
CANCER AG27-29 SERPL-ACNC: 54.8 U/ML

## 2022-07-14 ENCOUNTER — TELEPHONE (OUTPATIENT)
Dept: HEMATOLOGY/ONCOLOGY | Facility: CLINIC | Age: 62
End: 2022-07-14

## 2022-07-19 ENCOUNTER — LAB VISIT (OUTPATIENT)
Dept: LAB | Facility: HOSPITAL | Age: 62
End: 2022-07-19
Attending: INTERNAL MEDICINE
Payer: MEDICARE

## 2022-07-19 DIAGNOSIS — C50.411 MALIGNANT NEOPLASM OF UPPER-OUTER QUADRANT OF RIGHT BREAST IN FEMALE, ESTROGEN RECEPTOR NEGATIVE: ICD-10-CM

## 2022-07-19 DIAGNOSIS — C79.51 BONE METASTASES: ICD-10-CM

## 2022-07-19 DIAGNOSIS — Z17.1 MALIGNANT NEOPLASM OF UPPER-OUTER QUADRANT OF RIGHT BREAST IN FEMALE, ESTROGEN RECEPTOR NEGATIVE: ICD-10-CM

## 2022-07-19 LAB
ALBUMIN SERPL BCP-MCNC: 3.6 G/DL (ref 3.5–5.2)
ALP SERPL-CCNC: 440 U/L (ref 55–135)
ALT SERPL W/O P-5'-P-CCNC: 16 U/L (ref 10–44)
ANION GAP SERPL CALC-SCNC: 12 MMOL/L (ref 8–16)
AST SERPL-CCNC: 22 U/L (ref 10–40)
BASOPHILS # BLD AUTO: 0.03 K/UL (ref 0–0.2)
BASOPHILS NFR BLD: 0.6 % (ref 0–1.9)
BILIRUB SERPL-MCNC: 0.5 MG/DL (ref 0.1–1)
BUN SERPL-MCNC: 6 MG/DL (ref 6–20)
CALCIUM SERPL-MCNC: 8.4 MG/DL (ref 8.7–10.5)
CHLORIDE SERPL-SCNC: 108 MMOL/L (ref 95–110)
CO2 SERPL-SCNC: 22 MMOL/L (ref 23–29)
CREAT SERPL-MCNC: 0.6 MG/DL (ref 0.5–1.4)
DIFFERENTIAL METHOD: ABNORMAL
EOSINOPHIL # BLD AUTO: 0 K/UL (ref 0–0.5)
EOSINOPHIL NFR BLD: 0.4 % (ref 0–8)
ERYTHROCYTE [DISTWIDTH] IN BLOOD BY AUTOMATED COUNT: 16.3 % (ref 11.5–14.5)
EST. GFR  (AFRICAN AMERICAN): >60 ML/MIN/1.73 M^2
EST. GFR  (NON AFRICAN AMERICAN): >60 ML/MIN/1.73 M^2
GLUCOSE SERPL-MCNC: 225 MG/DL (ref 70–110)
HCT VFR BLD AUTO: 34.8 % (ref 37–48.5)
HGB BLD-MCNC: 11.4 G/DL (ref 12–16)
IMM GRANULOCYTES # BLD AUTO: 0.02 K/UL (ref 0–0.04)
IMM GRANULOCYTES NFR BLD AUTO: 0.4 % (ref 0–0.5)
LYMPHOCYTES # BLD AUTO: 0.6 K/UL (ref 1–4.8)
LYMPHOCYTES NFR BLD: 12.2 % (ref 18–48)
MCH RBC QN AUTO: 32.4 PG (ref 27–31)
MCHC RBC AUTO-ENTMCNC: 32.8 G/DL (ref 32–36)
MCV RBC AUTO: 99 FL (ref 82–98)
MONOCYTES # BLD AUTO: 0.4 K/UL (ref 0.3–1)
MONOCYTES NFR BLD: 9.2 % (ref 4–15)
NEUTROPHILS # BLD AUTO: 3.6 K/UL (ref 1.8–7.7)
NEUTROPHILS NFR BLD: 77.2 % (ref 38–73)
NRBC BLD-RTO: 0 /100 WBC
PLATELET # BLD AUTO: 321 K/UL (ref 150–450)
PMV BLD AUTO: 9.2 FL (ref 9.2–12.9)
POTASSIUM SERPL-SCNC: 3.5 MMOL/L (ref 3.5–5.1)
PROT SERPL-MCNC: 6.8 G/DL (ref 6–8.4)
RBC # BLD AUTO: 3.52 M/UL (ref 4–5.4)
SODIUM SERPL-SCNC: 142 MMOL/L (ref 136–145)
WBC # BLD AUTO: 4.67 K/UL (ref 3.9–12.7)

## 2022-07-19 PROCEDURE — 85025 COMPLETE CBC W/AUTO DIFF WBC: CPT | Performed by: INTERNAL MEDICINE

## 2022-07-19 PROCEDURE — 86300 IMMUNOASSAY TUMOR CA 15-3: CPT | Mod: 91 | Performed by: INTERNAL MEDICINE

## 2022-07-19 PROCEDURE — 86300 IMMUNOASSAY TUMOR CA 15-3: CPT | Performed by: INTERNAL MEDICINE

## 2022-07-19 PROCEDURE — 80053 COMPREHEN METABOLIC PANEL: CPT | Performed by: INTERNAL MEDICINE

## 2022-07-19 PROCEDURE — 36415 COLL VENOUS BLD VENIPUNCTURE: CPT | Performed by: INTERNAL MEDICINE

## 2022-07-20 ENCOUNTER — OFFICE VISIT (OUTPATIENT)
Dept: HEMATOLOGY/ONCOLOGY | Facility: CLINIC | Age: 62
End: 2022-07-20
Payer: MEDICARE

## 2022-07-20 VITALS
RESPIRATION RATE: 18 BRPM | BODY MASS INDEX: 21.48 KG/M2 | HEIGHT: 71 IN | HEART RATE: 80 BPM | SYSTOLIC BLOOD PRESSURE: 114 MMHG | DIASTOLIC BLOOD PRESSURE: 70 MMHG

## 2022-07-20 DIAGNOSIS — C50.411 MALIGNANT NEOPLASM OF UPPER-OUTER QUADRANT OF RIGHT BREAST IN FEMALE, ESTROGEN RECEPTOR NEGATIVE: Primary | ICD-10-CM

## 2022-07-20 DIAGNOSIS — C79.51 BONE METASTASES: ICD-10-CM

## 2022-07-20 DIAGNOSIS — R07.81 PLEURITIC CHEST PAIN: ICD-10-CM

## 2022-07-20 DIAGNOSIS — C78.7 LIVER METASTASES: ICD-10-CM

## 2022-07-20 DIAGNOSIS — Z17.1 MALIGNANT NEOPLASM OF UPPER-OUTER QUADRANT OF RIGHT BREAST IN FEMALE, ESTROGEN RECEPTOR NEGATIVE: Primary | ICD-10-CM

## 2022-07-20 LAB — CANCER AG15-3 SERPL IA-ACNC: 44 U/ML

## 2022-07-20 PROCEDURE — 3008F PR BODY MASS INDEX (BMI) DOCUMENTED: ICD-10-PCS | Mod: CPTII,S$GLB,, | Performed by: INTERNAL MEDICINE

## 2022-07-20 PROCEDURE — 3078F PR MOST RECENT DIASTOLIC BLOOD PRESSURE < 80 MM HG: ICD-10-PCS | Mod: CPTII,S$GLB,, | Performed by: INTERNAL MEDICINE

## 2022-07-20 PROCEDURE — 3008F BODY MASS INDEX DOCD: CPT | Mod: CPTII,S$GLB,, | Performed by: INTERNAL MEDICINE

## 2022-07-20 PROCEDURE — 4010F PR ACE/ARB THEARPY RXD/TAKEN: ICD-10-PCS | Mod: CPTII,S$GLB,, | Performed by: INTERNAL MEDICINE

## 2022-07-20 PROCEDURE — 3074F PR MOST RECENT SYSTOLIC BLOOD PRESSURE < 130 MM HG: ICD-10-PCS | Mod: CPTII,S$GLB,, | Performed by: INTERNAL MEDICINE

## 2022-07-20 PROCEDURE — 99213 PR OFFICE/OUTPT VISIT, EST, LEVL III, 20-29 MIN: ICD-10-PCS | Mod: S$GLB,,, | Performed by: INTERNAL MEDICINE

## 2022-07-20 PROCEDURE — 4010F ACE/ARB THERAPY RXD/TAKEN: CPT | Mod: CPTII,S$GLB,, | Performed by: INTERNAL MEDICINE

## 2022-07-20 PROCEDURE — 3046F PR MOST RECENT HEMOGLOBIN A1C LEVEL > 9.0%: ICD-10-PCS | Mod: CPTII,S$GLB,, | Performed by: INTERNAL MEDICINE

## 2022-07-20 PROCEDURE — 3046F HEMOGLOBIN A1C LEVEL >9.0%: CPT | Mod: CPTII,S$GLB,, | Performed by: INTERNAL MEDICINE

## 2022-07-20 PROCEDURE — 99213 OFFICE O/P EST LOW 20 MIN: CPT | Mod: S$GLB,,, | Performed by: INTERNAL MEDICINE

## 2022-07-20 PROCEDURE — 3078F DIAST BP <80 MM HG: CPT | Mod: CPTII,S$GLB,, | Performed by: INTERNAL MEDICINE

## 2022-07-20 PROCEDURE — 3074F SYST BP LT 130 MM HG: CPT | Mod: CPTII,S$GLB,, | Performed by: INTERNAL MEDICINE

## 2022-07-21 ENCOUNTER — INFUSION (OUTPATIENT)
Dept: INFUSION THERAPY | Facility: HOSPITAL | Age: 62
End: 2022-07-21
Attending: INTERNAL MEDICINE
Payer: MEDICARE

## 2022-07-21 VITALS
TEMPERATURE: 98 F | RESPIRATION RATE: 18 BRPM | HEIGHT: 71 IN | SYSTOLIC BLOOD PRESSURE: 113 MMHG | OXYGEN SATURATION: 96 % | WEIGHT: 150.69 LBS | HEART RATE: 91 BPM | BODY MASS INDEX: 21.1 KG/M2 | DIASTOLIC BLOOD PRESSURE: 72 MMHG

## 2022-07-21 DIAGNOSIS — Z17.1 MALIGNANT NEOPLASM OF UPPER-OUTER QUADRANT OF RIGHT BREAST IN FEMALE, ESTROGEN RECEPTOR NEGATIVE: ICD-10-CM

## 2022-07-21 DIAGNOSIS — C50.512 MALIGNANT NEOPLASM OF LOWER-OUTER QUADRANT OF LEFT BREAST OF FEMALE, ESTROGEN RECEPTOR POSITIVE: ICD-10-CM

## 2022-07-21 DIAGNOSIS — C79.51 BONE METASTASES: Primary | ICD-10-CM

## 2022-07-21 DIAGNOSIS — T45.1X5A CHEMOTHERAPY-INDUCED NEUTROPENIA: ICD-10-CM

## 2022-07-21 DIAGNOSIS — C50.411 MALIGNANT NEOPLASM OF UPPER-OUTER QUADRANT OF RIGHT BREAST IN FEMALE, ESTROGEN RECEPTOR NEGATIVE: ICD-10-CM

## 2022-07-21 DIAGNOSIS — Z17.0 MALIGNANT NEOPLASM OF LOWER-OUTER QUADRANT OF LEFT BREAST OF FEMALE, ESTROGEN RECEPTOR POSITIVE: ICD-10-CM

## 2022-07-21 DIAGNOSIS — D70.1 CHEMOTHERAPY-INDUCED NEUTROPENIA: ICD-10-CM

## 2022-07-21 PROCEDURE — 96417 CHEMO IV INFUS EACH ADDL SEQ: CPT

## 2022-07-21 PROCEDURE — 96367 TX/PROPH/DG ADDL SEQ IV INF: CPT

## 2022-07-21 PROCEDURE — 63600175 PHARM REV CODE 636 W HCPCS: Performed by: INTERNAL MEDICINE

## 2022-07-21 PROCEDURE — 96375 TX/PRO/DX INJ NEW DRUG ADDON: CPT

## 2022-07-21 PROCEDURE — 96413 CHEMO IV INFUSION 1 HR: CPT

## 2022-07-21 PROCEDURE — 25000003 PHARM REV CODE 250: Performed by: INTERNAL MEDICINE

## 2022-07-21 PROCEDURE — A4216 STERILE WATER/SALINE, 10 ML: HCPCS | Performed by: INTERNAL MEDICINE

## 2022-07-21 RX ORDER — SODIUM CHLORIDE 0.9 % (FLUSH) 0.9 %
10 SYRINGE (ML) INJECTION
Status: DISCONTINUED | OUTPATIENT
Start: 2022-07-21 | End: 2022-07-21 | Stop reason: HOSPADM

## 2022-07-21 RX ADMIN — DEXAMETHASONE SODIUM PHOSPHATE 12 MG: 4 INJECTION, SOLUTION INTRA-ARTICULAR; INTRALESIONAL; INTRAMUSCULAR; INTRAVENOUS; SOFT TISSUE at 09:07

## 2022-07-21 RX ADMIN — GEMCITABINE HYDROCHLORIDE 1425 MG: 2 INJECTION, SOLUTION INTRAVENOUS at 10:07

## 2022-07-21 RX ADMIN — APREPITANT 130 MG: 130 INJECTION, EMULSION INTRAVENOUS at 09:07

## 2022-07-21 RX ADMIN — SODIUM CHLORIDE, PRESERVATIVE FREE 10 ML: 5 INJECTION INTRAVENOUS at 12:07

## 2022-07-21 RX ADMIN — ONDANSETRON 16 MG: 2 INJECTION INTRAMUSCULAR; INTRAVENOUS at 09:07

## 2022-07-21 RX ADMIN — CARBOPLATIN 625 MG: 10 INJECTION, SOLUTION INTRAVENOUS at 11:07

## 2022-07-21 NOTE — PLAN OF CARE
Problem: Fatigue  Goal: Improved Activity Tolerance  Outcome: Ongoing, Progressing  Intervention: Promote Improved Energy  Flowsheets (Taken 7/21/2022 2548)  Fatigue Management: frequent rest breaks encouraged  Sleep/Rest Enhancement:   regular sleep/rest pattern promoted   relaxation techniques promoted  Activity Management: Ambulated -L4   C3D1 Gemzar/Carboplatin administered per MD order. Patient tolerated well.

## 2022-07-22 LAB — CANCER AG27-29 SERPL-ACNC: 42.2 U/ML

## 2022-07-25 RX ORDER — HYDROCODONE BITARTRATE AND ACETAMINOPHEN 10; 325 MG/1; MG/1
1 TABLET ORAL EVERY 6 HOURS PRN
Qty: 120 TABLET | Refills: 0 | Status: SHIPPED | OUTPATIENT
Start: 2022-07-25 | End: 2022-07-25 | Stop reason: SDUPTHER

## 2022-07-25 RX ORDER — HYDROCODONE BITARTRATE AND ACETAMINOPHEN 10; 325 MG/1; MG/1
1 TABLET ORAL EVERY 6 HOURS PRN
Qty: 120 TABLET | Refills: 0 | Status: SHIPPED | OUTPATIENT
Start: 2022-07-25 | End: 2022-08-24

## 2022-07-25 RX ORDER — CARISOPRODOL 350 MG/1
350 TABLET ORAL 3 TIMES DAILY PRN
Qty: 90 TABLET | Refills: 0 | Status: SHIPPED | OUTPATIENT
Start: 2022-07-25 | End: 2022-09-01 | Stop reason: SDUPTHER

## 2022-07-25 RX ORDER — OXYCODONE HYDROCHLORIDE 15 MG/1
15 TABLET ORAL
Qty: 120 TABLET | Refills: 0 | Status: SHIPPED | OUTPATIENT
Start: 2022-07-25 | End: 2022-09-01 | Stop reason: SDUPTHER

## 2022-07-26 ENCOUNTER — LAB VISIT (OUTPATIENT)
Dept: LAB | Facility: HOSPITAL | Age: 62
End: 2022-07-26
Attending: INTERNAL MEDICINE
Payer: MEDICARE

## 2022-07-26 ENCOUNTER — TELEPHONE (OUTPATIENT)
Dept: FAMILY MEDICINE | Facility: CLINIC | Age: 62
End: 2022-07-26

## 2022-07-26 ENCOUNTER — INFUSION (OUTPATIENT)
Dept: INFUSION THERAPY | Facility: HOSPITAL | Age: 62
End: 2022-07-26
Attending: INTERNAL MEDICINE
Payer: MEDICARE

## 2022-07-26 VITALS
HEART RATE: 97 BPM | HEIGHT: 71 IN | RESPIRATION RATE: 18 BRPM | BODY MASS INDEX: 21.09 KG/M2 | SYSTOLIC BLOOD PRESSURE: 115 MMHG | WEIGHT: 150.63 LBS | TEMPERATURE: 98 F | DIASTOLIC BLOOD PRESSURE: 76 MMHG | OXYGEN SATURATION: 96 %

## 2022-07-26 DIAGNOSIS — Z17.1 MALIGNANT NEOPLASM OF UPPER-OUTER QUADRANT OF RIGHT BREAST IN FEMALE, ESTROGEN RECEPTOR NEGATIVE: ICD-10-CM

## 2022-07-26 DIAGNOSIS — C79.51 BONE METASTASES: Primary | ICD-10-CM

## 2022-07-26 DIAGNOSIS — C79.51 BONE METASTASES: ICD-10-CM

## 2022-07-26 DIAGNOSIS — C50.512 MALIGNANT NEOPLASM OF LOWER-OUTER QUADRANT OF LEFT BREAST OF FEMALE, ESTROGEN RECEPTOR POSITIVE: ICD-10-CM

## 2022-07-26 DIAGNOSIS — C50.411 MALIGNANT NEOPLASM OF UPPER-OUTER QUADRANT OF RIGHT BREAST IN FEMALE, ESTROGEN RECEPTOR NEGATIVE: ICD-10-CM

## 2022-07-26 DIAGNOSIS — T45.1X5A CHEMOTHERAPY-INDUCED NEUTROPENIA: ICD-10-CM

## 2022-07-26 DIAGNOSIS — Z17.0 MALIGNANT NEOPLASM OF LOWER-OUTER QUADRANT OF LEFT BREAST OF FEMALE, ESTROGEN RECEPTOR POSITIVE: ICD-10-CM

## 2022-07-26 DIAGNOSIS — D70.1 CHEMOTHERAPY-INDUCED NEUTROPENIA: ICD-10-CM

## 2022-07-26 LAB
ALBUMIN SERPL BCP-MCNC: 3.8 G/DL (ref 3.5–5.2)
ALP SERPL-CCNC: 420 U/L (ref 55–135)
ALT SERPL W/O P-5'-P-CCNC: 38 U/L (ref 10–44)
ANION GAP SERPL CALC-SCNC: 13 MMOL/L (ref 8–16)
AST SERPL-CCNC: 45 U/L (ref 10–40)
BASOPHILS # BLD AUTO: 0.01 K/UL (ref 0–0.2)
BASOPHILS NFR BLD: 0.4 % (ref 0–1.9)
BILIRUB SERPL-MCNC: 0.7 MG/DL (ref 0.1–1)
BUN SERPL-MCNC: 9 MG/DL (ref 6–20)
CALCIUM SERPL-MCNC: 8.5 MG/DL (ref 8.7–10.5)
CHLORIDE SERPL-SCNC: 107 MMOL/L (ref 95–110)
CO2 SERPL-SCNC: 20 MMOL/L (ref 23–29)
CREAT SERPL-MCNC: 0.6 MG/DL (ref 0.5–1.4)
DIFFERENTIAL METHOD: ABNORMAL
EOSINOPHIL # BLD AUTO: 0 K/UL (ref 0–0.5)
EOSINOPHIL NFR BLD: 1.2 % (ref 0–8)
ERYTHROCYTE [DISTWIDTH] IN BLOOD BY AUTOMATED COUNT: 14.6 % (ref 11.5–14.5)
EST. GFR  (AFRICAN AMERICAN): >60 ML/MIN/1.73 M^2
EST. GFR  (NON AFRICAN AMERICAN): >60 ML/MIN/1.73 M^2
GLUCOSE SERPL-MCNC: 126 MG/DL (ref 70–110)
HCT VFR BLD AUTO: 34.6 % (ref 37–48.5)
HGB BLD-MCNC: 11.6 G/DL (ref 12–16)
IMM GRANULOCYTES # BLD AUTO: 0.01 K/UL (ref 0–0.04)
IMM GRANULOCYTES NFR BLD AUTO: 0.4 % (ref 0–0.5)
LYMPHOCYTES # BLD AUTO: 0.5 K/UL (ref 1–4.8)
LYMPHOCYTES NFR BLD: 19.4 % (ref 18–48)
MCH RBC QN AUTO: 32.6 PG (ref 27–31)
MCHC RBC AUTO-ENTMCNC: 33.5 G/DL (ref 32–36)
MCV RBC AUTO: 97 FL (ref 82–98)
MONOCYTES # BLD AUTO: 0 K/UL (ref 0.3–1)
MONOCYTES NFR BLD: 1.2 % (ref 4–15)
NEUTROPHILS # BLD AUTO: 1.9 K/UL (ref 1.8–7.7)
NEUTROPHILS NFR BLD: 77.4 % (ref 38–73)
NRBC BLD-RTO: 0 /100 WBC
PLATELET # BLD AUTO: 259 K/UL (ref 150–450)
PMV BLD AUTO: 9.1 FL (ref 9.2–12.9)
POTASSIUM SERPL-SCNC: 4 MMOL/L (ref 3.5–5.1)
PROT SERPL-MCNC: 7.1 G/DL (ref 6–8.4)
RBC # BLD AUTO: 3.56 M/UL (ref 4–5.4)
SODIUM SERPL-SCNC: 140 MMOL/L (ref 136–145)
WBC # BLD AUTO: 2.48 K/UL (ref 3.9–12.7)

## 2022-07-26 PROCEDURE — 86300 IMMUNOASSAY TUMOR CA 15-3: CPT | Mod: 91 | Performed by: INTERNAL MEDICINE

## 2022-07-26 PROCEDURE — 96372 THER/PROPH/DIAG INJ SC/IM: CPT

## 2022-07-26 PROCEDURE — 86300 IMMUNOASSAY TUMOR CA 15-3: CPT | Performed by: INTERNAL MEDICINE

## 2022-07-26 PROCEDURE — 80053 COMPREHEN METABOLIC PANEL: CPT | Performed by: INTERNAL MEDICINE

## 2022-07-26 PROCEDURE — 85025 COMPLETE CBC W/AUTO DIFF WBC: CPT | Performed by: INTERNAL MEDICINE

## 2022-07-26 PROCEDURE — 63600175 PHARM REV CODE 636 W HCPCS: Mod: JG | Performed by: INTERNAL MEDICINE

## 2022-07-26 RX ORDER — PROMETHAZINE HYDROCHLORIDE 25 MG/1
25 TABLET ORAL
Qty: 30 TABLET | Refills: 5 | Status: SHIPPED | OUTPATIENT
Start: 2022-07-26 | End: 2022-01-01 | Stop reason: SDUPTHER

## 2022-07-26 RX ADMIN — PEGFILGRASTIM 6 MG: 6 INJECTION SUBCUTANEOUS at 03:07

## 2022-07-26 NOTE — PROGRESS NOTES
"  [facility] In Office Hematology Oncology Subsequent  Encounter Note    7/20/22      Subjective:      Patient ID:   Negrita Castro  62 y.o.   Martínez Shepard R. Leblanc, Babycos, Bourgeois, Hall      Chief Complaint:   New L breast cancer eval.    HPI:  62 y.o. female with diagnosis of Stage 1 R breast cancer 10/26/15.  "Triple negative".  Adjuvant AC x's 4, tx's 12 and Rad Rx through 9/12/16.    Port removed.  Had bilateral reconstruction surgery 4/14/17.  Further reconstruction, tumsofy baxterck 8/14/17.  Additional fat injection at R chest done for symmetry.  She had placement of R nipple.  Dr. Hernandez.      She is due for additional reconstructive surgery at breast and abdomenal areas.  The surgery is being done as part of her reconstruction efforts and for keloid management.  The date of the surgery is May 19th.    Also, Dr. Alarcon to consider Rad Rx to operative site to decrease keloid potential.  On hold now because of Covid 19 pandemic.    Recent Mamm/U/S showed small mass at 4:00 at L breast.  Needle Bx invasive ductal Ca, ERP+, PRP+, H2N neg  She had  L partial mastectomy, Sentinal node Bx 8/24/20. Followed by another surgery because of L axillary infection.  Primary 2 cm, LN neg, Stage 1 dx.    Discussed Oncotype Dx testing, this supported adding adjuvant chemotherapy to adjuvant hormonal therapy because of increased risk of cancer recurrence long-term.  Discussed BRCA testing, given her bilateral  breast Dx. PHN would not authorize BRCA 1 & 2.  Refill meds for her.    She had COVID-19 infection and was hospitalized.  She came very close to requiring intubation and mechanical ventilation.  She is off oxygen now,   and sees Dr. Justin of Pulmonary.  Recent CT scan of the chest showed residual changes of interstitial disease, consistent with recovering from COVID-19.  Also lytic lesions were seen in the thoracic spine area very suspicious for metastatic disease at T5 through T8 spine.    Recently on " Saturday night she had a hard coughing spell, and thereafter developed severe right posterior pleuritic chest pain.  She has point tenderness over the right posterior chest wall and may very well have a fractured rib at the site.  Rib x-rays have been ordered.  I have refilled her Soma for 1 month.  I have refilled her Norco  for 1 month.  I have added oxycodone 5 mg 1 or 2 p.o. Q 3-4 hours p.r.n. breakthrough pain for 1 month to her regimen.    She also has an enlarging nodule at the left chest wall.  Ultrasound of the site suggested that this was a cyst however the areas firm movable and may very well be cancer.  I asked  for a ultrasound-guided biopsy of the mass per radiology.  Path report showed invasive ductal Ca, ERP + PRP neg,   H2N equivocal.    She is postmenopausal, her last menses were 4 5 years ago.  Rib x-rays have been requested.  Will order a CT scan to evaluate for possible metastatic disease.  Will order a ultrasound-guided biopsy of the left chest wall mass for pathologic examination at [facility].  Bx + invasive breast cancer.  ERP+, PRP-, H2N-.    Suspected local recurrence at L breast.  She had L lumpectomy.  Margins clear.    Refer to Rad Rx MD for adjuvant Rad Rx., for local control.  She has T spine metastases.  Currently on hormonal Rx.  To see Dr. Manuelito Shepard for DM, BS control.    Dr. Washington saw her for C spine eval, no surgery planned for now.  He referred her to Dr. Rivers for injection Rx.    DM, cholesterol, epilepsy hx, DJD, LBP.  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    LR shoulder arthroscopy, R wrist surgery, M0.    Smoke  ppd x's 35 years, quit 2015.  ETOH no.  Disability-depression, epilepsy  Allergy none    Mom ovarian cancer  Dad lung cancer  Sisters DM, lung dx.    Summary of care:  Right breast cancer 2015, triple negative, treated with Adriamycin Cytoxan followed by taxane.    Left breast cancer ERP positive PRP positive HER2 Judy negative in  August 24, 2020    COVID infection December 2020.  She has not taken the covid 19 vaccination because of multiple allergy hx.    February 2021 bone metastases determined.    He he March 21, 2021 local left breast cancer recurrence, ERP positive PRP negative HER2 Judy negative.    He April 21st bone biopsy positive for breast cancer metastatic.  ERP, PRP, HER2 Judy pending.    She was on radiation therapy through May 17, 2021.  Refilled Marinol at increased 5 mg 1 p.o. b.i.d. number 60.   I refilled her Soma, Norco, Marinol, and Silvadene cream; dated the prescriptions for June 17, 2021.    C/O mid thoracic pain x's 6-8 weeks,radiates to L & R, increased.  Tender over Mid thoracic area.  Hx of T spine metastatic dx.  Pain controlled on Norco 10/325 mg and oxycodone 5 mg 1-2 po PRN.  Add lodine 400 mg 1 daily    Checked MRI of T spine.  Metastatic dx with pathologic fx at T3,5,6.  Refer to Dr. Flowers of neurosurgery for Vertebroplasty or Kyphoplasty evaluation.    She is on Faslodex. Ibrance. Monitor WBC count.    She saw Dr. Flowers, and has been fitted with a brace initially, vertebroplasty after the first of the year.  [phone].  RTC Dr. Flowers 12/27/21.    No neuro surgery is planned for T-spine disease because of progressive growth and compression of the spine.  She admits to having decreased strength in the right hand.  She completed 10 of 10 radiation treatments on May 2, 2022. She is tapering down her Decadron 4 mg p.o. b.i.d. and she is taking Lodine 400 mg daily.  We are stopping fast low dex and Ibrance as of April 12, 2022.    Will ask the  to see her if we can get Meals on wheels for her.    She is due for an MRI on May 17, and a PET scan on May 19, I will see her on May 20th.  Plan to go with chemotherapy now, probably will go with carboplatin Gemzar.   She is 140 lb and 5 ft 11.     Today is D1C1 carbo, gemzar.  Duragesic patch 25 mch q 3 days x's 3 patches, start Tuesday.  Claritin 10 mg 1 po  "daily x's 4 days, start Wednesday.  For neulasta or Udenyca Thursday.    Resume Xgeva with D9 C1.  Check lab 7/14 weekly [facility] lab.    Working on getting a electric wheelchair.  On PT with "Hector".  Stronger.  RTC 8/11/22.    ROS:   GEN: normal without any fever, night sweats or weight loss  HEENT: normal with no HA's, sore throat, stiff neck, changes in vision  CV: normal with no CP, SOB, PND, POOL or orthopnea  PULM: normal with no SOB, cough, hemoptysis, sputum or pleuritic pain  GI: normal with no abdominal pain, nausea, vomiting, constipation, diarrhea, melanotic stools, BRBPR, or hematemesis  : normal with no hematuria, dysuria  BREAST: See HPI.  SKIN: normal with no rash, erythema, bruising, or swelling     Past Medical History:   Diagnosis Date    Acute hypoxemic respiratory failure 12/12/2020    Breast cancer     left breast    Cancer     RIGHT BREAST 10-15    Depression     Diabetes mellitus     Neuropathy     Panic attacks     S/P epidural steroid injection     Seizures     none since early 30's    Wears glasses     TO DRIVE     Past Surgical History:   Procedure Laterality Date    AXILLARY NODE DISSECTION Left 8/24/2020    Procedure: LYMPHADENECTOMY, AXILLARY;  Surgeon: Cory Vick MD;  Location: [facility] OR;  Service: General;  Laterality: Left;  left breast mastectomy with possible axillary lymph node dissection    BREAST BIOPSY      right    BREAST LUMPECTOMY      BREAST RECONSTRUCTION      breast reconstruction with tummy [facility]      BREAST SURGERY      11-3-15 LUMPECTOMY, 12-2-15 REEXCISION    INCISION AND DRAINAGE OF ABSCESS Left 9/9/2020    Procedure: INCISION AND DRAINAGE, ABSCESS;  Surgeon: Cory Vick MD;  Location[facility] OR;  Service: General;  Laterality: Left;    INSERTION OF LOCALIZATION WIRE Left 8/24/2020    Procedure: INSERTION, LOCALIZATION WIRE;  Surgeon: Cory Vick MD;  Location: [facility] OR;  Service: General;  Laterality: Left;  left breast " lumpectomy with wire needle loc    MASTECTOMY      mastectomy 2016      MASTECTOMY, PARTIAL Left 3/19/2021    Procedure: MASTECTOMY, PARTIAL;  Surgeon: Cory Vick MD;  Location: [facility] OR;  Service: General;  Laterality: Left;  lumpectomy  left breast     RECONSTRUCTION OF NIPPLE Right 11/5/2018    Procedure: RECONSTRUCTION-NIPPLE RIGHT;  Surgeon: Xiang Hernandez MD;  [facility];  Service: Plastics;  Laterality: Right;    SENTINEL LYMPH NODE BIOPSY Left 8/24/2020    Procedure: BIOPSY, LYMPH NODE, SENTINEL;  Surgeon: Cory Vick MD;  Location: [facility] OR;  Service: General;  Laterality: Left;  left breast lumpectomy with left sentinel lymoh node       shoulder surgery and wrist      BILAT  BONE SPUR    WRIST SURGERY      RIGHT       Review of patient's allergies indicates:   Allergen Reactions    Adhesive tape-silicones Hives     BANDAIDS    Polymycin Hives    Latex, natural rubber Itching    Polyurethane-39            Current Outpatient Medications:     anastrozole (ARIMIDEX) 1 mg Tab, Take 1 mg by mouth once daily., Disp: , Rfl:     blood sugar diagnostic Strp, TEST GLUCOSE BID, Disp: 300 each, Rfl: 3    blood-glucose meter (TRUE METRIX GLUCOSE METER) kit, TEST GLUCOSE BID, Disp: 1 each, Rfl: 0    blood-glucose meter,continuous (DEXCOM G6 ) Misc, TEST GLUCOSE QID, Disp: 1 each, Rfl: 1    blood-glucose sensor (DEXCOM G6 SENSOR) Edwige, TEST GLUCOSE QID, Disp: 13 each, Rfl: 3    blood-glucose transmitter (DEXCOM G6 TRANSMITTER) Edwige, TEST GLUCOSE QID, Disp: 1 each, Rfl: 1    carisoprodoL (SOMA) 350 MG tablet, Take 1 tablet (350 mg total) by mouth 3 (three) times daily as needed for Muscle spasms., Disp: 90 tablet, Rfl: 0    dexAMETHasone (DECADRON) 4 MG Tab, TAKE ONE TABLET (4MG TOTAL) BY MOUTH TWICE DAILY WITH MEALS, Disp: 60 tablet, Rfl: 1    dextromethorphan-guaiFENesin  mg/5 ml (ROBITUSSIN-DM)  mg/5 mL liquid, Take 1 teaspoon q 6 hours prn  cough., Disp: 300 mL, Rfl: 0    diazePAM (VALIUM) 10 MG Tab, Take 1 tablet (10 mg total) by mouth 4 (four) times daily as needed (anxiety)., Disp: 120 tablet, Rfl: 3    docusate sodium (COLACE) 100 MG capsule, Take 100 mg by mouth 2 (two) times daily., Disp: , Rfl:     dronabinoL (MARINOL) 5 MG capsule, Take 1 capsule (5 mg total) by mouth 2 (two) times daily before meals., Disp: 60 capsule, Rfl: 0    dulaglutide (TRULICITY) 3 mg/0.5 mL pen injector, Inject 3 mg into the skin every 7 days., Disp: 4 pen, Rfl: 11    enalapril (VASOTEC) 5 MG tablet, Take 1 tablet (5 mg total) by mouth once daily., Disp: 90 tablet, Rfl: 3    etodolac (LODINE) 400 MG tablet, TAKE ONE TABLET (400 MG TOTAL) BY MOUTH ONCE DAILY, Disp: 30 tablet, Rfl: 2    fentaNYL (DURAGESIC) 25 mcg/hr, Place 1 patch onto the skin every 72 hours. for 10 doses, Disp: 10 patch, Rfl: 0    fluconazole (DIFLUCAN) 150 MG Tab, TAKE ONE TABLET BY MOUTH ONCE FOR 1 DOSE, Disp: 1 tablet, Rfl: 2    furosemide (LASIX) 20 MG tablet, TAKE ONE TABLET BY MOUTH EVERY DAY, Disp: 30 tablet, Rfl: 6    HYDROcodone-acetaminophen (NORCO)  mg per tablet, Take 1 tablet by mouth every 6 (six) hours as needed for Pain., Disp: 120 tablet, Rfl: 0    insulin glargine (LANTUS U-100 INSULIN) 100 unit/mL injection, Inject 32 Units into the skin 2 (two) times a day., Disp: 18 mL, Rfl: 11    lancets 32 gauge Misc, TEST GLUCOSE BID, Disp: 300 each, Rfl: 3    metFORMIN (GLUCOPHAGE-XR) 500 MG ER 24hr tablet, Take 2 tablets (1,000 mg total) by mouth 2 (two) times daily with meals., Disp: 360 tablet, Rfl: 3    metoprolol tartrate (LOPRESSOR) 25 MG tablet, Take 1 tablet (25 mg total) by mouth 2 (two) times daily., Disp: 60 tablet, Rfl: 11    mometasone 0.1% (ELOCON) 0.1 % cream, Apply to affected area daily, Disp: 45 g, Rfl: 1    naloxegoL (MOVANTIK) 12.5 mg Tab, Take 12.5 mg by mouth once daily., Disp: 30 tablet, Rfl: 3    ondansetron (ZOFRAN-ODT) 8 MG TbDL, DISSOLVE 1  "TABLET (8MG TOTAL) on the tongue EVERY 6 TO 8 HOURS AS NEEDED (NAUSEA), Disp: 30 tablet, Rfl: 1    oxyCODONE (ROXICODONE) 15 MG Tab, Take 1 tablet (15 mg total) by mouth every 4 to 6 hours as needed for Pain., Disp: 120 tablet, Rfl: 0    palbociclib 100 mg Tab, Take 100 mg by mouth once daily. for 21 days, then take 7 days off. Total cycle length 28 days, Disp: 21 tablet, Rfl: 6    pen needle, diabetic (BD ULTRA-FINE MARCE PEN NEEDLE) 32 gauge x 5/32" Ndle, Test blood sugar twice daily, Disp: 100 each, Rfl: 5    promethazine (PHENERGAN) 25 MG tablet, Take 1 tablet (25 mg total) by mouth every 4 to 6 hours as needed., Disp: 30 tablet, Rfl: 5    promethazine-codeine 6.25-10 mg/5 ml (PHENERGAN WITH CODEINE) 6.25-10 mg/5 mL syrup, TAKE 5 ML BY MOUTH EVERY 6 HOURS AS NEEDED FOR COUGH, Disp: 450 mL, Rfl: 0    psyllium husk, with sugar, (METAMUCIL, WITH SUGAR,) 3.4 gram packet, Take 1 packet by mouth 2 (two) times daily., Disp: 30 packet, Rfl: 2    rosuvastatin (CRESTOR) 40 MG Tab, Take 1 tablet (40 mg total) by mouth every evening. For cholesterol, Disp: 90 tablet, Rfl: 3    zolpidem (AMBIEN) 10 mg Tab, TAKE ONE TABLET BY MOUTH EVERY NIGHT AT BEDTIME, Disp: 30 tablet, Rfl: 3          Objective:   Vitals:  Blood pressure 114/70, pulse 80, resp. rate 18, height 5' 11" (1.803 m), last menstrual period 01/16/2016.    Physical Examination:   GEN: no apparent distress, comfortable, Brace in place.  HEAD: atraumatic and normocephalic  EYES: no pallor, no icterus  ENT: no pharyngeal erythema  NECK: noLAD/LN's, supple  CV:  No edema  CHEST: Normal respiratory effort   she is not  tender over the right posterior chest wall on exam.  The chest wall is not swollen.  ABDOM:  nondistended; soft                                                                                          MUSC/Skeletal: ROM normal, Tender over mid T spine area.  EXTREM: no swelling  SKIN: She is developing keloid changes at L breast incision and navel " surgical sites.  This area appears to be enlarging.  NEURO: grossly intact  PSYCH: normal mood, affect and behavior  LYMPH: normal  LN's  BREASTS: L breast is much improved.    Refill Oxycodone, SOMA, Norco.    Assessment:   (1) 62 y.o. female with diagnosis of Stage 1 triple negative R breast cancer, 10/2016.       S/P R mastectomy, SNBx, AC x's 4, T x's12 weeks, Rad Rx and recent reconstruction.    (2) New L invasive ductal Ca, ERP+, PRP+, H2N neg.  Clinical stage 1 dx.    I have started her on Arimidex 1 mg p.o. daily  for metastatic disease at this time.    Suspected fracture of right ribs after recent cough event, check rib x-rays, medicate for pain.    ERP+ dx, bone metastases.  Started on Arimidex daily.  Add 2nd hormonal drug Rx after Rad Rx completed.    Bone biopsy positive for metastatic breast cancer.  ERP+, PRP+, H2N-.    Check PET and MRI of brain.  RTC 5/20/22.  Brain metastases not seen.  Progressive bone and liver metastases seen on PET.    D1C1 Carbo Gemzar today.  RTC 1 week for D8C1 Gemzar.  RTC 8/11/22.

## 2022-07-26 NOTE — PLAN OF CARE
Problem: Fatigue  Goal: Improved Activity Tolerance  Outcome: Ongoing, Progressing  Intervention: Promote Improved Energy  Flowsheets (Taken 7/26/2022 1519)  Fatigue Management:   frequent rest breaks encouraged   fatigue-related activity identified   paced activity encouraged  Sleep/Rest Enhancement:   regular sleep/rest pattern promoted   relaxation techniques promoted

## 2022-07-26 NOTE — TELEPHONE ENCOUNTER
----- Message from Lorenza Saldaña MA sent at 7/25/2022  9:20 AM CDT -----    ----- Message -----  From: Manjula Cutler  Sent: 7/22/2022   2:27 PM CDT  To: Manuelito Sehpard Staff    PA approval

## 2022-07-27 LAB
CANCER AG15-3 SERPL IA-ACNC: 35 U/ML
CANCER AG27-29 SERPL-ACNC: 40.2 U/ML

## 2022-08-01 ENCOUNTER — DOCUMENTATION ONLY (OUTPATIENT)
Dept: HEMATOLOGY/ONCOLOGY | Facility: CLINIC | Age: 62
End: 2022-08-01

## 2022-08-01 DIAGNOSIS — Z17.1 MALIGNANT NEOPLASM OF UPPER-OUTER QUADRANT OF RIGHT BREAST IN FEMALE, ESTROGEN RECEPTOR NEGATIVE: ICD-10-CM

## 2022-08-01 DIAGNOSIS — R07.81 PLEURITIC CHEST PAIN: ICD-10-CM

## 2022-08-01 DIAGNOSIS — C50.411 MALIGNANT NEOPLASM OF UPPER-OUTER QUADRANT OF RIGHT BREAST IN FEMALE, ESTROGEN RECEPTOR NEGATIVE: ICD-10-CM

## 2022-08-01 NOTE — PROGRESS NOTES
On 7/27/22, Amy Dolan, again, provided patient with the number for [employer] Whe[employer] and informed that she must contact that agency herself.

## 2022-08-02 ENCOUNTER — LAB VISIT (OUTPATIENT)
Dept: LAB | Facility: HOSPITAL | Age: 62
End: 2022-08-02
Attending: INTERNAL MEDICINE
Payer: MEDICARE

## 2022-08-02 DIAGNOSIS — F06.4 ANXIETY DISORDER DUE TO KNOWN PHYSIOLOGICAL CONDITION: ICD-10-CM

## 2022-08-02 DIAGNOSIS — C79.51 BONE METASTASES: ICD-10-CM

## 2022-08-02 DIAGNOSIS — C50.411 MALIGNANT NEOPLASM OF UPPER-OUTER QUADRANT OF RIGHT BREAST IN FEMALE, ESTROGEN RECEPTOR NEGATIVE: ICD-10-CM

## 2022-08-02 DIAGNOSIS — Z17.1 MALIGNANT NEOPLASM OF UPPER-OUTER QUADRANT OF RIGHT BREAST IN FEMALE, ESTROGEN RECEPTOR NEGATIVE: ICD-10-CM

## 2022-08-02 LAB
ALBUMIN SERPL BCP-MCNC: 3.8 G/DL (ref 3.5–5.2)
ALP SERPL-CCNC: 492 U/L (ref 55–135)
ALT SERPL W/O P-5'-P-CCNC: 23 U/L (ref 10–44)
ANION GAP SERPL CALC-SCNC: 11 MMOL/L (ref 8–16)
AST SERPL-CCNC: 25 U/L (ref 10–40)
BASOPHILS # BLD AUTO: 0.1 K/UL (ref 0–0.2)
BASOPHILS NFR BLD: 0.6 % (ref 0–1.9)
BILIRUB SERPL-MCNC: 0.4 MG/DL (ref 0.1–1)
BUN SERPL-MCNC: 2 MG/DL (ref 6–20)
CALCIUM SERPL-MCNC: 8.7 MG/DL (ref 8.7–10.5)
CHLORIDE SERPL-SCNC: 109 MMOL/L (ref 95–110)
CO2 SERPL-SCNC: 21 MMOL/L (ref 23–29)
CREAT SERPL-MCNC: 0.6 MG/DL (ref 0.5–1.4)
DIFFERENTIAL METHOD: ABNORMAL
EOSINOPHIL # BLD AUTO: 0.1 K/UL (ref 0–0.5)
EOSINOPHIL NFR BLD: 0.4 % (ref 0–8)
ERYTHROCYTE [DISTWIDTH] IN BLOOD BY AUTOMATED COUNT: 14.9 % (ref 11.5–14.5)
EST. GFR  (NO RACE VARIABLE): >60 ML/MIN/1.73 M^2
GLUCOSE SERPL-MCNC: 110 MG/DL (ref 70–110)
HCT VFR BLD AUTO: 33.2 % (ref 37–48.5)
HGB BLD-MCNC: 11 G/DL (ref 12–16)
IMM GRANULOCYTES # BLD AUTO: 0.97 K/UL (ref 0–0.04)
IMM GRANULOCYTES NFR BLD AUTO: 6.3 % (ref 0–0.5)
LYMPHOCYTES # BLD AUTO: 0.8 K/UL (ref 1–4.8)
LYMPHOCYTES NFR BLD: 5.2 % (ref 18–48)
MCH RBC QN AUTO: 32.4 PG (ref 27–31)
MCHC RBC AUTO-ENTMCNC: 33.1 G/DL (ref 32–36)
MCV RBC AUTO: 98 FL (ref 82–98)
MONOCYTES # BLD AUTO: 1.2 K/UL (ref 0.3–1)
MONOCYTES NFR BLD: 7.7 % (ref 4–15)
NEUTROPHILS # BLD AUTO: 12.3 K/UL (ref 1.8–7.7)
NEUTROPHILS NFR BLD: 79.8 % (ref 38–73)
NRBC BLD-RTO: 0 /100 WBC
PLATELET # BLD AUTO: 110 K/UL (ref 150–450)
PLATELET BLD QL SMEAR: ABNORMAL
PMV BLD AUTO: 9.9 FL (ref 9.2–12.9)
POTASSIUM SERPL-SCNC: 4 MMOL/L (ref 3.5–5.1)
PROT SERPL-MCNC: 7 G/DL (ref 6–8.4)
RBC # BLD AUTO: 3.4 M/UL (ref 4–5.4)
SODIUM SERPL-SCNC: 141 MMOL/L (ref 136–145)
WBC # BLD AUTO: 15.47 K/UL (ref 3.9–12.7)

## 2022-08-02 PROCEDURE — 86300 IMMUNOASSAY TUMOR CA 15-3: CPT | Performed by: INTERNAL MEDICINE

## 2022-08-02 PROCEDURE — 80053 COMPREHEN METABOLIC PANEL: CPT | Performed by: INTERNAL MEDICINE

## 2022-08-02 PROCEDURE — 85025 COMPLETE CBC W/AUTO DIFF WBC: CPT | Performed by: INTERNAL MEDICINE

## 2022-08-02 PROCEDURE — 86300 IMMUNOASSAY TUMOR CA 15-3: CPT | Mod: 91 | Performed by: INTERNAL MEDICINE

## 2022-08-02 RX ORDER — ONDANSETRON 2 MG/ML
16 INJECTION INTRAMUSCULAR; INTRAVENOUS ONCE
Status: CANCELLED | OUTPATIENT
Start: 2022-08-11

## 2022-08-02 RX ORDER — SODIUM CHLORIDE 0.9 % (FLUSH) 0.9 %
10 SYRINGE (ML) INJECTION
Status: CANCELLED | OUTPATIENT
Start: 2022-08-11

## 2022-08-02 RX ORDER — HEPARIN 100 UNIT/ML
500 SYRINGE INTRAVENOUS
Status: CANCELLED | OUTPATIENT
Start: 2022-08-11

## 2022-08-02 RX ORDER — HYDROCODONE BITARTRATE AND ACETAMINOPHEN 10; 325 MG/1; MG/1
1 TABLET ORAL EVERY 6 HOURS PRN
Qty: 120 TABLET | Refills: 0 | OUTPATIENT
Start: 2022-08-04 | End: 2022-09-03

## 2022-08-02 NOTE — TELEPHONE ENCOUNTER
----- Message from Shantal Berman sent at 8/2/2022 12:02 PM CDT -----  Patient called and stated that she need a refill of her diazepam  called into [facility] if any questions please give her a call at [phone]

## 2022-08-03 DIAGNOSIS — C79.51 BONE METASTASES: ICD-10-CM

## 2022-08-03 DIAGNOSIS — Z17.1 MALIGNANT NEOPLASM OF UPPER-OUTER QUADRANT OF RIGHT BREAST IN FEMALE, ESTROGEN RECEPTOR NEGATIVE: Primary | ICD-10-CM

## 2022-08-03 DIAGNOSIS — C50.411 MALIGNANT NEOPLASM OF UPPER-OUTER QUADRANT OF RIGHT BREAST IN FEMALE, ESTROGEN RECEPTOR NEGATIVE: Primary | ICD-10-CM

## 2022-08-03 LAB — CANCER AG15-3 SERPL IA-ACNC: 39 U/ML

## 2022-08-03 RX ORDER — DIAZEPAM 10 MG/1
10 TABLET ORAL 4 TIMES DAILY PRN
Qty: 120 TABLET | Refills: 2 | Status: SHIPPED | OUTPATIENT
Start: 2022-08-03 | End: 2022-01-01 | Stop reason: SDUPTHER

## 2022-08-03 RX ORDER — HYDROCODONE BITARTRATE AND ACETAMINOPHEN 10; 325 MG/1; MG/1
1 TABLET ORAL EVERY 6 HOURS PRN
Qty: 120 TABLET | Refills: 0 | Status: SHIPPED | OUTPATIENT
Start: 2022-08-03 | End: 2022-09-01 | Stop reason: SDUPTHER

## 2022-08-03 NOTE — TELEPHONE ENCOUNTER
This prescription has been approved and sent to   [facility] - [facility] - [id number]  [address] [id number]  Phone: [phone] Fax: [phone]

## 2022-08-04 LAB — CANCER AG27-29 SERPL-ACNC: 44.3 U/ML

## 2022-08-05 ENCOUNTER — INFUSION (OUTPATIENT)
Dept: INFUSION THERAPY | Facility: HOSPITAL | Age: 62
End: 2022-08-05
Attending: INTERNAL MEDICINE
Payer: MEDICARE

## 2022-08-05 VITALS
HEIGHT: 71 IN | TEMPERATURE: 98 F | DIASTOLIC BLOOD PRESSURE: 77 MMHG | WEIGHT: 151.13 LBS | BODY MASS INDEX: 21.16 KG/M2 | HEART RATE: 88 BPM | RESPIRATION RATE: 18 BRPM | SYSTOLIC BLOOD PRESSURE: 129 MMHG

## 2022-08-05 DIAGNOSIS — C50.512 MALIGNANT NEOPLASM OF LOWER-OUTER QUADRANT OF LEFT BREAST OF FEMALE, ESTROGEN RECEPTOR POSITIVE: ICD-10-CM

## 2022-08-05 DIAGNOSIS — Z17.0 MALIGNANT NEOPLASM OF LOWER-OUTER QUADRANT OF LEFT BREAST OF FEMALE, ESTROGEN RECEPTOR POSITIVE: ICD-10-CM

## 2022-08-05 DIAGNOSIS — C79.51 BONE METASTASES: Primary | ICD-10-CM

## 2022-08-05 PROCEDURE — A4216 STERILE WATER/SALINE, 10 ML: HCPCS | Performed by: NURSE PRACTITIONER

## 2022-08-05 PROCEDURE — 96365 THER/PROPH/DIAG IV INF INIT: CPT

## 2022-08-05 PROCEDURE — 25000003 PHARM REV CODE 250: Performed by: NURSE PRACTITIONER

## 2022-08-05 PROCEDURE — 63600175 PHARM REV CODE 636 W HCPCS: Performed by: NURSE PRACTITIONER

## 2022-08-05 RX ORDER — HEPARIN 100 UNIT/ML
500 SYRINGE INTRAVENOUS
Status: CANCELLED | OUTPATIENT
Start: 2022-09-02

## 2022-08-05 RX ORDER — SODIUM CHLORIDE 0.9 % (FLUSH) 0.9 %
10 SYRINGE (ML) INJECTION
Status: DISCONTINUED | OUTPATIENT
Start: 2022-08-05 | End: 2022-08-05 | Stop reason: HOSPADM

## 2022-08-05 RX ORDER — ZOLEDRONIC ACID 0.04 MG/ML
4 INJECTION, SOLUTION INTRAVENOUS
Status: COMPLETED | OUTPATIENT
Start: 2022-08-05 | End: 2022-08-05

## 2022-08-05 RX ORDER — SODIUM CHLORIDE 0.9 % (FLUSH) 0.9 %
10 SYRINGE (ML) INJECTION
Status: CANCELLED | OUTPATIENT
Start: 2022-09-02

## 2022-08-05 RX ORDER — ZOLEDRONIC ACID 0.04 MG/ML
4 INJECTION, SOLUTION INTRAVENOUS
Status: CANCELLED | OUTPATIENT
Start: 2022-09-02

## 2022-08-05 RX ADMIN — SODIUM CHLORIDE, PRESERVATIVE FREE 10 ML: 5 INJECTION INTRAVENOUS at 09:08

## 2022-08-05 RX ADMIN — ZOLEDRONIC ACID 4 MG: 0.04 INJECTION, SOLUTION INTRAVENOUS at 09:08

## 2022-08-05 NOTE — PLAN OF CARE
Problem: Fatigue  Goal: Improved Activity Tolerance  8/5/2022 0919 by Awa Yeung RN  Outcome: Met  8/5/2022 0919 by Awa Yeung RN  Outcome: Ongoing, Progressing

## 2022-08-09 ENCOUNTER — LAB VISIT (OUTPATIENT)
Dept: LAB | Facility: HOSPITAL | Age: 62
End: 2022-08-09
Attending: INTERNAL MEDICINE
Payer: MEDICARE

## 2022-08-09 DIAGNOSIS — C50.411 MALIGNANT NEOPLASM OF UPPER-OUTER QUADRANT OF RIGHT BREAST IN FEMALE, ESTROGEN RECEPTOR NEGATIVE: ICD-10-CM

## 2022-08-09 DIAGNOSIS — C79.51 BONE METASTASES: ICD-10-CM

## 2022-08-09 DIAGNOSIS — Z17.1 MALIGNANT NEOPLASM OF UPPER-OUTER QUADRANT OF RIGHT BREAST IN FEMALE, ESTROGEN RECEPTOR NEGATIVE: ICD-10-CM

## 2022-08-09 LAB
ALBUMIN SERPL BCP-MCNC: 3.7 G/DL (ref 3.5–5.2)
ALP SERPL-CCNC: 427 U/L (ref 55–135)
ALT SERPL W/O P-5'-P-CCNC: 16 U/L (ref 10–44)
ANION GAP SERPL CALC-SCNC: 9 MMOL/L (ref 8–16)
AST SERPL-CCNC: 26 U/L (ref 10–40)
BASOPHILS # BLD AUTO: 0.01 K/UL (ref 0–0.2)
BASOPHILS NFR BLD: 0.2 % (ref 0–1.9)
BILIRUB SERPL-MCNC: 0.3 MG/DL (ref 0.1–1)
BUN SERPL-MCNC: 5 MG/DL (ref 6–20)
CALCIUM SERPL-MCNC: 8.4 MG/DL (ref 8.7–10.5)
CHLORIDE SERPL-SCNC: 110 MMOL/L (ref 95–110)
CO2 SERPL-SCNC: 21 MMOL/L (ref 23–29)
CREAT SERPL-MCNC: 0.6 MG/DL (ref 0.5–1.4)
DIFFERENTIAL METHOD: ABNORMAL
EOSINOPHIL # BLD AUTO: 0 K/UL (ref 0–0.5)
EOSINOPHIL NFR BLD: 0.3 % (ref 0–8)
ERYTHROCYTE [DISTWIDTH] IN BLOOD BY AUTOMATED COUNT: 15 % (ref 11.5–14.5)
EST. GFR  (NO RACE VARIABLE): >60 ML/MIN/1.73 M^2
GLUCOSE SERPL-MCNC: 120 MG/DL (ref 70–110)
HCT VFR BLD AUTO: 34.1 % (ref 37–48.5)
HGB BLD-MCNC: 11.4 G/DL (ref 12–16)
IMM GRANULOCYTES # BLD AUTO: 0.03 K/UL (ref 0–0.04)
IMM GRANULOCYTES NFR BLD AUTO: 0.5 % (ref 0–0.5)
LYMPHOCYTES # BLD AUTO: 0.6 K/UL (ref 1–4.8)
LYMPHOCYTES NFR BLD: 9.3 % (ref 18–48)
MCH RBC QN AUTO: 32.7 PG (ref 27–31)
MCHC RBC AUTO-ENTMCNC: 33.4 G/DL (ref 32–36)
MCV RBC AUTO: 98 FL (ref 82–98)
MONOCYTES # BLD AUTO: 0.6 K/UL (ref 0.3–1)
MONOCYTES NFR BLD: 9.7 % (ref 4–15)
NEUTROPHILS # BLD AUTO: 4.8 K/UL (ref 1.8–7.7)
NEUTROPHILS NFR BLD: 80 % (ref 38–73)
NRBC BLD-RTO: 0 /100 WBC
PLATELET # BLD AUTO: 269 K/UL (ref 150–450)
PMV BLD AUTO: 8.7 FL (ref 9.2–12.9)
POTASSIUM SERPL-SCNC: 3.9 MMOL/L (ref 3.5–5.1)
PROT SERPL-MCNC: 7 G/DL (ref 6–8.4)
RBC # BLD AUTO: 3.49 M/UL (ref 4–5.4)
SODIUM SERPL-SCNC: 140 MMOL/L (ref 136–145)
WBC # BLD AUTO: 6 K/UL (ref 3.9–12.7)

## 2022-08-09 PROCEDURE — 86300 IMMUNOASSAY TUMOR CA 15-3: CPT | Performed by: INTERNAL MEDICINE

## 2022-08-09 PROCEDURE — 80053 COMPREHEN METABOLIC PANEL: CPT | Performed by: INTERNAL MEDICINE

## 2022-08-09 PROCEDURE — 85025 COMPLETE CBC W/AUTO DIFF WBC: CPT | Performed by: INTERNAL MEDICINE

## 2022-08-09 PROCEDURE — 86300 IMMUNOASSAY TUMOR CA 15-3: CPT | Mod: 91 | Performed by: INTERNAL MEDICINE

## 2022-08-10 LAB
CANCER AG15-3 SERPL IA-ACNC: 40 U/ML
CANCER AG27-29 SERPL-ACNC: 50.6 U/ML

## 2022-08-11 ENCOUNTER — INFUSION (OUTPATIENT)
Dept: INFUSION THERAPY | Facility: HOSPITAL | Age: 62
End: 2022-08-11
Attending: INTERNAL MEDICINE
Payer: MEDICARE

## 2022-08-11 ENCOUNTER — OFFICE VISIT (OUTPATIENT)
Dept: HEMATOLOGY/ONCOLOGY | Facility: CLINIC | Age: 62
End: 2022-08-11
Payer: MEDICARE

## 2022-08-11 VITALS
HEART RATE: 89 BPM | RESPIRATION RATE: 18 BRPM | OXYGEN SATURATION: 98 % | DIASTOLIC BLOOD PRESSURE: 73 MMHG | BODY MASS INDEX: 21.24 KG/M2 | HEIGHT: 71 IN | WEIGHT: 151.69 LBS | TEMPERATURE: 98 F | SYSTOLIC BLOOD PRESSURE: 124 MMHG

## 2022-08-11 VITALS
RESPIRATION RATE: 18 BRPM | BODY MASS INDEX: 21.14 KG/M2 | HEIGHT: 71 IN | WEIGHT: 151 LBS | SYSTOLIC BLOOD PRESSURE: 116 MMHG | HEART RATE: 98 BPM | TEMPERATURE: 97 F | DIASTOLIC BLOOD PRESSURE: 78 MMHG

## 2022-08-11 DIAGNOSIS — Z17.0 MALIGNANT NEOPLASM OF LOWER-OUTER QUADRANT OF LEFT BREAST OF FEMALE, ESTROGEN RECEPTOR POSITIVE: ICD-10-CM

## 2022-08-11 DIAGNOSIS — C50.512 MALIGNANT NEOPLASM OF LOWER-OUTER QUADRANT OF LEFT BREAST OF FEMALE, ESTROGEN RECEPTOR POSITIVE: ICD-10-CM

## 2022-08-11 DIAGNOSIS — C78.7 LIVER METASTASES: ICD-10-CM

## 2022-08-11 DIAGNOSIS — D70.1 CHEMOTHERAPY-INDUCED NEUTROPENIA: ICD-10-CM

## 2022-08-11 DIAGNOSIS — Z17.1 MALIGNANT NEOPLASM OF UPPER-OUTER QUADRANT OF RIGHT BREAST IN FEMALE, ESTROGEN RECEPTOR NEGATIVE: ICD-10-CM

## 2022-08-11 DIAGNOSIS — C50.411 MALIGNANT NEOPLASM OF UPPER-OUTER QUADRANT OF RIGHT BREAST IN FEMALE, ESTROGEN RECEPTOR NEGATIVE: ICD-10-CM

## 2022-08-11 DIAGNOSIS — C50.411 MALIGNANT NEOPLASM OF UPPER-OUTER QUADRANT OF RIGHT BREAST IN FEMALE, ESTROGEN RECEPTOR NEGATIVE: Primary | ICD-10-CM

## 2022-08-11 DIAGNOSIS — Z17.1 MALIGNANT NEOPLASM OF UPPER-OUTER QUADRANT OF RIGHT BREAST IN FEMALE, ESTROGEN RECEPTOR NEGATIVE: Primary | ICD-10-CM

## 2022-08-11 DIAGNOSIS — T45.1X5A CHEMOTHERAPY-INDUCED NEUTROPENIA: ICD-10-CM

## 2022-08-11 DIAGNOSIS — C79.51 BONE METASTASES: Primary | ICD-10-CM

## 2022-08-11 PROCEDURE — 3078F PR MOST RECENT DIASTOLIC BLOOD PRESSURE < 80 MM HG: ICD-10-PCS | Mod: CPTII,S$GLB,, | Performed by: INTERNAL MEDICINE

## 2022-08-11 PROCEDURE — 96375 TX/PRO/DX INJ NEW DRUG ADDON: CPT

## 2022-08-11 PROCEDURE — 3008F BODY MASS INDEX DOCD: CPT | Mod: CPTII,S$GLB,, | Performed by: INTERNAL MEDICINE

## 2022-08-11 PROCEDURE — 4010F PR ACE/ARB THEARPY RXD/TAKEN: ICD-10-PCS | Mod: CPTII,S$GLB,, | Performed by: INTERNAL MEDICINE

## 2022-08-11 PROCEDURE — 3046F PR MOST RECENT HEMOGLOBIN A1C LEVEL > 9.0%: ICD-10-PCS | Mod: CPTII,S$GLB,, | Performed by: INTERNAL MEDICINE

## 2022-08-11 PROCEDURE — 99213 OFFICE O/P EST LOW 20 MIN: CPT | Mod: S$GLB,,, | Performed by: INTERNAL MEDICINE

## 2022-08-11 PROCEDURE — 96367 TX/PROPH/DG ADDL SEQ IV INF: CPT

## 2022-08-11 PROCEDURE — 96417 CHEMO IV INFUS EACH ADDL SEQ: CPT

## 2022-08-11 PROCEDURE — 3078F DIAST BP <80 MM HG: CPT | Mod: CPTII,S$GLB,, | Performed by: INTERNAL MEDICINE

## 2022-08-11 PROCEDURE — 1159F MED LIST DOCD IN RCRD: CPT | Mod: CPTII,S$GLB,, | Performed by: INTERNAL MEDICINE

## 2022-08-11 PROCEDURE — 25000003 PHARM REV CODE 250: Performed by: INTERNAL MEDICINE

## 2022-08-11 PROCEDURE — 4010F ACE/ARB THERAPY RXD/TAKEN: CPT | Mod: CPTII,S$GLB,, | Performed by: INTERNAL MEDICINE

## 2022-08-11 PROCEDURE — 96413 CHEMO IV INFUSION 1 HR: CPT

## 2022-08-11 PROCEDURE — 1159F PR MEDICATION LIST DOCUMENTED IN MEDICAL RECORD: ICD-10-PCS | Mod: CPTII,S$GLB,, | Performed by: INTERNAL MEDICINE

## 2022-08-11 PROCEDURE — 3074F SYST BP LT 130 MM HG: CPT | Mod: CPTII,S$GLB,, | Performed by: INTERNAL MEDICINE

## 2022-08-11 PROCEDURE — 3046F HEMOGLOBIN A1C LEVEL >9.0%: CPT | Mod: CPTII,S$GLB,, | Performed by: INTERNAL MEDICINE

## 2022-08-11 PROCEDURE — 3008F PR BODY MASS INDEX (BMI) DOCUMENTED: ICD-10-PCS | Mod: CPTII,S$GLB,, | Performed by: INTERNAL MEDICINE

## 2022-08-11 PROCEDURE — 3074F PR MOST RECENT SYSTOLIC BLOOD PRESSURE < 130 MM HG: ICD-10-PCS | Mod: CPTII,S$GLB,, | Performed by: INTERNAL MEDICINE

## 2022-08-11 PROCEDURE — 99213 PR OFFICE/OUTPT VISIT, EST, LEVL III, 20-29 MIN: ICD-10-PCS | Mod: S$GLB,,, | Performed by: INTERNAL MEDICINE

## 2022-08-11 PROCEDURE — 63600175 PHARM REV CODE 636 W HCPCS: Performed by: INTERNAL MEDICINE

## 2022-08-11 RX ORDER — SODIUM CHLORIDE 0.9 % (FLUSH) 0.9 %
10 SYRINGE (ML) INJECTION
Status: DISCONTINUED | OUTPATIENT
Start: 2022-08-11 | End: 2022-08-11 | Stop reason: HOSPADM

## 2022-08-11 RX ORDER — HEPARIN 100 UNIT/ML
500 SYRINGE INTRAVENOUS
Status: DISCONTINUED | OUTPATIENT
Start: 2022-08-11 | End: 2022-08-11 | Stop reason: HOSPADM

## 2022-08-11 RX ADMIN — SODIUM CHLORIDE: 9 INJECTION, SOLUTION INTRAVENOUS at 09:08

## 2022-08-11 RX ADMIN — ONDANSETRON 16 MG: 2 INJECTION INTRAMUSCULAR; INTRAVENOUS at 09:08

## 2022-08-11 RX ADMIN — APREPITANT 130 MG: 130 INJECTION, EMULSION INTRAVENOUS at 10:08

## 2022-08-11 RX ADMIN — GEMCITABINE 1425 MG: 38 INJECTION, SOLUTION INTRAVENOUS at 10:08

## 2022-08-11 RX ADMIN — DEXAMETHASONE SODIUM PHOSPHATE 12 MG: 4 INJECTION, SOLUTION INTRA-ARTICULAR; INTRALESIONAL; INTRAMUSCULAR; INTRAVENOUS; SOFT TISSUE at 09:08

## 2022-08-11 RX ADMIN — CARBOPLATIN 625 MG: 10 INJECTION, SOLUTION INTRAVENOUS at 11:08

## 2022-08-11 NOTE — PLAN OF CARE
Problem: Fall Injury Risk  Goal: Absence of Fall and Fall-Related Injury  Outcome: Ongoing, Progressing  Intervention: Identify and Manage Contributors  Flowsheets (Taken 8/11/2022 0924)  Self-Care Promotion: safe use of adaptive equipment encouraged  Intervention: Promote Injury-Free Environment  Flowsheets (Taken 8/11/2022 0924)  Safety Promotion/Fall Prevention: assistive device/personal item within reach

## 2022-08-16 ENCOUNTER — INFUSION (OUTPATIENT)
Dept: INFUSION THERAPY | Facility: HOSPITAL | Age: 62
End: 2022-08-16
Attending: INTERNAL MEDICINE
Payer: MEDICARE

## 2022-08-16 VITALS
SYSTOLIC BLOOD PRESSURE: 149 MMHG | HEART RATE: 105 BPM | DIASTOLIC BLOOD PRESSURE: 83 MMHG | RESPIRATION RATE: 17 BRPM | HEIGHT: 71 IN | TEMPERATURE: 97 F | WEIGHT: 149.94 LBS | BODY MASS INDEX: 20.99 KG/M2 | OXYGEN SATURATION: 98 %

## 2022-08-16 DIAGNOSIS — C79.51 BONE METASTASES: Primary | ICD-10-CM

## 2022-08-16 DIAGNOSIS — Z17.0 MALIGNANT NEOPLASM OF LOWER-OUTER QUADRANT OF LEFT BREAST OF FEMALE, ESTROGEN RECEPTOR POSITIVE: ICD-10-CM

## 2022-08-16 DIAGNOSIS — C50.512 MALIGNANT NEOPLASM OF LOWER-OUTER QUADRANT OF LEFT BREAST OF FEMALE, ESTROGEN RECEPTOR POSITIVE: ICD-10-CM

## 2022-08-16 DIAGNOSIS — C50.411 MALIGNANT NEOPLASM OF UPPER-OUTER QUADRANT OF RIGHT BREAST IN FEMALE, ESTROGEN RECEPTOR NEGATIVE: ICD-10-CM

## 2022-08-16 DIAGNOSIS — T45.1X5A CHEMOTHERAPY-INDUCED NEUTROPENIA: ICD-10-CM

## 2022-08-16 DIAGNOSIS — D70.1 CHEMOTHERAPY-INDUCED NEUTROPENIA: ICD-10-CM

## 2022-08-16 DIAGNOSIS — Z17.1 MALIGNANT NEOPLASM OF UPPER-OUTER QUADRANT OF RIGHT BREAST IN FEMALE, ESTROGEN RECEPTOR NEGATIVE: ICD-10-CM

## 2022-08-16 DIAGNOSIS — R21 RASH: Primary | ICD-10-CM

## 2022-08-16 PROCEDURE — 96372 THER/PROPH/DIAG INJ SC/IM: CPT

## 2022-08-16 PROCEDURE — 63600175 PHARM REV CODE 636 W HCPCS: Mod: JG | Performed by: NURSE PRACTITIONER

## 2022-08-16 RX ORDER — SODIUM CHLORIDE 0.9 % (FLUSH) 0.9 %
10 SYRINGE (ML) INJECTION
Status: CANCELLED | OUTPATIENT
Start: 2022-09-01

## 2022-08-16 RX ORDER — HEPARIN 100 UNIT/ML
500 SYRINGE INTRAVENOUS
Status: CANCELLED | OUTPATIENT
Start: 2022-09-01

## 2022-08-16 RX ORDER — TRIAMCINOLONE ACETONIDE 1 MG/G
CREAM TOPICAL 2 TIMES DAILY
Qty: 80 G | Refills: 1 | Status: SHIPPED | OUTPATIENT
Start: 2022-08-16 | End: 2022-01-01 | Stop reason: CLARIF

## 2022-08-16 RX ORDER — ONDANSETRON 2 MG/ML
16 INJECTION INTRAMUSCULAR; INTRAVENOUS ONCE
Status: CANCELLED | OUTPATIENT
Start: 2022-09-01

## 2022-08-16 RX ADMIN — PEGFILGRASTIM 6 MG: 6 INJECTION SUBCUTANEOUS at 01:08

## 2022-08-16 NOTE — TELEPHONE ENCOUNTER
----- Message from Susan Araogn sent at 8/16/2022  2:03 PM CDT -----  Pt. Stopped in office since last chemo treatment has itching and bumps needs to know what to take .     #[phone]

## 2022-08-16 NOTE — TELEPHONE ENCOUNTER
----- Message from Susan Aragon sent at 8/16/2022  2:03 PM CDT -----  Pt. Stopped in office since last chemo treatment has itching and bumps needs to know what to take .     #[phone]

## 2022-08-16 NOTE — PLAN OF CARE
Problem: Fatigue  Goal: Improved Activity Tolerance  Intervention: Promote Improved Energy  Flowsheets (Taken 8/16/2022 4101)  Fatigue Management:   paced activity encouraged   frequent rest breaks encouraged  Activity Management: Ambulated -L4

## 2022-08-16 NOTE — PLAN OF CARE
Problem: Fatigue  Goal: Improved Activity Tolerance  Intervention: Promote Improved Energy  Flowsheets (Taken 8/16/2022 9059)  Fatigue Management:   paced activity encouraged   frequent rest breaks encouraged  Activity Management: Ambulated -L4

## 2022-08-17 NOTE — PROGRESS NOTES
"  [facility] In Office Hematology Oncology Subsequent  Encounter Note    8/11/22      Subjective:      Patient ID:   Negrita Castro  62 y.o.   Martínez Shepard R. Leblanc, Babycos, Bourgeois, Hall      Chief Complaint:   New L breast cancer eval.    HPI:  62 y.o. female with diagnosis of Stage 1 R breast cancer 10/26/15.  "Triple negative".  Adjuvant AC x's 4, tx's 12 and Rad Rx through 9/12/16.    Port removed.  Had bilateral reconstruction surgery 4/14/17.  Further reconstruction, tumsofy lisa 8/14/17.  Additional fat injection at R chest done for symmetry.  She had placement of R nipple.  Dr. Hernandez.      She is due for additional reconstructive surgery at breast and abdomenal areas.  The surgery is being done as part of her reconstruction efforts and for keloid management.  The date of the surgery is May 19th.    Also, Dr. Alarcon to consider Rad Rx to operative site to decrease keloid potential.  On hold now because of Covid 19 pandemic.    Recent Mamm/U/S showed small mass at 4:00 at L breast.  Needle Bx invasive ductal Ca, ERP+, PRP+, H2N neg  She had  L partial mastectomy, Sentinal node Bx 8/24/20. Followed by another surgery because of L axillary infection.  Primary 2 cm, LN neg, Stage 1 dx.    Discussed Oncotype Dx testing, this supported adding adjuvant chemotherapy to adjuvant hormonal therapy because of increased risk of cancer recurrence long-term.  Discussed BRCA testing, given her bilateral  breast Dx. PHN would not authorize BRCA 1 & 2.  Refill meds for her.    She had COVID-19 infection and was hospitalized.  She came very close to requiring intubation and mechanical ventilation.  She is off oxygen now,   and sees Dr. Justin of Pulmonary.  Recent CT scan of the chest showed residual changes of interstitial disease, consistent with recovering from COVID-19.  Also lytic lesions were seen in the thoracic spine area very suspicious for metastatic disease at T5 through T8 spine.    Recently on " Admit Date: 2019 POD 3 Days Post-Op Procedure:  Procedure(s): 
ROBOTIC ASSISTED LAPAROSCOPIC INCISIONAL HERNIA REPAIR WITH MESH Subjective:  
 
Patient status post robotic hernia repair Dr. Kerri Koehler, abdominal pain still but steadily improving, patient states she is swallowing well but there was concern about aspiration pneumonia and pulmonary issues, speech therapy evaluation pending tomorrow and internal medicine following pulmonary issues Review of Systems Patient denies any dysphasia although this is well documented previously, no shortness of breath minimal incisional abdominal pain Objective:  
Patient alert awake no acute distress lungs have breath sounds bilaterally, abdomen soft incision clean minimally tender around previous hernia Blood pressure 138/63, pulse (!) 117, temperature 98.9 °F (37.2 °C), resp. rate 18, height 5' 3.5\" (1.613 m), weight 46 kg (101 lb 6.6 oz), SpO2 91 %, not currently breastfeeding. Temp (24hrs), Av.5 °F (36.9 °C), Min:98.2 °F (36.8 °C), Max:98.9 °F (37.2 °C) Physical Exam 
  
 
Labs:  
Recent Results (from the past 24 hour(s)) METABOLIC PANEL, BASIC Collection Time: 19  4:35 AM  
Result Value Ref Range Sodium 147 (H) 136 - 145 mmol/L Potassium 3.8 3.5 - 5.1 mmol/L Chloride 111 (H) 97 - 108 mmol/L  
 CO2 33 (H) 21 - 32 mmol/L Anion gap 3 (L) 5 - 15 mmol/L Glucose 132 (H) 65 - 100 mg/dL BUN 19 6 - 20 MG/DL Creatinine 0.93 0.55 - 1.02 MG/DL  
 BUN/Creatinine ratio 20 12 - 20 GFR est AA >60 >60 ml/min/1.73m2 GFR est non-AA 59 (L) >60 ml/min/1.73m2 Calcium 10.4 (H) 8.5 - 10.1 MG/DL Data Review images and reports reviewed Assessment:  
 
Active Problems: Dysphagia (2015) SIRS (systemic inflammatory response syndrome) ([address] (3/24/2017) Incisional hernia (2019) Influenza and pneumonia (2019) Ventral hernia (2019) Plan/Recommendations/Medical Decision Making:  
 
Continue present treatment Speech therapy consult tomorrow, Pending that result, advance diet as tolerated, internal medicine hospitalist recommendations and therapy regarding pulmonary issues Kaur Black MD , [facility] 
ED [facility] Saturday night she had a hard coughing spell, and thereafter developed severe right posterior pleuritic chest pain.  She has point tenderness over the right posterior chest wall and may very well have a fractured rib at the site.  Rib x-rays have been ordered.  I have refilled her Soma for 1 month.  I have refilled her Norco  for 1 month.  I have added oxycodone 5 mg 1 or 2 p.o. Q 3-4 hours p.r.n. breakthrough pain for 1 month to her regimen.    She also has an enlarging nodule at the left chest wall.  Ultrasound of the site suggested that this was a cyst however the areas firm movable and may very well be cancer.  I asked  for a ultrasound-guided biopsy of the mass per radiology.  Path report showed invasive ductal Ca, ERP + PRP neg,   H2N equivocal.    She is postmenopausal, her last menses were 4 5 years ago.  Rib x-rays have been requested.  Will order a CT scan to evaluate for possible metastatic disease.  Will order a ultrasound-guided biopsy of the left chest wall mass for pathologic examination at [facility].  Bx + invasive breast cancer.  ERP+, PRP-, H2N-.    Suspected local recurrence at L breast.  She had L lumpectomy.  Margins clear.    Refer to Rad Rx MD for adjuvant Rad Rx., for local control.  She has T spine metastases.  Currently on hormonal Rx.  To see Dr. Manuelito Shepard for DM, BS control.    Dr. Washington saw her for C spine eval, no surgery planned for now.  He referred her to Dr. Rivers for injection Rx.    DM, cholesterol, epilepsy hx, DJD, LBP.  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    LR shoulder arthroscopy, R wrist surgery, M0.    Smoke  ppd x's 35 years, quit 2015.  ETOH no.  Disability-depression, epilepsy  Allergy none    Mom ovarian cancer  Dad lung cancer  Sisters DM, lung dx.    Summary of care:  Right breast cancer 2015, triple negative, treated with Adriamycin Cytoxan followed by taxane.    Left breast cancer ERP positive PRP positive HER2 Judy negative in  August 24, 2020    COVID infection December 2020.  She has not taken the covid 19 vaccination because of multiple allergy hx.    February 2021 bone metastases determined.    He he March 21, 2021 local left breast cancer recurrence, ERP positive PRP negative HER2 Judy negative.    He April 21st bone biopsy positive for breast cancer metastatic.  ERP, PRP, HER2 Judy pending.    She was on radiation therapy through May 17, 2021.  Refilled Marinol at increased 5 mg 1 p.o. b.i.d. number 60.   I refilled her Soma, Norco, Marinol, and Silvadene cream; dated the prescriptions for June 17, 2021.    C/O mid thoracic pain x's 6-8 weeks,radiates to L & R, increased.  Tender over Mid thoracic area.  Hx of T spine metastatic dx.  Pain controlled on Norco 10/325 mg and oxycodone 5 mg 1-2 po PRN.  Add lodine 400 mg 1 daily    Checked MRI of T spine.  Metastatic dx with pathologic fx at T3,5,6.  Refer to Dr. Flowers of neurosurgery for Vertebroplasty or Kyphoplasty evaluation.    She is on Faslodex. Ibrance. Monitor WBC count.    She saw Dr. Flowers, and has been fitted with a brace initially, vertebroplasty after the first of the year.  [phone].  RTC Dr. Flowers 12/27/21.    No neuro surgery is planned for T-spine disease because of progressive growth and compression of the spine.  She admits to having decreased strength in the right hand.  She completed 10 of 10 radiation treatments on May 2, 2022. She is tapering down her Decadron 4 mg p.o. b.i.d. and she is taking Lodine 400 mg daily.  We are stopping fast low dex and Ibrance as of April 12, 2022.    Will ask the  to see her if we can get Meals on wheels for her.    She is due for an MRI on May 17, and a PET scan on May 19, I will see her on May 20th.  Plan to go with chemotherapy now, probably will go with carboplatin Gemzar.   She is 140 lb and 5 ft 11.     Today is D1C1 carbo, gemzar.  Duragesic patch 25 mch q 3 days x's 3 patches, start Tuesday.  Claritin 10 mg 1 po  "daily x's 4 days, start Wednesday.  For neulasta or Udenyca Thursday.    Resume Xgeva with D9 C1.  Check lab 7/14 weekly [facility] lab.    Working on getting a electric wheelchair.  On PT with "Hector".  Stronger.  Today chemo given.  Mass on back is giving her pain, refer to Cory Vick MD for removal.  D1C6 9/1/22    Refill Soma, Oxycodone, Norco, Ambien on 9/1/22.  ROS:   GEN: normal without any fever, night sweats or weight loss  HEENT: normal with no HA's, sore throat, stiff neck, changes in vision  CV: normal with no CP, SOB, PND, POOL or orthopnea  PULM: normal with no SOB, cough, hemoptysis, sputum or pleuritic pain  GI: normal with no abdominal pain, nausea, vomiting, constipation, diarrhea, melanotic stools, BRBPR, or hematemesis  : normal with no hematuria, dysuria  BREAST: See HPI.  SKIN: normal with no rash, erythema, bruising, or swelling     Past Medical History:   Diagnosis Date    Acute hypoxemic respiratory failure 12/12/2020    Breast cancer     left breast    Cancer     RIGHT BREAST 10-15    Depression     Diabetes mellitus     Neuropathy     Panic attacks     S/P epidural steroid injection     Seizures     none since early 30's    Wears glasses     TO DRIVE     Past Surgical History:   Procedure Laterality Date    AXILLARY NODE DISSECTION Left 8/24/2020    Procedure: LYMPHADENECTOMY, AXILLARY;  Surgeon: Cory Vick MD;  Location: [facility] OR;  Service: General;  Laterality: Left;  left breast mastectomy with possible axillary lymph node dissection    BREAST BIOPSY      right    BREAST LUMPECTOMY      BREAST RECONSTRUCTION      breast reconstruction with tummy [facility]      BREAST SURGERY      11-3-15 LUMPECTOMY, 12-2-15 REEXCISION    INCISION AND DRAINAGE OF ABSCESS Left 9/9/2020    Procedure: INCISION AND DRAINAGE, ABSCESS;  Surgeon: Cory Vick MD;  Location: [facility] OR;  Service: General;  Laterality: Left;    INSERTION OF LOCALIZATION WIRE Left 8/24/2020    Procedure: " INSERTION, LOCALIZATION WIRE;  Surgeon: Cory Vick MD;  Location: [facility] OR;  Service: General;  Laterality: Left;  left breast lumpectomy with wire needle loc    MASTECTOMY      mastectomy 2016      MASTECTOMY, PARTIAL Left 3/19/2021    Procedure: MASTECTOMY, PARTIAL;  Surgeon: Cory Vick MD;  Location: [facility] OR;  Service: General;  Laterality: Left;  lumpectomy  left breast     RECONSTRUCTION OF NIPPLE Right 11/5/2018    Procedure: RECONSTRUCTION-NIPPLE RIGHT;  Surgeon: Xiang Hernandez MD;  [facility];  Service: Plastics;  Laterality: Right;    SENTINEL LYMPH NODE BIOPSY Left 8/24/2020    Procedure: BIOPSY, LYMPH NODE, SENTINEL;  Surgeon: Cory Vick MD;  Location: [facility] OR;  Service: General;  Laterality: Left;  left breast lumpectomy with left sentinel lymoh node       shoulder surgery and wrist      BILAT  BONE SPUR    WRIST SURGERY      RIGHT       Review of patient's allergies indicates:   Allergen Reactions    Adhesive tape-silicones Hives     BANDAIDS    Polymycin Hives    Latex, natural rubber Itching    Polyurethane-39            Current Outpatient Medications:     anastrozole (ARIMIDEX) 1 mg Tab, Take 1 mg by mouth once daily., Disp: , Rfl:     blood sugar diagnostic Strp, TEST GLUCOSE BID, Disp: 300 each, Rfl: 3    blood-glucose meter (TRUE METRIX GLUCOSE METER) kit, TEST GLUCOSE BID, Disp: 1 each, Rfl: 0    blood-glucose meter,continuous (DEXCOM G6 ) Misc, TEST GLUCOSE QID, Disp: 1 each, Rfl: 1    blood-glucose sensor (DEXCOM G6 SENSOR) [place]ige, TEST GLUCOSE QID, Disp: 13 each, Rfl: 3    blood-glucose transmitter (DEXCOM G6 TRANSMITTER) Edwige, TEST GLUCOSE QID, Disp: 1 each, Rfl: 1    carisoprodoL (SOMA) 350 MG tablet, Take 1 tablet (350 mg total) by mouth 3 (three) times daily as needed for Muscle spasms., Disp: 90 tablet, Rfl: 0    dexAMETHasone (DECADRON) 4 MG Tab, TAKE ONE TABLET (4MG TOTAL) BY MOUTH TWICE DAILY WITH MEALS, Disp:  60 tablet, Rfl: 1    dextromethorphan-guaiFENesin  mg/5 ml (ROBITUSSIN-DM)  mg/5 mL liquid, Take 1 teaspoon q 6 hours prn cough., Disp: 300 mL, Rfl: 0    diazePAM (VALIUM) 10 MG Tab, Take 1 tablet (10 mg total) by mouth 4 (four) times daily as needed (anxiety)., Disp: 120 tablet, Rfl: 2    docusate sodium (COLACE) 100 MG capsule, Take 100 mg by mouth 2 (two) times daily., Disp: , Rfl:     dronabinoL (MARINOL) 5 MG capsule, Take 1 capsule (5 mg total) by mouth 2 (two) times daily before meals., Disp: 60 capsule, Rfl: 0    dulaglutide (TRULICITY) 3 mg/0.5 mL pen injector, Inject 3 mg into the skin every 7 days., Disp: 4 pen, Rfl: 11    enalapril (VASOTEC) 5 MG tablet, Take 1 tablet (5 mg total) by mouth once daily., Disp: 90 tablet, Rfl: 3    etodolac (LODINE) 400 MG tablet, TAKE ONE TABLET (400 MG TOTAL) BY MOUTH ONCE DAILY, Disp: 30 tablet, Rfl: 2    fluconazole (DIFLUCAN) 150 MG Tab, TAKE ONE TABLET BY MOUTH ONCE FOR 1 DOSE, Disp: 1 tablet, Rfl: 2    furosemide (LASIX) 20 MG tablet, TAKE ONE TABLET BY MOUTH EVERY DAY, Disp: 30 tablet, Rfl: 6    HYDROcodone-acetaminophen (NORCO)  mg per tablet, Take 1 tablet by mouth every 6 (six) hours as needed for Pain., Disp: 120 tablet, Rfl: 0    HYDROcodone-acetaminophen (NORCO)  mg per tablet, Take 1 tablet by mouth every 6 (six) hours as needed for Pain., Disp: 120 tablet, Rfl: 0    insulin glargine (LANTUS U-100 INSULIN) 100 unit/mL injection, Inject 32 Units into the skin 2 (two) times a day., Disp: 18 mL, Rfl: 11    lancets 32 gauge Misc, TEST GLUCOSE BID, Disp: 300 each, Rfl: 3    metFORMIN (GLUCOPHAGE-XR) 500 MG ER 24hr tablet, Take 2 tablets (1,000 mg total) by mouth 2 (two) times daily with meals., Disp: 360 tablet, Rfl: 3    metoprolol tartrate (LOPRESSOR) 25 MG tablet, Take 1 tablet (25 mg total) by mouth 2 (two) times daily., Disp: 60 tablet, Rfl: 11    mometasone 0.1% (ELOCON) 0.1 % cream, Apply to affected area daily, Disp:  "45 g, Rfl: 1    naloxegoL (MOVANTIK) 12.5 mg Tab, Take 12.5 mg by mouth once daily., Disp: 30 tablet, Rfl: 3    ondansetron (ZOFRAN-ODT) 8 MG TbDL, DISSOLVE 1 TABLET (8MG TOTAL) on the tongue EVERY 6 TO 8 HOURS AS NEEDED (NAUSEA), Disp: 30 tablet, Rfl: 1    oxyCODONE (ROXICODONE) 15 MG Tab, Take 1 tablet (15 mg total) by mouth every 4 to 6 hours as needed for Pain., Disp: 120 tablet, Rfl: 0    palbociclib 100 mg Tab, Take 100 mg by mouth once daily. for 21 days, then take 7 days off. Total cycle length 28 days, Disp: 21 tablet, Rfl: 6    pen needle, diabetic (BD ULTRA-FINE MARCE PEN NEEDLE) 32 gauge x 5/32" Ndle, Test blood sugar twice daily, Disp: 100 each, Rfl: 5    promethazine (PHENERGAN) 25 MG tablet, Take 1 tablet (25 mg total) by mouth every 4 to 6 hours as needed., Disp: 30 tablet, Rfl: 5    promethazine-codeine 6.25-10 mg/5 ml (PHENERGAN WITH CODEINE) 6.25-10 mg/5 mL syrup, TAKE 5 ML BY MOUTH EVERY 6 HOURS AS NEEDED FOR COUGH, Disp: 450 mL, Rfl: 0    psyllium husk, with sugar, (METAMUCIL, WITH SUGAR,) 3.4 gram packet, Take 1 packet by mouth 2 (two) times daily., Disp: 30 packet, Rfl: 2    rosuvastatin (CRESTOR) 40 MG Tab, Take 1 tablet (40 mg total) by mouth every evening. For cholesterol, Disp: 90 tablet, Rfl: 3    zolpidem (AMBIEN) 10 mg Tab, TAKE ONE TABLET BY MOUTH EVERY NIGHT AT BEDTIME, Disp: 30 tablet, Rfl: 3    triamcinolone acetonide 0.1% (KENALOG) 0.1 % cream, Apply topically 2 (two) times daily., Disp: 80 g, Rfl: 1  No current facility-administered medications for this visit.          Objective:   Vitals:  Blood pressure 116/78, pulse 98, temperature 97.3 °F (36.3 °C), resp. rate 18, height 5' 11" (1.803 m), weight 68.5 kg (151 lb), last menstrual period 01/16/2016.    Physical Examination:   GEN: no apparent distress, comfortable, Brace in place.  HEAD: atraumatic and normocephalic  EYES: no pallor, no icterus  ENT: no pharyngeal erythema  NECK: noLAD/LN's, supple  CV:  No " edema  CHEST: Normal respiratory effort   she is not  tender over the right posterior chest wall on exam.  The chest wall is not swollen.  ABDOM:  nondistended; soft                                                                                          MUSC/Skeletal: ROM normal, Tender over mid T spine area.  EXTREM: no swelling  SKIN: She is developing keloid changes at L breast incision and navel surgical sites.  This area appears to be enlarging.  NEURO: grossly intact  PSYCH: normal mood, affect and behavior  LYMPH: normal  LN's  BREASTS: L breast is much improved.    Refill Oxycodone, SOMA, Norco.    Assessment:   (1) 62 y.o. female with diagnosis of Stage 1 triple negative R breast cancer, 10/2016.       S/P R mastectomy, SNBx, AC x's 4, T x's12 weeks, Rad Rx and recent reconstruction.    (2) New L invasive ductal Ca, ERP+, PRP+, H2N neg.  Clinical stage 1 dx.    I have started her on Arimidex 1 mg p.o. daily  for metastatic disease at this time.    Suspected fracture of right ribs after recent cough event, check rib x-rays, medicate for pain.    ERP+ dx, bone metastases.  Started on Arimidex daily.  Add 2nd hormonal drug Rx after Rad Rx completed.    Bone biopsy positive for metastatic breast cancer.  ERP+, PRP+, H2N-.    RTC 9/1/22

## 2022-08-23 ENCOUNTER — LAB VISIT (OUTPATIENT)
Dept: LAB | Facility: HOSPITAL | Age: 62
End: 2022-08-23
Attending: INTERNAL MEDICINE
Payer: MEDICARE

## 2022-08-23 DIAGNOSIS — Z17.1 MALIGNANT NEOPLASM OF UPPER-OUTER QUADRANT OF RIGHT BREAST IN FEMALE, ESTROGEN RECEPTOR NEGATIVE: ICD-10-CM

## 2022-08-23 DIAGNOSIS — C79.51 BONE METASTASES: ICD-10-CM

## 2022-08-23 DIAGNOSIS — C50.411 MALIGNANT NEOPLASM OF UPPER-OUTER QUADRANT OF RIGHT BREAST IN FEMALE, ESTROGEN RECEPTOR NEGATIVE: ICD-10-CM

## 2022-08-23 LAB
ALBUMIN SERPL BCP-MCNC: 3.9 G/DL (ref 3.5–5.2)
ALP SERPL-CCNC: 454 U/L (ref 55–135)
ALT SERPL W/O P-5'-P-CCNC: 43 U/L (ref 10–44)
ANION GAP SERPL CALC-SCNC: 10 MMOL/L (ref 8–16)
AST SERPL-CCNC: 33 U/L (ref 10–40)
BASOPHILS # BLD AUTO: 0.02 K/UL (ref 0–0.2)
BASOPHILS NFR BLD: 0.2 % (ref 0–1.9)
BILIRUB SERPL-MCNC: 0.4 MG/DL (ref 0.1–1)
BUN SERPL-MCNC: 4 MG/DL (ref 6–20)
CALCIUM SERPL-MCNC: 8.7 MG/DL (ref 8.7–10.5)
CHLORIDE SERPL-SCNC: 108 MMOL/L (ref 95–110)
CO2 SERPL-SCNC: 24 MMOL/L (ref 23–29)
CREAT SERPL-MCNC: 0.7 MG/DL (ref 0.5–1.4)
DIFFERENTIAL METHOD: ABNORMAL
EOSINOPHIL # BLD AUTO: 0 K/UL (ref 0–0.5)
EOSINOPHIL NFR BLD: 0.2 % (ref 0–8)
ERYTHROCYTE [DISTWIDTH] IN BLOOD BY AUTOMATED COUNT: 13.9 % (ref 11.5–14.5)
EST. GFR  (NO RACE VARIABLE): >60 ML/MIN/1.73 M^2
GLUCOSE SERPL-MCNC: 123 MG/DL (ref 70–110)
HCT VFR BLD AUTO: 32 % (ref 37–48.5)
HGB BLD-MCNC: 10.7 G/DL (ref 12–16)
IMM GRANULOCYTES # BLD AUTO: 0.19 K/UL (ref 0–0.04)
IMM GRANULOCYTES NFR BLD AUTO: 1.9 % (ref 0–0.5)
LYMPHOCYTES # BLD AUTO: 0.8 K/UL (ref 1–4.8)
LYMPHOCYTES NFR BLD: 7.6 % (ref 18–48)
MCH RBC QN AUTO: 32.6 PG (ref 27–31)
MCHC RBC AUTO-ENTMCNC: 33.4 G/DL (ref 32–36)
MCV RBC AUTO: 98 FL (ref 82–98)
MONOCYTES # BLD AUTO: 1 K/UL (ref 0.3–1)
MONOCYTES NFR BLD: 9.5 % (ref 4–15)
NEUTROPHILS # BLD AUTO: 8.2 K/UL (ref 1.8–7.7)
NEUTROPHILS NFR BLD: 80.6 % (ref 38–73)
NRBC BLD-RTO: 0 /100 WBC
PLATELET # BLD AUTO: 54 K/UL (ref 150–450)
PLATELET BLD QL SMEAR: ABNORMAL
PMV BLD AUTO: 10.6 FL (ref 9.2–12.9)
POLYCHROMASIA BLD QL SMEAR: ABNORMAL
POTASSIUM SERPL-SCNC: 3.6 MMOL/L (ref 3.5–5.1)
PROT SERPL-MCNC: 7.1 G/DL (ref 6–8.4)
RBC # BLD AUTO: 3.28 M/UL (ref 4–5.4)
SODIUM SERPL-SCNC: 142 MMOL/L (ref 136–145)
WBC # BLD AUTO: 10.13 K/UL (ref 3.9–12.7)

## 2022-08-23 PROCEDURE — 85025 COMPLETE CBC W/AUTO DIFF WBC: CPT | Performed by: INTERNAL MEDICINE

## 2022-08-23 PROCEDURE — 86300 IMMUNOASSAY TUMOR CA 15-3: CPT | Mod: 91 | Performed by: INTERNAL MEDICINE

## 2022-08-23 PROCEDURE — 80053 COMPREHEN METABOLIC PANEL: CPT | Performed by: INTERNAL MEDICINE

## 2022-08-23 PROCEDURE — 86300 IMMUNOASSAY TUMOR CA 15-3: CPT | Performed by: INTERNAL MEDICINE

## 2022-08-23 PROCEDURE — 36415 COLL VENOUS BLD VENIPUNCTURE: CPT | Performed by: INTERNAL MEDICINE

## 2022-08-25 LAB — CANCER AG15-3 SERPL IA-ACNC: 34 U/ML

## 2022-08-26 LAB — CANCER AG27-29 SERPL-ACNC: 36.1 U/ML

## 2022-08-30 ENCOUNTER — LAB VISIT (OUTPATIENT)
Dept: LAB | Facility: HOSPITAL | Age: 62
End: 2022-08-30
Attending: INTERNAL MEDICINE
Payer: MEDICARE

## 2022-08-30 DIAGNOSIS — Z17.1 MALIGNANT NEOPLASM OF UPPER-OUTER QUADRANT OF RIGHT BREAST IN FEMALE, ESTROGEN RECEPTOR NEGATIVE: ICD-10-CM

## 2022-08-30 DIAGNOSIS — C50.411 MALIGNANT NEOPLASM OF UPPER-OUTER QUADRANT OF RIGHT BREAST IN FEMALE, ESTROGEN RECEPTOR NEGATIVE: ICD-10-CM

## 2022-08-30 DIAGNOSIS — C79.51 BONE METASTASES: ICD-10-CM

## 2022-08-30 LAB
ALBUMIN SERPL BCP-MCNC: 3.7 G/DL (ref 3.5–5.2)
ALP SERPL-CCNC: 396 U/L (ref 55–135)
ALT SERPL W/O P-5'-P-CCNC: 26 U/L (ref 10–44)
ANION GAP SERPL CALC-SCNC: 10 MMOL/L (ref 8–16)
AST SERPL-CCNC: 24 U/L (ref 10–40)
BASOPHILS # BLD AUTO: 0.01 K/UL (ref 0–0.2)
BASOPHILS NFR BLD: 0.2 % (ref 0–1.9)
BILIRUB SERPL-MCNC: 0.4 MG/DL (ref 0.1–1)
BUN SERPL-MCNC: 9 MG/DL (ref 6–20)
CALCIUM SERPL-MCNC: 8.6 MG/DL (ref 8.7–10.5)
CHLORIDE SERPL-SCNC: 106 MMOL/L (ref 95–110)
CO2 SERPL-SCNC: 22 MMOL/L (ref 23–29)
CREAT SERPL-MCNC: 0.8 MG/DL (ref 0.5–1.4)
DIFFERENTIAL METHOD: ABNORMAL
EOSINOPHIL # BLD AUTO: 0 K/UL (ref 0–0.5)
EOSINOPHIL NFR BLD: 0.6 % (ref 0–8)
ERYTHROCYTE [DISTWIDTH] IN BLOOD BY AUTOMATED COUNT: 15.4 % (ref 11.5–14.5)
EST. GFR  (NO RACE VARIABLE): >60 ML/MIN/1.73 M^2
GLUCOSE SERPL-MCNC: 224 MG/DL (ref 70–110)
HCT VFR BLD AUTO: 33.1 % (ref 37–48.5)
HGB BLD-MCNC: 11 G/DL (ref 12–16)
IMM GRANULOCYTES # BLD AUTO: 0.02 K/UL (ref 0–0.04)
IMM GRANULOCYTES NFR BLD AUTO: 0.4 % (ref 0–0.5)
LYMPHOCYTES # BLD AUTO: 0.6 K/UL (ref 1–4.8)
LYMPHOCYTES NFR BLD: 11.9 % (ref 18–48)
MCH RBC QN AUTO: 32.9 PG (ref 27–31)
MCHC RBC AUTO-ENTMCNC: 33.2 G/DL (ref 32–36)
MCV RBC AUTO: 99 FL (ref 82–98)
MONOCYTES # BLD AUTO: 0.5 K/UL (ref 0.3–1)
MONOCYTES NFR BLD: 10.7 % (ref 4–15)
NEUTROPHILS # BLD AUTO: 3.6 K/UL (ref 1.8–7.7)
NEUTROPHILS NFR BLD: 76.2 % (ref 38–73)
NRBC BLD-RTO: 0 /100 WBC
PLATELET # BLD AUTO: 242 K/UL (ref 150–450)
PMV BLD AUTO: 9.3 FL (ref 9.2–12.9)
POTASSIUM SERPL-SCNC: 3.7 MMOL/L (ref 3.5–5.1)
PROT SERPL-MCNC: 6.6 G/DL (ref 6–8.4)
RBC # BLD AUTO: 3.34 M/UL (ref 4–5.4)
SODIUM SERPL-SCNC: 138 MMOL/L (ref 136–145)
WBC # BLD AUTO: 4.69 K/UL (ref 3.9–12.7)

## 2022-08-30 PROCEDURE — 86300 IMMUNOASSAY TUMOR CA 15-3: CPT | Performed by: INTERNAL MEDICINE

## 2022-08-30 PROCEDURE — 86300 IMMUNOASSAY TUMOR CA 15-3: CPT | Mod: 91 | Performed by: INTERNAL MEDICINE

## 2022-08-30 PROCEDURE — 36415 COLL VENOUS BLD VENIPUNCTURE: CPT | Performed by: INTERNAL MEDICINE

## 2022-08-30 PROCEDURE — 80053 COMPREHEN METABOLIC PANEL: CPT | Performed by: INTERNAL MEDICINE

## 2022-08-30 PROCEDURE — 85025 COMPLETE CBC W/AUTO DIFF WBC: CPT | Performed by: INTERNAL MEDICINE

## 2022-09-01 ENCOUNTER — OFFICE VISIT (OUTPATIENT)
Dept: HEMATOLOGY/ONCOLOGY | Facility: CLINIC | Age: 62
End: 2022-09-01
Payer: MEDICARE

## 2022-09-01 ENCOUNTER — INFUSION (OUTPATIENT)
Dept: INFUSION THERAPY | Facility: HOSPITAL | Age: 62
End: 2022-09-01
Attending: INTERNAL MEDICINE
Payer: MEDICARE

## 2022-09-01 VITALS
WEIGHT: 153 LBS | SYSTOLIC BLOOD PRESSURE: 113 MMHG | DIASTOLIC BLOOD PRESSURE: 67 MMHG | HEART RATE: 86 BPM | BODY MASS INDEX: 21.34 KG/M2 | TEMPERATURE: 98 F

## 2022-09-01 VITALS
WEIGHT: 153.19 LBS | HEART RATE: 90 BPM | OXYGEN SATURATION: 98 % | SYSTOLIC BLOOD PRESSURE: 116 MMHG | TEMPERATURE: 98 F | BODY MASS INDEX: 21.45 KG/M2 | DIASTOLIC BLOOD PRESSURE: 67 MMHG | HEIGHT: 71 IN | RESPIRATION RATE: 18 BRPM

## 2022-09-01 DIAGNOSIS — C50.411 MALIGNANT NEOPLASM OF UPPER-OUTER QUADRANT OF RIGHT BREAST IN FEMALE, ESTROGEN RECEPTOR NEGATIVE: ICD-10-CM

## 2022-09-01 DIAGNOSIS — R07.81 PLEURITIC CHEST PAIN: ICD-10-CM

## 2022-09-01 DIAGNOSIS — C79.51 BONE METASTASES: Primary | ICD-10-CM

## 2022-09-01 DIAGNOSIS — Z17.0 MALIGNANT NEOPLASM OF LOWER-OUTER QUADRANT OF LEFT BREAST OF FEMALE, ESTROGEN RECEPTOR POSITIVE: ICD-10-CM

## 2022-09-01 DIAGNOSIS — C50.512 MALIGNANT NEOPLASM OF LOWER-OUTER QUADRANT OF LEFT BREAST OF FEMALE, ESTROGEN RECEPTOR POSITIVE: ICD-10-CM

## 2022-09-01 DIAGNOSIS — Z17.1 MALIGNANT NEOPLASM OF UPPER-OUTER QUADRANT OF RIGHT BREAST IN FEMALE, ESTROGEN RECEPTOR NEGATIVE: ICD-10-CM

## 2022-09-01 DIAGNOSIS — D70.1 CHEMOTHERAPY-INDUCED NEUTROPENIA: ICD-10-CM

## 2022-09-01 DIAGNOSIS — T45.1X5A CHEMOTHERAPY-INDUCED NEUTROPENIA: ICD-10-CM

## 2022-09-01 DIAGNOSIS — C79.51 BONE METASTASES: ICD-10-CM

## 2022-09-01 LAB
CANCER AG15-3 SERPL IA-ACNC: 38 U/ML
CANCER AG27-29 SERPL-ACNC: 44.9 U/ML

## 2022-09-01 PROCEDURE — 99213 OFFICE O/P EST LOW 20 MIN: CPT | Mod: S$GLB,,, | Performed by: INTERNAL MEDICINE

## 2022-09-01 PROCEDURE — A4216 STERILE WATER/SALINE, 10 ML: HCPCS | Performed by: INTERNAL MEDICINE

## 2022-09-01 PROCEDURE — 3046F PR MOST RECENT HEMOGLOBIN A1C LEVEL > 9.0%: ICD-10-PCS | Mod: CPTII,S$GLB,, | Performed by: INTERNAL MEDICINE

## 2022-09-01 PROCEDURE — 4010F ACE/ARB THERAPY RXD/TAKEN: CPT | Mod: CPTII,S$GLB,, | Performed by: INTERNAL MEDICINE

## 2022-09-01 PROCEDURE — 99213 PR OFFICE/OUTPT VISIT, EST, LEVL III, 20-29 MIN: ICD-10-PCS | Mod: S$GLB,,, | Performed by: INTERNAL MEDICINE

## 2022-09-01 PROCEDURE — 3078F PR MOST RECENT DIASTOLIC BLOOD PRESSURE < 80 MM HG: ICD-10-PCS | Mod: CPTII,S$GLB,, | Performed by: INTERNAL MEDICINE

## 2022-09-01 PROCEDURE — 63600175 PHARM REV CODE 636 W HCPCS: Performed by: INTERNAL MEDICINE

## 2022-09-01 PROCEDURE — 3008F PR BODY MASS INDEX (BMI) DOCUMENTED: ICD-10-PCS | Mod: CPTII,S$GLB,, | Performed by: INTERNAL MEDICINE

## 2022-09-01 PROCEDURE — 3074F SYST BP LT 130 MM HG: CPT | Mod: CPTII,S$GLB,, | Performed by: INTERNAL MEDICINE

## 2022-09-01 PROCEDURE — 3074F PR MOST RECENT SYSTOLIC BLOOD PRESSURE < 130 MM HG: ICD-10-PCS | Mod: CPTII,S$GLB,, | Performed by: INTERNAL MEDICINE

## 2022-09-01 PROCEDURE — 96367 TX/PROPH/DG ADDL SEQ IV INF: CPT

## 2022-09-01 PROCEDURE — 4010F PR ACE/ARB THEARPY RXD/TAKEN: ICD-10-PCS | Mod: CPTII,S$GLB,, | Performed by: INTERNAL MEDICINE

## 2022-09-01 PROCEDURE — 96413 CHEMO IV INFUSION 1 HR: CPT

## 2022-09-01 PROCEDURE — 3046F HEMOGLOBIN A1C LEVEL >9.0%: CPT | Mod: CPTII,S$GLB,, | Performed by: INTERNAL MEDICINE

## 2022-09-01 PROCEDURE — 96417 CHEMO IV INFUS EACH ADDL SEQ: CPT

## 2022-09-01 PROCEDURE — 3008F BODY MASS INDEX DOCD: CPT | Mod: CPTII,S$GLB,, | Performed by: INTERNAL MEDICINE

## 2022-09-01 PROCEDURE — 25000003 PHARM REV CODE 250: Performed by: INTERNAL MEDICINE

## 2022-09-01 PROCEDURE — 96375 TX/PRO/DX INJ NEW DRUG ADDON: CPT

## 2022-09-01 PROCEDURE — 3078F DIAST BP <80 MM HG: CPT | Mod: CPTII,S$GLB,, | Performed by: INTERNAL MEDICINE

## 2022-09-01 RX ORDER — HYDROCODONE BITARTRATE AND ACETAMINOPHEN 10; 325 MG/1; MG/1
1 TABLET ORAL EVERY 6 HOURS PRN
Qty: 120 TABLET | Refills: 0 | Status: SHIPPED | OUTPATIENT
Start: 2022-09-01 | End: 2022-01-01 | Stop reason: SDUPTHER

## 2022-09-01 RX ORDER — OXYCODONE HYDROCHLORIDE 15 MG/1
15 TABLET ORAL
Qty: 120 TABLET | Refills: 0 | Status: SHIPPED | OUTPATIENT
Start: 2022-09-01 | End: 2022-01-01 | Stop reason: SDUPTHER

## 2022-09-01 RX ORDER — SODIUM CHLORIDE 0.9 % (FLUSH) 0.9 %
10 SYRINGE (ML) INJECTION
Status: DISCONTINUED | OUTPATIENT
Start: 2022-09-01 | End: 2022-09-01 | Stop reason: HOSPADM

## 2022-09-01 RX ORDER — ONDANSETRON 8 MG/1
TABLET, ORALLY DISINTEGRATING ORAL
Qty: 30 TABLET | Refills: 1 | Status: SHIPPED | OUTPATIENT
Start: 2022-09-01 | End: 2022-01-01 | Stop reason: SDUPTHER

## 2022-09-01 RX ORDER — CARISOPRODOL 350 MG/1
350 TABLET ORAL 3 TIMES DAILY PRN
Qty: 90 TABLET | Refills: 0 | Status: SHIPPED | OUTPATIENT
Start: 2022-09-01 | End: 2022-01-01 | Stop reason: SDUPTHER

## 2022-09-01 RX ADMIN — APREPITANT 130 MG: 130 INJECTION, EMULSION INTRAVENOUS at 09:09

## 2022-09-01 RX ADMIN — GEMCITABINE 1425 MG: 38 INJECTION, SOLUTION INTRAVENOUS at 10:09

## 2022-09-01 RX ADMIN — SODIUM CHLORIDE, PRESERVATIVE FREE 10 ML: 5 INJECTION INTRAVENOUS at 12:09

## 2022-09-01 RX ADMIN — DEXAMETHASONE SODIUM PHOSPHATE 12 MG: 4 INJECTION, SOLUTION INTRA-ARTICULAR; INTRALESIONAL; INTRAMUSCULAR; INTRAVENOUS; SOFT TISSUE at 09:09

## 2022-09-01 RX ADMIN — SODIUM CHLORIDE: 0.9 INJECTION, SOLUTION INTRAVENOUS at 09:09

## 2022-09-01 RX ADMIN — CARBOPLATIN 525 MG: 10 INJECTION, SOLUTION INTRAVENOUS at 11:09

## 2022-09-01 RX ADMIN — ONDANSETRON 16 MG: 2 INJECTION INTRAMUSCULAR; INTRAVENOUS at 09:09

## 2022-09-01 NOTE — PLAN OF CARE
Problem: Fatigue  Goal: Improved Activity Tolerance  Outcome: Ongoing, Progressing  Intervention: Promote Improved Energy  Flowsheets (Taken 9/1/2022 2192)  Fatigue Management:   fatigue-related activity identified   frequent rest breaks encouraged  Sleep/Rest Enhancement:   regular sleep/rest pattern promoted   relaxation techniques promoted  Activity Management: Ambulated -L4

## 2022-09-01 NOTE — NURSING
0919 Pt c/o IV site itching, pt continuously rubbing site. IV flushed easily and has blood return. Site changed per pt request.     0921 New PIV inserted, Tegaderm applied with coban holding IV tubing. Pt ambulated to bathroom and when returning to chair PIV and coban were adjusted. Pt c/o itching and scratching PIV. Coban removed, PIV adjusted and paper tape applied. Site remains clear of redness, swelling, warmth,bruising and PIV flushed easily and has blood return. Patient instructed not to touch PIV and verbalized understanding. Medication infusing without issues.

## 2022-09-01 NOTE — PROGRESS NOTES
"  [facility] In Office Hematology Oncology Subsequent  Encounter Note    9/1/22      Subjective:      Patient ID:   Negrita Castro  62 y.o.   Martínez Shepard R. Leblanc, Babycos, Bourgeois, Hall      Chief Complaint:   New L breast cancer eval.    HPI:  62 y.o. female with diagnosis of Stage 1 R breast cancer 10/26/15.  "Triple negative".  Adjuvant AC x's 4, tx's 12 and Rad Rx through 9/12/16.    Port removed.  Had bilateral reconstruction surgery 4/14/17.  Further reconstruction, tumsofy baxterck 8/14/17.  Additional fat injection at R chest done for symmetry.  She had placement of R nipple.  Dr. Hernandez.      She is due for additional reconstructive surgery at breast and abdomenal areas.  The surgery is being done as part of her reconstruction efforts and for keloid management.  The date of the surgery is May 19th.    Also, Dr. Alarcon to consider Rad Rx to operative site to decrease keloid potential.  On hold now because of Covid 19 pandemic.    Recent Mamm/U/S showed small mass at 4:00 at L breast.  Needle Bx invasive ductal Ca, ERP+, PRP+, H2N neg  She had  L partial mastectomy, Sentinal node Bx 8/24/20. Followed by another surgery because of L axillary infection.  Primary 2 cm, LN neg, Stage 1 dx.    Discussed Oncotype Dx testing, this supported adding adjuvant chemotherapy to adjuvant hormonal therapy because of increased risk of cancer recurrence long-term.  Discussed BRCA testing, given her bilateral  breast Dx. PHN would not authorize BRCA 1 & 2.  Refill meds for her.    She had COVID-19 infection and was hospitalized.  She came very close to requiring intubation and mechanical ventilation.  She is off oxygen now,   and sees Dr. Justin of Pulmonary.  Recent CT scan of the chest showed residual changes of interstitial disease, consistent with recovering from COVID-19.  Also lytic lesions were seen in the thoracic spine area very suspicious for metastatic disease at T5 through T8 spine.    Recently on " Saturday night she had a hard coughing spell, and thereafter developed severe right posterior pleuritic chest pain.  She has point tenderness over the right posterior chest wall and may very well have a fractured rib at the site.  Rib x-rays have been ordered.  I have refilled her Soma for 1 month.  I have refilled her Norco  for 1 month.  I have added oxycodone 5 mg 1 or 2 p.o. Q 3-4 hours p.r.n. breakthrough pain for 1 month to her regimen.    She also has an enlarging nodule at the left chest wall.  Ultrasound of the site suggested that this was a cyst however the areas firm movable and may very well be cancer.  I asked  for a ultrasound-guided biopsy of the mass per radiology.  Path report showed invasive ductal Ca, ERP + PRP neg,   H2N equivocal.    She is postmenopausal, her last menses were 4 5 years ago.  Rib x-rays have been requested.  Will order a CT scan to evaluate for possible metastatic disease.  Will order a ultrasound-guided biopsy of the left chest wall mass for pathologic examination at [facility].  Bx + invasive breast cancer.  ERP+, PRP-, H2N-.    Suspected local recurrence at L breast.  She had L lumpectomy.  Margins clear.    Refer to Rad Rx MD for adjuvant Rad Rx., for local control.  She has T spine metastases.  Currently on hormonal Rx.  To see Dr. Manuelito Shepard for DM, BS control.    Dr. Washington saw her for C spine eval, no surgery planned for now.  He referred her to Dr. Rivers for injection Rx.    DM, cholesterol, epilepsy hx, DJD, LBP.  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    LR shoulder arthroscopy, R wrist surgery, M0.    Smoke  ppd x's 35 years, quit 2015.  ETOH no.  Disability-depression, epilepsy  Allergy none    Mom ovarian cancer  Dad lung cancer  Sisters DM, lung dx.    Summary of care:  Right breast cancer 2015, triple negative, treated with Adriamycin Cytoxan followed by taxane.    Left breast cancer ERP positive PRP positive HER2 Judy negative in  August 24, 2020    COVID infection December 2020.  She has not taken the covid 19 vaccination because of multiple allergy hx.    February 2021 bone metastases determined.    He he March 21, 2021 local left breast cancer recurrence, ERP positive PRP negative HER2 Judy negative.    He April 21st bone biopsy positive for breast cancer metastatic.  ERP, PRP, HER2 Judy pending.    She was on radiation therapy through May 17, 2021.  Refilled Marinol at increased 5 mg 1 p.o. b.i.d. number 60.   I refilled her Soma, Norco, Marinol, and Silvadene cream; dated the prescriptions for June 17, 2021.    C/O mid thoracic pain x's 6-8 weeks,radiates to L & R, increased.  Tender over Mid thoracic area.  Hx of T spine metastatic dx.  Pain controlled on Norco 10/325 mg and oxycodone 5 mg 1-2 po PRN.  Add lodine 400 mg 1 daily    Checked MRI of T spine.  Metastatic dx with pathologic fx at T3,5,6.  Refer to Dr. Flowers of neurosurgery for Vertebroplasty or Kyphoplasty evaluation.    She is on Faslodex. Ibrance. Monitor WBC count.    She saw Dr. Flowers, and has been fitted with a brace initially, vertebroplasty after the first of the year.  [phone].  RTC Dr. Flowers 12/27/21.    No neuro surgery is planned for T-spine disease because of progressive growth and compression of the spine.  She admits to having decreased strength in the right hand.  She completed 10 of 10 radiation treatments on May 2, 2022. She is tapering down her Decadron 4 mg p.o. b.i.d. and she is taking Lodine 400 mg daily.  We are stopping fast low dex and Ibrance as of April 12, 2022.    Will ask the  to see her if we can get Meals on wheels for her.    She is due for an MRI on May 17, and a PET scan on May 19, I will see her on May 20th.  Plan to go with chemotherapy now, probably will go with carboplatin Gemzar.   She is 140 lb and 5 ft 11.     Today is D1C1 carbo, gemzar.  Duragesic patch 25 mch q 3 days x's 3 patches, start Tuesday.  Claritin 10 mg 1 po  "daily x's 4 days, start Wednesday.  For neulasta or Udenyca Thursday.    Resume Xgeva with D9 C1.  Check lab 7/14 weekly [facility] lab.    Working on getting a electric wheelchair.  On PT with "Hector".  Stronger.  Today chemo given.  Mass on back is giving her pain, refer to Cory Vick MD for removal.  D1C6 9/1/22    Refill Soma, Oxycodone, Norco, Ambien on 9/1/22.    She is doing better, with increased energy, 8/10.  Weight 153#.  Pain is better 8/10.  ROS:   GEN: normal without any fever, night sweats or weight loss  HEENT: normal with no HA's, sore throat, stiff neck, changes in vision  CV: normal with no CP, SOB, PND, POOL or orthopnea  PULM: normal with no SOB, cough, hemoptysis, sputum or pleuritic pain  GI: normal with no abdominal pain, nausea, vomiting, constipation, diarrhea, melanotic stools, BRBPR, or hematemesis  : normal with no hematuria, dysuria  BREAST: See HPI.  SKIN: normal with no rash, erythema, bruising, or swelling     Past Medical History:   Diagnosis Date    Acute hypoxemic respiratory failure 12/12/2020    Breast cancer     left breast    Cancer     RIGHT BREAST 10-15    Depression     Diabetes mellitus     Neuropathy     Panic attacks     S/P epidural steroid injection     Seizures     none since early 30's    Wears glasses     TO DRIVE     Past Surgical History:   Procedure Laterality Date    AXILLARY NODE DISSECTION Left 8/24/2020    Procedure: LYMPHADENECTOMY, AXILLARY;  Surgeon: Cory Vick MD;  Location: [facility] OR;  Service: General;  Laterality: Left;  left breast mastectomy with possible axillary lymph node dissection    BREAST BIOPSY      right    BREAST LUMPECTOMY      BREAST RECONSTRUCTION      breast reconstruction with tummy [facility]      BREAST SURGERY      11-3-15 LUMPECTOMY, 12-2-15 REEXCISION    INCISION AND DRAINAGE OF ABSCESS Left 9/9/2020    Procedure: INCISION AND DRAINAGE, ABSCESS;  Surgeon: Cory Vick MD;  Location: [facility] OR;  Service: General;  " Laterality: Left;    INSERTION OF LOCALIZATION WIRE Left 8/24/2020    Procedure: INSERTION, LOCALIZATION WIRE;  Surgeon: Cory Vick MD;  Location: [facility] OR;  Service: General;  Laterality: Left;  left breast lumpectomy with wire needle loc    MASTECTOMY      mastectomy 2016      MASTECTOMY, PARTIAL Left 3/19/2021    Procedure: MASTECTOMY, PARTIAL;  Surgeon: Cory Vick MD;  Location: [facility] OR;  Service: General;  Laterality: Left;  lumpectomy  left breast     RECONSTRUCTION OF NIPPLE Right 11/5/2018    Procedure: RECONSTRUCTION-NIPPLE RIGHT;  Surgeon: Xiang Hernandez MD;  Location: [facility] OR [facility] FLR;  Service: Plastics;  Laterality: Right;    SENTINEL LYMPH NODE BIOPSY Left 8/24/2020    Procedure: BIOPSY, LYMPH NODE, SENTINEL;  Surgeon: Cory Vick MD;  Location: [facility] OR;  Service: General;  Laterality: Left;  left breast lumpectomy with left sentinel lymoh node       shoulder surgery and wrist      BILAT  BONE SPUR    WRIST SURGERY      RIGHT       Review of patient's allergies indicates:   Allergen Reactions    Adhesive tape-silicones Hives     BANDAIDS    Polymycin Hives    Latex, natural rubber Itching    Polyurethane-41            Current Outpatient Medications:     anastrozole (ARIMIDEX) 1 mg Tab, Take 1 mg by mouth once daily., Disp: , Rfl:     blood sugar diagnostic Strp, TEST GLUCOSE BID, Disp: 300 each, Rfl: 3    blood-glucose meter (TRUE METRIX GLUCOSE METER) kit, TEST GLUCOSE BID, Disp: 1 each, Rfl: 0    blood-glucose meter,continuous (DEXCOM G6 ) Misc, TEST GLUCOSE QID, Disp: 1 each, Rfl: 1    blood-glucose sensor (DEXCOM G6 SENSOR) Edwige, TEST GLUCOSE QID, Disp: 13 each, Rfl: 3    blood-glucose transmitter (DEXCOM G6 TRANSMITTER) Edwige, TEST GLUCOSE QID, Disp: 1 each, Rfl: 1    carisoprodoL (SOMA) 350 MG tablet, Take 1 tablet (350 mg total) by mouth 3 (three) times daily as needed for Muscle spasms., Disp: 90 tablet, Rfl: 0    dexAMETHasone (DECADRON) 4 MG Tab, TAKE  ONE TABLET (4MG TOTAL) BY MOUTH TWICE DAILY WITH MEALS, Disp: 60 tablet, Rfl: 1    dextromethorphan-guaiFENesin  mg/5 ml (ROBITUSSIN-DM)  mg/5 mL liquid, Take 1 teaspoon q 6 hours prn cough., Disp: 300 mL, Rfl: 0    diazePAM (VALIUM) 10 MG Tab, Take 1 tablet (10 mg total) by mouth 4 (four) times daily as needed (anxiety)., Disp: 120 tablet, Rfl: 2    docusate sodium (COLACE) 100 MG capsule, Take 100 mg by mouth 2 (two) times daily., Disp: , Rfl:     dronabinoL (MARINOL) 5 MG capsule, Take 1 capsule (5 mg total) by mouth 2 (two) times daily before meals., Disp: 60 capsule, Rfl: 0    dulaglutide (TRULICITY) 3 mg/0.5 mL pen injector, Inject 3 mg into the skin every 7 days., Disp: 4 pen, Rfl: 11    enalapril (VASOTEC) 5 MG tablet, Take 1 tablet (5 mg total) by mouth once daily., Disp: 90 tablet, Rfl: 3    etodolac (LODINE) 400 MG tablet, TAKE ONE TABLET (400 MG TOTAL) BY MOUTH ONCE DAILY, Disp: 30 tablet, Rfl: 2    fluconazole (DIFLUCAN) 150 MG Tab, TAKE ONE TABLET BY MOUTH ONCE FOR 1 DOSE, Disp: 1 tablet, Rfl: 2    furosemide (LASIX) 20 MG tablet, TAKE ONE TABLET BY MOUTH EVERY DAY, Disp: 30 tablet, Rfl: 6    HYDROcodone-acetaminophen (NORCO)  mg per tablet, Take 1 tablet by mouth every 6 (six) hours as needed for Pain., Disp: 120 tablet, Rfl: 0    insulin glargine (LANTUS U-100 INSULIN) 100 unit/mL injection, Inject 32 Units into the skin 2 (two) times a day., Disp: 18 mL, Rfl: 11    lancets 32 gauge Misc, TEST GLUCOSE BID, Disp: 300 each, Rfl: 3    metFORMIN (GLUCOPHAGE-XR) 500 MG ER 24hr tablet, Take 2 tablets (1,000 mg total) by mouth 2 (two) times daily with meals., Disp: 360 tablet, Rfl: 3    metoprolol tartrate (LOPRESSOR) 25 MG tablet, Take 1 tablet (25 mg total) by mouth 2 (two) times daily., Disp: 60 tablet, Rfl: 11    mometasone 0.1% (ELOCON) 0.1 % cream, Apply to affected area daily, Disp: 45 g, Rfl: 1    naloxegoL (MOVANTIK) 12.5 mg Tab, Take 12.5 mg by mouth once daily., Disp: 30 tablet,  "Rfl: 3    ondansetron (ZOFRAN-ODT) 8 MG TbDL, DISSOLVE 1 TABLET (8MG TOTAL) on the tongue EVERY 6 TO 8 HOURS AS NEEDED (NAUSEA) Strength: 8 mg, Disp: 30 tablet, Rfl: 1    oxyCODONE (ROXICODONE) 15 MG Tab, Take 1 tablet (15 mg total) by mouth every 4 to 6 hours as needed for Pain., Disp: 120 tablet, Rfl: 0    palbociclib 100 mg Tab, Take 100 mg by mouth once daily. for 21 days, then take 7 days off. Total cycle length 28 days, Disp: 21 tablet, Rfl: 6    pen needle, diabetic (BD ULTRA-FINE MARCE PEN NEEDLE) 32 gauge x 5/32" Ndle, Test blood sugar twice daily, Disp: 100 each, Rfl: 5    promethazine (PHENERGAN) 25 MG tablet, Take 1 tablet (25 mg total) by mouth every 4 to 6 hours as needed., Disp: 30 tablet, Rfl: 5    promethazine-codeine 6.25-10 mg/5 ml (PHENERGAN WITH CODEINE) 6.25-10 mg/5 mL syrup, TAKE 5 ML BY MOUTH EVERY 6 HOURS AS NEEDED FOR COUGH, Disp: 450 mL, Rfl: 0    psyllium husk, with sugar, (METAMUCIL, WITH SUGAR,) 3.4 gram packet, Take 1 packet by mouth 2 (two) times daily., Disp: 30 packet, Rfl: 2    rosuvastatin (CRESTOR) 40 MG Tab, Take 1 tablet (40 mg total) by mouth every evening. For cholesterol, Disp: 90 tablet, Rfl: 3    triamcinolone acetonide 0.1% (KENALOG) 0.1 % cream, Apply topically 2 (two) times daily., Disp: 80 g, Rfl: 1    zolpidem (AMBIEN) 10 mg Tab, TAKE ONE TABLET BY MOUTH EVERY NIGHT AT BEDTIME, Disp: 30 tablet, Rfl: 3          Objective:   Vitals:  Blood pressure 113/67, pulse 86, temperature 98.1 °F (36.7 °C), weight 69.4 kg (153 lb), last menstrual period 01/16/2016.    Physical Examination:   GEN: no apparent distress, comfortable, Brace in place.  HEAD: atraumatic and normocephalic  EYES: no pallor, no icterus  ENT: no pharyngeal erythema  NECK: noLAD/LN's, supple  CV:  No edema  CHEST: Normal respiratory effort   she is not  tender over the right posterior chest wall on exam.  The chest wall is not swollen.  ABDOM:  nondistended; soft                                                "                                           MUSC/Skeletal: ROM normal, Tender over mid T spine area.  EXTREM: no swelling  SKIN: She is developing keloid changes at L breast incision and navel surgical sites.  This area appears to be enlarging.  NEURO: grossly intact  PSYCH: normal mood, affect and behavior  LYMPH: normal  LN's  BREASTS: L breast is much improved.    Refill Oxycodone, SOMA, Norco.    Tumor markers are improved.    Assessment:   (1) 62 y.o. female with diagnosis of Stage 1 triple negative R breast cancer, 10/2016.       S/P R mastectomy, SNBx, AC x's 4, T x's12 weeks, Rad Rx and recent reconstruction.    (2) New L invasive ductal Ca, ERP+, PRP+, H2N neg.  Clinical stage 1 dx.    I have started her on Arimidex 1 mg p.o. daily  for metastatic disease at this time.    Suspected fracture of right ribs after recent cough event, check rib x-rays, medicate for pain.    ERP+ dx, bone metastases.  Started on Arimidex daily.  Add 2nd hormonal drug Rx after Rad Rx completed.    Bone biopsy positive for metastatic breast cancer.  ERP+, PRP+, H2N-.    Now on Genzar, Carboplatin.  Improved.  RTC to see me 1 month.

## 2022-09-01 NOTE — PROGRESS NOTES
Carboplatin dose reduced from 625 mg (based on a Scr = 0.6 mg/dL) to 525 mg based on actual Scr = 0.8 mg/dL.  Ok per Tabatha GREEN.

## 2022-09-02 ENCOUNTER — INFUSION (OUTPATIENT)
Dept: INFUSION THERAPY | Facility: HOSPITAL | Age: 62
End: 2022-09-02
Attending: INTERNAL MEDICINE
Payer: MEDICARE

## 2022-09-02 VITALS
WEIGHT: 153 LBS | OXYGEN SATURATION: 100 % | HEART RATE: 89 BPM | BODY MASS INDEX: 21.42 KG/M2 | HEIGHT: 71 IN | TEMPERATURE: 98 F | DIASTOLIC BLOOD PRESSURE: 69 MMHG | SYSTOLIC BLOOD PRESSURE: 119 MMHG | RESPIRATION RATE: 18 BRPM

## 2022-09-02 DIAGNOSIS — C79.51 BONE METASTASES: Primary | ICD-10-CM

## 2022-09-02 DIAGNOSIS — Z17.0 MALIGNANT NEOPLASM OF LOWER-OUTER QUADRANT OF LEFT BREAST OF FEMALE, ESTROGEN RECEPTOR POSITIVE: ICD-10-CM

## 2022-09-02 DIAGNOSIS — C50.512 MALIGNANT NEOPLASM OF LOWER-OUTER QUADRANT OF LEFT BREAST OF FEMALE, ESTROGEN RECEPTOR POSITIVE: ICD-10-CM

## 2022-09-02 PROCEDURE — 96365 THER/PROPH/DIAG IV INF INIT: CPT

## 2022-09-02 PROCEDURE — 63600175 PHARM REV CODE 636 W HCPCS: Performed by: NURSE PRACTITIONER

## 2022-09-02 RX ORDER — ZOLEDRONIC ACID 0.04 MG/ML
4 INJECTION, SOLUTION INTRAVENOUS
Status: CANCELLED | OUTPATIENT
Start: 2022-01-01

## 2022-09-02 RX ORDER — HEPARIN 100 UNIT/ML
500 SYRINGE INTRAVENOUS
Status: CANCELLED | OUTPATIENT
Start: 2022-01-01

## 2022-09-02 RX ORDER — ZOLEDRONIC ACID 0.04 MG/ML
4 INJECTION, SOLUTION INTRAVENOUS
Status: COMPLETED | OUTPATIENT
Start: 2022-09-02 | End: 2022-09-02

## 2022-09-02 RX ORDER — SODIUM CHLORIDE 0.9 % (FLUSH) 0.9 %
10 SYRINGE (ML) INJECTION
Status: CANCELLED | OUTPATIENT
Start: 2022-01-01

## 2022-09-02 RX ADMIN — ZOLEDRONIC ACID 4 MG: 0.04 INJECTION, SOLUTION INTRAVENOUS at 09:09

## 2022-09-02 NOTE — PLAN OF CARE
Problem: Fatigue  Goal: Improved Activity Tolerance  Outcome: Ongoing, Progressing  Intervention: Promote Improved Energy  Flowsheets (Taken 9/2/2022 0904)  Fatigue Management:   fatigue-related activity identified   frequent rest breaks encouraged  Sleep/Rest Enhancement:   regular sleep/rest pattern promoted   relaxation techniques promoted  Activity Management: Ambulated -L4

## 2022-09-06 ENCOUNTER — LAB VISIT (OUTPATIENT)
Dept: LAB | Facility: HOSPITAL | Age: 62
End: 2022-09-06
Attending: INTERNAL MEDICINE
Payer: MEDICARE

## 2022-09-06 ENCOUNTER — INFUSION (OUTPATIENT)
Dept: INFUSION THERAPY | Facility: HOSPITAL | Age: 62
End: 2022-09-06
Attending: INTERNAL MEDICINE
Payer: MEDICARE

## 2022-09-06 VITALS
DIASTOLIC BLOOD PRESSURE: 78 MMHG | SYSTOLIC BLOOD PRESSURE: 119 MMHG | TEMPERATURE: 97 F | RESPIRATION RATE: 17 BRPM | HEART RATE: 96 BPM | WEIGHT: 153 LBS | OXYGEN SATURATION: 99 % | BODY MASS INDEX: 21.42 KG/M2 | HEIGHT: 71 IN

## 2022-09-06 DIAGNOSIS — C79.51 BONE METASTASES: ICD-10-CM

## 2022-09-06 DIAGNOSIS — E11.65 TYPE 2 DIABETES MELLITUS WITH HYPERGLYCEMIA, WITH LONG-TERM CURRENT USE OF INSULIN: ICD-10-CM

## 2022-09-06 DIAGNOSIS — Z17.1 MALIGNANT NEOPLASM OF UPPER-OUTER QUADRANT OF RIGHT BREAST IN FEMALE, ESTROGEN RECEPTOR NEGATIVE: ICD-10-CM

## 2022-09-06 DIAGNOSIS — C50.411 MALIGNANT NEOPLASM OF UPPER-OUTER QUADRANT OF RIGHT BREAST IN FEMALE, ESTROGEN RECEPTOR NEGATIVE: ICD-10-CM

## 2022-09-06 DIAGNOSIS — C50.512 MALIGNANT NEOPLASM OF LOWER-OUTER QUADRANT OF LEFT BREAST OF FEMALE, ESTROGEN RECEPTOR POSITIVE: ICD-10-CM

## 2022-09-06 DIAGNOSIS — Z17.0 MALIGNANT NEOPLASM OF LOWER-OUTER QUADRANT OF LEFT BREAST OF FEMALE, ESTROGEN RECEPTOR POSITIVE: ICD-10-CM

## 2022-09-06 DIAGNOSIS — D70.1 CHEMOTHERAPY-INDUCED NEUTROPENIA: ICD-10-CM

## 2022-09-06 DIAGNOSIS — T45.1X5A CHEMOTHERAPY-INDUCED NEUTROPENIA: ICD-10-CM

## 2022-09-06 DIAGNOSIS — C79.51 BONE METASTASES: Primary | ICD-10-CM

## 2022-09-06 DIAGNOSIS — Z79.4 TYPE 2 DIABETES MELLITUS WITH HYPERGLYCEMIA, WITH LONG-TERM CURRENT USE OF INSULIN: ICD-10-CM

## 2022-09-06 LAB
ALBUMIN SERPL BCP-MCNC: 3.8 G/DL (ref 3.5–5.2)
ALP SERPL-CCNC: 408 U/L (ref 55–135)
ALT SERPL W/O P-5'-P-CCNC: 180 U/L (ref 10–44)
ANION GAP SERPL CALC-SCNC: 8 MMOL/L (ref 8–16)
AST SERPL-CCNC: 172 U/L (ref 10–40)
BASOPHILS # BLD AUTO: 0.01 K/UL (ref 0–0.2)
BASOPHILS NFR BLD: 0.4 % (ref 0–1.9)
BILIRUB SERPL-MCNC: 0.5 MG/DL (ref 0.1–1)
BUN SERPL-MCNC: 9 MG/DL (ref 6–20)
CALCIUM SERPL-MCNC: 8.5 MG/DL (ref 8.7–10.5)
CHLORIDE SERPL-SCNC: 107 MMOL/L (ref 95–110)
CO2 SERPL-SCNC: 21 MMOL/L (ref 23–29)
CREAT SERPL-MCNC: 0.7 MG/DL (ref 0.5–1.4)
DIFFERENTIAL METHOD: ABNORMAL
EOSINOPHIL # BLD AUTO: 0 K/UL (ref 0–0.5)
EOSINOPHIL NFR BLD: 1.1 % (ref 0–8)
ERYTHROCYTE [DISTWIDTH] IN BLOOD BY AUTOMATED COUNT: 15.1 % (ref 11.5–14.5)
EST. GFR  (NO RACE VARIABLE): >60 ML/MIN/1.73 M^2
GLUCOSE SERPL-MCNC: 232 MG/DL (ref 70–110)
HCT VFR BLD AUTO: 34.4 % (ref 37–48.5)
HGB BLD-MCNC: 11.4 G/DL (ref 12–16)
IMM GRANULOCYTES # BLD AUTO: 0.01 K/UL (ref 0–0.04)
IMM GRANULOCYTES NFR BLD AUTO: 0.4 % (ref 0–0.5)
LYMPHOCYTES # BLD AUTO: 0.3 K/UL (ref 1–4.8)
LYMPHOCYTES NFR BLD: 11.3 % (ref 18–48)
MCH RBC QN AUTO: 33 PG (ref 27–31)
MCHC RBC AUTO-ENTMCNC: 33.1 G/DL (ref 32–36)
MCV RBC AUTO: 100 FL (ref 82–98)
MONOCYTES # BLD AUTO: 0.1 K/UL (ref 0.3–1)
MONOCYTES NFR BLD: 1.9 % (ref 4–15)
NEUTROPHILS # BLD AUTO: 2.3 K/UL (ref 1.8–7.7)
NEUTROPHILS NFR BLD: 84.9 % (ref 38–73)
NRBC BLD-RTO: 0 /100 WBC
PLATELET # BLD AUTO: 229 K/UL (ref 150–450)
PMV BLD AUTO: 8.8 FL (ref 9.2–12.9)
POTASSIUM SERPL-SCNC: 4.2 MMOL/L (ref 3.5–5.1)
PROT SERPL-MCNC: 7 G/DL (ref 6–8.4)
RBC # BLD AUTO: 3.45 M/UL (ref 4–5.4)
SODIUM SERPL-SCNC: 136 MMOL/L (ref 136–145)
WBC # BLD AUTO: 2.65 K/UL (ref 3.9–12.7)

## 2022-09-06 PROCEDURE — 63600175 PHARM REV CODE 636 W HCPCS: Mod: JG | Performed by: INTERNAL MEDICINE

## 2022-09-06 PROCEDURE — 86300 IMMUNOASSAY TUMOR CA 15-3: CPT | Performed by: INTERNAL MEDICINE

## 2022-09-06 PROCEDURE — 85025 COMPLETE CBC W/AUTO DIFF WBC: CPT | Performed by: INTERNAL MEDICINE

## 2022-09-06 PROCEDURE — 36415 COLL VENOUS BLD VENIPUNCTURE: CPT | Performed by: INTERNAL MEDICINE

## 2022-09-06 PROCEDURE — 86300 IMMUNOASSAY TUMOR CA 15-3: CPT | Mod: 91 | Performed by: INTERNAL MEDICINE

## 2022-09-06 PROCEDURE — 96372 THER/PROPH/DIAG INJ SC/IM: CPT

## 2022-09-06 PROCEDURE — 80053 COMPREHEN METABOLIC PANEL: CPT | Performed by: INTERNAL MEDICINE

## 2022-09-06 RX ORDER — DULAGLUTIDE 3 MG/.5ML
3 INJECTION, SOLUTION SUBCUTANEOUS
Qty: 4 PEN | Refills: 2 | Status: SHIPPED | OUTPATIENT
Start: 2022-09-06 | End: 2022-01-01 | Stop reason: SDUPTHER

## 2022-09-06 RX ADMIN — PEGFILGRASTIM 6 MG: 6 INJECTION SUBCUTANEOUS at 01:09

## 2022-09-06 NOTE — PLAN OF CARE
Problem: Fatigue  Goal: Improved Activity Tolerance  Intervention: Promote Improved Energy  Flowsheets (Taken 9/6/2022 1342)  Fatigue Management:   paced activity encouraged   frequent rest breaks encouraged

## 2022-09-06 NOTE — TELEPHONE ENCOUNTER
----- Message from Shantal Berman sent at 9/6/2022  8:14 AM CDT -----  VM 09/02/22 @ 10:16 AM : [facility] and stated that the patient need a refill of her dulaglutide the phone number is [phone] and fax [phone]

## 2022-09-07 LAB
CANCER AG15-3 SERPL IA-ACNC: 34 U/ML
CANCER AG27-29 SERPL-ACNC: 37.9 U/ML

## 2022-09-07 NOTE — TELEPHONE ENCOUNTER
The patient's prescription has been approved and sent to  [facility] by Margar[facility], [place] - [address]  [address]  [facility] [address]  Phone: [phone] Fax: [phone]

## 2022-09-22 NOTE — PLAN OF CARE
Problem: Fatigue  Goal: Improved Activity Tolerance  Outcome: Met     Problem: Fall Injury Risk  Goal: Absence of Fall and Fall-Related Injury  Outcome: Met     Problem: Activity Intolerance  Goal: Enhanced Capacity and Energy  Outcome: Met     
40 year old female past medical history of pre Diabetes comes to emergency room for left shoulder pain. patient states started earlier tonight. denies injury and denies lifting heavy objects. patient states that she took vallum and fell asleep and woke up and pain was persistent. Pt states that pain worse with movement and position and better with rest and positions. denies chest pain, shortness of breath, abd pain, nausea, vomiting, diarrhea, no back pain, no neck pain.

## 2022-09-26 NOTE — PROGRESS NOTES
"  [facility] In Office Hematology Oncology Subsequent  Encounter Note    9/22/22      Subjective:      Patient ID:   Negrita Casrto  62 y.o.   Martínez Shepard R. Leblanc, Babycos, Bourgeois, Hall      Chief Complaint:   New L breast cancer eval.    HPI:  62 y.o. female with diagnosis of Stage 1 R breast cancer 10/26/15.  "Triple negative".  Adjuvant AC x's 4, tx's 12 and Rad Rx through 9/12/16.    Port removed.  Had bilateral reconstruction surgery 4/14/17.  Further reconstruction, tumsofy baxterck 8/14/17.  Additional fat injection at R chest done for symmetry.  She had placement of R nipple.  Dr. Hernandez.      She is due for additional reconstructive surgery at breast and abdomenal areas.  The surgery is being done as part of her reconstruction efforts and for keloid management.  The date of the surgery is May 19th.    Also, Dr. Alarcon to consider Rad Rx to operative site to decrease keloid potential.  On hold now because of Covid 19 pandemic.    Recent Mamm/U/S showed small mass at 4:00 at L breast.  Needle Bx invasive ductal Ca, ERP+, PRP+, H2N neg  She had  L partial mastectomy, Sentinal node Bx 8/24/20. Followed by another surgery because of L axillary infection.  Primary 2 cm, LN neg, Stage 1 dx.    Discussed Oncotype Dx testing, this supported adding adjuvant chemotherapy to adjuvant hormonal therapy because of increased risk of cancer recurrence long-term.  Discussed BRCA testing, given her bilateral  breast Dx. PHN would not authorize BRCA 1 & 2.  Refill meds for her.    She had COVID-19 infection and was hospitalized.  She came very close to requiring intubation and mechanical ventilation.  She is off oxygen now,   and sees Dr. Justin of Pulmonary.  Recent CT scan of the chest showed residual changes of interstitial disease, consistent with recovering from COVID-19.  Also lytic lesions were seen in the thoracic spine area very suspicious for metastatic disease at T5 through T8 spine.    Recently on " Saturday night she had a hard coughing spell, and thereafter developed severe right posterior pleuritic chest pain.  She has point tenderness over the right posterior chest wall and may very well have a fractured rib at the site.  Rib x-rays have been ordered.  I have refilled her Soma for 1 month.  I have refilled her Norco  for 1 month.  I have added oxycodone 5 mg 1 or 2 p.o. Q 3-4 hours p.r.n. breakthrough pain for 1 month to her regimen.    She also has an enlarging nodule at the left chest wall.  Ultrasound of the site suggested that this was a cyst however the areas firm movable and may very well be cancer.  I asked  for a ultrasound-guided biopsy of the mass per radiology.  Path report showed invasive ductal Ca, ERP + PRP neg,   H2N equivocal.    She is postmenopausal, her last menses were 4 5 years ago.  Rib x-rays have been requested.  Will order a CT scan to evaluate for possible metastatic disease.  Will order a ultrasound-guided biopsy of the left chest wall mass for pathologic examination at [facility].  Bx + invasive breast cancer.  ERP+, PRP-, H2N-.    Suspected local recurrence at L breast.  She had L lumpectomy.  Margins clear.    Refer to Rad Rx MD for adjuvant Rad Rx., for local control.  She has T spine metastases.  Currently on hormonal Rx.  To see Dr. Manuelito Shepard for DM, BS control.    Dr. Washington saw her for C spine eval, no surgery planned for now.  He referred her to Dr. Rivers for injection Rx.    DM, cholesterol, epilepsy hx, DJD, LBP.  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    LR shoulder arthroscopy, R wrist surgery, M0.    Smoke  ppd x's 35 years, quit 2015.  ETOH no.  Disability-depression, epilepsy  Allergy none    Mom ovarian cancer  Dad lung cancer  Sisters DM, lung dx.    Summary of care:  Right breast cancer 2015, triple negative, treated with Adriamycin Cytoxan followed by taxane.    Left breast cancer ERP positive PRP positive HER2 Judy negative in  August 24, 2020    COVID infection December 2020.  She has not taken the covid 19 vaccination because of multiple allergy hx.    February 2021 bone metastases determined.    He he March 21, 2021 local left breast cancer recurrence, ERP positive PRP negative HER2 Judy negative.    He April 21st bone biopsy positive for breast cancer metastatic.  ERP, PRP, HER2 Judy pending.    She was on radiation therapy through May 17, 2021.  Refilled Marinol at increased 5 mg 1 p.o. b.i.d. number 60.   I refilled her Soma, Norco, Marinol, and Silvadene cream; dated the prescriptions for June 17, 2021.    C/O mid thoracic pain x's 6-8 weeks,radiates to L & R, increased.  Tender over Mid thoracic area.  Hx of T spine metastatic dx.  Pain controlled on Norco 10/325 mg and oxycodone 5 mg 1-2 po PRN.  Add lodine 400 mg 1 daily    Checked MRI of T spine.  Metastatic dx with pathologic fx at T3,5,6.  Refer to Dr. Flowers of neurosurgery for Vertebroplasty or Kyphoplasty evaluation.    She is on Faslodex. Ibrance. Monitor WBC count.    She saw Dr. Flowers, and has been fitted with a brace initially, vertebroplasty after the first of the year.  [phone].  RTC Dr. Flowers 12/27/21.    No neuro surgery is planned for T-spine disease because of progressive growth and compression of the spine.  She admits to having decreased strength in the right hand.  She completed 10 of 10 radiation treatments on May 2, 2022. She is tapering down her Decadron 4 mg p.o. b.i.d. and she is taking Lodine 400 mg daily.  We are stopping fast low dex and Ibrance as of April 12, 2022.    Will ask the  to see her if we can get Meals on wheels for her.    She is due for an MRI on May 17, and a PET scan on May 19, I will see her on May 20th.  Plan to go with chemotherapy now, probably will go with carboplatin Gemzar.   She is 140 lb and 5 ft 11.     Today is D1C1 carbo, gemzar.  Duragesic patch 25 mch q 3 days x's 3 patches, start Tuesday.  Claritin 10 mg 1 po  "daily x's 4 days, start Wednesday.  For neulasta or Udenyca Thursday.    Resume Xgeva with D9 C1.  Check lab 7/14 weekly [facility] lab.    Working on getting a electric wheelchair.  On PT with "Hector".  Stronger.  Today chemo given.  Mass on back is giving her pain, refer to Cory Vick MD for removal.  D1C6 9/1/22    Refill Soma, Oxycodone, Norco, Ambien on 9/1/22.  Due for refill 9/30/22.    She is doing better, with increased energy, 8/10.  Weight 154#.  Pain is better 8/10.  Improved R arm function.  ROS:   GEN: normal without any fever, night sweats or weight loss  HEENT: normal with no HA's, sore throat, stiff neck, changes in vision  CV: normal with no CP, SOB, PND, POOL or orthopnea  PULM: normal with no SOB, cough, hemoptysis, sputum or pleuritic pain  GI: normal with no abdominal pain, nausea, vomiting, constipation, diarrhea, melanotic stools, BRBPR, or hematemesis  : normal with no hematuria, dysuria  BREAST: See HPI.  SKIN: normal with no rash, erythema, bruising, or swelling     Past Medical History:   Diagnosis Date    Acute hypoxemic respiratory failure 12/12/2020    Breast cancer     left breast    Cancer     RIGHT BREAST 10-15    Depression     Diabetes mellitus     Neuropathy     Panic attacks     S/P epidural steroid injection     Seizures     none since early 30's    Wears glasses     TO DRIVE     Past Surgical History:   Procedure Laterality Date    AXILLARY NODE DISSECTION Left 8/24/2020    Procedure: LYMPHADENECTOMY, AXILLARY;  Surgeon: Cory Vick MD;  Location: [facility] OR;  Service: General;  Laterality: Left;  left breast mastectomy with possible axillary lymph node dissection    BREAST BIOPSY      right    BREAST LUMPECTOMY      BREAST RECONSTRUCTION      breast reconstruction with tummy [facility]      BREAST SURGERY      11-3-15 LUMPECTOMY, 12-2-15 REEXCISION    INCISION AND DRAINAGE OF ABSCESS Left 9/9/2020    Procedure: INCISION AND DRAINAGE, ABSCESS;  Surgeon: Cory Vick, " MD;  Location: [facility] OR;  Service: General;  Laterality: Left;    INSERTION OF LOCALIZATION WIRE Left 8/24/2020    Procedure: INSERTION, LOCALIZATION WIRE;  Surgeon: Cory Vick MD;  Location: [facility] OR;  Service: General;  Laterality: Left;  left breast lumpectomy with wire needle loc    MASTECTOMY      mastectomy 2016      MASTECTOMY, PARTIAL Left 3/19/2021    Procedure: MASTECTOMY, PARTIAL;  Surgeon: Cory Vick MD;  Location: [facility] OR;  Service: General;  Laterality: Left;  lumpectomy  left breast     RECONSTRUCTION OF NIPPLE Right 11/5/2018    Procedure: RECONSTRUCTION-NIPPLE RIGHT;  Surgeon: Xiang Hernandez MD;  Location: [facility] OR [facility] F[facility];  Service: Plastics;  Laterality: Right;    SENTINEL LYMPH NODE BIOPSY Left 8/24/2020    Procedure: BIOPSY, LYMPH NODE, SENTINEL;  Surgeon: Cory Vick MD;  Location: [facility] OR;  Service: General;  Laterality: Left;  left breast lumpectomy with left sentinel lymoh node       shoulder surgery and wrist      BILAT  BONE SPUR    WRIST SURGERY      RIGHT       Review of patient's allergies indicates:   Allergen Reactions    Adhesive tape-silicones Hives     BANDAIDS    Polymycin Hives    Latex, natural rubber Itching    Polyurethane-39            Current Outpatient Medications:     anastrozole (ARIMIDEX) 1 mg Tab, Take 1 mg by mouth once daily., Disp: , Rfl:     blood sugar diagnostic Strp, TEST GLUCOSE BID, Disp: 300 each, Rfl: 3    blood-glucose meter (TRUE METRIX GLUCOSE METER) kit, TEST GLUCOSE BID, Disp: 1 each, Rfl: 0    blood-glucose meter,continuous (DEXCOM G6 ) Misc, TEST GLUCOSE QID, Disp: 1 each, Rfl: 1    blood-glucose sensor (DEXCOM G6 SENSOR) Edwige, TEST GLUCOSE QID, Disp: 13 each, Rfl: 3    blood-glucose transmitter (DEXCOM G6 TRANSMITTER) Edwige, TEST GLUCOSE QID, Disp: 1 each, Rfl: 1    carisoprodoL (SOMA) 350 MG tablet, Take 1 tablet (350 mg total) by mouth 3 (three) times daily as needed for Muscle spasms., Disp: 90 tablet, Rfl:  0    dexAMETHasone (DECADRON) 4 MG Tab, TAKE ONE TABLET (4MG TOTAL) BY MOUTH TWICE DAILY WITH MEALS, Disp: 60 tablet, Rfl: 1    dextromethorphan-guaiFENesin  mg/5 ml (ROBITUSSIN-DM)  mg/5 mL liquid, Take 1 teaspoon q 6 hours prn cough., Disp: 300 mL, Rfl: 0    diazePAM (VALIUM) 10 MG Tab, Take 1 tablet (10 mg total) by mouth 4 (four) times daily as needed (anxiety)., Disp: 120 tablet, Rfl: 2    docusate sodium (COLACE) 100 MG capsule, Take 100 mg by mouth 2 (two) times daily., Disp: , Rfl:     dronabinoL (MARINOL) 5 MG capsule, Take 1 capsule (5 mg total) by mouth 2 (two) times daily before meals., Disp: 60 capsule, Rfl: 0    dulaglutide (TRULICITY) 3 mg/0.5 mL pen injector, Inject 3 mg into the skin every 7 days., Disp: 4 pen, Rfl: 2    enalapril (VASOTEC) 5 MG tablet, Take 1 tablet (5 mg total) by mouth once daily., Disp: 90 tablet, Rfl: 3    etodolac (LODINE) 400 MG tablet, TAKE ONE TABLET (400 MG TOTAL) BY MOUTH ONCE DAILY, Disp: 30 tablet, Rfl: 2    fluconazole (DIFLUCAN) 150 MG Tab, TAKE ONE TABLET BY MOUTH ONCE FOR 1 DOSE, Disp: 1 tablet, Rfl: 2    furosemide (LASIX) 20 MG tablet, TAKE ONE TABLET BY MOUTH EVERY DAY, Disp: 30 tablet, Rfl: 6    HYDROcodone-acetaminophen (NORCO)  mg per tablet, Take 1 tablet by mouth every 6 (six) hours as needed for Pain., Disp: 120 tablet, Rfl: 0    insulin glargine (LANTUS U-100 INSULIN) 100 unit/mL injection, Inject 32 Units into the skin 2 (two) times a day., Disp: 18 mL, Rfl: 11    lancets 32 gauge Misc, TEST GLUCOSE BID, Disp: 300 each, Rfl: 3    metFORMIN (GLUCOPHAGE-XR) 500 MG ER 24hr tablet, Take 2 tablets (1,000 mg total) by mouth 2 (two) times daily with meals., Disp: 360 tablet, Rfl: 3    metoprolol tartrate (LOPRESSOR) 25 MG tablet, Take 1 tablet (25 mg total) by mouth 2 (two) times daily., Disp: 60 tablet, Rfl: 11    mometasone 0.1% (ELOCON) 0.1 % cream, Apply to affected area daily, Disp: 45 g, Rfl: 1    naloxegoL (MOVANTIK) 12.5 mg Tab, Take  "12.5 mg by mouth once daily., Disp: 30 tablet, Rfl: 3    ondansetron (ZOFRAN-ODT) 8 MG TbDL, DISSOLVE 1 TABLET (8MG TOTAL) on the tongue EVERY 6 TO 8 HOURS AS NEEDED (NAUSEA) Strength: 8 mg, Disp: 30 tablet, Rfl: 1    oxyCODONE (ROXICODONE) 15 MG Tab, Take 1 tablet (15 mg total) by mouth every 4 to 6 hours as needed for Pain., Disp: 120 tablet, Rfl: 0    palbociclib 100 mg Tab, Take 100 mg by mouth once daily. for 21 days, then take 7 days off. Total cycle length 28 days, Disp: 21 tablet, Rfl: 6    pen needle, diabetic (BD ULTRA-FINE MARCE PEN NEEDLE) 32 gauge x 5/32" Ndle, Test blood sugar twice daily, Disp: 100 each, Rfl: 5    promethazine (PHENERGAN) 25 MG tablet, Take 1 tablet (25 mg total) by mouth every 4 to 6 hours as needed., Disp: 30 tablet, Rfl: 5    promethazine-codeine 6.25-10 mg/5 ml (PHENERGAN WITH CODEINE) 6.25-10 mg/5 mL syrup, TAKE 5 ML BY MOUTH EVERY 6 HOURS AS NEEDED FOR COUGH, Disp: 450 mL, Rfl: 0    psyllium husk, with sugar, (METAMUCIL, WITH SUGAR,) 3.4 gram packet, Take 1 packet by mouth 2 (two) times daily., Disp: 30 packet, Rfl: 2    rosuvastatin (CRESTOR) 40 MG Tab, Take 1 tablet (40 mg total) by mouth every evening. For cholesterol, Disp: 90 tablet, Rfl: 3    triamcinolone acetonide 0.1% (KENALOG) 0.1 % cream, Apply topically 2 (two) times daily., Disp: 80 g, Rfl: 1    zolpidem (AMBIEN) 10 mg Tab, TAKE ONE TABLET BY MOUTH EVERY NIGHT AT BEDTIME, Disp: 30 tablet, Rfl: 3          Objective:   Vitals:  Blood pressure 101/67, pulse 82, temperature 98 °F (36.7 °C), resp. rate 18, height 5' 10" (1.778 m), weight 70.1 kg (154 lb 8 oz), last menstrual period 01/16/2016, SpO2 96 %.    Physical Examination:   GEN: no apparent distress, comfortable, Brace in place.  HEAD: atraumatic and normocephalic  EYES: no pallor, no icterus  ENT: no pharyngeal erythema  NECK: noLAD/LN's, supple  CV:  No edema  CHEST: Normal respiratory effort   she is not  tender over the right posterior chest wall on exam.  The " chest wall is not swollen.  ABDOM:  nondistended; soft                                                                                          MUSC/Skeletal: ROM normal, Tender over mid T spine area.  EXTREM: no swelling  SKIN: She is developing keloid changes at L breast incision and navel surgical sites.  This area appears to be enlarging.  NEURO: grossly intact  PSYCH: normal mood, affect and behavior  LYMPH: normal  LN's  BREASTS: L breast is much improved.    Refill Oxycodone, SOMA, Norco.    Tumor markers are improved.    Assessment:   (1) 62 y.o. female with diagnosis of Stage 1 triple negative R breast cancer, 10/2016.       S/P R mastectomy, SNBx, AC x's 4, T x's12 weeks, Rad Rx and recent reconstruction.    (2) New L invasive ductal Ca, ERP+, PRP+, H2N neg.  Clinical stage 1 dx.    I have started her on Arimidex 1 mg p.o. daily  for metastatic disease at this time.    Suspected fracture of right ribs after recent cough event, check rib x-rays, medicate for pain.    ERP+ dx, bone metastases.  Started on Arimidex daily.  Add 2nd hormonal drug Rx after Rad Rx completed.    Bone biopsy positive for metastatic breast cancer.  ERP+, PRP+, H2N-.    Now on Genzar, Carboplatin.  Improved.  RTC to see me 1 month.

## 2022-09-27 NOTE — TELEPHONE ENCOUNTER
----- Message from Nell Carrillo sent at 9/27/2022 11:19 AM CDT -----  The patient needs prescriptions for Norco 10mg, Soma 350mg, and oxycodone 15mg. She said she has an appointment for treatment at 1:40 and wants to  while she is here. She said Dr. Peraza told her he would have them ready. [phone]

## 2022-09-30 NOTE — PLAN OF CARE
Problem: Fatigue  Goal: Improved Activity Tolerance  Outcome: Ongoing, Progressing  Intervention: Promote Improved Energy  Flowsheets (Taken 9/30/2022 4883)  Fatigue Management:   activity schedule adjusted   activity assistance provided   paced activity encouraged   frequent rest breaks encouraged   fatigue-related activity identified  Sleep/Rest Enhancement:   noise level reduced   relaxation techniques promoted   regular sleep/rest pattern promoted   consistent schedule promoted  Activity Management:   Ambulated -L4   Up in chair - L3

## 2022-10-12 NOTE — TELEPHONE ENCOUNTER
----- Message from Jolie Real MA sent at 10/12/2022  2:08 PM CDT -----  Pt would like her bloodwork for diabetes orders sent to [facility]. She will be doing it on the 18th. [phone]

## 2022-10-12 NOTE — TELEPHONE ENCOUNTER
Medication requesting - metoprolol tartrate 25mg tab.   Last Rx-10/12/2022 quanity -[id number] refill-11   Last office visit -03/02/2021  Next Office Visit-None

## 2022-10-13 NOTE — PROGRESS NOTES
Per Michelle Ravalli, NP:  Reduce Carboplatin dose from 675 mg to 480 mg based on a current Scr = 0.9 mg/dL.  Original order AUC 5 based on Scr = 0.6 mg/dL.

## 2022-10-13 NOTE — PLAN OF CARE
Problem: Fatigue  Goal: Improved Activity Tolerance  Outcome: Ongoing, Progressing  Intervention: Promote Improved Energy  Flowsheets (Taken 10/13/2022 6362)  Fatigue Management:   frequent rest breaks encouraged   fatigue-related activity identified  Sleep/Rest Enhancement:   regular sleep/rest pattern promoted   relaxation techniques promoted  Activity Management: Ambulated -L4

## 2022-10-17 NOTE — TELEPHONE ENCOUNTER
----- Message from Shantal Berman sent at 10/17/2022  2:13 PM CDT -----  Patient called and stated that she is going to have her labs done for Dr Vick and she want to know if her labs from Dr Shepard were sent over please give her a call at [phone]

## 2022-10-18 NOTE — PLAN OF CARE
Problem: Neutropenia  Goal: Absence of Infection  Outcome: Ongoing, Progressing  Intervention: Prevent Infection and Maximize Resistance  Flowsheets (Taken 10/18/2022 1109)  Infection Prevention:   cohorting utilized   environmental surveillance performed   equipment surfaces disinfected

## 2022-10-24 NOTE — TELEPHONE ENCOUNTER
----- Message from Nell Carrillo sent at 10/24/2022  2:48 PM CDT -----  The patient called to get her prescriptions sent to [facility] for Friday. She needs Norco 10, Soma 350mg, oxycontin 15mg, and Zofran. She said she has an infusion on Friday at 9 am and when she is done she will be going to the pharmacy to  prescriptions. # [phone]

## 2022-10-25 NOTE — PROGRESS NOTES
SUBJECTIVE:    Patient ID: Negrita Castro is a 62 y.o. female.    Chief Complaint: 6M DM Check Up / Lab Review and declined flu vaccine due to chemo    Patient with DM has right and left breast CA with bony metastases presents for visit.  She has had a recent change in her chemotherapy and she feels she is responding well to it.  Her previous regimen involved giving her IV steroids and oral steroids.  Her diabetes is now much better controlled with the absence of this steroid use.  She has some issues with nausea but this is improving.  She has some significant weight loss on the previous regimen as well but she has been working on her intake.  She has had vertebral fractures is secondary to her cancer and surgery has been holding until the chemotherapy is complete.  She is on high-dose pain medications because of her discomfort.  She reports a recent issue with fecal impaction    Overall she has noticed some improvement.  Her white blood cell count is improved in her metabolic status is acceptable    Lab Visit on 10/25/2022   Component Date Value Ref Range Status    WBC 10/25/2022 16.77 (H)  3.90 - 12.70 K/uL Final    RBC 10/25/2022 2.98 (L)  4.00 - 5.40 M/uL Final    Hemoglobin 10/25/2022 10.0 (L)  12.0 - 16.0 g/dL Final    Hematocrit 10/25/2022 31.1 (L)  37.0 - 48.5 % Final    MCV 10/25/2022 104 (H)  82 - 98 fL Final    MCH 10/25/2022 33.6 (H)  27.0 - 31.0 pg Final    MCHC 10/25/2022 32.2  32.0 - 36.0 g/dL Final    RDW 10/25/2022 14.4  11.5 - 14.5 % Final    Platelets 10/25/2022 73 (L)  150 - 450 K/uL Final    MPV 10/25/2022 11.1  9.2 - 12.9 fL Final    Immature Granulocytes 10/25/2022 6.3 (H)  0.0 - 0.5 % Final    Gran # (ANC) 10/25/2022 13.6 (H)  1.8 - 7.7 K/uL Final    Immature Grans (Abs) 10/25/2022 1.05 (H)  0.00 - 0.04 K/uL Final    Lymph # 10/25/2022 0.7 (L)  1.0 - 4.8 K/uL Final    Mono # 10/25/2022 1.3 (H)  0.3 - 1.0 K/uL Final    Eos # 10/25/2022 0.0  0.0 - 0.5 K/uL Final    Baso # 10/25/2022 0.02   0.00 - 0.20 K/uL Final    nRBC 10/25/2022 0  0 /100 WBC Final    Gran % 10/25/2022 81.3 (H)  38.0 - 73.0 % Final    Lymph % 10/25/2022 4.3 (L)  18.0 - 48.0 % Final    Mono % 10/25/2022 7.9  4.0 - 15.0 % Final    Eosinophil % 10/25/2022 0.1  0.0 - 8.0 % Final    Basophil % 10/25/2022 0.1  0.0 - 1.9 % Final    Platelet Estimate 10/25/2022 Decreased (A)   Final    Poly 10/25/2022 Moderate   Final    Tear Drop Cells 10/25/2022 Occasional   Final    Differential Method 10/25/2022 Automated   Final    Sodium 10/25/2022 141  136 - 145 mmol/L Final    Potassium 10/25/2022 3.5  3.5 - 5.1 mmol/L Final    Chloride 10/25/2022 110  95 - 110 mmol/L Final    CO2 10/25/2022 19 (L)  23 - 29 mmol/L Final    Glucose 10/25/2022 162 (H)  70 - 110 mg/dL Final    BUN 10/25/2022 5 (L)  6 - 20 mg/dL Final    Creatinine 10/25/2022 0.8  0.5 - 1.4 mg/dL Final    Calcium 10/25/2022 8.4 (L)  8.7 - 10.5 mg/dL Final    Total Protein 10/25/2022 6.8  6.0 - 8.4 g/dL Final    Albumin 10/25/2022 3.8  3.5 - 5.2 g/dL Final    Total Bilirubin 10/25/2022 0.4  0.1 - 1.0 mg/dL Final    Alkaline Phosphatase 10/25/2022 408 (H)  55 - 135 U/L Final    AST 10/25/2022 27  10 - 40 U/L Final    ALT 10/25/2022 35  10 - 44 U/L Final    Anion Gap 10/25/2022 12  8 - 16 mmol/L Final    eGFR 10/25/2022 >60  >60 mL/min/1.73 m^2 Final    CA 15-3 10/25/2022 43 (H)  <30 U/mL Final    CA 27.29 10/25/2022 46.4 (H)  <=38.0 U/mL Final   Lab Visit on 10/18/2022   Component Date Value Ref Range Status    Hemoglobin A1C 10/18/2022 6.8 (H)  4.0 - 5.6 % Final    Estimated Avg Glucose 10/18/2022 148 (H)  68 - 131 mg/dL Final   Lab Visit on 10/18/2022   Component Date Value Ref Range Status    WBC 10/18/2022 2.47 (L)  3.90 - 12.70 K/uL Final    RBC 10/18/2022 3.08 (L)  4.00 - 5.40 M/uL Final    Hemoglobin 10/18/2022 10.3 (L)  12.0 - 16.0 g/dL Final    Hematocrit 10/18/2022 30.5 (L)  37.0 - 48.5 % Final    MCV 10/18/2022 99 (H)  82 - 98 fL Final    MCH 10/18/2022 33.4 (H)  27.0 - 31.0 pg  Final    MCHC 10/18/2022 33.8  32.0 - 36.0 g/dL Final    RDW 10/18/2022 14.6 (H)  11.5 - 14.5 % Final    Platelets 10/18/2022 206  150 - 450 K/uL Final    MPV 10/18/2022 8.9 (L)  9.2 - 12.9 fL Final    Immature Granulocytes 10/18/2022 0.4  0.0 - 0.5 % Final    Gran # (ANC) 10/18/2022 2.0  1.8 - 7.7 K/uL Final    Immature Grans (Abs) 10/18/2022 0.01  0.00 - 0.04 K/uL Final    Lymph # 10/18/2022 0.3 (L)  1.0 - 4.8 K/uL Final    Mono # 10/18/2022 0.1 (L)  0.3 - 1.0 K/uL Final    Eos # 10/18/2022 0.1  0.0 - 0.5 K/uL Final    Baso # 10/18/2022 0.01  0.00 - 0.20 K/uL Final    nRBC 10/18/2022 0  0 /100 WBC Final    Gran % 10/18/2022 82.2 (H)  38.0 - 73.0 % Final    Lymph % 10/18/2022 13.0 (L)  18.0 - 48.0 % Final    Mono % 10/18/2022 2.0 (L)  4.0 - 15.0 % Final    Eosinophil % 10/18/2022 2.0  0.0 - 8.0 % Final    Basophil % 10/18/2022 0.4  0.0 - 1.9 % Final    Differential Method 10/18/2022 Automated   Final    Sodium 10/18/2022 140  136 - 145 mmol/L Final    Potassium 10/18/2022 3.8  3.5 - 5.1 mmol/L Final    Chloride 10/18/2022 110  95 - 110 mmol/L Final    CO2 10/18/2022 19 (L)  23 - 29 mmol/L Final    Glucose 10/18/2022 153 (H)  70 - 110 mg/dL Final    BUN 10/18/2022 12  6 - 20 mg/dL Final    Creatinine 10/18/2022 0.8  0.5 - 1.4 mg/dL Final    Calcium 10/18/2022 8.4 (L)  8.7 - 10.5 mg/dL Final    Total Protein 10/18/2022 7.2  6.0 - 8.4 g/dL Final    Albumin 10/18/2022 3.9  3.5 - 5.2 g/dL Final    Total Bilirubin 10/18/2022 0.5  0.1 - 1.0 mg/dL Final    Alkaline Phosphatase 10/18/2022 319 (H)  55 - 135 U/L Final    AST 10/18/2022 54 (H)  10 - 40 U/L Final    ALT 10/18/2022 55 (H)  10 - 44 U/L Final    Anion Gap 10/18/2022 11  8 - 16 mmol/L Final    eGFR 10/18/2022 >60  >60 mL/min/1.73 m^2 Final    CA 15-3 10/18/2022 38 (H)  <30 U/mL Final    CA 27.29 10/18/2022 41.1 (H)  <=38.0 U/mL Final   Lab Visit on 10/11/2022   Component Date Value Ref Range Status    WBC 10/11/2022 6.95  3.90 - 12.70 K/uL Final    RBC 10/11/2022  3.35 (L)  4.00 - 5.40 M/uL Final    Hemoglobin 10/11/2022 11.2 (L)  12.0 - 16.0 g/dL Final    Hematocrit 10/11/2022 33.4 (L)  37.0 - 48.5 % Final    MCV 10/11/2022 100 (H)  82 - 98 fL Final    MCH 10/11/2022 33.4 (H)  27.0 - 31.0 pg Final    MCHC 10/11/2022 33.5  32.0 - 36.0 g/dL Final    RDW 10/11/2022 15.6 (H)  11.5 - 14.5 % Final    Platelets 10/11/2022 229  150 - 450 K/uL Final    MPV 10/11/2022 9.3  9.2 - 12.9 fL Final    Immature Granulocytes 10/11/2022 0.4  0.0 - 0.5 % Final    Gran # (ANC) 10/11/2022 5.6  1.8 - 7.7 K/uL Final    Immature Grans (Abs) 10/11/2022 0.03  0.00 - 0.04 K/uL Final    Lymph # 10/11/2022 0.6 (L)  1.0 - 4.8 K/uL Final    Mono # 10/11/2022 0.7  0.3 - 1.0 K/uL Final    Eos # 10/11/2022 0.1  0.0 - 0.5 K/uL Final    Baso # 10/11/2022 0.02  0.00 - 0.20 K/uL Final    nRBC 10/11/2022 0  0 /100 WBC Final    Gran % 10/11/2022 80.9 (H)  38.0 - 73.0 % Final    Lymph % 10/11/2022 8.1 (L)  18.0 - 48.0 % Final    Mono % 10/11/2022 9.4  4.0 - 15.0 % Final    Eosinophil % 10/11/2022 0.9  0.0 - 8.0 % Final    Basophil % 10/11/2022 0.3  0.0 - 1.9 % Final    Platelet Estimate 10/11/2022 Appears normal   Final    Differential Method 10/11/2022 Automated   Final    Sodium 10/11/2022 141  136 - 145 mmol/L Final    Potassium 10/11/2022 3.4 (L)  3.5 - 5.1 mmol/L Final    Chloride 10/11/2022 108  95 - 110 mmol/L Final    CO2 10/11/2022 21 (L)  23 - 29 mmol/L Final    Glucose 10/11/2022 128 (H)  70 - 110 mg/dL Final    BUN 10/11/2022 9  6 - 20 mg/dL Final    Creatinine 10/11/2022 0.9  0.5 - 1.4 mg/dL Final    Calcium 10/11/2022 8.8  8.7 - 10.5 mg/dL Final    Total Protein 10/11/2022 7.4  6.0 - 8.4 g/dL Final    Albumin 10/11/2022 4.1  3.5 - 5.2 g/dL Final    Total Bilirubin 10/11/2022 0.4  0.1 - 1.0 mg/dL Final    Alkaline Phosphatase 10/11/2022 393 (H)  55 - 135 U/L Final    AST 10/11/2022 31  10 - 40 U/L Final    ALT 10/11/2022 20  10 - 44 U/L Final    Anion Gap 10/11/2022 12  8 - 16 mmol/L Final    eGFR  10/11/2022 >60  >60 mL/min/1.73 m^2 Final    CA 15-3 10/11/2022 48 (H)  <30 U/mL Final    CA 27.29 10/11/2022 56.3 (H)  <=38.0 U/mL Final   Lab Visit on 10/04/2022   Component Date Value Ref Range Status    WBC 10/04/2022 11.08  3.90 - 12.70 K/uL Final    RBC 10/04/2022 3.40 (L)  4.00 - 5.40 M/uL Final    Hemoglobin 10/04/2022 11.3 (L)  12.0 - 16.0 g/dL Final    Hematocrit 10/04/2022 33.6 (L)  37.0 - 48.5 % Final    MCV 10/04/2022 99 (H)  82 - 98 fL Final    MCH 10/04/2022 33.2 (H)  27.0 - 31.0 pg Final    MCHC 10/04/2022 33.6  32.0 - 36.0 g/dL Final    RDW 10/04/2022 14.0  11.5 - 14.5 % Final    Platelets 10/04/2022 53 (L)  150 - 450 K/uL Final    MPV 10/04/2022 11.2  9.2 - 12.9 fL Final    Immature Granulocytes 10/04/2022 5.1 (H)  0.0 - 0.5 % Final    Gran # (ANC) 10/04/2022 9.0 (H)  1.8 - 7.7 K/uL Final    Immature Grans (Abs) 10/04/2022 0.56 (H)  0.00 - 0.04 K/uL Final    Lymph # 10/04/2022 0.7 (L)  1.0 - 4.8 K/uL Final    Mono # 10/04/2022 0.7  0.3 - 1.0 K/uL Final    Eos # 10/04/2022 0.0  0.0 - 0.5 K/uL Final    Baso # 10/04/2022 0.02  0.00 - 0.20 K/uL Final    nRBC 10/04/2022 0  0 /100 WBC Final    Gran % 10/04/2022 81.3 (H)  38.0 - 73.0 % Final    Lymph % 10/04/2022 6.4 (L)  18.0 - 48.0 % Final    Mono % 10/04/2022 6.7  4.0 - 15.0 % Final    Eosinophil % 10/04/2022 0.3  0.0 - 8.0 % Final    Basophil % 10/04/2022 0.2  0.0 - 1.9 % Final    Platelet Estimate 10/04/2022 Decreased (A)   Final    Poly 10/04/2022 Occasional   Final    Differential Method 10/04/2022 Automated   Final    Sodium 10/04/2022 141  136 - 145 mmol/L Final    Potassium 10/04/2022 3.8  3.5 - 5.1 mmol/L Final    Chloride 10/04/2022 111 (H)  95 - 110 mmol/L Final    CO2 10/04/2022 19 (L)  23 - 29 mmol/L Final    Glucose 10/04/2022 168 (H)  70 - 110 mg/dL Final    BUN 10/04/2022 6  6 - 20 mg/dL Final    Creatinine 10/04/2022 0.8  0.5 - 1.4 mg/dL Final    Calcium 10/04/2022 8.8  8.7 - 10.5 mg/dL Final    Total Protein 10/04/2022 7.4  6.0 - 8.4  g/dL Final    Albumin 10/04/2022 4.0  3.5 - 5.2 g/dL Final    Total Bilirubin 10/04/2022 0.4  0.1 - 1.0 mg/dL Final    Alkaline Phosphatase 10/04/2022 441 (H)  55 - 135 U/L Final    AST 10/04/2022 30  10 - 40 U/L Final    ALT 10/04/2022 31  10 - 44 U/L Final    Anion Gap 10/04/2022 11  8 - 16 mmol/L Final    eGFR 10/04/2022 >60  >60 mL/min/1.73 m^2 Final    CA 15-3 10/04/2022 44 (H)  <30 U/mL Final    CA 27.29 10/04/2022 49.7 (H)  <=38.0 U/mL Final   Lab Visit on 09/27/2022   Component Date Value Ref Range Status    WBC 09/27/2022 3.37 (L)  3.90 - 12.70 K/uL Final    RBC 09/27/2022 3.56 (L)  4.00 - 5.40 M/uL Final    Hemoglobin 09/27/2022 11.9 (L)  12.0 - 16.0 g/dL Final    Hematocrit 09/27/2022 35.5 (L)  37.0 - 48.5 % Final    MCV 09/27/2022 100 (H)  82 - 98 fL Final    MCH 09/27/2022 33.4 (H)  27.0 - 31.0 pg Final    MCHC 09/27/2022 33.5  32.0 - 36.0 g/dL Final    RDW 09/27/2022 14.6 (H)  11.5 - 14.5 % Final    Platelets 09/27/2022 215  150 - 450 K/uL Final    MPV 09/27/2022 9.0 (L)  9.2 - 12.9 fL Final    Immature Granulocytes 09/27/2022 0.6 (H)  0.0 - 0.5 % Final    Gran # (ANC) 09/27/2022 3.0  1.8 - 7.7 K/uL Final    Immature Grans (Abs) 09/27/2022 0.02  0.00 - 0.04 K/uL Final    Lymph # 09/27/2022 0.3 (L)  1.0 - 4.8 K/uL Final    Mono # 09/27/2022 0.0 (L)  0.3 - 1.0 K/uL Final    Eos # 09/27/2022 0.0  0.0 - 0.5 K/uL Final    Baso # 09/27/2022 0.01  0.00 - 0.20 K/uL Final    nRBC 09/27/2022 0  0 /100 WBC Final    Gran % 09/27/2022 88.4 (H)  38.0 - 73.0 % Final    Lymph % 09/27/2022 8.6 (L)  18.0 - 48.0 % Final    Mono % 09/27/2022 0.9 (L)  4.0 - 15.0 % Final    Eosinophil % 09/27/2022 1.2  0.0 - 8.0 % Final    Basophil % 09/27/2022 0.3  0.0 - 1.9 % Final    Differential Method 09/27/2022 Automated   Final    Sodium 09/27/2022 138  136 - 145 mmol/L Final    Potassium 09/27/2022 4.2  3.5 - 5.1 mmol/L Final    Chloride 09/27/2022 108  95 - 110 mmol/L Final    CO2 09/27/2022 24  23 - 29 mmol/L Final    Glucose  09/27/2022 255 (H)  70 - 110 mg/dL Final    BUN 09/27/2022 13  6 - 20 mg/dL Final    Creatinine 09/27/2022 0.8  0.5 - 1.4 mg/dL Final    Calcium 09/27/2022 8.7  8.7 - 10.5 mg/dL Final    Total Protein 09/27/2022 7.6  6.0 - 8.4 g/dL Final    Albumin 09/27/2022 4.1  3.5 - 5.2 g/dL Final    Total Bilirubin 09/27/2022 0.6  0.1 - 1.0 mg/dL Final    Alkaline Phosphatase 09/27/2022 384 (H)  55 - 135 U/L Final    AST 09/27/2022 65 (H)  10 - 40 U/L Final    ALT 09/27/2022 61 (H)  10 - 44 U/L Final    Anion Gap 09/27/2022 6 (L)  8 - 16 mmol/L Final    eGFR 09/27/2022 >60  >60 mL/min/1.73 m^2 Final    CA 15-3 09/27/2022 37 (H)  <30 U/mL Final    CA 27.29 09/27/2022 45.2 (H)  <=38.0 U/mL Final   Lab Visit on 09/20/2022   Component Date Value Ref Range Status    WBC 09/20/2022 7.03  3.90 - 12.70 K/uL Final    RBC 09/20/2022 3.45 (L)  4.00 - 5.40 M/uL Final    Hemoglobin 09/20/2022 11.2 (L)  12.0 - 16.0 g/dL Final    Hematocrit 09/20/2022 35.3 (L)  37.0 - 48.5 % Final    MCV 09/20/2022 102 (H)  82 - 98 fL Final    MCH 09/20/2022 32.5 (H)  27.0 - 31.0 pg Final    MCHC 09/20/2022 31.7 (L)  32.0 - 36.0 g/dL Final    RDW 09/20/2022 15.9 (H)  11.5 - 14.5 % Final    Platelets 09/20/2022 276  150 - 450 K/uL Final    MPV 09/20/2022 9.3  9.2 - 12.9 fL Final    Immature Granulocytes 09/20/2022 0.4  0.0 - 0.5 % Final    Gran # (ANC) 09/20/2022 5.7  1.8 - 7.7 K/uL Final    Immature Grans (Abs) 09/20/2022 0.03  0.00 - 0.04 K/uL Final    Lymph # 09/20/2022 0.5 (L)  1.0 - 4.8 K/uL Final    Mono # 09/20/2022 0.7  0.3 - 1.0 K/uL Final    Eos # 09/20/2022 0.0  0.0 - 0.5 K/uL Final    Baso # 09/20/2022 0.03  0.00 - 0.20 K/uL Final    nRBC 09/20/2022 0  0 /100 WBC Final    Gran % 09/20/2022 81.6 (H)  38.0 - 73.0 % Final    Lymph % 09/20/2022 6.8 (L)  18.0 - 48.0 % Final    Mono % 09/20/2022 10.4  4.0 - 15.0 % Final    Eosinophil % 09/20/2022 0.4  0.0 - 8.0 % Final    Basophil % 09/20/2022 0.4  0.0 - 1.9 % Final    Differential Method 09/20/2022  Automated   Final    Sodium 09/20/2022 141  136 - 145 mmol/L Final    Potassium 09/20/2022 3.9  3.5 - 5.1 mmol/L Final    Chloride 09/20/2022 110  95 - 110 mmol/L Final    CO2 09/20/2022 19 (L)  23 - 29 mmol/L Final    Glucose 09/20/2022 160 (H)  70 - 110 mg/dL Final    BUN 09/20/2022 10  6 - 20 mg/dL Final    Creatinine 09/20/2022 0.7  0.5 - 1.4 mg/dL Final    Calcium 09/20/2022 8.7  8.7 - 10.5 mg/dL Final    Total Protein 09/20/2022 7.0  6.0 - 8.4 g/dL Final    Albumin 09/20/2022 3.9  3.5 - 5.2 g/dL Final    Total Bilirubin 09/20/2022 0.4  0.1 - 1.0 mg/dL Final    Alkaline Phosphatase 09/20/2022 407 (H)  55 - 135 U/L Final    AST 09/20/2022 26  10 - 40 U/L Final    ALT 09/20/2022 22  10 - 44 U/L Final    Anion Gap 09/20/2022 12  8 - 16 mmol/L Final    eGFR 09/20/2022 >60  >60 mL/min/1.73 m^2 Final    CA 15-3 09/20/2022 47 (H)  <30 U/mL Final    CA 27.29 09/20/2022 53.0 (H)  <=38.0 U/mL Final   Lab Visit on 09/13/2022   Component Date Value Ref Range Status    WBC 09/13/2022 16.96 (H)  3.90 - 12.70 K/uL Final    RBC 09/13/2022 3.42 (L)  4.00 - 5.40 M/uL Final    Hemoglobin 09/13/2022 11.3 (L)  12.0 - 16.0 g/dL Final    Hematocrit 09/13/2022 33.4 (L)  37.0 - 48.5 % Final    MCV 09/13/2022 98  82 - 98 fL Final    MCH 09/13/2022 33.0 (H)  27.0 - 31.0 pg Final    MCHC 09/13/2022 33.8  32.0 - 36.0 g/dL Final    RDW 09/13/2022 14.5  11.5 - 14.5 % Final    Platelets 09/13/2022 80 (L)  150 - 450 K/uL Final    MPV 09/13/2022 10.7  9.2 - 12.9 fL Final    Immature Granulocytes 09/13/2022 9.4 (H)  0.0 - 0.5 % Final    Gran # (ANC) 09/13/2022 12.9 (H)  1.8 - 7.7 K/uL Final    Immature Grans (Abs) 09/13/2022 1.60 (H)  0.00 - 0.04 K/uL Final    Lymph # 09/13/2022 0.9 (L)  1.0 - 4.8 K/uL Final    Mono # 09/13/2022 1.4 (H)  0.3 - 1.0 K/uL Final    Eos # 09/13/2022 0.0  0.0 - 0.5 K/uL Final    Baso # 09/13/2022 0.15  0.00 - 0.20 K/uL Final    nRBC 09/13/2022 0  0 /100 WBC Final    Gran % 09/13/2022 75.9 (H)  38.0 - 73.0 % Final     Lymph % 09/13/2022 5.3 (L)  18.0 - 48.0 % Final    Mono % 09/13/2022 8.4  4.0 - 15.0 % Final    Eosinophil % 09/13/2022 0.1  0.0 - 8.0 % Final    Basophil % 09/13/2022 0.9  0.0 - 1.9 % Final    Platelet Estimate 09/13/2022 Decreased (A)   Final    Differential Method 09/13/2022 Automated   Final    Sodium 09/13/2022 143  136 - 145 mmol/L Final    Potassium 09/13/2022 3.1 (L)  3.5 - 5.1 mmol/L Final    Chloride 09/13/2022 108  95 - 110 mmol/L Final    CO2 09/13/2022 22 (L)  23 - 29 mmol/L Final    Glucose 09/13/2022 156 (H)  70 - 110 mg/dL Final    BUN 09/13/2022 4 (L)  6 - 20 mg/dL Final    Creatinine 09/13/2022 0.7  0.5 - 1.4 mg/dL Final    Calcium 09/13/2022 8.7  8.7 - 10.5 mg/dL Final    Total Protein 09/13/2022 7.1  6.0 - 8.4 g/dL Final    Albumin 09/13/2022 4.0  3.5 - 5.2 g/dL Final    Total Bilirubin 09/13/2022 0.4  0.1 - 1.0 mg/dL Final    Alkaline Phosphatase 09/13/2022 566 (H)  55 - 135 U/L Final    AST 09/13/2022 34  10 - 40 U/L Final    ALT 09/13/2022 53 (H)  10 - 44 U/L Final    Anion Gap 09/13/2022 13  8 - 16 mmol/L Final    eGFR 09/13/2022 >60  >60 mL/min/1.73 m^2 Final    CA 15-3 09/13/2022 41 (H)  <30 U/mL Final    CA 27.29 09/13/2022 44.9 (H)  <=38.0 U/mL Final   Lab Visit on 09/06/2022   Component Date Value Ref Range Status    WBC 09/06/2022 2.65 (L)  3.90 - 12.70 K/uL Final    RBC 09/06/2022 3.45 (L)  4.00 - 5.40 M/uL Final    Hemoglobin 09/06/2022 11.4 (L)  12.0 - 16.0 g/dL Final    Hematocrit 09/06/2022 34.4 (L)  37.0 - 48.5 % Final    MCV 09/06/2022 100 (H)  82 - 98 fL Final    MCH 09/06/2022 33.0 (H)  27.0 - 31.0 pg Final    MCHC 09/06/2022 33.1  32.0 - 36.0 g/dL Final    RDW 09/06/2022 15.1 (H)  11.5 - 14.5 % Final    Platelets 09/06/2022 229  150 - 450 K/uL Final    MPV 09/06/2022 8.8 (L)  9.2 - 12.9 fL Final    Immature Granulocytes 09/06/2022 0.4  0.0 - 0.5 % Final    Gran # (ANC) 09/06/2022 2.3  1.8 - 7.7 K/uL Final    Immature Grans (Abs) 09/06/2022 0.01  0.00 - 0.04 K/uL Final    Lymph  # 09/06/2022 0.3 (L)  1.0 - 4.8 K/uL Final    Mono # 09/06/2022 0.1 (L)  0.3 - 1.0 K/uL Final    Eos # 09/06/2022 0.0  0.0 - 0.5 K/uL Final    Baso # 09/06/2022 0.01  0.00 - 0.20 K/uL Final    nRBC 09/06/2022 0  0 /100 WBC Final    Gran % 09/06/2022 84.9 (H)  38.0 - 73.0 % Final    Lymph % 09/06/2022 11.3 (L)  18.0 - 48.0 % Final    Mono % 09/06/2022 1.9 (L)  4.0 - 15.0 % Final    Eosinophil % 09/06/2022 1.1  0.0 - 8.0 % Final    Basophil % 09/06/2022 0.4  0.0 - 1.9 % Final    Differential Method 09/06/2022 Automated   Final    Sodium 09/06/2022 136  136 - 145 mmol/L Final    Potassium 09/06/2022 4.2  3.5 - 5.1 mmol/L Final    Chloride 09/06/2022 107  95 - 110 mmol/L Final    CO2 09/06/2022 21 (L)  23 - 29 mmol/L Final    Glucose 09/06/2022 232 (H)  70 - 110 mg/dL Final    BUN 09/06/2022 9  6 - 20 mg/dL Final    Creatinine 09/06/2022 0.7  0.5 - 1.4 mg/dL Final    Calcium 09/06/2022 8.5 (L)  8.7 - 10.5 mg/dL Final    Total Protein 09/06/2022 7.0  6.0 - 8.4 g/dL Final    Albumin 09/06/2022 3.8  3.5 - 5.2 g/dL Final    Total Bilirubin 09/06/2022 0.5  0.1 - 1.0 mg/dL Final    Alkaline Phosphatase 09/06/2022 408 (H)  55 - 135 U/L Final    AST 09/06/2022 172 (H)  10 - 40 U/L Final    ALT 09/06/2022 180 (H)  10 - 44 U/L Final    Anion Gap 09/06/2022 8  8 - 16 mmol/L Final    eGFR 09/06/2022 >60  >60 mL/min/1.73 m^2 Final    CA 15-3 09/06/2022 34 (H)  <30 U/mL Final    CA 27.29 09/06/2022 37.9  <=38.0 U/mL Final   Lab Visit on 08/30/2022   Component Date Value Ref Range Status    WBC 08/30/2022 4.69  3.90 - 12.70 K/uL Final    RBC 08/30/2022 3.34 (L)  4.00 - 5.40 M/uL Final    Hemoglobin 08/30/2022 11.0 (L)  12.0 - 16.0 g/dL Final    Hematocrit 08/30/2022 33.1 (L)  37.0 - 48.5 % Final    MCV 08/30/2022 99 (H)  82 - 98 fL Final    MCH 08/30/2022 32.9 (H)  27.0 - 31.0 pg Final    MCHC 08/30/2022 33.2  32.0 - 36.0 g/dL Final    RDW 08/30/2022 15.4 (H)  11.5 - 14.5 % Final    Platelets 08/30/2022 242  150 - 450 K/uL Final     MPV 08/30/2022 9.3  9.2 - 12.9 fL Final    Immature Granulocytes 08/30/2022 0.4  0.0 - 0.5 % Final    Gran # (ANC) 08/30/2022 3.6  1.8 - 7.7 K/uL Final    Immature Grans (Abs) 08/30/2022 0.02  0.00 - 0.04 K/uL Final    Lymph # 08/30/2022 0.6 (L)  1.0 - 4.8 K/uL Final    Mono # 08/30/2022 0.5  0.3 - 1.0 K/uL Final    Eos # 08/30/2022 0.0  0.0 - 0.5 K/uL Final    Baso # 08/30/2022 0.01  0.00 - 0.20 K/uL Final    nRBC 08/30/2022 0  0 /100 WBC Final    Gran % 08/30/2022 76.2 (H)  38.0 - 73.0 % Final    Lymph % 08/30/2022 11.9 (L)  18.0 - 48.0 % Final    Mono % 08/30/2022 10.7  4.0 - 15.0 % Final    Eosinophil % 08/30/2022 0.6  0.0 - 8.0 % Final    Basophil % 08/30/2022 0.2  0.0 - 1.9 % Final    Differential Method 08/30/2022 Automated   Final    Sodium 08/30/2022 138  136 - 145 mmol/L Final    Potassium 08/30/2022 3.7  3.5 - 5.1 mmol/L Final    Chloride 08/30/2022 106  95 - 110 mmol/L Final    CO2 08/30/2022 22 (L)  23 - 29 mmol/L Final    Glucose 08/30/2022 224 (H)  70 - 110 mg/dL Final    BUN 08/30/2022 9  6 - 20 mg/dL Final    Creatinine 08/30/2022 0.8  0.5 - 1.4 mg/dL Final    Calcium 08/30/2022 8.6 (L)  8.7 - 10.5 mg/dL Final    Total Protein 08/30/2022 6.6  6.0 - 8.4 g/dL Final    Albumin 08/30/2022 3.7  3.5 - 5.2 g/dL Final    Total Bilirubin 08/30/2022 0.4  0.1 - 1.0 mg/dL Final    Alkaline Phosphatase 08/30/2022 396 (H)  55 - 135 U/L Final    AST 08/30/2022 24  10 - 40 U/L Final    ALT 08/30/2022 26  10 - 44 U/L Final    Anion Gap 08/30/2022 10  8 - 16 mmol/L Final    eGFR 08/30/2022 >60  >60 mL/min/1.73 m^2 Final    CA 15-3 08/30/2022 38 (H)  <30 U/mL Final    CA 27.29 08/30/2022 44.9 (H)  <=38.0 U/mL Final   There may be more visits with results that are not included.      Past Medical History:   Diagnosis Date    Acute hypoxemic respiratory failure 12/12/2020    Breast cancer     left breast    Cancer     RIGHT BREAST 10-15    Depression     Diabetes mellitus     Neuropathy     Panic attacks     S/P epidural  steroid injection     Seizures     none since early 30's    Wears glasses     TO DRIVE     Past Surgical History:   Procedure Laterality Date    AXILLARY NODE DISSECTION Left 8/24/2020    Procedure: LYMPHADENECTOMY, AXILLARY;  Surgeon: Cory Vick MD;  Location: [facility] OR;  Service: General;  Laterality: Left;  left breast mastectomy with possible axillary lymph node dissection    BREAST BIOPSY      right    BREAST LUMPECTOMY      BREAST RECONSTRUCTION      breast reconstruction with tummy tuck      BREAST SURGERY      11-3-15 LUMPECTOMY, 12-2-15 REEXCISION    INCISION AND DRAINAGE OF ABSCESS Left 9/9/2020    Procedure: INCISION AND DRAINAGE, ABSCESS;  Surgeon: Cory Vick MD;  Location: [facility] OR;  Service: General;  Laterality: Left;    INSERTION OF LOCALIZATION WIRE Left 8/24/2020    Procedure: INSERTION, LOCALIZATION WIRE;  Surgeon: Cory Vick MD;  Location: [facility] OR;  Service: General;  Laterality: Left;  left breast lumpectomy with wire needle loc    MASTECTOMY      mastectomy 2016      MASTECTOMY, PARTIAL Left 3/19/2021    Procedure: MASTECTOMY, PARTIAL;  Surgeon: Cory Vick MD;  Location: [facility] OR;  Service: General;  Laterality: Left;  lumpectomy  left breast     RECONSTRUCTION OF NIPPLE Right 11/5/2018    Procedure: RECONSTRUCTION-NIPPLE RIGHT;  Surgeon: Xiang Hernandez MD;  Location: [address];  Service: Plastics;  Laterality: Right;    SENTINEL LYMPH NODE BIOPSY Left 8/24/2020    Procedure: BIOPSY, LYMPH NODE, SENTINEL;  Surgeon: Cory Vick MD;  Location: [facility] OR;  Service: General;  Laterality: Left;  left breast lumpectomy with left sentinel lymoh node       shoulder surgery and wrist      BILAT  BONE SPUR    WRIST SURGERY      RIGHT     Family History   Problem Relation Age of Onset    Anesthesia problems Neg Hx        Marital Status:   Alcohol History:  reports current alcohol use.  Tobacco History:  reports that she quit smoking about 7  years ago. She has a 7.50 pack-year smoking history. She has never used smokeless tobacco.  Drug History:  reports that she does not currently use drugs after having used the following drugs: Marijuana.    Review of patient's allergies indicates:   Allergen Reactions    Adhesive tape-silicones Hives     BANDAIDS    Polymycin Hives    Adhesive     Latex, natural rubber Hives and Itching    Neomycin-bacitracnzn-polymyxnb     Polyurethane-39 Hives       Current Outpatient Medications:     diazePAM (VALIUM) 10 MG Tab, Take 1 tablet (10 mg total) by mouth 4 (four) times daily as needed (anxiety)., Disp: 120 tablet, Rfl: 2    furosemide (LASIX) 20 MG tablet, TAKE ONE TABLET BY MOUTH EVERY DAY, Disp: 30 tablet, Rfl: 6    metoprolol tartrate (LOPRESSOR) 25 MG tablet, Take 1 tablet (25 mg total) by mouth 2 (two) times daily., Disp: 60 tablet, Rfl: 11    promethazine (PHENERGAN) 25 MG tablet, Take 1 tablet (25 mg total) by mouth every 4 to 6 hours as needed., Disp: 30 tablet, Rfl: 5    zolpidem (AMBIEN) 10 mg Tab, TAKE ONE TABLET BY MOUTH EVERY NIGHT AT BEDTIME, Disp: 30 tablet, Rfl: 3    anastrozole (ARIMIDEX) 1 mg Tab, Take 1 mg by mouth once daily., Disp: , Rfl:     blood sugar diagnostic Strp, TEST GLUCOSE BID, Disp: 300 each, Rfl: 3    blood-glucose meter (TRUE METRIX GLUCOSE METER) kit, TEST GLUCOSE BID, Disp: 1 each, Rfl: 0    blood-glucose meter,continuous (DEXCOM G6 ) Misc, TEST GLUCOSE QID, Disp: 1 each, Rfl: 1    blood-glucose sensor (DEXCOM G6 SENSOR) Edwige, TEST GLUCOSE QID, Disp: 13 each, Rfl: 3    blood-glucose transmitter (DEXCOM G6 TRANSMITTER) Edwige, TEST GLUCOSE QID, Disp: 1 each, Rfl: 1    carisoprodoL (SOMA) 350 MG tablet, Take 1 tablet (350 mg total) by mouth 3 (three) times daily as needed for Muscle spasms., Disp: 90 tablet, Rfl: 0    dexAMETHasone (DECADRON) 4 MG Tab, TAKE ONE TABLET (4MG TOTAL) BY MOUTH TWICE DAILY WITH MEALS, Disp: 60 tablet, Rfl: 1    dextromethorphan-guaiFENesin  mg/5  ml (ROBITUSSIN-DM)  mg/5 mL liquid, Take 1 teaspoon q 6 hours prn cough., Disp: 300 mL, Rfl: 0    docusate sodium (COLACE) 100 MG capsule, Take 100 mg by mouth 2 (two) times daily., Disp: , Rfl:     dronabinoL (MARINOL) 5 MG capsule, Take 1 capsule (5 mg total) by mouth 2 (two) times daily before meals., Disp: 60 capsule, Rfl: 0    dulaglutide (TRULICITY) 3 mg/0.5 mL pen injector, Inject 3 mg into the skin every 7 days., Disp: 4 pen, Rfl: 11    enalapril (VASOTEC) 5 MG tablet, Take 1 tablet (5 mg total) by mouth once daily., Disp: 90 tablet, Rfl: 3    etodolac (LODINE) 400 MG tablet, TAKE ONE TABLET (400 MG TOTAL) BY MOUTH ONCE DAILY, Disp: 30 tablet, Rfl: 2    fluconazole (DIFLUCAN) 150 MG Tab, TAKE ONE TABLET BY MOUTH ONCE FOR 1 DOSE, Disp: 1 tablet, Rfl: 2    HYDROcodone-acetaminophen (NORCO)  mg per tablet, Take 1 tablet by mouth every 6 (six) hours as needed for Pain., Disp: 120 tablet, Rfl: 0    insulin glargine (LANTUS U-100 INSULIN) 100 unit/mL injection, Inject 32 Units into the skin 2 (two) times a day., Disp: 18 mL, Rfl: 11    lancets 32 gauge Misc, TEST GLUCOSE BID, Disp: 300 each, Rfl: 3    metFORMIN (GLUCOPHAGE-XR) 500 MG ER 24hr tablet, Take 2 tablets (1,000 mg total) by mouth 2 (two) times daily with meals., Disp: 360 tablet, Rfl: 3    mometasone 0.1% (ELOCON) 0.1 % cream, Apply to affected area daily, Disp: 45 g, Rfl: 1    naloxegoL (MOVANTIK) 12.5 mg Tab, Take 12.5 mg by mouth once daily., Disp: 30 tablet, Rfl: 3    ondansetron (ZOFRAN-ODT) 8 MG TbDL, DISSOLVE 1 TABLET (8MG TOTAL) on the tongue EVERY 6 TO 8 HOURS AS NEEDED (NAUSEA) Strength: 8 mg, Disp: 30 tablet, Rfl: 1    oxyCODONE (ROXICODONE) 15 MG Tab, Take 1 tablet (15 mg total) by mouth every 4 to 6 hours as needed for Pain., Disp: 120 tablet, Rfl: 0    palbociclib 100 mg Tab, Take 100 mg by mouth once daily. for 21 days, then take 7 days off. Total cycle length 28 days, Disp: 21 tablet, Rfl: 6    pen needle, diabetic (BD  "ULTRA-FINE MARCE PEN NEEDLE) 32 gauge x 5/32" Ndle, Test blood sugar twice daily, Disp: 100 each, Rfl: 5    promethazine-codeine 6.25-10 mg/5 ml (PHENERGAN WITH CODEINE) 6.25-10 mg/5 mL syrup, TAKE 5 ML BY MOUTH EVERY 6 HOURS AS NEEDED FOR COUGH, Disp: 450 mL, Rfl: 0    psyllium husk, with sugar, (METAMUCIL, WITH SUGAR,) 3.4 gram packet, Take 1 packet by mouth 2 (two) times daily., Disp: 30 packet, Rfl: 2    rosuvastatin (CRESTOR) 40 MG Tab, Take 1 tablet (40 mg total) by mouth every evening. For cholesterol, Disp: 90 tablet, Rfl: 3    triamcinolone acetonide 0.1% (KENALOG) 0.1 % cream, Apply topically 2 (two) times daily., Disp: 80 g, Rfl: 1    Review of Systems   Constitutional:  Negative for activity change, fatigue and unexpected weight change.   HENT:  Negative for hearing loss, postnasal drip, sinus pressure, sore throat and voice change.    Eyes:  Negative for photophobia and visual disturbance.   Respiratory:  Negative for cough, shortness of breath and wheezing.    Cardiovascular:  Negative for chest pain and palpitations.   Gastrointestinal:  Positive for constipation. Negative for diarrhea and nausea.   Genitourinary:  Negative for difficulty urinating, frequency, hematuria and urgency.   Musculoskeletal:  Positive for arthralgias, back pain and gait problem.   Skin:  Negative for rash.   Neurological:  Negative for weakness, light-headedness and headaches.   Hematological:  Negative for adenopathy. Does not bruise/bleed easily.   Psychiatric/Behavioral:  Positive for sleep disturbance. The patient is not nervous/anxious.         Objective:      Vitals:    10/25/22 1412   BP: (!) 146/90   Pulse: 85   SpO2: 99%   Weight: 68.9 kg (152 lb)   Height: 5' 10" (1.778 m)     Physical Exam  Constitutional:       General: She is not in acute distress.     Appearance: Normal appearance.      Comments: Seated in WC   HENT:      Head: Normocephalic and atraumatic.      Mouth/Throat:      Mouth: Mucous membranes are " moist.   Eyes:      Conjunctiva/sclera: Conjunctivae normal.   Pulmonary:      Effort: Pulmonary effort is normal.   Musculoskeletal:      Right lower leg: No edema.      Left lower leg: No edema.   Skin:     Findings: No rash.   Neurological:      General: No focal deficit present.      Mental Status: She is alert and oriented to person, place, and time.   Psychiatric:         Mood and Affect: Mood normal.         Behavior: Behavior normal.         Assessment:       1. Type 2 diabetes mellitus with hyperglycemia, with long-term current use of insulin    2. Mixed hyperlipidemia    3. Malignant neoplasm of right female breast, unspecified estrogen receptor status, unspecified site of breast    4. Chemotherapy-induced neutropenia    5. Bone metastases    6. Chemotherapy-induced nausea    7. Chronic obstructive pulmonary disease, unspecified COPD type         Plan:       Type 2 diabetes mellitus with hyperglycemia, with long-term current use of insulin  -     metFORMIN (GLUCOPHAGE-XR) 500 MG ER 24hr tablet; Take 2 tablets (1,000 mg total) by mouth 2 (two) times daily with meals.  Dispense: 360 tablet; Refill: 3  -     enalapril (VASOTEC) 5 MG tablet; Take 1 tablet (5 mg total) by mouth once daily.  Dispense: 90 tablet; Refill: 3  -     dulaglutide (TRULICITY) 3 mg/0.5 mL pen injector; Inject 3 mg into the skin every 7 days.  Dispense: 4 pen; Refill: 11    Mixed hyperlipidemia  -     rosuvastatin (CRESTOR) 40 MG Tab; Take 1 tablet (40 mg total) by mouth every evening. For cholesterol  Dispense: 90 tablet; Refill: 3    Malignant neoplasm of right female breast, unspecified estrogen receptor status, unspecified site of breast  Per oncology     Chemotherapy-induced neutropenia    Bone metastases    Chemotherapy-induced nausea    Chronic obstructive pulmonary disease, unspecified COPD type    Follow up in about 6 months (around 4/25/2023) for Diabetic Check-Up.

## 2022-10-27 NOTE — TELEPHONE ENCOUNTER
Notified by Maricel from Infusion center - patient's calcium 8.4 even after calcium replacement. Reviewed with Dr. Vick and per Dr. Vick's verbal order, hold zometa infusion scheduled for 10/28/2023 and reschedule for one month out. Patient made aware of above and verbalized understanding.

## 2022-11-05 NOTE — PROGRESS NOTES
"  [facility] In Office Hematology Oncology Subsequent  Encounter Note    11/3/22      Subjective:      Patient ID:   Negrita Castro  62 y.o.   Martínez Shepard R. Leblanc, [place], [place], [place]      Chief Complaint:   New L breast cancer eval.    HPI:  62 y.o. female with diagnosis of Stage 1 R breast cancer 10/26/15.  "Triple negative".  Adjuvant AC x's 4, tx's 12 and Rad Rx through 9/12/16.      Had bilateral reconstruction surgery 4/14/17.  Further reconstruction, tummy tuck 8/14/17.  Additional fat injection at R chest done for symmetry.  She had placement of R nipple.  Dr. Hernandez.      She had additional reconstructive surgery at breast and abdomenal areas.   The date of the surgery was May 19th.      Recent Mamm/U/S showed small mass at 4:00 at L breast.  Needle Bx invasive ductal Ca, ERP+, PRP+, H2N neg  She had  L partial mastectomy, Sentinal node Bx 8/24/20. Followed by another surgery because of L axillary infection.  Primary 2 cm, LN neg, Stage 1 dx.    Discussed Oncotype Dx testing, this supported adding adjuvant chemotherapy to adjuvant hormonal therapy because of increased risk of cancer recurrence long-term.  Discussed BRCA testing, given her bilateral  breast Dx. PHN would not authorize BRCA 1 & 2.  Refill meds for her.    She had COVID-19 infection and was hospitalized.  She came very close to requiring intubation and mechanical ventilation.  She is off oxygen now,   and sees Dr. Justin of Pulmonary.  Recent CT scan of the chest showed residual changes of interstitial disease, consistent with recovering from COVID-19.  Also lytic lesions were seen in the thoracic spine area very suspicious for metastatic disease at T5 through T8 spine.    Recently on Saturday night she had a hard coughing spell, and thereafter developed severe right posterior pleuritic chest pain.  She has point tenderness over the right posterior chest wall and may very well have a fractured rib at the site.  Rib x-rays " have been ordered.  I have refilled her Soma for 1 month.  I have refilled her Norco  for 1 month.  I have added oxycodone 5 mg 1 or 2 p.o. Q 3-4 hours p.r.n. breakthrough pain for 1 month to her regimen.    She also has an enlarging nodule at the left chest wall.  Ultrasound of the site suggested that this was a cyst however the areas firm movable and may very well be cancer.  I asked  for a ultrasound-guided biopsy of the mass per radiology.  Path report showed invasive ductal Ca, ERP + PRP neg,   H2N equivocal.    She is postmenopausal, her last menses were 4 5 years ago.  Rib x-rays have been requested.  Will order a CT scan to evaluate for possible metastatic disease.  Will order a ultrasound-guided biopsy of the left chest wall mass for pathologic examination at [facility].  Bx + invasive breast cancer.  ERP+, PRP-, H2N-.    Suspected local recurrence at L breast.  She had L lumpectomy.  Margins clear.    Refer to Rad Rx MD for adjuvant Rad Rx., for local control.  She has T spine metastases.  Currently on hormonal Rx.  To see Dr. Manuelito Shepard for DM, BS control.    Dr. Washington saw her for C spine eval, no surgery planned for now.  He referred her to Dr. Rivers for injection Rx.    DM, cholesterol, epilepsy hx, DJD, LBP.  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    LR shoulder arthroscopy, R wrist surgery, M0.    Smoke 1/4 ppd x's 35 years, quit 2015.  ETOH no.  Disability-depression, epilepsy  Allergy none    Mom ovarian cancer  Dad lung cancer  Sisters DM, lung dx.    Summary of care:  Right breast cancer 2015, triple negative, treated with Adriamycin Cytoxan followed by taxane.    Left breast cancer ERP positive PRP positive HER2 Judy negative in 2020    COVID infection 2020.  She has not taken the covid 19 vaccination because of multiple allergy hx.    2021 bone metastases determined.    He he 2021 local left breast cancer recurrence, ERP  "positive PRP negative HER2 Judy negative.    He April 21st bone biopsy positive for breast cancer metastatic.  ERP, PRP, HER2 Judy pending.    She was on radiation therapy through May 17, 2021.  Refilled Marinol at increased 5 mg 1 p.o. b.i.d. number 60.   I refilled her Soma, Norco, Marinol, and Silvadene cream; dated the prescriptions for June 17, 2021.    C/O mid thoracic pain x's 6-8 weeks,radiates to L & R, increased.  Tender over Mid thoracic area.  Hx of T spine metastatic dx.  Pain controlled on Norco 10/325 mg and oxycodone 5 mg 1-2 po PRN.  Add lodine 400 mg 1 daily    Checked MRI of T spine.  Metastatic dx with pathologic fx at T3,5,6.  Refer to Dr. Flowers of neurosurgery for Vertebroplasty or Kyphoplasty evaluation.    She is on Faslodex. Ibrance. Monitor WBC count.    She saw Dr. Flowers, and has been fitted with a brace initially, vertebroplasty after the first of the year.  [phone].  RTC Dr. Flowers 12/27/21.    No neuro surgery is planned for T-spine disease because of progressive growth and compression of the spine.  She admits to having decreased strength in the right hand.  She completed 10 of 10 radiation treatments on May 2, 2022. She is tapering down her Decadron 4 mg p.o. b.i.d. and she is taking Lodine 400 mg daily.  We are stopping fast low dex and Ibrance as of April 12, 2022.    Will ask the  to see her if we can get Meals on wheels for her.    She is due for an MRI on May 17, and a PET scan on May 19, I will see her on May 20th.  Plan to go with chemotherapy now, probably will go with carboplatin Gemzar.   She is 140 lb and 5 ft 11.     Today is D1C1 carbo, gemzar.  Duragesic patch 25 mch q 3 days x's 3 patches, start Tuesday.  Claritin 10 mg 1 po daily x's 4 days, start Wednesday.  For neulasta or Udenyca Thursday.    Resume Xgeva with D9 C1.  Check lab 7/14 weekly [facility] lab.    Working on getting a electric wheelchair.  On PT with "Hector".  Stronger.  Today chemo given.  Mass " on back is giving her pain, refer to Cory Vick MD for removal.  D1C6 9/1/22    Refill Soma, Oxycodone, Norco, Ambien on 9/1/22.  Due for refill 9/30/22.    She is doing better, with increased energy, 8/10.  Weight 154#.  Pain is better 8/10.  Improved R arm function.  ROS:   GEN: normal without any fever, night sweats or weight loss  HEENT: normal with no HA's, sore throat, stiff neck, changes in vision  CV: normal with no CP, SOB, PND, POOL or orthopnea  PULM: normal with no SOB, cough, hemoptysis, sputum or pleuritic pain  GI: normal with no abdominal pain, nausea, vomiting, constipation, diarrhea, melanotic stools, BRBPR, or hematemesis  : normal with no hematuria, dysuria  BREAST: See HPI.  SKIN: normal with no rash, erythema, bruising, or swelling     Past Medical History:   Diagnosis Date    Acute hypoxemic respiratory failure 12/12/2020    Breast cancer     left breast    Cancer     RIGHT BREAST 10-15    Depression     Diabetes mellitus     Neuropathy     Panic attacks     S/P epidural steroid injection     Seizures     none since early 30's    Wears glasses     TO DRIVE     Past Surgical History:   Procedure Laterality Date    AXILLARY NODE DISSECTION Left 8/24/2020    Procedure: LYMPHADENECTOMY, AXILLARY;  Surgeon: Cory Vick MD;  Location: [facility] OR;  Service: General;  Laterality: Left;  left breast mastectomy with possible axillary lymph node dissection    BREAST BIOPSY      right    BREAST LUMPECTOMY      BREAST RECONSTRUCTION      breast reconstruction with tummy [facility]      BREAST SURGERY      11-3-15 LUMPECTOMY, 12-2-15 REEXCISION    INCISION AND DRAINAGE OF ABSCESS Left 9/9/2020    Procedure: INCISION AND DRAINAGE, ABSCESS;  Surgeon: Cory Vick MD;  Location: [facility] OR;  Service: General;  Laterality: Left;    INSERTION OF LOCALIZATION WIRE Left 8/24/2020    Procedure: INSERTION, LOCALIZATION WIRE;  Surgeon: Cory Vick MD;  Location: [facility] OR;  Service: General;   Laterality: Left;  left breast lumpectomy with wire needle loc    MASTECTOMY      mastectomy 2016      MASTECTOMY, PARTIAL Left 3/19/2021    Procedure: MASTECTOMY, PARTIAL;  Surgeon: Cory Vick MD;  Location: [facility] OR;  Service: General;  Laterality: Left;  lumpectomy  left breast     RECONSTRUCTION OF NIPPLE Right 11/5/2018    Procedure: RECONSTRUCTION-NIPPLE RIGHT;  Surgeon: Xiang Hernandez MD;  [facility];  Service: Plastics;  Laterality: Right;    SENTINEL LYMPH NODE BIOPSY Left 8/24/2020    Procedure: BIOPSY, LYMPH NODE, SENTINEL;  Surgeon: Cory Vick MD;  Location: [facility] OR;  Service: General;  Laterality: Left;  left breast lumpectomy with left sentinel lymoh node       shoulder surgery and wrist      BILAT  BONE SPUR    WRIST SURGERY      RIGHT       Review of patient's allergies indicates:   Allergen Reactions    Adhesive tape-silicones Hives     BANDAIDS    Polymycin Hives    Latex, natural rubber Itching    Polyurethane-39            Current Outpatient Medications:     anastrozole (ARIMIDEX) 1 mg Tab, Take 1 mg by mouth once daily., Disp: , Rfl:     blood sugar diagnostic Strp, TEST GLUCOSE BID, Disp: 300 each, Rfl: 3    blood-glucose meter (TRUE METRIX GLUCOSE METER) kit, TEST GLUCOSE BID, Disp: 1 each, Rfl: 0    blood-glucose meter,continuous (DEXCOM G6 ) Misc, TEST GLUCOSE QID, Disp: 1 each, Rfl: 1    blood-glucose sensor (DEXCOM G6 SENSOR) Edwige, TEST GLUCOSE QID, Disp: 13 each, Rfl: 3    blood-glucose transmitter (DEXCOM G6 TRANSMITTER) Edwige, TEST GLUCOSE QID, Disp: 1 each, Rfl: 1    carisoprodoL (SOMA) 350 MG tablet, Take 1 tablet (350 mg total) by mouth 3 (three) times daily as needed for Muscle spasms., Disp: 90 tablet, Rfl: 0    dexAMETHasone (DECADRON) 4 MG Tab, TAKE ONE TABLET (4MG TOTAL) BY MOUTH TWICE DAILY WITH MEALS, Disp: 60 tablet, Rfl: 1    dextromethorphan-guaiFENesin  mg/5 ml (ROBITUSSIN-DM)  mg/5 mL liquid, Take 1 teaspoon q 6  hours prn cough., Disp: 300 mL, Rfl: 0    diazePAM (VALIUM) 10 MG Tab, Take 1 tablet (10 mg total) by mouth 4 (four) times daily as needed (anxiety)., Disp: 120 tablet, Rfl: 2    docusate sodium (COLACE) 100 MG capsule, Take 100 mg by mouth 2 (two) times daily., Disp: , Rfl:     dronabinoL (MARINOL) 5 MG capsule, Take 1 capsule (5 mg total) by mouth 2 (two) times daily before meals., Disp: 60 capsule, Rfl: 0    dulaglutide (TRULICITY) 3 mg/0.5 mL pen injector, Inject 3 mg into the skin every 7 days., Disp: 4 pen, Rfl: 11    enalapril (VASOTEC) 5 MG tablet, Take 1 tablet (5 mg total) by mouth once daily., Disp: 90 tablet, Rfl: 3    etodolac (LODINE) 400 MG tablet, TAKE ONE TABLET (400 MG TOTAL) BY MOUTH ONCE DAILY, Disp: 30 tablet, Rfl: 2    fluconazole (DIFLUCAN) 150 MG Tab, TAKE ONE TABLET BY MOUTH ONCE FOR 1 DOSE, Disp: 1 tablet, Rfl: 2    furosemide (LASIX) 20 MG tablet, TAKE ONE TABLET BY MOUTH EVERY DAY, Disp: 30 tablet, Rfl: 6    HYDROcodone-acetaminophen (NORCO)  mg per tablet, Take 1 tablet by mouth every 6 (six) hours as needed for Pain., Disp: 120 tablet, Rfl: 0    insulin glargine (LANTUS U-100 INSULIN) 100 unit/mL injection, Inject 32 Units into the skin 2 (two) times a day., Disp: 18 mL, Rfl: 11    lancets 32 gauge Misc, TEST GLUCOSE BID, Disp: 300 each, Rfl: 3    metFORMIN (GLUCOPHAGE-XR) 500 MG ER 24hr tablet, Take 2 tablets (1,000 mg total) by mouth 2 (two) times daily with meals., Disp: 360 tablet, Rfl: 3    metoprolol tartrate (LOPRESSOR) 25 MG tablet, Take 1 tablet (25 mg total) by mouth 2 (two) times daily., Disp: 60 tablet, Rfl: 11    mometasone 0.1% (ELOCON) 0.1 % cream, Apply to affected area daily, Disp: 45 g, Rfl: 1    naloxegoL (MOVANTIK) 12.5 mg Tab, Take 12.5 mg by mouth once daily., Disp: 30 tablet, Rfl: 3    ondansetron (ZOFRAN-ODT) 8 MG TbDL, DISSOLVE 1 TABLET (8MG TOTAL) on the tongue EVERY 6 TO 8 HOURS AS NEEDED (NAUSEA) Strength: 8 mg, Disp: 30 tablet, Rfl: 1    oxyCODONE  "(ROXICODONE) 15 MG Tab, Take 1 tablet (15 mg total) by mouth every 4 to 6 hours as needed for Pain., Disp: 120 tablet, Rfl: 0    palbociclib 100 mg Tab, Take 100 mg by mouth once daily. for 21 days, then take 7 days off. Total cycle length 28 days, Disp: 21 tablet, Rfl: 6    pen needle, diabetic (BD ULTRA-FINE MARCE PEN NEEDLE) 32 gauge x 5/32" Ndle, Test blood sugar twice daily, Disp: 100 each, Rfl: 5    promethazine (PHENERGAN) 25 MG tablet, Take 1 tablet (25 mg total) by mouth every 4 to 6 hours as needed., Disp: 30 tablet, Rfl: 5    promethazine-codeine 6.25-10 mg/5 ml (PHENERGAN WITH CODEINE) 6.25-10 mg/5 mL syrup, TAKE 5 ML BY MOUTH EVERY 6 HOURS AS NEEDED FOR COUGH, Disp: 450 mL, Rfl: 0    psyllium husk, with sugar, (METAMUCIL, WITH SUGAR,) 3.4 gram packet, Take 1 packet by mouth 2 (two) times daily., Disp: 30 packet, Rfl: 2    rosuvastatin (CRESTOR) 40 MG Tab, Take 1 tablet (40 mg total) by mouth every evening. For cholesterol, Disp: 90 tablet, Rfl: 3    triamcinolone acetonide 0.1% (KENALOG) 0.1 % cream, Apply topically 2 (two) times daily., Disp: 80 g, Rfl: 1    zolpidem (AMBIEN) 10 mg Tab, TAKE ONE TABLET BY MOUTH EVERY NIGHT AT BEDTIME, Disp: 30 tablet, Rfl: 3          Objective:   Vitals:  Blood pressure 115/77, pulse 79, temperature 98 °F (36.7 °C), resp. rate 18, height 5' 10" (1.778 m), weight 70 kg (154 lb 4.8 oz), last menstrual period 01/16/2016, SpO2 100 %.    Physical Examination:   GEN: no apparent distress, comfortable, Brace in place.  HEAD: atraumatic and normocephalic  EYES: no pallor, no icterus  ENT: no pharyngeal erythema  NECK: noLAD/LN's, supple  CV:  No edema  CHEST: Normal respiratory effort   she is not  tender over the right posterior chest wall on exam.  The chest wall is not swollen.  ABDOM:  nondistended; soft                                                                                          MUSC/Skeletal: ROM normal, Tender over mid T spine area.  EXTREM: no " swelling  SKIN: She is developing keloid changes at L breast incision and navel surgical sites.  This area appears to be enlarging.  NEURO: grossly intact  PSYCH: normal mood, affect and behavior  LYMPH: normal  LN's  BREASTS: L breast is much improved.    Refill Oxycodone, SOMA, Norco.    Tumor markers are improved.    Assessment:   (1) 62 y.o. female with diagnosis of Stage 1 triple negative R breast cancer, 10/2016.       S/P R mastectomy, SNBx, AC x's 4, T x's12 weeks, Rad Rx and recent reconstruction.    (2) New L invasive ductal Ca, ERP+, PRP+, H2N neg.  Clinical stage 1 dx.    I have started her on Arimidex 1 mg p.o. daily  for metastatic disease at this time.    Suspected fracture of right ribs after recent cough event, check rib x-rays, medicate for pain.    ERP+ dx, bone metastases.  Started on Arimidex daily.  Add 2nd hormonal drug Rx after Rad Rx completed.    Bone biopsy positive for metastatic breast cancer.  ERP+, PRP+, H2N-.    Now on Genzar, Carboplatin.  Improved.  RTC to see me 1 month.  She has picked up her pain meds for this month.

## 2022-11-08 NOTE — PLAN OF CARE
Problem: Fatigue  Goal: Improved Activity Tolerance  Outcome: Ongoing, Progressing  Intervention: Promote Improved Energy  Flowsheets (Taken 11/8/2022 0227)  Fatigue Management:   frequent rest breaks encouraged   fatigue-related activity identified   paced activity encouraged  Sleep/Rest Enhancement: relaxation techniques promoted  Activity Management: Ambulated -L4

## 2022-11-09 NOTE — TELEPHONE ENCOUNTER
----- Message from Rosa Grayson sent at 11/7/2022  1:22 PM CST -----  Please schedule patient for consult.  Thank you Ale

## 2022-11-09 NOTE — TELEPHONE ENCOUNTER
----- Message from Blank Lee sent at 11/9/2022  3:41 PM CST -----  Contact: Self  Type:  Patient Returning Call    Who Called:  Patient  Who Left Message for Patient:  Nella  Does the patient know what this is regarding?:  scheduling to have port placed  Best Call Back Number: [phone]  Additional Information:  Pt stated if she does not answer the first time, please try more than once to reach her. Thank You

## 2022-11-09 NOTE — TELEPHONE ENCOUNTER
----- Message from Blank Lee sent at 11/9/2022  3:41 PM CST -----  Contact: Self  Type:  Patient Returning Call    Who Called:  Patient  Who Left Message for Patient:  Nella  Does the patient know what this is regarding?:  scheduling to have port placed  Best Call Back Number: [phone]  Additional Information:  Pt stated if she does not answer the first time, please try more than once to reach her. Thank You

## 2022-11-10 NOTE — TELEPHONE ENCOUNTER
Patient called in with report of toothache, possible infection. Denies sour taste, swelling. Reports pain when biting but not with hot or cold. Patient also reports her dentist is refusing to help as she is on chemotherapy. Made Dr. Vick aware. He reports that he will review and call in an antibiotic for patient. Patient made aware and verbalized understanding.

## 2022-11-11 NOTE — H&P
GENERAL SURGERY  OUTPATIENT H&P    REASON FOR VISIT/CC: Port placement     HPI: Negrita Castro is a 62 y.o. female with history of bilateral breast cancer now with recurrence and metastasis who was referred for placement of a port. Patient has previously had bilateral breast cancer status post resection with recurrence.  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  Planning to continue palliative chemotherapy after completing radiation. Has poor venous access and right arm can not be used due to previous axillary lymph node dissection. Patient has previously had a left subclavian port which was removed after treatment. She currently utilizes a wheelchair for the majority of her ambulation due to severe spinal involvement for metastatic disease. She is able to lay flat. Has bilateral upper extremity weakness as well.    I have reviewed the patient's chart including prior progress notes, procedures and testing.     ROS:   Review of Systems   All other systems reviewed and are negative.    PROBLEM LIST:  Patient Active Problem List   Diagnosis    Depression    Anxiety disorder due to known physiological condition    Dysthymic disorder    Neuropathy    Atypical chest pain    Breast cancer, right    Breast cancer    Nipple anomaly    Type 2 diabetes mellitus with hyperglycemia, with long-term current use of insulin    Mixed hyperlipidemia    Chemotherapy-induced nausea    Keloid of skin    Breast cancer, left breast    Breast cancer, right breast    Chemotherapy-induced neutropenia    Pneumonia due to COVID-19 virus    Chronic hypoxemic respiratory failure    Pleuritic chest pain    Tachycardia    Chronic obstructive pulmonary disease    Essential hypertension    Bone metastases         HISTORY  Past Medical History:   Diagnosis Date    Acute hypoxemic respiratory failure 12/12/2020    Breast cancer     left breast    Cancer     RIGHT BREAST 10-15    Depression     Diabetes mellitus     Neuropathy     Panic  attacks     S/P epidural steroid injection     Seizures     none since early 30's    Wears glasses     TO DRIVE       Past Surgical History:   Procedure Laterality Date    AXILLARY NODE DISSECTION Left 8/24/2020    Procedure: LYMPHADENECTOMY, AXILLARY;  Surgeon: Cory Vick MD;  Location: [facility];  Service: General;  Laterality: Left;  left breast mastectomy with possible axillary lymph node dissection    BREAST BIOPSY      right    BREAST LUMPECTOMY      BREAST RECONSTRUCTION      breast reconstruction with tummy [facility]      BREAST SURGERY      11-3-15 LUMPECTOMY, 12-2-15 REEXCISION    INCISION AND DRAINAGE OF ABSCESS Left 9/9/2020    Procedure: INCISION AND DRAINAGE, ABSCESS;  Surgeon: Cory Vick MD;  Location: [facility] OR;  Service: General;  Laterality: Left;    INSERTION OF LOCALIZATION WIRE Left 8/24/2020    Procedure: INSERTION, LOCALIZATION WIRE;  Surgeon: Cory Vick MD;  Location: [facility];  Service: General;  Laterality: Left;  left breast lumpectomy with wire needle loc    MASTECTOMY      mastectomy 2016      MASTECTOMY, PARTIAL Left 3/19/2021    Procedure: MASTECTOMY, PARTIAL;  Surgeon: Cory Vick MD;  Location: [facility];  Service: General;  Laterality: Left;  lumpectomy  left breast     RECONSTRUCTION OF NIPPLE Right 11/5/2018    Procedure: RECONSTRUCTION-NIPPLE RIGHT;  Surgeon: Xiang Hernandez MD;  Location: [address];  Service: Plastics;  Laterality: Right;    SENTINEL LYMPH NODE BIOPSY Left 8/24/2020    Procedure: BIOPSY, LYMPH NODE, SENTINEL;  Surgeon: Cory Vick MD;  Location: [facility];  Service: General;  Laterality: Left;  left breast lumpectomy with left sentinel lymoh node       shoulder surgery and wrist      BILAT  BONE SPUR    WRIST SURGERY      RIGHT       Social History     Tobacco Use    Smoking status: Former     Packs/day: 0.25     Years: 30.00     Pack years: 7.50     Types: Cigarettes     Quit date: 9/28/2015     Years since quitting:  7.1    Smokeless tobacco: Never   Substance Use Topics    Alcohol use: Yes     Alcohol/week: 0.0 standard drinks     Comment: RARELY    Drug use: Not Currently     Types: Marijuana     Comment: medical marijuana       Family History   Problem Relation Age of Onset    Anesthesia problems Neg Hx          MEDS:  Current Outpatient Medications on File Prior to Visit   Medication Sig Dispense Refill    anastrozole (ARIMIDEX) 1 mg Tab Take 1 mg by mouth once daily.      blood sugar diagnostic Strp TEST GLUCOSE  each 3    blood-glucose meter (TRUE METRIX GLUCOSE METER) kit TEST GLUCOSE BID 1 each 0    blood-glucose meter,continuous (DEXCOM G6 ) Misc TEST GLUCOSE QID 1 each 1    blood-glucose sensor (DEXCOM G6 SENSOR) Edwige TEST GLUCOSE QID 13 each 3    blood-glucose transmitter (DEXCOM G6 TRANSMITTER) Edwige TEST GLUCOSE QID 1 each 1    carisoprodoL (SOMA) 350 MG tablet Take 1 tablet (350 mg total) by mouth 3 (three) times daily as needed for Muscle spasms. 90 tablet 0    dexAMETHasone (DECADRON) 4 MG Tab TAKE ONE TABLET (4MG TOTAL) BY MOUTH TWICE DAILY WITH MEALS 60 tablet 1    dextromethorphan-guaiFENesin  mg/5 ml (ROBITUSSIN-DM)  mg/5 mL liquid Take 1 teaspoon q 6 hours prn cough. 300 mL 0    diazePAM (VALIUM) 10 MG Tab Take 1 tablet (10 mg total) by mouth 4 (four) times daily as needed (anxiety). 120 tablet 2    docusate sodium (COLACE) 100 MG capsule Take 100 mg by mouth 2 (two) times daily.      dronabinoL (MARINOL) 5 MG capsule Take 1 capsule (5 mg total) by mouth 2 (two) times daily before meals. 60 capsule 0    dulaglutide (TRULICITY) 3 mg/0.5 mL pen injector Inject 3 mg into the skin every 7 days. 4 pen 11    enalapril (VASOTEC) 5 MG tablet Take 1 tablet (5 mg total) by mouth once daily. 90 tablet 3    etodolac (LODINE) 400 MG tablet TAKE ONE TABLET (400 MG TOTAL) BY MOUTH ONCE DAILY 30 tablet 2    fluconazole (DIFLUCAN) 150 MG Tab TAKE ONE TABLET BY MOUTH ONCE FOR 1 DOSE 1 tablet 2     "furosemide (LASIX) 20 MG tablet TAKE ONE TABLET BY MOUTH EVERY DAY 30 tablet 6    HYDROcodone-acetaminophen (NORCO)  mg per tablet Take 1 tablet by mouth every 6 (six) hours as needed for Pain. 120 tablet 0    lancets 32 gauge Misc TEST GLUCOSE  each 3    metFORMIN (GLUCOPHAGE-XR) 500 MG ER 24hr tablet Take 2 tablets (1,000 mg total) by mouth 2 (two) times daily with meals. 360 tablet 3    metoprolol tartrate (LOPRESSOR) 25 MG tablet Take 1 tablet (25 mg total) by mouth 2 (two) times daily. 60 tablet 11    mometasone 0.1% (ELOCON) 0.1 % cream Apply to affected area daily 45 g 1    naloxegoL (MOVANTIK) 12.5 mg Tab Take 12.5 mg by mouth once daily. 30 tablet 3    ondansetron (ZOFRAN-ODT) 8 MG TbDL DISSOLVE 1 TABLET (8MG TOTAL) on the tongue EVERY 6 TO 8 HOURS AS NEEDED (NAUSEA)  Strength: 8 mg 30 tablet 1    oxyCODONE (ROXICODONE) 15 MG Tab Take 1 tablet (15 mg total) by mouth every 4 to 6 hours as needed for Pain. 120 tablet 0    palbociclib 100 mg Tab Take 100 mg by mouth once daily. for 21 days, then take 7 days off. Total cycle length 28 days 21 tablet 6    pen needle, diabetic (BD ULTRA-FINE MARCE PEN NEEDLE) 32 gauge x 5/32" Ndle Test blood sugar twice daily 100 each 5    promethazine (PHENERGAN) 25 MG tablet Take 1 tablet (25 mg total) by mouth every 4 to 6 hours as needed. 30 tablet 5    promethazine-codeine 6.25-10 mg/5 ml (PHENERGAN WITH CODEINE) 6.25-10 mg/5 mL syrup TAKE 5 ML BY MOUTH EVERY 6 HOURS AS NEEDED FOR COUGH 450 mL 0    psyllium husk, with sugar, (METAMUCIL, WITH SUGAR,) 3.4 gram packet Take 1 packet by mouth 2 (two) times daily. 30 packet 2    rosuvastatin (CRESTOR) 40 MG Tab Take 1 tablet (40 mg total) by mouth every evening. For cholesterol 90 tablet 3    triamcinolone acetonide 0.1% (KENALOG) 0.1 % cream Apply topically 2 (two) times daily. 80 g 1    zolpidem (AMBIEN) 10 mg Tab TAKE ONE TABLET BY MOUTH EVERY NIGHT AT BEDTIME 30 tablet 3    amoxicillin (AMOXIL) 500 MG capsule Take " 1 capsule (500 mg total) by mouth every 8 (eight) hours. for 10 days (Patient not taking: Reported on 11/11/2022) 30 capsule 0    insulin glargine (LANTUS U-100 INSULIN) 100 unit/mL injection Inject 32 Units into the skin 2 (two) times a day. 18 mL 11     No current facility-administered medications on file prior to visit.       ALLERGIES:  Review of patient's allergies indicates:   Allergen Reactions    Adhesive tape-silicones Hives     BANDAIDS    Polymycin Hives    Adhesive     Latex, natural rubber Hives and Itching    Neomycin-bacitracnzn-polymyxnb     Polyurethane-39 Hives         VITALS:  Vitals:    11/11/22 0850   BP: (!) 110/55   Pulse: 100   Temp: 98 °F (36.7 °C)         PHYSICAL EXAM:  Physical Exam  Vitals reviewed.   Constitutional:       General: She is not in acute distress.     Appearance: Normal appearance. She is well-developed.      Comments: In wheelchair   HENT:      Head: Normocephalic and atraumatic.      Nose: Nose normal.   Eyes:      General: No scleral icterus.     Conjunctiva/sclera: Conjunctivae normal.   Neck:      Trachea: No tracheal tenderness or tracheal deviation.   Cardiovascular:      Rate and Rhythm: Normal rate and regular rhythm.      Pulses: Normal pulses.   Pulmonary:      Effort: Pulmonary effort is normal. No accessory muscle usage or respiratory distress.      Breath sounds: Normal breath sounds.   Chest:      Comments: Well-healed left-sided previous port incision  Abdominal:      General: There is no distension.      Palpations: Abdomen is soft.      Tenderness: There is no abdominal tenderness.   Musculoskeletal:         General: No swelling or tenderness. Normal range of motion.      Cervical back: Normal range of motion and neck supple. No rigidity.   Skin:     General: Skin is warm and dry.      Coloration: Skin is not jaundiced.      Findings: No erythema.   Neurological:      General: No focal deficit present.      Mental Status: She is alert and oriented to  person, place, and time.      Motor: No weakness or abnormal muscle tone.   Psychiatric:         Mood and Affect: Mood normal.         Behavior: Behavior normal.         Thought Content: Thought content normal.         Judgment: Judgment normal.         LABS:  Lab Results   Component Value Date    WBC 2.57 (L) 11/08/2022    RBC 3.21 (L) 11/08/2022    HGB 10.7 (L) 11/08/2022    HCT 33.6 (L) 11/08/2022     11/08/2022     Lab Results   Component Value Date     (H) 11/08/2022     11/08/2022    K 4.2 11/08/2022     11/08/2022    CO2 23 11/08/2022    BUN 16 11/08/2022    CREATININE 0.9 11/08/2022    CALCIUM 8.5 (L) 11/08/2022     Lab Results   Component Value Date    ALT 85 (H) 11/08/2022    AST 73 (H) 11/08/2022    ALKPHOS 311 (H) 11/08/2022    BILITOT 0.5 11/08/2022     Lab Results   Component Value Date    MG 1.4 (L) 06/01/2022    PHOS 3.6 12/16/2020           ASSESSMENT & PLAN:  62 y.o. female with recurrent metastatic breast cancer   - we discussed the indication for port placement to ensure secure and easy access to the venous system for chemotherapy infusion   - the procedure for placement as well as associated risk of pain, bleeding, scarring, infection, need to remove port, port malfunction, port malpositioning, injury to artery, injury to lung, arrhythmia, malfunction, hematoma, thrombus, etc.  were reviewed, patient expressed understanding these risks and agreed proceed with surgical intervention  -right versus left IJ planned  -will obtain new EKG but otherwise labs were today

## 2022-11-11 NOTE — H&P (VIEW-ONLY)
GENERAL SURGERY  OUTPATIENT H&P    REASON FOR VISIT/CC: Port placement     HPI: Negrita Castro is a 62 y.o. female with history of bilateral breast cancer now with recurrence and metastasis who was referred for placement of a port. Patient has previously had bilateral breast cancer status post resection with recurrence.  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  Planning to continue palliative chemotherapy after completing radiation. Has poor venous access and right arm can not be used due to previous axillary lymph node dissection. Patient has previously had a left subclavian port which was removed after treatment. She currently utilizes a wheelchair for the majority of her ambulation due to severe spinal involvement for metastatic disease. She is able to lay flat. Has bilateral upper extremity weakness as well.    I have reviewed the patient's chart including prior progress notes, procedures and testing.     ROS:   Review of Systems   All other systems reviewed and are negative.    PROBLEM LIST:  Patient Active Problem List   Diagnosis    Depression    Anxiety disorder due to known physiological condition    Dysthymic disorder    Neuropathy    Atypical chest pain    Breast cancer, right    Breast cancer    Nipple anomaly    Type 2 diabetes mellitus with hyperglycemia, with long-term current use of insulin    Mixed hyperlipidemia    Chemotherapy-induced nausea    Keloid of skin    Breast cancer, left breast    Breast cancer, right breast    Chemotherapy-induced neutropenia    Pneumonia due to COVID-19 virus    Chronic hypoxemic respiratory failure    Pleuritic chest pain    Tachycardia    Chronic obstructive pulmonary disease    Essential hypertension    Bone metastases         HISTORY  Past Medical History:   Diagnosis Date    Acute hypoxemic respiratory failure 12/12/2020    Breast cancer     left breast    Cancer     RIGHT BREAST 10-15    Depression     Diabetes mellitus     Neuropathy     Panic  attacks     S/P epidural steroid injection     Seizures     none since early 30's    Wears glasses     TO DRIVE       Past Surgical History:   Procedure Laterality Date    AXILLARY NODE DISSECTION Left 8/24/2020    Procedure: LYMPHADENECTOMY, AXILLARY;  Surgeon: Cory Vick MD;  Location: [facility];  Service: General;  Laterality: Left;  left breast mastectomy with possible axillary lymph node dissection    BREAST BIOPSY      right    BREAST LUMPECTOMY      BREAST RECONSTRUCTION      breast reconstruction with tummy [facility]      BREAST SURGERY      11-3-15 LUMPECTOMY, 12-2-15 REEXCISION    INCISION AND DRAINAGE OF ABSCESS Left 9/9/2020    Procedure: INCISION AND DRAINAGE, ABSCESS;  Surgeon: Cory Vick MD;  Location: [facility] OR;  Service: General;  Laterality: Left;    INSERTION OF LOCALIZATION WIRE Left 8/24/2020    Procedure: INSERTION, LOCALIZATION WIRE;  Surgeon: Cory Vick MD;  Location: [facility];  Service: General;  Laterality: Left;  left breast lumpectomy with wire needle loc    MASTECTOMY      mastectomy 2016      MASTECTOMY, PARTIAL Left 3/19/2021    Procedure: MASTECTOMY, PARTIAL;  Surgeon: Cory Vick MD;  Location: [facility];  Service: General;  Laterality: Left;  lumpectomy  left breast     RECONSTRUCTION OF NIPPLE Right 11/5/2018    Procedure: RECONSTRUCTION-NIPPLE RIGHT;  Surgeon: Xiang Hernandez MD;  Location: [address];  Service: Plastics;  Laterality: Right;    SENTINEL LYMPH NODE BIOPSY Left 8/24/2020    Procedure: BIOPSY, LYMPH NODE, SENTINEL;  Surgeon: Cory Vick MD;  Location: [facility];  Service: General;  Laterality: Left;  left breast lumpectomy with left sentinel lymoh node       shoulder surgery and wrist      BILAT  BONE SPUR    WRIST SURGERY      RIGHT       Social History     Tobacco Use    Smoking status: Former     Packs/day: 0.25     Years: 30.00     Pack years: 7.50     Types: Cigarettes     Quit date: 9/28/2015     Years since quitting:  7.1    Smokeless tobacco: Never   Substance Use Topics    Alcohol use: Yes     Alcohol/week: 0.0 standard drinks     Comment: RARELY    Drug use: Not Currently     Types: Marijuana     Comment: medical marijuana       Family History   Problem Relation Age of Onset    Anesthesia problems Neg Hx          MEDS:  Current Outpatient Medications on File Prior to Visit   Medication Sig Dispense Refill    anastrozole (ARIMIDEX) 1 mg Tab Take 1 mg by mouth once daily.      blood sugar diagnostic Strp TEST GLUCOSE  each 3    blood-glucose meter (TRUE METRIX GLUCOSE METER) kit TEST GLUCOSE BID 1 each 0    blood-glucose meter,continuous (DEXCOM G6 ) Misc TEST GLUCOSE QID 1 each 1    blood-glucose sensor (DEXCOM G6 SENSOR) Edwige TEST GLUCOSE QID 13 each 3    blood-glucose transmitter (DEXCOM G6 TRANSMITTER) Edwige TEST GLUCOSE QID 1 each 1    carisoprodoL (SOMA) 350 MG tablet Take 1 tablet (350 mg total) by mouth 3 (three) times daily as needed for Muscle spasms. 90 tablet 0    dexAMETHasone (DECADRON) 4 MG Tab TAKE ONE TABLET (4MG TOTAL) BY MOUTH TWICE DAILY WITH MEALS 60 tablet 1    dextromethorphan-guaiFENesin  mg/5 ml (ROBITUSSIN-DM)  mg/5 mL liquid Take 1 teaspoon q 6 hours prn cough. 300 mL 0    diazePAM (VALIUM) 10 MG Tab Take 1 tablet (10 mg total) by mouth 4 (four) times daily as needed (anxiety). 120 tablet 2    docusate sodium (COLACE) 100 MG capsule Take 100 mg by mouth 2 (two) times daily.      dronabinoL (MARINOL) 5 MG capsule Take 1 capsule (5 mg total) by mouth 2 (two) times daily before meals. 60 capsule 0    dulaglutide (TRULICITY) 3 mg/0.5 mL pen injector Inject 3 mg into the skin every 7 days. 4 pen 11    enalapril (VASOTEC) 5 MG tablet Take 1 tablet (5 mg total) by mouth once daily. 90 tablet 3    etodolac (LODINE) 400 MG tablet TAKE ONE TABLET (400 MG TOTAL) BY MOUTH ONCE DAILY 30 tablet 2    fluconazole (DIFLUCAN) 150 MG Tab TAKE ONE TABLET BY MOUTH ONCE FOR 1 DOSE 1 tablet 2     "furosemide (LASIX) 20 MG tablet TAKE ONE TABLET BY MOUTH EVERY DAY 30 tablet 6    HYDROcodone-acetaminophen (NORCO)  mg per tablet Take 1 tablet by mouth every 6 (six) hours as needed for Pain. 120 tablet 0    lancets 32 gauge Misc TEST GLUCOSE  each 3    metFORMIN (GLUCOPHAGE-XR) 500 MG ER 24hr tablet Take 2 tablets (1,000 mg total) by mouth 2 (two) times daily with meals. 360 tablet 3    metoprolol tartrate (LOPRESSOR) 25 MG tablet Take 1 tablet (25 mg total) by mouth 2 (two) times daily. 60 tablet 11    mometasone 0.1% (ELOCON) 0.1 % cream Apply to affected area daily 45 g 1    naloxegoL (MOVANTIK) 12.5 mg Tab Take 12.5 mg by mouth once daily. 30 tablet 3    ondansetron (ZOFRAN-ODT) 8 MG TbDL DISSOLVE 1 TABLET (8MG TOTAL) on the tongue EVERY 6 TO 8 HOURS AS NEEDED (NAUSEA)  Strength: 8 mg 30 tablet 1    oxyCODONE (ROXICODONE) 15 MG Tab Take 1 tablet (15 mg total) by mouth every 4 to 6 hours as needed for Pain. 120 tablet 0    palbociclib 100 mg Tab Take 100 mg by mouth once daily. for 21 days, then take 7 days off. Total cycle length 28 days 21 tablet 6    pen needle, diabetic (BD ULTRA-FINE MARCE PEN NEEDLE) 32 gauge x 5/32" Ndle Test blood sugar twice daily 100 each 5    promethazine (PHENERGAN) 25 MG tablet Take 1 tablet (25 mg total) by mouth every 4 to 6 hours as needed. 30 tablet 5    promethazine-codeine 6.25-10 mg/5 ml (PHENERGAN WITH CODEINE) 6.25-10 mg/5 mL syrup TAKE 5 ML BY MOUTH EVERY 6 HOURS AS NEEDED FOR COUGH 450 mL 0    psyllium husk, with sugar, (METAMUCIL, WITH SUGAR,) 3.4 gram packet Take 1 packet by mouth 2 (two) times daily. 30 packet 2    rosuvastatin (CRESTOR) 40 MG Tab Take 1 tablet (40 mg total) by mouth every evening. For cholesterol 90 tablet 3    triamcinolone acetonide 0.1% (KENALOG) 0.1 % cream Apply topically 2 (two) times daily. 80 g 1    zolpidem (AMBIEN) 10 mg Tab TAKE ONE TABLET BY MOUTH EVERY NIGHT AT BEDTIME 30 tablet 3    amoxicillin (AMOXIL) 500 MG capsule Take " 1 capsule (500 mg total) by mouth every 8 (eight) hours. for 10 days (Patient not taking: Reported on 11/11/2022) 30 capsule 0    insulin glargine (LANTUS U-100 INSULIN) 100 unit/mL injection Inject 32 Units into the skin 2 (two) times a day. 18 mL 11     No current facility-administered medications on file prior to visit.       ALLERGIES:  Review of patient's allergies indicates:   Allergen Reactions    Adhesive tape-silicones Hives     BANDAIDS    Polymycin Hives    Adhesive     Latex, natural rubber Hives and Itching    Neomycin-bacitracnzn-polymyxnb     Polyurethane-39 Hives         VITALS:  Vitals:    11/11/22 0850   BP: (!) 110/55   Pulse: 100   Temp: 98 °F (36.7 °C)         PHYSICAL EXAM:  Physical Exam  Vitals reviewed.   Constitutional:       General: She is not in acute distress.     Appearance: Normal appearance. She is well-developed.      Comments: In wheelchair   HENT:      Head: Normocephalic and atraumatic.      Nose: Nose normal.   Eyes:      General: No scleral icterus.     Conjunctiva/sclera: Conjunctivae normal.   Neck:      Trachea: No tracheal tenderness or tracheal deviation.   Cardiovascular:      Rate and Rhythm: Normal rate and regular rhythm.      Pulses: Normal pulses.   Pulmonary:      Effort: Pulmonary effort is normal. No accessory muscle usage or respiratory distress.      Breath sounds: Normal breath sounds.   Chest:      Comments: Well-healed left-sided previous port incision  Abdominal:      General: There is no distension.      Palpations: Abdomen is soft.      Tenderness: There is no abdominal tenderness.   Musculoskeletal:         General: No swelling or tenderness. Normal range of motion.      Cervical back: Normal range of motion and neck supple. No rigidity.   Skin:     General: Skin is warm and dry.      Coloration: Skin is not jaundiced.      Findings: No erythema.   Neurological:      General: No focal deficit present.      Mental Status: She is alert and oriented to  person, place, and time.      Motor: No weakness or abnormal muscle tone.   Psychiatric:         Mood and Affect: Mood normal.         Behavior: Behavior normal.         Thought Content: Thought content normal.         Judgment: Judgment normal.         LABS:  Lab Results   Component Value Date    WBC 2.57 (L) 11/08/2022    RBC 3.21 (L) 11/08/2022    HGB 10.7 (L) 11/08/2022    HCT 33.6 (L) 11/08/2022     11/08/2022     Lab Results   Component Value Date     (H) 11/08/2022     11/08/2022    K 4.2 11/08/2022     11/08/2022    CO2 23 11/08/2022    BUN 16 11/08/2022    CREATININE 0.9 11/08/2022    CALCIUM 8.5 (L) 11/08/2022     Lab Results   Component Value Date    ALT 85 (H) 11/08/2022    AST 73 (H) 11/08/2022    ALKPHOS 311 (H) 11/08/2022    BILITOT 0.5 11/08/2022     Lab Results   Component Value Date    MG 1.4 (L) 06/01/2022    PHOS 3.6 12/16/2020           ASSESSMENT & PLAN:  62 y.o. female with recurrent metastatic breast cancer   - we discussed the indication for port placement to ensure secure and easy access to the venous system for chemotherapy infusion   - the procedure for placement as well as associated risk of pain, bleeding, scarring, infection, need to remove port, port malfunction, port malpositioning, injury to artery, injury to lung, arrhythmia, malfunction, hematoma, thrombus, etc.  were reviewed, patient expressed understanding these risks and agreed proceed with surgical intervention  -right versus left IJ planned  -will obtain new EKG but otherwise labs were today

## 2022-11-11 NOTE — PATIENT INSTRUCTIONS
Your procedure has been scheduled at:    [facility]pre-admit nurse  [phone]  [facility].........................................................pre_admit  [phone]  [place]  [facility]  [address] [facility] [address]    If you have had heart surgery, atrial fibrillation and are on blood thinners from your cardiologist you will need cardiac clearance prior to surgery!!!      DAY   Thursday    DATE   November 17, 2022         Someone from the hospital will call you the evening before surgery to let you know what time you need to be at the hospital for surgery.                                               1:  DO NOT EAT OR DRINK ANYTHING AFTER MIDNIGHT THE NIGHT BEFORE SURGERY.     2:  You will need to stop any blood thinners 1 week prior to surgery.  This includes Aspirin, fish oil, Pradaxa, Coumadin, Plavix, Pletal.  Please contact the prescribing doctor to be sure it is ok to stop these medicines.    3:  Pre-admit nurse will go over your medicines and let you know which ones not to take the morning of surgery    4:  Plan to have someone drive you home after you are released from the hospital.  You WILL NOT be able to drive yourself.    5:  If you have any questions or need to change your surgery date, please call Primitivo Carmen or Brigida at [phone] or [phone]    6.  You may start taking a stool softener prior to and after surgery to help with constipation that may occur due to anesthesia and pain medication.    AFTER SURGERY:    You can shower 24-48 hours after surgery, incision can get wet but do not soak. You  may have bandages and steri strips or you may just have glue over the incision with no bandage.  The glue will peel away after a few days, steri strips you can remove after 1 week.   If you have not had a bowel movement within 3 days after surgery you may take a laxative of your choice.(Take stool softeners as noted above may help)  Do not lift anything  over 5-10 pounds.    You need to have a follow up appointment 7-14 days after surgery, call the office to schedule or if you have questions or concerns.

## 2022-11-16 NOTE — TELEPHONE ENCOUNTER
----- Message from Nell Carrillo sent at 11/16/2022 10:00 AM CST -----  The patient said the antibiotics Dr. Peraza gave her are working. She said she is having her port placed tomorrow. She said her prescriptions are going to be due on 11-26 and she would like to have them sent so that she doesn't get messed up with the holiday. She said her pharmacy is only open until 2pm on Saturday. She will need her oxycodone 15mg, Norco 10mg,and Soma 350mg . She said she is in need of a refill on her cough syrup now. # [phone]

## 2022-11-17 NOTE — DISCHARGE SUMMARY
[facility]  Discharge Note  Short Stay    Procedure(s) (LRB):  [id number] (Right)      OUTCOME: Patient tolerated treatment/procedure well without complication and is now ready for discharge.    DISPOSITION: Home or Self Care    FINAL DIAGNOSIS:  <principal problem not specified>    FOLLOWUP: In clinic    DISCHARGE INSTRUCTIONS:    Discharge Procedure Orders   Diet Adult Regular     Ice to affected area     Lifting restrictions   Order Comments: Please avoid lifting greater than 20 lb, straining, strenuous activity for one week.     Change dressing (specify)   Order Comments: Post-Operative Wound Care    A surgical glue has been placed over your incisions, please leave the glue in place and do not attempt to remove it.  It is ok to shower using mild soap and water over the incisions the day after your procedure. Pat dry your incisions. Do not soak in a bathtub or other body of water for 2 weeks or until cleared by your surgeon.      If you noticed redness, swelling, fever, increasing pain or significant drainage from your wound please call/message the office or the 24 hr nurse hotline after hours.     Notify your health care provider if you experience any of the following:  temperature >100.4     Notify your health care provider if you experience any of the following:  persistent nausea and vomiting or diarrhea     Notify your health care provider if you experience any of the following:  severe uncontrolled pain     Notify your health care provider if you experience any of the following:  redness, tenderness, or signs of infection (pain, swelling, redness, odor or green/yellow discharge around incision site)     Notify your health care provider if you experience any of the following:  worsening rash     Notify your health care provider if you experience any of the following:  increased confusion or weakness     Shower on day dressing removed (No bath)        TIME SPENT ON DISCHARGE: 10  minutes

## 2022-11-17 NOTE — OP NOTE
DATE OF PROCEDURE: 11/17/2022    PREOPERATIVE DIAGNOSIS: Metastatic breast cancer    POSTOPERATIVE DIAGNOSIS: Same     PROCEDURE: Right IJ Port-A-Cath Placement    SURGEON: Jeff Mckeon M.D.    ASSISTANT: None    ANESTHESIA: General    PREP: Chlorhexidine    ESTIMATED BLOOD LOSS: 10 cc    COMPLICATIONS: No complications however unable to place left-sided port due to occlusion at the IJ/subclavian junction    INDICATION: Access for chemotherapy    FINDINGS:  1.  Left IJ accessed using ultrasound with return of dark red nonpulsatile blood.  2.  Wire did not pass into the innominate vein despite manipulation under fluoroscopy  3. Attempted left subclavian access with return of dark red nonpulsatile blood but again wire did not pass into the innominate vein due to occlusion   4. Right IJ accessed under ultrasound guidance with return of dark red nonpulsatile blood  5. This time wire easily passed   6.  Catheter tip positioned at the atrial caval junction under fluoro  7.  Port aspirated and flushed with ease.    PROCEDURE IN DETAIL:  The patient was identified in the preoperative unit and taken back to the operating room and laid supine on the operating room table. IV antibiotics were administered prior to the start of anesthesia. General anesthesia was administered without complication and LMA was placed. The patient was then prepped and draped in the standard sterile fashion. The ultrasound was used to identify the left internal jugular vein.  Local anesthetic was injected and then a small skin nick was made.  The patient was placed into the Trendelenburg position and an 18g needle was used to access the vein under ultrasound guidance.We had return of dark red, non-pulsatile blood. I attempted to pass wire however met resistance.  Under fluoroscopy the wire was meeting occlusion at the SVC IVC junction.  Despite manipulation were unable to pass it. I elected to remove the wire and held pressure in the neck. I  then turned my attention to performing a left subclavian access. A needle was inserted under constant negative pressure just underneath the clavicle. I was able to get return of dark red nonpulsatile blood and attempted to pass wire.  Again this was met with resistance.  Fluoroscopy confirmed that the obstruction was preventing access from the subclavian site as well.  The needle and wire removed and pressure was held. At this point I turned my attention to the right side. An ultrasound was used to identify the right internal jugular vein which was widely patent.  Under negative pressure and ultrasound guidance an 18 gauge needle was inserted after making a skin nick.  We had return of dark red nonpulsatile blood.  This time the wire was advanced without issue under fluoroscopic guidance. We turned our attention to creation of the port pocket in the upper chest. Local anesthetic was injected into the upper chest in the location of the port pocket and along the path of the planned catheter tunnel. A 4cm incision was made sharpley and the subcutaneous tissue was dissected until an appropriate sized pocket was created to accommodate the port. Once we were satisfied with the pocket, the catheter was flushed and tunneled from the chest to the neck incision. Under fluoroscopic guidance the dilator and sheath were placed over the wire using Seldinger technique. The dilator and wire were removed and the catheter was introduced and fed into the sheath as the sheath was peeled away. Fluoro was used to confirm the position of the catheter in the atriocaval junction and confirm that the catheter was not kinked. The catheter was then cut to size and attached to the port which was then placed in the chest wall pocket. The port was then access and easily aspirated blood. It was then flushed with heparinized saline. Hemostasis was assured and the dermis of the port pocket was re-approximated with 3-0 vicryl suture in an interrupted  fashion. The skin was re-approximated using 4-0 monocryl suture in a running fashion. Dermabond was then applied. The patient was awakened from anesthesia without complication and returned to the postoperative recovery unit in stable condition. At the end of the case, sponge, instrument, and needle counts were correct and hemostasis was achieved. I was present and scrubbed throughout the entirety of the case. A post-operative CXR will be obtained to confirm appropriate catheter location and to rule out pneumothorax.      All images were personally interpreted by me and stored.    COMPLICATIONS: None    CONDITION:  Stable    DISPO: To PACU then home after CXR    Jeff Mckeon Jr

## 2022-11-17 NOTE — TELEPHONE ENCOUNTER
----- Message from Nlel Carrillo sent at 11/17/2022  1:26 PM CST -----  The patient called again to request a refill on her cough syrup, promethazine codeine syrup 450. Please send to [facility] for the patient. Please call her when done.

## 2022-11-17 NOTE — ANESTHESIA PREPROCEDURE EVALUATION
11/17/2022  Negrita Castro is a 62 y.o., female.      Patient Active Problem List   Diagnosis    Depression    Anxiety disorder due to known physiological condition    Dysthymic disorder    Neuropathy    Atypical chest pain    Breast cancer, right    Breast cancer    Nipple anomaly    Type 2 diabetes mellitus with hyperglycemia, with long-term current use of insulin    Mixed hyperlipidemia    Chemotherapy-induced nausea    Keloid of skin    Breast cancer, left breast    Breast cancer, right breast    Chemotherapy-induced neutropenia    Pneumonia due to COVID-19 virus    Chronic hypoxemic respiratory failure    Pleuritic chest pain    Tachycardia    Chronic obstructive pulmonary disease    Essential hypertension    Bone metastases       Past Surgical History:   Procedure Laterality Date    AXILLARY NODE DISSECTION Left 8/24/2020    Procedure: LYMPHADENECTOMY, AXILLARY;  Surgeon: Cory Vick MD;  Location: [facility] OR;  Service: General;  Laterality: Left;  left breast mastectomy with possible axillary lymph node dissection    BREAST BIOPSY      right    BREAST LUMPECTOMY      BREAST RECONSTRUCTION      breast reconstruction with tummy tuck      BREAST SURGERY      11-3-15 LUMPECTOMY, 12-2-15 REEXCISION    INCISION AND DRAINAGE OF ABSCESS Left 9/9/2020    Procedure: INCISION AND DRAINAGE, ABSCESS;  Surgeon: Cory Vick MD;  Location: [facility] OR;  Service: General;  Laterality: Left;    INSERTION OF LOCALIZATION WIRE Left 8/24/2020    Procedure: INSERTION, LOCALIZATION WIRE;  Surgeon: Cory Vick MD;  Location: [facility] OR;  Service: General;  Laterality: Left;  left breast lumpectomy with wire needle loc    MASTECTOMY      mastectomy 2016      MASTECTOMY, PARTIAL Left 3/19/2021    Procedure: MASTECTOMY, PARTIAL;  Surgeon: Cory Vick MD;  Location: [facility]  OR;  Service: General;  Laterality: Left;  lumpectomy  left breast     RECONSTRUCTION OF NIPPLE Right 11/5/2018    Procedure: RECONSTRUCTION-NIPPLE RIGHT;  Surgeon: Xiang Hernandez MD;  Location: [address];  Service: Plastics;  Laterality: Right;    SENTINEL LYMPH NODE BIOPSY Left 8/24/2020    Procedure: BIOPSY, LYMPH NODE, SENTINEL;  Surgeon: Cory Vick MD;  Location: [facility] OR;  Service: General;  Laterality: Left;  left breast lumpectomy with left sentinel lymoh node       shoulder surgery and wrist      BILAT  BONE SPUR    WRIST SURGERY      RIGHT        Tobacco Use:  The patient  reports that she quit smoking about 7 years ago. She has a 7.50 pack-year smoking history. She has never used smokeless tobacco.     Results for orders placed or performed during the hospital encounter of 11/16/22   EKG 12-lead    Collection Time: 11/16/22  2:47 PM    Narrative    Test Reason : Z01.818,    Vent. Rate : 081 BPM     Atrial Rate : 081 BPM     P-R Int : 130 ms          QRS Dur : 084 ms      QT Int : 372 ms       P-R-T Axes : -15 -08 006 degrees     QTc Int : 432 ms    Normal sinus rhythm  Normal ECG  When compared with ECG of 15-MAR-2022 18:17,  QT has shortened    Referred By:             Confirmed By:              Lab Results   Component Value Date    WBC 10.28 11/15/2022    HGB 10.0 (L) 11/15/2022    HCT 30.7 (L) 11/15/2022     (H) 11/15/2022    PLT 53 (L) 11/15/2022     BMP  Lab Results   Component Value Date     11/15/2022    K 3.4 (L) 11/15/2022     11/15/2022    CO2 25 11/15/2022    BUN 6 11/15/2022    CREATININE 0.8 11/15/2022    CALCIUM 9.0 11/15/2022    ANIONGAP 11 11/15/2022     (H) 11/15/2022     (H) 11/08/2022     (H) 11/03/2022       No results found for this or any previous visit.              Pre-op Assessment    I have reviewed the Patient Summary Reports.     I have reviewed the Nursing Notes. I have reviewed the NPO Status.   I have  reviewed the Medications.     Review of Systems  Anesthesia Hx:  No problems with previous Anesthesia  Denies Family Hx of Anesthesia complications.  Personal Hx of Anesthesia complications, Post-Operative Nausea/Vomiting, in the past, but not with recent anesthetics / prophylaxis   Social:  Former Smoker    Hematology/Oncology:  Hematology Normal      Hematology Comments: Thrombocytopenia noted Current/Recent Cancer. (with metastasis to T spine) Breast   Cardiovascular:   Hypertension, well controlled hyperlipidemia    Pulmonary:   Pneumonia (hx COVID, resolved) COPD, moderate    Hepatic/GI:  Hepatic/GI Normal    Musculoskeletal:  Musculoskeletal Normal    Neurological:   Seizures, well controlled   Chronic Pain Syndrome (chronic cancer related pain, chronic  opioids)   Endocrine:   Diabetes, well controlled, type 2    Psych:   Psychiatric History anxiety depression          Physical Exam  General: Well nourished, Cooperative, Alert and Oriented    Airway:  Mallampati: III / II  Mouth Opening: Normal  TM Distance: Normal  Tongue: Normal  Neck ROM: Normal ROM    Dental:  Intact    Chest/Lungs:  Clear to auscultation    Heart:  Rate: Normal  Rhythm: Regular Rhythm  Sounds: Normal    Abdomen:  Normal, Soft, Nontender        Anesthesia Plan  Type of Anesthesia, risks & benefits discussed:    Anesthesia Type: MAC  Intra-op Monitoring Plan: Standard ASA Monitors  Post Op Pain Control Plan:   (medical reason for not using multimodal pain management)  Induction:  IV  Informed Consent: Informed consent signed with the Patient and all parties understand the risks and agree with anesthesia plan.  All questions answered.   ASA Score: 3  Anesthesia Plan Notes: MAC  Propofol  Zofran Pepcid    Ready For Surgery From Anesthesia Perspective.     .

## 2022-11-17 NOTE — ANESTHESIA POSTPROCEDURE EVALUATION
Anesthesia Post Evaluation    Patient: Negrita Castro    Procedure(s) Performed: Procedure(s) (LRB):  [id number] (Right)    Final Anesthesia Type: general      Patient location during evaluation: PACU  Patient participation: Yes- Able to Participate  Level of consciousness: awake and alert and oriented  Post-procedure vital signs: reviewed and stable  Pain management: adequate  Airway patency: patent    PONV status at discharge: No PONV  Anesthetic complications: no      Cardiovascular status: blood pressure returned to baseline and hemodynamically stable  Respiratory status: unassisted, spontaneous ventilation and room air  Hydration status: euvolemic  Follow-up not needed.          Vitals Value Taken Time   /65 11/17/22 1046   Temp 36.3 °C (97.4 °F) 11/17/22 1003   Pulse 78 11/17/22 1053   Resp 16 11/17/22 1030   SpO2 99 % 11/17/22 1053   Vitals shown include unvalidated device data.      No case tracking events are documented in the log.      Pain/Larissa Score: Lairssa Score: 10 (11/17/2022 10:30 AM)

## 2022-11-17 NOTE — TELEPHONE ENCOUNTER
Notified pt that prior authorization sent and pharmacy will reach out when decision is made. Verbalized understanding.

## 2022-11-23 NOTE — PLAN OF CARE
Problem: Fatigue  Goal: Improved Activity Tolerance  Outcome: Ongoing, Progressing  Intervention: Promote Improved Energy  Flowsheets (Taken 11/23/2022 4993)  Fatigue Management:   fatigue-related activity identified   paced activity encouraged   frequent rest breaks encouraged  Sleep/Rest Enhancement:   noise level reduced   relaxation techniques promoted   regular sleep/rest pattern promoted

## 2022-11-25 NOTE — PLAN OF CARE
Problem: Fatigue  Goal: Improved Activity Tolerance  Outcome: Met     Problem: Fall Injury Risk  Goal: Absence of Fall and Fall-Related Injury  Outcome: Met

## 2022-11-28 NOTE — PLAN OF CARE
Problem: Fall Injury Risk  Goal: Absence of Fall and Fall-Related Injury  Outcome: Ongoing, Progressing  Intervention: Identify and Manage Contributors  Flowsheets (Taken 11/28/2022 1029)  Self-Care Promotion: independence encouraged  Medication Review/Management: medications reviewed  Intervention: Promote Injury-Free Environment  Flowsheets (Taken 11/28/2022 1029)  Safety Promotion/Fall Prevention: medications reviewed

## 2022-12-05 NOTE — PROGRESS NOTES
"  [facility] In Office Hematology Oncology Subsequent  Encounter Note    11/29/22      Subjective:      Patient ID:   Negrita Castro  62 y.o.   Martínez Shepard R. Leblanc, Baby[place], Bourgeois, [place]      Chief Complaint:   New L breast cancer eval.    HPI:  62 y.o. female with diagnosis of Stage 1 R breast cancer 10/26/15.  "Triple negative".  Adjuvant AC x's 4, tx's 12 and Rad Rx through 9/12/16.      Had bilateral reconstruction surgery 4/14/17.  Further reconstruction, tummy tuck 8/14/17.  Additional fat injection at R chest done for symmetry.  She had placement of R nipple.  Dr. Hernandez.      She had additional reconstructive surgery at breast and abdomenal areas.   The date of the surgery was May 19th.      Recent Mamm/U/S showed small mass at 4:00 at L breast.  Needle Bx invasive ductal Ca, ERP+, PRP+, H2N neg  She had  L partial mastectomy, Sentinal node Bx 8/24/20. Followed by another surgery because of L axillary infection.  Primary 2 cm, LN neg, Stage 1 dx.    Discussed Oncotype Dx testing, this supported adding adjuvant chemotherapy to adjuvant hormonal therapy because of increased risk of cancer recurrence long-term.  Discussed BRCA testing, given her bilateral  breast Dx. PHN would not authorize BRCA 1 & 2.  Refill meds for her.    She had COVID-19 infection and was hospitalized.  She came very close to requiring intubation and mechanical ventilation.  She is off oxygen now,   and sees Dr. Justin of Pulmonary.  Recent CT scan of the chest showed residual changes of interstitial disease, consistent with recovering from COVID-19.  Also lytic lesions were seen in the thoracic spine area very suspicious for metastatic disease at T5 through T8 spine.    Recently on Saturday night she had a hard coughing spell, and thereafter developed severe right posterior pleuritic chest pain.  She has point tenderness over the right posterior chest wall and may very well have a fractured rib at the site.  Rib x-rays " have been ordered.  I have refilled her Soma for 1 month.  I have refilled her Norco  for 1 month.  I have added oxycodone 5 mg 1 or 2 p.o. Q 3-4 hours p.r.n. breakthrough pain for 1 month to her regimen.    She also has an enlarging nodule at the left chest wall.  Ultrasound of the site suggested that this was a cyst however the areas firm movable and may very well be cancer.  I asked  for a ultrasound-guided biopsy of the mass per radiology.  Path report showed invasive ductal Ca, ERP + PRP neg,   H2N equivocal.    She is postmenopausal, her last menses were 4 5 years ago.  Rib x-rays have been requested.  Will order a CT scan to evaluate for possible metastatic disease.  Will order a ultrasound-guided biopsy of the left chest wall mass for pathologic examination at [facility].  Bx + invasive breast cancer.  ERP+, PRP-, H2N-.    Suspected local recurrence at L breast.  She had L lumpectomy.  Margins clear.    Refer to Rad Rx MD for adjuvant Rad Rx., for local control.  She has T spine metastases.  Currently on hormonal Rx.  To see Dr. Manuelito Shepard for DM, BS control.    Dr. Washington saw her for C spine eval, no surgery planned for now.  He referred her to Dr. Rivers for injection Rx.    DM, cholesterol, epilepsy hx, DJD, LBP.  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    LR shoulder arthroscopy, R wrist surgery, M0.    Smoke 1/4 ppd x's 35 years, quit 2015.  ETOH no.  Disability-depression, epilepsy  Allergy none    Mom ovarian cancer  Dad lung cancer  Sisters DM, lung dx.    Summary of care:  Right breast cancer 2015, triple negative, treated with Adriamycin Cytoxan followed by taxane.    Left breast cancer ERP positive PRP positive HER2 Judy negative in 2020    COVID infection 2020.  She has not taken the covid 19 vaccination because of multiple allergy hx.    2021 bone metastases determined.    He he 2021 local left breast cancer recurrence, ERP  "positive PRP negative HER2 Judy negative.    He April 21st bone biopsy positive for breast cancer metastatic.  ERP, PRP, HER2 Judy pending.    She was on radiation therapy through May 17, 2021.  Refilled Marinol at increased 5 mg 1 p.o. b.i.d. number 60.   I refilled her Soma, Norco, Marinol, and Silvadene cream; dated the prescriptions for June 17, 2021.    C/O mid thoracic pain x's 6-8 weeks,radiates to L & R, increased.  Tender over Mid thoracic area.  Hx of T spine metastatic dx.  Pain controlled on Norco 10/325 mg and oxycodone 5 mg 1-2 po PRN.  Add lodine 400 mg 1 daily    Checked MRI of T spine.  Metastatic dx with pathologic fx at T3,5,6.  Refer to Dr. Flowers of neurosurgery for Vertebroplasty or Kyphoplasty evaluation.    She is on Faslodex. Ibrance. Monitor WBC count.    She saw Dr. Flowers, and has been fitted with a brace initially, vertebroplasty after the first of the year.  [phone].  RTC Dr. Flowers 12/27/21.    No neuro surgery is planned for T-spine disease because of progressive growth and compression of the spine.  She admits to having decreased strength in the right hand.  She completed 10 of 10 radiation treatments on May 2, 2022. She is tapering down her Decadron 4 mg p.o. b.i.d. and she is taking Lodine 400 mg daily.  We are stopping fast low dex and Ibrance as of April 12, 2022.    Will ask the  to see her if we can get Meals on wheels for her.    She is due for an MRI on May 17, and a PET scan on May 19, I will see her on May 20th.  Plan to go with chemotherapy now, probably will go with carboplatin Gemzar.   She is 140 lb and 5 ft 11.     Today is D1C1 carbo, gemzar.  Duragesic patch 25 mch q 3 days x's 3 patches, start Tuesday.  Claritin 10 mg 1 po daily x's 4 days, start Wednesday.  For neulasta or Udenyca Thursday.    Resume Xgeva with D9 C1.  Check lab 7/14 weekly [facility] lab.    Working on getting a electric wheelchair.  On PT with "Hector".  Stronger.  Today chemo given.  Mass " on back is giving her pain, refer to Cory Vick MD for removal.  D1C6 9/1/22    Refill Soma, Oxycodone, Norco, Ambien on 9/1/22.  Due for refill 9/30/22.    She is doing better, with increased energy, 8/10.  Weight 154#.  Pain is better 8/10.  Improved R arm function.    Dr. Mckeon placed port.  PET 5/2022 increased dx in bones and liver.  Carbo Gemzar x's 6 months.  12/15/22 #10.  Check PET now.  ROS:   GEN: normal without any fever, night sweats or weight loss  HEENT: normal with no HA's, sore throat, stiff neck, changes in vision  CV: normal with no CP, SOB, PND, POOL or orthopnea  PULM: normal with no SOB, cough, hemoptysis, sputum or pleuritic pain  GI: normal with no abdominal pain, nausea, vomiting, constipation, diarrhea, melanotic stools, BRBPR, or hematemesis  : normal with no hematuria, dysuria  BREAST: See HPI.  SKIN: normal with no rash, erythema, bruising, or swelling     Past Medical History:   Diagnosis Date    Acute hypoxemic respiratory failure 12/12/2020    Breast cancer     left breast    Cancer     RIGHT BREAST 10-15    Depression     Diabetes mellitus     Neuropathy     Panic attacks     S/P epidural steroid injection     Seizures     none since early 30's    Wears glasses     TO DRIVE     Past Surgical History:   Procedure Laterality Date    AXILLARY NODE DISSECTION Left 8/24/2020    Procedure: LYMPHADENECTOMY, AXILLARY;  Surgeon: Cory Vick MD;  Location: [facility] OR;  Service: General;  Laterality: Left;  left breast mastectomy with possible axillary lymph node dissection    BREAST BIOPSY      right    BREAST LUMPECTOMY      BREAST RECONSTRUCTION      breast reconstruction with tummy [facility]      BREAST SURGERY      11-3-15 LUMPECTOMY, 12-2-15 REEXCISION    INCISION AND DRAINAGE OF ABSCESS Left 9/9/2020    Procedure: INCISION AND DRAINAGE, ABSCESS;  Surgeon: Cory Vick MD;  Location: [facility] OR;  Service: General;  Laterality: Left;    INSERTION OF LOCALIZATION WIRE Left  8/24/2020    Procedure: INSERTION, LOCALIZATION WIRE;  Surgeon: Cory Vick MD;  Location: [facility] OR;  Service: General;  Laterality: Left;  left breast lumpectomy with wire needle loc    INSERTION OF TUNNELED CENTRAL VENOUS CATHETER (CVC) WITH SUBCUTANEOUS PORT Right 11/17/2022    Procedure: [id number];  Surgeon: Jeff Mckeon Jr., MD;  Location: [facility] OR;  Service: General;  Laterality: Right;    MASTECTOMY      mastectomy 2016      MASTECTOMY, PARTIAL Left 3/19/2021    Procedure: MASTECTOMY, PARTIAL;  Surgeon: Cory Vick MD;  Location: [facility] OR;  Service: General;  Laterality: Left;  lumpectomy  left breast     RECONSTRUCTION OF NIPPLE Right 11/5/2018    Procedure: RECONSTRUCTION-NIPPLE RIGHT;  Surgeon: Xiang Hernandez MD;  [facility];  Service: Plastics;  Laterality: Right;    SENTINEL LYMPH NODE BIOPSY Left 8/24/2020    Procedure: BIOPSY, LYMPH NODE, SENTINEL;  Surgeon: Cory Vick MD;  Location: [facility] OR;  Service: General;  Laterality: Left;  left breast lumpectomy with left sentinel lymoh node       shoulder surgery and wrist      BILAT  BONE SPUR    WRIST SURGERY      RIGHT       Review of patient's allergies indicates:   Allergen Reactions    Adhesive tape-silicones Hives     BANDAIDS    Polymycin Hives    Latex, natural rubber Itching    Polyurethane-39            Current Outpatient Medications:     anastrozole (ARIMIDEX) 1 mg Tab, Take 1 mg by mouth once daily., Disp: , Rfl:     blood sugar diagnostic Strp, TEST GLUCOSE BID, Disp: 300 each, Rfl: 3    blood-glucose meter (TRUE METRIX GLUCOSE METER) kit, TEST GLUCOSE BID, Disp: 1 each, Rfl: 0    blood-glucose meter,continuous (DEXCOM G6 ) Misc, TEST GLUCOSE QID, Disp: 1 each, Rfl: 1    blood-glucose sensor (DEXCOM G6 SENSOR) Edwige, TEST GLUCOSE QID, Disp: 13 each, Rfl: 3    blood-glucose transmitter (DEXCOM G6 TRANSMITTER) Edwige, TEST GLUCOSE QID, Disp: 1 each, Rfl: 1    carisoprodoL (SOMA)  350 MG tablet, Take 1 tablet (350 mg total) by mouth 3 (three) times daily as needed for Muscle spasms., Disp: 90 tablet, Rfl: 0    dexAMETHasone (DECADRON) 4 MG Tab, TAKE ONE TABLET (4MG TOTAL) BY MOUTH TWICE DAILY WITH MEALS, Disp: 60 tablet, Rfl: 1    dextromethorphan-guaiFENesin  mg/5 ml (ROBITUSSIN-DM)  mg/5 mL liquid, Take 1 teaspoon q 6 hours prn cough. (Patient taking differently: Take 5 mLs by mouth. Take 1 teaspoon q 6 hours prn cough.), Disp: 300 mL, Rfl: 0    diazePAM (VALIUM) 10 MG Tab, Take 1 tablet (10 mg total) by mouth 4 (four) times daily as needed (anxiety)., Disp: 120 tablet, Rfl: 2    docusate sodium (COLACE) 100 MG capsule, Take 100 mg by mouth 2 (two) times daily., Disp: , Rfl:     dronabinoL (MARINOL) 5 MG capsule, Take 1 capsule (5 mg total) by mouth 2 (two) times daily before meals., Disp: 60 capsule, Rfl: 0    dulaglutide (TRULICITY) 3 mg/0.5 mL pen injector, Inject 3 mg into the skin every 7 days., Disp: 4 pen, Rfl: 11    enalapril (VASOTEC) 5 MG tablet, Take 1 tablet (5 mg total) by mouth once daily., Disp: 90 tablet, Rfl: 3    etodolac (LODINE) 400 MG tablet, TAKE ONE TABLET (400 MG TOTAL) BY MOUTH ONCE DAILY, Disp: 30 tablet, Rfl: 2    fluconazole (DIFLUCAN) 150 MG Tab, TAKE ONE TABLET BY MOUTH ONCE FOR 1 DOSE, Disp: 1 tablet, Rfl: 2    furosemide (LASIX) 20 MG tablet, TAKE ONE TABLET BY MOUTH EVERY DAY, Disp: 30 tablet, Rfl: 6    HYDROcodone-acetaminophen (NORCO)  mg per tablet, Take 1 tablet by mouth every 6 (six) hours as needed for Pain., Disp: 120 tablet, Rfl: 0    lancets 32 gauge Misc, TEST GLUCOSE BID, Disp: 300 each, Rfl: 3    metFORMIN (GLUCOPHAGE-XR) 500 MG ER 24hr tablet, Take 2 tablets (1,000 mg total) by mouth 2 (two) times daily with meals., Disp: 360 tablet, Rfl: 3    metoprolol tartrate (LOPRESSOR) 25 MG tablet, Take 1 tablet (25 mg total) by mouth 2 (two) times daily., Disp: 60 tablet, Rfl: 11    naloxegoL (MOVANTIK) 12.5 mg Tab, Take 12.5 mg by  "mouth once daily., Disp: 30 tablet, Rfl: 3    ondansetron (ZOFRAN-ODT) 8 MG TbDL, DISSOLVE 1 TABLET (8MG TOTAL) on the tongue EVERY 6 TO 8 HOURS AS NEEDED (NAUSEA) Strength: 8 mg, Disp: 30 tablet, Rfl: 1    oxyCODONE (ROXICODONE) 15 MG Tab, Take 1 tablet (15 mg total) by mouth every 4 to 6 hours as needed for Pain., Disp: 120 tablet, Rfl: 0    palbociclib 100 mg Tab, Take 100 mg by mouth once daily. for 21 days, then take 7 days off. Total cycle length 28 days, Disp: 21 tablet, Rfl: 6    pen needle, diabetic (BD ULTRA-FINE MARCE PEN NEEDLE) 32 gauge x 5/32" Ndle, Test blood sugar twice daily, Disp: 100 each, Rfl: 5    promethazine (PHENERGAN) 25 MG tablet, Take 1 tablet (25 mg total) by mouth every 4 to 6 hours as needed., Disp: 30 tablet, Rfl: 5    promethazine-codeine 6.25-10 mg/5 ml (PHENERGAN WITH CODEINE) 6.25-10 mg/5 mL syrup, TAKE 5 ML BY MOUTH EVERY 6 HOURS AS NEEDED FOR COUGH, Disp: 450 mL, Rfl: 0    psyllium husk, with sugar, (METAMUCIL, WITH SUGAR,) 3.4 gram packet, Take 1 packet by mouth 2 (two) times daily., Disp: 30 packet, Rfl: 2    rosuvastatin (CRESTOR) 40 MG Tab, Take 1 tablet (40 mg total) by mouth every evening. For cholesterol, Disp: 90 tablet, Rfl: 3    zolpidem (AMBIEN) 10 mg Tab, TAKE ONE TABLET BY MOUTH EVERY NIGHT AT BEDTIME, Disp: 30 tablet, Rfl: 3    fentaNYL (DURAGESIC) 25 mcg/hr, Place 1 patch onto the skin every 72 hours., Disp: 10 patch, Rfl: 0    insulin glargine (LANTUS U-100 INSULIN) 100 unit/mL injection, Inject 32 Units into the skin 2 (two) times a day., Disp: 18 mL, Rfl: 11          Objective:   Vitals:  Blood pressure 127/76, pulse 93, temperature 97.9 °F (36.6 °C), resp. rate 18, height 5' 10" (1.778 m), weight 69.7 kg (153 lb 11.2 oz), last menstrual period 01/16/2016.    Physical Examination:   GEN: no apparent distress, comfortable, Brace in place.  HEAD: atraumatic and normocephalic  EYES: no pallor, no icterus  ENT: no pharyngeal erythema  NECK: noLAD/LN's, supple  CV:  " No edema  CHEST: Normal respiratory effort   she is not  tender over the right posterior chest wall on exam.  The chest wall is not swollen.  ABDOM:  nondistended; soft                                                                                          MUSC/Skeletal: ROM normal, Tender over mid T spine area.  EXTREM: no swelling  SKIN: She is developing keloid changes at L breast incision and navel surgical sites.  This area appears to be enlarging.  NEURO: grossly intact  PSYCH: normal mood, affect and behavior  LYMPH: normal  LN's  BREASTS: L breast is much improved.    Refill Oxycodone, SOMA, Norco.    Tumor markers are improved.    Assessment:   (1) 62 y.o. female with diagnosis of Stage 1 triple negative R breast cancer, 10/2016.       S/P R mastectomy, SNBx, AC x's 4, T x's12 weeks, Rad Rx and recent reconstruction.    (2) New L invasive ductal Ca, ERP+, PRP+, H2N neg.  Clinical stage 1 dx.    I have started her on Arimidex 1 mg p.o. daily  for metastatic disease at this time.    Suspected fracture of right ribs after recent cough event, check rib x-rays, medicate for pain.    ERP+ dx, bone metastases.  Started on Arimidex daily.  Add 2nd hormonal drug Rx after Rad Rx completed.    Bone biopsy positive for metastatic breast cancer.  ERP+, PRP+, H2N-.    Now on Genzar, Carboplatin.  Improved.  RTC to see me 1 month.  She has picked up her pain meds for this month.  Check PET 12/15/22.

## 2022-12-14 NOTE — TELEPHONE ENCOUNTER
----- Message from Lala Davis sent at 12/14/2022 12:17 PM CST -----  Pt needs refill on valium also would like to know if we have received her lantus rx assistance   [facility]   [phone]

## 2022-12-15 NOTE — TELEPHONE ENCOUNTER
Patient called and reported that she had seen the dentist this morning and was diagnosed with a tooth abscess and has been on antibiotics. Reviewed with NORMA Turner and per her verbal order, patient's chemo will be moved to next week and appointments to be moved to next week as well. Schedulers and Infusion Center made aware of above. Patient aware of above and verbalized understanding.

## 2022-12-15 NOTE — TELEPHONE ENCOUNTER
prescription sent to   [facility] - [place] - [id number]  [address] [id number]  Phone: [phone] Fax: [phone]

## 2022-12-22 NOTE — PLAN OF CARE
Problem: Fatigue  Goal: Improved Activity Tolerance  Outcome: Ongoing, Progressing  Intervention: Promote Improved Energy  Flowsheets (Taken 12/22/2022 7195)  Fatigue Management:   fatigue-related activity identified   paced activity encouraged   frequent rest breaks encouraged  Sleep/Rest Enhancement:   noise level reduced   relaxation techniques promoted   regular sleep/rest pattern promoted

## 2022-12-25 NOTE — PROGRESS NOTES
"  [facility] In Office Hematology Oncology Subsequent  Encounter Note    12/22/22      Subjective:      Patient ID:   Negrita Castro  62 y.o.   Martínez Shepard R. Leblanc, Babycos, Bourgeois, Hall      Chief Complaint:   New L breast cancer eval.    HPI:  62 y.o. female with diagnosis of Stage 1 R breast cancer 10/26/15.  "Triple negative".  Adjuvant AC x's 4, tx's 12 and Rad Rx through 9/12/16.      Had bilateral reconstruction surgery 4/14/17.  Further reconstruction, tummy tuck 8/14/17.  Additional fat injection at R chest done for symmetry.  She had placement of R nipple.  Dr. Hernandez.      She had additional reconstructive surgery at breast and abdomenal areas.   The date of the surgery was May 19th.      Recent Mamm/U/S showed small mass at 4:00 at L breast.  Needle Bx invasive ductal Ca, ERP+, PRP+, H2N neg  She had  L partial mastectomy, Sentinal node Bx 8/24/20. Followed by another surgery because of L axillary infection.  Primary 2 cm, LN neg, Stage 1 dx.    Discussed Oncotype Dx testing, this supported adding adjuvant chemotherapy to adjuvant hormonal therapy because of increased risk of cancer recurrence long-term.  Discussed BRCA testing, given her bilateral  breast Dx. PHN would not authorize BRCA 1 & 2.  Refill meds for her.    She had COVID-19 infection and was hospitalized.  She came very close to requiring intubation and mechanical ventilation.  She is off oxygen now,   and sees Dr. Justin of Pulmonary.  Recent CT scan of the chest showed residual changes of interstitial disease, consistent with recovering from COVID-19.  Also lytic lesions were seen in the thoracic spine area very suspicious for metastatic disease at T5 through T8 spine.    Recently on Saturday night she had a hard coughing spell, and thereafter developed severe right posterior pleuritic chest pain.  She has point tenderness over the right posterior chest wall and may very well have a fractured rib at the site.  Rib x-rays " have been ordered.  I have refilled her Soma for 1 month.  I have refilled her Norco  for 1 month.  I have added oxycodone 5 mg 1 or 2 p.o. Q 3-4 hours p.r.n. breakthrough pain for 1 month to her regimen.    She also has an enlarging nodule at the left chest wall.  Ultrasound of the site suggested that this was a cyst however the areas firm movable and may very well be cancer.  I asked  for a ultrasound-guided biopsy of the mass per radiology.  Path report showed invasive ductal Ca, ERP + PRP neg,   H2N equivocal.    She is postmenopausal, her last menses were 4 5 years ago.  Rib x-rays have been requested.  Will order a CT scan to evaluate for possible metastatic disease.  Will order a ultrasound-guided biopsy of the left chest wall mass for pathologic examination at [facility].  Bx + invasive breast cancer.  ERP+, PRP-, H2N-.    Suspected local recurrence at L breast.  She had L lumpectomy.  Margins clear.    Refer to Rad Rx MD for adjuvant Rad Rx., for local control.  She has T spine metastases.  Currently on hormonal Rx.  To see Dr. Manuelito Shepard for DM, BS control.    Dr. Washington saw her for C spine eval, no surgery planned for now.  He referred her to Dr. Rivers for injection Rx.    DM, cholesterol, epilepsy hx, DJD, LBP.  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    LR shoulder arthroscopy, R wrist surgery, M0.    Smoke 1/4 ppd x's 35 years, quit 2015.  ETOH no.  Disability-depression, epilepsy  Allergy none    Mom ovarian cancer  Dad lung cancer  Sisters DM, lung dx.    Summary of care:  Right breast cancer 2015, triple negative, treated with Adriamycin Cytoxan followed by taxane.    Left breast cancer ERP positive PRP positive HER2 Judy negative in 2020    COVID infection 2020.  She has not taken the covid 19 vaccination because of multiple allergy hx.    2021 bone metastases determined.    He he 2021 local left breast cancer recurrence, ERP  "positive PRP negative HER2 Judy negative.    He April 21st bone biopsy positive for breast cancer metastatic.  ERP, PRP, HER2 Judy pending.    She was on radiation therapy through May 17, 2021.  Refilled Marinol at increased 5 mg 1 p.o. b.i.d. number 60.   I refilled her Soma, Norco, Marinol, and Silvadene cream; dated the prescriptions for June 17, 2021.    C/O mid thoracic pain x's 6-8 weeks,radiates to L & R, increased.  Tender over Mid thoracic area.  Hx of T spine metastatic dx.  Pain controlled on Norco 10/325 mg and oxycodone 5 mg 1-2 po PRN.  Add lodine 400 mg 1 daily    Checked MRI of T spine.  Metastatic dx with pathologic fx at T3,5,6.  Refer to Dr. Flowers of neurosurgery for Vertebroplasty or Kyphoplasty evaluation.    She is on Faslodex. Ibrance. Monitor WBC count.    She saw Dr. Flowers, and has been fitted with a brace initially, vertebroplasty after the first of the year.  [phone].  RTC Dr. Flowers 12/27/21.    No neuro surgery is planned for T-spine disease because of progressive growth and compression of the spine.  She admits to having decreased strength in the right hand.  She completed 10 of 10 radiation treatments on May 2, 2022. She is tapering down her Decadron 4 mg p.o. b.i.d. and she is taking Lodine 400 mg daily.  We are stopping fast low dex and Ibrance as of April 12, 2022.    Will ask the  to see her if we can get Meals on wheels for her.    She is due for an MRI on May 17, and a PET scan on May 19, I will see her on May 20th.  Plan to go with chemotherapy now, probably will go with carboplatin Gemzar.   She is 140 lb and 5 ft 11.     Today is D1C1 carbo, gemzar.  Duragesic patch 25 mch q 3 days x's 3 patches, start Tuesday.  Claritin 10 mg 1 po daily x's 4 days, start Wednesday.  For neulasta or Udenyca Thursday.    Resume Xgeva with D9 C1.  Check lab 7/14 weekly [facility] lab.    Working on getting a electric wheelchair.  On PT with "Hector".  Stronger.  Today chemo given.  Mass " on back is giving her pain, refer to Cory Vick MD for removal.  D1C6 9/1/22    Refill Soma, Oxycodone, Norco, Ambien on 9/1/22.  Due for refill 9/30/22.    She is doing better, with increased energy, 8/10.  Weight 154#.  Pain is better 8/10.  Improved R arm function.    Dr. Mckeon placed port.  PET 5/2022 increased dx in bones and liver.  Carbo Gemzar x's 6 months.  12/15/22 #10.  Check PET now.  Breast cancer metastases are improved on current PET scan.    ROS:   GEN: normal without any fever, night sweats or weight loss  HEENT: normal with no HA's, sore throat, stiff neck, changes in vision  CV: normal with no CP, SOB, PND, POOL or orthopnea  PULM: normal with no SOB, cough, hemoptysis, sputum or pleuritic pain  GI: normal with no abdominal pain, nausea, vomiting, constipation, diarrhea, melanotic stools, BRBPR, or hematemesis  : normal with no hematuria, dysuria  BREAST: See HPI.  SKIN: normal with no rash, erythema, bruising, or swelling     Past Medical History:   Diagnosis Date    Acute hypoxemic respiratory failure 12/12/2020    Breast cancer     left breast    Cancer     RIGHT BREAST 10-15    Depression     Diabetes mellitus     Neuropathy     Panic attacks     S/P epidural steroid injection     Seizures     none since early 30's    Wears glasses     TO DRIVE     Past Surgical History:   Procedure Laterality Date    AXILLARY NODE DISSECTION Left 8/24/2020    Procedure: LYMPHADENECTOMY, AXILLARY;  Surgeon: Cory Vick MD;  Location: [facility] OR;  Service: General;  Laterality: Left;  left breast mastectomy with possible axillary lymph node dissection    BREAST BIOPSY      right    BREAST LUMPECTOMY      BREAST RECONSTRUCTION      breast reconstruction with tummy [facility]      BREAST SURGERY      11-3-15 LUMPECTOMY, 12-2-15 REEXCISION    INCISION AND DRAINAGE OF ABSCESS Left 9/9/2020    Procedure: INCISION AND DRAINAGE, ABSCESS;  Surgeon: Cory Vick MD;  Location: [facility] OR;  Service:  General;  Laterality: Left;    INSERTION OF LOCALIZATION WIRE Left 8/24/2020    Procedure: INSERTION, LOCALIZATION WIRE;  Surgeon: Cory Vick MD;  Location: [facility] OR;  Service: General;  Laterality: Left;  left breast lumpectomy with wire needle loc    INSERTION OF TUNNELED CENTRAL VENOUS CATHETER (CVC) WITH SUBCUTANEOUS PORT Right 11/17/2022    Procedure: [id number];  Surgeon: Jeff Mckeon Jr., MD;  Location: [facility] OR;  Service: General;  Laterality: Right;    MASTECTOMY      mastectomy 2016      MASTECTOMY, PARTIAL Left 3/19/2021    Procedure: MASTECTOMY, PARTIAL;  Surgeon: Cory Vick MD;  Location: [facility] OR;  Service: General;  Laterality: Left;  lumpectomy  left breast     RECONSTRUCTION OF NIPPLE Right 11/5/2018    Procedure: RECONSTRUCTION-NIPPLE RIGHT;  Surgeon: Xiang Hernandez MD;  [facility];  Service: Plastics;  Laterality: Right;    SENTINEL LYMPH NODE BIOPSY Left 8/24/2020    Procedure: BIOPSY, LYMPH NODE, SENTINEL;  Surgeon: Cory Vick MD;  Location: [facility] OR;  Service: General;  Laterality: Left;  left breast lumpectomy with left sentinel lymoh node       shoulder surgery and wrist      BILAT  BONE SPUR    WRIST SURGERY      RIGHT       Review of patient's allergies indicates:   Allergen Reactions    Adhesive tape-silicones Hives     BANDAIDS    Polymycin Hives    Latex, natural rubber Itching    Polyurethane-39            Current Outpatient Medications:     amoxicillin (AMOXIL) 500 MG Tab, Take 1 po q 8 hours x's 10 days, Disp: 30 tablet, Rfl: 0    blood sugar diagnostic Strp, TEST GLUCOSE BID, Disp: 300 each, Rfl: 3    blood-glucose meter (TRUE METRIX GLUCOSE METER) kit, TEST GLUCOSE BID, Disp: 1 each, Rfl: 0    blood-glucose meter,continuous (DEXCOM G6 ) Misc, TEST GLUCOSE QID, Disp: 1 each, Rfl: 1    blood-glucose sensor (DEXCOM G6 SENSOR) Edwige, TEST GLUCOSE QID, Disp: 13 each, Rfl: 3    blood-glucose transmitter (DEXCOM G6  TRANSMITTER) Edwige, TEST GLUCOSE QID, Disp: 1 each, Rfl: 1    dexAMETHasone (DECADRON) 4 MG Tab, TAKE ONE TABLET (4MG TOTAL) BY MOUTH TWICE DAILY WITH MEALS, Disp: 60 tablet, Rfl: 1    dextromethorphan-guaiFENesin  mg/5 ml (ROBITUSSIN-DM)  mg/5 mL liquid, Take 1 teaspoon q 6 hours prn cough. (Patient taking differently: Take 5 mLs by mouth. Take 1 teaspoon q 6 hours prn cough.), Disp: 300 mL, Rfl: 0    diazePAM (VALIUM) 10 MG Tab, Take 1 tablet (10 mg total) by mouth 4 (four) times daily as needed (anxiety)., Disp: 120 tablet, Rfl: 2    docusate sodium (COLACE) 100 MG capsule, Take 100 mg by mouth 2 (two) times daily., Disp: , Rfl:     dronabinoL (MARINOL) 5 MG capsule, Take 1 capsule (5 mg total) by mouth 2 (two) times daily before meals., Disp: 60 capsule, Rfl: 0    dulaglutide (TRULICITY) 3 mg/0.5 mL pen injector, Inject 3 mg into the skin every 7 days., Disp: 4 pen, Rfl: 11    enalapril (VASOTEC) 5 MG tablet, Take 1 tablet (5 mg total) by mouth once daily., Disp: 90 tablet, Rfl: 3    etodolac (LODINE) 400 MG tablet, TAKE ONE TABLET (400 MG TOTAL) BY MOUTH ONCE DAILY, Disp: 30 tablet, Rfl: 2    fentaNYL (DURAGESIC) 25 mcg/hr, Place 1 patch onto the skin every 72 hours., Disp: 10 patch, Rfl: 0    fluconazole (DIFLUCAN) 150 MG Tab, TAKE ONE TABLET BY MOUTH ONCE FOR 1 DOSE, Disp: 1 tablet, Rfl: 2    furosemide (LASIX) 20 MG tablet, TAKE ONE TABLET BY MOUTH EVERY DAY, Disp: 30 tablet, Rfl: 6    lancets 32 gauge Misc, TEST GLUCOSE BID, Disp: 300 each, Rfl: 3    metFORMIN (GLUCOPHAGE-XR) 500 MG ER 24hr tablet, Take 2 tablets (1,000 mg total) by mouth 2 (two) times daily with meals., Disp: 360 tablet, Rfl: 3    metoprolol tartrate (LOPRESSOR) 25 MG tablet, Take 1 tablet (25 mg total) by mouth 2 (two) times daily., Disp: 60 tablet, Rfl: 11    naloxegoL (MOVANTIK) 12.5 mg Tab, Take 12.5 mg by mouth once daily., Disp: 30 tablet, Rfl: 3    palbociclib 100 mg Tab, Take 100 mg by mouth once daily. for 21 days, then  "take 7 days off. Total cycle length 28 days, Disp: 21 tablet, Rfl: 6    pen needle, diabetic (BD ULTRA-FINE MARCE PEN NEEDLE) 32 gauge x 5/32" Ndle, Test blood sugar twice daily, Disp: 100 each, Rfl: 5    promethazine (PHENERGAN) 25 MG tablet, TAKE ONE TABLET (25MG TOTAL) BY MOUTH EVERY 4 TO 6 HOURS AS NEEDED, Disp: 30 tablet, Rfl: 5    promethazine-codeine 6.25-10 mg/5 ml (PHENERGAN WITH CODEINE) 6.25-10 mg/5 mL syrup, TAKE 5 ML BY MOUTH EVERY 6 HOURS AS NEEDED FOR COUGH, Disp: 450 mL, Rfl: 0    psyllium husk, with sugar, (METAMUCIL, WITH SUGAR,) 3.4 gram packet, Take 1 packet by mouth 2 (two) times daily., Disp: 30 packet, Rfl: 2    rosuvastatin (CRESTOR) 40 MG Tab, Take 1 tablet (40 mg total) by mouth every evening. For cholesterol, Disp: 90 tablet, Rfl: 3    zolpidem (AMBIEN) 10 mg Tab, TAKE ONE TABLET BY MOUTH EVERY NIGHT AT BEDTIME, Disp: 30 tablet, Rfl: 3    carisoprodoL (SOMA) 350 MG tablet, Take 1 tablet (350 mg total) by mouth 3 (three) times daily as needed for Muscle spasms., Disp: 90 tablet, Rfl: 0    HYDROcodone-acetaminophen (NORCO)  mg per tablet, Take 1 tablet by mouth every 6 (six) hours as needed for Pain., Disp: 120 tablet, Rfl: 0    insulin glargine (LANTUS U-100 INSULIN) 100 unit/mL injection, Inject 32 Units into the skin 2 (two) times a day., Disp: 18 mL, Rfl: 11    ondansetron (ZOFRAN-ODT) 8 MG TbDL, DISSOLVE 1 TABLET (8MG TOTAL) on the tongue EVERY 6 TO 8 HOURS AS NEEDED (NAUSEA) Strength: 8 mg, Disp: 60 tablet, Rfl: 2    oxyCODONE (ROXICODONE) 15 MG Tab, Take 1 tablet (15 mg total) by mouth every 4 to 6 hours as needed for Pain., Disp: 120 tablet, Rfl: 0          Objective:   Vitals:  Blood pressure 137/82, pulse 83, temperature 97.7 °F (36.5 °C), resp. rate 18, height 5' 10" (1.778 m), weight 70.5 kg (155 lb 6.4 oz), last menstrual period 01/16/2016.    Physical Examination:   GEN: no apparent distress, comfortable, Brace in place.  HEAD: atraumatic and normocephalic  EYES: no " pallor, no icterus  ENT: no pharyngeal erythema.  The cellulitis at L cheek area is resolved, after antibiotics.  NECK: noLAD/LN's, supple  CV:  No edema  CHEST: Normal respiratory effort   she is not  tender over the right posterior chest wall on exam.  The chest wall is not swollen.  ABDOM:  nondistended; soft                                                                                          MUSC/Skeletal: ROM normal, Tender over mid T spine area.  EXTREM: no swelling  SKIN: She is developing keloid changes at L breast incision and navel surgical sites.  This area appears to be enlarg  NEURO: grossly intact  PSYCH: normal mood, affect and behavior  LYMPH: normal  LN's  BREASTS: L breast is much improved.    Refill Oxycodone, SOMA, Norco.    Tumor markers are improved.    Assessment:   (1) 62 y.o. female with diagnosis of Stage 1 triple negative R breast cancer, 10/2016.       S/P R mastectomy, SNBx, AC x's 4, T x's12 weeks, Rad Rx and recent reconstruction.    (2) New L invasive ductal Ca, ERP+, PRP+, H2N neg.  Clinical stage 1 dx.    I have started her on Arimidex 1 mg p.o. daily  for metastatic disease at this time.    Suspected fracture of right ribs after recent cough event, check rib x-rays, medicate for pain.    ERP+ dx, bone metastases.  Started on Arimidex daily.  Add 2nd hormonal drug Rx after Rad Rx completed.    Bone biopsy positive for metastatic breast cancer.  ERP+, PRP+, H2N-.    Now on Genzar, Carboplatin.  Improved.  RTC to see me 1 month.  She has picked up her pain meds for this month.  Check PET 12/15/22.  Improving metastatic dx on present Rx.

## 2022-12-27 NOTE — PLAN OF CARE
Problem: Neutropenia  Goal: Absence of Infection  Outcome: Ongoing, Progressing  Intervention: Prevent Infection and Maximize Resistance  Flowsheets (Taken 12/27/2022 2679)  Infection Prevention:   cohorting utilized   environmental surveillance performed   equipment surfaces disinfected

## 2022-12-28 NOTE — PROGRESS NOTES
"  [facility] In Office Hematology Oncology Subsequent  Encounter Note    12/13/22      Subjective:      Patient ID:   Negrita Castro  62 y.o.   Martínez Shepard R. Leblanc, Babycos, Bourgeois, Hall      Chief Complaint:   New L breast cancer eval.    HPI:  62 y.o. female with diagnosis of Stage 1 R breast cancer 10/26/15.  "Triple negative".  Adjuvant AC x's 4, tx's 12 and Rad Rx through 9/12/16.      Had bilateral reconstruction surgery 4/14/17.  Further reconstruction, tummy tuck 8/14/17.  Additional fat injection at R chest done for symmetry.  She had placement of R nipple.  Dr. Hernandez.      She had additional reconstructive surgery at breast and abdomenal areas.   The date of the surgery was May 19th.      Recent Mamm/U/S showed small mass at 4:00 at L breast.  Needle Bx invasive ductal Ca, ERP+, PRP+, H2N neg  She had  L partial mastectomy, Sentinal node Bx 8/24/20. Followed by another surgery because of L axillary infection.  Primary 2 cm, LN neg, Stage 1 dx.    Discussed Oncotype Dx testing, this supported adding adjuvant chemotherapy to adjuvant hormonal therapy because of increased risk of cancer recurrence long-term.  Discussed BRCA testing, given her bilateral  breast Dx. PHN would not authorize BRCA 1 & 2.  Refill meds for her.    She had COVID-19 infection and was hospitalized.  She came very close to requiring intubation and mechanical ventilation.  She is off oxygen now,   and sees Dr. Justin of Pulmonary.  Recent CT scan of the chest showed residual changes of interstitial disease, consistent with recovering from COVID-19.  Also lytic lesions were seen in the thoracic spine area very suspicious for metastatic disease at T5 through T8 spine.    Recently on Saturday night she had a hard coughing spell, and thereafter developed severe right posterior pleuritic chest pain.  She has point tenderness over the right posterior chest wall and may very well have a fractured rib at the site.  Rib x-rays " have been ordered.  I have refilled her Soma for 1 month.  I have refilled her Norco  for 1 month.  I have added oxycodone 5 mg 1 or 2 p.o. Q 3-4 hours p.r.n. breakthrough pain for 1 month to her regimen.    She also has an enlarging nodule at the left chest wall.  Ultrasound of the site suggested that this was a cyst however the areas firm movable and may very well be cancer.  I asked  for a ultrasound-guided biopsy of the mass per radiology.  Path report showed invasive ductal Ca, ERP + PRP neg,   H2N equivocal.    She is postmenopausal, her last menses were 4 5 years ago.  Rib x-rays have been requested.  Will order a CT scan to evaluate for possible metastatic disease.  Will order a ultrasound-guided biopsy of the left chest wall mass for pathologic examination at [facility].  Bx + invasive breast cancer.  ERP+, PRP-, H2N-.    Suspected local recurrence at L breast.  She had L lumpectomy.  Margins clear.    Refer to Rad Rx MD for adjuvant Rad Rx., for local control.  She has T spine metastases.  Currently on hormonal Rx.  To see Dr. Manuelito Shepard for DM, BS control.    Dr. Washington saw her for C spine eval, no surgery planned for now.  He referred her to Dr. Rivers for injection Rx.    DM, cholesterol, epilepsy hx, DJD, LBP.  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    LR shoulder arthroscopy, R wrist surgery, M0.    Smoke 1/4 ppd x's 35 years, quit 2015.  ETOH no.  Disability-depression, epilepsy  Allergy none    Mom ovarian cancer  Dad lung cancer  Sisters DM, lung dx.    Summary of care:  Right breast cancer 2015, triple negative, treated with Adriamycin Cytoxan followed by taxane.    Left breast cancer ERP positive PRP positive HER2 Judy negative in 2020    COVID infection 2020.  She has not taken the covid 19 vaccination because of multiple allergy hx.    2021 bone metastases determined.    He he 2021 local left breast cancer recurrence, ERP  "positive PRP negative HER2 Judy negative.    He April 21st bone biopsy positive for breast cancer metastatic.  ERP, PRP, HER2 Judy pending.    She was on radiation therapy through May 17, 2021.  Refilled Marinol at increased 5 mg 1 p.o. b.i.d. number 60.   I refilled her Soma, Norco, Marinol, and Silvadene cream; dated the prescriptions for June 17, 2021.    C/O mid thoracic pain x's 6-8 weeks,radiates to L & R, increased.  Tender over Mid thoracic area.  Hx of T spine metastatic dx.  Pain controlled on Norco 10/325 mg and oxycodone 5 mg 1-2 po PRN.  Add lodine 400 mg 1 daily    Checked MRI of T spine.  Metastatic dx with pathologic fx at T3,5,6.  Refer to Dr. Flowers of neurosurgery for Vertebroplasty or Kyphoplasty evaluation.    She is on Faslodex. Ibrance. Monitor WBC count.    She saw Dr. Flowers, and has been fitted with a brace initially, vertebroplasty after the first of the year.  [phone].  RTC Dr. Flowers 12/27/21.    No neuro surgery is planned for T-spine disease because of progressive growth and compression of the spine.  She admits to having decreased strength in the right hand.  She completed 10 of 10 radiation treatments on May 2, 2022. She is tapering down her Decadron 4 mg p.o. b.i.d. and she is taking Lodine 400 mg daily.  We are stopping fast low dex and Ibrance as of April 12, 2022.    Will ask the  to see her if we can get Meals on wheels for her.    She is due for an MRI on May 17, and a PET scan on May 19, I will see her on May 20th.  Plan to go with chemotherapy now, probably will go with carboplatin Gemzar.   She is 140 lb and 5 ft 11.     Today is D1C1 carbo, gemzar.  Duragesic patch 25 mch q 3 days x's 3 patches, start Tuesday.  Claritin 10 mg 1 po daily x's 4 days, start Wednesday.  For neulasta or Udenyca Thursday.    Resume Xgeva with D9 C1.  Check lab 7/14 weekly [facility] lab.    Working on getting a electric wheelchair.  On PT with "Hector".  Stronger.  Today chemo given.  Mass " on back is giving her pain, refer to Cory Vick MD for removal.  D1C6 9/1/22    Refill Soma, Oxycodone, Norco, Ambien on 9/1/22.  Due for refill 9/30/22.    She is doing better, with increased energy, 8/10.  Weight 154#.  Pain is better 8/10.  Improved R arm function.    Dr. Mckeon placed port.  PET 5/2022 increased dx in bones and liver.  Carbo Gemzar x's 6 months.  12/15/22 #10.  Check PET now.  Metastatic Dx is improved.    She has oral dental dx, L check swollen, tender, inflamed.  Amoxicillin 500 mg qid.  Due for Rx on 12/15/22.    RTC see me 1 month.  ROS:   GEN: normal without any fever, night sweats or weight loss  HEENT: normal with no HA's, sore throat, stiff neck, changes in vision  CV: normal with no CP, SOB, PND, POOL or orthopnea  PULM: normal with no SOB, cough, hemoptysis, sputum or pleuritic pain  GI: normal with no abdominal pain, nausea, vomiting, constipation, diarrhea, melanotic stools, BRBPR, or hematemesis  : normal with no hematuria, dysuria  BREAST: See HPI.  SKIN: normal with no rash, erythema, bruising, or swelling     Past Medical History:   Diagnosis Date    Acute hypoxemic respiratory failure 12/12/2020    Breast cancer     left breast    Cancer     RIGHT BREAST 10-15    Depression     Diabetes mellitus     Neuropathy     Panic attacks     S/P epidural steroid injection     Seizures     none since early 30's    Wears glasses     TO DRIVE     Past Surgical History:   Procedure Laterality Date    AXILLARY NODE DISSECTION Left 8/24/2020    Procedure: LYMPHADENECTOMY, AXILLARY;  Surgeon: Cory Vick MD;  Location: [facility] OR;  Service: General;  Laterality: Left;  left breast mastectomy with possible axillary lymph node dissection    BREAST BIOPSY      right    BREAST LUMPECTOMY      BREAST RECONSTRUCTION      breast reconstruction with tummy tuck      BREAST SURGERY      11-3-15 LUMPECTOMY, 12-2-15 REEXCISION    INCISION AND DRAINAGE OF ABSCESS Left 9/9/2020    Procedure:  INCISION AND DRAINAGE, ABSCESS;  Surgeon: Cory Vick MD;  Location: [facility] OR;  Service: General;  Laterality: Left;    INSERTION OF LOCALIZATION WIRE Left 8/24/2020    Procedure: INSERTION, LOCALIZATION WIRE;  Surgeon: Cory Vick MD;  Location: [facility] OR;  Service: General;  Laterality: Left;  left breast lumpectomy with wire needle loc    INSERTION OF TUNNELED CENTRAL VENOUS CATHETER (CVC) WITH SUBCUTANEOUS PORT Right 11/17/2022    Procedure: [id number];  Surgeon: Jeff Mckeon Jr., MD;  Location: [facility] OR;  Service: General;  Laterality: Right;    MASTECTOMY      mastectomy 2016      MASTECTOMY, PARTIAL Left 3/19/2021    Procedure: MASTECTOMY, PARTIAL;  Surgeon: Cory Vick MD;  Location: [facility] OR;  Service: General;  Laterality: Left;  lumpectomy  left breast     RECONSTRUCTION OF NIPPLE Right 11/5/2018    Procedure: RECONSTRUCTION-NIPPLE RIGHT;  Surgeon: Xiang Hernandez MD;  Location: [facility] OR [address] [place] [address];  Service: Plastics;  Laterality: Right;    SENTINEL LYMPH NODE BIOPSY Left 8/24/2020    Procedure: BIOPSY, LYMPH NODE, SENTINEL;  Surgeon: Cory Vick MD;  Location: [facility] OR;  Service: General;  Laterality: Left;  left breast lumpectomy with left sentinel lymoh node       shoulder surgery and wrist      BILAT  BONE SPUR    WRIST SURGERY      RIGHT       Review of patient's allergies indicates:   Allergen Reactions    Adhesive tape-silicones Hives     BANDAIDS    Polymycin Hives    Latex, natural rubber Itching    Polyurethane-39            Current Outpatient Medications:     blood sugar diagnostic Strp, TEST GLUCOSE BID, Disp: 300 each, Rfl: 3    blood-glucose meter (TRUE METRIX GLUCOSE METER) kit, TEST GLUCOSE BID, Disp: 1 each, Rfl: 0    blood-glucose meter,continuous (DEXCOM G6 ) Misc, TEST GLUCOSE QID, Disp: 1 each, Rfl: 1    blood-glucose sensor (DEXCOM G6 SENSOR) Edwige, TEST GLUCOSE QID, Disp: 13 each, Rfl: 3    blood-glucose transmitter (DEXCOM G6  "TRANSMITTER) Edwige, TEST GLUCOSE QID, Disp: 1 each, Rfl: 1    dexAMETHasone (DECADRON) 4 MG Tab, TAKE ONE TABLET (4MG TOTAL) BY MOUTH TWICE DAILY WITH MEALS, Disp: 60 tablet, Rfl: 1    dextromethorphan-guaiFENesin  mg/5 ml (ROBITUSSIN-DM)  mg/5 mL liquid, Take 1 teaspoon q 6 hours prn cough. (Patient taking differently: Take 5 mLs by mouth. Take 1 teaspoon q 6 hours prn cough.), Disp: 300 mL, Rfl: 0    docusate sodium (COLACE) 100 MG capsule, Take 100 mg by mouth 2 (two) times daily., Disp: , Rfl:     dronabinoL (MARINOL) 5 MG capsule, Take 1 capsule (5 mg total) by mouth 2 (two) times daily before meals., Disp: 60 capsule, Rfl: 0    dulaglutide (TRULICITY) 3 mg/0.5 mL pen injector, Inject 3 mg into the skin every 7 days., Disp: 4 pen, Rfl: 11    enalapril (VASOTEC) 5 MG tablet, Take 1 tablet (5 mg total) by mouth once daily., Disp: 90 tablet, Rfl: 3    etodolac (LODINE) 400 MG tablet, TAKE ONE TABLET (400 MG TOTAL) BY MOUTH ONCE DAILY, Disp: 30 tablet, Rfl: 2    fentaNYL (DURAGESIC) 25 mcg/hr, Place 1 patch onto the skin every 72 hours., Disp: 10 patch, Rfl: 0    fluconazole (DIFLUCAN) 150 MG Tab, TAKE ONE TABLET BY MOUTH ONCE FOR 1 DOSE, Disp: 1 tablet, Rfl: 2    lancets 32 gauge Misc, TEST GLUCOSE BID, Disp: 300 each, Rfl: 3    metFORMIN (GLUCOPHAGE-XR) 500 MG ER 24hr tablet, Take 2 tablets (1,000 mg total) by mouth 2 (two) times daily with meals., Disp: 360 tablet, Rfl: 3    metoprolol tartrate (LOPRESSOR) 25 MG tablet, Take 1 tablet (25 mg total) by mouth 2 (two) times daily., Disp: 60 tablet, Rfl: 11    naloxegoL (MOVANTIK) 12.5 mg Tab, Take 12.5 mg by mouth once daily., Disp: 30 tablet, Rfl: 3    palbociclib 100 mg Tab, Take 100 mg by mouth once daily. for 21 days, then take 7 days off. Total cycle length 28 days, Disp: 21 tablet, Rfl: 6    pen needle, diabetic (BD ULTRA-FINE MARCE PEN NEEDLE) 32 gauge x 5/32" Ndle, Test blood sugar twice daily, Disp: 100 each, Rfl: 5    promethazine-codeine 6.25-10 " "mg/5 ml (PHENERGAN WITH CODEINE) 6.25-10 mg/5 mL syrup, TAKE 5 ML BY MOUTH EVERY 6 HOURS AS NEEDED FOR COUGH, Disp: 450 mL, Rfl: 0    psyllium husk, with sugar, (METAMUCIL, WITH SUGAR,) 3.4 gram packet, Take 1 packet by mouth 2 (two) times daily., Disp: 30 packet, Rfl: 2    rosuvastatin (CRESTOR) 40 MG Tab, Take 1 tablet (40 mg total) by mouth every evening. For cholesterol, Disp: 90 tablet, Rfl: 3    zolpidem (AMBIEN) 10 mg Tab, TAKE ONE TABLET BY MOUTH EVERY NIGHT AT BEDTIME, Disp: 30 tablet, Rfl: 3    amoxicillin (AMOXIL) 500 MG Tab, Take 1 po q 8 hours x's 10 days, Disp: 30 tablet, Rfl: 0    carisoprodoL (SOMA) 350 MG tablet, Take 1 tablet (350 mg total) by mouth 3 (three) times daily as needed for Muscle spasms., Disp: 90 tablet, Rfl: 0    diazePAM (VALIUM) 10 MG Tab, Take 1 tablet (10 mg total) by mouth 4 (four) times daily as needed (anxiety)., Disp: 120 tablet, Rfl: 2    furosemide (LASIX) 20 MG tablet, TAKE ONE TABLET BY MOUTH EVERY DAY, Disp: 30 tablet, Rfl: 6    HYDROcodone-acetaminophen (NORCO)  mg per tablet, Take 1 tablet by mouth every 6 (six) hours as needed for Pain., Disp: 120 tablet, Rfl: 0    insulin glargine (LANTUS U-100 INSULIN) 100 unit/mL injection, Inject 32 Units into the skin 2 (two) times a day., Disp: 18 mL, Rfl: 11    ondansetron (ZOFRAN-ODT) 8 MG TbDL, DISSOLVE 1 TABLET (8MG TOTAL) on the tongue EVERY 6 TO 8 HOURS AS NEEDED (NAUSEA) Strength: 8 mg, Disp: 60 tablet, Rfl: 2    oxyCODONE (ROXICODONE) 15 MG Tab, Take 1 tablet (15 mg total) by mouth every 4 to 6 hours as needed for Pain., Disp: 120 tablet, Rfl: 0    promethazine (PHENERGAN) 25 MG tablet, TAKE ONE TABLET (25MG TOTAL) BY MOUTH EVERY 4 TO 6 HOURS AS NEEDED, Disp: 30 tablet, Rfl: 5  No current facility-administered medications for this visit.          Objective:   Vitals:  Blood pressure 128/80, pulse 84, temperature 97.9 °F (36.6 °C), resp. rate 18, height 5' 10" (1.778 m), weight 69.9 kg (154 lb), last menstrual period " 01/16/2016.    Physical Examination:   GEN: no apparent distress, comfortable, Brace in place.  HEAD: atraumatic and normocephalic  EYES: no pallor, no icterus  ENT: no pharyngeal erythema  NECK: noLAD/LN's, supple  CV:  No edema  CHEST: Normal respiratory effort   she is not  tender over the right posterior chest wall on exam.  The chest wall is not swollen.  ABDOM:  nondistended; soft                                                                                          MUSC/Skeletal: ROM normal, Tender over mid T spine area.  EXTREM: no swelling  SKIN: She is developing keloid changes at L breast incision and navel surgical sites.  This area appears to be enlarging.  NEURO: grossly intact  PSYCH: normal mood, affect and behavior  LYMPH: normal  LN's  BREASTS: L breast is much improved.    Refill Oxycodone, SOMA, Norco.    Tumor markers are improved.    Assessment:   (1) 62 y.o. female with diagnosis of Stage 1 triple negative R breast cancer, 10/2016.       S/P R mastectomy, SNBx, AC x's 4, T x's12 weeks, Rad Rx and recent reconstruction.    (2) New L invasive ductal Ca, ERP+, PRP+, H2N neg.  Clinical stage 1 dx.    I have started her on Arimidex 1 mg p.o. daily  for metastatic disease at this time.    Suspected fracture of right ribs after recent cough event, check rib x-rays, medicate for pain.    ERP+ dx, bone metastases.  Started on Arimidex daily.  Add 2nd hormonal drug Rx after Rad Rx completed.    Bone biopsy positive for metastatic breast cancer.  ERP+, PRP+, H2N-.    Now on Genzar, Carboplatin.  Improved.  RTC to see me 1 month.  She has picked up her pain meds for this month.  Check PET 12/15/22.

## 2023-01-01 ENCOUNTER — TELEPHONE (OUTPATIENT)
Dept: HEMATOLOGY/ONCOLOGY | Facility: CLINIC | Age: 63
End: 2023-01-01

## 2023-01-01 ENCOUNTER — TELEPHONE (OUTPATIENT)
Dept: FAMILY MEDICINE | Facility: CLINIC | Age: 63
End: 2023-01-01

## 2023-01-01 ENCOUNTER — INFUSION (OUTPATIENT)
Dept: INFUSION THERAPY | Facility: HOSPITAL | Age: 63
End: 2023-01-01
Attending: INTERNAL MEDICINE
Payer: MEDICARE

## 2023-01-01 ENCOUNTER — LAB VISIT (OUTPATIENT)
Dept: LAB | Facility: HOSPITAL | Age: 63
End: 2023-01-01
Attending: INTERNAL MEDICINE
Payer: MEDICARE

## 2023-01-01 ENCOUNTER — HOSPITAL ENCOUNTER (INPATIENT)
Facility: HOSPITAL | Age: 63
LOS: 8 days | Discharge: HOME HEALTH | DRG: 871 | End: 2023-08-01
Attending: EMERGENCY MEDICINE | Admitting: FAMILY MEDICINE
Payer: MEDICARE

## 2023-01-01 ENCOUNTER — DOCUMENT SCAN (OUTPATIENT)
Dept: HOME HEALTH SERVICES | Facility: HOSPITAL | Age: 63
End: 2023-01-01
Payer: MEDICARE

## 2023-01-01 ENCOUNTER — HOSPITAL ENCOUNTER (OUTPATIENT)
Dept: RADIOLOGY | Facility: HOSPITAL | Age: 63
Discharge: HOME | End: 2023-07-12
Attending: INTERNAL MEDICINE
Payer: MEDICARE

## 2023-01-01 ENCOUNTER — PATIENT OUTREACH (OUTPATIENT)
Dept: ADMINISTRATIVE | Facility: CLINIC | Age: 63
End: 2023-01-01
Payer: MEDICARE

## 2023-01-01 ENCOUNTER — OFFICE VISIT (OUTPATIENT)
Dept: HEMATOLOGY/ONCOLOGY | Facility: CLINIC | Age: 63
End: 2023-01-01
Payer: MEDICARE

## 2023-01-01 ENCOUNTER — HOSPITAL ENCOUNTER (OUTPATIENT)
Dept: RADIOLOGY | Facility: HOSPITAL | Age: 63
Discharge: HOME | End: 2023-05-29
Attending: NURSE PRACTITIONER
Payer: MEDICARE

## 2023-01-01 ENCOUNTER — PATIENT MESSAGE (OUTPATIENT)
Dept: ADMINISTRATIVE | Facility: HOSPITAL | Age: 63
End: 2023-01-01
Payer: MEDICARE

## 2023-01-01 ENCOUNTER — LAB VISIT (OUTPATIENT)
Dept: LAB | Facility: HOSPITAL | Age: 63
End: 2023-01-01
Attending: FAMILY MEDICINE
Payer: MEDICARE

## 2023-01-01 ENCOUNTER — PATIENT OUTREACH (OUTPATIENT)
Dept: ADMINISTRATIVE | Facility: HOSPITAL | Age: 63
End: 2023-01-01
Payer: MEDICARE

## 2023-01-01 ENCOUNTER — OFFICE VISIT (OUTPATIENT)
Dept: WOUND CARE | Facility: HOSPITAL | Age: 63
End: 2023-01-01
Attending: FAMILY MEDICINE
Payer: MEDICARE

## 2023-01-01 ENCOUNTER — INFUSION (OUTPATIENT)
Dept: INFUSION THERAPY | Facility: HOSPITAL | Age: 63
DRG: 871 | End: 2023-01-01
Attending: INTERNAL MEDICINE
Payer: MEDICARE

## 2023-01-01 ENCOUNTER — TELEPHONE (OUTPATIENT)
Dept: RADIOLOGY | Facility: HOSPITAL | Age: 63
End: 2023-01-01

## 2023-01-01 ENCOUNTER — DOCUMENTATION ONLY (OUTPATIENT)
Dept: INFUSION THERAPY | Facility: HOSPITAL | Age: 63
End: 2023-01-01

## 2023-01-01 ENCOUNTER — HOSPITAL ENCOUNTER (EMERGENCY)
Facility: HOSPITAL | Age: 63
Discharge: HOME | End: 2023-06-26
Attending: EMERGENCY MEDICINE
Payer: MEDICARE

## 2023-01-01 ENCOUNTER — HOSPITAL ENCOUNTER (INPATIENT)
Facility: HOSPITAL | Age: 63
LOS: 1 days | Discharge: HOME | DRG: 194 | End: 2023-06-20
Attending: EMERGENCY MEDICINE | Admitting: STUDENT IN AN ORGANIZED HEALTH CARE EDUCATION/TRAINING PROGRAM
Payer: MEDICARE

## 2023-01-01 ENCOUNTER — HOSPITAL ENCOUNTER (INPATIENT)
Facility: HOSPITAL | Age: 63
LOS: 1 days | DRG: 871 | End: 2023-09-08
Attending: EMERGENCY MEDICINE | Admitting: INTERNAL MEDICINE
Payer: MEDICARE

## 2023-01-01 ENCOUNTER — TELEPHONE (OUTPATIENT)
Dept: INFUSION THERAPY | Facility: HOSPITAL | Age: 63
End: 2023-01-01

## 2023-01-01 ENCOUNTER — EXTERNAL HOME HEALTH (OUTPATIENT)
Dept: HOME HEALTH SERVICES | Facility: HOSPITAL | Age: 63
End: 2023-01-01

## 2023-01-01 VITALS
OXYGEN SATURATION: 95 % | SYSTOLIC BLOOD PRESSURE: 99 MMHG | DIASTOLIC BLOOD PRESSURE: 74 MMHG | RESPIRATION RATE: 18 BRPM | HEART RATE: 79 BPM | WEIGHT: 156 LBS | BODY MASS INDEX: 22.33 KG/M2 | BODY MASS INDEX: 22.38 KG/M2 | RESPIRATION RATE: 18 BRPM | HEIGHT: 70 IN | HEIGHT: 70 IN | WEIGHT: 156.31 LBS | TEMPERATURE: 98 F | SYSTOLIC BLOOD PRESSURE: 99 MMHG | TEMPERATURE: 98 F | DIASTOLIC BLOOD PRESSURE: 64 MMHG | HEART RATE: 74 BPM | OXYGEN SATURATION: 96 %

## 2023-01-01 VITALS
SYSTOLIC BLOOD PRESSURE: 116 MMHG | OXYGEN SATURATION: 97 % | WEIGHT: 151 LBS | DIASTOLIC BLOOD PRESSURE: 77 MMHG | BODY MASS INDEX: 21.62 KG/M2 | HEART RATE: 110 BPM | HEART RATE: 86 BPM | HEIGHT: 70 IN | RESPIRATION RATE: 17 BRPM | BODY MASS INDEX: 22.07 KG/M2 | TEMPERATURE: 98 F | RESPIRATION RATE: 18 BRPM | DIASTOLIC BLOOD PRESSURE: 67 MMHG | WEIGHT: 154.19 LBS | SYSTOLIC BLOOD PRESSURE: 113 MMHG | HEIGHT: 70 IN | OXYGEN SATURATION: 97 %

## 2023-01-01 VITALS
TEMPERATURE: 98 F | WEIGHT: 128 LBS | BODY MASS INDEX: 18.76 KG/M2 | SYSTOLIC BLOOD PRESSURE: 93 MMHG | OXYGEN SATURATION: 98 % | HEIGHT: 71 IN | TEMPERATURE: 98 F | DIASTOLIC BLOOD PRESSURE: 68 MMHG | RESPIRATION RATE: 18 BRPM | WEIGHT: 134 LBS | OXYGEN SATURATION: 100 % | BODY MASS INDEX: 17.92 KG/M2 | RESPIRATION RATE: 18 BRPM | HEART RATE: 115 BPM | RESPIRATION RATE: 16 BRPM | BODY MASS INDEX: 18.62 KG/M2 | DIASTOLIC BLOOD PRESSURE: 77 MMHG | DIASTOLIC BLOOD PRESSURE: 68 MMHG | HEART RATE: 110 BPM | SYSTOLIC BLOOD PRESSURE: 110 MMHG | HEART RATE: 100 BPM | HEIGHT: 71 IN | TEMPERATURE: 98 F | SYSTOLIC BLOOD PRESSURE: 114 MMHG | HEIGHT: 71 IN | WEIGHT: 133 LBS

## 2023-01-01 VITALS
RESPIRATION RATE: 16 BRPM | DIASTOLIC BLOOD PRESSURE: 84 MMHG | TEMPERATURE: 98 F | HEIGHT: 71 IN | WEIGHT: 134 LBS | BODY MASS INDEX: 18.76 KG/M2 | OXYGEN SATURATION: 99 % | SYSTOLIC BLOOD PRESSURE: 122 MMHG | HEART RATE: 111 BPM

## 2023-01-01 VITALS
DIASTOLIC BLOOD PRESSURE: 66 MMHG | BODY MASS INDEX: 22.15 KG/M2 | HEIGHT: 70 IN | WEIGHT: 154.69 LBS | RESPIRATION RATE: 18 BRPM | HEART RATE: 76 BPM | SYSTOLIC BLOOD PRESSURE: 106 MMHG | OXYGEN SATURATION: 98 % | TEMPERATURE: 98 F

## 2023-01-01 VITALS
DIASTOLIC BLOOD PRESSURE: 66 MMHG | SYSTOLIC BLOOD PRESSURE: 101 MMHG | RESPIRATION RATE: 18 BRPM | TEMPERATURE: 97 F | OXYGEN SATURATION: 99 % | HEART RATE: 84 BPM

## 2023-01-01 VITALS
HEIGHT: 70 IN | BODY MASS INDEX: 22.09 KG/M2 | DIASTOLIC BLOOD PRESSURE: 76 MMHG | RESPIRATION RATE: 15 BRPM | WEIGHT: 154.31 LBS | HEART RATE: 89 BPM | SYSTOLIC BLOOD PRESSURE: 135 MMHG | OXYGEN SATURATION: 97 % | TEMPERATURE: 98 F

## 2023-01-01 VITALS
DIASTOLIC BLOOD PRESSURE: 62 MMHG | HEART RATE: 111 BPM | WEIGHT: 132.69 LBS | SYSTOLIC BLOOD PRESSURE: 92 MMHG | TEMPERATURE: 97 F | OXYGEN SATURATION: 98 % | RESPIRATION RATE: 18 BRPM | BODY MASS INDEX: 18.58 KG/M2 | HEIGHT: 71 IN

## 2023-01-01 VITALS
WEIGHT: 152.69 LBS | HEIGHT: 70 IN | SYSTOLIC BLOOD PRESSURE: 119 MMHG | DIASTOLIC BLOOD PRESSURE: 76 MMHG | OXYGEN SATURATION: 96 % | BODY MASS INDEX: 21.86 KG/M2 | RESPIRATION RATE: 18 BRPM | HEART RATE: 90 BPM

## 2023-01-01 VITALS
SYSTOLIC BLOOD PRESSURE: 68 MMHG | RESPIRATION RATE: 18 BRPM | TEMPERATURE: 98 F | OXYGEN SATURATION: 97 % | BODY MASS INDEX: 18.51 KG/M2 | HEIGHT: 71 IN | HEART RATE: 76 BPM | DIASTOLIC BLOOD PRESSURE: 66 MMHG | WEIGHT: 132.19 LBS

## 2023-01-01 VITALS
HEART RATE: 100 BPM | OXYGEN SATURATION: 95 % | BODY MASS INDEX: 24.05 KG/M2 | SYSTOLIC BLOOD PRESSURE: 98 MMHG | TEMPERATURE: 97 F | WEIGHT: 140.88 LBS | DIASTOLIC BLOOD PRESSURE: 44 MMHG | HEIGHT: 64 IN | RESPIRATION RATE: 18 BRPM

## 2023-01-01 VITALS
OXYGEN SATURATION: 100 % | HEIGHT: 70 IN | TEMPERATURE: 99 F | RESPIRATION RATE: 17 BRPM | BODY MASS INDEX: 21.83 KG/M2 | WEIGHT: 152.5 LBS | HEART RATE: 80 BPM | DIASTOLIC BLOOD PRESSURE: 86 MMHG | SYSTOLIC BLOOD PRESSURE: 140 MMHG

## 2023-01-01 VITALS
TEMPERATURE: 98 F | OXYGEN SATURATION: 99 % | WEIGHT: 150 LBS | BODY MASS INDEX: 21.47 KG/M2 | HEIGHT: 70 IN | SYSTOLIC BLOOD PRESSURE: 111 MMHG | DIASTOLIC BLOOD PRESSURE: 76 MMHG | HEART RATE: 102 BPM | RESPIRATION RATE: 17 BRPM

## 2023-01-01 VITALS
OXYGEN SATURATION: 98 % | TEMPERATURE: 98 F | SYSTOLIC BLOOD PRESSURE: 107 MMHG | WEIGHT: 150 LBS | HEIGHT: 70 IN | BODY MASS INDEX: 21.47 KG/M2 | HEIGHT: 70 IN | RESPIRATION RATE: 18 BRPM | DIASTOLIC BLOOD PRESSURE: 69 MMHG | SYSTOLIC BLOOD PRESSURE: 119 MMHG | WEIGHT: 150.19 LBS | BODY MASS INDEX: 21 KG/M2 | OXYGEN SATURATION: 97 % | HEART RATE: 87 BPM | HEART RATE: 82 BPM | WEIGHT: 150 LBS | RESPIRATION RATE: 18 BRPM | TEMPERATURE: 97 F | DIASTOLIC BLOOD PRESSURE: 77 MMHG | HEIGHT: 71 IN | BODY MASS INDEX: 21.5 KG/M2

## 2023-01-01 VITALS
TEMPERATURE: 98 F | BODY MASS INDEX: 19.32 KG/M2 | HEIGHT: 71 IN | SYSTOLIC BLOOD PRESSURE: 147 MMHG | OXYGEN SATURATION: 97 % | RESPIRATION RATE: 18 BRPM | HEART RATE: 90 BPM | WEIGHT: 138 LBS | DIASTOLIC BLOOD PRESSURE: 84 MMHG

## 2023-01-01 VITALS
BODY MASS INDEX: 27.06 KG/M2 | DIASTOLIC BLOOD PRESSURE: 20 MMHG | OXYGEN SATURATION: 71 % | SYSTOLIC BLOOD PRESSURE: 44 MMHG | TEMPERATURE: 97 F | HEIGHT: 64 IN | HEART RATE: 85 BPM | WEIGHT: 158.5 LBS

## 2023-01-01 VITALS
DIASTOLIC BLOOD PRESSURE: 83 MMHG | TEMPERATURE: 97 F | BODY MASS INDEX: 22.25 KG/M2 | HEIGHT: 70 IN | RESPIRATION RATE: 18 BRPM | SYSTOLIC BLOOD PRESSURE: 135 MMHG | HEART RATE: 84 BPM | WEIGHT: 155.44 LBS

## 2023-01-01 VITALS
HEIGHT: 71 IN | BODY MASS INDEX: 18.97 KG/M2 | RESPIRATION RATE: 16 BRPM | DIASTOLIC BLOOD PRESSURE: 75 MMHG | WEIGHT: 135.5 LBS | HEART RATE: 113 BPM | SYSTOLIC BLOOD PRESSURE: 144 MMHG | TEMPERATURE: 98 F

## 2023-01-01 VITALS
SYSTOLIC BLOOD PRESSURE: 115 MMHG | WEIGHT: 132.19 LBS | WEIGHT: 128.69 LBS | HEIGHT: 71 IN | DIASTOLIC BLOOD PRESSURE: 59 MMHG | DIASTOLIC BLOOD PRESSURE: 77 MMHG | HEART RATE: 105 BPM | TEMPERATURE: 98 F | TEMPERATURE: 98 F | RESPIRATION RATE: 16 BRPM | OXYGEN SATURATION: 100 % | BODY MASS INDEX: 18.51 KG/M2 | BODY MASS INDEX: 18.02 KG/M2 | HEIGHT: 71 IN | HEART RATE: 108 BPM | RESPIRATION RATE: 16 BRPM | SYSTOLIC BLOOD PRESSURE: 90 MMHG

## 2023-01-01 VITALS
DIASTOLIC BLOOD PRESSURE: 72 MMHG | BODY MASS INDEX: 22.09 KG/M2 | HEIGHT: 70 IN | HEART RATE: 86 BPM | SYSTOLIC BLOOD PRESSURE: 117 MMHG | RESPIRATION RATE: 18 BRPM | WEIGHT: 154.31 LBS | TEMPERATURE: 98 F | OXYGEN SATURATION: 98 %

## 2023-01-01 VITALS
RESPIRATION RATE: 18 BRPM | HEIGHT: 70 IN | TEMPERATURE: 98 F | HEART RATE: 96 BPM | OXYGEN SATURATION: 98 % | WEIGHT: 154.19 LBS | SYSTOLIC BLOOD PRESSURE: 140 MMHG | BODY MASS INDEX: 22.07 KG/M2 | DIASTOLIC BLOOD PRESSURE: 82 MMHG

## 2023-01-01 VITALS
RESPIRATION RATE: 17 BRPM | DIASTOLIC BLOOD PRESSURE: 62 MMHG | HEIGHT: 70 IN | WEIGHT: 154 LBS | TEMPERATURE: 98 F | RESPIRATION RATE: 16 BRPM | RESPIRATION RATE: 17 BRPM | TEMPERATURE: 98 F | BODY MASS INDEX: 21.43 KG/M2 | SYSTOLIC BLOOD PRESSURE: 123 MMHG | HEART RATE: 98 BPM | TEMPERATURE: 98 F | HEART RATE: 95 BPM | BODY MASS INDEX: 22.05 KG/M2 | OXYGEN SATURATION: 95 % | HEIGHT: 70 IN | DIASTOLIC BLOOD PRESSURE: 81 MMHG | DIASTOLIC BLOOD PRESSURE: 81 MMHG | BODY MASS INDEX: 21.33 KG/M2 | HEART RATE: 80 BPM | SYSTOLIC BLOOD PRESSURE: 123 MMHG | SYSTOLIC BLOOD PRESSURE: 111 MMHG | HEIGHT: 70 IN | WEIGHT: 149.69 LBS | WEIGHT: 149 LBS

## 2023-01-01 VITALS
OXYGEN SATURATION: 95 % | HEIGHT: 71 IN | BODY MASS INDEX: 19.23 KG/M2 | DIASTOLIC BLOOD PRESSURE: 59 MMHG | TEMPERATURE: 97 F | HEART RATE: 93 BPM | WEIGHT: 137.38 LBS | SYSTOLIC BLOOD PRESSURE: 93 MMHG | RESPIRATION RATE: 18 BRPM

## 2023-01-01 VITALS
BODY MASS INDEX: 21.76 KG/M2 | TEMPERATURE: 98 F | RESPIRATION RATE: 18 BRPM | HEART RATE: 90 BPM | SYSTOLIC BLOOD PRESSURE: 110 MMHG | WEIGHT: 152 LBS | DIASTOLIC BLOOD PRESSURE: 72 MMHG | HEIGHT: 70 IN

## 2023-01-01 VITALS
HEART RATE: 95 BPM | RESPIRATION RATE: 17 BRPM | WEIGHT: 147 LBS | BODY MASS INDEX: 20.58 KG/M2 | SYSTOLIC BLOOD PRESSURE: 143 MMHG | DIASTOLIC BLOOD PRESSURE: 86 MMHG | HEIGHT: 71 IN | SYSTOLIC BLOOD PRESSURE: 135 MMHG | DIASTOLIC BLOOD PRESSURE: 81 MMHG | TEMPERATURE: 98 F | OXYGEN SATURATION: 96 % | HEART RATE: 97 BPM | RESPIRATION RATE: 18 BRPM | TEMPERATURE: 98 F | WEIGHT: 143.94 LBS | BODY MASS INDEX: 20.15 KG/M2 | HEIGHT: 71 IN

## 2023-01-01 VITALS
RESPIRATION RATE: 18 BRPM | WEIGHT: 132.13 LBS | TEMPERATURE: 98 F | DIASTOLIC BLOOD PRESSURE: 65 MMHG | HEART RATE: 120 BPM | SYSTOLIC BLOOD PRESSURE: 145 MMHG | HEIGHT: 71 IN | DIASTOLIC BLOOD PRESSURE: 70 MMHG | TEMPERATURE: 99 F | BODY MASS INDEX: 21.11 KG/M2 | SYSTOLIC BLOOD PRESSURE: 118 MMHG | HEART RATE: 109 BPM | HEIGHT: 71 IN | HEIGHT: 71 IN | WEIGHT: 133.31 LBS | BODY MASS INDEX: 18.5 KG/M2 | OXYGEN SATURATION: 100 % | RESPIRATION RATE: 18 BRPM | TEMPERATURE: 98 F | HEART RATE: 106 BPM | SYSTOLIC BLOOD PRESSURE: 130 MMHG | BODY MASS INDEX: 18.66 KG/M2 | OXYGEN SATURATION: 98 % | OXYGEN SATURATION: 99 % | RESPIRATION RATE: 16 BRPM | DIASTOLIC BLOOD PRESSURE: 68 MMHG | WEIGHT: 150.81 LBS

## 2023-01-01 VITALS
HEART RATE: 113 BPM | OXYGEN SATURATION: 97 % | DIASTOLIC BLOOD PRESSURE: 71 MMHG | RESPIRATION RATE: 16 BRPM | SYSTOLIC BLOOD PRESSURE: 105 MMHG | TEMPERATURE: 98 F | HEIGHT: 70 IN | WEIGHT: 146.69 LBS | BODY MASS INDEX: 21 KG/M2

## 2023-01-01 VITALS
WEIGHT: 132.69 LBS | HEART RATE: 114 BPM | BODY MASS INDEX: 18.58 KG/M2 | SYSTOLIC BLOOD PRESSURE: 119 MMHG | TEMPERATURE: 97 F | HEIGHT: 71 IN | RESPIRATION RATE: 16 BRPM | DIASTOLIC BLOOD PRESSURE: 66 MMHG

## 2023-01-01 VITALS
RESPIRATION RATE: 18 BRPM | BODY MASS INDEX: 20.84 KG/M2 | HEIGHT: 71 IN | WEIGHT: 148.88 LBS | HEART RATE: 88 BPM | OXYGEN SATURATION: 97 % | SYSTOLIC BLOOD PRESSURE: 145 MMHG | DIASTOLIC BLOOD PRESSURE: 88 MMHG | TEMPERATURE: 98 F

## 2023-01-01 VITALS
BODY MASS INDEX: 18.69 KG/M2 | TEMPERATURE: 98 F | HEART RATE: 120 BPM | HEIGHT: 71 IN | RESPIRATION RATE: 16 BRPM | DIASTOLIC BLOOD PRESSURE: 77 MMHG | SYSTOLIC BLOOD PRESSURE: 115 MMHG | WEIGHT: 133.5 LBS

## 2023-01-01 VITALS
SYSTOLIC BLOOD PRESSURE: 104 MMHG | OXYGEN SATURATION: 99 % | TEMPERATURE: 97 F | WEIGHT: 134 LBS | RESPIRATION RATE: 17 BRPM | BODY MASS INDEX: 18.76 KG/M2 | DIASTOLIC BLOOD PRESSURE: 69 MMHG | HEART RATE: 101 BPM | HEIGHT: 71 IN

## 2023-01-01 VITALS
OXYGEN SATURATION: 99 % | TEMPERATURE: 98 F | DIASTOLIC BLOOD PRESSURE: 61 MMHG | RESPIRATION RATE: 17 BRPM | HEIGHT: 70 IN | HEART RATE: 106 BPM | WEIGHT: 148 LBS | BODY MASS INDEX: 21.19 KG/M2 | SYSTOLIC BLOOD PRESSURE: 106 MMHG

## 2023-01-01 VITALS
HEART RATE: 80 BPM | BODY MASS INDEX: 20.84 KG/M2 | DIASTOLIC BLOOD PRESSURE: 92 MMHG | TEMPERATURE: 98 F | WEIGHT: 148.88 LBS | SYSTOLIC BLOOD PRESSURE: 142 MMHG | RESPIRATION RATE: 18 BRPM | HEIGHT: 71 IN | OXYGEN SATURATION: 98 %

## 2023-01-01 VITALS
SYSTOLIC BLOOD PRESSURE: 114 MMHG | WEIGHT: 132.69 LBS | DIASTOLIC BLOOD PRESSURE: 73 MMHG | RESPIRATION RATE: 18 BRPM | OXYGEN SATURATION: 93 % | HEART RATE: 100 BPM | HEIGHT: 71 IN | TEMPERATURE: 98 F | BODY MASS INDEX: 18.58 KG/M2

## 2023-01-01 VITALS
HEART RATE: 97 BPM | SYSTOLIC BLOOD PRESSURE: 113 MMHG | DIASTOLIC BLOOD PRESSURE: 77 MMHG | WEIGHT: 150.63 LBS | HEIGHT: 70 IN | TEMPERATURE: 98 F | RESPIRATION RATE: 18 BRPM | BODY MASS INDEX: 21.56 KG/M2

## 2023-01-01 VITALS — HEART RATE: 82 BPM | DIASTOLIC BLOOD PRESSURE: 64 MMHG | SYSTOLIC BLOOD PRESSURE: 117 MMHG | TEMPERATURE: 98 F

## 2023-01-01 VITALS
HEART RATE: 84 BPM | RESPIRATION RATE: 16 BRPM | DIASTOLIC BLOOD PRESSURE: 74 MMHG | SYSTOLIC BLOOD PRESSURE: 118 MMHG | OXYGEN SATURATION: 97 % | TEMPERATURE: 99 F

## 2023-01-01 VITALS
SYSTOLIC BLOOD PRESSURE: 114 MMHG | RESPIRATION RATE: 20 BRPM | HEART RATE: 96 BPM | DIASTOLIC BLOOD PRESSURE: 80 MMHG | TEMPERATURE: 98 F

## 2023-01-01 DIAGNOSIS — F51.02 ADJUSTMENT INSOMNIA: ICD-10-CM

## 2023-01-01 DIAGNOSIS — Z17.0 MALIGNANT NEOPLASM OF LOWER-OUTER QUADRANT OF LEFT BREAST OF FEMALE, ESTROGEN RECEPTOR POSITIVE: ICD-10-CM

## 2023-01-01 DIAGNOSIS — R07.9 CHEST PAIN: ICD-10-CM

## 2023-01-01 DIAGNOSIS — Z17.1 MALIGNANT NEOPLASM OF BOTH BREASTS IN FEMALE, ESTROGEN RECEPTOR NEGATIVE, UNSPECIFIED SITE OF BREAST: ICD-10-CM

## 2023-01-01 DIAGNOSIS — D70.1 CHEMOTHERAPY-INDUCED NEUTROPENIA: ICD-10-CM

## 2023-01-01 DIAGNOSIS — T45.1X5A CHEMOTHERAPY-INDUCED NEUTROPENIA: ICD-10-CM

## 2023-01-01 DIAGNOSIS — C50.911 MALIGNANT NEOPLASM OF BOTH BREASTS IN FEMALE, ESTROGEN RECEPTOR NEGATIVE, UNSPECIFIED SITE OF BREAST: ICD-10-CM

## 2023-01-01 DIAGNOSIS — C50.919 CARCINOMA OF BREAST METASTATIC TO MULTIPLE SITES, UNSPECIFIED LATERALITY: ICD-10-CM

## 2023-01-01 DIAGNOSIS — C50.411 MALIGNANT NEOPLASM OF UPPER-OUTER QUADRANT OF RIGHT BREAST IN FEMALE, ESTROGEN RECEPTOR NEGATIVE: ICD-10-CM

## 2023-01-01 DIAGNOSIS — R05.9 COUGH, UNSPECIFIED TYPE: ICD-10-CM

## 2023-01-01 DIAGNOSIS — E86.0 DEHYDRATION: Primary | ICD-10-CM

## 2023-01-01 DIAGNOSIS — C50.411 MALIGNANT NEOPLASM OF UPPER-OUTER QUADRANT OF RIGHT BREAST IN FEMALE, ESTROGEN RECEPTOR NEGATIVE: Primary | ICD-10-CM

## 2023-01-01 DIAGNOSIS — Z17.1 MALIGNANT NEOPLASM OF UPPER-OUTER QUADRANT OF RIGHT BREAST IN FEMALE, ESTROGEN RECEPTOR NEGATIVE: Primary | ICD-10-CM

## 2023-01-01 DIAGNOSIS — C79.51 MALIGNANT NEOPLASM METASTATIC TO BONE: Primary | ICD-10-CM

## 2023-01-01 DIAGNOSIS — R65.20 SEVERE SEPSIS: ICD-10-CM

## 2023-01-01 DIAGNOSIS — Z17.1 MALIGNANT NEOPLASM OF UPPER-OUTER QUADRANT OF RIGHT BREAST IN FEMALE, ESTROGEN RECEPTOR NEGATIVE: ICD-10-CM

## 2023-01-01 DIAGNOSIS — R19.7 DIARRHEA, UNSPECIFIED TYPE: Primary | ICD-10-CM

## 2023-01-01 DIAGNOSIS — E11.65 TYPE 2 DIABETES MELLITUS WITH HYPERGLYCEMIA, WITH LONG-TERM CURRENT USE OF INSULIN: ICD-10-CM

## 2023-01-01 DIAGNOSIS — R07.81 PLEURITIC CHEST PAIN: ICD-10-CM

## 2023-01-01 DIAGNOSIS — R53.1 WEAKNESS: ICD-10-CM

## 2023-01-01 DIAGNOSIS — E87.6 HYPOKALEMIA: Primary | ICD-10-CM

## 2023-01-01 DIAGNOSIS — C50.512 MALIGNANT NEOPLASM OF LOWER-OUTER QUADRANT OF LEFT BREAST OF FEMALE, ESTROGEN RECEPTOR POSITIVE: ICD-10-CM

## 2023-01-01 DIAGNOSIS — C50.512 MALIGNANT NEOPLASM OF LOWER-OUTER QUADRANT OF LEFT BREAST OF FEMALE, ESTROGEN RECEPTOR POSITIVE: Primary | ICD-10-CM

## 2023-01-01 DIAGNOSIS — C50.912 MALIGNANT NEOPLASM OF BOTH BREASTS IN FEMALE, ESTROGEN RECEPTOR NEGATIVE, UNSPECIFIED SITE OF BREAST: ICD-10-CM

## 2023-01-01 DIAGNOSIS — Z17.0 MALIGNANT NEOPLASM OF LOWER-OUTER QUADRANT OF LEFT BREAST OF FEMALE, ESTROGEN RECEPTOR POSITIVE: Primary | ICD-10-CM

## 2023-01-01 DIAGNOSIS — K04.7 DENTAL INFECTION: ICD-10-CM

## 2023-01-01 DIAGNOSIS — J15.69: ICD-10-CM

## 2023-01-01 DIAGNOSIS — C79.51 BONE METASTASES: ICD-10-CM

## 2023-01-01 DIAGNOSIS — N17.9 AKI (ACUTE KIDNEY INJURY): Primary | ICD-10-CM

## 2023-01-01 DIAGNOSIS — C78.7 METASTASES TO THE LIVER: ICD-10-CM

## 2023-01-01 DIAGNOSIS — T45.1X5A CHEMOTHERAPY-INDUCED NEUTROPENIA: Primary | ICD-10-CM

## 2023-01-01 DIAGNOSIS — Z17.0 MALIGNANT NEOPLASM OF LOWER-INNER QUADRANT OF RIGHT BREAST OF FEMALE, ESTROGEN RECEPTOR POSITIVE: ICD-10-CM

## 2023-01-01 DIAGNOSIS — A41.9 SEVERE SEPSIS: ICD-10-CM

## 2023-01-01 DIAGNOSIS — E86.0 DEHYDRATION: ICD-10-CM

## 2023-01-01 DIAGNOSIS — R11.0 CHEMOTHERAPY-INDUCED NAUSEA: ICD-10-CM

## 2023-01-01 DIAGNOSIS — C79.51 MALIGNANT NEOPLASM METASTATIC TO BONE: ICD-10-CM

## 2023-01-01 DIAGNOSIS — E11.65 TYPE 2 DIABETES MELLITUS WITH HYPERGLYCEMIA, WITH LONG-TERM CURRENT USE OF INSULIN: Primary | ICD-10-CM

## 2023-01-01 DIAGNOSIS — C50.912 BILATERAL MALIGNANT NEOPLASM OF BREAST IN FEMALE, UNSPECIFIED ESTROGEN RECEPTOR STATUS, UNSPECIFIED SITE OF BREAST: ICD-10-CM

## 2023-01-01 DIAGNOSIS — R60.0 MILD PERIPHERAL EDEMA: ICD-10-CM

## 2023-01-01 DIAGNOSIS — G89.3 CANCER-RELATED BREAKTHROUGH PAIN: ICD-10-CM

## 2023-01-01 DIAGNOSIS — C50.911 BILATERAL MALIGNANT NEOPLASM OF BREAST IN FEMALE, UNSPECIFIED ESTROGEN RECEPTOR STATUS, UNSPECIFIED SITE OF BREAST: ICD-10-CM

## 2023-01-01 DIAGNOSIS — R06.02 SHORTNESS OF BREATH: Primary | ICD-10-CM

## 2023-01-01 DIAGNOSIS — D64.9 SYMPTOMATIC ANEMIA: Primary | ICD-10-CM

## 2023-01-01 DIAGNOSIS — R53.83 FATIGUE, UNSPECIFIED TYPE: ICD-10-CM

## 2023-01-01 DIAGNOSIS — J90 PLEURISY WITH EFFUSION: Primary | ICD-10-CM

## 2023-01-01 DIAGNOSIS — C50.911 MALIGNANT NEOPLASM OF RIGHT FEMALE BREAST, UNSPECIFIED ESTROGEN RECEPTOR STATUS, UNSPECIFIED SITE OF BREAST: ICD-10-CM

## 2023-01-01 DIAGNOSIS — J15.69 GRAM-NEGATIVE PNEUMONIA: Primary | ICD-10-CM

## 2023-01-01 DIAGNOSIS — C50.919 CARCINOMA OF BREAST METASTATIC TO LIVER, UNSPECIFIED LATERALITY: ICD-10-CM

## 2023-01-01 DIAGNOSIS — D61.818 PANCYTOPENIA: ICD-10-CM

## 2023-01-01 DIAGNOSIS — R05.9 COUGH: ICD-10-CM

## 2023-01-01 DIAGNOSIS — C79.51 BONE METASTASES: Primary | ICD-10-CM

## 2023-01-01 DIAGNOSIS — I10 ESSENTIAL HYPERTENSION: ICD-10-CM

## 2023-01-01 DIAGNOSIS — R05.9 COUGH, UNSPECIFIED TYPE: Primary | ICD-10-CM

## 2023-01-01 DIAGNOSIS — E78.2 MIXED HYPERLIPIDEMIA: ICD-10-CM

## 2023-01-01 DIAGNOSIS — E87.20 LACTIC ACIDOSIS: ICD-10-CM

## 2023-01-01 DIAGNOSIS — E87.6 HYPOKALEMIA: ICD-10-CM

## 2023-01-01 DIAGNOSIS — R32 URINARY INCONTINENCE, UNSPECIFIED TYPE: ICD-10-CM

## 2023-01-01 DIAGNOSIS — D64.9 SYMPTOMATIC ANEMIA: ICD-10-CM

## 2023-01-01 DIAGNOSIS — Z79.4 TYPE 2 DIABETES MELLITUS WITH HYPERGLYCEMIA, WITH LONG-TERM CURRENT USE OF INSULIN: Primary | ICD-10-CM

## 2023-01-01 DIAGNOSIS — C78.7 CARCINOMA OF BREAST METASTATIC TO LIVER, UNSPECIFIED LATERALITY: ICD-10-CM

## 2023-01-01 DIAGNOSIS — Z99.81 HYPOXEMIA REQUIRING SUPPLEMENTAL OXYGEN: ICD-10-CM

## 2023-01-01 DIAGNOSIS — Z79.4 TYPE 2 DIABETES MELLITUS WITH HYPERGLYCEMIA, WITH LONG-TERM CURRENT USE OF INSULIN: ICD-10-CM

## 2023-01-01 DIAGNOSIS — L89.152 PRESSURE INJURY OF SACRAL REGION, STAGE 2: Primary | ICD-10-CM

## 2023-01-01 DIAGNOSIS — C50.311 MALIGNANT NEOPLASM OF LOWER-INNER QUADRANT OF RIGHT BREAST OF FEMALE, ESTROGEN RECEPTOR POSITIVE: ICD-10-CM

## 2023-01-01 DIAGNOSIS — R09.02 HYPOXEMIA REQUIRING SUPPLEMENTAL OXYGEN: ICD-10-CM

## 2023-01-01 DIAGNOSIS — R11.10 VOMITING, UNSPECIFIED VOMITING TYPE, UNSPECIFIED WHETHER NAUSEA PRESENT: Primary | ICD-10-CM

## 2023-01-01 DIAGNOSIS — Z00.00 ROUTINE GENERAL MEDICAL EXAMINATION AT A HEALTH CARE FACILITY: ICD-10-CM

## 2023-01-01 DIAGNOSIS — R11.0 NAUSEA: ICD-10-CM

## 2023-01-01 DIAGNOSIS — C78.7 METASTASES TO THE LIVER: Primary | ICD-10-CM

## 2023-01-01 DIAGNOSIS — F06.4 ANXIETY DISORDER DUE TO KNOWN PHYSIOLOGICAL CONDITION: ICD-10-CM

## 2023-01-01 DIAGNOSIS — T45.1X5A CHEMOTHERAPY-INDUCED NAUSEA: ICD-10-CM

## 2023-01-01 DIAGNOSIS — D70.1 CHEMOTHERAPY-INDUCED NEUTROPENIA: Primary | ICD-10-CM

## 2023-01-01 DIAGNOSIS — R07.81 RIB PAIN: ICD-10-CM

## 2023-01-01 DIAGNOSIS — R06.02 SOB (SHORTNESS OF BREATH): ICD-10-CM

## 2023-01-01 DIAGNOSIS — D69.6 THROMBOCYTOPENIA: ICD-10-CM

## 2023-01-01 DIAGNOSIS — C78.7 LIVER METASTASES: ICD-10-CM

## 2023-01-01 DIAGNOSIS — J18.9 PNEUMONIA OF RIGHT LUNG DUE TO INFECTIOUS ORGANISM, UNSPECIFIED PART OF LUNG: ICD-10-CM

## 2023-01-01 LAB
1,25(OH)2D3 SERPL-MCNC: 7.4 PG/ML (ref 24.8–81.5)
25(OH)D3+25(OH)D2 SERPL-MCNC: 7 NG/ML (ref 30–96)
ABO + RH BLD: NORMAL
ALBUMIN SERPL BCP-MCNC: 1.7 G/DL (ref 3.5–5.2)
ALBUMIN SERPL BCP-MCNC: 1.8 G/DL (ref 3.5–5.2)
ALBUMIN SERPL BCP-MCNC: 1.9 G/DL (ref 3.5–5.2)
ALBUMIN SERPL BCP-MCNC: 2.1 G/DL (ref 3.5–5.2)
ALBUMIN SERPL BCP-MCNC: 2.2 G/DL (ref 3.5–5.2)
ALBUMIN SERPL BCP-MCNC: 2.3 G/DL (ref 3.5–5.2)
ALBUMIN SERPL BCP-MCNC: 2.5 G/DL (ref 3.5–5.2)
ALBUMIN SERPL BCP-MCNC: 2.7 G/DL (ref 3.5–5.2)
ALBUMIN SERPL BCP-MCNC: 2.8 G/DL (ref 3.5–5.2)
ALBUMIN SERPL BCP-MCNC: 2.9 G/DL (ref 3.5–5.2)
ALBUMIN SERPL BCP-MCNC: 3.1 G/DL (ref 3.5–5.2)
ALBUMIN SERPL BCP-MCNC: 3.1 G/DL (ref 3.5–5.2)
ALBUMIN SERPL BCP-MCNC: 3.2 G/DL (ref 3.5–5.2)
ALBUMIN SERPL BCP-MCNC: 3.5 G/DL (ref 3.5–5.2)
ALBUMIN SERPL BCP-MCNC: 3.7 G/DL (ref 3.5–5.2)
ALBUMIN SERPL BCP-MCNC: 3.7 G/DL (ref 3.5–5.2)
ALBUMIN SERPL BCP-MCNC: 3.8 G/DL (ref 3.5–5.2)
ALBUMIN SERPL BCP-MCNC: 3.8 G/DL (ref 3.5–5.2)
ALBUMIN SERPL BCP-MCNC: 3.9 G/DL (ref 3.5–5.2)
ALBUMIN SERPL BCP-MCNC: 4 G/DL (ref 3.5–5.2)
ALBUMIN SERPL BCP-MCNC: 4.1 G/DL (ref 3.5–5.2)
ALBUMIN SERPL BCP-MCNC: 4.1 G/DL (ref 3.5–5.2)
ALBUMIN SERPL BCP-MCNC: 4.2 G/DL (ref 3.5–5.2)
ALBUMIN SERPL BCP-MCNC: 4.2 G/DL (ref 3.5–5.2)
ALBUMIN/CREAT UR: 68.5 UG/MG (ref 0–30)
ALLENS TEST: ABNORMAL
ALLENS TEST: ABNORMAL
ALP SERPL-CCNC: 185 U/L (ref 55–135)
ALP SERPL-CCNC: 193 U/L (ref 55–135)
ALP SERPL-CCNC: 212 U/L (ref 55–135)
ALP SERPL-CCNC: 213 U/L (ref 55–135)
ALP SERPL-CCNC: 221 U/L (ref 55–135)
ALP SERPL-CCNC: 224 U/L (ref 55–135)
ALP SERPL-CCNC: 225 U/L (ref 55–135)
ALP SERPL-CCNC: 231 U/L (ref 55–135)
ALP SERPL-CCNC: 233 U/L (ref 55–135)
ALP SERPL-CCNC: 236 U/L (ref 55–135)
ALP SERPL-CCNC: 236 U/L (ref 55–135)
ALP SERPL-CCNC: 237 U/L (ref 55–135)
ALP SERPL-CCNC: 242 U/L (ref 55–135)
ALP SERPL-CCNC: 251 U/L (ref 55–135)
ALP SERPL-CCNC: 254 U/L (ref 55–135)
ALP SERPL-CCNC: 258 U/L (ref 55–135)
ALP SERPL-CCNC: 266 U/L (ref 55–135)
ALP SERPL-CCNC: 274 U/L (ref 55–135)
ALP SERPL-CCNC: 275 U/L (ref 55–135)
ALP SERPL-CCNC: 304 U/L (ref 55–135)
ALP SERPL-CCNC: 335 U/L (ref 55–135)
ALP SERPL-CCNC: 337 U/L (ref 55–135)
ALP SERPL-CCNC: 343 U/L (ref 55–135)
ALP SERPL-CCNC: 346 U/L (ref 55–135)
ALP SERPL-CCNC: 353 U/L (ref 55–135)
ALP SERPL-CCNC: 355 U/L (ref 55–135)
ALP SERPL-CCNC: 383 U/L (ref 55–135)
ALP SERPL-CCNC: 385 U/L (ref 55–135)
ALP SERPL-CCNC: 407 U/L (ref 55–135)
ALP SERPL-CCNC: 431 U/L (ref 55–135)
ALP SERPL-CCNC: 437 U/L (ref 55–135)
ALP SERPL-CCNC: 464 U/L (ref 55–135)
ALP SERPL-CCNC: 518 U/L (ref 55–135)
ALP SERPL-CCNC: 539 U/L (ref 55–135)
ALP SERPL-CCNC: 540 U/L (ref 55–135)
ALP SERPL-CCNC: 594 U/L (ref 55–135)
ALP SERPL-CCNC: 640 U/L (ref 55–135)
ALP SERPL-CCNC: 675 U/L (ref 55–135)
ALP SERPL-CCNC: 675 U/L (ref 55–135)
ALP SERPL-CCNC: 788 U/L (ref 55–135)
ALT SERPL W/O P-5'-P-CCNC: 17 U/L (ref 10–44)
ALT SERPL W/O P-5'-P-CCNC: 17 U/L (ref 10–44)
ALT SERPL W/O P-5'-P-CCNC: 18 U/L (ref 10–44)
ALT SERPL W/O P-5'-P-CCNC: 18 U/L (ref 10–44)
ALT SERPL W/O P-5'-P-CCNC: 185 U/L (ref 10–44)
ALT SERPL W/O P-5'-P-CCNC: 185 U/L (ref 10–44)
ALT SERPL W/O P-5'-P-CCNC: 20 U/L (ref 10–44)
ALT SERPL W/O P-5'-P-CCNC: 21 U/L (ref 10–44)
ALT SERPL W/O P-5'-P-CCNC: 21 U/L (ref 10–44)
ALT SERPL W/O P-5'-P-CCNC: 210 U/L (ref 10–44)
ALT SERPL W/O P-5'-P-CCNC: 217 U/L (ref 10–44)
ALT SERPL W/O P-5'-P-CCNC: 23 U/L (ref 10–44)
ALT SERPL W/O P-5'-P-CCNC: 24 U/L (ref 10–44)
ALT SERPL W/O P-5'-P-CCNC: 25 U/L (ref 10–44)
ALT SERPL W/O P-5'-P-CCNC: 26 U/L (ref 10–44)
ALT SERPL W/O P-5'-P-CCNC: 27 U/L (ref 10–44)
ALT SERPL W/O P-5'-P-CCNC: 28 U/L (ref 10–44)
ALT SERPL W/O P-5'-P-CCNC: 29 U/L (ref 10–44)
ALT SERPL W/O P-5'-P-CCNC: 29 U/L (ref 10–44)
ALT SERPL W/O P-5'-P-CCNC: 30 U/L (ref 10–44)
ALT SERPL W/O P-5'-P-CCNC: 32 U/L (ref 10–44)
ALT SERPL W/O P-5'-P-CCNC: 32 U/L (ref 10–44)
ALT SERPL W/O P-5'-P-CCNC: 33 U/L (ref 10–44)
ALT SERPL W/O P-5'-P-CCNC: 34 U/L (ref 10–44)
ALT SERPL W/O P-5'-P-CCNC: 36 U/L (ref 10–44)
ALT SERPL W/O P-5'-P-CCNC: 39 U/L (ref 10–44)
ALT SERPL W/O P-5'-P-CCNC: 40 U/L (ref 10–44)
ALT SERPL W/O P-5'-P-CCNC: 43 U/L (ref 10–44)
ALT SERPL W/O P-5'-P-CCNC: 47 U/L (ref 10–44)
ALT SERPL W/O P-5'-P-CCNC: 49 U/L (ref 10–44)
ALT SERPL W/O P-5'-P-CCNC: 52 U/L (ref 10–44)
ALT SERPL W/O P-5'-P-CCNC: 54 U/L (ref 10–44)
ALT SERPL W/O P-5'-P-CCNC: 55 U/L (ref 10–44)
ALT SERPL W/O P-5'-P-CCNC: 98 U/L (ref 10–44)
AMMONIA PLAS-SCNC: 40 UMOL/L (ref 10–50)
AMORPH CRY URNS QL MICRO: ABNORMAL
AMPHET+METHAMPHET UR QL: NEGATIVE
ANION GAP SERPL CALC-SCNC: 10 MMOL/L (ref 8–16)
ANION GAP SERPL CALC-SCNC: 11 MMOL/L (ref 8–16)
ANION GAP SERPL CALC-SCNC: 12 MMOL/L (ref 8–16)
ANION GAP SERPL CALC-SCNC: 12 MMOL/L (ref 8–16)
ANION GAP SERPL CALC-SCNC: 13 MMOL/L (ref 8–16)
ANION GAP SERPL CALC-SCNC: 3 MMOL/L (ref 8–16)
ANION GAP SERPL CALC-SCNC: 4 MMOL/L (ref 8–16)
ANION GAP SERPL CALC-SCNC: 5 MMOL/L (ref 8–16)
ANION GAP SERPL CALC-SCNC: 6 MMOL/L (ref 8–16)
ANION GAP SERPL CALC-SCNC: 7 MMOL/L (ref 8–16)
ANION GAP SERPL CALC-SCNC: 8 MMOL/L (ref 8–16)
ANION GAP SERPL CALC-SCNC: 9 MMOL/L (ref 8–16)
ANION GAP SERPL CALC-SCNC: 9 MMOL/L (ref 8–16)
ANISOCYTOSIS BLD QL SMEAR: ABNORMAL
ANISOCYTOSIS BLD QL SMEAR: SLIGHT
APTT PPP: 25.6 SEC (ref 21–32)
APTT PPP: 26.3 SEC (ref 21–32)
AST SERPL-CCNC: 1096 U/L (ref 10–40)
AST SERPL-CCNC: 1096 U/L (ref 10–40)
AST SERPL-CCNC: 128 U/L (ref 10–40)
AST SERPL-CCNC: 129 U/L (ref 10–40)
AST SERPL-CCNC: 1405 U/L (ref 10–40)
AST SERPL-CCNC: 149 U/L (ref 10–40)
AST SERPL-CCNC: 167 U/L (ref 10–40)
AST SERPL-CCNC: 169 U/L (ref 10–40)
AST SERPL-CCNC: 184 U/L (ref 10–40)
AST SERPL-CCNC: 23 U/L (ref 10–40)
AST SERPL-CCNC: 24 U/L (ref 10–40)
AST SERPL-CCNC: 26 U/L (ref 10–40)
AST SERPL-CCNC: 27 U/L (ref 10–40)
AST SERPL-CCNC: 28 U/L (ref 10–40)
AST SERPL-CCNC: 28 U/L (ref 10–40)
AST SERPL-CCNC: 29 U/L (ref 10–40)
AST SERPL-CCNC: 29 U/L (ref 10–40)
AST SERPL-CCNC: 31 U/L (ref 10–40)
AST SERPL-CCNC: 31 U/L (ref 10–40)
AST SERPL-CCNC: 32 U/L (ref 10–40)
AST SERPL-CCNC: 32 U/L (ref 10–40)
AST SERPL-CCNC: 33 U/L (ref 10–40)
AST SERPL-CCNC: 35 U/L (ref 10–40)
AST SERPL-CCNC: 35 U/L (ref 10–40)
AST SERPL-CCNC: 38 U/L (ref 10–40)
AST SERPL-CCNC: 42 U/L (ref 10–40)
AST SERPL-CCNC: 43 U/L (ref 10–40)
AST SERPL-CCNC: 46 U/L (ref 10–40)
AST SERPL-CCNC: 48 U/L (ref 10–40)
AST SERPL-CCNC: 49 U/L (ref 10–40)
AST SERPL-CCNC: 51 U/L (ref 10–40)
AST SERPL-CCNC: 55 U/L (ref 10–40)
AST SERPL-CCNC: 57 U/L (ref 10–40)
AST SERPL-CCNC: 71 U/L (ref 10–40)
AST SERPL-CCNC: 95 U/L (ref 10–40)
BACTERIA #/AREA URNS HPF: ABNORMAL /HPF
BACTERIA #/AREA URNS HPF: ABNORMAL /HPF
BACTERIA #/AREA URNS HPF: NEGATIVE /HPF
BACTERIA BLD CULT: NORMAL
BACTERIA SPEC AEROBE CULT: ABNORMAL
BACTERIA SPEC AEROBE CULT: ABNORMAL
BARBITURATES UR QL SCN>200 NG/ML: NEGATIVE
BASOPHILS # BLD AUTO: 0 K/UL (ref 0–0.2)
BASOPHILS # BLD AUTO: 0 K/UL (ref 0–0.2)
BASOPHILS # BLD AUTO: 0.01 K/UL (ref 0–0.2)
BASOPHILS # BLD AUTO: 0.02 K/UL (ref 0–0.2)
BASOPHILS # BLD AUTO: 0.03 K/UL (ref 0–0.2)
BASOPHILS # BLD AUTO: 0.04 K/UL (ref 0–0.2)
BASOPHILS # BLD AUTO: 0.04 K/UL (ref 0–0.2)
BASOPHILS # BLD AUTO: 0.05 K/UL (ref 0–0.2)
BASOPHILS # BLD AUTO: 0.05 K/UL (ref 0–0.2)
BASOPHILS # BLD AUTO: 0.08 K/UL (ref 0–0.2)
BASOPHILS # BLD AUTO: ABNORMAL K/UL (ref 0–0.2)
BASOPHILS NFR BLD: 0 % (ref 0–1.9)
BASOPHILS NFR BLD: 0.1 % (ref 0–1.9)
BASOPHILS NFR BLD: 0.2 % (ref 0–1.9)
BASOPHILS NFR BLD: 0.3 % (ref 0–1.9)
BASOPHILS NFR BLD: 0.4 % (ref 0–1.9)
BASOPHILS NFR BLD: 0.5 % (ref 0–1.9)
BASOPHILS NFR BLD: 0.5 % (ref 0–1.9)
BASOPHILS NFR BLD: 0.6 % (ref 0–1.9)
BASOPHILS NFR BLD: 0.7 % (ref 0–1.9)
BASOPHILS NFR BLD: 0.8 % (ref 0–1.9)
BASOPHILS NFR BLD: 0.8 % (ref 0–1.9)
BASOPHILS NFR BLD: 0.9 % (ref 0–1.9)
BASOPHILS NFR BLD: 1 % (ref 0–1.9)
BENZODIAZ UR QL SCN>200 NG/ML: NEGATIVE
BILIRUB SERPL-MCNC: 0.3 MG/DL (ref 0.1–1)
BILIRUB SERPL-MCNC: 0.3 MG/DL (ref 0.1–1)
BILIRUB SERPL-MCNC: 0.4 MG/DL (ref 0.1–1)
BILIRUB SERPL-MCNC: 0.5 MG/DL (ref 0.1–1)
BILIRUB SERPL-MCNC: 0.6 MG/DL (ref 0.1–1)
BILIRUB SERPL-MCNC: 0.7 MG/DL (ref 0.1–1)
BILIRUB SERPL-MCNC: 0.9 MG/DL (ref 0.1–1)
BILIRUB SERPL-MCNC: 1 MG/DL (ref 0.1–1)
BILIRUB SERPL-MCNC: 1 MG/DL (ref 0.1–1)
BILIRUB SERPL-MCNC: 1.1 MG/DL (ref 0.1–1)
BILIRUB SERPL-MCNC: 1.2 MG/DL (ref 0.1–1)
BILIRUB SERPL-MCNC: 1.9 MG/DL (ref 0.1–1)
BILIRUB SERPL-MCNC: 2 MG/DL (ref 0.1–1)
BILIRUB UR QL STRIP: NEGATIVE
BLD GP AB SCN CELLS X3 SERPL QL: NORMAL
BLD PROD TYP BPU: NORMAL
BLOOD UNIT EXPIRATION DATE: NORMAL
BLOOD UNIT TYPE CODE: 5100
BLOOD UNIT TYPE: NORMAL
BNP SERPL-MCNC: 108 PG/ML (ref 0–99)
BNP SERPL-MCNC: 35 PG/ML (ref 0–99)
BUN SERPL-MCNC: 10 MG/DL (ref 8–23)
BUN SERPL-MCNC: 11 MG/DL (ref 6–20)
BUN SERPL-MCNC: 11 MG/DL (ref 8–23)
BUN SERPL-MCNC: 12 MG/DL (ref 6–20)
BUN SERPL-MCNC: 12 MG/DL (ref 8–23)
BUN SERPL-MCNC: 13 MG/DL (ref 6–20)
BUN SERPL-MCNC: 13 MG/DL (ref 6–20)
BUN SERPL-MCNC: 15 MG/DL (ref 6–20)
BUN SERPL-MCNC: 16 MG/DL (ref 8–23)
BUN SERPL-MCNC: 16 MG/DL (ref 8–23)
BUN SERPL-MCNC: 18 MG/DL (ref 6–20)
BUN SERPL-MCNC: 19 MG/DL (ref 8–23)
BUN SERPL-MCNC: 22 MG/DL (ref 8–23)
BUN SERPL-MCNC: 23 MG/DL (ref 8–23)
BUN SERPL-MCNC: 23 MG/DL (ref 8–23)
BUN SERPL-MCNC: 25 MG/DL (ref 8–23)
BUN SERPL-MCNC: 26 MG/DL (ref 8–23)
BUN SERPL-MCNC: 27 MG/DL (ref 8–23)
BUN SERPL-MCNC: 27 MG/DL (ref 8–23)
BUN SERPL-MCNC: 29 MG/DL (ref 8–23)
BUN SERPL-MCNC: 32 MG/DL (ref 8–23)
BUN SERPL-MCNC: 34 MG/DL (ref 8–23)
BUN SERPL-MCNC: 34 MG/DL (ref 8–23)
BUN SERPL-MCNC: 36 MG/DL (ref 8–23)
BUN SERPL-MCNC: 39 MG/DL (ref 8–23)
BUN SERPL-MCNC: 42 MG/DL (ref 8–23)
BUN SERPL-MCNC: 6 MG/DL (ref 6–20)
BUN SERPL-MCNC: 64 MG/DL (ref 8–23)
BUN SERPL-MCNC: 8 MG/DL (ref 6–20)
BUN SERPL-MCNC: 8 MG/DL (ref 8–23)
BUN SERPL-MCNC: 9 MG/DL (ref 6–20)
BUN SERPL-MCNC: 9 MG/DL (ref 8–23)
BZE UR QL SCN: NEGATIVE
CA-I BLDV-SCNC: 1.05 MMOL/L (ref 1.06–1.42)
CA-I BLDV-SCNC: 1.38 MMOL/L (ref 1.06–1.42)
CALCIUM SERPL-MCNC: 10.1 MG/DL (ref 8.7–10.5)
CALCIUM SERPL-MCNC: 10.2 MG/DL (ref 8.7–10.5)
CALCIUM SERPL-MCNC: 10.6 MG/DL (ref 8.7–10.5)
CALCIUM SERPL-MCNC: 11.1 MG/DL (ref 8.7–10.5)
CALCIUM SERPL-MCNC: 12.1 MG/DL (ref 8.7–10.5)
CALCIUM SERPL-MCNC: 6.9 MG/DL (ref 8.7–10.5)
CALCIUM SERPL-MCNC: 7.9 MG/DL (ref 8.7–10.5)
CALCIUM SERPL-MCNC: 7.9 MG/DL (ref 8.7–10.5)
CALCIUM SERPL-MCNC: 8.1 MG/DL (ref 8.7–10.5)
CALCIUM SERPL-MCNC: 8.1 MG/DL (ref 8.7–10.5)
CALCIUM SERPL-MCNC: 8.2 MG/DL (ref 8.7–10.5)
CALCIUM SERPL-MCNC: 8.5 MG/DL (ref 8.7–10.5)
CALCIUM SERPL-MCNC: 8.6 MG/DL (ref 8.7–10.5)
CALCIUM SERPL-MCNC: 8.6 MG/DL (ref 8.7–10.5)
CALCIUM SERPL-MCNC: 8.7 MG/DL (ref 8.7–10.5)
CALCIUM SERPL-MCNC: 8.7 MG/DL (ref 8.7–10.5)
CALCIUM SERPL-MCNC: 8.8 MG/DL (ref 8.7–10.5)
CALCIUM SERPL-MCNC: 8.8 MG/DL (ref 8.7–10.5)
CALCIUM SERPL-MCNC: 8.9 MG/DL (ref 8.7–10.5)
CALCIUM SERPL-MCNC: 9 MG/DL (ref 8.7–10.5)
CALCIUM SERPL-MCNC: 9.1 MG/DL (ref 8.7–10.5)
CALCIUM SERPL-MCNC: 9.1 MG/DL (ref 8.7–10.5)
CALCIUM SERPL-MCNC: 9.3 MG/DL (ref 8.7–10.5)
CALCIUM SERPL-MCNC: 9.4 MG/DL (ref 8.7–10.5)
CALCIUM SERPL-MCNC: 9.5 MG/DL (ref 8.7–10.5)
CALCIUM SERPL-MCNC: 9.6 MG/DL (ref 8.7–10.5)
CALCIUM SERPL-MCNC: 9.7 MG/DL (ref 8.7–10.5)
CALCIUM SERPL-MCNC: 9.8 MG/DL (ref 8.7–10.5)
CANCER AG15-3 SERPL IA-ACNC: 40 U/ML
CANCER AG15-3 SERPL IA-ACNC: 45 U/ML
CANCER AG15-3 SERPL-ACNC: 102 U/ML (ref 0–25)
CANCER AG15-3 SERPL-ACNC: 42.6 U/ML (ref 0–25)
CANCER AG15-3 SERPL-ACNC: 44.1 U/ML (ref 0–25)
CANCER AG15-3 SERPL-ACNC: 46.3 U/ML (ref 0–25)
CANCER AG15-3 SERPL-ACNC: 50 U/ML (ref 0–25)
CANCER AG15-3 SERPL-ACNC: 51.6 U/ML (ref 0–25)
CANCER AG15-3 SERPL-ACNC: 54.2 U/ML (ref 0–25)
CANCER AG15-3 SERPL-ACNC: 55.9 U/ML (ref 0–25)
CANCER AG15-3 SERPL-ACNC: 56.1 U/ML (ref 0–25)
CANCER AG15-3 SERPL-ACNC: 56.5 U/ML (ref 0–25)
CANCER AG15-3 SERPL-ACNC: 59.6 U/ML (ref 0–25)
CANCER AG15-3 SERPL-ACNC: 60.5 U/ML (ref 0–25)
CANCER AG15-3 SERPL-ACNC: 63.9 U/ML (ref 0–25)
CANCER AG15-3 SERPL-ACNC: 64.8 U/ML (ref 0–25)
CANCER AG15-3 SERPL-ACNC: 65.1 U/ML (ref 0–25)
CANCER AG15-3 SERPL-ACNC: 83.9 U/ML (ref 0–25)
CANCER AG15-3 SERPL-ACNC: 91.7 U/ML (ref 0–25)
CANCER AG27-29 SERPL-ACNC: 108.1 U/ML (ref 0–38.6)
CANCER AG27-29 SERPL-ACNC: 114.5 U/ML (ref 0–38.6)
CANCER AG27-29 SERPL-ACNC: 119.5 U/ML (ref 0–38.6)
CANCER AG27-29 SERPL-ACNC: 50.6 U/ML
CANCER AG27-29 SERPL-ACNC: 53.5 U/ML (ref 0–38.6)
CANCER AG27-29 SERPL-ACNC: 53.7 U/ML
CANCER AG27-29 SERPL-ACNC: 55.8 U/ML (ref 0–38.6)
CANCER AG27-29 SERPL-ACNC: 60 U/ML (ref 0–38.6)
CANCER AG27-29 SERPL-ACNC: 66.4 U/ML (ref 0–38.6)
CANCER AG27-29 SERPL-ACNC: 67.3 U/ML (ref 0–38.6)
CANCER AG27-29 SERPL-ACNC: 69.1 U/ML (ref 0–38.6)
CANCER AG27-29 SERPL-ACNC: 73.9 U/ML (ref 0–38.6)
CANCER AG27-29 SERPL-ACNC: 74 U/ML (ref 0–38.6)
CANCER AG27-29 SERPL-ACNC: 74.2 U/ML (ref 0–38.6)
CANCER AG27-29 SERPL-ACNC: 75.2 U/ML (ref 0–38.6)
CANCER AG27-29 SERPL-ACNC: 77.9 U/ML (ref 0–38.6)
CANCER AG27-29 SERPL-ACNC: 80.6 U/ML (ref 0–38.6)
CANCER AG27-29 SERPL-ACNC: 80.9 U/ML (ref 0–38.6)
CANCER AG27-29 SERPL-ACNC: 85.6 U/ML (ref 0–38.6)
CANNABINOIDS UR QL SCN: NEGATIVE
CHLORIDE SERPL-SCNC: 100 MMOL/L (ref 95–110)
CHLORIDE SERPL-SCNC: 100 MMOL/L (ref 95–110)
CHLORIDE SERPL-SCNC: 101 MMOL/L (ref 95–110)
CHLORIDE SERPL-SCNC: 102 MMOL/L (ref 95–110)
CHLORIDE SERPL-SCNC: 102 MMOL/L (ref 95–110)
CHLORIDE SERPL-SCNC: 103 MMOL/L (ref 95–110)
CHLORIDE SERPL-SCNC: 103 MMOL/L (ref 95–110)
CHLORIDE SERPL-SCNC: 104 MMOL/L (ref 95–110)
CHLORIDE SERPL-SCNC: 105 MMOL/L (ref 95–110)
CHLORIDE SERPL-SCNC: 106 MMOL/L (ref 95–110)
CHLORIDE SERPL-SCNC: 107 MMOL/L (ref 95–110)
CHLORIDE SERPL-SCNC: 108 MMOL/L (ref 95–110)
CHLORIDE SERPL-SCNC: 109 MMOL/L (ref 95–110)
CHLORIDE SERPL-SCNC: 110 MMOL/L (ref 95–110)
CHLORIDE SERPL-SCNC: 111 MMOL/L (ref 95–110)
CHLORIDE SERPL-SCNC: 111 MMOL/L (ref 95–110)
CHLORIDE SERPL-SCNC: 112 MMOL/L (ref 95–110)
CHLORIDE SERPL-SCNC: 112 MMOL/L (ref 95–110)
CHLORIDE SERPL-SCNC: 114 MMOL/L (ref 95–110)
CHLORIDE SERPL-SCNC: 115 MMOL/L (ref 95–110)
CHLORIDE SERPL-SCNC: 115 MMOL/L (ref 95–110)
CHOLEST SERPL-MCNC: 157 MG/DL (ref 120–199)
CHOLEST/HDLC SERPL: 2 {RATIO} (ref 2–5)
CK SERPL-CCNC: 68 U/L (ref 20–180)
CLARITY UR: ABNORMAL
CLARITY UR: CLEAR
CO2 SERPL-SCNC: 12 MMOL/L (ref 23–29)
CO2 SERPL-SCNC: 14 MMOL/L (ref 23–29)
CO2 SERPL-SCNC: 14 MMOL/L (ref 23–29)
CO2 SERPL-SCNC: 15 MMOL/L (ref 23–29)
CO2 SERPL-SCNC: 19 MMOL/L (ref 23–29)
CO2 SERPL-SCNC: 20 MMOL/L (ref 23–29)
CO2 SERPL-SCNC: 20 MMOL/L (ref 23–29)
CO2 SERPL-SCNC: 21 MMOL/L (ref 23–29)
CO2 SERPL-SCNC: 22 MMOL/L (ref 23–29)
CO2 SERPL-SCNC: 22 MMOL/L (ref 23–29)
CO2 SERPL-SCNC: 23 MMOL/L (ref 23–29)
CO2 SERPL-SCNC: 24 MMOL/L (ref 23–29)
CO2 SERPL-SCNC: 25 MMOL/L (ref 23–29)
CO2 SERPL-SCNC: 26 MMOL/L (ref 23–29)
CO2 SERPL-SCNC: 26 MMOL/L (ref 23–29)
CO2 SERPL-SCNC: 27 MMOL/L (ref 23–29)
CODING SYSTEM: NORMAL
COLOR UR: ABNORMAL
COLOR UR: YELLOW
CREAT SERPL-MCNC: 0.7 MG/DL (ref 0.5–1.4)
CREAT SERPL-MCNC: 0.8 MG/DL (ref 0.5–1.4)
CREAT SERPL-MCNC: 0.9 MG/DL (ref 0.5–1.4)
CREAT SERPL-MCNC: 1 MG/DL (ref 0.5–1.4)
CREAT SERPL-MCNC: 1.1 MG/DL (ref 0.5–1.4)
CREAT SERPL-MCNC: 1.2 MG/DL (ref 0.5–1.4)
CREAT SERPL-MCNC: 1.3 MG/DL (ref 0.5–1.4)
CREAT SERPL-MCNC: 1.4 MG/DL (ref 0.5–1.4)
CREAT SERPL-MCNC: 1.5 MG/DL (ref 0.5–1.4)
CREAT SERPL-MCNC: 1.6 MG/DL (ref 0.5–1.4)
CREAT SERPL-MCNC: 2.6 MG/DL (ref 0.5–1.4)
CREAT SERPL-MCNC: 3.1 MG/DL (ref 0.5–1.4)
CREAT SERPL-MCNC: 3.1 MG/DL (ref 0.5–1.4)
CREAT SERPL-MCNC: 3.2 MG/DL (ref 0.5–1.4)
CREAT SERPL-MCNC: 3.3 MG/DL (ref 0.5–1.4)
CREAT UR-MCNC: 137 MG/DL (ref 15–325)
CREAT UR-MCNC: 233 MG/DL (ref 15–325)
CROSSMATCH INTERPRETATION: NORMAL
DELSYS: ABNORMAL
DELSYS: ABNORMAL
DIFFERENTIAL METHOD: ABNORMAL
DISPENSE STATUS: NORMAL
DOHLE BOD BLD QL SMEAR: PRESENT
EOSINOPHIL # BLD AUTO: 0 K/UL (ref 0–0.5)
EOSINOPHIL # BLD AUTO: 0.1 K/UL (ref 0–0.5)
EOSINOPHIL # BLD AUTO: 0.2 K/UL (ref 0–0.5)
EOSINOPHIL # BLD AUTO: ABNORMAL K/UL (ref 0–0.5)
EOSINOPHIL NFR BLD: 0 % (ref 0–8)
EOSINOPHIL NFR BLD: 0.1 % (ref 0–8)
EOSINOPHIL NFR BLD: 0.2 % (ref 0–8)
EOSINOPHIL NFR BLD: 0.2 % (ref 0–8)
EOSINOPHIL NFR BLD: 0.3 % (ref 0–8)
EOSINOPHIL NFR BLD: 0.4 % (ref 0–8)
EOSINOPHIL NFR BLD: 0.5 % (ref 0–8)
EOSINOPHIL NFR BLD: 0.5 % (ref 0–8)
EOSINOPHIL NFR BLD: 0.6 % (ref 0–8)
EOSINOPHIL NFR BLD: 0.6 % (ref 0–8)
EOSINOPHIL NFR BLD: 1 % (ref 0–8)
EOSINOPHIL NFR BLD: 1.1 % (ref 0–8)
EOSINOPHIL NFR BLD: 1.2 % (ref 0–8)
EOSINOPHIL NFR BLD: 1.2 % (ref 0–8)
EOSINOPHIL NFR BLD: 1.3 % (ref 0–8)
EOSINOPHIL NFR BLD: 1.7 % (ref 0–8)
EOSINOPHIL NFR BLD: 1.9 % (ref 0–8)
EOSINOPHIL NFR BLD: 2 % (ref 0–8)
EOSINOPHIL NFR BLD: 2 % (ref 0–8)
EOSINOPHIL NFR BLD: 3 % (ref 0–8)
EOSINOPHIL NFR BLD: 3.2 % (ref 0–8)
EOSINOPHIL NFR BLD: 4.8 % (ref 0–8)
ERYTHROCYTE [DISTWIDTH] IN BLOOD BY AUTOMATED COUNT: 14.3 % (ref 11.5–14.5)
ERYTHROCYTE [DISTWIDTH] IN BLOOD BY AUTOMATED COUNT: 14.6 % (ref 11.5–14.5)
ERYTHROCYTE [DISTWIDTH] IN BLOOD BY AUTOMATED COUNT: 14.8 % (ref 11.5–14.5)
ERYTHROCYTE [DISTWIDTH] IN BLOOD BY AUTOMATED COUNT: 15.1 % (ref 11.5–14.5)
ERYTHROCYTE [DISTWIDTH] IN BLOOD BY AUTOMATED COUNT: 15.2 % (ref 11.5–14.5)
ERYTHROCYTE [DISTWIDTH] IN BLOOD BY AUTOMATED COUNT: 15.2 % (ref 11.5–14.5)
ERYTHROCYTE [DISTWIDTH] IN BLOOD BY AUTOMATED COUNT: 15.4 % (ref 11.5–14.5)
ERYTHROCYTE [DISTWIDTH] IN BLOOD BY AUTOMATED COUNT: 15.6 % (ref 11.5–14.5)
ERYTHROCYTE [DISTWIDTH] IN BLOOD BY AUTOMATED COUNT: 15.8 % (ref 11.5–14.5)
ERYTHROCYTE [DISTWIDTH] IN BLOOD BY AUTOMATED COUNT: 16 % (ref 11.5–14.5)
ERYTHROCYTE [DISTWIDTH] IN BLOOD BY AUTOMATED COUNT: 16.1 % (ref 11.5–14.5)
ERYTHROCYTE [DISTWIDTH] IN BLOOD BY AUTOMATED COUNT: 16.2 % (ref 11.5–14.5)
ERYTHROCYTE [DISTWIDTH] IN BLOOD BY AUTOMATED COUNT: 16.3 % (ref 11.5–14.5)
ERYTHROCYTE [DISTWIDTH] IN BLOOD BY AUTOMATED COUNT: 16.5 % (ref 11.5–14.5)
ERYTHROCYTE [DISTWIDTH] IN BLOOD BY AUTOMATED COUNT: 16.7 % (ref 11.5–14.5)
ERYTHROCYTE [DISTWIDTH] IN BLOOD BY AUTOMATED COUNT: 16.7 % (ref 11.5–14.5)
ERYTHROCYTE [DISTWIDTH] IN BLOOD BY AUTOMATED COUNT: 16.8 % (ref 11.5–14.5)
ERYTHROCYTE [DISTWIDTH] IN BLOOD BY AUTOMATED COUNT: 17.1 % (ref 11.5–14.5)
ERYTHROCYTE [DISTWIDTH] IN BLOOD BY AUTOMATED COUNT: 17.1 % (ref 11.5–14.5)
ERYTHROCYTE [DISTWIDTH] IN BLOOD BY AUTOMATED COUNT: 17.2 % (ref 11.5–14.5)
ERYTHROCYTE [DISTWIDTH] IN BLOOD BY AUTOMATED COUNT: 17.3 % (ref 11.5–14.5)
ERYTHROCYTE [DISTWIDTH] IN BLOOD BY AUTOMATED COUNT: 17.4 % (ref 11.5–14.5)
ERYTHROCYTE [DISTWIDTH] IN BLOOD BY AUTOMATED COUNT: 17.5 % (ref 11.5–14.5)
ERYTHROCYTE [DISTWIDTH] IN BLOOD BY AUTOMATED COUNT: 17.8 % (ref 11.5–14.5)
ERYTHROCYTE [DISTWIDTH] IN BLOOD BY AUTOMATED COUNT: 17.8 % (ref 11.5–14.5)
ERYTHROCYTE [DISTWIDTH] IN BLOOD BY AUTOMATED COUNT: 17.9 % (ref 11.5–14.5)
ERYTHROCYTE [DISTWIDTH] IN BLOOD BY AUTOMATED COUNT: 18.1 % (ref 11.5–14.5)
ERYTHROCYTE [DISTWIDTH] IN BLOOD BY AUTOMATED COUNT: 18.3 % (ref 11.5–14.5)
ERYTHROCYTE [DISTWIDTH] IN BLOOD BY AUTOMATED COUNT: 18.6 % (ref 11.5–14.5)
ERYTHROCYTE [DISTWIDTH] IN BLOOD BY AUTOMATED COUNT: 18.6 % (ref 11.5–14.5)
ERYTHROCYTE [DISTWIDTH] IN BLOOD BY AUTOMATED COUNT: 18.9 % (ref 11.5–14.5)
ERYTHROCYTE [DISTWIDTH] IN BLOOD BY AUTOMATED COUNT: 19 % (ref 11.5–14.5)
ERYTHROCYTE [DISTWIDTH] IN BLOOD BY AUTOMATED COUNT: 19 % (ref 11.5–14.5)
ERYTHROCYTE [DISTWIDTH] IN BLOOD BY AUTOMATED COUNT: 19.8 % (ref 11.5–14.5)
ERYTHROCYTE [DISTWIDTH] IN BLOOD BY AUTOMATED COUNT: 20.4 % (ref 11.5–14.5)
ERYTHROCYTE [DISTWIDTH] IN BLOOD BY AUTOMATED COUNT: 21.7 % (ref 11.5–14.5)
ERYTHROCYTE [DISTWIDTH] IN BLOOD BY AUTOMATED COUNT: 21.8 % (ref 11.5–14.5)
ERYTHROCYTE [DISTWIDTH] IN BLOOD BY AUTOMATED COUNT: 24.6 % (ref 11.5–14.5)
ERYTHROCYTE [DISTWIDTH] IN BLOOD BY AUTOMATED COUNT: 24.7 % (ref 11.5–14.5)
EST. GFR  (NO RACE VARIABLE): 15.3 ML/MIN/1.73 M^2
EST. GFR  (NO RACE VARIABLE): 15.9 ML/MIN/1.73 M^2
EST. GFR  (NO RACE VARIABLE): 16.5 ML/MIN/1.73 M^2
EST. GFR  (NO RACE VARIABLE): 16.5 ML/MIN/1.73 M^2
EST. GFR  (NO RACE VARIABLE): 20.4 ML/MIN/1.73 M^2
EST. GFR  (NO RACE VARIABLE): 36.5 ML/MIN/1.73 M^2
EST. GFR  (NO RACE VARIABLE): 39.4 ML/MIN/1.73 M^2
EST. GFR  (NO RACE VARIABLE): 42.8 ML/MIN/1.73 M^2
EST. GFR  (NO RACE VARIABLE): 46.8 ML/MIN/1.73 M^2
EST. GFR  (NO RACE VARIABLE): 46.8 ML/MIN/1.73 M^2
EST. GFR  (NO RACE VARIABLE): 47.1 ML/MIN/1.73 M^2
EST. GFR  (NO RACE VARIABLE): 47.1 ML/MIN/1.73 M^2
EST. GFR  (NO RACE VARIABLE): 51.5 ML/MIN/1.73 M^2
EST. GFR  (NO RACE VARIABLE): 57.2 ML/MIN/1.73 M^2
EST. GFR  (NO RACE VARIABLE): 57.5 ML/MIN/1.73 M^2
EST. GFR  (NO RACE VARIABLE): 57.5 ML/MIN/1.73 M^2
EST. GFR  (NO RACE VARIABLE): >60 ML/MIN/1.73 M^2
ESTIMATED AVG GLUCOSE: 197 MG/DL (ref 68–131)
ESTIMATED AVG GLUCOSE: 214 MG/DL (ref 68–131)
FIO2: 100
FLOW: 5
GIANT PLATELETS BLD QL SMEAR: PRESENT
GIANT PLATELETS BLD QL SMEAR: PRESENT
GLUCOSE SERPL-MCNC: 100 MG/DL (ref 70–110)
GLUCOSE SERPL-MCNC: 102 MG/DL (ref 70–110)
GLUCOSE SERPL-MCNC: 113 MG/DL (ref 70–110)
GLUCOSE SERPL-MCNC: 122 MG/DL (ref 70–110)
GLUCOSE SERPL-MCNC: 123 MG/DL (ref 70–110)
GLUCOSE SERPL-MCNC: 125 MG/DL (ref 70–110)
GLUCOSE SERPL-MCNC: 125 MG/DL (ref 70–110)
GLUCOSE SERPL-MCNC: 128 MG/DL (ref 70–110)
GLUCOSE SERPL-MCNC: 131 MG/DL (ref 70–110)
GLUCOSE SERPL-MCNC: 131 MG/DL (ref 70–110)
GLUCOSE SERPL-MCNC: 135 MG/DL (ref 70–110)
GLUCOSE SERPL-MCNC: 138 MG/DL (ref 70–110)
GLUCOSE SERPL-MCNC: 145 MG/DL (ref 70–110)
GLUCOSE SERPL-MCNC: 146 MG/DL (ref 70–110)
GLUCOSE SERPL-MCNC: 149 MG/DL (ref 70–110)
GLUCOSE SERPL-MCNC: 166 MG/DL (ref 70–110)
GLUCOSE SERPL-MCNC: 172 MG/DL (ref 70–110)
GLUCOSE SERPL-MCNC: 173 MG/DL (ref 70–110)
GLUCOSE SERPL-MCNC: 173 MG/DL (ref 70–110)
GLUCOSE SERPL-MCNC: 174 MG/DL (ref 70–110)
GLUCOSE SERPL-MCNC: 175 MG/DL (ref 70–110)
GLUCOSE SERPL-MCNC: 177 MG/DL (ref 70–110)
GLUCOSE SERPL-MCNC: 177 MG/DL (ref 70–110)
GLUCOSE SERPL-MCNC: 178 MG/DL (ref 70–110)
GLUCOSE SERPL-MCNC: 178 MG/DL (ref 70–110)
GLUCOSE SERPL-MCNC: 182 MG/DL (ref 70–110)
GLUCOSE SERPL-MCNC: 183 MG/DL (ref 70–110)
GLUCOSE SERPL-MCNC: 191 MG/DL (ref 70–110)
GLUCOSE SERPL-MCNC: 193 MG/DL (ref 70–110)
GLUCOSE SERPL-MCNC: 193 MG/DL (ref 70–110)
GLUCOSE SERPL-MCNC: 195 MG/DL (ref 70–110)
GLUCOSE SERPL-MCNC: 195 MG/DL (ref 70–110)
GLUCOSE SERPL-MCNC: 196 MG/DL (ref 70–110)
GLUCOSE SERPL-MCNC: 197 MG/DL (ref 70–110)
GLUCOSE SERPL-MCNC: 198 MG/DL (ref 70–110)
GLUCOSE SERPL-MCNC: 201 MG/DL (ref 70–110)
GLUCOSE SERPL-MCNC: 203 MG/DL (ref 70–110)
GLUCOSE SERPL-MCNC: 206 MG/DL (ref 70–110)
GLUCOSE SERPL-MCNC: 211 MG/DL (ref 70–110)
GLUCOSE SERPL-MCNC: 212 MG/DL (ref 70–110)
GLUCOSE SERPL-MCNC: 219 MG/DL (ref 70–110)
GLUCOSE SERPL-MCNC: 219 MG/DL (ref 70–110)
GLUCOSE SERPL-MCNC: 221 MG/DL (ref 70–110)
GLUCOSE SERPL-MCNC: 225 MG/DL (ref 70–110)
GLUCOSE SERPL-MCNC: 225 MG/DL (ref 70–110)
GLUCOSE SERPL-MCNC: 230 MG/DL (ref 70–110)
GLUCOSE SERPL-MCNC: 237 MG/DL (ref 70–110)
GLUCOSE SERPL-MCNC: 239 MG/DL (ref 70–110)
GLUCOSE SERPL-MCNC: 240 MG/DL (ref 70–110)
GLUCOSE SERPL-MCNC: 242 MG/DL (ref 70–110)
GLUCOSE SERPL-MCNC: 243 MG/DL (ref 70–110)
GLUCOSE SERPL-MCNC: 243 MG/DL (ref 70–110)
GLUCOSE SERPL-MCNC: 252 MG/DL (ref 70–110)
GLUCOSE SERPL-MCNC: 252 MG/DL (ref 70–110)
GLUCOSE SERPL-MCNC: 253 MG/DL (ref 70–110)
GLUCOSE SERPL-MCNC: 256 MG/DL (ref 70–110)
GLUCOSE SERPL-MCNC: 267 MG/DL (ref 70–110)
GLUCOSE SERPL-MCNC: 269 MG/DL (ref 70–110)
GLUCOSE SERPL-MCNC: 277 MG/DL (ref 70–110)
GLUCOSE SERPL-MCNC: 286 MG/DL (ref 70–110)
GLUCOSE SERPL-MCNC: 293 MG/DL (ref 70–110)
GLUCOSE SERPL-MCNC: 298 MG/DL (ref 70–110)
GLUCOSE SERPL-MCNC: 317 MG/DL (ref 70–110)
GLUCOSE SERPL-MCNC: 331 MG/DL (ref 70–110)
GLUCOSE SERPL-MCNC: 341 MG/DL (ref 70–110)
GLUCOSE SERPL-MCNC: 350 MG/DL (ref 70–110)
GLUCOSE SERPL-MCNC: 353 MG/DL (ref 70–110)
GLUCOSE SERPL-MCNC: 353 MG/DL (ref 70–110)
GLUCOSE SERPL-MCNC: 374 MG/DL (ref 70–110)
GLUCOSE SERPL-MCNC: 385 MG/DL (ref 70–110)
GLUCOSE SERPL-MCNC: 392 MG/DL (ref 70–110)
GLUCOSE SERPL-MCNC: 395 MG/DL (ref 70–110)
GLUCOSE SERPL-MCNC: 58 MG/DL (ref 70–110)
GLUCOSE SERPL-MCNC: 665 MG/DL (ref 70–110)
GLUCOSE SERPL-MCNC: 83 MG/DL (ref 70–110)
GLUCOSE SERPL-MCNC: 83 MG/DL (ref 70–110)
GLUCOSE SERPL-MCNC: 89 MG/DL (ref 70–110)
GLUCOSE UR QL STRIP: ABNORMAL
GRAM STN SPEC: ABNORMAL
GRAN CASTS #/AREA URNS LPF: 20 /LPF
HBA1C MFR BLD: 8.5 % (ref 4.5–6.2)
HBA1C MFR BLD: 9.1 % (ref 4.5–6.2)
HCO3 UR-SCNC: 12.5 MMOL/L (ref 24–28)
HCO3 UR-SCNC: 13.2 MMOL/L (ref 24–28)
HCT VFR BLD AUTO: 22.5 % (ref 37–48.5)
HCT VFR BLD AUTO: 23.9 % (ref 37–48.5)
HCT VFR BLD AUTO: 24.6 % (ref 37–48.5)
HCT VFR BLD AUTO: 25.1 % (ref 37–48.5)
HCT VFR BLD AUTO: 25.2 % (ref 37–48.5)
HCT VFR BLD AUTO: 26 % (ref 37–48.5)
HCT VFR BLD AUTO: 26.3 % (ref 37–48.5)
HCT VFR BLD AUTO: 27.4 % (ref 37–48.5)
HCT VFR BLD AUTO: 27.6 % (ref 37–48.5)
HCT VFR BLD AUTO: 28 % (ref 37–48.5)
HCT VFR BLD AUTO: 28.2 % (ref 37–48.5)
HCT VFR BLD AUTO: 28.3 % (ref 37–48.5)
HCT VFR BLD AUTO: 28.8 % (ref 37–48.5)
HCT VFR BLD AUTO: 28.8 % (ref 37–48.5)
HCT VFR BLD AUTO: 29 % (ref 37–48.5)
HCT VFR BLD AUTO: 29.2 % (ref 37–48.5)
HCT VFR BLD AUTO: 29.3 % (ref 37–48.5)
HCT VFR BLD AUTO: 29.5 % (ref 37–48.5)
HCT VFR BLD AUTO: 29.5 % (ref 37–48.5)
HCT VFR BLD AUTO: 29.9 % (ref 37–48.5)
HCT VFR BLD AUTO: 30.4 % (ref 37–48.5)
HCT VFR BLD AUTO: 30.4 % (ref 37–48.5)
HCT VFR BLD AUTO: 31 % (ref 37–48.5)
HCT VFR BLD AUTO: 31.3 % (ref 37–48.5)
HCT VFR BLD AUTO: 31.5 % (ref 37–48.5)
HCT VFR BLD AUTO: 31.7 % (ref 37–48.5)
HCT VFR BLD AUTO: 32 % (ref 37–48.5)
HCT VFR BLD AUTO: 32.2 % (ref 37–48.5)
HCT VFR BLD AUTO: 32.6 % (ref 37–48.5)
HCT VFR BLD AUTO: 32.9 % (ref 37–48.5)
HCT VFR BLD AUTO: 33.3 % (ref 37–48.5)
HCT VFR BLD AUTO: 34.4 % (ref 37–48.5)
HCT VFR BLD AUTO: 34.8 % (ref 37–48.5)
HCT VFR BLD AUTO: 34.9 % (ref 37–48.5)
HCT VFR BLD AUTO: 35.6 % (ref 37–48.5)
HCT VFR BLD AUTO: 35.8 % (ref 37–48.5)
HCT VFR BLD AUTO: 35.9 % (ref 37–48.5)
HCT VFR BLD AUTO: 36.1 % (ref 37–48.5)
HCT VFR BLD AUTO: 37.4 % (ref 37–48.5)
HCT VFR BLD AUTO: 38.6 % (ref 37–48.5)
HCT VFR BLD AUTO: 41.1 % (ref 37–48.5)
HCT VFR BLD CALC: 26 %PCV (ref 36–54)
HDLC SERPL-MCNC: 77 MG/DL (ref 40–75)
HDLC SERPL: 49 % (ref 20–50)
HGB BLD-MCNC: 10.2 G/DL (ref 12–16)
HGB BLD-MCNC: 10.3 G/DL (ref 12–16)
HGB BLD-MCNC: 10.4 G/DL (ref 12–16)
HGB BLD-MCNC: 10.4 G/DL (ref 12–16)
HGB BLD-MCNC: 10.6 G/DL (ref 12–16)
HGB BLD-MCNC: 10.8 G/DL (ref 12–16)
HGB BLD-MCNC: 10.8 G/DL (ref 12–16)
HGB BLD-MCNC: 11 G/DL (ref 12–16)
HGB BLD-MCNC: 11.1 G/DL (ref 12–16)
HGB BLD-MCNC: 11.1 G/DL (ref 12–16)
HGB BLD-MCNC: 11.4 G/DL (ref 12–16)
HGB BLD-MCNC: 11.7 G/DL (ref 12–16)
HGB BLD-MCNC: 12 G/DL (ref 12–16)
HGB BLD-MCNC: 12.3 G/DL (ref 12–16)
HGB BLD-MCNC: 12.4 G/DL (ref 12–16)
HGB BLD-MCNC: 7.5 G/DL (ref 12–16)
HGB BLD-MCNC: 7.5 G/DL (ref 12–16)
HGB BLD-MCNC: 7.7 G/DL (ref 12–16)
HGB BLD-MCNC: 7.9 G/DL (ref 12–16)
HGB BLD-MCNC: 8 G/DL (ref 12–16)
HGB BLD-MCNC: 8.1 G/DL (ref 12–16)
HGB BLD-MCNC: 8.3 G/DL (ref 12–16)
HGB BLD-MCNC: 8.4 G/DL (ref 12–16)
HGB BLD-MCNC: 8.5 G/DL (ref 12–16)
HGB BLD-MCNC: 8.8 G/DL (ref 12–16)
HGB BLD-MCNC: 8.8 G/DL (ref 12–16)
HGB BLD-MCNC: 9.1 G/DL (ref 12–16)
HGB BLD-MCNC: 9.2 G/DL (ref 12–16)
HGB BLD-MCNC: 9.3 G/DL (ref 12–16)
HGB BLD-MCNC: 9.3 G/DL (ref 12–16)
HGB BLD-MCNC: 9.7 G/DL (ref 12–16)
HGB BLD-MCNC: 9.8 G/DL (ref 12–16)
HGB UR QL STRIP: ABNORMAL
HGB UR QL STRIP: NEGATIVE
HYALINE CASTS #/AREA URNS LPF: 0 /LPF
HYALINE CASTS #/AREA URNS LPF: 20 /LPF
HYALINE CASTS #/AREA URNS LPF: 22 /LPF
HYALINE CASTS #/AREA URNS LPF: 24 /LPF
HYALINE CASTS #/AREA URNS LPF: 27 /LPF
HYPOCHROMIA BLD QL SMEAR: ABNORMAL
IMM GRANULOCYTES # BLD AUTO: 0 K/UL (ref 0–0.04)
IMM GRANULOCYTES # BLD AUTO: 0.01 K/UL (ref 0–0.04)
IMM GRANULOCYTES # BLD AUTO: 0.01 K/UL (ref 0–0.04)
IMM GRANULOCYTES # BLD AUTO: 0.02 K/UL (ref 0–0.04)
IMM GRANULOCYTES # BLD AUTO: 0.02 K/UL (ref 0–0.04)
IMM GRANULOCYTES # BLD AUTO: 0.03 K/UL (ref 0–0.04)
IMM GRANULOCYTES # BLD AUTO: 0.04 K/UL (ref 0–0.04)
IMM GRANULOCYTES # BLD AUTO: 0.05 K/UL (ref 0–0.04)
IMM GRANULOCYTES # BLD AUTO: 0.06 K/UL (ref 0–0.04)
IMM GRANULOCYTES # BLD AUTO: 0.06 K/UL (ref 0–0.04)
IMM GRANULOCYTES # BLD AUTO: 0.07 K/UL (ref 0–0.04)
IMM GRANULOCYTES # BLD AUTO: 0.07 K/UL (ref 0–0.04)
IMM GRANULOCYTES # BLD AUTO: 0.08 K/UL (ref 0–0.04)
IMM GRANULOCYTES # BLD AUTO: 0.1 K/UL (ref 0–0.04)
IMM GRANULOCYTES # BLD AUTO: 0.11 K/UL (ref 0–0.04)
IMM GRANULOCYTES # BLD AUTO: 0.18 K/UL (ref 0–0.04)
IMM GRANULOCYTES # BLD AUTO: 0.27 K/UL (ref 0–0.04)
IMM GRANULOCYTES # BLD AUTO: 0.29 K/UL (ref 0–0.04)
IMM GRANULOCYTES # BLD AUTO: 0.52 K/UL (ref 0–0.04)
IMM GRANULOCYTES # BLD AUTO: 0.67 K/UL (ref 0–0.04)
IMM GRANULOCYTES # BLD AUTO: ABNORMAL K/UL (ref 0–0.04)
IMM GRANULOCYTES NFR BLD AUTO: 0 % (ref 0–0.5)
IMM GRANULOCYTES NFR BLD AUTO: 0.3 % (ref 0–0.5)
IMM GRANULOCYTES NFR BLD AUTO: 0.3 % (ref 0–0.5)
IMM GRANULOCYTES NFR BLD AUTO: 0.4 % (ref 0–0.5)
IMM GRANULOCYTES NFR BLD AUTO: 0.5 % (ref 0–0.5)
IMM GRANULOCYTES NFR BLD AUTO: 0.6 % (ref 0–0.5)
IMM GRANULOCYTES NFR BLD AUTO: 0.7 % (ref 0–0.5)
IMM GRANULOCYTES NFR BLD AUTO: 0.7 % (ref 0–0.5)
IMM GRANULOCYTES NFR BLD AUTO: 0.8 % (ref 0–0.5)
IMM GRANULOCYTES NFR BLD AUTO: 0.9 % (ref 0–0.5)
IMM GRANULOCYTES NFR BLD AUTO: 0.9 % (ref 0–0.5)
IMM GRANULOCYTES NFR BLD AUTO: 1 % (ref 0–0.5)
IMM GRANULOCYTES NFR BLD AUTO: 1.1 % (ref 0–0.5)
IMM GRANULOCYTES NFR BLD AUTO: 1.2 % (ref 0–0.5)
IMM GRANULOCYTES NFR BLD AUTO: 1.3 % (ref 0–0.5)
IMM GRANULOCYTES NFR BLD AUTO: 1.3 % (ref 0–0.5)
IMM GRANULOCYTES NFR BLD AUTO: 1.4 % (ref 0–0.5)
IMM GRANULOCYTES NFR BLD AUTO: 1.6 % (ref 0–0.5)
IMM GRANULOCYTES NFR BLD AUTO: 3.2 % (ref 0–0.5)
IMM GRANULOCYTES NFR BLD AUTO: 3.8 % (ref 0–0.5)
IMM GRANULOCYTES NFR BLD AUTO: 3.8 % (ref 0–0.5)
IMM GRANULOCYTES NFR BLD AUTO: 4.1 % (ref 0–0.5)
IMM GRANULOCYTES NFR BLD AUTO: ABNORMAL % (ref 0–0.5)
INFLUENZA A, MOLECULAR: NEGATIVE
INFLUENZA B, MOLECULAR: NEGATIVE
INR PPP: 1.1 (ref 0.8–1.2)
INR PPP: 1.2 (ref 0.8–1.2)
KETONES UR QL STRIP: ABNORMAL
KETONES UR QL STRIP: ABNORMAL
KETONES UR QL STRIP: NEGATIVE
LACTATE SERPL-SCNC: 1.5 MMOL/L (ref 0.5–1.9)
LACTATE SERPL-SCNC: 1.5 MMOL/L (ref 0.5–1.9)
LACTATE SERPL-SCNC: 1.6 MMOL/L (ref 0.5–1.9)
LACTATE SERPL-SCNC: 2.2 MMOL/L (ref 0.5–1.9)
LACTATE SERPL-SCNC: 2.8 MMOL/L (ref 0.5–1.9)
LACTATE SERPL-SCNC: 3.4 MMOL/L (ref 0.5–1.9)
LACTATE SERPL-SCNC: 3.9 MMOL/L (ref 0.5–1.9)
LACTATE SERPL-SCNC: 4.1 MMOL/L (ref 0.5–1.9)
LACTATE SERPL-SCNC: 4.1 MMOL/L (ref 0.5–1.9)
LACTATE SERPL-SCNC: 4.6 MMOL/L (ref 0.5–1.9)
LACTATE SERPL-SCNC: 4.7 MMOL/L (ref 0.5–1.9)
LACTATE SERPL-SCNC: 5.6 MMOL/L (ref 0.5–1.9)
LDLC SERPL CALC-MCNC: 58.6 MG/DL (ref 63–159)
LEUKOCYTE ESTERASE UR QL STRIP: NEGATIVE
LIPASE SERPL-CCNC: 44 U/L (ref 4–60)
LIPASE SERPL-CCNC: >4000 U/L (ref 4–60)
LYMPHOCYTES # BLD AUTO: 0.2 K/UL (ref 1–4.8)
LYMPHOCYTES # BLD AUTO: 0.3 K/UL (ref 1–4.8)
LYMPHOCYTES # BLD AUTO: 0.4 K/UL (ref 1–4.8)
LYMPHOCYTES # BLD AUTO: 0.5 K/UL (ref 1–4.8)
LYMPHOCYTES # BLD AUTO: 0.6 K/UL (ref 1–4.8)
LYMPHOCYTES # BLD AUTO: 0.7 K/UL (ref 1–4.8)
LYMPHOCYTES # BLD AUTO: 0.8 K/UL (ref 1–4.8)
LYMPHOCYTES # BLD AUTO: 0.9 K/UL (ref 1–4.8)
LYMPHOCYTES # BLD AUTO: ABNORMAL K/UL (ref 1–4.8)
LYMPHOCYTES NFR BLD: 1.7 % (ref 18–48)
LYMPHOCYTES NFR BLD: 1.7 % (ref 18–48)
LYMPHOCYTES NFR BLD: 10 % (ref 18–48)
LYMPHOCYTES NFR BLD: 10.2 % (ref 18–48)
LYMPHOCYTES NFR BLD: 10.4 % (ref 18–48)
LYMPHOCYTES NFR BLD: 11 % (ref 18–48)
LYMPHOCYTES NFR BLD: 11.1 % (ref 18–48)
LYMPHOCYTES NFR BLD: 12 % (ref 18–48)
LYMPHOCYTES NFR BLD: 13.1 % (ref 18–48)
LYMPHOCYTES NFR BLD: 15 % (ref 18–48)
LYMPHOCYTES NFR BLD: 16.4 % (ref 18–48)
LYMPHOCYTES NFR BLD: 16.4 % (ref 18–48)
LYMPHOCYTES NFR BLD: 18.3 % (ref 18–48)
LYMPHOCYTES NFR BLD: 19 % (ref 18–48)
LYMPHOCYTES NFR BLD: 19.4 % (ref 18–48)
LYMPHOCYTES NFR BLD: 2.3 % (ref 18–48)
LYMPHOCYTES NFR BLD: 20 % (ref 18–48)
LYMPHOCYTES NFR BLD: 20 % (ref 18–48)
LYMPHOCYTES NFR BLD: 4.1 % (ref 18–48)
LYMPHOCYTES NFR BLD: 4.4 % (ref 18–48)
LYMPHOCYTES NFR BLD: 4.9 % (ref 18–48)
LYMPHOCYTES NFR BLD: 40 % (ref 18–48)
LYMPHOCYTES NFR BLD: 45.1 % (ref 18–48)
LYMPHOCYTES NFR BLD: 5.5 % (ref 18–48)
LYMPHOCYTES NFR BLD: 55.2 % (ref 18–48)
LYMPHOCYTES NFR BLD: 6 % (ref 18–48)
LYMPHOCYTES NFR BLD: 6.5 % (ref 18–48)
LYMPHOCYTES NFR BLD: 7 % (ref 18–48)
LYMPHOCYTES NFR BLD: 7 % (ref 18–48)
LYMPHOCYTES NFR BLD: 7.3 % (ref 18–48)
LYMPHOCYTES NFR BLD: 7.6 % (ref 18–48)
LYMPHOCYTES NFR BLD: 7.9 % (ref 18–48)
LYMPHOCYTES NFR BLD: 8.2 % (ref 18–48)
LYMPHOCYTES NFR BLD: 8.3 % (ref 18–48)
LYMPHOCYTES NFR BLD: 8.4 % (ref 18–48)
LYMPHOCYTES NFR BLD: 8.6 % (ref 18–48)
LYMPHOCYTES NFR BLD: 8.7 % (ref 18–48)
LYMPHOCYTES NFR BLD: 8.7 % (ref 18–48)
LYMPHOCYTES NFR BLD: 9 % (ref 18–48)
LYMPHOCYTES NFR BLD: 9.5 % (ref 18–48)
LYMPHOCYTES NFR BLD: 9.5 % (ref 18–48)
MAGNESIUM SERPL-MCNC: 1.4 MG/DL (ref 1.6–2.6)
MAGNESIUM SERPL-MCNC: 1.5 MG/DL (ref 1.6–2.6)
MAGNESIUM SERPL-MCNC: 1.6 MG/DL (ref 1.6–2.6)
MAGNESIUM SERPL-MCNC: 1.7 MG/DL (ref 1.6–2.6)
MAGNESIUM SERPL-MCNC: 1.8 MG/DL (ref 1.6–2.6)
MAGNESIUM SERPL-MCNC: 1.9 MG/DL (ref 1.6–2.6)
MAGNESIUM SERPL-MCNC: 2.8 MG/DL (ref 1.6–2.6)
MCH RBC QN AUTO: 28.6 PG (ref 27–31)
MCH RBC QN AUTO: 28.8 PG (ref 27–31)
MCH RBC QN AUTO: 28.8 PG (ref 27–31)
MCH RBC QN AUTO: 28.9 PG (ref 27–31)
MCH RBC QN AUTO: 29 PG (ref 27–31)
MCH RBC QN AUTO: 29.1 PG (ref 27–31)
MCH RBC QN AUTO: 29.1 PG (ref 27–31)
MCH RBC QN AUTO: 29.2 PG (ref 27–31)
MCH RBC QN AUTO: 29.3 PG (ref 27–31)
MCH RBC QN AUTO: 29.4 PG (ref 27–31)
MCH RBC QN AUTO: 29.5 PG (ref 27–31)
MCH RBC QN AUTO: 29.9 PG (ref 27–31)
MCH RBC QN AUTO: 30.1 PG (ref 27–31)
MCH RBC QN AUTO: 30.4 PG (ref 27–31)
MCH RBC QN AUTO: 30.6 PG (ref 27–31)
MCH RBC QN AUTO: 30.9 PG (ref 27–31)
MCH RBC QN AUTO: 31.2 PG (ref 27–31)
MCH RBC QN AUTO: 32.4 PG (ref 27–31)
MCH RBC QN AUTO: 32.5 PG (ref 27–31)
MCH RBC QN AUTO: 32.7 PG (ref 27–31)
MCH RBC QN AUTO: 32.8 PG (ref 27–31)
MCH RBC QN AUTO: 32.9 PG (ref 27–31)
MCH RBC QN AUTO: 32.9 PG (ref 27–31)
MCH RBC QN AUTO: 33 PG (ref 27–31)
MCH RBC QN AUTO: 33.1 PG (ref 27–31)
MCH RBC QN AUTO: 33.2 PG (ref 27–31)
MCH RBC QN AUTO: 33.3 PG (ref 27–31)
MCH RBC QN AUTO: 33.5 PG (ref 27–31)
MCH RBC QN AUTO: 33.9 PG (ref 27–31)
MCH RBC QN AUTO: 33.9 PG (ref 27–31)
MCH RBC QN AUTO: 34 PG (ref 27–31)
MCH RBC QN AUTO: 34.1 PG (ref 27–31)
MCH RBC QN AUTO: 34.3 PG (ref 27–31)
MCH RBC QN AUTO: 34.8 PG (ref 27–31)
MCH RBC QN AUTO: 35 PG (ref 27–31)
MCH RBC QN AUTO: 35.1 PG (ref 27–31)
MCHC RBC AUTO-ENTMCNC: 29.2 G/DL (ref 32–36)
MCHC RBC AUTO-ENTMCNC: 29.6 G/DL (ref 32–36)
MCHC RBC AUTO-ENTMCNC: 30.3 G/DL (ref 32–36)
MCHC RBC AUTO-ENTMCNC: 30.4 G/DL (ref 32–36)
MCHC RBC AUTO-ENTMCNC: 30.4 G/DL (ref 32–36)
MCHC RBC AUTO-ENTMCNC: 30.6 G/DL (ref 32–36)
MCHC RBC AUTO-ENTMCNC: 31.1 G/DL (ref 32–36)
MCHC RBC AUTO-ENTMCNC: 31.2 G/DL (ref 32–36)
MCHC RBC AUTO-ENTMCNC: 31.2 G/DL (ref 32–36)
MCHC RBC AUTO-ENTMCNC: 31.3 G/DL (ref 32–36)
MCHC RBC AUTO-ENTMCNC: 31.4 G/DL (ref 32–36)
MCHC RBC AUTO-ENTMCNC: 31.5 G/DL (ref 32–36)
MCHC RBC AUTO-ENTMCNC: 31.6 G/DL (ref 32–36)
MCHC RBC AUTO-ENTMCNC: 31.7 G/DL (ref 32–36)
MCHC RBC AUTO-ENTMCNC: 31.8 G/DL (ref 32–36)
MCHC RBC AUTO-ENTMCNC: 31.8 G/DL (ref 32–36)
MCHC RBC AUTO-ENTMCNC: 31.9 G/DL (ref 32–36)
MCHC RBC AUTO-ENTMCNC: 31.9 G/DL (ref 32–36)
MCHC RBC AUTO-ENTMCNC: 32 G/DL (ref 32–36)
MCHC RBC AUTO-ENTMCNC: 32.1 G/DL (ref 32–36)
MCHC RBC AUTO-ENTMCNC: 32.2 G/DL (ref 32–36)
MCHC RBC AUTO-ENTMCNC: 32.3 G/DL (ref 32–36)
MCHC RBC AUTO-ENTMCNC: 32.4 G/DL (ref 32–36)
MCHC RBC AUTO-ENTMCNC: 32.5 G/DL (ref 32–36)
MCHC RBC AUTO-ENTMCNC: 32.6 G/DL (ref 32–36)
MCHC RBC AUTO-ENTMCNC: 32.6 G/DL (ref 32–36)
MCHC RBC AUTO-ENTMCNC: 32.8 G/DL (ref 32–36)
MCHC RBC AUTO-ENTMCNC: 32.9 G/DL (ref 32–36)
MCHC RBC AUTO-ENTMCNC: 32.9 G/DL (ref 32–36)
MCHC RBC AUTO-ENTMCNC: 33 G/DL (ref 32–36)
MCHC RBC AUTO-ENTMCNC: 33.1 G/DL (ref 32–36)
MCHC RBC AUTO-ENTMCNC: 33.1 G/DL (ref 32–36)
MCHC RBC AUTO-ENTMCNC: 33.2 G/DL (ref 32–36)
MCHC RBC AUTO-ENTMCNC: 33.2 G/DL (ref 32–36)
MCHC RBC AUTO-ENTMCNC: 33.3 G/DL (ref 32–36)
MCV RBC AUTO: 100 FL (ref 82–98)
MCV RBC AUTO: 100 FL (ref 82–98)
MCV RBC AUTO: 101 FL (ref 82–98)
MCV RBC AUTO: 102 FL (ref 82–98)
MCV RBC AUTO: 103 FL (ref 82–98)
MCV RBC AUTO: 103 FL (ref 82–98)
MCV RBC AUTO: 104 FL (ref 82–98)
MCV RBC AUTO: 105 FL (ref 82–98)
MCV RBC AUTO: 106 FL (ref 82–98)
MCV RBC AUTO: 106 FL (ref 82–98)
MCV RBC AUTO: 107 FL (ref 82–98)
MCV RBC AUTO: 108 FL (ref 82–98)
MCV RBC AUTO: 109 FL (ref 82–98)
MCV RBC AUTO: 90 FL (ref 82–98)
MCV RBC AUTO: 90 FL (ref 82–98)
MCV RBC AUTO: 91 FL (ref 82–98)
MCV RBC AUTO: 93 FL (ref 82–98)
MCV RBC AUTO: 94 FL (ref 82–98)
MCV RBC AUTO: 96 FL (ref 82–98)
MCV RBC AUTO: 97 FL (ref 82–98)
MCV RBC AUTO: 97 FL (ref 82–98)
MCV RBC AUTO: 98 FL (ref 82–98)
MCV RBC AUTO: 99 FL (ref 82–98)
MCV RBC AUTO: 99 FL (ref 82–98)
METAMYELOCYTES NFR BLD MANUAL: 1 %
METAMYELOCYTES NFR BLD MANUAL: 1 %
METAMYELOCYTES NFR BLD MANUAL: 2 %
MICROALBUMIN UR DL<=1MG/L-MCNC: 159.6 UG/ML
MICROSCOPIC COMMENT: ABNORMAL
MODE: ABNORMAL
MONOCYTES # BLD AUTO: 0 K/UL (ref 0.3–1)
MONOCYTES # BLD AUTO: 0.1 K/UL (ref 0.3–1)
MONOCYTES # BLD AUTO: 0.2 K/UL (ref 0.3–1)
MONOCYTES # BLD AUTO: 0.4 K/UL (ref 0.3–1)
MONOCYTES # BLD AUTO: 0.5 K/UL (ref 0.3–1)
MONOCYTES # BLD AUTO: 0.6 K/UL (ref 0.3–1)
MONOCYTES # BLD AUTO: 0.7 K/UL (ref 0.3–1)
MONOCYTES # BLD AUTO: 0.8 K/UL (ref 0.3–1)
MONOCYTES # BLD AUTO: 0.9 K/UL (ref 0.3–1)
MONOCYTES # BLD AUTO: 1 K/UL (ref 0.3–1)
MONOCYTES # BLD AUTO: 1 K/UL (ref 0.3–1)
MONOCYTES # BLD AUTO: 1.1 K/UL (ref 0.3–1)
MONOCYTES # BLD AUTO: 1.3 K/UL (ref 0.3–1)
MONOCYTES # BLD AUTO: ABNORMAL K/UL (ref 0.3–1)
MONOCYTES NFR BLD: 0 % (ref 4–15)
MONOCYTES NFR BLD: 0 % (ref 4–15)
MONOCYTES NFR BLD: 0.3 % (ref 4–15)
MONOCYTES NFR BLD: 0.7 % (ref 4–15)
MONOCYTES NFR BLD: 1 % (ref 4–15)
MONOCYTES NFR BLD: 1 % (ref 4–15)
MONOCYTES NFR BLD: 1.2 % (ref 4–15)
MONOCYTES NFR BLD: 10 % (ref 4–15)
MONOCYTES NFR BLD: 10 % (ref 4–15)
MONOCYTES NFR BLD: 10.2 % (ref 4–15)
MONOCYTES NFR BLD: 10.6 % (ref 4–15)
MONOCYTES NFR BLD: 11.4 % (ref 4–15)
MONOCYTES NFR BLD: 11.4 % (ref 4–15)
MONOCYTES NFR BLD: 11.5 % (ref 4–15)
MONOCYTES NFR BLD: 12 % (ref 4–15)
MONOCYTES NFR BLD: 12 % (ref 4–15)
MONOCYTES NFR BLD: 12.1 % (ref 4–15)
MONOCYTES NFR BLD: 12.2 % (ref 4–15)
MONOCYTES NFR BLD: 12.3 % (ref 4–15)
MONOCYTES NFR BLD: 12.4 % (ref 4–15)
MONOCYTES NFR BLD: 12.7 % (ref 4–15)
MONOCYTES NFR BLD: 13.6 % (ref 4–15)
MONOCYTES NFR BLD: 15.1 % (ref 4–15)
MONOCYTES NFR BLD: 3 % (ref 4–15)
MONOCYTES NFR BLD: 4 % (ref 4–15)
MONOCYTES NFR BLD: 4.9 % (ref 4–15)
MONOCYTES NFR BLD: 5.7 % (ref 4–15)
MONOCYTES NFR BLD: 6.7 % (ref 4–15)
MONOCYTES NFR BLD: 7.4 % (ref 4–15)
MONOCYTES NFR BLD: 7.6 % (ref 4–15)
MONOCYTES NFR BLD: 8 % (ref 4–15)
MONOCYTES NFR BLD: 8.5 % (ref 4–15)
MONOCYTES NFR BLD: 8.6 % (ref 4–15)
MONOCYTES NFR BLD: 9 % (ref 4–15)
MONOCYTES NFR BLD: 9.4 % (ref 4–15)
MONOCYTES NFR BLD: 9.6 % (ref 4–15)
MONOCYTES NFR BLD: 9.6 % (ref 4–15)
MONOCYTES NFR BLD: 9.9 % (ref 4–15)
MRSA SCREEN BY PCR: NEGATIVE
MYELOCYTES NFR BLD MANUAL: 1 %
MYELOCYTES NFR BLD MANUAL: 1 %
NEUTROPHILS # BLD AUTO: 0.2 K/UL (ref 1.8–7.7)
NEUTROPHILS # BLD AUTO: 0.4 K/UL (ref 1.8–7.7)
NEUTROPHILS # BLD AUTO: 1.5 K/UL (ref 1.8–7.7)
NEUTROPHILS # BLD AUTO: 1.5 K/UL (ref 1.8–7.7)
NEUTROPHILS # BLD AUTO: 10 K/UL (ref 1.8–7.7)
NEUTROPHILS # BLD AUTO: 10.8 K/UL (ref 1.8–7.7)
NEUTROPHILS # BLD AUTO: 11.1 K/UL (ref 1.8–7.7)
NEUTROPHILS # BLD AUTO: 15.3 K/UL (ref 1.8–7.7)
NEUTROPHILS # BLD AUTO: 17.1 K/UL (ref 1.8–7.7)
NEUTROPHILS # BLD AUTO: 2.3 K/UL (ref 1.8–7.7)
NEUTROPHILS # BLD AUTO: 2.5 K/UL (ref 1.8–7.7)
NEUTROPHILS # BLD AUTO: 3.5 K/UL (ref 1.8–7.7)
NEUTROPHILS # BLD AUTO: 3.7 K/UL (ref 1.8–7.7)
NEUTROPHILS # BLD AUTO: 3.8 K/UL (ref 1.8–7.7)
NEUTROPHILS # BLD AUTO: 4.1 K/UL (ref 1.8–7.7)
NEUTROPHILS # BLD AUTO: 4.2 K/UL (ref 1.8–7.7)
NEUTROPHILS # BLD AUTO: 4.5 K/UL (ref 1.8–7.7)
NEUTROPHILS # BLD AUTO: 4.5 K/UL (ref 1.8–7.7)
NEUTROPHILS # BLD AUTO: 4.9 K/UL (ref 1.8–7.7)
NEUTROPHILS # BLD AUTO: 5.5 K/UL (ref 1.8–7.7)
NEUTROPHILS # BLD AUTO: 5.5 K/UL (ref 1.8–7.7)
NEUTROPHILS # BLD AUTO: 5.6 K/UL (ref 1.8–7.7)
NEUTROPHILS # BLD AUTO: 6.7 K/UL (ref 1.8–7.7)
NEUTROPHILS # BLD AUTO: 6.8 K/UL (ref 1.8–7.7)
NEUTROPHILS # BLD AUTO: 6.8 K/UL (ref 1.8–7.7)
NEUTROPHILS # BLD AUTO: 7 K/UL (ref 1.8–7.7)
NEUTROPHILS # BLD AUTO: 7.2 K/UL (ref 1.8–7.7)
NEUTROPHILS # BLD AUTO: 7.3 K/UL (ref 1.8–7.7)
NEUTROPHILS # BLD AUTO: 7.9 K/UL (ref 1.8–7.7)
NEUTROPHILS # BLD AUTO: 8.9 K/UL (ref 1.8–7.7)
NEUTROPHILS NFR BLD: 34.5 % (ref 38–73)
NEUTROPHILS NFR BLD: 44 % (ref 38–73)
NEUTROPHILS NFR BLD: 52 % (ref 38–73)
NEUTROPHILS NFR BLD: 52 % (ref 38–73)
NEUTROPHILS NFR BLD: 57 % (ref 38–73)
NEUTROPHILS NFR BLD: 59.4 % (ref 38–73)
NEUTROPHILS NFR BLD: 65 % (ref 38–73)
NEUTROPHILS NFR BLD: 66.6 % (ref 38–73)
NEUTROPHILS NFR BLD: 67 % (ref 38–73)
NEUTROPHILS NFR BLD: 68.4 % (ref 38–73)
NEUTROPHILS NFR BLD: 72.8 % (ref 38–73)
NEUTROPHILS NFR BLD: 73 % (ref 38–73)
NEUTROPHILS NFR BLD: 75.5 % (ref 38–73)
NEUTROPHILS NFR BLD: 76.5 % (ref 38–73)
NEUTROPHILS NFR BLD: 76.7 % (ref 38–73)
NEUTROPHILS NFR BLD: 76.7 % (ref 38–73)
NEUTROPHILS NFR BLD: 77.3 % (ref 38–73)
NEUTROPHILS NFR BLD: 77.4 % (ref 38–73)
NEUTROPHILS NFR BLD: 78.3 % (ref 38–73)
NEUTROPHILS NFR BLD: 78.5 % (ref 38–73)
NEUTROPHILS NFR BLD: 78.9 % (ref 38–73)
NEUTROPHILS NFR BLD: 79 % (ref 38–73)
NEUTROPHILS NFR BLD: 79.1 % (ref 38–73)
NEUTROPHILS NFR BLD: 79.2 % (ref 38–73)
NEUTROPHILS NFR BLD: 79.5 % (ref 38–73)
NEUTROPHILS NFR BLD: 79.7 % (ref 38–73)
NEUTROPHILS NFR BLD: 81 % (ref 38–73)
NEUTROPHILS NFR BLD: 81.2 % (ref 38–73)
NEUTROPHILS NFR BLD: 81.5 % (ref 38–73)
NEUTROPHILS NFR BLD: 82 % (ref 38–73)
NEUTROPHILS NFR BLD: 83 % (ref 38–73)
NEUTROPHILS NFR BLD: 83.1 % (ref 38–73)
NEUTROPHILS NFR BLD: 83.2 % (ref 38–73)
NEUTROPHILS NFR BLD: 84 % (ref 38–73)
NEUTROPHILS NFR BLD: 84.5 % (ref 38–73)
NEUTROPHILS NFR BLD: 85.4 % (ref 38–73)
NEUTROPHILS NFR BLD: 88 % (ref 38–73)
NEUTROPHILS NFR BLD: 89.2 % (ref 38–73)
NEUTROPHILS NFR BLD: 93.3 % (ref 38–73)
NEUTROPHILS NFR BLD: 95.2 % (ref 38–73)
NEUTROPHILS NFR BLD: 96.7 % (ref 38–73)
NEUTS BAND NFR BLD MANUAL: 10 %
NEUTS BAND NFR BLD MANUAL: 12 %
NEUTS BAND NFR BLD MANUAL: 18 %
NEUTS BAND NFR BLD MANUAL: 24 %
NEUTS BAND NFR BLD MANUAL: 7 %
NEUTS BAND NFR BLD MANUAL: 7 %
NITRITE UR QL STRIP: NEGATIVE
NONHDLC SERPL-MCNC: 80 MG/DL
NRBC BLD-RTO: 0 /100 WBC
NRBC BLD-RTO: 1 /100 WBC
NUM UNITS TRANS PACKED RBC: NORMAL
OPIATES UR QL SCN: ABNORMAL
OSMOLALITY SERPL: 303 MOSM/KG (ref 275–295)
OSMOLALITY UR: 344 MOSM/KG (ref 50–1200)
PCO2 BLDA: 28.6 MMHG (ref 35–45)
PCO2 BLDA: 32.3 MMHG (ref 35–45)
PCP UR QL SCN>25 NG/ML: NEGATIVE
PH SMN: 7.2 [PH] (ref 7.35–7.45)
PH SMN: 7.27 [PH] (ref 7.35–7.45)
PH UR STRIP: 6 [PH] (ref 5–8)
PHOSPHATE SERPL-MCNC: 1.5 MG/DL (ref 2.7–4.5)
PHOSPHATE SERPL-MCNC: 2.5 MG/DL (ref 2.7–4.5)
PHOSPHATE SERPL-MCNC: 2.5 MG/DL (ref 2.7–4.5)
PHOSPHATE SERPL-MCNC: 3.6 MG/DL (ref 2.7–4.5)
PLATELET # BLD AUTO: 102 K/UL (ref 150–450)
PLATELET # BLD AUTO: 102 K/UL (ref 150–450)
PLATELET # BLD AUTO: 106 K/UL (ref 150–450)
PLATELET # BLD AUTO: 111 K/UL (ref 150–450)
PLATELET # BLD AUTO: 137 K/UL (ref 150–450)
PLATELET # BLD AUTO: 142 K/UL (ref 150–450)
PLATELET # BLD AUTO: 142 K/UL (ref 150–450)
PLATELET # BLD AUTO: 145 K/UL (ref 150–450)
PLATELET # BLD AUTO: 157 K/UL (ref 150–450)
PLATELET # BLD AUTO: 162 K/UL (ref 150–450)
PLATELET # BLD AUTO: 176 K/UL (ref 150–450)
PLATELET # BLD AUTO: 177 K/UL (ref 150–450)
PLATELET # BLD AUTO: 177 K/UL (ref 150–450)
PLATELET # BLD AUTO: 187 K/UL (ref 150–450)
PLATELET # BLD AUTO: 197 K/UL (ref 150–450)
PLATELET # BLD AUTO: 198 K/UL (ref 150–450)
PLATELET # BLD AUTO: 201 K/UL (ref 150–450)
PLATELET # BLD AUTO: 203 K/UL (ref 150–450)
PLATELET # BLD AUTO: 213 K/UL (ref 150–450)
PLATELET # BLD AUTO: 217 K/UL (ref 150–450)
PLATELET # BLD AUTO: 23 K/UL (ref 150–450)
PLATELET # BLD AUTO: 235 K/UL (ref 150–450)
PLATELET # BLD AUTO: 235 K/UL (ref 150–450)
PLATELET # BLD AUTO: 239 K/UL (ref 150–450)
PLATELET # BLD AUTO: 254 K/UL (ref 150–450)
PLATELET # BLD AUTO: 27 K/UL (ref 150–450)
PLATELET # BLD AUTO: 32 K/UL (ref 150–450)
PLATELET # BLD AUTO: 40 K/UL (ref 150–450)
PLATELET # BLD AUTO: 40 K/UL (ref 150–450)
PLATELET # BLD AUTO: 41 K/UL (ref 150–450)
PLATELET # BLD AUTO: 42 K/UL (ref 150–450)
PLATELET # BLD AUTO: 44 K/UL (ref 150–450)
PLATELET # BLD AUTO: 50 K/UL (ref 150–450)
PLATELET # BLD AUTO: 52 K/UL (ref 150–450)
PLATELET # BLD AUTO: 57 K/UL (ref 150–450)
PLATELET # BLD AUTO: 65 K/UL (ref 150–450)
PLATELET # BLD AUTO: 67 K/UL (ref 150–450)
PLATELET # BLD AUTO: 84 K/UL (ref 150–450)
PLATELET # BLD AUTO: 86 K/UL (ref 150–450)
PLATELET # BLD AUTO: 88 K/UL (ref 150–450)
PLATELET # BLD AUTO: 98 K/UL (ref 150–450)
PLATELET BLD QL SMEAR: ABNORMAL
PMV BLD AUTO: 10.1 FL (ref 9.2–12.9)
PMV BLD AUTO: 10.2 FL (ref 9.2–12.9)
PMV BLD AUTO: 10.3 FL (ref 9.2–12.9)
PMV BLD AUTO: 10.4 FL (ref 9.2–12.9)
PMV BLD AUTO: 10.4 FL (ref 9.2–12.9)
PMV BLD AUTO: 10.5 FL (ref 9.2–12.9)
PMV BLD AUTO: 10.6 FL (ref 9.2–12.9)
PMV BLD AUTO: 10.6 FL (ref 9.2–12.9)
PMV BLD AUTO: 10.8 FL (ref 9.2–12.9)
PMV BLD AUTO: 11 FL (ref 9.2–12.9)
PMV BLD AUTO: 11.1 FL (ref 9.2–12.9)
PMV BLD AUTO: 11.2 FL (ref 9.2–12.9)
PMV BLD AUTO: 11.3 FL (ref 9.2–12.9)
PMV BLD AUTO: 11.3 FL (ref 9.2–12.9)
PMV BLD AUTO: 11.6 FL (ref 9.2–12.9)
PMV BLD AUTO: 11.8 FL (ref 9.2–12.9)
PMV BLD AUTO: 8.8 FL (ref 9.2–12.9)
PMV BLD AUTO: 8.9 FL (ref 9.2–12.9)
PMV BLD AUTO: 9.1 FL (ref 9.2–12.9)
PMV BLD AUTO: 9.1 FL (ref 9.2–12.9)
PMV BLD AUTO: 9.2 FL (ref 9.2–12.9)
PMV BLD AUTO: 9.3 FL (ref 9.2–12.9)
PMV BLD AUTO: 9.4 FL (ref 9.2–12.9)
PMV BLD AUTO: 9.5 FL (ref 9.2–12.9)
PMV BLD AUTO: 9.5 FL (ref 9.2–12.9)
PMV BLD AUTO: 9.6 FL (ref 9.2–12.9)
PMV BLD AUTO: 9.8 FL (ref 9.2–12.9)
PMV BLD AUTO: ABNORMAL FL (ref 9.2–12.9)
PO2 BLDA: 140 MMHG (ref 80–100)
PO2 BLDA: 51 MMHG (ref 40–60)
POC BE: -14 MMOL/L
POC BE: -16 MMOL/L
POC IONIZED CALCIUM: 1.2 MMOL/L (ref 1.06–1.42)
POC SATURATED O2: 77 % (ref 95–100)
POC SATURATED O2: 99 % (ref 95–100)
POC TCO2: 13 MMOL/L (ref 24–29)
POC TCO2: 14 MMOL/L (ref 23–27)
POIKILOCYTOSIS BLD QL SMEAR: ABNORMAL
POIKILOCYTOSIS BLD QL SMEAR: SLIGHT
POLYCHROMASIA BLD QL SMEAR: ABNORMAL
POTASSIUM BLD-SCNC: 4 MMOL/L (ref 3.5–5.1)
POTASSIUM SERPL-SCNC: 2.3 MMOL/L (ref 3.5–5.1)
POTASSIUM SERPL-SCNC: 2.5 MMOL/L (ref 3.5–5.1)
POTASSIUM SERPL-SCNC: 2.6 MMOL/L (ref 3.5–5.1)
POTASSIUM SERPL-SCNC: 2.8 MMOL/L (ref 3.5–5.1)
POTASSIUM SERPL-SCNC: 3.1 MMOL/L (ref 3.5–5.1)
POTASSIUM SERPL-SCNC: 3.1 MMOL/L (ref 3.5–5.1)
POTASSIUM SERPL-SCNC: 3.2 MMOL/L (ref 3.5–5.1)
POTASSIUM SERPL-SCNC: 3.3 MMOL/L (ref 3.5–5.1)
POTASSIUM SERPL-SCNC: 3.4 MMOL/L (ref 3.5–5.1)
POTASSIUM SERPL-SCNC: 3.4 MMOL/L (ref 3.5–5.1)
POTASSIUM SERPL-SCNC: 3.5 MMOL/L (ref 3.5–5.1)
POTASSIUM SERPL-SCNC: 3.6 MMOL/L (ref 3.5–5.1)
POTASSIUM SERPL-SCNC: 3.7 MMOL/L (ref 3.5–5.1)
POTASSIUM SERPL-SCNC: 3.8 MMOL/L (ref 3.5–5.1)
POTASSIUM SERPL-SCNC: 3.9 MMOL/L (ref 3.5–5.1)
POTASSIUM SERPL-SCNC: 4 MMOL/L (ref 3.5–5.1)
POTASSIUM SERPL-SCNC: 4.1 MMOL/L (ref 3.5–5.1)
POTASSIUM SERPL-SCNC: 4.2 MMOL/L (ref 3.5–5.1)
POTASSIUM SERPL-SCNC: 4.3 MMOL/L (ref 3.5–5.1)
POTASSIUM SERPL-SCNC: 4.4 MMOL/L (ref 3.5–5.1)
POTASSIUM SERPL-SCNC: 4.5 MMOL/L (ref 3.5–5.1)
POTASSIUM UR-SCNC: 32 MMOL/L (ref 15–95)
PROCALCITONIN SERPL IA-MCNC: 2.43 NG/ML (ref 0–0.5)
PROCALCITONIN SERPL IA-MCNC: 2.75 NG/ML (ref 0–0.5)
PROT SERPL-MCNC: 5.2 G/DL (ref 6–8.4)
PROT SERPL-MCNC: 5.3 G/DL (ref 6–8.4)
PROT SERPL-MCNC: 5.4 G/DL (ref 6–8.4)
PROT SERPL-MCNC: 5.8 G/DL (ref 6–8.4)
PROT SERPL-MCNC: 5.8 G/DL (ref 6–8.4)
PROT SERPL-MCNC: 5.9 G/DL (ref 6–8.4)
PROT SERPL-MCNC: 5.9 G/DL (ref 6–8.4)
PROT SERPL-MCNC: 6 G/DL (ref 6–8.4)
PROT SERPL-MCNC: 6.1 G/DL (ref 6–8.4)
PROT SERPL-MCNC: 6.2 G/DL (ref 6–8.4)
PROT SERPL-MCNC: 6.3 G/DL (ref 6–8.4)
PROT SERPL-MCNC: 6.8 G/DL (ref 6–8.4)
PROT SERPL-MCNC: 7 G/DL (ref 6–8.4)
PROT SERPL-MCNC: 7.1 G/DL (ref 6–8.4)
PROT SERPL-MCNC: 7.2 G/DL (ref 6–8.4)
PROT SERPL-MCNC: 7.3 G/DL (ref 6–8.4)
PROT SERPL-MCNC: 7.3 G/DL (ref 6–8.4)
PROT SERPL-MCNC: 7.4 G/DL (ref 6–8.4)
PROT SERPL-MCNC: 7.5 G/DL (ref 6–8.4)
PROT SERPL-MCNC: 7.5 G/DL (ref 6–8.4)
PROT SERPL-MCNC: 7.6 G/DL (ref 6–8.4)
PROT SERPL-MCNC: 7.7 G/DL (ref 6–8.4)
PROT SERPL-MCNC: 7.8 G/DL (ref 6–8.4)
PROT SERPL-MCNC: 8.2 G/DL (ref 6–8.4)
PROT SERPL-MCNC: 8.4 G/DL (ref 6–8.4)
PROT SERPL-MCNC: 8.7 G/DL (ref 6–8.4)
PROT SERPL-MCNC: 9.3 G/DL (ref 6–8.4)
PROT UR QL STRIP: ABNORMAL
PROTHROMBIN TIME: 12.5 SEC (ref 9–12.5)
PROTHROMBIN TIME: 13.4 SEC (ref 9–12.5)
PTH RELATED PROT SERPL-SCNC: <2 PMOL/L
PTH-INTACT SERPL-MCNC: 9.1 PG/ML (ref 9–77)
RBC # BLD AUTO: 2.14 M/UL (ref 4–5.4)
RBC # BLD AUTO: 2.55 M/UL (ref 4–5.4)
RBC # BLD AUTO: 2.58 M/UL (ref 4–5.4)
RBC # BLD AUTO: 2.6 M/UL (ref 4–5.4)
RBC # BLD AUTO: 2.65 M/UL (ref 4–5.4)
RBC # BLD AUTO: 2.65 M/UL (ref 4–5.4)
RBC # BLD AUTO: 2.72 M/UL (ref 4–5.4)
RBC # BLD AUTO: 2.74 M/UL (ref 4–5.4)
RBC # BLD AUTO: 2.76 M/UL (ref 4–5.4)
RBC # BLD AUTO: 2.76 M/UL (ref 4–5.4)
RBC # BLD AUTO: 2.81 M/UL (ref 4–5.4)
RBC # BLD AUTO: 2.81 M/UL (ref 4–5.4)
RBC # BLD AUTO: 2.82 M/UL (ref 4–5.4)
RBC # BLD AUTO: 2.83 M/UL (ref 4–5.4)
RBC # BLD AUTO: 2.91 M/UL (ref 4–5.4)
RBC # BLD AUTO: 2.92 M/UL (ref 4–5.4)
RBC # BLD AUTO: 2.93 M/UL (ref 4–5.4)
RBC # BLD AUTO: 2.94 M/UL (ref 4–5.4)
RBC # BLD AUTO: 2.99 M/UL (ref 4–5.4)
RBC # BLD AUTO: 3.01 M/UL (ref 4–5.4)
RBC # BLD AUTO: 3.02 M/UL (ref 4–5.4)
RBC # BLD AUTO: 3.04 M/UL (ref 4–5.4)
RBC # BLD AUTO: 3.12 M/UL (ref 4–5.4)
RBC # BLD AUTO: 3.12 M/UL (ref 4–5.4)
RBC # BLD AUTO: 3.14 M/UL (ref 4–5.4)
RBC # BLD AUTO: 3.17 M/UL (ref 4–5.4)
RBC # BLD AUTO: 3.19 M/UL (ref 4–5.4)
RBC # BLD AUTO: 3.2 M/UL (ref 4–5.4)
RBC # BLD AUTO: 3.22 M/UL (ref 4–5.4)
RBC # BLD AUTO: 3.27 M/UL (ref 4–5.4)
RBC # BLD AUTO: 3.35 M/UL (ref 4–5.4)
RBC # BLD AUTO: 3.37 M/UL (ref 4–5.4)
RBC # BLD AUTO: 3.37 M/UL (ref 4–5.4)
RBC # BLD AUTO: 3.61 M/UL (ref 4–5.4)
RBC # BLD AUTO: 3.61 M/UL (ref 4–5.4)
RBC # BLD AUTO: 3.69 M/UL (ref 4–5.4)
RBC # BLD AUTO: 3.7 M/UL (ref 4–5.4)
RBC # BLD AUTO: 3.73 M/UL (ref 4–5.4)
RBC # BLD AUTO: 3.79 M/UL (ref 4–5.4)
RBC # BLD AUTO: 3.88 M/UL (ref 4–5.4)
RBC # BLD AUTO: 3.88 M/UL (ref 4–5.4)
RBC #/AREA URNS HPF: 0 /HPF (ref 0–4)
RBC #/AREA URNS HPF: 1 /HPF (ref 0–4)
RBC #/AREA URNS HPF: 7 /HPF (ref 0–4)
SAMPLE: ABNORMAL
SAMPLE: ABNORMAL
SARS-COV-2 RDRP RESP QL NAA+PROBE: NEGATIVE
SCHISTOCYTES BLD QL SMEAR: PRESENT
SITE: ABNORMAL
SITE: ABNORMAL
SODIUM BLD-SCNC: 136 MMOL/L (ref 136–145)
SODIUM SERPL-SCNC: 131 MMOL/L (ref 136–145)
SODIUM SERPL-SCNC: 131 MMOL/L (ref 136–145)
SODIUM SERPL-SCNC: 132 MMOL/L (ref 136–145)
SODIUM SERPL-SCNC: 133 MMOL/L (ref 136–145)
SODIUM SERPL-SCNC: 133 MMOL/L (ref 136–145)
SODIUM SERPL-SCNC: 134 MMOL/L (ref 136–145)
SODIUM SERPL-SCNC: 134 MMOL/L (ref 136–145)
SODIUM SERPL-SCNC: 135 MMOL/L (ref 136–145)
SODIUM SERPL-SCNC: 136 MMOL/L (ref 136–145)
SODIUM SERPL-SCNC: 137 MMOL/L (ref 136–145)
SODIUM SERPL-SCNC: 138 MMOL/L (ref 136–145)
SODIUM SERPL-SCNC: 139 MMOL/L (ref 136–145)
SODIUM SERPL-SCNC: 140 MMOL/L (ref 136–145)
SODIUM SERPL-SCNC: 141 MMOL/L (ref 136–145)
SP GR UR STRIP: 1.01 (ref 1–1.03)
SP GR UR STRIP: 1.02 (ref 1–1.03)
SP GR UR STRIP: >1.03 (ref 1–1.03)
SPECIMEN OUTDATE: NORMAL
SPECIMEN SOURCE: NORMAL
SPHEROCYTES BLD QL SMEAR: ABNORMAL
SQUAMOUS #/AREA URNS HPF: 1 /HPF
SQUAMOUS #/AREA URNS HPF: 12 /HPF
SQUAMOUS #/AREA URNS HPF: 14 /HPF
SQUAMOUS #/AREA URNS HPF: 23 /HPF
SQUAMOUS #/AREA URNS HPF: 8 /HPF
TOXIC GRANULES BLD QL SMEAR: PRESENT
TOXICOLOGY INFORMATION: ABNORMAL
TRIGL SERPL-MCNC: 107 MG/DL (ref 30–150)
TROPONIN I SERPL HS-MCNC: 45.7 PG/ML (ref 0–14.9)
TROPONIN I SERPL HS-MCNC: 47.9 PG/ML (ref 0–14.9)
TROPONIN I SERPL HS-MCNC: 9.1 PG/ML (ref 0–14.9)
TROPONIN I SERPL HS-MCNC: 9.2 PG/ML (ref 0–14.9)
TROPONIN I SERPL HS-MCNC: 9.7 PG/ML (ref 0–14.9)
TSH SERPL DL<=0.005 MIU/L-ACNC: 1.64 UIU/ML (ref 0.34–5.6)
TSH SERPL DL<=0.005 MIU/L-ACNC: 1.97 UIU/ML (ref 0.34–5.6)
URN SPEC COLLECT METH UR: ABNORMAL
UROBILINOGEN UR STRIP-ACNC: NEGATIVE EU/DL
VANCOMYCIN TROUGH SERPL-MCNC: 7 UG/ML
WBC # BLD AUTO: 0.58 K/UL (ref 3.9–12.7)
WBC # BLD AUTO: 0.82 K/UL (ref 3.9–12.7)
WBC # BLD AUTO: 1.02 K/UL (ref 3.9–12.7)
WBC # BLD AUTO: 1.44 K/UL (ref 3.9–12.7)
WBC # BLD AUTO: 10.64 K/UL (ref 3.9–12.7)
WBC # BLD AUTO: 11.04 K/UL (ref 3.9–12.7)
WBC # BLD AUTO: 11.2 K/UL (ref 3.9–12.7)
WBC # BLD AUTO: 12.22 K/UL (ref 3.9–12.7)
WBC # BLD AUTO: 12.61 K/UL (ref 3.9–12.7)
WBC # BLD AUTO: 13.65 K/UL (ref 3.9–12.7)
WBC # BLD AUTO: 14.81 K/UL (ref 3.9–12.7)
WBC # BLD AUTO: 16.81 K/UL (ref 3.9–12.7)
WBC # BLD AUTO: 17.99 K/UL (ref 3.9–12.7)
WBC # BLD AUTO: 2.06 K/UL (ref 3.9–12.7)
WBC # BLD AUTO: 2.25 K/UL (ref 3.9–12.7)
WBC # BLD AUTO: 2.28 K/UL (ref 3.9–12.7)
WBC # BLD AUTO: 2.51 K/UL (ref 3.9–12.7)
WBC # BLD AUTO: 3.29 K/UL (ref 3.9–12.7)
WBC # BLD AUTO: 3.53 K/UL (ref 3.9–12.7)
WBC # BLD AUTO: 3.78 K/UL (ref 3.9–12.7)
WBC # BLD AUTO: 3.93 K/UL (ref 3.9–12.7)
WBC # BLD AUTO: 4.64 K/UL (ref 3.9–12.7)
WBC # BLD AUTO: 4.75 K/UL (ref 3.9–12.7)
WBC # BLD AUTO: 4.88 K/UL (ref 3.9–12.7)
WBC # BLD AUTO: 5.19 K/UL (ref 3.9–12.7)
WBC # BLD AUTO: 5.55 K/UL (ref 3.9–12.7)
WBC # BLD AUTO: 5.58 K/UL (ref 3.9–12.7)
WBC # BLD AUTO: 5.63 K/UL (ref 3.9–12.7)
WBC # BLD AUTO: 5.69 K/UL (ref 3.9–12.7)
WBC # BLD AUTO: 6.36 K/UL (ref 3.9–12.7)
WBC # BLD AUTO: 7.03 K/UL (ref 3.9–12.7)
WBC # BLD AUTO: 7.03 K/UL (ref 3.9–12.7)
WBC # BLD AUTO: 7.07 K/UL (ref 3.9–12.7)
WBC # BLD AUTO: 7.09 K/UL (ref 3.9–12.7)
WBC # BLD AUTO: 7.68 K/UL (ref 3.9–12.7)
WBC # BLD AUTO: 8.39 K/UL (ref 3.9–12.7)
WBC # BLD AUTO: 8.4 K/UL (ref 3.9–12.7)
WBC # BLD AUTO: 8.45 K/UL (ref 3.9–12.7)
WBC # BLD AUTO: 8.48 K/UL (ref 3.9–12.7)
WBC # BLD AUTO: 9.15 K/UL (ref 3.9–12.7)
WBC # BLD AUTO: 9.73 K/UL (ref 3.9–12.7)
WBC #/AREA URNS HPF: 1 /HPF (ref 0–5)
WBC #/AREA URNS HPF: 10 /HPF (ref 0–5)
WBC #/AREA URNS HPF: 2 /HPF (ref 0–5)
WBC #/AREA URNS HPF: 4 /HPF (ref 0–5)
WBC #/AREA URNS HPF: 8 /HPF (ref 0–5)
YEAST URNS QL MICRO: ABNORMAL

## 2023-01-01 PROCEDURE — 80053 COMPREHEN METABOLIC PANEL: CPT | Performed by: INTERNAL MEDICINE

## 2023-01-01 PROCEDURE — 85025 COMPLETE CBC W/AUTO DIFF WBC: CPT | Performed by: INTERNAL MEDICINE

## 2023-01-01 PROCEDURE — 96372 THER/PROPH/DIAG INJ SC/IM: CPT

## 2023-01-01 PROCEDURE — 3078F PR MOST RECENT DIASTOLIC BLOOD PRESSURE < 80 MM HG: ICD-10-PCS | Mod: CPTII,S$GLB,, | Performed by: INTERNAL MEDICINE

## 2023-01-01 PROCEDURE — 97165 OT EVAL LOW COMPLEX 30 MIN: CPT

## 2023-01-01 PROCEDURE — 36415 COLL VENOUS BLD VENIPUNCTURE: CPT | Performed by: INTERNAL MEDICINE

## 2023-01-01 PROCEDURE — 94761 N-INVAS EAR/PLS OXIMETRY MLT: CPT

## 2023-01-01 PROCEDURE — 83735 ASSAY OF MAGNESIUM: CPT | Performed by: FAMILY MEDICINE

## 2023-01-01 PROCEDURE — 86300 IMMUNOASSAY TUMOR CA 15-3: CPT | Performed by: INTERNAL MEDICINE

## 2023-01-01 PROCEDURE — A4216 STERILE WATER/SALINE, 10 ML: HCPCS | Performed by: INTERNAL MEDICINE

## 2023-01-01 PROCEDURE — 84484 ASSAY OF TROPONIN QUANT: CPT | Mod: 91

## 2023-01-01 PROCEDURE — 86300 IMMUNOASSAY TUMOR CA 15-3: CPT | Mod: 91 | Performed by: INTERNAL MEDICINE

## 2023-01-01 PROCEDURE — 63600175 PHARM REV CODE 636 W HCPCS: Performed by: FAMILY MEDICINE

## 2023-01-01 PROCEDURE — 83880 ASSAY OF NATRIURETIC PEPTIDE: CPT

## 2023-01-01 PROCEDURE — 36415 COLL VENOUS BLD VENIPUNCTURE: CPT | Performed by: EMERGENCY MEDICINE

## 2023-01-01 PROCEDURE — 96415 CHEMO IV INFUSION ADDL HR: CPT

## 2023-01-01 PROCEDURE — 63600175 PHARM REV CODE 636 W HCPCS: Performed by: INTERNAL MEDICINE

## 2023-01-01 PROCEDURE — 25000003 PHARM REV CODE 250: Performed by: INTERNAL MEDICINE

## 2023-01-01 PROCEDURE — 4010F PR ACE/ARB THEARPY RXD/TAKEN: ICD-10-PCS | Mod: CPTII,S$GLB,, | Performed by: INTERNAL MEDICINE

## 2023-01-01 PROCEDURE — 25000242 PHARM REV CODE 250 ALT 637 W/ HCPCS: Performed by: HOSPITALIST

## 2023-01-01 PROCEDURE — 25000003 PHARM REV CODE 250: Performed by: STUDENT IN AN ORGANIZED HEALTH CARE EDUCATION/TRAINING PROGRAM

## 2023-01-01 PROCEDURE — 84484 ASSAY OF TROPONIN QUANT: CPT | Performed by: EMERGENCY MEDICINE

## 2023-01-01 PROCEDURE — 99900035 HC TECH TIME PER 15 MIN (STAT)

## 2023-01-01 PROCEDURE — 96375 TX/PRO/DX INJ NEW DRUG ADDON: CPT

## 2023-01-01 PROCEDURE — 93010 EKG 12-LEAD: ICD-10-PCS | Mod: ,,, | Performed by: INTERNAL MEDICINE

## 2023-01-01 PROCEDURE — 3060F POS MICROALBUMINURIA REV: CPT | Mod: CPTII,S$GLB,, | Performed by: INTERNAL MEDICINE

## 2023-01-01 PROCEDURE — 85025 COMPLETE CBC W/AUTO DIFF WBC: CPT | Performed by: NURSE PRACTITIONER

## 2023-01-01 PROCEDURE — 83735 ASSAY OF MAGNESIUM: CPT | Performed by: EMERGENCY MEDICINE

## 2023-01-01 PROCEDURE — 85007 BL SMEAR W/DIFF WBC COUNT: CPT | Performed by: FAMILY MEDICINE

## 2023-01-01 PROCEDURE — 25000003 PHARM REV CODE 250: Performed by: FAMILY MEDICINE

## 2023-01-01 PROCEDURE — 86900 BLOOD TYPING SEROLOGIC ABO: CPT | Performed by: HOSPITALIST

## 2023-01-01 PROCEDURE — 3066F PR DOCUMENTATION OF TREATMENT FOR NEPHROPATHY: ICD-10-PCS | Mod: CPTII,S$GLB,, | Performed by: NURSE PRACTITIONER

## 2023-01-01 PROCEDURE — 3046F PR MOST RECENT HEMOGLOBIN A1C LEVEL > 9.0%: ICD-10-PCS | Mod: CPTII,S$GLB,, | Performed by: INTERNAL MEDICINE

## 2023-01-01 PROCEDURE — 80307 DRUG TEST PRSMV CHEM ANLYZR: CPT | Performed by: EMERGENCY MEDICINE

## 2023-01-01 PROCEDURE — 3074F SYST BP LT 130 MM HG: CPT | Mod: CPTII,S$GLB,, | Performed by: INTERNAL MEDICINE

## 2023-01-01 PROCEDURE — 85025 COMPLETE CBC W/AUTO DIFF WBC: CPT | Performed by: FAMILY MEDICINE

## 2023-01-01 PROCEDURE — 3052F PR MOST RECENT HEMOGLOBIN A1C LEVEL 8.0 - < 9.0%: ICD-10-PCS | Mod: CPTII,S$GLB,, | Performed by: INTERNAL MEDICINE

## 2023-01-01 PROCEDURE — P9016 RBC LEUKOCYTES REDUCED: HCPCS | Performed by: HOSPITALIST

## 2023-01-01 PROCEDURE — 93010 ELECTROCARDIOGRAM REPORT: CPT | Mod: ,,, | Performed by: SPECIALIST

## 2023-01-01 PROCEDURE — 3077F PR MOST RECENT SYSTOLIC BLOOD PRESSURE >= 140 MM HG: ICD-10-PCS | Mod: CPTII,S$GLB,, | Performed by: INTERNAL MEDICINE

## 2023-01-01 PROCEDURE — 25000003 PHARM REV CODE 250: Performed by: NURSE PRACTITIONER

## 2023-01-01 PROCEDURE — 96367 TX/PROPH/DG ADDL SEQ IV INF: CPT

## 2023-01-01 PROCEDURE — 51798 US URINE CAPACITY MEASURE: CPT

## 2023-01-01 PROCEDURE — 99213 PR OFFICE/OUTPT VISIT, EST, LEVL III, 20-29 MIN: ICD-10-PCS | Mod: S$GLB,,, | Performed by: INTERNAL MEDICINE

## 2023-01-01 PROCEDURE — 3008F PR BODY MASS INDEX (BMI) DOCUMENTED: ICD-10-PCS | Mod: CPTII,S$GLB,, | Performed by: INTERNAL MEDICINE

## 2023-01-01 PROCEDURE — 96413 CHEMO IV INFUSION 1 HR: CPT

## 2023-01-01 PROCEDURE — 99900031 HC PATIENT EDUCATION (STAT)

## 2023-01-01 PROCEDURE — 96374 THER/PROPH/DIAG INJ IV PUSH: CPT

## 2023-01-01 PROCEDURE — 4010F PR ACE/ARB THEARPY RXD/TAKEN: ICD-10-PCS | Mod: CPTII,ICN,, | Performed by: FAMILY MEDICINE

## 2023-01-01 PROCEDURE — 94640 AIRWAY INHALATION TREATMENT: CPT

## 2023-01-01 PROCEDURE — 1111F DSCHRG MED/CURRENT MED MERGE: CPT | Mod: CPTII,ICN,, | Performed by: FAMILY MEDICINE

## 2023-01-01 PROCEDURE — 85610 PROTHROMBIN TIME: CPT | Performed by: RADIOLOGY

## 2023-01-01 PROCEDURE — 4010F ACE/ARB THERAPY RXD/TAKEN: CPT | Mod: CPTII,S$GLB,, | Performed by: INTERNAL MEDICINE

## 2023-01-01 PROCEDURE — 99285 EMERGENCY DEPT VISIT HI MDM: CPT | Mod: 25

## 2023-01-01 PROCEDURE — 82570 ASSAY OF URINE CREATININE: CPT | Performed by: FAMILY MEDICINE

## 2023-01-01 PROCEDURE — 3046F HEMOGLOBIN A1C LEVEL >9.0%: CPT | Mod: CPTII,S$GLB,, | Performed by: INTERNAL MEDICINE

## 2023-01-01 PROCEDURE — P9040 RBC LEUKOREDUCED IRRADIATED: HCPCS | Performed by: NURSE PRACTITIONER

## 2023-01-01 PROCEDURE — 3008F BODY MASS INDEX DOCD: CPT | Mod: CPTII,S$GLB,, | Performed by: NURSE PRACTITIONER

## 2023-01-01 PROCEDURE — 1111F PR DISCHARGE MEDS RECONCILED W/ CURRENT OUTPATIENT MED LIST: ICD-10-PCS | Mod: CPTII,S$GLB,, | Performed by: INTERNAL MEDICINE

## 2023-01-01 PROCEDURE — 93005 ELECTROCARDIOGRAM TRACING: CPT | Performed by: SPECIALIST

## 2023-01-01 PROCEDURE — 85007 BL SMEAR W/DIFF WBC COUNT: CPT | Performed by: INTERNAL MEDICINE

## 2023-01-01 PROCEDURE — 63600175 PHARM REV CODE 636 W HCPCS: Performed by: NURSE PRACTITIONER

## 2023-01-01 PROCEDURE — 93005 ELECTROCARDIOGRAM TRACING: CPT | Performed by: INTERNAL MEDICINE

## 2023-01-01 PROCEDURE — 63600175 PHARM REV CODE 636 W HCPCS: Performed by: STUDENT IN AN ORGANIZED HEALTH CARE EDUCATION/TRAINING PROGRAM

## 2023-01-01 PROCEDURE — 3074F PR MOST RECENT SYSTOLIC BLOOD PRESSURE < 130 MM HG: ICD-10-PCS | Mod: CPTII,ICN,, | Performed by: FAMILY MEDICINE

## 2023-01-01 PROCEDURE — 3078F PR MOST RECENT DIASTOLIC BLOOD PRESSURE < 80 MM HG: ICD-10-PCS | Mod: CPTII,S$GLB,, | Performed by: NURSE PRACTITIONER

## 2023-01-01 PROCEDURE — 3052F HG A1C>EQUAL 8.0%<EQUAL 9.0%: CPT | Mod: CPTII,S$GLB,, | Performed by: INTERNAL MEDICINE

## 2023-01-01 PROCEDURE — 4010F ACE/ARB THERAPY RXD/TAKEN: CPT | Mod: CPTII,S$GLB,, | Performed by: NURSE PRACTITIONER

## 2023-01-01 PROCEDURE — 1111F DSCHRG MED/CURRENT MED MERGE: CPT | Mod: CPTII,S$GLB,, | Performed by: INTERNAL MEDICINE

## 2023-01-01 PROCEDURE — 3078F DIAST BP <80 MM HG: CPT | Mod: CPTII,S$GLB,, | Performed by: INTERNAL MEDICINE

## 2023-01-01 PROCEDURE — 3066F NEPHROPATHY DOC TX: CPT | Mod: CPTII,S$GLB,, | Performed by: INTERNAL MEDICINE

## 2023-01-01 PROCEDURE — 99213 OFFICE O/P EST LOW 20 MIN: CPT | Mod: S$GLB,,, | Performed by: INTERNAL MEDICINE

## 2023-01-01 PROCEDURE — 97535 SELF CARE MNGMENT TRAINING: CPT

## 2023-01-01 PROCEDURE — 3066F NEPHROPATHY DOC TX: CPT | Mod: CPTII,S$GLB,, | Performed by: NURSE PRACTITIONER

## 2023-01-01 PROCEDURE — 99233 PR SUBSEQUENT HOSPITAL CARE,LEVL III: ICD-10-PCS | Mod: ,,, | Performed by: INTERNAL MEDICINE

## 2023-01-01 PROCEDURE — 93010 ELECTROCARDIOGRAM REPORT: CPT | Mod: ,,, | Performed by: INTERNAL MEDICINE

## 2023-01-01 PROCEDURE — 3060F PR POS MICROALBUMINURIA RESULT DOCUMENTED/REVIEW: ICD-10-PCS | Mod: CPTII,S$GLB,, | Performed by: INTERNAL MEDICINE

## 2023-01-01 PROCEDURE — 3066F PR DOCUMENTATION OF TREATMENT FOR NEPHROPATHY: ICD-10-PCS | Mod: CPTII,S$GLB,, | Performed by: INTERNAL MEDICINE

## 2023-01-01 PROCEDURE — 1111F PR DISCHARGE MEDS RECONCILED W/ CURRENT OUTPATIENT MED LIST: ICD-10-PCS | Mod: CPTII,S$GLB,, | Performed by: NURSE PRACTITIONER

## 2023-01-01 PROCEDURE — 63600175 PHARM REV CODE 636 W HCPCS: Mod: JZ,JG | Performed by: INTERNAL MEDICINE

## 2023-01-01 PROCEDURE — 96365 THER/PROPH/DIAG IV INF INIT: CPT

## 2023-01-01 PROCEDURE — 84484 ASSAY OF TROPONIN QUANT: CPT | Performed by: INTERNAL MEDICINE

## 2023-01-01 PROCEDURE — 81001 URINALYSIS AUTO W/SCOPE: CPT | Performed by: NURSE PRACTITIONER

## 2023-01-01 PROCEDURE — 96417 CHEMO IV INFUS EACH ADDL SEQ: CPT

## 2023-01-01 PROCEDURE — 12000002 HC ACUTE/MED SURGE SEMI-PRIVATE ROOM

## 2023-01-01 PROCEDURE — 83605 ASSAY OF LACTIC ACID: CPT | Performed by: EMERGENCY MEDICINE

## 2023-01-01 PROCEDURE — 83605 ASSAY OF LACTIC ACID: CPT | Performed by: FAMILY MEDICINE

## 2023-01-01 PROCEDURE — 99221 PR INITIAL HOSPITAL CARE,LEVL I: ICD-10-PCS | Mod: ,,, | Performed by: FAMILY MEDICINE

## 2023-01-01 PROCEDURE — 99213 OFFICE O/P EST LOW 20 MIN: CPT | Mod: S$GLB,ICN,, | Performed by: FAMILY MEDICINE

## 2023-01-01 PROCEDURE — 85027 COMPLETE CBC AUTOMATED: CPT | Performed by: INTERNAL MEDICINE

## 2023-01-01 PROCEDURE — 3078F DIAST BP <80 MM HG: CPT | Mod: CPTII,S$GLB,, | Performed by: NURSE PRACTITIONER

## 2023-01-01 PROCEDURE — 87077 CULTURE AEROBIC IDENTIFY: CPT | Mod: 59 | Performed by: INTERNAL MEDICINE

## 2023-01-01 PROCEDURE — 96368 THER/DIAG CONCURRENT INF: CPT

## 2023-01-01 PROCEDURE — 99213 PR OFFICE/OUTPT VISIT, EST, LEVL III, 20-29 MIN: ICD-10-PCS | Mod: S$GLB,,, | Performed by: NURSE PRACTITIONER

## 2023-01-01 PROCEDURE — 83605 ASSAY OF LACTIC ACID: CPT | Mod: 91 | Performed by: INTERNAL MEDICINE

## 2023-01-01 PROCEDURE — 3008F BODY MASS INDEX DOCD: CPT | Mod: CPTII,S$GLB,, | Performed by: INTERNAL MEDICINE

## 2023-01-01 PROCEDURE — 25000003 PHARM REV CODE 250: Performed by: EMERGENCY MEDICINE

## 2023-01-01 PROCEDURE — 4010F PR ACE/ARB THEARPY RXD/TAKEN: ICD-10-PCS | Mod: CPTII,S$GLB,, | Performed by: NURSE PRACTITIONER

## 2023-01-01 PROCEDURE — 85027 COMPLETE CBC AUTOMATED: CPT | Performed by: FAMILY MEDICINE

## 2023-01-01 PROCEDURE — 97116 GAIT TRAINING THERAPY: CPT

## 2023-01-01 PROCEDURE — 3052F PR MOST RECENT HEMOGLOBIN A1C LEVEL 8.0 - < 9.0%: ICD-10-PCS | Mod: CPTII,S$GLB,, | Performed by: NURSE PRACTITIONER

## 2023-01-01 PROCEDURE — 83690 ASSAY OF LIPASE: CPT | Performed by: EMERGENCY MEDICINE

## 2023-01-01 PROCEDURE — 1159F PR MEDICATION LIST DOCUMENTED IN MEDICAL RECORD: ICD-10-PCS | Mod: CPTII,S$GLB,, | Performed by: NURSE PRACTITIONER

## 2023-01-01 PROCEDURE — 83605 ASSAY OF LACTIC ACID: CPT | Performed by: NURSE PRACTITIONER

## 2023-01-01 PROCEDURE — 85025 COMPLETE CBC W/AUTO DIFF WBC: CPT

## 2023-01-01 PROCEDURE — 84132 ASSAY OF SERUM POTASSIUM: CPT | Performed by: STUDENT IN AN ORGANIZED HEALTH CARE EDUCATION/TRAINING PROGRAM

## 2023-01-01 PROCEDURE — A4216 STERILE WATER/SALINE, 10 ML: HCPCS | Performed by: NURSE PRACTITIONER

## 2023-01-01 PROCEDURE — 3074F PR MOST RECENT SYSTOLIC BLOOD PRESSURE < 130 MM HG: ICD-10-PCS | Mod: CPTII,S$GLB,, | Performed by: INTERNAL MEDICINE

## 2023-01-01 PROCEDURE — 99214 PR OFFICE/OUTPT VISIT, EST, LEVL IV, 30-39 MIN: ICD-10-PCS | Mod: S$GLB,,, | Performed by: INTERNAL MEDICINE

## 2023-01-01 PROCEDURE — 25000242 PHARM REV CODE 250 ALT 637 W/ HCPCS: Performed by: FAMILY MEDICINE

## 2023-01-01 PROCEDURE — 80061 LIPID PANEL: CPT | Performed by: FAMILY MEDICINE

## 2023-01-01 PROCEDURE — 96361 HYDRATE IV INFUSION ADD-ON: CPT

## 2023-01-01 PROCEDURE — 25500020 PHARM REV CODE 255: Performed by: EMERGENCY MEDICINE

## 2023-01-01 PROCEDURE — 3060F POS MICROALBUMINURIA REV: CPT | Mod: CPTII,S$GLB,, | Performed by: NURSE PRACTITIONER

## 2023-01-01 PROCEDURE — 81001 URINALYSIS AUTO W/SCOPE: CPT | Performed by: INTERNAL MEDICINE

## 2023-01-01 PROCEDURE — 3079F PR MOST RECENT DIASTOLIC BLOOD PRESSURE 80-89 MM HG: ICD-10-PCS | Mod: CPTII,S$GLB,, | Performed by: INTERNAL MEDICINE

## 2023-01-01 PROCEDURE — 81001 URINALYSIS AUTO W/SCOPE: CPT | Performed by: FAMILY MEDICINE

## 2023-01-01 PROCEDURE — 97161 PT EVAL LOW COMPLEX 20 MIN: CPT

## 2023-01-01 PROCEDURE — 83036 HEMOGLOBIN GLYCOSYLATED A1C: CPT | Performed by: FAMILY MEDICINE

## 2023-01-01 PROCEDURE — 93010 PR ELECTROCARDIOGRAM REPORT: ICD-10-PCS | Mod: ,,, | Performed by: GENERAL PRACTICE

## 2023-01-01 PROCEDURE — 36430 TRANSFUSION BLD/BLD COMPNT: CPT

## 2023-01-01 PROCEDURE — 80053 COMPREHEN METABOLIC PANEL: CPT | Performed by: FAMILY MEDICINE

## 2023-01-01 PROCEDURE — 27000221 HC OXYGEN, UP TO 24 HOURS

## 2023-01-01 PROCEDURE — 63600175 PHARM REV CODE 636 W HCPCS: Mod: JG | Performed by: INTERNAL MEDICINE

## 2023-01-01 PROCEDURE — 80053 COMPREHEN METABOLIC PANEL: CPT | Performed by: EMERGENCY MEDICINE

## 2023-01-01 PROCEDURE — 99213 PR OFFICE/OUTPT VISIT, EST, LEVL III, 20-29 MIN: ICD-10-PCS | Mod: S$GLB,ICN,, | Performed by: FAMILY MEDICINE

## 2023-01-01 PROCEDURE — 84145 PROCALCITONIN (PCT): CPT | Performed by: INTERNAL MEDICINE

## 2023-01-01 PROCEDURE — 87205 SMEAR GRAM STAIN: CPT | Performed by: INTERNAL MEDICINE

## 2023-01-01 PROCEDURE — 83605 ASSAY OF LACTIC ACID: CPT | Mod: 91 | Performed by: EMERGENCY MEDICINE

## 2023-01-01 PROCEDURE — 1160F RVW MEDS BY RX/DR IN RCRD: CPT | Mod: CPTII,ICN,, | Performed by: FAMILY MEDICINE

## 2023-01-01 PROCEDURE — 1160F PR REVIEW ALL MEDS BY PRESCRIBER/CLIN PHARMACIST DOCUMENTED: ICD-10-PCS | Mod: CPTII,ICN,, | Performed by: FAMILY MEDICINE

## 2023-01-01 PROCEDURE — 99223 1ST HOSP IP/OBS HIGH 75: CPT | Mod: ,,, | Performed by: INTERNAL MEDICINE

## 2023-01-01 PROCEDURE — 36415 COLL VENOUS BLD VENIPUNCTURE: CPT | Performed by: STUDENT IN AN ORGANIZED HEALTH CARE EDUCATION/TRAINING PROGRAM

## 2023-01-01 PROCEDURE — 3066F NEPHROPATHY DOC TX: CPT | Mod: CPTII,ICN,, | Performed by: FAMILY MEDICINE

## 2023-01-01 PROCEDURE — 1111F DSCHRG MED/CURRENT MED MERGE: CPT | Mod: CPTII,S$GLB,, | Performed by: NURSE PRACTITIONER

## 2023-01-01 PROCEDURE — 3060F PR POS MICROALBUMINURIA RESULT DOCUMENTED/REVIEW: ICD-10-PCS | Mod: CPTII,S$GLB,, | Performed by: NURSE PRACTITIONER

## 2023-01-01 PROCEDURE — 1159F MED LIST DOCD IN RCRD: CPT | Mod: CPTII,S$GLB,, | Performed by: INTERNAL MEDICINE

## 2023-01-01 PROCEDURE — 25000003 PHARM REV CODE 250: Performed by: HOSPITALIST

## 2023-01-01 PROCEDURE — U0002 COVID-19 LAB TEST NON-CDC: HCPCS | Performed by: EMERGENCY MEDICINE

## 2023-01-01 PROCEDURE — 83690 ASSAY OF LIPASE: CPT | Performed by: INTERNAL MEDICINE

## 2023-01-01 PROCEDURE — 87186 SC STD MICRODIL/AGAR DIL: CPT | Performed by: INTERNAL MEDICINE

## 2023-01-01 PROCEDURE — 63600175 PHARM REV CODE 636 W HCPCS: Mod: JZ,JG | Performed by: NURSE PRACTITIONER

## 2023-01-01 PROCEDURE — 99291 CRITICAL CARE FIRST HOUR: CPT | Mod: ,,, | Performed by: INTERNAL MEDICINE

## 2023-01-01 PROCEDURE — 80053 COMPREHEN METABOLIC PANEL: CPT | Performed by: STUDENT IN AN ORGANIZED HEALTH CARE EDUCATION/TRAINING PROGRAM

## 2023-01-01 PROCEDURE — 3008F PR BODY MASS INDEX (BMI) DOCUMENTED: ICD-10-PCS | Mod: CPTII,S$GLB,, | Performed by: NURSE PRACTITIONER

## 2023-01-01 PROCEDURE — 99232 SBSQ HOSP IP/OBS MODERATE 35: CPT | Mod: ,,, | Performed by: INTERNAL MEDICINE

## 2023-01-01 PROCEDURE — 3052F HG A1C>EQUAL 8.0%<EQUAL 9.0%: CPT | Mod: CPTII,S$GLB,, | Performed by: NURSE PRACTITIONER

## 2023-01-01 PROCEDURE — 99223 PR INITIAL HOSPITAL CARE,LEVL III: ICD-10-PCS | Mod: ,,, | Performed by: INTERNAL MEDICINE

## 2023-01-01 PROCEDURE — 86920 COMPATIBILITY TEST SPIN: CPT | Performed by: HOSPITALIST

## 2023-01-01 PROCEDURE — G0378 HOSPITAL OBSERVATION PER HR: HCPCS

## 2023-01-01 PROCEDURE — 82306 VITAMIN D 25 HYDROXY: CPT | Performed by: NURSE PRACTITIONER

## 2023-01-01 PROCEDURE — 83605 ASSAY OF LACTIC ACID: CPT | Mod: 91 | Performed by: FAMILY MEDICINE

## 2023-01-01 PROCEDURE — 94760 N-INVAS EAR/PLS OXIMETRY 1: CPT

## 2023-01-01 PROCEDURE — 87641 MR-STAPH DNA AMP PROBE: CPT | Performed by: STUDENT IN AN ORGANIZED HEALTH CARE EDUCATION/TRAINING PROGRAM

## 2023-01-01 PROCEDURE — 80048 BASIC METABOLIC PNL TOTAL CA: CPT | Performed by: STUDENT IN AN ORGANIZED HEALTH CARE EDUCATION/TRAINING PROGRAM

## 2023-01-01 PROCEDURE — 81001 URINALYSIS AUTO W/SCOPE: CPT | Performed by: EMERGENCY MEDICINE

## 2023-01-01 PROCEDURE — 85025 COMPLETE CBC W/AUTO DIFF WBC: CPT | Performed by: STUDENT IN AN ORGANIZED HEALTH CARE EDUCATION/TRAINING PROGRAM

## 2023-01-01 PROCEDURE — 4010F ACE/ARB THERAPY RXD/TAKEN: CPT | Mod: CPTII,ICN,, | Performed by: FAMILY MEDICINE

## 2023-01-01 PROCEDURE — 82803 BLOOD GASES ANY COMBINATION: CPT

## 2023-01-01 PROCEDURE — 80069 RENAL FUNCTION PANEL: CPT | Performed by: NURSE PRACTITIONER

## 2023-01-01 PROCEDURE — 97530 THERAPEUTIC ACTIVITIES: CPT

## 2023-01-01 PROCEDURE — 3046F PR MOST RECENT HEMOGLOBIN A1C LEVEL > 9.0%: ICD-10-PCS | Mod: CPTII,ICN,, | Performed by: FAMILY MEDICINE

## 2023-01-01 PROCEDURE — 1159F PR MEDICATION LIST DOCUMENTED IN MEDICAL RECORD: ICD-10-PCS | Mod: CPTII,ICN,, | Performed by: FAMILY MEDICINE

## 2023-01-01 PROCEDURE — 3060F POS MICROALBUMINURIA REV: CPT | Mod: CPTII,ICN,, | Performed by: FAMILY MEDICINE

## 2023-01-01 PROCEDURE — 88361 TUMOR IMMUNOHISTOCHEM/COMPUT: CPT | Mod: TC,59 | Performed by: PATHOLOGY

## 2023-01-01 PROCEDURE — 3077F PR MOST RECENT SYSTOLIC BLOOD PRESSURE >= 140 MM HG: ICD-10-PCS | Mod: CPTII,S$GLB,, | Performed by: NURSE PRACTITIONER

## 2023-01-01 PROCEDURE — 87040 BLOOD CULTURE FOR BACTERIA: CPT | Performed by: EMERGENCY MEDICINE

## 2023-01-01 PROCEDURE — 83930 ASSAY OF BLOOD OSMOLALITY: CPT | Performed by: NURSE PRACTITIONER

## 2023-01-01 PROCEDURE — 80053 COMPREHEN METABOLIC PANEL: CPT | Performed by: NURSE PRACTITIONER

## 2023-01-01 PROCEDURE — 1160F RVW MEDS BY RX/DR IN RCRD: CPT | Mod: CPTII,S$GLB,, | Performed by: NURSE PRACTITIONER

## 2023-01-01 PROCEDURE — 82652 VIT D 1 25-DIHYDROXY: CPT | Performed by: NURSE PRACTITIONER

## 2023-01-01 PROCEDURE — 3074F SYST BP LT 130 MM HG: CPT | Mod: CPTII,S$GLB,, | Performed by: NURSE PRACTITIONER

## 2023-01-01 PROCEDURE — 83935 ASSAY OF URINE OSMOLALITY: CPT | Performed by: NURSE PRACTITIONER

## 2023-01-01 PROCEDURE — 86920 COMPATIBILITY TEST SPIN: CPT | Mod: 59 | Performed by: NURSE PRACTITIONER

## 2023-01-01 PROCEDURE — 3079F PR MOST RECENT DIASTOLIC BLOOD PRESSURE 80-89 MM HG: ICD-10-PCS | Mod: CPTII,S$GLB,, | Performed by: NURSE PRACTITIONER

## 2023-01-01 PROCEDURE — 1159F PR MEDICATION LIST DOCUMENTED IN MEDICAL RECORD: ICD-10-PCS | Mod: CPTII,S$GLB,, | Performed by: INTERNAL MEDICINE

## 2023-01-01 PROCEDURE — 99291 PR CRITICAL CARE, E/M 30-74 MINUTES: ICD-10-PCS | Mod: ,,, | Performed by: INTERNAL MEDICINE

## 2023-01-01 PROCEDURE — 36600 WITHDRAWAL OF ARTERIAL BLOOD: CPT

## 2023-01-01 PROCEDURE — 93010 ELECTROCARDIOGRAM REPORT: CPT | Mod: ,,, | Performed by: GENERAL PRACTICE

## 2023-01-01 PROCEDURE — 80202 ASSAY OF VANCOMYCIN: CPT | Performed by: STUDENT IN AN ORGANIZED HEALTH CARE EDUCATION/TRAINING PROGRAM

## 2023-01-01 PROCEDURE — 99214 OFFICE O/P EST MOD 30 MIN: CPT | Mod: S$GLB,,, | Performed by: INTERNAL MEDICINE

## 2023-01-01 PROCEDURE — 63600175 PHARM REV CODE 636 W HCPCS: Performed by: EMERGENCY MEDICINE

## 2023-01-01 PROCEDURE — 86920 COMPATIBILITY TEST SPIN: CPT | Mod: 59 | Performed by: INTERNAL MEDICINE

## 2023-01-01 PROCEDURE — 1159F MED LIST DOCD IN RCRD: CPT | Mod: CPTII,S$GLB,, | Performed by: NURSE PRACTITIONER

## 2023-01-01 PROCEDURE — 86900 BLOOD TYPING SEROLOGIC ABO: CPT | Performed by: INTERNAL MEDICINE

## 2023-01-01 PROCEDURE — 3046F HEMOGLOBIN A1C LEVEL >9.0%: CPT | Mod: CPTII,ICN,, | Performed by: FAMILY MEDICINE

## 2023-01-01 PROCEDURE — 84443 ASSAY THYROID STIM HORMONE: CPT | Performed by: EMERGENCY MEDICINE

## 2023-01-01 PROCEDURE — 99215 PR OFFICE/OUTPT VISIT, EST, LEVL V, 40-54 MIN: ICD-10-PCS | Mod: S$GLB,,, | Performed by: NURSE PRACTITIONER

## 2023-01-01 PROCEDURE — 86900 BLOOD TYPING SEROLOGIC ABO: CPT | Performed by: NURSE PRACTITIONER

## 2023-01-01 PROCEDURE — 99214 OFFICE O/P EST MOD 30 MIN: CPT | Mod: S$GLB,,, | Performed by: NURSE PRACTITIONER

## 2023-01-01 PROCEDURE — U0002 COVID-19 LAB TEST NON-CDC: HCPCS | Performed by: FAMILY MEDICINE

## 2023-01-01 PROCEDURE — 1111F PR DISCHARGE MEDS RECONCILED W/ CURRENT OUTPATIENT MED LIST: ICD-10-PCS | Mod: CPTII,ICN,, | Performed by: FAMILY MEDICINE

## 2023-01-01 PROCEDURE — 25000003 PHARM REV CODE 250: Performed by: RADIOLOGY

## 2023-01-01 PROCEDURE — 80053 COMPREHEN METABOLIC PANEL: CPT

## 2023-01-01 PROCEDURE — 3079F DIAST BP 80-89 MM HG: CPT | Mod: CPTII,S$GLB,, | Performed by: NURSE PRACTITIONER

## 2023-01-01 PROCEDURE — 99214 OFFICE O/P EST MOD 30 MIN: CPT | Performed by: FAMILY MEDICINE

## 2023-01-01 PROCEDURE — 3074F PR MOST RECENT SYSTOLIC BLOOD PRESSURE < 130 MM HG: ICD-10-PCS | Mod: CPTII,S$GLB,, | Performed by: NURSE PRACTITIONER

## 2023-01-01 PROCEDURE — 87502 INFLUENZA DNA AMP PROBE: CPT | Performed by: FAMILY MEDICINE

## 2023-01-01 PROCEDURE — 1160F PR REVIEW ALL MEDS BY PRESCRIBER/CLIN PHARMACIST DOCUMENTED: ICD-10-PCS | Mod: CPTII,S$GLB,, | Performed by: NURSE PRACTITIONER

## 2023-01-01 PROCEDURE — 78815 PET IMAGE W/CT SKULL-THIGH: CPT | Mod: TC,PO

## 2023-01-01 PROCEDURE — 97110 THERAPEUTIC EXERCISES: CPT

## 2023-01-01 PROCEDURE — 84145 PROCALCITONIN (PCT): CPT | Performed by: EMERGENCY MEDICINE

## 2023-01-01 PROCEDURE — 63600175 PHARM REV CODE 636 W HCPCS: Performed by: HOSPITALIST

## 2023-01-01 PROCEDURE — 87070 CULTURE OTHR SPECIMN AEROBIC: CPT | Performed by: INTERNAL MEDICINE

## 2023-01-01 PROCEDURE — 82550 ASSAY OF CK (CPK): CPT | Performed by: EMERGENCY MEDICINE

## 2023-01-01 PROCEDURE — 85610 PROTHROMBIN TIME: CPT | Performed by: INTERNAL MEDICINE

## 2023-01-01 PROCEDURE — 3077F SYST BP >= 140 MM HG: CPT | Mod: CPTII,S$GLB,, | Performed by: INTERNAL MEDICINE

## 2023-01-01 PROCEDURE — 93010 EKG 12-LEAD: ICD-10-PCS | Mod: ,,, | Performed by: SPECIALIST

## 2023-01-01 PROCEDURE — 3079F DIAST BP 80-89 MM HG: CPT | Mod: CPTII,S$GLB,, | Performed by: INTERNAL MEDICINE

## 2023-01-01 PROCEDURE — 99214 PR OFFICE/OUTPT VISIT, EST, LEVL IV, 30-39 MIN: ICD-10-PCS | Mod: S$GLB,,, | Performed by: NURSE PRACTITIONER

## 2023-01-01 PROCEDURE — 99213 OFFICE O/P EST LOW 20 MIN: CPT | Mod: S$GLB,,, | Performed by: NURSE PRACTITIONER

## 2023-01-01 PROCEDURE — 86920 COMPATIBILITY TEST SPIN: CPT | Performed by: INTERNAL MEDICINE

## 2023-01-01 PROCEDURE — P9016 RBC LEUKOCYTES REDUCED: HCPCS | Performed by: INTERNAL MEDICINE

## 2023-01-01 PROCEDURE — 99152 MOD SED SAME PHYS/QHP 5/>YRS: CPT

## 2023-01-01 PROCEDURE — 63600175 PHARM REV CODE 636 W HCPCS: Mod: JW,TB | Performed by: INTERNAL MEDICINE

## 2023-01-01 PROCEDURE — 99215 OFFICE O/P EST HI 40 MIN: CPT | Mod: S$GLB,,, | Performed by: NURSE PRACTITIONER

## 2023-01-01 PROCEDURE — 88341 IMHCHEM/IMCYTCHM EA ADD ANTB: CPT | Mod: TC,59 | Performed by: PATHOLOGY

## 2023-01-01 PROCEDURE — 77012 CT SCAN FOR NEEDLE BIOPSY: CPT | Mod: TC

## 2023-01-01 PROCEDURE — 85730 THROMBOPLASTIN TIME PARTIAL: CPT | Performed by: INTERNAL MEDICINE

## 2023-01-01 PROCEDURE — 99232 PR SUBSEQUENT HOSPITAL CARE,LEVL II: ICD-10-PCS | Mod: ,,, | Performed by: INTERNAL MEDICINE

## 2023-01-01 PROCEDURE — 99233 SBSQ HOSP IP/OBS HIGH 50: CPT | Mod: ,,, | Performed by: INTERNAL MEDICINE

## 2023-01-01 PROCEDURE — 82962 GLUCOSE BLOOD TEST: CPT

## 2023-01-01 PROCEDURE — 87205 SMEAR GRAM STAIN: CPT | Performed by: NURSE PRACTITIONER

## 2023-01-01 PROCEDURE — 82330 ASSAY OF CALCIUM: CPT | Performed by: NURSE PRACTITIONER

## 2023-01-01 PROCEDURE — 83735 ASSAY OF MAGNESIUM: CPT

## 2023-01-01 PROCEDURE — 93005 ELECTROCARDIOGRAM TRACING: CPT | Performed by: GENERAL PRACTICE

## 2023-01-01 PROCEDURE — 80053 COMPREHEN METABOLIC PANEL: CPT | Mod: 91 | Performed by: EMERGENCY MEDICINE

## 2023-01-01 PROCEDURE — 85730 THROMBOPLASTIN TIME PARTIAL: CPT | Performed by: RADIOLOGY

## 2023-01-01 PROCEDURE — 82397 CHEMILUMINESCENT ASSAY: CPT | Performed by: NURSE PRACTITIONER

## 2023-01-01 PROCEDURE — 3060F PR POS MICROALBUMINURIA RESULT DOCUMENTED/REVIEW: ICD-10-PCS | Mod: CPTII,ICN,, | Performed by: FAMILY MEDICINE

## 2023-01-01 PROCEDURE — 3079F PR MOST RECENT DIASTOLIC BLOOD PRESSURE 80-89 MM HG: ICD-10-PCS | Mod: CPTII,ICN,, | Performed by: FAMILY MEDICINE

## 2023-01-01 PROCEDURE — 3066F PR DOCUMENTATION OF TREATMENT FOR NEPHROPATHY: ICD-10-PCS | Mod: CPTII,ICN,, | Performed by: FAMILY MEDICINE

## 2023-01-01 PROCEDURE — 83880 ASSAY OF NATRIURETIC PEPTIDE: CPT | Performed by: NURSE PRACTITIONER

## 2023-01-01 PROCEDURE — 3077F SYST BP >= 140 MM HG: CPT | Mod: CPTII,S$GLB,, | Performed by: NURSE PRACTITIONER

## 2023-01-01 PROCEDURE — 3079F DIAST BP 80-89 MM HG: CPT | Mod: CPTII,ICN,, | Performed by: FAMILY MEDICINE

## 2023-01-01 PROCEDURE — 85025 COMPLETE CBC W/AUTO DIFF WBC: CPT | Performed by: EMERGENCY MEDICINE

## 2023-01-01 PROCEDURE — 96376 TX/PRO/DX INJ SAME DRUG ADON: CPT

## 2023-01-01 PROCEDURE — 94799 UNLISTED PULMONARY SVC/PX: CPT

## 2023-01-01 PROCEDURE — 87040 BLOOD CULTURE FOR BACTERIA: CPT | Mod: 59 | Performed by: EMERGENCY MEDICINE

## 2023-01-01 PROCEDURE — 3074F SYST BP LT 130 MM HG: CPT | Mod: CPTII,ICN,, | Performed by: FAMILY MEDICINE

## 2023-01-01 PROCEDURE — 1159F MED LIST DOCD IN RCRD: CPT | Mod: CPTII,ICN,, | Performed by: FAMILY MEDICINE

## 2023-01-01 PROCEDURE — 82140 ASSAY OF AMMONIA: CPT | Performed by: EMERGENCY MEDICINE

## 2023-01-01 PROCEDURE — A9552 F18 FDG: HCPCS | Mod: PO

## 2023-01-01 PROCEDURE — 20000000 HC ICU ROOM

## 2023-01-01 PROCEDURE — 84443 ASSAY THYROID STIM HORMONE: CPT | Performed by: FAMILY MEDICINE

## 2023-01-01 PROCEDURE — 83970 ASSAY OF PARATHORMONE: CPT | Performed by: NURSE PRACTITIONER

## 2023-01-01 PROCEDURE — P9016 RBC LEUKOCYTES REDUCED: HCPCS | Performed by: NURSE PRACTITIONER

## 2023-01-01 PROCEDURE — 84133 ASSAY OF URINE POTASSIUM: CPT | Performed by: NURSE PRACTITIONER

## 2023-01-01 PROCEDURE — 82330 ASSAY OF CALCIUM: CPT | Performed by: STUDENT IN AN ORGANIZED HEALTH CARE EDUCATION/TRAINING PROGRAM

## 2023-01-01 PROCEDURE — 84484 ASSAY OF TROPONIN QUANT: CPT | Performed by: NURSE PRACTITIONER

## 2023-01-01 PROCEDURE — 99221 1ST HOSP IP/OBS SF/LOW 40: CPT | Mod: ,,, | Performed by: FAMILY MEDICINE

## 2023-01-01 PROCEDURE — 85025 COMPLETE CBC W/AUTO DIFF WBC: CPT | Performed by: RADIOLOGY

## 2023-01-01 PROCEDURE — 86900 BLOOD TYPING SEROLOGIC ABO: CPT | Performed by: EMERGENCY MEDICINE

## 2023-01-01 PROCEDURE — 63600175 PHARM REV CODE 636 W HCPCS: Performed by: RADIOLOGY

## 2023-01-01 RX ORDER — SODIUM CHLORIDE 0.9 % (FLUSH) 0.9 %
10 SYRINGE (ML) INJECTION
Status: DISCONTINUED | OUTPATIENT
Start: 2023-01-01 | End: 2023-01-01 | Stop reason: HOSPADM

## 2023-01-01 RX ORDER — POTASSIUM CHLORIDE 20 MEQ/1
20 TABLET, EXTENDED RELEASE ORAL ONCE
Status: COMPLETED | OUTPATIENT
Start: 2023-01-01 | End: 2023-01-01

## 2023-01-01 RX ORDER — ZOLEDRONIC ACID 0.04 MG/ML
4 INJECTION, SOLUTION INTRAVENOUS
Status: CANCELLED | OUTPATIENT
Start: 2023-01-01

## 2023-01-01 RX ORDER — MUPIROCIN 20 MG/G
OINTMENT TOPICAL 2 TIMES DAILY
Status: DISPENSED | OUTPATIENT
Start: 2023-01-01 | End: 2023-01-01

## 2023-01-01 RX ORDER — POTASSIUM CHLORIDE 20 MEQ/1
20 TABLET, EXTENDED RELEASE ORAL DAILY
Qty: 60 TABLET | Refills: 0 | Status: SHIPPED | OUTPATIENT
Start: 2023-01-01 | End: 2023-01-01 | Stop reason: SDUPTHER

## 2023-01-01 RX ORDER — DIAZEPAM 2 MG/1
2 TABLET ORAL 4 TIMES DAILY PRN
Status: DISCONTINUED | OUTPATIENT
Start: 2023-01-01 | End: 2023-01-01 | Stop reason: HOSPADM

## 2023-01-01 RX ORDER — INSULIN ASPART 100 [IU]/ML
1-10 INJECTION, SOLUTION INTRAVENOUS; SUBCUTANEOUS
Status: DISCONTINUED | OUTPATIENT
Start: 2023-01-01 | End: 2023-01-01 | Stop reason: HOSPADM

## 2023-01-01 RX ORDER — LANCETS 28 GAUGE
EACH MISCELLANEOUS
COMMUNITY
Start: 2023-01-01

## 2023-01-01 RX ORDER — CLONIDINE HYDROCHLORIDE 0.1 MG/1
0.1 TABLET ORAL EVERY 8 HOURS PRN
Status: DISCONTINUED | OUTPATIENT
Start: 2023-01-01 | End: 2023-01-01

## 2023-01-01 RX ORDER — HEPARIN 100 UNIT/ML
500 SYRINGE INTRAVENOUS
Status: CANCELLED | OUTPATIENT
Start: 2023-01-01

## 2023-01-01 RX ORDER — DIPHENHYDRAMINE HYDROCHLORIDE 50 MG/ML
25 INJECTION INTRAMUSCULAR; INTRAVENOUS
Status: CANCELLED | OUTPATIENT
Start: 2023-01-01

## 2023-01-01 RX ORDER — FAMOTIDINE 10 MG/ML
20 INJECTION INTRAVENOUS
Status: CANCELLED | OUTPATIENT
Start: 2023-01-01

## 2023-01-01 RX ORDER — ONDANSETRON 8 MG/1
8 TABLET, ORALLY DISINTEGRATING ORAL
Qty: 30 TABLET | Refills: 2 | Status: SHIPPED | OUTPATIENT
Start: 2023-01-01

## 2023-01-01 RX ORDER — HEPARIN 100 UNIT/ML
500 SYRINGE INTRAVENOUS
Status: DISCONTINUED | OUTPATIENT
Start: 2023-01-01 | End: 2023-01-01 | Stop reason: HOSPADM

## 2023-01-01 RX ORDER — SODIUM CHLORIDE 0.9 % (FLUSH) 0.9 %
10 SYRINGE (ML) INJECTION EVERY 6 HOURS PRN
Status: DISCONTINUED | OUTPATIENT
Start: 2023-01-01 | End: 2023-01-01 | Stop reason: HOSPADM

## 2023-01-01 RX ORDER — LEVOFLOXACIN 750 MG/1
750 TABLET ORAL DAILY
Status: DISCONTINUED | OUTPATIENT
Start: 2023-01-01 | End: 2023-01-01 | Stop reason: HOSPADM

## 2023-01-01 RX ORDER — DIPHENOXYLATE HYDROCHLORIDE AND ATROPINE SULFATE 2.5; .025 MG/1; MG/1
1 TABLET ORAL 4 TIMES DAILY PRN
Qty: 30 TABLET | Refills: 2 | Status: SHIPPED | OUTPATIENT
Start: 2023-01-01

## 2023-01-01 RX ORDER — LEVOFLOXACIN 500 MG/1
500 TABLET, FILM COATED ORAL DAILY
Qty: 10 TABLET | Refills: 0 | Status: SHIPPED | OUTPATIENT
Start: 2023-01-01 | End: 2023-01-01

## 2023-01-01 RX ORDER — DIPHENHYDRAMINE HYDROCHLORIDE 50 MG/ML
50 INJECTION INTRAMUSCULAR; INTRAVENOUS ONCE AS NEEDED
Status: DISCONTINUED | OUTPATIENT
Start: 2023-01-01 | End: 2023-01-01 | Stop reason: HOSPADM

## 2023-01-01 RX ORDER — FENTANYL 25 UG/1
1 PATCH TRANSDERMAL
Qty: 10 PATCH | Refills: 0 | Status: SHIPPED | OUTPATIENT
Start: 2023-01-01 | End: 2023-01-01 | Stop reason: SDUPTHER

## 2023-01-01 RX ORDER — SODIUM CHLORIDE 0.9 % (FLUSH) 0.9 %
10 SYRINGE (ML) INJECTION
Status: CANCELLED | OUTPATIENT
Start: 2023-01-01

## 2023-01-01 RX ORDER — LANOLIN ALCOHOL/MO/W.PET/CERES
400 CREAM (GRAM) TOPICAL ONCE
Status: COMPLETED | OUTPATIENT
Start: 2023-01-01 | End: 2023-01-01

## 2023-01-01 RX ORDER — DIPHENHYDRAMINE HYDROCHLORIDE 50 MG/ML
25 INJECTION INTRAMUSCULAR; INTRAVENOUS
Status: COMPLETED | OUTPATIENT
Start: 2023-01-01 | End: 2023-01-01

## 2023-01-01 RX ORDER — FUROSEMIDE 10 MG/ML
20 INJECTION INTRAMUSCULAR; INTRAVENOUS ONCE
Status: CANCELLED | OUTPATIENT
Start: 2023-01-01

## 2023-01-01 RX ORDER — ATORVASTATIN CALCIUM 40 MG/1
40 TABLET, FILM COATED ORAL DAILY
Status: DISCONTINUED | OUTPATIENT
Start: 2023-01-01 | End: 2023-01-01 | Stop reason: HOSPADM

## 2023-01-01 RX ORDER — MEROPENEM AND SODIUM CHLORIDE 500 MG/50ML
500 INJECTION, SOLUTION INTRAVENOUS
Status: DISCONTINUED | OUTPATIENT
Start: 2023-01-01 | End: 2023-01-01

## 2023-01-01 RX ORDER — HYDROMORPHONE HYDROCHLORIDE 1 MG/ML
0.5 INJECTION, SOLUTION INTRAMUSCULAR; INTRAVENOUS; SUBCUTANEOUS
Status: COMPLETED | OUTPATIENT
Start: 2023-01-01 | End: 2023-01-01

## 2023-01-01 RX ORDER — ATROPINE SULFATE 0.4 MG/ML
0.4 INJECTION, SOLUTION ENDOTRACHEAL; INTRAMEDULLARY; INTRAMUSCULAR; INTRAVENOUS; SUBCUTANEOUS ONCE AS NEEDED
Status: CANCELLED | OUTPATIENT
Start: 2023-01-01

## 2023-01-01 RX ORDER — ACETAMINOPHEN 325 MG/1
650 TABLET ORAL
Status: DISCONTINUED | OUTPATIENT
Start: 2023-01-01 | End: 2023-01-01 | Stop reason: HOSPADM

## 2023-01-01 RX ORDER — LIDOCAINE HYDROCHLORIDE 10 MG/ML
INJECTION, SOLUTION EPIDURAL; INFILTRATION; INTRACAUDAL; PERINEURAL
Status: COMPLETED | OUTPATIENT
Start: 2023-01-01 | End: 2023-01-01

## 2023-01-01 RX ORDER — ACETAMINOPHEN 325 MG/1
650 TABLET ORAL
Status: CANCELLED | OUTPATIENT
Start: 2023-01-01

## 2023-01-01 RX ORDER — METOPROLOL TARTRATE 25 MG/1
25 TABLET, FILM COATED ORAL 2 TIMES DAILY
Status: DISCONTINUED | OUTPATIENT
Start: 2023-01-01 | End: 2023-01-01 | Stop reason: HOSPADM

## 2023-01-01 RX ORDER — FENTANYL 25 UG/1
1 PATCH TRANSDERMAL
Status: DISCONTINUED | OUTPATIENT
Start: 2023-01-01 | End: 2023-01-01 | Stop reason: HOSPADM

## 2023-01-01 RX ORDER — BENZONATATE 100 MG/1
100 CAPSULE ORAL 3 TIMES DAILY PRN
Qty: 30 CAPSULE | Refills: 2 | Status: SHIPPED | OUTPATIENT
Start: 2023-01-01 | End: 2023-01-01

## 2023-01-01 RX ORDER — LANOLIN ALCOHOL/MO/W.PET/CERES
800 CREAM (GRAM) TOPICAL
Status: DISCONTINUED | OUTPATIENT
Start: 2023-01-01 | End: 2023-01-01 | Stop reason: HOSPADM

## 2023-01-01 RX ORDER — FENTANYL 25 UG/1
1 PATCH TRANSDERMAL
Qty: 10 PATCH | Refills: 0 | Status: SHIPPED | OUTPATIENT
Start: 2023-01-01 | End: 2024-08-20

## 2023-01-01 RX ORDER — ZOLEDRONIC ACID 0.04 MG/ML
4 INJECTION, SOLUTION INTRAVENOUS
Status: COMPLETED | OUTPATIENT
Start: 2023-01-01 | End: 2023-01-01

## 2023-01-01 RX ORDER — CARISOPRODOL 350 MG/1
350 TABLET ORAL 3 TIMES DAILY PRN
Qty: 90 TABLET | Refills: 0 | Status: SHIPPED | OUTPATIENT
Start: 2023-01-01 | End: 2023-01-01 | Stop reason: SDUPTHER

## 2023-01-01 RX ORDER — ONDANSETRON 2 MG/ML
16 INJECTION INTRAMUSCULAR; INTRAVENOUS ONCE
Status: CANCELLED | OUTPATIENT
Start: 2023-01-01

## 2023-01-01 RX ORDER — DOCUSATE SODIUM 100 MG/1
100 CAPSULE, LIQUID FILLED ORAL 2 TIMES DAILY
Status: DISCONTINUED | OUTPATIENT
Start: 2023-01-01 | End: 2023-01-01 | Stop reason: HOSPADM

## 2023-01-01 RX ORDER — TALC
6 POWDER (GRAM) TOPICAL NIGHTLY PRN
Status: DISCONTINUED | OUTPATIENT
Start: 2023-01-01 | End: 2023-01-01 | Stop reason: HOSPADM

## 2023-01-01 RX ORDER — POTASSIUM CHLORIDE 20 MEQ/1
TABLET, EXTENDED RELEASE ORAL
Qty: 60 TABLET | Refills: 11
Start: 2023-01-01

## 2023-01-01 RX ORDER — ONDANSETRON HCL IN 0.9 % NACL 8 MG/50 ML
8 INTRAVENOUS SOLUTION, PIGGYBACK (ML) INTRAVENOUS
Status: COMPLETED | OUTPATIENT
Start: 2023-01-01 | End: 2023-01-01

## 2023-01-01 RX ORDER — BISACODYL 10 MG
10 SUPPOSITORY, RECTAL RECTAL DAILY PRN
Status: DISCONTINUED | OUTPATIENT
Start: 2023-01-01 | End: 2023-01-01

## 2023-01-01 RX ORDER — ACETAMINOPHEN 325 MG/1
650 TABLET ORAL
Status: COMPLETED | OUTPATIENT
Start: 2023-01-01 | End: 2023-01-01

## 2023-01-01 RX ORDER — OXYCODONE HYDROCHLORIDE 5 MG/1
15 TABLET ORAL EVERY 4 HOURS PRN
Status: DISCONTINUED | OUTPATIENT
Start: 2023-01-01 | End: 2023-01-01

## 2023-01-01 RX ORDER — CHOLECALCIFEROL (VITAMIN D3) 50 MCG
2000 TABLET ORAL DAILY
Status: DISCONTINUED | OUTPATIENT
Start: 2023-01-01 | End: 2023-01-01 | Stop reason: HOSPADM

## 2023-01-01 RX ORDER — POLYETHYLENE GLYCOL 3350 17 G/17G
17 POWDER, FOR SOLUTION ORAL 2 TIMES DAILY PRN
Status: DISCONTINUED | OUTPATIENT
Start: 2023-01-01 | End: 2023-01-01 | Stop reason: HOSPADM

## 2023-01-01 RX ORDER — ENOXAPARIN SODIUM 100 MG/ML
40 INJECTION SUBCUTANEOUS EVERY 24 HOURS
Status: DISCONTINUED | OUTPATIENT
Start: 2023-01-01 | End: 2023-01-01 | Stop reason: HOSPADM

## 2023-01-01 RX ORDER — DRONABINOL 2.5 MG/1
5 CAPSULE ORAL
Status: DISCONTINUED | OUTPATIENT
Start: 2023-01-01 | End: 2023-01-01 | Stop reason: HOSPADM

## 2023-01-01 RX ORDER — INSULIN GLARGINE 100 [IU]/ML
32 INJECTION, SOLUTION SUBCUTANEOUS 2 TIMES DAILY
Qty: 18 ML | Refills: 11 | Status: SHIPPED | OUTPATIENT
Start: 2023-01-01 | End: 2024-07-09

## 2023-01-01 RX ORDER — HYDROCODONE BITARTRATE AND ACETAMINOPHEN 500; 5 MG/1; MG/1
TABLET ORAL
Status: DISCONTINUED | OUTPATIENT
Start: 2023-01-01 | End: 2023-01-01 | Stop reason: HOSPADM

## 2023-01-01 RX ORDER — BENZONATATE 100 MG/1
100 CAPSULE ORAL 3 TIMES DAILY PRN
Status: DISCONTINUED | OUTPATIENT
Start: 2023-01-01 | End: 2023-01-01 | Stop reason: HOSPADM

## 2023-01-01 RX ORDER — IBUPROFEN 200 MG
16 TABLET ORAL
Status: DISCONTINUED | OUTPATIENT
Start: 2023-01-01 | End: 2023-01-01 | Stop reason: HOSPADM

## 2023-01-01 RX ORDER — DIAZEPAM 10 MG/1
10 TABLET ORAL 4 TIMES DAILY PRN
Qty: 120 TABLET | Refills: 2 | Status: SHIPPED | OUTPATIENT
Start: 2023-01-01

## 2023-01-01 RX ORDER — DRONABINOL 5 MG/1
5 CAPSULE ORAL
Qty: 60 CAPSULE | Refills: 0 | Status: SHIPPED | OUTPATIENT
Start: 2023-01-01

## 2023-01-01 RX ORDER — ATORVASTATIN CALCIUM 40 MG/1
40 TABLET, FILM COATED ORAL DAILY
Status: DISCONTINUED | OUTPATIENT
Start: 2023-01-01 | End: 2023-01-01

## 2023-01-01 RX ORDER — IBUPROFEN 200 MG
24 TABLET ORAL
Status: DISCONTINUED | OUTPATIENT
Start: 2023-01-01 | End: 2023-01-01

## 2023-01-01 RX ORDER — DIAZEPAM 10 MG/1
10 TABLET ORAL 4 TIMES DAILY PRN
Qty: 120 TABLET | Refills: 2 | Status: SHIPPED | OUTPATIENT
Start: 2023-01-01 | End: 2023-01-01 | Stop reason: SDUPTHER

## 2023-01-01 RX ORDER — FENTANYL CITRATE 50 UG/ML
50 INJECTION, SOLUTION INTRAMUSCULAR; INTRAVENOUS
Status: DISCONTINUED | OUTPATIENT
Start: 2023-01-01 | End: 2023-01-01

## 2023-01-01 RX ORDER — HYDROMORPHONE HYDROCHLORIDE 1 MG/ML
1 INJECTION, SOLUTION INTRAMUSCULAR; INTRAVENOUS; SUBCUTANEOUS
Status: DISCONTINUED | OUTPATIENT
Start: 2023-01-01 | End: 2023-01-01 | Stop reason: HOSPADM

## 2023-01-01 RX ORDER — FAMOTIDINE 10 MG/ML
20 INJECTION INTRAVENOUS DAILY
Status: DISCONTINUED | OUTPATIENT
Start: 2023-01-01 | End: 2023-01-01 | Stop reason: HOSPADM

## 2023-01-01 RX ORDER — FAMOTIDINE 10 MG/ML
20 INJECTION INTRAVENOUS
Status: COMPLETED | OUTPATIENT
Start: 2023-01-01 | End: 2023-01-01

## 2023-01-01 RX ORDER — CODEINE PHOSPHATE AND GUAIFENESIN 10; 100 MG/5ML; MG/5ML
5 SOLUTION ORAL EVERY 4 HOURS PRN
Status: DISCONTINUED | OUTPATIENT
Start: 2023-01-01 | End: 2023-01-01 | Stop reason: HOSPADM

## 2023-01-01 RX ORDER — OXYCODONE HYDROCHLORIDE 15 MG/1
15 TABLET ORAL
Qty: 120 TABLET | Refills: 0 | Status: SHIPPED | OUTPATIENT
Start: 2023-01-01 | End: 2023-01-01 | Stop reason: SDUPTHER

## 2023-01-01 RX ORDER — SODIUM,POTASSIUM PHOSPHATES 280-250MG
2 POWDER IN PACKET (EA) ORAL
Status: DISCONTINUED | OUTPATIENT
Start: 2023-01-01 | End: 2023-01-01 | Stop reason: HOSPADM

## 2023-01-01 RX ORDER — FLUCONAZOLE 2 MG/ML
200 INJECTION, SOLUTION INTRAVENOUS
Status: DISCONTINUED | OUTPATIENT
Start: 2023-01-01 | End: 2023-01-01

## 2023-01-01 RX ORDER — EPINEPHRINE 0.3 MG/.3ML
0.3 INJECTION SUBCUTANEOUS ONCE AS NEEDED
Status: CANCELLED | OUTPATIENT
Start: 2023-01-01

## 2023-01-01 RX ORDER — MORPHINE SULFATE 4 MG/ML
4 INJECTION, SOLUTION INTRAMUSCULAR; INTRAVENOUS
Status: DISCONTINUED | OUTPATIENT
Start: 2023-01-01 | End: 2023-01-01 | Stop reason: HOSPADM

## 2023-01-01 RX ORDER — OXYCODONE HYDROCHLORIDE 15 MG/1
15 TABLET ORAL
Qty: 120 TABLET | Refills: 0 | Status: SHIPPED | OUTPATIENT
Start: 2023-01-01 | End: 2023-12-19

## 2023-01-01 RX ORDER — ENOXAPARIN SODIUM 100 MG/ML
40 INJECTION SUBCUTANEOUS EVERY 24 HOURS
Status: DISCONTINUED | OUTPATIENT
Start: 2023-01-01 | End: 2023-01-01

## 2023-01-01 RX ORDER — MAGNESIUM SULFATE 1 G/100ML
1 INJECTION INTRAVENOUS ONCE
Status: COMPLETED | OUTPATIENT
Start: 2023-01-01 | End: 2023-01-01

## 2023-01-01 RX ORDER — DIPHENHYDRAMINE HYDROCHLORIDE 50 MG/ML
50 INJECTION INTRAMUSCULAR; INTRAVENOUS ONCE AS NEEDED
Status: CANCELLED | OUTPATIENT
Start: 2023-01-01

## 2023-01-01 RX ORDER — HYDROCODONE BITARTRATE AND ACETAMINOPHEN 10; 325 MG/1; MG/1
1 TABLET ORAL EVERY 6 HOURS PRN
Qty: 120 TABLET | Refills: 0 | Status: SHIPPED | OUTPATIENT
Start: 2023-01-01 | End: 2023-01-01 | Stop reason: SDUPTHER

## 2023-01-01 RX ORDER — POTASSIUM CHLORIDE 20 MEQ/1
20 TABLET, EXTENDED RELEASE ORAL DAILY
Status: DISCONTINUED | OUTPATIENT
Start: 2023-01-01 | End: 2023-01-01 | Stop reason: HOSPADM

## 2023-01-01 RX ORDER — TRIAMCINOLONE ACETONIDE 1 MG/G
CREAM TOPICAL 2 TIMES DAILY
Status: DISCONTINUED | OUTPATIENT
Start: 2023-01-01 | End: 2023-01-01 | Stop reason: HOSPADM

## 2023-01-01 RX ORDER — LISINOPRIL 10 MG/1
10 TABLET ORAL DAILY
Status: DISCONTINUED | OUTPATIENT
Start: 2023-01-01 | End: 2023-01-01 | Stop reason: HOSPADM

## 2023-01-01 RX ORDER — MAGNESIUM SULFATE HEPTAHYDRATE 40 MG/ML
2 INJECTION, SOLUTION INTRAVENOUS ONCE
Status: COMPLETED | OUTPATIENT
Start: 2023-01-01 | End: 2023-01-01

## 2023-01-01 RX ORDER — PROMETHAZINE HYDROCHLORIDE AND CODEINE PHOSPHATE 6.25; 1 MG/5ML; MG/5ML
5 SOLUTION ORAL EVERY 6 HOURS PRN
Qty: 450 ML | Refills: 0 | Status: SHIPPED | OUTPATIENT
Start: 2023-01-01

## 2023-01-01 RX ORDER — ATROPINE SULFATE 0.4 MG/ML
0.4 INJECTION, SOLUTION ENDOTRACHEAL; INTRAMEDULLARY; INTRAMUSCULAR; INTRAVENOUS; SUBCUTANEOUS ONCE AS NEEDED
Status: COMPLETED | OUTPATIENT
Start: 2023-01-01 | End: 2023-01-01

## 2023-01-01 RX ORDER — MORPHINE SULFATE 4 MG/ML
4 INJECTION, SOLUTION INTRAMUSCULAR; INTRAVENOUS EVERY 4 HOURS PRN
Status: DISCONTINUED | OUTPATIENT
Start: 2023-01-01 | End: 2023-01-01 | Stop reason: HOSPADM

## 2023-01-01 RX ORDER — POTASSIUM CHLORIDE 20 MEQ/1
TABLET, EXTENDED RELEASE ORAL
Qty: 60 TABLET | Refills: 11 | Status: ON HOLD | OUTPATIENT
Start: 2023-01-01 | End: 2023-01-01 | Stop reason: SDUPTHER

## 2023-01-01 RX ORDER — HYDROCODONE BITARTRATE AND ACETAMINOPHEN 500; 5 MG/1; MG/1
TABLET ORAL ONCE
Status: ACTIVE | OUTPATIENT
Start: 2023-01-01

## 2023-01-01 RX ORDER — HEPARIN 100 UNIT/ML
5 SYRINGE INTRAVENOUS ONCE
Status: COMPLETED | OUTPATIENT
Start: 2023-01-01 | End: 2023-01-01

## 2023-01-01 RX ORDER — ZOLPIDEM TARTRATE 5 MG/1
10 TABLET ORAL NIGHTLY
Status: DISCONTINUED | OUTPATIENT
Start: 2023-01-01 | End: 2023-01-01 | Stop reason: HOSPADM

## 2023-01-01 RX ORDER — FUROSEMIDE 10 MG/ML
20 INJECTION INTRAMUSCULAR; INTRAVENOUS ONCE
Status: DISPENSED | OUTPATIENT
Start: 2023-01-01

## 2023-01-01 RX ORDER — PROCHLORPERAZINE EDISYLATE 5 MG/ML
5 INJECTION INTRAMUSCULAR; INTRAVENOUS EVERY 6 HOURS PRN
Status: DISCONTINUED | OUTPATIENT
Start: 2023-01-01 | End: 2023-01-01 | Stop reason: HOSPADM

## 2023-01-01 RX ORDER — LANOLIN ALCOHOL/MO/W.PET/CERES
400 CREAM (GRAM) TOPICAL 2 TIMES DAILY
Status: DISCONTINUED | OUTPATIENT
Start: 2023-01-01 | End: 2023-01-01 | Stop reason: HOSPADM

## 2023-01-01 RX ORDER — ONDANSETRON 2 MG/ML
4 INJECTION INTRAMUSCULAR; INTRAVENOUS EVERY 6 HOURS PRN
Status: DISCONTINUED | OUTPATIENT
Start: 2023-01-01 | End: 2023-01-01 | Stop reason: HOSPADM

## 2023-01-01 RX ORDER — POTASSIUM CHLORIDE 20 MEQ/1
40 TABLET, EXTENDED RELEASE ORAL
Status: COMPLETED | OUTPATIENT
Start: 2023-01-01 | End: 2023-01-01

## 2023-01-01 RX ORDER — IPRATROPIUM BROMIDE AND ALBUTEROL SULFATE 2.5; .5 MG/3ML; MG/3ML
3 SOLUTION RESPIRATORY (INHALATION) EVERY 4 HOURS PRN
Status: DISCONTINUED | OUTPATIENT
Start: 2023-01-01 | End: 2023-01-01

## 2023-01-01 RX ORDER — ONDANSETRON 2 MG/ML
4 INJECTION INTRAMUSCULAR; INTRAVENOUS EVERY 8 HOURS PRN
Status: DISCONTINUED | OUTPATIENT
Start: 2023-01-01 | End: 2023-01-01 | Stop reason: HOSPADM

## 2023-01-01 RX ORDER — LORAZEPAM 2 MG/ML
2 INJECTION INTRAMUSCULAR
Status: DISCONTINUED | OUTPATIENT
Start: 2023-01-01 | End: 2023-01-01 | Stop reason: HOSPADM

## 2023-01-01 RX ORDER — IBUPROFEN 200 MG
16 TABLET ORAL
Status: DISCONTINUED | OUTPATIENT
Start: 2023-01-01 | End: 2023-01-01

## 2023-01-01 RX ORDER — DRONABINOL 2.5 MG/1
5 CAPSULE ORAL
Status: DISCONTINUED | OUTPATIENT
Start: 2023-01-01 | End: 2023-01-01

## 2023-01-01 RX ORDER — SODIUM CHLORIDE 9 MG/ML
INJECTION, SOLUTION INTRAVENOUS CONTINUOUS
Status: DISCONTINUED | OUTPATIENT
Start: 2023-01-01 | End: 2023-01-01

## 2023-01-01 RX ORDER — LANOLIN ALCOHOL/MO/W.PET/CERES
400 CREAM (GRAM) TOPICAL
Status: COMPLETED | OUTPATIENT
Start: 2023-01-01 | End: 2023-01-01

## 2023-01-01 RX ORDER — LEVOFLOXACIN 500 MG/1
500 TABLET, FILM COATED ORAL DAILY
Qty: 5 TABLET | Refills: 0 | Status: SHIPPED | OUTPATIENT
Start: 2023-01-01 | End: 2023-01-01

## 2023-01-01 RX ORDER — AMOXICILLIN 250 MG
1 CAPSULE ORAL 2 TIMES DAILY PRN
Status: DISCONTINUED | OUTPATIENT
Start: 2023-01-01 | End: 2023-01-01 | Stop reason: HOSPADM

## 2023-01-01 RX ORDER — TALC
6 POWDER (GRAM) TOPICAL NIGHTLY PRN
Status: DISCONTINUED | OUTPATIENT
Start: 2023-01-01 | End: 2023-01-01

## 2023-01-01 RX ORDER — FLUCONAZOLE 150 MG/1
150 TABLET ORAL DAILY
Qty: 1 TABLET | Refills: 0 | Status: SHIPPED | OUTPATIENT
Start: 2023-01-01 | End: 2023-01-01

## 2023-01-01 RX ORDER — MUPIROCIN 20 MG/G
OINTMENT TOPICAL 2 TIMES DAILY
Status: DISCONTINUED | OUTPATIENT
Start: 2023-01-01 | End: 2023-01-01

## 2023-01-01 RX ORDER — NOREPINEPHRINE BITARTRATE/D5W 4MG/250ML
PLASTIC BAG, INJECTION (ML) INTRAVENOUS
Status: DISPENSED
Start: 2023-01-01 | End: 2023-01-01

## 2023-01-01 RX ORDER — GLUCAGON 1 MG
1 KIT INJECTION
Status: DISCONTINUED | OUTPATIENT
Start: 2023-01-01 | End: 2023-01-01

## 2023-01-01 RX ORDER — ATROPINE SULFATE 0.4 MG/ML
0.4 INJECTION, SOLUTION ENDOTRACHEAL; INTRAMEDULLARY; INTRAMUSCULAR; INTRAVENOUS; SUBCUTANEOUS ONCE AS NEEDED
Status: DISCONTINUED | OUTPATIENT
Start: 2023-01-01 | End: 2023-01-01 | Stop reason: HOSPADM

## 2023-01-01 RX ORDER — FAMOTIDINE 20 MG/1
20 TABLET, FILM COATED ORAL DAILY
Status: DISCONTINUED | OUTPATIENT
Start: 2023-01-01 | End: 2023-01-01

## 2023-01-01 RX ORDER — ACETAMINOPHEN 325 MG/1
650 TABLET ORAL EVERY 8 HOURS PRN
Status: DISCONTINUED | OUTPATIENT
Start: 2023-01-01 | End: 2023-01-01 | Stop reason: HOSPADM

## 2023-01-01 RX ORDER — ONDANSETRON 2 MG/ML
4 INJECTION INTRAMUSCULAR; INTRAVENOUS
Status: COMPLETED | OUTPATIENT
Start: 2023-01-01 | End: 2023-01-01

## 2023-01-01 RX ORDER — FLUCONAZOLE 150 MG/1
TABLET ORAL
Qty: 1 TABLET | Refills: 2 | Status: SHIPPED | OUTPATIENT
Start: 2023-01-01

## 2023-01-01 RX ORDER — POTASSIUM CHLORIDE 7.45 MG/ML
10 INJECTION INTRAVENOUS
Status: COMPLETED | OUTPATIENT
Start: 2023-01-01 | End: 2023-01-01

## 2023-01-01 RX ORDER — ZOLPIDEM TARTRATE 10 MG/1
TABLET ORAL
Qty: 30 TABLET | Refills: 3 | Status: SHIPPED | OUTPATIENT
Start: 2023-01-01 | End: 2023-01-01

## 2023-01-01 RX ORDER — SODIUM CHLORIDE, SODIUM LACTATE, POTASSIUM CHLORIDE, CALCIUM CHLORIDE 600; 310; 30; 20 MG/100ML; MG/100ML; MG/100ML; MG/100ML
INJECTION, SOLUTION INTRAVENOUS CONTINUOUS
Status: DISCONTINUED | OUTPATIENT
Start: 2023-01-01 | End: 2023-01-01

## 2023-01-01 RX ORDER — AMOXICILLIN 500 MG/1
TABLET, FILM COATED ORAL
Qty: 30 TABLET | Refills: 0 | Status: ON HOLD | OUTPATIENT
Start: 2023-01-01 | End: 2023-01-01 | Stop reason: HOSPADM

## 2023-01-01 RX ORDER — CARISOPRODOL 350 MG/1
350 TABLET ORAL 3 TIMES DAILY PRN
Qty: 90 TABLET | Refills: 0 | Status: SHIPPED | OUTPATIENT
Start: 2023-01-01 | End: 2023-09-20

## 2023-01-01 RX ORDER — EPINEPHRINE 0.3 MG/.3ML
0.3 INJECTION SUBCUTANEOUS ONCE AS NEEDED
Status: DISCONTINUED | OUTPATIENT
Start: 2023-01-01 | End: 2023-01-01 | Stop reason: HOSPADM

## 2023-01-01 RX ORDER — INSULIN ASPART 100 [IU]/ML
0-5 INJECTION, SOLUTION INTRAVENOUS; SUBCUTANEOUS
Status: DISCONTINUED | OUTPATIENT
Start: 2023-01-01 | End: 2023-01-01

## 2023-01-01 RX ORDER — SIMETHICONE 80 MG
1 TABLET,CHEWABLE ORAL 4 TIMES DAILY PRN
Status: DISCONTINUED | OUTPATIENT
Start: 2023-01-01 | End: 2023-01-01 | Stop reason: HOSPADM

## 2023-01-01 RX ORDER — MORPHINE SULFATE 2 MG/ML
2 INJECTION, SOLUTION INTRAMUSCULAR; INTRAVENOUS
Status: COMPLETED | OUTPATIENT
Start: 2023-01-01 | End: 2023-01-01

## 2023-01-01 RX ORDER — FENTANYL 25 UG/1
1 PATCH TRANSDERMAL
Status: DISCONTINUED | OUTPATIENT
Start: 2023-01-01 | End: 2023-01-01

## 2023-01-01 RX ORDER — FENTANYL 25 UG/1
1 PATCH TRANSDERMAL
Qty: 10 PATCH | Refills: 0 | Status: ON HOLD | OUTPATIENT
Start: 2023-01-01 | End: 2023-01-01 | Stop reason: SDUPTHER

## 2023-01-01 RX ORDER — CALCIUM CHLORIDE INJECTION 100 MG/ML
1 INJECTION, SOLUTION INTRAVENOUS ONCE
Status: COMPLETED | OUTPATIENT
Start: 2023-01-01 | End: 2023-01-01

## 2023-01-01 RX ORDER — SODIUM CHLORIDE 9 MG/ML
1000 INJECTION, SOLUTION INTRAVENOUS
Status: COMPLETED | OUTPATIENT
Start: 2023-01-01 | End: 2023-01-01

## 2023-01-01 RX ORDER — IBUPROFEN 200 MG
24 TABLET ORAL
Status: DISCONTINUED | OUTPATIENT
Start: 2023-01-01 | End: 2023-01-01 | Stop reason: HOSPADM

## 2023-01-01 RX ORDER — VANCOMYCIN HCL IN 5 % DEXTROSE 1G/250ML
1000 PLASTIC BAG, INJECTION (ML) INTRAVENOUS
Status: DISCONTINUED | OUTPATIENT
Start: 2023-01-01 | End: 2023-01-01

## 2023-01-01 RX ORDER — ENOXAPARIN SODIUM 100 MG/ML
30 INJECTION SUBCUTANEOUS EVERY 24 HOURS
Status: DISCONTINUED | OUTPATIENT
Start: 2023-01-01 | End: 2023-01-01

## 2023-01-01 RX ORDER — SODIUM,POTASSIUM PHOSPHATES 280-250MG
1 POWDER IN PACKET (EA) ORAL
Status: DISCONTINUED | OUTPATIENT
Start: 2023-01-01 | End: 2023-01-01

## 2023-01-01 RX ORDER — ONDANSETRON HCL IN 0.9 % NACL 8 MG/50 ML
8 INTRAVENOUS SOLUTION, PIGGYBACK (ML) INTRAVENOUS
Status: CANCELLED
Start: 2023-01-01 | End: 2023-01-01

## 2023-01-01 RX ORDER — ACETAMINOPHEN 325 MG/1
650 TABLET ORAL EVERY 4 HOURS PRN
Status: DISCONTINUED | OUTPATIENT
Start: 2023-01-01 | End: 2023-01-01

## 2023-01-01 RX ORDER — CHOLECALCIFEROL (VITAMIN D3) 25 MCG
1000 TABLET ORAL DAILY
Status: DISCONTINUED | OUTPATIENT
Start: 2023-01-01 | End: 2023-01-01

## 2023-01-01 RX ORDER — HYDROCODONE BITARTRATE AND ACETAMINOPHEN 10; 325 MG/1; MG/1
1 TABLET ORAL EVERY 6 HOURS PRN
Qty: 120 TABLET | Refills: 0 | Status: SHIPPED | OUTPATIENT
Start: 2023-01-01

## 2023-01-01 RX ORDER — FUROSEMIDE 10 MG/ML
20 INJECTION INTRAMUSCULAR; INTRAVENOUS ONCE
Status: DISCONTINUED | OUTPATIENT
Start: 2023-01-01 | End: 2023-01-01

## 2023-01-01 RX ORDER — HEPARIN 100 UNIT/ML
5 SYRINGE INTRAVENOUS ONCE
Status: DISCONTINUED | OUTPATIENT
Start: 2023-01-01 | End: 2023-01-01

## 2023-01-01 RX ORDER — FUROSEMIDE 20 MG/1
TABLET ORAL
Qty: 30 TABLET | Refills: 6 | Status: SHIPPED | OUTPATIENT
Start: 2023-01-01 | End: 2023-01-01

## 2023-01-01 RX ORDER — BENZONATATE 100 MG/1
100 CAPSULE ORAL 3 TIMES DAILY PRN
Qty: 30 CAPSULE | Refills: 2 | Status: SHIPPED | OUTPATIENT
Start: 2023-01-01

## 2023-01-01 RX ORDER — MIDAZOLAM HYDROCHLORIDE 1 MG/ML
INJECTION INTRAMUSCULAR; INTRAVENOUS
Status: COMPLETED | OUTPATIENT
Start: 2023-01-01 | End: 2023-01-01

## 2023-01-01 RX ORDER — ZOLPIDEM TARTRATE 10 MG/1
TABLET ORAL
Qty: 30 TABLET | Refills: 3 | Status: SHIPPED | OUTPATIENT
Start: 2023-01-01

## 2023-01-01 RX ORDER — GLUCAGON 1 MG
1 KIT INJECTION
Status: DISCONTINUED | OUTPATIENT
Start: 2023-01-01 | End: 2023-01-01 | Stop reason: HOSPADM

## 2023-01-01 RX ORDER — HYDROCODONE BITARTRATE AND ACETAMINOPHEN 10; 325 MG/1; MG/1
1 TABLET ORAL EVERY 6 HOURS PRN
Status: DISCONTINUED | OUTPATIENT
Start: 2023-01-01 | End: 2023-01-01 | Stop reason: HOSPADM

## 2023-01-01 RX ORDER — SODIUM CHLORIDE 0.9 % (FLUSH) 0.9 %
10 SYRINGE (ML) INJECTION
Status: CANCELLED | OUTPATIENT
Start: 2023-09-15

## 2023-01-01 RX ORDER — POLYETHYLENE GLYCOL 3350 17 G/17G
17 POWDER, FOR SOLUTION ORAL DAILY
Status: DISCONTINUED | OUTPATIENT
Start: 2023-01-01 | End: 2023-01-01 | Stop reason: HOSPADM

## 2023-01-01 RX ORDER — DIAZEPAM 5 MG/1
10 TABLET ORAL 4 TIMES DAILY PRN
Status: DISCONTINUED | OUTPATIENT
Start: 2023-01-01 | End: 2023-01-01 | Stop reason: HOSPADM

## 2023-01-01 RX ORDER — NOREPINEPHRINE BITARTRATE/D5W 4MG/250ML
0-3 PLASTIC BAG, INJECTION (ML) INTRAVENOUS CONTINUOUS
Status: DISCONTINUED | OUTPATIENT
Start: 2023-01-01 | End: 2023-01-01

## 2023-01-01 RX ORDER — DEXAMETHASONE 4 MG/1
4 TABLET ORAL 2 TIMES DAILY WITH MEALS
Status: DISCONTINUED | OUTPATIENT
Start: 2023-01-01 | End: 2023-01-01

## 2023-01-01 RX ORDER — HYDROCODONE BITARTRATE AND ACETAMINOPHEN 10; 325 MG/1; MG/1
1 TABLET ORAL EVERY 6 HOURS PRN
Qty: 120 TABLET | Refills: 0 | Status: ON HOLD | OUTPATIENT
Start: 2023-01-01 | End: 2023-01-01 | Stop reason: SDUPTHER

## 2023-01-01 RX ORDER — FENTANYL CITRATE 50 UG/ML
INJECTION, SOLUTION INTRAMUSCULAR; INTRAVENOUS
Status: COMPLETED | OUTPATIENT
Start: 2023-01-01 | End: 2023-01-01

## 2023-01-01 RX ORDER — HYDROCODONE BITARTRATE AND ACETAMINOPHEN 500; 5 MG/1; MG/1
TABLET ORAL ONCE
Status: CANCELLED | OUTPATIENT
Start: 2023-01-01 | End: 2023-01-01

## 2023-01-01 RX ORDER — VANCOMYCIN HCL IN 5 % DEXTROSE 1G/250ML
2000 PLASTIC BAG, INJECTION (ML) INTRAVENOUS ONCE
Status: COMPLETED | OUTPATIENT
Start: 2023-01-01 | End: 2023-01-01

## 2023-01-01 RX ORDER — ACETAMINOPHEN 500 MG
1000 TABLET ORAL EVERY 8 HOURS PRN
Status: DISCONTINUED | OUTPATIENT
Start: 2023-01-01 | End: 2023-01-01 | Stop reason: HOSPADM

## 2023-01-01 RX ORDER — HYDROCODONE BITARTRATE AND ACETAMINOPHEN 5; 325 MG/1; MG/1
1 TABLET ORAL EVERY 4 HOURS PRN
Status: DISCONTINUED | OUTPATIENT
Start: 2023-01-01 | End: 2023-01-01 | Stop reason: HOSPADM

## 2023-01-01 RX ORDER — HYDROCODONE BITARTRATE AND ACETAMINOPHEN 10; 325 MG/1; MG/1
1 TABLET ORAL
Status: COMPLETED | OUTPATIENT
Start: 2023-01-01 | End: 2023-01-01

## 2023-01-01 RX ORDER — MORPHINE SULFATE 4 MG/ML
4 INJECTION, SOLUTION INTRAMUSCULAR; INTRAVENOUS EVERY 4 HOURS PRN
Status: DISCONTINUED | OUTPATIENT
Start: 2023-01-01 | End: 2023-01-01

## 2023-01-01 RX ORDER — OXYCODONE HYDROCHLORIDE 15 MG/1
15 TABLET ORAL
Qty: 120 TABLET | Refills: 0 | Status: ON HOLD | OUTPATIENT
Start: 2023-01-01 | End: 2023-01-01 | Stop reason: SDUPTHER

## 2023-01-01 RX ORDER — NALOXONE HCL 0.4 MG/ML
0.02 VIAL (ML) INJECTION
Status: DISCONTINUED | OUTPATIENT
Start: 2023-01-01 | End: 2023-01-01 | Stop reason: HOSPADM

## 2023-01-01 RX ORDER — IPRATROPIUM BROMIDE AND ALBUTEROL SULFATE 2.5; .5 MG/3ML; MG/3ML
3 SOLUTION RESPIRATORY (INHALATION)
Status: DISCONTINUED | OUTPATIENT
Start: 2023-01-01 | End: 2023-01-01 | Stop reason: HOSPADM

## 2023-01-01 RX ORDER — HEPARIN 100 UNIT/ML
500 SYRINGE INTRAVENOUS
Status: CANCELLED | OUTPATIENT
Start: 2023-09-15

## 2023-01-01 RX ADMIN — HEPARIN 500 UNITS: 100 SYRINGE at 10:01

## 2023-01-01 RX ADMIN — SODIUM CHLORIDE, SODIUM LACTATE, POTASSIUM CHLORIDE, AND CALCIUM CHLORIDE: .6; .31; .03; .02 INJECTION, SOLUTION INTRAVENOUS at 12:06

## 2023-01-01 RX ADMIN — INSULIN ASPART 2 UNITS: 100 INJECTION, SOLUTION INTRAVENOUS; SUBCUTANEOUS at 04:07

## 2023-01-01 RX ADMIN — HYDROCODONE BITARTRATE AND ACETAMINOPHEN 1 TABLET: 10; 325 TABLET ORAL at 09:07

## 2023-01-01 RX ADMIN — Medication 400 MG: at 12:07

## 2023-01-01 RX ADMIN — HYDROCODONE BITARTRATE AND ACETAMINOPHEN 1 TABLET: 10; 325 TABLET ORAL at 08:07

## 2023-01-01 RX ADMIN — SODIUM CHLORIDE 1000 ML: 0.9 INJECTION, SOLUTION INTRAVENOUS at 05:06

## 2023-01-01 RX ADMIN — SODIUM BICARBONATE: 84 INJECTION, SOLUTION INTRAVENOUS at 02:09

## 2023-01-01 RX ADMIN — POTASSIUM, SODIUM PHOSPHATES 280 MG-160 MG-250 MG ORAL POWDER PACKET 2 PACKET: POWDER IN PACKET at 05:08

## 2023-01-01 RX ADMIN — DRONABINOL 5 MG: 2.5 CAPSULE ORAL at 04:07

## 2023-01-01 RX ADMIN — PEGFILGRASTIM 6 MG: 6 INJECTION SUBCUTANEOUS at 11:04

## 2023-01-01 RX ADMIN — HEPARIN 500 UNITS: 100 SYRINGE at 10:07

## 2023-01-01 RX ADMIN — CARBOPLATIN 545 MG: 10 INJECTION, SOLUTION INTRAVENOUS at 11:02

## 2023-01-01 RX ADMIN — SODIUM CHLORIDE, PRESERVATIVE FREE 10 ML: 5 INJECTION INTRAVENOUS at 10:09

## 2023-01-01 RX ADMIN — MUPIROCIN 1 G: 20 OINTMENT TOPICAL at 08:07

## 2023-01-01 RX ADMIN — DOCUSATE SODIUM 100 MG: 100 CAPSULE, LIQUID FILLED ORAL at 08:07

## 2023-01-01 RX ADMIN — HEPARIN 500 UNITS: 100 SYRINGE at 02:09

## 2023-01-01 RX ADMIN — METOPROLOL TARTRATE 25 MG: 25 TABLET, FILM COATED ORAL at 08:07

## 2023-01-01 RX ADMIN — INSULIN ASPART 4 UNITS: 100 INJECTION, SOLUTION INTRAVENOUS; SUBCUTANEOUS at 12:07

## 2023-01-01 RX ADMIN — SODIUM CHLORIDE, PRESERVATIVE FREE 10 ML: 5 INJECTION INTRAVENOUS at 10:08

## 2023-01-01 RX ADMIN — HYDROCODONE BITARTRATE AND ACETAMINOPHEN 1 TABLET: 5; 325 TABLET ORAL at 03:06

## 2023-01-01 RX ADMIN — ONDANSETRON 4 MG: 2 INJECTION INTRAMUSCULAR; INTRAVENOUS at 01:06

## 2023-01-01 RX ADMIN — PROCHLORPERAZINE EDISYLATE 5 MG: 5 INJECTION INTRAMUSCULAR; INTRAVENOUS at 08:06

## 2023-01-01 RX ADMIN — LEVOFLOXACIN 750 MG: 750 TABLET, FILM COATED ORAL at 08:07

## 2023-01-01 RX ADMIN — HYDROCODONE BITARTRATE AND ACETAMINOPHEN 1 TABLET: 10; 325 TABLET ORAL at 02:06

## 2023-01-01 RX ADMIN — ZOLEDRONIC ACID 4 MG: 0.04 INJECTION, SOLUTION INTRAVENOUS at 10:01

## 2023-01-01 RX ADMIN — PALONOSETRON HYDROCHLORIDE 0.25 MG: 0.25 INJECTION INTRAVENOUS at 09:08

## 2023-01-01 RX ADMIN — CEFEPIME 1 G: 1 INJECTION, POWDER, FOR SOLUTION INTRAMUSCULAR; INTRAVENOUS at 09:06

## 2023-01-01 RX ADMIN — Medication 500 UNITS: at 01:07

## 2023-01-01 RX ADMIN — ATORVASTATIN CALCIUM 40 MG: 40 TABLET, FILM COATED ORAL at 09:06

## 2023-01-01 RX ADMIN — DEXAMETHASONE SODIUM PHOSPHATE 12 MG: 4 INJECTION, SOLUTION INTRA-ARTICULAR; INTRALESIONAL; INTRAMUSCULAR; INTRAVENOUS; SOFT TISSUE at 10:01

## 2023-01-01 RX ADMIN — POTASSIUM CHLORIDE 10 MEQ: 7.46 INJECTION, SOLUTION INTRAVENOUS at 04:07

## 2023-01-01 RX ADMIN — LORAZEPAM 2 MG: 2 INJECTION INTRAMUSCULAR; INTRAVENOUS at 10:09

## 2023-01-01 RX ADMIN — PEGFILGRASTIM 6 MG: 6 INJECTION SUBCUTANEOUS at 11:05

## 2023-01-01 RX ADMIN — CEFEPIME 1 G: 1 INJECTION, POWDER, FOR SOLUTION INTRAMUSCULAR; INTRAVENOUS at 07:07

## 2023-01-01 RX ADMIN — INSULIN ASPART 3 UNITS: 100 INJECTION, SOLUTION INTRAVENOUS; SUBCUTANEOUS at 09:09

## 2023-01-01 RX ADMIN — APREPITANT 130 MG: 130 INJECTION, EMULSION INTRAVENOUS at 10:03

## 2023-01-01 RX ADMIN — Medication 400 MG: at 08:07

## 2023-01-01 RX ADMIN — POTASSIUM CHLORIDE 10 MEQ: 7.46 INJECTION, SOLUTION INTRAVENOUS at 08:07

## 2023-01-01 RX ADMIN — ZOLEDRONIC ACID 3.3 MG: 4 INJECTION, SOLUTION, CONCENTRATE INTRAVENOUS at 11:09

## 2023-01-01 RX ADMIN — APREPITANT 130 MG: 130 INJECTION, EMULSION INTRAVENOUS at 10:01

## 2023-01-01 RX ADMIN — APREPITANT 130 MG: 130 INJECTION, EMULSION INTRAVENOUS at 07:09

## 2023-01-01 RX ADMIN — Medication 1000 UNITS: at 08:07

## 2023-01-01 RX ADMIN — CARBOPLATIN 490 MG: 10 INJECTION, SOLUTION INTRAVENOUS at 11:01

## 2023-01-01 RX ADMIN — DRONABINOL 5 MG: 2.5 CAPSULE ORAL at 05:07

## 2023-01-01 RX ADMIN — MAGNESIUM SULFATE HEPTAHYDRATE 2 G: 40 INJECTION, SOLUTION INTRAVENOUS at 08:07

## 2023-01-01 RX ADMIN — PALONOSETRON HYDROCHLORIDE 0.25 MG: 0.25 INJECTION INTRAVENOUS at 07:09

## 2023-01-01 RX ADMIN — POTASSIUM CHLORIDE 20 MEQ: 1500 TABLET, EXTENDED RELEASE ORAL at 09:06

## 2023-01-01 RX ADMIN — Medication 2000 UNITS: at 08:08

## 2023-01-01 RX ADMIN — SODIUM CHLORIDE 1000 ML: 0.9 INJECTION, SOLUTION INTRAVENOUS at 08:09

## 2023-01-01 RX ADMIN — IPRATROPIUM BROMIDE AND ALBUTEROL SULFATE 3 ML: 2.5; .5 SOLUTION RESPIRATORY (INHALATION) at 02:07

## 2023-01-01 RX ADMIN — METOPROLOL TARTRATE 25 MG: 25 TABLET, FILM COATED ORAL at 09:07

## 2023-01-01 RX ADMIN — GUAIFENESIN AND CODEINE PHOSPHATE 5 ML: 100; 10 SOLUTION ORAL at 06:07

## 2023-01-01 RX ADMIN — DEXAMETHASONE 4 MG: 4 TABLET ORAL at 09:09

## 2023-01-01 RX ADMIN — HEPARIN 500 UNITS: 100 SYRINGE at 10:08

## 2023-01-01 RX ADMIN — LEVOFLOXACIN 750 MG: 750 TABLET, FILM COATED ORAL at 09:07

## 2023-01-01 RX ADMIN — IOHEXOL 100 ML: 350 INJECTION, SOLUTION INTRAVENOUS at 07:06

## 2023-01-01 RX ADMIN — SODIUM CHLORIDE 1000 ML: 0.9 INJECTION, SOLUTION INTRAVENOUS at 10:07

## 2023-01-01 RX ADMIN — DRONABINOL 5 MG: 2.5 CAPSULE ORAL at 09:09

## 2023-01-01 RX ADMIN — ONDANSETRON 16 MG: 2 INJECTION INTRAMUSCULAR; INTRAVENOUS at 10:01

## 2023-01-01 RX ADMIN — SODIUM CHLORIDE, PRESERVATIVE FREE 10 ML: 5 INJECTION INTRAVENOUS at 12:04

## 2023-01-01 RX ADMIN — ZOLEDRONIC ACID 4 MG: 0.04 INJECTION, SOLUTION INTRAVENOUS at 02:04

## 2023-01-01 RX ADMIN — MORPHINE SULFATE 4 MG: 4 INJECTION, SOLUTION INTRAMUSCULAR; INTRAVENOUS at 10:09

## 2023-01-01 RX ADMIN — HEPARIN 500 UNITS: 100 SYRINGE at 11:06

## 2023-01-01 RX ADMIN — CEFEPIME 1 G: 1 INJECTION, POWDER, FOR SOLUTION INTRAMUSCULAR; INTRAVENOUS at 11:07

## 2023-01-01 RX ADMIN — POTASSIUM BICARBONATE 60 MEQ: 391 TABLET, EFFERVESCENT ORAL at 09:06

## 2023-01-01 RX ADMIN — HEPARIN 500 UNITS: 100 SYRINGE at 11:02

## 2023-01-01 RX ADMIN — FILGRASTIM-SNDZ 480 MCG: 480 INJECTION, SOLUTION INTRAVENOUS; SUBCUTANEOUS at 08:07

## 2023-01-01 RX ADMIN — MORPHINE SULFATE 4 MG: 4 INJECTION, SOLUTION INTRAMUSCULAR; INTRAVENOUS at 08:09

## 2023-01-01 RX ADMIN — GUAIFENESIN AND CODEINE PHOSPHATE 5 ML: 100; 10 SOLUTION ORAL at 03:07

## 2023-01-01 RX ADMIN — HEPARIN 500 UNITS: 100 SYRINGE at 12:04

## 2023-01-01 RX ADMIN — CEFEPIME 1 G: 1 INJECTION, POWDER, FOR SOLUTION INTRAMUSCULAR; INTRAVENOUS at 01:07

## 2023-01-01 RX ADMIN — GUAIFENESIN AND CODEINE PHOSPHATE 5 ML: 100; 10 SOLUTION ORAL at 11:07

## 2023-01-01 RX ADMIN — GUAIFENESIN AND CODEINE PHOSPHATE 5 ML: 100; 10 SOLUTION ORAL at 08:07

## 2023-01-01 RX ADMIN — HYDROCODONE BITARTRATE AND ACETAMINOPHEN 1 TABLET: 10; 325 TABLET ORAL at 07:07

## 2023-01-01 RX ADMIN — POTASSIUM CHLORIDE 10 MEQ: 7.46 INJECTION, SOLUTION INTRAVENOUS at 11:07

## 2023-01-01 RX ADMIN — OXYCODONE HYDROCHLORIDE 15 MG: 5 TABLET ORAL at 09:09

## 2023-01-01 RX ADMIN — BENZONATATE 100 MG: 100 CAPSULE ORAL at 05:07

## 2023-01-01 RX ADMIN — POTASSIUM, SODIUM PHOSPHATES 280 MG-160 MG-250 MG ORAL POWDER PACKET 2 PACKET: POWDER IN PACKET at 11:07

## 2023-01-01 RX ADMIN — HEPARIN 500 UNITS: 100 SYRINGE at 03:07

## 2023-01-01 RX ADMIN — DIPHENHYDRAMINE HYDROCHLORIDE 25 MG: 50 INJECTION INTRAMUSCULAR; INTRAVENOUS at 08:09

## 2023-01-01 RX ADMIN — MUPIROCIN 1 G: 20 OINTMENT TOPICAL at 09:07

## 2023-01-01 RX ADMIN — INSULIN ASPART 8 UNITS: 100 INJECTION, SOLUTION INTRAVENOUS; SUBCUTANEOUS at 08:07

## 2023-01-01 RX ADMIN — POTASSIUM CHLORIDE 10 MEQ: 7.46 INJECTION, SOLUTION INTRAVENOUS at 02:07

## 2023-01-01 RX ADMIN — LIDOCAINE HYDROCHLORIDE 5 ML: 10 INJECTION, SOLUTION EPIDURAL; INFILTRATION; INTRACAUDAL at 11:07

## 2023-01-01 RX ADMIN — ONDANSETRON 16 MG: 2 INJECTION INTRAMUSCULAR; INTRAVENOUS at 09:02

## 2023-01-01 RX ADMIN — INSULIN DETEMIR 15 UNITS: 100 INJECTION, SOLUTION SUBCUTANEOUS at 12:07

## 2023-01-01 RX ADMIN — INSULIN ASPART 8 UNITS: 100 INJECTION, SOLUTION INTRAVENOUS; SUBCUTANEOUS at 04:07

## 2023-01-01 RX ADMIN — ONDANSETRON 16 MG: 2 INJECTION INTRAMUSCULAR; INTRAVENOUS at 10:04

## 2023-01-01 RX ADMIN — HYDROMORPHONE HYDROCHLORIDE 1 MG: 1 INJECTION, SOLUTION INTRAMUSCULAR; INTRAVENOUS; SUBCUTANEOUS at 10:09

## 2023-01-01 RX ADMIN — CALCIUM CHLORIDE 1 G: 100 INJECTION INTRAVENOUS; INTRAVENTRICULAR at 04:09

## 2023-01-01 RX ADMIN — HEPARIN 500 UNITS: 100 SYRINGE at 01:05

## 2023-01-01 RX ADMIN — PEGFILGRASTIM 6 MG: 6 INJECTION SUBCUTANEOUS at 09:01

## 2023-01-01 RX ADMIN — DEXAMETHASONE 4 MG: 4 TABLET ORAL at 04:09

## 2023-01-01 RX ADMIN — DIPHENHYDRAMINE HYDROCHLORIDE 25 MG: 50 INJECTION INTRAMUSCULAR; INTRAVENOUS at 07:06

## 2023-01-01 RX ADMIN — POTASSIUM CHLORIDE 20 MEQ: 1500 TABLET, EXTENDED RELEASE ORAL at 02:06

## 2023-01-01 RX ADMIN — APREPITANT 130 MG: 130 INJECTION, EMULSION INTRAVENOUS at 09:08

## 2023-01-01 RX ADMIN — VANCOMYCIN HYDROCHLORIDE 2000 MG: 1 INJECTION, POWDER, LYOPHILIZED, FOR SOLUTION INTRAVENOUS at 02:09

## 2023-01-01 RX ADMIN — DEXAMETHASONE SODIUM PHOSPHATE 12 MG: 4 INJECTION, SOLUTION INTRA-ARTICULAR; INTRALESIONAL; INTRAMUSCULAR; INTRAVENOUS; SOFT TISSUE at 10:03

## 2023-01-01 RX ADMIN — SODIUM CHLORIDE 500 ML: 0.9 INJECTION, SOLUTION INTRAVENOUS at 03:07

## 2023-01-01 RX ADMIN — CEFEPIME 1 G: 1 INJECTION, POWDER, FOR SOLUTION INTRAMUSCULAR; INTRAVENOUS at 10:06

## 2023-01-01 RX ADMIN — SODIUM CHLORIDE: 0.9 INJECTION, SOLUTION INTRAVENOUS at 10:07

## 2023-01-01 RX ADMIN — SODIUM CHLORIDE: 0.9 INJECTION, SOLUTION INTRAVENOUS at 08:07

## 2023-01-01 RX ADMIN — ONDANSETRON 16 MG: 2 INJECTION INTRAMUSCULAR; INTRAVENOUS at 10:05

## 2023-01-01 RX ADMIN — SODIUM CHLORIDE: 0.9 INJECTION, SOLUTION INTRAVENOUS at 10:01

## 2023-01-01 RX ADMIN — MAGNESIUM SULFATE HEPTAHYDRATE 2 G: 40 INJECTION, SOLUTION INTRAVENOUS at 12:07

## 2023-01-01 RX ADMIN — TRIAMCINOLONE ACETONIDE: 1 CREAM TOPICAL at 08:07

## 2023-01-01 RX ADMIN — ONDANSETRON 16 MG: 2 INJECTION INTRAMUSCULAR; INTRAVENOUS at 10:02

## 2023-01-01 RX ADMIN — FAMOTIDINE 20 MG: 20 TABLET ORAL at 09:09

## 2023-01-01 RX ADMIN — IPRATROPIUM BROMIDE AND ALBUTEROL SULFATE 3 ML: .5; 3 SOLUTION RESPIRATORY (INHALATION) at 09:07

## 2023-01-01 RX ADMIN — POTASSIUM CHLORIDE 40 MEQ: 1500 TABLET, EXTENDED RELEASE ORAL at 12:07

## 2023-01-01 RX ADMIN — METOPROLOL TARTRATE 25 MG: 25 TABLET, FILM COATED ORAL at 09:06

## 2023-01-01 RX ADMIN — INSULIN ASPART 5 UNITS: 100 INJECTION, SOLUTION INTRAVENOUS; SUBCUTANEOUS at 09:07

## 2023-01-01 RX ADMIN — IPRATROPIUM BROMIDE AND ALBUTEROL SULFATE 3 ML: 2.5; .5 SOLUTION RESPIRATORY (INHALATION) at 08:07

## 2023-01-01 RX ADMIN — FAMOTIDINE 20 MG: 10 INJECTION INTRAVENOUS at 07:09

## 2023-01-01 RX ADMIN — PEGFILGRASTIM 6 MG: 6 INJECTION SUBCUTANEOUS at 10:05

## 2023-01-01 RX ADMIN — MORPHINE SULFATE 4 MG: 4 INJECTION, SOLUTION INTRAMUSCULAR; INTRAVENOUS at 11:09

## 2023-01-01 RX ADMIN — SODIUM CHLORIDE, PRESERVATIVE FREE 10 ML: 5 INJECTION INTRAVENOUS at 01:05

## 2023-01-01 RX ADMIN — FENTANYL CITRATE 50 MCG: 50 INJECTION INTRAMUSCULAR; INTRAVENOUS at 11:07

## 2023-01-01 RX ADMIN — SODIUM CHLORIDE 1000 ML: 0.9 INJECTION, SOLUTION INTRAVENOUS at 06:07

## 2023-01-01 RX ADMIN — POTASSIUM CHLORIDE 40 MEQ: 1500 TABLET, EXTENDED RELEASE ORAL at 09:07

## 2023-01-01 RX ADMIN — POTASSIUM, SODIUM PHOSPHATES 280 MG-160 MG-250 MG ORAL POWDER PACKET 2 PACKET: POWDER IN PACKET at 04:07

## 2023-01-01 RX ADMIN — POTASSIUM BICARBONATE 50 MEQ: 977.5 TABLET, EFFERVESCENT ORAL at 04:07

## 2023-01-01 RX ADMIN — TRIAMCINOLONE ACETONIDE: 1 CREAM TOPICAL at 09:07

## 2023-01-01 RX ADMIN — DEXAMETHASONE SODIUM PHOSPHATE 12 MG: 4 INJECTION, SOLUTION INTRA-ARTICULAR; INTRALESIONAL; INTRAMUSCULAR; INTRAVENOUS; SOFT TISSUE at 10:02

## 2023-01-01 RX ADMIN — MUPIROCIN 1 G: 20 OINTMENT TOPICAL at 09:09

## 2023-01-01 RX ADMIN — LISINOPRIL 10 MG: 10 TABLET ORAL at 09:06

## 2023-01-01 RX ADMIN — CARBOPLATIN 545 MG: 600 INJECTION, SOLUTION INTRAVENOUS at 11:02

## 2023-01-01 RX ADMIN — SODIUM CHLORIDE: 0.9 INJECTION, SOLUTION INTRAVENOUS at 10:04

## 2023-01-01 RX ADMIN — SODIUM CHLORIDE 1000 ML: 0.9 INJECTION, SOLUTION INTRAVENOUS at 08:08

## 2023-01-01 RX ADMIN — POTASSIUM, SODIUM PHOSPHATES 280 MG-160 MG-250 MG ORAL POWDER PACKET 1 PACKET: POWDER IN PACKET at 05:07

## 2023-01-01 RX ADMIN — INSULIN ASPART 4 UNITS: 100 INJECTION, SOLUTION INTRAVENOUS; SUBCUTANEOUS at 09:07

## 2023-01-01 RX ADMIN — SODIUM CHLORIDE 1000 ML: 0.9 INJECTION, SOLUTION INTRAVENOUS at 10:09

## 2023-01-01 RX ADMIN — SODIUM CHLORIDE, PRESERVATIVE FREE 10 ML: 5 INJECTION INTRAVENOUS at 12:01

## 2023-01-01 RX ADMIN — BENZONATATE 100 MG: 100 CAPSULE ORAL at 04:07

## 2023-01-01 RX ADMIN — ONDANSETRON 16 MG: 2 INJECTION INTRAMUSCULAR; INTRAVENOUS at 11:03

## 2023-01-01 RX ADMIN — APREPITANT 130 MG: 130 INJECTION, EMULSION INTRAVENOUS at 10:04

## 2023-01-01 RX ADMIN — Medication 400 MG: at 09:07

## 2023-01-01 RX ADMIN — POTASSIUM, SODIUM PHOSPHATES 280 MG-160 MG-250 MG ORAL POWDER PACKET 2 PACKET: POWDER IN PACKET at 10:08

## 2023-01-01 RX ADMIN — POTASSIUM PHOSPHATE, MONOBASIC AND POTASSIUM PHOSPHATE, DIBASIC 30 MMOL: 224; 236 INJECTION, SOLUTION, CONCENTRATE INTRAVENOUS at 12:07

## 2023-01-01 RX ADMIN — OXYCODONE HYDROCHLORIDE 15 MG: 5 TABLET ORAL at 04:09

## 2023-01-01 RX ADMIN — FAMOTIDINE 20 MG: 10 INJECTION INTRAVENOUS at 09:08

## 2023-01-01 RX ADMIN — DRONABINOL 5 MG: 2.5 CAPSULE ORAL at 03:07

## 2023-01-01 RX ADMIN — SACITUZUMAB GOVITECAN 608 MG: 180 POWDER, FOR SOLUTION INTRAVENOUS at 10:08

## 2023-01-01 RX ADMIN — ONDANSETRON 8 MG: 2 INJECTION INTRAMUSCULAR; INTRAVENOUS at 11:09

## 2023-01-01 RX ADMIN — ACETAMINOPHEN 650 MG: 325 TABLET ORAL at 07:06

## 2023-01-01 RX ADMIN — INSULIN ASPART 2 UNITS: 100 INJECTION, SOLUTION INTRAVENOUS; SUBCUTANEOUS at 12:07

## 2023-01-01 RX ADMIN — DIPHENHYDRAMINE HYDROCHLORIDE 25 MG: 50 INJECTION INTRAMUSCULAR; INTRAVENOUS at 09:08

## 2023-01-01 RX ADMIN — VANCOMYCIN HYDROCHLORIDE 1500 MG: 1.5 INJECTION, POWDER, LYOPHILIZED, FOR SOLUTION INTRAVENOUS at 06:07

## 2023-01-01 RX ADMIN — DEXAMETHASONE SODIUM PHOSPHATE 12 MG: 4 INJECTION, SOLUTION INTRA-ARTICULAR; INTRALESIONAL; INTRAMUSCULAR; INTRAVENOUS; SOFT TISSUE at 10:05

## 2023-01-01 RX ADMIN — DEXAMETHASONE SODIUM PHOSPHATE 0.25 MG: 4 INJECTION, SOLUTION INTRA-ARTICULAR; INTRALESIONAL; INTRAMUSCULAR; INTRAVENOUS; SOFT TISSUE at 10:07

## 2023-01-01 RX ADMIN — ACETAMINOPHEN 650 MG: 325 TABLET ORAL at 08:07

## 2023-01-01 RX ADMIN — MAGNESIUM SULFATE 1 G: 1 INJECTION INTRAVENOUS at 09:07

## 2023-01-01 RX ADMIN — HEPARIN 500 UNITS: 100 SYRINGE at 10:09

## 2023-01-01 RX ADMIN — ZOLEDRONIC ACID 3.3 MG: 4 INJECTION INTRAVENOUS at 10:07

## 2023-01-01 RX ADMIN — SODIUM CHLORIDE, SODIUM LACTATE, POTASSIUM CHLORIDE, AND CALCIUM CHLORIDE: .6; .31; .03; .02 INJECTION, SOLUTION INTRAVENOUS at 03:06

## 2023-01-01 RX ADMIN — POTASSIUM, SODIUM PHOSPHATES 280 MG-160 MG-250 MG ORAL POWDER PACKET 2 PACKET: POWDER IN PACKET at 09:07

## 2023-01-01 RX ADMIN — SODIUM BICARBONATE: 84 INJECTION, SOLUTION INTRAVENOUS at 03:09

## 2023-01-01 RX ADMIN — FENTANYL CITRATE 25 MCG: 50 INJECTION INTRAMUSCULAR; INTRAVENOUS at 11:07

## 2023-01-01 RX ADMIN — HEPARIN 500 UNITS: 100 SYRINGE at 12:07

## 2023-01-01 RX ADMIN — OXYCODONE HYDROCHLORIDE 15 MG: 5 TABLET ORAL at 03:09

## 2023-01-01 RX ADMIN — HYDROCODONE BITARTRATE AND ACETAMINOPHEN 1 TABLET: 5; 325 TABLET ORAL at 08:06

## 2023-01-01 RX ADMIN — SACITUZUMAB GOVITECAN 608 MG: 180 POWDER, FOR SOLUTION INTRAVENOUS at 11:07

## 2023-01-01 RX ADMIN — INSULIN ASPART 2 UNITS: 100 INJECTION, SOLUTION INTRAVENOUS; SUBCUTANEOUS at 11:07

## 2023-01-01 RX ADMIN — GUAIFENESIN AND CODEINE PHOSPHATE 5 ML: 100; 10 SOLUTION ORAL at 09:07

## 2023-01-01 RX ADMIN — ONDANSETRON 8 MG: 2 INJECTION INTRAMUSCULAR; INTRAVENOUS at 08:08

## 2023-01-01 RX ADMIN — HEPARIN 500 UNITS: 100 SYRINGE at 11:08

## 2023-01-01 RX ADMIN — SODIUM CHLORIDE, PRESERVATIVE FREE 10 ML: 5 INJECTION INTRAVENOUS at 12:02

## 2023-01-01 RX ADMIN — SODIUM CHLORIDE 1000 ML: 0.9 INJECTION, SOLUTION INTRAVENOUS at 06:06

## 2023-01-01 RX ADMIN — Medication 400 MG: at 10:07

## 2023-01-01 RX ADMIN — ONDANSETRON 4 MG: 2 INJECTION INTRAMUSCULAR; INTRAVENOUS at 10:07

## 2023-01-01 RX ADMIN — ACETAMINOPHEN 650 MG: 325 TABLET ORAL at 07:09

## 2023-01-01 RX ADMIN — LORAZEPAM 2 MG: 2 INJECTION INTRAMUSCULAR; INTRAVENOUS at 11:09

## 2023-01-01 RX ADMIN — LORAZEPAM 2 MG: 2 INJECTION INTRAMUSCULAR; INTRAVENOUS at 08:09

## 2023-01-01 RX ADMIN — FENTANYL TRANSDERMAL 1 PATCH: 25 PATCH, EXTENDED RELEASE TRANSDERMAL at 02:06

## 2023-01-01 RX ADMIN — VANCOMYCIN HYDROCHLORIDE 1500 MG: 1.5 INJECTION, POWDER, LYOPHILIZED, FOR SOLUTION INTRAVENOUS at 10:06

## 2023-01-01 RX ADMIN — HEPARIN 500 UNITS: 100 SYRINGE at 02:04

## 2023-01-01 RX ADMIN — SODIUM CHLORIDE, PRESERVATIVE FREE 10 ML: 5 INJECTION INTRAVENOUS at 03:07

## 2023-01-01 RX ADMIN — INSULIN DETEMIR 10 UNITS: 100 INJECTION, SOLUTION SUBCUTANEOUS at 12:07

## 2023-01-01 RX ADMIN — ZOLEDRONIC ACID 4 MG: 0.04 INJECTION, SOLUTION INTRAVENOUS at 01:03

## 2023-01-01 RX ADMIN — LEVOFLOXACIN 750 MG: 750 TABLET, FILM COATED ORAL at 08:08

## 2023-01-01 RX ADMIN — HYDROMORPHONE HYDROCHLORIDE 0.5 MG: 0.5 INJECTION, SOLUTION INTRAMUSCULAR; INTRAVENOUS; SUBCUTANEOUS at 07:06

## 2023-01-01 RX ADMIN — HEPARIN 500 UNITS: 100 SYRINGE at 01:04

## 2023-01-01 RX ADMIN — SODIUM CHLORIDE, PRESERVATIVE FREE 10 ML: 5 INJECTION INTRAVENOUS at 02:04

## 2023-01-01 RX ADMIN — INSULIN ASPART 5 UNITS: 100 INJECTION, SOLUTION INTRAVENOUS; SUBCUTANEOUS at 12:07

## 2023-01-01 RX ADMIN — POTASSIUM, SODIUM PHOSPHATES 280 MG-160 MG-250 MG ORAL POWDER PACKET 1 PACKET: POWDER IN PACKET at 08:07

## 2023-01-01 RX ADMIN — CARBOPLATIN 420 MG: 10 INJECTION, SOLUTION INTRAVENOUS at 12:05

## 2023-01-01 RX ADMIN — SODIUM CHLORIDE, PRESERVATIVE FREE 10 ML: 5 INJECTION INTRAVENOUS at 11:06

## 2023-01-01 RX ADMIN — POTASSIUM BICARBONATE 50 MEQ: 977.5 TABLET, EFFERVESCENT ORAL at 08:07

## 2023-01-01 RX ADMIN — ENOXAPARIN SODIUM 30 MG: 30 INJECTION SUBCUTANEOUS at 05:09

## 2023-01-01 RX ADMIN — GEMCITABINE 1495 MG: 38 INJECTION, SOLUTION INTRAVENOUS at 10:02

## 2023-01-01 RX ADMIN — FAMOTIDINE 20 MG: 10 INJECTION INTRAVENOUS at 10:07

## 2023-01-01 RX ADMIN — HEPARIN 500 UNITS: 100 SYRINGE at 12:01

## 2023-01-01 RX ADMIN — ZOLEDRONIC ACID 4 MG: 0.04 INJECTION, SOLUTION INTRAVENOUS at 03:05

## 2023-01-01 RX ADMIN — HEPARIN 500 UNITS: 100 SYRINGE at 12:02

## 2023-01-01 RX ADMIN — ZOLEDRONIC ACID 4 MG: 0.04 INJECTION, SOLUTION INTRAVENOUS at 10:02

## 2023-01-01 RX ADMIN — POTASSIUM CHLORIDE 40 MEQ: 1500 TABLET, EXTENDED RELEASE ORAL at 02:07

## 2023-01-01 RX ADMIN — SACITUZUMAB GOVITECAN 608 MG: 180 POWDER, FOR SOLUTION INTRAVENOUS at 08:09

## 2023-01-01 RX ADMIN — DIPHENHYDRAMINE HYDROCHLORIDE 25 MG: 50 INJECTION INTRAMUSCULAR; INTRAVENOUS at 08:07

## 2023-01-01 RX ADMIN — SODIUM CHLORIDE, PRESERVATIVE FREE 10 ML: 5 INJECTION INTRAVENOUS at 10:07

## 2023-01-01 RX ADMIN — HYDROCODONE BITARTRATE AND ACETAMINOPHEN 1 TABLET: 5; 325 TABLET ORAL at 05:06

## 2023-01-01 RX ADMIN — DEXAMETHASONE SODIUM PHOSPHATE 12 MG: 4 INJECTION, SOLUTION INTRA-ARTICULAR; INTRALESIONAL; INTRAMUSCULAR; INTRAVENOUS; SOFT TISSUE at 10:04

## 2023-01-01 RX ADMIN — DRONABINOL 5 MG: 2.5 CAPSULE ORAL at 04:09

## 2023-01-01 RX ADMIN — SODIUM CHLORIDE: 0.9 INJECTION, SOLUTION INTRAVENOUS at 09:02

## 2023-01-01 RX ADMIN — SODIUM CHLORIDE 1000 ML: 0.9 INJECTION, SOLUTION INTRAVENOUS at 01:09

## 2023-01-01 RX ADMIN — POTASSIUM CHLORIDE 10 MEQ: 7.46 INJECTION, SOLUTION INTRAVENOUS at 10:07

## 2023-01-01 RX ADMIN — APREPITANT 130 MG: 130 INJECTION, EMULSION INTRAVENOUS at 09:02

## 2023-01-01 RX ADMIN — MORPHINE SULFATE 2 MG: 2 INJECTION, SOLUTION INTRAMUSCULAR; INTRAVENOUS at 06:06

## 2023-01-01 RX ADMIN — POTASSIUM CHLORIDE 10 MEQ: 7.46 INJECTION, SOLUTION INTRAVENOUS at 09:07

## 2023-01-01 RX ADMIN — ENOXAPARIN SODIUM 30 MG: 30 INJECTION SUBCUTANEOUS at 03:09

## 2023-01-01 RX ADMIN — POTASSIUM, SODIUM PHOSPHATES 280 MG-160 MG-250 MG ORAL POWDER PACKET 1 PACKET: POWDER IN PACKET at 11:07

## 2023-01-01 RX ADMIN — HEPARIN 500 UNITS: 100 SYRINGE at 02:03

## 2023-01-01 RX ADMIN — INSULIN ASPART 3 UNITS: 100 INJECTION, SOLUTION INTRAVENOUS; SUBCUTANEOUS at 08:07

## 2023-01-01 RX ADMIN — POTASSIUM BICARBONATE 50 MEQ: 977.5 TABLET, EFFERVESCENT ORAL at 05:07

## 2023-01-01 RX ADMIN — SODIUM CHLORIDE 1000 ML: 0.9 INJECTION, SOLUTION INTRAVENOUS at 11:09

## 2023-01-01 RX ADMIN — POTASSIUM CHLORIDE 40 MEQ: 1500 TABLET, EXTENDED RELEASE ORAL at 03:07

## 2023-01-01 RX ADMIN — MIDAZOLAM HYDROCHLORIDE 2 MG: 1 INJECTION, SOLUTION INTRAMUSCULAR; INTRAVENOUS at 11:07

## 2023-01-01 RX ADMIN — GEMCITABINE 1495 MG: 38 INJECTION, SOLUTION INTRAVENOUS at 11:03

## 2023-01-01 RX ADMIN — DIPHENHYDRAMINE HYDROCHLORIDE 25 MG: 50 INJECTION INTRAMUSCULAR; INTRAVENOUS at 11:07

## 2023-01-01 RX ADMIN — PEGFILGRASTIM-CBQV 6 MG: 6 INJECTION, SOLUTION SUBCUTANEOUS at 10:09

## 2023-01-01 RX ADMIN — POTASSIUM CHLORIDE 40 MEQ: 1500 TABLET, EXTENDED RELEASE ORAL at 10:07

## 2023-01-01 RX ADMIN — ONDANSETRON 8 MG: 2 INJECTION INTRAMUSCULAR; INTRAVENOUS at 11:07

## 2023-01-01 RX ADMIN — ONDANSETRON 4 MG: 2 INJECTION INTRAMUSCULAR; INTRAVENOUS at 06:06

## 2023-01-01 RX ADMIN — SODIUM CHLORIDE 1000 ML: 0.9 INJECTION, SOLUTION INTRAVENOUS at 09:07

## 2023-01-01 RX ADMIN — DRONABINOL 5 MG: 2.5 CAPSULE ORAL at 05:08

## 2023-01-01 RX ADMIN — HEPARIN 500 UNITS: 100 SYRINGE at 03:05

## 2023-01-01 RX ADMIN — APREPITANT 130 MG: 130 INJECTION, EMULSION INTRAVENOUS at 10:02

## 2023-01-01 RX ADMIN — TRIAMCINOLONE ACETONIDE: 1 CREAM TOPICAL at 01:07

## 2023-01-01 RX ADMIN — GEMCITABINE 1490 MG: 38 INJECTION, SOLUTION INTRAVENOUS at 10:04

## 2023-01-01 RX ADMIN — APREPITANT 130 MG: 130 INJECTION, EMULSION INTRAVENOUS at 10:07

## 2023-01-01 RX ADMIN — POTASSIUM CHLORIDE 10 MEQ: 7.46 INJECTION, SOLUTION INTRAVENOUS at 06:07

## 2023-01-01 RX ADMIN — ZOLEDRONIC ACID 3.5 MG: 4 INJECTION INTRAVENOUS at 10:06

## 2023-01-01 RX ADMIN — PEGFILGRASTIM 6 MG: 6 INJECTION SUBCUTANEOUS at 08:02

## 2023-01-01 RX ADMIN — TRIAMCINOLONE ACETONIDE: 1 CREAM TOPICAL at 08:08

## 2023-01-01 RX ADMIN — CARBOPLATIN 495 MG: 10 INJECTION, SOLUTION INTRAVENOUS at 12:03

## 2023-01-01 RX ADMIN — PEGFILGRASTIM-CBQV 6 MG: 6 INJECTION, SOLUTION SUBCUTANEOUS at 10:08

## 2023-01-01 RX ADMIN — CEFEPIME 1 G: 1 INJECTION, POWDER, FOR SOLUTION INTRAMUSCULAR; INTRAVENOUS at 03:07

## 2023-01-01 RX ADMIN — VANCOMYCIN HYDROCHLORIDE 1250 MG: 1.25 INJECTION, POWDER, LYOPHILIZED, FOR SOLUTION INTRAVENOUS at 10:06

## 2023-01-01 RX ADMIN — INSULIN ASPART 2 UNITS: 100 INJECTION, SOLUTION INTRAVENOUS; SUBCUTANEOUS at 12:09

## 2023-01-01 RX ADMIN — BENZONATATE 100 MG: 100 CAPSULE ORAL at 07:07

## 2023-01-01 RX ADMIN — INSULIN ASPART 2 UNITS: 100 INJECTION, SOLUTION INTRAVENOUS; SUBCUTANEOUS at 05:09

## 2023-01-01 RX ADMIN — MEROPENEM 2 G: 1 INJECTION, POWDER, FOR SOLUTION INTRAVENOUS at 12:09

## 2023-01-01 RX ADMIN — PEGFILGRASTIM 6 MG: 6 INJECTION SUBCUTANEOUS at 11:02

## 2023-01-01 RX ADMIN — HYDROCODONE BITARTRATE AND ACETAMINOPHEN 1 TABLET: 5; 325 TABLET ORAL at 12:06

## 2023-01-01 RX ADMIN — SODIUM CHLORIDE, PRESERVATIVE FREE 10 ML: 5 INJECTION INTRAVENOUS at 02:03

## 2023-01-01 RX ADMIN — ATROPINE SULFATE 0.4 MG: 0.4 INJECTION, SOLUTION INTRAVENOUS at 10:08

## 2023-01-01 RX ADMIN — IOHEXOL 100 ML: 350 INJECTION, SOLUTION INTRAVENOUS at 05:06

## 2023-01-01 RX ADMIN — Medication 400 MG: at 08:08

## 2023-01-01 RX ADMIN — HYDROCODONE BITARTRATE AND ACETAMINOPHEN 1 TABLET: 10; 325 TABLET ORAL at 12:07

## 2023-01-01 RX ADMIN — BENZONATATE 100 MG: 100 CAPSULE ORAL at 06:07

## 2023-01-01 RX ADMIN — POTASSIUM BICARBONATE 40 MEQ: 782 TABLET, EFFERVESCENT ORAL at 09:06

## 2023-01-01 RX ADMIN — ENOXAPARIN SODIUM 40 MG: 100 INJECTION SUBCUTANEOUS at 05:06

## 2023-01-01 RX ADMIN — GUAIFENESIN AND CODEINE PHOSPHATE 5 ML: 100; 10 SOLUTION ORAL at 12:07

## 2023-01-01 RX ADMIN — POTASSIUM CHLORIDE 10 MEQ: 7.46 INJECTION, SOLUTION INTRAVENOUS at 01:07

## 2023-01-01 RX ADMIN — GEMCITABINE 1464 MG: 38 INJECTION, SOLUTION INTRAVENOUS at 11:05

## 2023-01-01 RX ADMIN — SODIUM CHLORIDE, PRESERVATIVE FREE 10 ML: 5 INJECTION INTRAVENOUS at 11:02

## 2023-01-01 RX ADMIN — GEMCITABINE 1480 MG: 38 INJECTION, SOLUTION INTRAVENOUS at 10:01

## 2023-01-01 RX ADMIN — INSULIN ASPART 3 UNITS: 100 INJECTION, SOLUTION INTRAVENOUS; SUBCUTANEOUS at 09:07

## 2023-01-01 RX ADMIN — APREPITANT 130 MG: 130 INJECTION, EMULSION INTRAVENOUS at 10:05

## 2023-01-01 RX ADMIN — POTASSIUM CHLORIDE 10 MEQ: 7.46 INJECTION, SOLUTION INTRAVENOUS at 07:07

## 2023-01-01 RX ADMIN — Medication 1000 UNITS: at 03:07

## 2023-01-01 RX ADMIN — DEXTROSE MONOHYDRATE 5 MG/HR: 50 INJECTION, SOLUTION INTRAVENOUS at 12:09

## 2023-01-01 RX ADMIN — MAGNESIUM SULFATE HEPTAHYDRATE 1 G: 1 INJECTION, SOLUTION INTRAVENOUS at 04:07

## 2023-01-01 RX ADMIN — PEGFILGRASTIM 6 MG: 6 INJECTION SUBCUTANEOUS at 10:03

## 2023-01-01 RX ADMIN — CEFEPIME HYDROCHLORIDE 2 G: 2 INJECTION, POWDER, FOR SOLUTION INTRAVENOUS at 05:07

## 2023-01-01 RX ADMIN — FILGRASTIM-SNDZ 480 MCG: 480 INJECTION, SOLUTION INTRAVENOUS; SUBCUTANEOUS at 02:07

## 2023-01-01 RX ADMIN — POTASSIUM CHLORIDE 20 MEQ: 1500 TABLET, EXTENDED RELEASE ORAL at 11:07

## 2023-01-01 RX ADMIN — MEROPENEM 500 MG: 500 INJECTION, POWDER, FOR SOLUTION INTRAVENOUS at 12:09

## 2023-01-01 RX ADMIN — IPRATROPIUM BROMIDE AND ALBUTEROL SULFATE 3 ML: 2.5; .5 SOLUTION RESPIRATORY (INHALATION) at 08:08

## 2023-01-01 RX ADMIN — INSULIN ASPART 1 UNITS: 100 INJECTION, SOLUTION INTRAVENOUS; SUBCUTANEOUS at 08:07

## 2023-01-01 RX ADMIN — SODIUM CHLORIDE, PRESERVATIVE FREE 10 ML: 5 INJECTION INTRAVENOUS at 10:01

## 2023-01-01 RX ADMIN — ACETAMINOPHEN 650 MG: 325 TABLET ORAL at 10:07

## 2023-01-01 RX ADMIN — Medication 500 UNITS: at 02:06

## 2023-01-01 RX ADMIN — SODIUM CHLORIDE 1000 ML: 0.9 INJECTION, SOLUTION INTRAVENOUS at 07:09

## 2023-01-01 RX ADMIN — CARBOPLATIN 480 MG: 450 INJECTION, SOLUTION INTRAVENOUS at 12:04

## 2023-01-01 RX ADMIN — GEMCITABINE 1465 MG: 38 INJECTION, SOLUTION INTRAVENOUS at 11:04

## 2023-01-01 RX ADMIN — Medication 500 UNITS: at 12:08

## 2023-01-01 RX ADMIN — FILGRASTIM-SNDZ 480 MCG: 480 INJECTION, SOLUTION INTRAVENOUS; SUBCUTANEOUS at 09:07

## 2023-01-01 RX ADMIN — CARBOPLATIN 495 MG: 10 INJECTION, SOLUTION INTRAVENOUS at 11:04

## 2023-01-10 NOTE — TELEPHONE ENCOUNTER
----- Message from Ladan Caicedo sent at 1/10/2023  1:15 PM CST -----  Pt brought in paperwork for Patient assistant program.  She was also wondering about her lantus that should have already be here.  Please call [phone]. Gave to Lorenza

## 2023-01-12 NOTE — PLAN OF CARE
Problem: Fatigue  Goal: Improved Activity Tolerance  Outcome: Ongoing, Progressing  Intervention: Promote Improved Energy  Flowsheets (Taken 1/12/2023 8432)  Fatigue Management:   fatigue-related activity identified   frequent rest breaks encouraged   paced activity encouraged  Sleep/Rest Enhancement: relaxation techniques promoted  Activity Management: Ambulated -L4

## 2023-01-17 NOTE — TELEPHONE ENCOUNTER
Dr Vick reviewed patient's critical WBC of 1.44 called in from Rosa at [facility] - Patient to receive her previously scheduled dose of neulasta today, per Dr Vick's verbal order. No other orders at this time.

## 2023-01-17 NOTE — PLAN OF CARE
Problem: Fatigue  Goal: Improved Activity Tolerance  Outcome: Ongoing, Progressing  Intervention: Promote Improved Energy  Flowsheets (Taken 1/17/2023 0920)  Fatigue Management:   fatigue-related activity identified   paced activity encouraged   frequent rest breaks encouraged  Sleep/Rest Enhancement:   noise level reduced   relaxation techniques promoted   regular sleep/rest pattern promoted

## 2023-01-19 NOTE — TELEPHONE ENCOUNTER
Michelle Servin, CISCO-C, reviewed patient's labs and per her verbal order, patient to get her CMP rechecked tomorrow morning before her zometa infusion. Patient verbalized understanding of above.

## 2023-01-20 NOTE — PLAN OF CARE
Problem: Fatigue  Goal: Improved Activity Tolerance  Outcome: Ongoing, Progressing  Intervention: Promote Improved Energy  Flowsheets (Taken 1/20/2023 6150)  Fatigue Management: frequent rest breaks encouraged  Sleep/Rest Enhancement:   regular sleep/rest pattern promoted   relaxation techniques promoted  Activity Management: Ambulated -L4

## 2023-01-23 NOTE — TELEPHONE ENCOUNTER
----- Message from Abeba Beckman sent at 1/23/2023 10:35 AM CST -----  Pt would like to know the status of her Trulicity and her lantus. [phone]

## 2023-01-23 NOTE — TELEPHONE ENCOUNTER
----- Message from Tonie Winn sent at 1/20/2023 10:42 AM CST -----  Pt needs refills on carisoprodoL (SOMA) 350 MG tablet, HYDROcodone-acetaminophen (NORCO)  mg per tablet, oxyCODONE (ROXICODONE) 15 MG Tab. They will be due on the 25th, next week. She also had blood work today and would like a courtesy call to go over it, she stated she can look on my chart but its hard for her to understand.

## 2023-01-24 NOTE — TELEPHONE ENCOUNTER
Critical Platelet called from Ana from [facility] - Patient's PLT 42. Michelle Davidson, NP-C, reviewed, no new orders at this time.

## 2023-02-02 NOTE — PLAN OF CARE
Problem: Fall Injury Risk  Goal: Absence of Fall and Fall-Related Injury  Outcome: Ongoing, Progressing  Intervention: Identify and Manage Contributors  Flowsheets (Taken 2/2/2023 0925)  Self-Care Promotion: independence encouraged  Medication Review/Management: medications reviewed  Intervention: Promote Injury-Free Environment  Flowsheets (Taken 2/2/2023 0925)  Safety Promotion/Fall Prevention: medications reviewed

## 2023-02-03 NOTE — PROGRESS NOTES
"  [facility] In Office Hematology Oncology Subsequent  Encounter Note    12/22/22      Subjective:      Patient ID:   Negrita Castro  62 y.o.   Martínez Shepard R. Leblanc, Babycos, Bourgeois, Hall      Chief Complaint:    L breast cancer     HPI:  62 y.o. female with diagnosis of Stage 1 R breast cancer 10/26/15.  "Triple negative".  Adjuvant AC x's 4, tx's 12 and Rad Rx through 9/12/16.      Had bilateral reconstruction surgery 4/14/17.  Further reconstruction, tumsofy baxterck 8/14/17.  Additional fat injection at R chest done for symmetry.  She had placement of R nipple.  Dr. Hernandez.      She had additional reconstructive surgery at breast and abdomenal areas.   The date of the surgery was May 19th.      Recent Mamm/U/S showed small mass at 4:00 at L breast.  Needle Bx invasive ductal Ca, ERP+, PRP+, H2N neg  She had  L partial mastectomy, Sentinal node Bx 8/24/20. Followed by another surgery because of L axillary infection.  Primary 2 cm, LN neg, Stage 1 dx.    Discussed Oncotype Dx testing, this supported adding adjuvant chemotherapy to adjuvant hormonal therapy because of increased risk of cancer recurrence long-term.  Discussed BRCA testing, given her bilateral  breast Dx. PHN would not authorize BRCA 1 & 2.  Refill meds for her.    She had COVID-19 infection and was hospitalized.  She came very close to requiring intubation and mechanical ventilation.  She is off oxygen now,   and sees Dr. Justin of Pulmonary.  Recent CT scan of the chest showed residual changes of interstitial disease, consistent with recovering from COVID-19.  Also lytic lesions were seen in the thoracic spine area very suspicious for metastatic disease at T5 through T8 spine.    Recently on Saturday night she had a hard coughing spell, and thereafter developed severe right posterior pleuritic chest pain.  She has point tenderness over the right posterior chest wall and may very well have a fractured rib at the site.  Rib x-rays have " been ordered.  I have refilled her Soma for 1 month.  I have refilled her Norco  for 1 month.  I have added oxycodone 5 mg 1 or 2 p.o. Q 3-4 hours p.r.n. breakthrough pain for 1 month to her regimen.    She also has an enlarging nodule at the left chest wall.  Ultrasound of the site suggested that this was a cyst however the areas firm movable and may very well be cancer.  I asked  for a ultrasound-guided biopsy of the mass per radiology.  Path report showed invasive ductal Ca, ERP + PRP neg,   H2N equivocal.    She is postmenopausal, her last menses were 4 5 years ago.  Rib x-rays have been requested.  Will order a CT scan to evaluate for possible metastatic disease.  Will order a ultrasound-guided biopsy of the left chest wall mass for pathologic examination at [facility].  Bx + invasive breast cancer.  ERP+, PRP-, H2N-.    Suspected local recurrence at L breast.  She had L lumpectomy.  Margins clear.    Refer to Rad Rx MD for adjuvant Rad Rx., for local control.  She has T spine metastases.  Currently on hormonal Rx.  To see Dr. Manuelito Shepard for DM, BS control.    Dr. Washington saw her for C spine eval, no surgery planned for now.  He referred her to Dr. Rivers for injection Rx.    DM, cholesterol, epilepsy hx, DJD, LBP.  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    LR shoulder arthroscopy, R wrist surgery, M0.    Smoke 1/4 ppd x's 35 years, quit 2015.  ETOH no.  Disability-depression, epilepsy  Allergy none    Mom ovarian cancer  Dad lung cancer  Sisters DM, lung dx.    Summary of care:  Right breast cancer 2015, triple negative, treated with Adriamycin Cytoxan followed by taxane.    Left breast cancer ERP positive PRP positive HER2 Judy negative in 2020    COVID infection 2020.  She has not taken the covid 19 vaccination because of multiple allergy hx.    2021 bone metastases determined.    He he 2021 local left breast cancer recurrence, ERP positive  "PRP negative HER2 Judy negative.    He April 21st bone biopsy positive for breast cancer metastatic.  ERP, PRP, HER2 Judy pending.    She was on radiation therapy through May 17, 2021.  Refilled Marinol at increased 5 mg 1 p.o. b.i.d. number 60.   I refilled her Soma, Norco, Marinol, and Silvadene cream; dated the prescriptions for June 17, 2021.    C/O mid thoracic pain x's 6-8 weeks,radiates to L & R, increased.  Tender over Mid thoracic area.  Hx of T spine metastatic dx.  Pain controlled on Norco 10/325 mg and oxycodone 5 mg 1-2 po PRN.  Add lodine 400 mg 1 daily    Checked MRI of T spine.  Metastatic dx with pathologic fx at T3,5,6.  Refer to Dr. Flowers of neurosurgery for Vertebroplasty or Kyphoplasty evaluation.    She is on Faslodex. Ibrance. Monitor WBC count.    She saw Dr. Flowers, and has been fitted with a brace initially, vertebroplasty after the first of the year.  [phone].  RTC Dr. Flowers 12/27/21.    No neuro surgery is planned for T-spine disease because of progressive growth and compression of the spine.  She admits to having decreased strength in the right hand.  She completed 10 of 10 radiation treatments on May 2, 2022. She is tapering down her Decadron 4 mg p.o. b.i.d. and she is taking Lodine 400 mg daily.  We are stopping fast low dex and Ibrance as of April 12, 2022.    Will ask the  to see her if we can get Meals on wheels for her.    She is due for an MRI on May 17, and a PET scan on May 19, I will see her on May 20th.  Plan to go with chemotherapy now, probably will go with carboplatin Gemzar.   She is 140 lb and 5 ft 11.     Today is D1C1 carbo, gemzar.  Duragesic patch 25 mch q 3 days x's 3 patches, start Tuesday.  Claritin 10 mg 1 po daily x's 4 days, start Wednesday.  For neulasta or Udenyca Thursday.    Resume Xgeva with D9 C1.  Check lab 7/14 weekly [facility] lab.    Working on getting a electric wheelchair.  On PT with "Hector".  Stronger.  Today chemo given.  Mass on back " is giving her pain, refer to Cory Vick MD for removal.  D1C6 9/1/22    Refill Soma, Oxycodone, Norco, Ambien on 9/1/22.  Due for refill 9/30/22.    She is doing better, with increased energy, 8/10.  Weight 154#.  Pain is better 8/10.  Improved R arm function.    Dr. Mckeon placed port.  PET 5/2022 increased dx in bones and liver.  Carbo Gemzar x's 6 months.  12/15/22 #10.  Breast cancer metastases are improved on current PET scan.    Port was placed.  Continues on chemo today.  Check PET 4/2023.    ROS:   GEN: normal without any fever, night sweats or weight loss  HEENT: normal with no HA's, sore throat, stiff neck, changes in vision  CV: normal with no CP, SOB, PND, POOL or orthopnea  PULM: normal with no SOB, cough, hemoptysis, sputum or pleuritic pain  GI: normal with no abdominal pain, nausea, vomiting, constipation, diarrhea, melanotic stools, BRBPR, or hematemesis  : normal with no hematuria, dysuria  BREAST: See HPI.  SKIN: normal with no rash, erythema, bruising, or swelling     Past Medical History:   Diagnosis Date    Acute hypoxemic respiratory failure 12/12/2020    Breast cancer     left breast    Cancer     RIGHT BREAST 10-15    Depression     Diabetes mellitus     Neuropathy     Panic attacks     S/P epidural steroid injection     Seizures     none since early 30's    Wears glasses     TO DRIVE     Past Surgical History:   Procedure Laterality Date    AXILLARY NODE DISSECTION Left 8/24/2020    Procedure: LYMPHADENECTOMY, AXILLARY;  Surgeon: Cory Vick MD;  Location: [facility];  Service: General;  Laterality: Left;  left breast mastectomy with possible axillary lymph node dissection    BREAST BIOPSY      right    BREAST LUMPECTOMY      BREAST RECONSTRUCTION      breast reconstruction with tummy [facility]      BREAST SURGERY      11-3-15 LUMPECTOMY, 12-2-15 REEXCISION    INCISION AND DRAINAGE OF ABSCESS Left 9/9/2020    Procedure: INCISION AND DRAINAGE, ABSCESS;  Surgeon: oCry BARNES  MD Nicanor;  Location: [facility] OR;  Service: General;  Laterality: Left;    INSERTION OF LOCALIZATION WIRE Left 8/24/2020    Procedure: INSERTION, LOCALIZATION WIRE;  Surgeon: Cory Vick MD;  Location: [facility] OR;  Service: General;  Laterality: Left;  left breast lumpectomy with wire needle loc    INSERTION OF TUNNELED CENTRAL VENOUS CATHETER (CVC) WITH SUBCUTANEOUS PORT Right 11/17/2022    Procedure: RGDKLILID-SMLX-U-CATH;  Surgeon: Jeff Mckeon Jr., MD;  Location: [facility] OR;  Service: General;  Laterality: Right;    MASTECTOMY      mastectomy 2016      MASTECTOMY, PARTIAL Left 3/19/2021    Procedure: MASTECTOMY, PARTIAL;  Surgeon: Cory Vick MD;  Location: [facility] OR;  Service: General;  Laterality: Left;  lumpectomy  left breast     RECONSTRUCTION OF NIPPLE Right 11/5/2018    Procedure: RECONSTRUCTION-NIPPLE RIGHT;  Surgeon: Xiang Hernandez MD;  [facility];  Service: Plastics;  Laterality: Right;    SENTINEL LYMPH NODE BIOPSY Left 8/24/2020    Procedure: BIOPSY, LYMPH NODE, SENTINEL;  Surgeon: Cory Vick MD;  Location: [facility] OR;  Service: General;  Laterality: Left;  left breast lumpectomy with left sentinel lymoh node       shoulder surgery and wrist      BILAT  BONE SPUR    WRIST SURGERY      RIGHT       Review of patient's allergies indicates:   Allergen Reactions    Adhesive tape-silicones Hives     BANDAIDS    Polymycin Hives    Latex, natural rubber Itching    Polyurethane-39            Current Outpatient Medications:     amoxicillin (AMOXIL) 500 MG Tab, Take 1 po q 8 hours x's 10 days, Disp: 30 tablet, Rfl: 0    blood sugar diagnostic Strp, TEST GLUCOSE BID, Disp: 300 each, Rfl: 3    blood-glucose meter (TRUE METRIX GLUCOSE METER) kit, TEST GLUCOSE BID, Disp: 1 each, Rfl: 0    blood-glucose meter,continuous (DEXCOM G6 ) Misc, TEST GLUCOSE QID, Disp: 1 each, Rfl: 1    blood-glucose sensor (DEXCOM G6 SENSOR) Edwige, TEST GLUCOSE QID, Disp: 13 each, Rfl:  3    blood-glucose transmitter (DEXCOM G6 TRANSMITTER) Edwige, TEST GLUCOSE QID, Disp: 1 each, Rfl: 1    carisoprodoL (SOMA) 350 MG tablet, Take 1 tablet (350 mg total) by mouth 3 (three) times daily as needed for Muscle spasms., Disp: 90 tablet, Rfl: 0    dexAMETHasone (DECADRON) 4 MG Tab, TAKE ONE TABLET (4MG TOTAL) BY MOUTH TWICE DAILY WITH MEALS, Disp: 60 tablet, Rfl: 1    dextromethorphan-guaiFENesin  mg/5 ml (ROBITUSSIN-DM)  mg/5 mL liquid, Take 1 teaspoon q 6 hours prn cough. (Patient taking differently: Take 5 mLs by mouth. Take 1 teaspoon q 6 hours prn cough.), Disp: 300 mL, Rfl: 0    diazePAM (VALIUM) 10 MG Tab, Take 1 tablet (10 mg total) by mouth 4 (four) times daily as needed (anxiety)., Disp: 120 tablet, Rfl: 2    docusate sodium (COLACE) 100 MG capsule, Take 100 mg by mouth 2 (two) times daily., Disp: , Rfl:     dronabinoL (MARINOL) 5 MG capsule, Take 1 capsule (5 mg total) by mouth 2 (two) times daily before meals., Disp: 60 capsule, Rfl: 0    dulaglutide (TRULICITY) 3 mg/0.5 mL pen injector, Inject 3 mg into the skin every 7 days., Disp: 4 pen, Rfl: 11    enalapril (VASOTEC) 5 MG tablet, Take 1 tablet (5 mg total) by mouth once daily., Disp: 90 tablet, Rfl: 3    etodolac (LODINE) 400 MG tablet, TAKE ONE TABLET (400 MG TOTAL) BY MOUTH ONCE DAILY, Disp: 30 tablet, Rfl: 2    fentaNYL (DURAGESIC) 25 mcg/hr, Place 1 patch onto the skin every 72 hours., Disp: 10 patch, Rfl: 0    fluconazole (DIFLUCAN) 150 MG Tab, TAKE ONE TABLET BY MOUTH ONCE FOR 1 DOSE, Disp: 1 tablet, Rfl: 2    furosemide (LASIX) 20 MG tablet, TAKE ONE TABLET BY MOUTH EVERY DAY, Disp: 30 tablet, Rfl: 6    HYDROcodone-acetaminophen (NORCO)  mg per tablet, Take 1 tablet by mouth every 6 (six) hours as needed for Pain., Disp: 120 tablet, Rfl: 0    insulin glargine (LANTUS U-100 INSULIN) 100 unit/mL injection, Inject 32 Units into the skin 2 (two) times a day., Disp: 18 mL, Rfl: 11    lancets 32 gauge Misc, TEST GLUCOSE BID,  "Disp: 300 each, Rfl: 3    metFORMIN (GLUCOPHAGE-XR) 500 MG ER 24hr tablet, Take 2 tablets (1,000 mg total) by mouth 2 (two) times daily with meals., Disp: 360 tablet, Rfl: 3    metoprolol tartrate (LOPRESSOR) 25 MG tablet, Take 1 tablet (25 mg total) by mouth 2 (two) times daily., Disp: 60 tablet, Rfl: 11    naloxegoL (MOVANTIK) 12.5 mg Tab, Take 12.5 mg by mouth once daily., Disp: 30 tablet, Rfl: 3    ondansetron (ZOFRAN-ODT) 8 MG TbDL, DISSOLVE 1 TABLET (8MG TOTAL) on the tongue EVERY 6 TO 8 HOURS AS NEEDED (NAUSEA) Strength: 8 mg, Disp: 60 tablet, Rfl: 2    oxyCODONE (ROXICODONE) 15 MG Tab, Take 1 tablet (15 mg total) by mouth every 4 to 6 hours as needed for Pain., Disp: 120 tablet, Rfl: 0    palbociclib 100 mg Tab, Take 100 mg by mouth once daily. for 21 days, then take 7 days off. Total cycle length 28 days, Disp: 21 tablet, Rfl: 6    pen needle, diabetic (BD ULTRA-FINE MARCE PEN NEEDLE) 32 gauge x 5/32" Ndle, Test blood sugar twice daily, Disp: 100 each, Rfl: 5    promethazine (PHENERGAN) 25 MG tablet, TAKE ONE TABLET (25MG TOTAL) BY MOUTH EVERY 4 TO 6 HOURS AS NEEDED, Disp: 30 tablet, Rfl: 5    promethazine-codeine 6.25-10 mg/5 ml (PHENERGAN WITH CODEINE) 6.25-10 mg/5 mL syrup, TAKE 5 ML BY MOUTH EVERY 6 HOURS AS NEEDED FOR COUGH, Disp: 450 mL, Rfl: 0    psyllium husk, with sugar, (METAMUCIL, WITH SUGAR,) 3.4 gram packet, Take 1 packet by mouth 2 (two) times daily., Disp: 30 packet, Rfl: 2    rosuvastatin (CRESTOR) 40 MG Tab, Take 1 tablet (40 mg total) by mouth every evening. For cholesterol, Disp: 90 tablet, Rfl: 3    zolpidem (AMBIEN) 10 mg Tab, TAKE ONE TABLET BY MOUTH EVERY NIGHT AT BEDTIME, Disp: 30 tablet, Rfl: 3  No current facility-administered medications for this visit.          Objective:   Vitals:  Blood pressure 117/64, pulse 82, temperature 97.7 °F (36.5 °C), last menstrual period 01/16/2016.    Physical Examination:   GEN: no apparent distress, comfortable, Brace in place.  HEAD: atraumatic " and normocephalic  EYES: no pallor, no icterus  ENT: no pharyngeal erythema.  The cellulitis at L cheek area is resolved, after antibiotics.  NECK: noLAD/LN's, supple  CV:  No edema  CHEST: Normal respiratory effort   she is not  tender over the right posterior chest wall on exam.  The chest wall is not swollen.  ABDOM:  nondistended; soft                                                                                          MUSC/Skeletal: ROM normal, Tender over mid T spine area.  EXTREM: no swelling  SKIN: She is developing keloid changes at L breast incision and navel surgical sites.  This area appears to be enlarg  NEURO: grossly intact  PSYCH: normal mood, affect and behavior  LYMPH: normal  LN's  BREASTS: L breast is much improved.    Refill Oxycodone, SOMA, Norco.    Tumor markers are improved.    Assessment:   (1) 62 y.o. female with diagnosis of Stage 1 triple negative R breast cancer, 10/2016.       S/P R mastectomy, SNBx, AC x's 4, T x's12 weeks, Rad Rx and recent reconstruction.    (2) New L invasive ductal Ca, ERP+, PRP+, H2N neg.  Clinical stage 1 dx.    I have started her on Arimidex 1 mg p.o. daily  for metastatic disease at this time.    Suspected fracture of right ribs after recent cough event, check rib x-rays, medicate for pain.    ERP+ dx, bone metastases.  Started on Arimidex daily.  Add 2nd hormonal drug Rx after Rad Rx completed.    Bone biopsy positive for metastatic breast cancer.  ERP+, PRP+, H2N-.    Now on Genzar, Carboplatin.  Improved.  Check PET 12/15/22.  Improving metastatic dx on present Rx.    RTC 3 weeks.  Pet 4/2023.

## 2023-02-07 NOTE — PLAN OF CARE
Problem: Fall Injury Risk  Goal: Absence of Fall and Fall-Related Injury  Outcome: Ongoing, Progressing  Intervention: Identify and Manage Contributors  Flowsheets (Taken 2/7/2023 0815)  Self-Care Promotion: independence encouraged  Medication Review/Management: medications reviewed  Intervention: Promote Injury-Free Environment  Flowsheets (Taken 2/7/2023 0815)  Safety Promotion/Fall Prevention: medications reviewed

## 2023-02-07 NOTE — TELEPHONE ENCOUNTER
Call received from Ana from [facility] - Critical WBC 1.22, ANC 0.93. Patient to get Neulasta injection today 2/7. Reviewed with NORMA Carter, no further orders at this time.

## 2023-02-14 NOTE — TELEPHONE ENCOUNTER
Dr Vick reviewed patient's labs, including the critical platelets of 40 and per his verbal order, patient is okay to get Zometa infusion on 2/17. Infusion Center made aware.

## 2023-02-17 NOTE — PLAN OF CARE
Problem: Fatigue  Goal: Improved Activity Tolerance  Outcome: Ongoing, Progressing  Intervention: Promote Improved Energy  Flowsheets (Taken 2/17/2023 1110)  Fatigue Management:   frequent rest breaks encouraged   activity schedule adjusted   paced activity encouraged  Activity Management: Ambulated -L4

## 2023-02-23 NOTE — PLAN OF CARE
Problem: Fatigue  Goal: Improved Activity Tolerance  Outcome: Ongoing, Progressing  Intervention: Promote Improved Energy  Flowsheets (Taken 2/23/2023 7970)  Fatigue Management:   fatigue-related activity identified   paced activity encouraged   frequent rest breaks encouraged  Sleep/Rest Enhancement:   noise level reduced   relaxation techniques promoted   regular sleep/rest pattern promoted

## 2023-02-27 NOTE — PROGRESS NOTES
Cervical Cancer Gap Report Review. Care Everywhere updated and reviewed. [facility] and [facility] reviewed. No new HM items found.    Immunizations reviewed.

## 2023-02-28 NOTE — TELEPHONE ENCOUNTER
Critical WBC 1.45 called from Ana in [facility]. Dr Vick made aware of above. Patient received neulasta injection today. No further orders at this time.

## 2023-02-28 NOTE — PLAN OF CARE
Problem: Fall Injury Risk  Goal: Absence of Fall and Fall-Related Injury  Outcome: Ongoing, Progressing  Intervention: Identify and Manage Contributors  Flowsheets (Taken 2/28/2023 1130)  Self-Care Promotion: independence encouraged  Medication Review/Management: medications reviewed  Intervention: Promote Injury-Free Environment  Flowsheets (Taken 2/28/2023 1130)  Safety Promotion/Fall Prevention: medications reviewed

## 2023-03-16 NOTE — PLAN OF CARE
Problem: Nausea and Vomiting  Goal: Fluid and Electrolyte Balance  3/16/2023 1057 by Awa Yeung RN  Outcome: Met  3/16/2023 1057 by Awa Yeung RN  Outcome: Ongoing, Progressing

## 2023-03-21 NOTE — PLAN OF CARE
Problem: Fatigue  Goal: Improved Activity Tolerance  Outcome: Ongoing, Progressing  Intervention: Promote Improved Energy  Flowsheets (Taken 3/21/2023 1033)  Fatigue Management: frequent rest breaks encouraged  Sleep/Rest Enhancement: regular sleep/rest pattern promoted  Activity Management:   Ambulated -L4   Up in chair - L3

## 2023-04-06 NOTE — PLAN OF CARE
Problem: Fall Injury Risk  Goal: Absence of Fall and Fall-Related Injury  Outcome: Ongoing, Progressing  Intervention: Identify and Manage Contributors  Flowsheets (Taken 4/6/2023 0948)  Self-Care Promotion: safe use of adaptive equipment encouraged  Medication Review/Management: medications reviewed  Intervention: Promote Injury-Free Environment  Flowsheets (Taken 4/6/2023 0948)  Safety Promotion/Fall Prevention: instructed to call staff for mobility

## 2023-04-06 NOTE — PROGRESS NOTES
"  [facility] In Office Hematology Oncology Subsequent  Encounter Note    4/6/23      Subjective:      Patient ID:   Negrita Castro  62 y.o.   Martínez Shpeard R. Leblanc, Babycos, Bourgeois, [place]      Chief Complaint:    L breast cancer     HPI:  62 y.o. female with diagnosis of Stage 1 R breast cancer 10/26/15.  "Triple negative".  Adjuvant AC x's 4, tx's 12 and Rad Rx through 9/12/16.      Had bilateral reconstruction surgery 4/14/17.  Further reconstruction, tumsofy baxterck 8/14/17.  Additional fat injection at R chest done for symmetry.  She had placement of R nipple.  Dr. Hernandez.      She had additional reconstructive surgery at breast and abdomenal areas.   The date of the surgery was May 19th.      Recent Mamm/U/S showed small mass at 4:00 at L breast.  Needle Bx invasive ductal Ca, ERP+, PRP+, H2N neg  She had  L partial mastectomy, Sentinal node Bx 8/24/20. Followed by another surgery because of L axillary infection.  Primary 2 cm, LN neg, Stage 1 dx.    Discussed Oncotype Dx testing, this supported adding adjuvant chemotherapy to adjuvant hormonal therapy because of increased risk of cancer recurrence long-term.  Discussed BRCA testing, given her bilateral  breast Dx. PHN would not authorize BRCA 1 & 2.  Refill meds for her.    She had COVID-19 infection and was hospitalized.  She came very close to requiring intubation and mechanical ventilation.  She is off oxygen now,   and sees Dr. Justin of Pulmonary.  Recent CT scan of the chest showed residual changes of interstitial disease, consistent with recovering from COVID-19.  Also lytic lesions were seen in the thoracic spine area very suspicious for metastatic disease at T5 through T8 spine.    Recently on Saturday night she had a hard coughing spell, and thereafter developed severe right posterior pleuritic chest pain.  She has point tenderness over the right posterior chest wall and may very well have a fractured rib at the site.  Rib x-rays have been " ordered.  I have refilled her Soma for 1 month.  I have refilled her Norco  for 1 month.  I have added oxycodone 5 mg 1 or 2 p.o. Q 3-4 hours p.r.n. breakthrough pain for 1 month to her regimen.    She also has an enlarging nodule at the left chest wall.  Ultrasound of the site suggested that this was a cyst however the areas firm movable and may very well be cancer.  I asked  for a ultrasound-guided biopsy of the mass per radiology.  Path report showed invasive ductal Ca, ERP + PRP neg,   H2N equivocal.    She is postmenopausal, her last menses were 4 5 years ago.  Rib x-rays have been requested.  Will order a CT scan to evaluate for possible metastatic disease.  Will order a ultrasound-guided biopsy of the left chest wall mass for pathologic examination at [facility].  Bx + invasive breast cancer.  ERP+, PRP-, H2N-.    Suspected local recurrence at L breast.  She had L lumpectomy.  Margins clear.    Refer to Rad Rx MD for adjuvant Rad Rx., for local control.  She has T spine metastases.  Currently on hormonal Rx.  To see Dr. Manuelito Shepard for DM, BS control.    Dr. Washington saw her for C spine eval, no surgery planned for now.  He referred her to Dr. Rivers for injection Rx.    DM, cholesterol, epilepsy hx, DJD, LBP.  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    LR shoulder arthroscopy, R wrist surgery, M0.    Smoke 1/4 ppd x's 35 years, quit 2015.  ETOH no.  Disability-depression, epilepsy  Allergy none    Mom ovarian cancer  Dad lung cancer  Sisters DM, lung dx.    Summary of care:  Right breast cancer 2015, triple negative, treated with Adriamycin Cytoxan followed by taxane.    Left breast cancer ERP positive PRP positive HER2 Judy negative in 2020    COVID infection 2020.  She has not taken the covid 19 vaccination because of multiple allergy hx.    2021 bone metastases determined.    He he 2021 local left breast cancer recurrence, ERP positive PRP  "negative HER2 Judy negative.    He April 21st bone biopsy positive for breast cancer metastatic.  ERP, PRP, HER2 Judy pending.    She was on radiation therapy through May 17, 2021.  Refilled Marinol at increased 5 mg 1 p.o. b.i.d. number 60.   I refilled her Soma, Norco, Marinol, and Silvadene cream; dated the prescriptions for June 17, 2021.    C/O mid thoracic pain x's 6-8 weeks,radiates to L & R, increased.  Tender over Mid thoracic area.  Hx of T spine metastatic dx.  Pain controlled on Norco 10/325 mg and oxycodone 5 mg 1-2 po PRN.  Add lodine 400 mg 1 daily    Checked MRI of T spine.  Metastatic dx with pathologic fx at T3,5,6.  Refer to Dr. Flowers of neurosurgery for Vertebroplasty or Kyphoplasty evaluation.    She is on Faslodex. Ibrance. Monitor WBC count.    She saw Dr. Flowers, and has been fitted with a brace initially, vertebroplasty after the first of the year.  [phone].  RTC Dr. Flowers 12/27/21.    No neuro surgery is planned for T-spine disease because of progressive growth and compression of the spine.  She admits to having decreased strength in the right hand.  She completed 10 of 10 radiation treatments on May 2, 2022. She is tapering down her Decadron 4 mg p.o. b.i.d. and she is taking Lodine 400 mg daily.  We are stopping fast low dex and Ibrance as of April 12, 2022.    Will ask the  to see her if we can get Meals on wheels for her.    She is due for an MRI on May 17, and a PET scan on May 19, I will see her on May 20th.  Plan to go with chemotherapy now, probably will go with carboplatin Gemzar.   She is 140 lb and 5 ft 11.     Today is D1C1 carbo, gemzar.  Duragesic patch 25 mch q 3 days x's 3 patches, start Tuesday.  Claritin 10 mg 1 po daily x's 4 days, start Wednesday.  For neulasta or Udenyca Thursday.    Resume Xgeva with D9 C1.  Check lab 7/14 weekly [facility] lab.    Working on getting a electric wheelchair.  On PT with "Hector".  Stronger.  Today chemo given.  Mass on back is " giving her pain, refer to Cory Vick MD for removal.  D1C6 9/1/22    Refill Soma, Oxycodone, Norco, Ambien on 9/1/22.  Due for refill 9/30/22.    She is doing better, with increased energy, 8/10.  Weight 154#.  Pain is better 8/10.  Improved R arm function.    Dr. Mckeon placed port.  PET 5/2022 increased dx in bones and liver.  Carbo Gemzar x's 6 months.  12/15/22 #10.  Breast cancer metastases are improved on current PET scan.    Port was placed.  Continues on chemo today.  Check PET 4/2023.    ROS:   GEN: normal without any fever, night sweats or weight loss  HEENT: normal with no HA's, sore throat, stiff neck, changes in vision  CV: normal with no CP, SOB, PND, POOL or orthopnea  PULM: normal with no SOB, cough, hemoptysis, sputum or pleuritic pain  GI: normal with no abdominal pain, nausea, vomiting, constipation, diarrhea, melanotic stools, BRBPR, or hematemesis  : normal with no hematuria, dysuria  BREAST: See HPI.  SKIN: normal with no rash, erythema, bruising, or swelling     Past Medical History:   Diagnosis Date    Acute hypoxemic respiratory failure 12/12/2020    Breast cancer     left breast    Cancer     RIGHT BREAST 10-15    Depression     Diabetes mellitus     Neuropathy     Panic attacks     S/P epidural steroid injection     Seizures     none since early 30's    Wears glasses     TO DRIVE     Past Surgical History:   Procedure Laterality Date    AXILLARY NODE DISSECTION Left 8/24/2020    Procedure: LYMPHADENECTOMY, AXILLARY;  Surgeon: Cory Vick MD;  Location: [facility];  Service: General;  Laterality: Left;  left breast mastectomy with possible axillary lymph node dissection    BREAST BIOPSY      right    BREAST LUMPECTOMY      BREAST RECONSTRUCTION      breast reconstruction with tummy [facility]      BREAST SURGERY      11-3-15 LUMPECTOMY, 12-2-15 REEXCISION    INCISION AND DRAINAGE OF ABSCESS Left 9/9/2020    Procedure: INCISION AND DRAINAGE, ABSCESS;  Surgeon: Cory Vick,  MD;  Location: [facility] OR;  Service: General;  Laterality: Left;    INSERTION OF LOCALIZATION WIRE Left 8/24/2020    Procedure: INSERTION, LOCALIZATION WIRE;  Surgeon: Cory Vick MD;  Location: [facility] OR;  Service: General;  Laterality: Left;  left breast lumpectomy with wire needle loc    INSERTION OF TUNNELED CENTRAL VENOUS CATHETER (CVC) WITH SUBCUTANEOUS PORT Right 11/17/2022    Procedure: [id number]-S-CATH;  Surgeon: Jeff Mckeon Jr., MD;  Location: [facility] OR;  Service: General;  Laterality: Right;    MASTECTOMY      mastectomy 2016      MASTECTOMY, PARTIAL Left 3/19/2021    Procedure: MASTECTOMY, PARTIAL;  Surgeon: Cory Vick MD;  Location: [facility] OR;  Service: General;  Laterality: Left;  lumpectomy  left breast     RECONSTRUCTION OF NIPPLE Right 11/5/2018    Procedure: RECONSTRUCTION-NIPPLE RIGHT;  Surgeon: Xiang Hernandez MD;  [facility];  Service: Plastics;  Laterality: Right;    SENTINEL LYMPH NODE BIOPSY Left 8/24/2020    Procedure: BIOPSY, LYMPH NODE, SENTINEL;  Surgeon: Cory Vick MD;  Location: [facility] OR;  Service: General;  Laterality: Left;  left breast lumpectomy with left sentinel lymoh node       shoulder surgery and wrist      BILAT  BONE SPUR    WRIST SURGERY      RIGHT       Review of patient's allergies indicates:   Allergen Reactions    Adhesive tape-silicones Hives     BANDAIDS    Polymycin Hives    Latex, natural rubber Itching    Polyurethane-39            Current Outpatient Medications:     amoxicillin (AMOXIL) 500 MG Tab, Take 1 po q 8 hours x's 10 days, Disp: 30 tablet, Rfl: 0    blood sugar diagnostic Strp, TEST GLUCOSE BID, Disp: 300 each, Rfl: 3    blood-glucose meter (TRUE METRIX GLUCOSE METER) kit, TEST GLUCOSE BID, Disp: 1 each, Rfl: 0    blood-glucose meter,continuous (DEXCOM G6 ) Misc, TEST GLUCOSE QID, Disp: 1 each, Rfl: 1    blood-glucose sensor (DEXCOM G6 SENSOR) Edwige, TEST GLUCOSE QID, Disp: 13 each, Rfl: 3     blood-glucose transmitter (DEXCOM G6 TRANSMITTER) Edwige, TEST GLUCOSE QID, Disp: 1 each, Rfl: 1    carisoprodoL (SOMA) 350 MG tablet, Take 1 tablet (350 mg total) by mouth 3 (three) times daily as needed for Muscle spasms., Disp: 90 tablet, Rfl: 0    dexAMETHasone (DECADRON) 4 MG Tab, TAKE ONE TABLET (4MG TOTAL) BY MOUTH TWICE DAILY WITH MEALS, Disp: 60 tablet, Rfl: 1    dextromethorphan-guaiFENesin  mg/5 ml (ROBITUSSIN-DM)  mg/5 mL liquid, Take 1 teaspoon q 6 hours prn cough. (Patient taking differently: Take 5 mLs by mouth. Take 1 teaspoon q 6 hours prn cough.), Disp: 300 mL, Rfl: 0    diazePAM (VALIUM) 10 MG Tab, Take 1 tablet (10 mg total) by mouth 4 (four) times daily as needed (anxiety)., Disp: 120 tablet, Rfl: 2    docusate sodium (COLACE) 100 MG capsule, Take 100 mg by mouth 2 (two) times daily., Disp: , Rfl:     dronabinoL (MARINOL) 5 MG capsule, Take 1 capsule (5 mg total) by mouth 2 (two) times daily before meals., Disp: 60 capsule, Rfl: 0    dulaglutide (TRULICITY) 3 mg/0.5 mL pen injector, Inject 3 mg into the skin every 7 days., Disp: 4 pen, Rfl: 11    enalapril (VASOTEC) 5 MG tablet, Take 1 tablet (5 mg total) by mouth once daily., Disp: 90 tablet, Rfl: 3    etodolac (LODINE) 400 MG tablet, TAKE ONE TABLET (400 MG TOTAL) BY MOUTH ONCE DAILY, Disp: 30 tablet, Rfl: 2    fentaNYL (DURAGESIC) 25 mcg/hr, Place 1 patch onto the skin every 72 hours., Disp: 10 patch, Rfl: 0    fluconazole (DIFLUCAN) 150 MG Tab, TAKE ONE TABLET BY MOUTH ONCE FOR 1 DOSE, Disp: 1 tablet, Rfl: 2    furosemide (LASIX) 20 MG tablet, TAKE ONE TABLET BY MOUTH EVERY DAY, Disp: 30 tablet, Rfl: 6    HYDROcodone-acetaminophen (NORCO)  mg per tablet, Take 1 tablet by mouth every 6 (six) hours as needed for Pain., Disp: 120 tablet, Rfl: 0    lancets 32 gauge Misc, TEST GLUCOSE BID, Disp: 300 each, Rfl: 3    metFORMIN (GLUCOPHAGE-XR) 500 MG ER 24hr tablet, Take 2 tablets (1,000 mg total) by mouth 2 (two) times daily with  "meals., Disp: 360 tablet, Rfl: 3    metoprolol tartrate (LOPRESSOR) 25 MG tablet, Take 1 tablet (25 mg total) by mouth 2 (two) times daily., Disp: 60 tablet, Rfl: 11    naloxegoL (MOVANTIK) 12.5 mg Tab, Take 12.5 mg by mouth once daily., Disp: 30 tablet, Rfl: 3    ondansetron (ZOFRAN-ODT) 8 MG TbDL, dissolve 1 TABLET ON THE TONGUE EVERY 6 TO 8 HOURS AS NEEDED FOR nausea, Disp: 60 tablet, Rfl: 2    oxyCODONE (ROXICODONE) 15 MG Tab, Take 1 tablet (15 mg total) by mouth every 4 to 6 hours as needed for Pain., Disp: 120 tablet, Rfl: 0    palbociclib 100 mg Tab, Take 100 mg by mouth once daily. for 21 days, then take 7 days off. Total cycle length 28 days, Disp: 21 tablet, Rfl: 6    pen needle, diabetic (BD ULTRA-FINE MARCE PEN NEEDLE) 32 gauge x 5/32" Ndle, Test blood sugar twice daily, Disp: 100 each, Rfl: 5    promethazine (PHENERGAN) 25 MG tablet, TAKE ONE TABLET (25MG TOTAL) BY MOUTH EVERY 4 TO 6 HOURS AS NEEDED, Disp: 30 tablet, Rfl: 5    promethazine-codeine 6.25-10 mg/5 ml (PHENERGAN WITH CODEINE) 6.25-10 mg/5 mL syrup, TAKE 5 ML BY MOUTH EVERY 6 HOURS AS NEEDED FOR COUGH, Disp: 450 mL, Rfl: 0    psyllium husk, with sugar, (METAMUCIL, WITH SUGAR,) 3.4 gram packet, Take 1 packet by mouth 2 (two) times daily., Disp: 30 packet, Rfl: 2    rosuvastatin (CRESTOR) 40 MG Tab, Take 1 tablet (40 mg total) by mouth every evening. For cholesterol, Disp: 90 tablet, Rfl: 3    zolpidem (AMBIEN) 10 mg Tab, TAKE ONE TABLET BY MOUTH EVERY NIGHT AT BEDTIME, Disp: 30 tablet, Rfl: 3    insulin glargine (LANTUS U-100 INSULIN) 100 unit/mL injection, Inject 32 Units into the skin 2 (two) times a day., Disp: 18 mL, Rfl: 11          Objective:   Vitals:  Blood pressure 111/62, pulse 80, temperature 97.9 °F (36.6 °C), resp. rate 16, height 5' 10" (1.778 m), weight 69.9 kg (154 lb), last menstrual period 01/16/2016.    Physical Examination:   GEN: no apparent distress, comfortable, Brace in place.  HEAD: atraumatic and normocephalic  EYES: " no pallor, no icterus  ENT: no pharyngeal erythema.  The cellulitis at L cheek area is resolved, after antibiotics.  NECK: noLAD/LN's, supple  CV:  No edema  CHEST: Normal respiratory effort   she is not  tender over the right posterior chest wall on exam.  The chest wall is not swollen.  ABDOM:  nondistended; soft                                                                                          MUSC/Skeletal: ROM normal, Tender over mid T spine area.  EXTREM: no swelling  SKIN: She is developing keloid changes at L breast incision and navel surgical sites.  This area appears to be enlarg  NEURO: grossly intact  PSYCH: normal mood, affect and behavior  LYMPH: normal  LN's  BREASTS: L breast is much improved.    Refill Oxycodone, SOMA, Norco.    Tumor markers are improved.    Assessment:   (1) 62 y.o. female with diagnosis of Stage 1 triple negative R breast cancer, 10/2016.       S/P R mastectomy, SNBx, AC x's 4, T x's12 weeks, Rad Rx and recent reconstruction.    (2) New L invasive ductal Ca, ERP+, PRP+, H2N neg.  Clinical stage 1 dx.    I have started her on Arimidex 1 mg p.o. daily  for metastatic disease at this time.    Suspected fracture of right ribs after recent cough event, check rib x-rays, medicate for pain.    ERP+ dx, bone metastases.  Started on Arimidex daily.  Add 2nd hormonal drug Rx after Rad Rx completed.    Bone biopsy positive for metastatic breast cancer.  ERP+, PRP+, H2N-.    Now on Genzar, Carboplatin.  Improved.  PET 12/15/22.  Improving metastatic dx on present Rx.    RTC 3 weeks.

## 2023-04-10 NOTE — TELEPHONE ENCOUNTER
----- Message from Lala Davis sent at 4/10/2023 11:25 AM CDT -----  Pt needs refill on valium and would like to know if she needs blood work before her appt   Katie   [phone]

## 2023-04-10 NOTE — TELEPHONE ENCOUNTER
Order pended to Dr. Shepard-garrett    Lab work orders pended, please review  CBC CMP on 04/04/2023 Dr. Peraza      Spoke with patient informed pending lab work for approval prior to appt. Patient verbalized understanding

## 2023-04-11 NOTE — PLAN OF CARE
Problem: Neutropenia  Goal: Absence of Infection  Outcome: Ongoing, Progressing  Intervention: Prevent Infection and Maximize Resistance  Flowsheets (Taken 4/11/2023 1113)  Infection Prevention:   cohorting utilized   environmental surveillance performed   equipment surfaces disinfected

## 2023-04-11 NOTE — TELEPHONE ENCOUNTER
Tabatha Denise NP-C reviewed patient's WBC of 1.34. Patient scheduled to receive neulasta today. No further orders at this time per Tabatha.

## 2023-04-14 NOTE — PLAN OF CARE
Problem: Fatigue  Goal: Improved Activity Tolerance  4/14/2023 1437 by Awa Yeung RN  Outcome: Met  4/14/2023 1437 by Awa Yeung RN  Outcome: Ongoing, Progressing

## 2023-04-17 NOTE — TELEPHONE ENCOUNTER
----- Message from Abeba Beckman sent at 4/17/2023 11:04 AM CDT -----  Pt would like to get labs printed so she have them done at the cancer center tomorrow for 11am. [phone]

## 2023-04-24 NOTE — TELEPHONE ENCOUNTER
----- Message from Abeba Eubanks MA sent at 4/24/2023 10:00 AM CDT -----  Regarding: would like to change appt  Patient has appt on 4/26/23 w/dr. Shepard. She is a chemo patient and would like to come same day at 3:00 or 3:30. Or could also come in tomorrow afer 11:00 am.  [phone]

## 2023-04-26 NOTE — PROGRESS NOTES
"  [facility] In Office Hematology Oncology Subsequent  Encounter Note    4/27/2023      Subjective:      Patient ID:   Negrita Castro  62 y.o.   Martínez Shepard R. Leblanc, Kevin Hernandez Hall      Chief Complaint:    L breast cancer     HPI:    Patient is in infusion for Cycle 5 Carboplatin and Gemzar. She is tolerating treatment well. She does complain of increased nausea and fatigue today. She denies any fever, CP or SOB.       Per Dr. Peraza's Previous note:  62 y.o. female with diagnosis of Stage 1 R breast cancer 10/26/15.  "Triple negative".  Adjuvant AC x's 4, tx's 12 and Rad Rx through 9/12/16.      Had bilateral reconstruction surgery 4/14/17.  Further reconstruction, tummy tuck 8/14/17.  Additional fat injection at R chest done for symmetry.  She had placement of R nipple.  Dr. Hernandez.      She had additional reconstructive surgery at breast and abdomenal areas.   The date of the surgery was May 19th.      Recent Mamm/U/S showed small mass at 4:00 at L breast.  Needle Bx invasive ductal Ca, ERP+, PRP+, H2N neg  She had  L partial mastectomy, Sentinal node Bx 8/24/20. Followed by another surgery because of L axillary infection.  Primary 2 cm, LN neg, Stage 1 dx.    Discussed Oncotype Dx testing, this supported adding adjuvant chemotherapy to adjuvant hormonal therapy because of increased risk of cancer recurrence long-term.  Discussed BRCA testing, given her bilateral  breast Dx. PHN would not authorize BRCA 1 & 2.  Refill meds for her.    She had COVID-19 infection and was hospitalized.  She came very close to requiring intubation and mechanical ventilation.  She is off oxygen now,   and sees Dr. Justin of Pulmonary.  Recent CT scan of the chest showed residual changes of interstitial disease, consistent with recovering from COVID-19.  Also lytic lesions were seen in the thoracic spine area very suspicious for metastatic disease at T5 through T8 spine.    Recently on Saturday night she had " a hard coughing spell, and thereafter developed severe right posterior pleuritic chest pain.  She has point tenderness over the right posterior chest wall and may very well have a fractured rib at the site.  Rib x-rays have been ordered.  I have refilled her Soma for 1 month.  I have refilled her Norco  for 1 month.  I have added oxycodone 5 mg 1 or 2 p.o. Q 3-4 hours p.r.n. breakthrough pain for 1 month to her regimen.    She also has an enlarging nodule at the left chest wall.  Ultrasound of the site suggested that this was a cyst however the areas firm movable and may very well be cancer.  I asked  for a ultrasound-guided biopsy of the mass per radiology.  Path report showed invasive ductal Ca, ERP + PRP neg,   H2N equivocal.    She is postmenopausal, her last menses were 4 5 years ago.  Rib x-rays have been requested.  Will order a CT scan to evaluate for possible metastatic disease.  Will order a ultrasound-guided biopsy of the left chest wall mass for pathologic examination at [facility].  Bx + invasive breast cancer.  ERP+, PRP-, H2N-.    Suspected local recurrence at L breast.  She had L lumpectomy.  Margins clear.    Refer to Rad Rx MD for adjuvant Rad Rx., for local control.  She has T spine metastases.  Currently on hormonal Rx.  To see Dr. Manuelito Shepard for DM, BS control.    Dr. Washington saw her for C spine eval, no surgery planned for now.  He referred her to Dr. Rivers for injection Rx.    DM, cholesterol, epilepsy hx, DJD, LBP.  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    LR shoulder arthroscopy, R wrist surgery, M0.    Smoke  ppd x's 35 years, quit 2015.  ETOH no.  Disability-depression, epilepsy  Allergy none    Mom ovarian cancer  Dad lung cancer  Sisters DM, lung dx.    Summary of care:  Right breast cancer 2015, triple negative, treated with Adriamycin Cytoxan followed by taxane.    Left breast cancer ERP positive PRP positive HER2 Judy negative in ,  2020    COVID infection December 2020.  She has not taken the covid 19 vaccination because of multiple allergy hx.    February 2021 bone metastases determined.    He he March 21, 2021 local left breast cancer recurrence, ERP positive PRP negative HER2 Judy negative.    He April 21st bone biopsy positive for breast cancer metastatic.  ERP, PRP, HER2 Judy pending.    She was on radiation therapy through May 17, 2021.  Refilled Marinol at increased 5 mg 1 p.o. b.i.d. number 60.   I refilled her Soma, Norco, Marinol, and Silvadene cream; dated the prescriptions for June 17, 2021.    C/O mid thoracic pain x's 6-8 weeks,radiates to L & R, increased.  Tender over Mid thoracic area.  Hx of T spine metastatic dx.  Pain controlled on Norco 10/325 mg and oxycodone 5 mg 1-2 po PRN.  Add lodine 400 mg 1 daily    Checked MRI of T spine.  Metastatic dx with pathologic fx at T3,5,6.  Refer to Dr. Flowers of neurosurgery for Vertebroplasty or Kyphoplasty evaluation.    She is on Faslodex. Ibrance. Monitor WBC count.    She saw Dr. Flowers, and has been fitted with a brace initially, vertebroplasty after the first of the year.  [phone].  RTC Dr. Flowers 12/27/21.    No neuro surgery is planned for T-spine disease because of progressive growth and compression of the spine.  She admits to having decreased strength in the right hand.  She completed 10 of 10 radiation treatments on May 2, 2022. She is tapering down her Decadron 4 mg p.o. b.i.d. and she is taking Lodine 400 mg daily.  We are stopping fast low dex and Ibrance as of April 12, 2022.    Will ask the  to see her if we can get Meals on wheels for her.    She is due for an MRI on May 17, and a PET scan on May 19, I will see her on May 20th.  Plan to go with chemotherapy now, probably will go with carboplatin Gemzar.   She is 140 lb and 5 ft 11.     Today is D1C1 carbo, gemzar.  Duragesic patch 25 mch q 3 days x's 3 patches, start Tuesday.  Claritin 10 mg 1 po daily x's  "4 days, start Wednesday.  For neulasta or Udenyca Thursday.    Resume Xgeva with D9 C1.  Check lab 7/14 weekly [facility] lab.    Working on getting a electric wheelchair.  On PT with "Hector".  Stronger.  Today chemo given.  Mass on back is giving her pain, refer to Cory Vick MD for removal.  D1C6 9/1/22    Refill Soma, Oxycodone, Norco, Ambien on 9/1/22.  Due for refill 9/30/22.    She is doing better, with increased energy, 8/10.  Weight 154#.  Pain is better 8/10.  Improved R arm function.    Dr. Mckeon placed port.  PET 5/2022 increased dx in bones and liver.  Carbo Gemzar x's 6 months.  12/15/22 #10.  Breast cancer metastases are improved on current PET scan.    Port was placed.  Continues on chemo today.  Check PET 4/2023.    ROS:   GEN: normal without any fever, night sweats or weight loss  HEENT: normal with no HA's, sore throat, stiff neck, changes in vision  CV: normal with no CP, SOB, PND, POOL or orthopnea  PULM: normal with no SOB, cough, hemoptysis, sputum or pleuritic pain  GI: normal with no abdominal pain, nausea, vomiting, constipation, diarrhea, melanotic stools, BRBPR, or hematemesis  : normal with no hematuria, dysuria  BREAST: See HPI.  SKIN: normal with no rash, erythema, bruising, or swelling     Past Medical History:   Diagnosis Date    Acute hypoxemic respiratory failure 12/12/2020    Breast cancer 10/26/2015    left breast    Cancer     RIGHT BREAST 10-15    Depression     Diabetes mellitus     Neuropathy     Panic attacks     S/P epidural steroid injection     Seizures     none since early 30's    Wears glasses     TO DRIVE     Past Surgical History:   Procedure Laterality Date    AXILLARY NODE DISSECTION Left 8/24/2020    Procedure: LYMPHADENECTOMY, AXILLARY;  Surgeon: Cory Vick MD;  Location: [facility] OR;  Service: General;  Laterality: Left;  left breast mastectomy with possible axillary lymph node dissection    BREAST BIOPSY      right    BREAST LUMPECTOMY      BREAST " RECONSTRUCTION      breast reconstruction with tummy [facility]      BREAST SURGERY      11-3-15 LUMPECTOMY, 12-2-15 REEXCISION    INCISION AND DRAINAGE OF ABSCESS Left 9/9/2020    Procedure: INCISION AND DRAINAGE, ABSCESS;  Surgeon: Cory Vick MD;  Location: [facility] OR;  Service: General;  Laterality: Left;    INSERTION OF LOCALIZATION WIRE Left 8/24/2020    Procedure: INSERTION, LOCALIZATION WIRE;  Surgeon: Cory Vick MD;  Location: [facility] OR;  Service: General;  Laterality: Left;  left breast lumpectomy with wire needle loc    INSERTION OF TUNNELED CENTRAL VENOUS CATHETER (CVC) WITH SUBCUTANEOUS PORT Right 11/17/2022    Procedure: YBPIPLBXL-DYPI-B-CATH;  Surgeon: Jeff Mckeon Jr., MD;  Location: [facility] OR;  Service: General;  Laterality: Right;    MASTECTOMY      mastectomy 2016      MASTECTOMY, PARTIAL Left 3/19/2021    Procedure: MASTECTOMY, PARTIAL;  Surgeon: Cory Vick MD;  Location: [facility] OR;  Service: General;  Laterality: Left;  lumpectomy  left breast     RECONSTRUCTION OF NIPPLE Right 11/5/2018    Procedure: RECONSTRUCTION-NIPPLE RIGHT;  Surgeon: Xiang Hernandez MD;  Location: [address];  Service: Plastics;  Laterality: Right;    SENTINEL LYMPH NODE BIOPSY Left 8/24/2020    Procedure: BIOPSY, LYMPH NODE, SENTINEL;  Surgeon: Cory Vick MD;  Location: [facility] OR;  Service: General;  Laterality: Left;  left breast lumpectomy with left sentinel lymoh node       shoulder surgery and wrist      BILAT  BONE SPUR    WRIST SURGERY      RIGHT       Review of patient's allergies indicates:   Allergen Reactions    Adhesive tape-silicones Hives     BANDAIDS    Polymycin Hives    Latex, natural rubber Itching    Polyurethane-39            Current Outpatient Medications:     amoxicillin (AMOXIL) 500 MG Tab, Take 1 po q 8 hours x's 10 days, Disp: 30 tablet, Rfl: 0    blood sugar diagnostic Strp, TEST GLUCOSE BID, Disp: 300 each, Rfl: 3    blood-glucose meter (TRUE METRIX GLUCOSE  METER) kit, TEST GLUCOSE BID, Disp: 1 each, Rfl: 0    blood-glucose meter,continuous (DEXCOM G6 ) Misc, TEST GLUCOSE QID, Disp: 1 each, Rfl: 1    blood-glucose sensor (DEXCOM G6 SENSOR) Edwige, TEST GLUCOSE QID, Disp: 13 each, Rfl: 3    blood-glucose transmitter (DEXCOM G6 TRANSMITTER) Edwige, TEST GLUCOSE QID, Disp: 1 each, Rfl: 1    carisoprodoL (SOMA) 350 MG tablet, Take 1 tablet (350 mg total) by mouth 3 (three) times daily as needed for Muscle spasms., Disp: 90 tablet, Rfl: 0    dexAMETHasone (DECADRON) 4 MG Tab, TAKE ONE TABLET (4MG TOTAL) BY MOUTH TWICE DAILY WITH MEALS, Disp: 60 tablet, Rfl: 1    dextromethorphan-guaiFENesin  mg/5 ml (ROBITUSSIN-DM)  mg/5 mL liquid, Take 1 teaspoon q 6 hours prn cough. (Patient taking differently: Take 5 mLs by mouth. Take 1 teaspoon q 6 hours prn cough.), Disp: 300 mL, Rfl: 0    diazePAM (VALIUM) 10 MG Tab, Take 1 tablet (10 mg total) by mouth 4 (four) times daily as needed (anxiety)., Disp: 120 tablet, Rfl: 2    docusate sodium (COLACE) 100 MG capsule, Take 100 mg by mouth 2 (two) times daily., Disp: , Rfl:     dronabinoL (MARINOL) 5 MG capsule, Take 1 capsule (5 mg total) by mouth 2 (two) times daily before meals., Disp: 60 capsule, Rfl: 0    dulaglutide (TRULICITY) 3 mg/0.5 mL pen injector, Inject 3 mg into the skin every 7 days., Disp: 4 pen, Rfl: 11    enalapril (VASOTEC) 5 MG tablet, Take 1 tablet (5 mg total) by mouth once daily., Disp: 90 tablet, Rfl: 3    etodolac (LODINE) 400 MG tablet, TAKE ONE TABLET (400 MG TOTAL) BY MOUTH ONCE DAILY, Disp: 30 tablet, Rfl: 2    fentaNYL (DURAGESIC) 25 mcg/hr, Place 1 patch onto the skin every 72 hours., Disp: 10 patch, Rfl: 0    fluconazole (DIFLUCAN) 150 MG Tab, TAKE ONE TABLET BY MOUTH ONCE FOR 1 DOSE, Disp: 1 tablet, Rfl: 2    furosemide (LASIX) 20 MG tablet, TAKE ONE TABLET BY MOUTH EVERY DAY, Disp: 30 tablet, Rfl: 6    HYDROcodone-acetaminophen (NORCO)  mg per tablet, Take 1 tablet by mouth every 6  "(six) hours as needed for Pain., Disp: 120 tablet, Rfl: 0    insulin glargine (LANTUS U-100 INSULIN) 100 unit/mL injection, Inject 32 Units into the skin 2 (two) times a day., Disp: 18 mL, Rfl: 11    lancets 32 gauge Misc, TEST GLUCOSE BID, Disp: 300 each, Rfl: 3    metFORMIN (GLUCOPHAGE-XR) 500 MG ER 24hr tablet, Take 2 tablets (1,000 mg total) by mouth 2 (two) times daily with meals., Disp: 360 tablet, Rfl: 3    metoprolol tartrate (LOPRESSOR) 25 MG tablet, Take 1 tablet (25 mg total) by mouth 2 (two) times daily., Disp: 60 tablet, Rfl: 11    naloxegoL (MOVANTIK) 12.5 mg Tab, Take 12.5 mg by mouth once daily., Disp: 30 tablet, Rfl: 3    ondansetron (ZOFRAN-ODT) 8 MG TbDL, dissolve 1 TABLET ON THE TONGUE EVERY 6 TO 8 HOURS AS NEEDED FOR nausea, Disp: 60 tablet, Rfl: 2    oxyCODONE (ROXICODONE) 15 MG Tab, Take 1 tablet (15 mg total) by mouth every 4 to 6 hours as needed for Pain., Disp: 120 tablet, Rfl: 0    palbociclib 100 mg Tab, Take 100 mg by mouth once daily. for 21 days, then take 7 days off. Total cycle length 28 days, Disp: 21 tablet, Rfl: 6    pen needle, diabetic (BD ULTRA-FINE MARCE PEN NEEDLE) 32 gauge x 5/32" Ndle, Test blood sugar twice daily, Disp: 100 each, Rfl: 5    promethazine (PHENERGAN) 25 MG tablet, TAKE ONE TABLET (25MG TOTAL) BY MOUTH EVERY 4 TO 6 HOURS AS NEEDED, Disp: 30 tablet, Rfl: 5    promethazine-codeine 6.25-10 mg/5 ml (PHENERGAN WITH CODEINE) 6.25-10 mg/5 mL syrup, TAKE 5 ML BY MOUTH EVERY 6 HOURS AS NEEDED FOR COUGH, Disp: 450 mL, Rfl: 0    psyllium husk, with sugar, (METAMUCIL, WITH SUGAR,) 3.4 gram packet, Take 1 packet by mouth 2 (two) times daily., Disp: 30 packet, Rfl: 2    rosuvastatin (CRESTOR) 40 MG Tab, Take 1 tablet (40 mg total) by mouth every evening. For cholesterol, Disp: 90 tablet, Rfl: 3    zolpidem (AMBIEN) 10 mg Tab, TAKE ONE TABLET BY MOUTH EVERY NIGHT AT BEDTIME, Disp: 30 tablet, Rfl: 3  No current facility-administered medications for this " "visit.          Objective:   Vitals:  Blood pressure 123/81, pulse 95, temperature 97.5 °F (36.4 °C), resp. rate 17, height 5' 10" (1.778 m), weight 67.6 kg (149 lb), last menstrual period 01/16/2016.    Physical Examination:   GEN: no apparent distress, comfortable, Brace in place.  HEAD: atraumatic and normocephalic  EYES: no pallor, no icterus  ENT: no pharyngeal erythema.  The cellulitis at L cheek area is resolved, after antibiotics.  NECK: noLAD/LN's, supple  CV:  No edema  CHEST: Normal respiratory effort   she is not  tender over the right posterior chest wall on exam.  The chest wall is not swollen.  ABDOM:  nondistended; soft                                                                                          MUSC/Skeletal: ROM normal, Tender over mid T spine area.  EXTREM: no swelling  SKIN: She is developing keloid changes at L breast incision and navel surgical sites.  This area appears to be enlarg  NEURO: grossly intact  PSYCH: normal mood, affect and behavior  LYMPH: normal  LN's  BREASTS: L breast is much improved.    Diagnosis:     1. Malignant neoplasm of upper-outer quadrant of right breast in female, estrogen receptor negative  NM PET CT Routine Skull to Mid Thigh      2. Malignant neoplasm metastatic to bone  NM PET CT Routine Skull to Mid Thigh      3. Chemotherapy-induced nausea        4. Fatigue, unspecified type        5. Cancer-related breakthrough pain            Assessment:   (1) 62 y.o. female with diagnosis of Stage 1 triple negative R breast cancer, 10/2016.       S/P R mastectomy, SNBx, AC x's 4, T x's12 weeks, Rad Rx and recent reconstruction.    (2) New L invasive ductal Ca, ERP+, PRP+, H2N neg.  Clinical stage 1 dx.    I have started her on Arimidex 1 mg p.o. daily  for metastatic disease at this time.    Suspected fracture of right ribs after recent cough event, check rib x-rays, medicate for pain.    ERP+ dx, bone metastases.  Started on Arimidex daily.  Add 2nd hormonal " drug Rx after Rad Rx completed.    Bone biopsy positive for metastatic breast cancer.  ERP+, PRP+, H2N-.    Now on Genzar, Carboplatin.  Improved.  PET 12/15/22.      Slight elevation in her tumor markers CA27.29 and Ca15.3 since last month; Plan PET scan at end of May.    (3) Cancer related Pain- patient Continue On Fentanyl, Soma and Norco with relief of her pain    (4) Bone Mets- Continue Zometa monthly    (5) Chemo Induced Neutropenia- Continue Neulasta with each cycle           Follow up in about 3 weeks (around 5/18/2023) for with Dr. Peraza.      Electronically Signed By Tabatha Denise, MSN, APRN,A GNP-C, OCN

## 2023-04-27 NOTE — PLAN OF CARE
Problem: Fatigue  Goal: Improved Activity Tolerance  Outcome: Ongoing, Progressing  Intervention: Promote Improved Energy  Flowsheets (Taken 4/27/2023 1112)  Fatigue Management:   activity schedule adjusted   frequent rest breaks encouraged   paced activity encouraged   fatigue-related activity identified  Activity Management: Ambulated -L4

## 2023-05-02 NOTE — PLAN OF CARE
Problem: Fall Injury Risk  Goal: Absence of Fall and Fall-Related Injury  Outcome: Ongoing, Progressing  Intervention: Identify and Manage Contributors  Flowsheets (Taken 5/2/2023 1040)  Self-Care Promotion: safe use of adaptive equipment encouraged  Medication Review/Management: medications reviewed  Intervention: Promote Injury-Free Environment  Flowsheets (Taken 5/2/2023 1040)  Safety Promotion/Fall Prevention: assistive device/personal item within reach

## 2023-05-09 NOTE — TELEPHONE ENCOUNTER
Critical labs  Potassium 2.5  Notified Dr. Vick.  Prescribed k dur 20 meq 1 po TID x 7 days and recheck labs on Tuesday. Pt verbalized understanding.

## 2023-05-11 NOTE — TELEPHONE ENCOUNTER
Notified by infusion center [facility] that patient's potassium was 2.9 on 5/9. Dr Vick reviewed and per his verbal order, patient to increase her potassium dose to 20 mEq TID x 7 days. Patient verbalized understanding of above.

## 2023-05-12 NOTE — PLAN OF CARE
Problem: Fatigue  Goal: Improved Activity Tolerance  Intervention: Promote Improved Energy  Flowsheets (Taken 5/12/2023 2998)  Fatigue Management: fatigue-related activity identified

## 2023-05-18 NOTE — PROGRESS NOTES
"  [facility] In Office Hematology Oncology Subsequent  Encounter Note    5/18/23      Subjective:      Patient ID:   Negrita Castro  62 y.o.   Martínez Shepard R. Leblanc, Babycos, Bourgeois, Hall      Chief Complaint:    L breast cancer     HPI:  62 y.o. female with diagnosis of Stage 1 R breast cancer 10/26/15.  "Triple negative".  Adjuvant AC x's 4, tx's 12 and Rad Rx through 9/12/16.      Had bilateral reconstruction surgery 4/14/17.  Further reconstruction, tumsofy baxterck 8/14/17.  Additional fat injection at R chest done for symmetry.  She had placement of R nipple.  Dr. Hernandez.      She had additional reconstructive surgery at breast and abdomenal areas.   The date of the surgery was May 19th.      Recent Mamm/U/S showed small mass at 4:00 at L breast.  Needle Bx invasive ductal Ca, ERP+, PRP+, H2N neg  She had  L partial mastectomy, Sentinal node Bx 8/24/20. Followed by another surgery because of L axillary infection.  Primary 2 cm, LN neg, Stage 1 dx.    Discussed Oncotype Dx testing, this supported adding adjuvant chemotherapy to adjuvant hormonal therapy because of increased risk of cancer recurrence long-term.  Discussed BRCA testing, given her bilateral  breast Dx. PHN would not authorize BRCA 1 & 2.  Refill meds for her.    She had COVID-19 infection and was hospitalized.  She came very close to requiring intubation and mechanical ventilation.  She is off oxygen now,   and sees Dr. Justin of Pulmonary.  Recent CT scan of the chest showed residual changes of interstitial disease, consistent with recovering from COVID-19.  Also lytic lesions were seen in the thoracic spine area very suspicious for metastatic disease at T5 through T8 spine.    Recently on Saturday night she had a hard coughing spell, and thereafter developed severe right posterior pleuritic chest pain.  She has point tenderness over the right posterior chest wall and may very well have a fractured rib at the site.  Rib x-rays have " been ordered.  I have refilled her Soma for 1 month.  I have refilled her Norco  for 1 month.  I have added oxycodone 5 mg 1 or 2 p.o. Q 3-4 hours p.r.n. breakthrough pain for 1 month to her regimen.    She also has an enlarging nodule at the left chest wall.  Ultrasound of the site suggested that this was a cyst however the areas firm movable and may very well be cancer.  I asked  for a ultrasound-guided biopsy of the mass per radiology.  Path report showed invasive ductal Ca, ERP + PRP neg,   H2N equivocal.    She is postmenopausal, her last menses were 4 5 years ago.  Rib x-rays have been requested.  Will order a CT scan to evaluate for possible metastatic disease.  Will order a ultrasound-guided biopsy of the left chest wall mass for pathologic examination at [facility].  Bx + invasive breast cancer.  ERP+, PRP-, H2N-.    Suspected local recurrence at L breast.  She had L lumpectomy.  Margins clear.    Refer to Rad Rx MD for adjuvant Rad Rx., for local control.  She has T spine metastases.  Currently on hormonal Rx.  To see Dr. Manuelito Shepard for DM, BS control.    Dr. Washington saw her for C spine eval, no surgery planned for now.  He referred her to Dr. Rivers for injection Rx.    DM, cholesterol, epilepsy hx, DJD, LBP.  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    LR shoulder arthroscopy, R wrist surgery, M0.    Smoke 1/4 ppd x's 35 years, quit 2015.  ETOH no.  Disability-depression, epilepsy  Allergy none    Mom ovarian cancer  Dad lung cancer  Sisters DM, lung dx.    Summary of care:  Right breast cancer 2015, triple negative, treated with Adriamycin Cytoxan followed by taxane.    Left breast cancer ERP positive PRP positive HER2 Judy negative in 2020    COVID infection 2020.  She has not taken the covid 19 vaccination because of multiple allergy hx.    2021 bone metastases determined.    He he 2021 local left breast cancer recurrence, ERP positive  "PRP negative HER2 Judy negative.    He April 21st bone biopsy positive for breast cancer metastatic.  ERP, PRP, HER2 Judy pending.    She was on radiation therapy through May 17, 2021.  Refilled Marinol at increased 5 mg 1 p.o. b.i.d. number 60.   I refilled her Soma, Norco, Marinol, and Silvadene cream; dated the prescriptions for June 17, 2021.    C/O mid thoracic pain x's 6-8 weeks,radiates to L & R, increased.  Tender over Mid thoracic area.  Hx of T spine metastatic dx.  Pain controlled on Norco 10/325 mg and oxycodone 5 mg 1-2 po PRN.  Add lodine 400 mg 1 daily    Checked MRI of T spine.  Metastatic dx with pathologic fx at T3,5,6.  Refer to Dr. Flowers of neurosurgery for Vertebroplasty or Kyphoplasty evaluation.    She is on Faslodex. Ibrance. Monitor WBC count.    She saw Dr. Flowers, and has been fitted with a brace initially, vertebroplasty after the first of the year.  [phone].  RTC Dr. Flowers 12/27/21.    No neuro surgery is planned for T-spine disease because of progressive growth and compression of the spine.  She admits to having decreased strength in the right hand.  She completed 10 of 10 radiation treatments on May 2, 2022. She is tapering down her Decadron 4 mg p.o. b.i.d. and she is taking Lodine 400 mg daily.  We are stopping fast low dex and Ibrance as of April 12, 2022.    Will ask the  to see her if we can get Meals on wheels for her.    She is due for an MRI on May 17, and a PET scan on May 19, I will see her on May 20th.  Plan to go with chemotherapy now, probably will go with carboplatin Gemzar.   She is 140 lb and 5 ft 11.     Today is D1C1 carbo, gemzar.  Duragesic patch 25 mch q 3 days x's 3 patches, start Tuesday.  Claritin 10 mg 1 po daily x's 4 days, start Wednesday.  For neulasta or Udenyca Thursday.    Resume Xgeva with D9 C1.  Check lab 7/14 weekly [facility] lab.    Working on getting a electric wheelchair.  On PT with "Hector".  Stronger.  Today chemo given.  Mass on back " is giving her pain, refer to Cory Vick MD for removal.  D1C6 9/1/22    Refill Soma, Oxycodone, Norco, Ambien on 9/1/22.  Due for refill 9/30/22.    She is doing better, with increased energy, 8/10.  Weight 154#.  Pain is better 8/10.  Improved R arm function.    Dr. Mckeon placed port.  PET 5/2022 increased dx in bones and liver.  Carbo Gemzar x's 6 months.  12/15/22 #10.  Breast cancer metastases are improved on current PET scan.    Port was placed.  Continues on chemo today.  Check PET 4/2023.    ROS:   GEN: normal without any fever, night sweats or weight loss  HEENT: normal with no HA's, sore throat, stiff neck, changes in vision  CV: normal with no CP, SOB, PND, POOL or orthopnea  PULM: normal with no SOB, cough, hemoptysis, sputum or pleuritic pain  GI: normal with no abdominal pain, nausea, vomiting, constipation, diarrhea, melanotic stools, BRBPR, or hematemesis  : normal with no hematuria, dysuria  BREAST: See HPI.  SKIN: normal with no rash, erythema, bruising, or swelling     Past Medical History:   Diagnosis Date    Acute hypoxemic respiratory failure 12/12/2020    Breast cancer 10/26/2015    left breast    Cancer     RIGHT BREAST 10-15    Depression     Diabetes mellitus     Neuropathy     Panic attacks     S/P epidural steroid injection     Seizures     none since early 30's    Wears glasses     TO DRIVE     Past Surgical History:   Procedure Laterality Date    AXILLARY NODE DISSECTION Left 8/24/2020    Procedure: LYMPHADENECTOMY, AXILLARY;  Surgeon: Cory Vick MD;  Location: [facility];  Service: General;  Laterality: Left;  left breast mastectomy with possible axillary lymph node dissection    BREAST BIOPSY      right    BREAST LUMPECTOMY      BREAST RECONSTRUCTION      breast reconstruction with tummy [facility]      BREAST SURGERY      11-3-15 LUMPECTOMY, 12-2-15 REEXCISION    INCISION AND DRAINAGE OF ABSCESS Left 9/9/2020    Procedure: INCISION AND DRAINAGE, ABSCESS;  Surgeon: Cory  MOLLY Vick MD;  Location: [facility] OR;  Service: General;  Laterality: Left;    INSERTION OF LOCALIZATION WIRE Left 8/24/2020    Procedure: INSERTION, LOCALIZATION WIRE;  Surgeon: oCry Vick MD;  Location: [facility] OR;  Service: General;  Laterality: Left;  left breast lumpectomy with wire needle loc    INSERTION OF TUNNELED CENTRAL VENOUS CATHETER (CVC) WITH SUBCUTANEOUS PORT Right 11/17/2022    Procedure: [id number]-N-CATH;  Surgeon: Jeff Mckeon Jr., MD;  Location: [facility] OR;  Service: General;  Laterality: Right;    MASTECTOMY      mastectomy 2016      MASTECTOMY, PARTIAL Left 3/19/2021    Procedure: MASTECTOMY, PARTIAL;  Surgeon: Cory Vick MD;  Location: [facility] OR;  Service: General;  Laterality: Left;  lumpectomy  left breast     RECONSTRUCTION OF NIPPLE Right 11/5/2018    Procedure: RECONSTRUCTION-NIPPLE RIGHT;  Surgeon: Xiang Hernandez MD;  [facility];  Service: Plastics;  Laterality: Right;    SENTINEL LYMPH NODE BIOPSY Left 8/24/2020    Procedure: BIOPSY, LYMPH NODE, SENTINEL;  Surgeon: Cory Vick MD;  Location: [facility] OR;  Service: General;  Laterality: Left;  left breast lumpectomy with left sentinel lymoh node       shoulder surgery and wrist      BILAT  BONE SPUR    WRIST SURGERY      RIGHT       Review of patient's allergies indicates:   Allergen Reactions    Adhesive tape-silicones Hives     BANDAIDS    Polymycin Hives    Latex, natural rubber Itching    Polyurethane-39            Current Outpatient Medications:     amoxicillin (AMOXIL) 500 MG Tab, Take 1 po q 8 hours x's 10 days, Disp: 30 tablet, Rfl: 0    blood sugar diagnostic Strp, TEST GLUCOSE BID, Disp: 300 each, Rfl: 3    blood-glucose meter (TRUE METRIX GLUCOSE METER) kit, TEST GLUCOSE BID, Disp: 1 each, Rfl: 0    blood-glucose meter,continuous (DEXCOM G6 ) Misc, TEST GLUCOSE QID, Disp: 1 each, Rfl: 1    blood-glucose sensor (DEXCOM G6 SENSOR) Edwige, TEST GLUCOSE QID, Disp: 13 each,  Rfl: 3    blood-glucose transmitter (DEXCOM G6 TRANSMITTER) Edwige, TEST GLUCOSE QID, Disp: 1 each, Rfl: 1    carisoprodoL (SOMA) 350 MG tablet, Take 1 tablet (350 mg total) by mouth 3 (three) times daily as needed for Muscle spasms., Disp: 90 tablet, Rfl: 0    dexAMETHasone (DECADRON) 4 MG Tab, TAKE ONE TABLET (4MG TOTAL) BY MOUTH TWICE DAILY WITH MEALS, Disp: 60 tablet, Rfl: 1    dextromethorphan-guaiFENesin  mg/5 ml (ROBITUSSIN-DM)  mg/5 mL liquid, Take 1 teaspoon q 6 hours prn cough. (Patient taking differently: Take 5 mLs by mouth. Take 1 teaspoon q 6 hours prn cough.), Disp: 300 mL, Rfl: 0    diazePAM (VALIUM) 10 MG Tab, Take 1 tablet (10 mg total) by mouth 4 (four) times daily as needed (anxiety)., Disp: 120 tablet, Rfl: 2    docusate sodium (COLACE) 100 MG capsule, Take 100 mg by mouth 2 (two) times daily., Disp: , Rfl:     droNABinol (MARINOL) 5 MG capsule, Take 1 capsule (5 mg total) by mouth 2 (two) times daily before meals., Disp: 60 capsule, Rfl: 0    dulaglutide (TRULICITY) 3 mg/0.5 mL pen injector, Inject 3 mg into the skin every 7 days., Disp: 4 pen, Rfl: 11    enalapril (VASOTEC) 5 MG tablet, Take 1 tablet (5 mg total) by mouth once daily., Disp: 90 tablet, Rfl: 3    etodolac (LODINE) 400 MG tablet, TAKE ONE TABLET (400 MG TOTAL) BY MOUTH ONCE DAILY, Disp: 30 tablet, Rfl: 2    fentaNYL (DURAGESIC) 25 mcg/hr, Place 1 patch onto the skin every 72 hours., Disp: 10 patch, Rfl: 0    fluconazole (DIFLUCAN) 150 MG Tab, TAKE ONE TABLET BY MOUTH ONCE FOR 1 DOSE, Disp: 1 tablet, Rfl: 2    furosemide (LASIX) 20 MG tablet, TAKE ONE TABLET BY MOUTH EVERY DAY, Disp: 30 tablet, Rfl: 6    HYDROcodone-acetaminophen (NORCO)  mg per tablet, Take 1 tablet by mouth every 6 (six) hours as needed for Pain., Disp: 120 tablet, Rfl: 0    insulin glargine (LANTUS U-100 INSULIN) 100 unit/mL injection, Inject 32 Units into the skin 2 (two) times a day., Disp: 18 mL, Rfl: 11    lancets 32 gauge Misc, TEST GLUCOSE  "BID, Disp: 300 each, Rfl: 3    metFORMIN (GLUCOPHAGE-XR) 500 MG ER 24hr tablet, Take 2 tablets (1,000 mg total) by mouth 2 (two) times daily with meals., Disp: 360 tablet, Rfl: 3    metoprolol tartrate (LOPRESSOR) 25 MG tablet, Take 1 tablet (25 mg total) by mouth 2 (two) times daily., Disp: 60 tablet, Rfl: 11    naloxegoL (MOVANTIK) 12.5 mg Tab, Take 12.5 mg by mouth once daily., Disp: 30 tablet, Rfl: 3    ondansetron (ZOFRAN-ODT) 8 MG TbDL, dissolve 1 TABLET ON THE TONGUE EVERY 6 TO 8 HOURS AS NEEDED FOR nausea, Disp: 60 tablet, Rfl: 2    oxyCODONE (ROXICODONE) 15 MG Tab, Take 1 tablet (15 mg total) by mouth every 4 to 6 hours as needed for Pain., Disp: 120 tablet, Rfl: 0    palbociclib 100 mg Tab, Take 100 mg by mouth once daily. for 21 days, then take 7 days off. Total cycle length 28 days, Disp: 21 tablet, Rfl: 6    pen needle, diabetic (BD ULTRA-FINE MARCE PEN NEEDLE) 32 gauge x 5/32" Ndle, Test blood sugar twice daily, Disp: 100 each, Rfl: 5    potassium chloride SA (K-DUR,KLOR-CON) 20 MEQ tablet, Take 1 tablet (20 mEq total) by mouth once daily. Take 1 tablet (20 meq) by mouth 3 times a day x 7 days. Then daily., Disp: 60 tablet, Rfl: 0    promethazine (PHENERGAN) 25 MG tablet, TAKE ONE TABLET (25MG TOTAL) BY MOUTH EVERY 4 TO 6 HOURS AS NEEDED, Disp: 30 tablet, Rfl: 5    promethazine-codeine 6.25-10 mg/5 ml (PHENERGAN WITH CODEINE) 6.25-10 mg/5 mL syrup, TAKE 5 ML BY MOUTH EVERY 6 HOURS AS NEEDED FOR COUGH, Disp: 450 mL, Rfl: 0    psyllium husk, with sugar, (METAMUCIL, WITH SUGAR,) 3.4 gram packet, Take 1 packet by mouth 2 (two) times daily., Disp: 30 packet, Rfl: 2    rosuvastatin (CRESTOR) 40 MG Tab, Take 1 tablet (40 mg total) by mouth every evening. For cholesterol, Disp: 90 tablet, Rfl: 3    zolpidem (AMBIEN) 10 mg Tab, TAKE ONE TABLET BY MOUTH EVERY NIGHT AT BEDTIME, Disp: 30 tablet, Rfl: 3  No current facility-administered medications for this visit.    Facility-Administered Medications Ordered in " Other Visits:     heparin, porcine (PF) 100 unit/mL injection flush 500 Units, 500 Units, Intravenous, PRN, TALISHA Vick MD, 500 Units at 05/18/23 1317    sodium chloride 0.9% 250 mL flush bag, , Intravenous, 1 time in Clinic/HOD, TALISHA Vick MD    sodium chloride 0.9% flush 10 mL, 10 mL, Intravenous, PRN, TALISHA Vick MD, 10 mL at 05/18/23 1317          Objective:   Vitals:  Last menstrual period 01/16/2016.    Physical Examination:   GEN: no apparent distress, comfortable, Brace in place.  HEAD: atraumatic and normocephalic  EYES: no pallor, no icterus  ENT: no pharyngeal erythema.  The cellulitis at L cheek area is resolved, after antibiotics.  NECK: noLAD/LN's, supple  CV:  No edema  CHEST: Normal respiratory effort   she is not  tender over the right posterior chest wall on exam.  The chest wall is not swollen.  ABDOM:  nondistended; soft                                                                                          MUSC/Skeletal: ROM normal, Tender over mid T spine area.  EXTREM: no swelling  SKIN: She is developing keloid changes at L breast incision and navel surgical sites.  This area appears to be enlarg  NEURO: grossly intact  PSYCH: normal mood, affect and behavior  LYMPH: normal  LN's  BREASTS: L breast is much improved.    Refill Oxycodone, SOMA, Norco.    Tumor markers are improved.    Assessment:   (1) 62 y.o. female with diagnosis of Stage 1 triple negative R breast cancer, 10/2016.       S/P R mastectomy, SNBx, AC x's 4, T x's12 weeks, Rad Rx and recent reconstruction.    (2) New L invasive ductal Ca, ERP+, PRP+, H2N neg.  Clinical stage 1 dx.    I have started her on Arimidex 1 mg p.o. daily  for metastatic disease at this time.    Suspected fracture of right ribs after recent cough event, check rib x-rays, medicate for pain.    ERP+ dx, bone metastases.  Started on Arimidex daily.  Add 2nd hormonal drug Rx after Rad Rx completed.    Bone biopsy positive for metastatic breast  cancer.  ERP+, PRP+, H2N-.    Now on Genzar, Carboplatin.  Improved.  PET 12/15/22.  Improving metastatic dx on present Rx.    RTC 4 weeks.

## 2023-05-18 NOTE — PLAN OF CARE
Problem: Fatigue  Goal: Improved Activity Tolerance  Outcome: Ongoing, Progressing  Intervention: Promote Improved Energy  Flowsheets (Taken 5/18/2023 1007)  Fatigue Management:   fatigue-related activity identified   paced activity encouraged   frequent rest breaks encouraged  Sleep/Rest Enhancement:   noise level reduced   relaxation techniques promoted   regular sleep/rest pattern promoted  Activity Management:   Ambulated -L4   Up in chair - L3

## 2023-05-19 NOTE — PROGRESS NOTES
"  [facility] In Office Hematology Oncology Subsequent  Encounter Note    5/18/23      Subjective:      Patient ID:   Negrita Castro  62 y.o.   Martínez Shepard R. Leblanc, Babycos, Bourgeois, Hall      Chief Complaint:    L breast cancer     HPI:  62 y.o. female with diagnosis of Stage 1 R breast cancer 10/26/15.  "Triple negative".  Adjuvant AC x's 4, tx's 12 and Rad Rx through 9/12/16.      Had bilateral reconstruction surgery 4/14/17.  Further reconstruction, tumsofy baxterck 8/14/17.  Additional fat injection at R chest done for symmetry.  She had placement of R nipple.  Dr. Hernandez.      She had additional reconstructive surgery at breast and abdomenal areas.   The date of the surgery was May 19th.      Recent Mamm/U/S showed small mass at 4:00 at L breast.  Needle Bx invasive ductal Ca, ERP+, PRP+, H2N neg  She had  L partial mastectomy, Sentinal node Bx 8/24/20. Followed by another surgery because of L axillary infection.  Primary 2 cm, LN neg, Stage 1 dx.    Discussed Oncotype Dx testing, this supported adding adjuvant chemotherapy to adjuvant hormonal therapy because of increased risk of cancer recurrence long-term.  Discussed BRCA testing, given her bilateral  breast Dx. PHN would not authorize BRCA 1 & 2.  Refill meds for her.    She had COVID-19 infection and was hospitalized.  She came very close to requiring intubation and mechanical ventilation.  She is off oxygen now,   and sees Dr. Justin of Pulmonary.  Recent CT scan of the chest showed residual changes of interstitial disease, consistent with recovering from COVID-19.  Also lytic lesions were seen in the thoracic spine area very suspicious for metastatic disease at T5 through T8 spine.    Recently on Saturday night she had a hard coughing spell, and thereafter developed severe right posterior pleuritic chest pain.  She has point tenderness over the right posterior chest wall and may very well have a fractured rib at the site.  Rib x-rays have " been ordered.  I have refilled her Soma for 1 month.  I have refilled her Norco  for 1 month.  I have added oxycodone 5 mg 1 or 2 p.o. Q 3-4 hours p.r.n. breakthrough pain for 1 month to her regimen.    She also has an enlarging nodule at the left chest wall.  Ultrasound of the site suggested that this was a cyst however the areas firm movable and may very well be cancer.  I asked  for a ultrasound-guided biopsy of the mass per radiology.  Path report showed invasive ductal Ca, ERP + PRP neg,   H2N equivocal.    She is postmenopausal, her last menses were 4 5 years ago.  Rib x-rays have been requested.  Will order a CT scan to evaluate for possible metastatic disease.  Will order a ultrasound-guided biopsy of the left chest wall mass for pathologic examination at [facility].  Bx + invasive breast cancer.  ERP+, PRP-, H2N-.    Suspected local recurrence at L breast.  She had L lumpectomy.  Margins clear.    Refer to Rad Rx MD for adjuvant Rad Rx., for local control.  She has T spine metastases.  Currently on hormonal Rx.  To see Dr. Manuelito Shepard for DM, BS control.    Dr. Washington saw her for C spine eval, no surgery planned for now.  He referred her to Dr. Rivers for injection Rx.    DM, cholesterol, epilepsy hx, DJD, LBP.  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    LR shoulder arthroscopy, R wrist surgery, M0.    Smoke 1/4 ppd x's 35 years, quit 2015.  ETOH no.  Disability-depression, epilepsy  Allergy none    Mom ovarian cancer  Dad lung cancer  Sisters DM, lung dx.    Summary of care:  Right breast cancer 2015, triple negative, treated with Adriamycin Cytoxan followed by taxane.    Left breast cancer ERP positive PRP positive HER2 Judy negative in 2020    COVID infection 2020.  She has not taken the covid 19 vaccination because of multiple allergy hx.    2021 bone metastases determined.    He he 2021 local left breast cancer recurrence, ERP positive  "PRP negative HER2 Judy negative.    He April 21st bone biopsy positive for breast cancer metastatic.  ERP, PRP, HER2 Judy pending.    She was on radiation therapy through May 17, 2021.  Refilled Marinol at increased 5 mg 1 p.o. b.i.d. number 60.   I refilled her Soma, Norco, Marinol, and Silvadene cream; dated the prescriptions for June 17, 2021.    C/O mid thoracic pain x's 6-8 weeks,radiates to L & R, increased.  Tender over Mid thoracic area.  Hx of T spine metastatic dx.  Pain controlled on Norco 10/325 mg and oxycodone 5 mg 1-2 po PRN.  Add lodine 400 mg 1 daily    Checked MRI of T spine.  Metastatic dx with pathologic fx at T3,5,6.  Refer to Dr. Folwers of neurosurgery for Vertebroplasty or Kyphoplasty evaluation.    She is on Faslodex. Ibrance. Monitor WBC count.    She saw Dr. Flowers, and has been fitted with a brace initially, vertebroplasty after the first of the year.  [phone].  RTC Dr. Flowers 12/27/21.    No neuro surgery is planned for T-spine disease because of progressive growth and compression of the spine.  She admits to having decreased strength in the right hand.  She completed 10 of 10 radiation treatments on May 2, 2022. She is tapering down her Decadron 4 mg p.o. b.i.d. and she is taking Lodine 400 mg daily.  We are stopping fast low dex and Ibrance as of April 12, 2022.    Will ask the  to see her if we can get Meals on wheels for her.    She is due for an MRI on May 17, and a PET scan on May 19, I will see her on May 20th.  Plan to go with chemotherapy now, probably will go with carboplatin Gemzar.   She is 140 lb and 5 ft 11.     Today is D1C1 carbo, gemzar.  Duragesic patch 25 mch q 3 days x's 3 patches, start Tuesday.  Claritin 10 mg 1 po daily x's 4 days, start Wednesday.  For neulasta or Udenyca Thursday.    Resume Xgeva with D9 C1.  Check lab 7/14 weekly [facility] lab.    Working on getting a electric wheelchair.  On PT with "Hector".  Stronger.  Today chemo given.  Mass on back " is giving her pain, refer to Cory Vick MD for removal.  D1C6 9/1/22    Refill Soma, Oxycodone, Norco, Ambien on 9/1/22.  Due for refill 9/30/22.    She is doing better, with increased energy, 8/10.  Weight 154#.  Pain is better 8/10.  Improved R arm function.    Dr. Mckeon placed port.  PET 5/2022 increased dx in bones and liver.  Carbo Gemzar x's 6 months.  12/15/22 #10.  Breast cancer metastases are improved on current PET scan.    Port was placed.  Continues on chemo today.  Check PET 4/2023.    ROS:   GEN: normal without any fever, night sweats or weight loss  HEENT: normal with no HA's, sore throat, stiff neck, changes in vision  CV: normal with no CP, SOB, PND, POOL or orthopnea  PULM: normal with no SOB, cough, hemoptysis, sputum or pleuritic pain  GI: normal with no abdominal pain, nausea, vomiting, constipation, diarrhea, melanotic stools, BRBPR, or hematemesis  : normal with no hematuria, dysuria  BREAST: See HPI.  SKIN: normal with no rash, erythema, bruising, or swelling     Past Medical History:   Diagnosis Date    Acute hypoxemic respiratory failure 12/12/2020    Breast cancer 10/26/2015    left breast    Cancer     RIGHT BREAST 10-15    Depression     Diabetes mellitus     Neuropathy     Panic attacks     S/P epidural steroid injection     Seizures     none since early 30's    Wears glasses     TO DRIVE     Past Surgical History:   Procedure Laterality Date    AXILLARY NODE DISSECTION Left 8/24/2020    Procedure: LYMPHADENECTOMY, AXILLARY;  Surgeon: Cory Vick MD;  Location: [facility];  Service: General;  Laterality: Left;  left breast mastectomy with possible axillary lymph node dissection    BREAST BIOPSY      right    BREAST LUMPECTOMY      BREAST RECONSTRUCTION      breast reconstruction with tummy [facility]      BREAST SURGERY      11-3-15 LUMPECTOMY, 12-2-15 REEXCISION    INCISION AND DRAINAGE OF ABSCESS Left 9/9/2020    Procedure: INCISION AND DRAINAGE, ABSCESS;  Surgeon: Cory  MOLLY Vick MD;  Location: [facility] OR;  Service: General;  Laterality: Left;    INSERTION OF LOCALIZATION WIRE Left 8/24/2020    Procedure: INSERTION, LOCALIZATION WIRE;  Surgeon: Cory Vick MD;  Location: [facility] OR;  Service: General;  Laterality: Left;  left breast lumpectomy with wire needle loc    INSERTION OF TUNNELED CENTRAL VENOUS CATHETER (CVC) WITH SUBCUTANEOUS PORT Right 11/17/2022    Procedure: [id number];  Surgeon: Jeff Mckeon Jr., MD;  Location: [facility] OR;  Service: General;  Laterality: Right;    MASTECTOMY      mastectomy 2016      MASTECTOMY, PARTIAL Left 3/19/2021    Procedure: MASTECTOMY, PARTIAL;  Surgeon: Cory Vick MD;  Location: [facility] OR;  Service: General;  Laterality: Left;  lumpectomy  left breast     RECONSTRUCTION OF NIPPLE Right 11/5/2018    Procedure: RECONSTRUCTION-NIPPLE RIGHT;  Surgeon: Xiang Hernandez MD;  [facility];  Service: Plastics;  Laterality: Right;    SENTINEL LYMPH NODE BIOPSY Left 8/24/2020    Procedure: BIOPSY, LYMPH NODE, SENTINEL;  Surgeon: Cory Vick MD;  Location: [facility] OR;  Service: General;  Laterality: Left;  left breast lumpectomy with left sentinel lymoh node       shoulder surgery and wrist      BILAT  BONE SPUR    WRIST SURGERY      RIGHT       Review of patient's allergies indicates:   Allergen Reactions    Adhesive tape-silicones Hives     BANDAIDS    Polymycin Hives    Latex, natural rubber Itching    Polyurethane-39            Current Outpatient Medications:     amoxicillin (AMOXIL) 500 MG Tab, Take 1 po q 8 hours x's 10 days, Disp: 30 tablet, Rfl: 0    blood sugar diagnostic Strp, TEST GLUCOSE BID, Disp: 300 each, Rfl: 3    blood-glucose meter (TRUE METRIX GLUCOSE METER) kit, TEST GLUCOSE BID, Disp: 1 each, Rfl: 0    blood-glucose meter,continuous (DEXCOM G6 ) Misc, TEST GLUCOSE QID, Disp: 1 each, Rfl: 1    blood-glucose sensor (DEXCOM G6 SENSOR) Edwige, TEST GLUCOSE QID, Disp: 13 each,  Rfl: 3    blood-glucose transmitter (DEXCOM G6 TRANSMITTER) Edwige, TEST GLUCOSE QID, Disp: 1 each, Rfl: 1    carisoprodoL (SOMA) 350 MG tablet, Take 1 tablet (350 mg total) by mouth 3 (three) times daily as needed for Muscle spasms., Disp: 90 tablet, Rfl: 0    dexAMETHasone (DECADRON) 4 MG Tab, TAKE ONE TABLET (4MG TOTAL) BY MOUTH TWICE DAILY WITH MEALS, Disp: 60 tablet, Rfl: 1    dextromethorphan-guaiFENesin  mg/5 ml (ROBITUSSIN-DM)  mg/5 mL liquid, Take 1 teaspoon q 6 hours prn cough. (Patient taking differently: Take 5 mLs by mouth. Take 1 teaspoon q 6 hours prn cough.), Disp: 300 mL, Rfl: 0    diazePAM (VALIUM) 10 MG Tab, Take 1 tablet (10 mg total) by mouth 4 (four) times daily as needed (anxiety)., Disp: 120 tablet, Rfl: 2    docusate sodium (COLACE) 100 MG capsule, Take 100 mg by mouth 2 (two) times daily., Disp: , Rfl:     droNABinol (MARINOL) 5 MG capsule, Take 1 capsule (5 mg total) by mouth 2 (two) times daily before meals., Disp: 60 capsule, Rfl: 0    dulaglutide (TRULICITY) 3 mg/0.5 mL pen injector, Inject 3 mg into the skin every 7 days., Disp: 4 pen, Rfl: 11    enalapril (VASOTEC) 5 MG tablet, Take 1 tablet (5 mg total) by mouth once daily., Disp: 90 tablet, Rfl: 3    etodolac (LODINE) 400 MG tablet, TAKE ONE TABLET (400 MG TOTAL) BY MOUTH ONCE DAILY, Disp: 30 tablet, Rfl: 2    fentaNYL (DURAGESIC) 25 mcg/hr, Place 1 patch onto the skin every 72 hours., Disp: 10 patch, Rfl: 0    fluconazole (DIFLUCAN) 150 MG Tab, TAKE ONE TABLET BY MOUTH ONCE FOR 1 DOSE, Disp: 1 tablet, Rfl: 2    furosemide (LASIX) 20 MG tablet, TAKE ONE TABLET BY MOUTH EVERY DAY, Disp: 30 tablet, Rfl: 6    HYDROcodone-acetaminophen (NORCO)  mg per tablet, Take 1 tablet by mouth every 6 (six) hours as needed for Pain., Disp: 120 tablet, Rfl: 0    insulin glargine (LANTUS U-100 INSULIN) 100 unit/mL injection, Inject 32 Units into the skin 2 (two) times a day., Disp: 18 mL, Rfl: 11    lancets 32 gauge Misc, TEST GLUCOSE  "BID, Disp: 300 each, Rfl: 3    metFORMIN (GLUCOPHAGE-XR) 500 MG ER 24hr tablet, Take 2 tablets (1,000 mg total) by mouth 2 (two) times daily with meals., Disp: 360 tablet, Rfl: 3    metoprolol tartrate (LOPRESSOR) 25 MG tablet, Take 1 tablet (25 mg total) by mouth 2 (two) times daily., Disp: 60 tablet, Rfl: 11    naloxegoL (MOVANTIK) 12.5 mg Tab, Take 12.5 mg by mouth once daily., Disp: 30 tablet, Rfl: 3    ondansetron (ZOFRAN-ODT) 8 MG TbDL, dissolve 1 TABLET ON THE TONGUE EVERY 6 TO 8 HOURS AS NEEDED FOR nausea, Disp: 60 tablet, Rfl: 2    oxyCODONE (ROXICODONE) 15 MG Tab, Take 1 tablet (15 mg total) by mouth every 4 to 6 hours as needed for Pain., Disp: 120 tablet, Rfl: 0    palbociclib 100 mg Tab, Take 100 mg by mouth once daily. for 21 days, then take 7 days off. Total cycle length 28 days, Disp: 21 tablet, Rfl: 6    pen needle, diabetic (BD ULTRA-FINE MARCE PEN NEEDLE) 32 gauge x 5/32" Ndle, Test blood sugar twice daily, Disp: 100 each, Rfl: 5    potassium chloride SA (K-DUR,KLOR-CON) 20 MEQ tablet, Take 1 tablet (20 mEq total) by mouth once daily. Take 1 tablet (20 meq) by mouth 3 times a day x 7 days. Then daily., Disp: 60 tablet, Rfl: 0    promethazine (PHENERGAN) 25 MG tablet, TAKE ONE TABLET (25MG TOTAL) BY MOUTH EVERY 4 TO 6 HOURS AS NEEDED, Disp: 30 tablet, Rfl: 5    promethazine-codeine 6.25-10 mg/5 ml (PHENERGAN WITH CODEINE) 6.25-10 mg/5 mL syrup, TAKE 5 ML BY MOUTH EVERY 6 HOURS AS NEEDED FOR COUGH, Disp: 450 mL, Rfl: 0    psyllium husk, with sugar, (METAMUCIL, WITH SUGAR,) 3.4 gram packet, Take 1 packet by mouth 2 (two) times daily., Disp: 30 packet, Rfl: 2    rosuvastatin (CRESTOR) 40 MG Tab, Take 1 tablet (40 mg total) by mouth every evening. For cholesterol, Disp: 90 tablet, Rfl: 3    zolpidem (AMBIEN) 10 mg Tab, TAKE ONE TABLET BY MOUTH EVERY NIGHT AT BEDTIME, Disp: 30 tablet, Rfl: 3  No current facility-administered medications for this visit.          Objective:   Vitals:  Blood pressure " "119/77, pulse 82, temperature 97.2 °F (36.2 °C), resp. rate 18, height 5' 10" (1.778 m), weight 68 kg (150 lb), last menstrual period 01/16/2016, SpO2 98 %.    Physical Examination:   GEN: no apparent distress, comfortable, Brace in place.  HEAD: atraumatic and normocephalic  EYES: no pallor, no icterus  ENT: no pharyngeal erythema.  The cellulitis at L cheek area is resolved, after antibiotics.  NECK: noLAD/LN's, supple  CV:  No edema  CHEST: Normal respiratory effort   she is not  tender over the right posterior chest wall on exam.  The chest wall is not swollen.  ABDOM:  nondistended; soft                                                                                          MUSC/Skeletal: ROM normal, Tender over mid T spine area.  EXTREM: no swelling  SKIN: She is developing keloid changes at L breast incision and navel surgical sites.  This area appears to be enlarg  NEURO: grossly intact  PSYCH: normal mood, affect and behavior  LYMPH: normal  LN's  BREASTS: L breast is much improved.    Refill Oxycodone, SOMA, Norco.    Tumor markers are improved.    K is 4, up from 2.4.  Decrease KCL from TID to BID.    Assessment:   (1) 62 y.o. female with diagnosis of Stage 1 triple negative R breast cancer, 10/2016.       S/P R mastectomy, SNBx, AC x's 4, T x's12 weeks, Rad Rx and recent reconstruction.    (2) New L invasive ductal Ca, ERP+, PRP+, H2N neg.  Clinical stage 1 dx.    I have started her on Arimidex 1 mg p.o. daily  for metastatic disease at this time.    Suspected fracture of right ribs after recent cough event, check rib x-rays, medicate for pain.    ERP+ dx, bone metastases.  Started on Arimidex daily.  Add 2nd hormonal drug Rx after Rad Rx completed.    Bone biopsy positive for metastatic breast cancer.  ERP+, PRP+, H2N-.    Now on Genzar, Carboplatin.  Improved.  PET 12/15/22.  Improving metastatic dx on present Rx.    Check PET now.  RTC after PET.     "

## 2023-05-29 NOTE — PROGRESS NOTES
Population Health Chart Review & Patient Outreach Details:     Reason for Outreach Encounter:     [x]  Non-Compliant Report   []  Payor Report ([employer], PHN, BCBS, MSSP, MCIP, C, etc.)   []  Pre-Visit Chart Review     Updates Requested / Reviewed:     [x]  Care Everywhere    [x]     [x]  External Sources ([facility], [facility], DIS, etc.)   [x]  Care Team Updated    Patient Outreach Method:    []  Telephone Outreach Completed   [] Successful   [] Left Voicemail   [] Unable to Contact (wrong number, no voicemail)  []  [employer] Portal Outreach Sent  [x]  Letter Outreach Mailed  []  Fax Sent for External Records  []  External Records Upload    Health Maintenance Topics Addressed and Outreach Outcomes / Actions Taken:        [x]      Breast Cancer Screening []  Mammo Scheduled      []  External Records Requested     []  Added Reminder to Complete to Upcoming Primary Care Appt Notes     []  Patient Declined     []  Patient Will Call Back to Schedule     []  Patient Will Schedule with External Provider / Order Routed if Applicable             [x]       Cervical Cancer Screening []  Pap Scheduled      []  External Records Requested     []  Added Reminder to Complete to Upcoming Primary Care Appt Notes     []  Patient Declined     []  Patient Will Call Back to Schedule     []  Patient Will Schedule with External Provider               [x]          Colorectal Cancer Screening []  Colonoscopy Case Request or Referral Placed     []  External Records Requested     []  Added Reminder to Complete to Upcoming Primary Care Appt Notes     []  Patient Declined     []  Patient Will Call Back to Schedule     []  Patient Will Schedule with External Provider     []  Fit Kit Mailed (add the SmartPhrase under additional notes)     []  Reminded Patient to Complete Home Test             [x]      Diabetic Eye Exam []  Eye Camera Scheduled or Optometry Referral Placed     []  External Records Requested     []  Added Reminder to  Complete to Upcoming Primary Care Appt Notes     []  Patient Declined     []  Patient Will Call Back to Schedule     []  Patient Will Schedule with External Provider             []      Blood Pressure Control []  Primary Care Follow Up Visit Scheduled     []  Remote Blood Pressure Reading Captured     []  Added Reminder to Complete to Upcoming Primary Care Appt Notes     []  Patient Declined     []  Patient Will Call Back / Patient Will Send Portal Message with Reading     []  Patient Will Call Back to Schedule Provider Visit             [x]       HbA1c & Other Labs []  Lab Appt Scheduled for Due Labs     []  Primary Care Follow Up Visit Scheduled      []  Reminded Patient to Complete Home Test     []  Added Reminder to Complete to Upcoming Primary Care Appt Notes     []  Patient Declined     []  Patient Will Call Back to Schedule     []  Patient Will Schedule with External Provider / Order Routed if Applicable           []    Schedule Primary Care Appt []  Primary Care Appt Scheduled     []  Patient Declined     []  Patient Will Call Back to Schedule     []  Pt Established with External Provider & Updated Care Team             []      Medication Adherence []  Primary Care Appointment Scheduled     []  Added Reminder to Upcoming Primary Care Appt Notes     []  Patient Reminded to  Prescription     []  Patient Declined, Provider Notified if Needed     []  Sent Provider Message to Review and/or Add Exclusion to Problem List             []      Osteoporosis Screening []  DXA Appointment Scheduled     []  External Records Requested     []  Added Reminder to Complete to Upcoming Primary Care Appt Notes     []  Patient Declined     []  Patient Will Call Back to Schedule     []  Patient Will Schedule with External Provider / Order Routed if Applicable     Additional Care Coordinator Notes:         Further Action Needed If Patient Returns Outreach:

## 2023-05-29 NOTE — LETTER
May 29, 2023    Negrita Castro  [address] [id number]             [facility]  [address]  [facility] [address]  Phone: [phone] [facility] is committed to your overall health.  To help you get the most out of each of your visits, we will review your information to make sure you are up to date on all of your recommended tests and/or procedures.       Your PCP  Manuelito Shepard MD   found that you may be due for:     Health Maintenance Due   Topic Date Due    Hepatitis C Screening  Never done    Cervical Cancer Screening  Never done    COVID-19 Vaccine (1) Never done    Pneumococcal Vaccines (Age 0-64) (1 - PCV) Never done    Eye Exam  Never done    HIV Screening  Never done    Shingles Vaccine (1 of 2) Never done    Colorectal Cancer Screening  Never done    Mammogram  02/23/2022    Foot Exam  08/12/2022         If you have had any of the above done at another facility, please let us know where you went so that we can request your record. So that your chart with [facility] will be complete.  If you would like to schedule any of these, please contact us at [phone].      Thank you for choosing [facility] .    Kaylyn MOLINA  Clinical Care Coordinator    [facility]  [phone] (Phone)  [phone] (Fax)

## 2023-05-30 NOTE — TELEPHONE ENCOUNTER
Patient called in and reported that she had a cough that is having bad coughing spells and spitting up blood at times. Reviewed with Dr Vick and per his verbal order, orders placed for antibiotics and tessalon perles. Patient verbalized understanding of above.

## 2023-06-01 NOTE — TELEPHONE ENCOUNTER
Dr Vick reviewed patient's critically low potassium of 2.6 and per his verbal order, patient to increase her potassium to 20 mEq three times per day for 1 week, then decrease her potassium to 20 mEq twice per day. Patient verbalized understanding of above.

## 2023-06-01 NOTE — TELEPHONE ENCOUNTER
Dr Vick reviewed patient's labs including critical platelet of 41 and per his verbal order, patient to get 2 units of blood tomorrow. Patient made aware of above and verbalized understanding.

## 2023-06-02 NOTE — PROGRESS NOTES
BLOOD TRANSFUSION COMPLETE, PORT A CATH HEPARINIZED AND DEACCESSED.  PATIENT TOLERATED WELL AND SHOWED NO SIGNS OR SYMPTOMS OF TRANSFUSION REACTION.

## 2023-06-02 NOTE — PROGRESS NOTES
PORT A CATH ACCESSED USING STERILE TECHNIQUE, POSITIVE BLOOD RETURN NOTED, FLUSHED WITHOUT DIFFICULTY. TEGADERM PLACED AND GREEN ALCOHOL CAP APPLIED.

## 2023-06-02 NOTE — TELEPHONE ENCOUNTER
Potassium returned 2.6.  She is on potassium 20 mEq daily.  Ask her to increase the potassium to 20 mEq t.i.d. x1 week and then reduce the dose to b.i.d. thereafter.  Her hemoglobin was in the 7.5 range and I believe we have given her 2 units of packed red blood cells.

## 2023-06-09 NOTE — PLAN OF CARE
Problem: Fatigue  Goal: Improved Activity Tolerance  Intervention: Promote Improved Energy  Flowsheets (Taken 6/9/2023 1017)  Fatigue Management: fatigue-related activity identified

## 2023-06-12 NOTE — PROGRESS NOTES
"  [facility] In Office Hematology Oncology Subsequent  Encounter Note    6/9/23      Subjective:      Patient ID:   Negrita Castro  62 y.o.   Martínez Shepard R. Leblanc, Babycos, Bourgeois, Hall      Chief Complaint:    L breast cancer     HPI:  62 y.o. female with diagnosis of Stage 1 R breast cancer 10/26/15.  "Triple negative".  Adjuvant AC x's 4, tx's 12 and Rad Rx through 9/12/16.      Had bilateral reconstruction surgery 4/14/17.  Further reconstruction, tumsofy baxterck 8/14/17.  Additional fat injection at R chest done for symmetry.  She had placement of R nipple.  Dr. Hernandez.      She had additional reconstructive surgery at breast and abdomenal areas.   The date of the surgery was May 19th.      Recent Mamm/U/S showed small mass at 4:00 at L breast.  Needle Bx invasive ductal Ca, ERP+, PRP+, H2N neg  She had  L partial mastectomy, Sentinal node Bx 8/24/20. Followed by another surgery because of L axillary infection.  Primary 2 cm, LN neg, Stage 1 dx.    Discussed Oncotype Dx testing, this supported adding adjuvant chemotherapy to adjuvant hormonal therapy because of increased risk of cancer recurrence long-term.  Discussed BRCA testing, given her bilateral  breast Dx. PHN would not authorize BRCA 1 & 2.  Refill meds for her.    She had COVID-19 infection and was hospitalized.  She came very close to requiring intubation and mechanical ventilation.  She is off oxygen now,   and sees Dr. Justni of Pulmonary.  Recent CT scan of the chest showed residual changes of interstitial disease, consistent with recovering from COVID-19.  Also lytic lesions were seen in the thoracic spine area very suspicious for metastatic disease at T5 through T8 spine.    Recently on Saturday night she had a hard coughing spell, and thereafter developed severe right posterior pleuritic chest pain.  She has point tenderness over the right posterior chest wall and may very well have a fractured rib at the site.  Rib x-rays have been " ordered.  I have refilled her Soma for 1 month.  I have refilled her Norco  for 1 month.  I have added oxycodone 5 mg 1 or 2 p.o. Q 3-4 hours p.r.n. breakthrough pain for 1 month to her regimen.    She also has an enlarging nodule at the left chest wall.  Ultrasound of the site suggested that this was a cyst however the areas firm movable and may very well be cancer.  I asked  for a ultrasound-guided biopsy of the mass per radiology.  Path report showed invasive ductal Ca, ERP + PRP neg,   H2N equivocal.    She is postmenopausal, her last menses were 4 5 years ago.  Rib x-rays have been requested.  Will order a CT scan to evaluate for possible metastatic disease.  Will order a ultrasound-guided biopsy of the left chest wall mass for pathologic examination at [facility].  Bx + invasive breast cancer.  ERP+, PRP-, H2N-.    Suspected local recurrence at L breast.  She had L lumpectomy.  Margins clear.    Refer to Rad Rx MD for adjuvant Rad Rx., for local control.  She has T spine metastases.  Currently on hormonal Rx.  To see Dr. Manuelito Shepard for DM, BS control.    Dr. Washington saw her for C spine eval, no surgery planned for now.  He referred her to Dr. Rivers for injection Rx.    DM, cholesterol, epilepsy hx, DJD, LBP.  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    LR shoulder arthroscopy, R wrist surgery, M0.    Smoke 1/4 ppd x's 35 years, quit 2015.  ETOH no.  Disability-depression, epilepsy  Allergy none    Mom ovarian cancer  Dad lung cancer  Sisters DM, lung dx.    Summary of care:  Right breast cancer 2015, triple negative, treated with Adriamycin Cytoxan followed by taxane.    Left breast cancer ERP positive PRP positive HER2 Judy negative in 2020    COVID infection 2020.  She has not taken the covid 19 vaccination because of multiple allergy hx.    2021 bone metastases determined.    He he 2021 local left breast cancer recurrence, ERP positive PRP  "negative HER2 Judy negative.    He April 21st bone biopsy positive for breast cancer metastatic.  ERP, PRP, HER2 Judy pending.    She was on radiation therapy through May 17, 2021.  Refilled Marinol at increased 5 mg 1 p.o. b.i.d. number 60.   I refilled her Soma, Norco, Marinol, and Silvadene cream; dated the prescriptions for June 17, 2021.    C/O mid thoracic pain x's 6-8 weeks,radiates to L & R, increased.  Tender over Mid thoracic area.  Hx of T spine metastatic dx.  Pain controlled on Norco 10/325 mg and oxycodone 5 mg 1-2 po PRN.  Add lodine 400 mg 1 daily    Checked MRI of T spine.  Metastatic dx with pathologic fx at T3,5,6.  Refer to Dr. Flowers of neurosurgery for Vertebroplasty or Kyphoplasty evaluation.    She is on Faslodex. Ibrance. Monitor WBC count.    She saw Dr. Flowers, and has been fitted with a brace initially, vertebroplasty after the first of the year.  [phone].  RTC Dr. Flowers 12/27/21.    No neuro surgery is planned for T-spine disease because of progressive growth and compression of the spine.  She admits to having decreased strength in the right hand.  She completed 10 of 10 radiation treatments on May 2, 2022. She is tapering down her Decadron 4 mg p.o. b.i.d. and she is taking Lodine 400 mg daily.  We are stopping fast low dex and Ibrance as of April 12, 2022.    Will ask the  to see her if we can get Meals on wheels for her.    She is due for an MRI on May 17, and a PET scan on May 19, I will see her on May 20th.  Plan to go with chemotherapy now, probably will go with carboplatin Gemzar.   She is 140 lb and 5 ft 11.     Today is D1C1 carbo, gemzar.  Duragesic patch 25 mch q 3 days x's 3 patches, start Tuesday.  Claritin 10 mg 1 po daily x's 4 days, start Wednesday.  For neulasta or Udenyca Thursday.    Resume Xgeva with D9 C1.  Check lab 7/14 weekly [facility] lab.    Working on getting a electric wheelchair.  On PT with "Hector".  Stronger.  Today chemo given.  Mass on back is " giving her pain, refer to Cory Vick MD for removal.  D1C6 9/1/22    Refill Soma, Oxycodone, Norco, Ambien on 9/1/22.  Due for refill 9/30/22.    She is doing better, with increased energy, 8/10.  Weight 154#.  Pain is better 8/10.  Improved R arm function.    Dr. Mckeon placed port.  PET 5/2022 increased dx in bones and liver.  Carbo Gemzar x's 6 months.  12/15/22 #10.  Breast cancer metastases are improved on current PET scan.    Port was placed.  Continues on chemo today.  PET recurrent liver metastases.  Review CAT with radiation MD.    ROS:   GEN: normal without any fever, night sweats or weight loss  HEENT: normal with no HA's, sore throat, stiff neck, changes in vision  CV: normal with no CP, SOB, PND, POOL or orthopnea  PULM: normal with no SOB, cough, hemoptysis, sputum or pleuritic pain  GI: normal with no abdominal pain, nausea, vomiting, constipation, diarrhea, melanotic stools, BRBPR, or hematemesis  : normal with no hematuria, dysuria  BREAST: See HPI.  SKIN: normal with no rash, erythema, bruising, or swelling     Past Medical History:   Diagnosis Date    Acute hypoxemic respiratory failure 12/12/2020    Breast cancer 10/26/2015    left breast    Cancer     RIGHT BREAST 10-15    Depression     Diabetes mellitus     Neuropathy     Panic attacks     S/P epidural steroid injection     Seizures     none since early 30's    Wears glasses     TO DRIVE     Past Surgical History:   Procedure Laterality Date    AXILLARY NODE DISSECTION Left 8/24/2020    Procedure: LYMPHADENECTOMY, AXILLARY;  Surgeon: Cory Vick MD;  Location: [facility];  Service: General;  Laterality: Left;  left breast mastectomy with possible axillary lymph node dissection    BREAST BIOPSY      right    BREAST LUMPECTOMY      BREAST RECONSTRUCTION      breast reconstruction with tummy [facility]      BREAST SURGERY      11-3-15 LUMPECTOMY, 12-2-15 REEXCISION    INCISION AND DRAINAGE OF ABSCESS Left 9/9/2020    Procedure:  INCISION AND DRAINAGE, ABSCESS;  Surgeon: Cory Vick MD;  Location: [facility] OR;  Service: General;  Laterality: Left;    INSERTION OF LOCALIZATION WIRE Left 8/24/2020    Procedure: INSERTION, LOCALIZATION WIRE;  Surgeon: Cory Vick MD;  Location: [facility] OR;  Service: General;  Laterality: Left;  left breast lumpectomy with wire needle loc    INSERTION OF TUNNELED CENTRAL VENOUS CATHETER (CVC) WITH SUBCUTANEOUS PORT Right 11/17/2022    Procedure: [id number];  Surgeon: Jeff Mckeon Jr., MD;  Location: [facility] OR;  Service: General;  Laterality: Right;    MASTECTOMY      mastectomy 2016      MASTECTOMY, PARTIAL Left 3/19/2021    Procedure: MASTECTOMY, PARTIAL;  Surgeon: Cory Vick MD;  Location: [facility] OR;  Service: General;  Laterality: Left;  lumpectomy  left breast     RECONSTRUCTION OF NIPPLE Right 11/5/2018    Procedure: RECONSTRUCTION-NIPPLE RIGHT;  Surgeon: Xiang Hernandez MD;  Location: [address];  Service: Plastics;  Laterality: Right;    SENTINEL LYMPH NODE BIOPSY Left 8/24/2020    Procedure: BIOPSY, LYMPH NODE, SENTINEL;  Surgeon: Cory Vick MD;  Location: [facility] OR;  Service: General;  Laterality: Left;  left breast lumpectomy with left sentinel lymoh node       shoulder surgery and wrist      BILAT  BONE SPUR    WRIST SURGERY      RIGHT       Review of patient's allergies indicates:   Allergen Reactions    Adhesive tape-silicones Hives     BANDAIDS    Polymycin Hives    Latex, natural rubber Itching    Polyurethane-39            Current Outpatient Medications:     amoxicillin (AMOXIL) 500 MG Tab, Take 1 po q 8 hours x's 10 days, Disp: 30 tablet, Rfl: 0    benzonatate (TESSALON) 100 MG capsule, Take 1 capsule (100 mg total) by mouth 3 (three) times daily as needed for Cough., Disp: 30 capsule, Rfl: 2    blood sugar diagnostic Strp, TEST GLUCOSE BID, Disp: 300 each, Rfl: 3    blood-glucose meter (TRUE METRIX GLUCOSE METER) kit, TEST GLUCOSE BID, Disp:  1 each, Rfl: 0    blood-glucose meter,continuous (DEXCOM G6 ) Misc, TEST GLUCOSE QID, Disp: 1 each, Rfl: 1    blood-glucose sensor (DEXCOM G6 SENSOR) Edwige, TEST GLUCOSE QID, Disp: 13 each, Rfl: 3    blood-glucose transmitter (DEXCOM G6 TRANSMITTER) Edwige, TEST GLUCOSE QID, Disp: 1 each, Rfl: 1    carisoprodoL (SOMA) 350 MG tablet, Take 1 tablet (350 mg total) by mouth 3 (three) times daily as needed for Muscle spasms., Disp: 90 tablet, Rfl: 0    dexAMETHasone (DECADRON) 4 MG Tab, TAKE ONE TABLET (4MG TOTAL) BY MOUTH TWICE DAILY WITH MEALS, Disp: 60 tablet, Rfl: 1    dextromethorphan-guaiFENesin  mg/5 ml (ROBITUSSIN-DM)  mg/5 mL liquid, Take 1 teaspoon q 6 hours prn cough. (Patient taking differently: Take 5 mLs by mouth. Take 1 teaspoon q 6 hours prn cough.), Disp: 300 mL, Rfl: 0    diazePAM (VALIUM) 10 MG Tab, Take 1 tablet (10 mg total) by mouth 4 (four) times daily as needed (anxiety)., Disp: 120 tablet, Rfl: 2    docusate sodium (COLACE) 100 MG capsule, Take 100 mg by mouth 2 (two) times daily., Disp: , Rfl:     droNABinol (MARINOL) 5 MG capsule, Take 1 capsule (5 mg total) by mouth 2 (two) times daily before meals., Disp: 60 capsule, Rfl: 0    dulaglutide (TRULICITY) 3 mg/0.5 mL pen injector, Inject 3 mg into the skin every 7 days., Disp: 4 pen, Rfl: 11    enalapril (VASOTEC) 5 MG tablet, Take 1 tablet (5 mg total) by mouth once daily., Disp: 90 tablet, Rfl: 3    etodolac (LODINE) 400 MG tablet, TAKE ONE TABLET (400 MG TOTAL) BY MOUTH ONCE DAILY, Disp: 30 tablet, Rfl: 2    fentaNYL (DURAGESIC) 25 mcg/hr, Place 1 patch onto the skin every 72 hours., Disp: 10 patch, Rfl: 0    fluconazole (DIFLUCAN) 150 MG Tab, TAKE ONE TABLET BY MOUTH ONCE FOR 1 DOSE, Disp: 1 tablet, Rfl: 2    furosemide (LASIX) 20 MG tablet, TAKE ONE TABLET BY MOUTH EVERY DAY, Disp: 30 tablet, Rfl: 6    HYDROcodone-acetaminophen (NORCO)  mg per tablet, Take 1 tablet by mouth every 6 (six) hours as needed for Pain., Disp:  "120 tablet, Rfl: 0    insulin glargine (LANTUS U-100 INSULIN) 100 unit/mL injection, Inject 32 Units into the skin 2 (two) times a day., Disp: 18 mL, Rfl: 11    lancets 32 gauge Misc, TEST GLUCOSE BID, Disp: 300 each, Rfl: 3    metFORMIN (GLUCOPHAGE-XR) 500 MG ER 24hr tablet, Take 2 tablets (1,000 mg total) by mouth 2 (two) times daily with meals., Disp: 360 tablet, Rfl: 3    metoprolol tartrate (LOPRESSOR) 25 MG tablet, Take 1 tablet (25 mg total) by mouth 2 (two) times daily., Disp: 60 tablet, Rfl: 11    naloxegoL (MOVANTIK) 12.5 mg Tab, Take 12.5 mg by mouth once daily., Disp: 30 tablet, Rfl: 3    ondansetron (ZOFRAN-ODT) 8 MG TbDL, dissolve 1 TABLET ON THE TONGUE EVERY 6 TO 8 HOURS AS NEEDED FOR nausea, Disp: 60 tablet, Rfl: 2    oxyCODONE (ROXICODONE) 15 MG Tab, Take 1 tablet (15 mg total) by mouth every 4 to 6 hours as needed for Pain., Disp: 120 tablet, Rfl: 0    palbociclib 100 mg Tab, Take 100 mg by mouth once daily. for 21 days, then take 7 days off. Total cycle length 28 days, Disp: 21 tablet, Rfl: 6    pen needle, diabetic (BD ULTRA-FINE MARCE PEN NEEDLE) 32 gauge x 5/32" Ndle, Test blood sugar twice daily, Disp: 100 each, Rfl: 5    potassium chloride SA (K-DUR,KLOR-CON) 20 MEQ tablet, Take 1 tablet (20 meq) by mouth 3 times a day x 7 days. Then twice daily., Disp: 60 tablet, Rfl: 11    promethazine (PHENERGAN) 25 MG tablet, TAKE ONE TABLET (25MG TOTAL) BY MOUTH EVERY 4 TO 6 HOURS AS NEEDED, Disp: 30 tablet, Rfl: 5    promethazine-codeine 6.25-10 mg/5 ml (PHENERGAN WITH CODEINE) 6.25-10 mg/5 mL syrup, TAKE 5 ML BY MOUTH EVERY 6 HOURS AS NEEDED FOR COUGH, Disp: 450 mL, Rfl: 0    psyllium husk, with sugar, (METAMUCIL, WITH SUGAR,) 3.4 gram packet, Take 1 packet by mouth 2 (two) times daily., Disp: 30 packet, Rfl: 2    rosuvastatin (CRESTOR) 40 MG Tab, Take 1 tablet (40 mg total) by mouth every evening. For cholesterol, Disp: 90 tablet, Rfl: 3    zolpidem (AMBIEN) 10 mg Tab, TAKE ONE TABLET BY MOUTH EVERY " "NIGHT AT BEDTIME, Disp: 30 tablet, Rfl: 3    Current Facility-Administered Medications:     0.9%  NaCl infusion (for blood administration), , Intravenous, Once, R Nhan Vick MD, Stopped at 06/02/23 1200          Objective:   Vitals:  Blood pressure (!) 145/88, pulse 88, temperature 97.5 °F (36.4 °C), resp. rate 18, height 5' 11" (1.803 m), weight 67.5 kg (148 lb 14.4 oz), last menstrual period 01/16/2016, SpO2 97 %.    Physical Examination:   GEN: no apparent distress, comfortable, Brace in place.  HEAD: atraumatic and normocephalic  EYES: no pallor, no icterus  ENT: no pharyngeal erythema.  The cellulitis at L cheek area is resolved, after antibiotics.  NECK: noLAD/LN's, supple  CV:  No edema  CHEST: Normal respiratory effort   she is not  tender over the right posterior chest wall on exam.  The chest wall is not swollen.  ABDOM:  nondistended; soft                                                                                          MUSC/Skeletal: ROM normal, Tender over mid T spine area.  EXTREM: no swelling  SKIN: She is developing keloid changes at L breast incision and navel surgical sites.  This area appears to be enlarg  NEURO: grossly intact  PSYCH: normal mood, affect and behavior  LYMPH: normal  LN's  BREASTS: L breast is much improved.    Refill Oxycodone, SOMA, Norco.    Tumor markers are improved.    K is 4, up from 2.4.  Decrease KCL from TID to BID.    Assessment:   (1) 62 y.o. female with diagnosis of Stage 1 triple negative R breast cancer, 10/2016.       S/P R mastectomy, SNBx, AC x's 4, T x's12 weeks, Rad Rx and recent reconstruction.    (2) New L invasive ductal Ca, ERP+, PRP+, H2N neg.  Clinical stage 1 dx.    I have started her on Arimidex 1 mg p.o. daily  for metastatic disease at this time.    Suspected fracture of right ribs after recent cough event, check rib x-rays, medicate for pain.    ERP+ dx, bone metastases.  Started on Arimidex daily.  Add 2nd hormonal drug Rx after Rad Rx " completed.    Bone biopsy positive for metastatic breast cancer.  ERP+, PRP+, H2N-.    Now on Genzar, Carboplatin.  Improved.  PET 12/15/22.  Improving metastatic dx on present Rx.    Pet now recurrent liver metastases.  RTC 1 week.

## 2023-06-17 NOTE — PROGRESS NOTES
"  [facility] In Office Hematology Oncology Subsequent  Encounter Note    6/15/23      Subjective:      Patient ID:   Negrita Castro  63 y.o.   Martínez Shepard R. Leblanc, Babycos, Bourgeois, Hall      Chief Complaint:    L breast cancer     HPI:  63 y.o. female with diagnosis of Stage 1 R breast cancer 10/26/15.  "Triple negative".  Adjuvant AC x's 4, tx's 12 and Rad Rx through 9/12/16.      Had bilateral reconstruction surgery 4/14/17.  Further reconstruction, tumsofy baxterck 8/14/17.  Additional fat injection at R chest done for symmetry.  She had placement of R nipple.  Dr. Hernandez.      She had additional reconstructive surgery at breast and abdomenal areas.   The date of the surgery was May 19th.      Recent Mamm/U/S showed small mass at 4:00 at L breast.  2020.  Needle Bx invasive ductal Ca, ERP+, PRP+, H2N neg  She had  L partial mastectomy, Sentinal node Bx 8/24/20. Followed by another surgery because of L axillary infection.  Primary 2 cm, LN neg, Stage 1 dx.    Discussed Oncotype Dx testing, this supported adding adjuvant chemotherapy to adjuvant hormonal therapy because of increased risk of cancer recurrence long-term.  Discussed BRCA testing, given her bilateral  breast Dx. PHN would not authorize BRCA 1 & 2.    She had COVID-19 infection and was hospitalized.  She came very close to requiring intubation and mechanical ventilation.  She is off oxygen now,   and sees Dr. Justin of Pulmonary.  Recent CT scan of the chest showed residual changes of interstitial disease, consistent with recovering from COVID-19.  Also lytic lesions were seen in the thoracic spine area very suspicious for metastatic disease at T5 through T8 spine.    Recently on Saturday night she had a hard coughing spell, and thereafter developed severe right posterior pleuritic chest pain.  She has point tenderness over the right posterior chest wall and may very well have a fractured rib at the site.  Rib x-rays have been ordered.  I " have refilled her Soma for 1 month.  I have refilled her Norco  for 1 month.  I have added oxycodone 5 mg 1 or 2 p.o. Q 3-4 hours p.r.n. breakthrough pain for 1 month to her regimen.    She also has an enlarging nodule at the left chest wall.  Ultrasound of the site suggested that this was a cyst however the areas firm movable and may very well be cancer.  I asked  for a ultrasound-guided biopsy of the mass per radiology.  Path report showed invasive ductal Ca, ERP + PRP neg,   H2N equivocal.    She is postmenopausal, her last menses were 4 5 years ago.  Rib x-rays have been requested.  Will order a CT scan to evaluate for possible metastatic disease.  Will order a ultrasound-guided biopsy of the left chest wall mass for pathologic examination at [facility].  Bx + invasive breast cancer.  ERP+, PRP-, H2N-.    Suspected local recurrence at L breast.  She had L lumpectomy.  Margins clear.    Referred  to Rad Rx MD for adjuvant Rad Rx., for local control.  She has T spine metastases.  Currently on hormonal Rx.  To see Dr. Manuelito Shepard for DM, BS control.    Dr. Washington saw her for C spine eval, no surgery planned for now.  He referred her to Dr. Rivers for injection Rx.    DM, cholesterol, epilepsy hx, DJD, LBP.  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    LR shoulder arthroscopy, R wrist surgery, M0.    Smoke 1/4 ppd x's 35 years, quit 2015.  ETOH no.  Disability-depression, epilepsy  Allergy none    Mom ovarian cancer  Dad lung cancer  Sisters DM, lung dx.    Summary of care:  Right breast cancer 2015, triple negative, treated with Adriamycin Cytoxan followed by taxane.    Left breast cancer ERP positive PRP positive HER2 Judy negative in 2020    COVID infection 2020.  She has not taken the covid 19 vaccination because of multiple allergy hx.    2021 bone metastases determined.    2021 local left breast cancer recurrence, ERP positive PRP negative HER2 Judy  "negative.    He April 21st bone biopsy positive for breast cancer metastatic.  ERP, PRP, HER2 Jduy pending.    She was on radiation therapy through May 17, 2021.  Refilled Marinol at increased 5 mg 1 p.o. b.i.d. number 60.   I refilled her Soma, Norco, Marinol, and Silvadene cream; dated the prescriptions for June 17, 2021.    C/O mid thoracic pain x's 6-8 weeks,radiates to L & R, increased.  Tender over Mid thoracic area.  Hx of T spine metastatic dx.  Pain controlled on Norco 10/325 mg and oxycodone 5 mg 1-2 po PRN.  Add lodine 400 mg 1 daily    Checked MRI of T spine.  Metastatic dx with pathologic fx at T3,5,6.  Refer to Dr. Flowers of neurosurgery for Vertebroplasty or Kyphoplasty evaluation.    She is on Faslodex. Ibrance. Monitor WBC count.    She saw Dr. Flowers, and has been fitted with a brace initially, vertebroplasty after the first of the year.  [phone].  RTC Dr. Flowers 12/27/21.    No neuro surgery is planned for T-spine disease because of progressive growth and compression of the spine.  She admits to having decreased strength in the right hand.  She completed 10 of 10 radiation treatments on May 2, 2022. She is tapering down her Decadron 4 mg p.o. b.i.d. and she is taking Lodine 400 mg daily.  We are stopping fast low dex and Ibrance as of April 12, 2022.    She had an MRI on May 17, and a PET scan on May 19, I saw her on May 20th.  Plan to go with chemotherapy now, probably will go with carboplatin Gemzar.   She is 140 lb and 5 ft 11.     D1C1 carbo, gemzar. Continued over the past year.  Duragesic patch 25 mch q 3 days x's 3 patches, start Tuesday.  Claritin 10 mg 1 po daily x's 4 days, start Wednesday.    Resume Xgeva with D9 C1.  Check lab 7/14 weekly [facility] lab.    Got her a electric wheelchair.  On PT with "Hector".  Stronger.  Mass on back is giving her pain, refer to Cory Peraza MD for removal.  D1C6 9/1/22    Refill Soma, Oxycodone, Norco, Ambien on 9/1/22.  Due for refill 9/30/22.    She " is doing better, with increased energy, 8/10.  Weight 154#.  Pain is better 8/10.  Improved R arm function.    Dr. Mckeon placed port.  PET 5/2022 increased dx in bones and liver.  Carbo Gemzar x's 6 months.  12/15/22 #10.  Breast cancer metastases are improved on current PET scan.    Port was placed.  Continues on chemo .  PET recurrent liver metastases.  Review CAT with radiation MD.  Reviewed the PET with the radiologist.  The liver mets had resolved 1/2023, but on present study have recurred.  Hx of bilateral breast cancer.  One was tripple negative.  One was ERP +.  Discussed further hormonal Rx, chemo therapy, immuno oncology Rx and   Antibody/Chemo conjugate, Trodelvy.    Discussed and she agrees to liver mass Bx.  RTC 3 weeks.    ROS:   GEN: normal without any fever, night sweats or weight loss  HEENT: normal with no HA's, sore throat, stiff neck, changes in vision  CV: normal with no CP, SOB, PND, POOL or orthopnea  PULM: normal with no SOB, cough, hemoptysis, sputum or pleuritic pain  GI: normal with no abdominal pain, nausea, vomiting, constipation, diarrhea, melanotic stools, BRBPR, or hematemesis  : normal with no hematuria, dysuria  BREAST: See HPI.  SKIN: normal with no rash, erythema, bruising, or swelling     Past Medical History:   Diagnosis Date    Acute hypoxemic respiratory failure 12/12/2020    Breast cancer 10/26/2015    left breast    Cancer     RIGHT BREAST 10-15    Depression     Diabetes mellitus     Neuropathy     Panic attacks     S/P epidural steroid injection     Seizures     none since early 30's    Wears glasses     TO DRIVE     Past Surgical History:   Procedure Laterality Date    AXILLARY NODE DISSECTION Left 8/24/2020    Procedure: LYMPHADENECTOMY, AXILLARY;  Surgeon: Cory Vick MD;  Location: [facility] OR;  Service: General;  Laterality: Left;  left breast mastectomy with possible axillary lymph node dissection    BREAST BIOPSY      right    BREAST LUMPECTOMY       BREAST RECONSTRUCTION      breast reconstruction with tummy [facility]      BREAST SURGERY      11-3-15 LUMPECTOMY, 12-2-15 REEXCISION    INCISION AND DRAINAGE OF ABSCESS Left 9/9/2020    Procedure: INCISION AND DRAINAGE, ABSCESS;  Surgeon: Cory Vick MD;  Location: [facility] OR;  Service: General;  Laterality: Left;    INSERTION OF LOCALIZATION WIRE Left 8/24/2020    Procedure: INSERTION, LOCALIZATION WIRE;  Surgeon: Cory Vick MD;  Location: [facility] OR;  Service: General;  Laterality: Left;  left breast lumpectomy with wire needle loc    INSERTION OF TUNNELED CENTRAL VENOUS CATHETER (CVC) WITH SUBCUTANEOUS PORT Right 11/17/2022    Procedure: [id number];  Surgeon: Jeff Mckeon Jr., MD;  Location: [facility] OR;  Service: General;  Laterality: Right;    MASTECTOMY      mastectomy 2016      MASTECTOMY, PARTIAL Left 3/19/2021    Procedure: MASTECTOMY, PARTIAL;  Surgeon: Cory Vick MD;  Location: [facility] OR;  Service: General;  Laterality: Left;  lumpectomy  left breast     RECONSTRUCTION OF NIPPLE Right 11/5/2018    Procedure: RECONSTRUCTION-NIPPLE RIGHT;  Surgeon: Xiang Hernandez MD;  Location: [address];  Service: Plastics;  Laterality: Right;    SENTINEL LYMPH NODE BIOPSY Left 8/24/2020    Procedure: BIOPSY, LYMPH NODE, SENTINEL;  Surgeon: Cory Vick MD;  Location: [facility] OR;  Service: General;  Laterality: Left;  left breast lumpectomy with left sentinel lymoh node       shoulder surgery and wrist      BILAT  BONE SPUR    WRIST SURGERY      RIGHT       Review of patient's allergies indicates:   Allergen Reactions    Adhesive tape-silicones Hives     BANDAIDS    Polymycin Hives    Latex, natural rubber Itching    Polyurethane-39            Current Outpatient Medications:     amoxicillin (AMOXIL) 500 MG Tab, Take 1 po q 8 hours x's 10 days, Disp: 30 tablet, Rfl: 0    benzonatate (TESSALON) 100 MG capsule, Take 1 capsule (100 mg total) by mouth 3 (three) times daily as  needed for Cough., Disp: 30 capsule, Rfl: 2    blood sugar diagnostic Strp, TEST GLUCOSE BID, Disp: 300 each, Rfl: 3    blood-glucose meter,continuous (DEXCOM G6 ) Misc, TEST GLUCOSE QID, Disp: 1 each, Rfl: 1    blood-glucose sensor (DEXCOM G6 SENSOR) Edwige, TEST GLUCOSE QID, Disp: 13 each, Rfl: 3    blood-glucose transmitter (DEXCOM G6 TRANSMITTER) Edwige, TEST GLUCOSE QID, Disp: 1 each, Rfl: 1    dexAMETHasone (DECADRON) 4 MG Tab, TAKE ONE TABLET (4MG TOTAL) BY MOUTH TWICE DAILY WITH MEALS, Disp: 60 tablet, Rfl: 1    dextromethorphan-guaiFENesin  mg/5 ml (ROBITUSSIN-DM)  mg/5 mL liquid, Take 1 teaspoon q 6 hours prn cough. (Patient taking differently: Take 5 mLs by mouth. Take 1 teaspoon q 6 hours prn cough.), Disp: 300 mL, Rfl: 0    diazePAM (VALIUM) 10 MG Tab, Take 1 tablet (10 mg total) by mouth 4 (four) times daily as needed (anxiety)., Disp: 120 tablet, Rfl: 2    docusate sodium (COLACE) 100 MG capsule, Take 100 mg by mouth 2 (two) times daily., Disp: , Rfl:     droNABinol (MARINOL) 5 MG capsule, Take 1 capsule (5 mg total) by mouth 2 (two) times daily before meals., Disp: 60 capsule, Rfl: 0    dulaglutide (TRULICITY) 3 mg/0.5 mL pen injector, Inject 3 mg into the skin every 7 days., Disp: 4 pen, Rfl: 11    enalapril (VASOTEC) 5 MG tablet, Take 1 tablet (5 mg total) by mouth once daily., Disp: 90 tablet, Rfl: 3    etodolac (LODINE) 400 MG tablet, TAKE ONE TABLET (400 MG TOTAL) BY MOUTH ONCE DAILY, Disp: 30 tablet, Rfl: 2    fluconazole (DIFLUCAN) 150 MG Tab, TAKE ONE TABLET BY MOUTH ONCE FOR 1 DOSE, Disp: 1 tablet, Rfl: 2    furosemide (LASIX) 20 MG tablet, TAKE ONE TABLET BY MOUTH EVERY DAY, Disp: 30 tablet, Rfl: 6    lancets 32 gauge Misc, TEST GLUCOSE BID, Disp: 300 each, Rfl: 3    metFORMIN (GLUCOPHAGE-XR) 500 MG ER 24hr tablet, Take 2 tablets (1,000 mg total) by mouth 2 (two) times daily with meals., Disp: 360 tablet, Rfl: 3    metoprolol tartrate (LOPRESSOR) 25 MG tablet, Take 1 tablet  "(25 mg total) by mouth 2 (two) times daily., Disp: 60 tablet, Rfl: 11    naloxegoL (MOVANTIK) 12.5 mg Tab, Take 12.5 mg by mouth once daily., Disp: 30 tablet, Rfl: 3    ondansetron (ZOFRAN-ODT) 8 MG TbDL, dissolve 1 TABLET ON THE TONGUE EVERY 6 TO 8 HOURS AS NEEDED FOR nausea, Disp: 60 tablet, Rfl: 2    palbociclib 100 mg Tab, Take 100 mg by mouth once daily. for 21 days, then take 7 days off. Total cycle length 28 days, Disp: 21 tablet, Rfl: 6    pen needle, diabetic (BD ULTRA-FINE MARCE PEN NEEDLE) 32 gauge x 5/32" Ndle, Test blood sugar twice daily, Disp: 100 each, Rfl: 5    potassium chloride SA (K-DUR,KLOR-CON) 20 MEQ tablet, Take 1 tablet (20 meq) by mouth 3 times a day x 7 days. Then twice daily., Disp: 60 tablet, Rfl: 11    promethazine (PHENERGAN) 25 MG tablet, TAKE ONE TABLET (25MG TOTAL) BY MOUTH EVERY 4 TO 6 HOURS AS NEEDED, Disp: 30 tablet, Rfl: 5    promethazine-codeine 6.25-10 mg/5 ml (PHENERGAN WITH CODEINE) 6.25-10 mg/5 mL syrup, TAKE 5 ML BY MOUTH EVERY 6 HOURS AS NEEDED FOR COUGH, Disp: 450 mL, Rfl: 0    psyllium husk, with sugar, (METAMUCIL, WITH SUGAR,) 3.4 gram packet, Take 1 packet by mouth 2 (two) times daily., Disp: 30 packet, Rfl: 2    rosuvastatin (CRESTOR) 40 MG Tab, Take 1 tablet (40 mg total) by mouth every evening. For cholesterol, Disp: 90 tablet, Rfl: 3    zolpidem (AMBIEN) 10 mg Tab, TAKE ONE TABLET BY MOUTH EVERY NIGHT AT BEDTIME, Disp: 30 tablet, Rfl: 3    blood-glucose meter (TRUE METRIX GLUCOSE METER) kit, TEST GLUCOSE BID, Disp: 1 each, Rfl: 0    carisoprodoL (SOMA) 350 MG tablet, Take 1 tablet (350 mg total) by mouth 3 (three) times daily as needed for Muscle spasms., Disp: 90 tablet, Rfl: 0    fentaNYL (DURAGESIC) 25 mcg/hr, Place 1 patch onto the skin every 72 hours., Disp: 10 patch, Rfl: 0    HYDROcodone-acetaminophen (NORCO)  mg per tablet, Take 1 tablet by mouth every 6 (six) hours as needed for Pain., Disp: 120 tablet, Rfl: 0    insulin glargine (LANTUS U-100 " "INSULIN) 100 unit/mL injection, Inject 32 Units into the skin 2 (two) times a day., Disp: 18 mL, Rfl: 11    oxyCODONE (ROXICODONE) 15 MG Tab, Take 1 tablet (15 mg total) by mouth every 4 to 6 hours as needed for Pain., Disp: 120 tablet, Rfl: 0    Current Facility-Administered Medications:     0.9%  NaCl infusion (for blood administration), , Intravenous, Once, R Nhan Vick MD, Stopped at 06/02/23 1200          Objective:   Vitals:  Blood pressure (!) 143/81, pulse 97, temperature 97.9 °F (36.6 °C), resp. rate 18, height 5' 11" (1.803 m), weight 66.7 kg (147 lb), last menstrual period 01/16/2016.    Physical Examination:   GEN: no apparent distress, comfortable, Brace in place.  HEAD: atraumatic and normocephalic  EYES: no pallor, no icterus  ENT: no pharyngeal erythema.  The cellulitis at L cheek area is resolved, after antibiotics.  NECK: noLAD/LN's, supple  CV:  No edema  CHEST: Normal respiratory effort   she is not  tender over the right posterior chest wall on exam.  The chest wall is not swollen.  ABDOM:  nondistended; soft                                                                                          MUSC/Skeletal: ROM normal, Tender over mid T spine area.  EXTREM: no swelling  SKIN: She is developing keloid changes at L breast incision and navel surgical sites.  This area appears to be enlarg  NEURO: grossly intact  PSYCH: normal mood, affect and behavior  LYMPH: normal  LN's  BREASTS: L breast is much improved.    Refill Oxycodone, SOMA, Norco.    Tumor markers are improved.    K is 4, up from 2.4.  Decrease KCL from TID to BID.    Assessment:   (1) 63 y.o. female with diagnosis of Stage 1 triple negative R breast cancer, 10/2016.       S/P R mastectomy, SNBx, AC x's 4, T x's12 weeks, Rad Rx and recent reconstruction.    (2) New L invasive ductal Ca, ERP+, PRP+, H2N neg.  Clinical stage 1 dx.    I have started her on Arimidex 1 mg p.o. daily  for metastatic disease at this time.    Suspected " fracture of right ribs after recent cough event, check rib x-rays, medicate for pain.    ERP+ dx, bone metastases.  Started on Arimidex daily.  Add 2nd hormonal drug Rx after Rad Rx completed.    Bone biopsy positive for metastatic breast cancer.  ERP+, PRP+, H2N-.    Now on Genzar, Carboplatin.  Improved.  PET 12/15/22.  Improving metastatic dx on present Rx.    Pet now recurrent liver metastases.  Progressive Dx in liver.    For liver Bx, see discussion above.  RTC 3. week.

## 2023-06-18 PROBLEM — J18.9 PNEUMONIA: Status: ACTIVE | Noted: 2023-01-01

## 2023-06-18 PROBLEM — N17.9 AKI (ACUTE KIDNEY INJURY): Status: ACTIVE | Noted: 2023-01-01

## 2023-06-18 PROBLEM — R07.9 CHEST PAIN: Status: ACTIVE | Noted: 2017-02-02

## 2023-06-19 NOTE — PROGRESS NOTES
Pharmacist Renal Dose Adjustment Note    Negrita Castro is a 63 y.o. female being treated with the medication cefepime    Patient Data:    Vital Signs (Most Recent):  Temp: 98.5 °F (36.9 °C) (06/19/23 0805)  Pulse: 105 (06/19/23 0805)  Resp: 16 (06/19/23 0805)  BP: 121/66 (06/19/23 0805)  SpO2: 95 % (06/19/23 0805) Vital Signs (72h Range):  Temp:  [98.5 °F (36.9 °C)-99.2 °F (37.3 °C)]   Pulse:  []   Resp:  [16-22]   BP: (111-181)/()   SpO2:  [91 %-99 %]      Recent Labs   Lab 06/13/23  1105 06/18/23  1745   CREATININE 1.3 1.6*     Serum creatinine: 1.6 mg/dL (H) 06/18/23 1745  Estimated creatinine clearance: 38.1 mL/min (A)    Per [facility] renal dosing protocol:     Previous Order: cefepime 1 g Q 8 hours    Will be changed to:     New Order:cefepime 1 g Q 12 hours,    Due to: crcl 30-60 mL/min    Renal dose adjustments performed as noted above.    We will continue monitoring and adjusting as necessary.    Pharmacist: Rakesh Soni, PharmD  Ext: [phone]

## 2023-06-19 NOTE — H&P
[facility] - Emergency Dept  Hospital Medicine  History & Physical    Patient Name: Negrita Castro  MRN: [id number]  Patient Class: OP- Observation  Admission Date: 6/18/2023  Attending Physician: Eusebio Cervantes MD   Primary Care Provider: Manuelito Shepard MD         Patient information was obtained from patient and ER records.     Subjective:     Principal Problem:Pneumonia    Chief Complaint:   Chief Complaint   Patient presents with    Chest Pain     Right side into right shoulder. Hx of cancer. Mastectomy on right side.         HPI: Patient is a 63-year-old female with a history of hypertension, chronic back pain on oxycodone, Soma, OxyContin, metastatic breast cancer with metastatic disease to the liver and bone.  She says that yesterday she woke up with severe pain in her right lower chest which is worse with inspiration and coughing.  She says she also had weakness in her right arm at that time and took nearly all day to resolve.  She had pain in her neck radiating down her arm.  She is not had any shortness a breath.  She has had a productive cough for the last 2-3 weeks and was treated with antibiotics per Dr. Peraza as an outpatient.  She has no nausea or vomiting.  She denies fever but does endorse chills.  She is not had any sick contacts.  She has no other changes to her medication regimen.  She quit smoking in 2015.  She is not had lower extremity swelling or calf pain.      In the ED:  T 98.8°, P 93, R 18, /100, O2 sat 93% on room air.  She was given 2 L of oxygen.  CBC with WBC of 10.6 with left shift, hemoglobin 12.3, platelets 235.  Chemistry panel with K of 3.1, creatinine 1.6 (mild YIMI from baseline), , T bili 1.2, AST 55, ALT 27, lipase 44.  , troponin 9.7, lactic acid 1.5.  TSH 1.9.  UDS with opiates.  Urine otherwise unremarkable.  EKG without acute ischemic changes.  CTA of the chest and abdomen was performed.  There was no evidence of pulmonary  embolus.  She does have right lower lobe consolidation and parapneumonic effusion apparent.  She will be admitted for pneumonia with parapneumonic effusion, pain control, YIMI.        Past Medical History:   Diagnosis Date    Acute hypoxemic respiratory failure 12/12/2020    Breast cancer 10/26/2015    left breast    Cancer     RIGHT BREAST 10-15    Depression     Diabetes mellitus     Neuropathy     Panic attacks     S/P epidural steroid injection     Seizures     none since early 30's    Wears glasses     TO DRIVE       Past Surgical History:   Procedure Laterality Date    AXILLARY NODE DISSECTION Left 8/24/2020    Procedure: LYMPHADENECTOMY, AXILLARY;  Surgeon: Cory Vick MD;  Location: [facility] OR;  Service: General;  Laterality: Left;  left breast mastectomy with possible axillary lymph node dissection    BREAST BIOPSY      right    BREAST LUMPECTOMY      BREAST RECONSTRUCTION      breast reconstruction with tummy [facility]      BREAST SURGERY      11-3-15 LUMPECTOMY, 12-2-15 REEXCISION    INCISION AND DRAINAGE OF ABSCESS Left 9/9/2020    Procedure: INCISION AND DRAINAGE, ABSCESS;  Surgeon: Cory Vick MD;  Location: [facility] OR;  Service: General;  Laterality: Left;    INSERTION OF LOCALIZATION WIRE Left 8/24/2020    Procedure: INSERTION, LOCALIZATION WIRE;  Surgeon: Cory Vick MD;  Location: [facility] OR;  Service: General;  Laterality: Left;  left breast lumpectomy with wire needle loc    INSERTION OF TUNNELED CENTRAL VENOUS CATHETER (CVC) WITH SUBCUTANEOUS PORT Right 11/17/2022    Procedure: [id number]-[id number];  Surgeon: Jeff Mckeon Jr., MD;  Location: [facility] OR;  Service: General;  Laterality: Right;    MASTECTOMY      mastectomy 2016      MASTECTOMY, PARTIAL Left 3/19/2021    Procedure: MASTECTOMY, PARTIAL;  Surgeon: Cory Vick MD;  Location: [facility] OR;  Service: General;  Laterality: Left;  lumpectomy  left breast     RECONSTRUCTION OF NIPPLE Right 11/5/2018     Procedure: RECONSTRUCTION-NIPPLE RIGHT;  Surgeon: Xiang Hernandez MD;  Location: [facility] OR [address] [place] [address];  Service: Plastics;  Laterality: Right;    SENTINEL LYMPH NODE BIOPSY Left 8/24/2020    Procedure: BIOPSY, LYMPH NODE, SENTINEL;  Surgeon: Cory Vick MD;  Location: [facility] OR;  Service: General;  Laterality: Left;  left breast lumpectomy with left sentinel lymoh node       shoulder surgery and wrist      BILAT  BONE SPUR    WRIST SURGERY      RIGHT       Review of patient's allergies indicates:   Allergen Reactions    Adhesive tape-silicones Hives     BANDAIDS    Polymycin Hives    Adhesive     Latex, natural rubber Hives and Itching    Neomycin-bacitracnzn-polymyxnb     Polyurethane-39 Hives       Current Facility-Administered Medications on File Prior to Encounter   Medication    0.9%  NaCl infusion (for blood administration)     Current Outpatient Medications on File Prior to Encounter   Medication Sig    amoxicillin (AMOXIL) 500 MG Tab Take 1 po q 8 hours x's 10 days    benzonatate (TESSALON) 100 MG capsule Take 1 capsule (100 mg total) by mouth 3 (three) times daily as needed for Cough.    blood sugar diagnostic Strp TEST GLUCOSE BID    blood-glucose meter (TRUE METRIX GLUCOSE METER) kit TEST GLUCOSE BID    blood-glucose meter,continuous (DEXCOM G6 ) Misc TEST GLUCOSE QID    blood-glucose sensor (DEXCOM G6 SENSOR) Edwige TEST GLUCOSE QID    blood-glucose transmitter (DEXCOM G6 TRANSMITTER) Edwige TEST GLUCOSE QID    carisoprodoL (SOMA) 350 MG tablet Take 1 tablet (350 mg total) by mouth 3 (three) times daily as needed for Muscle spasms.    dexAMETHasone (DECADRON) 4 MG Tab TAKE ONE TABLET (4MG TOTAL) BY MOUTH TWICE DAILY WITH MEALS    dextromethorphan-guaiFENesin  mg/5 ml (ROBITUSSIN-DM)  mg/5 mL liquid Take 1 teaspoon q 6 hours prn cough. (Patient taking differently: Take 5 mLs by mouth. Take 1 teaspoon q 6 hours prn cough.)    diazePAM (VALIUM) 10 MG Tab Take  "1 tablet (10 mg total) by mouth 4 (four) times daily as needed (anxiety).    docusate sodium (COLACE) 100 MG capsule Take 100 mg by mouth 2 (two) times daily.    droNABinol (MARINOL) 5 MG capsule Take 1 capsule (5 mg total) by mouth 2 (two) times daily before meals.    dulaglutide (TRULICITY) 3 mg/0.5 mL pen injector Inject 3 mg into the skin every 7 days.    enalapril (VASOTEC) 5 MG tablet Take 1 tablet (5 mg total) by mouth once daily.    etodolac (LODINE) 400 MG tablet TAKE ONE TABLET (400 MG TOTAL) BY MOUTH ONCE DAILY    fentaNYL (DURAGESIC) 25 mcg/hr Place 1 patch onto the skin every 72 hours.    fluconazole (DIFLUCAN) 150 MG Tab TAKE ONE TABLET BY MOUTH ONCE FOR 1 DOSE    furosemide (LASIX) 20 MG tablet TAKE ONE TABLET BY MOUTH EVERY DAY    HYDROcodone-acetaminophen (NORCO)  mg per tablet Take 1 tablet by mouth every 6 (six) hours as needed for Pain.    insulin glargine (LANTUS U-100 INSULIN) 100 unit/mL injection Inject 32 Units into the skin 2 (two) times a day.    lancets 32 gauge Misc TEST GLUCOSE BID    metFORMIN (GLUCOPHAGE-XR) 500 MG ER 24hr tablet Take 2 tablets (1,000 mg total) by mouth 2 (two) times daily with meals.    metoprolol tartrate (LOPRESSOR) 25 MG tablet Take 1 tablet (25 mg total) by mouth 2 (two) times daily.    naloxegoL (MOVANTIK) 12.5 mg Tab Take 12.5 mg by mouth once daily.    ondansetron (ZOFRAN-ODT) 8 MG TbDL dissolve 1 TABLET ON THE TONGUE EVERY 6 TO 8 HOURS AS NEEDED FOR nausea    oxyCODONE (ROXICODONE) 15 MG Tab Take 1 tablet (15 mg total) by mouth every 4 to 6 hours as needed for Pain.    palbociclib 100 mg Tab Take 100 mg by mouth once daily. for 21 days, then take 7 days off. Total cycle length 28 days    pen needle, diabetic (BD ULTRA-FINE MARCE PEN NEEDLE) 32 gauge x 5/32" Ndle Test blood sugar twice daily    potassium chloride SA (K-DUR,KLOR-CON) 20 MEQ tablet Take 1 tablet (20 meq) by mouth 3 times a day x 7 days. Then twice daily.    promethazine " (PHENERGAN) 25 MG tablet TAKE ONE TABLET (25MG TOTAL) BY MOUTH EVERY 4 TO 6 HOURS AS NEEDED    promethazine-codeine 6.25-10 mg/5 ml (PHENERGAN WITH CODEINE) 6.25-10 mg/5 mL syrup TAKE 5 ML BY MOUTH EVERY 6 HOURS AS NEEDED FOR COUGH    psyllium husk, with sugar, (METAMUCIL, WITH SUGAR,) 3.4 gram packet Take 1 packet by mouth 2 (two) times daily.    rosuvastatin (CRESTOR) 40 MG Tab Take 1 tablet (40 mg total) by mouth every evening. For cholesterol    zolpidem (AMBIEN) 10 mg Tab TAKE ONE TABLET BY MOUTH EVERY NIGHT AT BEDTIME     Family History    None       Tobacco Use    Smoking status: Former     Packs/day: 0.25     Years: 30.00     Pack years: 7.50     Types: Cigarettes     Quit date: 2015     Years since quittin.7    Smokeless tobacco: Never   Substance and Sexual Activity    Alcohol use: Yes     Alcohol/week: 0.0 standard drinks     Comment: RARELY    Drug use: Not Currently     Types: Marijuana     Comment: medical marijuana    Sexual activity: Not on file     Review of Systems   All other systems reviewed and are negative.  Objective:     Vital Signs (Most Recent):  Temp: 98.8 °F (37.1 °C) (23 1622)  Pulse: 94 (23 2200)  Resp: (!) 21 (239)  BP: (!) 147/86 (23 2200)  SpO2: 96 % (230) Vital Signs (24h Range):  Temp:  [98.8 °F (37.1 °C)] 98.8 °F (37.1 °C)  Pulse:  [92-99] 94  Resp:  [18-22] 21  SpO2:  [91 %-99 %] 96 %  BP: (111-179)/() 147/86     Weight: 65.3 kg (144 lb)  Body mass index is 20.08 kg/m².     Physical Exam  Constitutional:       Appearance: Normal appearance. She is normal weight.   HENT:      Head: Normocephalic and atraumatic.      Nose: Nose normal.      Mouth/Throat:      Mouth: Mucous membranes are moist.   Eyes:      Conjunctiva/sclera: Conjunctivae normal.   Cardiovascular:      Rate and Rhythm: Normal rate and regular rhythm.      Pulses: Normal pulses.      Heart sounds: Normal heart sounds. No murmur heard.    No friction rub.  No gallop.   Pulmonary:      Effort: Pulmonary effort is normal.      Breath sounds: Rhonchi present. No wheezing or rales.      Comments: Reduced breath sounds in the right lower lobe with soft crackles.  Chest:      Chest wall: No tenderness.   Abdominal:      General: Abdomen is flat. Bowel sounds are normal. There is no distension.      Palpations: Abdomen is soft.      Tenderness: There is no abdominal tenderness. There is no guarding.   Musculoskeletal:         General: No swelling. Normal range of motion.      Cervical back: Normal range of motion and neck supple.      Right lower leg: No edema.      Left lower leg: No edema.   Skin:     General: Skin is warm and dry.   Neurological:      General: No focal deficit present.      Mental Status: She is alert.   Psychiatric:         Mood and Affect: Mood normal.         Thought Content: Thought content normal.         Judgment: Judgment normal.              Significant Labs: All pertinent labs within the past 24 hours have been reviewed.    Significant Imaging: I have reviewed all pertinent imaging results/findings within the past 24 hours.    Assessment/Plan:     * Pneumonia with parapneumonic effusion  Patient presents with productive cough for the past 2-3 weeks and attempted outpatient antibiotics with no improvement.  She has chills but does not endorse any fever.  Right lower lobe consolidation with apparent parapneumonic effusion on CTA.  - check procalcitonin  - continue broad antibiotics for now likely can deescalate tomorrow  - pain control for chest pain likely related to pneumonia  - sputum culture if possible  - follow blood cultures  - trend left shift and WBC      Chest pain  Seems likely related to pneumonia and parapneumonic effusion, she does have osseous metastatic disease noted on CT imaging however she was told by her oncologist that this represents likely old scars rather than active disease.  She says she does not have chronic bone pain  related to her cancer but rather has chronic pain in her lumbar spine related to pain she had previous to her cancer diagnosis.  - monitor closely  - obtain 2nd troponin  - EKG without ischemic changes  - seems likely related to pneumonia versus MSK  - pain control as noted elsewhere      YIMI (acute kidney injury)  Seems likely prerenal  Hold home Lasix dosing  Gentle IV fluids  Send urine studies  Further workup if creatinine not improving      Malignant neoplasm metastatic to bone  Patient says she was told by Dr. Vick that her imaging findings suggesting bone metastatic disease is likely old scarring rather than active disease.  She does have metastatic disease to the liver.  Pending biopsy of her liver lesions.  Will follow up with Oncology as an outpatient      Essential hypertension  Continue home blood pressure medication      Mixed hyperlipidemia  Continue statin      Breast cancer, right  Known metastatic breast cancer, follows with Dr. Vick.  Finished her last round of chemo around 3 weeks ago per the patient.  Will follow-up outpatient with Dr. Vick          VTE Risk Mitigation (From admission, onward)    None             On 06/18/2023, patient should be placed in hospital observation services under my care.        Eusebio Cervantes MD  Department of Hospital Medicine  [facility] - Emergency Dept

## 2023-06-19 NOTE — PLAN OF CARE
[facility]  Initial Discharge Assessment       Primary Care Provider: Manuelito Shepard MD    Admission Diagnosis: Pleurisy with effusion [J90]    Admission Date: 6/18/2023  Expected Discharge Date: 6/19/2023    Transition of Care Barriers: None    Discharge assessment completed with patient at bedside. Information verified as correct on facesheet. Patient denies HD (dialysis) and coumadin. Patient denies HH. DME listed below.       Payor: [employer] MANAGED MEDICARE / Plan: TMS [facility] 65 / Product Type: Medicare Advantage /     Extended Emergency Contact Information  Primary Emergency Contact: Ganga Brown   [facility]  Mobile Phone: [phone]  Relation: Friend  Secondary Emergency Contact: rei garcia  Mobile Phone: [phone]  Relation: Relative  Preferred language: English   needed? No    Discharge Plan A: Home, Home with family  Discharge Plan B: Home, Home with family      [facility] [address]  [address] [facility] [address]  Phone: [phone] Fax: [phone]    [facility]  [address] [facility] [address]  Phone: [phone] Fax: [phone]    [facility] [address] [facility]  [address]  [facility] [address]  Phone: [phone] Fax: [phone]      Initial Assessment (most recent)       Adult Discharge Assessment - 06/19/23 1443          Discharge Assessment    Assessment Type Discharge Planning Assessment     Confirmed/corrected address, phone number and insurance Yes     Confirmed Demographics Correct on Facesheet     Source of Information patient;health record     Does patient/caregiver understand observation status Yes     Communicated GUNNER with patient/caregiver Date not available/Unable to determine     Reason For Admission pneumonia     Facility Arrived From: home     Do you expect to  return to your current living situation? Yes     Do you have help at home or someone to help you manage your care at home? Yes     Who are your caregiver(s) and their phone number(s)? family     Prior to hospitilization cognitive status: Alert/Oriented     Current cognitive status: Alert/Oriented     Equipment Currently Used at Home glucometer     Readmission within 30 days? No     Patient currently being followed by outpatient case management? No     Do you currently have service(s) that help you manage your care at home? No     Do you take prescription medications? Yes     Do you have prescription coverage? Yes     Coverage people health managed medicare     Do you have any problems affording any of your prescribed medications? No     Is the patient taking medications as prescribed? yes     Who is going to help you get home at discharge? family     How do you get to doctors appointments? family or friend will provide     Are you on dialysis? No     Do you take coumadin? No     Discharge Plan A Home;Home with family     Discharge Plan B Home;Home with family     DME Needed Upon Discharge  none     Transition of Care Barriers None

## 2023-06-19 NOTE — SUBJECTIVE & OBJECTIVE
Past Medical History:   Diagnosis Date    Acute hypoxemic respiratory failure 12/12/2020    Breast cancer 10/26/2015    left breast    Cancer     RIGHT BREAST 10-15    Depression     Diabetes mellitus     Neuropathy     Panic attacks     S/P epidural steroid injection     Seizures     none since early 30's    Wears glasses     TO DRIVE       Past Surgical History:   Procedure Laterality Date    AXILLARY NODE DISSECTION Left 8/24/2020    Procedure: LYMPHADENECTOMY, AXILLARY;  Surgeon: Coyr Vick MD;  Location: [facility] OR;  Service: General;  Laterality: Left;  left breast mastectomy with possible axillary lymph node dissection    BREAST BIOPSY      right    BREAST LUMPECTOMY      BREAST RECONSTRUCTION      breast reconstruction with tummy tuck      BREAST SURGERY      11-3-15 LUMPECTOMY, 12-2-15 REEXCISION    INCISION AND DRAINAGE OF ABSCESS Left 9/9/2020    Procedure: INCISION AND DRAINAGE, ABSCESS;  Surgeon: Cory Vick MD;  Location: [facility] OR;  Service: General;  Laterality: Left;    INSERTION OF LOCALIZATION WIRE Left 8/24/2020    Procedure: INSERTION, LOCALIZATION WIRE;  Surgeon: Cory Vick MD;  Location: [facility] OR;  Service: General;  Laterality: Left;  left breast lumpectomy with wire needle loc    INSERTION OF TUNNELED CENTRAL VENOUS CATHETER (CVC) WITH SUBCUTANEOUS PORT Right 11/17/2022    Procedure: MIFPCOPGR-XLZE-O-CATH;  Surgeon: Jeff Mckeon Jr., MD;  Location: [facility] OR;  Service: General;  Laterality: Right;    MASTECTOMY      mastectomy 2016      MASTECTOMY, PARTIAL Left 3/19/2021    Procedure: MASTECTOMY, PARTIAL;  Surgeon: Cory Vick MD;  Location: [facility];  Service: General;  Laterality: Left;  lumpectomy  left breast     RECONSTRUCTION OF NIPPLE Right 11/5/2018    Procedure: RECONSTRUCTION-NIPPLE RIGHT;  Surgeon: Xiang Hernandez MD;  Location: [address];  Service: Plastics;  Laterality: Right;    SENTINEL LYMPH NODE BIOPSY Left 8/24/2020     Procedure: BIOPSY, LYMPH NODE, SENTINEL;  Surgeon: Cory Vick MD;  Location: [facility] OR;  Service: General;  Laterality: Left;  left breast lumpectomy with left sentinel lymoh node       shoulder surgery and wrist      BILAT  BONE SPUR    WRIST SURGERY      RIGHT       Review of patient's allergies indicates:   Allergen Reactions    Adhesive tape-silicones Hives     BANDAIDS    Polymycin Hives    Adhesive     Latex, natural rubber Hives and Itching    Neomycin-bacitracnzn-polymyxnb     Polyurethane-39 Hives       Current Facility-Administered Medications on File Prior to Encounter   Medication    0.9%  NaCl infusion (for blood administration)     Current Outpatient Medications on File Prior to Encounter   Medication Sig    amoxicillin (AMOXIL) 500 MG Tab Take 1 po q 8 hours x's 10 days    benzonatate (TESSALON) 100 MG capsule Take 1 capsule (100 mg total) by mouth 3 (three) times daily as needed for Cough.    blood sugar diagnostic Strp TEST GLUCOSE BID    blood-glucose meter (TRUE METRIX GLUCOSE METER) kit TEST GLUCOSE BID    blood-glucose meter,continuous (DEXCOM G6 ) Misc TEST GLUCOSE QID    blood-glucose sensor (DEXCOM G6 SENSOR) Edwige TEST GLUCOSE QID    blood-glucose transmitter (DEXCOM G6 TRANSMITTER) Edwige TEST GLUCOSE QID    carisoprodoL (SOMA) 350 MG tablet Take 1 tablet (350 mg total) by mouth 3 (three) times daily as needed for Muscle spasms.    dexAMETHasone (DECADRON) 4 MG Tab TAKE ONE TABLET (4MG TOTAL) BY MOUTH TWICE DAILY WITH MEALS    dextromethorphan-guaiFENesin  mg/5 ml (ROBITUSSIN-DM)  mg/5 mL liquid Take 1 teaspoon q 6 hours prn cough. (Patient taking differently: Take 5 mLs by mouth. Take 1 teaspoon q 6 hours prn cough.)    diazePAM (VALIUM) 10 MG Tab Take 1 tablet (10 mg total) by mouth 4 (four) times daily as needed (anxiety).    docusate sodium (COLACE) 100 MG capsule Take 100 mg by mouth 2 (two) times daily.    droNABinol (MARINOL) 5 MG capsule Take 1 capsule (5 mg  "total) by mouth 2 (two) times daily before meals.    dulaglutide (TRULICITY) 3 mg/0.5 mL pen injector Inject 3 mg into the skin every 7 days.    enalapril (VASOTEC) 5 MG tablet Take 1 tablet (5 mg total) by mouth once daily.    etodolac (LODINE) 400 MG tablet TAKE ONE TABLET (400 MG TOTAL) BY MOUTH ONCE DAILY    fentaNYL (DURAGESIC) 25 mcg/hr Place 1 patch onto the skin every 72 hours.    fluconazole (DIFLUCAN) 150 MG Tab TAKE ONE TABLET BY MOUTH ONCE FOR 1 DOSE    furosemide (LASIX) 20 MG tablet TAKE ONE TABLET BY MOUTH EVERY DAY    HYDROcodone-acetaminophen (NORCO)  mg per tablet Take 1 tablet by mouth every 6 (six) hours as needed for Pain.    insulin glargine (LANTUS U-100 INSULIN) 100 unit/mL injection Inject 32 Units into the skin 2 (two) times a day.    lancets 32 gauge Misc TEST GLUCOSE BID    metFORMIN (GLUCOPHAGE-XR) 500 MG ER 24hr tablet Take 2 tablets (1,000 mg total) by mouth 2 (two) times daily with meals.    metoprolol tartrate (LOPRESSOR) 25 MG tablet Take 1 tablet (25 mg total) by mouth 2 (two) times daily.    naloxegoL (MOVANTIK) 12.5 mg Tab Take 12.5 mg by mouth once daily.    ondansetron (ZOFRAN-ODT) 8 MG TbDL dissolve 1 TABLET ON THE TONGUE EVERY 6 TO 8 HOURS AS NEEDED FOR nausea    oxyCODONE (ROXICODONE) 15 MG Tab Take 1 tablet (15 mg total) by mouth every 4 to 6 hours as needed for Pain.    palbociclib 100 mg Tab Take 100 mg by mouth once daily. for 21 days, then take 7 days off. Total cycle length 28 days    pen needle, diabetic (BD ULTRA-FINE MARCE PEN NEEDLE) 32 gauge x 5/32" Ndle Test blood sugar twice daily    potassium chloride SA (K-DUR,KLOR-CON) 20 MEQ tablet Take 1 tablet (20 meq) by mouth 3 times a day x 7 days. Then twice daily.    promethazine (PHENERGAN) 25 MG tablet TAKE ONE TABLET (25MG TOTAL) BY MOUTH EVERY 4 TO 6 HOURS AS NEEDED    promethazine-codeine 6.25-10 mg/5 ml (PHENERGAN WITH CODEINE) 6.25-10 mg/5 mL syrup TAKE 5 ML BY MOUTH EVERY 6 HOURS AS NEEDED FOR COUGH    " psyllium husk, with sugar, (METAMUCIL, WITH SUGAR,) 3.4 gram packet Take 1 packet by mouth 2 (two) times daily.    rosuvastatin (CRESTOR) 40 MG Tab Take 1 tablet (40 mg total) by mouth every evening. For cholesterol    zolpidem (AMBIEN) 10 mg Tab TAKE ONE TABLET BY MOUTH EVERY NIGHT AT BEDTIME     Family History    None       Tobacco Use    Smoking status: Former     Packs/day: 0.25     Years: 30.00     Pack years: 7.50     Types: Cigarettes     Quit date: 2015     Years since quittin.7    Smokeless tobacco: Never   Substance and Sexual Activity    Alcohol use: Yes     Alcohol/week: 0.0 standard drinks     Comment: RARELY    Drug use: Not Currently     Types: Marijuana     Comment: medical marijuana    Sexual activity: Not on file     Review of Systems   All other systems reviewed and are negative.  Objective:     Vital Signs (Most Recent):  Temp: 98.8 °F (37.1 °C) (23 1622)  Pulse: 94 (23 2200)  Resp: (!) 21 (23 2159)  BP: (!) 147/86 (23 2200)  SpO2: 96 % (23 2200) Vital Signs (24h Range):  Temp:  [98.8 °F (37.1 °C)] 98.8 °F (37.1 °C)  Pulse:  [92-99] 94  Resp:  [18-22] 21  SpO2:  [91 %-99 %] 96 %  BP: (111-179)/() 147/86     Weight: 65.3 kg (144 lb)  Body mass index is 20.08 kg/m².     Physical Exam  Constitutional:       Appearance: Normal appearance. She is normal weight.   HENT:      Head: Normocephalic and atraumatic.      Nose: Nose normal.      Mouth/Throat:      Mouth: Mucous membranes are moist.   Eyes:      Conjunctiva/sclera: Conjunctivae normal.   Cardiovascular:      Rate and Rhythm: Normal rate and regular rhythm.      Pulses: Normal pulses.      Heart sounds: Normal heart sounds. No murmur heard.    No friction rub. No gallop.   Pulmonary:      Effort: Pulmonary effort is normal.      Breath sounds: Rhonchi present. No wheezing or rales.      Comments: Reduced breath sounds in the right lower lobe with soft crackles.  Chest:      Chest wall: No tenderness.    Abdominal:      General: Abdomen is flat. Bowel sounds are normal. There is no distension.      Palpations: Abdomen is soft.      Tenderness: There is no abdominal tenderness. There is no guarding.   Musculoskeletal:         General: No swelling. Normal range of motion.      Cervical back: Normal range of motion and neck supple.      Right lower leg: No edema.      Left lower leg: No edema.   Skin:     General: Skin is warm and dry.   Neurological:      General: No focal deficit present.      Mental Status: She is alert.   Psychiatric:         Mood and Affect: Mood normal.         Thought Content: Thought content normal.         Judgment: Judgment normal.              Significant Labs: All pertinent labs within the past 24 hours have been reviewed.    Significant Imaging: I have reviewed all pertinent imaging results/findings within the past 24 hours.

## 2023-06-19 NOTE — PLAN OF CARE
Problem: Adult Inpatient Plan of Care  Goal: Plan of Care Review  Outcome: Ongoing, Progressing  Goal: Optimal Comfort and Wellbeing  Outcome: Ongoing, Progressing     Problem: Oral Intake Inadequate (Acute Kidney Injury/Impairment)  Goal: Optimal Nutrition Intake  Outcome: Ongoing, Progressing     Problem: Renal Function Impairment (Acute Kidney Injury/Impairment)  Goal: Effective Renal Function  Outcome: Ongoing, Progressing

## 2023-06-19 NOTE — ASSESSMENT & PLAN NOTE
Patient presents with productive cough for the past 2-3 weeks and attempted outpatient antibiotics with no improvement.  She has chills but does not endorse any fever.  Right lower lobe consolidation with apparent parapneumonic effusion on CTA.  - check procalcitonin  - continue broad antibiotics for now likely can deescalate tomorrow  - pain control for chest pain likely related to pneumonia  - sputum culture if possible  - follow blood cultures  - trend left shift and WBC

## 2023-06-19 NOTE — NURSING
Nurses Note -- 4 Eyes      6/19/2023   6:21 AM      Skin assessed during: Admit      [] No Altered Skin Integrity Present    []Prevention Measures Documented      [] Yes- Altered Skin Integrity Present or Discovered   [] LDA Added if Not in Epic (Describe Wound)   [] New Altered Skin Integrity was Present on Admit and Documented in LDA   [] Wound Image Taken    Wound Care Consulted? No    Attending Nurse:  Juliana Cochran RN     Second RN/Staff Member:  [id number]

## 2023-06-19 NOTE — HPI
Patient is a 63-year-old female with a history of hypertension, chronic back pain on oxycodone, Soma, OxyContin, metastatic breast cancer with metastatic disease to the liver and bone.  She says that yesterday she woke up with severe pain in her right lower chest which is worse with inspiration and coughing.  She says she also had weakness in her right arm at that time and took nearly all day to resolve.  She had pain in her neck radiating down her arm.  She is not had any shortness a breath.  She has had a productive cough for the last 2-3 weeks and was treated with antibiotics per Dr. Peraza as an outpatient.  She has no nausea or vomiting.  She denies fever but does endorse chills.  She is not had any sick contacts.  She has no other changes to her medication regimen.  She quit smoking in 2015.  She is not had lower extremity swelling or calf pain.      In the ED:  T 98.8°, P 93, R 18, /100, O2 sat 93% on room air.  She was given 2 L of oxygen.  CBC with WBC of 10.6 with left shift, hemoglobin 12.3, platelets 235.  Chemistry panel with K of 3.1, creatinine 1.6 (mild YIMI from baseline), , T bili 1.2, AST 55, ALT 27, lipase 44.  , troponin 9.7, lactic acid 1.5.  TSH 1.9.  UDS with opiates.  Urine otherwise unremarkable.  EKG without acute ischemic changes.  CTA of the chest and abdomen was performed.  There was no evidence of pulmonary embolus.  She does have right lower lobe consolidation and parapneumonic effusion apparent.  She will be admitted for pneumonia with parapneumonic effusion, pain control, YIMI.

## 2023-06-19 NOTE — ASSESSMENT & PLAN NOTE
Seems likely prerenal  Hold home Lasix dosing  Gentle IV fluids  Send urine studies  Further workup if creatinine not improving

## 2023-06-19 NOTE — ASSESSMENT & PLAN NOTE
Seems likely related to pneumonia and parapneumonic effusion, she does have osseous metastatic disease noted on CT imaging however she was told by her oncologist that this represents likely old scars rather than active disease.  She says she does not have chronic bone pain related to her cancer but rather has chronic pain in her lumbar spine related to pain she had previous to her cancer diagnosis.  - monitor closely  - obtain 2nd troponin  - EKG without ischemic changes  - seems likely related to pneumonia versus MSK  - pain control as noted elsewhere

## 2023-06-19 NOTE — PROGRESS NOTES
Critical access hospital Medicine  Progress Note    Patient name: Negrita Castro  MRN: [id number]  Admit Date: 6/18/2023   LOS: 0 days     SUBJECTIVE:     Principal problem: Pneumonia    Interval History:  63-year-old female with a history of hypertension, chronic back pain on oxycodone, Soma, OxyContin, metastatic breast cancer with metastatic disease to the liver and bone admitted with pneumonia, suspected pleuritic chest pain, IYMI, hypokalemia.  She had been treated in the outpatient setting with abx for cough but does not recall the name of the antibiotic. She reports feeling well until she woke up with severe right chest pain. Serial Troponin has been normal.  CTA chest reveals no PE or aortic dissection but does demonstrate R infiltrates consistent with pneumonia. She was started on supplemental O2 for mild hypoxia. IVFs started for Cr of 1.6- stopped now that Cr has normalized.  K being replaced- she tells me she is on daily replacement at home.     Hospital Course:    Scheduled Meds:   atorvastatin  40 mg Oral Daily    ceFEPime (MAXIPIME) IVPB  1 g Intravenous Q12H    enoxparin  40 mg Subcutaneous Daily    fentaNYL  1 patch Transdermal Q72H    lisinopriL  10 mg Oral Daily    metoprolol tartrate  25 mg Oral BID    polyethylene glycol  17 g Oral Daily    potassium chloride  20 mEq Oral Daily    vancomycin (VANCOCIN) IVPB  1,250 mg Intravenous Q24H     Continuous Infusions:  PRN Meds:acetaminophen, diazePAM, HYDROcodone-acetaminophen, melatonin, morphine, ondansetron, prochlorperazine, sodium chloride 0.9%, Pharmacy to dose Vancomycin consult **AND** vancomycin - pharmacy to dose    Review of patient's allergies indicates:   Allergen Reactions    Adhesive tape-silicones Hives     BANDAIDS    Polymycin Hives    Adhesive     Latex, natural rubber Hives and Itching    Neomycin-bacitracnzn-polymyxnb     Polyurethane-39 Hives       Review of Systems: As per interval history    OBJECTIVE:     Vital Signs  (Most Recent)  Temp: 98.9 °F (37.2 °C) (06/19/23 1245)  Pulse: 85 (06/19/23 1245)  Resp: 18 (06/19/23 1245)  BP: 126/70 (06/19/23 1245)  SpO2: (!) 94 % (06/19/23 1245)    Vital Signs Range (Last 24H):  Temp:  [98.5 °F (36.9 °C)-99.2 °F (37.3 °C)]   Pulse:  []   Resp:  [16-22]   BP: (111-181)/()   SpO2:  [91 %-99 %]     I & O (Last 24H):  Intake/Output Summary (Last 24 hours) at 6/19/2023 1358  Last data filed at 6/19/2023 0500  Gross per 24 hour   Intake 229.17 ml   Output --   Net 229.17 ml       Physical Exam:    Vitals and nursing note reviewed.     Constitutional:       General: Not in acute distress.     Appearance: Well-developed.   HENT:      Head: Normocephalic and atraumatic.   Eyes:      Pupils: Pupils are equal, round, and reactive to light.   Cardiovascular:      Rate and Rhythm: Regular rhythm.   Pulmonary:      Effort: Pulmonary effort is normal.      Breath sounds: Normal breath sounds. No wheezing.   O2 per NC  Abdominal:      General: There is no distension.      Palpations: Abdomen is soft.      Tenderness: There is no abdominal tenderness. There is no guarding or rebound.   Musculoskeletal:         General: Normal range of motion.      Cervical back: Normal range of motion.   Skin:     Findings: No rash.   Neurological:      Mental Status: Alert and oriented to person, place, and time.      Cranial Nerves: No cranial nerve deficit.      Sensory: No sensory deficit.     Laboratory:  I have reviewed all pertinent lab results within the past 24 hours.  CBC:   Recent Labs   Lab 06/19/23  0427   WBC 9.15   RBC 3.22*   HGB 10.8*   HCT 32.6*      *   MCH 33.5*   MCHC 33.1     CMP:   Recent Labs   Lab 06/19/23  1239   *   CALCIUM 8.9   ALBUMIN 3.1*   PROT 7.2   *   K 3.2*   CO2 22*      BUN 16   CREATININE 1.4   ALKPHOS 213*   ALT 20   AST 38   BILITOT 0.5     Cardiac markers: No results for input(s): CKMB, CPKMB, TROPONINT, TROPONINI, MYOGLOBIN in the last 168  hours.  Microbiology Results (last 7 days)       Procedure Component Value Units Date/Time    Blood culture #1 **CANNOT BE ORDERED STAT** [[id number]] Collected: 06/18/23 1848    Order Status: Completed Specimen: Blood from Peripheral, Upper Arm, Left Updated: 06/19/23 0158     Blood Culture, Routine No Growth to date    Blood culture #2 **CANNOT BE ORDERED STAT** [[id number]] Collected: 06/18/23 1847    Order Status: Completed Specimen: Blood from Peripheral, Lower Arm, Left Updated: 06/19/23 0158     Blood Culture, Routine No Growth to date            Diagnostic Results:      ASSESSMENT/PLAN:         Active Hospital Problems    Diagnosis  POA    *Pneumonia with parapneumonic effusion [J18.9]  Unknown    YIMI (acute kidney injury) [N17.9]  Unknown    Malignant neoplasm metastatic to bone [C79.51]  Yes    Essential hypertension [I10]  Yes    Mixed hyperlipidemia [E78.2]  Yes    Breast cancer, right [C50.911]  Yes    Chest pain [R07.9]  Unknown      Resolved Hospital Problems   No resolved problems to display.         Plan:     -Cardiac workup has been unrevealing of acute ischemia.  I suspect chest pain is pleuritic pain from pneumonia.  -Broad IV abx for pneumonia given recent abx in the outpatient setting. Cultures in progress.  -Supplemental oxygen for hypoxia.  Wean as able.  -IVFs for mild renal insufficiency.  Stopping as Cr is now normalized.  -Replacing K.  I've added back a daily replacement which she takes at home.   -DVT Px with lovenox      VTE Risk Mitigation (From admission, onward)           Ordered     enoxaparin injection 40 mg  Daily         06/19/23 0206     IP VTE HIGH RISK PATIENT  Once         06/19/23 0206     Place sequential compression device  Until discontinued         06/19/23 0206                      Department Hospital Medicine  [facility]  Lucho Pino MD  Date of service: 06/19/2023

## 2023-06-19 NOTE — PLAN OF CARE
06/19/23 1446   GAINES Message   Medicare Outpatient and Observation Notification regarding financial responsibility Given to patient/caregiver;Explained to patient/caregiver;Signed/date by patient/caregiver   Date GAINES was signed 06/19/23

## 2023-06-19 NOTE — ED PROVIDER NOTES
Encounter Date: 6/18/2023       History     Chief Complaint   Patient presents with    Chest Pain     Right side into right shoulder. Hx of cancer. Mastectomy on right side.      This is a 63-year-old female with current active breast cancer who presents complaining of right-sided thorax pain.  She reports that she awoke yesterday and had pain extending from the right upper chest and back to the right upper abdomen and right flank area.  The pain has been constant in nature since it started.  The pain is worse with deep breathing and movement.  She denies trauma.  She denies any history of similar symptoms.  The pain is somewhat extending into the right lateral neck area.  She states she was not sure if she slept wrong which could have caused the pain.  Presents to the ED today as the pain has not improved and has been moderate to severe in intensity.  She has felt short of breath.  She states it hurts to take a deep breath and it hurts if she attempts to cough.  She denies fever.  She denies any nausea vomiting or diarrhea.  She denies syncope or near-syncope.  She denies any urinary problems or changes and denies any bowel problems or changes--no constipation or gastrointestinal bleeding.  She denies any extremity weakness numbness tingling or paresthesias denies any headache, photophobia or neck stiffness.  She denies any other problems or complaints.  The patient reports that she was in remission as of December 2022 however was recently diagnosed with recurrence of the breast cancer with bony metastatic involvement.  Has not restarted treatment as of yet.      Review of patient's allergies indicates:   Allergen Reactions    Adhesive tape-silicones Hives     BANDAIDS    Polymycin Hives    Adhesive     Latex, natural rubber Hives and Itching    Neomycin-bacitracnzn-polymyxnb     Polyurethane-39 Hives     Past Medical History:   Diagnosis Date    Acute hypoxemic respiratory failure 12/12/2020    Breast cancer  10/26/2015    left breast    Cancer     RIGHT BREAST 10-15    Depression     Diabetes mellitus     Neuropathy     Panic attacks     S/P epidural steroid injection     Seizures     none since early 30's    Wears glasses     TO DRIVE     Past Surgical History:   Procedure Laterality Date    AXILLARY NODE DISSECTION Left 8/24/2020    Procedure: LYMPHADENECTOMY, AXILLARY;  Surgeon: Cory Vick MD;  Location: [facility] OR;  Service: General;  Laterality: Left;  left breast mastectomy with possible axillary lymph node dissection    BREAST BIOPSY      right    BREAST LUMPECTOMY      BREAST RECONSTRUCTION      breast reconstruction with tummy [facility]      BREAST SURGERY      11-3-15 LUMPECTOMY, 12-2-15 REEXCISION    INCISION AND DRAINAGE OF ABSCESS Left 9/9/2020    Procedure: INCISION AND DRAINAGE, ABSCESS;  Surgeon: Cory Vick MD;  Location: [facility] OR;  Service: General;  Laterality: Left;    INSERTION OF LOCALIZATION WIRE Left 8/24/2020    Procedure: INSERTION, LOCALIZATION WIRE;  Surgeon: Cory Vick MD;  Location: [facility] OR;  Service: General;  Laterality: Left;  left breast lumpectomy with wire needle loc    INSERTION OF TUNNELED CENTRAL VENOUS CATHETER (CVC) WITH SUBCUTANEOUS PORT Right 11/17/2022    Procedure: [id number];  Surgeon: Jeff Mckeon Jr., MD;  Location: [facility] OR;  Service: General;  Laterality: Right;    MASTECTOMY      mastectomy 2016      MASTECTOMY, PARTIAL Left 3/19/2021    Procedure: MASTECTOMY, PARTIAL;  Surgeon: Cory Vick MD;  Location: [facility] OR;  Service: General;  Laterality: Left;  lumpectomy  left breast     RECONSTRUCTION OF NIPPLE Right 11/5/2018    Procedure: RECONSTRUCTION-NIPPLE RIGHT;  Surgeon: Xiang Hernandez MD;  Location: [facility] OR [address] [place] [address];  Service: Plastics;  Laterality: Right;    SENTINEL LYMPH NODE BIOPSY Left 8/24/2020    Procedure: BIOPSY, LYMPH NODE, SENTINEL;  Surgeon: Cory Vick MD;  Location: [facility] OR;  Service: General;   Laterality: Left;  left breast lumpectomy with left sentinel lymoh node       shoulder surgery and wrist      BILAT  BONE SPUR    WRIST SURGERY      RIGHT     Family History   Problem Relation Age of Onset    Anesthesia problems Neg Hx      Social History     Tobacco Use    Smoking status: Former     Packs/day: 0.25     Years: 30.00     Pack years: 7.50     Types: Cigarettes     Quit date: 2015     Years since quittin.7    Smokeless tobacco: Never   Substance Use Topics    Alcohol use: Yes     Alcohol/week: 0.0 standard drinks     Comment: RARELY    Drug use: Not Currently     Types: Marijuana     Comment: medical marijuana     Review of Systems   Constitutional:  Positive for fatigue. Negative for activity change, appetite change, chills and fever.   HENT: Negative.  Negative for congestion, ear pain, rhinorrhea, sore throat and trouble swallowing.    Eyes: Negative.  Negative for photophobia, pain, redness and visual disturbance.   Respiratory:  Positive for shortness of breath. Negative for cough, chest tightness and wheezing.    Cardiovascular:  Positive for chest pain. Negative for palpitations and leg swelling.   Gastrointestinal:  Positive for abdominal pain. Negative for abdominal distention, blood in stool, constipation, diarrhea, nausea and vomiting.   Endocrine: Negative.    Genitourinary: Negative.  Negative for decreased urine volume, difficulty urinating, dysuria, frequency, pelvic pain and urgency.   Musculoskeletal:  Positive for myalgias (Pain involves the entire right hemithorax area.  No midline back pain,). Negative for arthralgias, back pain, gait problem, neck pain and neck stiffness.   Skin: Negative.  Negative for rash.   Neurological: Negative.  Negative for dizziness, syncope, facial asymmetry, speech difficulty, weakness, light-headedness, numbness and headaches.   Hematological:  Does not bruise/bleed easily.   Psychiatric/Behavioral: Negative.  Negative for confusion.    All  other systems reviewed and are negative.    Physical Exam     Initial Vitals [06/18/23 1622]   BP Pulse Resp Temp SpO2   (!) 163/100 93 18 98.8 °F (37.1 °C) (!) 93 %      MAP       --         Physical Exam    Nursing note and vitals reviewed.  Constitutional: She is active and cooperative.  Non-toxic appearance. She appears ill. She appears distressed.   Patient is not toxic appearing, no respiratory distress but does appear very uncomfortable.   HENT:   Head: Normocephalic and atraumatic.   Right Ear: Tympanic membrane normal.   Left Ear: Tympanic membrane normal.   Nose: Nose normal.   Mouth/Throat: Uvula is midline, oropharynx is clear and moist and mucous membranes are normal. No oral lesions. No uvula swelling. No oropharyngeal exudate, posterior oropharyngeal edema or posterior oropharyngeal erythema.   Eyes: Conjunctivae, EOM and lids are normal. Pupils are equal, round, and reactive to light. No scleral icterus.   Neck: Trachea normal and phonation normal. Neck supple. No thyroid mass and no thyromegaly present. No stridor present. No JVD present.   Normal range of motion.   Full passive range of motion without pain.     Cardiovascular:  Normal rate, regular rhythm, normal heart sounds, intact distal pulses and normal pulses.     Exam reveals no gallop, no distant heart sounds, no friction rub and no decreased pulses.       No murmur heard.  Pulmonary/Chest: Effort normal. No accessory muscle usage or stridor. Tachypnea noted. No respiratory distress. She has no wheezes. She has rhonchi in the right middle field and the right lower field. She has no rales.   Mild tachypnea noted, rhonchi in the right lower lobe, pulse ox 90% on room air.  Placed on supplemental oxygen with improvement to 93%.   Abdominal: Abdomen is soft. Bowel sounds are normal. She exhibits no distension, no pulsatile midline mass and no mass. There is abdominal tenderness in the right upper quadrant and epigastric area. No hernia.   No  right CVA tenderness.  No left CVA tenderness. There is no rigidity, no rebound and no guarding. negative psoas sign and negative Rovsing's sign  Musculoskeletal:         General: No tenderness or edema. Normal range of motion.      Right hand: Normal. Normal range of motion. Normal strength. Normal sensation. Normal capillary refill. Normal pulse.      Left hand: Normal. Normal range of motion. Normal strength. Normal sensation. Normal capillary refill. Normal pulse.      Cervical back: Normal, full passive range of motion without pain, normal range of motion and neck supple. No edema, erythema, rigidity or bony tenderness. No spinous process tenderness or muscular tenderness. Normal range of motion.      Thoracic back: Normal. No bony tenderness. Normal range of motion.      Lumbar back: Normal. No bony tenderness. Normal range of motion.      Right foot: Normal. Normal range of motion and normal capillary refill. No tenderness or bony tenderness. Normal pulse.      Left foot: Normal. Normal range of motion and normal capillary refill. No tenderness or bony tenderness. Normal pulse.      Comments: Pulses are 2+ throughout, cap refill is less than 2 sec throughout, extremities are nontender throughout with full range of motion. There is no spinal tenderness to palpation.     Neurological: She is alert and oriented to person, place, and time. She has normal strength. She displays normal reflexes. No cranial nerve deficit or sensory deficit. Gait normal.   No focal deficits.   Skin: Skin is warm, dry and intact. Capillary refill takes less than 2 seconds. No ecchymosis, no petechiae and no rash noted. No erythema. No pallor.   Psychiatric: She has a normal mood and affect. Her speech is normal and behavior is normal. Judgment and thought content normal. Cognition and memory are normal.       ED Course   Procedures  Labs Reviewed   CBC W/ AUTO DIFFERENTIAL - Abnormal; Notable for the following components:       Result  Value    RBC 3.69 (*)      (*)     MCH 33.3 (*)     RDW 16.5 (*)     MPV 8.8 (*)     Gran # (ANC) 8.9 (*)     Immature Grans (Abs) 0.05 (*)     Lymph # 0.7 (*)     Gran % 83.1 (*)     Lymph % 6.5 (*)     All other components within normal limits   COMPREHENSIVE METABOLIC PANEL - Abnormal; Notable for the following components:    Sodium 135 (*)     Potassium 3.1 (*)     CO2 20 (*)     Glucose 178 (*)     Creatinine 1.6 (*)     Total Protein 9.3 (*)     Total Bilirubin 1.2 (*)     Alkaline Phosphatase 251 (*)     AST 55 (*)     eGFR 36.5 (*)     All other components within normal limits   B-TYPE NATRIURETIC PEPTIDE - Abnormal; Notable for the following components:     (*)     All other components within normal limits   DRUG SCREEN PANEL, URINE EMERGENCY - Abnormal; Notable for the following components:    Opiate Scrn, Ur Presumptive Positive (*)     All other components within normal limits    Narrative:     Specimen Source->Urine   URINALYSIS, REFLEX TO URINE CULTURE - Abnormal; Notable for the following components:    Appearance, UA Hazy (*)     Protein, UA 3+ (*)     Glucose, UA 4+ (*)     Ketones, UA 1+ (*)     Occult Blood UA 2+ (*)     All other components within normal limits    Narrative:     Specimen Source->Urine   URINALYSIS MICROSCOPIC - Abnormal; Notable for the following components:    WBC, UA 8 (*)     Hyaline Casts, UA 20 (*)     Granular Casts, UA 20 (*)     All other components within normal limits    Narrative:     Specimen Source->Urine   CULTURE, BLOOD   CULTURE, BLOOD   MAGNESIUM   TROPONIN I HIGH SENSITIVITY   LIPASE   CK   TSH   SARS-COV-2 RNA AMPLIFICATION, QUAL   LACTIC ACID, PLASMA   TROPONIN I HIGH SENSITIVITY        ECG Results              EKG 12-lead (In process)  Result time 06/18/23 17:02:45      In process by Interface, Lab In [facility] (06/18/23 17:02:45)                   Narrative:    Test Reason : R07.9,    Vent. Rate : 098 BPM     Atrial Rate : 098 BPM     P-R Int :  140 ms          QRS Dur : 084 ms      QT Int : 346 ms       P-R-T Axes : 056 -27 014 degrees     QTc Int : 441 ms    Normal sinus rhythm  Possible Left atrial enlargement  Nonspecific ST abnormality  Abnormal ECG  When compared with ECG of 16-NOV-2022 14:47,  No significant change was found    Referred By: [email or web address]   SELF           Confirmed By:                                   Imaging Results              CT Abdomen Pelvis With Contrast (Final result)  Result time 06/18/23 21:13:03      Final result by Blank Boyer MD (06/18/23 21:13:03)                   Narrative:    EXAM DESCRIPTION:  CT CHEST ANGIOGRAPHY WITH IV CONTRAST, CT ABDOMEN PELVIS WITH IV CONTRAST    CLINICAL HISTORY:  Pulmonary embolism (PE) suspected, high prob. Chest pain, right side to right shoulder. Epigastric pain. History of bilateral metastatic breast cancer, with osseous and hepatic metastases.    TECHNIQUE:  Multiple axial images were obtained through the chest with 100 mL Omni 350 intravenous contrast utilizing the CTA protocol. 3-D postprocessing was utilized, with coronal and sagittal images reconstructed.  In addition, multiple axial images were obtained through the abdomen and pelvis with IV contrast.  All CT scans at this facility use dose modulation, iterative reconstruction, and/or weight based dosing when appropriate to reduce radiation dose to as low as reasonably achievable.    COMPARISON:  Reference is made to previous PET CT dated 5/29/2023. Previous chest CTA images dated 1/25/2021. The prior CTA report is not available at this time. Reference is also made to previous chest abdomen pelvis CT images and report dated 7/1/2020.    FINDINGS:      CHEST:    Pulmonary Arteries: Contrast bolus is adequate.  No CT evidence for acute pulmonary embolism.    Lungs: Focal atelectasis and/or consolidation within the right lower lobe at the base. Mild atelectasis within the base of the lingula and left lung base. Focal minimal  atelectasis and/or consolidation within the dependent right upper lobe. Minimal right pleural effusion.    Mediastinum: No pathologically enlarged mediastinal lymph nodes. A few small right hilar lymph nodes, nonspecific. This could potentially be reactive. The heart size appears top normal.  The mediastinal vasculature is grossly within normal limits. Right-sided port catheter terminating within the SVC.    ABDOMEN:    Stomach: Mild wall thickening along the gastric antrum, and gastritis is not excluded.    Liver: Multiple irregular low-attenuation masses within the liver, corresponding to the lesions seen on prior PET/CT, compatible with the patient's known hepatic metastatic disease. The largest irregular ill-defined conglomerate of masses along the dome of the liver measures approximately 5.1 cm in axial diameter. Hepatomegaly, with a craniocaudal liver length of approximately 19.5 cm.    Gallbladder: Unremarkable.    Pancreas: Within normal limits.    Spleen: Within normal limits.    Adrenal glands: The left adrenal gland is unremarkable. Small right adrenal nodule measuring approximately 10 mm in diameter, in retrospect unchanged dating back to at least 1/25/2021, and demonstrating Hounsfield units of approximately 78. Due to the metastatic cancer history, best pectus recommendations do not apply. Given the lack of interval change, this is most likely benign.    Right kidney: No hydronephrosis. Small simple appearing cyst, for which no follow-up imaging is recommended.  Left kidney: No hydronephrosis. No focal lesion.    Vascular structures: Vascular calcifications.    Nodes: No lymphadenopathy by size criteria.    PELVIS:    Small bowel: No significant distention.    Appendix: The appendix cannot be reliably identified, though no inflammatory changes are seen in the region of the cecum or right lower quadrant.    Colon : Mild colonic diverticulosis, with no evidence of acute epididymitis.    Bladder:  Unremarkable.    No pelvic mass or adenopathy.    No free intraperitoneal fluid or air.      BONES:    Innumerable lesions throughout the osseous structures, predominantly sclerotic, with a few lytic areas, compatible with the patient's known osseous metastatic disease. This involves the ribs, sternum, vertebrae, sacrum, pelvis, and bilateral proximal femurs, also present on previous PET/CT.      IMPRESSION:  1.  No evidence of acute pulmonary embolism.  2.  Focal atelectasis and/or consolidation within the right lower lobe at the base. Pneumonia is not excluded.  3.  Focal minimal atelectasis and/or consolidation within the dependent right upper lobe.  4.  Minimal right pleural effusion.  5.  Mild wall thickening along the gastric antrum, and gastritis is not excluded.  6.  Extensive osseous metastatic disease, as on prior PET/CT.  7.  Hepatic metastatic disease, as on prior PET/CT.  8.  Hepatomegaly.  9.  Other findings as noted.    Electronically signed by:  Blank Boyer MD  6/18/2023 9:13 PM CDT Workstation: [id number]                                     CTA Chest Non-Coronary (PE Studies) (Final result)  Result time 06/18/23 21:13:03      Final result by Blank Boyer MD (06/18/23 21:13:03)                   Narrative:    EXAM DESCRIPTION:  CT CHEST ANGIOGRAPHY WITH IV CONTRAST, CT ABDOMEN PELVIS WITH IV CONTRAST    CLINICAL HISTORY:  Pulmonary embolism (PE) suspected, high prob. Chest pain, right side to right shoulder. Epigastric pain. History of bilateral metastatic breast cancer, with osseous and hepatic metastases.    TECHNIQUE:  Multiple axial images were obtained through the chest with 100 mL Omni 350 intravenous contrast utilizing the CTA protocol. 3-D postprocessing was utilized, with coronal and sagittal images reconstructed.  In addition, multiple axial images were obtained through the abdomen and pelvis with IV contrast.  All CT scans at this facility use dose modulation, iterative  reconstruction, and/or weight based dosing when appropriate to reduce radiation dose to as low as reasonably achievable.    COMPARISON:  Reference is made to previous PET CT dated 5/29/2023. Previous chest CTA images dated 1/25/2021. The prior CTA report is not available at this time. Reference is also made to previous chest abdomen pelvis CT images and report dated 7/1/2020.    FINDINGS:      CHEST:    Pulmonary Arteries: Contrast bolus is adequate.  No CT evidence for acute pulmonary embolism.    Lungs: Focal atelectasis and/or consolidation within the right lower lobe at the base. Mild atelectasis within the base of the lingula and left lung base. Focal minimal atelectasis and/or consolidation within the dependent right upper lobe. Minimal right pleural effusion.    Mediastinum: No pathologically enlarged mediastinal lymph nodes. A few small right hilar lymph nodes, nonspecific. This could potentially be reactive. The heart size appears top normal.  The mediastinal vasculature is grossly within normal limits. Right-sided port catheter terminating within the SVC.    ABDOMEN:    Stomach: Mild wall thickening along the gastric antrum, and gastritis is not excluded.    Liver: Multiple irregular low-attenuation masses within the liver, corresponding to the lesions seen on prior PET/CT, compatible with the patient's known hepatic metastatic disease. The largest irregular ill-defined conglomerate of masses along the dome of the liver measures approximately 5.1 cm in axial diameter. Hepatomegaly, with a craniocaudal liver length of approximately 19.5 cm.    Gallbladder: Unremarkable.    Pancreas: Within normal limits.    Spleen: Within normal limits.    Adrenal glands: The left adrenal gland is unremarkable. Small right adrenal nodule measuring approximately 10 mm in diameter, in retrospect unchanged dating back to at least 1/25/2021, and demonstrating Hounsfield units of approximately 78. Due to the metastatic cancer  history, best pectus recommendations do not apply. Given the lack of interval change, this is most likely benign.    Right kidney: No hydronephrosis. Small simple appearing cyst, for which no follow-up imaging is recommended.  Left kidney: No hydronephrosis. No focal lesion.    Vascular structures: Vascular calcifications.    Nodes: No lymphadenopathy by size criteria.    PELVIS:    Small bowel: No significant distention.    Appendix: The appendix cannot be reliably identified, though no inflammatory changes are seen in the region of the cecum or right lower quadrant.    Colon : Mild colonic diverticulosis, with no evidence of acute epididymitis.    Bladder: Unremarkable.    No pelvic mass or adenopathy.    No free intraperitoneal fluid or air.      BONES:    Innumerable lesions throughout the osseous structures, predominantly sclerotic, with a few lytic areas, compatible with the patient's known osseous metastatic disease. This involves the ribs, sternum, vertebrae, sacrum, pelvis, and bilateral proximal femurs, also present on previous PET/CT.      IMPRESSION:  1.  No evidence of acute pulmonary embolism.  2.  Focal atelectasis and/or consolidation within the right lower lobe at the base. Pneumonia is not excluded.  3.  Focal minimal atelectasis and/or consolidation within the dependent right upper lobe.  4.  Minimal right pleural effusion.  5.  Mild wall thickening along the gastric antrum, and gastritis is not excluded.  6.  Extensive osseous metastatic disease, as on prior PET/CT.  7.  Hepatic metastatic disease, as on prior PET/CT.  8.  Hepatomegaly.  9.  Other findings as noted.    Electronically signed by:  Blank Boyer MD  6/18/2023 9:13 PM CDT Workstation: [id number]                                     X-Ray Chest AP Portable (Final result)  Result time 06/18/23 17:44:17      Final result by Mauricio Stephens Jr., MD (06/18/23 17:44:17)                   Narrative:    EXAMINATION:  XR CHEST AP  PORTABLE    CLINICAL INDICATION:  Female, 63 years old. Chest Pain    COMPARISON:  November 17, 2022    FINDINGS:  There is no change in the cardiomediastinal silhouette. The right internal jugular catheter remains in place.    Both lungs remain expanded and clear. No pleural effusion can be seen.    IMPRESSION:  No x-ray evidence of active or acute disease and no essential change since November 17, 2022. The right side [id number] and right internal jugular catheter remain in place    Electronically signed by:  Mauricio Stephens Jr., MD  6/18/2023 5:44 PM CDT Workstation: [id number]                                     Medications   cefepime 1g in dextrose 5% 100 mL IVPB (ready to mix) (has no administration in time range)   vancomycin - pharmacy to dose (has no administration in time range)   vancomycin 1.5 g in dextrose 5 % 250 mL IVPB (ready to mix) (has no administration in time range)   morphine injection 2 mg (2 mg Intravenous Given 6/18/23 1851)   ondansetron injection 4 mg (4 mg Intravenous Given 6/18/23 1850)   sodium chloride 0.9% bolus 1,000 mL 1,000 mL (1,000 mLs Intravenous New Bag 6/18/23 1851)   iohexoL (OMNIPAQUE 350) injection 100 mL (100 mLs Intravenous Given 6/18/23 1949)   HYDROmorphone injection 0.5 mg (0.5 mg Intravenous Given 6/18/23 1943)   potassium bicarbonate disintegrating tablet 40 mEq (40 mEq Oral Given 6/18/23 2117)     Medical Decision Making:   Clinical Tests:   Lab Tests: Ordered and Reviewed  Radiological Study: Ordered and Reviewed  Medical Tests: Ordered and Reviewed  ED Management:  Emergent evaluation of a 63-year-old female with recently diagnosed recurrent metastatic breast cancer, not as of yet undergoing chemotherapy for this recurrence who presents with severe right pleuritic chest and back pain.  Noted to be hypoxic at 90%, placed on supplemental oxygen with improvement to 93%.  Tachypnea improved.  Symptoms concerning for a pulmonary embolism.  CTA of the chest obtained  with no evidence of pulmonary embolism however right lung infiltrate and pleural effusion as well as atelectasis noted.  Blood cultures obtained.  IV antibiotics ordered.  Also with mild YIMI.  Symptoms have improved after ED treatment.  She is currently not demonstrating any evidence of shock or hypoperfusion.  I have discussed the case with the hospitalist provider who has assumed care and will admit.  Lab workup findings/CT findings discussed with the patient/plan for admission discussed and patient is agreeable.                        Clinical Impression:   Final diagnoses:  [R07.9] Chest pain  [J90] Pleurisy with effusion (Primary)  [J18.9] Pneumonia of right lung due to infectious organism, unspecified part of lung  [R09.02, Z99.81] Hypoxemia requiring supplemental oxygen        ED Disposition Condition    Admit Stable                Nuvia Cartagena MD  06/18/23 5564

## 2023-06-19 NOTE — ASSESSMENT & PLAN NOTE
Known metastatic breast cancer, follows with Dr. Peraza.  Finished her last round of chemo around 3 weeks ago per the patient.  Will follow-up outpatient with Dr. Peraza

## 2023-06-19 NOTE — ED NOTES
"1st contact with pt, pt calling for assistance using call light, reports her arm is " blown up" noted swelling to L upper arm, iv infiltration, iv stopped, iv discontinued and I have called for u/s guided iv, pt reports she was stuck over ten times and iv was inserted with an u/s  "

## 2023-06-19 NOTE — ASSESSMENT & PLAN NOTE
Patient says she was told by Dr. Vick that her imaging findings suggesting bone metastatic disease is likely old scarring rather than active disease.  She does have metastatic disease to the liver.  Pending biopsy of her liver lesions.  Will follow up with Oncology as an outpatient

## 2023-06-20 PROBLEM — N17.9 AKI (ACUTE KIDNEY INJURY): Status: RESOLVED | Noted: 2023-01-01 | Resolved: 2023-01-01

## 2023-06-20 NOTE — TELEPHONE ENCOUNTER
----- Message from Cherie Shin RN sent at 6/20/2023 12:40 PM CDT -----  Good afternoon    Patient admitted to [facility] for pneumonia. She is discharging to home today. Please schedule pcp follow up and contact patient with appt information.   Per hospitalist, will need chest xray in 4 weeks to monitor for clearance of pneumonia.     Thank you  Cherie Shin RN CM

## 2023-06-20 NOTE — PLAN OF CARE
06/20/23 1239   Final Note   Assessment Type Final Discharge Note   Anticipated Discharge Disposition Home   What phone number can be called within the next 1-3 days to see how you are doing after discharge? [phone]   Hospital Resources/Appts/Education Provided Post-Acute resouces added to AVS   Post-Acute Status   Discharge Delays None known at this time     Discharge orders and chart reviewed. No other discharge needs noted at this time. Pt is clear for discharge from case management. Pt is discharging to home.    Inbasket message sent to PCP to notify of hospital discharge on today. Clinic to schedule hospital follow up appt.

## 2023-06-20 NOTE — PLAN OF CARE
Problem: Adult Inpatient Plan of Care  Goal: Plan of Care Review  Outcome: Met  Goal: Patient-Specific Goal (Individualized)  Outcome: Met  Goal: Absence of Hospital-Acquired Illness or Injury  Outcome: Met  Goal: Optimal Comfort and Wellbeing  Outcome: Met  Goal: Readiness for Transition of Care  Outcome: Met     Problem: Diabetes Comorbidity  Goal: Blood Glucose Level Within Targeted Range  Outcome: Met     Problem: Fluid and Electrolyte Imbalance (Acute Kidney Injury/Impairment)  Goal: Fluid and Electrolyte Balance  Outcome: Met

## 2023-06-20 NOTE — NURSING
06/20/2023      Assumed care of patient.   Assessment and vital signs assessed.   Labs and meds reviewed.  Last documentation =  Temp: 99 °F (37.2 °C) (06/20/23 0802)  Pulse: 99 (06/20/23 0918)  Resp: 17 (06/20/23 0802)  BP: 138/68 (06/20/23 0918)  SpO2: 95 % (06/20/23 0802)     No questions or concerns at this time.

## 2023-06-20 NOTE — PLAN OF CARE
Problem: Adult Inpatient Plan of Care  Goal: Plan of Care Review  Outcome: Ongoing, Progressing  Goal: Optimal Comfort and Wellbeing  Outcome: Ongoing, Progressing  Goal: Readiness for Transition of Care  Outcome: Ongoing, Progressing     Problem: Oral Intake Inadequate (Acute Kidney Injury/Impairment)  Goal: Optimal Nutrition Intake  Outcome: Ongoing, Progressing     Problem: Infection (Pneumonia)  Goal: Resolution of Infection Signs and Symptoms  Outcome: Ongoing, Progressing

## 2023-06-20 NOTE — CARE UPDATE
06/19/23 6057   Patient Assessment/Suction   Level of Consciousness (AVPU) alert   Respiratory Effort Normal;Unlabored   PRE-TX-O2   Device (Oxygen Therapy) nasal cannula   $ Is the patient on Low Flow Oxygen? Yes   Flow (L/min) 2   Oxygen Concentration (%) 28   SpO2 97 %   Pulse Oximetry Type Intermittent   $ Pulse Oximetry - Multiple Charge Pulse Oximetry - Multiple   Ready to Wean/Extubation Screen   FIO2<=50 (chart decimal) 0.28   Education   $ Education Other (see comment);15 min

## 2023-06-20 NOTE — NURSING
Pt is d/c to home. Midline, tele d/c'd.  No questions concerning discharge. Case management signed off on the pt. Pt tx to car via w/c.

## 2023-06-21 NOTE — TELEPHONE ENCOUNTER
Spoke with patient states she was told her cancer has returned and will need biopsy done. Patient would like to finish her infusion treatment before coming to appt. Patient states she is very worried and will like to come in for HFU at a later date.    Monthly or less

## 2023-06-22 NOTE — TELEPHONE ENCOUNTER
Spoke to patient who confirmed she is unable to hold meat down when she eats as it makes her vomit. States she can eat all other things, just no meat at this time. Patient cj diarrhea and states she has not had BM since Thursday. Patient states she was recently dc'd from hospital due to pneumonia and is just not feeling herself lately. States she is not having SOB and that her O2 monitor is reading 97% on room air at this time. States she was scheduled for bx on 7/12. Michelle made aware and per her verbal orders I instructed patient to continue to eat food that she is able to at this time, that I will place referral to GI so that they can possibly do a scope to see what is causing her not to be able to keep meat products down and that Dr. Vick will review her scans upon his return and will call her with results if needed. I instructed patient that if she starts to experience uncontrolled vomiting, CP, SOB to go straight to ER. Verbalized understanding. GI referral placed, Ale made aware to send with all needed documents.

## 2023-06-22 NOTE — NURSING
Pt scheduled for ct guided liver bx. Given arrival date and time 7/12 at 8am.   Pt denies use of blood thinners or aspirin.  Pt instructed to have nothing to eat or drink after midnight day of procedure.   Pt must have a  day of procedure.      Pt verbalized understanding of above instructions.

## 2023-06-26 NOTE — ED PROVIDER NOTES
Encounter Date: 6/26/2023       History     Chief Complaint   Patient presents with    Shortness of Breath     WITH EXERTION, RECENT HOSP WITH PNEUMONIA    GEN WEAKNESS    Fall     TODAY    Back Pain     63-year-old female with past medical history metastatic breast cancer to liver and bone, diabetes, panic attacks, seizures, presents emergency department with shortness of breath with exertion, generalized weakness, multiple falls.  Patient was discharged 6 days ago from the hospital for pneumonia with parapneumonic effusion.  Patient completed antibiotic course.  Patient says that she is been so weak since being discharged on Tuesday.  Says that she is had multiple falls about 6 to be exact.  Says she is generally weak and fatigued.  She has extreme shortness of breath with any exertion she says.  She does not have any chest tightness.  She is not having any cough.  No reported fever or chills.  She has had some vomiting earlier today.  About 1 episode a day for the last couple of days the vomiting.  No diarrhea.  She does state that she is had urinary frequency as well multiple episodes of urinary frequency    Review of patient's allergies indicates:   Allergen Reactions    Adhesive tape-silicones Hives     BANDAIDS    Polymycin Hives    Adhesive     Latex, natural rubber Hives and Itching    Neomycin-bacitracnzn-polymyxnb     Polyurethane-39 Hives     Past Medical History:   Diagnosis Date    Acute hypoxemic respiratory failure 12/12/2020    Breast cancer 10/26/2015    left breast    Cancer     RIGHT BREAST 10-15    Depression     Diabetes mellitus     Neuropathy     Panic attacks     S/P epidural steroid injection     Seizures     none since early 30's    Wears glasses     TO DRIVE     Past Surgical History:   Procedure Laterality Date    AXILLARY NODE DISSECTION Left 8/24/2020    Procedure: LYMPHADENECTOMY, AXILLARY;  Surgeon: Cory Vick MD;  Location: [facility];  Service: General;  Laterality: Left;   left breast mastectomy with possible axillary lymph node dissection    BREAST BIOPSY      right    BREAST LUMPECTOMY      BREAST RECONSTRUCTION      breast reconstruction with tummy [facility]      BREAST SURGERY      11-3-15 LUMPECTOMY, 12-2-15 REEXCISION    INCISION AND DRAINAGE OF ABSCESS Left 2020    Procedure: INCISION AND DRAINAGE, ABSCESS;  Surgeon: Cory Vick MD;  Location: [facility] OR;  Service: General;  Laterality: Left;    INSERTION OF LOCALIZATION WIRE Left 2020    Procedure: INSERTION, LOCALIZATION WIRE;  Surgeon: Cory Vick MD;  Location: [facility] OR;  Service: General;  Laterality: Left;  left breast lumpectomy with wire needle loc    INSERTION OF TUNNELED CENTRAL VENOUS CATHETER (CVC) WITH SUBCUTANEOUS PORT Right 2022    Procedure: [id number]-X-CATH;  Surgeon: Jeff Mckeon Jr., MD;  Location: [facility] OR;  Service: General;  Laterality: Right;    MASTECTOMY      mastectomy 2016      MASTECTOMY, PARTIAL Left 3/19/2021    Procedure: MASTECTOMY, PARTIAL;  Surgeon: Cory Vick MD;  Location: [facility] OR;  Service: General;  Laterality: Left;  lumpectomy  left breast     RECONSTRUCTION OF NIPPLE Right 2018    Procedure: RECONSTRUCTION-NIPPLE RIGHT;  Surgeon: Xiang Hernandez MD;  Location: [address];  Service: Plastics;  Laterality: Right;    SENTINEL LYMPH NODE BIOPSY Left 2020    Procedure: BIOPSY, LYMPH NODE, SENTINEL;  Surgeon: Cory Vick MD;  Location: [facility];  Service: General;  Laterality: Left;  left breast lumpectomy with left sentinel lymoh node       shoulder surgery and wrist      BILAT  BONE SPUR    WRIST SURGERY      RIGHT     Family History   Problem Relation Age of Onset    Anesthesia problems Neg Hx      Social History     Tobacco Use    Smoking status: Former     Packs/day: 0.25     Years: 30.00     Pack years: 7.50     Types: Cigarettes     Quit date: 2015     Years since quittin.7    Smokeless tobacco: Never    Substance Use Topics    Alcohol use: Yes     Alcohol/week: 0.0 standard drinks     Comment: RARELY    Drug use: Not Currently     Types: Marijuana     Comment: medical marijuana     Review of Systems   Constitutional:  Positive for fatigue. Negative for chills and fever.   HENT:  Negative for congestion and sore throat.    Respiratory:  Positive for shortness of breath. Negative for cough.    Cardiovascular:  Negative for chest pain and palpitations.   Gastrointestinal:  Positive for nausea and vomiting. Negative for abdominal pain.   Genitourinary:  Positive for frequency. Negative for dysuria.   Musculoskeletal:  Negative for back pain.   Skin:  Negative for rash.   Neurological:  Negative for weakness and headaches.   Hematological:  Does not bruise/bleed easily.   All other systems reviewed and are negative.    Physical Exam     Initial Vitals [06/26/23 1202]   BP Pulse Resp Temp SpO2   133/88 (!) 117 (!) 22 98.3 °F (36.8 °C) 97 %      MAP       --         Physical Exam    Nursing note and vitals reviewed.  Constitutional: She appears well-developed and well-nourished. No distress.   Generalized weakness, frailty.   HENT:   Head: Normocephalic and atraumatic.   Mouth/Throat: No oropharyngeal exudate.   Eyes: Conjunctivae and EOM are normal. Pupils are equal, round, and reactive to light.   Neck: Neck supple. No tracheal deviation present.   Cardiovascular:  Normal rate, regular rhythm, normal heart sounds and intact distal pulses.           No murmur heard.  Pulmonary/Chest: Breath sounds normal. No stridor. No respiratory distress. She has no wheezes. She has no rhonchi. She has no rales.   Abdominal: Abdomen is soft. She exhibits no distension. There is no abdominal tenderness.   Musculoskeletal:         General: No tenderness or edema. Normal range of motion.      Cervical back: Neck supple.     Neurological: She is alert and oriented to person, place, and time. She has normal strength. No cranial nerve  deficit or sensory deficit. GCS score is 15. GCS eye subscore is 4. GCS verbal subscore is 5. GCS motor subscore is 6.   5/5 strength in bilateral upper and lower extremities.  Normal hand  strength.   Skin: Skin is warm and dry. Capillary refill takes less than 2 seconds. No rash noted. No erythema. No pallor.   Psychiatric: She has a normal mood and affect. Thought content normal.       ED Course   Procedures  Labs Reviewed   CBC W/ AUTO DIFFERENTIAL - Abnormal; Notable for the following components:       Result Value    RBC 3.61 (*)     Hemoglobin 11.7 (*)     Hematocrit 36.1 (*)      (*)     MCH 32.4 (*)     RDW 16.2 (*)     Lymph # 0.5 (*)     Gran % 84.5 (*)     Lymph % 5.5 (*)     All other components within normal limits   COMPREHENSIVE METABOLIC PANEL - Abnormal; Notable for the following components:    Sodium 131 (*)     CO2 19 (*)     Glucose 353 (*)     BUN 25 (*)     Total Protein 8.7 (*)     Albumin 3.2 (*)     Alkaline Phosphatase 383 (*)      (*)     ALT 47 (*)     eGFR 42.8 (*)     All other components within normal limits    Narrative:     Recoll. [id number] by SIENNA at 06/26/2023 14:25, reason: Specimen   hemolyzed. Spoke to Cheyanne Delgado RN ER.  06/26/2023  14:25   LACTIC ACID, PLASMA - Abnormal; Notable for the following components:    Lactate (Lactic Acid) 2.2 (*)     All other components within normal limits    Narrative:     Lactic Acid critical result(s) repeated. Called and verbal readback   obtained from Cheyanne Delgado RN ER.  by SIENNA 06/26/2023 14:51   URINALYSIS, REFLEX TO URINE CULTURE - Abnormal; Notable for the following components:    Appearance, UA Hazy (*)     Specific Gravity, UA >1.030 (*)     Protein, UA 2+ (*)     Glucose, UA 4+ (*)     Ketones, UA 1+ (*)     Occult Blood UA 1+ (*)     All other components within normal limits    Narrative:     Specimen Source->Urine   URINALYSIS MICROSCOPIC - Abnormal; Notable for the following components:    Yeast, UA  Occasional (*)     Hyaline Casts, UA 24 (*)     All other components within normal limits    Narrative:     Specimen Source->Urine   CULTURE, BLOOD   CULTURE, BLOOD   TROPONIN I HIGH SENSITIVITY    Narrative:     Recoll. [id number] by [email or web address] at 06/26/2023 14:25, reason: Specimen   hemolyzed. Spoke to Cheyanne Delgado RN ER.  06/26/2023  14:25   B-TYPE NATRIURETIC PEPTIDE   LACTIC ACID, PLASMA     Results for orders placed or performed during the hospital encounter of 06/26/23   CBC auto differential   Result Value Ref Range    WBC 8.48 3.90 - 12.70 K/uL    RBC 3.61 (L) 4.00 - 5.40 M/uL    Hemoglobin 11.7 (L) 12.0 - 16.0 g/dL    Hematocrit 36.1 (L) 37.0 - 48.5 %     (H) 82 - 98 fL    MCH 32.4 (H) 27.0 - 31.0 pg    MCHC 32.4 32.0 - 36.0 g/dL    RDW 16.2 (H) 11.5 - 14.5 %    Platelets 254 150 - 450 K/uL    MPV 9.6 9.2 - 12.9 fL    Immature Granulocytes 0.5 0.0 - 0.5 %    Gran # (ANC) 7.2 1.8 - 7.7 K/uL    Immature Grans (Abs) 0.04 0.00 - 0.04 K/uL    Lymph # 0.5 (L) 1.0 - 4.8 K/uL    Mono # 0.8 0.3 - 1.0 K/uL    Eos # 0.0 0.0 - 0.5 K/uL    Baso # 0.01 0.00 - 0.20 K/uL    nRBC 0 0 /100 WBC    Gran % 84.5 (H) 38.0 - 73.0 %    Lymph % 5.5 (L) 18.0 - 48.0 %    Mono % 9.4 4.0 - 15.0 %    Eosinophil % 0.0 0.0 - 8.0 %    Basophil % 0.1 0.0 - 1.9 %    Platelet Estimate Appears normal     Differential Method Automated    Comprehensive metabolic panel   Result Value Ref Range    Sodium 131 (L) 136 - 145 mmol/L    Potassium 4.5 3.5 - 5.1 mmol/L    Chloride 100 95 - 110 mmol/L    CO2 19 (L) 23 - 29 mmol/L    Glucose 353 (H) 70 - 110 mg/dL    BUN 25 (H) 8 - 23 mg/dL    Creatinine 1.4 0.5 - 1.4 mg/dL    Calcium 10.2 8.7 - 10.5 mg/dL    Total Protein 8.7 (H) 6.0 - 8.4 g/dL    Albumin 3.2 (L) 3.5 - 5.2 g/dL    Total Bilirubin 0.9 0.1 - 1.0 mg/dL    Alkaline Phosphatase 383 (H) 55 - 135 U/L     (H) 10 - 40 U/L    ALT 47 (H) 10 - 44 U/L    eGFR 42.8 (A) >60 mL/min/1.73 m^2    Anion Gap 12 8 - 16 mmol/L   Lactic acid, plasma    Result Value Ref Range    Lactate (Lactic Acid) 2.2 (HH) 0.5 - 1.9 mmol/L   Urinalysis, Reflex to Urine Culture Urine, Clean Catch    Specimen: Urine   Result Value Ref Range    Specimen UA Urine, Clean Catch     Color, UA Yellow Yellow, Straw, [facility]    Appearance, UA Hazy (A) Clear    pH, UA 6.0 5.0 - 8.0    Specific Gravity, UA >1.030 (A) 1.005 - 1.030    Protein, UA 2+ (A) Negative    Glucose, UA 4+ (A) Negative    Ketones, UA 1+ (A) Negative    Bilirubin (UA) Negative Negative    Occult Blood UA 1+ (A) Negative    Nitrite, UA Negative Negative    Urobilinogen, UA Negative Negative EU/dL    Leukocytes, UA Negative Negative   TROPONIN I HIGH SENSITIVITY   Result Value Ref Range    Troponin I High Sensitivity 9.1 0.0 - 14.9 pg/mL   Brain natriuretic peptide   Result Value Ref Range    BNP 35 0 - 99 pg/mL   Urinalysis Microscopic   Result Value Ref Range    RBC, UA 1 0 - 4 /hpf    WBC, UA 2 0 - 5 /hpf    Bacteria Rare None-Occ /hpf    Yeast, UA Occasional (A) None    Squam Epithel, UA 14 /hpf    Hyaline Casts, UA 24 (A) 0-1/lpf /lpf    Microscopic Comment SEE COMMENT    Lactic acid, plasma   Result Value Ref Range    Lactate (Lactic Acid) 1.5 0.5 - 1.9 mmol/L     *Note: Due to a large number of results and/or encounters for the requested time period, some results have not been displayed. A complete set of results can be found in Results Review.          ECG Results              EKG 12-lead (In process)  Result time 06/26/23 13:08:08      In process by Interface, Lab In [facility] (06/26/23 13:08:08)                   Narrative:    Test Reason : R53.1,    Vent. Rate : 118 BPM     Atrial Rate : 118 BPM     P-R Int : 126 ms          QRS Dur : 080 ms      QT Int : 306 ms       P-R-T Axes : 070 013 052 degrees     QTc Int : 428 ms    Sinus tachycardia  Otherwise normal ECG  When compared with ECG of 18-JUN-2023 16:51,  No significant change was found    Referred By: [email or web address]   SELF           Confirmed By:                                    Imaging Results              CT Abdomen Pelvis With Contrast (Final result)  Result time 06/26/23 20:06:25      Final result by Delfina Pennington MD (06/26/23 20:06:25)                   Narrative:    EXAM:  CT Abdomen and Pelvis With Intravenous Contrast    CLINICAL HISTORY:  The patient is 63 years old and is Female; Epigastric pain hx breast CA    TECHNIQUE:  Axial computed tomography images of the abdomen and pelvis with intravenous contrast.  Sagittal and coronal reformatted images were created and reviewed.  This CT exam was performed using one or more of the following dose reduction techniques:  automated exposure control, adjustment of the mA and/or kV according to patient size, and/or use of iterative reconstruction technique.    COMPARISON:  CT abdomen pelvis June 18, 2023.    FINDINGS:  Lung bases:  Linear atelectasis in the lingula and right lower lobe.    ABDOMEN:  Liver:  Multiple metastatic lesions in the liver.  Gallbladder and bile ducts:  Unremarkable.  No calcified stones.  No ductal dilation.  Pancreas:  No findings to suggest acute pancreatitis. No mass visualized.  No ductal dilation.  Spleen:  Unremarkable.  No splenomegaly.  Adrenals:  Unremarkable.  No mass.  Kidneys and ureters:  Unremarkable.  No solid mass.  No hydronephrosis.  Stomach and bowel:  Elongated sigmoid colon.  No bowel dilatation or obstruction. No bowel wall thickening.    PELVIS:  Appendix:  No findings to suggest acute appendicitis.  Bladder:  Unremarkable.  No mass.  Reproductive:  Unremarkable as visualized.    ABDOMEN and PELVIS:  Intraperitoneal space:  Unremarkable.  No free air.  No significant fluid collection.  Bones/joints:  Diffuse osseous metastatic disease. No acute fracture visualized. Vertebral Schmorl's nodes.  No dislocation.  Soft tissues:  Unremarkable.  Vasculature:  Unremarkable.  No abdominal aortic aneurysm.  Lymph nodes:  No pathologically enlarged lymph  nodes.    IMPRESSION:  Hepatic and osseous metastatic disease again visualized.    Electronically signed by:  Delfina Pennington MD  6/26/2023 8:06 PM CDT Workstation: [id number]                                     CTA Chest Non-Coronary (PE Studies) (Final result)  Result time 06/26/23 19:41:09      Final result by Mauricio Stephens Jr., MD (06/26/23 19:41:09)                   Narrative:    EXAM: CTA CHEST WITH CONTRAST, PE PROTOCOL CTA CHEST NON CORONARY (PE STUDIES)    CLINICAL INDICATION: Pulmonary embolism (PE) suspected, high prob    TECHNIQUE: CTA chest was performed, following the administration of intravenous contrast, as per department pulmonary embolus protocol. Axial, sagittal, and coronal reconstructions were obtained. 3D/MIPS reconstructions were obtained.    STUDY QUALITY:  This examination for the diagnosis of pulmonary embolism is Satisfactory    IV CONTRAST: 100 cc of opaque 350    RADIATION DOSE REDUCTION: This exam was performed according to the departmental dose-optimization program which includes automated exposure control, adjustment of the mA and/or kV according to patient size and/or use of iterative reconstruction technique.    COMPARISON: None    FINDINGS:  LOWER NECK:  No pathologic process in imaged portion of lower neck.    PULMONARY ARTERIES:  No evidence for pulmonary emboli.    THORACIC VESSELS:  No pathologic process.    HEART AND PERICARDIUM:  Heart normal size. No coronary artery calcification. No pericardial fluid or thickening.    PULMONARY PARENCHYMA AND AIRWAYS:  No residual infiltrative or atelectatic changes at the left lower lobe. Atelectatic changes at the right lower lobe shown on the previous study of June 18, 2023 of the above.    MEDIASTINUM AND SÁNCHEZ:  No pathologic process.    PLEURAL SPACES:  No pleural fluid, pleural thickening or pneumothorax.    CHEST WALL AND AXILLA:  No pathologic process.    UPPER ABDOMEN:  No metastatic liver lesions are again  demonstrated.    MUSCULOSKELETAL:  Diffuse osseous metastatic disease again demonstrated multiple compressed vertebrae are again demonstrated. Previously demonstrated right adrenal nodule is not included on these images.    ADDITIONAL FINDINGS:  None.    IMPRESSION:  No evidence for acute pulmonary embolism.    Atelectatic or infiltrative changes in the left lower lobe have partially resolved as compared to the previous study of June 18, 2023 and atelectatic changes at the left lower lobe have resolved.    Metastatic disease to the liver again demonstrated.    A portable osseous metastatic lesions are demonstrated.    Standardized Report:  [id number].    Electronically signed by:  Mauricio Stephens Jr., MD  6/26/2023 7:41 PM CDT Workstation: [id number]                                     CT Head Without Contrast (Final result)  Result time 06/26/23 19:23:46      Final result by Mauricio Stephens Jr., MD (06/26/23 19:23:46)                   Narrative:    CT of the head without contrast    Indication: Head trauma, moderate-severe    Findings: The ventricles, basal cisterns and sulci appear normal for patient's age. No abnormal intracranial attenuation can be seen. There is good discrimination of the gray and white matter. No mass or mass effect can be seen. No hemorrhage can be seen. The Claire visualized bones are intact. Visualized paranasal sinuses and mastoid air cells are clear. Orbital contents are unremarkable    Impression:  No abnormality demonstrated.    This exam was performed according to our departmental dose optimization program, which includes automated exposure control, adjustment of the mA and/or kV according to patient size and/or use of iterative reconstruction technique.      Electronically signed by:  Mauricio Stephens Jr., MD  6/26/2023 7:23 PM CDT Workstation: [id number]                                     X-Ray Lumbar Spine 5 View (Final result)  Result time 06/26/23 13:16:12   Procedure  changed from X-Ray Lumbar Spine Ap And Lateral     Final result by Valentin Lowe MD (06/26/23 13:16:12)                   Narrative:    XR LUMBAR SPINE 4 OR MORE VIEWS    CLINICAL HISTORY:  63 years Female back pain    COMPARISON: CT abdomen and pelvis June 18, 2023    FINDINGS: Lumbar spine alignment is within normal limits. Lumbar vertebral body heights are maintained. Extensive aggressive sclerotic lesions throughout the lumbar spine and visualized pelvis consistent with osseous metastasis. Mild multilevel degenerative disc disease, most pronounced at L5-S1.    IMPRESSION:    Extensive osseous metastatic disease throughout the lumbar spine and pelvis.    Electronically signed by:  Valentin Lowe MD  6/26/2023 1:16 PM CDT Workstation: [id number]                                     X-Ray Chest PA And Lateral (Final result)  Result time 06/26/23 13:13:20      Final result by Valentin Lowe MD (06/26/23 13:13:20)                   Narrative:    XR CHEST 2 VIEWS    CLINICAL HISTORY:  63 years Female Pt states she was admitted in hospital last Sunday & discharged 2 days later with pneumonia / pt states she has fallen 5 times in 1 week, right side lower back pain    COMPARISON: June 18, 2023    FINDINGS: Cardiac silhouette size is within normal limits and stable compared to prior. Right IJ Mediport catheter is in stable position. Numerous surgical clips project over the chest. Previously seen airspace disease involving base appears improved. No airspace consolidation. No pleural effusion or pneumothorax. No acute osseous abnormality.    IMPRESSION:    No acute pulmonary pathology.    Electronically signed by:  Valentin Lowe MD  6/26/2023 1:13 PM CDT Workstation: [id number]                                  Results for orders placed or performed during the hospital encounter of 06/26/23   CBC auto differential   Result Value Ref Range    WBC 8.48 3.90 - 12.70 K/uL    RBC 3.61 (L) 4.00 - 5.40 M/uL    Hemoglobin  11.7 (L) 12.0 - 16.0 g/dL    Hematocrit 36.1 (L) 37.0 - 48.5 %     (H) 82 - 98 fL    MCH 32.4 (H) 27.0 - 31.0 pg    MCHC 32.4 32.0 - 36.0 g/dL    RDW 16.2 (H) 11.5 - 14.5 %    Platelets 254 150 - 450 K/uL    MPV 9.6 9.2 - 12.9 fL    Immature Granulocytes 0.5 0.0 - 0.5 %    Gran # (ANC) 7.2 1.8 - 7.7 K/uL    Immature Grans (Abs) 0.04 0.00 - 0.04 K/uL    Lymph # 0.5 (L) 1.0 - 4.8 K/uL    Mono # 0.8 0.3 - 1.0 K/uL    Eos # 0.0 0.0 - 0.5 K/uL    Baso # 0.01 0.00 - 0.20 K/uL    nRBC 0 0 /100 WBC    Gran % 84.5 (H) 38.0 - 73.0 %    Lymph % 5.5 (L) 18.0 - 48.0 %    Mono % 9.4 4.0 - 15.0 %    Eosinophil % 0.0 0.0 - 8.0 %    Basophil % 0.1 0.0 - 1.9 %    Platelet Estimate Appears normal     Differential Method Automated    Comprehensive metabolic panel   Result Value Ref Range    Sodium 131 (L) 136 - 145 mmol/L    Potassium 4.5 3.5 - 5.1 mmol/L    Chloride 100 95 - 110 mmol/L    CO2 19 (L) 23 - 29 mmol/L    Glucose 353 (H) 70 - 110 mg/dL    BUN 25 (H) 8 - 23 mg/dL    Creatinine 1.4 0.5 - 1.4 mg/dL    Calcium 10.2 8.7 - 10.5 mg/dL    Total Protein 8.7 (H) 6.0 - 8.4 g/dL    Albumin 3.2 (L) 3.5 - 5.2 g/dL    Total Bilirubin 0.9 0.1 - 1.0 mg/dL    Alkaline Phosphatase 383 (H) 55 - 135 U/L     (H) 10 - 40 U/L    ALT 47 (H) 10 - 44 U/L    eGFR 42.8 (A) >60 mL/min/1.73 m^2    Anion Gap 12 8 - 16 mmol/L   Lactic acid, plasma   Result Value Ref Range    Lactate (Lactic Acid) 2.2 (HH) 0.5 - 1.9 mmol/L   Urinalysis, Reflex to Urine Culture Urine, Clean Catch    Specimen: Urine   Result Value Ref Range    Specimen UA Urine, Clean Catch     Color, UA Yellow Yellow, Straw, [facility]    Appearance, UA Hazy (A) Clear    pH, UA 6.0 5.0 - 8.0    Specific Gravity, UA >1.030 (A) 1.005 - 1.030    Protein, UA 2+ (A) Negative    Glucose, UA 4+ (A) Negative    Ketones, UA 1+ (A) Negative    Bilirubin (UA) Negative Negative    Occult Blood UA 1+ (A) Negative    Nitrite, UA Negative Negative    Urobilinogen, UA Negative Negative EU/dL     Leukocytes, UA Negative Negative   TROPONIN I HIGH SENSITIVITY   Result Value Ref Range    Troponin I High Sensitivity 9.1 0.0 - 14.9 pg/mL   Brain natriuretic peptide   Result Value Ref Range    BNP 35 0 - 99 pg/mL   Urinalysis Microscopic   Result Value Ref Range    RBC, UA 1 0 - 4 /hpf    WBC, UA 2 0 - 5 /hpf    Bacteria Rare None-Occ /hpf    Yeast, UA Occasional (A) None    Squam Epithel, UA 14 /hpf    Hyaline Casts, UA 24 (A) 0-1/lpf /lpf    Microscopic Comment SEE COMMENT    Lactic acid, plasma   Result Value Ref Range    Lactate (Lactic Acid) 1.5 0.5 - 1.9 mmol/L     *Note: Due to a large number of results and/or encounters for the requested time period, some results have not been displayed. A complete set of results can be found in Results Review.     CT Abdomen Pelvis With Contrast   Final Result      CTA Chest Non-Coronary (PE Studies)   Final Result      CT Head Without Contrast   Final Result      X-Ray Lumbar Spine 5 View   Final Result      X-Ray Chest PA And Lateral   Final Result             Medications   acetaminophen tablet 650 mg (has no administration in time range)   HYDROcodone-acetaminophen  mg per tablet 1 tablet (1 tablet Oral Given 6/26/23 1453)   iohexoL (OMNIPAQUE 350) injection 100 mL (100 mLs Intravenous Given 6/26/23 1705)   sodium chloride 0.9% bolus 1,000 mL 1,000 mL (1,000 mLs Intravenous New Bag 6/26/23 1745)     Medical Decision Making:   Differential Diagnosis:   Includes but not limited to PE, pneumonia, ACS, congestive heart failure, pulmonary edema pneumothorax, anemia  Clinical Tests:   Lab Tests: Ordered and Reviewed  Radiological Study: Ordered and Reviewed  Medical Tests: Ordered and Reviewed  ED Management:  Emergent evaluation of a 63-year-old female presents emergency department with shortness of breath with exertion.  Patient emergency department is well-appearing, nontoxic, no distress.  She recently got over pneumonia.  Patient may still be symptomatic from her  pneumonia.  Although I did obtain a CTA of her chest abdomen pelvis to rule out PE with her recent hospitalization recent pneumonia.  Pneumonia seems be improving compared to other CT scan.  No evidence of PE found.  There is no pulmonary edema there is no pleural effusions.  There is a normal BNP, doubt acute decompensated congestive heart failure.  Doubt acute coronary syndrome, troponin negative.  Patient symptoms have improved and I believe patient will benefit from home health/physical therapy at home.  I have placed a referral for home health flash physical therapy.  Patient will be discharged home and will follow up with PCP on an outpatient basis.  Patient is not requiring oxygen, not hypoxic.  Patient will return if her symptoms change or worsen.    A dictation software program was used for this note.  Please expect some simple typographical  errors in this note.    I had a detailed discussion with the patient and/or guardian regarding: The historical points, exam findings, and diagnostic results supporting the discharge diagnosis, lab results, pertinent radiology results, and the need for outpatient follow-up, for definitive care with a family practitioner and to return to the emergency department if symptoms worsen or persist or if there are any questions or concerns that arise at home. All questions have been answered in detail. Strict return to Emergency Department precautions have been provided.              ED Course as of 06/26/23 2117 Mon Jun 26, 2023   1554 EKG 12:13 p.m. sinus tachycardia rate of 118.  No ST elevation or depression.  No STEMI.  EKG interpreted independently by me. [JR]   1933 Patient is spiking a fever here in the ER after 8 hours.  Will add on blood cultures will await for reading of CT scan for source, no obvious source. [JR]      ED Course User Index  [JR] Dre Hardy DO                 Clinical Impression:   Final diagnoses:  [R06.02] Shortness of breath  (Primary)  [R53.1] Weakness        ED Disposition Condition    Discharge Stable          ED Prescriptions    None       Follow-up Information       Follow up With Specialties Details Why Contact Info Additional Information    Manuelito Shepard MD Family Medicine In 3 days  [address]  [address]  [place] [address]  [phone]       [facility] - Emergency Dept Emergency Medicine  If symptoms worsen [address] [id number]  [phone] 1st floor             Dre Hardy DO  06/26/23 2115

## 2023-06-26 NOTE — FIRST PROVIDER EVALUATION
Medical screening examination initiated.  I have conducted a focused provider triage encounter, findings are as follows:    Brief history of present illness: Admitted for pneumonia recently. Has had frequent falls, shortness of breath, vomiting, generalized weakness. Was discharged 6 days ago.     Vitals:    06/26/23 1202   BP: 133/88   BP Location: Left arm   Patient Position: Sitting   Pulse: (!) 117   Resp: (!) 22   TempSrc: Oral   SpO2: 97%       Pertinent physical exam:  Tachycardia. In no distress    Brief workup plan:  Labs, imaging    Preliminary workup initiated; this workup will be continued and followed by the physician or advanced practice provider that is assigned to the patient when roomed.

## 2023-06-28 NOTE — TELEPHONE ENCOUNTER
----- Message from Jolie Corbin MA sent at 6/28/2023  2:14 PM CDT -----  Regarding: hfu appt needed  Pt calling stating that she was just discharged from the hospital with pneumonia, she is really weak, can hardly walk, and is having frequent urination. [phone]

## 2023-06-28 NOTE — TELEPHONE ENCOUNTER
Spoke with patient went to ED on 06/26 for shortness of breath and continues to have SOB and can barely walk. When ambulates to bathroom and back to living room or bedroom has difficulty walking and standing up straight. Patient states she is having frequent urination with no UTI symptoms, denies burning, pain, urgency. Only frequency. Per patient her boyfriend states her urine looks cloudy and a little darker, no odor. Patient states when she stands up, she urinates on herself. No medications given at ED visit for UTI, urinalysis results in EPIC. Informed patient  not In office and if continued urinary frequency to head back to UC or ED. Patient verbalized understanding. Set appt for HFU with prior HFU discharge communication on 06/21/2023, as the ED visit on 06/26 patient was not admitted. Appt 07/11/2023 at 12 pm with Dr. Shepard.

## 2023-07-03 NOTE — HOSPITAL COURSE
63-year-old female with a history of hypertension, chronic back pain on oxycodone, Soma, OxyContin, metastatic breast cancer with metastatic disease to the liver and bone admitted with pneumonia, suspected pleuritic chest pain, YIMI, hypokalemia.  She had been treated in the outpatient setting with abx for cough but does not recall the name of the antibiotic. She reports feeling well until she woke up with severe right chest pain. Serial Troponin has been normal.  CTA chest reveals no PE or aortic dissection but does demonstrate R infiltrates consistent with pneumonia. She was started on supplemental O2 for mild hypoxia. IVFs started for Cr of 1.6- stopped now that Cr has normalized.  IVFs stopped. K being replaced- she tells me she is on daily replacement at home. I suspect chest pain is pleuritic in nature. Supplemental oxygen weaned to room air during hospital course and she will not require home O2. Discharged on course of po abx.  Examined on day of discharge and alert, NAD,comfortable respirations on room air.  Return precautions given.

## 2023-07-03 NOTE — DISCHARGE SUMMARY
[facility]  Discharge Summary      Patient Name: Negrita Castro  MRN: [id number]  [facility]: [id number]  Patient Class: IP- Inpatient  Admission Date: 6/18/2023  Hospital Length of Stay: 1 days  Discharge Date and Time: 6/20/2023  2:06 PM  Attending Physician: No att. providers found   Discharging Provider: Lucho Pino MD  Primary Care Provider: Manuelito Shepard MD    Primary Care Team: Networked reference to record PCT     HPI:   Patient is a 63-year-old female with a history of hypertension, chronic back pain on oxycodone, Soma, OxyContin, metastatic breast cancer with metastatic disease to the liver and bone.  She says that yesterday she woke up with severe pain in her right lower chest which is worse with inspiration and coughing.  She says she also had weakness in her right arm at that time and took nearly all day to resolve.  She had pain in her neck radiating down her arm.  She is not had any shortness a breath.  She has had a productive cough for the last 2-3 weeks and was treated with antibiotics per Dr. Peraza as an outpatient.  She has no nausea or vomiting.  She denies fever but does endorse chills.  She is not had any sick contacts.  She has no other changes to her medication regimen.  She quit smoking in 2015.  She is not had lower extremity swelling or calf pain.      In the ED:  T 98.8°, P 93, R 18, /100, O2 sat 93% on room air.  She was given 2 L of oxygen.  CBC with WBC of 10.6 with left shift, hemoglobin 12.3, platelets 235.  Chemistry panel with K of 3.1, creatinine 1.6 (mild YIMI from baseline), , T bili 1.2, AST 55, ALT 27, lipase 44.  , troponin 9.7, lactic acid 1.5.  TSH 1.9.  UDS with opiates.  Urine otherwise unremarkable.  EKG without acute ischemic changes.  CTA of the chest and abdomen was performed.  There was no evidence of pulmonary embolus.  She does have right lower lobe consolidation and parapneumonic effusion apparent.  She  will be admitted for pneumonia with parapneumonic effusion, pain control, YIMI.        * No surgery found *      Hospital Course:   63-year-old female with a history of hypertension, chronic back pain on oxycodone, Soma, OxyContin, metastatic breast cancer with metastatic disease to the liver and bone admitted with pneumonia, suspected pleuritic chest pain, YIMI, hypokalemia.  She had been treated in the outpatient setting with abx for cough but does not recall the name of the antibiotic. She reports feeling well until she woke up with severe right chest pain. Serial Troponin has been normal.  CTA chest reveals no PE or aortic dissection but does demonstrate R infiltrates consistent with pneumonia. She was started on supplemental O2 for mild hypoxia. IVFs started for Cr of 1.6- stopped now that Cr has normalized.  IVFs stopped. K being replaced- she tells me she is on daily replacement at home. I suspect chest pain is pleuritic in nature. Supplemental oxygen weaned to room air during hospital course and she will not require home O2. Discharged on course of po abx.  Examined on day of discharge and alert, NAD,comfortable respirations on room air.  Return precautions given.          Goals of Care Treatment Preferences:  Code Status: Full Code    Living Will: Yes              Consults:     No new Assessment & Plan notes have been filed under this hospital service since the last note was generated.  Service: Hospital Medicine    Final Active Diagnoses:    Diagnosis Date Noted POA    PRINCIPAL PROBLEM:  Pneumonia with parapneumonic effusion [J18.9] 06/18/2023 Yes    Malignant neoplasm metastatic to bone [C79.51] 04/08/2021 Yes    Essential hypertension [I10] 01/19/2021 Yes    Mixed hyperlipidemia [E78.2] 01/08/2020 Yes    Breast cancer, right [C50.911] 06/01/2017 Yes    Chest pain [R07.9] 02/02/2017 Yes      Problems Resolved During this Admission:    Diagnosis Date Noted Date Resolved POA    YIMI (acute kidney  injury) [N17.9] 06/18/2023 06/20/2023 Yes       Discharged Condition: good    Disposition: Home or Self Care    Follow Up:   Follow-up Information     Manuelito Shepard MD. Schedule an appointment as soon as possible for a visit in 4 week(s).    Specialty: Family Medicine  Why: post hospital discharge follow up for pneumonia.  Will need a repeat Chest xray in 4 weeks to document clearance of pneumonia.  Contact information:  [address]  [address] [place] [id number]  [phone]                       Patient Instructions:      Diet Cardiac     Diet diabetic     Notify your health care provider if you experience any of the following:  difficulty breathing or increased cough     Activity as tolerated       Significant Diagnostic Studies: Labs: CMP No results for input(s): NA, K, CL, CO2, GLU, BUN, CREATININE, CALCIUM, PROT, ALBUMIN, BILITOT, ALKPHOS, AST, ALT, ANIONGAP, ESTGFRAFRICA, EGFRNONAA in the last 48 hours. and CBC No results for input(s): WBC, HGB, HCT, PLT in the last 48 hours.    Pending Diagnostic Studies:     None         Medications:  Reconciled Home Medications:      Medication List      START taking these medications    levoFLOXacin 500 MG tablet  Commonly known as: LEVAQUIN  Take 1 tablet (500 mg total) by mouth once daily.        CHANGE how you take these medications    dexAMETHasone 4 MG Tab  Commonly known as: DECADRON  TAKE ONE TABLET (4MG TOTAL) BY MOUTH TWICE DAILY WITH MEALS  What changed: See the new instructions.     dextromethorphan-guaiFENesin  mg/5 ml  mg/5 mL liquid  Commonly known as: ROBITUSSIN-DM  Take 1 teaspoon q 6 hours prn cough.  What changed:   · how much to take  · how to take this     etodolac 400 MG tablet  Commonly known as: LODINE  TAKE ONE TABLET (400 MG TOTAL) BY MOUTH ONCE DAILY  What changed: See the new instructions.     fluconazole 150 MG Tab  Commonly known as: DIFLUCAN  TAKE ONE TABLET BY MOUTH ONCE FOR 1 DOSE  What changed: See the new  instructions.     ondansetron 8 MG Tbdl  Commonly known as: ZOFRAN-ODT  dissolve 1 TABLET ON THE TONGUE EVERY 6 TO 8 HOURS AS NEEDED FOR nausea  What changed:   · how much to take  · how to take this  · when to take this  · reasons to take this     potassium chloride SA 20 MEQ tablet  Commonly known as: K-DUR,KLOR-CON  Take 1 tablet (20 meq) by mouth 3 times a day until instructed further by Dr. Vick.  What changed: additional instructions     promethazine 25 MG tablet  Commonly known as: PHENERGAN  TAKE ONE TABLET (25MG TOTAL) BY MOUTH EVERY 4 TO 6 HOURS AS NEEDED  What changed: See the new instructions.     promethazine-codeine 6.25-10 mg/5 ml 6.25-10 mg/5 mL syrup  Commonly known as: PHENERGAN with CODEINE  TAKE 5 ML BY MOUTH EVERY 6 HOURS AS NEEDED FOR COUGH  What changed: additional instructions     zolpidem 10 mg Tab  Commonly known as: AMBIEN  TAKE ONE TABLET BY MOUTH EVERY NIGHT AT BEDTIME  What changed: additional instructions        CONTINUE taking these medications    benzonatate 100 MG capsule  Commonly known as: TESSALON  Take 1 capsule (100 mg total) by mouth 3 (three) times daily as needed for Cough.     blood sugar diagnostic Strp  TEST GLUCOSE BID     blood-glucose meter kit  Commonly known as: TRUE METRIX GLUCOSE METER  TEST GLUCOSE BID     carisoprodoL 350 MG tablet  Commonly known as: SOMA  Take 1 tablet (350 mg total) by mouth 3 (three) times daily as needed for Muscle spasms.     DEXCOM G6  Misc  Generic drug: blood-glucose meter,continuous  TEST GLUCOSE QID     DEXCOM G6 SENSOR Edwige  Generic drug: blood-glucose sensor  TEST GLUCOSE QID     DEXCOM G6 TRANSMITTER Edwige  Generic drug: blood-glucose transmitter  TEST GLUCOSE QID     diazePAM 10 MG Tab  Commonly known as: VALIUM  Take 1 tablet (10 mg total) by mouth 4 (four) times daily as needed (anxiety).     docusate sodium 100 MG capsule  Commonly known as: COLACE  Take 100 mg by mouth 2 (two) times daily.     droNABinol 5 MG  "capsule  Commonly known as: MARINOL  Take 1 capsule (5 mg total) by mouth 2 (two) times daily before meals.     enalapril 5 MG tablet  Commonly known as: VASOTEC  Take 1 tablet (5 mg total) by mouth once daily.     fentaNYL 25 mcg/hr  Commonly known as: DURAGESIC  Place 1 patch onto the skin every 72 hours.     HYDROcodone-acetaminophen  mg per tablet  Commonly known as: NORCO  Take 1 tablet by mouth every 6 (six) hours as needed for Pain.     lancets 32 gauge Misc  TEST GLUCOSE BID     LANTUS U-100 INSULIN 100 unit/mL injection  Generic drug: insulin glargine  Inject 32 Units into the skin 2 (two) times a day.     METAMUCIL (WITH SUGAR) 3.4 gram packet  Generic drug: psyllium husk (with sugar)  Take 1 packet by mouth 2 (two) times daily.     metFORMIN 500 MG ER 24hr tablet  Commonly known as: GLUCOPHAGE-XR  Take 2 tablets (1,000 mg total) by mouth 2 (two) times daily with meals.     metoprolol tartrate 25 MG tablet  Commonly known as: LOPRESSOR  Take 1 tablet (25 mg total) by mouth 2 (two) times daily.     MOVANTIK 12.5 mg Tab  Generic drug: naloxegoL  Take 12.5 mg by mouth once daily.     oxyCODONE 15 MG Tab  Commonly known as: ROXICODONE  Take 1 tablet (15 mg total) by mouth every 4 to 6 hours as needed for Pain.     palbociclib 100 mg Tab  Take 100 mg by mouth once daily. for 21 days, then take 7 days off. Total cycle length 28 days     pen needle, diabetic 32 gauge x 5/32" Ndle  Commonly known as: BD ULTRA-FINE MARCE PEN NEEDLE  Test blood sugar twice daily     rosuvastatin 40 MG Tab  Commonly known as: CRESTOR  Take 1 tablet (40 mg total) by mouth every evening. For cholesterol     TRULICITY 3 mg/0.5 mL pen injector  Generic drug: dulaglutide  Inject 3 mg into the skin every 7 days.        STOP taking these medications    amoxicillin 500 MG Tab  Commonly known as: AMOXIL            Indwelling Lines/Drains at time of discharge:   Lines/Drains/Airways     Central Venous Catheter Line  Duration           " Port A Cath Single Lumen right subclavian -- days                Time spent on the discharge of patient: 35 minutes         Lucho Pino MD  Department of Hospital Medicine  [facility]

## 2023-07-07 NOTE — PLAN OF CARE
Problem: Fall Injury Risk  Goal: Absence of Fall and Fall-Related Injury  Outcome: Ongoing, Progressing  Intervention: Identify and Manage Contributors  Flowsheets (Taken 7/7/2023 1054)  Self-Care Promotion:   independence encouraged   safe use of adaptive equipment encouraged  Medication Review/Management: medications reviewed

## 2023-07-07 NOTE — PROGRESS NOTES
"  [facility] In Office Hematology Oncology Subsequent  Encounter Note    7/7/23      Subjective:      Patient ID:   Negrita Castro  63 y.o.   Martínez Shepard R. Leblanc, Babycos, Bourgeois, Hall      Chief Complaint:    L breast cancer     HPI:  63 y.o. female with diagnosis of Stage 1 R breast cancer 10/26/15.  "Triple negative".  Adjuvant AC x's 4, tx's 12 and Rad Rx through 9/12/16.      Had bilateral reconstruction surgery 4/14/17.  Further reconstruction, tumsofy baxterck 8/14/17.  Additional fat injection at R chest done for symmetry.  She had placement of R nipple.  Dr. Hernandez.      She had additional reconstructive surgery at breast and abdomenal areas.   The date of the surgery was May 19th.      Recent Mamm/U/S showed small mass at 4:00 at L breast.  2020.  Needle Bx invasive ductal Ca, ERP+, PRP+, H2N neg  She had  L partial mastectomy, Sentinal node Bx 8/24/20. Followed by another surgery because of L axillary infection.  Primary 2 cm, LN neg, Stage 1 dx.    Discussed Oncotype Dx testing, this supported adding adjuvant chemotherapy to adjuvant hormonal therapy because of increased risk of cancer recurrence long-term.  Discussed BRCA testing, given her bilateral  breast Dx. PHN would not authorize BRCA 1 & 2.    She had COVID-19 infection and was hospitalized.  She came very close to requiring intubation and mechanical ventilation.  She is off oxygen now,   and sees Dr. Justin of Pulmonary.  Recent CT scan of the chest showed residual changes of interstitial disease, consistent with recovering from COVID-19.  Also lytic lesions were seen in the thoracic spine area very suspicious for metastatic disease at T5 through T8 spine.    Recently on Saturday night she had a hard coughing spell, and thereafter developed severe right posterior pleuritic chest pain.  She has point tenderness over the right posterior chest wall and may very well have a fractured rib at the site.  Rib x-rays have been ordered.  I " have refilled her Soma for 1 month.  I have refilled her Norco  for 1 month.  I have added oxycodone 5 mg 1 or 2 p.o. Q 3-4 hours p.r.n. breakthrough pain for 1 month to her regimen.    She also has an enlarging nodule at the left chest wall.  Ultrasound of the site suggested that this was a cyst however the areas firm movable and may very well be cancer.  I asked  for a ultrasound-guided biopsy of the mass per radiology.  Path report showed invasive ductal Ca, ERP + PRP neg,   H2N equivocal.    She is postmenopausal, her last menses were 4 5 years ago.  Rib x-rays have been requested.  Will order a CT scan to evaluate for possible metastatic disease.  Will order a ultrasound-guided biopsy of the left chest wall mass for pathologic examination at [facility].  Bx + invasive breast cancer.  ERP+, PRP-, H2N-.    Suspected local recurrence at L breast.  She had L lumpectomy.  Margins clear.    Referred  to Rad Rx MD for adjuvant Rad Rx., for local control.  She has T spine metastases.  Currently on hormonal Rx.  To see Dr. Manuelito hSepard for DM, BS control.    Dr. Washington saw her for C spine eval, no surgery planned for now.  He referred her to Dr. Rivers for injection Rx.    DM, cholesterol, epilepsy hx, DJD, LBP.  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    LR shoulder arthroscopy, R wrist surgery, M0.    Smoke 1/4 ppd x's 35 years, quit 2015.  ETOH no.  Disability-depression, epilepsy  Allergy none    Mom ovarian cancer  Dad lung cancer  Sisters DM, lung dx.    Summary of care:  Right breast cancer 2015, triple negative, treated with Adriamycin Cytoxan followed by taxane.    Left breast cancer ERP positive PRP positive HER2 Judy negative in 2020    COVID infection 2020.  She has not taken the covid 19 vaccination because of multiple allergy hx.    2021 bone metastases determined.    2021 local left breast cancer recurrence, ERP positive PRP negative HER2 Judy  "negative.    He April 21st bone biopsy positive for breast cancer metastatic.  ERP, PRP, HER2 Judy pending.    She was on radiation therapy through May 17, 2021.  Refilled Marinol at increased 5 mg 1 p.o. b.i.d. number 60.   I refilled her Soma, Norco, Marinol, and Silvadene cream; dated the prescriptions for June 17, 2021.    C/O mid thoracic pain x's 6-8 weeks,radiates to L & R, increased.  Tender over Mid thoracic area.  Hx of T spine metastatic dx.  Pain controlled on Norco 10/325 mg and oxycodone 5 mg 1-2 po PRN.  Add lodine 400 mg 1 daily    Checked MRI of T spine.  Metastatic dx with pathologic fx at T3,5,6.  Refer to Dr. Flowers of neurosurgery for Vertebroplasty or Kyphoplasty evaluation.    She is on Faslodex. Ibrance. Monitor WBC count.    She saw Dr. Flowers, and has been fitted with a brace initially, vertebroplasty after the first of the year.  [phone].  RTC Dr. Flowers 12/27/21.    No neuro surgery is planned for T-spine disease because of progressive growth and compression of the spine.  She admits to having decreased strength in the right hand.  She completed 10 of 10 radiation treatments on May 2, 2022. She is tapering down her Decadron 4 mg p.o. b.i.d. and she is taking Lodine 400 mg daily.  We are stopping fast low dex and Ibrance as of April 12, 2022.    She had an MRI on May 17, and a PET scan on May 19, I saw her on May 20th.  Plan to go with chemotherapy now, probably will go with carboplatin Gemzar.   She is 140 lb and 5 ft 11.     D1C1 carbo, gemzar. Continued over the past year.  Duragesic patch 25 mch q 3 days x's 3 patches, start Tuesday.  Claritin 10 mg 1 po daily x's 4 days, start Wednesday.    Resume Xgeva with D9 C1.  Check lab 7/14 weekly [facility] lab.    Got her a electric wheelchair.  On PT with "Hector".  Stronger.  Mass on back is giving her pain, refer to Cory Peraza MD for removal.  D1C6 9/1/22    Refill Soma, Oxycodone, Norco, Ambien on 9/1/22.  Due for refill 9/30/22.    She " "is doing better, with increased energy, 8/10.  Weight 154#.  Pain is better 8/10.  Improved R arm function.    Dr. Mckeon placed port.  PET 5/2022 increased dx in bones and liver.  Carbo Gemzar x's 6 months.  12/15/22 #10.  Breast cancer metastases are improved on current PET scan.    Port was placed.  Continues on chemo .  PET recurrent liver metastases.  Review CAT with radiation MD.  Reviewed the PET with the radiologist.  The liver mets had resolved 1/2023, but on present study have recurred.  Hx of bilateral breast cancer.  One was tripple negative.  One was ERP +.  Discussed further hormonal Rx, chemo therapy, immuno oncology Rx and   Antibody/Chemo conjugate, Trodelvy.    Discussed and she agrees to liver mass Bx.  7/12/23.  For Trodelvy 7/13/23.  Check lab 7/11/23.    She was hospitalizeed 6/18/23 for pneumonia, dehydration.  LLL.  And in ER 6/26/23, pneumonia improving   Now on Lantus 35 u BID, Dr. Shepard.  5'11"  134#.    ROS:   GEN: normal without any fever, night sweats or weight loss  HEENT: normal with no HA's, sore throat, stiff neck, changes in vision  CV: normal with no CP, SOB, PND, POOL or orthopnea  PULM: normal with no SOB, cough, hemoptysis, sputum or pleuritic pain  GI: normal with no abdominal pain, nausea, vomiting, constipation, diarrhea, melanotic stools, BRBPR, or hematemesis  : normal with no hematuria, dysuria  BREAST: See HPI.  SKIN: normal with no rash, erythema, bruising, or swelling     Past Medical History:   Diagnosis Date    Acute hypoxemic respiratory failure 12/12/2020    Breast cancer 10/26/2015    left breast    Cancer     RIGHT BREAST 10-15    Depression     Diabetes mellitus     Neuropathy     Panic attacks     S/P epidural steroid injection     Seizures     none since early 30's    Wears glasses     TO DRIVE     Past Surgical History:   Procedure Laterality Date    AXILLARY NODE DISSECTION Left 8/24/2020    Procedure: LYMPHADENECTOMY, AXILLARY;  Surgeon: Cory BARNES" MD Nicanor;  Location: [facility] OR;  Service: General;  Laterality: Left;  left breast mastectomy with possible axillary lymph node dissection    BREAST BIOPSY      right    BREAST LUMPECTOMY      BREAST RECONSTRUCTION      breast reconstruction with tummy [employer]      BREAST SURGERY      11-3-15 LUMPECTOMY, 12-2-15 REEXCISION    INCISION AND DRAINAGE OF ABSCESS Left 9/9/2020    Procedure: INCISION AND DRAINAGE, ABSCESS;  Surgeon: Cory Vick MD;  Location: [facility] OR;  Service: General;  Laterality: Left;    INSERTION OF LOCALIZATION WIRE Left 8/24/2020    Procedure: INSERTION, LOCALIZATION WIRE;  Surgeon: Cory Vick MD;  Location: [facility] OR;  Service: General;  Laterality: Left;  left breast lumpectomy with wire needle loc    INSERTION OF TUNNELED CENTRAL VENOUS CATHETER (CVC) WITH SUBCUTANEOUS PORT Right 11/17/2022    Procedure: [id number];  Surgeon: Jeff Mckeon Jr., MD;  Location: [facility] OR;  Service: General;  Laterality: Right;    MASTECTOMY      mastectomy 2016      MASTECTOMY, PARTIAL Left 3/19/2021    Procedure: MASTECTOMY, PARTIAL;  Surgeon: Cory Vick MD;  Location: [facility] OR;  Service: General;  Laterality: Left;  lumpectomy  left breast     RECONSTRUCTION OF NIPPLE Right 11/5/2018    Procedure: RECONSTRUCTION-NIPPLE RIGHT;  Surgeon: Xiang Hernandez MD;  Location: [address];  Service: Plastics;  Laterality: Right;    SENTINEL LYMPH NODE BIOPSY Left 8/24/2020    Procedure: BIOPSY, LYMPH NODE, SENTINEL;  Surgeon: Cory Vick MD;  Location: [facility];  Service: General;  Laterality: Left;  left breast lumpectomy with left sentinel lymoh node       shoulder surgery and wrist      BILAT  BONE SPUR    WRIST SURGERY      RIGHT       Review of patient's allergies indicates:   Allergen Reactions    Adhesive tape-silicones Hives     BANDAIDS    Polymycin Hives    Latex, natural rubber Itching    Polyurethane-39            Current Outpatient Medications:      benzonatate (TESSALON) 100 MG capsule, Take 1 capsule (100 mg total) by mouth 3 (three) times daily as needed for Cough., Disp: 30 capsule, Rfl: 2    blood sugar diagnostic Strp, TEST GLUCOSE BID, Disp: 300 each, Rfl: 3    blood-glucose meter (TRUE METRIX GLUCOSE METER) kit, TEST GLUCOSE BID, Disp: 1 each, Rfl: 0    blood-glucose meter,continuous (DEXCOM G6 ) Misc, TEST GLUCOSE QID, Disp: 1 each, Rfl: 1    blood-glucose sensor (DEXCOM G6 SENSOR) Edwige, TEST GLUCOSE QID, Disp: 13 each, Rfl: 3    blood-glucose transmitter (DEXCOM G6 TRANSMITTER) Edwige, TEST GLUCOSE QID, Disp: 1 each, Rfl: 1    carisoprodoL (SOMA) 350 MG tablet, Take 1 tablet (350 mg total) by mouth 3 (three) times daily as needed for Muscle spasms., Disp: 90 tablet, Rfl: 0    dexAMETHasone (DECADRON) 4 MG Tab, TAKE ONE TABLET (4MG TOTAL) BY MOUTH TWICE DAILY WITH MEALS (Patient taking differently: Take 4 mg by mouth 2 (two) times daily with meals.), Disp: 60 tablet, Rfl: 1    dextromethorphan-guaiFENesin  mg/5 ml (ROBITUSSIN-DM)  mg/5 mL liquid, Take 1 teaspoon q 6 hours prn cough. (Patient taking differently: Take 5 mLs by mouth. Take 1 teaspoon q 6 hours prn cough.), Disp: 300 mL, Rfl: 0    diazePAM (VALIUM) 10 MG Tab, Take 1 tablet (10 mg total) by mouth 4 (four) times daily as needed (anxiety)., Disp: 120 tablet, Rfl: 2    docusate sodium (COLACE) 100 MG capsule, Take 100 mg by mouth 2 (two) times daily., Disp: , Rfl:     droNABinol (MARINOL) 5 MG capsule, Take 1 capsule (5 mg total) by mouth 2 (two) times daily before meals., Disp: 60 capsule, Rfl: 0    dulaglutide (TRULICITY) 3 mg/0.5 mL pen injector, Inject 3 mg into the skin every 7 days., Disp: 4 pen, Rfl: 11    enalapril (VASOTEC) 5 MG tablet, Take 1 tablet (5 mg total) by mouth once daily., Disp: 90 tablet, Rfl: 3    etodolac (LODINE) 400 MG tablet, TAKE ONE TABLET (400 MG TOTAL) BY MOUTH ONCE DAILY (Patient taking differently: Take 400 mg by mouth once daily.), Disp: 30  "tablet, Rfl: 2    fentaNYL (DURAGESIC) 25 mcg/hr, Place 1 patch onto the skin every 72 hours., Disp: 10 patch, Rfl: 0    fluconazole (DIFLUCAN) 150 MG Tab, TAKE ONE TABLET BY MOUTH ONCE FOR 1 DOSE (Patient taking differently: Take 150 mg by mouth once.), Disp: 1 tablet, Rfl: 2    furosemide (LASIX) 20 MG tablet, TAKE ONE TABLET BY MOUTH EVERY DAY, Disp: 30 tablet, Rfl: 6    HYDROcodone-acetaminophen (NORCO)  mg per tablet, Take 1 tablet by mouth every 6 (six) hours as needed for Pain., Disp: 120 tablet, Rfl: 0    insulin glargine (LANTUS U-100 INSULIN) 100 unit/mL injection, Inject 32 Units into the skin 2 (two) times a day., Disp: 18 mL, Rfl: 11    lancets 32 gauge Misc, TEST GLUCOSE BID, Disp: 300 each, Rfl: 3    levoFLOXacin (LEVAQUIN) 500 MG tablet, Take 1 tablet (500 mg total) by mouth once daily., Disp: 5 tablet, Rfl: 0    metFORMIN (GLUCOPHAGE-XR) 500 MG ER 24hr tablet, Take 2 tablets (1,000 mg total) by mouth 2 (two) times daily with meals., Disp: 360 tablet, Rfl: 3    metoprolol tartrate (LOPRESSOR) 25 MG tablet, Take 1 tablet (25 mg total) by mouth 2 (two) times daily., Disp: 60 tablet, Rfl: 11    naloxegoL (MOVANTIK) 12.5 mg Tab, Take 12.5 mg by mouth once daily., Disp: 30 tablet, Rfl: 3    ondansetron (ZOFRAN-ODT) 8 MG TbDL, dissolve 1 TABLET ON THE TONGUE EVERY 6 TO 8 HOURS AS NEEDED FOR nausea (Patient taking differently: Take 8 mg by mouth every 6 to 8 hours as needed. dissolve 1 TABLET ON THE TONGUE EVERY 6 TO 8 HOURS AS NEEDED FOR nausea), Disp: 60 tablet, Rfl: 2    oxyCODONE (ROXICODONE) 15 MG Tab, Take 1 tablet (15 mg total) by mouth every 4 to 6 hours as needed for Pain., Disp: 120 tablet, Rfl: 0    palbociclib 100 mg Tab, Take 100 mg by mouth once daily. for 21 days, then take 7 days off. Total cycle length 28 days, Disp: 21 tablet, Rfl: 6    pen needle, diabetic (BD ULTRA-FINE MARCE PEN NEEDLE) 32 gauge x 5/32" Ndle, Test blood sugar twice daily, Disp: 100 each, Rfl: 5    potassium " "chloride SA (K-DUR,KLOR-CON) 20 MEQ tablet, Take 1 tablet (20 meq) by mouth 3 times a day until instructed further by Dr. Vick., Disp: 60 tablet, Rfl: 11    promethazine (PHENERGAN) 25 MG tablet, TAKE ONE TABLET (25MG TOTAL) BY MOUTH EVERY 4 TO 6 HOURS AS NEEDED (Patient taking differently: Take 25 mg by mouth every 4 to 6 hours as needed.), Disp: 30 tablet, Rfl: 5    promethazine-codeine 6.25-10 mg/5 ml (PHENERGAN WITH CODEINE) 6.25-10 mg/5 mL syrup, TAKE 5 ML BY MOUTH EVERY 6 HOURS AS NEEDED FOR COUGH (Patient taking differently: Take 5 mLs by mouth every 6 (six) hours as needed. TAKE 5 ML BY MOUTH EVERY 6 HOURS AS NEEDED FOR COUGH), Disp: 450 mL, Rfl: 0    psyllium husk, with sugar, (METAMUCIL, WITH SUGAR,) 3.4 gram packet, Take 1 packet by mouth 2 (two) times daily., Disp: 30 packet, Rfl: 2    rosuvastatin (CRESTOR) 40 MG Tab, Take 1 tablet (40 mg total) by mouth every evening. For cholesterol, Disp: 90 tablet, Rfl: 3    zolpidem (AMBIEN) 10 mg Tab, TAKE ONE TABLET BY MOUTH EVERY NIGHT AT BEDTIME (Patient taking differently: Take 10 mg by mouth every evening.), Disp: 30 tablet, Rfl: 3    Current Facility-Administered Medications:     0.9%  NaCl infusion (for blood administration), , Intravenous, Once, R Nhan Vick MD, Stopped at 06/02/23 1200          Objective:   Vitals:  Blood pressure 122/84, pulse (!) 111, temperature 98 °F (36.7 °C), resp. rate 16, height 5' 11" (1.803 m), weight 60.8 kg (134 lb), last menstrual period 01/16/2016, SpO2 99 %.    Physical Examination:   GEN: no apparent distress, comfortable, Brace in place.  HEAD: atraumatic and normocephalic  EYES: no pallor, no icterus  ENT: no pharyngeal erythema.  The cellulitis at L cheek area is resolved, after antibiotics.  NECK: noLAD/LN's, supple  CV:  No edema  CHEST: Normal respiratory effort   she is not  tender over the right posterior chest wall on exam.  The chest wall is not swollen.  ABDOM:  nondistended; soft                           "                                                                MUSC/Skeletal: ROM normal, Tender over mid T spine area.  EXTREM: no swelling  SKIN: She is developing keloid changes at L breast incision and navel surgical sites.  This area appears to be enlarg  NEURO: grossly intact  PSYCH: normal mood, affect and behavior  LYMPH: normal  LN's  BREASTS: L breast is much improved.    Refill Oxycodone, SOMA, Norco.    Tumor markers are improved.    K is 4, up from 2.4.  Decrease KCL from TID to BID.    Assessment:   (1) 63 y.o. female with diagnosis of Stage 1 triple negative R breast cancer, 10/2016.       S/P R mastectomy, SNBx, AC x's 4, T x's12 weeks, Rad Rx and recent reconstruction.    (2) New L invasive ductal Ca, ERP+, PRP+, H2N neg.  Clinical stage 1 dx.    I have started her on Arimidex 1 mg p.o. daily  for metastatic disease at this time.    Suspected fracture of right ribs after recent cough event, check rib x-rays, medicate for pain.    ERP+ dx, bone metastases.  Started on Arimidex daily.  Add 2nd hormonal drug Rx after Rad Rx completed.    Bone biopsy positive for metastatic breast cancer.  ERP+, PRP+, H2N-.    Now on Genzar, Carboplatin.  Improved.  PET 12/15/22.  Improving metastatic dx on present Rx.    Pet now recurrent liver metastases.  Progressive Dx in liver.    For liver Bx, see discussion above.  7/12/23  Begin Trodelvy 7/13/23.  Check lab 7/11/23.

## 2023-07-09 NOTE — TELEPHONE ENCOUNTER
Progressive liver metastases, breast cancer primary.  For liver Bx 7/12/23.    Orders in Epic for Trodelvy D 1 & 8, q 3 weeks.    Start 7/13/23.    Get auth    Get date and time    [employer]    Co pay assistance if needed.    I will see her 7/13/23.

## 2023-07-10 NOTE — TELEPHONE ENCOUNTER
"Spoke with patient cancelled 7/11/23 HFU with dr mathews.  Refills pended for MD approval.  Patient states for diflucan request, recently finished abx for pneumonia.  States she has been itching very bad "down there" since taking the abx.  Requests to have diflucan sent to pharmacy.  Rx pended, to MD for review/approval -DN  "

## 2023-07-10 NOTE — TELEPHONE ENCOUNTER
The patient's prescription has been approved and sent to   [facility] - [place], [place] - [address]  [address] [address]  Phone: [phone] Fax: [phone]

## 2023-07-10 NOTE — TELEPHONE ENCOUNTER
----- Message from Abeba Eubanks MA sent at 7/10/2023  9:21 AM CDT -----  Regarding: refill  Patient needs refill on lantus pen, valium, also has yeast infection and would like diflucan called in   [facility]   [phone]

## 2023-07-12 NOTE — SEDATION DOCUMENTATION
Ct guided liver bx completed.  Pt lauren well.  Moved to transport stretcher.  Resp even and unlabored.  Pt transported to asu with RN

## 2023-07-12 NOTE — PLAN OF CARE
1135- pt is back from biopsy. Biopsy site is clean and dry with no redness or swelling noted. Pt sitting up drinking fluids with no complaints of pain to her abd. Abd is soft. 1230- biopsy site is clean and dry. Abd is soft. Pt has no complaints at this time. 1330- biopsy site is clean and dry with no redness or swelling. Pt tolerated po intake with no n/v. 1500- pt is alert and oriented breathing even unlabored. Abd is soft. Biopsy site is clean and dry with no redness or swelling noted. VSS. Pt wheeled to her vehicle with no incident.

## 2023-07-13 NOTE — PROGRESS NOTES
[facility] HEMATOLGY ONCOLOGY     Subjective:       Patient ID: Negrita Castro is a 63 y.o. female presents today for chemotherapy education.      Chief Complaint: Breast Cancer     HPI  She is here today with a family member to discuss Trodevly.     Oncology History   Breast cancer, right   6/1/2017 Initial Diagnosis    Breast cancer, right     7/6/2021 - 4/12/2022 Chemotherapy    Treatment Summary   Plan Name: OP PALBOCICLIB FULVESTRANT Q4W  Treatment Goal: Palliative  Status: Inactive  Start Date: 7/6/2021  End Date: 4/12/2022  Provider: TALISHA Vick MD  Chemotherapy: fulvestrant (FASLODEX) injection 500 mg, 500 mg, Intramuscular, Clinic/HOD 1 time, 11 of 12 cycles  Administration: 500 mg (7/6/2021), 500 mg (8/3/2021), 500 mg (7/20/2021), 500 mg (8/31/2021), 500 mg (9/28/2021), 500 mg (10/26/2021), 500 mg (11/23/2021), 500 mg (12/21/2021), 500 mg (1/18/2022), 500 mg (2/15/2022), 500 mg (3/15/2022), 500 mg (4/12/2022)  palbociclib 125 mg Tab, 125 mg, Oral, Daily, 1 of 1 cycle, Start date: --, End date: --     6/2/2022 - 5/23/2023 Chemotherapy    Treatment Summary   Plan Name: OP BREAST GEMCITABINE CARBOPLATIN Q3W  Treatment Goal: Control  Status: Inactive  Start Date: 6/2/2022  End Date: 5/23/2023  Provider: TALISHA Vick MD  Chemotherapy: CARBOplatin (PARAPLATIN) 505 mg in sodium chloride 0.9% 300.5 mL chemo infusion, 505 mg (100 % of original dose 504.5 mg), Intravenous, Clinic/HOD 1 time, 17 of 17 cycles  Dose modification:   (original dose 504.5 mg, Cycle 1),   (original dose 553.5 mg, Cycle 2, Reason: MD Discretion),   (original dose 553.5 mg, Cycle 8),   (original dose 553.5 mg, Cycle 8),   (original dose 553.5 mg, Cycle 8),   (original dose 553.5 mg, Cycle 8),   (original dose 553.5 mg, Cycle 9),   (original dose 553.5 mg, Cycle 9),   (original dose 553.5 mg, Cycle 10),   (original dose 553.5 mg, Cycle 17, Reason: MD Discretion),   (original dose 553.5 mg, Cycle 17, Reason: MD  Discretion)  Administration: 505 mg (6/2/2022), 625 mg (6/23/2022), 625 mg (7/21/2022), 625 mg (8/11/2022), 525 mg (9/1/2022), 625 mg (9/22/2022), 480 mg (10/13/2022), 495 mg (11/3/2022), 540 mg (11/23/2022), 450 mg (12/22/2022), 490 mg (1/12/2023), 545 mg (2/2/2023), 545 mg (2/23/2023), 495 mg (3/16/2023), 495 mg (4/6/2023), 480 mg (4/27/2023), 420 mg (5/18/2023)  gemcitabine 1,425 mg in sodium chloride 0.9% 250 mL chemo infusion, 800 mg/m2 = 1,425 mg (100 % of original dose 800 mg/m2), Intravenous, Once, 17 of 17 cycles  Dose modification: 800 mg/m2 (original dose 800 mg/m2, Cycle 1)  Administration: 1,425 mg (6/2/2022), 1,425 mg (6/23/2022), 1,425 mg (7/21/2022), 1,425 mg (8/11/2022), 1,425 mg (9/1/2022), 1,425 mg (9/22/2022), 1,425 mg (10/13/2022), 1,490 mg (11/3/2022), 1,495 mg (11/23/2022), 1,490 mg (12/22/2022), 1,480 mg (1/12/2023), 1,495 mg (2/2/2023), 1,495 mg (2/23/2023), 1,495 mg (3/16/2023), 1,490 mg (4/6/2023), 1,465 mg (4/27/2023), 1,464 mg (5/18/2023)     7/13/2023 -  Chemotherapy    Treatment Summary   Plan Name: GLYNN SACITUZUMAB GOVITECAN-HZIY Q3W  Treatment Goal: Control  Status: Active  Start Date: 7/13/2023 (Planned)  End Date: 6/20/2024 (Planned)  Provider: TALISHA Vick MD  Chemotherapy: sacituzumab govitecan-hziy (TRODELVY) 608 mg in sodium chloride 0.9% 500 mL chemo infusion, 10 mg/kg = 608 mg, Intravenous, Clinic/HOD 1 time, 0 of 17 cycles     Breast cancer, left breast   8/24/2020 Initial Diagnosis    Breast cancer, left breast     12/3/2020 - 12/3/2020 Chemotherapy    Treatment Summary   Plan Name: OP BREAST TC - DOCETAXEL CYCLOPHOSPHAMIDE Q3W  Treatment Goal: Curative  Status: Inactive  Start Date:   End Date:   Provider: TALISHA Vick MD  Chemotherapy: cyclophosphamide (CYTOXAN) 600 mg/m2 = 1,290 mg in sodium chloride 0.9% 250 mL chemo infusion, 600 mg/m2 = 1,290 mg, Intravenous, Clinic/HOD 1 time, 0 of 6 cycles  DOCEtaxeL (TAXOTERE) 75 mg/m2 = 160 mg in sodium chloride 0.9% 250 mL  chemo infusion, 75 mg/m2 = 160 mg, Intravenous, Clinic/HOD 1 time, 0 of 6 cycles     4/8/2021 Cancer Staged    Staging form: Breast, AJCC 8th Edition  - Pathologic: Stage IIA (rpT2, pN0(f), cM0, G3, ER+, PA-, HER2-)     7/6/2021 - 4/12/2022 Chemotherapy    Treatment Summary   Plan Name: OP PALBOCICLIB FULVESTRANT Q4W  Treatment Goal: Palliative  Status: Inactive  Start Date: 7/6/2021  End Date: 4/12/2022  Provider: TALISHA Vick MD  Chemotherapy: fulvestrant (FASLODEX) injection 500 mg, 500 mg, Intramuscular, Clinic/HOD 1 time, 11 of 12 cycles  Administration: 500 mg (7/6/2021), 500 mg (8/3/2021), 500 mg (7/20/2021), 500 mg (8/31/2021), 500 mg (9/28/2021), 500 mg (10/26/2021), 500 mg (11/23/2021), 500 mg (12/21/2021), 500 mg (1/18/2022), 500 mg (2/15/2022), 500 mg (3/15/2022), 500 mg (4/12/2022)  palbociclib 125 mg Tab, 125 mg, Oral, Daily, 1 of 1 cycle, Start date: --, End date: --     6/2/2022 - 5/23/2023 Chemotherapy    Treatment Summary   Plan Name: OP BREAST GEMCITABINE CARBOPLATIN Q3W  Treatment Goal: Control  Status: Inactive  Start Date: 6/2/2022  End Date: 5/23/2023  Provider: TALISHA Vick MD  Chemotherapy: CARBOplatin (PARAPLATIN) 505 mg in sodium chloride 0.9% 300.5 mL chemo infusion, 505 mg (100 % of original dose 504.5 mg), Intravenous, Clinic/HOD 1 time, 17 of 17 cycles  Dose modification:   (original dose 504.5 mg, Cycle 1),   (original dose 553.5 mg, Cycle 2, Reason: MD Discretion),   (original dose 553.5 mg, Cycle 8),   (original dose 553.5 mg, Cycle 8),   (original dose 553.5 mg, Cycle 8),   (original dose 553.5 mg, Cycle 8),   (original dose 553.5 mg, Cycle 9),   (original dose 553.5 mg, Cycle 9),   (original dose 553.5 mg, Cycle 10),   (original dose 553.5 mg, Cycle 17, Reason: MD Discretion),   (original dose 553.5 mg, Cycle 17, Reason: MD Discretion)  Administration: 505 mg (6/2/2022), 625 mg (6/23/2022), 625 mg (7/21/2022), 625 mg (8/11/2022), 525 mg (9/1/2022), 625 mg (9/22/2022), 480 mg  (10/13/2022), 495 mg (11/3/2022), 540 mg (11/23/2022), 450 mg (12/22/2022), 490 mg (1/12/2023), 545 mg (2/2/2023), 545 mg (2/23/2023), 495 mg (3/16/2023), 495 mg (4/6/2023), 480 mg (4/27/2023), 420 mg (5/18/2023)  gemcitabine 1,425 mg in sodium chloride 0.9% 250 mL chemo infusion, 800 mg/m2 = 1,425 mg (100 % of original dose 800 mg/m2), Intravenous, Once, 17 of 17 cycles  Dose modification: 800 mg/m2 (original dose 800 mg/m2, Cycle 1)  Administration: 1,425 mg (6/2/2022), 1,425 mg (6/23/2022), 1,425 mg (7/21/2022), 1,425 mg (8/11/2022), 1,425 mg (9/1/2022), 1,425 mg (9/22/2022), 1,425 mg (10/13/2022), 1,490 mg (11/3/2022), 1,495 mg (11/23/2022), 1,490 mg (12/22/2022), 1,480 mg (1/12/2023), 1,495 mg (2/2/2023), 1,495 mg (2/23/2023), 1,495 mg (3/16/2023), 1,490 mg (4/6/2023), 1,465 mg (4/27/2023), 1,464 mg (5/18/2023)     7/13/2023 -  Chemotherapy    Treatment Summary   Plan Name: OP SACITUZUMAB GOVITECAN-HZIY Q3W  Treatment Goal: Control  Status: Active  Start Date: 7/13/2023 (Planned)  End Date: 6/20/2024 (Planned)  Provider: TALISHA Vick MD  Chemotherapy: sacituzumab govitecan-hziy (TRODELVY) 608 mg in sodium chloride 0.9% 500 mL chemo infusion, 10 mg/kg = 608 mg, Intravenous, Clinic/HOD 1 time, 0 of 17 cycles     Malignant neoplasm metastatic to bone   4/8/2021 Initial Diagnosis    Bone metastases     7/6/2021 - 4/12/2022 Chemotherapy    Treatment Summary   Plan Name: OP PALBOCICLIB FULVESTRANT Q4W  Treatment Goal: Palliative  Status: Inactive  Start Date: 7/6/2021  End Date: 4/12/2022  Provider: TALISHA Vick MD  Chemotherapy: fulvestrant (FASLODEX) injection 500 mg, 500 mg, Intramuscular, Clinic/HOD 1 time, 11 of 12 cycles  Administration: 500 mg (7/6/2021), 500 mg (8/3/2021), 500 mg (7/20/2021), 500 mg (8/31/2021), 500 mg (9/28/2021), 500 mg (10/26/2021), 500 mg (11/23/2021), 500 mg (12/21/2021), 500 mg (1/18/2022), 500 mg (2/15/2022), 500 mg (3/15/2022), 500 mg (4/12/2022)  palbociclib 125 mg Tab, 125 mg,  Oral, Daily, 1 of 1 cycle, Start date: --, End date: --     6/2/2022 - 5/23/2023 Chemotherapy    Treatment Summary   Plan Name: OP BREAST GEMCITABINE CARBOPLATIN Q3W  Treatment Goal: Control  Status: Inactive  Start Date: 6/2/2022  End Date: 5/23/2023  Provider: TALISHA Vick MD  Chemotherapy: CARBOplatin (PARAPLATIN) 505 mg in sodium chloride 0.9% 300.5 mL chemo infusion, 505 mg (100 % of original dose 504.5 mg), Intravenous, Clinic/[facility] 1 time, 17 of 17 cycles  Dose modification:   (original dose 504.5 mg, Cycle 1),   (original dose 553.5 mg, Cycle 2, Reason: MD Discretion),   (original dose 553.5 mg, Cycle 8),   (original dose 553.5 mg, Cycle 8),   (original dose 553.5 mg, Cycle 8),   (original dose 553.5 mg, Cycle 8),   (original dose 553.5 mg, Cycle 9),   (original dose 553.5 mg, Cycle 9),   (original dose 553.5 mg, Cycle 10),   (original dose 553.5 mg, Cycle 17, Reason: MD Discretion),   (original dose 553.5 mg, Cycle 17, Reason: MD Discretion)  Administration: 505 mg (6/2/2022), 625 mg (6/23/2022), 625 mg (7/21/2022), 625 mg (8/11/2022), 525 mg (9/1/2022), 625 mg (9/22/2022), 480 mg (10/13/2022), 495 mg (11/3/2022), 540 mg (11/23/2022), 450 mg (12/22/2022), 490 mg (1/12/2023), 545 mg (2/2/2023), 545 mg (2/23/2023), 495 mg (3/16/2023), 495 mg (4/6/2023), 480 mg (4/27/2023), 420 mg (5/18/2023)  gemcitabine 1,425 mg in sodium chloride 0.9% 250 mL chemo infusion, 800 mg/m2 = 1,425 mg (100 % of original dose 800 mg/m2), Intravenous, Once, 17 of 17 cycles  Dose modification: 800 mg/m2 (original dose 800 mg/m2, Cycle 1)  Administration: 1,425 mg (6/2/2022), 1,425 mg (6/23/2022), 1,425 mg (7/21/2022), 1,425 mg (8/11/2022), 1,425 mg (9/1/2022), 1,425 mg (9/22/2022), 1,425 mg (10/13/2022), 1,490 mg (11/3/2022), 1,495 mg (11/23/2022), 1,490 mg (12/22/2022), 1,480 mg (1/12/2023), 1,495 mg (2/2/2023), 1,495 mg (2/23/2023), 1,495 mg (3/16/2023), 1,490 mg (4/6/2023), 1,465 mg (4/27/2023), 1,464 mg (5/18/2023)     7/13/2023  -  Chemotherapy    Treatment Summary   Plan Name: OP SACITUZUMAB GOVITECAN-HZIY Q3W  Treatment Goal: Control  Status: Active  Start Date: 7/13/2023 (Planned)  End Date: 6/20/2024 (Planned)  Provider: TALISHA Vick MD  Chemotherapy: sacituzumab govitecan-hziy (TRODELVY) 608 mg in sodium chloride 0.9% 500 mL chemo infusion, 10 mg/kg = 608 mg, Intravenous, Clinic/HOD 1 time, 0 of 17 cycles         Past Medical History:   Diagnosis Date    Acute hypoxemic respiratory failure 12/12/2020    Breast cancer 10/26/2015    left breast    Cancer     RIGHT BREAST 10-15    Depression     Diabetes mellitus     Neuropathy     Panic attacks     S/P epidural steroid injection     Seizures     none since early 30's    Wears glasses     TO DRIVE       Past Surgical History:   Procedure Laterality Date    AXILLARY NODE DISSECTION Left 8/24/2020    Procedure: LYMPHADENECTOMY, AXILLARY;  Surgeon: Croy Vick MD;  Location: [facility] OR;  Service: General;  Laterality: Left;  left breast mastectomy with possible axillary lymph node dissection    BREAST BIOPSY      right    BREAST LUMPECTOMY      BREAST RECONSTRUCTION      breast reconstruction with tummy [facility]      BREAST SURGERY      11-3-15 LUMPECTOMY, 12-2-15 REEXCISION    INCISION AND DRAINAGE OF ABSCESS Left 9/9/2020    Procedure: INCISION AND DRAINAGE, ABSCESS;  Surgeon: Cory Vick MD;  Location: [facility] OR;  Service: General;  Laterality: Left;    INSERTION OF LOCALIZATION WIRE Left 8/24/2020    Procedure: INSERTION, LOCALIZATION WIRE;  Surgeon: Cory Vick MD;  Location: [facility] OR;  Service: General;  Laterality: Left;  left breast lumpectomy with wire needle loc    INSERTION OF TUNNELED CENTRAL VENOUS CATHETER (CVC) WITH SUBCUTANEOUS PORT Right 11/17/2022    Procedure: LIXIGRNXN-TCBH-F-CATH;  Surgeon: Jeff Mckeon Jr., MD;  Location: [facility] OR;  Service: General;  Laterality: Right;    MASTECTOMY      mastectomy 2016      MASTECTOMY, PARTIAL Left 3/19/2021     Procedure: MASTECTOMY, PARTIAL;  Surgeon: Cory Vick MD;  Location: [facility] OR;  Service: General;  Laterality: Left;  lumpectomy  left breast     RECONSTRUCTION OF NIPPLE Right 2018    Procedure: RECONSTRUCTION-NIPPLE RIGHT;  Surgeon: Xiang Hernandez MD;  [facility];  Service: Plastics;  Laterality: Right;    SENTINEL LYMPH NODE BIOPSY Left 2020    Procedure: BIOPSY, LYMPH NODE, SENTINEL;  Surgeon: Cory Vick MD;  Location: [facility] OR;  Service: General;  Laterality: Left;  left breast lumpectomy with left sentinel lymoh node       shoulder surgery and wrist      BILAT  BONE SPUR    WRIST SURGERY      RIGHT       Social History     Socioeconomic History    Marital status:    Tobacco Use    Smoking status: Former     Packs/day: 0.25     Years: 30.00     Pack years: 7.50     Types: Cigarettes     Quit date: 2015     Years since quittin.7    Smokeless tobacco: Never   Substance and Sexual Activity    Alcohol use: Yes     Alcohol/week: 0.0 standard drinks     Comment: RARELY    Drug use: Not Currently     Types: Marijuana     Comment: medical marijuana       Family History   Problem Relation Age of Onset    Anesthesia problems Neg Hx        Review of patient's allergies indicates:   Allergen Reactions    Adhesive tape-silicones Hives     BANDAIDS    Polymycin Hives    Adhesive     Latex, natural rubber Hives and Itching    Neomycin-bacitracnzn-polymyxnb     Polyurethane-39 Hives         Current Outpatient Medications:     benzonatate (TESSALON) 100 MG capsule, Take 1 capsule (100 mg total) by mouth 3 (three) times daily as needed for Cough., Disp: 30 capsule, Rfl: 2    blood sugar diagnostic Strp, TEST GLUCOSE BID, Disp: 300 each, Rfl: 3    blood-glucose meter,continuous (DEXCOM G6 ) Misc, TEST GLUCOSE QID, Disp: 1 each, Rfl: 1    blood-glucose sensor (DEXCOM G6 SENSOR) Edwige, TEST GLUCOSE QID, Disp: 13 each, Rfl: 3    blood-glucose transmitter  (DEXCOM G6 TRANSMITTER) Edwige, TEST GLUCOSE QID, Disp: 1 each, Rfl: 1    carisoprodoL (SOMA) 350 MG tablet, Take 1 tablet (350 mg total) by mouth 3 (three) times daily as needed for Muscle spasms., Disp: 90 tablet, Rfl: 0    dexAMETHasone (DECADRON) 4 MG Tab, TAKE ONE TABLET (4MG TOTAL) BY MOUTH TWICE DAILY WITH MEALS (Patient taking differently: Take 4 mg by mouth 2 (two) times daily with meals.), Disp: 60 tablet, Rfl: 1    dextromethorphan-guaiFENesin  mg/5 ml (ROBITUSSIN-DM)  mg/5 mL liquid, Take 1 teaspoon q 6 hours prn cough. (Patient taking differently: Take 5 mLs by mouth. Take 1 teaspoon q 6 hours prn cough.), Disp: 300 mL, Rfl: 0    diazePAM (VALIUM) 10 MG Tab, Take 1 tablet (10 mg total) by mouth 4 (four) times daily as needed (anxiety)., Disp: 120 tablet, Rfl: 2    docusate sodium (COLACE) 100 MG capsule, Take 100 mg by mouth 2 (two) times daily., Disp: , Rfl:     droNABinol (MARINOL) 5 MG capsule, Take 1 capsule (5 mg total) by mouth 2 (two) times daily before meals., Disp: 60 capsule, Rfl: 0    dulaglutide (TRULICITY) 3 mg/0.5 mL pen injector, Inject 3 mg into the skin every 7 days., Disp: 4 pen, Rfl: 11    enalapril (VASOTEC) 5 MG tablet, Take 1 tablet (5 mg total) by mouth once daily., Disp: 90 tablet, Rfl: 3    etodolac (LODINE) 400 MG tablet, TAKE ONE TABLET (400 MG TOTAL) BY MOUTH ONCE DAILY (Patient taking differently: Take 400 mg by mouth once daily.), Disp: 30 tablet, Rfl: 2    fentaNYL (DURAGESIC) 25 mcg/hr, Place 1 patch onto the skin every 72 hours., Disp: 10 patch, Rfl: 0    fluconazole (DIFLUCAN) 150 MG Tab, TAKE ONE TABLET BY MOUTH ONCE FOR 1 DOSE, Disp: 1 tablet, Rfl: 2    furosemide (LASIX) 20 MG tablet, TAKE ONE TABLET BY MOUTH EVERY DAY, Disp: 30 tablet, Rfl: 6    HYDROcodone-acetaminophen (NORCO)  mg per tablet, Take 1 tablet by mouth every 6 (six) hours as needed for Pain., Disp: 120 tablet, Rfl: 0    insulin glargine (LANTUS U-100 INSULIN) 100 unit/mL injection,  "Inject 32 Units into the skin 2 (two) times a day., Disp: 18 mL, Rfl: 11    lancets 32 gauge Misc, TEST GLUCOSE BID, Disp: 300 each, Rfl: 3    metFORMIN (GLUCOPHAGE-XR) 500 MG ER 24hr tablet, Take 2 tablets (1,000 mg total) by mouth 2 (two) times daily with meals., Disp: 360 tablet, Rfl: 3    metoprolol tartrate (LOPRESSOR) 25 MG tablet, Take 1 tablet (25 mg total) by mouth 2 (two) times daily., Disp: 60 tablet, Rfl: 11    naloxegoL (MOVANTIK) 12.5 mg Tab, Take 12.5 mg by mouth once daily., Disp: 30 tablet, Rfl: 3    oxyCODONE (ROXICODONE) 15 MG Tab, Take 1 tablet (15 mg total) by mouth every 4 to 6 hours as needed for Pain., Disp: 120 tablet, Rfl: 0    palbociclib 100 mg Tab, Take 100 mg by mouth once daily. for 21 days, then take 7 days off. Total cycle length 28 days, Disp: 21 tablet, Rfl: 6    pen needle, diabetic (BD ULTRA-FINE MARCE PEN NEEDLE) 32 gauge x 5/32" Ndle, Test blood sugar twice daily, Disp: 100 each, Rfl: 5    potassium chloride SA (K-DUR,KLOR-CON) 20 MEQ tablet, Take 1 tablet (20 meq) by mouth 3 times a day until instructed further by Dr. Vick., Disp: 60 tablet, Rfl: 11    promethazine (PHENERGAN) 25 MG tablet, TAKE ONE TABLET (25MG TOTAL) BY MOUTH EVERY 4 TO 6 HOURS AS NEEDED (Patient taking differently: Take 25 mg by mouth every 4 to 6 hours as needed.), Disp: 30 tablet, Rfl: 5    promethazine-codeine 6.25-10 mg/5 ml (PHENERGAN WITH CODEINE) 6.25-10 mg/5 mL syrup, TAKE 5 ML BY MOUTH EVERY 6 HOURS AS NEEDED FOR COUGH (Patient taking differently: Take 5 mLs by mouth every 6 (six) hours as needed. TAKE 5 ML BY MOUTH EVERY 6 HOURS AS NEEDED FOR COUGH), Disp: 450 mL, Rfl: 0    psyllium husk, with sugar, (METAMUCIL, WITH SUGAR,) 3.4 gram packet, Take 1 packet by mouth 2 (two) times daily., Disp: 30 packet, Rfl: 2    rosuvastatin (CRESTOR) 40 MG Tab, Take 1 tablet (40 mg total) by mouth every evening. For cholesterol, Disp: 90 tablet, Rfl: 3    zolpidem (AMBIEN) 10 mg Tab, TAKE ONE TABLET BY MOUTH " EVERY NIGHT AT BEDTIME (Patient taking differently: Take 10 mg by mouth every evening.), Disp: 30 tablet, Rfl: 3    blood-glucose meter (TRUE METRIX GLUCOSE METER) kit, TEST GLUCOSE BID, Disp: 1 each, Rfl: 0    ondansetron (ZOFRAN-ODT) 8 MG TbDL, Take 1 tablet (8 mg total) by mouth every 6 to 8 hours as needed (nausea). dissolve 1 TABLET ON THE TONGUE EVERY 6 TO 8 HOURS AS NEEDED FOR nausea, Disp: 30 tablet, Rfl: 2    Current Facility-Administered Medications:     0.9%  NaCl infusion (for blood administration), , Intravenous, Once, R Nhan Vick MD, Stopped at 06/02/23 1200    All medications and past history have been reviewed.    Review of Systems   Constitutional:  Positive for activity change and fatigue. Negative for appetite change and fever.   HENT:  Negative for congestion, mouth sores, postnasal drip, rhinorrhea, sinus pressure, sore throat and trouble swallowing.    Eyes:  Negative for photophobia and visual disturbance.   Respiratory:  Positive for shortness of breath. Negative for cough, chest tightness and wheezing.    Cardiovascular:  Negative for chest pain and leg swelling.   Gastrointestinal:  Positive for abdominal pain. Negative for abdominal distention, constipation, diarrhea, nausea and vomiting.   Endocrine: Negative for cold intolerance.   Genitourinary:  Negative for decreased urine volume, dysuria and frequency.   Musculoskeletal:  Positive for arthralgias, back pain and myalgias.   Skin:  Positive for pallor. Negative for rash.   Allergic/Immunologic: Negative for immunocompromised state.   Neurological:  Positive for light-headedness. Negative for dizziness, syncope, weakness, numbness and headaches.   Hematological:  Negative for adenopathy. Does not bruise/bleed easily.   Psychiatric/Behavioral:  Negative for sleep disturbance. The patient is nervous/anxious.      Objective:        Physcial Examination  VITAL SIGNS:    Body surface area is 1.76 meters squared.   Pain  Assessment       Wt Readings from Last 5 Encounters:   07/13/23 61.5 kg (135 lb 8 oz)   07/12/23 60.3 kg (133 lb)   07/07/23 60.8 kg (134 lb)   07/07/23 60.8 kg (134 lb)   06/26/23 62.6 kg (138 lb)       Physical Exam  Constitutional:       Appearance: She is ill-appearing.   HENT:      Head: Normocephalic and atraumatic.      Mouth/Throat:      Mouth: Mucous membranes are moist.   Cardiovascular:      Rate and Rhythm: Normal rate and regular rhythm.      Pulses: Normal pulses.      Heart sounds: Normal heart sounds.   Pulmonary:      Effort: Pulmonary effort is normal. No respiratory distress.      Breath sounds: Normal breath sounds. No wheezing.   Abdominal:      General: There is no distension.      Palpations: Abdomen is soft. There is no mass.      Tenderness: There is no abdominal tenderness.   Musculoskeletal:         General: No swelling. Normal range of motion.      Right lower leg: No edema.      Left lower leg: No edema.   Skin:     General: Skin is warm and dry.      Capillary Refill: Capillary refill takes 2 to 3 seconds.      Findings: No bruising or rash.   Neurological:      Mental Status: She is alert and oriented to person, place, and time. Mental status is at baseline.      Motor: No weakness.   Psychiatric:         Mood and Affect: Mood normal.         Behavior: Behavior normal.            Laboratory and Radiology   Lab Results   Component Value Date    WBC 8.40 07/12/2023    RBC 3.20 (L) 07/12/2023    HGB 9.8 (L) 07/12/2023    HCT 31.0 (L) 07/12/2023    MCV 97 07/12/2023    MCH 30.6 07/12/2023    MCHC 31.6 (L) 07/12/2023    RDW 16.8 (H) 07/12/2023     07/12/2023    MPV 9.8 07/12/2023    GRAN 7.0 07/12/2023    GRAN 83.2 (H) 07/12/2023    LYMPH 0.7 (L) 07/12/2023    LYMPH 8.3 (L) 07/12/2023    MONO 0.6 07/12/2023    MONO 7.6 07/12/2023    EOS 0.0 07/12/2023    BASO 0.02 07/12/2023    EOSINOPHIL 0.1 07/12/2023    BASOPHIL 0.2 07/12/2023     BMP  Lab Results   Component Value Date      (L) 07/11/2023    K 3.9 07/11/2023     07/11/2023    CO2 21 (L) 07/11/2023    BUN 19 07/11/2023    CREATININE 1.1 07/11/2023    CALCIUM 11.1 (H) 07/11/2023    ANIONGAP 7 (L) 07/11/2023    ESTGFRAFRICA >60 07/26/2022    EGFRNONAA >60 07/26/2022     Lab Results   Component Value Date    ALT 43 07/11/2023     (H) 07/11/2023    ALKPHOS 353 (H) 07/11/2023    BILITOT 0.5 07/11/2023     Results for orders placed or performed during the hospital encounter of 07/12/23 (from the past 2160 hour(s))   CT Biopsy Liver    Narrative    CMS MANDATED QUALITY DATA-CT RADIATION DOSE-436  All CT scans at this facility use dose modulation, iterative reconstruction, and or weight based dosing when appropriate, to reduce radiation dose to as low as reasonably achievable.    HISTORY: breast cancer, liver masses, get tissue  for Dx and additional studies?    TECHNIQUE:  After obtaining written informed consent, which included a discussion of the risks and benefits of the procedure to include infection and bleeding, including catastrophic bleeding into the abdomen from the liver, and allowing the patient the opportunity to ask questions, the patient agreed to the procedure.    The patient was placed supine on the CT fluoroscopy table. A suitable skin entrance site overlying one of the left hepatic lobe masses was localized with CT guidance. The skin was marked, and then prepped and draped in sterile fashion, with several milliliters of Lidocaine 1% injected subcutaneously to achieve adequate local anesthesia.    Following, a punch skin incision was made and a 17-gauge coaxial needle was advanced into the left lobe of the liver  with CT guidance. Two 18-gauge core biopsy samples were obtained. The needle was removed and a bandage was applied to the skin. There was no blood loss. The patient tolerated procedure well, with no immediate postprocedural complications.    Total moderate conscious sedation time with supervision by the  interventional radiologist and monitoring by the special procedures registered nurse was 25 minutes. The patient received Versed 2 mg and Fentanyl 75 mcg during the procedure.    IMPRESSION: Successful CT-guided liver mass core biopsy.      Electronically signed by:  Jerzy García MD  7/12/2023 1:01 PM CDT Workstation: [id number]   Results for orders placed or performed during the hospital encounter of 06/26/23 (from the past 2160 hour(s))   CT Abdomen Pelvis With Contrast    Narrative    EXAM:  CT Abdomen and Pelvis With Intravenous Contrast    CLINICAL HISTORY:  The patient is 63 years old and is Female; Epigastric pain hx breast CA    TECHNIQUE:  Axial computed tomography images of the abdomen and pelvis with intravenous contrast.  Sagittal and coronal reformatted images were created and reviewed.  This CT exam was performed using one or more of the following dose reduction techniques:  automated exposure control, adjustment of the mA and/or kV according to patient size, and/or use of iterative reconstruction technique.    COMPARISON:  CT abdomen pelvis June 18, 2023.    FINDINGS:  Lung bases:  Linear atelectasis in the lingula and right lower lobe.    ABDOMEN:  Liver:  Multiple metastatic lesions in the liver.  Gallbladder and bile ducts:  Unremarkable.  No calcified stones.  No ductal dilation.  Pancreas:  No findings to suggest acute pancreatitis. No mass visualized.  No ductal dilation.  Spleen:  Unremarkable.  No splenomegaly.  Adrenals:  Unremarkable.  No mass.  Kidneys and ureters:  Unremarkable.  No solid mass.  No hydronephrosis.  Stomach and bowel:  Elongated sigmoid colon.  No bowel dilatation or obstruction. No bowel wall thickening.    PELVIS:  Appendix:  No findings to suggest acute appendicitis.  Bladder:  Unremarkable.  No mass.  Reproductive:  Unremarkable as visualized.    ABDOMEN and PELVIS:  Intraperitoneal space:  Unremarkable.  No free air.  No significant fluid collection.  Bones/joints:   Diffuse osseous metastatic disease. No acute fracture visualized. Vertebral Schmorl's nodes.  No dislocation.  Soft tissues:  Unremarkable.  Vasculature:  Unremarkable.  No abdominal aortic aneurysm.  Lymph nodes:  No pathologically enlarged lymph nodes.    IMPRESSION:  Hepatic and osseous metastatic disease again visualized.    Electronically signed by:  Delfina Pennington MD  6/26/2023 8:06 PM CDT Workstation: [id number]   CTA Chest Non-Coronary (PE Studies)    Narrative    EXAM: CTA CHEST WITH CONTRAST, PE PROTOCOL CTA CHEST NON CORONARY (PE STUDIES)    CLINICAL INDICATION: Pulmonary embolism (PE) suspected, high prob    TECHNIQUE: CTA chest was performed, following the administration of intravenous contrast, as per department pulmonary embolus protocol. Axial, sagittal, and coronal reconstructions were obtained. 3D/MIPS reconstructions were obtained.    STUDY QUALITY:  This examination for the diagnosis of pulmonary embolism is Satisfactory    IV CONTRAST: 100 cc of opaque 350    RADIATION DOSE REDUCTION: This exam was performed according to the departmental dose-optimization program which includes automated exposure control, adjustment of the mA and/or kV according to patient size and/or use of iterative reconstruction technique.    COMPARISON: None    FINDINGS:  LOWER NECK:  No pathologic process in imaged portion of lower neck.    PULMONARY ARTERIES:  No evidence for pulmonary emboli.    THORACIC VESSELS:  No pathologic process.    HEART AND PERICARDIUM:  Heart normal size. No coronary artery calcification. No pericardial fluid or thickening.    PULMONARY PARENCHYMA AND AIRWAYS:  No residual infiltrative or atelectatic changes at the left lower lobe. Atelectatic changes at the right lower lobe shown on the previous study of June 18, 2023 of the above.    MEDIASTINUM AND SÁNCHEZ:  No pathologic process.    PLEURAL SPACES:  No pleural fluid, pleural thickening or pneumothorax.    CHEST WALL AND AXILLA:  No  pathologic process.    UPPER ABDOMEN:  No metastatic liver lesions are again demonstrated.    MUSCULOSKELETAL:  Diffuse osseous metastatic disease again demonstrated multiple compressed vertebrae are again demonstrated. Previously demonstrated right adrenal nodule is not included on these images.    ADDITIONAL FINDINGS:  None.    IMPRESSION:  No evidence for acute pulmonary embolism.    Atelectatic or infiltrative changes in the left lower lobe have partially resolved as compared to the previous study of June 18, 2023 and atelectatic changes at the left lower lobe have resolved.    Metastatic disease to the liver again demonstrated.    A portable osseous metastatic lesions are demonstrated.    Standardized Report:  [id number].    Electronically signed by:  Mauricio Stephens Jr., MD  6/26/2023 7:41 PM CDT Workstation: [id number]   CT Head Without Contrast    Narrative    CT of the head without contrast    Indication: Head trauma, moderate-severe    Findings: The ventricles, basal cisterns and sulci appear normal for patient's age. No abnormal intracranial attenuation can be seen. There is good discrimination of the gray and white matter. No mass or mass effect can be seen. No hemorrhage can be seen. The Pickrell visualized bones are intact. Visualized paranasal sinuses and mastoid air cells are clear. Orbital contents are unremarkable    Impression:  No abnormality demonstrated.    This exam was performed according to our departmental dose optimization program, which includes automated exposure control, adjustment of the mA and/or kV according to patient size and/or use of iterative reconstruction technique.      Electronically signed by:  Mauricio Stephens Jr., MD  6/26/2023 7:23 PM CDT Workstation: [id number]     *Note: Due to a large number of results and/or encounters for the requested time period, some results have not been displayed. A complete set of results can be found in Results Review.     No results  found. However, due to the size of the patient record, not all encounters were searched. Please check Results Review for a complete set of results.  No results found. However, due to the size of the patient record, not all encounters were searched. Please check Results Review for a complete set of results.    Pathology  Pathology Results  (Last 10 years)                 03/19/21 0935  Specimen to Pathology, Surgery General Surgery Edited Result - FINAL    Narrative:  Pre-op Diagnosis: Malignant neoplasm of left female breast,   unspecified estrogen receptor status, unspecified site of   breast [C50.912]   Procedure(s):   LUMPECTOMY, BREAST   Number of specimens: 1   Name of specimens: 1. LEFT BREAST RE-EXCISION, LONG   STITCH=LATERAL MARGIN, SHORT STITCH=SUPERIOR MARGIN   Specimen total (fresh, frozen, permanent):->1       02/23/21 1215  Specimen to Pathology, Radiology Breast, needle biopsy Edited Result - FINAL    Narrative:  Left breast 4:00 8cfn       09/09/20 1032  Specimen to Pathology, Surgery General Surgery Final result    Narrative:  Pre-op Diagnosis: Malignant neoplasm of right female breast,   unspecified estrogen receptor status, unspecified site of   breast [C50.911]   Procedure(s):   LUMPECTOMY, BREAST   Number of specimens: 1   Name of specimens: LEFT BREAST ANTERIOR MARGIN  ( STITCH AT   NEW ANTERIOR MARGIN)   Specimen total (fresh, frozen, permanent):->1       08/24/20 1259  Specimen to Pathology, Surgery General Surgery Edited Result - FINAL    Narrative:  Pre-op Diagnosis: Malignant neoplasm of left female breast,   unspecified estrogen receptor status, unspecified site of   breast [C50.912]   Procedure(s):   LUMPECTOMY, BREAST   INSERTION, LOCALIZATION WIRE   BIOPSY, LYMPH NODE, SENTINEL   LYMPHADENECTOMY, AXILLARY   Number of specimens: 2   Name of specimens:   1- left axillary sentinel node   2- left breast partial mastectomy with wire (short stitch   superior, long stitch lateral, double  stitch deep)   Specimen total (fresh, frozen, permanent):->2       08/05/20 1436  Specimen to Pathology, Radiology Breast, needle biopsy Final result    Narrative:  Left breast biopsy   4:00   6cm fn   Dr arthur   Fig 8 clip       10/22/15 0900  Tissue Specimen to Pathology Edited Result - FINAL            All lab results and imaging results have been reviewed and discussed with the patient.        TITLE: PLAN OF CARE FOR THE CHEMOTHERAPY PATIENT / TEACHING PROTOCOL    PURPOSE: To involve the patient / significant other in the plan of care and to provide teaching to the significant other & patient receiving chemotherapy.    LEVEL: Independent.    CONTENT: The Plan of Care for the chemotherapy patient is individualized and appropriate to the patients needs, strengths, limitations, & goals.  Education includes information regarding chemotherapy side effects, the treatment itself, and self-care  Activities.    GOAL / OUTCOME STANDARDS    PHYSIOLOGIC: The client will remain free or experience minimal side effects or toxicities throughout the chemotherapy treatment period.     PSYCHOLOGIC: The client/significant others will demonstrate positive coping mechanisms in relation to chemotherapy and its side effects.      COGINITIVE: The client/significant others will verbalize understanding of self-care measure to avoid/minimize side effects of the chemotherapy regimen.    EVALUATION / COMMENT KEY:    V = Audiovisual/Video  S = Successfully meets outcome  N = Needs further instruction  NA = Not applicable to the patient  P = Previous knowledge  U = Unable to comprehend  * = See progress notes      PLAN OF CARE  INFORMATION TO BE DELIVERED / NURSING INTERVENTIONS DATE EVALUATION   Assessment of client/caregiver,         knowledge of cancer diagnosis,         and chemotherapy as a treatment. 1a. Evaluate patient/caregiver learning ability    b. Plan teaching sessions with patient/caregiver according to needs and present  anxiety level/ability to learn.    c. Provide Chemotherapy Education Packet,        Mouth Care Protocol,         Specific Patient Education Sheets. 07/13/2023 S   Individual chemotherapy treatment         plan. 2a. Review of Chemotherapy Education handout from [employer]            07/13/2023   S   Knowledge Deficit & Self-Management of general side effects common to all chemotherapy:  Nausea/Vomiting  b.   Diarrhea  Mouth Care  Dental care  Constipation  Hair Loss  Potential for infection  Fatigue   3a. Reinforce that the majority of side effects from chemotherapy are reversible and are  controlled both in the hospital and at home        (blood counts recover, hair grows back).   b.  Refer to the following for reinforcement of         information post-treatment:  Mouth Care Protocol.  Bowel Protocol for constipation or diarrhea.  3.  Drug Specific Chemotherapy Information Sheets for each medication patient receiving.    07/13/2023     S     PLAN OF CARE  INFORMATION TO BE DELIVERED / NURSING INTERVENTIONS DATE EVALUATION   h. Potential for bleeding         i. Potential anemia/fatigue         j. Potential sunburn         k. Birth control measures  l. Safety measures post treatment 4.  Chemotherapy Home Care Instruction  and Safety Information Sheet.  A. patient/caregivers to thoroughly cook shellfish (shrimp, crab, etc) to decrease the chance of infection.    B.  Use sunscreen and protective clothing while in the sun.   07/13/2023      Knowledge deficit & Self Management of Drug Specific  Side Effects.    BLADDER EFFECTS        (Hemorrhagic Cystitis)                  Preventable with adequate hydration; occurs 2-3 days or more post treatment.   1.  Instruct patient to:  a.   Void at least every 2 hours; increase intake.  b.   DO NOT hold urine; go when urge is felt.  c.    Empty bladder at bedtime and on         awakening.  d.   Observe for color changes (red to tea           colored), amount and frequency  changes.  e.   Notify oncologist of any abnormalities           in urine or voiding or if you cannot               drink adequate fluids.   07/13/2023   S   b.   CHANGES IN URINE   COLOR:      1.   Instruct patient:  a.   Most evident in first 2-3 voidings after           administration.  Lasts less than 24 hours.  If urine is discolored 2 or more days post- treatment, notify oncologist.      07/13/2023 S   c.    KIDNEY EFFECTS           (Nephrotoxicity)   1.  Instruct patient to:  a.   Drink 8-16 glasses of fluid/day the day   pre-treatment and 3-4 days post-treatment to maintain hydration; the best way to minimize kidney problems.  b.   Notify oncologist immediately if unable to drink fluids or if changes are noted in urinary elimination.     07/13/2023   S   PULMONARY TOXICITY    Instruct patient to report symptoms such as shortness of breath, chest pain, shallow breathing, or chest wall discomfort to physician.  Reinforce preventative measures used by the health care team.  Baseline and periodic PFT and chest x-ray.   07/13/2023   S     PLAN OF CARE INFORMATION TO BE DELIVERED / NURSING INTERVENTIONS DATE EVALUATION   NERVE & MUSCLE EFFECTS (neurotoxocity; neuropathy, possible visual/hearing changes)        Instruct patient to:    Report numbness or tingling of the hands/feet, loss of fine motor movement (buttoning shirt, tying shoelaces), or gait changes to your oncologist.  If numbness/tingling are present:  protect feet with shoes at all times.  Use gloves for washing dishes/gardening & potholders in kitchen.       07/13/2023   S   CARDIOTOXICITY  Decreased effectiveness of             cardiac function. Effective are                  cumulative and irreversible.                                    CARDIAC ARRYTHMIAS              4   Instruct:  Heart function may be tested before treatment and perdiocally during treatment.  Notify oncologist of irregular pulse, palpitations, shortness of breath, or swelling in  lower extremities/feet.          Chemotherapy can cause arrhythmias on infusion that resolve once infusion discontinued. Instruct nurse if any irregularity felt.    07/13/2023   S   EXTRAVASTION  Occurs when vesicants leak outside of vein and cause damage to the skin and underlying tissues.   Reinforce preventive measures used to avoid complications.  Fresh IV site or central line monitored continuously with vesicant IVP.  Continuous infusion via central line site and blood return monitored periodically around the clock.  Instruct to:  Notify nurse of any discomfort, burning, stinging, etc. at IV site during chemotherapy administration.  Notify oncologist of any redness, pain, or swelling at IV site after discharge from hospital.   07/13/2023   S   HYPERSENSITIVITY can happen with any medication.   Instruct patient:  Nurse is with them during the initial part of treatment and will be close by to monitor.  Pre-medication ordered by the oncologist must be taken on time. If doses are missed, treatment will need to be re-scheduled.  Skin redness, itching, or hives appearing after discharge should be reported to oncologist. 07/13/2023   S       PLAN OF CARE INFORMATION TO BE DELIVERED / NURSING INTERVENTIONS DATE EVALUATION   FLU-LIKE SYNDROME      Instruct patient symptoms are hard to prevent and may include fever, shaking chills, muscle and body aches.  Taking prescribed medications from physician if needed.  Adequate fluids are important.    Reinforce the need to call if temperature is         elevated to 100.4 or more  07/13/2023   S   HAND-FOOT SYNDROME  causes painful, symmetric swelling and redness of palms and soles                  Instruct patient to report any numbness or tingling in the hands or feet.  Explain prevention techniques, such as     Use heavy moisturizers to lessen skin dryness and itching, but to avoid if skin is cracked or broken  Bathe in tepid water, use non-perfumed soap, and wash gently.  Baths with oatmeal or diluted baking soda may be soothing.  Avoid tight fitting shoes and repetitive actions, such as rubbing hands or applying pressure to hands/feet.  Review measures to take should syndrome occur:  Cold compresses and elevation for          edema  Pain medications and other measures as ordered by oncologist.   4.   Syndrome resolves few weeks after therapy. 07/13/2023   S   5. DISCHARGE PLANNING /        EDUCATION 1.    Explain importance of compliance with follow- up  tests (CBC, CMP).  2.    Verify patient/caregiver know:  a.    Oncologists office phone number.  b.    Dates of follow-up appointments.  c.    Prescriptions given for nausea  3.   Review side effects to monitor and notify          oncologist about.  4.   Reinforce the need for patient and caregivers to:  a.    Review information given.  b.    Call oncologists office with questions  or symptoms  5.   Provide [facility]chure make referrals if needed for financial or .   07/13/2023   S     PROGRESS NOTES: I met with the patient Mrs. Castro today for chemotherapy education. she will be starting treatment with Trodelvy . We discussed the mechanism of action, potential side effects of this treatment as well as ways she can manage them at home. Some of these side effects include but or not limited to fever, nausea, vomiting, decreased appetite, fatigue, weakness, cytopenias, myalgia/arthralgia, constipation, diarrhea, bleeding, headache, shortness of breath, nail changes, taste change, hair thinning/loss, mood disturbances, or edema. We also discussed dietary modifications she should make although this will be discussed in more detail with the dietician. she was provided with anti-emetic medication, a copy of all of the information we discussed today as well as our contact information. she will be provided a schedule on his first day of treatment. We will obtain labs on a weekly basis and the patient will  follow-up with the physician for toxicity monitoring throughout treatment. All questions were answered and an informed consent was obtained. she was reminded to certainly contact us sooner if needed.  Attached to the patients folder and discussed with the patient the 24 hour/ 7 days a week after hours telephone number for the physician.  Patient notified to call anytime 24/7 because their is a physician on call for any problems that may arise.  Patient also notified to report to [facility] if they can not get in touch with a physician after hours.  Discussed the five wishes booklet with the patient and their family.           Assessment/Plan:       1. Malignant neoplasm of upper-outer quadrant of right breast in female, estrogen receptor negative      Malignant neoplasm of upper-outer quadrant of right breast in female, estrogen receptor negative  -     ondansetron (ZOFRAN-ODT) 8 MG TbDL; Take 1 tablet (8 mg total) by mouth every 6 to 8 hours as needed (nausea). dissolve 1 TABLET ON THE TONGUE EVERY 6 TO 8 HOURS AS NEEDED FOR nausea  Dispense: 30 tablet; Refill: 2       Cancer Staging   Breast cancer, left breast  Staging form: Breast, AJCC 8th Edition  - Pathologic: Stage IIA (rpT2, pN0(f), cM0, G3, ER+, OK-, HER2-) - Signed by Ramírez Alarcon Jr., MD on 4/8/2021    Breast cancer, right  Staging form: Breast, AJCC 8th Edition  - Pathologic: Stage IIA (pT2, pN0(sn), cM0, G3, ER-, OK-, HER2-) - Signed by Ramírez Alarcon Jr., MD on 4/8/2021        I have explained and the patient understands all of  the current recommendation(s). I have answered all of their questions to the best of my ability and to their complete satisfaction.   The patient is to continue with the current management plan.          Medications Ordered:  Zofran 8mg 1 tab PO Q8h prn nausea  Phenergan 25mg PO Q4-6h prn nausea  IV fluids on Day 1 and 8   Neulasta on Day 9     Standing Labs Ordered:  CBC weekly  CMP weekly      Total Face to Face  Time: 60 minutes face to face with the patient and their family discussing the chemotherapy side-effects and when to call our office.   Electronically signed by: Electronically signed by: Michelle Davidson, MSN, APRN, AGNP-C

## 2023-07-17 NOTE — NURSING
0900. Pt here for C1D1 Alexx . Pt and family member states that she has been having fevers over the weekend with high temp of 102. Pt noted to have a wet cough.. pt is afebrile at this time. Pt also c/o weekness with recent fall yesterday. Office notified and will check with Dr. Peraza before starting chemo today. CBC repeated per [facility] NP order. 1030. Dr. Peraza was notified of pt's symptoms and order received to still treat today. Dr. Peraza also to order a urine culture and sputum culture on pt. Pt to also get T+ M for Blood tomorrow. Pt states understanding.1505. T+M drawn from port and green arm band applied. Sputum and urine sample collected and sent to lab.

## 2023-07-17 NOTE — PLAN OF CARE
Problem: Fall Injury Risk  Goal: Absence of Fall and Fall-Related Injury  Outcome: Ongoing, Progressing  Intervention: Identify and Manage Contributors  Flowsheets (Taken 7/17/2023 0930)  Self-Care Promotion: safe use of adaptive equipment encouraged  Intervention: Promote Injury-Free Environment  Flowsheets (Taken 7/17/2023 0930)  Safety Promotion/Fall Prevention:   Fall Risk reviewed with patient/family   assistive device/personal item within reach

## 2023-07-18 NOTE — PROGRESS NOTES
"  [facility] In Office Hematology Oncology Subsequent  Encounter Note    7/17/23      Subjective:      Patient ID:   Negrita Castro  63 y.o.   Martínez Shepard R. Leblanc, Babycos, Bourgeois, Hall      Chief Complaint:    L breast cancer     HPI:  63 y.o. female with diagnosis of Stage 1 R breast cancer 10/26/15.  "Triple negative".  Adjuvant AC x's 4, tx's 12 and Rad Rx through 9/12/16.      Had bilateral reconstruction surgery 4/14/17.  Further reconstruction, tumsofy baxterck 8/14/17.  Additional fat injection at R chest done for symmetry.  She had placement of R nipple.  Dr. Hernandez.      She had additional reconstructive surgery at breast and abdomenal areas.   The date of the surgery was May 19th.      Recent Mamm/U/S showed small mass at 4:00 at L breast.  2020.  Needle Bx invasive ductal Ca, ERP+, PRP+, H2N neg  She had  L partial mastectomy, Sentinal node Bx 8/24/20. Followed by another surgery because of L axillary infection.  Primary 2 cm, LN neg, Stage 1 dx.    Discussed Oncotype Dx testing, this supported adding adjuvant chemotherapy to adjuvant hormonal therapy because of increased risk of cancer recurrence long-term.  Discussed BRCA testing, given her bilateral  breast Dx. PHN would not authorize BRCA 1 & 2.    She had COVID-19 infection and was hospitalized.  She came very close to requiring intubation and mechanical ventilation.  She is off oxygen now,   and sees Dr. Justin of Pulmonary.  Recent CT scan of the chest showed residual changes of interstitial disease, consistent with recovering from COVID-19.  Also lytic lesions were seen in the thoracic spine area very suspicious for metastatic disease at T5 through T8 spine.    Recently on Saturday night she had a hard coughing spell, and thereafter developed severe right posterior pleuritic chest pain.  She has point tenderness over the right posterior chest wall and may very well have a fractured rib at the site.  Rib x-rays have been ordered.  I " have refilled her Soma for 1 month.  I have refilled her Norco  for 1 month.  I have added oxycodone 5 mg 1 or 2 p.o. Q 3-4 hours p.r.n. breakthrough pain for 1 month to her regimen.    She also has an enlarging nodule at the left chest wall.  Ultrasound of the site suggested that this was a cyst however the areas firm movable and may very well be cancer.  I asked  for a ultrasound-guided biopsy of the mass per radiology.  Path report showed invasive ductal Ca, ERP + PRP neg,   H2N equivocal.    She is postmenopausal, her last menses were 4 5 years ago.  Rib x-rays have been requested.  Will order a CT scan to evaluate for possible metastatic disease.  Will order a ultrasound-guided biopsy of the left chest wall mass for pathologic examination at [facility].  Bx + invasive breast cancer.  ERP+, PRP-, H2N-.    Suspected local recurrence at L breast.  She had L lumpectomy.  Margins clear.    Referred  to Rad Rx MD for adjuvant Rad Rx., for local control.  She has T spine metastases.  Currently on hormonal Rx.  To see Dr. Manuelito Shepard for DM, BS control.    Dr. Washington saw her for C spine eval, no surgery planned for now.  He referred her to Dr. Rivers for injection Rx.    DM, cholesterol, epilepsy hx, DJD, LBP.  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    LR shoulder arthroscopy, R wrist surgery, M0.    Smoke 1/4 ppd x's 35 years, quit 2015.  ETOH no.  Disability-depression, epilepsy  Allergy none    Mom ovarian cancer  Dad lung cancer  Sisters DM, lung dx.    Summary of care:  Right breast cancer 2015, triple negative, treated with Adriamycin Cytoxan followed by taxane.    Left breast cancer ERP positive PRP positive HER2 Judy negative in 2020    COVID infection 2020.  She has not taken the covid 19 vaccination because of multiple allergy hx.    2021 bone metastases determined.    2021 local left breast cancer recurrence, ERP positive PRP negative HER2 Judy  "negative.    He April 21st bone biopsy positive for breast cancer metastatic.  ERP, PRP, HER2 Judy pending.    She was on radiation therapy through May 17, 2021.  Refilled Marinol at increased 5 mg 1 p.o. b.i.d. number 60.   I refilled her Soma, Norco, Marinol, and Silvadene cream; dated the prescriptions for June 17, 2021.    C/O mid thoracic pain x's 6-8 weeks,radiates to L & R, increased.  Tender over Mid thoracic area.  Hx of T spine metastatic dx.  Pain controlled on Norco 10/325 mg and oxycodone 5 mg 1-2 po PRN.  Add lodine 400 mg 1 daily    Checked MRI of T spine.  Metastatic dx with pathologic fx at T3,5,6.  Refer to Dr. Flowers of neurosurgery for Vertebroplasty or Kyphoplasty evaluation.    She is on Faslodex. Ibrance. Monitor WBC count.    She saw Dr. Flowers, and has been fitted with a brace initially, vertebroplasty after the first of the year.  [phone].  RTC Dr. Flowers 12/27/21.    No neuro surgery is planned for T-spine disease because of progressive growth and compression of the spine.  She admits to having decreased strength in the right hand.  She completed 10 of 10 radiation treatments on May 2, 2022. She is tapering down her Decadron 4 mg p.o. b.i.d. and she is taking Lodine 400 mg daily.  We are stopping fast low dex and Ibrance as of April 12, 2022.    She had an MRI on May 17, and a PET scan on May 19, I saw her on May 20th.  Plan to go with chemotherapy now, probably will go with carboplatin Gemzar.   She is 140 lb and 5 ft 11.     D1C1 carbo, gemzar. Continued over the past year.  Duragesic patch 25 mch q 3 days x's 3 patches, start Tuesday.  Claritin 10 mg 1 po daily x's 4 days, start Wednesday.    Resume Xgeva with D9 C1.  Check lab 7/14 weekly [facility] lab.    Got her a electric wheelchair.  On PT with "Hector".  Stronger.  Mass on back is giving her pain, refer to Cory Peraza MD for removal.  D1C6 9/1/22    Refill Soma, Oxycodone, Norco, Ambien on 9/1/22.  Due for refill 9/30/22.    She " "is doing better, with increased energy, 8/10.  Weight 154#.  Pain is better 8/10.  Improved R arm function.    Dr. Mckeon placed port.  PET 5/2022 increased dx in bones and liver.  Carbo Gemzar x's 6 months.  12/15/22 #10.  Breast cancer metastases are improved on current PET scan.    Port was placed.  Continues on chemo .  PET recurrent liver metastases.  Review CAT with radiation MD.  Reviewed the PET with the radiologist.  The liver mets had resolved 1/2023, but on present study have recurred.  Hx of bilateral breast cancer.  One was tripple negative.  One was ERP +.  Discussed further hormonal Rx, chemo therapy, immuno oncology Rx and   Antibody/Chemo conjugate, Trodelvy.    Discussed and she agrees to liver mass Bx.  7/12/23.  For Trodelvy 7/13/23.  Check lab 7/11/23.    She was hospitalizeed 6/18/23 for pneumonia, dehydration.  LLL.  And in ER 6/26/23, pneumonia improving   Now on Lantus 35 u BID, Dr. Shepard.  5'11"  134#.    Fever 100.1 over weekend, some clear phlegm.  Denies SOB or dysuria.  Check Urine and sputum C/S.    Today Trodelvy D1C1, premeds given, IVF hydration.    ROS:   GEN: normal without any fever, night sweats or weight loss  HEENT: normal with no HA's, sore throat, stiff neck, changes in vision  CV: normal with no CP, SOB, PND, POOL or orthopnea  PULM: normal with no SOB, cough, hemoptysis, sputum or pleuritic pain  GI: normal with no abdominal pain, nausea, vomiting, constipation, diarrhea, melanotic stools, BRBPR, or hematemesis  : normal with no hematuria, dysuria  BREAST: See HPI.  SKIN: normal with no rash, erythema, bruising, or swelling     Past Medical History:   Diagnosis Date    Acute hypoxemic respiratory failure 12/12/2020    Breast cancer 10/26/2015    left breast    Cancer     RIGHT BREAST 10-15    Depression     Diabetes mellitus     Neuropathy     Panic attacks     S/P epidural steroid injection     Seizures     none since early 30's    Wears glasses     TO DRIVE "     Past Surgical History:   Procedure Laterality Date    AXILLARY NODE DISSECTION Left 8/24/2020    Procedure: LYMPHADENECTOMY, AXILLARY;  Surgeon: Cory Vick MD;  Location: [facility] OR;  Service: General;  Laterality: Left;  left breast mastectomy with possible axillary lymph node dissection    BREAST BIOPSY      right    BREAST LUMPECTOMY      BREAST RECONSTRUCTION      breast reconstruction with tummy [facility]      BREAST SURGERY      11-3-15 LUMPECTOMY, 12-2-15 REEXCISION    INCISION AND DRAINAGE OF ABSCESS Left 9/9/2020    Procedure: INCISION AND DRAINAGE, ABSCESS;  Surgeon: Cory Vick MD;  Location: [facility] OR;  Service: General;  Laterality: Left;    INSERTION OF LOCALIZATION WIRE Left 8/24/2020    Procedure: INSERTION, LOCALIZATION WIRE;  Surgeon: Cory Vick MD;  Location: [facility] OR;  Service: General;  Laterality: Left;  left breast lumpectomy with wire needle loc    INSERTION OF TUNNELED CENTRAL VENOUS CATHETER (CVC) WITH SUBCUTANEOUS PORT Right 11/17/2022    Procedure: CELKMNCPC-RMXG-B-CATH;  Surgeon: Jeff Mckeon Jr., MD;  Location: [facility] OR;  Service: General;  Laterality: Right;    MASTECTOMY      mastectomy 2016      MASTECTOMY, PARTIAL Left 3/19/2021    Procedure: MASTECTOMY, PARTIAL;  Surgeon: Cory Vick MD;  Location: [facility] OR;  Service: General;  Laterality: Left;  lumpectomy  left breast     RECONSTRUCTION OF NIPPLE Right 11/5/2018    Procedure: RECONSTRUCTION-NIPPLE RIGHT;  Surgeon: Xiang Hernandez MD;  Location: [address];  Service: Plastics;  Laterality: Right;    SENTINEL LYMPH NODE BIOPSY Left 8/24/2020    Procedure: BIOPSY, LYMPH NODE, SENTINEL;  Surgeon: Cory Vcik MD;  Location: [facility] OR;  Service: General;  Laterality: Left;  left breast lumpectomy with left sentinel lymoh node       shoulder surgery and wrist      BILAT  BONE SPUR    WRIST SURGERY      RIGHT       Review of patient's allergies indicates:   Allergen Reactions     Adhesive tape-silicones Hives     BANDAIDS    Polymycin Hives    Latex, natural rubber Itching    Polyurethane-39            Current Outpatient Medications:     benzonatate (TESSALON) 100 MG capsule, Take 1 capsule (100 mg total) by mouth 3 (three) times daily as needed for Cough., Disp: 30 capsule, Rfl: 2    blood sugar diagnostic Strp, TEST GLUCOSE BID, Disp: 300 each, Rfl: 3    blood-glucose meter (TRUE METRIX GLUCOSE METER) kit, TEST GLUCOSE BID, Disp: 1 each, Rfl: 0    blood-glucose meter,continuous (DEXCOM G6 ) Misc, TEST GLUCOSE QID, Disp: 1 each, Rfl: 1    blood-glucose sensor (DEXCOM G6 SENSOR) Edwige, TEST GLUCOSE QID, Disp: 13 each, Rfl: 3    blood-glucose transmitter (DEXCOM G6 TRANSMITTER) Edwige, TEST GLUCOSE QID, Disp: 1 each, Rfl: 1    carisoprodoL (SOMA) 350 MG tablet, Take 1 tablet (350 mg total) by mouth 3 (three) times daily as needed for Muscle spasms., Disp: 90 tablet, Rfl: 0    dexAMETHasone (DECADRON) 4 MG Tab, TAKE ONE TABLET (4MG TOTAL) BY MOUTH TWICE DAILY WITH MEALS (Patient taking differently: Take 4 mg by mouth 2 (two) times daily with meals.), Disp: 60 tablet, Rfl: 1    dextromethorphan-guaiFENesin  mg/5 ml (ROBITUSSIN-DM)  mg/5 mL liquid, Take 1 teaspoon q 6 hours prn cough. (Patient taking differently: Take 5 mLs by mouth. Take 1 teaspoon q 6 hours prn cough.), Disp: 300 mL, Rfl: 0    diazePAM (VALIUM) 10 MG Tab, Take 1 tablet (10 mg total) by mouth 4 (four) times daily as needed (anxiety)., Disp: 120 tablet, Rfl: 2    diphenoxylate-atropine 2.5-0.025 mg (LOMOTIL) 2.5-0.025 mg per tablet, Take 1 tablet by mouth 4 (four) times daily as needed for Diarrhea., Disp: 30 tablet, Rfl: 2    docusate sodium (COLACE) 100 MG capsule, Take 100 mg by mouth 2 (two) times daily., Disp: , Rfl:     droNABinol (MARINOL) 5 MG capsule, Take 1 capsule (5 mg total) by mouth 2 (two) times daily before meals., Disp: 60 capsule, Rfl: 0    dulaglutide (TRULICITY) 3 mg/0.5 mL pen injector,  Inject 3 mg into the skin every 7 days., Disp: 4 pen, Rfl: 11    enalapril (VASOTEC) 5 MG tablet, Take 1 tablet (5 mg total) by mouth once daily., Disp: 90 tablet, Rfl: 3    etodolac (LODINE) 400 MG tablet, TAKE ONE TABLET (400 MG TOTAL) BY MOUTH ONCE DAILY (Patient taking differently: Take 400 mg by mouth once daily.), Disp: 30 tablet, Rfl: 2    fentaNYL (DURAGESIC) 25 mcg/hr, Place 1 patch onto the skin every 72 hours., Disp: 10 patch, Rfl: 0    fluconazole (DIFLUCAN) 150 MG Tab, TAKE ONE TABLET BY MOUTH ONCE FOR 1 DOSE, Disp: 1 tablet, Rfl: 2    furosemide (LASIX) 20 MG tablet, TAKE ONE TABLET BY MOUTH EVERY DAY, Disp: 30 tablet, Rfl: 6    HYDROcodone-acetaminophen (NORCO)  mg per tablet, Take 1 tablet by mouth every 6 (six) hours as needed for Pain., Disp: 120 tablet, Rfl: 0    insulin glargine (LANTUS U-100 INSULIN) 100 unit/mL injection, Inject 32 Units into the skin 2 (two) times a day., Disp: 18 mL, Rfl: 11    lancets 32 gauge Misc, TEST GLUCOSE BID, Disp: 300 each, Rfl: 3    metFORMIN (GLUCOPHAGE-XR) 500 MG ER 24hr tablet, Take 2 tablets (1,000 mg total) by mouth 2 (two) times daily with meals., Disp: 360 tablet, Rfl: 3    metoprolol tartrate (LOPRESSOR) 25 MG tablet, Take 1 tablet (25 mg total) by mouth 2 (two) times daily., Disp: 60 tablet, Rfl: 11    naloxegoL (MOVANTIK) 12.5 mg Tab, Take 12.5 mg by mouth once daily., Disp: 30 tablet, Rfl: 3    ondansetron (ZOFRAN-ODT) 8 MG TbDL, Take 1 tablet (8 mg total) by mouth every 6 to 8 hours as needed (nausea). dissolve 1 TABLET ON THE TONGUE EVERY 6 TO 8 HOURS AS NEEDED FOR nausea, Disp: 30 tablet, Rfl: 2    oxyCODONE (ROXICODONE) 15 MG Tab, Take 1 tablet (15 mg total) by mouth every 4 to 6 hours as needed for Pain., Disp: 120 tablet, Rfl: 0    palbociclib 100 mg Tab, Take 100 mg by mouth once daily. for 21 days, then take 7 days off. Total cycle length 28 days, Disp: 21 tablet, Rfl: 6    pen needle, diabetic (BD ULTRA-FINE MARCE PEN NEEDLE) 32 gauge x  "5/32" Ndle, Test blood sugar twice daily, Disp: 100 each, Rfl: 5    potassium chloride SA (K-DUR,KLOR-CON) 20 MEQ tablet, Take 1 tablet (20 meq) by mouth 3 times a day until instructed further by Dr. Vick., Disp: 60 tablet, Rfl: 11    promethazine (PHENERGAN) 25 MG tablet, TAKE ONE TABLET (25MG TOTAL) BY MOUTH EVERY 4 TO 6 HOURS AS NEEDED (Patient taking differently: Take 25 mg by mouth every 4 to 6 hours as needed.), Disp: 30 tablet, Rfl: 5    promethazine-codeine 6.25-10 mg/5 ml (PHENERGAN WITH CODEINE) 6.25-10 mg/5 mL syrup, TAKE 5 ML BY MOUTH EVERY 6 HOURS AS NEEDED FOR COUGH (Patient taking differently: Take 5 mLs by mouth every 6 (six) hours as needed. TAKE 5 ML BY MOUTH EVERY 6 HOURS AS NEEDED FOR COUGH), Disp: 450 mL, Rfl: 0    psyllium husk, with sugar, (METAMUCIL, WITH SUGAR,) 3.4 gram packet, Take 1 packet by mouth 2 (two) times daily., Disp: 30 packet, Rfl: 2    rosuvastatin (CRESTOR) 40 MG Tab, Take 1 tablet (40 mg total) by mouth every evening. For cholesterol, Disp: 90 tablet, Rfl: 3    zolpidem (AMBIEN) 10 mg Tab, TAKE ONE TABLET BY MOUTH EVERY NIGHT AT BEDTIME (Patient taking differently: Take 10 mg by mouth every evening.), Disp: 30 tablet, Rfl: 3    Current Facility-Administered Medications:     0.9%  NaCl infusion (for blood administration), , Intravenous, Once, TALISHA Vick MD, Stopped at 06/02/23 1200          Objective:   Vitals:  Blood pressure (!) 90/59, pulse 105, temperature 98 °F (36.7 °C), resp. rate 16, height 5' 11" (1.803 m), weight 60 kg (132 lb 3.2 oz), last menstrual period 01/16/2016.    Physical Examination:   GEN: no apparent distress, comfortable, Brace in place.  HEAD: atraumatic and normocephalic  EYES: no pallor, no icterus  ENT: no pharyngeal erythema.  The cellulitis at L cheek area is resolved, after antibiotics.  NECK: noLAD/LN's, supple  CV:  No edema  CHEST: Normal respiratory effort   she is not  tender over the right posterior chest wall on exam.  The chest " wall is not swollen.  ABDOM:  nondistended; soft                                                                                          MUSC/Skeletal: ROM normal, Tender over mid T spine area.  EXTREM: no swelling  SKIN: She is developing keloid changes at L breast incision and navel surgical sites.  This area appears to be enlarg  NEURO: grossly intact  PSYCH: normal mood, affect and behavior  LYMPH: normal  LN's  BREASTS: L breast is much improved.    Refill Oxycodone, SOMA, Norco.    Tumor markers are improved.    K is 4, up from 2.4.  Decrease KCL from TID to BID.    Assessment:   (1) 63 y.o. female with diagnosis of Stage 1 triple negative R breast cancer, 10/2016.       S/P R mastectomy, SNBx, AC x's 4, T x's12 weeks, Rad Rx and recent reconstruction.    (2) New L invasive ductal Ca, ERP+, PRP+, H2N neg.  Clinical stage 1 dx.    I have started her on Arimidex 1 mg p.o. daily  for metastatic disease at this time.    Suspected fracture of right ribs after recent cough event, check rib x-rays, medicate for pain.    ERP+ dx, bone metastases.  Started on Arimidex daily.  Add 2nd hormonal drug Rx after Rad Rx completed.    Bone biopsy positive for metastatic breast cancer.  ERP+, PRP+, H2N-.    Now on Genzar, Carboplatin.  Improved.  PET 12/15/22.  Improving metastatic dx on present Rx.    Pet now recurrent liver metastases.  Progressive Dx in liver.    Liver Bx + for breast cancer.  ERP, PRP, H2N pending.  D1C1 Tredelvy today, hydration, RTC 1 week.

## 2023-07-18 NOTE — TELEPHONE ENCOUNTER
Notified from Chaim with [facility] that the sputum sample submitted yesterday only contained saliva and they needed a new sample. Patient made aware of above however seemed to not understand she needed to go back to the lab. Called [facility] where patient is currently receiving blood and spoke with Amy who will reiterate need to get sputum sample recollected.

## 2023-07-20 NOTE — TELEPHONE ENCOUNTER
Called enrique, no answer.  Vm full at this time.      Called patient, no answer.  Left message for patient to return call -DN

## 2023-07-20 NOTE — TELEPHONE ENCOUNTER
"Delfina from [facility] lab called to report Glucose 665, Michelle made aware per her verbal orders I spoke to patient made aware of this lab result inquired as to if she has means to recheck sugar, states she does, I instructed to recheck sugar, patient states "I cant do that right now" educated on importance of checking sugar, again states she can not recheck right now but that she will. Per Michelle's orders I instructed that if at time she rechecks sugar is 500 or greater that she will need to go to ER. Verbalized understanding.   "

## 2023-07-20 NOTE — TELEPHONE ENCOUNTER
----- Message from Lala Davis sent at 7/20/2023 11:43 AM CDT -----  -11:11-enrique  nurse with concerned care is calling and she reported a blood sugar of 500. She did take her insulin but did not recheck her blood sugar, kailey Rios [phone]

## 2023-07-21 NOTE — TELEPHONE ENCOUNTER
She had a BS of 665.  Call her to see how she is doing.  What is her blood sugar doing?  Any diarrhea?  If she is HIGH or > 500, we will have to send her to [facility] ER for hydration and support.

## 2023-07-21 NOTE — TELEPHONE ENCOUNTER
Spoke to patient to inquire as to if she rechecked her blood sugar as we discussed yesterday, states she did recheck and her sugar was 200 today around lunch time and when she rechecked yesterday. Dr. Vick made aware. No new orders received.

## 2023-07-21 NOTE — TELEPHONE ENCOUNTER
Called patient to check in if she had rechecked her blood sugar. No answer at number listed. Voicemail left with instructions to call back.

## 2023-07-21 NOTE — PROGRESS NOTES
"    [facility] In Office Hematology Oncology Subsequent  Encounter Note    7/24/23      Subjective:      Patient ID:   Negrita Castro  63 y.o.   Martínez Shepard R. Leblanc, Babycos, Bourgeois, Hall      Chief Complaint:    L breast cancer     HPI:  Met with patient in infusion., She is s/p Cycle 1 Day 1 Trodelvy. She was supposed to get day 8 today. Labs reviewed and WBC 0.82 and Plt count 32 K. Patient states she is feeling terrible with extreme fatigue and nausea. IV fluids today and CMP is still pending. She also complains of urinary incontinence. States she has been feeling bad since she received blood last week.      Per Dr. Herman's Previous note:  63 y.o. female with diagnosis of Stage 1 R breast cancer 10/26/15.  "Triple negative".  Adjuvant AC x's 4, tx's 12 and Rad Rx through 9/12/16.      Had bilateral reconstruction surgery 4/14/17.  Further reconstruction, tummy tuck 8/14/17.  Additional fat injection at R chest done for symmetry.  She had placement of R nipple.  Dr. Hernandez.      She had additional reconstructive surgery at breast and abdomenal areas.   The date of the surgery was May 19th.      Recent Mamm/U/S showed small mass at 4:00 at L breast.  2020.  Needle Bx invasive ductal Ca, ERP+, PRP+, H2N neg  She had  L partial mastectomy, Sentinal node Bx 8/24/20. Followed by another surgery because of L axillary infection.  Primary 2 cm, LN neg, Stage 1 dx.    Discussed Oncotype Dx testing, this supported adding adjuvant chemotherapy to adjuvant hormonal therapy because of increased risk of cancer recurrence long-term.  Discussed BRCA testing, given her bilateral  breast Dx. PHN would not authorize BRCA 1 & 2.    She had COVID-19 infection and was hospitalized.  She came very close to requiring intubation and mechanical ventilation.  She is off oxygen now,   and sees Dr. Justin of Pulmonary.  Recent CT scan of the chest showed residual changes of interstitial disease, consistent with " recovering from COVID-19.  Also lytic lesions were seen in the thoracic spine area very suspicious for metastatic disease at T5 through T8 spine.    Recently on Saturday night she had a hard coughing spell, and thereafter developed severe right posterior pleuritic chest pain.  She has point tenderness over the right posterior chest wall and may very well have a fractured rib at the site.  Rib x-rays have been ordered.  I have refilled her Soma for 1 month.  I have refilled her Norco  for 1 month.  I have added oxycodone 5 mg 1 or 2 p.o. Q 3-4 hours p.r.n. breakthrough pain for 1 month to her regimen.    She also has an enlarging nodule at the left chest wall.  Ultrasound of the site suggested that this was a cyst however the areas firm movable and may very well be cancer.  I asked  for a ultrasound-guided biopsy of the mass per radiology.  Path report showed invasive ductal Ca, ERP + PRP neg,   H2N equivocal.    She is postmenopausal, her last menses were 4 5 years ago.  Rib x-rays have been requested.  Will order a CT scan to evaluate for possible metastatic disease.  Will order a ultrasound-guided biopsy of the left chest wall mass for pathologic examination at [facility].  Bx + invasive breast cancer.  ERP+, PRP-, H2N-.    Suspected local recurrence at L breast.  She had L lumpectomy.  Margins clear.    Referred  to Rad Rx MD for adjuvant Rad Rx., for local control.  She has T spine metastases.  Currently on hormonal Rx.  To see Dr. Manuelito Shepard for DM, BS control.    Dr. Washington saw her for C spine eval, no surgery planned for now.  He referred her to Dr. Rivers for injection Rx.    DM, cholesterol, epilepsy hx, DJD, LBP.  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    LR shoulder arthroscopy, R wrist surgery, M0.    Smoke  ppd x's 35 years, quit 2015.  ETOH no.  Disability-depression, epilepsy  Allergy none    Mom ovarian cancer  Dad lung cancer  Sisters DM, lung dx.    Summary of care:  Right  breast cancer October 2015, triple negative, treated with Adriamycin Cytoxan followed by taxane.    Left breast cancer ERP positive PRP positive HER2 Judy negative in August 24, 2020    COVID infection December 2020.  She has not taken the covid 19 vaccination because of multiple allergy hx.    February 2021 bone metastases determined.    March 21, 2021 local left breast cancer recurrence, ERP positive PRP negative HER2 Judy negative.    He April 21st bone biopsy positive for breast cancer metastatic.  ERP, PRP, HER2 Judy pending.    She was on radiation therapy through May 17, 2021.  Refilled Marinol at increased 5 mg 1 p.o. b.i.d. number 60.   I refilled her Soma, Norco, Marinol, and Silvadene cream; dated the prescriptions for June 17, 2021.    C/O mid thoracic pain x's 6-8 weeks,radiates to L & R, increased.  Tender over Mid thoracic area.  Hx of T spine metastatic dx.  Pain controlled on Norco 10/325 mg and oxycodone 5 mg 1-2 po PRN.  Add lodine 400 mg 1 daily    Checked MRI of T spine.  Metastatic dx with pathologic fx at T3,5,6.  Refer to Dr. Flowers of neurosurgery for Vertebroplasty or Kyphoplasty evaluation.    She is on Faslodex. Ibrance. Monitor WBC count.    She saw Dr. Flowers, and has been fitted with a brace initially, vertebroplasty after the first of the year.  [phone].  RTC Dr. Flowers 12/27/21.    No neuro surgery is planned for T-spine disease because of progressive growth and compression of the spine.  She admits to having decreased strength in the right hand.  She completed 10 of 10 radiation treatments on May 2, 2022. She is tapering down her Decadron 4 mg p.o. b.i.d. and she is taking Lodine 400 mg daily.  We are stopping fast low dex and Ibrance as of April 12, 2022.    She had an MRI on May 17, and a PET scan on May 19, I saw her on May 20th.  Plan to go with chemotherapy now, probably will go with carboplatin Gemzar.   She is 140 lb and 5 ft 11.     D1C1 carbo, gemzar. Continued over the past  "year.  Duragesic patch 25 mch q 3 days x's 3 patches, start Tuesday.  Claritin 10 mg 1 po daily x's 4 days, start Wednesday.    Resume Xgeva with D9 C1.  Check lab 7/14 weekly [facility] lab.    Got her a electric wheelchair.  On PT with "Hector".  Stronger.  Mass on back is giving her pain, refer to Cory Peraza MD for removal.  D1C6 9/1/22    Refill Soma, Oxycodone, Norco, Ambien on 9/1/22.  Due for refill 9/30/22.    She is doing better, with increased energy, 8/10.  Weight 154#.  Pain is better 8/10.  Improved R arm function.    Dr. Mckeon placed port.  PET 5/2022 increased dx in bones and liver.  Carbo Gemzar x's 6 months.  12/15/22 #10.  Breast cancer metastases are improved on current PET scan.    Port was placed.  Continues on chemo .  PET recurrent liver metastases.  Review CAT with radiation MD.  Reviewed the PET with the radiologist.  The liver mets had resolved 1/2023, but on present study have recurred.  Hx of bilateral breast cancer.  One was tripple negative.  One was ERP +.  Discussed further hormonal Rx, chemo therapy, immuno oncology Rx and   Antibody/Chemo conjugate, Trodelvy.    Discussed and she agrees to liver mass Bx.  7/12/23.  For Trodelvy 7/13/23.  Check lab 7/11/23.    She was hospitalizeed 6/18/23 for pneumonia, dehydration.  LLL.  And in ER 6/26/23, pneumonia improving   Now on Lantus 35 u BID, Dr. Shepard.  5'11"  134#.    Fever 100.1 over weekend, some clear phlegm.  Denies SOB or dysuria.  Check Urine and sputum C/S.    Today Trodelvy D1C1, premeds given, IVF hydration.    ROS:   GEN: normal without any fever, night sweats or weight loss  HEENT: normal with no HA's, sore throat, stiff neck, changes in vision  CV: normal with no CP, SOB, PND, POOL or orthopnea  PULM: normal with no SOB, cough, hemoptysis, sputum or pleuritic pain  GI: normal with no abdominal pain, nausea, vomiting, constipation, diarrhea, melanotic stools, BRBPR, or hematemesis  : normal with no hematuria, " dysuria  BREAST: See HPI.  SKIN: normal with no rash, erythema, bruising, or swelling     Past Medical History:   Diagnosis Date    Acute hypoxemic respiratory failure 12/12/2020    Breast cancer 10/26/2015    left breast    Cancer     RIGHT BREAST 10-15    Depression     Diabetes mellitus     Neuropathy     Panic attacks     S/P epidural steroid injection     Seizures     none since early 30's    Wears glasses     TO DRIVE     Past Surgical History:   Procedure Laterality Date    AXILLARY NODE DISSECTION Left 8/24/2020    Procedure: LYMPHADENECTOMY, AXILLARY;  Surgeon: Cory Vick MD;  Location: [facility] OR;  Service: General;  Laterality: Left;  left breast mastectomy with possible axillary lymph node dissection    BREAST BIOPSY      right    BREAST LUMPECTOMY      BREAST RECONSTRUCTION      breast reconstruction with tummy [facility]      BREAST SURGERY      11-3-15 LUMPECTOMY, 12-2-15 REEXCISION    INCISION AND DRAINAGE OF ABSCESS Left 9/9/2020    Procedure: INCISION AND DRAINAGE, ABSCESS;  Surgeon: Cory Vick MD;  Location: [facility] OR;  Service: General;  Laterality: Left;    INSERTION OF LOCALIZATION WIRE Left 8/24/2020    Procedure: INSERTION, LOCALIZATION WIRE;  Surgeon: Cory Vick MD;  Location: [facility] OR;  Service: General;  Laterality: Left;  left breast lumpectomy with wire needle loc    INSERTION OF TUNNELED CENTRAL VENOUS CATHETER (CVC) WITH SUBCUTANEOUS PORT Right 11/17/2022    Procedure: [id number];  Surgeon: Jeff Mckeon Jr., MD;  Location: [facility] OR;  Service: General;  Laterality: Right;    MASTECTOMY      mastectomy 2016      MASTECTOMY, PARTIAL Left 3/19/2021    Procedure: MASTECTOMY, PARTIAL;  Surgeon: Cory Vick MD;  Location: [facility] OR;  Service: General;  Laterality: Left;  lumpectomy  left breast     RECONSTRUCTION OF NIPPLE Right 11/5/2018    Procedure: RECONSTRUCTION-NIPPLE RIGHT;  Surgeon: Xiang Hernandez MD;  Location: [facility] OR [address] [place] [address];   Service: Plastics;  Laterality: Right;    SENTINEL LYMPH NODE BIOPSY Left 8/24/2020    Procedure: BIOPSY, LYMPH NODE, SENTINEL;  Surgeon: Cory Vick MD;  Location: [facility];  Service: General;  Laterality: Left;  left breast lumpectomy with left sentinel lymoh node       shoulder surgery and wrist      BILAT  BONE SPUR    WRIST SURGERY      RIGHT       Review of patient's allergies indicates:   Allergen Reactions    Adhesive tape-silicones Hives     BANDAIDS    Polymycin Hives    Latex, natural rubber Itching    Polyurethane-39            Current Facility-Administered Medications:     0.9%  NaCl infusion (for blood administration), , Intravenous, Once, TALISHA Vick MD, Stopped at 06/02/23 1200    0.9%  NaCl infusion (for blood administration), , Intravenous, Once, Michelle Davidson NP    furosemide injection 20 mg, 20 mg, Intravenous, Once, Michelle Davidson NP    Current Outpatient Medications:     benzonatate (TESSALON) 100 MG capsule, Take 1 capsule (100 mg total) by mouth 3 (three) times daily as needed for Cough., Disp: 30 capsule, Rfl: 2    blood sugar diagnostic Strp, TEST GLUCOSE BID, Disp: 300 each, Rfl: 3    blood-glucose meter (TRUE METRIX GLUCOSE METER) kit, TEST GLUCOSE BID, Disp: 1 each, Rfl: 0    blood-glucose meter,continuous (DEXCOM G6 ) Misc, TEST GLUCOSE QID, Disp: 1 each, Rfl: 1    blood-glucose sensor (DEXCOM G6 SENSOR) Edwige, TEST GLUCOSE QID, Disp: 13 each, Rfl: 3    blood-glucose transmitter (DEXCOM G6 TRANSMITTER) Edwige, TEST GLUCOSE QID, Disp: 1 each, Rfl: 1    carisoprodoL (SOMA) 350 MG tablet, Take 1 tablet (350 mg total) by mouth 3 (three) times daily as needed for Muscle spasms., Disp: 90 tablet, Rfl: 0    dexAMETHasone (DECADRON) 4 MG Tab, TAKE ONE TABLET (4MG TOTAL) BY MOUTH TWICE DAILY WITH MEALS (Patient taking differently: Take 4 mg by mouth 2 (two) times daily with meals.), Disp: 60 tablet, Rfl: 1    dextromethorphan-guaiFENesin  mg/5 ml (ROBITUSSIN-DM)   mg/5 mL liquid, Take 1 teaspoon q 6 hours prn cough. (Patient taking differently: Take 5 mLs by mouth. Take 1 teaspoon q 6 hours prn cough.), Disp: 300 mL, Rfl: 0    diazePAM (VALIUM) 10 MG Tab, Take 1 tablet (10 mg total) by mouth 4 (four) times daily as needed (anxiety)., Disp: 120 tablet, Rfl: 2    diphenoxylate-atropine 2.5-0.025 mg (LOMOTIL) 2.5-0.025 mg per tablet, Take 1 tablet by mouth 4 (four) times daily as needed for Diarrhea., Disp: 30 tablet, Rfl: 2    docusate sodium (COLACE) 100 MG capsule, Take 100 mg by mouth 2 (two) times daily., Disp: , Rfl:     droNABinol (MARINOL) 5 MG capsule, Take 1 capsule (5 mg total) by mouth 2 (two) times daily before meals., Disp: 60 capsule, Rfl: 0    dulaglutide (TRULICITY) 3 mg/0.5 mL pen injector, Inject 3 mg into the skin every 7 days., Disp: 4 pen, Rfl: 11    enalapril (VASOTEC) 5 MG tablet, Take 1 tablet (5 mg total) by mouth once daily., Disp: 90 tablet, Rfl: 3    etodolac (LODINE) 400 MG tablet, TAKE ONE TABLET (400 MG TOTAL) BY MOUTH ONCE DAILY (Patient taking differently: Take 400 mg by mouth once daily.), Disp: 30 tablet, Rfl: 2    fentaNYL (DURAGESIC) 25 mcg/hr, Place 1 patch onto the skin every 72 hours., Disp: 10 patch, Rfl: 0    fluconazole (DIFLUCAN) 150 MG Tab, TAKE ONE TABLET BY MOUTH ONCE FOR 1 DOSE (Patient taking differently: Take 150 mg by mouth once.), Disp: 1 tablet, Rfl: 2    furosemide (LASIX) 20 MG tablet, TAKE ONE TABLET BY MOUTH EVERY DAY, Disp: 30 tablet, Rfl: 6    HYDROcodone-acetaminophen (NORCO)  mg per tablet, Take 1 tablet by mouth every 6 (six) hours as needed for Pain., Disp: 120 tablet, Rfl: 0    insulin glargine (LANTUS U-100 INSULIN) 100 unit/mL injection, Inject 32 Units into the skin 2 (two) times a day., Disp: 18 mL, Rfl: 11    lancets 32 gauge Misc, TEST GLUCOSE BID, Disp: 300 each, Rfl: 3    metoprolol tartrate (LOPRESSOR) 25 MG tablet, Take 1 tablet (25 mg total) by mouth 2 (two) times daily., Disp: 60 tablet,  "Rfl: 11    ondansetron (ZOFRAN-ODT) 8 MG TbDL, Take 1 tablet (8 mg total) by mouth every 6 to 8 hours as needed (nausea). dissolve 1 TABLET ON THE TONGUE EVERY 6 TO 8 HOURS AS NEEDED FOR nausea, Disp: 30 tablet, Rfl: 2    oxyCODONE (ROXICODONE) 15 MG Tab, Take 1 tablet (15 mg total) by mouth every 4 to 6 hours as needed for Pain., Disp: 120 tablet, Rfl: 0    palbociclib 100 mg Tab, Take 100 mg by mouth once daily. for 21 days, then take 7 days off. Total cycle length 28 days, Disp: 21 tablet, Rfl: 6    pen needle, diabetic (BD ULTRA-FINE MARCE PEN NEEDLE) 32 gauge x 5/32" Ndle, Test blood sugar twice daily, Disp: 100 each, Rfl: 5    potassium chloride SA (K-DUR,KLOR-CON) 20 MEQ tablet, Take 1 tablet (20 meq) by mouth 3 times a day until instructed further by Dr. Vick. (Patient taking differently: Take 20 mEq by mouth 3 (three) times daily. Take 1 tablet (20 meq) by mouth 3 times a day until instructed further by Dr. Vick.), Disp: 60 tablet, Rfl: 11    promethazine (PHENERGAN) 25 MG tablet, TAKE ONE TABLET (25MG TOTAL) BY MOUTH EVERY 4 TO 6 HOURS AS NEEDED (Patient taking differently: Take 25 mg by mouth every 4 to 6 hours as needed.), Disp: 30 tablet, Rfl: 5    psyllium husk, with sugar, (METAMUCIL, WITH SUGAR,) 3.4 gram packet, Take 1 packet by mouth 2 (two) times daily., Disp: 30 packet, Rfl: 2    rosuvastatin (CRESTOR) 40 MG Tab, Take 1 tablet (40 mg total) by mouth every evening. For cholesterol, Disp: 90 tablet, Rfl: 3    TRUEPLUS PEN NEEDLE 31 gauge x 3/16" Ndle, USE TO INJECT LANTUS TWICE DAILY, Disp: , Rfl:     zolpidem (AMBIEN) 10 mg Tab, TAKE ONE TABLET BY MOUTH EVERY NIGHT AT BEDTIME (Patient taking differently: Take 10 mg by mouth every evening.), Disp: 30 tablet, Rfl: 3    Facility-Administered Medications Ordered in Other Visits:     cefepime 2 g in dextrose 5% 100 mL IVPB (ready to mix), 2 g, Intravenous, ED 1 Time, Santa Olmedo MD, Last Rate: 200 mL/hr at 07/24/23 1759, 2 g at 07/24/23 " "1759    potassium chloride 10 mEq in 100 mL IVPB, 10 mEq, Intravenous, Q1H, Santa Olmedo MD, Stopped at 07/24/23 1800    sodium chloride 0.9% bolus 1,000 mL 1,000 mL, 1,000 mL, Intravenous, Once, Santa Olmedo MD, Last Rate: 1,000 mL/hr at 07/24/23 1803, 1,000 mL at 07/24/23 1803    Pharmacy to dose Vancomycin consult, , , Once **AND** vancomycin - pharmacy to dose, , Intravenous, pharmacy to manage frequency, Santa Olmedo MD    vancomycin 1.5 g in dextrose 5 % 250 mL IVPB (ready to mix), 1,500 mg, Intravenous, ED 1 Time, Santa Olmedo MD          Objective:   Vitals:  Blood pressure 93/68, pulse (!) 115, temperature 97.6 °F (36.4 °C), resp. rate 18, height 5' 11" (1.803 m), weight 58.1 kg (128 lb), last menstrual period 01/16/2016.    Physical Examination:   GEN: no apparent distress, comfortable, Brace in place.  HEAD: atraumatic and normocephalic  EYES: no pallor, no icterus  ENT: no pharyngeal erythema.  The cellulitis at L cheek area is resolved, after antibiotics.  NECK: noLAD/LN's, supple  CV:  No edema  CHEST: Normal respiratory effort   she is not  tender over the right posterior chest wall on exam.  The chest wall is not swollen.  ABDOM:  nondistended; soft                                                                                          MUSC/Skeletal: ROM normal, Tender over mid T spine area.  EXTREM: no swelling  SKIN: She is developing keloid changes at L breast incision and navel surgical sites.  This area appears to be enlarg  NEURO: grossly intact  PSYCH: normal mood, affect and behavior  LYMPH: normal  LN's  BREASTS: L breast is much improved.    Refill Oxycodone, SOMA, Norco.    Tumor markers are improved.    K is 4, up from 2.4.  Decrease KCL from TID to BID.    Assessment:   (1) 63 y.o. female with diagnosis of Stage 1 triple negative R breast cancer, 10/2016.       S/P R mastectomy, SNBx, AC x's 4, T x's12 weeks, Rad Rx and recent reconstruction.    (2) New L " invasive ductal Ca, ERP+, PRP+, H2N neg.  Clinical stage 1 dx.    I have started her on Arimidex 1 mg p.o. daily  for metastatic disease at this time.    Suspected fracture of right ribs after recent cough event, check rib x-rays, medicate for pain.    ERP+ dx, bone metastases.  Started on Arimidex daily.  Add 2nd hormonal drug Rx after Rad Rx completed.    Bone biopsy positive for metastatic breast cancer.  ERP+, PRP+, H2N-.    Now on Genzar, Carboplatin.  Improved.  PET 12/15/22.  Improving metastatic dx on present Rx.    Pet now recurrent liver metastases.  Progressive Dx in liver.    Liver Bx + for breast cancer.  ERP, PRP, H2N pending.  Hold D1C8 Tredelvy today, hydration, Zofran, RTC 1 week.     (3) Urinary Incontinence-UA today    (4) Severe hypokalemia- After the patient left Patient K critically low at 2.3; Patient sent to [facility] ER for evalution.    Diagnosis:     1. Malignant neoplasm of lower-outer quadrant of left breast of female, estrogen receptor positive        2. Urinary incontinence, unspecified type  Urinalysis, Reflex to Urine Culture Urine, Clean Catch      3. Dehydration        4. Chemotherapy-induced nausea        5. Nausea        6. Fatigue, unspecified type        7. Thrombocytopenia            Follow Up:     Follow up in about 1 week (around 7/31/2023) for with Dr. Peraza.    Electronically signed by Tabatha Denise, MSN, APRN, AGNP-C, OCN

## 2023-07-23 NOTE — TELEPHONE ENCOUNTER
She had path report liver bx 7/13/23 [facility].    See if ERP, PRP, H2N report is back yet?

## 2023-07-24 PROBLEM — D61.818 PANCYTOPENIA: Status: ACTIVE | Noted: 2023-01-01

## 2023-07-24 PROBLEM — E86.0 DEHYDRATION: Status: ACTIVE | Noted: 2023-01-01

## 2023-07-24 PROBLEM — R65.20 SEVERE SEPSIS: Status: ACTIVE | Noted: 2023-01-01

## 2023-07-24 PROBLEM — A41.9 SEVERE SEPSIS: Status: ACTIVE | Noted: 2023-01-01

## 2023-07-24 NOTE — NURSING
Patient Alexx held today per Dr. Francisco. Labs collected, sputum and U/A also collected. Patient had complaints of extreme fatigue, and urinary incontinence. Tabatha Denise NP for chairside appointment. Patient to also be added on for fluids on Friday with lab draw.

## 2023-07-24 NOTE — PHARMACY MED REC
"    Admission Medication History     The home medication history was taken by Anita Rico.    You may go to "Admission" then "Reconcile Home Medications" tabs to review and/or act upon these items.     The home medication list has been updated by the Pharmacy department.   Please read ALL comments highlighted in yellow.   Please address this information as you see fit.    Feel free to contact us if you have any questions or require assistance.      The medications listed below were removed from the home medication list. Please reorder if appropriate:  Patient reports no longer taking the following medication(s):  Benzonatate 100mg   Lomotil 2.5mg  Lasix 20mg      Medications listed below were obtained from: Patient/family  Current Facility-Administered Medications on File Prior to Encounter   Medication Dose Route Frequency Provider Last Rate Last Admin    0.9%  NaCl infusion (for blood administration)   Intravenous Once TALISHA Vick MD   Stopped at 06/02/23 1200    0.9%  NaCl infusion (for blood administration)   Intravenous Once Michelle Davidson NP        furosemide injection 20 mg  20 mg Intravenous Once Michelle Davidson NP        [COMPLETED] ondansetron IVPB 8 mg  8 mg Intravenous 1 time in Clinic/HOD TALSIHA Vick MD   Stopped at 07/24/23 1144    [COMPLETED] sodium chloride 0.9% bolus 1,000 mL 1,000 mL  1,000 mL Intravenous 1 time in Clinic/HOD TALISHA Vick MD   Stopped at 07/24/23 1139    [DISCONTINUED] heparin, porcine (PF) 100 unit/mL injection flush 500 Units  500 Units Intravenous PRN TALISHA Vick MD   500 Units at 07/24/23 1206    [DISCONTINUED] sodium chloride 0.9% flush 10 mL  10 mL Intravenous PRN TALISHA Vick MD         Current Outpatient Medications on File Prior to Encounter   Medication Sig Dispense Refill    benzonatate (TESSALON) 100 MG capsule Take 1 capsule (100 mg total) by mouth 3 (three) times daily as needed for Cough. 30 capsule 2    blood-glucose transmitter (DEXCOM G6 " TRANSMITTER) Edwige TEST GLUCOSE QID 1 each 1    diazePAM (VALIUM) 10 MG Tab Take 1 tablet (10 mg total) by mouth 4 (four) times daily as needed (anxiety). 120 tablet 2    docusate sodium (COLACE) 100 MG capsule Take 100 mg by mouth 2 (two) times daily.      droNABinol (MARINOL) 5 MG capsule Take 1 capsule (5 mg total) by mouth 2 (two) times daily before meals. 60 capsule 0    enalapril (VASOTEC) 5 MG tablet Take 1 tablet (5 mg total) by mouth once daily. 90 tablet 3    fentaNYL (DURAGESIC) 25 mcg/hr Place 1 patch onto the skin every 72 hours. 10 patch 0    fluconazole (DIFLUCAN) 150 MG Tab TAKE ONE TABLET BY MOUTH ONCE FOR 1 DOSE (Patient taking differently: Take 150 mg by mouth once.) 1 tablet 2    HYDROcodone-acetaminophen (NORCO)  mg per tablet Take 1 tablet by mouth every 6 (six) hours as needed for Pain. 120 tablet 0    metoprolol tartrate (LOPRESSOR) 25 MG tablet Take 1 tablet (25 mg total) by mouth 2 (two) times daily. 60 tablet 11    ondansetron (ZOFRAN-ODT) 8 MG TbDL Take 1 tablet (8 mg total) by mouth every 6 to 8 hours as needed (nausea). dissolve 1 TABLET ON THE TONGUE EVERY 6 TO 8 HOURS AS NEEDED FOR nausea 30 tablet 2    oxyCODONE (ROXICODONE) 15 MG Tab Take 1 tablet (15 mg total) by mouth every 4 to 6 hours as needed for Pain. 120 tablet 0    palbociclib 100 mg Tab Take 100 mg by mouth once daily. for 21 days, then take 7 days off. Total cycle length 28 days 21 tablet 6    potassium chloride SA (K-DUR,KLOR-CON) 20 MEQ tablet Take 1 tablet (20 meq) by mouth 3 times a day until instructed further by Dr. Vick. (Patient taking differently: Take 20 mEq by mouth 3 (three) times daily. Take 1 tablet (20 meq) by mouth 3 times a day until instructed further by Dr. Vick.) 60 tablet 11    promethazine (PHENERGAN) 25 MG tablet TAKE ONE TABLET (25MG TOTAL) BY MOUTH EVERY 4 TO 6 HOURS AS NEEDED (Patient taking differently: Take 25 mg by mouth every 4 to 6 hours as needed.) 30 tablet 5    rosuvastatin  "(CRESTOR) 40 MG Tab Take 1 tablet (40 mg total) by mouth every evening. For cholesterol 90 tablet 3    zolpidem (AMBIEN) 10 mg Tab TAKE ONE TABLET BY MOUTH EVERY NIGHT AT BEDTIME (Patient taking differently: Take 10 mg by mouth every evening.) 30 tablet 3    blood sugar diagnostic Strp TEST GLUCOSE  each 3    blood-glucose meter (TRUE METRIX GLUCOSE METER) kit TEST GLUCOSE BID 1 each 0    blood-glucose meter,continuous (DEXCOM G6 ) Misc TEST GLUCOSE QID 1 each 1    blood-glucose sensor (DEXCOM G6 SENSOR) Edwige TEST GLUCOSE QID 13 each 3    carisoprodoL (SOMA) 350 MG tablet Take 1 tablet (350 mg total) by mouth 3 (three) times daily as needed for Muscle spasms. 90 tablet 0    dexAMETHasone (DECADRON) 4 MG Tab TAKE ONE TABLET (4MG TOTAL) BY MOUTH TWICE DAILY WITH MEALS (Patient taking differently: Take 4 mg by mouth 2 (two) times daily with meals.) 60 tablet 1    dextromethorphan-guaiFENesin  mg/5 ml (ROBITUSSIN-DM)  mg/5 mL liquid Take 1 teaspoon q 6 hours prn cough. (Patient taking differently: Take 5 mLs by mouth. Take 1 teaspoon q 6 hours prn cough.) 300 mL 0    diphenoxylate-atropine 2.5-0.025 mg (LOMOTIL) 2.5-0.025 mg per tablet Take 1 tablet by mouth 4 (four) times daily as needed for Diarrhea. 30 tablet 2    dulaglutide (TRULICITY) 3 mg/0.5 mL pen injector Inject 3 mg into the skin every 7 days. 4 pen 11    etodolac (LODINE) 400 MG tablet TAKE ONE TABLET (400 MG TOTAL) BY MOUTH ONCE DAILY (Patient taking differently: Take 400 mg by mouth once daily.) 30 tablet 2    furosemide (LASIX) 20 MG tablet TAKE ONE TABLET BY MOUTH EVERY DAY 30 tablet 6    insulin glargine (LANTUS U-100 INSULIN) 100 unit/mL injection Inject 32 Units into the skin 2 (two) times a day. 18 mL 11    lancets 32 gauge Misc TEST GLUCOSE  each 3    pen needle, diabetic (BD ULTRA-FINE MARCE PEN NEEDLE) 32 gauge x 5/32" Ndle Test blood sugar twice daily 100 each 5    psyllium husk, with sugar, (METAMUCIL, WITH " "SUGAR,) 3.4 gram packet Take 1 packet by mouth 2 (two) times daily. 30 packet 2    TRUEPLUS PEN NEEDLE 31 gauge x 3/16" Ndle USE TO INJECT LANTUS TWICE DAILY      [DISCONTINUED] metFORMIN (GLUCOPHAGE-XR) 500 MG ER 24hr tablet Take 2 tablets (1,000 mg total) by mouth 2 (two) times daily with meals. 360 tablet 3    [DISCONTINUED] naloxegoL (MOVANTIK) 12.5 mg Tab Take 12.5 mg by mouth once daily. 30 tablet 3    [DISCONTINUED] promethazine-codeine 6.25-10 mg/5 ml (PHENERGAN WITH CODEINE) 6.25-10 mg/5 mL syrup TAKE 5 ML BY MOUTH EVERY 6 HOURS AS NEEDED FOR COUGH (Patient taking differently: Take 5 mLs by mouth every 6 (six) hours as needed. TAKE 5 ML BY MOUTH EVERY 6 HOURS AS NEEDED FOR COUGH) 450 mL 0       Potential issues to be addressed PRIOR TO DISCHARGE  Patient reported not taking the following medications: ([id number]). These medications remain on the home medication list. Please address accordingly.     Anita Rico  [id number]              .        "

## 2023-07-24 NOTE — ED PROVIDER NOTES
Encounter Date: 7/24/2023       History     Chief Complaint   Patient presents with    abnormal labs     Patient sent from cancer center due to low K+ and low BP     Emergent evaluation of a 63-year-old female with history of right-sided triple negative breast cancer 2015, now has left-sided invasive ductal breast cancer as 2021 with liver and bony Mets on chemotherapy has done radiation in the past, history of epilepsy, diabetes, hypertension, hyperlipidemia, COPD, 1/4 pack per day tobacco use for 35 years, being sent from oncology clinic after seeing Ms. Howie NP due to lab abnormalities including a potassium of 2.3, and low blood pressure blood pressure was 97/68 on arrival with a heart rate of 115.  Patient also had a white count of 1 and platelets of 42240 H&H of 11.1 and 34.8 and a BUN of 42.  Patient had pneumonia in the past month fever of 100.1 over the weekend.  Ongoing dry cough reports that she was no longer producing mucus.  Reports that when she was taking Levaquin she was having a great deal of nausea and vomiting inability to tolerate oral intake and discontinued the Levaquin.  She reports no worsening of shortness of breath.  No fevers more than or equal to 100.4.  Right-sided posterior chest pain concern for possible rib fracture at the oncology office.  Reports she is fatigued and weak having difficulty ambulating.  Before arriving in the ER patient received 1 L of IV fluids and 8 mg of Zofran           Review of patient's allergies indicates:   Allergen Reactions    Adhesive tape-silicones Hives     BANDAIDS    Polymycin Hives    Adhesive     Latex, natural rubber Hives and Itching    Neomycin-bacitracnzn-polymyxnb     Polyurethane-39 Hives     Past Medical History:   Diagnosis Date    Acute hypoxemic respiratory failure 12/12/2020    Breast cancer 10/26/2015    left breast    Cancer     RIGHT BREAST 10-15    Depression     Diabetes mellitus     Neuropathy     Panic attacks     S/P epidural  steroid injection     Seizures     none since early 30's    Wears glasses     TO DRIVE     Past Surgical History:   Procedure Laterality Date    AXILLARY NODE DISSECTION Left 8/24/2020    Procedure: LYMPHADENECTOMY, AXILLARY;  Surgeon: Cory Vick MD;  Location: [facility] OR;  Service: General;  Laterality: Left;  left breast mastectomy with possible axillary lymph node dissection    BREAST BIOPSY      right    BREAST LUMPECTOMY      BREAST RECONSTRUCTION      breast reconstruction with tummy tuck      BREAST SURGERY      11-3-15 LUMPECTOMY, 12-2-15 REEXCISION    INCISION AND DRAINAGE OF ABSCESS Left 9/9/2020    Procedure: INCISION AND DRAINAGE, ABSCESS;  Surgeon: Cory Vick MD;  Location: [facility] OR;  Service: General;  Laterality: Left;    INSERTION OF LOCALIZATION WIRE Left 8/24/2020    Procedure: INSERTION, LOCALIZATION WIRE;  Surgeon: Cory Vick MD;  Location: [facility] OR;  Service: General;  Laterality: Left;  left breast lumpectomy with wire needle loc    INSERTION OF TUNNELED CENTRAL VENOUS CATHETER (CVC) WITH SUBCUTANEOUS PORT Right 11/17/2022    Procedure: [id number];  Surgeon: Jeff Mckeon Jr., MD;  Location: [facility] OR;  Service: General;  Laterality: Right;    MASTECTOMY      mastectomy 2016      MASTECTOMY, PARTIAL Left 3/19/2021    Procedure: MASTECTOMY, PARTIAL;  Surgeon: Cory Vick MD;  Location: [facility] OR;  Service: General;  Laterality: Left;  lumpectomy  left breast     RECONSTRUCTION OF NIPPLE Right 11/5/2018    Procedure: RECONSTRUCTION-NIPPLE RIGHT;  Surgeon: Xiang Hernandez MD;  Location: [address];  Service: Plastics;  Laterality: Right;    SENTINEL LYMPH NODE BIOPSY Left 8/24/2020    Procedure: BIOPSY, LYMPH NODE, SENTINEL;  Surgeon: Cory Vick MD;  Location: [facility] OR;  Service: General;  Laterality: Left;  left breast lumpectomy with left sentinel lymoh node       shoulder surgery and wrist      BILAT  BONE SPUR    WRIST SURGERY       RIGHT     Family History   Problem Relation Age of Onset    Anesthesia problems Neg Hx      Social History     Tobacco Use    Smoking status: Former     Packs/day: 0.25     Years: 30.00     Pack years: 7.50     Types: Cigarettes     Quit date: 2015     Years since quittin.8    Smokeless tobacco: Never   Substance Use Topics    Alcohol use: Yes     Alcohol/week: 0.0 standard drinks     Comment: RARELY    Drug use: Not Currently     Types: Marijuana     Comment: medical marijuana     Review of Systems   Constitutional:  Positive for activity change, appetite change and fatigue. Negative for chills, diaphoresis and fever.   HENT:  Negative for congestion, postnasal drip, rhinorrhea and sore throat.    Respiratory:  Positive for cough. Negative for chest tightness, shortness of breath, wheezing and stridor.    Cardiovascular:  Positive for chest pain. Negative for palpitations.   Gastrointestinal:  Negative for abdominal distention, abdominal pain, blood in stool, constipation, diarrhea, nausea and vomiting.   Genitourinary:  Negative for dysuria, flank pain, frequency, hematuria and urgency.   Musculoskeletal:  Negative for arthralgias, back pain, myalgias, neck pain and neck stiffness.   Skin:  Negative for rash.   Neurological:  Positive for weakness. Negative for dizziness, syncope, light-headedness and headaches.   Hematological:  Does not bruise/bleed easily.   Psychiatric/Behavioral:  Negative for confusion. The patient is not nervous/anxious.    All other systems reviewed and are negative.    Physical Exam     Initial Vitals [23 1401]   BP Pulse Resp Temp SpO2   97/68 (!) 115 18 97.6 °F (36.4 °C) 99 %      MAP       --         Physical Exam    Nursing note and vitals reviewed.  Constitutional: She appears well-developed and well-nourished. She is not diaphoretic. No distress.   Cachectic   HENT:   Head: Normocephalic and atraumatic.   Right Ear: External ear normal.   Left Ear: External ear  normal.   Nose: Nose normal.   Mouth/Throat: Oropharynx is clear and moist.   Eyes: Conjunctivae and EOM are normal. Pupils are equal, round, and reactive to light.   Neck: Neck supple. No tracheal deviation present.   Normal range of motion.  Cardiovascular:  Normal rate, regular rhythm, normal heart sounds and intact distal pulses.     Exam reveals no gallop and no friction rub.       No murmur heard.  Heart rate 109 blood pressure 93/68   Pulmonary/Chest: Breath sounds normal. No stridor. No respiratory distress. She has no wheezes. She has no rhonchi. She has no rales. She exhibits no tenderness.   Abdominal: Abdomen is soft. Bowel sounds are normal. She exhibits no distension and no mass. There is no abdominal tenderness. There is no rebound and no guarding.   Musculoskeletal:         General: No edema. Normal range of motion.      Cervical back: Normal range of motion and neck supple.     Neurological: She is alert and oriented to person, place, and time. She has normal strength. No cranial nerve deficit or sensory deficit.   Skin: Skin is warm and dry. No rash noted. No erythema. No pallor.   Psychiatric: She has a normal mood and affect. Her behavior is normal. Judgment and thought content normal.       ED Course   Procedures  Labs Reviewed   CBC W/ AUTO DIFFERENTIAL - Abnormal; Notable for the following components:       Result Value    WBC 0.82 (*)     RBC 3.37 (*)     Hemoglobin 9.8 (*)     Hematocrit 30.4 (*)     RDW 17.4 (*)     Platelets 27 (*)     Immature Granulocytes 1.2 (*)     Gran # (ANC) 0.4 (*)     Lymph # 0.4 (*)     Mono # 0.1 (*)     All other components within normal limits   COMPREHENSIVE METABOLIC PANEL - Abnormal; Notable for the following components:    Potassium 2.5 (*)     CO2 21 (*)     Glucose 395 (*)     BUN 39 (*)     Creatinine 1.5 (*)     Albumin 2.1 (*)     Alkaline Phosphatase 335 (*)     eGFR 39.4 (*)     All other components within normal limits    Narrative:     Potassium  critical result(s) repeated. Called and verbal readback   obtained from Ema Burciaga ED, RN.  by [facility] 07/24/2023 16:18   MAGNESIUM - Abnormal; Notable for the following components:    Magnesium 1.4 (*)     All other components within normal limits   LACTIC ACID, PLASMA - Abnormal; Notable for the following components:    Lactate (Lactic Acid) 3.4 (*)     All other components within normal limits    Narrative:     Lactic acid critical result(s) repeated. Called and verbal readback   obtained from Apryl Andrew ED, RN.  by [facility] 07/24/2023 16:34   URINALYSIS, REFLEX TO URINE CULTURE - Abnormal; Notable for the following components:    Protein, UA 1+ (*)     Glucose, UA 4+ (*)     Occult Blood UA 1+ (*)     All other components within normal limits    Narrative:     Specimen Source->Urine   URINALYSIS MICROSCOPIC - Abnormal; Notable for the following components:    RBC, UA 7 (*)     Hyaline Casts, UA 0.00 (*)     All other components within normal limits    Narrative:     Specimen Source->Urine   CULTURE, BLOOD    Narrative:     Collection has been rescheduled by 2MB at 07/24/2023 15:43 Reason:   Unable to collect...rn to pull blood from port per DR Olmedo  Collection has been rescheduled by 2MB at 07/24/2023 15:43 Reason:   Unable to collect...rn to pull blood from port per DR Olmedo   CULTURE, BLOOD    Narrative:     Collection has been rescheduled by 2MB at 07/24/2023 15:43 Reason:   Unable to collect...rn to pull blood from port per DR Olmedo  Collection has been rescheduled by 2MB at 07/24/2023 15:43 Reason:   Unable to collect...rn to pull blood from port per DR Olmedo   LACTIC ACID, PLASMA   TYPE & SCREEN     EKG Readings: (Independently Interpreted)   Initial Reading: No STEMI. Rhythm: Sinus Tachycardia. Heart Rate: 109.     Imaging Results              XR Ribs Min 3 Views w/PA Chest Right (Final result)  Result time 07/24/23 15:38:54   Procedure changed from X-Ray Ribs 2 View Right     Final result  by Jacque Eddy MD (07/24/23 15:38:54)                   Narrative:    PA chest and right RIBS 5 views    Clinical history is right rib pain    COMPARISON: 6/26/2023    There is a right IJ Port-A-Cath with tip overlying the superior vena cava. The cardiomediastinal silhouette is normal in size. The lungs are clear. There are surgical clips overlying the right chest wall.    4 views of the right ribs show no fractures, dislocations or acute osseous abnormalities.  There is sclerosis of the thoracic spine compatible with metastatic disease    IMPRESSION: Negative left ribs    No acute pulmonary process    Sclerosis of the thoracic spine compatible with osseous metastasis    Electronically signed by:  Jacque Eddy MD  7/24/2023 3:38 PM CDT Workstation: [id number]                      Final result by Jacque Eddy MD (07/24/23 15:18:29)                   Narrative:    EXAMINATION:  XR RIBS MIN 3 VIEWS W/ PA CHEST RIGHT    CLINICAL HISTORY:  right rib pain;  Pleurodynia    FINDINGS:  PA chest demonstrates the lungs to be clear.  The cardiomediastinal silhouette is normal in size.  There is a right IJ Port-A-Cath in place.  Four views right ribs show no fracture, dislocation, or destructive osseous lesion. Soft tissues are unremarkable.    I[]MPRESSION:  Negative right ribs    No acute pulmonary process      Electronically signed by: Jacque Eddy MD  Date:    07/24/2023  Time:    15:18                                  X-Rays:   Independently Interpreted Readings:   Chest X-Ray: Normal heart size.  No infiltrates.  No acute abnormalities.   Medications   potassium chloride 10 mEq in 100 mL IVPB (10 mEq Intravenous New Bag 7/24/23 1649)   sodium chloride 0.9% bolus 1,000 mL 1,000 mL (has no administration in time range)   magnesium sulfate in dextrose IVPB (premix) 1 g (1 g Intravenous New Bag 7/24/23 1639)   vancomycin - pharmacy to dose (has no administration in time range)   cefepime 2 g in dextrose  "5% 100 mL IVPB (ready to mix) (has no administration in time range)   vancomycin 1.5 g in dextrose 5 % 250 mL IVPB (ready to mix) (has no administration in time range)   sodium chloride 0.9% bolus 500 mL 500 mL (0 mLs Intravenous Stopped 7/24/23 1627)   potassium bicarbonate disintegrating tablet 50 mEq (50 mEq Oral Given 7/24/23 1639)     Medical Decision Making:   History:   I obtained history from: someone other than patient.       <> Summary of History: Notes from oncology clinic today  Old Medical Records: I decided to obtain old medical records.  Old Records Summarized: records from clinic visits.  Independently Interpreted Test(s):   I have ordered and independently interpreted X-rays - see prior notes.  I have ordered and independently interpreted EKG Reading(s) - see prior notes  Clinical Tests:   Lab Tests: Ordered and Reviewed       <> Summary of Lab: White count 0.82  ANC 0.4    H&H 9.8 and 30.4    Platelets 61643  Potassium 2.5 bicarb 21 glucose 395 BUN 39 creatinine 1.5 alk-phos 335  Mag 1.4  Lactate 3.4    Blood cultures pending  Radiological Study: Ordered and Reviewed  Medical Tests: Ordered and Reviewed  Sepsis Perfusion Assessment: "I attest a sepsis perfusion exam was performed within 6 hours of sepsis, severe sepsis, or septic shock presentation, following fluid resuscitation."  ED Management:  Emergent evaluation of a 63-year-old female with history of right-sided triple negative breast cancer 2015, now has left-sided invasive ductal breast cancer as 2021 with liver and bony Mets on chemotherapy has done radiation in the past, history of epilepsy, diabetes, hypertension, hyperlipidemia, COPD, 1/4 pack per day tobacco use for 35 years, being sent from oncology clinic after seeing Ms. Howie NP due to lab abnormalities including a potassium of 2.3, and low blood pressure blood pressure was 97/68 on arrival with a heart rate of 115.  Patient also had a white count of 1 and platelets of 82865 " H&H of 11.1 and 34.8 and a BUN of 42.  Patient had pneumonia in the past month fever of 100.1 over the weekend.  Ongoing dry cough reports that she was no longer producing mucus.  Reports that when she was taking Levaquin she was having a great deal of nausea and vomiting inability to tolerate oral intake and discontinued the Levaquin.  She reports no worsening of shortness of breath.  No fevers more than or equal to 100.4.  Right-sided posterior chest pain concern for possible rib fracture at the oncology office.  Reports she is fatigued and weak having difficulty ambulating.  Before arriving in the ER patient received 1 L of IV fluids and 8 mg of Zofran  On my physical exam patient's heart rate was 109 blood pressure 93/68 patient was thin and cachectic had to be assisted in standing in rotating to bed due to severe weakness.  Mild pallor.  Clear breath sounds bilaterally soft nontender abdomen  MDM    Patient presents for emergent evaluation of acute hypokalemia, low blood pressure from Oncology Clinic that poses a possible threat to life and/or bodily function.    Differential diagnosis includes but is not limited to symptomatic hypokalemia, other electrolyte disturbances, worsening pancytopenia with possible neutropenia possibly incompletely treated pneumonia, or rib fracture, bleeding from severe thrombocytopenia, sepsis, dehydration.  In the ED patient found to have acute severe hypokalemia, hypo magnesium me a, severe pancytopenia with neutropenia at this time and severe thrombocytopenia.   I ordered labs and personally reviewed them.  Labs significant for see above   I ordered X-rays and personally reviewed them and reviewed the radiologist interpretation.  Xray significant for see above.    I ordered EKG and personally reviewed it.  EKG significant for see above.          Admission MDM  I discussed the patient presentation labs, ekg, X-rays,  with the Hospitalist   Patient was managed in the ED with IV   patient has received a 30 milliliter/kilogram IV bolus based on the fact that she got 1 L of normal saline prior to arrival and 1.5 L here.  Patient has never had recurrence of hypotension blood pressure is stable at 1 11/62 heart rate has improved.  She will rec cefepime 2 g IV and vancomycin due to her severe night GENARO knee a and possible infection due to recent pneumonia not having completed Levaquin, she received potassium both IV and orally as well as 1 g of IV magnesium.  The response to treatment was good  Patient required emergent consultation to hospitalist for admission.  Santa Olmedo M.D.       Other:   I have discussed this case with another health care provider.          Attending Attestation:         Attending Critical Care:   Critical Care Times:   Direct Patient Care (initial evaluation, reassessments, and time considering the case)................................................................10 minutes.   Additional History from reviewing old medical records or taking additional history from the family, EMS, PCP, etc.......................5 minutes.   Ordering, Reviewing, and Interpreting Diagnostic Studies...............................................................................................................5 minutes.   Documentation..................................................................................................................................................................................10 minutes.   Consultation with other Physicians. .................................................................................................................................................5 minutes.   Consultation with the patient's family directly relating to the patient's condition, care, and DNR status (when patient unable)......5 minutes.   ==============================================================  Total Critical Care Time - exclusive of procedural time: 40  minutes.  ==============================================================  Critical care was necessary to treat or prevent imminent or life-threatening deterioration of the following conditions: metabolic crisis.   Critical care was time spent personally by me on the following activities: obtaining history from patient or relative, examination of patient, review of old charts, ordering lab, x-rays, and/or EKG, development of treatment plan with patient or relative, ordering and performing treatments and interventions, evaluation of patient's response to treatment, discussion with consultants, interpretation of cardiac measurements and re-evaluation of patient's conition.   Critical Care Condition: life-threatening                      Clinical Impression:   Final diagnoses:  [E87.6] Hypokalemia (Primary)  [R07.81] Rib pain  [E87.20] Lactic acidosis  [D70.1, T45.1X5A] Chemotherapy-induced neutropenia  [D61.818] Pancytopenia  [R53.1] Weakness        ED Disposition Condition    Admit Stable                Santa Olmedo MD  07/24/23 2310

## 2023-07-24 NOTE — TELEPHONE ENCOUNTER
Tabatha Denise, NP-C reviewed patient's labs and per her verbal order, patient to go to the ER for evaluation and treatment due to critically low potassium of 2.3. Patient made aware of above and verbalized understanding.

## 2023-07-24 NOTE — PLAN OF CARE
Problem: Neutropenia  Goal: Absence of Infection  Outcome: Ongoing, Progressing  Intervention: Prevent Infection and Maximize Resistance  Flowsheets (Taken 7/24/2023 1158)  Infection Prevention:   cohorting utilized   environmental surveillance performed   equipment surfaces disinfected   hand hygiene promoted   rest/sleep promoted

## 2023-07-25 PROBLEM — E87.6 HYPOKALEMIA: Status: ACTIVE | Noted: 2023-01-01

## 2023-07-25 PROBLEM — E83.42 HYPOMAGNESEMIA: Status: ACTIVE | Noted: 2023-01-01

## 2023-07-25 PROBLEM — J15.69 GRAM-NEGATIVE PNEUMONIA: Status: ACTIVE | Noted: 2023-01-01

## 2023-07-25 NOTE — ASSESSMENT & PLAN NOTE
This patient does have evidence of infective focus  My overall impression is severe sepsis.  Source: Respiratory  Antibiotics given-   Antibiotics (72h ago, onward)    Start     Stop Route Frequency Ordered    07/25/23 0900  mupirocin 2 % ointment         07/30 0859 Nasl 2 times daily 07/24/23 2247    07/25/23 0200  cefepime 1g in dextrose 5% 100 mL IVPB (ready to mix)         -- IV Every 8 hours (non-standard times) 07/24/23 1904        Latest lactate reviewed-  Recent Labs   Lab 07/24/23  1524 07/24/23  1952 07/25/23  0420   LACTATE 3.4* 2.8* 1.6     Organ dysfunction indicated by lactic acidosis. also with pancytopenia  Fluid challenge given with normal saline; see MAR  Post- resuscitation assessment No - Post resuscitation assessment not needed   Will Not start Pressors  Source control achieved by: antibiotics  - see gram negative pneumonia section

## 2023-07-25 NOTE — NURSING
Nurses Note -- 4 Eyes      7/25/2023   12:41 AM      Skin assessed during: Admit      [] No Altered Skin Integrity Present    []Prevention Measures Documented      [x] Yes- Altered Skin Integrity Present or Discovered   [x] LDA Added if Not in Epic (Describe Wound)   [x] New Altered Skin Integrity was Present on Admit and Documented in LDA   [x] Wound Image Taken    Wound Care Consulted? Yes    Attending Nurse:  Blanca Bocanegra LPN     Second RN/Staff Member:  [id number]

## 2023-07-25 NOTE — PROGRESS NOTES
[facility] Medicine  Progress Note    Patient Name: Negrita Castro  MRN: [id number]  Patient Class: IP- Inpatient   Admission Date: 7/24/2023  Length of Stay: 1 days  Attending Physician: Rosalino Piña MD  Primary Care Provider: Manuelito Shepard MD        Subjective:     Principal Problem:Severe sepsis        HPI:  Per Dr. Adrienne Lee: Negrita Castro is a 63 y.o. White female   With PMH of breast cancer   undergoing chemotherapy,  Previous radiation,   COPD, smoking, DM2, HTN, HLD,  who presents with hypokalemia.  Sent from oncology office.     Pt reports worsening nausea since Monday  +vomiting previously, but not today  +decreased po intake  +malaise  +generalized weakness  +chills  +fever, Tmax 100.1  +dry cough  No SOB  +R chest pain  Last dose chemo on Monday  Treated for pneumonia over the weekend  However pt was unable to complete levaquin  (due to nausea)      Overview/Hospital Course:  Patient with metastatic breast cancer currently undergoing chemotherapy is admitted after being sent to ER due to outpatient labs showing hypokalemia. She was found to have sepsis attributed to right lower lobe pneumonia that had somewhat failed outpatient antibiotic therapy with levaquin due to poor tolerance. Given IVF and electrolyte replacement. Started on vancomycin and cefepime. Vancomycin discontinued after negative MRSA PCR swab. Respiratory culture growing GNR. Also noted to be pancytopenic so placed on neutropenic precautions. Hem/onc consulted and ordered filgrastim.       Interval History: Patient seen and examined. NAEON. Already feeling a good bit better this morning.       Objective:     Vital Signs (Most Recent):  Temp: 99.3 °F (37.4 °C) (07/25/23 1213)  Pulse: 85 (07/25/23 1213)  Resp: 18 (07/25/23 1213)  BP: 127/84 (07/25/23 1213)  SpO2: 98 % (07/25/23 1213) Vital Signs (24h Range):  Temp:  [97.5 °F (36.4 °C)-99.3 °F (37.4 °C)] 99.3 °F (37.4 °C)  Pulse:  [] 85  Resp:   [17-21] 18  SpO2:  [97 %-99 %] 98 %  BP: (103-151)/(60-90) 127/84     Weight: 64.5 kg (142 lb 3.2 oz)  Body mass index is 19.83 kg/m².    Intake/Output Summary (Last 24 hours) at 7/25/2023 1412  Last data filed at 7/25/2023 0900  Gross per 24 hour   Intake 2730 ml   Output 800 ml   Net 1930 ml         Physical Exam  Vitals reviewed.   Constitutional:       General: She is not in acute distress.  HENT:      Head: Normocephalic and atraumatic.   Cardiovascular:      Rate and Rhythm: Normal rate and regular rhythm.   Pulmonary:      Effort: Pulmonary effort is normal. No respiratory distress.      Breath sounds: Normal breath sounds.   Neurological:      General: No focal deficit present.      Mental Status: She is alert and oriented to person, place, and time. Mental status is at baseline.   Psychiatric:         Mood and Affect: Affect normal.         Behavior: Behavior normal.         Thought Content: Thought content normal.           Significant Labs: All pertinent labs within the past 24 hours have been reviewed.    Significant Imaging: I have reviewed all pertinent imaging results/findings within the past 24 hours.      Assessment/Plan:      * Severe sepsis  This patient does have evidence of infective focus  My overall impression is severe sepsis.  Source: Respiratory  Antibiotics given-   Antibiotics (72h ago, onward)    Start     Stop Route Frequency Ordered    07/25/23 0900  mupirocin 2 % ointment         07/30 0859 Nasl 2 times daily 07/24/23 2247    07/25/23 0200  cefepime 1g in dextrose 5% 100 mL IVPB (ready to mix)         -- IV Every 8 hours (non-standard times) 07/24/23 1904        Latest lactate reviewed-  Recent Labs   Lab 07/24/23  1524 07/24/23  1952 07/25/23  0420   LACTATE 3.4* 2.8* 1.6     Organ dysfunction indicated by lactic acidosis. also with pancytopenia  Fluid challenge given with normal saline; see MAR  Post- resuscitation assessment No - Post resuscitation assessment not needed   Will Not  start Pressors  Source control achieved by: antibiotics  - see gram negative pneumonia section    Hypomagnesemia  Mild  - replace IV today  - recheck level tomorrow and replace more if needed    Hypokalemia  Severe but without obvious symptoms other than fatigue  - tele  - replace IV and PO  - recheck level today and replace more if needed    Gram-negative pneumonia  Resp culture growing GNR x2  MRSA PCR negative  CXR R lower/basilar ?infiltrate  - dc vanc  - continue cefepime  - f/u culture finals    Pancytopenia  Likely chemo-related  No active bleeding  - neutropenic precautions  - trend cbc daily  - hem/onc consult: ordered filgrastim  - transfuse blood products as clinically indicated    Dehydration  Improved  - continue IVF at decreased rate    Essential hypertension  Chronic, controlled  - hold acei due to normotension; resume when appropriate  - ok to continue the metoprolol and monitor    Chronic obstructive pulmonary disease  Chronic, stable  - duoneb prn    Chronic hypoxemic respiratory failure  Noted hx uses 2.5L O2 NC prn  - O2 prn    Chemotherapy-induced neutropenia  - neutropenic precautions  - hem/onc consult: ordered filgrastim  - trend wbc on cbc daily    Type 2 diabetes mellitus with hyperglycemia, with long-term current use of insulin  Patient's FSGs are controlled on current medication regimen.  Last A1c reviewed-   Lab Results   Component Value Date    HGBA1C 9.1 (H) 07/24/2023     Most recent fingerstick glucose reviewed-   Current correctional scale  Medium  Decrease anti-hyperglycemic dose as follows-   Antihyperglycemics (From admission, onward)    Start     Stop Route Frequency Ordered    07/25/23 2100  insulin detemir U-100 (Levemir) pen 15 Units         -- SubQ Nightly 07/25/23 1411    07/24/23 2016  insulin aspart U-100 pen 1-10 Units         -- SubQ Before meals & nightly PRN 07/24/23 1918        Hold Oral hypoglycemics while patient is in the hospital.    Breast carcinoma metastatic  to multiple sites  Noted hx on current chemo  - hem/onc consult      VTE Risk Mitigation (From admission, onward)         Ordered     IP VTE HIGH RISK PATIENT  Once         07/24/23 1918     Place sequential compression device  Until discontinued         07/24/23 1918     Reason for No Pharmacological VTE Prophylaxis  Once        Question:  Reasons:  Answer:  Thrombocytopenia    07/24/23 1918                Discharge Planning   GUNNER: 7/28/2023     Code Status: Full Code   Is the patient medically ready for discharge?:     Reason for patient still in hospital (select all that apply): Patient trending condition, Laboratory test, Treatment and Consult recommendations  Discharge Plan A: Home Health                  Rosalino Piña MD  Department of Hospital Medicine   [facility]

## 2023-07-25 NOTE — H&P
[facility] Medicine   History & Physical     Patient Name: Negrita Castro  MRN: [id number]  Admission Date: 7/24/2023  1:58 PM  Attending Physician: Adrienne Lee MD  Face-to-Face encounter date: 07/24/2023 7:26 PM    Patient information was obtained from patient, past medical records, ER physician, and ER records.     HISTORY OF PRESENT ILLNESS:     Negrita Castro is a 63 y.o. White female   With PMH of breast cancer   undergoing chemotherapy,  Previous radiation,   COPD, smoking, DM2, HTN, HLD,  who presents with hypokalemia.  Sent from oncology office.    Pt reports worsening nausea since Monday  +vomiting previously, but not today  +decreased po intake  +malaise  +generalized weakness  +chills  +fever, Tmax 100.1  +dry cough  No SOB  +R chest pain  Last dose chemo on Monday  Treated for pneumonia over the weekend  However pt was unable to complete levaquin  (due to nausea)    REVIEW OF SYSTEMS:     All systems reviewed and are negative except as noted per above.    PAST MEDICAL HISTORY:     Past Medical History:   Diagnosis Date    Acute hypoxemic respiratory failure 12/12/2020    Breast cancer 10/26/2015    left breast    Cancer     RIGHT BREAST 10-15    Depression     Diabetes mellitus     Neuropathy     Panic attacks     S/P epidural steroid injection     Seizures     none since early 30's    Wears glasses     TO DRIVE     PAST SURGICAL HISTORY:     Past Surgical History:   Procedure Laterality Date    AXILLARY NODE DISSECTION Left 8/24/2020    Procedure: LYMPHADENECTOMY, AXILLARY;  Surgeon: Cory Vick MD;  Location: [facility];  Service: General;  Laterality: Left;  left breast mastectomy with possible axillary lymph node dissection    BREAST BIOPSY      right    BREAST LUMPECTOMY      BREAST RECONSTRUCTION      breast reconstruction with tummy [facility]      BREAST SURGERY      11-3-15 LUMPECTOMY, 12-2-15 REEXCISION    INCISION AND DRAINAGE OF ABSCESS Left 9/9/2020    Procedure:  INCISION AND DRAINAGE, ABSCESS;  Surgeon: Cory Vick MD;  Location: [facility] OR;  Service: General;  Laterality: Left;    INSERTION OF LOCALIZATION WIRE Left 2020    Procedure: INSERTION, LOCALIZATION WIRE;  Surgeon: Cory Vick MD;  Location: [facility] OR;  Service: General;  Laterality: Left;  left breast lumpectomy with wire needle loc    INSERTION OF TUNNELED CENTRAL VENOUS CATHETER (CVC) WITH SUBCUTANEOUS PORT Right 2022    Procedure: LVGAFFAXD-LVDO-A-CATH;  Surgeon: Jeff Mckeon Jr., MD;  Location: [facility] OR;  Service: General;  Laterality: Right;    MASTECTOMY      mastectomy 2016      MASTECTOMY, PARTIAL Left 3/19/2021    Procedure: MASTECTOMY, PARTIAL;  Surgeon: Cory Vick MD;  Location: [facility] OR;  Service: General;  Laterality: Left;  lumpectomy  left breast     RECONSTRUCTION OF NIPPLE Right 2018    Procedure: RECONSTRUCTION-NIPPLE RIGHT;  Surgeon: Xiang Hernandez MD;  Location: [address];  Service: Plastics;  Laterality: Right;    SENTINEL LYMPH NODE BIOPSY Left 2020    Procedure: BIOPSY, LYMPH NODE, SENTINEL;  Surgeon: Cory Vick MD;  Location: [facility] OR;  Service: General;  Laterality: Left;  left breast lumpectomy with left sentinel lymoh node       shoulder surgery and wrist      BILAT  BONE SPUR    WRIST SURGERY      RIGHT     ALLERGIES:   Adhesive tape-silicones; Polymycin; Adhesive; Latex, natural rubber; Neomycin-bacitracnzn-polymyxnb; and Polyurethane-39    FAMILY HISTORY:     Family History   Problem Relation Age of Onset    Anesthesia problems Neg Hx      SOCIAL HISTORY:     Social History     Tobacco Use    Smoking status: Former     Packs/day: 0.25     Years: 30.00     Pack years: 7.50     Types: Cigarettes     Quit date: 2015     Years since quittin.8    Smokeless tobacco: Never   Substance Use Topics    Alcohol use: Yes     Alcohol/week: 0.0 standard drinks     Comment: RARELY        Social History     Substance and  Sexual Activity   Sexual Activity Not on file        HOME MEDICATIONS:     Prior to Admission medications    Medication Sig Start Date End Date Taking? Authorizing Provider   blood-glucose transmitter (DEXCOM G6 TRANSMITTER) Edwige TEST GLUCOSE QID 4/21/22  Yes Manuelito Shepard MD   diazePAM (VALIUM) 10 MG Tab Take 1 tablet (10 mg total) by mouth 4 (four) times daily as needed (anxiety). 7/10/23  Yes Yael Niño MD   docusate sodium (COLACE) 100 MG capsule Take 100 mg by mouth 2 (two) times daily.   Yes Historical Provider   droNABinol (MARINOL) 5 MG capsule Take 1 capsule (5 mg total) by mouth 2 (two) times daily before meals. 5/18/23  Yes TALISHA Vick MD   enalapril (VASOTEC) 5 MG tablet Take 1 tablet (5 mg total) by mouth once daily. 10/25/22  Yes Manuelito Shepard MD   fentaNYL (DURAGESIC) 25 mcg/hr Place 1 patch onto the skin every 72 hours. 6/15/23 6/14/24 Yes TALISHA Vick MD   fluconazole (DIFLUCAN) 150 MG Tab TAKE ONE TABLET BY MOUTH ONCE FOR 1 DOSE  Patient taking differently: Take 150 mg by mouth once. 7/11/23  Yes TALISHA Vick MD   HYDROcodone-acetaminophen (NORCO)  mg per tablet Take 1 tablet by mouth every 6 (six) hours as needed for Pain. 6/15/23  Yes TALISHA Vick MD   metoprolol tartrate (LOPRESSOR) 25 MG tablet Take 1 tablet (25 mg total) by mouth 2 (two) times daily. 10/12/22 10/12/23 Yes Rocky Justin MD   ondansetron (ZOFRAN-ODT) 8 MG TbDL Take 1 tablet (8 mg total) by mouth every 6 to 8 hours as needed (nausea). dissolve 1 TABLET ON THE TONGUE EVERY 6 TO 8 HOURS AS NEEDED FOR nausea 7/13/23  Yes Michelle Davidson NP   oxyCODONE (ROXICODONE) 15 MG Tab Take 1 tablet (15 mg total) by mouth every 4 to 6 hours as needed for Pain. 6/15/23 10/13/23 Yes TALISHA Vick MD   palbociclib 100 mg Tab Take 100 mg by mouth once daily. for 21 days, then take 7 days off. Total cycle length 28 days 12/9/21  Yes Michelle Davidson NP   potassium chloride SA (K-DUR,KLOR-CON) 20 MEQ tablet  Take 1 tablet (20 meq) by mouth 3 times a day until instructed further by Dr. Vick.  Patient taking differently: Take 20 mEq by mouth 3 (three) times daily. Take 1 tablet (20 meq) by mouth 3 times a day until instructed further by Dr. Vick. 6/20/23  Yes Lucho Pino MD   promethazine (PHENERGAN) 25 MG tablet TAKE ONE TABLET (25MG TOTAL) BY MOUTH EVERY 4 TO 6 HOURS AS NEEDED  Patient taking differently: Take 25 mg by mouth every 4 to 6 hours as needed. 12/19/22  Yes TALISHA Vick MD   rosuvastatin (CRESTOR) 40 MG Tab Take 1 tablet (40 mg total) by mouth every evening. For cholesterol 10/25/22 10/25/23 Yes Manuelito Shepard MD   zolpidem (AMBIEN) 10 mg Tab TAKE ONE TABLET BY MOUTH EVERY NIGHT AT BEDTIME  Patient taking differently: Take 10 mg by mouth every evening. 5/5/23  Yes TALISHA Vick MD   benzonatate (TESSALON) 100 MG capsule Take 1 capsule (100 mg total) by mouth 3 (three) times daily as needed for Cough. 5/30/23 7/24/23 Yes TALISHA Vick MD   blood sugar diagnostic Strp TEST GLUCOSE BID 4/26/22   Manuelito Shepard MD   blood-glucose meter (TRUE METRIX GLUCOSE METER) kit TEST GLUCOSE BID 4/26/22 4/25/23  Manuelito Shepard MD   blood-glucose meter,continuous (DEXCOM G6 ) Misc TEST GLUCOSE QID 4/21/22   Manuelito Shepard MD   blood-glucose sensor (DEXCOM G6 SENSOR) Edwige TEST GLUCOSE QID 4/21/22   Manuelito Shepard MD   carisoprodoL (SOMA) 350 MG tablet Take 1 tablet (350 mg total) by mouth 3 (three) times daily as needed for Muscle spasms. 6/15/23 7/15/23  TALISHA Vick MD   dexAMETHasone (DECADRON) 4 MG Tab TAKE ONE TABLET (4MG TOTAL) BY MOUTH TWICE DAILY WITH MEALS  Patient taking differently: Take 4 mg by mouth 2 (two) times daily with meals. 6/6/22   TALISHA Vick MD   diphenoxylate-atropine 2.5-0.025 mg (LOMOTIL) 2.5-0.025 mg per tablet Take 1 tablet by mouth 4 (four) times daily as needed for Diarrhea. 7/13/23   Michelle Davidson NP   dulaglutide (TRULICITY) 3  "mg/0.5 mL pen injector Inject 3 mg into the skin every 7 days. 10/25/22   Manuelito Shepard MD   etodolac (LODINE) 400 MG tablet TAKE ONE TABLET (400 MG TOTAL) BY MOUTH ONCE DAILY  Patient taking differently: Take 400 mg by mouth once daily. 9/6/22   TALISHA Vick MD   insulin glargine (LANTUS U-100 INSULIN) 100 unit/mL injection Inject 32 Units into the skin 2 (two) times a day. 7/10/23 7/9/24  Yael Niño MD   lancets 32 gauge Misc TEST GLUCOSE BID 4/26/22   Manuelito Shepard MD   pen needle, diabetic (BD ULTRA-FINE MARCE PEN NEEDLE) 32 gauge x 5/32" Ndle Test blood sugar twice daily 3/8/22   Manuelito Shepard MD   TRUEPLUS PEN NEEDLE 31 gauge x 3/16" Ndle USE TO INJECT LANTUS TWICE DAILY 7/11/23   Historical Provider   dextromethorphan-guaiFENesin  mg/5 ml (ROBITUSSIN-DM)  mg/5 mL liquid Take 1 teaspoon q 6 hours prn cough.  Patient taking differently: Take 5 mLs by mouth. Take 1 teaspoon q 6 hours prn cough. 11/15/21 7/24/23  TALISHA Vick MD   furosemide (LASIX) 20 MG tablet TAKE ONE TABLET BY MOUTH EVERY DAY 6/21/23 7/24/23  TALISHA Vick MD   metFORMIN (GLUCOPHAGE-XR) 500 MG ER 24hr tablet Take 2 tablets (1,000 mg total) by mouth 2 (two) times daily with meals. 10/25/22 7/24/23  Manuelito Shepard MD   naloxegoL (MOVANTIK) 12.5 mg Tab Take 12.5 mg by mouth once daily. 3/10/22 7/24/23  Manuelito Shepard MD   promethazine-codeine 6.25-10 mg/5 ml (PHENERGAN WITH CODEINE) 6.25-10 mg/5 mL syrup TAKE 5 ML BY MOUTH EVERY 6 HOURS AS NEEDED FOR COUGH  Patient taking differently: Take 5 mLs by mouth every 6 (six) hours as needed. TAKE 5 ML BY MOUTH EVERY 6 HOURS AS NEEDED FOR COUGH 11/16/22 7/24/23  TALISHA Vick MD   psyllium husk, with sugar, (METAMUCIL, WITH SUGAR,) 3.4 gram packet Take 1 packet by mouth 2 (two) times daily. 3/26/22 7/24/23  Myke Rizzo MD       PHYSICAL EXAM:     /70   Pulse 103   Temp 97.6 °F (36.4 °C) (Oral)   Resp 18   Ht 5' 11" (1.803 m)   " Wt 58.1 kg (128 lb)   LMP 01/16/2016   SpO2 98%   BMI 17.85 kg/m²     Gen: alert, responsive, CACHECTIC  HEENT:  Eyes - no pallor  External ears with no lesions  Nares patent  Mouth/Throat:  trachea midline  CV: RRR  Lungs: CTA B/L  Abd: +BS, soft, NT, ND  Ext: no atrophy or edema  Skin: warm, dry  Neuro: grossly intact  Psych: pleasant     LABS AND IMAGING:     Labs Reviewed   CBC W/ AUTO DIFFERENTIAL - Abnormal; Notable for the following components:       Result Value    WBC 0.82 (*)     RBC 3.37 (*)     Hemoglobin 9.8 (*)     Hematocrit 30.4 (*)     RDW 17.4 (*)     Platelets 27 (*)     Immature Granulocytes 1.2 (*)     Gran # (ANC) 0.4 (*)     Lymph # 0.4 (*)     Mono # 0.1 (*)     All other components within normal limits   COMPREHENSIVE METABOLIC PANEL - Abnormal; Notable for the following components:    Potassium 2.5 (*)     CO2 21 (*)     Glucose 395 (*)     BUN 39 (*)     Creatinine 1.5 (*)     Albumin 2.1 (*)     Alkaline Phosphatase 335 (*)     eGFR 39.4 (*)     All other components within normal limits    Narrative:     Potassium critical result(s) repeated. Called and verbal readback   obtained from Ema Burciaga ED, RN.  by [facility] 07/24/2023 16:18   MAGNESIUM - Abnormal; Notable for the following components:    Magnesium 1.4 (*)     All other components within normal limits   LACTIC ACID, PLASMA - Abnormal; Notable for the following components:    Lactate (Lactic Acid) 3.4 (*)     All other components within normal limits    Narrative:     Lactic acid critical result(s) repeated. Called and verbal readback   obtained from Apryl Andrew ED, RN.  by [facility] 07/24/2023 16:34   URINALYSIS, REFLEX TO URINE CULTURE - Abnormal; Notable for the following components:    Protein, UA 1+ (*)     Glucose, UA 4+ (*)     Occult Blood UA 1+ (*)     All other components within normal limits    Narrative:     Specimen Source->Urine   URINALYSIS MICROSCOPIC - Abnormal; Notable for the following components:    RBC, UA 7  (*)     Hyaline Casts, UA 0.00 (*)     All other components within normal limits    Narrative:     Specimen Source->Urine   CULTURE, BLOOD    Narrative:     Collection has been rescheduled by 2MB at 07/24/2023 15:43 Reason:   Unable to collect...rn to pull blood from port per DR Olmedo  Collection has been rescheduled by 2MB at 07/24/2023 15:43 Reason:   Unable to collect...rn to pull blood from port per DR Olmedo   CULTURE, BLOOD    Narrative:     Collection has been rescheduled by 2MB at 07/24/2023 15:43 Reason:   Unable to collect...rn to pull blood from port per DR Olmedo  Collection has been rescheduled by 2MB at 07/24/2023 15:43 Reason:   Unable to collect...rn to pull blood from port per DR Olmedo   PROCALCITONIN   INFLUENZA A AND B ANTIGEN   SARS-COV-2 RNA AMPLIFICATION, QUAL   LACTIC ACID, PLASMA   PROCALCITONIN   HEMOGLOBIN A1C   TYPE & SCREEN       Imaging Results              XR Ribs Min 3 Views w/PA Chest Right (Final result)  Result time 07/24/23 15:38:54   Procedure changed from X-Ray Ribs 2 View Right     Final result by Jacque Eddy MD (07/24/23 15:38:54)                   Narrative:    PA chest and right RIBS 5 views    Clinical history is right rib pain    COMPARISON: 6/26/2023    There is a right IJ Port-A-Cath with tip overlying the superior vena cava. The cardiomediastinal silhouette is normal in size. The lungs are clear. There are surgical clips overlying the right chest wall.    4 views of the right ribs show no fractures, dislocations or acute osseous abnormalities.  There is sclerosis of the thoracic spine compatible with metastatic disease    IMPRESSION: Negative left ribs    No acute pulmonary process    Sclerosis of the thoracic spine compatible with osseous metastasis    Electronically signed by:  Jacque Eddy MD  7/24/2023 3:38 PM CDT Workstation: [id number]                      Final result by Jacque Eddy MD (07/24/23 15:18:29)                    Narrative:    EXAMINATION:  XR RIBS MIN 3 VIEWS W/ PA CHEST RIGHT    CLINICAL HISTORY:  right rib pain;  Pleurodynia    FINDINGS:  PA chest demonstrates the lungs to be clear.  The cardiomediastinal silhouette is normal in size.  There is a right IJ Port-A-Cath in place.  Four views right ribs show no fracture, dislocation, or destructive osseous lesion. Soft tissues are unremarkable.    I[]MPRESSION:  Negative right ribs    No acute pulmonary process      Electronically signed by: Jacque Eddy MD  Date:    07/24/2023  Time:    15:18                                    Labs and images reviewed personally by me.  See my personal assessment/interpretation of results below:    ASSESSMENT & PLAN:   Negrita Castro is a 63 y.o. female admitted for    Active Hospital Problems    Diagnosis  POA    *Severe sepsis [A41.9, R65.20]  Yes    Dehydration [E86.0]  Yes    Pancytopenia [D61.818]  Yes    Chronic hypoxemic respiratory failure [J96.11]  Yes    Chronic obstructive pulmonary disease [J44.9]  Yes    Essential hypertension [I10]  Yes    Chemotherapy-induced neutropenia [D70.1, T45.1X5A]  Yes    Type 2 diabetes mellitus with hyperglycemia, with long-term current use of insulin [E11.65, Z79.4]  Not Applicable    Breast cancer [C50.919]  Yes      Resolved Hospital Problems   No resolved problems to display.        Plan    Concern for Severe sepsis  Immunocompromised  Recent chemotherapy  Metastatic breast cancer  Dehydration  Pancytopenia  Under-treated Pneumonia  Hypokalemia  Hypomagnesia  - IVFs  - empiric vanc, cefepime  - follow cultures  - potassium and magnesium replacement  - trending CBC, CMP, magnesium  - neutropenic precautions   - oncology consulted, thank you    DM2  - SSI    Chronic conditions as noted above/below; home medications reviewed personally by me and restarted as appropriate  Electrolyte derangement:  Trending BMP; Mg; replacement prn  DVT ppx: SCDs  FULL CODE      - The above conditions include an  acute and/or chronic illness that poses a threat to life (or bodily function).  - Previous notes/encounters/external records reviewed personally by me.  - Pt's case personally discussed with another physician:  ER physician    Adrienne Lee MD  [facility] Hospitalist  07/24/2023

## 2023-07-25 NOTE — HPI
Per Dr. Adrienne Lee: Negrita Castro is a 63 y.o. White female   With PMH of breast cancer   undergoing chemotherapy,  Previous radiation,   COPD, smoking, DM2, HTN, HLD,  who presents with hypokalemia.  Sent from oncology office.     Pt reports worsening nausea since Monday  +vomiting previously, but not today  +decreased po intake  +malaise  +generalized weakness  +chills  +fever, Tmax 100.1  +dry cough  No SOB  +R chest pain  Last dose chemo on Monday  Treated for pneumonia over the weekend  However pt was unable to complete levaquin  (due to nausea)

## 2023-07-25 NOTE — SUBJECTIVE & OBJECTIVE
Interval History: Patient seen and examined. GLEN. Already feeling a good bit better this morning.       Objective:     Vital Signs (Most Recent):  Temp: 99.3 °F (37.4 °C) (07/25/23 1213)  Pulse: 85 (07/25/23 1213)  Resp: 18 (07/25/23 1213)  BP: 127/84 (07/25/23 1213)  SpO2: 98 % (07/25/23 1213) Vital Signs (24h Range):  Temp:  [97.5 °F (36.4 °C)-99.3 °F (37.4 °C)] 99.3 °F (37.4 °C)  Pulse:  [] 85  Resp:  [17-21] 18  SpO2:  [97 %-99 %] 98 %  BP: (103-151)/(60-90) 127/84     Weight: 64.5 kg (142 lb 3.2 oz)  Body mass index is 19.83 kg/m².    Intake/Output Summary (Last 24 hours) at 7/25/2023 1412  Last data filed at 7/25/2023 0900  Gross per 24 hour   Intake 2730 ml   Output 800 ml   Net 1930 ml         Physical Exam  Vitals reviewed.   Constitutional:       General: She is not in acute distress.  HENT:      Head: Normocephalic and atraumatic.   Cardiovascular:      Rate and Rhythm: Normal rate and regular rhythm.   Pulmonary:      Effort: Pulmonary effort is normal. No respiratory distress.      Breath sounds: Normal breath sounds.   Neurological:      General: No focal deficit present.      Mental Status: She is alert and oriented to person, place, and time. Mental status is at baseline.   Psychiatric:         Mood and Affect: Affect normal.         Behavior: Behavior normal.         Thought Content: Thought content normal.           Significant Labs: All pertinent labs within the past 24 hours have been reviewed.    Significant Imaging: I have reviewed all pertinent imaging results/findings within the past 24 hours.

## 2023-07-25 NOTE — PROGRESS NOTES
Pharmacy Consult Discontinuation: Vancomycin    Negrita Castro [id number] is a 63 y.o. female was consulted for vancomycin pharmacotherapy management by pharmacy.    Pharmacy consult for vancomycin dosing is no longer required.  Vancomycin was discontinued 07/25/2023 @ 1410 by Dr Piña.    Thank you for allowing us to participate in this patient's care. Should you have any questions or concerns please feel free to contact the pharmacy department at [phone].    Hussein Montoya, WaldoD

## 2023-07-25 NOTE — ASSESSMENT & PLAN NOTE
Chronic, controlled  - hold acei due to normotension; resume when appropriate  - ok to continue the metoprolol and monitor

## 2023-07-25 NOTE — PT/OT/SLP EVAL
Physical Therapy Evaluation    Patient Name:  Negrita Castro   MRN:  [id number]    Recommendations:     Discharge Recommendations: home health PT, nursing facility, skilled   Discharge Equipment Recommendations: none   Barriers to discharge:  pain, weakness, increase assist with mobility, decrease activity tolerance    Assessment:     Negrita Castro is a 63 y.o. female admitted with a medical diagnosis of Severe sepsis.  She presents with the following impairments/functional limitations: weakness, impaired endurance, impaired self care skills, impaired functional mobility, gait instability, impaired balance, decreased lower extremity function, decreased safety awareness, pain, impaired cardiopulmonary response to activity.    Pt found in bed with HOB elevated. Pt reports having a sacral wound from being in bed at home. Pt educated on importance of pressure relief/offloading such as intermittent rolling while in bed and OOB to wheelchair throughout the day. Attempt supine to sit this date but only able to achieve 50% EOB due to pain complete with min A. Pt agreeable to left sidelying for optimal pressure relief performed Independently. Pt educated to return to her back once lunch arrived then transition to R sidelying. Decrease core strength noted in long sit and during attempt to progress toward EOB due to forward flexed posture with rounded shoulder     Rehab Prognosis: Fair; patient would benefit from acute skilled PT services to address these deficits and reach maximum level of function.    Recent Surgery: * No surgery found *      Plan:     During this hospitalization, patient to be seen 5 x/week to address the identified rehab impairments via gait training, therapeutic activities, therapeutic exercises, neuromuscular re-education and progress toward the following goals:    Plan of Care Expires:  08/25/23    Subjective     Chief Complaint: sacral pain  Patient/Family Comments/goals: pain  management  Pain/Comfort:  Pain Rating 1: 8/10  Location 1: back  Pain Addressed 1: Reposition, Distraction, Cessation of Activity  Pain Rating Post-Intervention 1: 10/10 (with mobility)    Patients cultural, spiritual, Sabianism conflicts given the current situation: no    Living Environment:  Pt lives with significant other in a one story home with no ZANE. Pt relies on significant other for assist with all mobility. Declines uses RW prefers who hold onto significant other when walking short household distances  Prior to admission, patients level of function was ax1.  Equipment used at home: 3-in-1 commode, walker, rolling, power chair, wheelchair.  DME owned (not currently used): none.  Upon discharge, patient will have assistance from significant other.    Objective:     Communicated with Rn prior to session.  Patient found HOB elevated with peripheral IV, telemetry  upon PT entry to room.    General Precautions: Standard, fall  Orthopedic Precautions:N/A   Braces: N/A  Respiratory Status: Room air    Exams:  RLE ROM: WFL  RLE Strength: WFL except hip flexion 3/5  LLE ROM: WFL  LLE Strength: WFL except hip flexion 3/5    Functional Mobility:  Bed Mobility:     Rolling Left:  independence  Supine to Sit: 50% to EOB with  min A limited due to pain and weakness      AM-PAC 6 CLICK MOBILITY  Total Score:12       Treatment & Education:  Pt educated on POC, discharge recommendation, need for assist with mobility, importance of time OOB, importance of offloading and pressure relief in bed, use of call bell to seek assistance as needed and fall prevention      Patient left left sidelying with all lines intact, call button in reach, bed alarm on, and family present.    GOALS:   Multidisciplinary Problems       Physical Therapy Goals          Problem: Physical Therapy    Goal Priority Disciplines Outcome Goal Variances Interventions   Physical Therapy Goal     PT, PT/OT Ongoing, Progressing     Description: Goals to be  met by: 23     Patient will increase functional independence with mobility by performin. Supine to sit with Supervision  2. Sit to stand transfer with Supervision  3. Bed to chair transfer with Supervision using Rolling Walker  4. Gait  x 25 feet with Contact Guard Assist using Rolling Walker.                              History:     Past Medical History:   Diagnosis Date    Acute hypoxemic respiratory failure 2020    Breast cancer 10/26/2015    left breast    Cancer     RIGHT BREAST 10-15    Depression     Diabetes mellitus     Neuropathy     Panic attacks     S/P epidural steroid injection     Seizures     none since early     Wears glasses     TO DRIVE       Past Surgical History:   Procedure Laterality Date    AXILLARY NODE DISSECTION Left 2020    Procedure: LYMPHADENECTOMY, AXILLARY;  Surgeon: Cory Vick MD;  Location: [facility] OR;  Service: General;  Laterality: Left;  left breast mastectomy with possible axillary lymph node dissection    BREAST BIOPSY      right    BREAST LUMPECTOMY      BREAST RECONSTRUCTION      breast reconstruction with tummy [facility]      BREAST SURGERY      11-3-15 LUMPECTOMY, 12-2-15 REEXCISION    INCISION AND DRAINAGE OF ABSCESS Left 2020    Procedure: INCISION AND DRAINAGE, ABSCESS;  Surgeon: Cory Vick MD;  Location: [facility] OR;  Service: General;  Laterality: Left;    INSERTION OF LOCALIZATION WIRE Left 2020    Procedure: INSERTION, LOCALIZATION WIRE;  Surgeon: Cory Vick MD;  Location: [facility] OR;  Service: General;  Laterality: Left;  left breast lumpectomy with wire needle loc    INSERTION OF TUNNELED CENTRAL VENOUS CATHETER (CVC) WITH SUBCUTANEOUS PORT Right 2022    Procedure: INXCNCAPR-OEAZ-L-CATH;  Surgeon: Jeff Mckeon Jr., MD;  Location: [facility] OR;  Service: General;  Laterality: Right;    MASTECTOMY      mastectomy 2016      MASTECTOMY, PARTIAL Left 3/19/2021    Procedure: MASTECTOMY, PARTIAL;  Surgeon: Cory  MOLLY Vick MD;  Location: [facility] OR;  Service: General;  Laterality: Left;  lumpectomy  left breast     RECONSTRUCTION OF NIPPLE Right 11/5/2018    Procedure: RECONSTRUCTION-NIPPLE RIGHT;  Surgeon: Xiang Hernandez MD;  Location: [address];  Service: Plastics;  Laterality: Right;    SENTINEL LYMPH NODE BIOPSY Left 8/24/2020    Procedure: BIOPSY, LYMPH NODE, SENTINEL;  Surgeon: Cory Vick MD;  Location: [facility] OR;  Service: General;  Laterality: Left;  left breast lumpectomy with left sentinel lymoh node       shoulder surgery and wrist      BILAT  BONE SPUR    WRIST SURGERY      RIGHT       Time Tracking:     PT Received On: 07/25/23  PT Start Time: 1123     PT Stop Time: 1141  PT Total Time (min): 18 min     Billable Minutes: Evaluation 10 and Therapeutic Activity 8      07/25/2023

## 2023-07-25 NOTE — CONSULTS
[facility]  Adult Nutrition   Consult Note (Initial Assessment)     SUMMARY     Recommendations  1.) Will adjust diet to diabetic, neutropenic diet.   2.) Will add [employer] Glucose Support BID  (1 oyfvri=701 kcals, 16gm protein, 190mL water).   3.) Will add Pantera BID x 14 days to aid in wound healing.   4.) Hypokalemia: continue potassium chloride as needed.   5.) Continue marinol to encourage intake.   6.) DM and neutropenic diet education attached to d/c paperworl.   7.) Pt at high risk for malnutrition. RD to follow and provide recommendations PRN.    Goals:   1.) Pt to consume/tolerate >75% of meals and ONS.   2.) Progression of wound healing.   3.) Electrolytes to stabilize and return to standard reference range.   4.) Pt to maintain +1-2lbs/week while admitted.  Nutrition Goal Status: new    Dietitian Rounds Brief  Patient with metastatic breast cancer currently undergoing chemotherapy is admitted after being sent to ER due to outpatient labs showing hypokalemia. She was found to have sepsis attributed to right lower lobe pneumonia that had somewhat failed outpatient antibiotic therapy with levaquin due to poor tolerance. Pt laying in bed sleeping and asked RD to come back later. After x2 attempts. unable to obtain information. Per chart, pt with good intake of lunch (100%). Per chart, pt with poor PO intakes associated with nausea. Her last chemo was on Monday. No other GI distress. LBM . Skin per wound care note : Sacral /manoj fold pink wound bed 2x1.1x0.3cm stage 2 pressure injury cleaned and dried and applied. Wt reviewed. UBW 86.6kg (2020), admit wt 64.5kg reflecting 26% wt loss x 3 years. Average wt loss of 9% per year. NFPE not able to completed at this time. Pt is at high risk for malnutrition. Attached low bacteria diet and diabetic diet education to d/c paperwork.    Diet order:   Current Diet Order: regular, neutropenic       Evaluation of Received Nutrient/Fluid  "Intake  Energy Calories Required: not meeting needs  Protein Required: not meeting needs  Fluid Required: meeting needs  Tolerance: tolerating     % Intake of Estimated Energy Needs: 75 - 100 %  % Meal Intake: 75 - 100 %      Intake/Output Summary (Last 24 hours) at 2023  Last data filed at 2023 1727  Gross per 24 hour   Intake 5250 ml   Output 1650 ml   Net 3600 ml        Anthropometrics  Temp: 98 °F (36.7 °C)  Height Method: Stated  Height: 5' 11" (180.3 cm)  Height (inches): 71 in  Weight Method: Bed Scale  Weight: 64.5 kg (142 lb 3.2 oz)  Weight (lb): 142.2 lb  Ideal Body Weight (IBW), Female: 155 lb  % Ideal Body Weight, Female (lb): 91.74 %  BMI (Calculated): 19.8  BMI Grade: 18.5-24.9 - normal  Usual Body Weight (UBW), k.6 kg (2020)  % Usual Body Weight: 74.64       Estimated/Assessed Needs  Weight Used For Calorie Calculations: 64.5 kg (142 lb 3.2 oz)  Energy Calorie Requirements (kcal):   Energy Need Method: Kcal/kg (30-35)  Protein Requirements: 78-97 (1.2-1.5)  Weight Used For Protein Calculations: 64.5 kg (142 lb 3.2 oz)  Fluid Requirements (mL):      RDA Method (mL):   CHO Requirement: 45-60gm CHO per meal    Reason for Assessment  Reason For Assessment: consult  Diagnosis: infection/sepsis  Relevant Medical History: breast cancer   undergoing chemotherapy,  Previous radiation,   COPD, smoking, DM2, HTN, HLD  Nutrition Discharge Planning: Diabetic diet, low bacteria (no raw foods)    Nutrition/Diet History  Spiritual, Cultural Beliefs, Moravian Practices, Values that Affect Care: no  Food Allergies: NKFA  Factors Affecting Nutritional Intake: decreased appetite, nausea/vomiting    Nutrition Risk Screen  Nutrition Risk Screen: large or nonhealing wound, burn or pressure injury       Altered Skin Integrity 23 2338 midline Sacral spine Ulceration Partial thickness tissue loss. Shallow open ulcer with a red or pink wound bed, without slough. Intact or " Open/Ruptured Serum-filled blister.-Wound Image: Images linked  MST Score: 4  Have you recently lost weight without trying?: Yes: 24-33 lbs  Weight loss score: 3  Have you been eating poorly because of a decreased appetite?: Yes  Appetite score: 1       Weight History:  Wt Readings from Last 5 Encounters:   07/24/23 64.5 kg (142 lb 3.2 oz)   07/24/23 58.1 kg (128 lb)   07/24/23 58.4 kg (128 lb 11.2 oz)   07/17/23 60 kg (132 lb 3.2 oz)   07/17/23 60 kg (132 lb 3.2 oz)        Lab/Procedures/Meds: Pertinent Labs/Meds Reviewed    Medications:Pertinent Medications Reviewed  Scheduled Meds:   ceFEPime (MAXIPIME) IVPB  1 g Intravenous Q8H    docusate sodium  100 mg Oral BID    droNABinol  5 mg Oral BID AC    filgrastim-sndz  480 mcg Subcutaneous Daily    insulin detemir U-100  15 Units Subcutaneous QHS    metoprolol tartrate  25 mg Oral BID    mupirocin   Nasal BID    zolpidem  10 mg Oral QHS     Continuous Infusions:   sodium chloride 0.9% 50 mL/hr at 07/25/23 0835     PRN Meds:.acetaminophen, albuterol-ipratropium, benzonatate, dextrose 50%, dextrose 50%, diazePAM, glucagon (human recombinant), glucose, glucose, HYDROcodone-acetaminophen, insulin aspart U-100, melatonin, morphine, naloxone, ondansetron, polyethylene glycol, promethazine (PHENERGAN) IVPB, senna-docusate 8.6-50 mg, simethicone, sodium chloride 0.9%    Labs: Pertinent Labs Reviewed  Clinical Chemistry:  Recent Labs   Lab 07/25/23  0420      K 2.5*   *   CO2 23   *   BUN 29*   CREATININE 1.2   CALCIUM 8.7   PROT 5.2*   ALBUMIN 1.7*   BILITOT 0.6   ALKPHOS 304*   AST 35   ALT 29   ANIONGAP 3*   MG 1.6     CBC:   Recent Labs   Lab 07/25/23  0420   WBC 0.58*   RBC 2.65*   HGB 7.7*   HCT 24.6*   PLT 23*   MCV 93   MCH 29.1   MCHC 31.3*     Diabetes:  Recent Labs   Lab 07/24/23 1952   HGBA1C 9.1*     Monitor and Evaluation  Food and Nutrient Intake: energy intake, food and beverage intake  Food and Nutrient Adminstration: diet  order  Knowledge/Beliefs/Attitudes: food and nutrition knowledge/skill  Physical Activity and Function: nutrition-related ADLs and IADLs  Anthropometric Measurements: weight, weight change, body mass index  Biochemical Data, Medical Tests and Procedures: electrolyte and renal panel, gastrointestinal profile, glucose/endocrine profile  Nutrition-Focused Physical Findings: overall appearance     Nutrition Risk  Level of Risk/Frequency of Follow-up: moderate - high     Nutrition Follow-Up  RD Follow-up?: Yes      Lala Phillips RD 07/25/2023 5:28 PM

## 2023-07-25 NOTE — ASSESSMENT & PLAN NOTE
Patient's FSGs are controlled on current medication regimen.  Last A1c reviewed-   Lab Results   Component Value Date    HGBA1C 9.1 (H) 07/24/2023     Most recent fingerstick glucose reviewed-   Current correctional scale  Medium  Decrease anti-hyperglycemic dose as follows-   Antihyperglycemics (From admission, onward)    Start     Stop Route Frequency Ordered    07/25/23 2100  insulin detemir U-100 (Levemir) pen 15 Units         -- SubQ Nightly 07/25/23 1411    07/24/23 2016  insulin aspart U-100 pen 1-10 Units         -- SubQ Before meals & nightly PRN 07/24/23 1918        Hold Oral hypoglycemics while patient is in the hospital.

## 2023-07-25 NOTE — PROGRESS NOTES
Pharmacokinetic Initial Assessment: IV Vancomycin    Assessment/Plan:    Initiate intravenous vancomycin with loading dose of 1500 mg once followed by a maintenance dose of vancomycin 1000 mg IV every 24 hours  Desired empiric serum trough concentration is 10 to 20 mcg/mL  Draw vancomycin trough level 60 min prior to third dose on 07/26 at approximately 1700  Pharmacy will continue to follow and monitor vancomycin.      Please contact pharmacy at extension [phone] with any questions regarding this assessment.     Thank you for the consult,   Heladio Le       Patient brief summary:  Negrita Castro is a 63 y.o. female initiated on antimicrobial therapy with IV Vancomycin for treatment of suspected sepsis    Drug Allergies:   Review of patient's allergies indicates:   Allergen Reactions    Adhesive tape-silicones Hives     BANDAIDS    Polymycin Hives    Adhesive     Latex, natural rubber Hives and Itching    Neomycin-bacitracnzn-polymyxnb     Polyurethane-39 Hives       Actual Body Weight:   64.5 kg    Renal Function:   Estimated Creatinine Clearance: 40.1 mL/min (A) (based on SCr of 1.5 mg/dL (H)).,     CBC (last 72 hours):  Recent Labs   Lab Result Units 07/24/23  0925 07/24/23  1524   WBC K/uL 1.02* 0.82*   Hemoglobin g/dL 11.1* 9.8*   Hematocrit % 34.8* 30.4*   Platelets K/uL 32* 27*   Gran % % 52.0 44.0   Lymph % % 40.0 45.1   Mono % % 8.0 8.5   Eosinophil % % 0.0 1.2   Basophil % % 0.0 0.0   Differential Method  Manual Automated       Metabolic Panel (last 72 hours):  Recent Labs   Lab Result Units 07/24/23  0925 07/24/23  1524 07/24/23  1618   Sodium mmol/L 138 136  --    Potassium mmol/L 2.3* 2.5*  --    Chloride mmol/L 105 105  --    CO2 mmol/L 25 21*  --    Glucose mg/dL 128* 395*  --    Glucose, UA   --   --  4+*   BUN mg/dL 42* 39*  --    Creatinine mg/dL 1.4 1.5*  --    Albumin g/dL 2.3* 2.1*  --    Total Bilirubin mg/dL 1.0 0.5  --    Alkaline Phosphatase U/L 355* 335*  --    AST U/L 49* 38  --    ALT U/L  32 29  --    Magnesium mg/dL  --  1.4*  --        Drug levels (last 3 results):  No results for input(s): VANCOMYCINRA, VANCORANDOM, VANCOMYCINPE, VANCOPEAK, VANCOMYCINTR, VANCOTROUGH in the last 72 hours.    Microbiologic Results:  Microbiology Results (last 7 days)       Procedure Component Value Units Date/Time    Blood culture x two cultures. Draw prior to antibiotics. [[id number]] Collected: 07/24/23 1628    Order Status: Sent Specimen: Blood Updated: 07/24/23 1635    Narrative:      Collection has been rescheduled by 2MB at 07/24/2023 15:43 Reason:   Unable to collect...rn to pull blood from port per DR Olmedo  Collection has been rescheduled by 2MB at 07/24/2023 15:43 Reason:   Unable to collect...rn to pull blood from port per DR Olmedo    Blood culture x two cultures. Draw prior to antibiotics. [[id number]] Collected: 07/24/23 1600    Order Status: Sent Specimen: Blood Updated: 07/24/23 1634    Narrative:      Collection has been rescheduled by 2MB at 07/24/2023 15:43 Reason:   Unable to collect...rn to pull blood from port per DR Olmedo  Collection has been rescheduled by 2MB at 07/24/2023 15:43 Reason:   Unable to collect...rn to pull blood from port per DR Olmedo

## 2023-07-25 NOTE — ASSESSMENT & PLAN NOTE
Resp culture growing GNR x2  MRSA PCR negative  CXR R lower/basilar ?infiltrate  - dc vanc  - continue cefepime  - f/u culture finals

## 2023-07-25 NOTE — CARE UPDATE
07/25/23 0815   Patient Assessment/Suction   Level of Consciousness (AVPU) alert   PRE-TX-O2   Device (Oxygen Therapy) room air   SpO2 97 %   Pulse Oximetry Type Intermittent   $ Pulse Oximetry - Multiple Charge Pulse Oximetry - Multiple

## 2023-07-25 NOTE — HOSPITAL COURSE
Patient with metastatic breast cancer currently undergoing chemotherapy is admitted after being sent to ER due to outpatient labs showing hypokalemia. She was found to have sepsis attributed to right lower lobe pneumonia that had somewhat failed outpatient antibiotic therapy with levaquin due to poor tolerance attributed to recent chemo. Given IVF and potassium/magnesium replacement. Started on vancomycin and cefepime. Vancomycin discontinued after negative MRSA PCR swab. Respiratory culture growing GNR. Also noted to be pancytopenic so placed on neutropenic precautions. Hem/onc consulted and ordered filgrastim. Respiratory culture grew serratia and leclercia species. Antibiotics changed to oral levaquin and monitored for tolerance. She also had a stage 2 sacral/manoj fold ulcer on arrival for which wound care followed. Nephrology consulted for polyuria with persistent hypokalemia and hypomagnesemia.    Took over service 23: neutropenic precautions discontinued. 1 unit PRBC transfusion with appropriate response in H/H. 24 hour urine collecting. Replacing electrolytes (K, Mg, Phos). Vitamin D supplementation started. Ambulating with PT. Stable for dc home tomorrow.     2023: patient discharged with completion of antibiotic; stable H/H and follow up appts with PCP, pulmonary, nephrology and oncology. Resume home health services on dc home.

## 2023-07-25 NOTE — PLAN OF CARE
Problem: Skin Injury Risk Increased  Goal: Skin Health and Integrity  Intervention: Promote and Optimize Oral Intake  Flowsheets (Taken 7/25/2023 1730)  Oral Nutrition Promotion:   calorie-dense foods provided   calorie-dense liquids provided   other (see comments)     Problem: Oral Intake Inadequate  Goal: Improved Oral Intake  Outcome: Ongoing, Progressing  Intervention: Promote and Optimize Oral Intake  Flowsheets (Taken 7/25/2023 1730)  Oral Nutrition Promotion:   calorie-dense foods provided   calorie-dense liquids provided   other (see comments)     Recommendations  1.) Will adjust diet to diabetic, neutropenic diet.   2.) Will add [employer] Glucose Support BID  (1 fdbbes=497 kcals, 16gm protein, 190mL water).   3.) Will add Pantera BID x 14 days to aid in wound healing.   4.) Hypokalemia: continue potassium chloride as needed.   5.) Continue marinol to encourage intake.   6.) DM and neutropenic diet education attached to d/c paperworl.   7.) Pt at high risk for malnutrition. RD to follow and provide recommendations PRN.    Goals:   1.) Pt to consume/tolerate >75% of meals and ONS.   2.) Progression of wound healing.   3.) Electrolytes to stabilize and return to standard reference range.   4.) Pt to maintain +1-2lbs/week while admitted.  Nutrition Goal Status: new

## 2023-07-25 NOTE — CONSULTS
Sacral / fold pink wound bed 2x1.1x0.3cm stage 2 pressure injury cleaned and dried and applied [employer].  Encouraged pt to stay off buttock turn side to side every 2 hours.  Patient turns well without assistance.  Turned to th\e left side at this time with pillow behind back for support and between knees.  Visitors at bedside.

## 2023-07-25 NOTE — ASSESSMENT & PLAN NOTE
Severe but without obvious symptoms other than fatigue  - tele  - replace IV and PO  - recheck level today and replace more if needed

## 2023-07-25 NOTE — ASSESSMENT & PLAN NOTE
Likely chemo-related  No active bleeding  - neutropenic precautions  - trend cbc daily  - hem/onc consult: ordered filgrastim  - transfuse blood products as clinically indicated

## 2023-07-26 PROBLEM — L98.429 STAGE 2 SKIN ULCER OF SACRAL REGION: Status: ACTIVE | Noted: 2023-01-01

## 2023-07-26 NOTE — ASSESSMENT & PLAN NOTE
Improved   Likely chemo-related  No active bleeding  - neutropenic precautions  - trend cbc daily  - hem/onc consult: ordered filgrastim  - transfuse blood products as clinically indicated

## 2023-07-26 NOTE — ASSESSMENT & PLAN NOTE
This patient does have evidence of infective focus  My overall impression is severe sepsis.  Source: Respiratory  Antibiotics given-   Antibiotics (72h ago, onward)    Start     Stop Route Frequency Ordered    07/26/23 0945  levoFLOXacin tablet 750 mg         -- Oral Daily 07/26/23 0842    07/25/23 0900  mupirocin 2 % ointment         07/30 0859 Nasl 2 times daily 07/24/23 2247        Latest lactate reviewed-  Recent Labs   Lab 07/24/23  1524 07/24/23  1952 07/25/23  0420   LACTATE 3.4* 2.8* 1.6     Organ dysfunction indicated by lactic acidosis. also with pancytopenia  Fluid challenge given with normal saline; see MAR  Post- resuscitation assessment No - Post resuscitation assessment not needed   Will Not start Pressors  Source control achieved by: antibiotics  - see gram negative pneumonia section

## 2023-07-26 NOTE — CARE UPDATE
07/25/23 2110   Patient Assessment/Suction   Level of Consciousness (AVPU) alert   Respiratory Effort Normal;Unlabored   Expansion/Accessory Muscles/Retractions no use of accessory muscles   All Lung Fields Breath Sounds diminished   PRE-TX-O2   Device (Oxygen Therapy) room air   SpO2 96 %   Pulse Oximetry Type Intermittent   $ Pulse Oximetry - Multiple Charge Pulse Oximetry - Multiple   Pulse 91   Resp 18   Positioning   Body Position position changed independently   Aerosol Therapy   $ Aerosol Therapy Charges PRN treatment not required   Respiratory Evaluation   $ Care Plan Tech Time 15 min

## 2023-07-26 NOTE — PLAN OF CARE
Problem: Adult Inpatient Plan of Care  Goal: Plan of Care Review  Outcome: Ongoing, Progressing  Goal: Patient-Specific Goal (Individualized)  Outcome: Ongoing, Progressing  Goal: Absence of Hospital-Acquired Illness or Injury  Outcome: Ongoing, Progressing  Goal: Optimal Comfort and Wellbeing  Outcome: Ongoing, Progressing  Goal: Readiness for Transition of Care  Outcome: Ongoing, Progressing     Problem: Infection  Goal: Absence of Infection Signs and Symptoms  Outcome: Ongoing, Progressing     Problem: Adjustment to Illness (Sepsis/Septic Shock)  Goal: Optimal Coping  Outcome: Ongoing, Progressing     Problem: Bleeding (Sepsis/Septic Shock)  Goal: Absence of Bleeding  Outcome: Ongoing, Progressing     Problem: Glycemic Control Impaired (Sepsis/Septic Shock)  Goal: Blood Glucose Level Within Desired Range  Outcome: Ongoing, Progressing     Problem: Infection Progression (Sepsis/Septic Shock)  Goal: Absence of Infection Signs and Symptoms  Outcome: Ongoing, Progressing     Problem: Nutrition Impaired (Sepsis/Septic Shock)  Goal: Optimal Nutrition Intake  Outcome: Ongoing, Progressing     Problem: Fluid Imbalance (Pneumonia)  Goal: Fluid Balance  Outcome: Ongoing, Progressing     Problem: Infection (Pneumonia)  Goal: Resolution of Infection Signs and Symptoms  Outcome: Ongoing, Progressing     Problem: Respiratory Compromise (Pneumonia)  Goal: Effective Oxygenation and Ventilation  Outcome: Ongoing, Progressing     Problem: Diabetes Comorbidity  Goal: Blood Glucose Level Within Targeted Range  Outcome: Ongoing, Progressing     Problem: Impaired Wound Healing  Goal: Optimal Wound Healing  Outcome: Ongoing, Progressing     Problem: Skin Injury Risk Increased  Goal: Skin Health and Integrity  Outcome: Ongoing, Progressing     Problem: Fall Injury Risk  Goal: Absence of Fall and Fall-Related Injury  Outcome: Ongoing, Progressing     Problem: Oral Intake Inadequate  Goal: Improved Oral Intake  Outcome: Ongoing,  Progressing

## 2023-07-26 NOTE — ASSESSMENT & PLAN NOTE
Improved   - neutropenic precautions  - hem/onc consult: ordered filgrastim  - trend wbc on cbc daily

## 2023-07-26 NOTE — ASSESSMENT & PLAN NOTE
Mild  - replace IV today again  - recheck level tomorrow and replace more if needed  - encourage diet

## 2023-07-26 NOTE — TELEPHONE ENCOUNTER
Call patient - needs post-hospital phone call within 2 business days and hospital follow up visit scheduled within 7-14 days.    Expected discharge- 7/27/23

## 2023-07-26 NOTE — TELEPHONE ENCOUNTER
----- Message from Sera Simeon sent at 7/26/2023 11:40 AM CDT -----  [facility] is calling to let us know that the Fentanyl patches have become unavailable for some time. However, she has one box of qty 5 that she will dispense.  They will continue to be on the lookout when they can get it again.       [phone]

## 2023-07-26 NOTE — PT/OT/SLP PROGRESS
Occupational Therapy      Patient Name:  Negrita Castro   MRN:  [id number]    Patient not seen today secondary to Patient unwilling to participate. Will follow-up 7/27.    7/26/2023

## 2023-07-26 NOTE — CARE UPDATE
07/26/23 0746   PRE-TX-O2   Device (Oxygen Therapy) room air   SpO2 96 %   Pulse Oximetry Type Intermittent   $ Pulse Oximetry - Multiple Charge Pulse Oximetry - Multiple   Aerosol Therapy   $ Aerosol Therapy Charges PRN treatment not required

## 2023-07-26 NOTE — SUBJECTIVE & OBJECTIVE
Interval History: Patient seen and examined. MAGGIEON. Feeling much better. To try oral antibiotic today to be sure she can tolerate them.      Objective:     Vital Signs (Most Recent):  Temp: 97.9 °F (36.6 °C) (07/26/23 1216)  Pulse: 98 (07/26/23 1216)  Resp: 18 (07/26/23 1216)  BP: 124/64 (07/26/23 1216)  SpO2: 98 % (07/26/23 1216) Vital Signs (24h Range):  Temp:  [97.9 °F (36.6 °C)-98.8 °F (37.1 °C)] 97.9 °F (36.6 °C)  Pulse:  [] 98  Resp:  [18] 18  SpO2:  [93 %-98 %] 98 %  BP: ()/(64-78) 124/64     Weight: 64.5 kg (142 lb 3.2 oz)  Body mass index is 19.83 kg/m².    Intake/Output Summary (Last 24 hours) at 7/26/2023 1412  Last data filed at 7/26/2023 1251  Gross per 24 hour   Intake 4620 ml   Output 5500 ml   Net -880 ml         Physical Exam  Vitals reviewed.   Constitutional:       General: She is not in acute distress.  HENT:      Head: Normocephalic and atraumatic.   Cardiovascular:      Rate and Rhythm: Normal rate and regular rhythm.   Pulmonary:      Effort: Pulmonary effort is normal. No respiratory distress.      Breath sounds: Normal breath sounds.   Neurological:      General: No focal deficit present.      Mental Status: She is alert and oriented to person, place, and time. Mental status is at baseline.   Psychiatric:         Mood and Affect: Affect normal.         Behavior: Behavior normal.         Thought Content: Thought content normal.           Significant Labs: All pertinent labs within the past 24 hours have been reviewed.    Significant Imaging: I have reviewed all pertinent imaging results/findings within the past 24 hours.

## 2023-07-26 NOTE — CONSULTS
Hematology/Oncology  Inpatient Consult Note  Patient Name: Negrita Castro  MRN: [id number]  Admission Date: 7/24/2023  Hospital Length of Stay: 1 days  Code Status: Full Code   Attending Provider: Rosalino Piña MD  Referring Provider:Dr. Lee  Consulting Provider: TALISHA Vick MD  Primary Care Physician: Manuelito Shepard MD  Principal Problem:Severe sepsis    Inpatient consult to Hematology/Oncology  Consult performed by: TALISHA Vick MD  Consult ordered by: Adrienne Lee MD      Subjective:     Chief Complaint:  low potassium      History Present Illness:  63 y.o. female found to have significant hypokalemia, denies nausea or diarrhea.  She was treated with Trodelvy last Monday for metastatic breast cancer.  Over this past weekend she had hallucinations.    She has RLL pneumonia, sputum C/S GNR.  Pancytopenia 2nd chemotherapy.  Bilateral breast cancer with metastatic dx to liver and bones.      Past Medical/Surgical History:  Past Medical History:   Diagnosis Date    Acute hypoxemic respiratory failure 12/12/2020    Breast cancer 10/26/2015    left breast    Cancer     RIGHT BREAST 10-15    Depression     Diabetes mellitus     Neuropathy     Panic attacks     S/P epidural steroid injection     Seizures     none since early 30's    Wears glasses     TO DRIVE     Past Surgical History:   Procedure Laterality Date    AXILLARY NODE DISSECTION Left 8/24/2020    Procedure: LYMPHADENECTOMY, AXILLARY;  Surgeon: Cory Vick MD;  Location: [facility] OR;  Service: General;  Laterality: Left;  left breast mastectomy with possible axillary lymph node dissection    BREAST BIOPSY      right    BREAST LUMPECTOMY      BREAST RECONSTRUCTION      breast reconstruction with tummy [facility]      BREAST SURGERY      11-3-15 LUMPECTOMY, 12-2-15 REEXCISION    INCISION AND DRAINAGE OF ABSCESS Left 9/9/2020    Procedure: INCISION AND DRAINAGE, ABSCESS;  Surgeon: Cory Vick MD;  Location: [facility] OR;  Service: General;   Laterality: Left;    INSERTION OF LOCALIZATION WIRE Left 2020    Procedure: INSERTION, LOCALIZATION WIRE;  Surgeon: Cory Vick MD;  Location: [facility] OR;  Service: General;  Laterality: Left;  left breast lumpectomy with wire needle loc    INSERTION OF TUNNELED CENTRAL VENOUS CATHETER (CVC) WITH SUBCUTANEOUS PORT Right 2022    Procedure: [id number]-[id number];  Surgeon: Jeff Mckeon Jr., MD;  Location: [facility] OR;  Service: General;  Laterality: Right;    MASTECTOMY      mastectomy 2016      MASTECTOMY, PARTIAL Left 3/19/2021    Procedure: MASTECTOMY, PARTIAL;  Surgeon: Cory Vick MD;  Location: [facility] OR;  Service: General;  Laterality: Left;  lumpectomy  left breast     RECONSTRUCTION OF NIPPLE Right 2018    Procedure: RECONSTRUCTION-NIPPLE RIGHT;  Surgeon: Xiang Hernandez MD;  Location: [address];  Service: Plastics;  Laterality: Right;    SENTINEL LYMPH NODE BIOPSY Left 2020    Procedure: BIOPSY, LYMPH NODE, SENTINEL;  Surgeon: Cory Vick MD;  Location: [facility] OR;  Service: General;  Laterality: Left;  left breast lumpectomy with left sentinel lymoh node       shoulder surgery and wrist      BILAT  BONE SPUR    WRIST SURGERY      RIGHT       Allergies:  Review of patient's allergies indicates:   Allergen Reactions    Adhesive tape-silicones Hives     BANDAIDS    Polymycin Hives    Adhesive     Latex, natural rubber Hives and Itching    Neomycin-bacitracnzn-polymyxnb     Polyurethane-39 Hives       Social/Family History:  Social History     Socioeconomic History    Marital status:    Tobacco Use    Smoking status: Former     Packs/day: 0.25     Years: 30.00     Pack years: 7.50     Types: Cigarettes     Quit date: 2015     Years since quittin.8    Smokeless tobacco: Never   Substance and Sexual Activity    Alcohol use: Yes     Alcohol/week: 0.0 standard drinks     Comment: RARELY    Drug use: Not Currently     Types: Marijuana      "Comment: medical marijuana     Family History   Problem Relation Age of Onset    Anesthesia problems Neg Hx        ROS:    GEN: normal without any fever, night sweats or weight loss  HEENT: normal with no HA's, sore throat, stiff neck, changes in vision  CV: normal with no CP, SOB, PND, POOL or orthopnea  PULM: normal with no SOB, cough, hemoptysis, sputum or pleuritic pain  GI: normal with no abdominal pain, nausea, vomiting, constipation, diarrhea, melanotic stools, BRBPR, or hematemesis  : normal with no hematuria, dysuria  BREAST: See HPI  SKIN: See HPI        Medications:  Continuous Infusions:   sodium chloride 0.9% 50 mL/hr at 07/25/23 0835     Scheduled Meds:   ceFEPime (MAXIPIME) IVPB  1 g Intravenous Q8H    docusate sodium  100 mg Oral BID    droNABinol  5 mg Oral BID AC    filgrastim-sndz  480 mcg Subcutaneous Daily    insulin detemir U-100  15 Units Subcutaneous QHS    metoprolol tartrate  25 mg Oral BID    mupirocin   Nasal BID    zolpidem  10 mg Oral QHS     PRN Meds:acetaminophen, albuterol-ipratropium, benzonatate, dextrose 50%, dextrose 50%, diazePAM, glucagon (human recombinant), glucose, glucose, HYDROcodone-acetaminophen, insulin aspart U-100, melatonin, morphine, naloxone, ondansetron, polyethylene glycol, promethazine (PHENERGAN) IVPB, senna-docusate 8.6-50 mg, simethicone, sodium chloride 0.9%     Objective:     Vitals:  Blood pressure 112/67, pulse (!) 114, temperature 98.8 °F (37.1 °C), temperature source Oral, resp. rate 18, height 5' 11" (1.803 m), weight 64.5 kg (142 lb 3.2 oz), last menstrual period 01/16/2016, SpO2 95 %.    Physical Exam:  GEN: chronically ill in appearance  HEAD: atraumatic and normocephalic  EYES: + pallor, no icterus  ENT: no pharyngeal erythema, external ears WNL; no nasal discharge  NECK: no masses, thyroid normal, trachea midline, no LAD/LN's, supple  CV: RRR with no murmur; normal pulse; normal S1 and S2; no pedal edema  CHEST: Normal respiratory effort; CTAB; " normal breath sounds; no wheeze or crackles  ABDOM: nontender and nondistended; soft; no rebound/guarding, L/S NP  MUSC/Skeletal: ROM normal; no crepitus; joints normal  EXTREM: no clubbing, cyanosis, inflammation   SKIN: small decubitus at buttock  ; no CVAT  NEURO: grossly intact; motor/sensory WNL; no tremors  PSYCH: normal mood, affect and behavior  LYMPH: normal cervical, supraclavicular, axillary  LN's  Breast:extensive post op change.    WBC 0.58  H/H 7.7/24.6, plt cnt 23,000    K 2.5,  Sputum C/S GNR, Blood C/S NG.  CXR RLL infiltrate  Rib x ray negative  U/S liver metastases     Assessment/Plan:     (1) 63 y.o. female with bilateral breast cancer,  Metastatic dx, started on Trodelvy 1 week ago.    (2)RLL pneumonia, GNR, C/S pending.    (3)Neutropenia 2nd chemotherapy.  Neupogen daily.    (4) Anemia, symptomatic, 2nd chemo,  Metastatic dx.  Hgb 7.7.  Give 2 units of pRBC.    (5)Platelet count 23,000.  Follow count and transfuse for   plt cnt < 12,000.    (6)Small decubitus on buttock.  Ask wound care nurse to evaluate site.    (7)Hypokalemia, for electrolyte supplementation.      Active Diagnoses:    Diagnosis Date Noted POA    PRINCIPAL PROBLEM:  Severe sepsis [A41.9, R65.20] 07/24/2023 Yes    Gram-negative pneumonia [J15.6] 07/25/2023 Yes    Hypokalemia [E87.6] 07/25/2023 Yes    Hypomagnesemia [E83.42] 07/25/2023 Yes    Dehydration [E86.0] 07/24/2023 Yes    Pancytopenia [D61.818] 07/24/2023 Yes    Chronic hypoxemic respiratory failure [J96.11] 01/19/2021 Yes    Chronic obstructive pulmonary disease [J44.9] 01/19/2021 Yes    Essential hypertension [I10] 01/19/2021 Yes    Chemotherapy-induced neutropenia [D70.1, T45.1X5A] 11/25/2020 Yes    Type 2 diabetes mellitus with hyperglycemia, with long-term current use of insulin [E11.65, Z79.4] 01/08/2020 Not Applicable    Breast carcinoma metastatic to multiple sites [C50.919] 02/26/2018 Yes      Problems Resolved During this Admission:     TALISHA Vick,  MD  Hematology/Oncology  [facility] hospital   7/25/23.

## 2023-07-26 NOTE — TELEPHONE ENCOUNTER
Spoke with ILEANA Degroot, from [facility] [address] wing. Nurse requesting clarification on blood orders. Patient's Hgb 8.3 and wanted to know if patient still needed 2 units of blood. Reviewed with Dr Vick and per his verbal order, okay for RN to discontinue blood orders. Mikayla made aware of above and verbalized understanding.

## 2023-07-26 NOTE — PLAN OF CARE
orders sent to [facility] for services to be resumed. Possible DC tomorrow     07/26/23 3688   Post-Acute Status   Post-Acute Authorization Home Health   Home Health Status Referrals Sent

## 2023-07-26 NOTE — ASSESSMENT & PLAN NOTE
Much improved  - tele  - replace with PO today  - recheck level in AM and replace more if needed  - encourage diet

## 2023-07-26 NOTE — PT/OT/SLP PROGRESS
Physical Therapy Treatment    Patient Name:  Negrita Castro   MRN:  [id number]    Recommendations:     Discharge Recommendations: nursing facility, skilled, home health PT  Discharge Equipment Recommendations: none  Barriers to discharge:  Increased caregiver burden of care    Assessment:     Negrita Castro is a 63 y.o. female admitted with a medical diagnosis of Severe sepsis.  She presents with the following impairments/functional limitations: weakness, impaired endurance, impaired self care skills, impaired functional mobility, gait instability, decreased lower extremity function, impaired cardiopulmonary response to activity, decreased safety awareness .    Rehab Prognosis: Good; patient would benefit from acute skilled PT services to address these deficits and reach maximum level of function.    Recent Surgery: * No surgery found *      Plan:     During this hospitalization, patient to be seen 5 x/week to address the identified rehab impairments via gait training, therapeutic activities, therapeutic exercises and progress toward the following goals:    Plan of Care Expires:  08/25/23    Subjective     Chief Complaint: weakness   Patient/Family Comments/goals: to get stronger  Pain/Comfort:         Objective:     Communicated with nurse prior to session.  Patient found left sidelying with peripheral IV, telemetry upon PT entry to room.     General Precautions: Standard, fall  Orthopedic Precautions: N/A  Braces: N/A  Respiratory Status: Room air     Functional Mobility:  Bed Mobility:     Supine to Sit: minimum assistance  Sit to Supine: minimum assistance  Transfers:     Sit to Stand:  minimum assistance   Gait: 40 ft Min HHA      AM-PAC 6 CLICK MOBILITY          Treatment & Education:  Pt was educated on safety with t/f's , the benefit of increased time OOB, PT POC/DC recommendations    Patient left supine with all lines intact, call button in reach, and bed alarm on..    GOALS:   Multidisciplinary Problems        Physical Therapy Goals          Problem: Physical Therapy    Goal Priority Disciplines Outcome Goal Variances Interventions   Physical Therapy Goal     PT, PT/OT Ongoing, Progressing     Description: Goals to be met by: 23     Patient will increase functional independence with mobility by performin. Supine to sit with Supervision  2. Sit to stand transfer with Supervision  3. Bed to chair transfer with Supervision using Rolling Walker  4. Gait  x 25 feet with Contact Guard Assist using Rolling Walker.                              Time Tracking:     PT Received On: 23  PT Start Time: 1019     PT Stop Time: 1039  PT Total Time (min): 20 min     Billable Minutes: Gait Training 20 minutes    Treatment Type: Treatment  PT/PTA: PT     Number of PTA visits since last PT visit: 0     2023

## 2023-07-26 NOTE — ASSESSMENT & PLAN NOTE
Patient's FSGs are uncontrolled due to hyperglycemia on current medication regimen.  Last A1c reviewed-   Lab Results   Component Value Date    HGBA1C 9.1 (H) 07/24/2023     Most recent fingerstick glucose reviewed-   Current correctional scale  Medium  Increase anti-hyperglycemic dose as follows-   Antihyperglycemics (From admission, onward)    Start     Stop Route Frequency Ordered    07/26/23 2100  insulin detemir U-100 (Levemir) pen 15 Units         -- SubQ 2 times daily 07/26/23 1222    07/24/23 2016  insulin aspart U-100 pen 1-10 Units         -- SubQ Before meals & nightly PRN 07/24/23 1918        Hold Oral hypoglycemics while patient is in the hospital.  Adjust regimen for goal glucose <180   Oxybutynin Pregnancy And Lactation Text: This medication is Pregnancy Category B and is considered safe during pregnancy. It is unknown if it is excreted in breast milk.

## 2023-07-26 NOTE — CONSULTS
Chief complaint:  abnormal labs (Patient sent from cancer center due to low K+ and low BP)      HPI:  Negrita Castro is a 63 y.o. female presenting with a stage 2 pressure ulcer of the sacrum/ fold. Pt stated is is painful from time to time, but not quite sure how long it has been there. Wound was POA. Pt will be able to FU for OP treatment.    PMH:  As per HPI and below:  Past Medical History:   Diagnosis Date    Acute hypoxemic respiratory failure 2020    Breast cancer 10/26/2015    left breast    Cancer     RIGHT BREAST 10-15    Depression     Diabetes mellitus     Neuropathy     Panic attacks     S/P epidural steroid injection     Seizures     none since early     Wears glasses     TO DRIVE       Social History     Socioeconomic History    Marital status:    Tobacco Use    Smoking status: Former     Packs/day: 0.25     Years: 30.00     Pack years: 7.50     Types: Cigarettes     Quit date: 2015     Years since quittin.8    Smokeless tobacco: Never   Substance and Sexual Activity    Alcohol use: Yes     Alcohol/week: 0.0 standard drinks     Comment: RARELY    Drug use: Not Currently     Types: Marijuana     Comment: medical marijuana       Past Surgical History:   Procedure Laterality Date    AXILLARY NODE DISSECTION Left 2020    Procedure: LYMPHADENECTOMY, AXILLARY;  Surgeon: Cory Vick MD;  Location: [facility] OR;  Service: General;  Laterality: Left;  left breast mastectomy with possible axillary lymph node dissection    BREAST BIOPSY      right    BREAST LUMPECTOMY      BREAST RECONSTRUCTION      breast reconstruction with tummy ck      BREAST SURGERY      11-3-15 LUMPECTOMY, 12-2-15 REEXCISION    INCISION AND DRAINAGE OF ABSCESS Left 2020    Procedure: INCISION AND DRAINAGE, ABSCESS;  Surgeon: Cory Vick MD;  Location: [facility] OR;  Service: General;  Laterality: Left;    INSERTION OF LOCALIZATION WIRE Left 2020    Procedure: INSERTION, LOCALIZATION  WIRE;  Surgeon: Cory Vick MD;  Location: [facility] OR;  Service: General;  Laterality: Left;  left breast lumpectomy with wire needle loc    INSERTION OF TUNNELED CENTRAL VENOUS CATHETER (CVC) WITH SUBCUTANEOUS PORT Right 11/17/2022    Procedure: XNNVZAIKS-GMSF-F-CATH;  Surgeon: Jeff Mckeon Jr., MD;  Location: [facility] OR;  Service: General;  Laterality: Right;    MASTECTOMY      mastectomy 2016      MASTECTOMY, PARTIAL Left 3/19/2021    Procedure: MASTECTOMY, PARTIAL;  Surgeon: Cory Vick MD;  Location: [facility] OR;  Service: General;  Laterality: Left;  lumpectomy  left breast     RECONSTRUCTION OF NIPPLE Right 11/5/2018    Procedure: RECONSTRUCTION-NIPPLE RIGHT;  Surgeon: Xiang Hernandez MD;  Location: [address];  Service: Plastics;  Laterality: Right;    SENTINEL LYMPH NODE BIOPSY Left 8/24/2020    Procedure: BIOPSY, LYMPH NODE, SENTINEL;  Surgeon: Cory Vick MD;  Location: [facility] OR;  Service: General;  Laterality: Left;  left breast lumpectomy with left sentinel lymoh node       shoulder surgery and wrist      BILAT  BONE SPUR    WRIST SURGERY      RIGHT       Family History   Problem Relation Age of Onset    Anesthesia problems Neg Hx        Review of patient's allergies indicates:   Allergen Reactions    Adhesive tape-silicones Hives     BANDAIDS    Polymycin Hives    Adhesive     Latex, natural rubber Hives and Itching    Neomycin-bacitracnzn-polymyxnb     Polyurethane-39 Hives       No current facility-administered medications on file prior to encounter.     Current Outpatient Medications on File Prior to Encounter   Medication Sig Dispense Refill    blood-glucose transmitter (DEXCOM G6 TRANSMITTER) Edwige TEST GLUCOSE QID 1 each 1    diazePAM (VALIUM) 10 MG Tab Take 1 tablet (10 mg total) by mouth 4 (four) times daily as needed (anxiety). 120 tablet 2    docusate sodium (COLACE) 100 MG capsule Take 100 mg by mouth 2 (two) times daily.      droNABinol (MARINOL) 5 MG  capsule Take 1 capsule (5 mg total) by mouth 2 (two) times daily before meals. 60 capsule 0    enalapril (VASOTEC) 5 MG tablet Take 1 tablet (5 mg total) by mouth once daily. 90 tablet 3    fluconazole (DIFLUCAN) 150 MG Tab TAKE ONE TABLET BY MOUTH ONCE FOR 1 DOSE (Patient taking differently: Take 150 mg by mouth once.) 1 tablet 2    metoprolol tartrate (LOPRESSOR) 25 MG tablet Take 1 tablet (25 mg total) by mouth 2 (two) times daily. 60 tablet 11    ondansetron (ZOFRAN-ODT) 8 MG TbDL Take 1 tablet (8 mg total) by mouth every 6 to 8 hours as needed (nausea). dissolve 1 TABLET ON THE TONGUE EVERY 6 TO 8 HOURS AS NEEDED FOR nausea 30 tablet 2    palbociclib 100 mg Tab Take 100 mg by mouth once daily. for 21 days, then take 7 days off. Total cycle length 28 days 21 tablet 6    potassium chloride SA (K-DUR,KLOR-CON) 20 MEQ tablet Take 1 tablet (20 meq) by mouth 3 times a day until instructed further by Dr. Vick. (Patient taking differently: Take 20 mEq by mouth 3 (three) times daily. Take 1 tablet (20 meq) by mouth 3 times a day until instructed further by Dr. Vick.) 60 tablet 11    promethazine (PHENERGAN) 25 MG tablet TAKE ONE TABLET (25MG TOTAL) BY MOUTH EVERY 4 TO 6 HOURS AS NEEDED (Patient taking differently: Take 25 mg by mouth every 4 to 6 hours as needed.) 30 tablet 5    rosuvastatin (CRESTOR) 40 MG Tab Take 1 tablet (40 mg total) by mouth every evening. For cholesterol 90 tablet 3    zolpidem (AMBIEN) 10 mg Tab TAKE ONE TABLET BY MOUTH EVERY NIGHT AT BEDTIME (Patient taking differently: Take 10 mg by mouth every evening.) 30 tablet 3    blood sugar diagnostic Strp TEST GLUCOSE  each 3    blood-glucose meter (TRUE METRIX GLUCOSE METER) kit TEST GLUCOSE BID 1 each 0    blood-glucose meter,continuous (DEXCOM G6 ) Misc TEST GLUCOSE QID 1 each 1    blood-glucose sensor (DEXCOM G6 SENSOR) Edwige TEST GLUCOSE QID 13 each 3    carisoprodoL (SOMA) 350 MG tablet Take 1 tablet (350 mg total) by mouth 3  "(three) times daily as needed for Muscle spasms. 90 tablet 0    dexAMETHasone (DECADRON) 4 MG Tab TAKE ONE TABLET (4MG TOTAL) BY MOUTH TWICE DAILY WITH MEALS (Patient taking differently: Take 4 mg by mouth 2 (two) times daily with meals.) 60 tablet 1    diphenoxylate-atropine 2.5-0.025 mg (LOMOTIL) 2.5-0.025 mg per tablet Take 1 tablet by mouth 4 (four) times daily as needed for Diarrhea. 30 tablet 2    dulaglutide (TRULICITY) 3 mg/0.5 mL pen injector Inject 3 mg into the skin every 7 days. 4 pen 11    etodolac (LODINE) 400 MG tablet TAKE ONE TABLET (400 MG TOTAL) BY MOUTH ONCE DAILY (Patient taking differently: Take 400 mg by mouth once daily.) 30 tablet 2    insulin glargine (LANTUS U-100 INSULIN) 100 unit/mL injection Inject 32 Units into the skin 2 (two) times a day. 18 mL 11    lancets 32 gauge Misc TEST GLUCOSE  each 3    pen needle, diabetic (BD ULTRA-FINE MARCE PEN NEEDLE) 32 gauge x 5/32" Ndle Test blood sugar twice daily 100 each 5    TRUEPLUS PEN NEEDLE 31 gauge x 3/16" Ndle USE TO INJECT LANTUS TWICE DAILY         ROS: As per HPI and below:  Pertinent items are noted in HPI.      Physical Exam:     Vitals:    23 0447 23 0737 23 0746 23 1216   BP: 129/78 115/72  124/64   Pulse: 93 97  98   Resp: 18 18  18   Temp: 98.4 °F (36.9 °C) 98 °F (36.7 °C)  97.9 °F (36.6 °C)   TempSrc: Oral Oral  Oral   SpO2: (!) 94% 98% 96% 98%   Weight:       Height:           BP  Min: 91/65  Max: 151/83  Temp  Av.2 °F (36.8 °C)  Min: 97.5 °F (36.4 °C)  Max: 99.3 °F (37.4 °C)  Pulse  Av.7  Min: 80  Max: 115  Resp  Av.2  Min: 16  Max: 21  SpO2  Av.6 %  Min: 93 %  Max: 100 %  Height  Av' 11" (180.3 cm)  Min: 5' 11" (180.3 cm)  Max: 5' 11" (180.3 cm)  Weight  Av.8 kg (131 lb 11.6 oz)  Min: 58.1 kg (128 lb)  Max: 64.5 kg (142 lb 3.2 oz)    Body mass index is 19.83 kg/m².          General:             Well developed, well nourished, no apparent distress  HEENT:              " NCAT, no JVD, mucous membranes moist, EOM intact  Cardiovascular:  Regular rate and rhythm, normal S1, normal S2, No murmurs, rubs, or gallops  Respiratory:        Normal breath sounds, no wheezes, no rales, no rhonchi  Abdomen:           Bowel sounds present, non tender, non distended, no masses, no hepatojugular reflux  Extremities:        No clubbing, no cyanosis, no edema  Vascular:            2+ b/l radial.  Peripheral pulses intact.  No carotid bruits.  Neurological:      No focal deficits  Skin:                   No obvious rashes or erythema, stage 2 sacral pressure ulcer, no s/s of infection              Lab Results   Component Value Date    WBC 2.06 (L) 07/26/2023    HGB 8.3 (L) 07/26/2023    HCT 26.3 (L) 07/26/2023    MCV 93 07/26/2023    PLT 40 (LL) 07/26/2023     Lab Results   Component Value Date    CHOL 157 04/18/2023    CHOL 136 10/19/2021    CHOL 262 (H) 04/12/2021     Lab Results   Component Value Date    HDL 77 (H) 04/18/2023    HDL 54 10/19/2021    HDL 60 04/12/2021     Lab Results   Component Value Date    LDLCALC 58.6 (L) 04/18/2023    LDLCALC 54 10/19/2021    LDLCALC 156 (H) 04/12/2021     Lab Results   Component Value Date    TRIG 107 04/18/2023    TRIG 224 (H) 10/19/2021    TRIG 299 (H) 04/12/2021     Lab Results   Component Value Date    CHOLHDL 49.0 04/18/2023    CHOLHDL 2.5 10/19/2021    CHOLHDL 4.4 04/12/2021     CMP  Recent Labs   Lab 07/26/23  1024   *   CALCIUM 8.6*   ALBUMIN 1.8*   PROT 5.3*      K 3.2*   CO2 23      BUN 22   CREATININE 1.3   ALKPHOS 346*   ALT 32   AST 35   BILITOT 0.5      Lab Results   Component Value Date    TSH 1.970 06/18/2023         Assessment and Recommendations       Diagnoses:    1. Stage 2 sacral pressure ulcer    Plan:  1. Triad to the open wound daily and PRN  2. FU at the wound clinic on DC    Complexity:    low

## 2023-07-26 NOTE — PROGRESS NOTES
[facility] Medicine  Progress Note    Patient Name: Negrita Castro  MRN: [id number]  Patient Class: IP- Inpatient   Admission Date: 2023  Length of Stay: 2 days  Attending Physician: Rosalino Piña MD  Primary Care Provider: Manuelito Shepard MD        Subjective:     Principal Problem:Severe sepsis        HPI:  Per Dr. Adrienne Lee: Negrita Castro is a 63 y.o. White female   With PMH of breast cancer   undergoing chemotherapy,  Previous radiation,   COPD, smoking, DM2, HTN, HLD,  who presents with hypokalemia.  Sent from oncology office.     Pt reports worsening nausea since Monday  +vomiting previously, but not today  +decreased po intake  +malaise  +generalized weakness  +chills  +fever, Tmax 100.1  +dry cough  No SOB  +R chest pain  Last dose chemo on Monday  Treated for pneumonia over the weekend  However pt was unable to complete levaquin  (due to nausea)      Overview/Hospital Course:  Patient with metastatic breast cancer currently undergoing chemotherapy is admitted after being sent to ER due to outpatient labs showing hypokalemia. She was found to have sepsis attributed to right lower lobe pneumonia that had somewhat failed outpatient antibiotic therapy with levaquin due to poor tolerance attributed to recent chemo. Given IVF and potassium/magnesium replacement. Started on vancomycin and cefepime. Vancomycin discontinued after negative MRSA PCR swab. Respiratory culture growing GNR. Also noted to be pancytopenic so placed on neutropenic precautions. Hem/onc consulted and ordered filgrastim. Respiratory culture grew serratia and leclercia species. Antibiotics changed to oral levaquin and monitored for tolerance. She also had a stage 2 sacral/manoj fold ulcer on arrival for which wound care followed.      Interval History: Patient seen and examined. GLEN. Feeling much better. To try oral antibiotic today to be sure she can tolerate them.      Objective:     Vital Signs  (Most Recent):  Temp: 97.9 °F (36.6 °C) (07/26/23 1216)  Pulse: 98 (07/26/23 1216)  Resp: 18 (07/26/23 1216)  BP: 124/64 (07/26/23 1216)  SpO2: 98 % (07/26/23 1216) Vital Signs (24h Range):  Temp:  [97.9 °F (36.6 °C)-98.8 °F (37.1 °C)] 97.9 °F (36.6 °C)  Pulse:  [] 98  Resp:  [18] 18  SpO2:  [93 %-98 %] 98 %  BP: ()/(64-78) 124/64     Weight: 64.5 kg (142 lb 3.2 oz)  Body mass index is 19.83 kg/m².    Intake/Output Summary (Last 24 hours) at 7/26/2023 1412  Last data filed at 7/26/2023 1251  Gross per 24 hour   Intake 4620 ml   Output 5500 ml   Net -880 ml         Physical Exam  Vitals reviewed.   Constitutional:       General: She is not in acute distress.  HENT:      Head: Normocephalic and atraumatic.   Cardiovascular:      Rate and Rhythm: Normal rate and regular rhythm.   Pulmonary:      Effort: Pulmonary effort is normal. No respiratory distress.      Breath sounds: Normal breath sounds.   Neurological:      General: No focal deficit present.      Mental Status: She is alert and oriented to person, place, and time. Mental status is at baseline.   Psychiatric:         Mood and Affect: Affect normal.         Behavior: Behavior normal.         Thought Content: Thought content normal.           Significant Labs: All pertinent labs within the past 24 hours have been reviewed.    Significant Imaging: I have reviewed all pertinent imaging results/findings within the past 24 hours.      Assessment/Plan:      * Severe sepsis  This patient does have evidence of infective focus  My overall impression is severe sepsis.  Source: Respiratory  Antibiotics given-   Antibiotics (72h ago, onward)    Start     Stop Route Frequency Ordered    07/26/23 0945  levoFLOXacin tablet 750 mg         -- Oral Daily 07/26/23 0842    07/25/23 0900  mupirocin 2 % ointment         07/30 0859 Nasl 2 times daily 07/24/23 2247        Latest lactate reviewed-  Recent Labs   Lab 07/24/23  1524 07/24/23 1952 07/25/23  0420   LACTATE  3.4* 2.8* 1.6     Organ dysfunction indicated by lactic acidosis. also with pancytopenia  Fluid challenge given with normal saline; see MAR  Post- resuscitation assessment No - Post resuscitation assessment not needed   Will Not start Pressors  Source control achieved by: antibiotics  - see gram negative pneumonia section    Stage 2 skin ulcer of sacral region  POA   fold area  - wound care  - turn q2    Hypomagnesemia  Mild  - replace IV today again  - recheck level tomorrow and replace more if needed  - encourage diet    Hypokalemia  Much improved  - tele  - replace with PO today  - recheck level in AM and replace more if needed  - encourage diet    Gram-negative pneumonia  Resp culture with serratia and leclercia  MRSA PCR negative  CXR R lower/basilar ?infiltrate  - change to oral levaquin 750mg daily and monitor for tolerance    Pancytopenia  Improved   Likely chemo-related  No active bleeding  - neutropenic precautions  - trend cbc daily  - hem/onc consult: ordered filgrastim  - transfuse blood products as clinically indicated    Dehydration  Resolved   - dc IVF    Essential hypertension  Chronic, controlled  - hold acei due to normotension; resume when appropriate  - ok to continue the metoprolol and monitor    Chronic obstructive pulmonary disease  Chronic, stable  - duoneb prn    Chronic hypoxemic respiratory failure  Noted hx uses 2.5L O2 NC prn  - O2 prn    Chemotherapy-induced neutropenia  Improved   - neutropenic precautions  - hem/onc consult: ordered filgrastim  - trend wbc on cbc daily    Type 2 diabetes mellitus with hyperglycemia, with long-term current use of insulin  Patient's FSGs are uncontrolled due to hyperglycemia on current medication regimen.  Last A1c reviewed-   Lab Results   Component Value Date    HGBA1C 9.1 (H) 2023     Most recent fingerstick glucose reviewed-   Current correctional scale  Medium  Increase anti-hyperglycemic dose as follows-   Antihyperglycemics (From  admission, onward)    Start     Stop Route Frequency Ordered    07/26/23 2100  insulin detemir U-100 (Levemir) pen 15 Units         -- SubQ 2 times daily 07/26/23 1222    07/24/23 2016  insulin aspart U-100 pen 1-10 Units         -- SubQ Before meals & nightly PRN 07/24/23 1918        Hold Oral hypoglycemics while patient is in the hospital.  Adjust regimen for goal glucose <180    Breast carcinoma metastatic to multiple sites  Noted hx on current chemo  - hem/onc consult      VTE Risk Mitigation (From admission, onward)         Ordered     IP VTE HIGH RISK PATIENT  Once         07/24/23 1918     Place sequential compression device  Until discontinued         07/24/23 1918     Reason for No Pharmacological VTE Prophylaxis  Once        Question:  Reasons:  Answer:  Thrombocytopenia    07/24/23 1918                Discharge Planning   GUNNER: 7/27/2023     Code Status: Full Code   Is the patient medically ready for discharge?:     Reason for patient still in hospital (select all that apply): Patient trending condition  Discharge Plan A: Home Health                  Rosalino Piña MD  Department of Hospital Medicine   [facility]

## 2023-07-26 NOTE — NURSING
Notified Dr. John Justin [facility] about blood transfusion, stated will be fine to wait until tomorrow.

## 2023-07-26 NOTE — ASSESSMENT & PLAN NOTE
Resp culture with serratia and leclercia  MRSA PCR negative  CXR R lower/basilar ?infiltrate  - change to oral levaquin 750mg daily and monitor for tolerance

## 2023-07-27 PROBLEM — R35.89 POLYURIA: Status: ACTIVE | Noted: 2023-01-01

## 2023-07-27 NOTE — SUBJECTIVE & OBJECTIVE
Interval History:     Oncology Treatment Plan:   OP SACITUZUMAB GOVITECAN-HZIY Q3W    Medications:  Continuous Infusions:  Scheduled Meds:   docusate sodium  100 mg Oral BID    droNABinol  5 mg Oral BID AC    filgrastim-sndz  480 mcg Subcutaneous Daily    insulin detemir U-100  13 Units Subcutaneous BID    levoFLOXacin  750 mg Oral Daily    metoprolol tartrate  25 mg Oral BID    mupirocin   Nasal BID    zolpidem  10 mg Oral QHS     PRN Meds:sodium chloride, acetaminophen, albuterol-ipratropium, benzonatate, dextrose 50%, dextrose 50%, diazePAM, glucagon (human recombinant), glucose, glucose, HYDROcodone-acetaminophen, insulin aspart U-100, melatonin, morphine, naloxone, ondansetron, polyethylene glycol, promethazine (PHENERGAN) IVPB, senna-docusate 8.6-50 mg, simethicone, sodium chloride 0.9%     Review of Systems   Constitutional:  Negative for fever.   Respiratory:  Negative for shortness of breath.    Cardiovascular:  Negative for chest pain and leg swelling.   Gastrointestinal:  Negative for abdominal pain and blood in stool.   Genitourinary:  Negative for hematuria.   Skin:  Negative for rash.   Objective:     Vital Signs (Most Recent):  Temp: 98.2 °F (36.8 °C) (07/27/23 1500)  Pulse: 90 (07/27/23 1500)  Resp: 16 (07/27/23 1500)  BP: 125/66 (07/27/23 1500)  SpO2: 99 % (07/27/23 1500) Vital Signs (24h Range):  Temp:  [98 °F (36.7 °C)-98.7 °F (37.1 °C)] 98.2 °F (36.8 °C)  Pulse:  [] 90  Resp:  [16-18] 16  SpO2:  [94 %-99 %] 99 %  BP: ()/(63-81) 125/66     Weight: 64.5 kg (142 lb 3.2 oz)  Body mass index is 19.83 kg/m².  Body surface area is 1.8 meters squared.      Intake/Output Summary (Last 24 hours) at 7/27/2023 1751  Last data filed at 7/27/2023 1536  Gross per 24 hour   Intake 1590 ml   Output 2250 ml   Net -660 ml        Physical Exam  Constitutional:       Appearance: She is well-developed.   HENT:      Head: Normocephalic and atraumatic.      Right Ear: External ear normal.      Left Ear:  External ear normal.   Eyes:      Conjunctiva/sclera: Conjunctivae normal.      Pupils: Pupils are equal, round, and reactive to light.   Neck:      Thyroid: No thyromegaly.      Trachea: No tracheal deviation.   Cardiovascular:      Rate and Rhythm: Normal rate and regular rhythm.      Heart sounds: Normal heart sounds.   Pulmonary:      Effort: Pulmonary effort is normal.      Breath sounds: Normal breath sounds.   Abdominal:      General: Bowel sounds are normal. There is no distension.      Palpations: Abdomen is soft. There is no mass.      Tenderness: There is no abdominal tenderness.   Skin:     Findings: No rash.   Neurological:      Comments: Neuro intact througout   Psychiatric:         Behavior: Behavior normal.         Thought Content: Thought content normal.         Judgment: Judgment normal.        Significant Labs:   CBC:   Recent Labs   Lab 07/26/23  1024 07/27/23  0430   WBC 2.06* 2.25*   HGB 8.3* 8.0*   HCT 26.3* 25.2*   PLT 40* 52*    and CMP:   Recent Labs   Lab 07/26/23  1024 07/27/23  0430    141   K 3.2* 3.1*    111*   CO2 23 26   * 131*   BUN 22 26*   CREATININE 1.3 1.2   CALCIUM 8.6* 9.0   PROT 5.3* 5.4*   ALBUMIN 1.8* 1.8*   BILITOT 0.5 0.5   ALKPHOS 346* 337*   AST 35 27   ALT 32 30   ANIONGAP 8 4*       Diagnostic Results:  None

## 2023-07-27 NOTE — ASSESSMENT & PLAN NOTE
Resp culture with serratia and leclercia  MRSA PCR negative  CXR R lower/basilar ?infiltrate  - continue oral levaquin 750mg daily and monitor for tolerance

## 2023-07-27 NOTE — PROGRESS NOTES
[facility] Medicine  Progress Note    Patient Name: Negrita Castro  MRN: [id number]  Patient Class: IP- Inpatient   Admission Date: 2023  Length of Stay: 3 days  Attending Physician: Rosalino Piña MD  Primary Care Provider: Manuelito Shepard MD        Subjective:     Principal Problem:Severe sepsis        HPI:  Per Dr. Adrienne Lee: Negrita Castro is a 63 y.o. White female   With PMH of breast cancer   undergoing chemotherapy,  Previous radiation,   COPD, smoking, DM2, HTN, HLD,  who presents with hypokalemia.  Sent from oncology office.     Pt reports worsening nausea since Monday  +vomiting previously, but not today  +decreased po intake  +malaise  +generalized weakness  +chills  +fever, Tmax 100.1  +dry cough  No SOB  +R chest pain  Last dose chemo on Monday  Treated for pneumonia over the weekend  However pt was unable to complete levaquin  (due to nausea)      Overview/Hospital Course:  Patient with metastatic breast cancer currently undergoing chemotherapy is admitted after being sent to ER due to outpatient labs showing hypokalemia. She was found to have sepsis attributed to right lower lobe pneumonia that had somewhat failed outpatient antibiotic therapy with levaquin due to poor tolerance attributed to recent chemo. Given IVF and potassium/magnesium replacement. Started on vancomycin and cefepime. Vancomycin discontinued after negative MRSA PCR swab. Respiratory culture growing GNR. Also noted to be pancytopenic so placed on neutropenic precautions. Hem/onc consulted and ordered filgrastim. Respiratory culture grew serratia and leclercia species. Antibiotics changed to oral levaquin and monitored for tolerance. She also had a stage 2 sacral/manoj fold ulcer on arrival for which wound care followed.      Interval History: Patient seen and examined. NAEON. Again feeling good. Hungry.      Objective:     Vital Signs (Most Recent):  Temp: 98.3 °F (36.8 °C) (23  1100)  Pulse: 95 (07/27/23 1100)  Resp: 16 (07/27/23 1100)  BP: 120/81 (07/27/23 1100)  SpO2: 98 % (07/27/23 1100) Vital Signs (24h Range):  Temp:  [98 °F (36.7 °C)-98.7 °F (37.1 °C)] 98.3 °F (36.8 °C)  Pulse:  [] 95  Resp:  [16-18] 16  SpO2:  [94 %-99 %] 98 %  BP: ()/(63-81) 120/81     Weight: 64.5 kg (142 lb 3.2 oz)  Body mass index is 19.83 kg/m².    Intake/Output Summary (Last 24 hours) at 7/27/2023 1322  Last data filed at 7/27/2023 0957  Gross per 24 hour   Intake 1590 ml   Output 2750 ml   Net -1160 ml         Physical Exam  Vitals reviewed.   Constitutional:       General: She is not in acute distress.  HENT:      Head: Normocephalic and atraumatic.   Cardiovascular:      Rate and Rhythm: Normal rate and regular rhythm.      Heart sounds: Normal heart sounds. No murmur heard.  Pulmonary:      Effort: Pulmonary effort is normal. No respiratory distress.      Breath sounds: Normal breath sounds. No wheezing, rhonchi or rales.   Neurological:      Mental Status: She is alert.   Psychiatric:         Mood and Affect: Affect normal.         Behavior: Behavior normal.         Thought Content: Thought content normal.           Significant Labs: All pertinent labs within the past 24 hours have been reviewed.    Significant Imaging: I have reviewed all pertinent imaging results/findings within the past 24 hours.      Assessment/Plan:      * Severe sepsis  This patient does have evidence of infective focus  My overall impression is severe sepsis.  Source: Respiratory  Antibiotics given-   Antibiotics (72h ago, onward)    Start     Stop Route Frequency Ordered    07/26/23 0945  levoFLOXacin tablet 750 mg         -- Oral Daily 07/26/23 0842    07/25/23 0900  mupirocin 2 % ointment         07/30 0859 Nasl 2 times daily 07/24/23 2247        Latest lactate reviewed-  Recent Labs   Lab 07/24/23  1524 07/24/23  1952 07/25/23  0420   LACTATE 3.4* 2.8* 1.6     Organ dysfunction indicated by lactic acidosis. also  with pancytopenia  Fluid challenge given with normal saline; see MAR  Post- resuscitation assessment No - Post resuscitation assessment not needed   Will Not start Pressors  Source control achieved by: antibiotics  - see gram negative pneumonia section    Polyuria  Unclear etiology  At least partially due to glucosuria  Could be related to IV mag?  - monitor I/O  - consider nephro consult    Stage 2 skin ulcer of sacral region  POA   fold area  - wound care  - turn q2    Hypomagnesemia  Mild  - replace with PO today  - recheck level tomorrow and replace more if needed  - encourage diet    Hypokalemia  Much improved  - tele  - replace with PO today again  - recheck level in AM and replace more if needed  - encourage diet    Gram-negative pneumonia  Resp culture with serratia and leclercia  MRSA PCR negative  CXR R lower/basilar ?infiltrate  - continue oral levaquin 750mg daily and monitor for tolerance    Pancytopenia  Improved   Likely chemo-related  No active bleeding  - neutropenic precautions  - trend cbc daily  - hem/onc consult: ordered filgrastim  - transfuse blood products as clinically indicated    Dehydration  Resolved     Essential hypertension  Chronic, controlled  - hold acei due to normotension; resume when appropriate  - ok to continue the metoprolol and monitor    Chronic obstructive pulmonary disease  Chronic, stable  - duoneb prn    Chronic hypoxemic respiratory failure  Noted hx uses 2.5L O2 NC prn  - O2 prn    Chemotherapy-induced neutropenia  Improved   - neutropenic precautions  - hem/onc consult: ordered filgrastim  - trend wbc on cbc daily    Type 2 diabetes mellitus with hyperglycemia, with long-term current use of insulin  Patient's FSGs are controlled on current medication regimen.  Last A1c reviewed-   Lab Results   Component Value Date    HGBA1C 9.1 (H) 2023     Most recent fingerstick glucose reviewed-   Current correctional scale  Medium  Maintain anti-hyperglycemic dose as  follows-   Antihyperglycemics (From admission, onward)    Start     Stop Route Frequency Ordered    07/27/23 0900  insulin detemir U-100 (Levemir) pen 13 Units         -- SubQ 2 times daily 07/27/23 0757    07/24/23 2016  insulin aspart U-100 pen 1-10 Units         -- SubQ Before meals & nightly PRN 07/24/23 1918        Hold Oral hypoglycemics while patient is in the hospital.  Adjust regimen for goal glucose <180    Breast carcinoma metastatic to multiple sites  Noted hx on current chemo  - hem/onc consult      VTE Risk Mitigation (From admission, onward)         Ordered     IP VTE HIGH RISK PATIENT  Once         07/24/23 1918     Place sequential compression device  Until discontinued         07/24/23 1918     Reason for No Pharmacological VTE Prophylaxis  Once        Question:  Reasons:  Answer:  Thrombocytopenia    07/24/23 1918                Discharge Planning   GUNNER: 7/27/2023     Code Status: Full Code   Is the patient medically ready for discharge?:     Reason for patient still in hospital (select all that apply): Patient trending condition and Laboratory test  Discharge Plan A: Home Health                  Rosalino Piña MD  Department of Hospital Medicine   [facility]

## 2023-07-27 NOTE — PROGRESS NOTES
[facility]  Hematology/Oncology  Progress Note    Patient Name: Negrita Castro  Admission Date: 7/24/2023  Hospital Length of Stay: 3 days  Code Status: Full Code     Subjective:     HPI:  Patient is doing better today.  She denies any sob but still coughing.  No new complaints.       Interval History:     Oncology Treatment Plan:   OP SACITUZUMAB GOVITECAN-HZIY Q3W    Medications:  Continuous Infusions:  Scheduled Meds:   docusate sodium  100 mg Oral BID    droNABinol  5 mg Oral BID AC    filgrastim-sndz  480 mcg Subcutaneous Daily    insulin detemir U-100  13 Units Subcutaneous BID    levoFLOXacin  750 mg Oral Daily    metoprolol tartrate  25 mg Oral BID    mupirocin   Nasal BID    zolpidem  10 mg Oral QHS     PRN Meds:sodium chloride, acetaminophen, albuterol-ipratropium, benzonatate, dextrose 50%, dextrose 50%, diazePAM, glucagon (human recombinant), glucose, glucose, HYDROcodone-acetaminophen, insulin aspart U-100, melatonin, morphine, naloxone, ondansetron, polyethylene glycol, promethazine (PHENERGAN) IVPB, senna-docusate 8.6-50 mg, simethicone, sodium chloride 0.9%     Review of Systems   Constitutional:  Negative for fever.   Respiratory:  Negative for shortness of breath.    Cardiovascular:  Negative for chest pain and leg swelling.   Gastrointestinal:  Negative for abdominal pain and blood in stool.   Genitourinary:  Negative for hematuria.   Skin:  Negative for rash.   Objective:     Vital Signs (Most Recent):  Temp: 98.2 °F (36.8 °C) (07/27/23 1500)  Pulse: 90 (07/27/23 1500)  Resp: 16 (07/27/23 1500)  BP: 125/66 (07/27/23 1500)  SpO2: 99 % (07/27/23 1500) Vital Signs (24h Range):  Temp:  [98 °F (36.7 °C)-98.7 °F (37.1 °C)] 98.2 °F (36.8 °C)  Pulse:  [] 90  Resp:  [16-18] 16  SpO2:  [94 %-99 %] 99 %  BP: ()/(63-81) 125/66     Weight: 64.5 kg (142 lb 3.2 oz)  Body mass index is 19.83 kg/m².  Body surface area is 1.8 meters squared.      Intake/Output Summary (Last 24  hours) at 7/27/2023 1751  Last data filed at 7/27/2023 1536  Gross per 24 hour   Intake 1590 ml   Output 2250 ml   Net -660 ml        Physical Exam  Constitutional:       Appearance: She is well-developed.   HENT:      Head: Normocephalic and atraumatic.      Right Ear: External ear normal.      Left Ear: External ear normal.   Eyes:      Conjunctiva/sclera: Conjunctivae normal.      Pupils: Pupils are equal, round, and reactive to light.   Neck:      Thyroid: No thyromegaly.      Trachea: No tracheal deviation.   Cardiovascular:      Rate and Rhythm: Normal rate and regular rhythm.      Heart sounds: Normal heart sounds.   Pulmonary:      Effort: Pulmonary effort is normal.      Breath sounds: Normal breath sounds.   Abdominal:      General: Bowel sounds are normal. There is no distension.      Palpations: Abdomen is soft. There is no mass.      Tenderness: There is no abdominal tenderness.   Skin:     Findings: No rash.   Neurological:      Comments: Neuro intact througout   Psychiatric:         Behavior: Behavior normal.         Thought Content: Thought content normal.         Judgment: Judgment normal.        Significant Labs:   CBC:   Recent Labs   Lab 07/26/23  1024 07/27/23  0430   WBC 2.06* 2.25*   HGB 8.3* 8.0*   HCT 26.3* 25.2*   PLT 40* 52*    and CMP:   Recent Labs   Lab 07/26/23  1024 07/27/23  0430    141   K 3.2* 3.1*    111*   CO2 23 26   * 131*   BUN 22 26*   CREATININE 1.3 1.2   CALCIUM 8.6* 9.0   PROT 5.3* 5.4*   ALBUMIN 1.8* 1.8*   BILITOT 0.5 0.5   ALKPHOS 346* 337*   AST 35 27   ALT 32 30   ANIONGAP 8 4*       Diagnostic Results:  None    Assessment/Plan:     Breast carcinoma metastatic to multiple sites  Ms. Castro is doing better today.  She seems to be improving from an infection standpoint and her counts are beginning to rise now.  Would continue her on aggressive abx and close monitoring.  Will check counts in am and if afebrile and with improvement in counts then she  can likely be discharged.  Will have her follow up with Dr. Vick in the office next week.         Thank you for your consult. I will follow-up with patient. Please contact us if you have any additional questions.     Dimitris Rangel MD  Hematology/Oncology  [facility]

## 2023-07-27 NOTE — ASSESSMENT & PLAN NOTE
Unclear etiology  At least partially due to glucosuria  Could be related to IV mag?  - monitor I/O  - consider nephro consult

## 2023-07-27 NOTE — ASSESSMENT & PLAN NOTE
Mild  - replace with PO today  - recheck level tomorrow and replace more if needed  - encourage diet

## 2023-07-27 NOTE — ASSESSMENT & PLAN NOTE
Much improved  - tele  - replace with PO today again  - recheck level in AM and replace more if needed  - encourage diet

## 2023-07-27 NOTE — PLAN OF CARE
Problem: Physical Therapy  Goal: Physical Therapy Goal  Description: Goals to be met by: 23     Patient will increase functional independence with mobility by performin. Supine to sit with Supervision  2. Sit to stand transfer with Supervision  3. Bed to chair transfer with Supervision using Rolling Walker  4. Gait  x 25 feet with Contact Guard Assist using Rolling Walker.         Outcome: Ongoing, Progressing

## 2023-07-27 NOTE — SUBJECTIVE & OBJECTIVE
Interval History: Patient seen and examined. GLEN. Again feeling good. Hungry.      Objective:     Vital Signs (Most Recent):  Temp: 98.3 °F (36.8 °C) (07/27/23 1100)  Pulse: 95 (07/27/23 1100)  Resp: 16 (07/27/23 1100)  BP: 120/81 (07/27/23 1100)  SpO2: 98 % (07/27/23 1100) Vital Signs (24h Range):  Temp:  [98 °F (36.7 °C)-98.7 °F (37.1 °C)] 98.3 °F (36.8 °C)  Pulse:  [] 95  Resp:  [16-18] 16  SpO2:  [94 %-99 %] 98 %  BP: ()/(63-81) 120/81     Weight: 64.5 kg (142 lb 3.2 oz)  Body mass index is 19.83 kg/m².    Intake/Output Summary (Last 24 hours) at 7/27/2023 1322  Last data filed at 7/27/2023 0957  Gross per 24 hour   Intake 1590 ml   Output 2750 ml   Net -1160 ml         Physical Exam  Vitals reviewed.   Constitutional:       General: She is not in acute distress.  HENT:      Head: Normocephalic and atraumatic.   Cardiovascular:      Rate and Rhythm: Normal rate and regular rhythm.      Heart sounds: Normal heart sounds. No murmur heard.  Pulmonary:      Effort: Pulmonary effort is normal. No respiratory distress.      Breath sounds: Normal breath sounds. No wheezing, rhonchi or rales.   Neurological:      Mental Status: She is alert.   Psychiatric:         Mood and Affect: Affect normal.         Behavior: Behavior normal.         Thought Content: Thought content normal.           Significant Labs: All pertinent labs within the past 24 hours have been reviewed.    Significant Imaging: I have reviewed all pertinent imaging results/findings within the past 24 hours.

## 2023-07-27 NOTE — ASSESSMENT & PLAN NOTE
Ms. Castro is doing better today.  She seems to be improving from an infection standpoint and her counts are beginning to rise now.  Would continue her on aggressive abx and close monitoring.  Will check counts in am and if afebrile and with improvement in counts then she can likely be discharged.  Will have her follow up with Dr. Vick in the office next week.

## 2023-07-27 NOTE — ASSESSMENT & PLAN NOTE
Patient's FSGs are controlled on current medication regimen.  Last A1c reviewed-   Lab Results   Component Value Date    HGBA1C 9.1 (H) 07/24/2023     Most recent fingerstick glucose reviewed-   Current correctional scale  Medium  Maintain anti-hyperglycemic dose as follows-   Antihyperglycemics (From admission, onward)    Start     Stop Route Frequency Ordered    07/27/23 0900  insulin detemir U-100 (Levemir) pen 13 Units         -- SubQ 2 times daily 07/27/23 0757    07/24/23 2016  insulin aspart U-100 pen 1-10 Units         -- SubQ Before meals & nightly PRN 07/24/23 1918        Hold Oral hypoglycemics while patient is in the hospital.  Adjust regimen for goal glucose <180

## 2023-07-27 NOTE — PT/OT/SLP PROGRESS
Physical Therapy Treatment    Patient Name:  Negrita Castro   MRN:  [id number]    Recommendations:     Discharge Recommendations: home health PT  Discharge Equipment Recommendations: none  Barriers to discharge:  weakness, decreased ambulation    Assessment:     Negrita Castro is a 63 y.o. female admitted with a medical diagnosis of Severe sepsis.  She presents with the following impairments/functional limitations: weakness, impaired endurance, impaired self care skills, impaired functional mobility, gait instability, impaired balance, impaired cardiopulmonary response to activity .    Pt presented seated in chair with report of her tail bone hurting. She t/f'ed to stand  with CGA and ambulated 30 ft x2 with RW and CGA. Pt reported feeling much stronger than she did at admit. Pt appears more appropriate for HHPT rather than SNF upon D/C.    Rehab Prognosis: Good; patient would benefit from acute skilled PT services to address these deficits and reach maximum level of function.    Recent Surgery: * No surgery found *      Plan:     During this hospitalization, patient to be seen 5 x/week to address the identified rehab impairments via gait training, therapeutic activities, therapeutic exercises and progress toward the following goals:    Plan of Care Expires:  08/25/23    Subjective     Chief Complaint: pain to tailbone  Patient/Family Comments/goals: Return home with her Significant other  Pain/Comfort:         Objective:     Communicated with nurse prior to session.  Patient found up in chair with peripheral IV, telemetry, PureWick upon PT entry to room.     General Precautions: Standard, fall  Orthopedic Precautions: N/A  Braces: N/A  Respiratory Status: Room air     Functional Mobility:  Bed Mobility:     Sit to Supine: contact guard assistance  Transfers:     Sit to Stand:  contact guard assistance with rolling walker  Gait: 30 ft x2 RW and CGA      AM-PAC 6 CLICK MOBILITY          Treatment & Education:  Pt  was educated on benefits on increased time OOB, safety, use of call light, PT POC/D/C recommendation.    Patient left supine with all lines intact, call button in reach, bed alarm on, and her mate  present..    GOALS:   Multidisciplinary Problems       Physical Therapy Goals          Problem: Physical Therapy    Goal Priority Disciplines Outcome Goal Variances Interventions   Physical Therapy Goal     PT, PT/OT Ongoing, Progressing     Description: Goals to be met by: 23     Patient will increase functional independence with mobility by performin. Supine to sit with Supervision  2. Sit to stand transfer with Supervision  3. Bed to chair transfer with Supervision using Rolling Walker  4. Gait  x 25 feet with Contact Guard Assist using Rolling Walker.                              Time Tracking:     PT Received On: 23  PT Start Time: 1053     PT Stop Time: 1104  PT Total Time (min): 11 min     Billable Minutes: Gait Training 11 minutes    Treatment Type: Treatment  PT/PTA: PT     Number of PTA visits since last PT visit: 0     2023

## 2023-07-27 NOTE — PROGRESS NOTES
Hematology/Oncology  Inpatient Progress Note  7/26/23  Patient Name: Negrita Castro  MRN: [id number]  Admission Date: 7/24/2023  Hospital Length of Stay: 2 days  Code Status: Full Code   Attending Provider: Rosalino Piña MD  Referring Provider:Dr. Lee  Consulting Provider: TALISHA Vanegas MD  Primary Care Physician: Manuelito Shepard MD  Principal Problem:Severe sepsis    Progress Note Nhan aVnegas MD 7/26/23  Subjective:     Chief Complaint:  low potassium      History Present Illness:  63 y.o. female found to have significant hypokalemia, denies nausea or diarrhea.  She was treated with Trodelvy last Monday for metastatic breast cancer.  Over this past weekend she had hallucinations.    She has RLL pneumonia, sputum C/S GNR.  Pancytopenia 2nd chemotherapy.  Bilateral breast cancer with metastatic dx to liver and bones.    Stronger today, ambulated in sims with assistance.  Sputum C/S +.  Hgb better at 8.3 today, transfusion of pRBC deferred.  WBC better at 2,000 on neupogen,  Plt cnt better at 40,000.    Dr. Jurado of wound care has seen her for decubitus care.    Dr. Mayo covering for me through 7/31 am.      Past Medical/Surgical History:  Past Medical History:   Diagnosis Date    Acute hypoxemic respiratory failure 12/12/2020    Breast cancer 10/26/2015    left breast    Cancer     RIGHT BREAST 10-15    Depression     Diabetes mellitus     Neuropathy     Panic attacks     S/P epidural steroid injection     Seizures     none since early 30's    Wears glasses     TO DRIVE     Past Surgical History:   Procedure Laterality Date    AXILLARY NODE DISSECTION Left 8/24/2020    Procedure: LYMPHADENECTOMY, AXILLARY;  Surgeon: Cory Vanegas MD;  Location: [facility];  Service: General;  Laterality: Left;  left breast mastectomy with possible axillary lymph node dissection    BREAST BIOPSY      right    BREAST LUMPECTOMY      BREAST RECONSTRUCTION      breast reconstruction with tummy tuck      BREAST  SURGERY      11-3-15 LUMPECTOMY, 12-2-15 REEXCISION    INCISION AND DRAINAGE OF ABSCESS Left 9/9/2020    Procedure: INCISION AND DRAINAGE, ABSCESS;  Surgeon: Cory Vick MD;  Location: [facility] OR;  Service: General;  Laterality: Left;    INSERTION OF LOCALIZATION WIRE Left 8/24/2020    Procedure: INSERTION, LOCALIZATION WIRE;  Surgeon: Cory Vick MD;  Location: [facility] OR;  Service: General;  Laterality: Left;  left breast lumpectomy with wire needle loc    INSERTION OF TUNNELED CENTRAL VENOUS CATHETER (CVC) WITH SUBCUTANEOUS PORT Right 11/17/2022    Procedure: [id number]-CATH;  Surgeon: Jeff Mckeon Jr., MD;  Location: [facility] OR;  Service: General;  Laterality: Right;    MASTECTOMY      mastectomy 2016      MASTECTOMY, PARTIAL Left 3/19/2021    Procedure: MASTECTOMY, PARTIAL;  Surgeon: Cory Vick MD;  Location: [facility] OR;  Service: General;  Laterality: Left;  lumpectomy  left breast     RECONSTRUCTION OF NIPPLE Right 11/5/2018    Procedure: RECONSTRUCTION-NIPPLE RIGHT;  Surgeon: Xiang Hernandez MD;  Location: [address];  Service: Plastics;  Laterality: Right;    SENTINEL LYMPH NODE BIOPSY Left 8/24/2020    Procedure: BIOPSY, LYMPH NODE, SENTINEL;  Surgeon: Cory Vick MD;  Location: [facility] OR;  Service: General;  Laterality: Left;  left breast lumpectomy with left sentinel lymoh node       shoulder surgery and wrist      BILAT  BONE SPUR    WRIST SURGERY      RIGHT       Allergies:  Review of patient's allergies indicates:   Allergen Reactions    Adhesive tape-silicones Hives     BANDAIDS    Polymycin Hives    Adhesive     Latex, natural rubber Hives and Itching    Neomycin-bacitracnzn-polymyxnb     Polyurethane-39 Hives       Social/Family History:  Social History     Socioeconomic History    Marital status:    Tobacco Use    Smoking status: Former     Packs/day: 0.25     Years: 30.00     Pack years: 7.50     Types: Cigarettes     Quit date: 9/28/2015      "Years since quittin.8    Smokeless tobacco: Never   Substance and Sexual Activity    Alcohol use: Yes     Alcohol/week: 0.0 standard drinks     Comment: RARELY    Drug use: Not Currently     Types: Marijuana     Comment: medical marijuana     Family History   Problem Relation Age of Onset    Anesthesia problems Neg Hx        ROS:    GEN: normal without any fever, night sweats or weight loss  HEENT: normal with no HA's, sore throat, stiff neck, changes in vision  CV: normal with no CP, SOB, PND, POOL or orthopnea  PULM: normal with no SOB, cough, hemoptysis, sputum or pleuritic pain  GI: normal with no abdominal pain, nausea, vomiting, constipation, diarrhea, melanotic stools, BRBPR, or hematemesis  : normal with no hematuria, dysuria  BREAST: See HPI  SKIN: See HPI        Medications:  Continuous Infusions:      Scheduled Meds:   docusate sodium  100 mg Oral BID    droNABinol  5 mg Oral BID AC    filgrastim-sndz  480 mcg Subcutaneous Daily    furosemide (LASIX) injection  20 mg Intravenous Once    insulin detemir U-100  15 Units Subcutaneous BID    levoFLOXacin  750 mg Oral Daily    metoprolol tartrate  25 mg Oral BID    mupirocin   Nasal BID    zolpidem  10 mg Oral QHS     PRN Meds:sodium chloride, acetaminophen, albuterol-ipratropium, benzonatate, dextrose 50%, dextrose 50%, diazePAM, glucagon (human recombinant), glucose, glucose, HYDROcodone-acetaminophen, insulin aspart U-100, melatonin, morphine, naloxone, ondansetron, polyethylene glycol, promethazine (PHENERGAN) IVPB, senna-docusate 8.6-50 mg, simethicone, sodium chloride 0.9%     Objective:     Vitals:  Blood pressure 107/67, pulse (!) 117, temperature 98.7 °F (37.1 °C), temperature source Oral, resp. rate 17, height 5' 11" (1.803 m), weight 64.5 kg (142 lb 3.2 oz), last menstrual period 2016, SpO2 97 %.    Physical Exam:  GEN: chronically ill in appearance  HEAD: atraumatic and normocephalic  EYES: + pallor, no icterus  ENT: no pharyngeal " erythema, external ears WNL; no nasal discharge  NECK: no masses, thyroid normal, trachea midline, no LAD/LN's, supple  CV: RRR with no murmur; normal pulse; normal S1 and S2; no pedal edema  CHEST: Normal respiratory effort; CTAB; normal breath sounds; no wheeze or crackles  ABDOM: nontender and nondistended; soft; no rebound/guarding, L/S NP  MUSC/Skeletal: ROM normal; no crepitus; joints normal  EXTREM: no clubbing, cyanosis, inflammation   SKIN: small decubitus at buttock  ; no CVAT  NEURO: grossly intact; motor/sensory WNL; no tremors  PSYCH: normal mood, affect and behavior  LYMPH: normal cervical, supraclavicular, axillary  LN's  Breast:extensive post op change.    WBC 0.58  H/H 7.7/24.6, plt cnt 23,000    K 3.2,  Sputum C/S GNR, Blood C/S NG.  CXR RLL infiltrate  Rib x ray negative  U/S liver metastases     Assessment/Plan:     (1) 63 y.o. female with bilateral breast cancer,  Metastatic dx, started on Trodelvy 1 week ago.    (2)RLL pneumonia, GNR, C/S noted.    (3)Neutropenia 2nd chemotherapy.  Neupogen daily.  Improving.    (4) Anemia, symptomatic, 2nd chemo,  Metastatic dx.  Hgb 7.7. to 8.3.  Defer transfusion.    (5)Platelet count 23,000. To 40,000.      (6)Small decubitus on buttock.  Dr. Jurado has seen her.    (7)Hypokalemia, for electrolyte supplementation.  K 3.2.    Dr. Mayo covering for me through 7/31/23      Active Diagnoses:    Diagnosis Date Noted POA    PRINCIPAL PROBLEM:  Severe sepsis [A41.9, R65.20] 07/24/2023 Yes    Stage 2 skin ulcer of sacral region [L98.429] 07/26/2023 Yes    Gram-negative pneumonia [J15.6] 07/25/2023 Yes    Hypokalemia [E87.6] 07/25/2023 Yes    Hypomagnesemia [E83.42] 07/25/2023 Yes    Dehydration [E86.0] 07/24/2023 Yes    Pancytopenia [D61.818] 07/24/2023 Yes    Chronic hypoxemic respiratory failure [J96.11] 01/19/2021 Yes    Chronic obstructive pulmonary disease [J44.9] 01/19/2021 Yes    Essential hypertension [I10] 01/19/2021 Yes    Chemotherapy-induced  neutropenia [D70.1, T45.1X5A] 11/25/2020 Yes    Type 2 diabetes mellitus with hyperglycemia, with long-term current use of insulin [E11.65, Z79.4] 01/08/2020 Not Applicable    Breast carcinoma metastatic to multiple sites [C50.919] 02/26/2018 Yes      Problems Resolved During this Admission:     TALISHA Vick MD  Hematology/Oncology  [facility]   7/26/23.

## 2023-07-27 NOTE — PT/OT/SLP EVAL
Occupational Therapy   Evaluation    Name: Negrita Castro  MRN: [id number]  Admitting Diagnosis: Severe sepsis  Recent Surgery: * No surgery found *      Recommendations:     Discharge Recommendations: home health OT  Discharge Equipment Recommendations:  none  Barriers to discharge:  None    Assessment:     Negrita Castro is a 63 y.o. female with a medical diagnosis of Severe sepsis. Performance deficits affecting function: weakness, impaired endurance, impaired self care skills, impaired functional mobility, gait instability, impaired skin, pain, impaired cardiopulmonary response to activity, decreased safety awareness.  Patient spends a lot of time in bed and ambulates short distances with assistance.     Rehab Prognosis: Fair; patient would benefit from acute skilled OT services to address these deficits and reach maximum level of function.       Plan:     Patient to be seen 5 x/week to address the above listed problems via self-care/home management, therapeutic activities, therapeutic exercises  Plan of Care Expires: 8/27/2023  Plan of Care Reviewed with: patient, significant other    Subjective     Chief Complaint: back pain when she ambulates  Patient/Family Comments/goals: return home    Occupational Profile:  Living Environment: lives with significant other in single story home  Previous level of function: performs self care with assistance; ambulating short distances without device  Equipment Used at Home: 3-in-1 commode, walker, rolling, power chair  Assistance upon Discharge: family is available for assistance    Pain/Comfort:  Pain Rating 1: 0/10  Pain Rating Post-Intervention 1: 0/10    Patients cultural, spiritual, Buddhism conflicts given the current situation: no    Objective:     Communicated with: nurse prior to session.  Patient found HOB elevated with peripheral IV, telemetry, PureWick upon OT entry to room.    General Precautions: Standard, fall  Orthopedic Precautions:    Braces:  N/A  Respiratory Status: Room air    Occupational Performance:    Bed Mobility:    Patient completed Rolling/Turning to Right with minimum assistance  Patient completed Scooting/Bridging with minimum assistance  Patient completed Supine to Sit with minimum assistance    Functional Mobility/Transfers:  Patient completed Sit <> Stand Transfer with minimum assistance  with  hand-held assist   Patient completed Bed <> Chair Transfer using Stand Pivot technique with minimum assistance with hand-held assist    Activities of Daily Living:  Grooming: stand by assistance/setup for oral care and washing face while seated in chair    Cognitive/Visual Perceptual:  Cognitive/Psychosocial Skills:     -       Oriented to: Person, Place, and Time   -       Follows Commands/attention:Follows multistep  commands  -       Communication: clear/fluent  -       Memory: No Deficits noted  -       Safety awareness/insight to disability: impaired   -       Mood/Affect/Coping skills/emotional control: Appropriate to situation and Cooperative    Physical Exam:  Upper Extremity Range of Motion:     -       Right Upper Extremity: WFL  -       Left Upper Extremity: WFL  Upper Extremity Strength:    -       Right Upper Extremity: WFL  -       Left Upper Extremity: WFL   Strength:    -       Right Upper Extremity: WFL  -       Left Upper Extremity: WFL  Fine Motor Coordination:    -       Intact    AMPAC 6 Click ADL:  AMPAC Total Score: 18    Treatment & Education:  Patient was educated on the importance of getting out of bed, postioning and pressure relief techniques, discharge planning and equipment needs and reviewed use of call bell for assistance.     Patient left up in chair with all lines intact, call button in reach, and significant other  present    GOALS:   Multidisciplinary Problems       Occupational Therapy Goals          Problem: Occupational Therapy    Goal Priority Disciplines Outcome Interventions   Occupational Therapy Goal      OT, PT/OT     Description: Goals to be met by: 8/27/2023     Patient will increase functional independence with ADLs by performing:    UE Dressing with Supervision.  LE Dressing with Supervision.  Grooming while standing at sink with Supervision.  Toileting from toilet with Supervision for hygiene and clothing management.   Bathing from  tub bench with Minimal Assistance.                         History:     Past Medical History:   Diagnosis Date    Acute hypoxemic respiratory failure 12/12/2020    Breast cancer 10/26/2015    left breast    Cancer     RIGHT BREAST 10-15    Depression     Diabetes mellitus     Neuropathy     Panic attacks     S/P epidural steroid injection     Seizures     none since early 30's    Wears glasses     TO DRIVE         Past Surgical History:   Procedure Laterality Date    AXILLARY NODE DISSECTION Left 8/24/2020    Procedure: LYMPHADENECTOMY, AXILLARY;  Surgeon: Cory Vick MD;  Location: [facility] OR;  Service: General;  Laterality: Left;  left breast mastectomy with possible axillary lymph node dissection    BREAST BIOPSY      right    BREAST LUMPECTOMY      BREAST RECONSTRUCTION      breast reconstruction with tummy [facility]      BREAST SURGERY      11-3-15 LUMPECTOMY, 12-2-15 REEXCISION    INCISION AND DRAINAGE OF ABSCESS Left 9/9/2020    Procedure: INCISION AND DRAINAGE, ABSCESS;  Surgeon: Cory Vick MD;  Location: [facility] OR;  Service: General;  Laterality: Left;    INSERTION OF LOCALIZATION WIRE Left 8/24/2020    Procedure: INSERTION, LOCALIZATION WIRE;  Surgeon: Cory Vick MD;  Location: [facility] OR;  Service: General;  Laterality: Left;  left breast lumpectomy with wire needle loc    INSERTION OF TUNNELED CENTRAL VENOUS CATHETER (CVC) WITH SUBCUTANEOUS PORT Right 11/17/2022    Procedure: [id number];  Surgeon: Jeff Mckeon Jr., MD;  Location: [facility] OR;  Service: General;  Laterality: Right;    MASTECTOMY      mastectomy 2016      MASTECTOMY, PARTIAL  Left 3/19/2021    Procedure: MASTECTOMY, PARTIAL;  Surgeon: Cory Vick MD;  Location: [facility] OR;  Service: General;  Laterality: Left;  lumpectomy  left breast     RECONSTRUCTION OF NIPPLE Right 11/5/2018    Procedure: RECONSTRUCTION-NIPPLE RIGHT;  Surgeon: Xiang Hernandez MD;  Location: [facility] OR [address] [place] [address];  Service: Plastics;  Laterality: Right;    SENTINEL LYMPH NODE BIOPSY Left 8/24/2020    Procedure: BIOPSY, LYMPH NODE, SENTINEL;  Surgeon: Cory Vick MD;  Location: [facility] OR;  Service: General;  Laterality: Left;  left breast lumpectomy with left sentinel lymoh node       shoulder surgery and wrist      BILAT  BONE SPUR    WRIST SURGERY      RIGHT       Time Tracking:     OT Date of Treatment: 07/27/23  OT Start Time: 0933  OT Stop Time: 0954  OT Total Time (min): 21 min    Billable Minutes:Evaluation 5  Self Care/Home Management 16    7/27/2023

## 2023-07-27 NOTE — PLAN OF CARE
Problem: Adjustment to Illness (Sepsis/Septic Shock)  Goal: Optimal Coping  Outcome: Ongoing, Progressing     Problem: Glycemic Control Impaired (Sepsis/Septic Shock)  Goal: Blood Glucose Level Within Desired Range  Outcome: Ongoing, Progressing     Problem: Nutrition Impaired (Sepsis/Septic Shock)  Goal: Optimal Nutrition Intake  Outcome: Ongoing, Progressing     Problem: Impaired Wound Healing  Goal: Optimal Wound Healing  Outcome: Ongoing, Progressing

## 2023-07-27 NOTE — CARE UPDATE
07/27/23 0752   Patient Assessment/Suction   Level of Consciousness (AVPU) alert   Respiratory Effort Unlabored   Expansion/Accessory Muscles/Retractions expansion symmetric;no retractions   All Lung Fields Breath Sounds clear   Rhythm/Pattern, Respiratory depth regular;pattern regular;unlabored   PRE-TX-O2   Device (Oxygen Therapy) room air   SpO2 97 %   Pulse Oximetry Type Intermittent   $ Pulse Oximetry - Multiple Charge Pulse Oximetry - Multiple   Aerosol Therapy   $ Aerosol Therapy Charges PRN treatment not required

## 2023-07-28 PROBLEM — L30.9 DERMATITIS: Status: ACTIVE | Noted: 2023-01-01

## 2023-07-28 NOTE — ASSESSMENT & PLAN NOTE
Left upper back hyperpigmented patch  Unclear etiology  - topical triamcinolone BID and re-evaluate

## 2023-07-28 NOTE — ASSESSMENT & PLAN NOTE
Improved but unclear etiology and with hypoK and hypomag  At least partially due to glucosuria  Could be related to IV mag?  Concern for diabetes insipidus  - monitor I/O  - nephro consult

## 2023-07-28 NOTE — PLAN OF CARE
Problem: Infection  Goal: Absence of Infection Signs and Symptoms  Outcome: Ongoing, Progressing     Problem: Adjustment to Illness (Sepsis/Septic Shock)  Goal: Optimal Coping  Outcome: Ongoing, Progressing     Problem: Fluid Imbalance (Pneumonia)  Goal: Fluid Balance  Outcome: Ongoing, Progressing     Problem: Diabetes Comorbidity  Goal: Blood Glucose Level Within Targeted Range  Outcome: Ongoing, Progressing

## 2023-07-28 NOTE — ASSESSMENT & PLAN NOTE
Improved   - neutropenic precautions lifted  - hem/onc consult: ordered filgrastim & ok for dc from their standpoint  - trend wbc on cbc daily

## 2023-07-28 NOTE — PROGRESS NOTES
"[facility]  Adult Nutrition   Progress Note (Follow-Up)    SUMMARY     Recommendations  1. Continue current diabetic, neutropenic diet as tolerated.   2. Continue [place] Glucose Support BID and [place] BID x 14 days.   3. Continue to encourage po intake.   4. RD to monitor intake, labs and weight.  Goals:   1. Pt to meet >75% of EEN/EPN.   2. Progression of wound healing.   3. Electrolytes to stablize to reference range.  Nutrition Goal Status: progressing towards goal    Dietitian Rounds Brief  Pt seems to be eating well and meeting needs at this time. No new concerns.     Diet order:   Current Diet Order: regular, neutropenic                 Evaluation of Received Nutrient/Fluid Intake  Tolerance: tolerating     % Intake of Estimated Energy Needs: 75 - 100 %  % Meal Intake: 75 - 100 %      Intake/Output Summary (Last 24 hours) at 2023 1248  Last data filed at 2023 0837  Gross per 24 hour   Intake 1200 ml   Output 2600 ml   Net -1400 ml        Anthropometrics  Temp: 98.2 °F (36.8 °C)  Height Method: Stated  Height: 5' 11" (180.3 cm)  Height (inches): 71 in  Weight Method: Bed Scale  Weight: 64.5 kg (142 lb 3.2 oz)  Weight (lb): 142.2 lb  Ideal Body Weight (IBW), Female: 155 lb  % Ideal Body Weight, Female (lb): 91.74 %  BMI (Calculated): 19.8  BMI Grade: 18.5-24.9 - normal  Usual Body Weight (UBW), k.6 kg (2020)  % Usual Body Weight: 74.64       Estimated/Assessed Needs  Weight Used For Calorie Calculations: 64.5 kg (142 lb 3.2 oz)  Energy Calorie Requirements (kcal):   Energy Need Method: Kcal/kg (30-35)  Protein Requirements: 78-97 (1.2-1.5)  Weight Used For Protein Calculations: 64.5 kg (142 lb 3.2 oz)  Fluid Requirements (mL):      RDA Method (mL):   CHO Requirement: 45-60gm CHO per meal    Reason for Assessment  Reason For Assessment: RD follow-up  Diagnosis: infection/sepsis  Relevant Medical History: breast cancer   undergoing chemotherapy,  Previous " radiation,   COPD, smoking, DM2, HTN, HLD  General Information Comments: Patient with metastatic breast cancer currently undergoing chemotherapy is admitted after being sent to ER due to outpatient labs showing hypokalemia. She was found to have sepsis attributed to right lower lobe pneumonia that had somewhat failed outpatient antibiotic therapy with levaquin due to poor tolerance. Pt laying in bed sleeping and asked RD to come back later. After x2 attempts. unable to obtain information. Per chart, pt with good intake of lunch (100%). Per chart, pt with poor PO intakes associated with nausea. Her last chemo was on Monday. No other GI distress. LBM . Skin per wound care note : Sacral / fold pink wound bed 2x1.1x0.3cm stage 2 pressure injury cleaned and dried and applied. Wt reviewed. UBW 86.6kg (2020), admit wt 64.5kg reflecting 26% wt loss x 3 years. Average wt loss of 9% per year. NFPE not able to completed at this time. Pt is at high risk for malnutrition. Attached low bacteria diet and diabetic diet education to d/c paperwork.  Nutrition Discharge Planning: Diabetic diet, low bacteria (no raw foods)    Nutrition/Diet History  Spiritual, Cultural Beliefs, Temple Practices, Values that Affect Care: no  Food Allergies: NKFA  Factors Affecting Nutritional Intake: decreased appetite, nausea/vomiting    Nutrition Risk Screen  Nutrition Risk Screen: large or nonhealing wound, burn or pressure injury       Altered Skin Integrity 23 0845 midline Sacral spine Ulceration Partial thickness tissue loss. Shallow open ulcer with a red or pink wound bed, without slough. Intact or Open/Ruptured Serum-filled blister.-Wound Image: Images linked  MST Score: 4  Have you recently lost weight without trying?: Yes: 24-33 lbs  Weight loss score: 3  Have you been eating poorly because of a decreased appetite?: Yes  Appetite score: 1       Weight History:  Wt Readings from Last 10 Encounters:   23 64.5 kg  (142 lb 3.2 oz)   07/24/23 58.1 kg (128 lb)   07/24/23 58.4 kg (128 lb 11.2 oz)   07/17/23 60 kg (132 lb 3.2 oz)   07/17/23 60 kg (132 lb 3.2 oz)   07/13/23 61.5 kg (135 lb 8 oz)   07/12/23 60.3 kg (133 lb)   07/07/23 60.8 kg (134 lb)   07/07/23 60.8 kg (134 lb)   06/26/23 62.6 kg (138 lb)        Lab/Procedures/Meds: Pertinent Labs/Meds Reviewed    Medications:Pertinent Medications Reviewed  Scheduled Meds:   docusate sodium  100 mg Oral BID    droNABinol  5 mg Oral BID AC    filgrastim-sndz  480 mcg Subcutaneous Daily    insulin detemir U-100  13 Units Subcutaneous BID    levoFLOXacin  750 mg Oral Daily    metoprolol tartrate  25 mg Oral BID    mupirocin   Nasal BID    zolpidem  10 mg Oral QHS     Continuous Infusions:  PRN Meds:.sodium chloride, acetaminophen, albuterol-ipratropium, benzonatate, dextrose 50%, dextrose 50%, diazePAM, glucagon (human recombinant), glucose, glucose, HYDROcodone-acetaminophen, insulin aspart U-100, melatonin, morphine, naloxone, ondansetron, polyethylene glycol, promethazine (PHENERGAN) IVPB, senna-docusate 8.6-50 mg, simethicone, sodium chloride 0.9%    Labs: Pertinent Labs Reviewed  Clinical Chemistry:  Recent Labs   Lab 07/28/23  0516      K 3.3*      CO2 26   *   BUN 29*   CREATININE 1.2   CALCIUM 9.6   PROT 6.0   ALBUMIN 1.9*   BILITOT 0.5   ALKPHOS 385*   AST 27   ALT 30   ANIONGAP 4*   MG 1.5*     CBC:   Recent Labs   Lab 07/28/23  0516   WBC 2.28*   RBC 2.81*   HGB 8.1*   HCT 26.0*   PLT 84*   MCV 93   MCH 28.8   MCHC 31.2*     Lipid Panel:  No results for input(s): CHOL, HDL, LDLCALC, TRIG, CHOLHDL in the last 168 hours.  Cardiac Profile:  No results for input(s): BNP, CPK, CPKMB, TROPONINI, CKTOTAL in the last 168 hours.  Inflammatory Labs:  No results for input(s): CRP in the last 168 hours.  Diabetes:  Recent Labs   Lab 07/24/23 1952   HGBA1C 9.1*     Thyroid & Parathyroid:  No results for input(s): TSH, FREET4, Z7AEQOT, N5UODBC, THYROIDAB in the last  168 hours.    Monitor and Evaluation  Food and Nutrient Intake: energy intake, food and beverage intake  Food and Nutrient Adminstration: diet order  Knowledge/Beliefs/Attitudes: food and nutrition knowledge/skill  Physical Activity and Function: nutrition-related ADLs and IADLs  Anthropometric Measurements: weight, weight change, body mass index  Biochemical Data, Medical Tests and Procedures: electrolyte and renal panel, gastrointestinal profile, glucose/endocrine profile  Nutrition-Focused Physical Findings: overall appearance     Nutrition Risk  Level of Risk/Frequency of Follow-up: moderate     Nutrition Follow-Up  RD Follow-up?: Yes      Shaniqua Wellington, Student Dietitian 07/28/2023 12:48 PM

## 2023-07-28 NOTE — CARE UPDATE
07/28/23 0939   Patient Assessment/Suction   Level of Consciousness (AVPU) alert   Respiratory Effort Unlabored   Expansion/Accessory Muscles/Retractions no use of accessory muscles;no retractions;expansion symmetric   All Lung Fields Breath Sounds clear   Rhythm/Pattern, Respiratory unlabored;pattern regular;depth regular;no shortness of breath reported   Cough Frequency frequent   PRE-TX-O2   Device (Oxygen Therapy) room air   SpO2 96 %   Pulse Oximetry Type Intermittent   $ Pulse Oximetry - Multiple Charge Pulse Oximetry - Multiple   Pulse 97   Resp 18   Positioning HOB elevated 30 degrees   Aerosol Therapy   $ Aerosol Therapy Charges Aerosol Treatment   Respiratory Treatment Status (SVN) given   Treatment Route (SVN) mask;oxygen   Patient Position (SVN) HOB elevated   Post Treatment Assessment (SVN) increased aeration   Signs of Intolerance (SVN) none   Breath Sounds Post-Respiratory Treatment   Throughout All Fields Post-Treatment All Fields   Throughout All Fields Post-Treatment aeration increased   Post-treatment Heart Rate (beats/min) 100   Post-treatment Resp Rate (breaths/min) 103

## 2023-07-28 NOTE — ASSESSMENT & PLAN NOTE
Resp culture with serratia and leclercia  MRSA PCR negative  CXR R lower/basilar ?infiltrate  - continue oral levaquin 750mg daily for 10 day course

## 2023-07-28 NOTE — ASSESSMENT & PLAN NOTE
Ms. Castro continues to improve.  She has been afebrile and has had no growth on her blood cultures.  She remains on neupogen at this time and abx.  Labs have improved and I feel she is ok for discharge today.  Will have her follow up with Dr. Vick next week and discussed this today.

## 2023-07-28 NOTE — PROGRESS NOTES
[facility]  Hematology/Oncology  Progress Note    Patient Name: Negrita Castro  Admission Date: 7/24/2023  Hospital Length of Stay: 4 days  Code Status: Full Code     Subjective:     HPI:  Patient is doing better today.  No new complaints      Interval History:     Oncology Treatment Plan:   OP SACITUZUMAB GOVITECAN-HZIY Q3W    Medications:  Continuous Infusions:  Scheduled Meds:   docusate sodium  100 mg Oral BID    droNABinol  5 mg Oral BID AC    filgrastim-sndz  480 mcg Subcutaneous Daily    insulin detemir U-100  13 Units Subcutaneous BID    levoFLOXacin  750 mg Oral Daily    metoprolol tartrate  25 mg Oral BID    mupirocin   Nasal BID    zolpidem  10 mg Oral QHS     PRN Meds:sodium chloride, acetaminophen, albuterol-ipratropium, benzonatate, dextrose 50%, dextrose 50%, diazePAM, glucagon (human recombinant), glucose, glucose, HYDROcodone-acetaminophen, insulin aspart U-100, melatonin, morphine, naloxone, ondansetron, polyethylene glycol, promethazine (PHENERGAN) IVPB, senna-docusate 8.6-50 mg, simethicone, sodium chloride 0.9%     Review of Systems   Constitutional:  Negative for fever.   Respiratory:  Negative for shortness of breath.    Cardiovascular:  Negative for chest pain and leg swelling.   Gastrointestinal:  Negative for abdominal pain and blood in stool.   Genitourinary:  Negative for hematuria.   Skin:  Negative for rash.   Objective:     Vital Signs (Most Recent):  Temp: 98.7 °F (37.1 °C) (07/27/23 2043)  Pulse: 91 (07/28/23 0035)  Resp: 17 (07/28/23 0035)  BP: 107/72 (07/28/23 0035)  SpO2: 97 % (07/28/23 0035) Vital Signs (24h Range):  Temp:  [98 °F (36.7 °C)-98.7 °F (37.1 °C)] 98.7 °F (37.1 °C)  Pulse:  [] 91  Resp:  [16-17] 17  SpO2:  [97 %-99 %] 97 %  BP: (107-125)/(65-81) 107/72     Weight: 64.5 kg (142 lb 3.2 oz)  Body mass index is 19.83 kg/m².  Body surface area is 1.8 meters squared.      Intake/Output Summary (Last 24 hours) at 7/28/2023 2342  Last data filed  at 7/28/2023 0415  Gross per 24 hour   Intake 1660 ml   Output 2200 ml   Net -540 ml          Physical Exam  Constitutional:       Appearance: She is well-developed.   HENT:      Head: Normocephalic and atraumatic.      Right Ear: External ear normal.      Left Ear: External ear normal.   Eyes:      Conjunctiva/sclera: Conjunctivae normal.      Pupils: Pupils are equal, round, and reactive to light.   Neck:      Thyroid: No thyromegaly.      Trachea: No tracheal deviation.   Cardiovascular:      Rate and Rhythm: Normal rate and regular rhythm.      Heart sounds: Normal heart sounds.   Pulmonary:      Effort: Pulmonary effort is normal.      Breath sounds: Normal breath sounds.   Abdominal:      General: Bowel sounds are normal. There is no distension.      Palpations: Abdomen is soft. There is no mass.      Tenderness: There is no abdominal tenderness.   Skin:     Findings: No rash.   Neurological:      Comments: Neuro intact througout   Psychiatric:         Behavior: Behavior normal.         Thought Content: Thought content normal.         Judgment: Judgment normal.        Significant Labs:   CBC:   Recent Labs   Lab 07/26/23  1024 07/27/23  0430   WBC 2.06* 2.25*   HGB 8.3* 8.0*   HCT 26.3* 25.2*   PLT 40* 52*      and CMP:   Recent Labs   Lab 07/26/23  1024 07/27/23  0430    141   K 3.2* 3.1*    111*   CO2 23 26   * 131*   BUN 22 26*   CREATININE 1.3 1.2   CALCIUM 8.6* 9.0   PROT 5.3* 5.4*   ALBUMIN 1.8* 1.8*   BILITOT 0.5 0.5   ALKPHOS 346* 337*   AST 35 27   ALT 32 30   ANIONGAP 8 4*         Diagnostic Results:  None    Assessment/Plan:     Breast carcinoma metastatic to multiple sites  Ms. Castro continues to improve.  She has been afebrile and has had no growth on her blood cultures.  She remains on neupogen at this time and abx.  Labs have improved and I feel she is ok for discharge today.  Will have her follow up with Dr. Vick next week and discussed this today.         Thank you for  your consult. I will follow-up with patient. Please contact us if you have any additional questions.     Dimitris Rangel MD  Hematology/Oncology  [facility]

## 2023-07-28 NOTE — PT/OT/SLP PROGRESS
Physical Therapy      Patient Name:  Negrita Castro   MRN:  [id number]    Patient not seen today secondary to Patient unwilling to participate (Pt was upset because of her labs results). Will follow-up .

## 2023-07-28 NOTE — CONSULTS
Critical access hospital  Nephrology  Consult Note    Patient Name: Negrita Castro  MRN: [id number]  Admission Date: 7/24/2023  Hospital Length of Stay: 4 days  Attending Provider: Rosalino Piña MD   Primary Care Physician: Manuelito Shepard MD  Principal Problem:Severe sepsis    Inpatient consult to Nephrology  Consult performed by: RHINA Moore  Consult ordered by: RHINA Moore  Reason for consult: polyuria concern for diabetes insipidus, persistent hypokalemia and hypomagnesemia      Subjective:     HPI:   Negrita Castro  female with a past medical history significant for breast cancer  with metastasis to liver and bones (currently undergoing chemotherapy), COPD, type 2 diabetes mellitus, hypertension,  and hyperlipidemia appear she presented to [facility] Emergency Department at the request of her oncologist for evaluation of persistent hypokalemia.  She reports that on Monday she began a new chemotherapy regimen TRODELVY (sacituzumab govitecan-hziy), which she did not tolerate very well per her report. She was treated for pneumonia with Levaquin recently, although family reports she was unable to tolerate this due to persistent nausea / vomiting with  administration of this medication. To this provider, she reports she has been eating and drinking well.  She reports she is maintained on cannabis and has a very good appetite.   She reports she drinks at least  8-9 20 oz bottles of water daily.      Nephrology has been consulted today for evaluation of  poly urea with concerns for diabetes insipidus and persistent hypokalemia and hypomagnesemia.   Will evaluate    Past Medical History:   Diagnosis Date    Acute hypoxemic respiratory failure 12/12/2020    Breast cancer 10/26/2015    left breast    Cancer     RIGHT BREAST 10-15    Depression     Diabetes mellitus     Neuropathy     Panic attacks     S/P epidural steroid injection     Seizures     none since early 30's    Wears glasses     TO DRIVE        Past Surgical History:   Procedure Laterality Date    AXILLARY NODE DISSECTION Left 8/24/2020    Procedure: LYMPHADENECTOMY, AXILLARY;  Surgeon: Cory Vick MD;  Location: [facility] OR;  Service: General;  Laterality: Left;  left breast mastectomy with possible axillary lymph node dissection    BREAST BIOPSY      right    BREAST LUMPECTOMY      BREAST RECONSTRUCTION      breast reconstruction with tummy [facility]      BREAST SURGERY      11-3-15 LUMPECTOMY, 12-2-15 REEXCISION    INCISION AND DRAINAGE OF ABSCESS Left 9/9/2020    Procedure: INCISION AND DRAINAGE, ABSCESS;  Surgeon: Cory Vick MD;  Location: [facility] OR;  Service: General;  Laterality: Left;    INSERTION OF LOCALIZATION WIRE Left 8/24/2020    Procedure: INSERTION, LOCALIZATION WIRE;  Surgeon: Cory Vick MD;  Location: [facility] OR;  Service: General;  Laterality: Left;  left breast lumpectomy with wire needle loc    INSERTION OF TUNNELED CENTRAL VENOUS CATHETER (CVC) WITH SUBCUTANEOUS PORT Right 11/17/2022    Procedure: WLQUWNEUQ-AZRO-E-CATH;  Surgeon: Jeff Mckeon Jr., MD;  Location: [facility] OR;  Service: General;  Laterality: Right;    MASTECTOMY      mastectomy 2016      MASTECTOMY, PARTIAL Left 3/19/2021    Procedure: MASTECTOMY, PARTIAL;  Surgeon: Cory Vick MD;  Location: [facility] OR;  Service: General;  Laterality: Left;  lumpectomy  left breast     RECONSTRUCTION OF NIPPLE Right 11/5/2018    Procedure: RECONSTRUCTION-NIPPLE RIGHT;  Surgeon: Xiang Hernandez MD;  Location: [address];  Service: Plastics;  Laterality: Right;    SENTINEL LYMPH NODE BIOPSY Left 8/24/2020    Procedure: BIOPSY, LYMPH NODE, SENTINEL;  Surgeon: Cory Vick MD;  Location: [facility] OR;  Service: General;  Laterality: Left;  left breast lumpectomy with left sentinel lymoh node       shoulder surgery and wrist      BILAT  BONE SPUR    WRIST SURGERY      RIGHT       Review of patient's allergies indicates:   Allergen Reactions     Adhesive tape-silicones Hives     BANDAIDS    Polymycin Hives    Adhesive     Latex, natural rubber Hives and Itching    Neomycin-bacitracnzn-polymyxnb     Polyurethane-39 Hives     Current Facility-Administered Medications   Medication Frequency    0.9%  NaCl infusion (for blood administration) Q24H PRN    acetaminophen tablet 650 mg Q8H PRN    albuterol-ipratropium 2.5 mg-0.5 mg/3 mL nebulizer solution 3 mL Q4H PRN    benzonatate capsule 100 mg TID PRN    dextrose 50% injection 12.5 g PRN    dextrose 50% injection 25 g PRN    diazePAM tablet 2 mg QID PRN    docusate sodium capsule 100 mg BID    droNABinol capsule 5 mg BID AC    filgrastim-sndz (ZARXIO) injection 480 mcg/0.8 mL (Preferred Regimen) Daily    glucagon (human recombinant) injection 1 mg PRN    glucose chewable tablet 16 g PRN    glucose chewable tablet 24 g PRN    HYDROcodone-acetaminophen  mg per tablet 1 tablet Q6H PRN    insulin aspart U-100 pen 1-10 Units QID (AC + HS) PRN    insulin detemir U-100 (Levemir) pen 13 Units BID    levoFLOXacin tablet 750 mg Daily    melatonin tablet 6 mg Nightly PRN    metoprolol tartrate (LOPRESSOR) tablet 25 mg BID    morphine injection 4 mg Q4H PRN    mupirocin 2 % ointment BID    naloxone 0.4 mg/mL injection 0.02 mg PRN    ondansetron injection 4 mg Q6H PRN    polyethylene glycol packet 17 g BID PRN    promethazine (PHENERGAN) 6.25 mg in dextrose 5 % (D5W) 50 mL IVPB Q6H PRN    senna-docusate 8.6-50 mg per tablet 1 tablet BID PRN    simethicone chewable tablet 80 mg QID PRN    sodium chloride 0.9% flush 10 mL PRN    triamcinolone acetonide 0.1% cream BID    zolpidem tablet 10 mg QHS     Family History    None       Tobacco Use    Smoking status: Former     Packs/day: 0.25     Years: 30.00     Pack years: 7.50     Types: Cigarettes     Quit date: 2015     Years since quittin.8    Smokeless tobacco: Never   Substance and Sexual Activity    Alcohol use: Yes     Alcohol/week: 0.0 standard drinks      Comment: RARELY    Drug use: Not Currently     Types: Marijuana     Comment: medical marijuana    Sexual activity: Not on file     Review of Systems  Objective:     Vital Signs (Most Recent):  Temp: 98.2 °F (36.8 °C) (07/28/23 0734)  Pulse: 97 (07/28/23 0939)  Resp: 18 (07/28/23 0939)  BP: 111/84 (07/28/23 0734)  SpO2: 96 % (07/28/23 0939) Vital Signs (24h Range):  Temp:  [98.1 °F (36.7 °C)-98.7 °F (37.1 °C)] 98.2 °F (36.8 °C)  Pulse:  [] 97  Resp:  [16-18] 18  SpO2:  [96 %-99 %] 96 %  BP: (107-125)/(65-84) 111/84     Weight: 64.5 kg (142 lb 3.2 oz) (07/24/23 2208)  Body mass index is 19.83 kg/m².  Body surface area is 1.8 meters squared.    I/O last 3 completed shifts:  In: 2310 [P.O.:2240; I.V.:70]  Out: 3650 [Urine:3650]    Physical Exam  Vitals and nursing note reviewed.   Constitutional:       General: She is not in acute distress.     Appearance: Normal appearance. She is underweight. She is not ill-appearing, toxic-appearing or diaphoretic.   HENT:      Head: Normocephalic and atraumatic.      Mouth/Throat:      Mouth: Mucous membranes are moist.   Cardiovascular:      Rate and Rhythm: Normal rate and regular rhythm.      Pulses: Normal pulses.      Heart sounds: Normal heart sounds.     No friction rub. No gallop.   Pulmonary:      Effort: Pulmonary effort is normal. No respiratory distress.      Breath sounds: Normal breath sounds. No wheezing, rhonchi or rales.   Abdominal:      General: Abdomen is flat. Bowel sounds are normal. There is no distension.      Palpations: Abdomen is soft.      Tenderness: There is no abdominal tenderness. There is no guarding or rebound.   Musculoskeletal:      Right lower leg: No edema.      Left lower leg: No edema.   Skin:     General: Skin is warm and dry.      Capillary Refill: Capillary refill takes less than 2 seconds.   Neurological:      General: No focal deficit present.      Mental Status: She is alert and oriented to person, place, and time. Mental status is  at baseline.      Motor: No weakness.       Significant Labs:  ABGs: No results for input(s): PH, PCO2, HCO3, POCSATURATED, BE in the last 168 hours.  BMP:   Recent Labs   Lab 07/28/23  0516   *      K 3.3*      CO2 26   BUN 29*   CREATININE 1.2   CALCIUM 9.6   MG 1.5*     Cardiac Markers: No results for input(s): CKMB, TROPONINT, MYOGLOBIN in the last 168 hours.  CBC:   Recent Labs   Lab 07/28/23  0516   WBC 2.28*   RBC 2.81*   HGB 8.1*   HCT 26.0*   PLT 84*   MCV 93   MCH 28.8   MCHC 31.2*     CMP:   Recent Labs   Lab 07/28/23  0516   *   CALCIUM 9.6   ALBUMIN 1.9*   PROT 6.0      K 3.3*   CO2 26      BUN 29*   CREATININE 1.2   ALKPHOS 385*   ALT 30   AST 27   BILITOT 0.5     Coagulation: No results for input(s): PT, INR, APTT in the last 168 hours.  LFTs:   Recent Labs   Lab 07/28/23  0516   ALT 30   AST 27   ALKPHOS 385*   BILITOT 0.5   PROT 6.0   ALBUMIN 1.9*     Microbiology Results (last 7 days)       Procedure Component Value Units Date/Time    Blood culture x two cultures. Draw prior to antibiotics. [[id number]] Collected: 07/24/23 1600    Order Status: Completed Specimen: Blood Updated: 07/27/23 1832     Blood Culture, Routine No Growth to date      No Growth to date      No Growth to date      No Growth to date    Narrative:      Aerobic and anaerobic  Collection has been rescheduled by 2MB at 07/24/2023 15:43 Reason:   Unable to collect...rn to pull blood from port per DR Olmedo  Collection has been rescheduled by 2MB at 07/24/2023 15:43 Reason:   Unable to collect...rn to pull blood from port per DR Olmedo    Blood culture x two cultures. Draw prior to antibiotics. [[id number]] Collected: 07/24/23 1628    Order Status: Completed Specimen: Blood Updated: 07/27/23 1832     Blood Culture, Routine No Growth to date      No Growth to date      No Growth to date      No Growth to date    Narrative:      Aerobic and anaerobic  Collection has been rescheduled by 2MB at  07/24/2023 15:43 Reason:   Unable to collect...rn to pull blood from port per DR Olmedo  Collection has been rescheduled by 2MB at 07/24/2023 15:43 Reason:   Unable to collect...rn to pull blood from port per DR Olmedo    MRSA Screen by PCR [[id number]] Collected: 07/25/23 0830    Order Status: Completed Specimen: Nasopharyngeal Swab from Nasal Updated: 07/25/23 1053     MRSA SCREEN BY PCR Negative          Recent Labs   Lab 07/24/23  1618   COLORU Yellow   SPECGRAV 1.015   PHUR 6.0   PROTEINUA 1+*   BACTERIA Negative   NITRITE Negative   LEUKOCYTESUR Negative   UROBILINOGEN Negative   HYALINECASTS 0.00*     All labs within the past 24 hours have been reviewed.    Significant Imaging:  Pertinenet labs and imaging reveiwed.     Assessment/Plan:     Polyuria  -reports she drinks 8-9 20 oz bottles of water daily  -obtain 24 hr urine calcium, magnesium, creatinine, sodium, and potassium   -obtain serum and urine osm  -strict I&O over next 24 hrs  -will obtain urine volume on 24 hr urine studies  -place devi cathter for next 24 hrs (to aid in collection of above urine studies)  -further recommendations pending evaluation of above.    Hypomagnesemia  -possible side effect of chemotherapeutic agents  -f/u fractional excretion of magnesium  -daily Mg supplements --> Mag Ox 400 mg BID for now    Hypokalemia  -possible side effect of chemotherapeutic agents  -replaced with 80 mEq today--> f/u levels  -replace Mg levels as above  -f/u urine potassium    Hypercalcemia  -likely of malignancy  -calcium 11.6 mg/dl when corrected for severe hypoalbuminemia  -f/u ionized calcium  -f/u PTH and PTHrp  -f/u Vit D 25-OH and 1,25 OH  -avoid calcium or vit d supplementation       RHINA Moore  Nephrology  [facility]

## 2023-07-28 NOTE — ASSESSMENT & PLAN NOTE
Improved   Likely chemo-related  No active bleeding  - neutropenic precautions  - trend cbc daily  - hem/onc consult: ordered filgrastim & ok for dc from their standpoint  - transfuse blood products as clinically indicated

## 2023-07-28 NOTE — PLAN OF CARE
Problem: Adult Inpatient Plan of Care  Goal: Plan of Care Review  Outcome: Ongoing, Not Progressing  Goal: Patient-Specific Goal (Individualized)  Outcome: Ongoing, Not Progressing  Goal: Absence of Hospital-Acquired Illness or Injury  Outcome: Ongoing, Not Progressing  Goal: Optimal Comfort and Wellbeing  Outcome: Ongoing, Not Progressing  Goal: Readiness for Transition of Care  Outcome: Ongoing, Not Progressing     Problem: Infection  Goal: Absence of Infection Signs and Symptoms  Outcome: Ongoing, Not Progressing     Problem: Adjustment to Illness (Sepsis/Septic Shock)  Goal: Optimal Coping  Outcome: Ongoing, Not Progressing     Problem: Bleeding (Sepsis/Septic Shock)  Goal: Absence of Bleeding  Outcome: Ongoing, Not Progressing     Problem: Glycemic Control Impaired (Sepsis/Septic Shock)  Goal: Blood Glucose Level Within Desired Range  Outcome: Ongoing, Not Progressing     Problem: Infection Progression (Sepsis/Septic Shock)  Goal: Absence of Infection Signs and Symptoms  Outcome: Ongoing, Not Progressing     Problem: Nutrition Impaired (Sepsis/Septic Shock)  Goal: Optimal Nutrition Intake  Outcome: Ongoing, Not Progressing     Problem: Fluid Imbalance (Pneumonia)  Goal: Fluid Balance  Outcome: Ongoing, Not Progressing     Problem: Infection (Pneumonia)  Goal: Resolution of Infection Signs and Symptoms  Outcome: Ongoing, Not Progressing     Problem: Respiratory Compromise (Pneumonia)  Goal: Effective Oxygenation and Ventilation  Outcome: Ongoing, Not Progressing     Problem: Diabetes Comorbidity  Goal: Blood Glucose Level Within Targeted Range  Outcome: Ongoing, Not Progressing     Problem: Impaired Wound Healing  Goal: Optimal Wound Healing  Outcome: Ongoing, Not Progressing     Problem: Skin Injury Risk Increased  Goal: Skin Health and Integrity  Outcome: Ongoing, Not Progressing     Problem: Fall Injury Risk  Goal: Absence of Fall and Fall-Related Injury  Outcome: Ongoing, Not Progressing     Problem: Oral  Intake Inadequate  Goal: Improved Oral Intake  Outcome: Ongoing, Not Progressing

## 2023-07-28 NOTE — PT/OT/SLP PROGRESS
Occupational Therapy      Patient Name:  Negrita Castro   MRN:  [id number]    Patient not seen today secondary to Patient unwilling to participate (pt stated she walked already and been to bathroom 2 times and was having tailebone pain when she sits up. OT offered UE ex in bed, pt declined but receptive to handout and tband issued to her to do on her own. spouse was present.). Will follow-up next service date.    7/28/2023

## 2023-07-28 NOTE — ASSESSMENT & PLAN NOTE
This patient does have evidence of infective focus  My overall impression is severe sepsis.  Source: Respiratory  Antibiotics given-   Antibiotics (72h ago, onward)    Start     Stop Route Frequency Ordered    07/26/23 0945  levoFLOXacin tablet 750 mg         -- Oral Daily 07/26/23 0842    07/25/23 0900  mupirocin 2 % ointment         07/30 0859 Nasl 2 times daily 07/24/23 2247        Latest lactate reviewed-  Organ dysfunction indicated by lactic acidosis. also with pancytopenia  Fluid challenge given with normal saline; see MAR  Post- resuscitation assessment No - Post resuscitation assessment not needed   Will Not start Pressors  Source control achieved by: antibiotics  - see gram negative pneumonia section

## 2023-07-28 NOTE — ASSESSMENT & PLAN NOTE
Much improved but needing daily replacement  - tele  - replace with PO today again  - recheck level in AM and replace more if needed  - encourage diet

## 2023-07-28 NOTE — ASSESSMENT & PLAN NOTE
Mild but persistent  - replace with PO today again  - recheck level tomorrow and replace more if needed  - encourage diet

## 2023-07-28 NOTE — PROGRESS NOTES
[facility]  Progress Note    Patient Name: Negrita Castro  MRN: [id number]  Patient Class: IP- Inpatient   Admission Date: 2023  Length of Stay: 4 days  Attending Physician: Rosalino Piña MD  Primary Care Provider: Manuelito Shepard MD        Subjective:     Principal Problem:Severe sepsis        HPI:  Per Dr. Adrienne Lee: Negrita Castro is a 63 y.o. White female   With PMH of breast cancer   undergoing chemotherapy,  Previous radiation,   COPD, smoking, DM2, HTN, HLD,  who presents with hypokalemia.  Sent from oncology office.     Pt reports worsening nausea since Monday  +vomiting previously, but not today  +decreased po intake  +malaise  +generalized weakness  +chills  +fever, Tmax 100.1  +dry cough  No SOB  +R chest pain  Last dose chemo on Monday  Treated for pneumonia over the weekend  However pt was unable to complete levaquin  (due to nausea)      Overview/Hospital Course:  Patient with metastatic breast cancer currently undergoing chemotherapy is admitted after being sent to ER due to outpatient labs showing hypokalemia. She was found to have sepsis attributed to right lower lobe pneumonia that had somewhat failed outpatient antibiotic therapy with levaquin due to poor tolerance attributed to recent chemo. Given IVF and potassium/magnesium replacement. Started on vancomycin and cefepime. Vancomycin discontinued after negative MRSA PCR swab. Respiratory culture growing GNR. Also noted to be pancytopenic so placed on neutropenic precautions. Hem/onc consulted and ordered filgrastim. Respiratory culture grew serratia and leclercia species. Antibiotics changed to oral levaquin and monitored for tolerance. She also had a stage 2 sacral/manoj fold ulcer on arrival for which wound care followed. Nephrology consulted for polyuria with persistent hypokalemia and hypomagnesemia.      Interval History: Patient seen and examined. NAEON. Feeling general well. Has itchy  darkened skin to left upper back recently noticed.      Objective:     Vital Signs (Most Recent):  Temp: 98.2 °F (36.8 °C) (07/28/23 0734)  Pulse: 97 (07/28/23 0939)  Resp: 18 (07/28/23 0939)  BP: 111/84 (07/28/23 0734)  SpO2: 96 % (07/28/23 0939) Vital Signs (24h Range):  Temp:  [98.1 °F (36.7 °C)-98.7 °F (37.1 °C)] 98.2 °F (36.8 °C)  Pulse:  [] 97  Resp:  [16-18] 18  SpO2:  [96 %-99 %] 96 %  BP: (107-125)/(65-84) 111/84     Weight: 64.5 kg (142 lb 3.2 oz)  Body mass index is 19.83 kg/m².    Intake/Output Summary (Last 24 hours) at 7/28/2023 1441  Last data filed at 7/28/2023 0837  Gross per 24 hour   Intake 1200 ml   Output 2600 ml   Net -1400 ml         Physical Exam  Vitals reviewed.   Constitutional:       General: She is not in acute distress.  HENT:      Head: Normocephalic and atraumatic.   Cardiovascular:      Rate and Rhythm: Normal rate and regular rhythm.   Pulmonary:      Effort: Pulmonary effort is normal. No respiratory distress.   Skin:     General: Skin is warm and dry.      Comments: Left upper back hyperpigmented patch   Neurological:      Mental Status: She is alert.   Psychiatric:         Mood and Affect: Affect normal.         Behavior: Behavior normal.         Thought Content: Thought content normal.           Significant Labs: All pertinent labs within the past 24 hours have been reviewed.    Significant Imaging: I have reviewed all pertinent imaging results/findings within the past 24 hours.      Assessment/Plan:      * Severe sepsis  This patient does have evidence of infective focus  My overall impression is severe sepsis.  Source: Respiratory  Antibiotics given-   Antibiotics (72h ago, onward)    Start     Stop Route Frequency Ordered    07/26/23 0945  levoFLOXacin tablet 750 mg         -- Oral Daily 07/26/23 0842    07/25/23 0900  mupirocin 2 % ointment         07/30 0859 Nasl 2 times daily 07/24/23 2247        Latest lactate reviewed-  Organ dysfunction indicated by lactic  acidosis. also with pancytopenia  Fluid challenge given with normal saline; see MAR  Post- resuscitation assessment No - Post resuscitation assessment not needed   Will Not start Pressors  Source control achieved by: antibiotics  - see gram negative pneumonia section    Dermatitis  Left upper back hyperpigmented patch  Unclear etiology  - topical triamcinolone BID and re-evaluate    Polyuria  Improved but unclear etiology and with hypoK and hypomag  At least partially due to glucosuria  Could be related to IV mag?  Concern for diabetes insipidus  - monitor I/O  - nephro consult    Stage 2 skin ulcer of sacral region  POA   fold area  - wound care  - turn q2    Hypomagnesemia  Mild but persistent  - replace with PO today again  - recheck level tomorrow and replace more if needed  - encourage diet    Hypokalemia  Much improved but needing daily replacement  - tele  - replace with PO today again  - recheck level in AM and replace more if needed  - encourage diet    Gram-negative pneumonia  Resp culture with serratia and leclercia  MRSA PCR negative  CXR R lower/basilar ?infiltrate  - continue oral levaquin 750mg daily for 10 day course    Pancytopenia  Improved   Likely chemo-related  No active bleeding  - neutropenic precautions  - trend cbc daily  - hem/onc consult: ordered filgrastim & ok for dc from their standpoint  - transfuse blood products as clinically indicated    Dehydration  Resolved     Essential hypertension  Chronic, controlled  - hold acei due to normotension; resume when appropriate  - ok to continue the metoprolol and monitor    Chronic obstructive pulmonary disease  Chronic, stable  - duoneb prn    Chronic hypoxemic respiratory failure  Noted hx uses 2.5L O2 NC prn  - O2 prn    Chemotherapy-induced neutropenia  Improved   - neutropenic precautions lifted  - hem/onc consult: ordered filgrastim & ok for dc from their standpoint  - trend wbc on cbc daily    Type 2 diabetes mellitus with  hyperglycemia, with long-term current use of insulin  Patient's FSGs are controlled on current medication regimen.  Last A1c reviewed-   Lab Results   Component Value Date    HGBA1C 9.1 (H) 07/24/2023     Most recent fingerstick glucose reviewed-   Current correctional scale  Medium  Maintain anti-hyperglycemic dose as follows-   Antihyperglycemics (From admission, onward)    Start     Stop Route Frequency Ordered    07/27/23 0900  insulin detemir U-100 (Levemir) pen 13 Units         -- SubQ 2 times daily 07/27/23 0757    07/24/23 2016  insulin aspart U-100 pen 1-10 Units         -- SubQ Before meals & nightly PRN 07/24/23 1918        Hold Oral hypoglycemics while patient is in the hospital.  Adjust regimen for goal glucose <180    Breast carcinoma metastatic to multiple sites  Noted hx on current chemo  - hem/onc consult: ok for discharge from their standpoint      VTE Risk Mitigation (From admission, onward)         Ordered     IP VTE HIGH RISK PATIENT  Once         07/24/23 1918     Place sequential compression device  Until discontinued         07/24/23 1918     Reason for No Pharmacological VTE Prophylaxis  Once        Question:  Reasons:  Answer:  Thrombocytopenia    07/24/23 1918                Discharge Planning   GUNNER: 7/28/2023     Code Status: Full Code   Is the patient medically ready for discharge?:     Reason for patient still in hospital (select all that apply): Patient trending condition, Laboratory test and Consult recommendations  Discharge Plan A: Home Health                  Rosalino Piña MD  Department of Hospital Medicine   [facility]

## 2023-07-29 PROBLEM — E83.39 HYPOPHOSPHATEMIA: Status: ACTIVE | Noted: 2023-01-01

## 2023-07-29 NOTE — ASSESSMENT & PLAN NOTE
This patient does have evidence of infective focus  My overall impression is severe sepsis.  Source: Respiratory  Antibiotics given-   Antibiotics (72h ago, onward)    Start     Stop Route Frequency Ordered    07/26/23 0945  levoFLOXacin tablet 750 mg         -- Oral Daily 07/26/23 0842    07/25/23 0900  mupirocin 2 % ointment         07/30/23 0859 Nasl 2 times daily 07/24/23 2247        Latest lactate reviewed-  Organ dysfunction indicated by lactic acidosis. also with pancytopenia  Fluid challenge given with normal saline; see MAR  Post- resuscitation assessment No - Post resuscitation assessment not needed   Will Not start Pressors  Source control achieved by: antibiotics  - see gram negative pneumonia section

## 2023-07-29 NOTE — ASSESSMENT & PLAN NOTE
Improved   Likely chemo-related  No active bleeding  - neutropenic precautions  - trend cbc daily  - hem/onc consult: ordered filgrastim & ok for dc from their standpoint  - transfuse blood products as clinically indicated- transfuse 1 unit PRBC 7/29/23

## 2023-07-29 NOTE — NURSING
Rounds completed.  Noted 24 hour urine in progress.  Rosas bag placed on ice.  Noted orange collection container currently on ice.  Discussed plan of care with patient, how 24 hour urine works.  Allowed time for questions/answers.

## 2023-07-29 NOTE — ASSESSMENT & PLAN NOTE
Patient has Abnormal Phosphorus: hypophosphatemia. Will continue to monitor electrolytes closely. Will replace the affected electrolytes and repeat labs to be done after interventions completed. The patient's phosphorus results have been reviewed and are listed below.  Recent Labs   Lab 07/29/23  0455   PHOS 1.5*

## 2023-07-29 NOTE — CARE UPDATE
07/29/23 0713   Patient Assessment/Suction   Level of Consciousness (AVPU) alert   Respiratory Effort Normal;Unlabored   Expansion/Accessory Muscles/Retractions no use of accessory muscles;no retractions;expansion symmetric   All Lung Fields Breath Sounds clear   Rhythm/Pattern, Respiratory unlabored;pattern regular;depth regular;chest wiggle adequate;no shortness of breath reported   Cough Frequency no cough   PRE-TX-O2   Device (Oxygen Therapy) room air   SpO2 98 %   Pulse Oximetry Type Intermittent   $ Pulse Oximetry - Multiple Charge Pulse Oximetry - Multiple   Pulse 98   Resp 18   Aerosol Therapy   $ Aerosol Therapy Charges PRN treatment not required   Respiratory Evaluation   $ Care Plan Tech Time 15 min

## 2023-07-29 NOTE — PLAN OF CARE
Problem: Adult Inpatient Plan of Care  Goal: Plan of Care Review  Outcome: Ongoing, Progressing  Flowsheets (Taken 7/29/2023 3017)  Plan of Care Reviewed With: patient  Goal: Patient-Specific Goal (Individualized)  Outcome: Ongoing, Progressing  Goal: Absence of Hospital-Acquired Illness or Injury  Outcome: Ongoing, Progressing  Goal: Optimal Comfort and Wellbeing  Outcome: Ongoing, Progressing     Problem: Glycemic Control Impaired (Sepsis/Septic Shock)  Goal: Blood Glucose Level Within Desired Range  Outcome: Ongoing, Progressing     Problem: Infection Progression (Sepsis/Septic Shock)  Goal: Absence of Infection Signs and Symptoms  Outcome: Ongoing, Progressing

## 2023-07-29 NOTE — CONSULTS
[facility]  Nephrology  Progress Note    Patient Name: Negrita Castro  MRN: [id number]  Admission Date: 7/24/2023  Hospital Length of Stay: 5 days  Attending Provider: Ivette Bauer MD   Primary Care Physician: Manuelito Shepard MD  Principal Problem:Severe sepsis    Consults  Subjective:     HPI:   Negrita Castro  female with a past medical history significant for breast cancer  with metastasis to liver and bones (currently undergoing chemotherapy), COPD, type 2 diabetes mellitus, hypertension,  and hyperlipidemia appear she presented to [facility] Emergency Department at the request of her oncologist for evaluation of persistent hypokalemia.  She reports that on Monday she began a new chemotherapy regimen TRODELVY (sacituzumab govitecan-hziy), which she did not tolerate very well per her report. She was treated for pneumonia with Levaquin recently, although family reports she was unable to tolerate this due to persistent nausea / vomiting with  administration of this medication. To this provider, she reports she has been eating and drinking well.  She reports she is maintained on cannabis and has a very good appetite.   She reports she drinks at least  8-9 20 oz bottles of water daily.       Interim, she feels better and denies weakness, nausea, h/a, polyuria      Past Medical History:   Diagnosis Date    Acute hypoxemic respiratory failure 12/12/2020    Breast cancer 10/26/2015    left breast    Cancer     RIGHT BREAST 10-15    Depression     Diabetes mellitus     Neuropathy     Panic attacks     S/P epidural steroid injection     Seizures     none since early 30's    Wears glasses     TO DRIVE       Past Surgical History:   Procedure Laterality Date    AXILLARY NODE DISSECTION Left 8/24/2020    Procedure: LYMPHADENECTOMY, AXILLARY;  Surgeon: Cory Vick MD;  Location: [facility] OR;  Service: General;  Laterality: Left;  left breast mastectomy with possible axillary lymph node  dissection    BREAST BIOPSY      right    BREAST LUMPECTOMY      BREAST RECONSTRUCTION      breast reconstruction with tummy [facility]      BREAST SURGERY      11-3-15 LUMPECTOMY, 12-2-15 REEXCISION    INCISION AND DRAINAGE OF ABSCESS Left 9/9/2020    Procedure: INCISION AND DRAINAGE, ABSCESS;  Surgeon: Cory Vick MD;  Location: [facility] OR;  Service: General;  Laterality: Left;    INSERTION OF LOCALIZATION WIRE Left 8/24/2020    Procedure: INSERTION, LOCALIZATION WIRE;  Surgeon: Cory Vick MD;  Location: [facility] OR;  Service: General;  Laterality: Left;  left breast lumpectomy with wire needle loc    INSERTION OF TUNNELED CENTRAL VENOUS CATHETER (CVC) WITH SUBCUTANEOUS PORT Right 11/17/2022    Procedure: TEUZDILLG-EBLB-U-CATH;  Surgeon: Jeff Mckeon Jr., MD;  Location: [facility] OR;  Service: General;  Laterality: Right;    MASTECTOMY      mastectomy 2016      MASTECTOMY, PARTIAL Left 3/19/2021    Procedure: MASTECTOMY, PARTIAL;  Surgeon: Cory Vick MD;  Location: [facility] OR;  Service: General;  Laterality: Left;  lumpectomy  left breast     RECONSTRUCTION OF NIPPLE Right 11/5/2018    Procedure: RECONSTRUCTION-NIPPLE RIGHT;  Surgeon: Xiang Hernandez MD;  Location: [address];  Service: Plastics;  Laterality: Right;    SENTINEL LYMPH NODE BIOPSY Left 8/24/2020    Procedure: BIOPSY, LYMPH NODE, SENTINEL;  Surgeon: Cory Vick MD;  Location: [facility];  Service: General;  Laterality: Left;  left breast lumpectomy with left sentinel lymoh node       shoulder surgery and wrist      BILAT  BONE SPUR    WRIST SURGERY      RIGHT       Review of patient's allergies indicates:   Allergen Reactions    Adhesive tape-silicones Hives     BANDAIDS    Polymycin Hives    Adhesive     Latex, natural rubber Hives and Itching    Neomycin-bacitracnzn-polymyxnb     Polyurethane-39 Hives     Current Facility-Administered Medications   Medication Frequency    0.9%  NaCl infusion (for blood administration)  Q24H PRN    0.9%  NaCl infusion (for blood administration) Q24H PRN    acetaminophen tablet 650 mg Q8H PRN    albuterol-ipratropium 2.5 mg-0.5 mg/3 mL nebulizer solution 3 mL Q4H PRN    benzonatate capsule 100 mg TID PRN    dextrose 50% injection 12.5 g PRN    dextrose 50% injection 25 g PRN    diazePAM tablet 2 mg QID PRN    docusate sodium capsule 100 mg BID    droNABinol capsule 5 mg BID AC    glucagon (human recombinant) injection 1 mg PRN    glucose chewable tablet 16 g PRN    glucose chewable tablet 24 g PRN    guaiFENesin-codeine 100-10 mg/5 ml syrup 5 mL Q4H PRN    HYDROcodone-acetaminophen  mg per tablet 1 tablet Q6H PRN    insulin aspart U-100 pen 1-10 Units QID (AC + HS) PRN    insulin detemir U-100 (Levemir) pen 13 Units BID    levoFLOXacin tablet 750 mg Daily    magnesium oxide tablet 400 mg BID    melatonin tablet 6 mg Nightly PRN    metoprolol tartrate (LOPRESSOR) tablet 25 mg BID    morphine injection 4 mg Q4H PRN    mupirocin 2 % ointment BID    naloxone 0.4 mg/mL injection 0.02 mg PRN    ondansetron injection 4 mg Q6H PRN    polyethylene glycol packet 17 g BID PRN    potassium phosphate 30 mmol in dextrose 5 % (D5W) 500 mL infusion Once    promethazine (PHENERGAN) 6.25 mg in dextrose 5 % (D5W) 50 mL IVPB Q6H PRN    senna-docusate 8.6-50 mg per tablet 1 tablet BID PRN    simethicone chewable tablet 80 mg QID PRN    sodium chloride 0.9% flush 10 mL PRN    triamcinolone acetonide 0.1% cream BID    zolpidem tablet 10 mg QHS     Family History    None       Tobacco Use    Smoking status: Former     Current packs/day: 0.00     Average packs/day: 0.3 packs/day for 30.0 years (7.5 ttl pk-yrs)     Types: Cigarettes     Start date: 1985     Quit date: 2015     Years since quittin.8    Smokeless tobacco: Never   Substance and Sexual Activity    Alcohol use: Yes     Alcohol/week: 0.0 standard drinks of alcohol     Comment: RARELY    Drug use: Not Currently     Types: Marijuana     Comment:  medical marijuana    Sexual activity: Not on file     Review of Systems  No weakness, SOB, CP,  or GI complaints        Objective:     Vital Signs (Most Recent):  Temp: 98.7 °F (37.1 °C) (07/29/23 1625)  Pulse: 95 (07/29/23 1625)  Resp: 18 (07/29/23 1625)  BP: 102/75 (07/29/23 1625)  SpO2: 99 % (07/29/23 1625) Vital Signs (24h Range):  Temp:  [97.7 °F (36.5 °C)-98.7 °F (37.1 °C)] 98.7 °F (37.1 °C)  Pulse:  [] 95  Resp:  [18-20] 18  SpO2:  [95 %-99 %] 99 %  BP: ()/(64-78) 102/75     Weight: 64.5 kg (142 lb 3.2 oz) (07/24/23 2208)  Body mass index is 19.83 kg/m².  Body surface area is 1.8 meters squared.    I/O last 3 completed shifts:  In: 2680 [P.O.:2680]  Out: 4900 [Urine:4900]    Physical Exam  Vitals and nursing note reviewed.   Constitutional:       General: She is not in acute distress.     Appearance: Normal appearance. She is underweight. She is not ill-appearing, toxic-appearing or diaphoretic.   HENT:      Head: Normocephalic and atraumatic.      Mouth/Throat:      Mouth: Mucous membranes are moist.   Cardiovascular:      Rate and Rhythm: Normal rate and regular rhythm.      Pulses: Normal pulses.      Heart sounds: Normal heart sounds.      No friction rub. No gallop.   Pulmonary:      Effort: Pulmonary effort is normal. No respiratory distress.      Breath sounds: Normal breath sounds. No wheezing, rhonchi or rales.   Abdominal:      General: Abdomen is flat. Bowel sounds are normal. There is no distension.      Palpations: Abdomen is soft.      Tenderness: There is no abdominal tenderness. There is no guarding or rebound.   Musculoskeletal:      Right lower leg: No edema.      Left lower leg: No edema.   Skin:     General: Skin is warm and dry.      Capillary Refill: Capillary refill takes less than 2 seconds.   Neurological:      General: No focal deficit present.      Mental Status: She is alert and oriented to person, place, and time. Mental status is at baseline.      Motor: No  "weakness.         Significant Labs:  ABGs: No results for input(s): "PH", "PCO2", "HCO3", "POCSATURATED", "BE" in the last 168 hours.  BMP:   Recent Labs   Lab 07/29/23 0455   *  175*     136   K 3.2*  3.2*     110   CO2 21*  21*   BUN 27*  27*   CREATININE 1.0  1.0   CALCIUM 8.5*  8.5*   MG 1.4*       Cardiac Markers: No results for input(s): "CKMB", "TROPONINT", "MYOGLOBIN" in the last 168 hours.  CBC:   Recent Labs   Lab 07/29/23 0455   WBC 2.51*   RBC 2.55*   HGB 7.5*   HCT 23.9*   PLT 88*   MCV 94   MCH 29.4   MCHC 31.4*       CMP:   Recent Labs   Lab 07/29/23 0455   *  175*   CALCIUM 8.5*  8.5*   ALBUMIN 1.8*  1.8*   PROT 5.3*     136   K 3.2*  3.2*   CO2 21*  21*     110   BUN 27*  27*   CREATININE 1.0  1.0   ALKPHOS 407*   ALT 28   AST 31   BILITOT 0.5       Coagulation: No results for input(s): "PT", "INR", "APTT" in the last 168 hours.  LFTs:   Recent Labs   Lab 07/29/23 0455   ALT 28   AST 31   ALKPHOS 407*   BILITOT 0.5   PROT 5.3*   ALBUMIN 1.8*  1.8*       Microbiology Results (last 7 days)       Procedure Component Value Units Date/Time    Blood culture x two cultures. Draw prior to antibiotics. [[id number]] Collected: 07/24/23 1600    Order Status: Completed Specimen: Blood Updated: 07/28/23 1832     Blood Culture, Routine No Growth to date      No Growth to date      No Growth to date      No Growth to date      No Growth to date    Narrative:      Aerobic and anaerobic  Collection has been rescheduled by 2MB at 07/24/2023 15:43 Reason:   Unable to collect...rn to pull blood from port per DR Olmedo  Collection has been rescheduled by 2MB at 07/24/2023 15:43 Reason:   Unable to collect...rn to pull blood from port per DR Olmedo    Blood culture x two cultures. Draw prior to antibiotics. [[id number]] Collected: 07/24/23 1628    Order Status: Completed Specimen: Blood Updated: 07/28/23 1832     Blood Culture, Routine No Growth to date      " No Growth to date      No Growth to date      No Growth to date      No Growth to date    Narrative:      Aerobic and anaerobic  Collection has been rescheduled by 2MB at 07/24/2023 15:43 Reason:   Unable to collect...rn to pull blood from port per DR Olmedo  Collection has been rescheduled by 2MB at 07/24/2023 15:43 Reason:   Unable to collect...rn to pull blood from port per DR Olmedo    MRSA Screen by PCR [[id number]] Collected: 07/25/23 0830    Order Status: Completed Specimen: Nasopharyngeal Swab from Nasal Updated: 07/25/23 1053     MRSA SCREEN BY PCR Negative          Recent Labs   Lab 07/24/23  1618   COLORU Yellow   SPECGRAV 1.015   PHUR 6.0   PROTEINUA 1+*   BACTERIA Negative   NITRITE Negative   LEUKOCYTESUR Negative   UROBILINOGEN Negative   HYALINECASTS 0.00*       All labs within the past 24 hours have been reviewed.    Significant Imaging:  Pertinenet labs and imaging reveiwed.     Assessment/Plan:     Polyuria  -reports she drinks 8-9 20 oz bottles of water daily  -Sosm, SNa and Uosm not suggestive of a renal concentrating defect (DI)    Hypomagnesemia  -possible side effect of chemotherapeutic agents  -f/u fractional excretion of magnesium  -daily Mg supplements --> Mag Ox 400 mg BID for now    Hypokalemia  -possible side effect of chemotherapeutic agents  -replaced with 80 mEq today--> f/u levels  -replace Mg levels as above  -f/u urine potassium    Hypercalcemia  -likely of malignancy  -calcium (total) better; iCa wnl  -severe Vit D def noted with normal PTH; would allow total Ca to come down further before replacing D        Venu Son MD  Nephrology  [facility]

## 2023-07-29 NOTE — SUBJECTIVE & OBJECTIVE
Interval History: Patient seen and examined. GLEN. Patient wants to go home but understands needs to be more stable; discussed low albumin level and nutrition status; drop in h/h and pain at devi cath site- due to be removed at 2:30 pm today.     Objective:     Vital Signs (Most Recent):  Temp: 98.3 °F (36.8 °C) (07/29/23 1152)  Pulse: 90 (07/29/23 1152)  Resp: 18 (07/29/23 1152)  BP: 99/64 (07/29/23 1152)  SpO2: 98 % (07/29/23 1152) Vital Signs (24h Range):  Temp:  [97.7 °F (36.5 °C)-98.7 °F (37.1 °C)] 98.3 °F (36.8 °C)  Pulse:  [] 90  Resp:  [18-20] 18  SpO2:  [95 %-98 %] 98 %  BP: ()/(64-78) 99/64     Weight: 64.5 kg (142 lb 3.2 oz)  Body mass index is 19.83 kg/m².    Intake/Output Summary (Last 24 hours) at 7/29/2023 1227  Last data filed at 7/29/2023 0823  Gross per 24 hour   Intake 2240 ml   Output 3000 ml   Net -760 ml           Physical Exam  Vitals reviewed.   Constitutional:       General: She is not in acute distress.  HENT:      Head: Normocephalic and atraumatic.   Cardiovascular:      Rate and Rhythm: Normal rate and regular rhythm.   Pulmonary:      Effort: Pulmonary effort is normal. No respiratory distress.   Skin:     General: Skin is warm and dry.      Comments: Left upper back hyperpigmented patch   Neurological:      Mental Status: She is alert.   Psychiatric:         Mood and Affect: Affect normal.         Behavior: Behavior normal.         Thought Content: Thought content normal.             Significant Labs: All pertinent labs within the past 24 hours have been reviewed.    Significant Imaging: I have reviewed all pertinent imaging results/findings within the past 24 hours.  Review of Systems   Constitutional:  Positive for appetite change. Negative for chills and fever.   Respiratory: Negative.     Cardiovascular: Negative.    Gastrointestinal: Negative.    Endocrine: Positive for polyuria.   Musculoskeletal:  Positive for back pain and gait problem.   Skin:  Positive for  wound.   Neurological:  Positive for weakness.   Psychiatric/Behavioral:  Positive for dysphoric mood.

## 2023-07-29 NOTE — PROGRESS NOTES
[facility] Medicine  Progress Note    Patient Name: Negrita Castro  MRN: [id number]  Patient Class: IP- Inpatient   Admission Date: 2023  Length of Stay: 5 days  Attending Physician: Ivette Bauer MD  Primary Care Provider: Manuelito Shepard MD        Subjective:     Principal Problem:Severe sepsis        HPI:  Per Dr. Adrienne Lee: Negrita Castro is a 63 y.o. White female   With PMH of breast cancer   undergoing chemotherapy,  Previous radiation,   COPD, smoking, DM2, HTN, HLD,  who presents with hypokalemia.  Sent from oncology office.     Pt reports worsening nausea since Monday  +vomiting previously, but not today  +decreased po intake  +malaise  +generalized weakness  +chills  +fever, Tmax 100.1  +dry cough  No SOB  +R chest pain  Last dose chemo on Monday  Treated for pneumonia over the weekend  However pt was unable to complete levaquin  (due to nausea)      Overview/Hospital Course:  Patient with metastatic breast cancer currently undergoing chemotherapy is admitted after being sent to ER due to outpatient labs showing hypokalemia. She was found to have sepsis attributed to right lower lobe pneumonia that had somewhat failed outpatient antibiotic therapy with levaquin due to poor tolerance attributed to recent chemo. Given IVF and potassium/magnesium replacement. Started on vancomycin and cefepime. Vancomycin discontinued after negative MRSA PCR swab. Respiratory culture growing GNR. Also noted to be pancytopenic so placed on neutropenic precautions. Hem/onc consulted and ordered filgrastim. Respiratory culture grew serratia and leclercia species. Antibiotics changed to oral levaquin and monitored for tolerance. She also had a stage 2 sacral/ fold ulcer on arrival for which wound care followed. Nephrology consulted for polyuria with persistent hypokalemia and hypomagnesemia.    Took over service 23: neutropenic precautions discontinued. 1unit PRBC  transfusion today. 24 hour urine collecting. Replacing electrolytes (K, Mg, Phos).       Interval History: Patient seen and examined. DUKEEON. Patient wants to go home but understands needs to be more stable; discussed low albumin level and nutrition status; drop in h/h and pain at devi cath site- due to be removed at 2:30 pm today.     Objective:     Vital Signs (Most Recent):  Temp: 98.3 °F (36.8 °C) (07/29/23 1152)  Pulse: 90 (07/29/23 1152)  Resp: 18 (07/29/23 1152)  BP: 99/64 (07/29/23 1152)  SpO2: 98 % (07/29/23 1152) Vital Signs (24h Range):  Temp:  [97.7 °F (36.5 °C)-98.7 °F (37.1 °C)] 98.3 °F (36.8 °C)  Pulse:  [] 90  Resp:  [18-20] 18  SpO2:  [95 %-98 %] 98 %  BP: ()/(64-78) 99/64     Weight: 64.5 kg (142 lb 3.2 oz)  Body mass index is 19.83 kg/m².    Intake/Output Summary (Last 24 hours) at 7/29/2023 1227  Last data filed at 7/29/2023 0823  Gross per 24 hour   Intake 2240 ml   Output 3000 ml   Net -760 ml           Physical Exam  Vitals reviewed.   Constitutional:       General: She is not in acute distress.  HENT:      Head: Normocephalic and atraumatic.   Cardiovascular:      Rate and Rhythm: Normal rate and regular rhythm.   Pulmonary:      Effort: Pulmonary effort is normal. No respiratory distress.   Skin:     General: Skin is warm and dry.      Comments: Left upper back hyperpigmented patch   Neurological:      Mental Status: She is alert.   Psychiatric:         Mood and Affect: Affect normal.         Behavior: Behavior normal.         Thought Content: Thought content normal.             Significant Labs: All pertinent labs within the past 24 hours have been reviewed.    Significant Imaging: I have reviewed all pertinent imaging results/findings within the past 24 hours.  Review of Systems   Constitutional:  Positive for appetite change. Negative for chills and fever.   Respiratory: Negative.     Cardiovascular: Negative.    Gastrointestinal: Negative.    Endocrine: Positive for polyuria.    Musculoskeletal:  Positive for back pain and gait problem.   Skin:  Positive for wound.   Neurological:  Positive for weakness.   Psychiatric/Behavioral:  Positive for dysphoric mood.          Assessment/Plan:      * Severe sepsis  This patient does have evidence of infective focus  My overall impression is severe sepsis.  Source: Respiratory  Antibiotics given-   Antibiotics (72h ago, onward)    Start     Stop Route Frequency Ordered    23 0945  levoFLOXacin tablet 750 mg         -- Oral Daily 23 0842    23 0900  mupirocin 2 % ointment         23 0859 Nasl 2 times daily 23 2247        Latest lactate reviewed-  Organ dysfunction indicated by lactic acidosis. also with pancytopenia  Fluid challenge given with normal saline; see MAR  Post- resuscitation assessment No - Post resuscitation assessment not needed   Will Not start Pressors  Source control achieved by: antibiotics  - see gram negative pneumonia section    Hypophosphatemia  Patient has Abnormal Phosphorus: hypophosphatemia. Will continue to monitor electrolytes closely. Will replace the affected electrolytes and repeat labs to be done after interventions completed. The patient's phosphorus results have been reviewed and are listed below.  Recent Labs   Lab 23  0455   PHOS 1.5*        Dermatitis  Left upper back hyperpigmented patch  Unclear etiology  - topical triamcinolone BID and re-evaluate    Polyuria  Improved but unclear etiology and with hypoK and hypomag  At least partially due to glucosuria  Could be related to IV mag?  Concern for diabetes insipidus  - monitor I/O  - nephro consult    Stage 2 skin ulcer of sacral region  POA   fold area  - wound care  - turn q2    Hypomagnesemia  Mild but persistent  - replace with PO today again  - recheck level tomorrow and replace more if needed  - encourage diet    Hypokalemia  Much improved but needing daily replacement  - tele  - replace with PO today again  -  recheck level in AM and replace more if needed  - encourage diet    Gram-negative pneumonia  Resp culture with serratia and leclercia  MRSA PCR negative  CXR R lower/basilar ?infiltrate  - continue oral levaquin 750mg daily for 10 day course    Pancytopenia  Improved   Likely chemo-related  No active bleeding  - neutropenic precautions  - trend cbc daily  - hem/onc consult: ordered filgrastim & ok for dc from their standpoint  - transfuse blood products as clinically indicated- transfuse 1 unit PRBC 7/29/23     Dehydration  Resolved     Essential hypertension  Chronic, controlled  - hold acei due to normotension; resume when appropriate  - ok to continue the metoprolol and monitor    Chronic obstructive pulmonary disease  Chronic, stable  - duoneb prn    Chronic hypoxemic respiratory failure  Noted hx uses 2.5L O2 NC prn  - O2 prn    Chemotherapy-induced neutropenia  Improved   - neutropenic precautions lifted  - hem/onc consult: ordered filgrastim & ok for dc from their standpoint  - trend wbc on cbc daily    Type 2 diabetes mellitus with hyperglycemia, with long-term current use of insulin  Patient's FSGs are controlled on current medication regimen.  Last A1c reviewed-   Lab Results   Component Value Date    HGBA1C 9.1 (H) 07/24/2023     Most recent fingerstick glucose reviewed-   Current correctional scale  Medium  Maintain anti-hyperglycemic dose as follows-   Antihyperglycemics (From admission, onward)    Start     Stop Route Frequency Ordered    07/27/23 0900  insulin detemir U-100 (Levemir) pen 13 Units         -- SubQ 2 times daily 07/27/23 0757    07/24/23 2016  insulin aspart U-100 pen 1-10 Units         -- SubQ Before meals & nightly PRN 07/24/23 1918        Hold Oral hypoglycemics while patient is in the hospital.  Adjust regimen for goal glucose <180    Breast carcinoma metastatic to multiple sites  Noted hx on current chemo  - hem/onc consult: ok for discharge from their standpoint      VTE Risk  Mitigation (From admission, onward)         Ordered     IP VTE HIGH RISK PATIENT  Once         07/24/23 1918     Place sequential compression device  Until discontinued         07/24/23 1918     Reason for No Pharmacological VTE Prophylaxis  Once        Question:  Reasons:  Answer:  Thrombocytopenia    07/24/23 1918                Discharge Planning   GUNNER: 7/28/2023     Code Status: Full Code   Is the patient medically ready for discharge?:     Reason for patient still in hospital (select all that apply): Patient trending condition  Discharge Plan A: Home Health                  Ivette Bauer MD  Department of Hospital Medicine   [facility]

## 2023-07-30 NOTE — CARE UPDATE
07/30/23 1009   Patient Assessment/Suction   Level of Consciousness (AVPU) alert   Respiratory Effort Normal   All Lung Fields Breath Sounds clear   Rhythm/Pattern, Respiratory unlabored   PRE-TX-O2   Device (Oxygen Therapy) nasal cannula   $ Is the patient on Low Flow Oxygen? Yes   Flow (L/min) 2   SpO2 98 %   Pulse Oximetry Type Intermittent   $ Pulse Oximetry - Multiple Charge Pulse Oximetry - Multiple   Pulse 70   Resp 20   Positioning Other (see comments)  (walking in sims)   Aerosol Therapy   $ Aerosol Therapy Charges PRN treatment not required   Education   $ Education Bronchodilator;15 min

## 2023-07-30 NOTE — CARE UPDATE
07/30/23 0837   Patient Assessment/Suction   Level of Consciousness (AVPU) alert   Respiratory Effort Normal;Unlabored   Expansion/Accessory Muscles/Retractions no use of accessory muscles;no retractions;tracheal tugging   All Lung Fields Breath Sounds clear   Rhythm/Pattern, Respiratory no shortness of breath reported   PRE-TX-O2   Device (Oxygen Therapy) room air   SpO2 96 %   Pulse Oximetry Type Intermittent   $ Pulse Oximetry - Multiple Charge Pulse Oximetry - Multiple   Pulse (!) 54   Resp 18   Positioning Sitting on edge of bed (dangling)   Aerosol Therapy   $ Aerosol Therapy Charges PRN treatment not required   Education   $ Education Bronchodilator;15 min

## 2023-07-30 NOTE — TELEPHONE ENCOUNTER
She had liver bx for breast cancer 7/21/23.  H2N by fish was negative.  What is H2N by IHC technique.?  What is the Ki-67 result?

## 2023-07-30 NOTE — PLAN OF CARE
Problem: Infection  Goal: Absence of Infection Signs and Symptoms  Outcome: Ongoing, Progressing     Problem: Adjustment to Illness (Sepsis/Septic Shock)  Goal: Optimal Coping  Outcome: Ongoing, Progressing     Problem: Infection Progression (Sepsis/Septic Shock)  Goal: Absence of Infection Signs and Symptoms  Outcome: Ongoing, Progressing     Problem: Fluid Imbalance (Pneumonia)  Goal: Fluid Balance  Outcome: Ongoing, Progressing

## 2023-07-30 NOTE — CARE UPDATE
07/29/23 1955   Patient Assessment/Suction   Level of Consciousness (AVPU) alert   Respiratory Effort Normal;Unlabored   PRE-TX-O2   Device (Oxygen Therapy) room air   SpO2 95 %   Pulse Oximetry Type Intermittent   $ Pulse Oximetry - Multiple Charge Pulse Oximetry - Multiple   Pulse 95   Aerosol Therapy   $ Aerosol Therapy Charges PRN treatment not required

## 2023-07-30 NOTE — CONSULTS
[facility]  Nephrology  Progress Note    Patient Name: Negrita Castro  MRN: [id number]  Admission Date: 7/24/2023  Hospital Length of Stay: 6 days  Attending Provider: Ivette Bauer MD   Primary Care Physician: Manuelito Shepard MD  Principal Problem:Severe sepsis    Consults  Subjective:     HPI:   Negrita Castro  female with a past medical history significant for breast cancer  with metastasis to liver and bones (currently undergoing chemotherapy), COPD, type 2 diabetes mellitus, hypertension,  and hyperlipidemia appear she presented to [facility] Emergency Department at the request of her oncologist for evaluation of persistent hypokalemia.  She reports that on Monday she began a new chemotherapy regimen TRODELVY (sacituzumab govitecan-hziy), which she did not tolerate very well per her report. She was treated for pneumonia with Levaquin recently, although family reports she was unable to tolerate this due to persistent nausea / vomiting with  administration of this medication. To this provider, she reports she has been eating and drinking well.  She reports she is maintained on cannabis and has a very good appetite.   She reports she drinks at least  8-9 20 oz bottles of water daily.       Interim, she feels better and denies weakness, nausea, h/a, polyuria      Past Medical History:   Diagnosis Date    Acute hypoxemic respiratory failure 12/12/2020    Breast cancer 10/26/2015    left breast    Cancer     RIGHT BREAST 10-15    Depression     Diabetes mellitus     Neuropathy     Panic attacks     S/P epidural steroid injection     Seizures     none since early 30's    Wears glasses     TO DRIVE       Past Surgical History:   Procedure Laterality Date    AXILLARY NODE DISSECTION Left 8/24/2020    Procedure: LYMPHADENECTOMY, AXILLARY;  Surgeon: Cory Vick MD;  Location: [facility] OR;  Service: General;  Laterality: Left;  left breast mastectomy with possible axillary lymph node  dissection    BREAST BIOPSY      right    BREAST LUMPECTOMY      BREAST RECONSTRUCTION      breast reconstruction with tummy [facility]      BREAST SURGERY      11-3-15 LUMPECTOMY, 12-2-15 REEXCISION    INCISION AND DRAINAGE OF ABSCESS Left 9/9/2020    Procedure: INCISION AND DRAINAGE, ABSCESS;  Surgeon: Cory Vick MD;  Location: [facility] OR;  Service: General;  Laterality: Left;    INSERTION OF LOCALIZATION WIRE Left 8/24/2020    Procedure: INSERTION, LOCALIZATION WIRE;  Surgeon: Cory Vick MD;  Location: [facility] OR;  Service: General;  Laterality: Left;  left breast lumpectomy with wire needle loc    INSERTION OF TUNNELED CENTRAL VENOUS CATHETER (CVC) WITH SUBCUTANEOUS PORT Right 11/17/2022    Procedure: [id number];  Surgeon: Jeff Mckeon Jr., MD;  Location: [facility] OR;  Service: General;  Laterality: Right;    MASTECTOMY      mastectomy 2016      MASTECTOMY, PARTIAL Left 3/19/2021    Procedure: MASTECTOMY, PARTIAL;  Surgeon: Cory Vick MD;  Location: [facility] OR;  Service: General;  Laterality: Left;  lumpectomy  left breast     RECONSTRUCTION OF NIPPLE Right 11/5/2018    Procedure: RECONSTRUCTION-NIPPLE RIGHT;  Surgeon: Xiang Hernandez MD;  Location: [address];  Service: Plastics;  Laterality: Right;    SENTINEL LYMPH NODE BIOPSY Left 8/24/2020    Procedure: BIOPSY, LYMPH NODE, SENTINEL;  Surgeon: Cory Vick MD;  Location: [facility];  Service: General;  Laterality: Left;  left breast lumpectomy with left sentinel lymoh node       shoulder surgery and wrist      BILAT  BONE SPUR    WRIST SURGERY      RIGHT       Review of patient's allergies indicates:   Allergen Reactions    Adhesive tape-silicones Hives     BANDAIDS    Polymycin Hives    Adhesive     Latex, natural rubber Hives and Itching    Neomycin-bacitracnzn-polymyxnb     Polyurethane-39 Hives     Current Facility-Administered Medications   Medication Frequency    0.9%  NaCl infusion (for blood administration)  Q24H PRN    0.9%  NaCl infusion (for blood administration) Q24H PRN    acetaminophen tablet 650 mg Q8H PRN    albuterol-ipratropium 2.5 mg-0.5 mg/3 mL nebulizer solution 3 mL Q4H PRN    benzonatate capsule 100 mg TID PRN    dextrose 50% injection 12.5 g PRN    dextrose 50% injection 25 g PRN    diazePAM tablet 2 mg QID PRN    docusate sodium capsule 100 mg BID    droNABinol capsule 5 mg BID AC    glucagon (human recombinant) injection 1 mg PRN    glucose chewable tablet 16 g PRN    glucose chewable tablet 24 g PRN    guaiFENesin-codeine 100-10 mg/5 ml syrup 5 mL Q4H PRN    HYDROcodone-acetaminophen  mg per tablet 1 tablet Q6H PRN    insulin aspart U-100 pen 1-10 Units QID (AC + HS) PRN    insulin detemir U-100 (Levemir) pen 13 Units BID    levoFLOXacin tablet 750 mg Daily    magnesium oxide tablet 400 mg BID    melatonin tablet 6 mg Nightly PRN    metoprolol tartrate (LOPRESSOR) tablet 25 mg BID    morphine injection 4 mg Q4H PRN    naloxone 0.4 mg/mL injection 0.02 mg PRN    ondansetron injection 4 mg Q6H PRN    polyethylene glycol packet 17 g BID PRN    potassium, sodium phosphates 280-160-250 mg packet 1 packet QID (AC & HS)    promethazine (PHENERGAN) 6.25 mg in dextrose 5 % (D5W) 50 mL IVPB Q6H PRN    senna-docusate 8.6-50 mg per tablet 1 tablet BID PRN    simethicone chewable tablet 80 mg QID PRN    sodium chloride 0.9% flush 10 mL PRN    triamcinolone acetonide 0.1% cream BID    zolpidem tablet 10 mg QHS     Family History    None       Tobacco Use    Smoking status: Former     Current packs/day: 0.00     Average packs/day: 0.3 packs/day for 30.0 years (7.5 ttl pk-yrs)     Types: Cigarettes     Start date: 1985     Quit date: 2015     Years since quittin.8    Smokeless tobacco: Never   Substance and Sexual Activity    Alcohol use: Yes     Alcohol/week: 0.0 standard drinks of alcohol     Comment: RARELY    Drug use: Not Currently     Types: Marijuana     Comment: medical marijuana    Sexual  activity: Not on file     Review of Systems  No weakness, SOB, CP,  or GI complaints        Objective:     Vital Signs (Most Recent):  Temp: 98 °F (36.7 °C) (07/30/23 1100)  Pulse: 77 (07/30/23 1100)  Resp: 20 (07/30/23 1100)  BP: 121/69 (07/30/23 1100)  SpO2: 100 % (07/30/23 1100) Vital Signs (24h Range):  Temp:  [98 °F (36.7 °C)-98.9 °F (37.2 °C)] 98 °F (36.7 °C)  Pulse:  [] 77  Resp:  [18-96] 20  SpO2:  [95 %-100 %] 100 %  BP: ()/(60-81) 121/69     Weight: 64.5 kg (142 lb 3.2 oz) (07/24/23 2208)  Body mass index is 19.83 kg/m².  Body surface area is 1.8 meters squared.    I/O last 3 completed shifts:  In: 3896.5 [P.O.:2960; I.V.:40; Blood:396.7; IV Piggyback:499.8]  Out: 3750 [Urine:3750]    Physical Exam  Vitals and nursing note reviewed.   Constitutional:       General: She is not in acute distress.     Appearance: Normal appearance. She is underweight. She is not ill-appearing, toxic-appearing or diaphoretic.   HENT:      Head: Normocephalic and atraumatic.      Mouth/Throat:      Mouth: Mucous membranes are moist.   Cardiovascular:      Rate and Rhythm: Normal rate and regular rhythm.      Pulses: Normal pulses.      Heart sounds: Normal heart sounds.      No friction rub. No gallop.   Pulmonary:      Effort: Pulmonary effort is normal. No respiratory distress.      Breath sounds: Normal breath sounds. No wheezing, rhonchi or rales.   Abdominal:      General: Abdomen is flat. Bowel sounds are normal. There is no distension.      Palpations: Abdomen is soft.      Tenderness: There is no abdominal tenderness. There is no guarding or rebound.   Musculoskeletal:      Right lower leg: No edema.      Left lower leg: No edema.   Skin:     General: Skin is warm and dry.      Capillary Refill: Capillary refill takes less than 2 seconds.   Neurological:      General: No focal deficit present.      Mental Status: She is alert and oriented to person, place, and time. Mental status is at baseline.       "Motor: No weakness.         Significant Labs:  ABGs: No results for input(s): "PH", "PCO2", "HCO3", "POCSATURATED", "BE" in the last 168 hours.  BMP:   Recent Labs   Lab 07/30/23 0623   *  125*     137   K 3.4*  3.4*     106   CO2 25  25   BUN 25*  25*   CREATININE 0.9  0.9   CALCIUM 9.0  9.0   MG 1.8       Cardiac Markers: No results for input(s): "CKMB", "TROPONINT", "MYOGLOBIN" in the last 168 hours.  CBC:   Recent Labs   Lab 07/30/23 0623   WBC 7.68   RBC 3.19*   HGB 9.2*   HCT 29.5*   *   MCV 93   MCH 28.8   MCHC 31.2*       CMP:   Recent Labs   Lab 07/30/23 0623   *  125*   CALCIUM 9.0  9.0   ALBUMIN 2.1*  2.1*   PROT 5.8*     137   K 3.4*  3.4*   CO2 25  25     106   BUN 25*  25*   CREATININE 0.9  0.9   ALKPHOS 431*   ALT 25   AST 31   BILITOT 0.7       Coagulation: No results for input(s): "PT", "INR", "APTT" in the last 168 hours.  LFTs:   Recent Labs   Lab 07/30/23 0623   ALT 25   AST 31   ALKPHOS 431*   BILITOT 0.7   PROT 5.8*   ALBUMIN 2.1*  2.1*       Microbiology Results (last 7 days)       Procedure Component Value Units Date/Time    Blood culture x two cultures. Draw prior to antibiotics. [[id number]] Collected: 07/24/23 1600    Order Status: Completed Specimen: Blood Updated: 07/29/23 1832     Blood Culture, Routine No growth after 5 days.    Narrative:      Aerobic and anaerobic  Collection has been rescheduled by 2MB at 07/24/2023 15:43 Reason:   Unable to collect...rn to pull blood from port per DR Olmedo  Collection has been rescheduled by 2MB at 07/24/2023 15:43 Reason:   Unable to collect...rn to pull blood from port per DR Olmedo    Blood culture x two cultures. Draw prior to antibiotics. [[id number]] Collected: 07/24/23 1628    Order Status: Completed Specimen: Blood Updated: 07/29/23 1832     Blood Culture, Routine No growth after 5 days.    Narrative:      Aerobic and anaerobic  Collection has been rescheduled by 2MB at " "07/24/2023 15:43 Reason:   Unable to collect...rn to pull blood from port per DR Olmedo  Collection has been rescheduled by 2MB at 07/24/2023 15:43 Reason:   Unable to collect...rn to pull blood from port per DR Olmedo    MRSA Screen by PCR [[id number]] Collected: 07/25/23 0830    Order Status: Completed Specimen: Nasopharyngeal Swab from Nasal Updated: 07/25/23 1053     MRSA SCREEN BY PCR Negative          Recent Labs   Lab 07/24/23  1618   COLORU Yellow   SPECGRAV 1.015   PHUR 6.0   PROTEINUA 1+*   BACTERIA Negative   NITRITE Negative   LEUKOCYTESUR Negative   UROBILINOGEN Negative   HYALINECASTS 0.00*       All labs within the past 24 hours have been reviewed.    Significant Imaging:  Pertinenet labs and imaging reveiwed.     Assessment/Plan:     Polyuria  -reports she drinks 8-9 20 oz bottles of water daily  -she notes "much better" since admit  -Sosm, SNa and Uosm not suggestive of a renal concentrating defect (DI)    Hypomagnesemia  -improving    Hypokalemia  Slow improvement  -f/u urine potassium    Hypercalcemia  -likely of malignancy  -calcium (total) better; iCa wnl  -severe Vit D def noted with normal PTH; would allow total Ca to come down further before replacing D    Hypophosphatemia  Replace with vit D    Venu Son MD  Nephrology  [facility]  "

## 2023-07-31 NOTE — PROGRESS NOTES
Formerly [facility], later [facility]  Progress Note    Patient Name: Negrita Castro  MRN: [id number]  Patient Class: IP- Inpatient   Admission Date: 2023  Length of Stay: 6 days  Attending Physician: Ivette Bauer MD  Primary Care Provider: Manuelito Shepard MD        Subjective:     Principal Problem:Severe sepsis        HPI:  Per Dr. Adrienne Lee: Negrita Castro is a 63 y.o. White female   With PMH of breast cancer   undergoing chemotherapy,  Previous radiation,   COPD, smoking, DM2, HTN, HLD,  who presents with hypokalemia.  Sent from oncology office.     Pt reports worsening nausea since Monday  +vomiting previously, but not today  +decreased po intake  +malaise  +generalized weakness  +chills  +fever, Tmax 100.1  +dry cough  No SOB  +R chest pain  Last dose chemo on Monday  Treated for pneumonia over the weekend  However pt was unable to complete levaquin  (due to nausea)      Overview/Hospital Course:  Patient with metastatic breast cancer currently undergoing chemotherapy is admitted after being sent to ER due to outpatient labs showing hypokalemia. She was found to have sepsis attributed to right lower lobe pneumonia that had somewhat failed outpatient antibiotic therapy with levaquin due to poor tolerance attributed to recent chemo. Given IVF and potassium/magnesium replacement. Started on vancomycin and cefepime. Vancomycin discontinued after negative MRSA PCR swab. Respiratory culture growing GNR. Also noted to be pancytopenic so placed on neutropenic precautions. Hem/onc consulted and ordered filgrastim. Respiratory culture grew serratia and leclercia species. Antibiotics changed to oral levaquin and monitored for tolerance. She also had a stage 2 sacral/ fold ulcer on arrival for which wound care followed. Nephrology consulted for polyuria with persistent hypokalemia and hypomagnesemia.    Took over service 23: neutropenic precautions discontinued. 1 unit PRBC  transfusion with appropriate response in H/H. 24 hour urine collecting. Replacing electrolytes (K, Mg, Phos). Vitamin D supplementation started. Ambulating with PT. Stable for dc home tomorrow.       Interval History: Patient seen and examined. NAEON. Electrolytes slowly stabilizing. + cough     Objective:     Vital Signs (Most Recent):  Temp: 97.8 °F (36.6 °C) (07/30/23 1600)  Pulse: 100 (07/30/23 2050)  Resp: 18 (07/30/23 2050)  BP: 138/71 (07/30/23 2024)  SpO2: 96 % (07/30/23 2050) Vital Signs (24h Range):  Temp:  [97.8 °F (36.6 °C)-98.9 °F (37.2 °C)] 97.8 °F (36.6 °C)  Pulse:  [] 100  Resp:  [18-20] 18  SpO2:  [92 %-100 %] 96 %  BP: ()/(60-81) 138/71     Weight: 64.5 kg (142 lb 3.2 oz)  Body mass index is 19.83 kg/m².    Intake/Output Summary (Last 24 hours) at 7/30/2023 2348  Last data filed at 7/30/2023 2245  Gross per 24 hour   Intake 1566.67 ml   Output 1850 ml   Net -283.33 ml           Physical Exam  Vitals reviewed.   Constitutional:       General: She is not in acute distress.  HENT:      Head: Normocephalic and atraumatic.   Cardiovascular:      Rate and Rhythm: Normal rate and regular rhythm.   Pulmonary:      Effort: Pulmonary effort is normal. No respiratory distress.   Skin:     General: Skin is warm and dry.      Comments: Left upper back hyperpigmented patch   Neurological:      Mental Status: She is alert.   Psychiatric:         Mood and Affect: Affect normal.         Behavior: Behavior normal.         Thought Content: Thought content normal.             Significant Labs: All pertinent labs within the past 24 hours have been reviewed.    Significant Imaging: I have reviewed all pertinent imaging results/findings within the past 24 hours.  Review of Systems   Constitutional:  Positive for appetite change. Negative for chills and fever.   Respiratory:  Positive for cough.    Cardiovascular: Negative.    Gastrointestinal: Negative.    Endocrine: Positive for polyuria.   Musculoskeletal:   Positive for back pain and gait problem.   Skin:  Positive for wound.   Neurological:  Positive for weakness.   Psychiatric/Behavioral:  Positive for dysphoric mood.          Assessment/Plan:      * Severe sepsis  This patient does have evidence of infective focus  My overall impression is severe sepsis.  Source: Respiratory  Antibiotics given-   Antibiotics (72h ago, onward)    Start     Stop Route Frequency Ordered    23 0945  levoFLOXacin tablet 750 mg         -- Oral Daily 23 0842    23 0900  mupirocin 2 % ointment         23 0859 Nasl 2 times daily 23 2247        Latest lactate reviewed-  Organ dysfunction indicated by lactic acidosis. also with pancytopenia  Fluid challenge given with normal saline; see MAR  Post- resuscitation assessment No - Post resuscitation assessment not needed   Will Not start Pressors  Source control achieved by: antibiotics  - see gram negative pneumonia section    Hypophosphatemia  Patient has Abnormal Phosphorus: hypophosphatemia. Will continue to monitor electrolytes closely. Will replace the affected electrolytes and repeat labs to be done after interventions completed. The patient's phosphorus results have been reviewed and are listed below.  Recent Labs   Lab 23  0455   PHOS 1.5*        Dermatitis  Left upper back hyperpigmented patch  Unclear etiology  - topical triamcinolone BID and re-evaluate    Polyuria  Improved but unclear etiology and with hypoK and hypomag  At least partially due to glucosuria  Could be related to IV mag?  Concern for diabetes insipidus  - monitor I/O  - nephro consult    Stage 2 skin ulcer of sacral region  POA  Eliecer fold area  - wound care  - turn q2    Hypomagnesemia  Mild but persistent  - replace with PO today again  - recheck level tomorrow and replace more if needed  - encourage diet    Hypokalemia  Much improved but needing daily replacement  - tele  - replace with PO today again  - recheck level in AM and  replace more if needed  - encourage diet    Gram-negative pneumonia  Resp culture with serratia and leclercia  MRSA PCR negative  CXR R lower/basilar ?infiltrate  - continue oral levaquin 750mg daily for 10 day course    Pancytopenia  Improved   Likely chemo-related  No active bleeding  - neutropenic precautions- dc'd   - trend cbc daily  - hem/onc consult: ordered filgrastim & ok for dc from their standpoint  - transfuse blood products as clinically indicated- transfuse 1 unit PRBC 7/29/23     Dehydration  Resolved     Essential hypertension  Chronic, controlled  - hold acei due to normotension; resume when appropriate  - ok to continue the metoprolol and monitor    Chronic obstructive pulmonary disease  Chronic, stable  - duoneb prn    Chronic hypoxemic respiratory failure  Noted hx uses 2.5L O2 NC prn  - O2 prn    Chemotherapy-induced neutropenia  Improved   - neutropenic precautions lifted  - hem/onc consult: ordered filgrastim & ok for dc from their standpoint  - trend wbc on cbc daily    Type 2 diabetes mellitus with hyperglycemia, with long-term current use of insulin  Patient's FSGs are controlled on current medication regimen.  Last A1c reviewed-   Lab Results   Component Value Date    HGBA1C 9.1 (H) 07/24/2023     Most recent fingerstick glucose reviewed-   Current correctional scale  Medium  Maintain anti-hyperglycemic dose as follows-   Antihyperglycemics (From admission, onward)    Start     Stop Route Frequency Ordered    07/27/23 0900  insulin detemir U-100 (Levemir) pen 13 Units         -- SubQ 2 times daily 07/27/23 0757    07/24/23 2016  insulin aspart U-100 pen 1-10 Units         -- SubQ Before meals & nightly PRN 07/24/23 1918        Hold Oral hypoglycemics while patient is in the hospital.  Adjust regimen for goal glucose <180    Breast carcinoma metastatic to multiple sites  Noted hx on current chemo  - hem/onc consult: ok for discharge from their standpoint      VTE Risk Mitigation (From  admission, onward)         Ordered     IP VTE HIGH RISK PATIENT  Once         07/24/23 1918     Place sequential compression device  Until discontinued         07/24/23 1918     Reason for No Pharmacological VTE Prophylaxis  Once        Question:  Reasons:  Answer:  Thrombocytopenia    07/24/23 1918                Discharge Planning   GUNNER: 7/28/2023     Code Status: Full Code   Is the patient medically ready for discharge?:     Reason for patient still in hospital (select all that apply): Patient trending condition and Laboratory test  Discharge Plan A: Home Health                  Ivette Bauer MD  Department of Hospital Medicine   [facility]

## 2023-07-31 NOTE — SUBJECTIVE & OBJECTIVE
Interval History: Patient seen and examined. NAEON. Electrolytes slowly stabilizing. + cough     Objective:     Vital Signs (Most Recent):  Temp: 97.8 °F (36.6 °C) (07/30/23 1600)  Pulse: 100 (07/30/23 2050)  Resp: 18 (07/30/23 2050)  BP: 138/71 (07/30/23 2024)  SpO2: 96 % (07/30/23 2050) Vital Signs (24h Range):  Temp:  [97.8 °F (36.6 °C)-98.9 °F (37.2 °C)] 97.8 °F (36.6 °C)  Pulse:  [] 100  Resp:  [18-20] 18  SpO2:  [92 %-100 %] 96 %  BP: ()/(60-81) 138/71     Weight: 64.5 kg (142 lb 3.2 oz)  Body mass index is 19.83 kg/m².    Intake/Output Summary (Last 24 hours) at 7/30/2023 2348  Last data filed at 7/30/2023 2245  Gross per 24 hour   Intake 1566.67 ml   Output 1850 ml   Net -283.33 ml           Physical Exam  Vitals reviewed.   Constitutional:       General: She is not in acute distress.  HENT:      Head: Normocephalic and atraumatic.   Cardiovascular:      Rate and Rhythm: Normal rate and regular rhythm.   Pulmonary:      Effort: Pulmonary effort is normal. No respiratory distress.   Skin:     General: Skin is warm and dry.      Comments: Left upper back hyperpigmented patch   Neurological:      Mental Status: She is alert.   Psychiatric:         Mood and Affect: Affect normal.         Behavior: Behavior normal.         Thought Content: Thought content normal.             Significant Labs: All pertinent labs within the past 24 hours have been reviewed.    Significant Imaging: I have reviewed all pertinent imaging results/findings within the past 24 hours.  Review of Systems   Constitutional:  Positive for appetite change. Negative for chills and fever.   Respiratory:  Positive for cough.    Cardiovascular: Negative.    Gastrointestinal: Negative.    Endocrine: Positive for polyuria.   Musculoskeletal:  Positive for back pain and gait problem.   Skin:  Positive for wound.   Neurological:  Positive for weakness.   Psychiatric/Behavioral:  Positive for dysphoric mood.

## 2023-07-31 NOTE — ASSESSMENT & PLAN NOTE
Improved   Likely chemo-related  No active bleeding  - neutropenic precautions- dc'd   - trend cbc daily  - hem/onc consult: ordered filgrastim & ok for dc from their standpoint  - transfuse blood products as clinically indicated- transfuse 1 unit PRBC 7/29/23

## 2023-07-31 NOTE — PROGRESS NOTES
[facility]  Nephrology  Progress Note    Patient Name: Negrita Castro  MRN: [id number]  Admission Date: 7/24/2023  Hospital Length of Stay: 7 days  Attending Provider: Ivette Bauer MD   Primary Care Physician: Manuelito Shepard MD  Principal Problem:Severe sepsis    Consults  Subjective:     HPI:   Negrita Castro  female with a past medical history significant for breast cancer  with metastasis to liver and bones (currently undergoing chemotherapy), COPD, type 2 diabetes mellitus, hypertension,  and hyperlipidemia appear she presented to [facility] Emergency Department at the request of her oncologist for evaluation of persistent hypokalemia.  She reports that on Monday she began a new chemotherapy regimen TRODELVY (sacituzumab govitecan-hziy), which she did not tolerate very well per her report. She was treated for pneumonia with Levaquin recently, although family reports she was unable to tolerate this due to persistent nausea / vomiting with  administration of this medication. To this provider, she reports she has been eating and drinking well.  She reports she is maintained on cannabis and has a very good appetite.   She reports she drinks at least  8-9 20 oz bottles of water daily.       Interval History:  No acute events overnight  Seen and examined this AM. Family present at bedside. Resting in bed without any respiratory distress  Electrolytes improving this AM  UOP remains good          Past Medical History:   Diagnosis Date    Acute hypoxemic respiratory failure 12/12/2020    Breast cancer 10/26/2015    left breast    Cancer     RIGHT BREAST 10-15    Depression     Diabetes mellitus     Neuropathy     Panic attacks     S/P epidural steroid injection     Seizures     none since early 30's    Wears glasses     TO DRIVE       Past Surgical History:   Procedure Laterality Date    AXILLARY NODE DISSECTION Left 8/24/2020    Procedure: LYMPHADENECTOMY, AXILLARY;  Surgeon: Cory BARNES  MD Nicanor;  Location: [facility] OR;  Service: General;  Laterality: Left;  left breast mastectomy with possible axillary lymph node dissection    BREAST BIOPSY      right    BREAST LUMPECTOMY      BREAST RECONSTRUCTION      breast reconstruction with tummy [facility]      BREAST SURGERY      11-3-15 LUMPECTOMY, 12-2-15 REEXCISION    INCISION AND DRAINAGE OF ABSCESS Left 9/9/2020    Procedure: INCISION AND DRAINAGE, ABSCESS;  Surgeon: Cory Vick MD;  Location: [facility] OR;  Service: General;  Laterality: Left;    INSERTION OF LOCALIZATION WIRE Left 8/24/2020    Procedure: INSERTION, LOCALIZATION WIRE;  Surgeon: Cory Vick MD;  Location: [facility] OR;  Service: General;  Laterality: Left;  left breast lumpectomy with wire needle loc    INSERTION OF TUNNELED CENTRAL VENOUS CATHETER (CVC) WITH SUBCUTANEOUS PORT Right 11/17/2022    Procedure: [id number]-[id number];  Surgeon: Jeff Mckeon Jr., MD;  Location: [facility] OR;  Service: General;  Laterality: Right;    MASTECTOMY      mastectomy 2016      MASTECTOMY, PARTIAL Left 3/19/2021    Procedure: MASTECTOMY, PARTIAL;  Surgeon: Cory Vick MD;  Location: [facility] OR;  Service: General;  Laterality: Left;  lumpectomy  left breast     RECONSTRUCTION OF NIPPLE Right 11/5/2018    Procedure: RECONSTRUCTION-NIPPLE RIGHT;  Surgeon: Xiang Hernandez MD;  Location: [address];  Service: Plastics;  Laterality: Right;    SENTINEL LYMPH NODE BIOPSY Left 8/24/2020    Procedure: BIOPSY, LYMPH NODE, SENTINEL;  Surgeon: Cory Vick MD;  Location: [facility];  Service: General;  Laterality: Left;  left breast lumpectomy with left sentinel lymoh node       shoulder surgery and wrist      BILAT  BONE SPUR    WRIST SURGERY      RIGHT       Review of patient's allergies indicates:   Allergen Reactions    Adhesive tape-silicones Hives     BANDAIDS    Polymycin Hives    Adhesive     Latex, natural rubber Hives and Itching    Neomycin-bacitracnzn-polymyxnb      Polyurethane-39 Hives     Current Facility-Administered Medications   Medication Frequency    0.9%  NaCl infusion (for blood administration) Q24H PRN    0.9%  NaCl infusion (for blood administration) Q24H PRN    acetaminophen tablet 650 mg Q8H PRN    albuterol-ipratropium 2.5 mg-0.5 mg/3 mL nebulizer solution 3 mL Q6H WAKE    benzonatate capsule 100 mg TID PRN    dextrose 50% injection 12.5 g PRN    dextrose 50% injection 25 g PRN    diazePAM tablet 2 mg QID PRN    docusate sodium capsule 100 mg BID    droNABinol capsule 5 mg BID AC    glucagon (human recombinant) injection 1 mg PRN    glucose chewable tablet 16 g PRN    glucose chewable tablet 24 g PRN    guaiFENesin-codeine 100-10 mg/5 ml syrup 5 mL Q4H PRN    HYDROcodone-acetaminophen  mg per tablet 1 tablet Q6H PRN    insulin aspart U-100 pen 1-10 Units QID (AC + HS) PRN    insulin detemir U-100 (Levemir) pen 13 Units BID    levoFLOXacin tablet 750 mg Daily    magnesium oxide tablet 400 mg BID    melatonin tablet 6 mg Nightly PRN    metoprolol tartrate (LOPRESSOR) tablet 25 mg BID    morphine injection 4 mg Q4H PRN    naloxone 0.4 mg/mL injection 0.02 mg PRN    ondansetron injection 4 mg Q6H PRN    polyethylene glycol packet 17 g BID PRN    potassium, sodium phosphates 280-160-250 mg packet 2 packet QID (AC & HS)    promethazine (PHENERGAN) 6.25 mg in dextrose 5 % (D5W) 50 mL IVPB Q6H PRN    senna-docusate 8.6-50 mg per tablet 1 tablet BID PRN    simethicone chewable tablet 80 mg QID PRN    sodium chloride 0.9% flush 10 mL PRN    triamcinolone acetonide 0.1% cream BID    vitamin D 1000 units tablet 1,000 Units Daily    zolpidem tablet 10 mg QHS     Family History    None       Tobacco Use    Smoking status: Former     Current packs/day: 0.00     Average packs/day: 0.3 packs/day for 30.0 years (7.5 ttl pk-yrs)     Types: Cigarettes     Start date: 1985     Quit date: 2015     Years since quittin.8    Smokeless tobacco: Never   Substance and  Sexual Activity    Alcohol use: Yes     Alcohol/week: 0.0 standard drinks of alcohol     Comment: RARELY    Drug use: Not Currently     Types: Marijuana     Comment: medical marijuana    Sexual activity: Not on file     Review of Systems  No weakness, SOB, CP,  or GI complaints        Objective:     Vital Signs (Most Recent):  Temp: 97.8 °F (36.6 °C) (07/31/23 1107)  Pulse: 98 (07/31/23 1107)  Resp: 18 (07/31/23 1107)  BP: 98/69 (07/31/23 1107)  SpO2: 96 % (07/31/23 1107) Vital Signs (24h Range):  Temp:  [97.8 °F (36.6 °C)-98.9 °F (37.2 °C)] 97.8 °F (36.6 °C)  Pulse:  [] 98  Resp:  [18-20] 18  SpO2:  [90 %-100 %] 96 %  BP: ()/(65-77) 98/69     Weight: 64.5 kg (142 lb 3.2 oz) (07/24/23 2208)  Body mass index is 19.83 kg/m².  Body surface area is 1.8 meters squared.    I/O last 3 completed shifts:  In: 2746.5 [P.O.:1790; I.V.:60; Blood:396.7; IV Piggyback:499.8]  Out: 2300 [Urine:2300]    Physical Exam  Vitals and nursing note reviewed.   Constitutional:       General: She is not in acute distress.     Appearance: Normal appearance. She is underweight. She is not ill-appearing, toxic-appearing or diaphoretic.   HENT:      Head: Normocephalic and atraumatic.      Mouth/Throat:      Mouth: Mucous membranes are moist.   Cardiovascular:      Rate and Rhythm: Normal rate and regular rhythm.      Pulses: Normal pulses.      Heart sounds: Normal heart sounds.      No friction rub. No gallop.   Pulmonary:      Effort: Pulmonary effort is normal. No respiratory distress.      Breath sounds: Normal breath sounds. No wheezing, rhonchi or rales.   Abdominal:      General: Abdomen is flat. Bowel sounds are normal. There is no distension.      Palpations: Abdomen is soft.      Tenderness: There is no abdominal tenderness. There is no guarding or rebound.   Musculoskeletal:      Right lower leg: No edema.      Left lower leg: No edema.   Skin:     General: Skin is warm and dry.      Capillary Refill: Capillary refill  "takes less than 2 seconds.   Neurological:      General: No focal deficit present.      Mental Status: She is alert and oriented to person, place, and time. Mental status is at baseline.      Motor: No weakness.         Significant Labs:  ABGs: No results for input(s): "PH", "PCO2", "HCO3", "POCSATURATED", "BE" in the last 168 hours.  BMP:   Recent Labs   Lab 07/31/23 0432   GLU 83  83     139   K 3.3*  3.3*     108   CO2 27  27   BUN 22  22   CREATININE 1.1  1.1   CALCIUM 9.0  9.0   MG 1.9       Cardiac Markers: No results for input(s): "CKMB", "TROPONINT", "MYOGLOBIN" in the last 168 hours.  CBC:   Recent Labs   Lab 07/31/23 0432   WBC 5.58   RBC 3.12*   HGB 9.2*   HCT 29.0*   *   MCV 93   MCH 29.5   MCHC 31.7*       CMP:   Recent Labs   Lab 07/31/23 0432   GLU 83  83   CALCIUM 9.0  9.0   ALBUMIN 2.2*  2.2*   PROT 5.8*     139   K 3.3*  3.3*   CO2 27  27     108   BUN 22  22   CREATININE 1.1  1.1   ALKPHOS 437*   ALT 25   AST 32   BILITOT 0.4       Coagulation: No results for input(s): "PT", "INR", "APTT" in the last 168 hours.  LFTs:   Recent Labs   Lab 07/31/23 0432   ALT 25   AST 32   ALKPHOS 437*   BILITOT 0.4   PROT 5.8*   ALBUMIN 2.2*  2.2*       Microbiology Results (last 7 days)       Procedure Component Value Units Date/Time    Blood culture x two cultures. Draw prior to antibiotics. [[id number]] Collected: 07/24/23 1600    Order Status: Completed Specimen: Blood Updated: 07/29/23 1832     Blood Culture, Routine No growth after 5 days.    Narrative:      Aerobic and anaerobic  Collection has been rescheduled by 2MB at 07/24/2023 15:43 Reason:   Unable to collect...rn to pull blood from port per DR Olmedo  Collection has been rescheduled by 2MB at 07/24/2023 15:43 Reason:   Unable to collect...rn to pull blood from port per DR Olmedo    Blood culture x two cultures. Draw prior to antibiotics. [[id number]] Collected: 07/24/23 4052    Order Status: " "Completed Specimen: Blood Updated: 07/29/23 1832     Blood Culture, Routine No growth after 5 days.    Narrative:      Aerobic and anaerobic  Collection has been rescheduled by 2MB at 07/24/2023 15:43 Reason:   Unable to collect...rn to pull blood from port per DR Olmedo  Collection has been rescheduled by 2MB at 07/24/2023 15:43 Reason:   Unable to collect...rn to pull blood from port per DR Olmedo    MRSA Screen by PCR [[id number]] Collected: 07/25/23 0830    Order Status: Completed Specimen: Nasopharyngeal Swab from Nasal Updated: 07/25/23 1053     MRSA SCREEN BY PCR Negative          Recent Labs   Lab 07/24/23  1618   COLORU Yellow   SPECGRAV 1.015   PHUR 6.0   PROTEINUA 1+*   BACTERIA Negative   NITRITE Negative   LEUKOCYTESUR Negative   UROBILINOGEN Negative   HYALINECASTS 0.00*       All labs within the past 24 hours have been reviewed.    Significant Imaging:  Pertinenet labs and imaging reveiwed.     Assessment/Plan:     Polyuria  -reports she drinks 8-9 20 oz bottles of water daily  -she notes "much better" since admit  -Sosm, SNa and Uosm not suggestive of a renal concentrating defect (DI)    Hypomagnesemia  -improving with replacement     Hypokalemia  -slowly improving with replacement   -check urine potassium     Hypercalcemia  -likely of malignancy  -calcium (total) better; iCa wnl  -severe Vit D def noted with normal PTH; would allow total Ca to come down further before replacing D    Hypophosphatemia  -slowly improving with replacement vitamin D    Thank you  for the consult. Our group will follow patient.      Charissa Gillis NP  Nephrology  [facility]  "

## 2023-07-31 NOTE — PLAN OF CARE
Problem: Glycemic Control Impaired (Sepsis/Septic Shock)  Goal: Blood Glucose Level Within Desired Range  Outcome: Ongoing, Progressing     Problem: Infection Progression (Sepsis/Septic Shock)  Goal: Absence of Infection Signs and Symptoms  Outcome: Ongoing, Progressing     Problem: Fall Injury Risk  Goal: Absence of Fall and Fall-Related Injury  Outcome: Ongoing, Progressing     Problem: Oral Intake Inadequate  Goal: Improved Oral Intake  Outcome: Ongoing, Progressing

## 2023-07-31 NOTE — CARE UPDATE
07/31/23 0817   Patient Assessment/Suction   Level of Consciousness (AVPU) alert   Respiratory Effort Normal;Unlabored   Expansion/Accessory Muscles/Retractions no use of accessory muscles   PRE-TX-O2   Device (Oxygen Therapy) room air   SpO2 95 %   Pulse Oximetry Type Intermittent   $ Pulse Oximetry - Single Charge Pulse Oximetry - Single   Pulse 79   Resp 18   Aerosol Therapy   $ Aerosol Therapy Charges Aerosol Treatment   Daily Review of Necessity (SVN) completed   Respiratory Treatment Status (SVN) given   Treatment Route (SVN) mask;oxygen   Patient Position (SVN) HOB elevated   Post Treatment Assessment (SVN) increased aeration   Signs of Intolerance (SVN) none   Respiratory Evaluation   $ Care Plan Tech Time 15 min

## 2023-07-31 NOTE — PT/OT/SLP PROGRESS
Occupational Therapy   Treatment    Name: Negrita Castro  MRN: [id number]  Admitting Diagnosis:  Severe sepsis       Recommendations:     Discharge Recommendations: home health OT  Discharge Equipment Recommendations:  none  Barriers to discharge:  None    Assessment:     Negrita Castro is a 63 y.o. female with a medical diagnosis of Severe sepsis. Performance deficits affecting function are weakness, impaired endurance, impaired self care skills, impaired functional mobility, gait instability, impaired cardiopulmonary response to activity, decreased safety awareness. Patient reports she is worried about lab results because they are preventing her from discharge home.     Rehab Prognosis:  Fair; patient would benefit from acute skilled OT services to address these deficits and reach maximum level of function.       Plan:     Patient to be seen 5 x/week to address the above listed problems via self-care/home management, therapeutic activities, therapeutic exercises  Plan of Care Expires: 8/27/2023   Plan of Care Reviewed with: patient    Subjective     Chief Complaint: no new complaints  Patient/Family Comments/goals: return home  Pain/Comfort:  Pain Rating 1: 0/10  Pain Rating Post-Intervention 1: 0/10    Objective:     Communicated with: nurse prior to session.  Patient found HOB elevated with telemetry, PureWick, peripheral IV upon OT entry to room.    General Precautions: Standard, fall    Orthopedic Precautions:N/A  Braces: N/A  Respiratory Status: Room air     Occupational Performance:     Bed Mobility:    Patient completed Rolling/Turning to Right with minimum assistance  Patient completed Scooting/Bridging with minimum assistance  Patient completed Supine to Sit with minimum assistance     Therapeutic Exercises:  Patient worked on UB theraband exercises with HOB elevated. Tolerated well. Verbalized understanding of written instructions.     Treatment & Education:  Patient was educated on importance of  getting out of bed, discharge planning, safety during transfers and reviewed use of call bell for assistance.     Patient left HOB elevated with all lines intact, call button in reach, and bed alarm on    GOALS:   Multidisciplinary Problems       Occupational Therapy Goals          Problem: Occupational Therapy    Goal Priority Disciplines Outcome Interventions   Occupational Therapy Goal     OT, PT/OT     Description: Goals to be met by: 8/27/2023     Patient will increase functional independence with ADLs by performing:    UE Dressing with Supervision.  LE Dressing with Supervision.  Grooming while standing at sink with Supervision.  Toileting from toilet with Supervision for hygiene and clothing management.   Bathing from  tub bench with Minimal Assistance.                         Time Tracking:     OT Date of Treatment: 07/31/23  OT Start Time: 0926  OT Stop Time: 0938  OT Total Time (min): 12 min    Billable Minutes:Therapeutic Exercise 12    OT/BO: OT          7/31/2023

## 2023-07-31 NOTE — PT/OT/SLP PROGRESS
Physical Therapy      Patient Name:  Negrita Castro   MRN:  [id number]    Patient not seen today secondary to Patient unwilling to participate, Other (Comment) (reports too tired. Encouraged OOB to chair for dinner with staff). Will follow-up tomorrow.

## 2023-07-31 NOTE — PLAN OF CARE
Referral sent to [facility]  to resume services         07/31/23 0800   Post-Acute Status   Post-Acute Authorization Home Health   Home Health Status Referrals Sent

## 2023-07-31 NOTE — CARE UPDATE
07/30/23 2050   Patient Assessment/Suction   Level of Consciousness (AVPU) alert   Respiratory Effort Normal   PRE-TX-O2   Device (Oxygen Therapy) room air   SpO2 96 %   Pulse Oximetry Type Intermittent   $ Pulse Oximetry - Multiple Charge Pulse Oximetry - Multiple   Pulse 100   Resp 18   Aerosol Therapy   $ Aerosol Therapy Charges PRN treatment not required   Education   $ Education Bronchodilator;15 min   Respiratory Evaluation   $ Care Plan Tech Time 15 min

## 2023-08-01 NOTE — PT/OT/SLP PROGRESS
Physical Therapy Treatment    Patient Name:  Negrita Castro   MRN:  [id number]    Recommendations:     Discharge Recommendations: home health PT  Discharge Equipment Recommendations: none      Assessment:     Negrita Castro is a 63 y.o. female admitted with a medical diagnosis of Severe sepsis.  She presents with the following impairments/functional limitations: weakness, impaired endurance, impaired self care skills, impaired functional mobility, gait instability, impaired balance, decreased lower extremity function, impaired cardiopulmonary response to activity .    Pt refused PT at earlier attempt for therapy explaining that she was too depressed. She consented after RT explained need for 02 assessment during ambulation. Pt t/'ef slowly OOB with CGA and ambulated 20 ft x2 RW and CGA  on RA without desaturation. Sa02 remained >95%.    Rehab Prognosis: Good; patient would benefit from acute skilled PT services to address these deficits and reach maximum level of function.    Recent Surgery: * No surgery found *      Plan:     During this hospitalization, patient to be seen 5 x/week to address the identified rehab impairments via gait training, therapeutic activities, therapeutic exercises and progress toward the following goals:    Plan of Care Expires:  08/25/23    Subjective     Chief Complaint: depression  Patient/Family Comments/goals: D/C home today  Pain/Comfort:         Objective:     Communicated with RT  prior to session.  Patient found HOB elevated with telemetry, Adela upon PT entry to room.     General Precautions: Standard, fall  Orthopedic Precautions: N/A  Braces: N/A  Respiratory Status: Room air     Functional Mobility:  Bed Mobility:     Supine to Sit: contact guard assistance  Sit to Supine: contact guard assistance  Transfers:     Sit to Stand:  contact guard assistance with rolling walker  Gait: 20 ft x2 RW and CGA      AM-PAC 6 CLICK MOBILITY          Treatment & Education:  Pt was  educated on benefit of  increased time OOB, safety with mobility, use of call light    Patient left HOB elevated with all lines intact, call button in reach, and bed alarm on..    GOALS:   Multidisciplinary Problems       Physical Therapy Goals          Problem: Physical Therapy    Goal Priority Disciplines Outcome Goal Variances Interventions   Physical Therapy Goal     PT, PT/OT Ongoing, Progressing     Description: Goals to be met by: 23     Patient will increase functional independence with mobility by performin. Supine to sit with Supervision  2. Sit to stand transfer with Supervision  3. Bed to chair transfer with Supervision using Rolling Walker  4. Gait  x 25 feet with Contact Guard Assist using Rolling Walker.                              Time Tracking:     PT Received On: 23  PT Start Time: 1109     PT Stop Time: 1125  PT Total Time (min): 16 min     Billable Minutes: Gait Training 16 minutes    Treatment Type: Treatment  PT/PTA: PT     Number of PTA visits since last PT visit: 0     2023

## 2023-08-01 NOTE — CARE UPDATE
08/01/23 1157   Room Air SpO2 At Rest   Room Air SpO2 At Rest 96 %   Ambulation SpO2 Evaluation on Room Air   Room Air SpO2 During Ambulation 97 %   Pulse 134 bpm   SpO2 On Post Ambulation   SpO2 Post Ambulation 97 %   Post Ambulation Heart Rate 138 bpm   Home O2 Eval Comments   (Patients SpO2 did not drop below 88% but pt's heart rate increased drastically walking a very short distance with respiratory and PT, and stated she could not walk anymore and wanted to go back to her room. RN aware)

## 2023-08-01 NOTE — PROGRESS NOTES
[facility] Medicine  Progress Note    Patient Name: Negrita Castro  MRN: [id number]  Patient Class: IP- Inpatient   Admission Date: 2023  Length of Stay: 8 days  Attending Physician: Ivette Bauer MD  Primary Care Provider: Manuelito Shepard MD        Subjective:     Principal Problem:Severe sepsis        HPI:  Per Dr. Adrienne Lee: Negrita Castro is a 63 y.o. White female   With PMH of breast cancer   undergoing chemotherapy,  Previous radiation,   COPD, smoking, DM2, HTN, HLD,  who presents with hypokalemia.  Sent from oncology office.     Pt reports worsening nausea since Monday  +vomiting previously, but not today  +decreased po intake  +malaise  +generalized weakness  +chills  +fever, Tmax 100.1  +dry cough  No SOB  +R chest pain  Last dose chemo on Monday  Treated for pneumonia over the weekend  However pt was unable to complete levaquin  (due to nausea)      Overview/Hospital Course:  Patient with metastatic breast cancer currently undergoing chemotherapy is admitted after being sent to ER due to outpatient labs showing hypokalemia. She was found to have sepsis attributed to right lower lobe pneumonia that had somewhat failed outpatient antibiotic therapy with levaquin due to poor tolerance attributed to recent chemo. Given IVF and potassium/magnesium replacement. Started on vancomycin and cefepime. Vancomycin discontinued after negative MRSA PCR swab. Respiratory culture growing GNR. Also noted to be pancytopenic so placed on neutropenic precautions. Hem/onc consulted and ordered filgrastim. Respiratory culture grew serratia and leclercia species. Antibiotics changed to oral levaquin and monitored for tolerance. She also had a stage 2 sacral/ fold ulcer on arrival for which wound care followed. Nephrology consulted for polyuria with persistent hypokalemia and hypomagnesemia.    Took over service 23: neutropenic precautions discontinued. 1 unit PRBC  transfusion with appropriate response in H/H. 24 hour urine collecting. Replacing electrolytes (K, Mg, Phos). Vitamin D supplementation started. Ambulating with PT. Stable for dc home tomorrow.       Interval History: Patient seen and examined. NAEON. Electrolytes slowly stabilizing. + cough     Objective:     Vital Signs (Most Recent):  Temp: 98 °F (36.7 °C) (07/31/23 1640)  Pulse: (!) 114 (07/31/23 2109)  Resp: 18 (07/31/23 2109)  BP: 131/77 (07/31/23 2109)  SpO2: 95 % (07/31/23 2007) Vital Signs (24h Range):  Temp:  [97.8 °F (36.6 °C)-98.9 °F (37.2 °C)] 98 °F (36.7 °C)  Pulse:  [] 114  Resp:  [18-20] 18  SpO2:  [90 %-96 %] 95 %  BP: ()/(64-77) 131/77     Weight: 64.5 kg (142 lb 3.2 oz)  Body mass index is 19.83 kg/m².    Intake/Output Summary (Last 24 hours) at 8/1/2023 0007  Last data filed at 7/31/2023 1756  Gross per 24 hour   Intake 1600 ml   Output 1150 ml   Net 450 ml           Physical Exam  Vitals reviewed.   Constitutional:       General: She is not in acute distress.  HENT:      Head: Normocephalic and atraumatic.   Cardiovascular:      Rate and Rhythm: Normal rate and regular rhythm.   Pulmonary:      Effort: Pulmonary effort is normal. No respiratory distress.   Skin:     General: Skin is warm and dry.      Comments: Left upper back hyperpigmented patch   Neurological:      Mental Status: She is alert.   Psychiatric:         Mood and Affect: Affect normal.         Behavior: Behavior normal.         Thought Content: Thought content normal.             Significant Labs: All pertinent labs within the past 24 hours have been reviewed.    Significant Imaging: I have reviewed all pertinent imaging results/findings within the past 24 hours.  Review of Systems   Constitutional:  Positive for appetite change. Negative for chills and fever.   Respiratory:  Positive for cough.    Cardiovascular: Negative.    Gastrointestinal: Negative.    Endocrine: Positive for polyuria.   Musculoskeletal:  Positive  for back pain and gait problem.   Skin:  Positive for wound.   Neurological:  Positive for weakness.   Psychiatric/Behavioral:  Positive for dysphoric mood.          Assessment/Plan:      * Severe sepsis  This patient does have evidence of infective focus  My overall impression is severe sepsis.  Source: Respiratory  Antibiotics given-   Antibiotics (72h ago, onward)    Start     Stop Route Frequency Ordered    23 0945  levoFLOXacin tablet 750 mg         -- Oral Daily 23 0842    23 0900  mupirocin 2 % ointment         23 0859 Nasl 2 times daily 23 2247        Latest lactate reviewed-  Organ dysfunction indicated by lactic acidosis. also with pancytopenia  Fluid challenge given with normal saline; see MAR  Post- resuscitation assessment No - Post resuscitation assessment not needed   Will Not start Pressors  Source control achieved by: antibiotics  - see gram negative pneumonia section    Hypophosphatemia  Patient has Abnormal Phosphorus: hypophosphatemia. Will continue to monitor electrolytes closely. Will replace the affected electrolytes and repeat labs to be done after interventions completed. The patient's phosphorus results have been reviewed and are listed below.  Recent Labs   Lab 23  0455   PHOS 1.5*        Dermatitis  Left upper back hyperpigmented patch  Unclear etiology  - topical triamcinolone BID and re-evaluate    Polyuria  Improved but unclear etiology and with hypoK and hypomag  At least partially due to glucosuria  Could be related to IV mag?  Concern for diabetes insipidus  - monitor I/O  - nephro consult    Stage 2 skin ulcer of sacral region  POA   fold area  - wound care  - turn q2    Hypomagnesemia  Mild but persistent  - replace with PO today again  - recheck level tomorrow and replace more if needed  - encourage diet    Hypokalemia  Much improved but needing daily replacement  - tele  - replace with PO today again  - recheck level in AM and replace  more if needed  - encourage diet    Gram-negative pneumonia  Resp culture with serratia and leclercia  MRSA PCR negative  CXR R lower/basilar ?infiltrate  - continue oral levaquin 750mg daily for 10 day course    Pancytopenia  Improved   Likely chemo-related  No active bleeding  - neutropenic precautions- dc'd   - trend cbc daily  - hem/onc consult: ordered filgrastim & ok for dc from their standpoint  - transfuse blood products as clinically indicated- transfuse 1 unit PRBC 7/29/23     Dehydration  Resolved     Essential hypertension  Chronic, controlled  - hold acei due to normotension; resume when appropriate  - ok to continue the metoprolol and monitor    Chronic obstructive pulmonary disease  Chronic, stable  - duoneb prn    Chronic hypoxemic respiratory failure  Noted hx uses 2.5L O2 NC prn  - O2 prn    Chemotherapy-induced neutropenia  Improved   - neutropenic precautions lifted  - hem/onc consult: ordered filgrastim & ok for dc from their standpoint  - trend wbc on cbc daily    Type 2 diabetes mellitus with hyperglycemia, with long-term current use of insulin  Patient's FSGs are controlled on current medication regimen.  Last A1c reviewed-   Lab Results   Component Value Date    HGBA1C 9.1 (H) 07/24/2023     Most recent fingerstick glucose reviewed-   Current correctional scale  Medium  Maintain anti-hyperglycemic dose as follows-   Antihyperglycemics (From admission, onward)    Start     Stop Route Frequency Ordered    07/27/23 0900  insulin detemir U-100 (Levemir) pen 13 Units         -- SubQ 2 times daily 07/27/23 0757    07/24/23 2016  insulin aspart U-100 pen 1-10 Units         -- SubQ Before meals & nightly PRN 07/24/23 1918        Hold Oral hypoglycemics while patient is in the hospital.  Adjust regimen for goal glucose <180    Breast carcinoma metastatic to multiple sites  Noted hx on current chemo  - hem/onc consult: ok for discharge from their standpoint      VTE Risk Mitigation (From admission,  onward)         Ordered     IP VTE HIGH RISK PATIENT  Once         07/24/23 1918     Place sequential compression device  Until discontinued         07/24/23 1918     Reason for No Pharmacological VTE Prophylaxis  Once        Question:  Reasons:  Answer:  Thrombocytopenia    07/24/23 1918                Discharge Planning   GUNNER: 8/1/2023     Code Status: Full Code   Is the patient medically ready for discharge?:     Reason for patient still in hospital (select all that apply): Patient trending condition  Discharge Plan A: Home Health                  Ivette Bauer MD  Department of Hospital Medicine   [facility]

## 2023-08-01 NOTE — CARE UPDATE
07/31/23 1955   Patient Assessment/Suction   Level of Consciousness (AVPU) alert   Respiratory Effort Normal;Unlabored   Expansion/Accessory Muscles/Retractions no use of accessory muscles   All Lung Fields Breath Sounds diminished   PRE-TX-O2   Device (Oxygen Therapy) room air   SpO2 (!) 94 %   Pulse Oximetry Type Intermittent   $ Pulse Oximetry - Multiple Charge Pulse Oximetry - Multiple   Pulse (!) 114   Resp 18   /77   Positioning   Body Position 30 degrees;supine   Head of Bed (HOB) Positioning HOB at 30 degrees   Aerosol Therapy   $ Aerosol Therapy Charges Aerosol Treatment   Daily Review of Necessity (SVN) completed   Respiratory Treatment Status (SVN) given   Treatment Route (SVN) oxygen;mask   Patient Position (SVN) HOB elevated   Post Treatment Assessment (SVN) breath sounds improved   Signs of Intolerance (SVN) none   Education   $ Education Bronchodilator;15 min

## 2023-08-01 NOTE — PLAN OF CARE
Pt to follow up at discharge clinic for hospital follow up  Home health resumed with [facility]. SALEEM 8/2    Pt is clear for discharge from CM standpoint         08/01/23 1157   Final Note   Assessment Type Final Discharge Note   Anticipated Discharge Disposition Home-Health   Hospital Resources/Appts/Education Provided Appointments scheduled by Navigator/Coordinator;Post-Acute resouces added to AVS   Post-Acute Status   Post-Acute Authorization Home Health   Home Health Status Set-up Complete/Auth obtained

## 2023-08-01 NOTE — PT/OT/SLP PROGRESS
Occupational Therapy   Treatment    Name: Negrita Castro  MRN: [id number]  Admitting Diagnosis:  Severe sepsis       Recommendations:     Discharge Recommendations: home health OT  Discharge Equipment Recommendations:  none  Barriers to discharge:  None    Assessment:     Negrita Castro is a 63 y.o. female with a medical diagnosis of Severe sepsis.  Performance deficits affecting function are weakness, impaired endurance, impaired self care skills, impaired functional mobility, gait instability, impaired cardiopulmonary response to activity, decreased lower extremity function. Patient hopes she can go home soon.     Rehab Prognosis:  Good; patient would benefit from acute skilled OT services to address these deficits and reach maximum level of function.       Plan:     Patient to be seen 5 x/week to address the above listed problems via self-care/home management, therapeutic activities, therapeutic exercises  Plan of Care Expires:  8/27/2023  Plan of Care Reviewed with: patient    Subjective     Chief Complaint: sad about lab work  Patient/Family Comments/goals: return home  Pain/Comfort:  Pain Rating 1: 0/10  Pain Rating Post-Intervention 1: 0/10    Objective:     Communicated with: nurse prior to session.  Patient found HOB elevated with telemetry, PureWick, peripheral IV upon OT entry to room.    General Precautions: Standard, fall    Orthopedic Precautions:N/A  Braces: N/A  Respiratory Status: Room air     Occupational Performance:     Bed Mobility:    Patient completed Rolling/Turning to Right with minimum assistance  Patient completed Scooting/Bridging with minimum assistance  Patient completed Supine to Sit with minimum assistance   Declined transfer to chair; 10 minutes of unsupported sitting at edge of bed    Activities of Daily Living:  Grooming: stand by assistance/setup for washing face and oral care    Treatment & Education:  Patient was educated on safety during transfers and ADLs, importance of  getting out of the bed, skin protection, and reviewed use of call bell for assistance.     Patient left HOB elevated with all lines intact, call button in reach, and bed alarm on    GOALS:   Multidisciplinary Problems       Occupational Therapy Goals          Problem: Occupational Therapy    Goal Priority Disciplines Outcome Interventions   Occupational Therapy Goal     OT, PT/OT Ongoing, Progressing    Description: Goals to be met by: 8/27/2023     Patient will increase functional independence with ADLs by performing:    UE Dressing with Supervision.  LE Dressing with Supervision.  Grooming while standing at sink with Supervision.  Toileting from toilet with Supervision for hygiene and clothing management.   Bathing from  tub bench with Minimal Assistance.                         Time Tracking:     OT Date of Treatment: 08/01/23  OT Start Time: 0951  OT Stop Time: 1014  OT Total Time (min): 23 min    Billable Minutes:Self Care/Home Management 23    OT/BO: OT          8/1/2023

## 2023-08-01 NOTE — PLAN OF CARE
Problem: Occupational Therapy  Goal: Occupational Therapy Goal  Description: Goals to be met by: 8/27/2023     Patient will increase functional independence with ADLs by performing:    UE Dressing with Supervision.  LE Dressing with Supervision.  Grooming while standing at sink with Supervision.  Toileting from toilet with Supervision for hygiene and clothing management.   Bathing from  tub bench with Minimal Assistance.    Outcome: Ongoing, Progressing

## 2023-08-01 NOTE — CARE UPDATE
08/01/23 0804   Patient Assessment/Suction   Level of Consciousness (AVPU) alert   Respiratory Effort Normal;Unlabored   Expansion/Accessory Muscles/Retractions no retractions;no use of accessory muscles   All Lung Fields Breath Sounds diminished   Rhythm/Pattern, Respiratory pattern regular;unlabored   Cough Frequency infrequent   PRE-TX-O2   Device (Oxygen Therapy) room air   SpO2 (!) 94 %   Pulse Oximetry Type Intermittent   $ Pulse Oximetry - Multiple Charge Pulse Oximetry - Multiple   Pulse 100   Resp 16   Aerosol Therapy   $ Aerosol Therapy Charges Aerosol Treatment   Daily Review of Necessity (SVN) completed   Respiratory Treatment Status (SVN) given   Treatment Route (SVN) mask;oxygen   Patient Position (SVN) HOB elevated   Post Treatment Assessment (SVN) breath sounds unchanged   Signs of Intolerance (SVN) none   Breath Sounds Post-Respiratory Treatment   Throughout All Fields Post-Treatment All Fields   Throughout All Fields Post-Treatment no change   Post-treatment Heart Rate (beats/min) 96   Post-treatment Resp Rate (breaths/min) 18   Education   $ Education Bronchodilator;15 min

## 2023-08-01 NOTE — PROGRESS NOTES
[facility]  Nephrology  Progress Note    Patient Name: Negrita Castro  MRN: [id number]  Admission Date: 7/24/2023  Hospital Length of Stay: 8 days  Attending Provider: Ivette Bauer MD   Primary Care Physician: Manuelito Shepard MD  Principal Problem:Severe sepsis      Subjective:   HPI:   Negrita Castro  female with a past medical history significant for breast cancer  with metastasis to liver and bones (currently undergoing chemotherapy), COPD, type 2 diabetes mellitus, hypertension,  and hyperlipidemia appear she presented to [facility] at the request of her oncologist for evaluation of persistent hypokalemia.  She reports that on Monday she began a new chemotherapy regimen TRODELVY (sacituzumab govitecan-hziy), which she did not tolerate very well per her report. She was treated for pneumonia with Levaquin recently, although family reports she was unable to tolerate this due to persistent nausea / vomiting with  administration of this medication. To this provider, she reports she has been eating and drinking well.  She reports she is maintained on cannabis and has a very good appetite.   She reports she drinks at least  8-9 20 oz bottles of water daily    Interval History:   No acute events overnight  Renal functions stable  Lytes stable    Review of patient's allergies indicates:   Allergen Reactions    Adhesive tape-silicones Hives     BANDAIDS    Polymycin Hives    Adhesive     Latex, natural rubber Hives and Itching    Neomycin-bacitracnzn-polymyxnb     Polyurethane-39 Hives     Current Facility-Administered Medications   Medication Frequency    0.9%  NaCl infusion (for blood administration) Q24H PRN    0.9%  NaCl infusion (for blood administration) Q24H PRN    acetaminophen tablet 650 mg Q8H PRN    albuterol-ipratropium 2.5 mg-0.5 mg/3 mL nebulizer solution 3 mL Q6H WAKE    benzonatate capsule 100 mg TID PRN    cholecalciferol (vitamin D3) tablet 2,000 Units  Daily    dextrose 50% injection 12.5 g PRN    dextrose 50% injection 25 g PRN    diazePAM tablet 2 mg QID PRN    docusate sodium capsule 100 mg BID    droNABinol capsule 5 mg BID AC    glucagon (human recombinant) injection 1 mg PRN    glucose chewable tablet 16 g PRN    glucose chewable tablet 24 g PRN    guaiFENesin-codeine 100-10 mg/5 ml syrup 5 mL Q4H PRN    HYDROcodone-acetaminophen  mg per tablet 1 tablet Q6H PRN    insulin aspart U-100 pen 1-10 Units QID (AC + HS) PRN    insulin detemir U-100 (Levemir) pen 13 Units BID    levoFLOXacin tablet 750 mg Daily    magnesium oxide tablet 400 mg BID    melatonin tablet 6 mg Nightly PRN    metoprolol tartrate (LOPRESSOR) tablet 25 mg BID    morphine injection 4 mg Q4H PRN    naloxone 0.4 mg/mL injection 0.02 mg PRN    ondansetron injection 4 mg Q6H PRN    polyethylene glycol packet 17 g BID PRN    potassium, sodium phosphates 280-160-250 mg packet 2 packet QID (AC & HS)    promethazine (PHENERGAN) 6.25 mg in dextrose 5 % (D5W) 50 mL IVPB Q6H PRN    senna-docusate 8.6-50 mg per tablet 1 tablet BID PRN    simethicone chewable tablet 80 mg QID PRN    sodium chloride 0.9% flush 10 mL PRN    triamcinolone acetonide 0.1% cream BID    zolpidem tablet 10 mg QHS       Objective:     Vital Signs (Most Recent):  Temp: 98.2 °F (36.8 °C) (08/01/23 0712)  Pulse: 100 (08/01/23 0804)  Resp: 16 (08/01/23 0804)  BP: (!) 93/50 (08/01/23 0712)  SpO2: (!) 94 % (08/01/23 0804) Vital Signs (24h Range):  Temp:  [97.8 °F (36.6 °C)-98.9 °F (37.2 °C)] 98.2 °F (36.8 °C)  Pulse:  [] 100  Resp:  [16-20] 16  SpO2:  [93 %-96 %] 94 %  BP: ()/(50-77) 93/50     Weight: 68.4 kg (150 lb 12.7 oz) (08/01/23 0400)  Body mass index is 21.03 kg/m².  Body surface area is 1.85 meters squared.    I/O last 3 completed shifts:  In: 1850 [P.O.:1830; I.V.:20]  Out: 1900 [Urine:1900]    Physical Exam  Vitals and nursing note reviewed.   Constitutional:       General: She is not in acute distress.      Appearance: Normal appearance. She is underweight. She is not ill-appearing, toxic-appearing or diaphoretic.   HENT:      Head: Normocephalic and atraumatic.      Mouth/Throat:      Mouth: Mucous membranes are moist.   Cardiovascular:      Rate and Rhythm: Normal rate and regular rhythm.      Pulses: Normal pulses.      Heart sounds: Normal heart sounds.      No friction rub. No gallop.   Pulmonary:      Effort: Pulmonary effort is normal. No respiratory distress.      Breath sounds: Normal breath sounds. No wheezing, rhonchi or rales.   Abdominal:      General: Abdomen is flat. Bowel sounds are normal. There is no distension.      Palpations: Abdomen is soft.      Tenderness: There is no abdominal tenderness. There is no guarding or rebound.   Musculoskeletal:      Right lower leg: No edema.      Left lower leg: No edema.   Skin:     General: Skin is warm and dry.      Capillary Refill: Capillary refill takes less than 2 seconds.   Neurological:      General: No focal deficit present.      Mental Status: She is alert and oriented to person, place, and time. Mental status is at baseline.      Motor: No weakness.         Significant Labs:sureBMP:   Recent Labs   Lab 08/01/23  0528   *  225*     138   K 3.6  3.6     109   CO2 25  25   BUN 23  23   CREATININE 1.1  1.1   CALCIUM 7.9*  7.9*   MG 1.9     CBC:   Recent Labs   Lab 08/01/23  0528   WBC 3.53*   RBC 3.04*   HGB 8.8*   HCT 28.3*   PLT 98*   MCV 93   MCH 28.9   MCHC 31.1*     CMP:   Recent Labs   Lab 08/01/23  0528   *  225*   CALCIUM 7.9*  7.9*   ALBUMIN 2.1*  2.1*   PROT 5.9*     138   K 3.6  3.6   CO2 25  25     109   BUN 23  23   CREATININE 1.1  1.1   ALKPHOS 464*   ALT 26   AST 38   BILITOT 0.4     LFTs:   Recent Labs   Lab 08/01/23  0528   ALT 26   AST 38   ALKPHOS 464*   BILITOT 0.4   PROT 5.9*   ALBUMIN 2.1*  2.1*     PTH:   Recent Labs   Lab 07/29/23  0455   PTH 9.1     TSH: No results for  "input(s): "TSH" in the last 168 hours.  No results for input(s): "COLORU", "CLARITYU", "SPECGRAV", "PHUR", "PROTEINUA", "GLUCOSEU", "BILIRUBINCON", "BLOODU", "WBCU", "RBCU", "BACTERIA", "MUCUS", "NITRITE", "LEUKOCYTESUR", "UROBILINOGEN", "HYALINECASTS" in the last 168 hours.    Significant Imaging:  Pertinent labs and imaging reviewed.     Assessment/Plan:     #) Polyuria -  -related to polydipsia; counseled patient; Hemoglobin A1c 9.1% suggesting need for tighter glucose control  -sOsm, sNa and uOsm not suggestive of a renal concentrating defect (DI)     #) Hypokalemia -  -as a secondary effect from hypomagnesemia  -monitor and replace as needed.     #) Hypomagnesemia -   -known complication of TRODELVY via lit review; agree with oral Mg supplementation. Advised patient to ask her oncologist if she can receive scheduled Mg infusions at the infusion center to offset low Mg levels.     #) Hypercalcemia  -calcium corrected for albumin 9.4 mg/dl this am--> improved; iCa wnl  -severe Vit D def noted with normal PTH; would allow total Ca to come down further before replacing D    #) Hypophosphatemia  -resolved    Nephrology will sign off at this time. Please call with any questions/concerns. Will be available if needed.     RHINA Moore  Nephrology  [facility]         Patient condition and plan of care discussed and formulated with Dr. Floyd Torres. See above assessment and plan.   "

## 2023-08-01 NOTE — SUBJECTIVE & OBJECTIVE
Interval History: Patient seen and examined. NAEON. Electrolytes slowly stabilizing. + cough     Objective:     Vital Signs (Most Recent):  Temp: 98 °F (36.7 °C) (07/31/23 1640)  Pulse: (!) 114 (07/31/23 2109)  Resp: 18 (07/31/23 2109)  BP: 131/77 (07/31/23 2109)  SpO2: 95 % (07/31/23 2007) Vital Signs (24h Range):  Temp:  [97.8 °F (36.6 °C)-98.9 °F (37.2 °C)] 98 °F (36.7 °C)  Pulse:  [] 114  Resp:  [18-20] 18  SpO2:  [90 %-96 %] 95 %  BP: ()/(64-77) 131/77     Weight: 64.5 kg (142 lb 3.2 oz)  Body mass index is 19.83 kg/m².    Intake/Output Summary (Last 24 hours) at 8/1/2023 0007  Last data filed at 7/31/2023 1756  Gross per 24 hour   Intake 1600 ml   Output 1150 ml   Net 450 ml           Physical Exam  Vitals reviewed.   Constitutional:       General: She is not in acute distress.  HENT:      Head: Normocephalic and atraumatic.   Cardiovascular:      Rate and Rhythm: Normal rate and regular rhythm.   Pulmonary:      Effort: Pulmonary effort is normal. No respiratory distress.   Skin:     General: Skin is warm and dry.      Comments: Left upper back hyperpigmented patch   Neurological:      Mental Status: She is alert.   Psychiatric:         Mood and Affect: Affect normal.         Behavior: Behavior normal.         Thought Content: Thought content normal.             Significant Labs: All pertinent labs within the past 24 hours have been reviewed.    Significant Imaging: I have reviewed all pertinent imaging results/findings within the past 24 hours.  Review of Systems   Constitutional:  Positive for appetite change. Negative for chills and fever.   Respiratory:  Positive for cough.    Cardiovascular: Negative.    Gastrointestinal: Negative.    Endocrine: Positive for polyuria.   Musculoskeletal:  Positive for back pain and gait problem.   Skin:  Positive for wound.   Neurological:  Positive for weakness.   Psychiatric/Behavioral:  Positive for dysphoric mood.

## 2023-08-01 NOTE — PROGRESS NOTES
Hematology/Oncology  Inpatient Progress Note  7/31/23  Patient Name: Negrita Castro  MRN: [id number]  Admission Date: 7/24/2023  Hospital Length of Stay: 7 days  Code Status: Full Code   Attending Provider: Ivette Baure MD  Referring Provider:Dr. Lee  Consulting Provider: TALISHA Vanegas MD  Primary Care Physician: Manuelito Shepard MD  Principal Problem:Severe sepsis    Progress Note Nhan Vanegas MD 7/31/23  Subjective:     Chief Complaint:  low potassium      History Present Illness:  63 y.o. female found to have significant hypokalemia, denies nausea or diarrhea.  She was treated with Trodelvy last Monday for metastatic breast cancer.  Over this past weekend she had hallucinations.    She has RLL pneumonia, sputum C/S GNR.  Pancytopenia 2nd chemotherapy.  Bilateral breast cancer with metastatic dx to liver and bones.    Stronger today, ambulated in sims with assistance.  Sputum C/S +.  Hgb better at 9.2 today, transfusion of pRBC deferred.  WBC better.  NL.  Plt cnt better at 100,000.    Dr. Jurado of wound care has seen her for decubitus care.    Gradually improving, likely to go home soon.      Past Medical/Surgical History:  Past Medical History:   Diagnosis Date    Acute hypoxemic respiratory failure 12/12/2020    Breast cancer 10/26/2015    left breast    Cancer     RIGHT BREAST 10-15    Depression     Diabetes mellitus     Neuropathy     Panic attacks     S/P epidural steroid injection     Seizures     none since early 30's    Wears glasses     TO DRIVE     Past Surgical History:   Procedure Laterality Date    AXILLARY NODE DISSECTION Left 8/24/2020    Procedure: LYMPHADENECTOMY, AXILLARY;  Surgeon: Cory Vanegas MD;  Location: [facility] OR;  Service: General;  Laterality: Left;  left breast mastectomy with possible axillary lymph node dissection    BREAST BIOPSY      right    BREAST LUMPECTOMY      BREAST RECONSTRUCTION      breast reconstruction with tummy tuck      BREAST SURGERY       11-3-15 LUMPECTOMY, 12-2-15 REEXCISION    INCISION AND DRAINAGE OF ABSCESS Left 9/9/2020    Procedure: INCISION AND DRAINAGE, ABSCESS;  Surgeon: Cory Vick MD;  Location: [facility] OR;  Service: General;  Laterality: Left;    INSERTION OF LOCALIZATION WIRE Left 8/24/2020    Procedure: INSERTION, LOCALIZATION WIRE;  Surgeon: Cory Vick MD;  Location: [facility] OR;  Service: General;  Laterality: Left;  left breast lumpectomy with wire needle loc    INSERTION OF TUNNELED CENTRAL VENOUS CATHETER (CVC) WITH SUBCUTANEOUS PORT Right 11/17/2022    Procedure: HXNUNLIPF-XRLU-Y-CATH;  Surgeon: Jeff Mckeon Jr., MD;  Location: [facility] OR;  Service: General;  Laterality: Right;    MASTECTOMY      mastectomy 2016      MASTECTOMY, PARTIAL Left 3/19/2021    Procedure: MASTECTOMY, PARTIAL;  Surgeon: Cory Vick MD;  Location: [facility] OR;  Service: General;  Laterality: Left;  lumpectomy  left breast     RECONSTRUCTION OF NIPPLE Right 11/5/2018    Procedure: RECONSTRUCTION-NIPPLE RIGHT;  Surgeon: Xiang Hernandez MD;  Location: [address];  Service: Plastics;  Laterality: Right;    SENTINEL LYMPH NODE BIOPSY Left 8/24/2020    Procedure: BIOPSY, LYMPH NODE, SENTINEL;  Surgeon: Cory Vick MD;  Location: [facility] OR;  Service: General;  Laterality: Left;  left breast lumpectomy with left sentinel lymoh node       shoulder surgery and wrist      BILAT  BONE SPUR    WRIST SURGERY      RIGHT       Allergies:  Review of patient's allergies indicates:   Allergen Reactions    Adhesive tape-silicones Hives     BANDAIDS    Polymycin Hives    Adhesive     Latex, natural rubber Hives and Itching    Neomycin-bacitracnzn-polymyxnb     Polyurethane-39 Hives       Social/Family History:  Social History     Socioeconomic History    Marital status:    Tobacco Use    Smoking status: Former     Current packs/day: 0.00     Average packs/day: 0.3 packs/day for 30.0 years (7.5 ttl pk-yrs)     Types: Cigarettes      Start date: 1985     Quit date: 2015     Years since quittin.8    Smokeless tobacco: Never   Substance and Sexual Activity    Alcohol use: Yes     Alcohol/week: 0.0 standard drinks of alcohol     Comment: RARELY    Drug use: Not Currently     Types: Marijuana     Comment: medical marijuana     Family History   Problem Relation Age of Onset    Anesthesia problems Neg Hx        ROS:    GEN: normal without any fever, night sweats or weight loss  HEENT: normal with no HA's, sore throat, stiff neck, changes in vision  CV: normal with no CP, SOB, PND, POOL or orthopnea  PULM: normal with no SOB, cough, hemoptysis, sputum or pleuritic pain  GI: normal with no abdominal pain, nausea, vomiting, constipation, diarrhea, melanotic stools, BRBPR, or hematemesis  : normal with no hematuria, dysuria  BREAST: See HPI  SKIN: See HPI        Medications:  Continuous Infusions:      Scheduled Meds:   albuterol-ipratropium  3 mL Nebulization Q6H WAKE    [START ON 2023] vitamin D  2,000 Units Oral Daily    docusate sodium  100 mg Oral BID    droNABinol  5 mg Oral BID AC    insulin detemir U-100  13 Units Subcutaneous BID    levoFLOXacin  750 mg Oral Daily    magnesium oxide  400 mg Oral BID    metoprolol tartrate  25 mg Oral BID    potassium, sodium phosphates  2 packet Oral QID (AC & HS)    triamcinolone acetonide 0.1%   Topical (Top) BID    zolpidem  10 mg Oral QHS     PRN Meds:0.9%  NaCl infusion (for blood administration), 0.9%  NaCl infusion (for blood administration), acetaminophen, benzonatate, dextrose 50%, dextrose 50%, diazePAM, glucagon (human recombinant), glucose, glucose, guaiFENesin-codeine 100-10 mg/5 ml, HYDROcodone-acetaminophen, insulin aspart U-100, melatonin, morphine, naloxone, ondansetron, polyethylene glycol, promethazine (PHENERGAN) 6.25 mg in dextrose 5 % (D5W) 50 mL IVPB, senna-docusate 8.6-50 mg, simethicone, sodium chloride 0.9%     Objective:     Vitals:  Blood pressure 131/77, pulse (!)  "114, temperature 98 °F (36.7 °C), temperature source Oral, resp. rate 18, height 5' 11" (1.803 m), weight 64.5 kg (142 lb 3.2 oz), last menstrual period 01/16/2016, SpO2 95 %.    Physical Exam:  GEN: chronically ill in appearance  HEAD: atraumatic and normocephalic  EYES: + pallor, no icterus  ENT: no pharyngeal erythema, external ears WNL; no nasal discharge  NECK: no masses, thyroid normal, trachea midline, no LAD/LN's, supple  CV: RRR with no murmur; normal pulse; normal S1 and S2; no pedal edema  CHEST: Normal respiratory effort; CTAB; normal breath sounds; no wheeze or crackles  ABDOM: nontender and nondistended; soft; no rebound/guarding, L/S NP  MUSC/Skeletal: ROM normal; no crepitus; joints normal  EXTREM: no clubbing, cyanosis, inflammation   SKIN: small decubitus at buttock  ; no CVAT  NEURO: grossly intact; motor/sensory WNL; no tremors  PSYCH: normal mood, affect and behavior  LYMPH: normal cervical, supraclavicular, axillary  LN's  Breast:extensive post op change.    WBC 0.58  H/H 7.7/24.6, plt cnt 23,000    K 3.2,  Sputum C/S GNR, Blood C/S NG.  CXR RLL infiltrate  Rib x ray negative  U/S liver metastases     Assessment/Plan:     (1) 63 y.o. female with bilateral breast cancer,  Metastatic dx, started on Trodelvy 1 week ago.    (2)RLL pneumonia, GNR, C/S noted.    (3)Neutropenia 2nd chemotherapy.  Resolved.    (4) Anemia, symptomatic, 2nd chemo,  Metastatic dx.  Hgb 7.7. to 9.2.  Defer transfusion.    (5)POOL+.  If we can document 02 sat < 88,  Then she would qualify for 02 at home.      (6)Small decubitus on buttock.  Dr. Jurado has seen her.    (7)Hypokalemia, for electrolyte supplementation.  K 3.2.  Normalized now.      Active Diagnoses:    Diagnosis Date Noted POA    PRINCIPAL PROBLEM:  Severe sepsis [A41.9, R65.20] 07/24/2023 Yes    Hypophosphatemia [E83.39] 07/29/2023 Clinically Undetermined    Dermatitis [L30.9] 07/28/2023 No    Polyuria [R35.89] 07/27/2023 Yes    Stage 2 skin ulcer of sacral " region [L98.429] 07/26/2023 Yes    Gram-negative pneumonia [J15.6] 07/25/2023 Yes    Hypokalemia [E87.6] 07/25/2023 Yes    Hypomagnesemia [E83.42] 07/25/2023 Yes    Dehydration [E86.0] 07/24/2023 Yes    Pancytopenia [D61.818] 07/24/2023 Yes    Chronic hypoxemic respiratory failure [J96.11] 01/19/2021 Yes    Chronic obstructive pulmonary disease [J44.9] 01/19/2021 Yes    Essential hypertension [I10] 01/19/2021 Yes    Chemotherapy-induced neutropenia [D70.1, T45.1X5A] 11/25/2020 Yes    Type 2 diabetes mellitus with hyperglycemia, with long-term current use of insulin [E11.65, Z79.4] 01/08/2020 Not Applicable    Breast carcinoma metastatic to multiple sites [C50.919] 02/26/2018 Yes      Problems Resolved During this Admission:     TALISHA Vick MD  Hematology/Oncology  [facility]   7/31/23.

## 2023-08-02 NOTE — PLAN OF CARE
Met with Patient/Caregiver to provide IMM.  IMM signed by Patient/Caregiver, copy of IMM provided to Patient/Caregiver, and IMM will be scanned to chart.     anxious

## 2023-08-03 NOTE — TELEPHONE ENCOUNTER
Patient's calcium low at 7.9. Dr Vick reviewed and per his verbal order, zometa is not to be given tomorrow and patient to start on an OTC calcium supplement. Patient also needing a follow up appointment. Attempted to call patient with above to make aware. No answer at number listed. Voicemail left with above and instructions to call back. Infusion aware of above as well.

## 2023-08-04 NOTE — TELEPHONE ENCOUNTER
Patient notified that Dr. Vick was holding her chemo on Monday and will contact the office regarding MD appointment.

## 2023-08-07 PROBLEM — L89.152 PRESSURE INJURY OF SACRAL REGION, STAGE 2: Status: ACTIVE | Noted: 2023-01-01

## 2023-08-07 NOTE — PROGRESS NOTES
Wound Care Progress Note    Subjective:       Patient ID: Negrita Castro is a 63 y.o. female.    Chief Complaint: Wound Care    HPI  Pt seen in clinic for a hospital FU for a stage 2 pressure ulcer. Wound is smaller than in hospital, but she is starting chemo next week and concern is that it will stall the healing process. Pt has no other complaints today.  Review of Systems   Skin:  Positive for wound.        Sacral pressure ulcer   All other systems reviewed and are negative.      Objective:        Physical Exam  Vitals and nursing note reviewed. Exam conducted with a chaperone present.   Constitutional:       General: She is not in acute distress.     Appearance: Normal appearance.   Skin:     General: Skin is warm and dry.      Findings: Lesion present.      Comments: Stage 2 sacral pressure ulcer, see wound care assessment documentation in chart review scanned under the media tab   Neurological:      General: No focal deficit present.      Mental Status: She is alert and oriented to person, place, and time.   Psychiatric:         Mood and Affect: Mood normal.         Behavior: Behavior normal.         Thought Content: Thought content normal.         Judgment: Judgment normal.       Vitals:    08/07/23 1402   BP: 114/80   Pulse: 96   Resp: 20   Temp: 98.4 °F (36.9 °C)       Assessment:           ICD-10-CM ICD-9-CM   1. Pressure injury of sacral region, stage 2  L89.152 707.03     707.22                Plan:                  Negrita was seen today for wound care.    Diagnoses and all orders for this visit:    Pressure injury of sacral region, stage 2      Much of the documentation for this visit was completed in the Wound Docs system. Please see the attached documentation for further details about this patient's care.

## 2023-08-14 NOTE — DISCHARGE SUMMARY
[facility] Medicine  Discharge Summary      Patient Name: Negrita Castro  MRN: [id number]  [facility]: [id number]  Patient Class: IP- Inpatient  Admission Date: 2023  Hospital Length of Stay: 8 days  Discharge Date and Time: 2023  Attending Physician: No att. providers found   Discharging Provider: Ivette Bauer MD  Primary Care Provider: Manuelito Shepard MD    Primary Care Team: Networked reference to record PCT     HPI:   Per Dr. Adrienne Lee: Negrita Castro is a 63 y.o. White female   With PMH of breast cancer   undergoing chemotherapy,  Previous radiation,   COPD, smoking, DM2, HTN, HLD,  who presents with hypokalemia.  Sent from oncology office.     Pt reports worsening nausea since Monday  +vomiting previously, but not today  +decreased po intake  +malaise  +generalized weakness  +chills  +fever, Tmax 100.1  +dry cough  No SOB  +R chest pain  Last dose chemo on Monday  Treated for pneumonia over the weekend  However pt was unable to complete levaquin  (due to nausea)      * No surgery found *      Hospital Course:   Patient with metastatic breast cancer currently undergoing chemotherapy is admitted after being sent to ER due to outpatient labs showing hypokalemia. She was found to have sepsis attributed to right lower lobe pneumonia that had somewhat failed outpatient antibiotic therapy with levaquin due to poor tolerance attributed to recent chemo. Given IVF and potassium/magnesium replacement. Started on vancomycin and cefepime. Vancomycin discontinued after negative MRSA PCR swab. Respiratory culture growing GNR. Also noted to be pancytopenic so placed on neutropenic precautions. Hem/onc consulted and ordered filgrastim. Respiratory culture grew serratia and leclercia species. Antibiotics changed to oral levaquin and monitored for tolerance. She also had a stage 2 sacral/ fold ulcer on arrival for which wound care followed. Nephrology consulted for polyuria  with persistent hypokalemia and hypomagnesemia.    Took over service 7/29/23: neutropenic precautions discontinued. 1 unit PRBC transfusion with appropriate response in H/H. 24 hour urine collecting. Replacing electrolytes (K, Mg, Phos). Vitamin D supplementation started. Ambulating with PT. Stable for dc home tomorrow.     08/01/2023: patient discharged with completion of antibiotic; stable H/H and follow up appts with PCP, pulmonary, nephrology and oncology. Resume home health services on dc home.        Goals of Care Treatment Preferences:  Code Status: Full Code    Living Will: Yes              Consults:   Consults (From admission, onward)        Status Ordering Provider     Inpatient consult to Nephrology  Once        Provider:  Venu Son MD    Completed LORENZA ADKINS     Inpatient consult to Registered Dietitian/Nutritionist  Once        Provider:  (Not yet assigned)    Completed SWETHA JACQUES     Inpatient consult to Hematology/Oncology  Once        Provider:  TALISHA Vick MD    Completed SWETHA JACQUES          No new Assessment & Plan notes have been filed under this hospital service since the last note was generated.  Service: Hospital Medicine    Final Active Diagnoses:    Diagnosis Date Noted POA    PRINCIPAL PROBLEM:  Severe sepsis [A41.9, R65.20] 07/24/2023 Yes    Hypophosphatemia [E83.39] 07/29/2023 Clinically Undetermined    Dermatitis [L30.9] 07/28/2023 No    Polyuria [R35.89] 07/27/2023 Yes    Stage 2 skin ulcer of sacral region [L98.429] 07/26/2023 Yes    Gram-negative pneumonia [J15.6] 07/25/2023 Yes    Hypokalemia [E87.6] 07/25/2023 Yes    Hypomagnesemia [E83.42] 07/25/2023 Yes    Dehydration [E86.0] 07/24/2023 Yes    Pancytopenia [D61.818] 07/24/2023 Yes    Chronic hypoxemic respiratory failure [J96.11] 01/19/2021 Yes    Chronic obstructive pulmonary disease [J44.9] 01/19/2021 Yes    Essential hypertension [I10] 01/19/2021 Yes    Chemotherapy-induced neutropenia  [D70.1, T45.1X5A] 11/25/2020 Yes    Type 2 diabetes mellitus with hyperglycemia, with long-term current use of insulin [E11.65, Z79.4] 01/08/2020 Not Applicable    Breast carcinoma metastatic to multiple sites [C50.919] 02/26/2018 Yes      Problems Resolved During this Admission:       Discharged Condition: stable    Disposition: Home-Health Care [facility]    Follow Up:   Follow-up Information     Noe Jurado, DO Follow up in 1 week(s).    Specialty: Wound Care  Why: PHN auth requested. Sonja will contact the pt for scheduling  Contact information:  [address]  [address]  [place] [address]  [phone]             [facility] Follow up.    Specialties: Home Health Services, Home Therapy Services, Home Living Aide Services  Contact information:  [address]  [facility] [address]  [phone]           [place] [facility] Follow up.    Specialty: Primary Care  Why: 8/2 @ 9:30 am for hospital follow up  Contact information:  [address]  [facility] [id number] [phone]  Additional information:  [address]                     Patient Instructions:      Ambulatory referral/consult to Wound Clinic   Standing Status: Future   Referral Priority: Routine Referral Type: Consultation   Referral Reason: Specialty Services Required   Referred to Provider: NOE JURADO Requested Specialty: Wound Care   Number of Visits Requested: 1     SUBSEQUENT HOME HEALTH ORDERS   Order Comments: Please resume home health services.   Needs nurse, pt/ot, and aide     Order Specific Question Answer Comments   What Home Health Agency is the patient currently using? Other/External      SUBSEQUENT HOME HEALTH ORDERS   Order Comments: Please continue prior home health orders with prior home health company  See AVS for med changes     Order Specific Question Answer Comments   What Home Health Agency is the patient currently using? Other/External        Significant  "Diagnostic Studies: Labs: CMP No results for input(s): "NA", "K", "CL", "CO2", "GLU", "BUN", "CREATININE", "CALCIUM", "PROT", "ALBUMIN", "BILITOT", "ALKPHOS", "AST", "ALT", "ANIONGAP", "ESTGFRAFRICA", "EGFRNONAA" in the last 48 hours. and CBC No results for input(s): "WBC", "HGB", "HCT", "PLT" in the last 48 hours.    Pending Diagnostic Studies:     Procedure Component Value Units Date/Time    Calcium, Timed Urine [facility]; 24 Hours [[id number]]     Order Status: Sent Lab Status: No result     Specimen: Urine, Catheterized     Creatinine, urine, timed 24 Hours [[id number]]     Order Status: Sent Lab Status: No result     Specimen: Urine, Catheterized     Magnesium, Urine, 24 hour [[id number]] Collected: 07/29/23    Order Status: Sent Lab Status: No result     Specimen: Urine, Catheterized     Potassium, urine, timed 24 Hours [[id number]]     Order Status: Sent Lab Status: No result     Specimen: Urine, Clean Catch     Sodium, urine, timed 24 Hours [[id number]]     Order Status: Sent Lab Status: No result     Specimen: Urine, Catheterized          Medications:  Reconciled Home Medications:      Medication List      ASK your doctor about these medications    blood sugar diagnostic Strp  TEST GLUCOSE BID     blood-glucose meter kit  Commonly known as: TRUE METRIX GLUCOSE METER  TEST GLUCOSE BID     dexAMETHasone 4 MG Tab  Commonly known as: DECADRON  TAKE ONE TABLET (4MG TOTAL) BY MOUTH TWICE DAILY WITH MEALS     DEXCOM G6  Misc  Generic drug: blood-glucose meter,continuous  TEST GLUCOSE QID     DEXCOM G6 SENSOR Edwige  Generic drug: blood-glucose sensor  TEST GLUCOSE QID     DEXCOM G6 TRANSMITTER Edwige  Generic drug: blood-glucose transmitter  TEST GLUCOSE QID     diazePAM 10 MG Tab  Commonly known as: VALIUM  Take 1 tablet (10 mg total) by mouth 4 (four) times daily as needed (anxiety).     diphenoxylate-atropine 2.5-0.025 mg 2.5-0.025 mg per tablet  Commonly known as: LOMOTIL  Take 1 tablet by mouth 4 (four) times " "daily as needed for Diarrhea.     docusate sodium 100 MG capsule  Commonly known as: COLACE  Take 100 mg by mouth 2 (two) times daily.     droNABinol 5 MG capsule  Commonly known as: MARINOL  Take 1 capsule (5 mg total) by mouth 2 (two) times daily before meals.     enalapril 5 MG tablet  Commonly known as: VASOTEC  Take 1 tablet (5 mg total) by mouth once daily.     etodolac 400 MG tablet  Commonly known as: LODINE  TAKE ONE TABLET (400 MG TOTAL) BY MOUTH ONCE DAILY     fentaNYL 25 mcg/hr  Commonly known as: DURAGESIC  Place 1 patch onto the skin every 72 hours.  Ask about: Which instructions should I use?     fluconazole 150 MG Tab  Commonly known as: DIFLUCAN  TAKE ONE TABLET BY MOUTH ONCE FOR 1 DOSE     HYDROcodone-acetaminophen  mg per tablet  Commonly known as: NORCO  Take 1 tablet by mouth every 6 (six) hours as needed for Pain.  Ask about: Which instructions should I use?     insulin glargine 100 unit/mL injection  Commonly known as: LANTUS U-100 INSULIN  Inject 32 Units into the skin 2 (two) times a day.     lancets 32 gauge Misc  TEST GLUCOSE BID     metoprolol tartrate 25 MG tablet  Commonly known as: LOPRESSOR  Take 1 tablet (25 mg total) by mouth 2 (two) times daily.     ondansetron 8 MG Tbdl  Commonly known as: ZOFRAN-ODT  Take 1 tablet (8 mg total) by mouth every 6 to 8 hours as needed (nausea). dissolve 1 TABLET ON THE TONGUE EVERY 6 TO 8 HOURS AS NEEDED FOR nausea     oxyCODONE 15 MG Tab  Commonly known as: ROXICODONE  Take 1 tablet (15 mg total) by mouth every 4 to 6 hours as needed for Pain.  Ask about: Which instructions should I use?     palbociclib 100 mg Tab  Take 100 mg by mouth once daily. for 21 days, then take 7 days off. Total cycle length 28 days     * pen needle, diabetic 32 gauge x 5/32" Ndle  Commonly known as: BD ULTRA-FINE MACRE PEN NEEDLE  Test blood sugar twice daily     * TRUEPLUS PEN NEEDLE 31 gauge x 3/16" Ndle  Generic drug: pen needle, diabetic  USE TO INJECT LANTUS " TWICE DAILY     potassium chloride SA 20 MEQ tablet  Commonly known as: K-DUR,KLOR-CON  Take 1 tablet (20 meq) by mouth 3 times a day until instructed further by Dr. Vick.     promethazine 25 MG tablet  Commonly known as: PHENERGAN  TAKE ONE TABLET (25MG TOTAL) BY MOUTH EVERY 4 TO 6 HOURS AS NEEDED     rosuvastatin 40 MG Tab  Commonly known as: CRESTOR  Take 1 tablet (40 mg total) by mouth every evening. For cholesterol     TRULICITY 3 mg/0.5 mL pen injector  Generic drug: dulaglutide  Inject 3 mg into the skin every 7 days.     zolpidem 10 mg Tab  Commonly known as: AMBIEN  TAKE ONE TABLET BY MOUTH EVERY NIGHT AT BEDTIME         * This list has 2 medication(s) that are the same as other medications prescribed for you. Read the directions carefully, and ask your doctor or other care provider to review them with you.                Indwelling Lines/Drains at time of discharge:   Lines/Drains/Airways     Central Venous Catheter Line  Duration           Port A Cath Single Lumen right subclavian -- days                Time spent on the discharge of patient: 45 minutes         Ivette Bauer MD  Department of Hospital Medicine  [facility]

## 2023-08-14 NOTE — PLAN OF CARE
Problem: Fluid Volume Deficit  Goal: Fluid Balance  Outcome: Ongoing, Progressing  Intervention: Monitor and Manage Hypovolemia  Flowsheets (Taken 8/14/2023 9327)  Fluid/Electrolyte Management:   oral rehydration therapy initiated   intravenous fluid replacement initiated

## 2023-08-16 NOTE — TELEPHONE ENCOUNTER
Attempted to call patient to further assess and to check on patient, no answer at number listed. Voicemail left with instructions to call back. Called patient's significant other, Carlos, no answer, unable to leave voicemail.

## 2023-08-16 NOTE — TELEPHONE ENCOUNTER
----- Message from Sera Simeon sent at 8/15/2023  3:56 PM CDT -----  Felisa with [facility] calling in to let us know that since she had a reaction to chemo, she has shortness of breath walking and even talking. She has a little cough that is getting better. Pt has no other symptoms.     [phone]

## 2023-08-18 NOTE — TELEPHONE ENCOUNTER
----- Message from Sera Simeon sent at 8/15/2023  3:56 PM CDT -----  Felisa with [facility] calling in to let us know that since she had a reaction to chemo, she has shortness of breath walking and even talking. She has a little cough that is getting better. Pt has no other symptoms.     [phone]

## 2023-08-18 NOTE — TELEPHONE ENCOUNTER
----- Message from Lala Davis sent at 8/18/2023 11:47 AM CDT -----  Pamela wiley with home health physical therapy is calling and she is doing multiple doctor appts during the week so she only wants once a week therapy and not twice as it is to much   [phone]

## 2023-08-18 NOTE — TELEPHONE ENCOUNTER
Talked to Felisa from [facility] care.   She said the patient feels fine, but ismore SOB stince staring chemo. She will discuss with Dr. Vick on Monday.

## 2023-08-23 NOTE — TELEPHONE ENCOUNTER
Notified by [facility] at [facility] that they have an opening at 0800 on 8/24. Patient made aware of above and verbalized understanding.

## 2023-08-23 NOTE — TELEPHONE ENCOUNTER
Patient called requesting IVF. Per Dr Vick, okay for IVF. Spoke with ILEANA [facility], at [facility] and per her report, they are currently booked out through next week. Spoke with patient and notified her that if she wants to get fluids, it will be a while or writer can get her scheduled at [facility]. Patient refused [facility] appointment stating 'I like it better over there at the [facility]'. Dr Vick made aware of above. Notified [facility] to schedule patient at next available.

## 2023-08-24 NOTE — PLAN OF CARE
Problem: Fall Injury Risk  Goal: Absence of Fall and Fall-Related Injury  Outcome: Ongoing, Progressing  Intervention: Identify and Manage Contributors  Flowsheets (Taken 8/24/2023 0814)  Self-Care Promotion: independence encouraged  Medication Review/Management: medications reviewed  Intervention: Promote Injury-Free Environment  Flowsheets (Taken 8/24/2023 0814)  Safety Promotion/Fall Prevention: medications reviewed

## 2023-08-27 NOTE — PROGRESS NOTES
"  [facility] In Office Hematology Oncology Subsequent  Encounter Note    8/21/23      Subjective:      Patient ID:   Negrita Castro  63 y.o.   Martínez Shepard R. Leblanc, [place], Bourgeois, [place]      Chief Complaint:    L breast cancer     HPI:  63 y.o. female with diagnosis of Stage 1 R breast cancer 10/26/15.  "Triple negative".  Adjuvant AC x's 4, tx's 12 and Rad Rx through 9/12/16.      Had bilateral reconstruction surgery 4/14/17.  Further reconstruction, tumsofy lisa 8/14/17.  Additional fat injection at R chest done for symmetry.  She had placement of R nipple.  Dr. Hernandez.      She had additional reconstructive surgery at breast and abdomenal areas.   The date of the surgery was May 19th.      Recent Mamm/U/S showed small mass at 4:00 at L breast.  2020.  Needle Bx invasive ductal Ca, ERP+, PRP+, H2N neg  She had  L partial mastectomy, Sentinal node Bx 8/24/20. Followed by another surgery because of L axillary infection.  Primary 2 cm, LN neg, Stage 1 dx.    Discussed Oncotype Dx testing, this supported adding adjuvant chemotherapy to adjuvant hormonal therapy because of increased risk of cancer recurrence long-term.  Discussed BRCA testing, given her bilateral  breast Dx. PHN would not authorize BRCA 1 & 2.    She had COVID-19 infection and was hospitalized.  She came very close to requiring intubation and mechanical ventilation.  She is off oxygen now,   and sees Dr. Justin of Pulmonary.  Recent CT scan of the chest showed residual changes of interstitial disease, consistent with recovering from COVID-19.  Also lytic lesions were seen in the thoracic spine area very suspicious for metastatic disease at T5 through T8 spine.    Recently on Saturday night she had a hard coughing spell, and thereafter developed severe right posterior pleuritic chest pain.  She has point tenderness over the right posterior chest wall and may very well have a fractured rib at the site.  Rib x-rays have been ordered.  I " have refilled her Soma for 1 month.  I have refilled her Norco  for 1 month.  I have added oxycodone 5 mg 1 or 2 p.o. Q 3-4 hours p.r.n. breakthrough pain for 1 month to her regimen.    She also has an enlarging nodule at the left chest wall.  Ultrasound of the site suggested that this was a cyst however the areas firm movable and may very well be cancer.  I asked  for a ultrasound-guided biopsy of the mass per radiology.  Path report showed invasive ductal Ca, ERP + PRP neg,   H2N equivocal.    She is postmenopausal, her last menses were 4 5 years ago.  Rib x-rays have been requested.  Will order a CT scan to evaluate for possible metastatic disease.  Will order a ultrasound-guided biopsy of the left chest wall mass for pathologic examination at [facility].  Bx + invasive breast cancer.  ERP+, PRP-, H2N-.    Suspected local recurrence at L breast.  She had L lumpectomy.  Margins clear.    Referred  to Rad Rx MD for adjuvant Rad Rx., for local control.  She has T spine metastases.  Currently on hormonal Rx.  To see Dr. Manuelito Shepard for DM, BS control.    Dr. Washington saw her for C spine eval, no surgery planned for now.  He referred her to Dr. Rivers for injection Rx.    DM, cholesterol, epilepsy hx, DJD, LBP.  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    LR shoulder arthroscopy, R wrist surgery, M0.    Smoke 1/4 ppd x's 35 years, quit 2015.  ETOH no.  Disability-depression, epilepsy  Allergy none    Mom ovarian cancer  Dad lung cancer  Sisters DM, lung dx.    Summary of care:  Right breast cancer 2015, triple negative, treated with Adriamycin Cytoxan followed by taxane.    Left breast cancer ERP positive PRP positive HER2 Judy negative in 2020    COVID infection 2020.  She has not taken the covid 19 vaccination because of multiple allergy hx.    2021 bone metastases determined.    2021 local left breast cancer recurrence, ERP positive PRP negative HER2 Judy  "negative.    He April 21st bone biopsy positive for breast cancer metastatic.  ERP, PRP, HER2 Judy pending.    She was on radiation therapy through May 17, 2021.  Refilled Marinol at increased 5 mg 1 p.o. b.i.d. number 60.   I refilled her Soma, Norco, Marinol, and Silvadene cream; dated the prescriptions for June 17, 2021.    C/O mid thoracic pain x's 6-8 weeks,radiates to L & R, increased.  Tender over Mid thoracic area.  Hx of T spine metastatic dx.  Pain controlled on Norco 10/325 mg and oxycodone 5 mg 1-2 po PRN.  Add lodine 400 mg 1 daily    Checked MRI of T spine.  Metastatic dx with pathologic fx at T3,5,6.  Refer to Dr. Flowers of neurosurgery for Vertebroplasty or Kyphoplasty evaluation.    She is on Faslodex. Ibrance. Monitor WBC count.    She saw Dr. Flowers, and has been fitted with a brace initially, vertebroplasty after the first of the year.  [phone].  RTC Dr. Flowers 12/27/21.    No neuro surgery is planned for T-spine disease because of progressive growth and compression of the spine.  She admits to having decreased strength in the right hand.  She completed 10 of 10 radiation treatments on May 2, 2022. She is tapering down her Decadron 4 mg p.o. b.i.d. and she is taking Lodine 400 mg daily.  We are stopping fast low dex and Ibrance as of April 12, 2022.    She had an MRI on May 17, and a PET scan on May 19, I saw her on May 20th.  Plan to go with chemotherapy now, probably will go with carboplatin Gemzar.   She is 140 lb and 5 ft 11.     D1C1 carbo, gemzar. Continued over the past year.  Duragesic patch 25 mch q 3 days x's 3 patches, start Tuesday.  Claritin 10 mg 1 po daily x's 4 days, start Wednesday.    Resume Xgeva with D9 C1.  Check lab 7/14 weekly [facility] lab.    Got her a electric wheelchair.  On PT with "Hector".  Stronger.  Mass on back is giving her pain, refer to Cory Peraza MD for removal.  D1C6 9/1/22    Refill Soma, Oxycodone, Norco, Ambien on 9/1/22.  Due for refill 9/30/22.    She " "is doing better, with increased energy, 8/10.  Weight 154#.  Pain is better 8/10.  Improved R arm function.    Dr. Mckeon placed port.  PET 5/2022 increased dx in bones and liver.  Carbo Gemzar x's 6 months.  12/15/22 #10.  Breast cancer metastases are improved on current PET scan.    Port was placed.  Continues on chemo .  PET recurrent liver metastases.  Review CAT with radiation MD.  Reviewed the PET with the radiologist.  The liver mets had resolved 1/2023, but on present study have recurred.  Hx of bilateral breast cancer.  One was tripple negative.  One was ERP +.  Discussed further hormonal Rx, chemo therapy, immuno oncology Rx and   Antibody/Chemo conjugate, Trodelvy.    Discussed and she agrees to liver mass Bx.  7/12/23.  For Trodelvy 7/13/23.  Check lab 7/11/23.    She was hospitalizeed 6/18/23 for pneumonia, dehydration.  LLL.  And in ER 6/26/23, pneumonia improving   Now on Lantus 35 u BID, Dr. Shepard.  5'11"  134#.    Fever 100.1 over weekend, some clear phlegm.  Denies SOB or dysuria.  Check Urine and sputum C/S.    S/P Trodelvy D1C1, premeds given, IVF hydration.  Hospitalized for fever, neutropenia.  C/O fatigue, POOL.  02 sats in the 90's with 6 minute walk.  Check lab today.  Resume course 2 Trodelvy next week, 8/14/23.    Seeing Dr. Velásquez of wound care.  ERP +.  Will Rx 8/28/23 after she is stronger.    ROS:   GEN: normal without any fever, night sweats or weight loss  HEENT: normal with no HA's, sore throat, stiff neck, changes in vision  CV: normal with no CP, SOB, PND, POOL or orthopnea  PULM: normal with no SOB, cough, hemoptysis, sputum or pleuritic pain  GI: normal with no abdominal pain, nausea, vomiting, constipation, diarrhea, melanotic stools, BRBPR, or hematemesis  : normal with no hematuria, dysuria  BREAST: See HPI.  SKIN: normal with no rash, erythema, bruising, or swelling     Past Medical History:   Diagnosis Date    Acute hypoxemic respiratory failure 12/12/2020    " Breast cancer 10/26/2015    left breast    Cancer     RIGHT BREAST 10-15    Depression     Diabetes mellitus     Neuropathy     Panic attacks     S/P epidural steroid injection     Seizures     none since early 30's    Wears glasses     TO DRIVE     Past Surgical History:   Procedure Laterality Date    AXILLARY NODE DISSECTION Left 8/24/2020    Procedure: LYMPHADENECTOMY, AXILLARY;  Surgeon: Cory Vick MD;  Location: [facility] OR;  Service: General;  Laterality: Left;  left breast mastectomy with possible axillary lymph node dissection    BREAST BIOPSY      right    BREAST LUMPECTOMY      BREAST RECONSTRUCTION      breast reconstruction with tummy [facility]      BREAST SURGERY      11-3-15 LUMPECTOMY, 12-2-15 REEXCISION    INCISION AND DRAINAGE OF ABSCESS Left 9/9/2020    Procedure: INCISION AND DRAINAGE, ABSCESS;  Surgeon: Cory Vick MD;  Location: [facility] OR;  Service: General;  Laterality: Left;    INSERTION OF LOCALIZATION WIRE Left 8/24/2020    Procedure: INSERTION, LOCALIZATION WIRE;  Surgeon: Cory Vick MD;  Location: [facility] OR;  Service: General;  Laterality: Left;  left breast lumpectomy with wire needle loc    INSERTION OF TUNNELED CENTRAL VENOUS CATHETER (CVC) WITH SUBCUTANEOUS PORT Right 11/17/2022    Procedure: [id number]-H-CATH;  Surgeon: Jeff Mckeon Jr., MD;  Location: [facility] OR;  Service: General;  Laterality: Right;    MASTECTOMY      mastectomy 2016      MASTECTOMY, PARTIAL Left 3/19/2021    Procedure: MASTECTOMY, PARTIAL;  Surgeon: Cory Vick MD;  Location: [facility] OR;  Service: General;  Laterality: Left;  lumpectomy  left breast     RECONSTRUCTION OF NIPPLE Right 11/5/2018    Procedure: RECONSTRUCTION-NIPPLE RIGHT;  Surgeon: Xiang Hernandez MD;  Location: [address];  Service: Plastics;  Laterality: Right;    SENTINEL LYMPH NODE BIOPSY Left 8/24/2020    Procedure: BIOPSY, LYMPH NODE, SENTINEL;  Surgeon: Cory Vick MD;  Location: [facility] OR;   Service: General;  Laterality: Left;  left breast lumpectomy with left sentinel lymoh node       shoulder surgery and wrist      BILAT  BONE SPUR    WRIST SURGERY      RIGHT       Review of patient's allergies indicates:   Allergen Reactions    Adhesive tape-silicones Hives     BANDAIDS    Polymycin Hives    Latex, natural rubber Itching    Polyurethane-39            Current Outpatient Medications:     blood sugar diagnostic Strp, TEST GLUCOSE BID, Disp: 300 each, Rfl: 3    blood-glucose meter,continuous (DEXCOM G6 ) Misc, TEST GLUCOSE QID, Disp: 1 each, Rfl: 1    blood-glucose sensor (DEXCOM G6 SENSOR) Edwige, TEST GLUCOSE QID, Disp: 13 each, Rfl: 3    blood-glucose transmitter (DEXCOM G6 TRANSMITTER) Edwige, TEST GLUCOSE QID, Disp: 1 each, Rfl: 1    dexAMETHasone (DECADRON) 4 MG Tab, TAKE ONE TABLET (4MG TOTAL) BY MOUTH TWICE DAILY WITH MEALS (Patient taking differently: Take 4 mg by mouth 2 (two) times daily with meals.), Disp: 60 tablet, Rfl: 1    diazePAM (VALIUM) 10 MG Tab, Take 1 tablet (10 mg total) by mouth 4 (four) times daily as needed (anxiety)., Disp: 120 tablet, Rfl: 2    diphenoxylate-atropine 2.5-0.025 mg (LOMOTIL) 2.5-0.025 mg per tablet, Take 1 tablet by mouth 4 (four) times daily as needed for Diarrhea., Disp: 30 tablet, Rfl: 2    docusate sodium (COLACE) 100 MG capsule, Take 100 mg by mouth 2 (two) times daily., Disp: , Rfl:     droNABinol (MARINOL) 5 MG capsule, Take 1 capsule (5 mg total) by mouth 2 (two) times daily before meals., Disp: 60 capsule, Rfl: 0    dulaglutide (TRULICITY) 3 mg/0.5 mL pen injector, Inject 3 mg into the skin every 7 days., Disp: 4 pen, Rfl: 11    enalapril (VASOTEC) 5 MG tablet, Take 1 tablet (5 mg total) by mouth once daily., Disp: 90 tablet, Rfl: 3    etodolac (LODINE) 400 MG tablet, TAKE ONE TABLET (400 MG TOTAL) BY MOUTH ONCE DAILY (Patient taking differently: Take 400 mg by mouth once daily.), Disp: 30 tablet, Rfl: 2    fluconazole (DIFLUCAN) 150 MG Tab, TAKE  "ONE TABLET BY MOUTH ONCE FOR 1 DOSE (Patient taking differently: Take 150 mg by mouth once.), Disp: 1 tablet, Rfl: 2    insulin glargine (LANTUS U-100 INSULIN) 100 unit/mL injection, Inject 32 Units into the skin 2 (two) times a day., Disp: 18 mL, Rfl: 11    lancets 32 gauge Misc, TEST GLUCOSE BID, Disp: 300 each, Rfl: 3    metoprolol tartrate (LOPRESSOR) 25 MG tablet, Take 1 tablet (25 mg total) by mouth 2 (two) times daily., Disp: 60 tablet, Rfl: 11    ondansetron (ZOFRAN-ODT) 8 MG TbDL, Take 1 tablet (8 mg total) by mouth every 6 to 8 hours as needed (nausea). dissolve 1 TABLET ON THE TONGUE EVERY 6 TO 8 HOURS AS NEEDED FOR nausea, Disp: 30 tablet, Rfl: 2    palbociclib 100 mg Tab, Take 100 mg by mouth once daily. for 21 days, then take 7 days off. Total cycle length 28 days, Disp: 21 tablet, Rfl: 6    pen needle, diabetic (BD ULTRA-FINE MARCE PEN NEEDLE) 32 gauge x 5/32" Ndle, Test blood sugar twice daily, Disp: 100 each, Rfl: 5    potassium chloride SA (K-DUR,KLOR-CON) 20 MEQ tablet, Take 1 tablet (20 meq) by mouth 3 times a day until instructed further by Dr. Vick. (Patient taking differently: Take 20 mEq by mouth 3 (three) times daily. Take 1 tablet (20 meq) by mouth 3 times a day until instructed further by Dr. Vick.), Disp: 60 tablet, Rfl: 11    promethazine (PHENERGAN) 25 MG tablet, TAKE ONE TABLET (25MG TOTAL) BY MOUTH EVERY 4 TO 6 HOURS AS NEEDED (Patient taking differently: Take 25 mg by mouth every 4 to 6 hours as needed.), Disp: 30 tablet, Rfl: 5    rosuvastatin (CRESTOR) 40 MG Tab, Take 1 tablet (40 mg total) by mouth every evening. For cholesterol, Disp: 90 tablet, Rfl: 3    TRUEPLUS PEN NEEDLE 31 gauge x 3/16" Ndle, USE TO INJECT LANTUS TWICE DAILY, Disp: , Rfl:     zolpidem (AMBIEN) 10 mg Tab, TAKE ONE TABLET BY MOUTH EVERY NIGHT AT BEDTIME (Patient taking differently: Take 10 mg by mouth every evening.), Disp: 30 tablet, Rfl: 3    blood-glucose meter (TRUE METRIX GLUCOSE METER) kit, TEST " "GLUCOSE BID, Disp: 1 each, Rfl: 0    carisoprodoL (SOMA) 350 MG tablet, Take 1 tablet (350 mg total) by mouth 3 (three) times daily as needed for Muscle spasms., Disp: 90 tablet, Rfl: 0    fentaNYL (DURAGESIC) 25 mcg/hr, Place 1 patch onto the skin every 72 hours., Disp: 10 patch, Rfl: 0    HYDROcodone-acetaminophen (NORCO)  mg per tablet, Take 1 tablet by mouth every 6 (six) hours as needed for Pain., Disp: 120 tablet, Rfl: 0    oxyCODONE (ROXICODONE) 15 MG Tab, Take 1 tablet (15 mg total) by mouth every 4 to 6 hours as needed for Pain., Disp: 120 tablet, Rfl: 0    Current Facility-Administered Medications:     0.9%  NaCl infusion (for blood administration), , Intravenous, Once, TALISHA Vick MD, Stopped at 06/02/23 1200    0.9%  NaCl infusion (for blood administration), , Intravenous, Once, Michelle Davidson NP    furosemide injection 20 mg, 20 mg, Intravenous, Once, Michelle Davidson NP          Objective:   Vitals:  Blood pressure 119/66, pulse (!) 114, temperature 97.4 °F (36.3 °C), resp. rate 16, height 5' 11" (1.803 m), weight 60.2 kg (132 lb 11.2 oz), last menstrual period 01/16/2016.    Physical Examination:   GEN: no apparent distress, comfortable, Brace in place.  HEAD: atraumatic and normocephalic  EYES: no pallor, no icterus  ENT: no pharyngeal erythema.  The cellulitis at L cheek area is resolved, after antibiotics.  NECK: noLAD/LN's, supple  CV:  No edema  CHEST: Normal respiratory effort   she is not  tender over the right posterior chest wall on exam.  The chest wall is not swollen.  ABDOM:  nondistended; soft                                                                                          MUSC/Skeletal: ROM normal, Tender over mid T spine area.  EXTREM: no swelling  SKIN: She is developing keloid changes at L breast incision and navel surgical sites.  This area appears to be enlarg  NEURO: grossly intact  PSYCH: normal mood, affect and behavior  LYMPH: normal  LN's  BREASTS: L " breast is much improved.    Refill Oxycodone, SOMA, Norco.    Tumor markers are improved.    K is 4, up from 2.4.  Decrease KCL from TID to BID.    Assessment:   (1) 63 y.o. female with diagnosis of Stage 1 triple negative R breast cancer, 10/2016.       S/P R mastectomy, SNBx, AC x's 4, T x's12 weeks, Rad Rx and recent reconstruction.    (2) New L invasive ductal Ca, ERP+, PRP+, H2N neg.  Clinical stage 1 dx.    I have started her on Arimidex 1 mg p.o. daily  for metastatic disease at this time.    Suspected fracture of right ribs after recent cough event, check rib x-rays, medicate for pain.    ERP+ dx, bone metastases.  Started on Arimidex daily.  Add 2nd hormonal drug Rx after Rad Rx completed.    Bone biopsy positive for metastatic breast cancer.  ERP+, PRP+, H2N-.    Now on Genzar, Carboplatin.  Improved.  PET 12/15/22.  Improving metastatic dx on present Rx.    Pet now recurrent liver metastases.  Progressive Dx in liver.    Liver Bx + for breast cancer.  ERP +.  D1C2 Tredelvy , hydration, 8/14/23.  Neulasta support.    D1C3 8/28/23.

## 2023-08-31 NOTE — TELEPHONE ENCOUNTER
Critical calcium of 12.. called from [facility] [place]. Reviewed with NORMA John and per her verbal order, called patient and check on her. Patient reports she currently is just feeling weak, but no more than usual. Patient instructed that if the weakness gets worse she is to report to the ER, otherwise she will be seen tomorrow for IVF and her Zometa injection. Patient made aware of above and verbalized understanding.

## 2023-09-01 NOTE — PLAN OF CARE
Problem: Fatigue  Goal: Improved Activity Tolerance  Outcome: Ongoing, Progressing  Intervention: Promote Improved Energy  Flowsheets (Taken 9/1/2023 1005)  Fatigue Management: fatigue-related activity identified  Sleep/Rest Enhancement: noise level reduced  Activity Management: Up in chair - L3

## 2023-09-03 NOTE — PROGRESS NOTES
"  [facility] In Office Hematology Oncology Subsequent  Encounter Note    9/1/23      Subjective:      Patient ID:   Negrita Castro  63 y.o.   Martínez Shepard R. Leblanc, Babycos, Bourgeois, Hall      Chief Complaint:    L breast cancer     HPI:  63 y.o. female with diagnosis of Stage 1 R breast cancer 10/26/15.  "Triple negative".  Adjuvant AC x's 4, tx's 12 and Rad Rx through 9/12/16.      Had bilateral reconstruction surgery 4/14/17.  Further reconstruction, tumsofy baxterck 8/14/17.  Additional fat injection at R chest done for symmetry.  She had placement of R nipple.  Dr. Hernandez.      She had additional reconstructive surgery at breast and abdomenal areas.   The date of the surgery was May 19th.      Recent Mamm/U/S showed small mass at 4:00 at L breast.  2020.  Needle Bx invasive ductal Ca, ERP+, PRP+, H2N neg  She had  L partial mastectomy, Sentinal node Bx 8/24/20. Followed by another surgery because of L axillary infection.  Primary 2 cm, LN neg, Stage 1 dx.    Discussed Oncotype Dx testing, this supported adding adjuvant chemotherapy to adjuvant hormonal therapy because of increased risk of cancer recurrence long-term.  Discussed BRCA testing, given her bilateral  breast Dx. PHN would not authorize BRCA 1 & 2.    She had COVID-19 infection and was hospitalized.  She came very close to requiring intubation and mechanical ventilation.  She is off oxygen now,   and sees Dr. Justin of Pulmonary.  Recent CT scan of the chest showed residual changes of interstitial disease, consistent with recovering from COVID-19.  Also lytic lesions were seen in the thoracic spine area very suspicious for metastatic disease at T5 through T8 spine.    Recently on Saturday night she had a hard coughing spell, and thereafter developed severe right posterior pleuritic chest pain.  She has point tenderness over the right posterior chest wall and may very well have a fractured rib at the site.  Rib x-rays have been ordered.  I " have refilled her Soma for 1 month.  I have refilled her Norco  for 1 month.  I have added oxycodone 5 mg 1 or 2 p.o. Q 3-4 hours p.r.n. breakthrough pain for 1 month to her regimen.    She also has an enlarging nodule at the left chest wall.  Ultrasound of the site suggested that this was a cyst however the areas firm movable and may very well be cancer.  I asked  for a ultrasound-guided biopsy of the mass per radiology.  Path report showed invasive ductal Ca, ERP + PRP neg,   H2N equivocal.    She is postmenopausal, her last menses were 4 5 years ago.  Rib x-rays have been requested.  Will order a CT scan to evaluate for possible metastatic disease.  Will order a ultrasound-guided biopsy of the left chest wall mass for pathologic examination at [facility].  Bx + invasive breast cancer.  ERP+, PRP-, H2N-.    Suspected local recurrence at L breast.  She had L lumpectomy.  Margins clear.    Referred  to Rad Rx MD for adjuvant Rad Rx., for local control.  She has T spine metastases.  Currently on hormonal Rx.  To see Dr. Manuelito Shepard for DM, BS control.    Dr. Washington saw her for C spine eval, no surgery planned for now.  He referred her to Dr. Rivers for injection Rx.    DM, cholesterol, epilepsy hx, DJD, LBP.  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    LR shoulder arthroscopy, R wrist surgery, M0.    Smoke 1/4 ppd x's 35 years, quit 2015.  ETOH no.  Disability-depression, epilepsy  Allergy none    Mom ovarian cancer  Dad lung cancer  Sisters DM, lung dx.    Summary of care:  Right breast cancer 2015, triple negative, treated with Adriamycin Cytoxan followed by taxane.    Left breast cancer ERP positive PRP positive HER2 Judy negative in 2020    COVID infection 2020.  She has not taken the covid 19 vaccination because of multiple allergy hx.    2021 bone metastases determined.    2021 local left breast cancer recurrence, ERP positive PRP negative HER2 Judy  "negative.    He April 21st bone biopsy positive for breast cancer metastatic.  ERP, PRP, HER2 Judy pending.    She was on radiation therapy through May 17, 2021.  Refilled Marinol at increased 5 mg 1 p.o. b.i.d. number 60.   I refilled her Soma, Norco, Marinol, and Silvadene cream; dated the prescriptions for June 17, 2021.    C/O mid thoracic pain x's 6-8 weeks,radiates to L & R, increased.  Tender over Mid thoracic area.  Hx of T spine metastatic dx.  Pain controlled on Norco 10/325 mg and oxycodone 5 mg 1-2 po PRN.  Add lodine 400 mg 1 daily    Checked MRI of T spine.  Metastatic dx with pathologic fx at T3,5,6.  Refer to Dr. Flowers of neurosurgery for Vertebroplasty or Kyphoplasty evaluation.    She is on Faslodex. Ibrance. Monitor WBC count.    She saw Dr. Flowers, and has been fitted with a brace initially, vertebroplasty after the first of the year.  [phone].  RTC Dr. Flowers 12/27/21.    No neuro surgery is planned for T-spine disease because of progressive growth and compression of the spine.  She admits to having decreased strength in the right hand.  She completed 10 of 10 radiation treatments on May 2, 2022. She is tapering down her Decadron 4 mg p.o. b.i.d. and she is taking Lodine 400 mg daily.  We are stopping fast low dex and Ibrance as of April 12, 2022.    She had an MRI on May 17, and a PET scan on May 19, I saw her on May 20th.  Plan to go with chemotherapy now, probably will go with carboplatin Gemzar.   She is 140 lb and 5 ft 11.     D1C1 carbo, gemzar. Continued over the past year.  Duragesic patch 25 mch q 3 days x's 3 patches, start Tuesday.  Claritin 10 mg 1 po daily x's 4 days, start Wednesday.    Resume Xgeva with D9 C1.  Check lab 7/14 weekly [facility] lab.    Got her a electric wheelchair.  On PT with "Hector".  Stronger.  Mass on back is giving her pain, refer to Cory Peraza MD for removal.  D1C6 9/1/22    Refill Soma, Oxycodone, Norco, Ambien on 9/1/22.  Due for refill 9/30/22.    She " "is doing better, with increased energy, 8/10.  Weight 154#.  Pain is better 8/10.  Improved R arm function.    Dr. Mckeon placed port.  PET 5/2022 increased dx in bones and liver.  Carbo Gemzar x's 6 months.  12/15/22 #10.  Breast cancer metastases are improved on current PET scan.    Port was placed.  Continues on chemo .  PET recurrent liver metastases.  Review CAT with radiation MD.  Reviewed the PET with the radiologist.  The liver mets had resolved 1/2023, but on present study have recurred.  Hx of bilateral breast cancer.  One was tripple negative.  One was ERP +.  Discussed further hormonal Rx, chemo therapy, immuno oncology Rx and   Antibody/Chemo conjugate, Trodelvy.    Discussed and she agrees to liver mass Bx.  7/12/23.  For Trodelvy 7/13/23.  Check lab 7/11/23.    She was hospitalizeed 6/18/23 for pneumonia, dehydration.  LLL.  And in ER 6/26/23, pneumonia improving   Now on Lantus 35 u BID, Dr. Shepard.  5'11"  134#.    Fever 100.1 over weekend, some clear phlegm.  Denies SOB or dysuria.  Check Urine and sputum C/S.    S/P Trodelvy D1C1, premeds given, IVF hydration.  Hospitalized for fever, neutropenia.  C/O fatigue, POOL.  02 sats in the 90's with 6 minute walk.  Check lab today.  Resume course 2 Trodelvy next week, 8/14/23.    Seeing Dr. Velásquez of wound care.  ERP +.    Calcium returned 12.  [place]es in [place] for IVF, Zofran, Zometa.  Due for Chemo 9/5, 9/6. Udenyca support.    ROS:   GEN: normal without any fever, night sweats or weight loss  HEENT: normal with no HA's, sore throat, stiff neck, changes in vision  CV: normal with no CP, SOB, PND, POOL or orthopnea  PULM: normal with no SOB, cough, hemoptysis, sputum or pleuritic pain  GI: normal with no abdominal pain, nausea, vomiting, constipation, diarrhea, melanotic stools, BRBPR, or hematemesis  : normal with no hematuria, dysuria  BREAST: See HPI.  SKIN: normal with no rash, erythema, bruising, or swelling     Past Medical History: "   Diagnosis Date    Acute hypoxemic respiratory failure 12/12/2020    Breast cancer 10/26/2015    left breast    Cancer     RIGHT BREAST 10-15    Depression     Diabetes mellitus     Neuropathy     Panic attacks     S/P epidural steroid injection     Seizures     none since early 30's    Wears glasses     TO DRIVE     Past Surgical History:   Procedure Laterality Date    AXILLARY NODE DISSECTION Left 8/24/2020    Procedure: LYMPHADENECTOMY, AXILLARY;  Surgeon: Cory Vick MD;  Location: [facility] OR;  Service: General;  Laterality: Left;  left breast mastectomy with possible axillary lymph node dissection    BREAST BIOPSY      right    BREAST LUMPECTOMY      BREAST RECONSTRUCTION      breast reconstruction with tummy [facility]      BREAST SURGERY      11-3-15 LUMPECTOMY, 12-2-15 REEXCISION    INCISION AND DRAINAGE OF ABSCESS Left 9/9/2020    Procedure: INCISION AND DRAINAGE, ABSCESS;  Surgeon: Cory Vick MD;  Location: [facility] OR;  Service: General;  Laterality: Left;    INSERTION OF LOCALIZATION WIRE Left 8/24/2020    Procedure: INSERTION, LOCALIZATION WIRE;  Surgeon: Cory Vick MD;  Location: [facility] OR;  Service: General;  Laterality: Left;  left breast lumpectomy with wire needle loc    INSERTION OF TUNNELED CENTRAL VENOUS CATHETER (CVC) WITH SUBCUTANEOUS PORT Right 11/17/2022    Procedure: [id number]-O-CATH;  Surgeon: Jeff Mckeon Jr., MD;  Location: [facility] OR;  Service: General;  Laterality: Right;    MASTECTOMY      mastectomy 2016      MASTECTOMY, PARTIAL Left 3/19/2021    Procedure: MASTECTOMY, PARTIAL;  Surgeon: Cory Vick MD;  [facility]: [facility];  Service: General;  Laterality: Left;  lumpectomy  left breast     RECONSTRUCTION OF NIPPLE Right 11/5/2018    Procedure: RECONSTRUCTION-NIPPLE RIGHT;  Surgeon: Xiang Hernandez MD;  Location: [address];  Service: Plastics;  Laterality: Right;    SENTINEL LYMPH NODE BIOPSY Left 8/24/2020    Procedure: BIOPSY, LYMPH NODE,  SENTINEL;  Surgeon: Cory Vick MD;  Location: [facility] OR;  Service: General;  Laterality: Left;  left breast lumpectomy with left sentinel lymoh node       shoulder surgery and wrist      BILAT  BONE SPUR    WRIST SURGERY      RIGHT       Review of patient's allergies indicates:   Allergen Reactions    Adhesive tape-silicones Hives     BANDAIDS    Polymycin Hives    Latex, natural rubber Itching    Polyurethane-39            Current Outpatient Medications:     benzonatate (TESSALON) 100 MG capsule, Take 1 capsule (100 mg total) by mouth 3 (three) times daily as needed for Cough., Disp: 30 capsule, Rfl: 2    blood sugar diagnostic Strp, TEST GLUCOSE BID, Disp: 300 each, Rfl: 3    blood-glucose meter (TRUE METRIX GLUCOSE METER) kit, TEST GLUCOSE BID, Disp: 1 each, Rfl: 0    blood-glucose meter,continuous (DEXCOM G6 ) Misc, TEST GLUCOSE QID, Disp: 1 each, Rfl: 1    blood-glucose sensor (DEXCOM G6 SENSOR) Edwige, TEST GLUCOSE QID, Disp: 13 each, Rfl: 3    blood-glucose transmitter (DEXCOM G6 TRANSMITTER) Edwige, TEST GLUCOSE QID, Disp: 1 each, Rfl: 1    carisoprodoL (SOMA) 350 MG tablet, Take 1 tablet (350 mg total) by mouth 3 (three) times daily as needed for Muscle spasms., Disp: 90 tablet, Rfl: 0    dexAMETHasone (DECADRON) 4 MG Tab, TAKE ONE TABLET (4MG TOTAL) BY MOUTH TWICE DAILY WITH MEALS (Patient taking differently: Take 4 mg by mouth 2 (two) times daily with meals.), Disp: 60 tablet, Rfl: 1    diazePAM (VALIUM) 10 MG Tab, Take 1 tablet (10 mg total) by mouth 4 (four) times daily as needed (anxiety)., Disp: 120 tablet, Rfl: 2    diphenoxylate-atropine 2.5-0.025 mg (LOMOTIL) 2.5-0.025 mg per tablet, Take 1 tablet by mouth 4 (four) times daily as needed for Diarrhea., Disp: 30 tablet, Rfl: 2    docusate sodium (COLACE) 100 MG capsule, Take 100 mg by mouth 2 (two) times daily., Disp: , Rfl:     droNABinol (MARINOL) 5 MG capsule, Take 1 capsule (5 mg total) by mouth 2 (two) times daily before meals., Disp:  "60 capsule, Rfl: 0    dulaglutide (TRULICITY) 3 mg/0.5 mL pen injector, Inject 3 mg into the skin every 7 days., Disp: 4 pen, Rfl: 11    enalapril (VASOTEC) 5 MG tablet, Take 1 tablet (5 mg total) by mouth once daily., Disp: 90 tablet, Rfl: 3    etodolac (LODINE) 400 MG tablet, TAKE ONE TABLET (400 MG TOTAL) BY MOUTH ONCE DAILY (Patient taking differently: Take 400 mg by mouth once daily.), Disp: 30 tablet, Rfl: 2    fentaNYL (DURAGESIC) 25 mcg/hr, Place 1 patch onto the skin every 72 hours., Disp: 10 patch, Rfl: 0    fluconazole (DIFLUCAN) 150 MG Tab, TAKE ONE TABLET BY MOUTH ONCE FOR 1 DOSE (Patient taking differently: Take 150 mg by mouth once.), Disp: 1 tablet, Rfl: 2    HYDROcodone-acetaminophen (NORCO)  mg per tablet, Take 1 tablet by mouth every 6 (six) hours as needed for Pain., Disp: 120 tablet, Rfl: 0    insulin glargine (LANTUS U-100 INSULIN) 100 unit/mL injection, Inject 32 Units into the skin 2 (two) times a day., Disp: 18 mL, Rfl: 11    lancets 32 gauge Misc, TEST GLUCOSE BID, Disp: 300 each, Rfl: 3    metoprolol tartrate (LOPRESSOR) 25 MG tablet, Take 1 tablet (25 mg total) by mouth 2 (two) times daily., Disp: 60 tablet, Rfl: 11    ondansetron (ZOFRAN-ODT) 8 MG TbDL, Take 1 tablet (8 mg total) by mouth every 6 to 8 hours as needed (nausea). dissolve 1 TABLET ON THE TONGUE EVERY 6 TO 8 HOURS AS NEEDED FOR nausea, Disp: 30 tablet, Rfl: 2    oxyCODONE (ROXICODONE) 15 MG Tab, Take 1 tablet (15 mg total) by mouth every 4 to 6 hours as needed for Pain., Disp: 120 tablet, Rfl: 0    palbociclib 100 mg Tab, Take 100 mg by mouth once daily. for 21 days, then take 7 days off. Total cycle length 28 days, Disp: 21 tablet, Rfl: 6    pen needle, diabetic (BD ULTRA-FINE MARCE PEN NEEDLE) 32 gauge x 5/32" Ndle, Test blood sugar twice daily, Disp: 100 each, Rfl: 5    potassium chloride SA (K-DUR,KLOR-CON) 20 MEQ tablet, Take 1 tablet (20 meq) by mouth 3 times a day until instructed further by Dr. Vick. (Patient " "taking differently: Take 20 mEq by mouth 3 (three) times daily. Take 1 tablet (20 meq) by mouth 3 times a day until instructed further by Dr. Vick.), Disp: 60 tablet, Rfl: 11    promethazine (PHENERGAN) 25 MG tablet, TAKE ONE TABLET (25MG TOTAL) BY MOUTH EVERY 4 TO 6 HOURS AS NEEDED (Patient taking differently: Take 25 mg by mouth every 4 to 6 hours as needed.), Disp: 30 tablet, Rfl: 5    promethazine-codeine 6.25-10 mg/5 ml (PHENERGAN WITH CODEINE) 6.25-10 mg/5 mL syrup, Take 5 mLs by mouth every 6 (six) hours as needed for Cough. AS NEEDED FOR COUGH, Disp: 450 mL, Rfl: 0    rosuvastatin (CRESTOR) 40 MG Tab, Take 1 tablet (40 mg total) by mouth every evening. For cholesterol, Disp: 90 tablet, Rfl: 3    TRUEPLUS PEN NEEDLE 31 gauge x 3/16" Ndle, USE TO INJECT LANTUS TWICE DAILY, Disp: , Rfl:     zolpidem (AMBIEN) 10 mg Tab, TAKE ONE TABLET BY MOUTH EVERY NIGHT AT BEDTIME, Disp: 30 tablet, Rfl: 3    Current Facility-Administered Medications:     0.9%  NaCl infusion (for blood administration), , Intravenous, Once, TALISHA Vick MD, Stopped at 06/02/23 1200    0.9%  NaCl infusion (for blood administration), , Intravenous, Once, Michelle Davidson NP    furosemide injection 20 mg, 20 mg, Intravenous, Once, Michelle Davidson NP          Objective:   Vitals:  Blood pressure 92/62, pulse (!) 111, temperature 97.3 °F (36.3 °C), resp. rate 18, height 5' 11" (1.803 m), weight 60.2 kg (132 lb 11.2 oz), last menstrual period 01/16/2016, SpO2 98 %.    Physical Examination:   GEN: no apparent distress, comfortable, Brace in place.  HEAD: atraumatic and normocephalic  EYES: no pallor, no icterus  ENT: no pharyngeal erythema.  The cellulitis at L cheek area is resolved, after antibiotics.  NECK: noLAD/LN's, supple  CV:  No edema  CHEST: Normal respiratory effort   she is not  tender over the right posterior chest wall on exam.  The chest wall is not swollen.  ABDOM:  nondistended; soft                                         "                                                  MUSC/Skeletal: ROM normal, Tender over mid T spine area.  EXTREM: no swelling  SKIN: She is developing keloid changes at L breast incision and navel surgical sites.  This area appears to be enlarg  NEURO: grossly intact  PSYCH: normal mood, affect and behavior  LYMPH: normal  LN's  BREASTS: L breast is much improved.    Refill Oxycodone, SOMA, Norco.    Tumor markers are improved.    K is 4, up from 2.4.  Decrease KCL from TID to BID.    Assessment:   (1) 63 y.o. female with diagnosis of Stage 1 triple negative R breast cancer, 10/2016.       S/P R mastectomy, SNBx, AC x's 4, T x's12 weeks, Rad Rx and recent reconstruction.    (2) New L invasive ductal Ca, ERP+, PRP+, H2N neg.  Clinical stage 1 dx.    I have started her on Arimidex 1 mg p.o. daily  for metastatic disease at this time.    Suspected fracture of right ribs after recent cough event, check rib x-rays, medicate for pain.    ERP+ dx, bone metastases.  Started on Arimidex daily.  Add 2nd hormonal drug Rx after Rad Rx completed.    Bone biopsy positive for metastatic breast cancer.  ERP+, PRP+, H2N-.    Now on Genzar, Carboplatin.  Improved.  PET 12/15/22.  Improving metastatic dx on present Rx.    Pet now recurrent liver metastases.  Progressive Dx in liver.    Liver Bx + for breast cancer.  ERP +.  D1C2 Tredelvy , hydration, 8/14/23.  Neulasta support.    D1C3 9/5/23.  Refill meds.

## 2023-09-05 NOTE — PLAN OF CARE
Problem: Fall Injury Risk  Goal: Absence of Fall and Fall-Related Injury  Outcome: Ongoing, Progressing  Intervention: Identify and Manage Contributors  Flowsheets (Taken 9/5/2023 0819)  Self-Care Promotion: safe use of adaptive equipment encouraged  Medication Review/Management: medications reviewed  Intervention: Promote Injury-Free Environment  Flowsheets (Taken 9/5/2023 0819)  Safety Promotion/Fall Prevention: assistive device/personal item within reach

## 2023-09-06 NOTE — PLAN OF CARE
Problem: Fall Injury Risk  Goal: Absence of Fall and Fall-Related Injury  Outcome: Ongoing, Progressing  Intervention: Identify and Manage Contributors  Flowsheets (Taken 9/6/2023 0956)  Self-Care Promotion: independence encouraged  Medication Review/Management: medications reviewed  Intervention: Promote Injury-Free Environment  Flowsheets (Taken 9/6/2023 0956)  Safety Promotion/Fall Prevention: medications reviewed

## 2023-09-07 PROBLEM — C78.7 CARCINOMA OF BREAST METASTATIC TO LIVER: Status: ACTIVE | Noted: 2023-01-01

## 2023-09-07 PROBLEM — C50.919 CARCINOMA OF BREAST METASTATIC TO LIVER: Status: ACTIVE | Noted: 2023-01-01

## 2023-09-07 NOTE — PROGRESS NOTES
Pharmacist Renal Dose Adjustment Note    Negrita Castro is a 63 y.o. female being treated with the medication Meropenem    Patient Data:    Vital Signs (Most Recent):  Temp: 97.3 °F (36.3 °C) (09/06/23 2117)  Pulse: 102 (09/07/23 0251)  Resp: (!) 22 (09/07/23 0251)  BP: (!) 99/56 (09/07/23 0251)  SpO2: 100 % (09/07/23 0251) Vital Signs (72h Range):  Temp:  [97.1 °F (36.2 °C)-97.7 °F (36.5 °C)]   Pulse:  []   Resp:  [18-37]   BP: ()/(38-70)   SpO2:  [94 %-100 %]      Recent Labs   Lab 08/31/23  1043 09/06/23 2229   CREATININE 1.2 3.2*     Serum creatinine: 3.2 mg/dL (H) 09/06/23 2229  Estimated creatinine clearance: 18 mL/min (A)    Per [facility] renal dosing protocol:     Previous Order: Meropenem 500 mg Q8H    Will be changed to:     New Order: Meropenem 500 mg Q12H,    Due to: CrCl < 26 mL/min    Renal dose adjustments performed as noted above.    We will continue monitoring and adjusting as necessary.    Pharmacist: Waldo MccordD  Ext: [phone]

## 2023-09-07 NOTE — PLAN OF CARE
[facility]  Initial Discharge Assessment       Primary Care Provider: Manuelito Shepard MD    Admission Diagnosis: Severe sepsis [A41.9, R65.20]    Admission Date: 9/6/2023  Expected Discharge Date: 9/9/2023    Assessment completed with patient at bedside with her significant other, Carlos and 2 sisters in attendance. Verified and confirmed information on facesheet as correct per patient and significant other. Pt lives at listed address with significant other, Carlos. Reports she has help if needed from Carlos, significant other and Ganga, friend. Reports POA as Carlos Manriquez, significant. PCP is Manuelito Shepard MD. Reports last apt was about 3 months ago. Pharmacy is [facility] located at [address] in [place], [place]. States she has PToutpt services but unsure of company name. DME in use is a rollator. Reports being independent with most activities. Significant other, Carlos drivers her to apts. Reports that Carlos or Ganga will provide transportation home upon DC. Reports taking home medications as prescribed and can currently afford them. Verified insurance on file. Denies recent inpt stay in last 30 days.  DC plan is home with Carlos at this time.    Transition of Care Barriers: None    Payor: [employer] / Plan: [employer] CHOICES 65 / Product Type: Medicare Advantage /     Extended Emergency Contact Information  Primary Emergency Contact: Carlos Manriquez   [place]  Mobile Phone: [phone]  Relation: Significant other  Preferred language: English   needed? No  Secondary Emergency Contact: Ganga Brown   [facility]  Mobile Phone: [phone]  Relation: Friend    Discharge Plan A: Home with family  Discharge Plan B: Home with family      [facility] [address]  [address]  [facility] [address]  Phone: [phone] Fax: [phone]    [facility]  [facility] [address]  [facility] [address]  Phone: [phone] Fax: [phone]    [facility] [place], [place] - [address]  [address]  [facility] [address]  Phone: [phone] Fax: [phone]      Initial Assessment (most recent)       Adult Discharge Assessment - 09/07/23 1233          Discharge Assessment    Assessment Type Discharge Planning Assessment     Confirmed/corrected address, phone number and insurance Yes     Confirmed Demographics Correct on Facesheet     Source of Information patient;family     Communicated GUNNER with patient/caregiver Date not available/Unable to determine     Reason For Admission Severe Sepsis     People in Home significant other     Do you expect to return to your current living situation? Yes     Do you have help at home or someone to help you manage your care at home? Yes     Who are your caregiver(s) and their phone number(s)? Significant other, Carlos Manriquez [phone] and Friend, Ganga Brown [phone]     Prior to hospitilization cognitive status: Alert/Oriented     Current cognitive status: Alert/Oriented     Walking or Climbing Stairs ambulation difficulty, requires equipment;stair climbing difficulty, dependent;transferring difficulty, requires equipment     Mobility Management Rollator     Dressing/Bathing bathing difficulty, assistance 1 person;dressing difficulty, assistance 1 person     Dressing/Bathing Management Harris or Ganga Brown     Equipment Currently Used at Home rollator     Readmission within 30 days? No     Patient currently being followed by outpatient case management? No     Do you currently have service(s) that help you manage your care at home? No     Do you take prescription medications? Yes     Do you have prescription coverage? Yes     Coverage [facility]     Do you have any problems affording any of your prescribed medications? No     Is the patient taking  medications as prescribed? yes     Who is going to help you get home at discharge? Patient stated that Carlos or Ganga would be available     How do you get to doctors appointments? family or friend will provide     Are you on dialysis? No     Do you take coumadin? No     DME Needed Upon Discharge  none     Discharge Plan discussed with: POA;Patient;Spouse/sig other     Name(s) and Number(s) Carlos Manriquez, significant other [phone]     Transition of Care Barriers None     Discharge Plan A Home with family     Discharge Plan B Home with family

## 2023-09-07 NOTE — PLAN OF CARE
Problem: Adult Inpatient Plan of Care  Goal: Plan of Care Review  Outcome: Ongoing, Progressing  Goal: Patient-Specific Goal (Individualized)  Outcome: Ongoing, Progressing  Goal: Absence of Hospital-Acquired Illness or Injury  Outcome: Ongoing, Progressing  Goal: Optimal Comfort and Wellbeing  Outcome: Ongoing, Progressing  Goal: Readiness for Transition of Care  Outcome: Ongoing, Progressing     Problem: Diabetes Comorbidity  Goal: Blood Glucose Level Within Targeted Range  Outcome: Ongoing, Progressing     Problem: Adjustment to Illness (Sepsis/Septic Shock)  Goal: Optimal Coping  Outcome: Ongoing, Progressing     Problem: Bleeding (Sepsis/Septic Shock)  Goal: Absence of Bleeding  Outcome: Ongoing, Progressing     Problem: Glycemic Control Impaired (Sepsis/Septic Shock)  Goal: Blood Glucose Level Within Desired Range  Outcome: Ongoing, Progressing     Problem: Infection Progression (Sepsis/Septic Shock)  Goal: Absence of Infection Signs and Symptoms  Outcome: Ongoing, Progressing     Problem: Nutrition Impaired (Sepsis/Septic Shock)  Goal: Optimal Nutrition Intake  Outcome: Ongoing, Progressing     Problem: Fluid and Electrolyte Imbalance (Acute Kidney Injury/Impairment)  Goal: Fluid and Electrolyte Balance  Outcome: Ongoing, Progressing     Problem: Oral Intake Inadequate (Acute Kidney Injury/Impairment)  Goal: Optimal Nutrition Intake  Outcome: Ongoing, Progressing     Problem: Renal Function Impairment (Acute Kidney Injury/Impairment)  Goal: Effective Renal Function  Outcome: Ongoing, Progressing     Problem: Fluid Imbalance (Pneumonia)  Goal: Fluid Balance  Outcome: Ongoing, Progressing     Problem: Infection (Pneumonia)  Goal: Resolution of Infection Signs and Symptoms  Outcome: Ongoing, Progressing     Problem: Respiratory Compromise (Pneumonia)  Goal: Effective Oxygenation and Ventilation  Outcome: Ongoing, Progressing     Problem: Infection  Goal: Absence of Infection Signs and Symptoms  Outcome:  Ongoing, Progressing     Problem: Impaired Wound Healing  Goal: Optimal Wound Healing  Outcome: Ongoing, Progressing     Problem: Fall Injury Risk  Goal: Absence of Fall and Fall-Related Injury  Outcome: Ongoing, Progressing     Problem: Skin Injury Risk Increased  Goal: Skin Health and Integrity  Outcome: Ongoing, Progressing

## 2023-09-07 NOTE — ASSESSMENT & PLAN NOTE
This patient does have evidence of infective focus  My overall impression is septic shock due to lactate > 4 and MAP < 65.  Source: Urinary Tract  Antibiotics given-   Antibiotics (72h ago, onward)    Start     Stop Route Frequency Ordered    09/07/23 0900  mupirocin 2 % ointment         09/12/23 0859 Nasl 2 times daily 09/07/23 0206    09/07/23 0315  meropenem-0.9% sodium chloride 500 mg/50 mL IVPB         -- IV Every 8 hours (non-standard times) 09/07/23 0301    09/07/23 0030  vancomycin in dextrose 5 % 1 gram/250 mL IVPB 2,000 mg        Note to Pharmacy: Ht:    Wt: 78.5 kg (173 lb)  Estimated Creatinine Clearance: 18.7 mL/min (A) (based on SCr of 3.2 mg/dL (H)).   Body mass index is 29.7 kg/m².    -- IV Once 09/06/23 2325    09/07/23 0023  vancomycin - pharmacy to dose  (vancomycin IVPB (PEDS and ADULTS))        See Hyperspace for full Linked Orders Report.    -- IV pharmacy to manage frequency 09/06/23 2324        Latest lactate reviewed-  Recent Labs   Lab 09/06/23  2229   LACTATE 5.6*     Organ dysfunction indicated by Acute kidney injury    Fluid challenge Actual Body weight- Patient will receive 30ml/kg actual body weight to calculate fluid bolus for treatment of septic shock.     Post- resuscitation assessment Yes Perfusion exam was performed within 6 hours of septic shock presentation after bolus shows Adequate tissue perfusion assessed by non-invasive monitoring       Will  Pressors- Levophed for MAP of 65  Source control achieved by: antibiotics    Inpatient admission to ICU for Severe Sepsis, and YIMI; Pan cultured; empiric antibiotics;  after bolus of NS, hydration with bicarb infusion at 125 ml/hr; hold nephrotoxins and hepatic toxins; Nephrology Consultation; Oncology consultations--possibly Urology in AM if still unable to place Rosas; pressors prn; serial troponin; lipase with AM labs for review

## 2023-09-07 NOTE — HPI
"63 year old female with history of bilateral Breast Ca (on chemo currently), Mood disorder, Seizure disorder, DM 2, HTN  was brought to ED because of altered mentation, generalized malaise and body pain. She has not been urinating much, according to boyfriend at bedside. The patient is somewhat altered and is very poor historian currently. She denies any current chest discomfort or SOB. No abdominal pain, nor N/V. Has not had BM since 2 days. She does not know if she has had blood or pain with urination.    In ED: labs reviewed and noted below: leukocytosis with moderate macrocytic anemia; trivial hyponatremia, metabolic acidosis with new stage 5 renal dysfunction; mild hyperbilirubinemia with significant elevation of her transaminases; ammonia is normal; troponin is moderately elevated; with elevated lactate. VBG pH 7.19. CT Head: no acute intracranial path. CXR: bilateral fluffy opacities. EKG reviewed: sinus without acute segments. Unable to place Rosas in ED even with Coude. Bladder scan approximately 200 ml urine. In/out cath to collect urine was performed    CT Abdo/Pelvis:   "IMPRESSION:  1: Persistent evidence of diffuse metastatic disease to the liver and bone.  2: Several small nodular densities in the lower lungs suspicious for metastatic disease.  3: New small to moderate volume of ascites.  4: The pancreas appears prominent and increased in size over previous. Correlate clinically to exclude any acute pancreatitis.  5: Right mastectomy and reconstruction.  6: Rosas catheter may be partially inflated in the urethra or vagina. Correlate clinically to ensure it is working properly."     Discussed with ED MD: Inpatient admission to ICU for Severe Sepsis, and YIMI; Pan cultured; empiric antibiotics;  after bolus of NS, hydration with bicarb infusion at 125 ml/hr; hold nephrotoxins and hepatic toxins; Nephrology Consultation; Oncology consultations--possibly Urology in AM if still unable to place Rosas after " copious hydration; pressors prn; serial troponin; lipase with AM labs for review

## 2023-09-07 NOTE — ASSESSMENT & PLAN NOTE
Patient with acute kidney injury/acute renal failure likely due to post-obstructive d/t unknown YIMI is currently worsening. Baseline creatinine 1.2 - Labs reviewed- Renal function/electrolytes with Estimated Creatinine Clearance: 18.7 mL/min (A) (based on SCr of 3.2 mg/dL (H)). according to latest data. Monitor urine output and serial BMP and adjust therapy as needed. Avoid nephrotoxins and renally dose meds for GFR listed above.    Inpatient admission to ICU for Severe Sepsis, and YIMI; Pan cultured; empiric antibiotics;  after bolus of NS, hydration with bicarb infusion at 125 ml/hr; hold nephrotoxins and hepatic toxins; Nephrology Consultation; Oncology consultations--possibly Urology in AM if still unable to place Rosas; pressors prn; serial troponin; lipase with AM labs for review

## 2023-09-07 NOTE — PROGRESS NOTES
Pharmacist Renal Dose Adjustment Note    Negrita Castro is a 63 y.o. female being treated with the medication Enoxaparin    Patient Data:    Vital Signs (Most Recent):  Temp: 97.3 °F (36.3 °C) (09/06/23 2117)  Pulse: 102 (09/07/23 0251)  Resp: (!) 22 (09/07/23 0251)  BP: (!) 99/56 (09/07/23 0251)  SpO2: 100 % (09/07/23 0251) Vital Signs (72h Range):  Temp:  [97.1 °F (36.2 °C)-97.7 °F (36.5 °C)]   Pulse:  []   Resp:  [18-37]   BP: ()/(38-70)   SpO2:  [94 %-100 %]      Recent Labs   Lab 08/31/23  1043 09/06/23 2229   CREATININE 1.2 3.2*     Serum creatinine: 3.2 mg/dL (H) 09/06/23 2229  Estimated creatinine clearance: 18 mL/min (A)    Per [facility] renal dosing protocol:     Previous Order: Enoxaparin 40 mg Q24H    Will be changed to:     New Order: Enoxaparin 30 mg Q24H,    Due to: CrCl < 30 mL/min, BMI < 40 kg/m2    Renal dose adjustments performed as noted above.    We will continue monitoring and adjusting as necessary.    Pharmacist: Waldo MccordD  Ext: [phone]

## 2023-09-07 NOTE — PROGRESS NOTES
"Pharmacokinetic Initial Assessment: IV Vancomycin    Assessment/Plan:    Initiate intravenous vancomycin with loading dose of 2000 mg once with subsequent doses when random concentrations are less than 20 mcg/mL  Desired empiric serum trough concentration is 10 to 20 mcg/mL  Draw vancomycin random level on 09/08 at 0130.  Pharmacy will continue to follow and monitor vancomycin.      Please contact pharmacy at extension [phone] with any questions regarding this assessment.     Thank you for the consult,   Heladio Altamirano       Patient brief summary:  Negrita Castro is a 63 y.o. female initiated on antimicrobial therapy with IV Vancomycin for treatment of suspected sepsis    Drug Allergies:   Review of patient's allergies indicates:   Allergen Reactions    Adhesive tape-silicones Hives     BANDAIDS    Polymycin Hives    Adhesive     Latex, natural rubber Hives and Itching    Neomycin-bacitracnzn-polymyxnb     Polyurethane-39 Hives       Actual Body Weight:   71.9 kg    Renal Function:   Estimated Creatinine Clearance: 18 mL/min (A) (based on SCr of 3.2 mg/dL (H)).,     CBC (last 72 hours):  Recent Labs   Lab Result Units 09/06/23  2229   WBC K/uL 17.99*   Hemoglobin g/dL 9.3*   Hematocrit % 30.4*   Platelets K/uL 102*   Gran % % 95.2*   Lymph % % 1.7*   Mono % % 1.2*   Eosinophil % % 0.0   Basophil % % 0.3   Differential Method  Automated       Metabolic Panel (last 72 hours):  Recent Labs   Lab Result Units 09/06/23  2229   Sodium mmol/L 135*   Potassium mmol/L 4.3   Chloride mmol/L 112*   CO2 mmol/L 12*   Glucose mg/dL 221*   BUN mg/dL 32*   Creatinine mg/dL 3.2*   Albumin g/dL 2.1*   Total Bilirubin mg/dL 2.0*   Alkaline Phosphatase U/L 788*   AST U/L 1,405*   ALT U/L 217*   Magnesium mg/dL 2.8*       Drug levels (last 3 results):  No results for input(s): "VANCOMYCINRA", "VANCORANDOM", "VANCOMYCINPE", "VANCOPEAK", "VANCOMYCINTR", "VANCOTROUGH" in the last 72 hours.    Microbiologic Results:  Microbiology Results (last 7 " days)       Procedure Component Value Units Date/Time    Blood culture [[id number]]     Order Status: Sent Specimen: Blood     Blood culture [[id number]]     Order Status: Sent Specimen: Blood     Culture, Respiratory with Gram Stain [[id number]]     Order Status: No result Specimen: Respiratory from Sputum, Expectorated     Blood culture #2 **CANNOT BE ORDERED STAT** [[id number]] Collected: 09/06/23 2235    Order Status: Sent Specimen: Blood Updated: 09/06/23 2301    Narrative:      Collection has been rescheduled by GAYE at 09/06/2023 22:14 Reason:   Was not able to get the second set. Luis gonzales RN said he will try to   get the second set.  Collection has been rescheduled by GAYE at 09/06/2023 22:14 Reason:   Was not able to get the second set. Luis gonzales RN said he will try to   get the second set.    Blood culture #1 **CANNOT BE ORDERED STAT** [[id number]] Collected: 09/06/23 2213    Order Status: Sent Specimen: Blood from Peripheral, Left Hand Updated: 09/06/23 2225

## 2023-09-07 NOTE — CONSULTS
Pulmonary/Critical Care Consult      PATIENT NAME: Negrita Castro  MRN: [id number]  TODAY'S DATE: 2023  8:14 AM  ADMIT DATE: 2023  AGE: 63 y.o. : 1960    CONSULT REQUESTED BY: Xiang Guzman,*    REASON FOR CONSULT:   Severe sepsis    HPI:  The patient is a 63-year-old female who is being treated for recurrent breast cancer with widely disseminated osseous metastases and hepatic metastases.  She received her last chemotherapy on Friday.  She states she started feeling ill Friday and was very weak and stayed in bed.  She states her entire body hurts.  She has a leukocytosis and thrombocytosis pineal and anemia.  She has acute renal failure, transaminitis, markedly elevated alkaline phosphatase is with her bone mets and a lipase greater than 4000 and pancreatitis on CT; she has a severe metabolic acidosis.    REVIEW OF SYSTEMS  GENERAL: Feeling pain all over  EYES: Vision is good.  ENT: No sinusitis or pharyngitis.   HEART:  Her chest hurts.  She has a broken back from her cancer  LUNGS:  She is short of breath.  GI:  She has diffuse abdominal pain.  Her last bowel movement was 2 days ago.  She states her abdomen is normally flat.  Neither she nor her boyfriend noticed it becoming very rounded and distended.  :  She states urinated last right before she came to the emergency room  SKIN: No lesions or rashes.  MUSCULOSKELETAL:  Her entire body hurts.  She walks with a walker now.  NEURO: No headaches or neuropathy.  LYMPH:  Her feet and legs have been swelling this week  PSYCH: No anxiety or depression.  ENDO: No weight change.    ALLERGIES  Review of patient's allergies indicates:   Allergen Reactions    Adhesive tape-silicones Hives     BANDAIDS    Polymycin Hives    Adhesive     Latex, natural rubber Hives and Itching    Neomycin-bacitracnzn-polymyxnb     Polyurethane-39 Hives       INPATIENT SCHEDULED MEDICATIONS   dexAMETHasone  4 mg Oral BID WM    droNABinol  5 mg Oral BID AC     enoxparin  30 mg Subcutaneous Q24H (prophylaxis, 1700)    fentaNYL  1 patch Transdermal Q72H    meropenem (MERREM) IVPB  500 mg Intravenous Q12H    mupirocin   Nasal BID    NORepinephrine bitartrate-D5W        sodium chloride 0.9%  30 mL/kg Intravenous Once      NORepinephrine bitartrate-D5W      sodium bicarbonate 150 mEq in dextrose 5 % (D5W) 1,150 mL infusion 125 mL/hr at 09/07/23 0340       MEDICAL AND SURGICAL HISTORY  Past Medical History:   Diagnosis Date    Acute hypoxemic respiratory failure 12/12/2020    Breast cancer 10/26/2015    left breast    Cancer     RIGHT BREAST 10-15    Depression     Diabetes mellitus     Neuropathy     Panic attacks     S/P epidural steroid injection     Seizures     none since early 30's    Wears glasses     TO DRIVE     Past Surgical History:   Procedure Laterality Date    AXILLARY NODE DISSECTION Left 8/24/2020    Procedure: LYMPHADENECTOMY, AXILLARY;  Surgeon: Cory Vick MD;  Location: [facility] OR;  Service: General;  Laterality: Left;  left breast mastectomy with possible axillary lymph node dissection    BREAST BIOPSY      right    BREAST LUMPECTOMY      BREAST RECONSTRUCTION      breast reconstruction with tummy [facility]      BREAST SURGERY      11-3-15 LUMPECTOMY, 12-2-15 REEXCISION    INCISION AND DRAINAGE OF ABSCESS Left 9/9/2020    Procedure: INCISION AND DRAINAGE, ABSCESS;  Surgeon: Cory Vick MD;  Location: [facility] OR;  Service: General;  Laterality: Left;    INSERTION OF LOCALIZATION WIRE Left 8/24/2020    Procedure: INSERTION, LOCALIZATION WIRE;  Surgeon: Cory Vick MD;  Location: [facility] OR;  Service: General;  Laterality: Left;  left breast lumpectomy with wire needle loc    INSERTION OF TUNNELED CENTRAL VENOUS CATHETER (CVC) WITH SUBCUTANEOUS PORT Right 11/17/2022    Procedure: [id number];  Surgeon: Jeff Mckeon Jr., MD;  Location: [facility] OR;  Service: General;  Laterality: Right;    MASTECTOMY      mastectomy 2016      MASTECTOMY,  PARTIAL Left 3/19/2021    Procedure: MASTECTOMY, PARTIAL;  Surgeon: Cory Vick MD;  [facility];  Service: General;  Laterality: Left;  lumpectomy  left breast     RECONSTRUCTION OF NIPPLE Right 2018    Procedure: RECONSTRUCTION-NIPPLE RIGHT;  Surgeon: Xiang Hernandez MD;  Location: [facility] OR [address] [place] [address];  Service: Plastics;  Laterality: Right;    SENTINEL LYMPH NODE BIOPSY Left 2020    Procedure: BIOPSY, LYMPH NODE, SENTINEL;  Surgeon: Cory Vick MD;  Location: [facility] OR;  Service: General;  Laterality: Left;  left breast lumpectomy with left sentinel lymoh node       shoulder surgery and wrist      BILAT  BONE SPUR    WRIST SURGERY      RIGHT       ALCOHOL, TOBACCO AND DRUG USE  Social History     Tobacco Use   Smoking Status Former    Current packs/day: 0.00    Average packs/day: 0.3 packs/day for 30.0 years (7.5 ttl pk-yrs)    Types: Cigarettes    Start date: 1985    Quit date: 2015    Years since quittin.9   Smokeless Tobacco Never     Social History     Substance and Sexual Activity   Alcohol Use Yes    Alcohol/week: 0.0 standard drinks of alcohol    Comment: RARELY     Social History     Substance and Sexual Activity   Drug Use Not Currently    Types: Marijuana    Comment: medical marijuana       FAMILY HISTORY  Family History   Problem Relation Age of Onset    Anesthesia problems Neg Hx        VITAL SIGNS (MOST RECENT)  Temp: 97.2 °F (36.2 °C) (23 0701)  Pulse: 101 (23)  Resp: (!) 26 (23 0745)  BP: (!) 89/54 (23)  SpO2: 100 % (23)    INTAKE AND OUTPUT (LAST 24 HOURS):  Intake/Output Summary (Last 24 hours) at 2023 0814  Last data filed at 2023 0604  Gross per 24 hour   Intake 2900 ml   Output --   Net 2900 ml       WEIGHT  Wt Readings from Last 1 Encounters:   23 71.9 kg (158 lb 8.2 oz)       PHYSICAL EXAM  GENERAL: Older patient in no distress, unless she try to move her are touch her abdomen.  She  complains of pain everywhere.  HEENT: Pupils equal and reactive. Extraocular movements intact. Nose intact. Pharynx dry.  She has alopecia  NECK: Supple.   HEART: Regular rate and rhythm. No murmur or gallop auscultated.  LUNGS:  Difficult to auscultate as the patient will not allow you to turn her because she is in so much pain. No adventitial noises heard.  There is a port in the right upper thorax.  ABDOMEN: Bowel sounds present.  She is distended.  She is diffusely tender but most tender across the epigastrium and the right upper quadrant.  She is tympanitic.  The tenderness in the right upper quadrant extends down to the iliac crest.  : Normal anatomy.  EXTREMITIES: Normal muscle tone and joint movement, no cyanosis or clubbing.   LYMPHATICS: No adenopathy palpated, her feet are puffy.  Her skin has edema in it.  SKIN: Dry, intact, no lesions.   NEURO: Cranial nerves II-XII intact. Motor strength not tested.  She is a bit fuzzy on her recollections.  PSYCH: Appropriate affect      CBC LAST (LAST 24 HOURS)  Recent Labs   Lab 09/07/23  0323   WBC 16.81*   RBC 2.58*   HGB 8.5*   HCT 28.0*   *   MCH 32.9*   MCHC 30.4*   RDW 24.6*   PLT 86*   MPV 10.8   GRAN 93.3*  15.3*   LYMPH 1.7*  0.3*   MONO 0.7*  0.1*   BASO 0.03   NRBC 0       CHEMISTRY LAST (LAST 24 HOURS)  Recent Labs   Lab 09/06/23  2229 09/07/23  0251 09/07/23  0323   *  --  136  136   K 4.3  --  4.2  4.2   *  --  115*  115*   CO2 12*  --  14*  14*   ANIONGAP 11  --  7*  7*   BUN 32*  --  34*  34*   CREATININE 3.2*  --  3.1*  3.1*   *  --  237*  237*   CALCIUM 9.0  --  8.1*  8.1*   PH  --  7.195*  --    MG 2.8*  --   --    ALBUMIN 2.1*  --  1.8*  1.8*   PROT 6.2  --  5.3*  5.3*   ALKPHOS 788*  --  675*  675*   *  --  185*  185*   AST 1,405*  --  1,096*  1,096*   BILITOT 2.0*  --  1.9*  1.9*         CARDIAC PROFILE (LAST 24 HOURS)  Recent Labs   Lab 09/07/23  0323   TROPONINIHS 45.7*       LAST 7 DAYS  MICROBIOLOGY   Microbiology Results (last 7 days)       Procedure Component Value Units Date/Time    Blood culture #2 **CANNOT BE ORDERED STAT** [[id number]] Collected: 09/06/23 2235    Order Status: Completed Specimen: Blood Updated: 09/07/23 0558     Blood Culture, Routine No Growth to date    Narrative:      Collection has been rescheduled by GAYE at 09/06/2023 22:14 Reason:   Was not able to get the second set. Luis gonzales RN said he will try to   get the second set.  Collection has been rescheduled by GAYE at 09/06/2023 22:14 Reason:   Was not able to get the second set. Luis gonzales RN said he will try to   get the second set.    Blood culture #1 **CANNOT BE ORDERED STAT** [[id number]] Collected: 09/06/23 2213    Order Status: Completed Specimen: Blood from Peripheral, Left Hand Updated: 09/07/23 0517     Blood Culture, Routine No Growth to date    Blood culture [[id number]]     Order Status: Canceled Specimen: Blood     Blood culture [[id number]]     Order Status: Canceled Specimen: Blood     Culture, Respiratory with Gram Stain [[id number]]     Order Status: No result Specimen: Respiratory from Sputum, Expectorated             MOST RECENT IMAGING  X-Ray Chest AP Portable  Chest single view    CLINICAL DATA: Shortness of breath    FINDINGS: AP views compared to July 25. Heart size is normal. The mediastinum is unremarkable. There is persistent atelectasis or infiltrate at the right lung base there is evidence of faint airspace disease in the left upper lobe. No significant effusion is identified. Right-sided Port-A-Cath is unchanged in position. No acute osseous abnormality is identified.    IMPRESSION:    1. Right basilar atelectasis or infiltrate, similar to July 25.  2. Probable very faint airspace disease in the left upper lobe.    Electronically signed by:  Berto Carroll MD  9/7/2023 7:39 AM CDT Workstation: [id number]  CT Abdomen Pelvis  Without Contrast  Procedure description: CT ABDOMEN PELVIS WITHOUT  IV CONTRAST    CLINICAL INDICATION:  Bowel obstruction suspected    TECHNIQUE: Axial imaging obtained through the abdomen and pelvis. Sagittal and coronal reconstructions obtained.    CONTRAST: None    COMPARISON: July 12, 2023, June 26, 2023    FINDINGS:  CT abdomen/pelvis: Consolidation in the right lower lung versus dense right lower lung atelectasis. A few small nodular densities are present within the lower lungs.    There are no significant pleural or pericardial effusions and the heart size is normal. There is a central line in the lower SVC. Surgical clips are present over the right chest and also in the left breast. Possible previous right mastectomy with reconstruction.    Diffuse metastatic disease to the liver is not well characterized on noncontrast imaging but may be increased.    The spleen and adrenal glands appear normal. The pancreas is prominent in size. In the acute pancreatitis is not excluded. The gallbladder is contracted. No evidence of bile duct obstruction.    Both kidneys are normal.    A small volume of ascites is present in the abdomen and small to moderate volume of ascites in the pelvis.    A Rosas catheter is present which may be partially inflated within the upper urethra or vagina.    The uterus and ovaries are not enlarged.    No bowel distention or inflammation is evident. No significant constipation.    Body wall edema is present.    There is evidence of diffuse metastatic disease to bone.    IMPRESSION:  1: Persistent evidence of diffuse metastatic disease to the liver and bone.  2: Several small nodular densities in the lower lungs suspicious for metastatic disease.  3: New small to moderate volume of ascites.  4: The pancreas appears prominent and increased in size over previous. Correlate clinically to exclude any acute pancreatitis.  5: Right mastectomy and reconstruction.  6: Rosas catheter may be partially inflated in the urethra or vagina. Correlate clinically to ensure  it is working properly.    RADIATION DOSE REDUCTION: This exam was performed according to our departmental dose-optimization program which includes automated exposure control, adjustment of the mA and/or kV according to patient size and/or use of iterative reconstruction technique.    Electronically signed by:  Chaparro Rodríguez MD  9/7/2023 12:27 AM CDT Workstation: [id number]  CT Head Without Contrast  EXAM DESCRIPTION:  CT HEAD WITHOUT CONTRAST 9/7/2023 12:10 AM CDT  RadLex: CT HEAD WITHOUT IV CONTRAST    CLINICAL HISTORY:  63 years Female, Altered mental status, nontraumatic (Ped 0-18y)    COMPARISON:  Brain CT dated 6/26/2023    TECHNIQUE: Axial thin section CT images of the brain were obtained from the base of the skull to the vertex without intravenous contrast. Sagittal and coronal reconstructed images were also obtained.    The protocol utilizes one or more of the following dose reduction techniques: automated exposure control, adjustment of mA and/or kV according to patient size, and/or use of iterative reconstruction technique.    FINDINGS: Quality of examination is degraded secondary to motion artifact.    BRAIN: Gray-white matter differentiation is preserved. Mild extent of chronic medical vascular ischemic white matter disease is again appreciated. No evidence of an apparent acute transcortical infarct is definitely noted. Please note that sensitivity for subtle small acute infarct is suboptimal on CT.    CEREBROSPINAL FLUID SPACES: Ventricles, cerebral sulci, and basal cisterns are normal for patient's age. No hydrocephalus or ventricular entrapment is noted. No extraaxial fluid collection is noted.    MASS EFFECT: There is no mass effect or midline shift.    HEMORRHAGE: No intracranial hemorrhage is noted.    SELLA: The sella and suprasellar cistern appear unremarkable.    PARANASAL SINUSES: The visualized portions of the paranasal sinuses are clear.    MASTOID AIR CELLS: The visualized portions of  the mastoid air cells are well aerated.    ORBITS: The visualized portions of the orbits appear unremarkable.    SOFT TISSUES: No scalp soft tissue swelling is noted.    CALVARIUM: No calvarial fracture is noted.    ATHEROSCLEROSIS: No calcified intracranial atherosclerosis is noted.    IMPRESSION:    NO APPARENT ACUTE INTRACRANIAL BLEED, MIDLINE SHIFT, OR MASS EFFECT IS DEFINITELY NOTED WITHIN THE LIMITATIONS OF MOTION ARTIFACT.    Electronically signed by:  Antony Lewis MD  9/7/2023 12:20 AM CDT Workstation: [id number]      CURRENT VISIT EKG  No results found for this visit on 09/06/23.    ECHOCARDIOGRAM RESULTS  No results found for this or any previous visit.        Oxygen INFORMATION   6 L           LAST ARTERIAL BLOOD GAS  ABG  Recent Labs   Lab 09/07/23  0251   PH 7.195*   PO2 51   PCO2 32.3*   HCO3 12.5*   BE -16       IMPRESSION AND PLAN  Severe sepsis  - continuing lactic acidosis  - trend q4 hrs  - on Merrem and vanc, will deescalate with culture results  Pancreatitis   - no alcohol consumption  - patient needs more volume but we can not do this until we can get a Rosas catheter  Acute renal failure  - patient needs more volume but we need a Rosas catheter   Nephrology consulted  Widely metastatic breast cancer diffuse osseous and hepatic metastases  - markedly elevated LFTs  - elevated alkaline phosphatase  - ascites present  - on Zometa (last dose 9/1) and Trodelvy (last dose 9/5).   Diabetes mellitus  - hyperglycemia  - Accu-Cheks and sliding scale insulin  Severe malnutrition  - anasarca  Normal? anion gap metabolic acidosis  - lactic acidosis  - bicarb of 13, pH up to 7.27  - bicarb drip infusing at 125 cc/hour  Anemia and thrombocytopenia  - secondary to chemotherapy  Inability to place Rosas  - Dr. Leach consulted  - bladder scan suggests about 80 cc in the bladder    Enoxaparin for DVT prophylaxis   Pepcid for GI prophylaxis    Discussed with heme Onc and Urology and nursing and  Respiratory and patient and her boyfriend.    Critical care time spent reviewing the chart, examining the patient, reviewing the labs, reviewing the radiological findings, discussing care with nursing, physicians, and respiratory and creating the note and  has been greater than 35 minutes.    Ligia Castillo MD  Date of Service: 09/07/2023  8:14 AM

## 2023-09-07 NOTE — ED PROVIDER NOTES
Encounter Date: 9/6/2023       History     Chief Complaint   Patient presents with    Chest Pain     Starting yesterday, that has gotten worse today. Left sided     Chief complaint is shortness of breath.  Patient has diffuse body aches as well.  She takes chronic pain medicines for that.  She has history of breast cancer diagnosed in 2015.  It has recurred.  She has 1 breast removed.  She recently had chemo several days ago on Friday.  She is now feeling very weak.  She denies any fever chills.  She complains of body aches including chest pain leg pain abdominal pain.  Her blood pressure initially was 71/48 it has come up since then.  We will get an IV give her fluids hydrate her do a cardiac workup.        Review of patient's allergies indicates:   Allergen Reactions    Adhesive tape-silicones Hives     BANDAIDS    Polymycin Hives    Adhesive     Latex, natural rubber Hives and Itching    Neomycin-bacitracnzn-polymyxnb     Polyurethane-39 Hives     Past Medical History:   Diagnosis Date    Acute hypoxemic respiratory failure 12/12/2020    Breast cancer 10/26/2015    left breast    Cancer     RIGHT BREAST 10-15    Depression     Diabetes mellitus     Neuropathy     Panic attacks     S/P epidural steroid injection     Seizures     none since early 30's    Wears glasses     TO DRIVE     Past Surgical History:   Procedure Laterality Date    AXILLARY NODE DISSECTION Left 8/24/2020    Procedure: LYMPHADENECTOMY, AXILLARY;  Surgeon: Cory Vick MD;  Location: [facility] OR;  Service: General;  Laterality: Left;  left breast mastectomy with possible axillary lymph node dissection    BREAST BIOPSY      right    BREAST LUMPECTOMY      BREAST RECONSTRUCTION      breast reconstruction with tummy [facility]      BREAST SURGERY      11-3-15 LUMPECTOMY, 12-2-15 REEXCISION    INCISION AND DRAINAGE OF ABSCESS Left 9/9/2020    Procedure: INCISION AND DRAINAGE, ABSCESS;  Surgeon: Cory Vick MD;  Location: [facility] OR;  Service:  General;  Laterality: Left;    INSERTION OF LOCALIZATION WIRE Left 2020    Procedure: INSERTION, LOCALIZATION WIRE;  Surgeon: Cory Vick MD;  Location: [facility] OR;  Service: General;  Laterality: Left;  left breast lumpectomy with wire needle loc    INSERTION OF TUNNELED CENTRAL VENOUS CATHETER (CVC) WITH SUBCUTANEOUS PORT Right 2022    Procedure: [id number];  Surgeon: Jeff Mckeon Jr., MD;  Location: [place] OR;  Service: General;  Laterality: Right;    MASTECTOMY      mastectomy 2016      MASTECTOMY, PARTIAL Left 3/19/2021    Procedure: MASTECTOMY, PARTIAL;  Surgeon: Cory Vick MD;  Location: [facility] OR;  Service: General;  Laterality: Left;  lumpectomy  left breast     RECONSTRUCTION OF NIPPLE Right 2018    Procedure: RECONSTRUCTION-NIPPLE RIGHT;  Surgeon: Xiang Hernandez MD;  Location: [address];  Service: Plastics;  Laterality: Right;    SENTINEL LYMPH NODE BIOPSY Left 2020    Procedure: BIOPSY, LYMPH NODE, SENTINEL;  Surgeon: Cory Vick MD;  Location: [facility] OR;  Service: General;  Laterality: Left;  left breast lumpectomy with left sentinel lymoh node       shoulder surgery and wrist      BILAT  BONE SPUR    WRIST SURGERY      RIGHT     Family History   Problem Relation Age of Onset    Anesthesia problems Neg Hx      Social History     Tobacco Use    Smoking status: Former     Current packs/day: 0.00     Average packs/day: 0.3 packs/day for 30.0 years (7.5 ttl pk-yrs)     Types: Cigarettes     Start date: 1985     Quit date: 2015     Years since quittin.9    Smokeless tobacco: Never   Substance Use Topics    Alcohol use: Yes     Alcohol/week: 0.0 standard drinks of alcohol     Comment: RARELY    Drug use: Not Currently     Types: Marijuana     Comment: medical marijuana     Review of Systems   Constitutional:  Negative for chills and fever.   HENT:  Negative for ear pain, rhinorrhea and sore throat.    Eyes:  Negative for pain  and visual disturbance.   Respiratory:  Positive for shortness of breath. Negative for cough.    Cardiovascular:  Negative for chest pain and palpitations.   Gastrointestinal:  Positive for abdominal pain. Negative for constipation, diarrhea, nausea and vomiting.   Genitourinary:  Negative for dysuria, frequency, hematuria and urgency.   Musculoskeletal:  Negative for back pain, joint swelling and myalgias.   Skin:  Negative for rash.   Neurological:  Negative for dizziness, seizures, weakness and headaches.   Psychiatric/Behavioral:  Negative for dysphoric mood. The patient is not nervous/anxious.        Physical Exam     Initial Vitals [09/06/23 2117]   BP Pulse Resp Temp SpO2   (!) 71/48 103 (!) 21 97.3 °F (36.3 °C) 97 %      MAP       --         Physical Exam    Nursing note and vitals reviewed.  Constitutional: She appears well-developed and well-nourished.   HENT:   Head: Normocephalic and atraumatic.   Eyes: Conjunctivae, EOM and lids are normal. Pupils are equal, round, and reactive to light.   Neck: Trachea normal. Neck supple. No thyroid mass and no thyromegaly present.   Normal range of motion.  Cardiovascular:  Normal rate, regular rhythm and normal heart sounds.           Pulmonary/Chest: Effort normal and breath sounds normal.   Abdominal: Abdomen is soft. She exhibits distension. There is abdominal tenderness.   Musculoskeletal:         General: Normal range of motion.      Cervical back: Normal range of motion and neck supple.     Neurological: She is alert and oriented to person, place, and time. She has normal strength and normal reflexes. No cranial nerve deficit or sensory deficit.   Skin: Skin is warm and dry.   Psychiatric: She has a normal mood and affect. Her speech is normal and behavior is normal. Judgment and thought content normal.         ED Course   Procedures  Labs Reviewed   CBC W/ AUTO DIFFERENTIAL - Abnormal; Notable for the following components:       Result Value    WBC 17.99 (*)      RBC 2.82 (*)     Hemoglobin 9.3 (*)     Hematocrit 30.4 (*)      (*)     MCH 33.0 (*)     MCHC 30.6 (*)     RDW 24.7 (*)     Platelets 102 (*)     Immature Granulocytes 1.6 (*)     Gran # (ANC) 17.1 (*)     Immature Grans (Abs) 0.29 (*)     Lymph # 0.3 (*)     Mono # 0.2 (*)     Gran % 95.2 (*)     Lymph % 1.7 (*)     Mono % 1.2 (*)     All other components within normal limits   COMPREHENSIVE METABOLIC PANEL - Abnormal; Notable for the following components:    Sodium 135 (*)     Chloride 112 (*)     CO2 12 (*)     Glucose 221 (*)     BUN 32 (*)     Creatinine 3.2 (*)     Albumin 2.1 (*)     Total Bilirubin 2.0 (*)     Alkaline Phosphatase 788 (*)     AST 1,405 (*)      (*)     eGFR 15.9 (*)     All other components within normal limits   MAGNESIUM - Abnormal; Notable for the following components:    Magnesium 2.8 (*)     All other components within normal limits   LACTIC ACID, PLASMA - Abnormal; Notable for the following components:    Lactate (Lactic Acid) 5.6 (*)     All other components within normal limits    Narrative:      Lactic Acid critical result(s) repeated. Called and verbal readback   obtained from Bran Knox Rn/ER by LUDY 09/06/2023 23:18   TROPONIN I HIGH SENSITIVITY - Abnormal; Notable for the following components:    Troponin I High Sensitivity 47.9 (*)     All other components within normal limits   CULTURE, BLOOD   CULTURE, BLOOD    Narrative:     Collection has been rescheduled by GAYE at 09/06/2023 22:14 Reason:   Was not able to get the second set. Luis gonzales RN said he will try to   get the second set.  Collection has been rescheduled by GAYE at 09/06/2023 22:14 Reason:   Was not able to get the second set. Luis gonzales RN said he will try to   get the second set.   CULTURE, RESPIRATORY   CULTURE, BLOOD   CULTURE, BLOOD   AMMONIA   SARS-COV-2 RNA AMPLIFICATION, QUAL   URINALYSIS, REFLEX TO URINE CULTURE   LACTIC ACID, PLASMA   LACTIC ACID, PLASMA          Imaging Results               X-Ray Chest AP Portable (In process)                      CT Abdomen Pelvis  Without Contrast (Final result)  Result time 09/07/23 00:27:45   Procedure changed from CT Abdomen Pelvis With Contrast     Final result by Chaparro Rodríguez MD (09/07/23 00:27:45)                   Narrative:    Procedure description: CT ABDOMEN PELVIS WITHOUT IV CONTRAST    CLINICAL INDICATION:  Bowel obstruction suspected    TECHNIQUE: Axial imaging obtained through the abdomen and pelvis. Sagittal and coronal reconstructions obtained.    CONTRAST: None    COMPARISON: July 12, 2023, June 26, 2023    FINDINGS:  CT abdomen/pelvis: Consolidation in the right lower lung versus dense right lower lung atelectasis. A few small nodular densities are present within the lower lungs.    There are no significant pleural or pericardial effusions and the heart size is normal. There is a central line in the lower SVC. Surgical clips are present over the right chest and also in the left breast. Possible previous right mastectomy with reconstruction.    Diffuse metastatic disease to the liver is not well characterized on noncontrast imaging but may be increased.    The spleen and adrenal glands appear normal. The pancreas is prominent in size. In the acute pancreatitis is not excluded. The gallbladder is contracted. No evidence of bile duct obstruction.    Both kidneys are normal.    A small volume of ascites is present in the abdomen and small to moderate volume of ascites in the pelvis.    A Rosas catheter is present which may be partially inflated within the upper urethra or vagina.    The uterus and ovaries are not enlarged.    No bowel distention or inflammation is evident. No significant constipation.    Body wall edema is present.    There is evidence of diffuse metastatic disease to bone.      IMPRESSION:  1: Persistent evidence of diffuse metastatic disease to the liver and bone.  2: Several small nodular densities in the lower lungs  suspicious for metastatic disease.  3: New small to moderate volume of ascites.  4: The pancreas appears prominent and increased in size over previous. Correlate clinically to exclude any acute pancreatitis.  5: Right mastectomy and reconstruction.  6: Rosas catheter may be partially inflated in the urethra or vagina. Correlate clinically to ensure it is working properly.      RADIATION DOSE REDUCTION: This exam was performed according to our departmental dose-optimization program which includes automated exposure control, adjustment of the mA and/or kV according to patient size and/or use of iterative reconstruction technique.    Electronically signed by:  Chaparro Rodríguez MD  9/7/2023 12:27 AM CDT Workstation: [id number]                                     CT Head Without Contrast (Final result)  Result time 09/07/23 00:20:15      Final result by Antony Lewis MD (09/07/23 00:20:15)                   Narrative:    EXAM DESCRIPTION:  CT HEAD WITHOUT CONTRAST 9/7/2023 12:10 AM CDT  RadLex: CT HEAD WITHOUT IV CONTRAST    CLINICAL HISTORY:  63 years Female, Altered mental status, nontraumatic (Ped 0-18y)    COMPARISON:  Brain CT dated 6/26/2023    TECHNIQUE: Axial thin section CT images of the brain were obtained from the base of the skull to the vertex without intravenous contrast. Sagittal and coronal reconstructed images were also obtained.    The protocol utilizes one or more of the following dose reduction techniques: automated exposure control, adjustment of mA and/or kV according to patient size, and/or use of iterative reconstruction technique.    FINDINGS: Quality of examination is degraded secondary to motion artifact.    BRAIN: Gray-white matter differentiation is preserved. Mild extent of chronic medical vascular ischemic white matter disease is again appreciated. No evidence of an apparent acute transcortical infarct is definitely noted. Please note that sensitivity for subtle small acute infarct is  suboptimal on CT.    CEREBROSPINAL FLUID SPACES: Ventricles, cerebral sulci, and basal cisterns are normal for patient's age. No hydrocephalus or ventricular entrapment is noted. No extraaxial fluid collection is noted.    MASS EFFECT: There is no mass effect or midline shift.    HEMORRHAGE: No intracranial hemorrhage is noted.    SELLA: The sella and suprasellar cistern appear unremarkable.    PARANASAL SINUSES: The visualized portions of the paranasal sinuses are clear.    MASTOID AIR CELLS: The visualized portions of the mastoid air cells are well aerated.    ORBITS: The visualized portions of the orbits appear unremarkable.    SOFT TISSUES: No scalp soft tissue swelling is noted.    CALVARIUM: No calvarial fracture is noted.    ATHEROSCLEROSIS: No calcified intracranial atherosclerosis is noted.    IMPRESSION:    NO APPARENT ACUTE INTRACRANIAL BLEED, MIDLINE SHIFT, OR MASS EFFECT IS DEFINITELY NOTED WITHIN THE LIMITATIONS OF MOTION ARTIFACT.    Electronically signed by:  Antony Lewis MD  9/7/2023 12:20 AM CDT Workstation: [id number]                                     Medications   vancomycin - pharmacy to dose (has no administration in time range)   vancomycin in dextrose 5 % 1 gram/250 mL IVPB 2,000 mg (2,000 mg Intravenous New Bag 9/7/23 0225)   NORepinephrine bitartrate-D5W 4 mg/250 mL (16 mcg/mL) infusion Soln (0 mcg/kg/min  Hold 9/6/23 2345)   acetaminophen tablet 650 mg (has no administration in time range)   bisacodyL suppository 10 mg (has no administration in time range)   ondansetron injection 4 mg (has no administration in time range)   dextrose 50% injection 12.5 g (has no administration in time range)   dextrose 50% injection 25 g (has no administration in time range)   mupirocin 2 % ointment (has no administration in time range)   meropenem (MERREM) 2 g in sodium chloride 0.9% 100 mL IVPB (0 g Intravenous Stopped 9/7/23 0115)   0.9%  NaCl infusion (0 mLs Intravenous Stopped 9/7/23 0129)      Medical Decision Making  The patient arrived with weakness.  She is status post chemotherapy.  Weakness could be secondary to electrolyte imbalance, neurological defect, cardiovascular event, intra-abdominal event, infection, among others.    The patient appears to be dehydrated with elevated lactate elevated creatinine elevated WBC count.  A septic workup was initiated.  She was given antibiotics as well.  CT head negative.  CT abdomen pelvis reveals metastatic lesions to the liver and bones.  This has been noted before on CT scan.    Amount and/or Complexity of Data Reviewed  Labs: ordered.  Radiology: ordered.    Risk  Prescription drug management.  Decision regarding hospitalization.              Attending Attestation:         Attending Critical Care:   Critical Care Times:   Direct Patient Care (initial evaluation, reassessments, and time considering the case)................................................................20 minutes.   Additional History from reviewing old medical records or taking additional history from the family, EMS, PCP, etc.......................5 minutes.   Ordering, Reviewing, and Interpreting Diagnostic Studies...............................................................................................................10 minutes.   Documentation..................................................................................................................................................................................5 minutes.   Consultation with other Physicians. .................................................................................................................................................5 minutes.   ==============================================================  Total Critical Care Time - exclusive of procedural time: 45 minutes.  ==============================================================  Critical care was necessary to treat or prevent imminent or  life-threatening deterioration of the following conditions: sepsis.   Critical care was time spent personally by me on the following activities: obtaining history from patient or relative, examination of patient, review of x-rays / CT sent with the patient, review of old charts, ordering lab, x-rays, and/or EKG and discussion with consultants.   Critical Care Condition: critical                           Clinical Impression:   Final diagnoses:  [R53.1] Weakness  [R06.02] SOB (shortness of breath)  [A41.9, R65.20] Severe sepsis        ED Disposition Condition    Admit                 Quincy Alston MD  09/07/23 0246       Quincy Alston MD  09/07/23 0248       Quincy Alston MD  10/15/23 2102

## 2023-09-07 NOTE — SUBJECTIVE & OBJECTIVE
Past Medical History:   Diagnosis Date    Acute hypoxemic respiratory failure 12/12/2020    Breast cancer 10/26/2015    left breast    Cancer     RIGHT BREAST 10-15    Depression     Diabetes mellitus     Neuropathy     Panic attacks     S/P epidural steroid injection     Seizures     none since early 30's    Wears glasses     TO DRIVE       Past Surgical History:   Procedure Laterality Date    AXILLARY NODE DISSECTION Left 8/24/2020    Procedure: LYMPHADENECTOMY, AXILLARY;  Surgeon: Cory Vick MD;  Location: [facility] OR;  Service: General;  Laterality: Left;  left breast mastectomy with possible axillary lymph node dissection    BREAST BIOPSY      right    BREAST LUMPECTOMY      BREAST RECONSTRUCTION      breast reconstruction with tummy tuck      BREAST SURGERY      11-3-15 LUMPECTOMY, 12-2-15 REEXCISION    INCISION AND DRAINAGE OF ABSCESS Left 9/9/2020    Procedure: INCISION AND DRAINAGE, ABSCESS;  Surgeon: Cory Vick MD;  Location: [facility] OR;  Service: General;  Laterality: Left;    INSERTION OF LOCALIZATION WIRE Left 8/24/2020    Procedure: INSERTION, LOCALIZATION WIRE;  Surgeon: Cory Vick MD;  Location: [facility] OR;  Service: General;  Laterality: Left;  left breast lumpectomy with wire needle loc    INSERTION OF TUNNELED CENTRAL VENOUS CATHETER (CVC) WITH SUBCUTANEOUS PORT Right 11/17/2022    Procedure: [id number]-I-[id number];  Surgeon: Jeff Mckeon Jr., MD;  Location: [facility] OR;  Service: General;  Laterality: Right;    MASTECTOMY      mastectomy 2016      MASTECTOMY, PARTIAL Left 3/19/2021    Procedure: MASTECTOMY, PARTIAL;  Surgeon: Cory Vick MD;  Location: [facility];  Service: General;  Laterality: Left;  lumpectomy  left breast     RECONSTRUCTION OF NIPPLE Right 11/5/2018    Procedure: RECONSTRUCTION-NIPPLE RIGHT;  Surgeon: Xiang Hernandez MD;  Location: [address];  Service: Plastics;  Laterality: Right;    SENTINEL LYMPH NODE BIOPSY Left 8/24/2020     Procedure: BIOPSY, LYMPH NODE, SENTINEL;  Surgeon: Cory Vick MD;  Location: [facility] OR;  Service: General;  Laterality: Left;  left breast lumpectomy with left sentinel lymoh node       shoulder surgery and wrist      BILAT  BONE SPUR    WRIST SURGERY      RIGHT       Review of patient's allergies indicates:   Allergen Reactions    Adhesive tape-silicones Hives     BANDAIDS    Polymycin Hives    Adhesive     Latex, natural rubber Hives and Itching    Neomycin-bacitracnzn-polymyxnb     Polyurethane-39 Hives       Current Facility-Administered Medications on File Prior to Encounter   Medication    [COMPLETED] pegfilgrastim-cbqv (UDENYCA) injection 6 mg     Current Outpatient Medications on File Prior to Encounter   Medication Sig    benzonatate (TESSALON) 100 MG capsule Take 1 capsule (100 mg total) by mouth 3 (three) times daily as needed for Cough.    blood sugar diagnostic Strp TEST GLUCOSE BID    blood-glucose meter (TRUE METRIX GLUCOSE METER) kit TEST GLUCOSE BID    blood-glucose meter,continuous (DEXCOM G6 ) Misc TEST GLUCOSE QID    blood-glucose sensor (DEXCOM G6 SENSOR) Edwige TEST GLUCOSE QID    blood-glucose transmitter (DEXCOM G6 TRANSMITTER) Edwige TEST GLUCOSE QID    carisoprodoL (SOMA) 350 MG tablet Take 1 tablet (350 mg total) by mouth 3 (three) times daily as needed for Muscle spasms.    dexAMETHasone (DECADRON) 4 MG Tab TAKE ONE TABLET (4MG TOTAL) BY MOUTH TWICE DAILY WITH MEALS (Patient taking differently: Take 4 mg by mouth 2 (two) times daily with meals.)    diazePAM (VALIUM) 10 MG Tab Take 1 tablet (10 mg total) by mouth 4 (four) times daily as needed (anxiety).    diphenoxylate-atropine 2.5-0.025 mg (LOMOTIL) 2.5-0.025 mg per tablet Take 1 tablet by mouth 4 (four) times daily as needed for Diarrhea.    docusate sodium (COLACE) 100 MG capsule Take 100 mg by mouth 2 (two) times daily.    droNABinol (MARINOL) 5 MG capsule Take 1 capsule (5 mg total) by mouth 2 (two) times daily before  "meals.    dulaglutide (TRULICITY) 3 mg/0.5 mL pen injector Inject 3 mg into the skin every 7 days.    enalapril (VASOTEC) 5 MG tablet Take 1 tablet (5 mg total) by mouth once daily.    etodolac (LODINE) 400 MG tablet TAKE ONE TABLET (400 MG TOTAL) BY MOUTH ONCE DAILY (Patient taking differently: Take 400 mg by mouth once daily.)    fentaNYL (DURAGESIC) 25 mcg/hr Place 1 patch onto the skin every 72 hours.    fluconazole (DIFLUCAN) 150 MG Tab TAKE ONE TABLET BY MOUTH ONCE FOR 1 DOSE (Patient taking differently: Take 150 mg by mouth once.)    HYDROcodone-acetaminophen (NORCO)  mg per tablet Take 1 tablet by mouth every 6 (six) hours as needed for Pain.    insulin glargine (LANTUS U-100 INSULIN) 100 unit/mL injection Inject 32 Units into the skin 2 (two) times a day.    lancets 32 gauge Misc TEST GLUCOSE BID    metoprolol tartrate (LOPRESSOR) 25 MG tablet Take 1 tablet (25 mg total) by mouth 2 (two) times daily.    ondansetron (ZOFRAN-ODT) 8 MG TbDL Take 1 tablet (8 mg total) by mouth every 6 to 8 hours as needed (nausea). dissolve 1 TABLET ON THE TONGUE EVERY 6 TO 8 HOURS AS NEEDED FOR nausea    oxyCODONE (ROXICODONE) 15 MG Tab Take 1 tablet (15 mg total) by mouth every 4 to 6 hours as needed for Pain.    palbociclib 100 mg Tab Take 100 mg by mouth once daily. for 21 days, then take 7 days off. Total cycle length 28 days    pen needle, diabetic (BD ULTRA-FINE MARCE PEN NEEDLE) 32 gauge x 5/32" Ndle Test blood sugar twice daily    potassium chloride SA (K-DUR,KLOR-CON) 20 MEQ tablet Take 1 tablet (20 meq) by mouth 3 times a day until instructed further by Dr. Vick. (Patient taking differently: Take 20 mEq by mouth 3 (three) times daily. Take 1 tablet (20 meq) by mouth 3 times a day until instructed further by Dr. Vick.)    promethazine (PHENERGAN) 25 MG tablet TAKE ONE TABLET (25MG TOTAL) BY MOUTH EVERY 4 TO 6 HOURS AS NEEDED (Patient taking differently: Take 25 mg by mouth every 4 to 6 hours as needed.)    " "promethazine-codeine 6.25-10 mg/5 ml (PHENERGAN WITH CODEINE) 6.25-10 mg/5 mL syrup Take 5 mLs by mouth every 6 (six) hours as needed for Cough. AS NEEDED FOR COUGH    rosuvastatin (CRESTOR) 40 MG Tab Take 1 tablet (40 mg total) by mouth every evening. For cholesterol    TRUEPLUS PEN NEEDLE 31 gauge x 3/16" Ndle USE TO INJECT LANTUS TWICE DAILY    zolpidem (AMBIEN) 10 mg Tab TAKE ONE TABLET BY MOUTH EVERY NIGHT AT BEDTIME     Family History    None       Tobacco Use    Smoking status: Former     Current packs/day: 0.00     Average packs/day: 0.3 packs/day for 30.0 years (7.5 ttl pk-yrs)     Types: Cigarettes     Start date: 1985     Quit date: 2015     Years since quittin.9    Smokeless tobacco: Never   Substance and Sexual Activity    Alcohol use: Yes     Alcohol/week: 0.0 standard drinks of alcohol     Comment: RARELY    Drug use: Not Currently     Types: Marijuana     Comment: medical marijuana    Sexual activity: Not on file     Review of Systems   Unable to perform ROS: Mental status change     Objective:     Vital Signs (Most Recent):  Temp: 97.3 °F (36.3 °C) (23)  Pulse: 102 (23)  Resp: (!) 22 (23)  BP: (!) 99/56 (23)  SpO2: 100 % (23) Vital Signs (24h Range):  Temp:  [97.1 °F (36.2 °C)-97.3 °F (36.3 °C)] 97.3 °F (36.3 °C)  Pulse:  [] 102  Resp:  [18-37] 22  SpO2:  [94 %-100 %] 100 %  BP: ()/(38-70) 99/56     Weight: 78.5 kg (173 lb)  Body mass index is 29.7 kg/m².     Physical Exam  Vitals and nursing note reviewed.   Constitutional:       Appearance: She is well-developed. She is ill-appearing.      Comments: Alopecia   HENT:      Head: Normocephalic and atraumatic.      Right Ear: External ear normal.      Left Ear: External ear normal.      Nose: Nose normal.   Eyes:      Conjunctiva/sclera: Conjunctivae normal.      Pupils: Pupils are equal, round, and reactive to light.   Cardiovascular:      Rate and Rhythm: Regular " "rhythm. Tachycardia present.      Heart sounds: Normal heart sounds.   Pulmonary:      Effort: Pulmonary effort is normal.      Breath sounds: Normal breath sounds.      Comments: Decreased entry without adventitious sounds currently  Abdominal:      General: Bowel sounds are normal.      Palpations: Abdomen is soft.   Musculoskeletal:         General: Normal range of motion.      Cervical back: Normal range of motion and neck supple.   Skin:     General: Skin is warm and dry.      Capillary Refill: Capillary refill takes less than 2 seconds.   Neurological:      Mental Status: She is alert.      Comments: Oriented to self and place  Moves all extremities spontaneously against gravity  CN 2-12 grossly intact by inspection   Psychiatric:      Comments: Unable to assess              CRANIAL NERVES     CN III, IV, VI   Pupils are equal, round, and reactive to light.       Significant Labs: All pertinent labs within the past 24 hours have been reviewed.  ABGs:   Recent Labs   Lab 09/07/23  0251   PH 7.195*   PCO2 32.3*   HCO3 12.5*   POCSATURATED 77*   BE -16   PO2 51     Blood Culture: No results for input(s): "LABBLOO" in the last 48 hours.  CBC:   Recent Labs   Lab 09/06/23 2229   WBC 17.99*   HGB 9.3*   HCT 30.4*   *     CMP:   Recent Labs   Lab 09/06/23 2229   *   K 4.3   *   CO2 12*   *   BUN 32*   CREATININE 3.2*   CALCIUM 9.0   PROT 6.2   ALBUMIN 2.1*   BILITOT 2.0*   ALKPHOS 788*   AST 1,405*   *   ANIONGAP 11     Troponin:   Recent Labs   Lab 09/06/23 2229   TROPONINIHS 47.9*       Significant Imaging: I have reviewed all pertinent imaging results/findings within the past 24 hours.  "

## 2023-09-07 NOTE — NURSING
63 yr old female  very ill looking   Nursing using power port   Sacral     Sacral buttock  Clean area with chlorhexidine/ns. Pat dry. Apply Triad and place on an air flow pad daily

## 2023-09-07 NOTE — H&P
"[facility]  History & Physical    Patient Name: Negrita Castro  MRN: [id number]  Patient Class: IP- Inpatient  Admission Date: 9/6/2023  Attending Physician: Gideon Sierra MD  Primary Care Provider: Manuelito Shepard MD         Patient information was obtained from patient, spouse/SO, past medical records, ER records and ED MD.     Subjective:     Principal Problem:Severe sepsis    Chief Complaint:   Chief Complaint   Patient presents with    Chest Pain     Starting yesterday, that has gotten worse today. Left sided        HPI: 63 year old female with history of bilateral Breast Ca (on chemo currently), Mood disorder, Seizure disorder, DM 2, HTN  was brought to ED because of altered mentation, generalized malaise and body pain. She has not been urinating much, according to boyfriend at bedside. The patient is somewhat altered and is very poor historian currently. She denies any current chest discomfort or SOB. No abdominal pain, nor N/V. Has not had BM since 2 days. She does not know if she has had blood or pain with urination.    In ED: labs reviewed and noted below: leukocytosis with moderate macrocytic anemia; trivial hyponatremia, metabolic acidosis with new stage 5 renal dysfunction; mild hyperbilirubinemia with significant elevation of her transaminases; ammonia is normal; troponin is moderately elevated; with elevated lactate. VBG pH 7.19. CT Head: no acute intracranial path. CXR: bilateral fluffy opacities. EKG reviewed: sinus without acute segments. Unable to place Rosas in ED even with Coude. Bladder scan approximately 200 ml urine. In/out cath to collect urine was performed    CT Abdo/Pelvis:   "IMPRESSION:  1: Persistent evidence of diffuse metastatic disease to the liver and bone.  2: Several small nodular densities in the lower lungs suspicious for metastatic disease.  3: New small to moderate volume of ascites.  4: The pancreas appears prominent and increased " "in size over previous. Correlate clinically to exclude any acute pancreatitis.  5: Right mastectomy and reconstruction.  6: Rosas catheter may be partially inflated in the urethra or vagina. Correlate clinically to ensure it is working properly."     Discussed with ED MD: Inpatient admission to ICU for Severe Sepsis, and YIMI; Pan cultured; empiric antibiotics;  after bolus of NS, hydration with bicarb infusion at 125 ml/hr; hold nephrotoxins and hepatic toxins; Nephrology Consultation; Oncology consultations--possibly Urology in AM if still unable to place Rosas after copious hydration; pressors prn; serial troponin; lipase with AM labs for review      Past Medical History:   Diagnosis Date    Acute hypoxemic respiratory failure 12/12/2020    Breast cancer 10/26/2015    left breast    Cancer     RIGHT BREAST 10-15    Depression     Diabetes mellitus     Neuropathy     Panic attacks     S/P epidural steroid injection     Seizures     none since early 30's    Wears glasses     TO DRIVE       Past Surgical History:   Procedure Laterality Date    AXILLARY NODE DISSECTION Left 8/24/2020    Procedure: LYMPHADENECTOMY, AXILLARY;  Surgeon: Cory Vick MD;  Location: [facility] OR;  Service: General;  Laterality: Left;  left breast mastectomy with possible axillary lymph node dissection    BREAST BIOPSY      right    BREAST LUMPECTOMY      BREAST RECONSTRUCTION      breast reconstruction with tummy [facility]      BREAST SURGERY      11-3-15 LUMPECTOMY, 12-2-15 REEXCISION    INCISION AND DRAINAGE OF ABSCESS Left 9/9/2020    Procedure: INCISION AND DRAINAGE, ABSCESS;  Surgeon: Cory Vick MD;  Location: [facility] OR;  Service: General;  Laterality: Left;    INSERTION OF LOCALIZATION WIRE Left 8/24/2020    Procedure: INSERTION, LOCALIZATION WIRE;  Surgeon: Cory Vick MD;  Location: [facility] OR;  Service: General;  Laterality: Left;  left breast lumpectomy with wire needle loc    INSERTION OF TUNNELED " CENTRAL VENOUS CATHETER (CVC) WITH SUBCUTANEOUS PORT Right 11/17/2022    Procedure: [id number];  Surgeon: Jeff Mckeon Jr., MD;  Location: [facility] OR;  Service: General;  Laterality: Right;    MASTECTOMY      mastectomy 2016      MASTECTOMY, PARTIAL Left 3/19/2021    Procedure: MASTECTOMY, PARTIAL;  Surgeon: Cory Vick MD;  Location: [facility] OR;  Service: General;  Laterality: Left;  lumpectomy  left breast     RECONSTRUCTION OF NIPPLE Right 11/5/2018    Procedure: RECONSTRUCTION-NIPPLE RIGHT;  Surgeon: Xiang Hernandez MD;  Location: [facility] OR [facility];  Service: Plastics;  Laterality: Right;    SENTINEL LYMPH NODE BIOPSY Left 8/24/2020    Procedure: BIOPSY, LYMPH NODE, SENTINEL;  Surgeon: Cory Vick MD;  Location: [facility] OR;  Service: General;  Laterality: Left;  left breast lumpectomy with left sentinel lymoh node       shoulder surgery and wrist      BILAT  BONE SPUR    WRIST SURGERY      RIGHT       Review of patient's allergies indicates:   Allergen Reactions    Adhesive tape-silicones Hives     BANDAIDS    Polymycin Hives    Adhesive     Latex, natural rubber Hives and Itching    Neomycin-bacitracnzn-polymyxnb     Polyurethane-39 Hives       Current Facility-Administered Medications on File Prior to Encounter   Medication    [COMPLETED] pegfilgrastim-cbqv (UDENYCA) injection 6 mg     Current Outpatient Medications on File Prior to Encounter   Medication Sig    benzonatate (TESSALON) 100 MG capsule Take 1 capsule (100 mg total) by mouth 3 (three) times daily as needed for Cough.    blood sugar diagnostic Strp TEST GLUCOSE BID    blood-glucose meter (TRUE METRIX GLUCOSE METER) kit TEST GLUCOSE BID    blood-glucose meter,continuous (DEXCOM G6 ) Misc TEST GLUCOSE QID    blood-glucose sensor (DEXCOM G6 SENSOR) Edwige TEST GLUCOSE QID    blood-glucose transmitter (DEXCOM G6 TRANSMITTER) Edwige TEST GLUCOSE QID    carisoprodoL (SOMA) 350 MG tablet Take 1  tablet (350 mg total) by mouth 3 (three) times daily as needed for Muscle spasms.    dexAMETHasone (DECADRON) 4 MG Tab TAKE ONE TABLET (4MG TOTAL) BY MOUTH TWICE DAILY WITH MEALS (Patient taking differently: Take 4 mg by mouth 2 (two) times daily with meals.)    diazePAM (VALIUM) 10 MG Tab Take 1 tablet (10 mg total) by mouth 4 (four) times daily as needed (anxiety).    diphenoxylate-atropine 2.5-0.025 mg (LOMOTIL) 2.5-0.025 mg per tablet Take 1 tablet by mouth 4 (four) times daily as needed for Diarrhea.    docusate sodium (COLACE) 100 MG capsule Take 100 mg by mouth 2 (two) times daily.    droNABinol (MARINOL) 5 MG capsule Take 1 capsule (5 mg total) by mouth 2 (two) times daily before meals.    dulaglutide (TRULICITY) 3 mg/0.5 mL pen injector Inject 3 mg into the skin every 7 days.    enalapril (VASOTEC) 5 MG tablet Take 1 tablet (5 mg total) by mouth once daily.    etodolac (LODINE) 400 MG tablet TAKE ONE TABLET (400 MG TOTAL) BY MOUTH ONCE DAILY (Patient taking differently: Take 400 mg by mouth once daily.)    fentaNYL (DURAGESIC) 25 mcg/hr Place 1 patch onto the skin every 72 hours.    fluconazole (DIFLUCAN) 150 MG Tab TAKE ONE TABLET BY MOUTH ONCE FOR 1 DOSE (Patient taking differently: Take 150 mg by mouth once.)    HYDROcodone-acetaminophen (NORCO)  mg per tablet Take 1 tablet by mouth every 6 (six) hours as needed for Pain.    insulin glargine (LANTUS U-100 INSULIN) 100 unit/mL injection Inject 32 Units into the skin 2 (two) times a day.    lancets 32 gauge Misc TEST GLUCOSE BID    metoprolol tartrate (LOPRESSOR) 25 MG tablet Take 1 tablet (25 mg total) by mouth 2 (two) times daily.    ondansetron (ZOFRAN-ODT) 8 MG TbDL Take 1 tablet (8 mg total) by mouth every 6 to 8 hours as needed (nausea). dissolve 1 TABLET ON THE TONGUE EVERY 6 TO 8 HOURS AS NEEDED FOR nausea    oxyCODONE (ROXICODONE) 15 MG Tab Take 1 tablet (15 mg total) by mouth every 4 to 6 hours as needed for Pain.     "palbociclib 100 mg Tab Take 100 mg by mouth once daily. for 21 days, then take 7 days off. Total cycle length 28 days    pen needle, diabetic (BD ULTRA-FINE MARCE PEN NEEDLE) 32 gauge x 5/32" Ndle Test blood sugar twice daily    potassium chloride SA (K-DUR,KLOR-CON) 20 MEQ tablet Take 1 tablet (20 meq) by mouth 3 times a day until instructed further by Dr. Vick. (Patient taking differently: Take 20 mEq by mouth 3 (three) times daily. Take 1 tablet (20 meq) by mouth 3 times a day until instructed further by Dr. Vick.)    promethazine (PHENERGAN) 25 MG tablet TAKE ONE TABLET (25MG TOTAL) BY MOUTH EVERY 4 TO 6 HOURS AS NEEDED (Patient taking differently: Take 25 mg by mouth every 4 to 6 hours as needed.)    promethazine-codeine 6.25-10 mg/5 ml (PHENERGAN WITH CODEINE) 6.25-10 mg/5 mL syrup Take 5 mLs by mouth every 6 (six) hours as needed for Cough. AS NEEDED FOR COUGH    rosuvastatin (CRESTOR) 40 MG Tab Take 1 tablet (40 mg total) by mouth every evening. For cholesterol    TRUEPLUS PEN NEEDLE 31 gauge x 3/16" Ndle USE TO INJECT LANTUS TWICE DAILY    zolpidem (AMBIEN) 10 mg Tab TAKE ONE TABLET BY MOUTH EVERY NIGHT AT BEDTIME     Family History    None       Tobacco Use    Smoking status: Former     Current packs/day: 0.00     Average packs/day: 0.3 packs/day for 30.0 years (7.5 ttl pk-yrs)     Types: Cigarettes     Start date: 1985     Quit date: 2015     Years since quittin.9    Smokeless tobacco: Never   Substance and Sexual Activity    Alcohol use: Yes     Alcohol/week: 0.0 standard drinks of alcohol     Comment: RARELY    Drug use: Not Currently     Types: Marijuana     Comment: medical marijuana    Sexual activity: Not on file     Review of Systems   Unable to perform ROS: Mental status change     Objective:     Vital Signs (Most Recent):  Temp: 97.3 °F (36.3 °C) (23)  Pulse: 102 (23)  Resp: (!) 22 (23)  BP: (!) 99/56 (23 0251)  SpO2: 100 % " "(09/07/23 0251) Vital Signs (24h Range):  Temp:  [97.1 °F (36.2 °C)-97.3 °F (36.3 °C)] 97.3 °F (36.3 °C)  Pulse:  [] 102  Resp:  [18-37] 22  SpO2:  [94 %-100 %] 100 %  BP: ()/(38-70) 99/56     Weight: 78.5 kg (173 lb)  Body mass index is 29.7 kg/m².     Physical Exam  Vitals and nursing note reviewed.   Constitutional:       Appearance: She is well-developed. She is ill-appearing.      Comments: Alopecia   HENT:      Head: Normocephalic and atraumatic.      Right Ear: External ear normal.      Left Ear: External ear normal.      Nose: Nose normal.   Eyes:      Conjunctiva/sclera: Conjunctivae normal.      Pupils: Pupils are equal, round, and reactive to light.   Cardiovascular:      Rate and Rhythm: Regular rhythm. Tachycardia present.      Heart sounds: Normal heart sounds.   Pulmonary:      Effort: Pulmonary effort is normal.      Breath sounds: Normal breath sounds.      Comments: Decreased entry without adventitious sounds currently  Abdominal:      General: Bowel sounds are normal.      Palpations: Abdomen is soft.   Musculoskeletal:         General: Normal range of motion.      Cervical back: Normal range of motion and neck supple.   Skin:     General: Skin is warm and dry.      Capillary Refill: Capillary refill takes less than 2 seconds.   Neurological:      Mental Status: She is alert.      Comments: Oriented to self and place  Moves all extremities spontaneously against gravity  CN 2-12 grossly intact by inspection   Psychiatric:      Comments: Unable to assess              CRANIAL NERVES     CN III, IV, VI   Pupils are equal, round, and reactive to light.       Significant Labs: All pertinent labs within the past 24 hours have been reviewed.  ABGs:   Recent Labs   Lab 09/07/23 0251   PH 7.195*   PCO2 32.3*   HCO3 12.5*   POCSATURATED 77*   BE -16   PO2 51     Blood Culture: No results for input(s): "LABBLOO" in the last 48 hours.  CBC:   Recent Labs   Lab 09/06/23 2229   WBC 17.99*   HGB " 9.3*   HCT 30.4*   *     CMP:   Recent Labs   Lab 09/06/23 2229   *   K 4.3   *   CO2 12*   *   BUN 32*   CREATININE 3.2*   CALCIUM 9.0   PROT 6.2   ALBUMIN 2.1*   BILITOT 2.0*   ALKPHOS 788*   AST 1,405*   *   ANIONGAP 11     Troponin:   Recent Labs   Lab 09/06/23 2229   TROPONINIHS 47.9*       Significant Imaging: I have reviewed all pertinent imaging results/findings within the past 24 hours.    Assessment/Plan:     * Severe sepsis  This patient does have evidence of infective focus  My overall impression is septic shock due to lactate > 4 and MAP < 65.  Source: Urinary Tract  Antibiotics given-   Antibiotics (72h ago, onward)    Start     Stop Route Frequency Ordered    09/07/23 0900  mupirocin 2 % ointment         09/12/23 0859 Nasl 2 times daily 09/07/23 0206    09/07/23 0315  meropenem-0.9% sodium chloride 500 mg/50 mL IVPB         -- IV Every 8 hours (non-standard times) 09/07/23 0301    09/07/23 0030  vancomycin in dextrose 5 % 1 gram/250 mL IVPB 2,000 mg        Note to Pharmacy: Ht:    Wt: 78.5 kg (173 lb)  Estimated Creatinine Clearance: 18.7 mL/min (A) (based on SCr of 3.2 mg/dL (H)).   Body mass index is 29.7 kg/m².    -- IV Once 09/06/23 2325 09/07/23 0023  vancomycin - pharmacy to dose  (vancomycin IVPB (PEDS and ADULTS))        See Hyperspace for full Linked Orders Report.    -- IV pharmacy to manage frequency 09/06/23 2324        Latest lactate reviewed-  Recent Labs   Lab 09/06/23 2229   LACTATE 5.6*     Organ dysfunction indicated by Acute kidney injury    Fluid challenge Actual Body weight- Patient will receive 30ml/kg actual body weight to calculate fluid bolus for treatment of septic shock.     Post- resuscitation assessment Yes Perfusion exam was performed within 6 hours of septic shock presentation after bolus shows Adequate tissue perfusion assessed by non-invasive monitoring       Will  Pressors- Levophed for MAP of 65  Source control achieved by:  antibiotics    Inpatient admission to ICU for Severe Sepsis, and YIMI; Pan cultured; empiric antibiotics;  after bolus of NS, hydration with bicarb infusion at 125 ml/hr; hold nephrotoxins and hepatic toxins; Nephrology Consultation; Oncology consultations--possibly Urology in AM if still unable to place Rosas; pressors prn; serial troponin; lipase with AM labs for review    YIMI (acute kidney injury)  Patient with acute kidney injury/acute renal failure likely due to post-obstructive d/t unknown YIMI is currently worsening. Baseline creatinine 1.2 - Labs reviewed- Renal function/electrolytes with Estimated Creatinine Clearance: 18.7 mL/min (A) (based on SCr of 3.2 mg/dL (H)). according to latest data. Monitor urine output and serial BMP and adjust therapy as needed. Avoid nephrotoxins and renally dose meds for GFR listed above.    Inpatient admission to ICU for Severe Sepsis, and YIMI; Pan cultured; empiric antibiotics;  after bolus of NS, hydration with bicarb infusion at 125 ml/hr; hold nephrotoxins and hepatic toxins; Nephrology Consultation; Oncology consultations--possibly Urology in AM if still unable to place Rosas; pressors prn; serial troponin; lipase with AM labs for review    Breast cancer, right  Inpatient admission to ICU for Severe Sepsis, and YIMI; Pan cultured; empiric antibiotics;  after bolus of NS, hydration with bicarb infusion at 125 ml/hr; hold nephrotoxins and hepatic toxins; Nephrology Consultation; Oncology consultations--possibly Urology in AM if still unable to place Rosas; pressors prn; serial troponin; lipase with AM labs for review        VTE Risk Mitigation (From admission, onward)         Ordered     enoxaparin injection 30 mg  Every 24 hours         09/07/23 0309     IP VTE HIGH RISK PATIENT  Once         09/07/23 0301     Place sequential compression device  Until discontinued         09/07/23 0301     Place sequential compression device  Until discontinued         09/07/23 0301               Critical care time spent on the evaluation and treatment of severe organ dysfunction, review of pertinent labs and imaging studies, discussions with consulting providers and discussions with patient/family: 45 minutes.             Gideon Sierra MD  Department of Hospital Medicine  [facility]

## 2023-09-07 NOTE — CONSULTS
63-year-old female with bony metastasis secondary to breast cancer  Patient currently in the ICU with renal failure  In metabolic acidosis    Nursing staff has had trouble passing a urethral Rosas catheter  Emergency consult for Rosas catheter placement    Upon initial inspection the patient has urethral stenosis  I dilated her urethra with female sounds from 10 Jamaican to 20 Jamaican  This permitted placement of a 16 Jamaican Rosas catheter  Easily and without any complication  I irrigated the catheter to ensure proper position    Only approximately 70 cc of urine was recovered from the bladder  Please reconsult if necessary

## 2023-09-07 NOTE — ASSESSMENT & PLAN NOTE
Inpatient admission to ICU for Severe Sepsis, and YIMI; Pan cultured; empiric antibiotics;  after bolus of NS, hydration with bicarb infusion at 125 ml/hr; hold nephrotoxins and hepatic toxins; Nephrology Consultation; Oncology consultations--possibly Urology in AM if still unable to place Rosas; pressors prn; serial troponin; lipase with AM labs for review

## 2023-09-07 NOTE — CONSULTS
[facility]  Nephrology  Consult Note    Patient Name: Negrita Castro  MRN: [id number]  Admission Date: 9/6/2023  Hospital Length of Stay: 0 days  Attending Provider: Xiang Guzman,Uzair   Primary Care Physician: Manuelito Shepard MD  Principal Problem:Severe sepsis    Inpatient consult to Nephrology  Consult performed by: Sherry Stuart FNP  Consult ordered by: Gideon Sierra MD  Reason for consult: YIMI        Subjective:     HPI:   Negrita Castro is a 63 year old   female with a past medical history significant for breast cancer with metastasis to liver and bones (on chemotherapy-->recent administration of TRODELVY (sacituzumab govitecan-hziy) 9/5/2023 ), COPD, type 2 diabetes mellitus, hypertension, and hyperlipidemia.    She presented to [facility] with altered mentation, generalized malaise and body pain.   Upon arrival to the emergency department she was noted to by hypotensive with an initial BP reading of 71/48. Laboratory evaluation on arrival revealed and acute kidney injury, severe metabolic acidosis, elevated lactic acid, leukocytosis, thrombocytopenia, macrocytic anemia, transaminitis, elevated lipase >4000.     Cultures have been obtained, antibiotic initiated and IVFs initiated. Nephrology has been consulted for evaluation and management of YIMI.        Past Medical History:   Diagnosis Date    Acute hypoxemic respiratory failure 12/12/2020    Breast cancer 10/26/2015    left breast    Cancer     RIGHT BREAST 10-15    Depression     Diabetes mellitus     Neuropathy     Panic attacks     S/P epidural steroid injection     Seizures     none since early 30's    Wears glasses     TO DRIVE       Past Surgical History:   Procedure Laterality Date    AXILLARY NODE DISSECTION Left 8/24/2020    Procedure: LYMPHADENECTOMY, AXILLARY;  Surgeon: Cory Vick MD;  Location: [facility];  Service: General;  Laterality: Left;  left breast mastectomy with possible axillary lymph node  dissection    BREAST BIOPSY      right    BREAST LUMPECTOMY      BREAST RECONSTRUCTION      breast reconstruction with tummy [facility]      BREAST SURGERY      11-3-15 LUMPECTOMY, 12-2-15 REEXCISION    INCISION AND DRAINAGE OF ABSCESS Left 9/9/2020    Procedure: INCISION AND DRAINAGE, ABSCESS;  Surgeon: Cory Vick MD;  Location: [facility] OR;  Service: General;  Laterality: Left;    INSERTION OF LOCALIZATION WIRE Left 8/24/2020    Procedure: INSERTION, LOCALIZATION WIRE;  Surgeon: Cory Vick MD;  Location: [facility] OR;  Service: General;  Laterality: Left;  left breast lumpectomy with wire needle loc    INSERTION OF TUNNELED CENTRAL VENOUS CATHETER (CVC) WITH SUBCUTANEOUS PORT Right 11/17/2022    Procedure: [id number];  Surgeon: Jeff Mckeon Jr., MD;  Location: [facility] OR;  Service: General;  Laterality: Right;    MASTECTOMY      mastectomy 2016      MASTECTOMY, PARTIAL Left 3/19/2021    Procedure: MASTECTOMY, PARTIAL;  Surgeon: Cory Vick MD;  Location: [facility] OR;  Service: General;  Laterality: Left;  lumpectomy  left breast     RECONSTRUCTION OF NIPPLE Right 11/5/2018    Procedure: RECONSTRUCTION-NIPPLE RIGHT;  Surgeon: Xiang Hernandez MD;  Location: [address];  Service: Plastics;  Laterality: Right;    SENTINEL LYMPH NODE BIOPSY Left 8/24/2020    Procedure: BIOPSY, LYMPH NODE, SENTINEL;  Surgeon: Cory Vick MD;  Location: [facility];  Service: General;  Laterality: Left;  left breast lumpectomy with left sentinel lymoh node       shoulder surgery and wrist      BILAT  BONE SPUR    WRIST SURGERY      RIGHT       Review of patient's allergies indicates:   Allergen Reactions    Adhesive tape-silicones Hives     BANDAIDS    Polymycin Hives    Adhesive     Latex, natural rubber Hives and Itching    Neomycin-bacitracnzn-polymyxnb     Polyurethane-39 Hives     Current Facility-Administered Medications   Medication Frequency    acetaminophen tablet 650 mg Q4H PRN     bisacodyL suppository 10 mg Daily PRN    cloNIDine tablet 0.1 mg Q8H PRN    dexAMETHasone tablet 4 mg BID WM    dextrose 50% injection 12.5 g PRN    dextrose 50% injection 25 g PRN    droNABinol capsule 5 mg BID AC    enoxaparin injection 30 mg Q24H (prophylaxis, 1700)    famotidine tablet 20 mg Daily    fentaNYL 25 mcg/hr 1 patch Q72H    glucagon (human recombinant) injection 1 mg PRN    glucose chewable tablet 16 g PRN    glucose chewable tablet 24 g PRN    insulin aspart U-100 pen 0-5 Units QID (AC + HS) PRN    melatonin tablet 6 mg Nightly PRN    meropenem 500 mg in sodium chloride 0.9 % 100 mL IVPB (ready to mix system) Q12H    mupirocin 2 % ointment BID    NORepinephrine 4 mg in dextrose 5% 250 mL infusion (premix) Continuous    NORepinephrine bitartrate-D5W 4 mg/250 mL (16 mcg/mL) infusion Soln     ondansetron injection 4 mg Q8H PRN    oxyCODONE immediate release tablet 15 mg Q4H PRN    sodium bicarbonate 150 mEq in dextrose 5 % (D5W) 1,150 mL infusion Continuous    sodium chloride 0.9% bolus 2,355 mL 2,355 mL Once    vancomycin - pharmacy to dose pharmacy to manage frequency     Family History    None       Tobacco Use    Smoking status: Former     Current packs/day: 0.00     Average packs/day: 0.3 packs/day for 30.0 years (7.5 ttl pk-yrs)     Types: Cigarettes     Start date: 1985     Quit date: 2015     Years since quittin.9    Smokeless tobacco: Never   Substance and Sexual Activity    Alcohol use: Yes     Alcohol/week: 0.0 standard drinks of alcohol     Comment: RARELY    Drug use: Not Currently     Types: Marijuana     Comment: medical marijuana    Sexual activity: Not on file     Review of Systems   Constitutional:  Positive for fatigue.   Respiratory:  Negative for shortness of breath.    Cardiovascular:  Negative for chest pain and leg swelling.   Gastrointestinal:  Positive for abdominal distention, abdominal pain, constipation and nausea.   Musculoskeletal:  Positive for myalgias.    Neurological:  Positive for weakness.     Objective:     Vital Signs (Most Recent):  Temp: 97.2 °F (36.2 °C) (09/07/23 0701)  Pulse: 96 (09/07/23 0815)  Resp: (!) 26 (09/07/23 0916)  BP: (!) 98/55 (09/07/23 0815)  SpO2: 99 % (09/07/23 0815) Vital Signs (24h Range):  Temp:  [96.7 °F (35.9 °C)-97.3 °F (36.3 °C)] 97.2 °F (36.2 °C)  Pulse:  [] 96  Resp:  [18-37] 26  SpO2:  [94 %-100 %] 99 %  BP: ()/(38-70) 98/55     Weight: 71.9 kg (158 lb 8.2 oz) (09/07/23 0306)  Body mass index is 27.21 kg/m².  Body surface area is 1.8 meters squared.    I/O last 3 completed shifts:  In: 2900 [I.V.:2300; IV Piggyback:600]  Out: -     Physical Exam  Vitals and nursing note reviewed.   Constitutional:       General: She is not in acute distress.     Appearance: She is ill-appearing and toxic-appearing. She is not diaphoretic.   HENT:      Mouth/Throat:      Mouth: Mucous membranes are dry.   Eyes:      Pupils: Pupils are equal, round, and reactive to light.   Cardiovascular:      Rate and Rhythm: Regular rhythm. Tachycardia present.      Pulses: Normal pulses.      Heart sounds: Normal heart sounds.      No friction rub. No gallop.   Pulmonary:      Effort: No respiratory distress.   Abdominal:      General: Abdomen is protuberant. Bowel sounds are decreased. There is distension.      Tenderness: There is abdominal tenderness. There is no guarding or rebound.   Musculoskeletal:      Right lower leg: No edema.      Left lower leg: No edema.   Skin:     General: Skin is warm and dry.      Capillary Refill: Capillary refill takes less than 2 seconds.      Findings: No bruising, erythema, lesion or rash.   Neurological:      Mental Status: She is alert. Mental status is at baseline.      Motor: Weakness present.         Significant Labs:  ABGs:   Recent Labs   Lab 09/07/23 0829   PH 7.273*   PCO2 28.6*   HCO3 13.2*   POCSATURATED 99   BE -14     BMP:   Recent Labs   Lab 09/06/23  2229 09/07/23  0323   * 237*  237*  "  * 136  136   K 4.3 4.2  4.2   * 115*  115*   CO2 12* 14*  14*   BUN 32* 34*  34*   CREATININE 3.2* 3.1*  3.1*   CALCIUM 9.0 8.1*  8.1*   MG 2.8*  --      Cardiac Markers: No results for input(s): "CKMB", "TROPONINT", "MYOGLOBIN" in the last 168 hours.  CBC:   Recent Labs   Lab 09/07/23 0323 09/07/23 0829   WBC 16.81*  --    RBC 2.58*  --    HGB 8.5*  --    HCT 28.0* 26*   PLT 86*  --    *  --    MCH 32.9*  --    MCHC 30.4*  --      CMP:   Recent Labs   Lab 09/07/23 0323   *  237*   CALCIUM 8.1*  8.1*   ALBUMIN 1.8*  1.8*   PROT 5.3*  5.3*     136   K 4.2  4.2   CO2 14*  14*   *  115*   BUN 34*  34*   CREATININE 3.1*  3.1*   ALKPHOS 675*  675*   *  185*   AST 1,096*  1,096*   BILITOT 1.9*  1.9*     Coagulation: No results for input(s): "PT", "INR", "APTT" in the last 168 hours.  LFTs:   Recent Labs   Lab 09/07/23 0323   *  185*   AST 1,096*  1,096*   ALKPHOS 675*  675*   BILITOT 1.9*  1.9*   PROT 5.3*  5.3*   ALBUMIN 1.8*  1.8*     Microbiology Results (last 7 days)       Procedure Component Value Units Date/Time    Blood culture #2 **CANNOT BE ORDERED STAT** [[id number]] Collected: 09/06/23 2235    Order Status: Completed Specimen: Blood Updated: 09/07/23 0558     Blood Culture, Routine No Growth to date    Narrative:      Collection has been rescheduled by GAYE at 09/06/2023 22:14 Reason:   Was not able to get the second set. Luis gonzales RN said he will try to   get the second set.  Collection has been rescheduled by GAYE at 09/06/2023 22:14 Reason:   Was not able to get the second set. Luis gonzales,RN said he will try to   get the second set.    Blood culture #1 **CANNOT BE ORDERED STAT** [[id number]] Collected: 09/06/23 2213    Order Status: Completed Specimen: Blood from Peripheral, Left Hand Updated: 09/07/23 0517     Blood Culture, Routine No Growth to date    Blood culture [[id number]]     Order Status: Canceled Specimen: Blood  " "   Blood culture [[id number]]     Order Status: Canceled Specimen: Blood     Culture, Respiratory with Gram Stain [[id number]]     Order Status: No result Specimen: Respiratory from Sputum, Expectorated           Specimen (24h ago, onward)      None          PTH: No results for input(s): "PTH" in the last 168 hours.  TSH: No results for input(s): "TSH" in the last 168 hours.  No results for input(s): "COLORU", "CLARITYU", "SPECGRAV", "PHUR", "PROTEINUA", "GLUCOSEU", "BILIRUBINCON", "BLOODU", "WBCU", "RBCU", "BACTERIA", "MUCUS", "NITRITE", "LEUKOCYTESUR", "UROBILINOGEN", "HYALINECASTS" in the last 168 hours.    Significant Imaging:  Pertinent labs and imaging reviewed.     Assessment/Plan:     Anuric Acute Kidney Injury III  -baseline Cr 0.9-1.4 over the last several months  -presented with a Cr 3.2 mg/dl prompting nephrology consultation  -YIMI thought likely multifactorial and r/t hemodynamics (hypotension w/ systolic in 70s on arrival), IVVD. Recent chemotherapy administration could be contributing.   -obtain UA and urine eos  -obtain CPK, Uric acid  -CT imaging reviewed noting malpositioned devi catheter--> urology has now placed devi catheter. No hydronephrosis noted.   -minimal UOP at this time  -maintain devi cathter for now  -strict and accurate I&O  -avoid nephrotoxic agents  -no acute need for RRT services at this time. Will need to monitor volume status closely, if remains anuric will need to discuss options going forward.     Mixed Acid Base   -primary metabolic acidosis, compensatory respiratory alkalosis, with additional non-gap metabolic acidosis  -in the setting of acute renal failure and lactic acidosis  -agree with bicarb gtt at this time  -f/u urine anion gap    Sepsis  Hypotension  -improved with fluid resuscitation  -cultures pending  -abx per critical care/primary service    Macrocytic Anemia   Thrombycytopenia  -in the setting of chemotherapy  -Hgb 8.5 g/dl ()  -f/u B12 and folate " levels  -defer iron studies in the setting of sepsis  -f/u LDH and retic count    Renal MBD Evaluation  -calcium corrects for severe hypoalbuminemia to 8.7 mg/dl  -ionized calcium 1.05--> replace for iCa < 0.8  -f/u PO4  -Vit D 7 ng/ml (7/29/23)--> recommend vit d replacement  -f/u pre-albumin    Transaminitis  Elevated Lipase  Pancreatitis  -lipase >4000  -continue volume resuscitation  -managed per primary service      RHINA Moore  Nephrology  [facility]       Patient condition and plan of care discussed and formulated with Dr. Floyd Torres. See above assessment and plan.

## 2023-09-08 NOTE — SIGNIFICANT EVENT
Called to bedside due to worsening hypotension and lactic acidosis.  Pt admitted with severe sepsis, now progressing to septic shock, in setting of metastatic breast cancer with recent chemotherapy.  She also is admitted with pancreatitis and renal failure.  She is becoming SOB with fluid resuscitation.      I discussed pt's case with the pt and her family at bedside, including goals of care and possible plans for going forward--and they have decided that they would like to proceed with comfort care at this time.      Pt is confirmed as DNI/DNR.    Comfort measures ordered now.   consulted for hospice services in AM.    Adrienne Lee MD  [facility] Hospitalist

## 2023-09-08 NOTE — NURSING
Notified Dr. Lee of pt BP 86/61 and lactic rising. Updated her on pt condition. Dr. Lee coming up to pt bedside for assessment and to speak with family.

## 2023-09-08 NOTE — CONSULTS
Hematology/Oncology  Inpatient Consult Note  9/7/23  Patient Name: Negrita Castro  MRN: [id number]  Admission Date: 9/6/2023  Hospital Length of Stay: 0 days  Code Status: DNR   Attending Provider: Xiang Guzman,*  Referring Provider: Self, Aaareferral  Consulting Provider: TALISHA Vick MD  Primary Care Physician: Manuelito Shepard MD  Principal Problem:Severe sepsis    Inpatient consult to Hematology/Oncology  Consult performed by: TALISHA Vick MD  Consult ordered by: Gideon Sierra MD Dale LeBlanc, MD 9/7/23  Consult  Subjective:     Chief Complaint: Chest Pain (Starting yesterday, that has gotten worse today. Left sided)        History Present Illness:  63 y.o. female presented to ER with SOB, abdominal and back pain sx over weekend.  Hypotensive, suspected septic shock 2nd  Pneumonia or UTI.  Pancreatitis and marked elevation of LFT's.  Lactic acidosis.  Acute renal failure.    Mentation comes and goes.  BP 90 systolic, after liters of IVF, did not require pressor agents.    Bilateral breast cancer hx.  Liver and bone metastases and lymphadenopathy.  Currently on Trodelvy.    I met with her brother and sisters.  Discussed by telephone with her next of kin,  Ganga Brown.  Discussed DNR concept and her critical state with multi organ dysfunction.  They are in agreement for DNR,  I feel this is appropriate for her, given her metastatic cancer condition, heavily treated over last 8 years, and ARF, liver dysfunction and pancreatitis.      Her nurse confirmed DNR with her next of kin,Ganga Kevin by telephone.  I have ordered DNR for Mrs. Castro.      Past Medical/Surgical History:  Past Medical History:   Diagnosis Date    Acute hypoxemic respiratory failure 12/12/2020    Breast cancer 10/26/2015    left breast    Cancer     RIGHT BREAST 10-15    Depression     Diabetes mellitus     Neuropathy     Panic attacks     S/P epidural steroid injection     Seizures     none since early  30's    Wears glasses     TO DRIVE     Past Surgical History:   Procedure Laterality Date    AXILLARY NODE DISSECTION Left 8/24/2020    Procedure: LYMPHADENECTOMY, AXILLARY;  Surgeon: Cory Vick MD;  Location: [facility] OR;  Service: General;  Laterality: Left;  left breast mastectomy with possible axillary lymph node dissection    BREAST BIOPSY      right    BREAST LUMPECTOMY      BREAST RECONSTRUCTION      breast reconstruction with tummy tuck      BREAST SURGERY      11-3-15 LUMPECTOMY, 12-2-15 REEXCISION    INCISION AND DRAINAGE OF ABSCESS Left 9/9/2020    Procedure: INCISION AND DRAINAGE, ABSCESS;  Surgeon: Cory Vick MD;  Location: [facility] OR;  Service: General;  Laterality: Left;    INSERTION OF LOCALIZATION WIRE Left 8/24/2020    Procedure: INSERTION, LOCALIZATION WIRE;  Surgeon: Cory Vick MD;  Location: [facility] OR;  Service: General;  Laterality: Left;  left breast lumpectomy with wire needle loc    INSERTION OF TUNNELED CENTRAL VENOUS CATHETER (CVC) WITH SUBCUTANEOUS PORT Right 11/17/2022    Procedure: [id number];  Surgeon: Jeff Mckeon Jr., MD;  Location: [facility] OR;  Service: General;  Laterality: Right;    MASTECTOMY      mastectomy 2016      MASTECTOMY, PARTIAL Left 3/19/2021    Procedure: MASTECTOMY, PARTIAL;  Surgeon: oCry Vick MD;  Location: [facility] OR;  Service: General;  Laterality: Left;  lumpectomy  left breast     RECONSTRUCTION OF NIPPLE Right 11/5/2018    Procedure: RECONSTRUCTION-NIPPLE RIGHT;  Surgeon: Xiang Hernandez MD;  Location: [address];  Service: Plastics;  Laterality: Right;    SENTINEL LYMPH NODE BIOPSY Left 8/24/2020    Procedure: BIOPSY, LYMPH NODE, SENTINEL;  Surgeon: Cory Vick MD;  Location: [facility] OR;  Service: General;  Laterality: Left;  left breast lumpectomy with left sentinel lymoh node       shoulder surgery and wrist      BILAT  BONE SPUR    WRIST SURGERY      RIGHT       Allergies:  Review of patient's  "allergies indicates:   Allergen Reactions    Adhesive tape-silicones Hives     BANDAIDS    Polymycin Hives    Adhesive     Latex, natural rubber Hives and Itching    Neomycin-bacitracnzn-polymyxnb     Polyurethane-39 Hives       Social/Family History:  Social History     Socioeconomic History    Marital status:    Tobacco Use    Smoking status: Former     Current packs/day: 0.00     Average packs/day: 0.3 packs/day for 30.0 years (7.5 ttl pk-yrs)     Types: Cigarettes     Start date: 1985     Quit date: 2015     Years since quittin.9    Smokeless tobacco: Never   Substance and Sexual Activity    Alcohol use: Yes     Alcohol/week: 0.0 standard drinks of alcohol     Comment: RARELY    Drug use: Not Currently     Types: Marijuana     Comment: medical marijuana     Family History   Problem Relation Age of Onset    Anesthesia problems Neg Hx        ROS:    GEN: normal without any fever, night sweats or weight loss  HEENT: normal with no HA's, sore throat, stiff neck, changes in vision  CV: normal with no CP, SOB, PND, POOL or orthopnea  PULM: See HPI  GI: See HPI  : See HPI  BREAST:See HPI  SKIN: normal with no rash, erythema, bruising, or swelling        Medications:  Continuous Infusions:  Scheduled Meds:   [START ON 2023] famotidine (PF)  20 mg Intravenous Daily     PRN Meds:acetaminophen, HYDROmorphone, lorazepam, morphine, ondansetron, promethazine (PHENERGAN) 6.25 mg in dextrose 5 % (D5W) 50 mL IVPB     Objective:     Vitals:  Blood pressure (!) 76/52, pulse (!) 112, temperature 97.6 °F (36.4 °C), temperature source Axillary, resp. rate (!) 26, height 5' 4" (1.626 m), weight 71.9 kg (158 lb 8.2 oz), last menstrual period 2016, SpO2 96 %.    Physical Exam:  GEN: appears acutely ill, exhausted  HEAD: atraumatic and normocephalic  EYES: no pallor, no icterus  ENT: external ears WNL; no nasal discharge  NECK: no masses, thyroid normal, trachea midline, no LAD/LN's, supple  CV: RRR with " no murmur; normal pulse; normal S1 and S2; 2+ pedal edema  CHEST: Normal respiratory effort; CTAB; normal breath sounds; no wheeze or crackles  ABDOM: large with palpable ascites, tender directly, no rebound.  L/S NP  MUSC/Skeletal: ROM normal; no crepitus; joints normal  EXTREM: no clubbing, cyanosis, inflammation, there is 2+ edema  SKIN: no rashes, lesions, ulcers, petechiae   : devi with little urine out put  NEURO: grossly intact; motor/sensory WNL;  no tremors  PSYCH: talks, in and out of it  LYMPH: normal cervical, supraclavicular LN's    Lactic acid 4.7, GFR 15, ph 7.273,   lipase 4000, Hgb 8.5, WBC 16,000,   plt cnt 86,000., Calcium 8.1, , 6.9.  LFT's markedly increased.   Creatinine 3.3.    Blood C/S negative.    CXR MARÍA infiltrate? RLL infiltrate or atelectasis, bone metastases.    CT head negative    CT abd/pelvis ascites noted, liver metastases, large pancreas.    Assessment/Plan:     (1) 63 y.o. female with bilateral breast cancer,  Stage 4 disease with liver and bone metastases, lymphadenopathy.  Currently on Trodelvy.    (2)Acute renal failure, oliguric.    (3)pancreatitis, increased LFT's, ascites.    (4)Metabolic acidosis, lactic acidosis.    (5)Suspected sepsis with pneumonia or UTI.    (6)Pancytopenia    On antibiotics, C/S pending.  IVF given  Multi organ dysfunction, recovery is hard to say, discussed and agree with DNR, order for DNR on chart.    Will check on her tomorrow.      Active Diagnoses:    Diagnosis Date Noted POA    PRINCIPAL PROBLEM:  Severe sepsis [A41.9, R65.20] 07/24/2023 Yes    Breast cancer, right [C50.911] 06/01/2017 Yes    YIMI (acute kidney injury) [N17.9] 06/18/2023 Yes      Problems Resolved During this Admission:       TALISHA Vick MD  Hematology/Oncology  [facility]   9/7/23

## 2023-09-08 NOTE — SIGNIFICANT EVENT
Called to bedside due to report that pt had passed away.  Pt transitioned earlier this evening to comfort care.  (Pt admitted with severe sepsis progressing to septic shock, in setting of metastatic breast cancer with recent chemotherapy.  She also is admitted with pancreatitis and renal failure.)    Pt's ID confirmed.  Family at bedside; condolences offered.  Pupils fixed and dilated.  No response to tactile stimuli.  No spontaneous respiration.  No heart sounds.  No pulses.    Time of death:  9/8/2023 at 2:21 AM.    Adrienne Lee MD  [facility] Hospitalist    DISCHARGE SUMMARY and DEATH CERTIFICATE to be completed by Dr Xiang Guzman.

## 2023-09-08 NOTE — PROGRESS NOTES
Pharmacy Consult Discontinuation: Vancomycin    Negrita Castro [id number] is a 63 y.o. female was consulted for vancomycin pharmacotherapy management by pharmacy.    Pharmacy consult for vancomycin dosing is no longer required.  Vancomycin was discontinued 09/07/2023 @ 2039.    Thank you for allowing us to participate in this patient's care. Should you have any questions or concerns please feel free to contact the pharmacy department at [phone].    Hussein Montoya, PharmD

## 2023-09-08 NOTE — CARE UPDATE
09/07/23 220   Patient Assessment/Suction   Level of Consciousness (AVPU) responds to voice   Expansion/Accessory Muscles/Retractions expansion symmetric   All Lung Fields Breath Sounds diminished   Rhythm/Pattern, Respiratory shallow   Cough Frequency no cough   PRE-TX-O2   Device (Oxygen Therapy) nasal cannula   Flow (L/min) 6   SpO2 99 %   Pulse Oximetry Type Continuous   $ Pulse Oximetry - Multiple Charge Pulse Oximetry - Multiple   Pulse 109   Resp (!) 31   Positioning   Head of Bed (HOB) Positioning HOB elevated;HOB at 30-45 degrees   Respiratory Evaluation   $ Care Plan Tech Time 15 min   $ Eval/Re-eval Charges Re-evaluation       
Negative

## 2023-09-12 ENCOUNTER — DOCUMENT SCAN (OUTPATIENT)
Dept: HOME HEALTH SERVICES | Facility: HOSPITAL | Age: 63
End: 2023-09-12
Payer: MEDICARE

## 2023-09-12 LAB
BACTERIA BLD CULT: NORMAL
BACTERIA BLD CULT: NORMAL

## 2023-09-14 ENCOUNTER — DOCUMENT SCAN (OUTPATIENT)
Dept: HOME HEALTH SERVICES | Facility: HOSPITAL | Age: 63
End: 2023-09-14
Payer: MEDICARE

## 2023-09-18 ENCOUNTER — DOCUMENT SCAN (OUTPATIENT)
Dept: HOME HEALTH SERVICES | Facility: HOSPITAL | Age: 63
End: 2023-09-18
Payer: MEDICARE

## 2024-04-29 NOTE — H&P
[facility] Medicine  History & Physical    Patient Name: Negrita Castro  MRN: [id number]  Admission Date: 12/11/2020  Attending Physician: Katt Richter MD  Primary Care Provider: Yoni Renteria MD         Patient information was obtained from patient, past medical records and ER records.     Subjective:     Principal Problem:COVID-19 virus infection    Chief Complaint:   Chief Complaint   Patient presents with    Shortness of Breath     + covid, worsening last few days, pt states she started having SOB last few days.         HPI: Miss Castro is a 60 y.o. female IDDM, prior right breast cancer and recent diagnosis of left breast CA about to start chemotherapy, known COVID-19-positive 12/1/20 presenting with SOB.  Patient reports loss of sense of smell at our initial system and denied any respiratory complaints up until about 3 days ago.  Since then she reports worsening shortness of breath.  Also reports some loose stools and fatigue.  She is requiring 15 L nasal cannula in the ED after being 81% on room air.  She is going to be admitted to the ICU on high-flow nasal cannula.  Full code status confirmed on admission    Past Medical History:   Diagnosis Date    Breast cancer     left breast    Cancer     RIGHT BREAST 10-15    Depression     Diabetes mellitus     Neuropathy     Panic attacks     S/P epidural steroid injection     Seizures     none since early 30's    Wears glasses     TO DRIVE       Past Surgical History:   Procedure Laterality Date    AXILLARY NODE DISSECTION Left 8/24/2020    Procedure: LYMPHADENECTOMY, AXILLARY;  Surgeon: Cory Vick MD;  Location: [facility];  Service: General;  Laterality: Left;  left breast mastectomy with possible axillary lymph node dissection    BREAST BIOPSY      right    breast reconstruction with tummy tuck      BREAST SURGERY      11-3-15 LUMPECTOMY, 12-2-15 REEXCISION    INCISION AND DRAINAGE OF ABSCESS Left 9/9/2020     Procedure: INCISION AND DRAINAGE, ABSCESS;  Surgeon: Cory Vick MD;  Location: [facility] OR;  Service: General;  Laterality: Left;    INSERTION OF LOCALIZATION WIRE Left 8/24/2020    Procedure: INSERTION, LOCALIZATION WIRE;  Surgeon: Cory Vick MD;  Location: [facility] OR;  Service: General;  Laterality: Left;  left breast lumpectomy with wire needle loc    mastectomy 2016      RECONSTRUCTION OF NIPPLE Right 11/5/2018    Procedure: RECONSTRUCTION-NIPPLE RIGHT;  Surgeon: Xiang Hernandez MD;  [facility];  Service: Plastics;  Laterality: Right;    SENTINEL LYMPH NODE BIOPSY Left 8/24/2020    Procedure: BIOPSY, LYMPH NODE, SENTINEL;  Surgeon: Cory Vick MD;  Location: [facility] OR;  Service: General;  Laterality: Left;  left breast lumpectomy with left sentinel lymoh node       shoulder surgery and wrist      BILAT  BONE SPUR    WRIST SURGERY      RIGHT       Review of patient's allergies indicates:   Allergen Reactions    Adhesive tape-silicones Hives     BANDAIDS    Polymycin Hives    Adhesive     Latex, natural rubber Hives and Itching    Neomycin-bacitracnzn-polymyxnb     Polyurethane-39 Hives       No current facility-administered medications on file prior to encounter.      Current Outpatient Medications on File Prior to Encounter   Medication Sig    carisoprodol (SOMA) 350 MG tablet Take 1 tablet (350 mg total) by mouth 3 (three) times daily as needed. (Patient taking differently: Take 350 mg by mouth 3 (three) times daily as needed for Muscle spasms. )    diazepam (VALIUM) 10 MG tablet Take 10 mg by mouth 4 (four) times daily as needed (anxiety).     dronabinol (MARINOL) 2.5 MG capsule Take 1 capsule (2.5 mg total) by mouth 2 (two) times daily before meals.    dulaglutide (TRULICITY) 0.75 mg/0.5 mL PnIj Inject 0.75 mg into the skin once a week. Wed    enalapril (VASOTEC) 5 MG tablet Take 5 mg by mouth once daily.     furosemide (LASIX) 20 MG tablet TAKE ONE  TABLET BY MOUTH EVERY DAY (Patient taking differently: Take 20 mg by mouth once daily. )    HYDROcodone-acetaminophen (NORCO)  mg per tablet Take 1 tablet by mouth every 6 (six) hours as needed for Pain.     insulin glargine (LANTUS U-100 INSULIN) 100 unit/mL injection Inject 35 Units into the skin every evening.    metformin (GLUCOPHAGE-XR) 500 MG 24 hr tablet Take 2,000 mg by mouth every evening.     ondansetron (ZOFRAN-ODT) 8 MG TbDL Take 1 tablet (8 mg total) by mouth every 8 (eight) hours as needed. (Patient taking differently: Take 8 mg by mouth every 8 (eight) hours as needed (nausea). )    pravastatin (PRAVACHOL) 40 MG tablet Take 40 mg by mouth every morning.     promethazine (PHENERGAN) 25 MG tablet Take 1 tablet (25 mg total) by mouth every 4 to 6 hours as needed. (Patient taking differently: Take 25 mg by mouth every 4 to 6 hours as needed for Nausea. )    zolpidem (AMBIEN) 10 mg Tab TAKE ONE TABLET BY MOUTH EVERY NIGHT AT BEDTIME AS NEEDED FOR SLEEP (Patient taking differently: Take 10 mg by mouth nightly as needed (insomnia). )    dexAMETHasone (DECADRON) 4 MG Tab Take 1 tablet b.i.d. with meals on Monday, Tuesday, Wednesday.  Repeat q. 3 weeks before chemotherapy (Patient taking differently: Take 4 mg by mouth 3 (three) times a week. Take 1 tablet b.i.d. with meals on Monday, Tuesday, Wednesday.  Repeat q. 3 weeks before chemotherapy)    [DISCONTINUED] ASCORBIC ACID/VITAMIN E/BIOTIN (HAIR, SKIN, NAILS WITH BIOTIN ORAL) Take 1 tablet by mouth every morning.     [DISCONTINUED] HYDROcodone-acetaminophen (NORCO) 5-325 mg per tablet Take 1 tablet by mouth every 6 (six) hours as needed for Pain.    [DISCONTINUED] HYDROcodone-acetaminophen (NORCO) 5-325 mg per tablet Take 1 tablet by mouth every 6 (six) hours as needed for Pain.    [DISCONTINUED] insulin glargine,hum.rec.anlog (LANTUS U-100 INSULIN SUBQ) Inject 35 Units into the skin every evening.    [DISCONTINUED] insulin syringe-needle  U-100 1 mL 31 gauge x 5/16 Syrg 1 Device by Misc.(Non-Drug; Combo Route) route once daily.    [DISCONTINUED] multivitamin capsule Take 1 capsule by mouth every morning.    [DISCONTINUED] oxyCODONE-acetaminophen (PERCOCET)  mg per tablet Take 1 tablet by mouth every 4 (four) hours as needed for Pain.     Family History     None        Tobacco Use    Smoking status: Former Smoker     Packs/day: 0.25     Years: 30.00     Pack years: 7.50     Quit date: 2015     Years since quittin.2    Smokeless tobacco: Never Used   Substance and Sexual Activity    Alcohol use: Yes     Alcohol/week: 0.0 standard drinks     Comment: RARELY    Drug use: Yes     Types: Marijuana     Comment: medical marijuana    Sexual activity: Not on file     Review of Systems   Constitutional: Positive for activity change, appetite change and fatigue. Negative for chills and fever.   HENT: Negative for congestion and rhinorrhea.    Respiratory: Positive for cough and shortness of breath. Negative for wheezing.    Cardiovascular: Negative for chest pain, palpitations and leg swelling.   Gastrointestinal: Positive for diarrhea. Negative for abdominal distention, abdominal pain, nausea and vomiting.   Endocrine: Negative for cold intolerance and heat intolerance.   Genitourinary: Negative for dysuria and urgency.   Musculoskeletal: Negative for arthralgias and neck stiffness.   Skin: Negative for rash and wound.   Neurological: Negative for dizziness and weakness.     Objective:     Vital Signs (Most Recent):  Temp: 98 °F (36.7 °C) (20 1200)  Pulse: 100 (20 1701)  Resp: (!) 24 (20 1200)  BP: (!) 174/92 (20 1701)  SpO2: (!) 91 % (20 1701) Vital Signs (24h Range):  Temp:  [98 °F (36.7 °C)] 98 °F (36.7 °C)  Pulse:  [] 100  Resp:  [24] 24  SpO2:  [81 %-93 %] 91 %  BP: (159-218)/() 174/92     Weight: 90 kg (198 lb 6.6 oz)  Body mass index is 27.67 kg/m².    Physical Exam  Constitutional:        General: She is not in acute distress.     Appearance: She is well-developed.   HENT:      Head: Normocephalic and atraumatic.   Eyes:      Pupils: Pupils are equal, round, and reactive to light.   Cardiovascular:      Rate and Rhythm: Normal rate and regular rhythm.      Heart sounds: No murmur.   Pulmonary:      Effort: No respiratory distress.      Breath sounds: Rales present. No wheezing.      Comments: On high-flow nasal cannula.  Mild tachypnea  Abdominal:      General: Bowel sounds are normal. There is no distension.      Palpations: Abdomen is soft.      Tenderness: There is no abdominal tenderness.   Musculoskeletal: Normal range of motion.   Skin:     General: Skin is warm and dry.      Findings: No rash.   Neurological:      Mental Status: She is alert and oriented to person, place, and time.      Cranial Nerves: No cranial nerve deficit.   Psychiatric:         Behavior: Behavior normal.           CRANIAL NERVES     CN III, IV, VI   Pupils are equal, round, and reactive to light.       Significant Labs: All pertinent labs within the past 24 hours have been reviewed.    Significant Imaging: I have reviewed and interpreted all pertinent imaging results/findings within the past 24 hours.    Assessment/Plan:     * COVID-19 virus infection  - COVID-19 testing Collection Date: 12/1/2020 Collection Time:  11:28 AM   - Infection Control notified     - Isolation:   - Airborne, Contact and Droplet Precautions  - Cohort patients into COVID units  - N95 mask, wear eye protection  - 20 second hand hygiene              - Limit visitors per hospital policy              - Consolidating lab draws, nursing care, provider visits, and interventions    - Diagnostics: (leukopenia, hyponatremia, hyperferritinemia, elevated troponin, elevated d-dimer, age, and comorbidities are significant predictors of poor clinical outcome)  CBC, CMP, Ferritin, CRP and Portable CXR    - Management:  Supplemental O2 to maintain SpO2  >92%  Continuous/intermittent Pulse Ox  Albuterol treatment PRN   Dexamethasone  Remdesivir  Consult with pulmonology    Advance Care Planning     Code Status  Full code            Mixed hyperlipidemia  Chronic/controlled.  Continue home medications      Type 2 diabetes mellitus with hyperglycemia, with long-term current use of insulin  Patient's FSGs are not controlled on current hypoglycemics.  Patient reports noncompliance with insulin at home the past few days  Last A1c reviewed-   Lab Results   Component Value Date    HGBA1C 9.3 (H) 05/04/2020     Most recent fingerstick glucose reviewed- No results for input(s): POCTGLUCOSE in the last 24 hours.  Current correctional scale  Medium  Maintain anti-hyperglycemic dose as follows- will initiate home regimen with medium sliding scale insulin and monitor.  Antihyperglycemics (From admission, onward)    None        Hold Oral hypoglycemics while patient is in the hospital.        Breast cancer  Seen by Dr. Peraza.  Plans to start chemo soon      Depression  Chronic.  Continue home medications        VTE Risk Mitigation (From admission, onward)    None             Katt Richter MD  Department of Hospital Medicine   [facility]   1.5

## 2024-10-06 NOTE — TELEPHONE ENCOUNTER
Received call from [employer] stating that they have assessed pt and pt will need a power wheel chair with right hand controls order sent to them.  Order faxed. Scanned into media.    Attending MD Jolanta Valladares:  I personally made/approved the management plan and take responsibility for the patient management.  Physician assistant note reviewed and agree on plan of care and except where noted.  See HPI, PE, and MDM for details.

## 2024-12-20 NOTE — SUBJECTIVE & OBJECTIVE
Interval History: Patient seen and examined. NAEON. Feeling general well. Has itchy darkened skin to left upper back recently noticed.      Objective:     Vital Signs (Most Recent):  Temp: 98.2 °F (36.8 °C) (07/28/23 0734)  Pulse: 97 (07/28/23 0939)  Resp: 18 (07/28/23 0939)  BP: 111/84 (07/28/23 0734)  SpO2: 96 % (07/28/23 0939) Vital Signs (24h Range):  Temp:  [98.1 °F (36.7 °C)-98.7 °F (37.1 °C)] 98.2 °F (36.8 °C)  Pulse:  [] 97  Resp:  [16-18] 18  SpO2:  [96 %-99 %] 96 %  BP: (107-125)/(65-84) 111/84     Weight: 64.5 kg (142 lb 3.2 oz)  Body mass index is 19.83 kg/m².    Intake/Output Summary (Last 24 hours) at 7/28/2023 1441  Last data filed at 7/28/2023 0837  Gross per 24 hour   Intake 1200 ml   Output 2600 ml   Net -1400 ml         Physical Exam  Vitals reviewed.   Constitutional:       General: She is not in acute distress.  HENT:      Head: Normocephalic and atraumatic.   Cardiovascular:      Rate and Rhythm: Normal rate and regular rhythm.   Pulmonary:      Effort: Pulmonary effort is normal. No respiratory distress.   Skin:     General: Skin is warm and dry.      Comments: Left upper back hyperpigmented patch   Neurological:      Mental Status: She is alert.   Psychiatric:         Mood and Affect: Affect normal.         Behavior: Behavior normal.         Thought Content: Thought content normal.           Significant Labs: All pertinent labs within the past 24 hours have been reviewed.    Significant Imaging: I have reviewed all pertinent imaging results/findings within the past 24 hours.   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  They will use  CPM machine, as well as hip abductor brace for 4 weeks.  We will plan to  see them back in clinic in 2 weeks for anticipated suture removal.         SUBJECTIVE                                                                                                               12/23/2024: 6 WEEKS s/p surgery. Patient reports some anterior hip pain when hip is extended behind her while walking. Feels tight. Still pain with prolonged sitting.     11/13/2024 INITIAL EVALUATION: Patient is a 57 year old female presenting to physical therapy 2 DAYS s/p RIGHT HIP ARTHROSCOPY, LABRAL REPAIR, ACETABULOPLASTY, OSTEOCHONDROPLASTY OF FEMORAL HEAD AND NECK, CHONDROPLASTY, CAPSULAR CLOSURE AND SYNOVECTOMY (DOS: 11/11/2024). Pain in head and neck since surgery, soreness in abdomen. Taking pain meds without relief. Uses CPM machine (currently set at 30 degrees, increase by 5 degrees per day). Off work for 3 weeks, works for  (primarily a desk job). Using crutches, 20 lb flat foot weightbearing.     Pain / Symptoms  - Pain rating (out of 10): Current: 3 (anterior hip)     Patient Goals: increased motion, increased strength, decreased pain, independence with ADLs/IADLs, return to work, return to sport/leisure activities and improved balance.      OBJECTIVE                                                                                                                     Range of Motion (ROM)   (degrees unless noted; active unless noted; norms in  ( ); negative=lacking to 0, positive=beyond 0)  WNL: LLE, RLE    Strength  (out of 5 unless noted, standard test position unless noted)   Hip:    - Extension:         Left: 4-    - Abduction:         Left: 4-    - External Rotation:         Left: 4-                       Treatment     Gait belt applied and used throughout session.  Therapeutic Exercise  Recumbent bike Level 5x10 mins     Mini squats 2x10   Side steps with RED band 2x10  Backward/forward walking with RED band 2x10   Forward step ups 6\" step 2x10  Abdominal bracing + bent knee fall out vs RED band 2x10  Abdominal bracing  + marching vs RED band 2x10   Bridges supine 2x10  Sidelying clamshell vs RED band 2x10   Prone hip extension - 2 lb ankle weight 2x10    PROM right hip within patient tolerance (no ROM restrictions per week 3)    REVIEWED and UPDATED Home Exercise Program (see below)    Manual Therapy   Soft tissue mobilization anterior hip/quads musculature, IT band foam rolling, left lumbar paraspinals     Home Exercise Program  Access Code: [email or web address]  URL: [email or web address]  Date: 12/23/2024  Prepared by: Jennifer Roca    Exercises  - Supine Quad Set  - 2 x daily - 7 x weekly - 2 sets - 10 reps - 5 second hold  - Supine Gluteal Sets  - 2 x daily - 7 x weekly - 2 sets - 10 reps - 5 second hold  - Supine Transversus Abdominis Bracing - Hands on Stomach  - 2 x daily - 7 x weekly - 2 sets - 10 reps - 5 second hold  - Supine Bridge  - 2 x daily - 7 x weekly - 2 sets - 10 reps  - Prone Hip Extension  - 2 x daily - 7 x weekly - 2 sets - 10 reps  - Clamshell  - 2 x daily - 7 x weekly - 2 sets - 10 reps  - Supine March  - 2 x daily - 7 x weekly - 2 sets - 10 reps  - Side Stepping with Resistance at Ankles and Counter Support  - 1 x daily - 7 x weekly - 2 sets - 10 reps  - Forward Step Up with Counter Support  - 1 x daily - 7 x weekly - 2 sets - 10 reps  - Mini Squat with Counter Support  - 1 x daily - 7 x weekly - 2 sets - 10  reps      ASSESSMENT                                                                                                            Patient tolerated treatment well without any significant increase in pain/discomfort. ROM remains WNL without pain. Progressing well with core/hip stabilization activities. Updated home exercise program today. Continue to progress per post-op protocol and as tolerated.   Education:   - Results of above outlined education: Verbalizes understanding and Demonstrates understanding    PLAN                                                                                                                           Suggestions for next session as indicated: Progress per plan of care - postop protocol weeks 6-12 (progress hip/core strength and stabilization activities)    Goals  Long Term Goals: to be met by end of plan of care  1. Patient will report tolerating sitting for 60 minutes without pain/symptoms for completion of work duties   2. Patient will ascend and descend one flight of stairs (12 steps or more) using reciprocal pattern, independently with rail(s) to complete home management and self care.  3. Lower Extremity Functional Scale: Patient will score 50 or higher on The Lower Extremity Functional Scale to indicate a decreased level of difficulty with walking and moving around. (minimal clinically important difference: 9 points change)  4. Patient will be independent with progressed and modified home exercise program.  5. Patient will walk 200 feet or greater on paved outdoor surfaces without assistive device to indicate significantly decreased level of disability with walking and moving.    Therapy procedure time and total treatment time can be found documented on the Time Entry flowsheet

## 2024-12-21 NOTE — TELEPHONE ENCOUNTER
----- Message from Malena Lopez sent at 6/23/2017 11:11 AM CDT -----  1. What is the name of the medication you are requesting? marniol  2. What is the dose? 2.5 mg  3. How do you take the medication? Orally, topically, etc? By mouth   4. How often do you take this medication? Twice a day   5. Do you need a 30 day or 90 day supply? 30 day   6. How many refills are you requesting? none  7. What is your preferred pharmacy and location of the pharmacy? [address]   8. Who can we contact with further questions? [phone]     No

## 2025-05-05 NOTE — PLAN OF CARE
12/14/20 0659   Patient Assessment/Suction   Respiratory Effort Unlabored   Expansion/Accessory Muscles/Retractions expansion symmetric;no retractions   All Lung Fields Breath Sounds Anterior:;Lateral:;diminished   Rhythm/Pattern, Respiratory no shortness of breath reported   Cough Frequency infrequent   PRE-TX-O2   O2 Device (Oxygen Therapy) High Flow nasal Cannula   $ Is the patient on Low Flow Oxygen? Yes   Flow (L/min) 15   SpO2 (!) 92 %   Pulse Oximetry Type Continuous   Pulse 77   Resp (!) 26   Wound Care   $ Wound Care Tech Time 15 min   Area of Concern Bridge of nose   Skin Color/Characteristics without discoloration   Skin Temperature warm   Preset CPAP/BiPAP Settings   Mode Of Delivery Standby      Patient's airway examined, Neck FROM, dentition intact. TMJ FROM. Good mouth opening  Midline trachea.  Adequate oral aperture/mento-hyoid distance/cervical range of motion.  CV RRR  CTAB Patient's airway examined, Neck stiff, dentition intact. TMJ FROM. Good mouth opening  Midline trachea.  Adequate oral aperture/mento-hyoid distance/limited cervical range of motion.  CV RRR  CTAB

## (undated) DEVICE — GUIDE MICRO-GRID SIL GRN

## (undated) DEVICE — ELECTRODE REM PLYHSV RETURN 9

## (undated) DEVICE — SUT 2/0 30IN SILK BLK BRAI

## (undated) DEVICE — SLEEVE SCD EXPRESS CALF MEDIUM

## (undated) DEVICE — SEE MEDLINE ITEM 157216

## (undated) DEVICE — SEE MEDLINE ITEM 152512

## (undated) DEVICE — CORD BIPOLAR 12 FOOT

## (undated) DEVICE — SUT 3-0 VICRYL / SH (J416)

## (undated) DEVICE — SPONGE DERMACEA GAUZE 4X4

## (undated) DEVICE — CLAMP SINGLE MICRO.

## (undated) DEVICE — DRESSING TRANS 4X4 TEGADERM

## (undated) DEVICE — SUT ETHILON 5-0 18IN PLAIN

## (undated) DEVICE — SPONGE SUPER KERLIX 6X6.75IN

## (undated) DEVICE — DRAIN CHANNEL ROUND 10FR

## (undated) DEVICE — EVACUATOR WOUND BULB 100CC

## (undated) DEVICE — SUTURE MONOCRYL 4-0 PS-2 27 MCP426H

## (undated) DEVICE — SUT 2-0 VICRYL / CT-1

## (undated) DEVICE — TAPE MEDIPORE SOFT CLOTH 2X2

## (undated) DEVICE — SYR 30CC LUER LOCK

## (undated) DEVICE — SEE MEDLINE ITEM 146292

## (undated) DEVICE — GOWN SURGICAL X-LARGE

## (undated) DEVICE — SYS PRINEO SKIN CLOSURE

## (undated) DEVICE — SKINMARKER & RULER REGULAR X-F

## (undated) DEVICE — ELECTRODE BLADE INSULATED 1 IN

## (undated) DEVICE — BRA MAMMARY SUPPORT MED

## (undated) DEVICE — HOLDER TUBE

## (undated) DEVICE — PACK UNIVERSAL SPLIT II

## (undated) DEVICE — TRAY MINOR GEN SURG

## (undated) DEVICE — SOL 9P NACL IRR PIC IL

## (undated) DEVICE — SEE MEDLINE ITEM 157128

## (undated) DEVICE — ELECTRODE EXTENDED BLADE

## (undated) DEVICE — APPLICATOR CHLORAPREP ORN 26ML

## (undated) DEVICE — MICRO CLIP

## (undated) DEVICE — SEE MEDLINE ITEM 146417

## (undated) DEVICE — DRAPE T THYROID W/ARMBOARD COVER

## (undated) DEVICE — BLADE SURG CARBON STEEL SZ11

## (undated) DEVICE — SUT MONOCRYL 3-0 PS-2 UND

## (undated) DEVICE — CONTAINER SPECIMEN STRL 4OZ

## (undated) DEVICE — BAG DECANTER 10-102

## (undated) DEVICE — ELECTRODE BLADE W/SLEEVE 2.75

## (undated) DEVICE — STAPLER SKIN ROTATING HEAD

## (undated) DEVICE — NDL SAFETY 21G X 1 1/2 ECLPSE

## (undated) DEVICE — PACK CUSTOM UNIV BASIN SLI

## (undated) DEVICE — PAD ABD 8X10 STERILE

## (undated) DEVICE — UNDERGLOVES BIOGEL PI SIZE 8

## (undated) DEVICE — DRAPE NAVIGATOR GAMMA PROBE 098004

## (undated) DEVICE — SUT SILK 2-0 PS 18IN BLACK

## (undated) DEVICE — NDL SPINAL SPINOCAN 22GX3.5

## (undated) DEVICE — TRAY CATH UM FOLEY SIL W 16FR

## (undated) DEVICE — NDL HYPO REG 25G X 1 1/2

## (undated) DEVICE — SUT 2-0 ETHILON 18 FS

## (undated) DEVICE — SEE MEDLINE ITEM 152487

## (undated) DEVICE — Device

## (undated) DEVICE — TIP BOVIE TEFLON E1450X

## (undated) DEVICE — GLOVE SURG ULTRA TOUCH 7.5

## (undated) DEVICE — GOWN AERO CHROME W/ TOWEL XL

## (undated) DEVICE — PAD BOVIE ADULT

## (undated) DEVICE — SYR 10CC LUER LOCK

## (undated) DEVICE — SUT 9/0 5IN ETHILON BLK MON

## (undated) DEVICE — SPONGE DERMACEA 4X4IN 12PLY

## (undated) DEVICE — STAPLER SKIN SUBCUTICULAR

## (undated) DEVICE — DRAIN SINGLE ROUND 3/16 15F

## (undated) DEVICE — BLADE SURG CARBON STEEL #10

## (undated) DEVICE — GLOVE BIOGEL PI  GOLD SZ 7

## (undated) DEVICE — SUCTION SURGICAL STR 7FR

## (undated) DEVICE — DRAIN CHANNEL ROUND 15FR

## (undated) DEVICE — SET DECANTER MEDICHOICE

## (undated) DEVICE — ELECTRODE BLD EXT INSUL 1

## (undated) DEVICE — SYR 3CC LUER LOC

## (undated) DEVICE — SEE MEDLINE ITEM 146372

## (undated) DEVICE — CLOSURE SKIN STERI STRIP 1/2X4

## (undated) DEVICE — PREP CHLORA 10.5ML ORANGE

## (undated) DEVICE — TAPE MEDIPORE 4IN X 2YDS

## (undated) DEVICE — SWAB CULTURETTE SINGLE

## (undated) DEVICE — SUT PROLENE 5-0 PS-2 18

## (undated) DEVICE — SUT ETHILON 5-0 PS-2 18IN

## (undated) DEVICE — BLADE SURG #15 CARBON STEEL

## (undated) DEVICE — SEE MEDLINE ITEM 152622

## (undated) DEVICE — SOL NACL 0.9% INJ 500ML BG

## (undated) DEVICE — DRAPE LAP TIBURON 77X122IN

## (undated) DEVICE — APPLIER LIGACLIP LG 13IN

## (undated) DEVICE — SOLUTION IRRI NS BOTTLE 1000ML R5200-01

## (undated) DEVICE — STRAP OR TABLE 5IN X 72IN

## (undated) DEVICE — SPONGE LAP 18X18 PREWASHED

## (undated) DEVICE — SUT 3-0 VICRYL SH CR/8 18

## (undated) DEVICE — SUT MCRYL PLUS 4-0 PS2 27IN

## (undated) DEVICE — SEE MEDLINE ITEM 146313

## (undated) DEVICE — SEE MEDLINE ITEM 157117

## (undated) DEVICE — DRESSING XEROFORM 1X8IN

## (undated) DEVICE — CLIP DOUBLE MICRO.

## (undated) DEVICE — SPONGE IV DRAIN 4X4 STERILE

## (undated) DEVICE — HOOK STAY ELAS 5MM 8EA/PK

## (undated) DEVICE — DRAPE OPMI STERILE

## (undated) DEVICE — TAPE MEDIPORE 3 X 10YD

## (undated) DEVICE — APPLIER CLIP LIAGCLIP 9.375IN

## (undated) DEVICE — BOVIE SUCTION

## (undated) DEVICE — WARMER DRAPE STERILE LF

## (undated) DEVICE — UNDERGLOVES BIOGEL PI SZ 7 LF

## (undated) DEVICE — DRAPE C-ARM (FITS NEW C-ARM) 07-CA108

## (undated) DEVICE — COLLECTOR SPECIMEN ANAEROBIC

## (undated) DEVICE — TUBING 8FT HI VACLIPOSUCTION

## (undated) DEVICE — LOOP VESSEL BLUE MAXI

## (undated) DEVICE — DRESSING LEUKOPLAST FLEX 1X3IN

## (undated) DEVICE — LUBRICANT SURGILUBE 2 OZ

## (undated) DEVICE — CUP MEDICINE STERILE 2OZ

## (undated) DEVICE — FORCEP STRAIGHT DISP

## (undated) DEVICE — SEE MEDLINE ITEM 157148

## (undated) DEVICE — APPLIER LIGACLIP SM 9.38IN

## (undated) DEVICE — NEEDLE SAFETY ECLIPSE 25G 1-1/2IN 305767

## (undated) DEVICE — CATH IV INTROCAN 20G X 1.1

## (undated) DEVICE — CATH IV INTROCAN 22G X 1

## (undated) DEVICE — SUT SILK 3-0 SH 18IN BLACK

## (undated) DEVICE — SUT MONOCRYL 4-0 PS-2

## (undated) DEVICE — GLOVE SURG ULTRA TOUCH 7

## (undated) DEVICE — SEE L#120831

## (undated) DEVICE — GAUZE SPONGE BULKEE 6X6.75IN

## (undated) DEVICE — SUT VICRYL 2-0 36 CT-1

## (undated) DEVICE — BOOT AIR FLUID HEEL ADLT STD

## (undated) DEVICE — SYR CATHETER TIP 60ML

## (undated) DEVICE — SEE MEDLINE ITEM 152529

## (undated) DEVICE — SYRINGE 5ML 309646

## (undated) DEVICE — HOOK LONE STAR BLUNT 12MM

## (undated) DEVICE — SUTURE VICRYL 3-0 SH 27 VCP416H

## (undated) DEVICE — BLADE SCALPEL #11 371111

## (undated) DEVICE — SEE MEDLINE ITEM 157131

## (undated) DEVICE — SUT PDS II 2-0 CT1

## (undated) DEVICE — SUT 5/0 18IN PLIABILIZED ET

## (undated) DEVICE — GAUZE SPONGE 4X4 12PLY

## (undated) DEVICE — KIT MEROCEL INSTRUMENT WIPE

## (undated) DEVICE — GLOVE SURG BIOGEL LATEX SZ 7.5

## (undated) DEVICE — DRESSING XEROFORM FOIL PK 1X8

## (undated) DEVICE — NDL HYPO A BEVEL 22X1 1/2

## (undated) DEVICE — SUT ETHILON 2-0 PSLX 30IN

## (undated) DEVICE — KIT COVER GENERAL PURPOSE

## (undated) DEVICE — CAUTERY BOVIE PENCIL

## (undated) DEVICE — DRAPE STERI INSTRUMENT 1018

## (undated) DEVICE — KIT SAHARA DRAPE DRAW/LIFT

## (undated) DEVICE — ADHESIVE DERMABOND SKIN DNX12

## (undated) DEVICE — SLEEVE SCD EXPRESS KNEE MEDIUM